# Patient Record
Sex: FEMALE | Race: WHITE | NOT HISPANIC OR LATINO | Employment: OTHER | URBAN - METROPOLITAN AREA
[De-identification: names, ages, dates, MRNs, and addresses within clinical notes are randomized per-mention and may not be internally consistent; named-entity substitution may affect disease eponyms.]

---

## 2017-01-01 ENCOUNTER — GENERIC CONVERSION - ENCOUNTER (OUTPATIENT)
Dept: OTHER | Facility: OTHER | Age: 82
End: 2017-01-01

## 2017-01-07 ENCOUNTER — APPOINTMENT (OUTPATIENT)
Dept: LAB | Facility: CLINIC | Age: 82
End: 2017-01-07
Payer: MEDICARE

## 2017-01-07 ENCOUNTER — TRANSCRIBE ORDERS (OUTPATIENT)
Dept: LAB | Facility: CLINIC | Age: 82
End: 2017-01-07

## 2017-01-07 DIAGNOSIS — R29.6 FALLS FREQUENTLY: Primary | ICD-10-CM

## 2017-01-07 DIAGNOSIS — R29.6 FALLS FREQUENTLY: ICD-10-CM

## 2017-01-07 LAB
CK SERPL-CCNC: 81 U/L (ref 26–192)
TSH SERPL DL<=0.05 MIU/L-ACNC: 3.66 UIU/ML (ref 0.36–3.74)

## 2017-01-07 PROCEDURE — 82550 ASSAY OF CK (CPK): CPT

## 2017-01-07 PROCEDURE — 36415 COLL VENOUS BLD VENIPUNCTURE: CPT

## 2017-01-07 PROCEDURE — 84443 ASSAY THYROID STIM HORMONE: CPT

## 2017-01-17 ENCOUNTER — GENERIC CONVERSION - ENCOUNTER (OUTPATIENT)
Dept: OTHER | Facility: OTHER | Age: 82
End: 2017-01-17

## 2017-01-17 ENCOUNTER — HOSPITAL ENCOUNTER (OUTPATIENT)
Dept: GASTROENTEROLOGY | Facility: HOSPITAL | Age: 82
Discharge: HOME | End: 2017-01-17
Payer: MEDICARE

## 2017-01-17 PROCEDURE — 91110 GI TRC IMG INTRAL ESOPH-ILE: CPT

## 2017-02-25 ENCOUNTER — APPOINTMENT (OUTPATIENT)
Dept: LAB | Facility: CLINIC | Age: 82
End: 2017-02-25
Payer: MEDICARE

## 2017-02-25 ENCOUNTER — TRANSCRIBE ORDERS (OUTPATIENT)
Dept: LAB | Facility: CLINIC | Age: 82
End: 2017-02-25

## 2017-02-25 DIAGNOSIS — Z79.899 ENCOUNTER FOR LONG-TERM (CURRENT) USE OF OTHER MEDICATIONS: ICD-10-CM

## 2017-02-25 DIAGNOSIS — M81.0 OSTEOPOROSIS, UNSPECIFIED: ICD-10-CM

## 2017-02-25 DIAGNOSIS — M81.0 OSTEOPOROSIS, UNSPECIFIED: Primary | ICD-10-CM

## 2017-02-25 LAB
ANION GAP SERPL CALCULATED.3IONS-SCNC: 9 MMOL/L (ref 4–13)
BUN SERPL-MCNC: 31 MG/DL (ref 5–25)
CALCIUM SERPL-MCNC: 9.3 MG/DL (ref 8.3–10.1)
CHLORIDE SERPL-SCNC: 102 MMOL/L (ref 100–108)
CO2 SERPL-SCNC: 28 MMOL/L (ref 21–32)
CREAT SERPL-MCNC: 1.43 MG/DL (ref 0.6–1.3)
GFR SERPL CREATININE-BSD FRML MDRD: 35.1 ML/MIN/1.73SQ M
GLUCOSE SERPL-MCNC: 126 MG/DL (ref 65–140)
POTASSIUM SERPL-SCNC: 4.8 MMOL/L (ref 3.5–5.3)
SODIUM SERPL-SCNC: 139 MMOL/L (ref 136–145)

## 2017-02-25 PROCEDURE — 80048 BASIC METABOLIC PNL TOTAL CA: CPT

## 2017-02-25 PROCEDURE — 36415 COLL VENOUS BLD VENIPUNCTURE: CPT

## 2017-07-07 ENCOUNTER — TRANSCRIBE ORDERS (OUTPATIENT)
Dept: LAB | Facility: CLINIC | Age: 82
End: 2017-07-07

## 2017-07-07 ENCOUNTER — APPOINTMENT (OUTPATIENT)
Dept: LAB | Facility: CLINIC | Age: 82
End: 2017-07-07
Payer: MEDICARE

## 2017-07-07 DIAGNOSIS — E11.42 DIABETIC POLYNEUROPATHY ASSOCIATED WITH TYPE 2 DIABETES MELLITUS (HCC): ICD-10-CM

## 2017-07-07 DIAGNOSIS — M81.0 SENILE OSTEOPOROSIS: ICD-10-CM

## 2017-07-07 DIAGNOSIS — M15.0 PRIMARY GENERALIZED HYPERTROPHIC OSTEOARTHROSIS: ICD-10-CM

## 2017-07-07 DIAGNOSIS — Z79.899 ENCOUNTER FOR LONG-TERM (CURRENT) USE OF OTHER MEDICATIONS: ICD-10-CM

## 2017-07-07 DIAGNOSIS — M81.0 SENILE OSTEOPOROSIS: Primary | ICD-10-CM

## 2017-07-07 DIAGNOSIS — I73.00 RAYNAUD'S DISEASE WITHOUT GANGRENE: ICD-10-CM

## 2017-07-07 LAB
ALBUMIN SERPL BCP-MCNC: 3.2 G/DL (ref 3.5–5)
ALP SERPL-CCNC: 82 U/L (ref 46–116)
ALT SERPL W P-5'-P-CCNC: 21 U/L (ref 12–78)
ANION GAP SERPL CALCULATED.3IONS-SCNC: 9 MMOL/L (ref 4–13)
AST SERPL W P-5'-P-CCNC: 20 U/L (ref 5–45)
BACTERIA UR QL AUTO: ABNORMAL /HPF
BASOPHILS # BLD AUTO: 0.02 THOUSANDS/ΜL (ref 0–0.1)
BASOPHILS NFR BLD AUTO: 0 % (ref 0–1)
BILIRUB SERPL-MCNC: 0.2 MG/DL (ref 0.2–1)
BILIRUB UR QL STRIP: NEGATIVE
BUN SERPL-MCNC: 20 MG/DL (ref 5–25)
CALCIUM SERPL-MCNC: 9.3 MG/DL (ref 8.3–10.1)
CHLORIDE SERPL-SCNC: 104 MMOL/L (ref 100–108)
CLARITY UR: ABNORMAL
CO2 SERPL-SCNC: 29 MMOL/L (ref 21–32)
COLOR UR: YELLOW
CREAT SERPL-MCNC: 1.12 MG/DL (ref 0.6–1.3)
CRP SERPL QL: <3 MG/L
EOSINOPHIL # BLD AUTO: 0.14 THOUSAND/ΜL (ref 0–0.61)
EOSINOPHIL NFR BLD AUTO: 3 % (ref 0–6)
ERYTHROCYTE [DISTWIDTH] IN BLOOD BY AUTOMATED COUNT: 15.9 % (ref 11.6–15.1)
ERYTHROCYTE [SEDIMENTATION RATE] IN BLOOD: 12 MM/HOUR (ref 0–20)
GFR SERPL CREATININE-BSD FRML MDRD: 46.6 ML/MIN/1.73SQ M
GLUCOSE P FAST SERPL-MCNC: 137 MG/DL (ref 65–99)
GLUCOSE UR STRIP-MCNC: NEGATIVE MG/DL
HCT VFR BLD AUTO: 37.9 % (ref 34.8–46.1)
HGB BLD-MCNC: 11.8 G/DL (ref 11.5–15.4)
HGB UR QL STRIP.AUTO: ABNORMAL
KETONES UR STRIP-MCNC: NEGATIVE MG/DL
LEUKOCYTE ESTERASE UR QL STRIP: ABNORMAL
LYMPHOCYTES # BLD AUTO: 1.27 THOUSANDS/ΜL (ref 0.6–4.47)
LYMPHOCYTES NFR BLD AUTO: 23 % (ref 14–44)
MCH RBC QN AUTO: 28 PG (ref 26.8–34.3)
MCHC RBC AUTO-ENTMCNC: 31.1 G/DL (ref 31.4–37.4)
MCV RBC AUTO: 90 FL (ref 82–98)
MONOCYTES # BLD AUTO: 0.65 THOUSAND/ΜL (ref 0.17–1.22)
MONOCYTES NFR BLD AUTO: 12 % (ref 4–12)
NEUTROPHILS # BLD AUTO: 3.5 THOUSANDS/ΜL (ref 1.85–7.62)
NEUTS SEG NFR BLD AUTO: 62 % (ref 43–75)
NITRITE UR QL STRIP: POSITIVE
NON-SQ EPI CELLS URNS QL MICRO: ABNORMAL /HPF
PH UR STRIP.AUTO: 6 [PH] (ref 4.5–8)
PLATELET # BLD AUTO: 217 THOUSANDS/UL (ref 149–390)
PMV BLD AUTO: 11.2 FL (ref 8.9–12.7)
POTASSIUM SERPL-SCNC: 4.4 MMOL/L (ref 3.5–5.3)
PROT SERPL-MCNC: 7 G/DL (ref 6.4–8.2)
PROT UR STRIP-MCNC: NEGATIVE MG/DL
RBC # BLD AUTO: 4.22 MILLION/UL (ref 3.81–5.12)
RBC #/AREA URNS AUTO: ABNORMAL /HPF
SODIUM SERPL-SCNC: 142 MMOL/L (ref 136–145)
SP GR UR STRIP.AUTO: 1.02 (ref 1–1.03)
URATE SERPL-MCNC: 5 MG/DL (ref 2–6.8)
UROBILINOGEN UR QL STRIP.AUTO: 0.2 E.U./DL
WBC # BLD AUTO: 5.58 THOUSAND/UL (ref 4.31–10.16)
WBC #/AREA URNS AUTO: ABNORMAL /HPF

## 2017-07-07 PROCEDURE — 86140 C-REACTIVE PROTEIN: CPT

## 2017-07-07 PROCEDURE — 86038 ANTINUCLEAR ANTIBODIES: CPT

## 2017-07-07 PROCEDURE — 84550 ASSAY OF BLOOD/URIC ACID: CPT

## 2017-07-07 PROCEDURE — 85025 COMPLETE CBC W/AUTO DIFF WBC: CPT

## 2017-07-07 PROCEDURE — 81001 URINALYSIS AUTO W/SCOPE: CPT | Performed by: INTERNAL MEDICINE

## 2017-07-07 PROCEDURE — 85652 RBC SED RATE AUTOMATED: CPT

## 2017-07-07 PROCEDURE — 80053 COMPREHEN METABOLIC PANEL: CPT

## 2017-07-07 PROCEDURE — 36415 COLL VENOUS BLD VENIPUNCTURE: CPT

## 2017-07-10 LAB — RYE IGE QN: NEGATIVE

## 2017-07-20 ENCOUNTER — TRANSCRIBE ORDERS (OUTPATIENT)
Dept: ADMINISTRATIVE | Facility: HOSPITAL | Age: 82
End: 2017-07-20

## 2017-07-20 DIAGNOSIS — N39.0 URINARY TRACT INFECTION, SITE NOT SPECIFIED: Primary | ICD-10-CM

## 2017-07-25 ENCOUNTER — HOSPITAL ENCOUNTER (OUTPATIENT)
Dept: ULTRASOUND IMAGING | Facility: HOSPITAL | Age: 82
Discharge: HOME | End: 2017-07-25
Attending: UROLOGY
Payer: MEDICARE

## 2017-07-25 DIAGNOSIS — N39.0 URINARY TRACT INFECTION, SITE NOT SPECIFIED: ICD-10-CM

## 2017-07-25 PROCEDURE — 76770 US EXAM ABDO BACK WALL COMP: CPT

## 2017-08-30 ENCOUNTER — TRANSCRIBE ORDERS (OUTPATIENT)
Dept: LAB | Facility: CLINIC | Age: 82
End: 2017-08-30

## 2017-08-30 ENCOUNTER — APPOINTMENT (OUTPATIENT)
Dept: LAB | Facility: CLINIC | Age: 82
End: 2017-08-30
Payer: MEDICARE

## 2017-08-30 DIAGNOSIS — Z79.899 ENCOUNTER FOR LONG-TERM (CURRENT) USE OF OTHER MEDICATIONS: ICD-10-CM

## 2017-08-30 DIAGNOSIS — M81.0 SENILE OSTEOPOROSIS: ICD-10-CM

## 2017-08-30 DIAGNOSIS — M81.0 SENILE OSTEOPOROSIS: Primary | ICD-10-CM

## 2017-08-30 LAB
ANION GAP SERPL CALCULATED.3IONS-SCNC: 8 MMOL/L (ref 4–13)
BUN SERPL-MCNC: 23 MG/DL (ref 5–25)
CALCIUM SERPL-MCNC: 9.2 MG/DL (ref 8.3–10.1)
CHLORIDE SERPL-SCNC: 105 MMOL/L (ref 100–108)
CO2 SERPL-SCNC: 27 MMOL/L (ref 21–32)
CREAT SERPL-MCNC: 1.25 MG/DL (ref 0.6–1.3)
GFR SERPL CREATININE-BSD FRML MDRD: 40 ML/MIN/1.73SQ M
GLUCOSE SERPL-MCNC: 122 MG/DL (ref 65–140)
POTASSIUM SERPL-SCNC: 4.6 MMOL/L (ref 3.5–5.3)
SODIUM SERPL-SCNC: 140 MMOL/L (ref 136–145)

## 2017-08-30 PROCEDURE — 80048 BASIC METABOLIC PNL TOTAL CA: CPT

## 2017-08-30 PROCEDURE — 36415 COLL VENOUS BLD VENIPUNCTURE: CPT

## 2017-10-10 ENCOUNTER — APPOINTMENT (OUTPATIENT)
Dept: LAB | Facility: CLINIC | Age: 82
End: 2017-10-10
Payer: MEDICARE

## 2017-10-10 ENCOUNTER — TRANSCRIBE ORDERS (OUTPATIENT)
Dept: LAB | Facility: CLINIC | Age: 82
End: 2017-10-10

## 2017-10-10 DIAGNOSIS — Z79.899 NEED FOR PROPHYLACTIC CHEMOTHERAPY: ICD-10-CM

## 2017-10-10 DIAGNOSIS — M06.09 RHEUMATOID ARTHRITIS OF MULTIPLE SITES WITHOUT RHEUMATOID FACTOR (HCC): ICD-10-CM

## 2017-10-10 DIAGNOSIS — I73.00 SECONDARY RAYNAUD'S PHENOMENON: ICD-10-CM

## 2017-10-10 DIAGNOSIS — E13.42 DIABETIC POLYNEUROPATHY ASSOCIATED WITH OTHER SPECIFIED DIABETES MELLITUS (HCC): ICD-10-CM

## 2017-10-10 DIAGNOSIS — M81.0 SENILE OSTEOPOROSIS: Primary | ICD-10-CM

## 2017-10-10 DIAGNOSIS — E55.9 AVITAMINOSIS D: ICD-10-CM

## 2017-10-10 DIAGNOSIS — M81.0 SENILE OSTEOPOROSIS: ICD-10-CM

## 2017-10-10 LAB
25(OH)D3 SERPL-MCNC: 34.5 NG/ML (ref 30–100)
ALBUMIN SERPL BCP-MCNC: 3.3 G/DL (ref 3.5–5)
ALP SERPL-CCNC: 76 U/L (ref 46–116)
ALT SERPL W P-5'-P-CCNC: 25 U/L (ref 12–78)
ANION GAP SERPL CALCULATED.3IONS-SCNC: 7 MMOL/L (ref 4–13)
AST SERPL W P-5'-P-CCNC: 21 U/L (ref 5–45)
BACTERIA UR QL AUTO: ABNORMAL /HPF
BASOPHILS # BLD AUTO: 0.03 THOUSANDS/ΜL (ref 0–0.1)
BASOPHILS NFR BLD AUTO: 1 % (ref 0–1)
BILIRUB SERPL-MCNC: 0.3 MG/DL (ref 0.2–1)
BILIRUB UR QL STRIP: NEGATIVE
BUN SERPL-MCNC: 18 MG/DL (ref 5–25)
CALCIUM SERPL-MCNC: 9.1 MG/DL (ref 8.3–10.1)
CHLORIDE SERPL-SCNC: 103 MMOL/L (ref 100–108)
CLARITY UR: CLEAR
CO2 SERPL-SCNC: 29 MMOL/L (ref 21–32)
COLOR UR: YELLOW
CREAT SERPL-MCNC: 1.29 MG/DL (ref 0.6–1.3)
EOSINOPHIL # BLD AUTO: 0.13 THOUSAND/ΜL (ref 0–0.61)
EOSINOPHIL NFR BLD AUTO: 2 % (ref 0–6)
ERYTHROCYTE [DISTWIDTH] IN BLOOD BY AUTOMATED COUNT: 17.4 % (ref 11.6–15.1)
ERYTHROCYTE [SEDIMENTATION RATE] IN BLOOD: 10 MM/HOUR (ref 0–20)
GFR SERPL CREATININE-BSD FRML MDRD: 38 ML/MIN/1.73SQ M
GLUCOSE P FAST SERPL-MCNC: 124 MG/DL (ref 65–99)
GLUCOSE UR STRIP-MCNC: NEGATIVE MG/DL
HCT VFR BLD AUTO: 36.8 % (ref 34.8–46.1)
HGB BLD-MCNC: 11.5 G/DL (ref 11.5–15.4)
HGB UR QL STRIP.AUTO: NEGATIVE
KETONES UR STRIP-MCNC: NEGATIVE MG/DL
LEUKOCYTE ESTERASE UR QL STRIP: ABNORMAL
LYMPHOCYTES # BLD AUTO: 1.56 THOUSANDS/ΜL (ref 0.6–4.47)
LYMPHOCYTES NFR BLD AUTO: 26 % (ref 14–44)
MCH RBC QN AUTO: 27.3 PG (ref 26.8–34.3)
MCHC RBC AUTO-ENTMCNC: 31.3 G/DL (ref 31.4–37.4)
MCV RBC AUTO: 87 FL (ref 82–98)
MONOCYTES # BLD AUTO: 0.78 THOUSAND/ΜL (ref 0.17–1.22)
MONOCYTES NFR BLD AUTO: 13 % (ref 4–12)
NEUTROPHILS # BLD AUTO: 3.56 THOUSANDS/ΜL (ref 1.85–7.62)
NEUTS SEG NFR BLD AUTO: 58 % (ref 43–75)
NITRITE UR QL STRIP: NEGATIVE
NON-SQ EPI CELLS URNS QL MICRO: ABNORMAL /HPF
PH UR STRIP.AUTO: 5.5 [PH] (ref 4.5–8)
PLATELET # BLD AUTO: 245 THOUSANDS/UL (ref 149–390)
PMV BLD AUTO: 11.3 FL (ref 8.9–12.7)
POTASSIUM SERPL-SCNC: 4.7 MMOL/L (ref 3.5–5.3)
PROT SERPL-MCNC: 7 G/DL (ref 6.4–8.2)
PROT UR STRIP-MCNC: NEGATIVE MG/DL
RBC # BLD AUTO: 4.22 MILLION/UL (ref 3.81–5.12)
RBC #/AREA URNS AUTO: ABNORMAL /HPF
SODIUM SERPL-SCNC: 139 MMOL/L (ref 136–145)
SP GR UR STRIP.AUTO: <=1.005 (ref 1–1.03)
UROBILINOGEN UR QL STRIP.AUTO: 0.2 E.U./DL
WBC # BLD AUTO: 6.06 THOUSAND/UL (ref 4.31–10.16)
WBC #/AREA URNS AUTO: ABNORMAL /HPF

## 2017-10-10 PROCEDURE — 85025 COMPLETE CBC W/AUTO DIFF WBC: CPT

## 2017-10-10 PROCEDURE — 81001 URINALYSIS AUTO W/SCOPE: CPT | Performed by: INTERNAL MEDICINE

## 2017-10-10 PROCEDURE — 86200 CCP ANTIBODY: CPT

## 2017-10-10 PROCEDURE — 85652 RBC SED RATE AUTOMATED: CPT

## 2017-10-10 PROCEDURE — 86430 RHEUMATOID FACTOR TEST QUAL: CPT

## 2017-10-10 PROCEDURE — 82306 VITAMIN D 25 HYDROXY: CPT

## 2017-10-10 PROCEDURE — 36415 COLL VENOUS BLD VENIPUNCTURE: CPT

## 2017-10-10 PROCEDURE — 80053 COMPREHEN METABOLIC PANEL: CPT

## 2017-10-11 LAB — RHEUMATOID FACT SER QL LA: NEGATIVE

## 2017-10-12 LAB — CCP IGA+IGG SERPL IA-ACNC: 6 UNITS (ref 0–19)

## 2017-11-13 ENCOUNTER — GENERIC CONVERSION - ENCOUNTER (OUTPATIENT)
Dept: OTHER | Facility: OTHER | Age: 82
End: 2017-11-13

## 2017-11-24 ENCOUNTER — ALLSCRIPTS OFFICE VISIT (OUTPATIENT)
Dept: OTHER | Facility: OTHER | Age: 82
End: 2017-11-24

## 2017-11-24 DIAGNOSIS — R35.0 FREQUENCY OF MICTURITION: ICD-10-CM

## 2017-12-19 ENCOUNTER — TRANSCRIBE ORDERS (OUTPATIENT)
Dept: LAB | Facility: CLINIC | Age: 82
End: 2017-12-19

## 2017-12-19 ENCOUNTER — APPOINTMENT (OUTPATIENT)
Dept: LAB | Facility: CLINIC | Age: 82
End: 2017-12-19
Payer: MEDICARE

## 2017-12-19 DIAGNOSIS — M06.09 RHEUMATOID ARTHRITIS OF MULTIPLE SITES WITHOUT RHEUMATOID FACTOR (HCC): Primary | ICD-10-CM

## 2017-12-19 DIAGNOSIS — R35.0 FREQUENCY OF MICTURITION: ICD-10-CM

## 2017-12-19 DIAGNOSIS — Z79.899 ENCOUNTER FOR LONG-TERM (CURRENT) USE OF MEDICATIONS: ICD-10-CM

## 2017-12-19 DIAGNOSIS — M06.09 RHEUMATOID ARTHRITIS OF MULTIPLE SITES WITHOUT RHEUMATOID FACTOR (HCC): ICD-10-CM

## 2017-12-19 LAB
ALBUMIN SERPL BCP-MCNC: 3.2 G/DL (ref 3.5–5)
ALP SERPL-CCNC: 87 U/L (ref 46–116)
ALT SERPL W P-5'-P-CCNC: 25 U/L (ref 12–78)
ANION GAP SERPL CALCULATED.3IONS-SCNC: 8 MMOL/L (ref 4–13)
AST SERPL W P-5'-P-CCNC: 19 U/L (ref 5–45)
BACTERIA UR QL AUTO: ABNORMAL /HPF
BASOPHILS # BLD AUTO: 0.04 THOUSANDS/ΜL (ref 0–0.1)
BASOPHILS NFR BLD AUTO: 1 % (ref 0–1)
BILIRUB SERPL-MCNC: 0.3 MG/DL (ref 0.2–1)
BILIRUB UR QL STRIP: NEGATIVE
BUN SERPL-MCNC: 18 MG/DL (ref 5–25)
CALCIUM SERPL-MCNC: 8.9 MG/DL (ref 8.3–10.1)
CHLORIDE SERPL-SCNC: 104 MMOL/L (ref 100–108)
CLARITY UR: ABNORMAL
CO2 SERPL-SCNC: 27 MMOL/L (ref 21–32)
COLOR UR: YELLOW
CREAT SERPL-MCNC: 1.11 MG/DL (ref 0.6–1.3)
EOSINOPHIL # BLD AUTO: 0.24 THOUSAND/ΜL (ref 0–0.61)
EOSINOPHIL NFR BLD AUTO: 3 % (ref 0–6)
ERYTHROCYTE [DISTWIDTH] IN BLOOD BY AUTOMATED COUNT: 17.1 % (ref 11.6–15.1)
ERYTHROCYTE [SEDIMENTATION RATE] IN BLOOD: 10 MM/HOUR (ref 0–20)
GFR SERPL CREATININE-BSD FRML MDRD: 46 ML/MIN/1.73SQ M
GLUCOSE SERPL-MCNC: 114 MG/DL (ref 65–140)
GLUCOSE UR STRIP-MCNC: NEGATIVE MG/DL
HCT VFR BLD AUTO: 35.2 % (ref 34.8–46.1)
HGB BLD-MCNC: 11.1 G/DL (ref 11.5–15.4)
HGB UR QL STRIP.AUTO: ABNORMAL
KETONES UR STRIP-MCNC: NEGATIVE MG/DL
LEUKOCYTE ESTERASE UR QL STRIP: ABNORMAL
LYMPHOCYTES # BLD AUTO: 2.02 THOUSANDS/ΜL (ref 0.6–4.47)
LYMPHOCYTES NFR BLD AUTO: 27 % (ref 14–44)
MCH RBC QN AUTO: 27.3 PG (ref 26.8–34.3)
MCHC RBC AUTO-ENTMCNC: 31.5 G/DL (ref 31.4–37.4)
MCV RBC AUTO: 87 FL (ref 82–98)
MONOCYTES # BLD AUTO: 0.95 THOUSAND/ΜL (ref 0.17–1.22)
MONOCYTES NFR BLD AUTO: 13 % (ref 4–12)
NEUTROPHILS # BLD AUTO: 4.36 THOUSANDS/ΜL (ref 1.85–7.62)
NEUTS SEG NFR BLD AUTO: 56 % (ref 43–75)
NITRITE UR QL STRIP: NEGATIVE
NON-SQ EPI CELLS URNS QL MICRO: ABNORMAL /HPF
PH UR STRIP.AUTO: 5.5 [PH] (ref 4.5–8)
PLATELET # BLD AUTO: 248 THOUSANDS/UL (ref 149–390)
PMV BLD AUTO: 10.5 FL (ref 8.9–12.7)
POTASSIUM SERPL-SCNC: 4.5 MMOL/L (ref 3.5–5.3)
PROT SERPL-MCNC: 7.2 G/DL (ref 6.4–8.2)
PROT UR STRIP-MCNC: NEGATIVE MG/DL
RBC # BLD AUTO: 4.06 MILLION/UL (ref 3.81–5.12)
RBC #/AREA URNS AUTO: ABNORMAL /HPF
SODIUM SERPL-SCNC: 139 MMOL/L (ref 136–145)
SP GR UR STRIP.AUTO: 1.02 (ref 1–1.03)
UROBILINOGEN UR QL STRIP.AUTO: 0.2 E.U./DL
WBC # BLD AUTO: 7.61 THOUSAND/UL (ref 4.31–10.16)
WBC #/AREA URNS AUTO: ABNORMAL /HPF

## 2017-12-19 PROCEDURE — 80053 COMPREHEN METABOLIC PANEL: CPT

## 2017-12-19 PROCEDURE — 85025 COMPLETE CBC W/AUTO DIFF WBC: CPT

## 2017-12-19 PROCEDURE — 85652 RBC SED RATE AUTOMATED: CPT

## 2017-12-19 PROCEDURE — 81001 URINALYSIS AUTO W/SCOPE: CPT

## 2017-12-19 PROCEDURE — 36415 COLL VENOUS BLD VENIPUNCTURE: CPT

## 2018-01-09 ENCOUNTER — ALLSCRIPTS OFFICE VISIT (OUTPATIENT)
Dept: OTHER | Facility: OTHER | Age: 83
End: 2018-01-09

## 2018-01-10 NOTE — PROGRESS NOTES
Assessment   1  Urine frequency (788 41) (R35 0)   2  Urine incontinence (788 30) (R32)    Plan   Urine incontinence    · Myrbetriq 50 MG Oral Tablet Extended Release 24 Hour; Take 1 tablet daily   Rx By: Faye Vasquez; Dispense: 0 Days ; #:90 Tablet Extended Release 24 Hour; Refill: 3;For: Urine incontinence; JUVENTINO = N; Dispense Sample   · Follow-up visit in 1 month Evaluation and Treatment  Follow-up  Status: Complete  Done:    [id number]   Ordered; For: Urine incontinence; Ordered By: Faye Vasquez Performed:  Due: 27EPX8879; Last Updated By: Cherrie Jeter; 1/9/2018 11:02:27 AM    Discussion/Summary   Discussion Summary:    We discussed her concerns  We discussed possible medical management at this point  The problem is that first-line therapy with anticholinergics will not be tolerated due to her dry mouth problems  He we discussed side effects risks and benefits of anticholinergics  We also discussed side effect profile for Myrbetriq  She would like to try this  I do have samples and will give her samples of Myrbetriq 50 mg to try for 1 month  However she understands there may be difficulty with insurance coverage  We may be able to help Re certify, as she will not tolerate anticholinergics  She will follow up in 1 month to evaluate her progress  Patient's Capacity to Self-Care: Patient is able to Self-Care  Medication SE Review and Pt Understands Tx: Possible side effects of new medications were reviewed with the patient/guardian today  The treatment plan was reviewed with the patient/guardian  The patient/guardian understands and agrees with the treatment plan      Chief Complaint   Chief Complaint Free Text Note Form: Patient presents for urinary frequency and incontinence      History of Present Illness   HPI: Patient has tried behavior modification for the last month   She still complains of significant urinary incontinence especially at night when she wakes to void, she has no control  has a problem with dry mouth and needs to take fluids continually  Review of Systems   Complete-Female Urology:      Constitutional: No fever, no chills, feels well, no tiredness, no recent weight gain or weight loss  Respiratory: No complaints of shortness of breath, no wheezing, no cough, no SOB on exertion, no orthopnea, no PND  Cardiovascular: No complaints of slow heart rate, no fast heart rate, no chest pain, no palpitations, no leg claudication, no lower extremity edema  Gastrointestinal: No complaints of abdominal pain, no constipation, no nausea or vomiting, no diarrhea, no bloody stools  Genitourinary: feelings of urinary urgency,-- Empty sensation,-- incontinence-- (wears 2 pads at night  none during the day)-- and-- stream quality good, but-- no urinary hesitancy-- and-- no hematuria--       The patient presents with complaints of dysuria (one day last week she had pain and burning when she urinated)  The patient presents with complaints of nocturia (1-2 times per night)      Musculoskeletal: No complaints of arthralgias, no myalgias, no joint swelling or stiffness, no limb pain or swelling  Integumentary: No complaints of skin rash or lesions, no itching, no skin wounds, no breast pain or lump  Hematologic/Lymphatic: No complaints of swollen glands, no swollen glands in the neck, does not bleed easily, does not bruise easily  Neurological: No complaints of headache, no confusion, no convulsions, no numbness, no dizziness or fainting, no tingling, no limb weakness, no difficulty walking  Active Problems   1  Anemia (285 9) (D64 9)   2  Anemia due to GI blood loss (280 0) (D50 0)   3  History of bladder infections (V13 02) (Z87 440)   4  History of colon polyps (V12 72) (Z86 010)   5  Urine frequency (788 41) (R35 0)   6  Urine incontinence (788 30) (R32)    Past Medical History   1   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, with long-term current     use of insulin (250 20,V58 67) (E13 01,Z79 4)   2  History of back problems   3  History of hypertension (V12 59) (Z86 79)   4  History of orthopedic surgery (V45 89) (C96 592)    Surgical History   1  History of Back Surgery   2  History of Gastric Surgery   3  History of Hip Replacement   4  History of Knee Surgery    Family History   Mother    1  Family history of diabetes mellitus (V18 0) (Z83 3)  Father    2  Family history of cardiac disorder (V17 49) (Z82 49)   3  Family history of hypertension (V17 49) (Z82 49)    Social History    · Employed   ·    · Never a smoker   · No alcohol use   · Non-smoker (V49 89) (Z78 9)    Current Meds    1  Aspirin TABS; Therapy: (Recorded:24Nov2017) to Recorded   2  Cymbalta 20 MG Oral Capsule Delayed Release Particles; Therapy: (Recorded:24Nov2017) to Recorded   3  Lisinopril 10 MG Oral Tablet; Therapy: (Recorded:24Nov2017) to Recorded   4  Lomotil TABS; Therapy: (Recorded:24Nov2017) to Recorded   5  Lyrica 25 MG Oral Capsule; Therapy: (Recorded:24Nov2017) to Recorded   6  MetFORMIN HCl - 1000 MG Oral Tablet; Therapy: (Recorded:24Nov2017) to Recorded   7  Neurontin 100 MG Oral Capsule; Therapy: (Recorded:24Nov2017) to Recorded   8  PriLOSEC 20 MG CPDR; Therapy: ([phone]) to Recorded   9  TraMADol HCl - 50 MG Oral Tablet; Therapy: (Recorded:24Nov2017) to Recorded   10  Vitamin B-6 TABS; Therapy: (Recorded:24Nov2017) to Recorded   11  Vitamin D3 1000 UNIT Oral Capsule; Therapy: (Recorded:24Nov2017) to Recorded    Allergies   1  Cipro TABS  2  No Known Environmental Allergies   3   No Known Food Allergies    Vitals   Vital Signs    Recorded: 46GPG9226 10:36AM   Heart Rate 80   Systolic 302   Diastolic 70   Height 4 ft 11 in   Weight 144 lb    BMI Calculated 29 08   BSA Calculated 1 6     Physical Exam        Constitutional      General appearance: No acute distress, well appearing and well nourished  Abdomen      Abdomen: Non-tender, no masses  Musculoskeletal      Gait and station: Abnormal  -- Walks with a cane        Signatures    Electronically signed by : DEYVI Montana ; Jan 9 2018 12:28PM EST                       (Author)

## 2018-01-15 NOTE — RESULT NOTES
Message    Colon polyps removed came back as tubular adenomas  Repeat colonoscopy in one year due to the preparation reasons  pt is aware  ph         Verified Results  (1) TISSUE EXAM [id number] 01:18PM Donna Rouse     Test Name Result Flag Reference   LAB AP CASE REPORT (Report)     Surgical Pathology Report             Case: [id number]                   Authorizing Provider: Vane Stewart MD     Collected:      12/23/2016 1318        Ordering Location:   [facility]    Received:      12/23/2016 [address] [place] [address] Endoscopy                               Pathologist:      Jennifer Brower DO                               Specimens:  A) - Stomach, cold biopsy gastric body                                 B) - Polyp, Colorectal, hot snare proximal ascending polyps x 2                    C) - Polyp, Colorectal, hot snare transverse colon polyps x 2   LAB AP FINAL DIAGNOSIS (Report)     A  Stomach, body, biopsy:  - 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   - No H  pylori organisms identified on H&E and special stain   ([place]in-Starry)  B  Colon, proximal ascending, polyps x2, biopsy:  - Tubular adenomas (2)  - Negative for high-grade dysplasia  C  Colon, transverse, polyps x2, biopsy:  - Tubular adenoma involving all submitted fragments  - Negative for high-grade dysplasia  Appropriate positive and negative controls obtained  Interpretation performed at [facility], [place] [address]  Electronically signed by Jennifer Brower DO on 12/28/2016 at 8:41 AM   LAB AP SURGICAL ADDITIONAL INFORMATION (Report)     These tests were developed and their performance characteristics   determined by [facility] or [facility]  They may not be cleared or approved by the [place]  Food and   Drug Administration  The FDA has determined that such clearance or   approval is not necessary   These tests are used for clinical purposes  They should not be regarded as investigational or for research  This   laboratory has been approved by [place], designated as a high-complexity   laboratory and is qualified to perform these tests  LAB AP GROSS DESCRIPTION (Report)     A  The specimen is received in formalin, labeled with the patient's name   and hospital number, and is designated gastric body biopsy  The   specimen consists of 2 tan-pink soft to friable tissue fragments measuring   0 3 cm and 0 5 cm in greatest dimension  Entirely submitted  One cassette  B  The specimen is received in formalin, labeled with the patient's name   and hospital number, and is designated proximal ascending polyps ??2 the   specimen consists of 2 tan-pink polypoid soft tissue fragments measuring   0 5 cm and 0 8 cm in greatest dimension  Entirely submitted  One cassette  C  The specimen is received in formalin, labeled with the patient's name   and hospital number, and is designated transverse colon polyps ??2 the   specimen consists of 4 tan-pink soft tissue fragments measuring 0 2 cm,   0 3 cm, 0 4 cm, and 0 4 cm in greatest dimension  Entirely submitted  One   cassette  Note: The estimated total formalin fixation time based upon information   provided by the submitting clinician and the standard processing schedule   is over 72 hours    [facility]   LAB AP CLINICAL INFORMATION R/o h pylori     R/o h pylori

## 2018-01-16 NOTE — MISCELLANEOUS
Message  GI Reminder Recall [facility]:   Date: 11/13/2017   Dear Mahi Hayward:     Review of our records shows you are due for the following: Colonoscopy  Our records indicate that you are due at this time to have a follow-up examination for a colonoscopy  As you now, these tests are done to prevent colon cancer, a very common disease in the [place] and responsible for the thousands of patient deaths each year  We at [facility] are concerned for your health, and would very much appreciate you getting in touch with us at your earliest convenience, Again, this examination is vital to your proper health maintenance and for the prevention of cancer  Please call the following office to schedule your appointment:   [address], [address], [place], [address] [place] [address]  [phone]  We look forward to hearing from you!      Sincerely,     Dr [facility]            Signatures   Electronically signed by : Fide Trinidad, ; Nov 13 2017  8:51AM EST                       (Author)

## 2018-01-17 NOTE — CONSULTS
Assessment    1  Urine frequency (788 41) (R35 0)   2  Urine incontinence (788 30) (R32)   3  History of bladder infections (V13 02) (Z87 440)    Plan  Urine frequency    · (1) URINALYSIS w URINE C/S REFLEX (will reflex a microscopy if leukocytes, occult  blood, or nitrites are not within normal limits); Status:Active - Retrospective Authorization; Requested Z:[id number];    Perform:[facility]; YJM:[id number]; Last Updated Marylin Jeter; 11/24/2017 12:00:28 PM;Ordered; For:Urine frequency; Ordered By:Thomas Renee;  Urine incontinence    · Follow-up visit in 1 month Evaluation and Treatment  Follow-up  Status: Complete  Done:  [id number]   Ordered; For: Urine incontinence; Ordered By: Cristy Moore Performed:  Due: 91GEF7043; Last Updated By: Kory Griffiths; 11/24/2017 12:01:29 PM    Discussion/Summary  Discussion Summary:   I discussed the situation with the patient and her daughter  It is unclear whether she truly has a urologic condition  We discussed the importance of behavior modification with restricting fluids 2 hours before bedtime and double voiding as well  I would like to check a urine culture to ensure a sterile urine  They will also ensure that she is hydrating adequately  If she is still having complaints subsequently, we discussed adding medication such as Myrbetriq  For now will try to avoid medication  They are satisfied with this plan  Patient's Capacity to Self-Care: Patient is able to Self-Care  Goals and Barriers: The patient has the current Goals: Improve urinary symptoms  The patent has the current Barriers: None  Medication SE Review and Pt Understands Tx: The treatment plan was reviewed with the patient/guardian   The patient/guardian understands and agrees with the treatment plan      Chief Complaint  Chief Complaint Free Text Note Form: Patient presents for urine frequency, incontinence and history of UTI      History of Present Illness  HPI: 80-year-old woman referred for evaluation of lower urinary tract symptoms  She presents today with her daughter accompanying her  I have treated her  Sylvia Huynh in the past   She primarily complains of nocturia and nocturnal enuresis when on her way to the bathroom at night  She does not really have any daytime complaints  She wakes at night 2 to 3 times with this problem  Interestingly though she was on vacation 1 week ago and did not have any nocturnal problems  She typically drinks coffee at night and some water  No other beverages  She saw Romario Lopez a couple of months ago for these symptoms  An ultrasound was obtained and was normal as was her urine sample  [facility] was ordered however was canceled due to a family emergency  She said that she was sent a bill for $50 became very upset and chose not to return there  She gives a history of prior urinary tract infection, although I do not have any urine cultures to confirm this  Review of Systems  Complete-Female Urology:   Constitutional: No fever, no chills, feels well, no tiredness, no recent weight gain or weight loss  Respiratory: No complaints of shortness of breath, no wheezing, no cough, no SOB on exertion, no orthopnea, no PND  Cardiovascular: No complaints of slow heart rate, no fast heart rate, no chest pain, no palpitations, no leg claudication, no lower extremity edema  Gastrointestinal: No complaints of abdominal pain, no constipation, no nausea or vomiting, no diarrhea, no bloody stools  Genitourinary: dysuria, feelings of urinary urgency, Empty sensation and stream quality fair, but as noted in HPI, no urinary hesitancy, no incontinence and no hematuria    The patient presents with complaints of nocturia (Wears 3-4 pads )  Musculoskeletal: No complaints of arthralgias, no myalgias, no joint swelling or stiffness, no limb pain or swelling  Integumentary: No complaints of skin rash or lesions, no itching, no skin wounds, no breast pain or lump  Hematologic/Lymphatic: No complaints of swollen glands, no swollen glands in the neck, does not bleed easily, does not bruise easily  Neurological: No complaints of headache, no confusion, no convulsions, no numbness, no dizziness or fainting, no tingling, no limb weakness, no difficulty walking  ROS Reviewed:   ROS reviewed  Active Problems    1  Anemia (285 9) (D64 9)   2  Anemia due to GI blood loss (280 0) (D50 0)   3  History of bladder infections (V13 02) (Z87 440)   4  History of colon polyps (V12 72) (Z86 010)   5  Urine frequency (788 41) (R35 0)   6  Urine incontinence (788 30) (R32)    Past Medical History  Active Problems And Past Medical History Reviewed: The active problems and past medical history were reviewed and updated today  Surgical History  Surgical History Reviewed: The surgical history was reviewed and updated today  Family History  Family History Reviewed: The family history was reviewed and updated today  Social History  Social History Reviewed: The social history was reviewed and updated today  Current Meds  Medication List Reviewed: The medication list was reviewed and updated today  Vitals  Vital Signs    Recorded: 66YMJ8694 11:25AM   Heart Rate 68   Systolic 346   Diastolic 80   Height 4 ft 11 in   Weight 148 lb 2 oz   BMI Calculated 29 92   BSA Calculated 1 62     Physical Exam    Constitutional   General appearance: No acute distress, well appearing and well nourished  Pulmonary   Respiratory effort: No increased work of breathing or signs of respiratory distress  Cardiovascular   Examination of extremities for edema and/or varicosities: Normal     Abdomen   Abdomen: Non-tender, no masses  Musculoskeletal   Gait and station: Abnormal   Uses a walker  Neurologic   Sensation: No sensory loss  Additional Exam:   no CVA tenderness, no suprapubic tenderness   Affect normal       Future Appointments    Date/Time Provider Specialty Site   01/09/2018 10:30 AM DEYVI Porter   Urology [facility]     Signatures   Electronically signed by : DEYVI Slade ; Nov 24 2017 12:52PM EST                       (Author)

## 2018-01-22 VITALS
SYSTOLIC BLOOD PRESSURE: 126 MMHG | DIASTOLIC BLOOD PRESSURE: 80 MMHG | BODY MASS INDEX: 29.86 KG/M2 | HEART RATE: 68 BPM | HEIGHT: 59 IN | WEIGHT: 148.13 LBS

## 2018-01-23 VITALS
HEIGHT: 59 IN | BODY MASS INDEX: 29.03 KG/M2 | DIASTOLIC BLOOD PRESSURE: 70 MMHG | HEART RATE: 80 BPM | WEIGHT: 144 LBS | SYSTOLIC BLOOD PRESSURE: 120 MMHG

## 2018-01-30 ENCOUNTER — HOSPITAL ENCOUNTER (EMERGENCY)
Facility: HOSPITAL | Age: 83
Discharge: HOME | End: 2018-01-30
Attending: EMERGENCY MEDICINE
Payer: MEDICARE

## 2018-01-30 VITALS
BODY MASS INDEX: 30.3 KG/M2 | WEIGHT: 150 LBS | RESPIRATION RATE: 18 BRPM | SYSTOLIC BLOOD PRESSURE: 129 MMHG | DIASTOLIC BLOOD PRESSURE: 65 MMHG | OXYGEN SATURATION: 96 % | TEMPERATURE: 98.2 F | HEART RATE: 83 BPM

## 2018-01-30 DIAGNOSIS — S81.011A KNEE LACERATION, RIGHT, INITIAL ENCOUNTER: Primary | ICD-10-CM

## 2018-01-30 PROCEDURE — 99283 EMERGENCY DEPT VISIT LOW MDM: CPT

## 2018-01-30 RX ORDER — ACETAMINOPHEN 325 MG/1
975 TABLET ORAL ONCE
Status: COMPLETED | OUTPATIENT
Start: 2018-01-30 | End: 2018-01-30

## 2018-01-30 RX ORDER — LIDOCAINE HYDROCHLORIDE AND EPINEPHRINE 10; 10 MG/ML; UG/ML
1 INJECTION, SOLUTION INFILTRATION; PERINEURAL ONCE
Status: COMPLETED | OUTPATIENT
Start: 2018-01-30 | End: 2018-01-30

## 2018-01-30 RX ADMIN — LIDOCAINE HYDROCHLORIDE,EPINEPHRINE BITARTRATE 1 ML: 10; .01 INJECTION, SOLUTION INFILTRATION; PERINEURAL at 18:44

## 2018-01-30 RX ADMIN — ACETAMINOPHEN 975 MG: 325 TABLET, FILM COATED ORAL at 17:58

## 2018-01-30 NOTE — ED PROVIDER NOTES
History  Chief Complaint   Patient presents with    Knee Injury     patient sustained right knee laceration while walking up the steps  Happened approx 1 hour ago  Bleeding controlled  History provided by:  Patient   used: No     61-year-old female presents with the 3 cm right knee laceration after tripping on her steps this afternoon  She states she fell going up the steps  She was able to get up and walk afterwards  Denies much pain with bending the knee  She did not strike her head  Takes aspirin  Reports some right low back pain but nothing midline  Denies any other injuries  On exam she is awake and alert, oriented  No cervical spine, thoracic or lumbar tenderness  Some mild right-sided muscular tenderness  Able to range the knee without difficulty  3 cm laceration without active bleeding  Plan anesthetization, irrigation and closure with sutures  Prior to Admission Medications   Prescriptions Last Dose Informant Patient Reported? Taking?    DULoxetine (CYMBALTA) 60 mg delayed release capsule  Spouse/Significant Other Yes No   Sig: Take 60 mg by mouth daily   aspirin 81 MG tablet  Spouse/Significant Other Yes No   Sig: Take 81 mg by mouth daily   calcium carbonate-vitamin D (OSCAL-D) 500 mg-200 units per tablet  Spouse/Significant Other Yes No   Sig: Take 1 tablet by mouth daily with breakfast   dicyclomine (BENTYL) 10 mg capsule  Spouse/Significant Other Yes No   Sig: Take 10 mg by mouth daily   diphenoxylate-atropine (LOMOTIL) 2 5-0 025 mg per tablet  Spouse/Significant Other Yes No   Sig: Take 1 tablet by mouth 4 (four) times a day as needed for diarrhea   docusate sodium (COLACE) 100 mg capsule   No No   Sig: Take 1 capsule by mouth 2 (two) times a day for 30 days   ferrous sulfate 325 (65 Fe) mg tablet   No No   Sig: Take 1 tablet by mouth 2 (two) times a day for 30 days   gabapentin (NEURONTIN) 300 mg capsule  Spouse/Significant Other Yes No   Sig: Take 300 mg by mouth 3 (three) times a day   lisinopril (ZESTRIL) 2 5 mg tablet  Spouse/Significant Other Yes No   Sig: Take 2 5 mg by mouth daily   metFORMIN (GLUCOPHAGE) 500 mg tablet  Spouse/Significant Other Yes No   Sig: Take 500 mg by mouth 2 (two) times a day with meals   omeprazole (PriLOSEC) 20 mg delayed release capsule  Spouse/Significant Other Yes No   Sig: Take 20 mg by mouth daily   pregabalin (LYRICA) 50 mg capsule  Spouse/Significant Other Yes No   Sig: Take 50 mg by mouth daily after dinner   pyridoxine (B-6) 100 MG tablet  Spouse/Significant Other Yes No   Sig: Take 100 mg by mouth daily      Facility-Administered Medications: None       Past Medical History:   Diagnosis Date    Arthritis     [facility]     [facility]     Diverticulosis     Hypertension        Past Surgical History:   Procedure Laterality Date    BACK SURGERY      EGD AND COLONOSCOPY N/A 12/23/2016    Procedure: EGD AND COLONOSCOPY;  Surgeon: Denisha Kang MD;  Location: [facility]; Service:     JOINT REPLACEMENT      bilat knees and hips    OTHER SURGICAL HISTORY      pelvis rods    REPLACEMENT TOTAL KNEE BILATERAL         Family History   Problem Relation Age of Onset    Cancer Brother      I have reviewed and agree with the history as documented  Social History   Substance Use Topics    Smoking status: Former Smoker    Smokeless tobacco: Not on file    Alcohol use No        Review of Systems   Constitutional: Negative for activity change and appetite change  Eyes: Negative for photophobia  Respiratory: Negative for chest tightness and shortness of breath  Musculoskeletal: Positive for back pain  Negative for gait problem  Skin: Positive for wound  Neurological: Negative for dizziness, weakness and headaches  All other systems reviewed and are negative        Physical Exam  ED Triage Vitals   Temperature Pulse Respirations Blood Pressure SpO2   01/30/18 1628 01/30/18 1629 01/30/18 1628 01/30/18 1628 01/30/18 1629   98 2 °F (36 8 °C) 83 18 129/65 96 %      Temp Source Heart Rate Source Patient Position - Orthostatic VS BP Location FiO2 (%)   01/30/18 1628 01/30/18 1628 -- -- --   Oral Monitor         Pain Score       01/30/18 1628       No Pain           Orthostatic Vital Signs  Vitals:    01/30/18 1628 01/30/18 1629   BP: 129/65    Pulse:  83       Physical Exam   Constitutional: She is oriented to person, place, and time  She appears well-developed and well-nourished  No distress  HENT:   Head: Normocephalic and atraumatic  Neck: Normal range of motion  No cervical tenderness  Cardiovascular: Normal rate and regular rhythm  Pulmonary/Chest: Effort normal  No respiratory distress  Musculoskeletal: Normal range of motion  She exhibits no deformity  3 cm horizontal laceration on the right knee  No active hemorrhage  Mild tenderness of the right lumbar musculature  No midline tenderness  Neurological: She is alert and oriented to person, place, and time  No sensory deficit  She exhibits normal muscle tone  Coordination normal    Skin: Skin is warm and dry  Psychiatric: She has a normal mood and affect  Her behavior is normal    Nursing note and vitals reviewed  ED Medications  Medications   lidocaine-epinephrine (XYLOCAINE/EPINEPHRINE) 1 %-1:100,000 injection 1 mL (1 mL Infiltration Given 1/30/18 1844)   acetaminophen (TYLENOL) tablet 975 mg (975 mg Oral Given 1/30/18 1758)       Diagnostic Studies  Results Reviewed     None                 No orders to display              Procedures  Lac Repair  Date/Time: 1/30/2018 7:06 PM  Performed by: Tom Lopez  Authorized by: Tom Lopez   Consent: Verbal consent obtained    Consent given by: patient  Patient identity confirmed: verbally with patient  Body area: lower extremity  Location details: right knee  Laceration length: 3 cm    Anesthesia:  Local Anesthetic: lidocaine 1% with epinephrine    Wound Dehiscence:  Superficial Wound Dehiscence: simple closure      Procedure Details:  Skin closure: 4-0 nylon  Technique: running  Dressing: gauze roll  Patient tolerance: Patient tolerated the procedure well with no immediate complications             Phone Contacts  ED Phone Contact    ED Course  ED Course                                MDM  Number of Diagnoses or Management Options  Knee laceration, right, initial encounter:   Diagnosis management comments: 25-year-old female presented with a right knee laceration after tripping up her stairs  Mild right low back pain  Otherwise no injuries  Laceration repaired without complication  Patient ambulatory with cane as usual   Will have her return for surgery removal in 2 weeks or sooner if complications develop  Patient Progress  Patient progress: improved    CritCare Time    Disposition  Final diagnoses:   Knee laceration, right, initial encounter     Time reflects when diagnosis was documented in both MDM as applicable and the Disposition within this note     Time User Action Codes Description Comment    1/30/2018  7:04 PM Myrna Fernandez Add [S81 011A] Knee laceration, right, initial encounter       ED Disposition     ED Disposition Condition Comment    Discharge  [facility] discharge to home/self care  Condition at discharge: Good        Follow-up Information     Follow up With Specialties Details Why Contact Info Additional Information    [facility] Emergency Medicine  For suture removal in 2 weeks [address]  [phone] [facility], [address], [place], [place], [address]        Patient's Medications   Discharge Prescriptions    No medications on file     No discharge procedures on file      ED Provider  Electronically Signed by           Marty Smith MD  01/30/18 8347

## 2018-01-31 NOTE — DISCHARGE INSTRUCTIONS
Laceration   WHAT YOU NEED TO KNOW:   A laceration is an injury to the skin and the soft tissue underneath it  Lacerations happen when you are cut or hit by something  They can happen anywhere on the body  DISCHARGE INSTRUCTIONS:   Return to the emergency department if:   · You have heavy bleeding or bleeding that does not stop after 10 minutes of holding firm, direct pressure over the wound  · Your wound opens up  Contact your healthcare provider if:   · You have a fever or chills  · Your laceration is red, warm, or swollen  · You have red streaks on your skin coming from your wound  · You have white or yellow drainage from the wound that smells bad  · You have pain that gets worse, even after treatment  · You have questions or concerns about your condition or care  Medicines:   · Prescription pain medicine  may be given  Ask how to take this medicine safely  · Antibiotics  help treat or prevent a bacterial infection  · Take your medicine as directed  Contact your healthcare provider if you think your medicine is not helping or if you have side effects  Tell him or her if you are allergic to any medicine  Keep a list of the medicines, vitamins, and herbs you take  Include the amounts, and when and why you take them  Bring the list or the pill bottles to follow-up visits  Carry your medicine list with you in case of an emergency  Care for your wound as directed:   · Do not get your wound wet  until your healthcare provider says it is okay  Do not soak your wound in water  Do not go swimming until your healthcare provider says it is okay  Carefully wash the wound with soap and water  Gently pat the area dry or allow it to air dry  · Change your bandages  when they get wet, dirty, or after washing  Apply new, clean bandages as directed  Do not apply elastic bandages or tape too tight  Do not put powders or lotions over your incision  · Apply antibiotic ointment as directed  Your healthcare provider may give you antibiotic ointment to put over your wound if you have stitches  If you have strips of tape over your incision, let them dry up and fall off on their own  If they do not fall off within 14 days, gently remove them  If you have glue over your wound, do not remove or pick at it  If your glue comes off, do not replace it with glue that you have at home  · Check your wound every day for signs of infection such as swelling, redness, or pus  Self-care:   · Apply ice  on your wound for 15 to 20 minutes every hour or as directed  Use an ice pack, or put crushed ice in a plastic bag  Cover it with a towel  Ice helps prevent tissue damage and decreases swelling and pain  · Use a splint as directed  A splint will decrease movement and stress on your wound  It may help it heal faster  A splint may be used for lacerations over joints or areas of your body that bend  Ask your healthcare provider how to apply and remove a splint  · Decrease scarring of your wound  by applying ointments as directed  Do not apply ointments until your healthcare provider says it is okay  You may need to wait until your wound is healed  Ask which ointment to buy and how often to use it  After your wound is healed, use sunscreen over the area when you are out in the sun  You should do this for at least 6 months to 1 year after your injury  Follow up with your healthcare provider as directed: You may need to follow up in 24 to 48 hours to have your wound checked for infection  You will need to return in 3 to 14 days if you have stitches or staples so they can be removed  Care for your wound as directed to prevent infection and help it heal  Write down your questions so you remember to ask them during your visits  © 2017 [employer] Information is for End User's use only and may not be sold, redistributed or otherwise used for commercial purposes   All illustrations and images included in [facility] are the copyrighted property of [employer]  or [employer]  The above information is an  only  It is not intended as medical advice for individual conditions or treatments  Talk to your doctor, nurse or pharmacist before following any medical regimen to see if it is safe and effective for you

## 2018-02-10 ENCOUNTER — APPOINTMENT (EMERGENCY)
Dept: RADIOLOGY | Facility: HOSPITAL | Age: 83
End: 2018-02-10
Payer: MEDICARE

## 2018-02-10 ENCOUNTER — HOSPITAL ENCOUNTER (EMERGENCY)
Facility: HOSPITAL | Age: 83
Discharge: HOME | End: 2018-02-10
Attending: EMERGENCY MEDICINE
Payer: MEDICARE

## 2018-02-10 VITALS
SYSTOLIC BLOOD PRESSURE: 125 MMHG | HEART RATE: 79 BPM | RESPIRATION RATE: 16 BRPM | OXYGEN SATURATION: 98 % | DIASTOLIC BLOOD PRESSURE: 69 MMHG | TEMPERATURE: 97.8 F

## 2018-02-10 DIAGNOSIS — M25.561 RIGHT KNEE PAIN: ICD-10-CM

## 2018-02-10 DIAGNOSIS — T14.8XXA HEMATOMA: ICD-10-CM

## 2018-02-10 DIAGNOSIS — L02.91 ABSCESS: Primary | ICD-10-CM

## 2018-02-10 DIAGNOSIS — L03.90 CELLULITIS: ICD-10-CM

## 2018-02-10 LAB
ANION GAP SERPL CALCULATED.3IONS-SCNC: 9 MMOL/L (ref 4–13)
APPEARANCE FLD: CLEAR
BASOPHILS # BLD AUTO: 0.02 THOUSANDS/ΜL (ref 0–0.1)
BASOPHILS NFR BLD AUTO: 0 % (ref 0–1)
BUN SERPL-MCNC: 18 MG/DL (ref 5–25)
CALCIUM SERPL-MCNC: 9.5 MG/DL (ref 8.3–10.1)
CHLORIDE SERPL-SCNC: 99 MMOL/L (ref 100–108)
CO2 SERPL-SCNC: 28 MMOL/L (ref 21–32)
COLOR FLD: YELLOW
CREAT SERPL-MCNC: 1.27 MG/DL (ref 0.6–1.3)
CRP SERPL QL: 6.7 MG/L
EOSINOPHIL # BLD AUTO: 0.5 THOUSAND/ΜL (ref 0–0.61)
EOSINOPHIL NFR BLD AUTO: 7 % (ref 0–6)
ERYTHROCYTE [DISTWIDTH] IN BLOOD BY AUTOMATED COUNT: 16.8 % (ref 11.6–15.1)
ERYTHROCYTE [SEDIMENTATION RATE] IN BLOOD: 14 MM/HOUR (ref 0–20)
GFR SERPL CREATININE-BSD FRML MDRD: 39 ML/MIN/1.73SQ M
GLUCOSE SERPL-MCNC: 98 MG/DL (ref 65–140)
HCT VFR BLD AUTO: 32.5 % (ref 34.8–46.1)
HGB BLD-MCNC: 10.3 G/DL (ref 11.5–15.4)
LYMPHOCYTES # BLD AUTO: 1.75 THOUSANDS/ΜL (ref 0.6–4.47)
LYMPHOCYTES # SNV MANUAL: 38 %
LYMPHOCYTES NFR BLD AUTO: 23 % (ref 14–44)
MCH RBC QN AUTO: 26.7 PG (ref 26.8–34.3)
MCHC RBC AUTO-ENTMCNC: 31.7 G/DL (ref 31.4–37.4)
MCV RBC AUTO: 84 FL (ref 82–98)
MONOCYTES # BLD AUTO: 1.03 THOUSAND/ΜL (ref 0.17–1.22)
MONOCYTES NFR BLD AUTO: 13 % (ref 4–12)
MONONUC CELLS NFR SNV MANUAL: 56 %
NEUTROPHILS # BLD AUTO: 4.36 THOUSANDS/ΜL (ref 1.85–7.62)
NEUTROPHILS NFR SNV MANUAL: 3 %
NEUTS SEG NFR BLD AUTO: 57 % (ref 43–75)
PLATELET # BLD AUTO: 198 THOUSANDS/UL (ref 149–390)
PMV BLD AUTO: 10.8 FL (ref 8.9–12.7)
POTASSIUM SERPL-SCNC: 4.1 MMOL/L (ref 3.5–5.3)
RBC # BLD AUTO: 3.86 MILLION/UL (ref 3.81–5.12)
SITE: ABNORMAL
SODIUM SERPL-SCNC: 136 MMOL/L (ref 136–145)
SYNOVIOCYTES NFR SNV: 3 %
TOTAL CELLS COUNTED SPEC: 100
WBC # BLD AUTO: 7.66 THOUSAND/UL (ref 4.31–10.16)
WBC # FLD MANUAL: 1249 /UL (ref 0–200)

## 2018-02-10 PROCEDURE — 89051 BODY FLUID CELL COUNT: CPT | Performed by: ORTHOPAEDIC SURGERY

## 2018-02-10 PROCEDURE — 87070 CULTURE OTHR SPECIMN AEROBIC: CPT | Performed by: ORTHOPAEDIC SURGERY

## 2018-02-10 PROCEDURE — 85652 RBC SED RATE AUTOMATED: CPT | Performed by: EMERGENCY MEDICINE

## 2018-02-10 PROCEDURE — 87205 SMEAR GRAM STAIN: CPT | Performed by: ORTHOPAEDIC SURGERY

## 2018-02-10 PROCEDURE — 86140 C-REACTIVE PROTEIN: CPT | Performed by: EMERGENCY MEDICINE

## 2018-02-10 PROCEDURE — 96361 HYDRATE IV INFUSION ADD-ON: CPT

## 2018-02-10 PROCEDURE — 80048 BASIC METABOLIC PNL TOTAL CA: CPT | Performed by: EMERGENCY MEDICINE

## 2018-02-10 PROCEDURE — 99284 EMERGENCY DEPT VISIT MOD MDM: CPT | Performed by: ORTHOPAEDIC SURGERY

## 2018-02-10 PROCEDURE — 73560 X-RAY EXAM OF KNEE 1 OR 2: CPT

## 2018-02-10 PROCEDURE — 36415 COLL VENOUS BLD VENIPUNCTURE: CPT | Performed by: EMERGENCY MEDICINE

## 2018-02-10 PROCEDURE — 85025 COMPLETE CBC W/AUTO DIFF WBC: CPT | Performed by: EMERGENCY MEDICINE

## 2018-02-10 PROCEDURE — 10140 I&D HMTMA SEROMA/FLUID COLLJ: CPT | Performed by: ORTHOPAEDIC SURGERY

## 2018-02-10 PROCEDURE — 96374 THER/PROPH/DIAG INJ IV PUSH: CPT

## 2018-02-10 PROCEDURE — 99284 EMERGENCY DEPT VISIT MOD MDM: CPT

## 2018-02-10 PROCEDURE — 87040 BLOOD CULTURE FOR BACTERIA: CPT | Performed by: EMERGENCY MEDICINE

## 2018-02-10 RX ORDER — LIDOCAINE HYDROCHLORIDE AND EPINEPHRINE 10; 10 MG/ML; UG/ML
20 INJECTION, SOLUTION INFILTRATION; PERINEURAL ONCE
Status: COMPLETED | OUTPATIENT
Start: 2018-02-10 | End: 2018-02-10

## 2018-02-10 RX ORDER — OXYCODONE HYDROCHLORIDE AND ACETAMINOPHEN 5; 325 MG/1; MG/1
1 TABLET ORAL EVERY 6 HOURS PRN
Qty: 20 TABLET | Refills: 0 | Status: SHIPPED | OUTPATIENT
Start: 2018-02-10 | End: 2018-02-20

## 2018-02-10 RX ORDER — CEPHALEXIN 500 MG/1
500 CAPSULE ORAL EVERY 8 HOURS SCHEDULED
Qty: 30 CAPSULE | Refills: 0 | Status: SHIPPED | OUTPATIENT
Start: 2018-02-10 | End: 2018-02-20

## 2018-02-10 RX ADMIN — HYDROMORPHONE HYDROCHLORIDE 0.5 MG: 1 INJECTION, SOLUTION INTRAMUSCULAR; INTRAVENOUS; SUBCUTANEOUS at 16:06

## 2018-02-10 RX ADMIN — HYDROMORPHONE HYDROCHLORIDE 0.5 MG: 1 INJECTION, SOLUTION INTRAMUSCULAR; INTRAVENOUS; SUBCUTANEOUS at 16:29

## 2018-02-10 RX ADMIN — LIDOCAINE HYDROCHLORIDE,EPINEPHRINE BITARTRATE 20 ML: 10; .01 INJECTION, SOLUTION INFILTRATION; PERINEURAL at 16:06

## 2018-02-10 RX ADMIN — SODIUM CHLORIDE 1000 ML: 0.9 INJECTION, SOLUTION INTRAVENOUS at 16:06

## 2018-02-10 NOTE — ED PROVIDER NOTES
History  Chief Complaint   Patient presents with    Knee Swelling     Pt presents to the ED with right knee pain  Recent lac repair on tuesday d/t fall injury  Pt reports significant swelling and redness over the right knee since last night  History provided by:  Patient  Knee Pain   Location:  Knee  Time since incident:  2 days  Lower extremity injury: injury to the right knee with noted soft tissue swelling, laceration repair back several weeks ago  Knee location:  R knee  Pain details:     Quality:  Aching and burning    Radiates to:  Does not radiate    Severity:  Moderate    Onset quality:  Gradual    Duration:  2 days    Timing:  Constant    Progression:  Worsening  Chronicity:  New  Relieved by:  Nothing  Worsened by:  Extension, flexion, bearing weight and activity  Ineffective treatments:  None tried  Associated symptoms: swelling    Associated symptoms: no fever        Prior to Admission Medications   Prescriptions Last Dose Informant Patient Reported? Taking?    DULoxetine (CYMBALTA) 60 mg delayed release capsule  Spouse/Significant Other Yes No   Sig: Take 60 mg by mouth daily   aspirin 81 MG tablet  Spouse/Significant Other Yes No   Sig: Take 81 mg by mouth daily   calcium carbonate-vitamin D (OSCAL-D) 500 mg-200 units per tablet  Spouse/Significant Other Yes No   Sig: Take 1 tablet by mouth daily with breakfast   dicyclomine (BENTYL) 10 mg capsule  Spouse/Significant Other Yes No   Sig: Take 10 mg by mouth daily   diphenoxylate-atropine (LOMOTIL) 2 5-0 025 mg per tablet  Spouse/Significant Other Yes No   Sig: Take 1 tablet by mouth 4 (four) times a day as needed for diarrhea   docusate sodium (COLACE) 100 mg capsule   No No   Sig: Take 1 capsule by mouth 2 (two) times a day for 30 days   ferrous sulfate 325 (65 Fe) mg tablet   No No   Sig: Take 1 tablet by mouth 2 (two) times a day for 30 days   gabapentin (NEURONTIN) 300 mg capsule  Spouse/Significant Other Yes No   Sig: Take 300 mg by mouth 3 (three) times a day   lisinopril (ZESTRIL) 2 5 mg tablet  Spouse/Significant Other Yes No   Sig: Take 2 5 mg by mouth daily   metFORMIN (GLUCOPHAGE) 500 mg tablet  Spouse/Significant Other Yes No   Sig: Take 500 mg by mouth 2 (two) times a day with meals   omeprazole (PriLOSEC) 20 mg delayed release capsule  Spouse/Significant Other Yes No   Sig: Take 20 mg by mouth daily   pregabalin (LYRICA) 50 mg capsule  Spouse/Significant Other Yes No   Sig: Take 50 mg by mouth daily after dinner   pyridoxine (B-6) 100 MG tablet  Spouse/Significant Other Yes No   Sig: Take 100 mg by mouth daily      Facility-Administered Medications: None       Past Medical History:   Diagnosis Date    Arthritis     [facility]cong[facility] )     Diabetes mellitus ([facility] )     Diverticulosis     Hypertension        Past Surgical History:   Procedure Laterality Date    BACK SURGERY      EGD AND COLONOSCOPY N/A 12/23/2016    Procedure: EGD AND COLONOSCOPY;  Surgeon: Dominick York MD;  [facility]; Service:     JOINT REPLACEMENT      bilat knees and hips    OTHER SURGICAL HISTORY      pelvis rods    REPLACEMENT TOTAL KNEE BILATERAL         Family History   Problem Relation Age of Onset    Cancer Brother      I have reviewed and agree with the history as documented  Social History   Substance Use Topics    Smoking status: Former Smoker    Smokeless tobacco: Not on file    Alcohol use No        Review of Systems   Constitutional: Negative for chills and fever  HENT: Negative for rhinorrhea, sore throat and trouble swallowing  Eyes: Negative for pain  Respiratory: Negative for cough, shortness of breath, wheezing and stridor  Cardiovascular: Negative for chest pain and leg swelling  Gastrointestinal: Negative for abdominal pain, diarrhea and nausea  Endocrine: Negative for polyuria  Genitourinary: Negative for dysuria, flank pain and urgency     Musculoskeletal: Negative for joint swelling, myalgias and neck stiffness  Skin: Negative for rash  Allergic/Immunologic: Negative for immunocompromised state  Neurological: Negative for dizziness, syncope, weakness, numbness and headaches  Psychiatric/Behavioral: Negative for confusion and suicidal ideas  All other systems reviewed and are negative  Physical Exam  ED Triage Vitals [02/10/18 1300]   Temperature Pulse Respirations Blood Pressure SpO2   97 8 °F (36 6 °C) 75 18 121/57 97 %      Temp Source Heart Rate Source Patient Position - Orthostatic VS BP Location FiO2 (%)   Oral Monitor Sitting Right arm --      Pain Score       8           Orthostatic Vital Signs  Vitals:    02/10/18 1300 02/10/18 1501 02/10/18 1700   BP: 121/57 123/70 125/69   Pulse: 75 85 79   Patient Position - Orthostatic VS: Sitting  Sitting       Physical Exam   Constitutional: She is oriented to person, place, and time  She appears well-developed and well-nourished  HENT:   Head: Normocephalic and atraumatic  Eyes: EOM are normal  Pupils are equal, round, and reactive to light  Neck: Normal range of motion  Neck supple  Cardiovascular: Normal rate and regular rhythm  Exam reveals no friction rub  No murmur heard  Pulmonary/Chest: Breath sounds normal  No respiratory distress  She has no wheezes  She has no rales  Abdominal: Soft  Bowel sounds are normal  She exhibits no distension  There is no tenderness  Musculoskeletal: She exhibits tenderness  She exhibits no edema  Right knee: She exhibits decreased range of motion and swelling  Tenderness found  Medial joint line, lateral joint line and patellar tendon tenderness noted  Legs:  Neurological: She is alert and oriented to person, place, and time  Skin: Skin is warm  No rash noted  Psychiatric: She has a normal mood and affect  Nursing note and vitals reviewed        ED Medications  Medications   sodium chloride 0 9 % bolus 1,000 mL (0 mL Intravenous Stopped 2/10/18 5900) HYDROmorphone (DILAUDID) injection 0 5 mg (0 5 mg Intravenous Given 2/10/18 1606)   lidocaine-epinephrine (XYLOCAINE/EPINEPHRINE) 1 %-1:100,000 injection 20 mL (20 mL Infiltration Given 2/10/18 1606)   HYDROmorphone (DILAUDID) injection 0 5 mg (0 5 mg Intravenous Given 2/10/18 1629)       Diagnostic Studies  Results Reviewed     Procedure Component Value Units Date/Time    Synovial Fluid Diff [[id number]] Collected:  02/10/18 1706    Lab Status:  Final result Specimen:  Synovial Fluid from Joint, Right Knee Updated:  02/10/18 2003     Total Counted 100     Neutrophil % Synovial 3 %      Lymph % Synovial 38 %      Mononuclear Cell % Synovial 56 %      Synovial Lining Cell % 3 %     Synovial fluid white cell count w/ diff [[id number]]  (Abnormal) Collected:  02/10/18 1706    Lab Status:  Final result Specimen:  Synovial Fluid from Joint, Right Knee Updated:  02/10/18 1923     Site synovial fluid-right knee     Color, Fluid Yellow     Clarity, Fluid Clear     WBC, Fluid 1,249 (H) /ul     Sedimentation rate, automated [[id number]]  (Normal) Collected:  02/10/18 1602    Lab Status:  Final result Specimen:  Blood from Arm, Right Updated:  02/10/18 1833     Sed Rate 14 mm/hour     Body fluid culture and Gram stain [[id number]] Collected:  02/10/18 1706    Lab Status: In process Specimen:  Synovial Fluid from Joint, Right Knee Updated:  02/10/18 1713    Body fluid culture and Gram stain [[id number]] Collected:  02/10/18 1706    Lab Status: In process Specimen: Body Fluid from Bursa/Synovial Cyst Updated:  02/10/18 1713    Blood culture #1 [[id number]] Collected:  02/10/18 1705    Lab Status:   In process Specimen:  Blood from Arm, Left Updated:  02/10/18 2229    Basic metabolic panel [[id number]]  (Abnormal) Collected:  02/10/18 1602    Lab Status:  Final result Specimen:  Blood from Arm, Right Updated:  02/10/18 1653     Sodium 136 mmol/L      Potassium 4 1 mmol/L      Chloride 99 (L) mmol/L      CO2 28 mmol/L      Anion Gap 9 mmol/L      BUN 18 mg/dL      Creatinine 1 27 mg/dL      Glucose 98 mg/dL      Calcium 9 5 mg/dL      eGFR 39 ml/min/1 73sq m     Narrative:         National Kidney Disease Education Program recommendations are as follows:  GFR calculation is accurate only with a steady state creatinine  Chronic Kidney disease less than 60 ml/min/1 73 sq  meters  Kidney failure less than 15 ml/min/1 73 sq  meters  C-reactive protein [[id number]]  (Abnormal) Collected:  02/10/18 1602    Lab Status:  Final result Specimen:  Blood from Arm, Right Updated:  02/10/18 1653     CRP 6 7 (H) mg/L     CBC and differential [[id number]]  (Abnormal) Collected:  02/10/18 1602    Lab Status:  Final result Specimen:  Blood from Arm, Right Updated:  02/10/18 1616     WBC 7 66 Thousand/uL      RBC 3 86 Million/uL      Hemoglobin 10 3 (L) g/dL      Hematocrit 32 5 (L) %      MCV 84 fL      MCH 26 7 (L) pg      MCHC 31 7 g/dL      RDW 16 8 (H) %      MPV 10 8 fL      Platelets 804 Thousands/uL      Neutrophils Relative 57 %      Lymphocytes Relative 23 %      Monocytes Relative 13 (H) %      Eosinophils Relative 7 (H) %      Basophils Relative 0 %      Neutrophils Absolute 4 36 Thousands/µL      Lymphocytes Absolute 1 75 Thousands/µL      Monocytes Absolute 1 03 Thousand/µL      Eosinophils Absolute 0 50 Thousand/µL      Basophils Absolute 0 02 Thousands/µL     Blood culture #2 [[id number]] Collected:  02/10/18 1602    Lab Status:   In process Specimen:  Blood from Arm, Right Updated:  02/10/18 1613                 XR knee 1 or 2 views right    (Results Pending)              Procedures  Suture Removal  Date/Time: 2/10/2018 4:00 PM  Performed by: Hair Ornelas by: Nena Schulz     Patient location:  ED  Consent:     Consent obtained:  Verbal    Consent given by:  Patient    Risks discussed:  Bleeding    Alternatives discussed:  No treatment  Universal protocol:     Immediately prior to procedure, a time out was called: yes      Patient identity confirmed:  Verbally with patient and arm band  Location:     Laterality:  Right    Location:  Lower extremity    Lower extremity location:  Knee    Knee location:  R knee  Procedure details: Tools used:  Suture removal kit    Wound appearance:  No sign(s) of infection, good wound healing and clean    Sutures removed: running suture   Post-procedure details:     Post-removal:  Antibiotic ointment applied    Patient tolerance of procedure: Tolerated well, no immediate complications             Phone Contacts  ED Phone Contact    ED Course  ED Course                                MDM  Number of Diagnoses or Management Options  Abscess: new and requires workup  Cellulitis: new and requires workup  Hematoma: new and requires workup  Right knee pain: new and requires workup  Diagnosis management comments: 3:27 PM spoke with Dr Stephanie Chi, will come into the ED for eval of the joint, concern for for possible communication with the right knee joint  Fluid filled sac removed noted with bloody discharge  No evidence of pus  Sent off samples       Pt re-examined and evaluated after testing and treatment  Spoke with the patient and feeling improved and sxs have resolved  Will discharge home with close f/u with pcp and instructed to return to the ED if sxs worsen or continue  Pt agrees with the plan for discharge and feels comfortable to go home with proper f/u  Advised to return for worsening or additional problems  Diagnostic tests were reviewed and questions answered  Diagnosis, care plan and treatment options were discussed  The patient understand instructions and will follow up as directed           Amount and/or Complexity of Data Reviewed  Clinical lab tests: ordered and reviewed  Tests in the radiology section of CPT®: ordered and reviewed  Review and summarize past medical records: yes      CritCare Time    Disposition  Final diagnoses:   Abscess   Hematoma   Right knee pain   Cellulitis     Time reflects when diagnosis was documented in both MDM as applicable and the Disposition within this note     Time User Action Codes Description Comment    2/10/2018  3:31 PM Abdelrahman Sen Add [L02 91] Abscess     2/10/2018  4:48 PM Abdelrahman Sen Add Randol Scale  8XXA] Hematoma     2/10/2018  4:48 PM Abdelrahman Sen Add [W38 140] Right knee pain     2/10/2018  4:48 PM Abdelrahman Sen Add [L03 90] Cellulitis       ED Disposition     ED Disposition Condition Comment    Discharge  Tanya Stephen discharge to home/self care      Condition at discharge: Stable        Follow-up Information     Follow up With Specialties Details Why Contact Info    Cathy Trinh, DO Family Medicine Call in 2 days If symptoms worsen [address]   [phone]          Discharge Medication List as of 2/10/2018  4:49 PM      START taking these medications    Details   cephalexin (KEFLEX) 500 mg capsule Take 1 capsule (500 mg total) by mouth every 8 (eight) hours for 10 days, Starting Sat 2/10/2018, Until Tue 2/20/2018, Print      oxyCODONE-acetaminophen (PERCOCET) 5-325 mg per tablet Take 1 tablet by mouth every 6 (six) hours as needed for moderate pain (May use up to two tablets every 6 hours ) for up to 10 days Max Daily Amount: 4 tablets, Starting Sat 2/10/2018, Until Tue 2/20/2018, Print         CONTINUE these medications which have NOT CHANGED    Details   aspirin 81 MG tablet Take 81 mg by mouth daily, Until Discontinued, Historical Med      calcium carbonate-vitamin D (OSCAL-D) 500 mg-200 units per tablet Take 1 tablet by mouth daily with breakfast, Until Discontinued, Historical Med      dicyclomine (BENTYL) 10 mg capsule Take 10 mg by mouth daily, Until Discontinued, Historical Med      diphenoxylate-atropine (LOMOTIL) 2 5-0 025 mg per tablet Take 1 tablet by mouth 4 (four) times a day as needed for diarrhea, Until Discontinued, Historical Med      docusate sodium (COLACE) 100 mg capsule Take 1 capsule by mouth 2 (two) times a day for 30 days, Starting 12/24/2016, Until Mon 1/23/17, Print      DULoxetine (CYMBALTA) 60 mg delayed release capsule Take 60 mg by mouth daily, Until Discontinued, Historical Med      ferrous sulfate 325 (65 Fe) mg tablet Take 1 tablet by mouth 2 (two) times a day for 30 days, Starting 12/24/2016, Until Mon 1/23/17, Print      gabapentin (NEURONTIN) 300 mg capsule Take 300 mg by mouth 3 (three) times a day, Until Discontinued, Historical Med      lisinopril (ZESTRIL) 2 5 mg tablet Take 2 5 mg by mouth daily, Until Discontinued, Historical Med      metFORMIN (GLUCOPHAGE) 500 mg tablet Take 500 mg by mouth 2 (two) times a day with meals, Until Discontinued, Historical Med      omeprazole (PriLOSEC) 20 mg delayed release capsule Take 20 mg by mouth daily, Until Discontinued, Historical Med      pregabalin (LYRICA) 50 mg capsule Take 50 mg by mouth daily after dinner, Until Discontinued, Historical Med      pyridoxine (B-6) 100 MG tablet Take 100 mg by mouth daily, Until Discontinued, Historical Med           No discharge procedures on file      ED Provider  Electronically Signed by           Abigail Sarmiento DO  02/10/18 1242

## 2018-02-10 NOTE — CONSULTS
Consultation  Orthopedic Surgery    Jeremy Jenkins (10 y o  female)   : 1934   MRN: [id number]  Date: 2/10/2018   Encounter: [id number]   Unit/Bed#: ED 23    Consulting Physician:  Jas Villareal MD  Requesting Physician: Jaycee Hernandez DO  Reason for Consult / Principal Problem: right knee swelling    Assessment / Plan  Right prepatellar hemorrhagic bursitis  Recent right knee laceration s/p suturing in the ED on 18  History of right TKA in   Bedside procedures:  · Right knee aspiration:  Betadine prep, 1% lidocaine anesthetic, 8 mL straw synovial fluid obtianed via 18 gauge needle, sent for synovial fluid cultures, gram stain, WBC  · Right prepatellar bursa aspiration:  Betadine prep, 1% lidocaine anesthetic, 12 mL bloody fluid obtained via 18 gauge needle, sent for cultures and gram stain  · Right prepatellar bursa incision & drainage:  Betadine prep, 1% lidocaine anesthetic, 5 mm incision inferomedially with #15 blade, clotted hematoma and bloody fluid expressed, iodoform packing gauze placed into bursa  Sterile gauze bandage and ACE wrap applied  Plan:  · WBAT and ROM as tolerated right knee  · D/C home with Keflex 500 mg PO QID x 7 days  · Remove packing gauze tomorrow  · F/U with [facility] or [address] in 7-10 days, according to patient's preference  History of Present Illness  Estefana Pillar Dorien Felty is a 80 y o  female who presents right knee pain and swelling after falling on her knee on 18  She sustained a laceration over the superomedial aspect of the knee which was sutured in the ED that day  She developed increased knee pain and swelling on 18  Pain is mostly anterior and is worse with knee flexion and weighbearing  Denies fevers, nausea, chest pain, or dyspnea    She has a history of right TKA in  by Dr Anastasiya [facility] at [facility]   The knee has functioned well for her and she denies previous episodes of knee pain since her surgery  Review of Systems  Negative for chest pain and shortness of breath  Review of all other systems is negative    Medical, Surgical, Family, and Social History    Past Medical History:   Diagnosis Date    Arthritis     [facility] )     Diabetes mellitus ([facility] )     Diverticulosis     Hypertension        Past Surgical History:   Procedure Laterality Date    BACK SURGERY      EGD AND COLONOSCOPY N/A 12/23/2016    Procedure: EGD AND COLONOSCOPY;  Surgeon: Naida Mclaughlin MD;  [facility]; Service:     JOINT REPLACEMENT      bilat knees and hips    OTHER SURGICAL HISTORY      pelvis rods    REPLACEMENT TOTAL KNEE BILATERAL         Family History   Problem Relation Age of Onset    Cancer Brother        Social History     Occupational History    Not on file  Social History Main Topics    Smoking status: Former Smoker    Smokeless tobacco: Not on file    Alcohol use No    Drug use: No    Sexual activity: Not on file       Allergies   Allergen Reactions    Ciprofloxacin     Morphine        Current Facility-Administered Medications   Medication Dose Route Frequency    sodium chloride 0 9 % bolus 1,000 mL  1,000 mL Intravenous Once       (Not in a hospital admission)    Vitals  Temp:  [97 8 °F (36 6 °C)] 97 8 °F (36 6 °C)  HR:  [75-85] 85  Resp:  [18] 18  BP: (121-123)/(57-70) 123/70  There is no height or weight on file to calculate BMI  No intake/output data recorded  Physical Exam  General:  Alert & oriented x3, no distress, appears stated age  [de-identified]:  EOMI, eyes clear, hearing intact, mucous membranes moist  Neck:  Supple, non-tender, trachea midline, no lymphadenopathy  Cardiovascular:  Regular rate, no discernable arrhythmia  Pulmonary:  Regular rate, respirations non-labored   Abdominal:  Soft, non-tender    Ortho Exam - Right Lower Extremity  · Neutral knee alignment  · Healed anterior incision    2 cm laceration superomedial to patella that appears clean/dry  · Significant swelling and erythema over anterior knee  · Moderate tenderness and fluctuance over pre-patellar bursa  · No tenderness in the joint lines or popliteal fossa  · Knee ROM 0-70 degrees, limited by pain  · No edema in the leg  · Sensation intact throughout RLE   · +DF/PF ankle and toes  · 2+ DP pulse  Imaging  I have personally reviewed pertinent films in PACS  XR of right knee - TKA components appear well-aligned, no evidence of loosening    Lab Results  (I have personally reviewed pertinent lab results )    Results from last 7 days  Lab Units 02/10/18  1602   WBC Thousand/uL 7 66   HEMOGLOBIN g/dL 10 3*   HEMATOCRIT % 32 5*   PLATELETS Thousands/uL 198   RBC Million/uL 3 86   MCV fL 84   MCH pg 26 7*   MCHC g/dL 31 7   RDW % 16 8*   MPV fL 10 8           Results from last 7 days  Lab Units 02/10/18  1602   SODIUM mmol/L 136   POTASSIUM mmol/L 4 1   CHLORIDE mmol/L 99*   CO2 mmol/L 28   ANION GAP mmol/L 9   BUN mg/dL 18   CREATININE mg/dL 1 27   EGFR ml/min/1 73sq m 39   CALCIUM mg/dL 9 5   GLUCOSE RANDOM mg/dL 98       Results from last 7 days  Lab Units 02/10/18  1602   CRP mg/L 6 7*           EKG, Pathology, and Other Studies  (I have personally reviewed pertinent reports )  n/a    Code Status  Prior    Counseling / Coordination of Care  Total floor / unit time spent today 20 minutes  Greater than 50% of total time was spent with the patient and / or family counseling and / or coordination of care      Zach Mcmillan MD

## 2018-02-10 NOTE — DISCHARGE INSTRUCTIONS
Abscess   WHAT YOU NEED TO KNOW:   A warm compress may help your abscess drain  Your healthcare provider may make a cut in the abscess so it can drain  You may need surgery to remove an abscess that is on your hands or buttocks  DISCHARGE INSTRUCTIONS:   Return to the emergency department if:   · The area around your abscess becomes very painful, warm, or has red streaks  · You have a fever and chills  · Your heart is beating faster than usual      · You feel faint or confused  Contact your healthcare provider if:   · Your abscess gets bigger or does not get better  · Your abscess returns  · You have questions or concerns about your condition or care  Medicines: You may  need any of the following:  · Antibiotics  help treat a bacterial infection  · Acetaminophen  decreases pain and fever  It is available without a doctor's order  Ask how much to take and how often to take it  Follow directions  Acetaminophen can cause liver damage if not taken correctly  · NSAIDs , such as ibuprofen, help decrease swelling, pain, and fever  This medicine is available with or without a doctor's order  NSAIDs can cause stomach bleeding or kidney problems in certain people  If you take blood thinner medicine, always ask your healthcare provider if NSAIDs are safe for you  Always read the medicine label and follow directions  · Take your medicine as directed  Contact your healthcare provider if you think your medicine is not helping or if you have side effects  Tell him or her if you are allergic to any medicine  Keep a list of the medicines, vitamins, and herbs you take  Include the amounts, and when and why you take them  Bring the list or the pill bottles to follow-up visits  Carry your medicine list with you in case of an emergency  Self-care:   · Apply a warm compress to your abscess  This will help it open and drain  Wet a washcloth in warm, but not hot, water  Apply the compress for 10 minutes  Repeat this 4 times each day  Do not  press on an abscess or try to open it with a needle  You may push the bacteria deeper or into your blood  · Do not share your clothes, towels, or sheets with anyone  This can spread the infection to others  · Wash your hands often  This can help prevent the spread of germs  Use soap and water or an alcohol-based hand rub  Care for your wound after it is drained:   · Care for your wound as directed  If your healthcare provider says it is okay, carefully remove the bandage and gauze packing  You may need to soak the gauze to get it out of your wound  Clean your wound and the area around it as directed  Dry the area and put on new, clean bandages  Change your bandages when they get wet or dirty  · Ask your healthcare provider how to change the gauze in your wound  Keep track of how many pieces of gauze are placed inside the wound  Do not put too much packing in the wound  Do not pack the gauze too tightly in your wound  Follow up with your healthcare provider in 1 to 3 days: You may need to have your packing removed or your bandage changed  Write down your questions so you remember to ask them during your visits  © 2017 [employer]  Information is for End User's use only and may not be sold, redistributed or otherwise used for commercial purposes  All illustrations and images included in CareNotes® are the copyrighted property of [employer]  or [employer]  The above information is an  only  It is not intended as medical advice for individual conditions or treatments  Talk to your doctor, nurse or pharmacist before following any medical regimen to see if it is safe and effective for you  Arthralgia   WHAT YOU NEED TO KNOW:   Arthralgia is pain in one or more joints, with no inflammation  It may be short-term and get better within 6 to 8 weeks  Arthralgia can be an early sign of arthritis   Arthralgia may be caused by a medical condition, such as a hormone disorder or a tumor  It may also be caused by an infection or injury  DISCHARGE INSTRUCTIONS:   Medicines: The following medicines may  be ordered for you:  · Acetaminophen  decreases pain  Ask how much to take and how often to take it  Follow directions  Acetaminophen can cause liver damage if not taken correctly  · NSAIDs  decrease pain and prevent swelling  Ask your healthcare provider which medicine is right for you  Ask how much to take and when to take it  Take as directed  NSAIDs can cause stomach bleeding and kidney problems if not taken correctly  · Pain relief cream  decreases pain  Use this cream as directed  · Take your medicine as directed  Contact your healthcare provider if you think your medicine is not helping or if you have side effects  Tell him of her if you are allergic to any medicine  Keep a list of the medicines, vitamins, and herbs you take  Include the amounts, and when and why you take them  Bring the list or the pill bottles to follow-up visits  Carry your medicine list with you in case of an emergency  Follow up with your healthcare provider or specialist as directed:  Write down your questions so you remember to ask them during your visits  Self-care:   · Apply heat  to help decrease pain  Use a heating pad or heat wrap  Apply heat for 20 to 30 minutes every 2 hours for as many days as directed  · Rest  as much as possible  Avoid activities that cause joint pain  · Apply ice  to help decrease swelling and pain  Ice may also help prevent tissue damage  Use an ice pack, or put crushed ice in a plastic bag  Cover it with a towel and place it on your painful joint for 15 to 20 minutes every hour or as directed  · Support  the joint with a brace or elastic wrap as directed  · Elevate  your joint above the level of your heart as often as you can to help decrease swelling and pain   Prop your painful joint on pillows or blankets to keep it elevated comfortably  · Lose weight  if you are overweight  Extra weight can put pressure on your joints and cause more pain  Ask your healthcare provider how much you should weigh  Ask him to help you create a weight loss plan  · Exercise  regularly to help improve joint movement and to decrease pain  Ask about the best exercise plan for you  Low-impact exercises can help take the pressure off your joints  Examples are walking, swimming, and water aerobics  Physical therapy:  A physical therapist teaches you exercises to help improve movement and strength, and to decrease pain  Ask your healthcare provider if physical therapy is right for you  Contact your healthcare provider or specialist if:   · You have a fever  · You continue to have joint pain that cannot be relieved with heat, ice, or medicine  · You have pain and inflammation around your joint  · You have questions or concerns about your condition or care  Return to the emergency department if:   · You have sudden, severe pain when you move your joint  · You have a fever and shaking chills  · You cannot move your joint  · You lose feeling on the side of your body where you have the painful joint  © 2017 [employer]  Information is for End User's use only and may not be sold, redistributed or otherwise used for commercial purposes  All illustrations and images included in CareNotes® are the copyrighted property of [employer]  or [employer]  The above information is an  only  It is not intended as medical advice for individual conditions or treatments  Talk to your doctor, nurse or pharmacist before following any medical regimen to see if it is safe and effective for you

## 2018-02-13 ENCOUNTER — OFFICE VISIT (OUTPATIENT)
Dept: UROLOGY | Facility: AMBULATORY SURGERY CENTER | Age: 83
End: 2018-02-13
Payer: MEDICARE

## 2018-02-13 VITALS
SYSTOLIC BLOOD PRESSURE: 118 MMHG | HEIGHT: 59 IN | BODY MASS INDEX: 28.83 KG/M2 | DIASTOLIC BLOOD PRESSURE: 68 MMHG | HEART RATE: 68 BPM | WEIGHT: 143 LBS

## 2018-02-13 DIAGNOSIS — N32.81 OAB (OVERACTIVE BLADDER): Primary | ICD-10-CM

## 2018-02-13 DIAGNOSIS — N39.44 NOCTURNAL ENURESIS: ICD-10-CM

## 2018-02-13 DIAGNOSIS — R35.0 URINARY FREQUENCY: ICD-10-CM

## 2018-02-13 LAB
BACTERIA SPEC BFLD CULT: NO GROWTH
BACTERIA SPEC BFLD CULT: NO GROWTH
GRAM STN SPEC: NORMAL

## 2018-02-13 PROCEDURE — 99213 OFFICE O/P EST LOW 20 MIN: CPT | Performed by: NURSE PRACTITIONER

## 2018-02-13 RX ORDER — DULOXETIN HYDROCHLORIDE 20 MG/1
CAPSULE, DELAYED RELEASE ORAL
COMMUNITY
End: 2020-02-20 | Stop reason: SDUPTHER

## 2018-02-13 RX ORDER — PREGABALIN 25 MG/1
CAPSULE ORAL
COMMUNITY
End: 2020-02-20 | Stop reason: SDUPTHER

## 2018-02-13 RX ORDER — DIPHENOXYLATE HYDROCHLORIDE AND ATROPINE SULFATE 2.5; .025 MG/1; MG/1
TABLET ORAL
COMMUNITY
End: 2020-02-20 | Stop reason: SDUPTHER

## 2018-02-13 RX ORDER — LISINOPRIL 2.5 MG/1
2.5 TABLET ORAL
COMMUNITY
End: 2022-04-15

## 2018-02-13 RX ORDER — BIOTIN 1 MG
1 TABLET ORAL DAILY
COMMUNITY

## 2018-02-13 RX ORDER — OMEPRAZOLE 20 MG/1
20 CAPSULE, DELAYED RELEASE ORAL DAILY
COMMUNITY

## 2018-02-13 RX ORDER — MULTIVITAMIN WITH IRON
TABLET ORAL
COMMUNITY
End: 2020-02-20 | Stop reason: SDUPTHER

## 2018-02-13 NOTE — PROGRESS NOTES
2018    Tanya Sung Copas  1934  [id number]        Assessment  -OAB  -Urge urinary incontinence  -Nocturnal enuresis    Discussion/Plan  Kasia Dominguez is a 80 y o  female being managed by Dr Cassidy Lobato       -Provided patient with 1 week of Myrbetriq 50mg samples until confirmation of insurance coverage for medication is verified  -Will continue Myrbetriq 50mg  -Follow up in 1 year or sooner PRN  -All questions answered, patients agree with plan     History of Present Illness  80 y o  female with a history of OAB, nocturnal enuresis, and urge urinary incontinence presents today for follow up  Patient seen in office 1 month ago, as urinary symptoms had worsened  Patient unable to take previously prescribed anticholinergics due to inability to tolerate side effects with history of chronic dry mouth  Patient states that since starting myrbetriq, she has had  episodes of nocturnal enuresis and incontinence secondary to OAB  She denies any side effects of medication  Review of Systems  Review of Systems   Constitutional: Negative  HENT: Negative  Respiratory: Negative  Cardiovascular: Negative  Gastrointestinal: Negative  Genitourinary: Positive for frequency and urgency  Negative for decreased urine volume, difficulty urinating, dysuria, flank pain and hematuria  Musculoskeletal: Negative  Skin: Negative  Neurological: Negative  Psychiatric/Behavioral: Negative  Past Medical History  Past Medical History:   Diagnosis Date    Arthritis     Back pain     [facility]     [facility] [address] [facility]     Diabetes mellitus ([facility] )     Diverticulosis     Hypertension        Past Social History  Past Surgical History:   Procedure Laterality Date    BACK SURGERY      EGD AND COLONOSCOPY N/A 2016    Procedure: EGD AND COLONOSCOPY;  Surgeon: Chata Sarah MD;  [facility];   Service:     JOINT REPLACEMENT      bilat knees and hips    OTHER SURGICAL HISTORY      pelvis rods    REPLACEMENT TOTAL KNEE BILATERAL      STOMACH SURGERY         Past Family History  Family History   Problem Relation Age of Onset    Cancer Brother     Diabetes Mother     Hypertension Father     Heart disease Father        Past Social history  Social History     Social History    Marital status: /Civil Union     Spouse name: N/A    Number of children: N/A    Years of education: N/A     Occupational History    Not on file       Social History Main Topics    Smoking status: Former Smoker    Smokeless tobacco: Never Used    Alcohol use No    Drug use: No    Sexual activity: Not on file     Other Topics Concern    Not on file     Social History Narrative    No narrative on file       Current Medications  Current Outpatient Prescriptions   Medication Sig Dispense Refill    aspirin 81 MG tablet Take 81 mg by mouth daily      cephalexin (KEFLEX) 500 mg capsule Take 1 capsule (500 mg total) by mouth every 8 (eight) hours for 10 days 30 capsule 0    Cholecalciferol (VITAMIN D3) 1000 units CAPS Take by mouth      diphenoxylate-atropine (LOMOTIL) 2 5-0 025 mg per tablet Take 1 tablet by mouth 4 (four) times a day as needed for diarrhea      DULoxetine (CYMBALTA) 20 mg capsule Take by mouth      gabapentin (NEURONTIN) 300 mg capsule Take 300 mg by mouth 3 (three) times a day      lisinopril (ZESTRIL) 2 5 mg tablet Take 2 5 mg by mouth daily      metFORMIN (GLUCOPHAGE) 500 mg tablet Take 500 mg by mouth 2 (two) times a day with meals      Mirabegron ER (MYRBETRIQ) 50 MG TB24 Take 1 tablet (50 mg total) by mouth daily for 30 days 30 tablet 11    omeprazole (PriLOSEC) 20 mg delayed release capsule Take 20 mg by mouth daily      pregabalin (LYRICA) 25 mg capsule Take by mouth      pregabalin (LYRICA) 50 mg capsule Take 50 mg by mouth daily after dinner      pyridoxine (B-6) 100 MG tablet Take 100 mg by mouth daily      ASPIRIN 81 PO Take 81 mg by mouth      calcium carbonate-vitamin D (OSCAL-D) 500 mg-200 units per tablet Take 1 tablet by mouth daily with breakfast      dicyclomine (BENTYL) 10 mg capsule Take 10 mg by mouth daily      diphenoxylate-atropine (LOMOTIL) 2 5-0 025 mg per tablet Take by mouth      docusate sodium (COLACE) 100 mg capsule Take 1 capsule by mouth 2 (two) times a day for 30 days 60 capsule 0    DULoxetine (CYMBALTA) 60 mg delayed release capsule Take 60 mg by mouth daily      ferrous sulfate 325 (65 Fe) mg tablet Take 1 tablet by mouth 2 (two) times a day for 30 days 60 tablet 0    lisinopril (ZESTRIL) 2 5 mg tablet Take 2 5 mg by mouth      metFORMIN (GLUCOPHAGE) 1000 MG tablet Take by mouth      omeprazole (PRILOSEC) 20 mg delayed release capsule Take by mouth      oxyCODONE-acetaminophen (PERCOCET) 5-325 mg per tablet Take 1 tablet by mouth every 6 (six) hours as needed for moderate pain (May use up to two tablets every 6 hours ) for up to 10 days Max Daily Amount: 4 tablets 20 tablet 0    pyridoxine (VITAMIN B6) 100 mg tablet Take by mouth       No current facility-administered medications for this visit  Allergies  Allergies   Allergen Reactions    Morphine Other (See Comments)     urinary retention    Ciprofloxacin Rash       Past medical history, social history, family history, medications and allergies were reviewed  Vitals  Vitals:    02/13/18 1332   BP: 118/68   Pulse: 68   Weight: 64 9 kg (143 lb)   Height: 4' 11" (1 499 m)       Physical Exam  Physical Exam   Constitutional: She is oriented to person, place, and time  She appears well-developed and well-nourished  HENT:   Head: Normocephalic  Eyes: Pupils are equal, round, and reactive to light  Neck: Normal range of motion  Cardiovascular: Normal rate  Pulmonary/Chest: Effort normal    Abdominal: Soft  There is no tenderness  There is no CVA tenderness  Musculoskeletal: Normal range of motion     Neurological: She is alert and oriented to person, place, and time  She has normal reflexes  Skin: Skin is warm and dry  Psychiatric: She has a normal mood and affect   Her behavior is normal  Judgment and thought content normal        Results    Lab Results   Component Value Date    GLUCOSE 98 02/10/2018    CALCIUM 9 5 02/10/2018     02/10/2018    K 4 1 02/10/2018    CO2 28 02/10/2018    CL 99 (L) 02/10/2018    BUN 18 02/10/2018    CREATININE 1 27 02/10/2018     Lab Results   Component Value Date    WBC 7 66 02/10/2018    HGB 10 3 (L) 02/10/2018    HCT 32 5 (L) 02/10/2018    MCV 84 02/10/2018     02/10/2018

## 2018-02-13 NOTE — PATIENT INSTRUCTIONS
Overactive Bladder   AMBULATORY CARE:   Overactive bladder  is a sudden urge to urinate that is difficult for you to control  It occurs when the muscles of the bladder contract (tighten) more than normal  This causes a frequent or sudden need to urinate  You usually have to urinate more than 8 times in 24 hours  You may need to get up more than once in the middle of the night to urinate  You may also leak urine before you are able to make it to the bathroom  Contact your healthcare provider for any of the following:   · Pink or bloody urine    · Painful urination    · Continued symptoms even after treatment    · Questions or concerns about your condition or care  Manage your symptoms:   · Limit liquids as directed  Limit liquids to decrease the amount you urinate  Ask how much liquid to drink each day and which liquids are best for you  You may need to avoid drinking liquids several hours before you go to sleep  Your healthcare provider may also recommend that you limit caffeine and alcohol  · Exercise regularly and maintain a healthy weight  Ask your healthcare provider how much you should weigh and about the best exercise plan for you  Extra weight puts pressure on your bladder and may make your symptoms worse  Ask him to help you create a weight loss plan if you are overweight  · Do pelvic muscle exercises often  Your pelvic muscles help you stop urinating  Squeeze these muscles tightly for 5 seconds, then relax for 5 seconds  Gradually work up to squeezing for 10 seconds  Do 3 sets of 15 repetitions a day, or as directed  This will help strengthen your pelvic muscles and improve bladder control  · Train your bladder  Go to the bathroom at set times, such as every 2 hours, even if you do not feel the urge to go  You can also try to hold your urine when you feel the urge to go  For example, hold your urine for 5 minutes when you feel the urge to go   As that becomes easier, hold your urine for 10 minutes  Work up to every 3 or 4 hours to help control your bladder  Treatment for overactive bladder  may be needed if other methods are not working:  · Medicines  may be given to relax your bladder and decrease urination  · Sacral nerve stimulation  sends electrical signals to your sacral nerve through a small device implanted under your skin  Your sacral nerve controls your bladder, sphincter, and pelvic floor muscles  · Surgery  may be done if all other treatments cannot help you control your bladder  Follow up with your healthcare provider as directed:  Write down your questions so you remember to ask them during your visits  © 2017 [facility] [facility] Information is for End User's use only and may not be sold, redistributed or otherwise used for commercial purposes  All illustrations and images included in CareNotes® are the copyrighted property of [employer]  or [employer]  The above information is an  only  It is not intended as medical advice for individual conditions or treatments  Talk to your doctor, nurse or pharmacist before following any medical regimen to see if it is safe and effective for you

## 2018-02-16 LAB
BACTERIA BLD CULT: NORMAL
BACTERIA BLD CULT: NORMAL

## 2018-03-07 NOTE — PROGRESS NOTES
Dear Merlene Dillard,   My name is Concepcion and I am a  Registered Nurse and Care Coordinator for [address]  We recently spoke on the phone regarding your hospital stay and you opted out of continuing to receive follow-up phone calls from me  Because of the reason  that you were in the hospital, Medicare has placed you in a program called [address]  One of the benefits of the program is that you can have a nurse available to answer any questions or concerns you might have  Please feel  free to call me at the number listed below    Sincerely,      [address]  [phone]        Electronically signed by:Veronique Ham RN  Dec 28 2016  2:51PM EST

## 2018-05-19 ENCOUNTER — TRANSCRIBE ORDERS (OUTPATIENT)
Dept: LAB | Facility: CLINIC | Age: 83
End: 2018-05-19

## 2018-05-19 ENCOUNTER — APPOINTMENT (OUTPATIENT)
Dept: LAB | Facility: CLINIC | Age: 83
End: 2018-05-19
Payer: MEDICARE

## 2018-05-19 DIAGNOSIS — E11.42 DIABETIC SENSORIMOTOR NEUROPATHY (HCC): ICD-10-CM

## 2018-05-19 DIAGNOSIS — Z79.899 NEED FOR PROPHYLACTIC CHEMOTHERAPY: ICD-10-CM

## 2018-05-19 DIAGNOSIS — M15.0 PRIMARY GENERALIZED HYPERTROPHIC OSTEOARTHROSIS: ICD-10-CM

## 2018-05-19 DIAGNOSIS — M81.0 SENILE OSTEOPOROSIS: ICD-10-CM

## 2018-05-19 DIAGNOSIS — M06.09 RHEUMATOID ARTHRITIS OF MULTIPLE SITES WITHOUT RHEUMATOID FACTOR (HCC): ICD-10-CM

## 2018-05-19 DIAGNOSIS — M06.09 RHEUMATOID ARTHRITIS OF MULTIPLE SITES WITHOUT RHEUMATOID FACTOR (HCC): Primary | ICD-10-CM

## 2018-05-19 LAB
ALBUMIN SERPL BCP-MCNC: 3.4 G/DL (ref 3.5–5)
ALP SERPL-CCNC: 83 U/L (ref 46–116)
ALT SERPL W P-5'-P-CCNC: 35 U/L (ref 12–78)
ANION GAP SERPL CALCULATED.3IONS-SCNC: 9 MMOL/L (ref 4–13)
AST SERPL W P-5'-P-CCNC: 32 U/L (ref 5–45)
BASOPHILS # BLD AUTO: 0.01 THOUSANDS/ΜL (ref 0–0.1)
BASOPHILS NFR BLD AUTO: 0 % (ref 0–1)
BILIRUB SERPL-MCNC: 0.2 MG/DL (ref 0.2–1)
BUN SERPL-MCNC: 24 MG/DL (ref 5–25)
CALCIUM SERPL-MCNC: 9.1 MG/DL (ref 8.3–10.1)
CHLORIDE SERPL-SCNC: 102 MMOL/L (ref 100–108)
CO2 SERPL-SCNC: 26 MMOL/L (ref 21–32)
CREAT SERPL-MCNC: 1.51 MG/DL (ref 0.6–1.3)
EOSINOPHIL # BLD AUTO: 0.17 THOUSAND/ΜL (ref 0–0.61)
EOSINOPHIL NFR BLD AUTO: 3 % (ref 0–6)
ERYTHROCYTE [DISTWIDTH] IN BLOOD BY AUTOMATED COUNT: 16.1 % (ref 11.6–15.1)
ERYTHROCYTE [SEDIMENTATION RATE] IN BLOOD: 10 MM/HOUR (ref 0–20)
GFR SERPL CREATININE-BSD FRML MDRD: 32 ML/MIN/1.73SQ M
GLUCOSE P FAST SERPL-MCNC: 94 MG/DL (ref 65–99)
HCT VFR BLD AUTO: 32.3 % (ref 34.8–46.1)
HGB BLD-MCNC: 10.2 G/DL (ref 11.5–15.4)
LYMPHOCYTES # BLD AUTO: 1.32 THOUSANDS/ΜL (ref 0.6–4.47)
LYMPHOCYTES NFR BLD AUTO: 23 % (ref 14–44)
MCH RBC QN AUTO: 26.2 PG (ref 26.8–34.3)
MCHC RBC AUTO-ENTMCNC: 31.6 G/DL (ref 31.4–37.4)
MCV RBC AUTO: 83 FL (ref 82–98)
MONOCYTES # BLD AUTO: 0.74 THOUSAND/ΜL (ref 0.17–1.22)
MONOCYTES NFR BLD AUTO: 13 % (ref 4–12)
NEUTROPHILS # BLD AUTO: 3.46 THOUSANDS/ΜL (ref 1.85–7.62)
NEUTS SEG NFR BLD AUTO: 61 % (ref 43–75)
PLATELET # BLD AUTO: 222 THOUSANDS/UL (ref 149–390)
PMV BLD AUTO: 11 FL (ref 8.9–12.7)
POTASSIUM SERPL-SCNC: 4.2 MMOL/L (ref 3.5–5.3)
PROT SERPL-MCNC: 7 G/DL (ref 6.4–8.2)
RBC # BLD AUTO: 3.9 MILLION/UL (ref 3.81–5.12)
SODIUM SERPL-SCNC: 137 MMOL/L (ref 136–145)
WBC # BLD AUTO: 5.7 THOUSAND/UL (ref 4.31–10.16)

## 2018-05-19 PROCEDURE — 85025 COMPLETE CBC W/AUTO DIFF WBC: CPT

## 2018-05-19 PROCEDURE — 85652 RBC SED RATE AUTOMATED: CPT

## 2018-05-19 PROCEDURE — 36415 COLL VENOUS BLD VENIPUNCTURE: CPT

## 2018-05-19 PROCEDURE — 80053 COMPREHEN METABOLIC PANEL: CPT

## 2018-07-18 ENCOUNTER — TRANSCRIBE ORDERS (OUTPATIENT)
Dept: LAB | Facility: CLINIC | Age: 83
End: 2018-07-18

## 2018-07-18 ENCOUNTER — APPOINTMENT (OUTPATIENT)
Dept: LAB | Facility: CLINIC | Age: 83
End: 2018-07-18
Payer: MEDICARE

## 2018-07-18 DIAGNOSIS — R82.71 ASYMPTOMATIC BACTERIURIA IN PREGNANCY, WITH DELIVERY: Primary | ICD-10-CM

## 2018-07-18 DIAGNOSIS — R82.71 ASYMPTOMATIC BACTERIURIA IN PREGNANCY, WITH DELIVERY: ICD-10-CM

## 2018-07-18 LAB
BACTERIA UR QL AUTO: ABNORMAL /HPF
BILIRUB UR QL STRIP: NEGATIVE
CLARITY UR: CLEAR
COLOR UR: YELLOW
GLUCOSE UR STRIP-MCNC: NEGATIVE MG/DL
HGB UR QL STRIP.AUTO: NEGATIVE
KETONES UR STRIP-MCNC: NEGATIVE MG/DL
LEUKOCYTE ESTERASE UR QL STRIP: ABNORMAL
NITRITE UR QL STRIP: NEGATIVE
NON-SQ EPI CELLS URNS QL MICRO: ABNORMAL /HPF
OTHER STN SPEC: ABNORMAL
PH UR STRIP.AUTO: 5 [PH] (ref 4.5–8)
PROT UR STRIP-MCNC: NEGATIVE MG/DL
RBC #/AREA URNS AUTO: ABNORMAL /HPF
SP GR UR STRIP.AUTO: >=1.03 (ref 1–1.03)
UROBILINOGEN UR QL STRIP.AUTO: 0.2 E.U./DL
WBC #/AREA URNS AUTO: ABNORMAL /HPF

## 2018-07-18 PROCEDURE — 81001 URINALYSIS AUTO W/SCOPE: CPT | Performed by: INTERNAL MEDICINE

## 2018-07-18 PROCEDURE — 87086 URINE CULTURE/COLONY COUNT: CPT

## 2018-07-19 LAB — BACTERIA UR CULT: NORMAL

## 2018-09-01 ENCOUNTER — APPOINTMENT (OUTPATIENT)
Dept: LAB | Facility: CLINIC | Age: 83
End: 2018-09-01
Payer: MEDICARE

## 2018-09-01 ENCOUNTER — TRANSCRIBE ORDERS (OUTPATIENT)
Dept: LAB | Facility: CLINIC | Age: 83
End: 2018-09-01

## 2018-09-01 DIAGNOSIS — M06.09 RHEUMATOID ARTHRITIS OF MULTIPLE SITES WITHOUT RHEUMATOID FACTOR (HCC): ICD-10-CM

## 2018-09-01 DIAGNOSIS — Z79.899 NEED FOR PROPHYLACTIC CHEMOTHERAPY: ICD-10-CM

## 2018-09-01 DIAGNOSIS — D64.9 ANEMIA, UNSPECIFIED TYPE: Primary | ICD-10-CM

## 2018-09-01 LAB
ALBUMIN SERPL BCP-MCNC: 3.3 G/DL (ref 3.5–5)
ALP SERPL-CCNC: 78 U/L (ref 46–116)
ALT SERPL W P-5'-P-CCNC: 31 U/L (ref 12–78)
ANION GAP SERPL CALCULATED.3IONS-SCNC: 6 MMOL/L (ref 4–13)
AST SERPL W P-5'-P-CCNC: 28 U/L (ref 5–45)
BACTERIA UR QL AUTO: ABNORMAL /HPF
BASOPHILS # BLD AUTO: 0.03 THOUSANDS/ΜL (ref 0–0.1)
BASOPHILS NFR BLD AUTO: 1 % (ref 0–1)
BILIRUB SERPL-MCNC: 0.3 MG/DL (ref 0.2–1)
BILIRUB UR QL STRIP: NEGATIVE
BUN SERPL-MCNC: 23 MG/DL (ref 5–25)
CALCIUM SERPL-MCNC: 9 MG/DL (ref 8.3–10.1)
CHLORIDE SERPL-SCNC: 105 MMOL/L (ref 100–108)
CLARITY UR: CLEAR
CO2 SERPL-SCNC: 27 MMOL/L (ref 21–32)
COLOR UR: YELLOW
CREAT SERPL-MCNC: 1.4 MG/DL (ref 0.6–1.3)
EOSINOPHIL # BLD AUTO: 0.17 THOUSAND/ΜL (ref 0–0.61)
EOSINOPHIL NFR BLD AUTO: 3 % (ref 0–6)
ERYTHROCYTE [DISTWIDTH] IN BLOOD BY AUTOMATED COUNT: 17.3 % (ref 11.6–15.1)
ERYTHROCYTE [SEDIMENTATION RATE] IN BLOOD: 10 MM/HOUR (ref 0–20)
GFR SERPL CREATININE-BSD FRML MDRD: 35 ML/MIN/1.73SQ M
GLUCOSE P FAST SERPL-MCNC: 97 MG/DL (ref 65–99)
GLUCOSE UR STRIP-MCNC: NEGATIVE MG/DL
HCT VFR BLD AUTO: 31.1 % (ref 34.8–46.1)
HGB BLD-MCNC: 9.6 G/DL (ref 11.5–15.4)
HGB UR QL STRIP.AUTO: ABNORMAL
IMM GRANULOCYTES # BLD AUTO: 0.03 THOUSAND/UL (ref 0–0.2)
IMM GRANULOCYTES NFR BLD AUTO: 1 % (ref 0–2)
KETONES UR STRIP-MCNC: NEGATIVE MG/DL
LEUKOCYTE ESTERASE UR QL STRIP: ABNORMAL
LYMPHOCYTES # BLD AUTO: 1.43 THOUSANDS/ΜL (ref 0.6–4.47)
LYMPHOCYTES NFR BLD AUTO: 22 % (ref 14–44)
MCH RBC QN AUTO: 26.9 PG (ref 26.8–34.3)
MCHC RBC AUTO-ENTMCNC: 30.9 G/DL (ref 31.4–37.4)
MCV RBC AUTO: 87 FL (ref 82–98)
MONOCYTES # BLD AUTO: 0.8 THOUSAND/ΜL (ref 0.17–1.22)
MONOCYTES NFR BLD AUTO: 13 % (ref 4–12)
NEUTROPHILS # BLD AUTO: 3.91 THOUSANDS/ΜL (ref 1.85–7.62)
NEUTS SEG NFR BLD AUTO: 60 % (ref 43–75)
NITRITE UR QL STRIP: POSITIVE
NON-SQ EPI CELLS URNS QL MICRO: ABNORMAL /HPF
NRBC BLD AUTO-RTO: 0 /100 WBCS
PH UR STRIP.AUTO: 5.5 [PH] (ref 4.5–8)
PLATELET # BLD AUTO: 200 THOUSANDS/UL (ref 149–390)
PMV BLD AUTO: 10.9 FL (ref 8.9–12.7)
POTASSIUM SERPL-SCNC: 5.3 MMOL/L (ref 3.5–5.3)
PROT SERPL-MCNC: 6.8 G/DL (ref 6.4–8.2)
PROT UR STRIP-MCNC: ABNORMAL MG/DL
RBC # BLD AUTO: 3.57 MILLION/UL (ref 3.81–5.12)
RBC #/AREA URNS AUTO: ABNORMAL /HPF
SODIUM SERPL-SCNC: 138 MMOL/L (ref 136–145)
SP GR UR STRIP.AUTO: >=1.03 (ref 1–1.03)
UROBILINOGEN UR QL STRIP.AUTO: 0.2 E.U./DL
WBC # BLD AUTO: 6.37 THOUSAND/UL (ref 4.31–10.16)
WBC #/AREA URNS AUTO: ABNORMAL /HPF

## 2018-09-01 PROCEDURE — 80053 COMPREHEN METABOLIC PANEL: CPT

## 2018-09-01 PROCEDURE — 85025 COMPLETE CBC W/AUTO DIFF WBC: CPT

## 2018-09-01 PROCEDURE — 36415 COLL VENOUS BLD VENIPUNCTURE: CPT

## 2018-09-01 PROCEDURE — 81001 URINALYSIS AUTO W/SCOPE: CPT | Performed by: INTERNAL MEDICINE

## 2018-09-01 PROCEDURE — 85652 RBC SED RATE AUTOMATED: CPT

## 2018-09-04 ENCOUNTER — APPOINTMENT (OUTPATIENT)
Dept: LAB | Facility: CLINIC | Age: 83
End: 2018-09-04
Payer: MEDICARE

## 2018-09-04 DIAGNOSIS — D64.9 ANEMIA, UNSPECIFIED TYPE: ICD-10-CM

## 2018-09-04 DIAGNOSIS — M06.09 RHEUMATOID ARTHRITIS OF MULTIPLE SITES WITHOUT RHEUMATOID FACTOR (HCC): ICD-10-CM

## 2018-09-04 DIAGNOSIS — Z79.899 NEED FOR PROPHYLACTIC CHEMOTHERAPY: ICD-10-CM

## 2018-09-04 LAB
FERRITIN SERPL-MCNC: 10 NG/ML (ref 8–388)
IRON SERPL-MCNC: 61 UG/DL (ref 50–170)
TIBC SERPL-MCNC: 351 UG/DL (ref 250–450)

## 2018-09-04 PROCEDURE — 83550 IRON BINDING TEST: CPT

## 2018-09-04 PROCEDURE — 83540 ASSAY OF IRON: CPT

## 2018-09-04 PROCEDURE — 87086 URINE CULTURE/COLONY COUNT: CPT

## 2018-09-04 PROCEDURE — 87186 SC STD MICRODIL/AGAR DIL: CPT

## 2018-09-04 PROCEDURE — 82728 ASSAY OF FERRITIN: CPT

## 2018-09-04 PROCEDURE — 36415 COLL VENOUS BLD VENIPUNCTURE: CPT

## 2018-09-06 LAB
BACTERIA UR CULT: ABNORMAL
BACTERIA UR CULT: ABNORMAL

## 2018-09-10 ENCOUNTER — TELEPHONE (OUTPATIENT)
Dept: UROLOGY | Facility: AMBULATORY SURGERY CENTER | Age: 83
End: 2018-09-10

## 2018-09-10 DIAGNOSIS — N39.0 URINARY TRACT INFECTION WITHOUT HEMATURIA, SITE UNSPECIFIED: Primary | ICD-10-CM

## 2018-09-10 RX ORDER — SULFAMETHOXAZOLE AND TRIMETHOPRIM 800; 160 MG/1; MG/1
1 TABLET ORAL EVERY 12 HOURS SCHEDULED
Qty: 14 TABLET | Refills: 0 | Status: SHIPPED | OUTPATIENT
Start: 2018-09-10 | End: 2018-09-17

## 2018-09-10 NOTE — TELEPHONE ENCOUNTER
Called patient and informed that antibiotic will be sent to pharmacy on file  Patient understands that a week after completing antibiotic she is to have another culture to ensure that infection is cleared  Patient provided verbal understanding and agrees to plan  Future culture and UA orders are placed in patients chart

## 2018-09-10 NOTE — TELEPHONE ENCOUNTER
Patient managed by Dr Adebayo Jimenez and seen at the [place] office  Patient was seen by Dr Pedro Romo last week and was c/o UTI symptoms  Dr Pedro Romo ordered urine culture and is contacting this office to address the abnormal results  Culture results are in Epic  Patient has UTI  Please advise and send to [facility] pool to contact patient at [phone]

## 2018-09-10 NOTE — TELEPHONE ENCOUNTER
Reviewed results of recent urine culture results  Will send prescription for Bactrim to patient's [facility] pharmacy  Patient should have repeat urine testing 1 week after completing course of antibiotics, to ensure infection is cleared  Someone from office will call with results

## 2018-09-11 ENCOUNTER — APPOINTMENT (OUTPATIENT)
Dept: LAB | Facility: CLINIC | Age: 83
End: 2018-09-11
Payer: MEDICARE

## 2018-09-11 DIAGNOSIS — D64.9 ANEMIA, UNSPECIFIED TYPE: ICD-10-CM

## 2018-09-11 DIAGNOSIS — Z79.899 NEED FOR PROPHYLACTIC CHEMOTHERAPY: ICD-10-CM

## 2018-09-11 DIAGNOSIS — M06.09 RHEUMATOID ARTHRITIS OF MULTIPLE SITES WITHOUT RHEUMATOID FACTOR (HCC): ICD-10-CM

## 2018-09-11 LAB
DATE SPECIMEN #1: NORMAL
DATE SPECIMEN #2: NORMAL
DATE SPECIMEN #3: NORMAL
HEMOCCULT SP1 STL QL: NEGATIVE
HEMOCCULT SP2 STL QL: NEGATIVE
HEMOCCULT SP3 STL QL: NEGATIVE

## 2018-09-11 PROCEDURE — 82270 OCCULT BLOOD FECES: CPT

## 2018-11-20 ENCOUNTER — APPOINTMENT (OUTPATIENT)
Dept: LAB | Facility: CLINIC | Age: 83
End: 2018-11-20
Payer: MEDICARE

## 2018-11-20 ENCOUNTER — TRANSCRIBE ORDERS (OUTPATIENT)
Dept: LAB | Facility: CLINIC | Age: 83
End: 2018-11-20

## 2018-11-20 DIAGNOSIS — Z79.899 NEED FOR PROPHYLACTIC CHEMOTHERAPY: ICD-10-CM

## 2018-11-20 DIAGNOSIS — M81.0 SENILE OSTEOPOROSIS: Primary | ICD-10-CM

## 2018-11-20 DIAGNOSIS — M81.0 SENILE OSTEOPOROSIS: ICD-10-CM

## 2018-11-20 LAB
ANION GAP SERPL CALCULATED.3IONS-SCNC: 9 MMOL/L (ref 4–13)
BUN SERPL-MCNC: 16 MG/DL (ref 5–25)
CALCIUM SERPL-MCNC: 9.6 MG/DL (ref 8.3–10.1)
CHLORIDE SERPL-SCNC: 102 MMOL/L (ref 100–108)
CO2 SERPL-SCNC: 28 MMOL/L (ref 21–32)
CREAT SERPL-MCNC: 1.21 MG/DL (ref 0.6–1.3)
GFR SERPL CREATININE-BSD FRML MDRD: 41 ML/MIN/1.73SQ M
GLUCOSE P FAST SERPL-MCNC: 102 MG/DL (ref 65–99)
POTASSIUM SERPL-SCNC: 3.9 MMOL/L (ref 3.5–5.3)
SODIUM SERPL-SCNC: 139 MMOL/L (ref 136–145)

## 2018-11-20 PROCEDURE — 36415 COLL VENOUS BLD VENIPUNCTURE: CPT

## 2018-11-20 PROCEDURE — 80048 BASIC METABOLIC PNL TOTAL CA: CPT

## 2018-12-11 ENCOUNTER — APPOINTMENT (OUTPATIENT)
Dept: LAB | Facility: CLINIC | Age: 83
End: 2018-12-11
Payer: MEDICARE

## 2018-12-11 ENCOUNTER — TRANSCRIBE ORDERS (OUTPATIENT)
Dept: LAB | Facility: CLINIC | Age: 83
End: 2018-12-11

## 2018-12-11 DIAGNOSIS — M81.0 SENILE OSTEOPOROSIS: ICD-10-CM

## 2018-12-11 DIAGNOSIS — M81.0 SENILE OSTEOPOROSIS: Primary | ICD-10-CM

## 2018-12-11 LAB
ANION GAP SERPL CALCULATED.3IONS-SCNC: 9 MMOL/L (ref 4–13)
BUN SERPL-MCNC: 17 MG/DL (ref 5–25)
CALCIUM SERPL-MCNC: 9.3 MG/DL (ref 8.3–10.1)
CHLORIDE SERPL-SCNC: 104 MMOL/L (ref 100–108)
CO2 SERPL-SCNC: 28 MMOL/L (ref 21–32)
CREAT SERPL-MCNC: 1.17 MG/DL (ref 0.6–1.3)
GFR SERPL CREATININE-BSD FRML MDRD: 43 ML/MIN/1.73SQ M
GLUCOSE P FAST SERPL-MCNC: 101 MG/DL (ref 65–99)
POTASSIUM SERPL-SCNC: 4.2 MMOL/L (ref 3.5–5.3)
SODIUM SERPL-SCNC: 141 MMOL/L (ref 136–145)

## 2018-12-11 PROCEDURE — 80048 BASIC METABOLIC PNL TOTAL CA: CPT

## 2018-12-11 PROCEDURE — 36415 COLL VENOUS BLD VENIPUNCTURE: CPT

## 2019-01-07 ENCOUNTER — OFFICE VISIT (OUTPATIENT)
Dept: OBGYN CLINIC | Facility: CLINIC | Age: 84
End: 2019-01-07
Payer: MEDICARE

## 2019-01-07 VITALS — HEIGHT: 59 IN | BODY MASS INDEX: 28.22 KG/M2 | WEIGHT: 140 LBS

## 2019-01-07 DIAGNOSIS — N76.4 LABIAL ABSCESS: Primary | ICD-10-CM

## 2019-01-07 PROCEDURE — 10060 I&D ABSCESS SIMPLE/SINGLE: CPT | Performed by: NURSE PRACTITIONER

## 2019-01-07 PROCEDURE — 99213 OFFICE O/P EST LOW 20 MIN: CPT | Performed by: NURSE PRACTITIONER

## 2019-01-07 RX ORDER — MIRABEGRON 50 MG/1
1 TABLET, FILM COATED, EXTENDED RELEASE ORAL DAILY
Refills: 11 | COMMUNITY
Start: 2018-11-25 | End: 2019-01-22 | Stop reason: SDUPTHER

## 2019-01-07 RX ORDER — MULTIVITAMIN WITH IRON
TABLET ORAL
COMMUNITY
End: 2020-02-20 | Stop reason: SDUPTHER

## 2019-01-07 RX ORDER — BIOTIN 1 MG
TABLET ORAL
COMMUNITY
End: 2020-02-20 | Stop reason: SDUPTHER

## 2019-01-07 RX ORDER — CEPHALEXIN 500 MG/1
500 CAPSULE ORAL EVERY 12 HOURS SCHEDULED
Qty: 14 CAPSULE | Refills: 0 | Status: SHIPPED | OUTPATIENT
Start: 2019-01-07 | End: 2019-01-14

## 2019-01-07 RX ORDER — HYDROXYCHLOROQUINE SULFATE 200 MG/1
200 TABLET, FILM COATED ORAL 2 TIMES DAILY WITH MEALS
COMMUNITY
End: 2021-08-03 | Stop reason: SDUPTHER

## 2019-01-07 RX ORDER — PREGABALIN 75 MG/1
50 CAPSULE ORAL 3 TIMES DAILY
COMMUNITY
End: 2021-08-16

## 2019-01-07 RX ORDER — DIPHENOXYLATE HYDROCHLORIDE AND ATROPINE SULFATE 2.5; .025 MG/1; MG/1
1 TABLET ORAL AS NEEDED
COMMUNITY

## 2019-01-07 RX ORDER — DICYCLOMINE HYDROCHLORIDE 10 MG/1
CAPSULE ORAL
COMMUNITY
End: 2022-04-15

## 2019-01-07 RX ORDER — TRAMADOL HYDROCHLORIDE 50 MG/1
TABLET ORAL
COMMUNITY
End: 2022-04-15

## 2019-01-07 RX ORDER — ALPRAZOLAM 0.25 MG/1
TABLET ORAL
COMMUNITY
End: 2022-04-15

## 2019-01-07 RX ORDER — DULOXETIN HYDROCHLORIDE 60 MG/1
CAPSULE, DELAYED RELEASE ORAL
COMMUNITY
End: 2021-08-30 | Stop reason: SDUPTHER

## 2019-01-07 NOTE — PROGRESS NOTES
Assessment/Plan:      Diagnoses and all orders for this visit:    Labial abscess  -     cephalexin (KEFLEX) 500 mg capsule; Take 1 capsule (500 mg total) by mouth every 12 (twelve) hours for 7 days    Other orders  -     ALPRAZolam (XANAX) 0 25 mg tablet; alprazolam 0 25 mg tablet  -     traMADol (ULTRAM) 50 mg tablet; tramadol 50 mg tablet  -     pyridoxine (VITAMIN B6) 100 mg tablet; Take by oral route  -     Cholecalciferol (VITAMIN D3) 1000 units CAPS; 1000 mg  -     dicyclomine (BENTYL) 10 mg capsule; dicyclomine 10 mg capsule  -     diphenoxylate-atropine (LOMOTIL) 2 5-0 025 mg per tablet; diphenoxylate-atropine 2 5 mg-0 025 mg tablet  -     DULoxetine (CYMBALTA) 60 mg delayed release capsule; duloxetine 60 mg capsule,delayed release  -     metFORMIN (GLUCOPHAGE) 500 mg tablet; metformin 500 mg tablet  -     diclofenac sodium (VOLTAREN) 1 %; Voltaren 1 % topical gel  -     aspirin 81 MG tablet; Aspir-81  -     Diphenoxylate-Atropine (LOMOTIL PO); Lomotil  -     MYRBETRIQ 50 MG TB24; 1 tablet daily  -     pregabalin (LYRICA) 75 mg capsule; Take 50 mg by mouth 3 (three) times a day  -     hydroxychloroquine (PLAQUENIL) 200 mg tablet; Take 200 mg by mouth 2 (two) times a day with meals          Patient informed of exam findings  She tolerated I&D of abscess well  She will start abx treatment for 7 days  Warm compresses to site  Follow-up as needed  Subjective:     Patient ID: Flaco Corona is a 80 y o  female who presents for evaluation of a lump on her labia which she noticed 2 weeks ago  The lump is tender  She states it has not changed in size  She did notice some drainage today  She denies fevers, chills  HPI       Review of Systems    As noted in HPI  Objective:     Physical Exam   Constitutional: She appears well-developed and well-nourished  HENT:   Head: Normocephalic and atraumatic  Neck: Neck supple  Genitourinary:         Neurological: She is alert  Skin: Skin is warm  Incision and drain  Date/Time: 1/7/2019 11:45 AM  Performed by: Alexander Melendrez  Authorized by: Alexander Melendrez     Patient location:  Bedside  Other Assisting Provider: No    Consent:     Consent obtained:  Verbal    Consent given by:  Patient    Risks discussed:  Bleeding and infection    Alternatives discussed:  No treatment  Universal protocol:     Procedure explained and questions answered to patient or proxy's satisfaction: yes      Relevant documents present and verified: yes      Test results available and properly labeled: no      Radiology Images displayed and confirmed  If images not available, report reviewed: no      Patient identity confirmed:  Verbally with patient  Location:     Type:  Abscess    Size:  1 5 cm    Location: left labia majora  Pre-procedure details:     Skin preparation:  Betadine  Anesthesia (see MAR for exact dosages): Anesthesia method:  Local infiltration    Local anesthetic:  Lidocaine 1% w/o epi  Procedure details:     Complexity:  Simple    Needle aspiration: no      Incision types:  Stab incision    Scalpel blade:  10    Approach:  Puncture    Incision depth:  Skin    Techniques: manually expressed  Drainage:  Serosanguinous    Drainage amount:  Scant    Wound treatment:  Wound left open    Packing materials:  None  Post-procedure details:     Patient tolerance of procedure:   Tolerated well, no immediate complications

## 2019-01-10 ENCOUNTER — TELEPHONE (OUTPATIENT)
Dept: OBGYN CLINIC | Facility: CLINIC | Age: 84
End: 2019-01-10

## 2019-01-22 DIAGNOSIS — N32.81 OAB (OVERACTIVE BLADDER): Primary | ICD-10-CM

## 2019-01-22 RX ORDER — MIRABEGRON 50 MG/1
1 TABLET, FILM COATED, EXTENDED RELEASE ORAL DAILY
Qty: 90 TABLET | Refills: 3 | Status: SHIPPED | OUTPATIENT
Start: 2019-01-22 | End: 2019-02-24 | Stop reason: SDUPTHER

## 2019-02-18 NOTE — PROGRESS NOTES
2/19/2019    Tanya Jose Hampden  1934  [id number]        Assessment  -OAB  -Urge urinary incontinence  -Nocturnal enuresis    Discussion/Plan  Suellen Salazar is a 80 y o  female being managed by Dr Harshad Mauricio       -Patient doing well from a urinary standpoint, she is pleased with effects of Myrbetriq  She will continue to take Myrbetriq 50 mg daily  We did discuss follow up in our office as needed, however she wishes to continue yearly evaluation  Follow-up 1 year  Patient was instructed to call with any issues  -All questions answered, patients agree with plan     History of Present Illness  80 y o  female with a history of OAB, urinary urge incontinence, and nocturnal enuresis presents today for follow up  Patient continues to take Myrbetriq 50mg daily  She was unable to tolerate side effects of anticholinergics  Patient states she is pleased with her current urinary pattern  She reports decreased episodes of urinary incontinence  Patient continues to wear 1 sanitary pad daily for protection  No reports of gross hematuria or dysuria  Patient was noted to have positive UTI on 9/4/18, which was treated by her PCP, Dr Kemar Bello  Review of Systems  Review of Systems   Constitutional: Negative  HENT: Negative  Respiratory: Negative  Cardiovascular: Negative  Gastrointestinal: Negative  Genitourinary: Positive for frequency  Negative for decreased urine volume, difficulty urinating, dysuria, flank pain, hematuria and urgency  Musculoskeletal: Negative  Skin: Negative  Neurological: Negative  Psychiatric/Behavioral: Negative          Past Medical History  Past Medical History:   Diagnosis Date    Arthritis     Back pain     [facility]     [facility]     [facility]     Diverticulosis     Hypertension        Past Social History  Past Surgical History:   Procedure Laterality Date    BACK SURGERY      EGD AND COLONOSCOPY N/A 12/23/2016    Procedure: EGD AND COLONOSCOPY;  Surgeon: Bruce Rome MD;  [facility];   Service:     JOINT REPLACEMENT      bilat knees and hips    OTHER SURGICAL HISTORY      pelvis rods    REPLACEMENT TOTAL KNEE BILATERAL      STOMACH SURGERY         Past Family History  Family History   Problem Relation Age of Onset    Cancer Brother     Diabetes Mother     Hypertension Father     Heart disease Father        Past Social history  Social History     Socioeconomic History    Marital status: /Civil Union     Spouse name: Not on file    Number of children: Not on file    Years of education: Not on file    Highest education level: Not on file   Occupational History    Not on file   Social Needs    Financial resource strain: Not on file    Food insecurity:     Worry: Not on file     Inability: Not on file    Transportation needs:     Medical: Not on file     Non-medical: Not on file   Tobacco Use    Smoking status: Former Smoker    Smokeless tobacco: Never Used   Substance and Sexual Activity    Alcohol use: No    Drug use: No    Sexual activity: Yes     Partners: Male     Birth control/protection: None   Lifestyle    Physical activity:     Days per week: Not on file     Minutes per session: Not on file    Stress: Not on file   Relationships    Social connections:     Talks on phone: Not on file     Gets together: Not on file     Attends Baptism service: Not on file     Active member of club or organization: Not on file     Attends meetings of clubs or organizations: Not on file     Relationship status: Not on file    Intimate partner violence:     Fear of current or ex partner: Not on file     Emotionally abused: Not on file     Physically abused: Not on file     Forced sexual activity: Not on file   Other Topics Concern    Not on file   Social History Narrative    Not on file       Current Medications  Current Outpatient Medications   Medication Sig Dispense Refill    ALPRAZolam (XANAX) 0 25 mg tablet alprazolam 0 25 mg tablet      aspirin 81 MG tablet Take 81 mg by mouth daily      aspirin 81 MG tablet Aspir-81      ASPIRIN 81 PO Take 81 mg by mouth      calcium carbonate-vitamin D (OSCAL-D) 500 mg-200 units per tablet Take 1 tablet by mouth daily with breakfast      Cholecalciferol (VITAMIN D3) 1000 units CAPS Take by mouth      Cholecalciferol (VITAMIN D3) 1000 units CAPS 1000 mg      diclofenac sodium (VOLTAREN) 1 % Voltaren 1 % topical gel      dicyclomine (BENTYL) 10 mg capsule Take 10 mg by mouth daily      dicyclomine (BENTYL) 10 mg capsule dicyclomine 10 mg capsule      Diphenoxylate-Atropine (LOMOTIL PO) Lomotil      diphenoxylate-atropine (LOMOTIL) 2 5-0 025 mg per tablet Take 1 tablet by mouth 4 (four) times a day as needed for diarrhea      diphenoxylate-atropine (LOMOTIL) 2 5-0 025 mg per tablet Take by mouth      diphenoxylate-atropine (LOMOTIL) 2 5-0 025 mg per tablet diphenoxylate-atropine 2 5 mg-0 025 mg tablet      docusate sodium (COLACE) 100 mg capsule Take 1 capsule by mouth 2 (two) times a day for 30 days 60 capsule 0    DULoxetine (CYMBALTA) 20 mg capsule Take by mouth      DULoxetine (CYMBALTA) 60 mg delayed release capsule Take 60 mg by mouth daily      DULoxetine (CYMBALTA) 60 mg delayed release capsule duloxetine 60 mg capsule,delayed release      ferrous sulfate 325 (65 Fe) mg tablet Take 1 tablet by mouth 2 (two) times a day for 30 days 60 tablet 0    gabapentin (NEURONTIN) 300 mg capsule Take 300 mg by mouth 3 (three) times a day      hydroxychloroquine (PLAQUENIL) 200 mg tablet Take 200 mg by mouth 2 (two) times a day with meals      lisinopril (ZESTRIL) 2 5 mg tablet Take 2 5 mg by mouth daily      lisinopril (ZESTRIL) 2 5 mg tablet Take 2 5 mg by mouth      metFORMIN (GLUCOPHAGE) 1000 MG tablet Take by mouth      metFORMIN (GLUCOPHAGE) 500 mg tablet Take 500 mg by mouth 2 (two) times a day with meals      metFORMIN (GLUCOPHAGE) 500 mg tablet metformin 500 mg tablet      Mirabegron ER (MYRBETRIQ) 50 MG TB24 Take 1 tablet (50 mg total) by mouth daily for 30 days 30 tablet 11    MYRBETRIQ 50 MG TB24 Take 1 tablet (50 mg total) by mouth daily 90 tablet 3    omeprazole (PriLOSEC) 20 mg delayed release capsule Take 20 mg by mouth daily      omeprazole (PRILOSEC) 20 mg delayed release capsule Take by mouth      pregabalin (LYRICA) 25 mg capsule Take by mouth      pregabalin (LYRICA) 50 mg capsule Take 50 mg by mouth daily after dinner      pregabalin (LYRICA) 75 mg capsule Take 50 mg by mouth 3 (three) times a day      pyridoxine (B-6) 100 MG tablet Take 100 mg by mouth daily      pyridoxine (VITAMIN B6) 100 mg tablet Take by mouth      pyridoxine (VITAMIN B6) 100 mg tablet Take by oral route   traMADol (ULTRAM) 50 mg tablet tramadol 50 mg tablet       No current facility-administered medications for this visit  Allergies  Allergies   Allergen Reactions    Morphine Other (See Comments)     urinary retention    Ciprofloxacin Rash and Nausea Only       Past medical history, social history, family history, medications and allergies were reviewed  Vitals  There were no vitals filed for this visit  Physical Exam  Physical Exam   Constitutional: She is oriented to person, place, and time  She appears well-developed and well-nourished  HENT:   Head: Normocephalic  Eyes: Pupils are equal, round, and reactive to light  Neck: Normal range of motion  Cardiovascular: Normal rate and regular rhythm  Pulmonary/Chest: Effort normal    Abdominal: Soft  Normal appearance  There is no CVA tenderness  Musculoskeletal: Normal range of motion  Neurological: She is alert and oriented to person, place, and time  Skin: Skin is warm and dry  Psychiatric: She has a normal mood and affect   Her behavior is normal  Judgment and thought content normal        Results    I have personally reviewed all pertinent lab results and reviewed with patient  Lab Results   Component Value Date    GLUCOSE 205 (H) 10/09/2015    CALCIUM 9 3 12/11/2018     10/09/2015    K 4 2 12/11/2018    CO2 28 12/11/2018     12/11/2018    BUN 17 12/11/2018    CREATININE 1 17 12/11/2018     Lab Results   Component Value Date    WBC 6 37 09/01/2018    HGB 9 6 (L) 09/01/2018    HCT 31 1 (L) 09/01/2018    MCV 87 09/01/2018     09/01/2018     No results found for this or any previous visit (from the past 1 hour(s))

## 2019-02-19 ENCOUNTER — OFFICE VISIT (OUTPATIENT)
Dept: UROLOGY | Facility: AMBULATORY SURGERY CENTER | Age: 84
End: 2019-02-19
Payer: MEDICARE

## 2019-02-19 VITALS
HEART RATE: 80 BPM | HEIGHT: 60 IN | BODY MASS INDEX: 28.07 KG/M2 | SYSTOLIC BLOOD PRESSURE: 130 MMHG | WEIGHT: 143 LBS | DIASTOLIC BLOOD PRESSURE: 70 MMHG

## 2019-02-19 DIAGNOSIS — N32.81 OAB (OVERACTIVE BLADDER): Primary | ICD-10-CM

## 2019-02-19 PROCEDURE — 99212 OFFICE O/P EST SF 10 MIN: CPT | Performed by: NURSE PRACTITIONER

## 2019-02-24 DIAGNOSIS — N32.81 OAB (OVERACTIVE BLADDER): ICD-10-CM

## 2019-02-25 RX ORDER — MIRABEGRON 50 MG/1
TABLET, FILM COATED, EXTENDED RELEASE ORAL
Qty: 30 TABLET | Refills: 8 | Status: SHIPPED | OUTPATIENT
Start: 2019-02-25 | End: 2020-05-12 | Stop reason: SDUPTHER

## 2019-05-14 ENCOUNTER — APPOINTMENT (OUTPATIENT)
Dept: LAB | Facility: CLINIC | Age: 84
End: 2019-05-14
Payer: MEDICARE

## 2019-05-14 ENCOUNTER — TRANSCRIBE ORDERS (OUTPATIENT)
Dept: LAB | Facility: CLINIC | Age: 84
End: 2019-05-14

## 2019-05-14 DIAGNOSIS — M81.0 SENILE OSTEOPOROSIS: Primary | ICD-10-CM

## 2019-05-14 DIAGNOSIS — Z79.899 ENCOUNTER FOR LONG-TERM (CURRENT) USE OF OTHER MEDICATIONS: ICD-10-CM

## 2019-05-14 DIAGNOSIS — E55.9 AVITAMINOSIS D: ICD-10-CM

## 2019-05-14 DIAGNOSIS — M47.816 LUMBAR SPONDYLOSIS: ICD-10-CM

## 2019-05-14 DIAGNOSIS — D50.9 IRON DEFICIENCY ANEMIA, UNSPECIFIED IRON DEFICIENCY ANEMIA TYPE: ICD-10-CM

## 2019-05-14 DIAGNOSIS — M06.09 RHEUMATOID ARTHRITIS OF MULTIPLE SITES WITHOUT RHEUMATOID FACTOR (HCC): ICD-10-CM

## 2019-05-14 DIAGNOSIS — M15.0 PRIMARY GENERALIZED HYPERTROPHIC OSTEOARTHROSIS: ICD-10-CM

## 2019-05-14 DIAGNOSIS — M81.0 SENILE OSTEOPOROSIS: ICD-10-CM

## 2019-05-14 LAB
25(OH)D3 SERPL-MCNC: 25.3 NG/ML (ref 30–100)
ALBUMIN SERPL BCP-MCNC: 3.2 G/DL (ref 3.5–5)
ALP SERPL-CCNC: 99 U/L (ref 46–116)
ALT SERPL W P-5'-P-CCNC: 32 U/L (ref 12–78)
ANION GAP SERPL CALCULATED.3IONS-SCNC: 9 MMOL/L (ref 4–13)
AST SERPL W P-5'-P-CCNC: 24 U/L (ref 5–45)
BASOPHILS # BLD AUTO: 0.02 THOUSANDS/ΜL (ref 0–0.1)
BASOPHILS NFR BLD AUTO: 0 % (ref 0–1)
BILIRUB SERPL-MCNC: 0.3 MG/DL (ref 0.2–1)
BUN SERPL-MCNC: 27 MG/DL (ref 5–25)
CALCIUM SERPL-MCNC: 9.5 MG/DL (ref 8.3–10.1)
CHLORIDE SERPL-SCNC: 102 MMOL/L (ref 100–108)
CO2 SERPL-SCNC: 29 MMOL/L (ref 21–32)
CREAT SERPL-MCNC: 1.21 MG/DL (ref 0.6–1.3)
EOSINOPHIL # BLD AUTO: 0.12 THOUSAND/ΜL (ref 0–0.61)
EOSINOPHIL NFR BLD AUTO: 3 % (ref 0–6)
ERYTHROCYTE [DISTWIDTH] IN BLOOD BY AUTOMATED COUNT: 19.9 % (ref 11.6–15.1)
ERYTHROCYTE [SEDIMENTATION RATE] IN BLOOD: 20 MM/HOUR (ref 0–20)
FERRITIN SERPL-MCNC: 10 NG/ML (ref 8–388)
GFR SERPL CREATININE-BSD FRML MDRD: 41 ML/MIN/1.73SQ M
GLUCOSE SERPL-MCNC: 142 MG/DL (ref 65–140)
HCT VFR BLD AUTO: 32.9 % (ref 34.8–46.1)
HGB BLD-MCNC: 9.8 G/DL (ref 11.5–15.4)
IMM GRANULOCYTES # BLD AUTO: 0.02 THOUSAND/UL (ref 0–0.2)
IMM GRANULOCYTES NFR BLD AUTO: 0 % (ref 0–2)
LYMPHOCYTES # BLD AUTO: 0.98 THOUSANDS/ΜL (ref 0.6–4.47)
LYMPHOCYTES NFR BLD AUTO: 20 % (ref 14–44)
MCH RBC QN AUTO: 24.1 PG (ref 26.8–34.3)
MCHC RBC AUTO-ENTMCNC: 29.8 G/DL (ref 31.4–37.4)
MCV RBC AUTO: 81 FL (ref 82–98)
MONOCYTES # BLD AUTO: 0.57 THOUSAND/ΜL (ref 0.17–1.22)
MONOCYTES NFR BLD AUTO: 12 % (ref 4–12)
NEUTROPHILS # BLD AUTO: 3.1 THOUSANDS/ΜL (ref 1.85–7.62)
NEUTS SEG NFR BLD AUTO: 65 % (ref 43–75)
NRBC BLD AUTO-RTO: 0 /100 WBCS
PLATELET # BLD AUTO: 213 THOUSANDS/UL (ref 149–390)
PMV BLD AUTO: 10.5 FL (ref 8.9–12.7)
POTASSIUM SERPL-SCNC: 4.2 MMOL/L (ref 3.5–5.3)
PROT SERPL-MCNC: 7.1 G/DL (ref 6.4–8.2)
RBC # BLD AUTO: 4.06 MILLION/UL (ref 3.81–5.12)
SODIUM SERPL-SCNC: 140 MMOL/L (ref 136–145)
WBC # BLD AUTO: 4.81 THOUSAND/UL (ref 4.31–10.16)

## 2019-05-14 PROCEDURE — 85025 COMPLETE CBC W/AUTO DIFF WBC: CPT

## 2019-05-14 PROCEDURE — 80053 COMPREHEN METABOLIC PANEL: CPT

## 2019-05-14 PROCEDURE — 36415 COLL VENOUS BLD VENIPUNCTURE: CPT

## 2019-05-14 PROCEDURE — 85652 RBC SED RATE AUTOMATED: CPT

## 2019-05-14 PROCEDURE — 82728 ASSAY OF FERRITIN: CPT

## 2019-05-14 PROCEDURE — 82306 VITAMIN D 25 HYDROXY: CPT

## 2019-06-20 ENCOUNTER — TRANSCRIBE ORDERS (OUTPATIENT)
Dept: LAB | Facility: CLINIC | Age: 84
End: 2019-06-20

## 2019-06-20 ENCOUNTER — APPOINTMENT (OUTPATIENT)
Dept: LAB | Facility: CLINIC | Age: 84
End: 2019-06-20
Payer: MEDICARE

## 2019-06-20 DIAGNOSIS — M81.0 SENILE OSTEOPOROSIS: Primary | ICD-10-CM

## 2019-06-20 DIAGNOSIS — Z79.899 ENCOUNTER FOR LONG-TERM (CURRENT) USE OF OTHER MEDICATIONS: ICD-10-CM

## 2019-06-20 DIAGNOSIS — M81.0 SENILE OSTEOPOROSIS: ICD-10-CM

## 2019-06-20 LAB
ANION GAP SERPL CALCULATED.3IONS-SCNC: 6 MMOL/L (ref 4–13)
BUN SERPL-MCNC: 22 MG/DL (ref 5–25)
CALCIUM SERPL-MCNC: 9 MG/DL (ref 8.3–10.1)
CHLORIDE SERPL-SCNC: 104 MMOL/L (ref 100–108)
CO2 SERPL-SCNC: 30 MMOL/L (ref 21–32)
CREAT SERPL-MCNC: 1.26 MG/DL (ref 0.6–1.3)
GFR SERPL CREATININE-BSD FRML MDRD: 39 ML/MIN/1.73SQ M
GLUCOSE P FAST SERPL-MCNC: 111 MG/DL (ref 65–99)
POTASSIUM SERPL-SCNC: 4.3 MMOL/L (ref 3.5–5.3)
SODIUM SERPL-SCNC: 140 MMOL/L (ref 136–145)

## 2019-06-20 PROCEDURE — 80048 BASIC METABOLIC PNL TOTAL CA: CPT

## 2019-06-20 PROCEDURE — 36415 COLL VENOUS BLD VENIPUNCTURE: CPT

## 2019-08-01 ENCOUNTER — LAB REQUISITION (OUTPATIENT)
Dept: LAB | Facility: HOSPITAL | Age: 84
End: 2019-08-01
Payer: MEDICARE

## 2019-08-01 DIAGNOSIS — I89.0 LYMPHEDEMA, NOT ELSEWHERE CLASSIFIED: ICD-10-CM

## 2019-08-01 LAB
ANION GAP SERPL CALCULATED.3IONS-SCNC: 5 MMOL/L (ref 4–13)
BUN SERPL-MCNC: 21 MG/DL (ref 5–25)
CALCIUM SERPL-MCNC: 9.2 MG/DL (ref 8.3–10.1)
CHLORIDE SERPL-SCNC: 105 MMOL/L (ref 100–108)
CO2 SERPL-SCNC: 28 MMOL/L (ref 21–32)
CREAT SERPL-MCNC: 1.21 MG/DL (ref 0.6–1.3)
DEPRECATED D DIMER PPP: 2148 NG/ML (FEU)
GFR SERPL CREATININE-BSD FRML MDRD: 41 ML/MIN/1.73SQ M
GLUCOSE SERPL-MCNC: 92 MG/DL (ref 65–140)
NT-PROBNP SERPL-MCNC: 247 PG/ML
POTASSIUM SERPL-SCNC: 4.5 MMOL/L (ref 3.5–5.3)
SODIUM SERPL-SCNC: 138 MMOL/L (ref 136–145)

## 2019-08-01 PROCEDURE — 85379 FIBRIN DEGRADATION QUANT: CPT | Performed by: FAMILY MEDICINE

## 2019-08-01 PROCEDURE — 83880 ASSAY OF NATRIURETIC PEPTIDE: CPT | Performed by: FAMILY MEDICINE

## 2019-08-01 PROCEDURE — 80048 BASIC METABOLIC PNL TOTAL CA: CPT | Performed by: FAMILY MEDICINE

## 2019-08-05 ENCOUNTER — TRANSCRIBE ORDERS (OUTPATIENT)
Dept: ADMINISTRATIVE | Facility: HOSPITAL | Age: 84
End: 2019-08-05

## 2019-08-05 DIAGNOSIS — I89.0 OBLITERATION OF LYMPHATIC VESSEL: Primary | ICD-10-CM

## 2019-08-06 ENCOUNTER — HOSPITAL ENCOUNTER (OUTPATIENT)
Dept: ULTRASOUND IMAGING | Facility: HOSPITAL | Age: 84
Discharge: HOME | End: 2019-08-06
Attending: FAMILY MEDICINE
Payer: MEDICARE

## 2019-08-06 DIAGNOSIS — I89.0 OBLITERATION OF LYMPHATIC VESSEL: ICD-10-CM

## 2019-08-06 PROCEDURE — 93970 EXTREMITY STUDY: CPT

## 2019-08-07 PROCEDURE — 93970 EXTREMITY STUDY: CPT | Performed by: SURGERY

## 2019-11-19 ENCOUNTER — TRANSCRIBE ORDERS (OUTPATIENT)
Dept: LAB | Facility: CLINIC | Age: 84
End: 2019-11-19

## 2019-11-19 ENCOUNTER — HOSPITAL ENCOUNTER (EMERGENCY)
Facility: HOSPITAL | Age: 84
Discharge: HOME | End: 2019-11-19
Attending: EMERGENCY MEDICINE
Payer: MEDICARE

## 2019-11-19 ENCOUNTER — APPOINTMENT (OUTPATIENT)
Dept: LAB | Facility: CLINIC | Age: 84
End: 2019-11-19
Payer: MEDICARE

## 2019-11-19 VITALS
WEIGHT: 149.91 LBS | TEMPERATURE: 98 F | OXYGEN SATURATION: 96 % | SYSTOLIC BLOOD PRESSURE: 132 MMHG | RESPIRATION RATE: 18 BRPM | BODY MASS INDEX: 29.77 KG/M2 | HEART RATE: 79 BPM | DIASTOLIC BLOOD PRESSURE: 87 MMHG

## 2019-11-19 DIAGNOSIS — D64.9 SYMPTOMATIC ANEMIA: Primary | ICD-10-CM

## 2019-11-19 DIAGNOSIS — D50.8 IRON DEFICIENCY ANEMIA SECONDARY TO INADEQUATE DIETARY IRON INTAKE: ICD-10-CM

## 2019-11-19 DIAGNOSIS — D50.9 IRON DEFICIENCY ANEMIA, UNSPECIFIED IRON DEFICIENCY ANEMIA TYPE: ICD-10-CM

## 2019-11-19 DIAGNOSIS — Z79.899 ENCOUNTER FOR LONG-TERM (CURRENT) USE OF OTHER MEDICATIONS: ICD-10-CM

## 2019-11-19 DIAGNOSIS — M06.09 RHEUMATOID ARTHRITIS OF MULTIPLE SITES WITHOUT RHEUMATOID FACTOR (HCC): ICD-10-CM

## 2019-11-19 DIAGNOSIS — E13.42 DIABETIC POLYNEUROPATHY ASSOCIATED WITH OTHER SPECIFIED DIABETES MELLITUS (HCC): ICD-10-CM

## 2019-11-19 DIAGNOSIS — M35.01 KERATOCONJUNCTIVITIS SICCA (HCC): ICD-10-CM

## 2019-11-19 DIAGNOSIS — M81.0 SENILE OSTEOPOROSIS: ICD-10-CM

## 2019-11-19 DIAGNOSIS — M06.09 RHEUMATOID ARTHRITIS OF MULTIPLE SITES WITHOUT RHEUMATOID FACTOR (HCC): Primary | ICD-10-CM

## 2019-11-19 LAB
ABO GROUP BLD: NORMAL
ALBUMIN SERPL BCP-MCNC: 3.1 G/DL (ref 3.5–5)
ALP SERPL-CCNC: 98 U/L (ref 46–116)
ALT SERPL W P-5'-P-CCNC: 24 U/L (ref 12–78)
ANION GAP SERPL CALCULATED.3IONS-SCNC: 9 MMOL/L (ref 4–13)
APTT PPP: 29 SECONDS (ref 23–37)
AST SERPL W P-5'-P-CCNC: 28 U/L (ref 5–45)
BACTERIA UR QL AUTO: ABNORMAL /HPF
BASOPHILS # BLD AUTO: 0.05 THOUSANDS/ΜL (ref 0–0.1)
BASOPHILS NFR BLD AUTO: 1 % (ref 0–1)
BILIRUB SERPL-MCNC: 0.7 MG/DL (ref 0.2–1)
BILIRUB UR QL STRIP: NEGATIVE
BLD GP AB SCN SERPL QL: NEGATIVE
BUN SERPL-MCNC: 19 MG/DL (ref 5–25)
CALCIUM SERPL-MCNC: 8.9 MG/DL (ref 8.3–10.1)
CHLORIDE SERPL-SCNC: 103 MMOL/L (ref 100–108)
CLARITY UR: ABNORMAL
CO2 SERPL-SCNC: 27 MMOL/L (ref 21–32)
COLOR UR: YELLOW
CREAT SERPL-MCNC: 1.08 MG/DL (ref 0.6–1.3)
EOSINOPHIL # BLD AUTO: 0.27 THOUSAND/ΜL (ref 0–0.61)
EOSINOPHIL NFR BLD AUTO: 4 % (ref 0–6)
ERYTHROCYTE [DISTWIDTH] IN BLOOD BY AUTOMATED COUNT: 18.6 % (ref 11.6–15.1)
ERYTHROCYTE [SEDIMENTATION RATE] IN BLOOD: 20 MM/HOUR (ref 0–20)
GFR SERPL CREATININE-BSD FRML MDRD: 47 ML/MIN/1.73SQ M
GLUCOSE P FAST SERPL-MCNC: 118 MG/DL (ref 65–99)
GLUCOSE UR STRIP-MCNC: NEGATIVE MG/DL
HCT VFR BLD AUTO: 22.8 % (ref 34.8–46.1)
HGB BLD-MCNC: 6.7 G/DL (ref 11.5–15.4)
HGB UR QL STRIP.AUTO: NEGATIVE
IMM GRANULOCYTES # BLD AUTO: 0.07 THOUSAND/UL (ref 0–0.2)
IMM GRANULOCYTES NFR BLD AUTO: 1 % (ref 0–2)
INR PPP: 1.07 (ref 0.84–1.19)
IRON SERPL-MCNC: 24 UG/DL (ref 50–170)
KETONES UR STRIP-MCNC: NEGATIVE MG/DL
LEUKOCYTE ESTERASE UR QL STRIP: ABNORMAL
LYMPHOCYTES # BLD AUTO: 1.56 THOUSANDS/ΜL (ref 0.6–4.47)
LYMPHOCYTES NFR BLD AUTO: 21 % (ref 14–44)
MCH RBC QN AUTO: 23.9 PG (ref 26.8–34.3)
MCHC RBC AUTO-ENTMCNC: 29.4 G/DL (ref 31.4–37.4)
MCV RBC AUTO: 81 FL (ref 82–98)
MONOCYTES # BLD AUTO: 1.12 THOUSAND/ΜL (ref 0.17–1.22)
MONOCYTES NFR BLD AUTO: 15 % (ref 4–12)
NEUTROPHILS # BLD AUTO: 4.36 THOUSANDS/ΜL (ref 1.85–7.62)
NEUTS SEG NFR BLD AUTO: 58 % (ref 43–75)
NITRITE UR QL STRIP: NEGATIVE
NON-SQ EPI CELLS URNS QL MICRO: ABNORMAL /HPF
NRBC BLD AUTO-RTO: 0 /100 WBCS
PH UR STRIP.AUTO: 5 [PH]
PLATELET # BLD AUTO: 235 THOUSANDS/UL (ref 149–390)
PMV BLD AUTO: 10.8 FL (ref 8.9–12.7)
POTASSIUM SERPL-SCNC: 4.1 MMOL/L (ref 3.5–5.3)
PROT SERPL-MCNC: 6.6 G/DL (ref 6.4–8.2)
PROT UR STRIP-MCNC: NEGATIVE MG/DL
PROTHROMBIN TIME: 13.3 SECONDS (ref 11.6–14.5)
RBC # BLD AUTO: 2.8 MILLION/UL (ref 3.81–5.12)
RBC #/AREA URNS AUTO: ABNORMAL /HPF
RH BLD: POSITIVE
SODIUM SERPL-SCNC: 139 MMOL/L (ref 136–145)
SP GR UR STRIP.AUTO: >=1.03 (ref 1–1.03)
SPECIMEN EXPIRATION DATE: NORMAL
UROBILINOGEN UR QL STRIP.AUTO: 0.2 E.U./DL
WBC # BLD AUTO: 7.43 THOUSAND/UL (ref 4.31–10.16)
WBC #/AREA URNS AUTO: ABNORMAL /HPF

## 2019-11-19 PROCEDURE — 86901 BLOOD TYPING SEROLOGIC RH(D): CPT | Performed by: EMERGENCY MEDICINE

## 2019-11-19 PROCEDURE — 86900 BLOOD TYPING SEROLOGIC ABO: CPT | Performed by: EMERGENCY MEDICINE

## 2019-11-19 PROCEDURE — 85610 PROTHROMBIN TIME: CPT | Performed by: EMERGENCY MEDICINE

## 2019-11-19 PROCEDURE — 99284 EMERGENCY DEPT VISIT MOD MDM: CPT | Performed by: EMERGENCY MEDICINE

## 2019-11-19 PROCEDURE — 85025 COMPLETE CBC W/AUTO DIFF WBC: CPT

## 2019-11-19 PROCEDURE — 99283 EMERGENCY DEPT VISIT LOW MDM: CPT

## 2019-11-19 PROCEDURE — 36415 COLL VENOUS BLD VENIPUNCTURE: CPT | Performed by: INTERNAL MEDICINE

## 2019-11-19 PROCEDURE — 83540 ASSAY OF IRON: CPT

## 2019-11-19 PROCEDURE — 85730 THROMBOPLASTIN TIME PARTIAL: CPT | Performed by: EMERGENCY MEDICINE

## 2019-11-19 PROCEDURE — 86920 COMPATIBILITY TEST SPIN: CPT

## 2019-11-19 PROCEDURE — 86850 RBC ANTIBODY SCREEN: CPT | Performed by: EMERGENCY MEDICINE

## 2019-11-19 PROCEDURE — 81001 URINALYSIS AUTO W/SCOPE: CPT | Performed by: INTERNAL MEDICINE

## 2019-11-19 PROCEDURE — 36430 TRANSFUSION BLD/BLD COMPNT: CPT

## 2019-11-19 PROCEDURE — P9016 RBC LEUKOCYTES REDUCED: HCPCS

## 2019-11-19 PROCEDURE — 80053 COMPREHEN METABOLIC PANEL: CPT

## 2019-11-19 PROCEDURE — 85652 RBC SED RATE AUTOMATED: CPT | Performed by: INTERNAL MEDICINE

## 2019-11-19 RX ORDER — FERROUS SULFATE 325(65) MG
325 TABLET ORAL DAILY
Qty: 30 TABLET | Refills: 0 | OUTPATIENT
Start: 2019-11-19 | End: 2022-04-15

## 2019-11-19 NOTE — ED PROVIDER NOTES
History  Chief Complaint   Patient presents with    Abnormal Lab     patient reports lethargy and dizziness  had blood work done this am and hemoglobin was 6  7  hx of transfusions in the past     26-year-old female presents to symptomatic anemia  Had outpatient routine blood work this morning which showed a hemoglobin of 6 7  Patient states in retrospect that she has been increasingly tired, exertional dyspnea, and increasing daytime somnolence over the past few months  She denies any bloody or black stools  Denies any significant bleeding  No hematuria no bleeding from the gums no hematemesis no hemoptysis  Patient did have a fall about 2 weeks ago, mechanical, tripped, landed on her buttocks, does have a contusion over bilateral buttocks is, but they are not firm they are not taut, there has been no expanding hematoma, pain was local over the area for a few days but has subsequently completely resolved even of the ecchymosis has not  Patient states she had a blood transfusion multiple years ago because of low blood counts  Patient states that she has been told she has been iron deficient was originally placed on iron pills multiple years ago but was unable to tolerate them due to GI upset so stop taking them  Patient states she eats very little meat and very low proteins in her diet  History provided by:  Patient  Malaise - 7 years or greater   Severity:  Severe  Onset quality:  Gradual  Duration:  8 weeks  Timing:  Constant  Progression:  Worsening  Chronicity:  New  Context comment:  Symptomatic anemia, hemoglobin outpatient 6 7  Relieved by:  Rest  Worsened by:   Activity  Ineffective treatments:  None tried  Associated symptoms: no abdominal pain, no chest pain, no cough, no diarrhea, no dizziness, no dysuria, no fever, no frequency, no headaches, no hematochezia, no nausea, no shortness of breath and no vomiting        Prior to Admission Medications   Prescriptions Last Dose Informant Patient Reported? Taking?    ALPRAZolam (XANAX) 0 25 mg tablet  Spouse/Significant Other Yes No   Sig: alprazolam 0 25 mg tablet   ASPIRIN 81 PO  Spouse/Significant Other Yes No   Sig: Take 81 mg by mouth   Cholecalciferol (VITAMIN D3) 1000 units CAPS  Spouse/Significant Other Yes No   Sig: Take by mouth   Cholecalciferol (VITAMIN D3) 1000 units CAPS  Spouse/Significant Other Yes No   Si mg   DULoxetine (CYMBALTA) 20 mg capsule  Spouse/Significant Other Yes No   Sig: Take by mouth   DULoxetine (CYMBALTA) 60 mg delayed release capsule  Spouse/Significant Other Yes No   Sig: Take 60 mg by mouth daily   DULoxetine (CYMBALTA) 60 mg delayed release capsule  Spouse/Significant Other Yes No   Sig: duloxetine 60 mg capsule,delayed release   Diphenoxylate-Atropine (LOMOTIL PO)  Spouse/Significant Other Yes No   Sig: Lomotil   MYRBETRIQ 50 MG TB24   No No   Sig: TAKE ONE TABLET ONCE DAILY   aspirin 81 MG tablet  Spouse/Significant Other Yes No   Sig: Take 81 mg by mouth daily   aspirin 81 MG tablet  Spouse/Significant Other Yes No   Sig: Aspir-81   calcium carbonate-vitamin D (OSCAL-D) 500 mg-200 units per tablet  Spouse/Significant Other Yes No   Sig: Take 1 tablet by mouth daily with breakfast   diclofenac sodium (VOLTAREN) 1 %  Spouse/Significant Other Yes No   Sig: Voltaren 1 % topical gel   dicyclomine (BENTYL) 10 mg capsule  Spouse/Significant Other Yes No   Sig: Take 10 mg by mouth daily   dicyclomine (BENTYL) 10 mg capsule  Spouse/Significant Other Yes No   Sig: dicyclomine 10 mg capsule   diphenoxylate-atropine (LOMOTIL) 2 5-0 025 mg per tablet  Spouse/Significant Other Yes No   Sig: Take 1 tablet by mouth 4 (four) times a day as needed for diarrhea   diphenoxylate-atropine (LOMOTIL) 2 5-0 025 mg per tablet  Spouse/Significant Other Yes No   Sig: Take by mouth   diphenoxylate-atropine (LOMOTIL) 2 5-0 025 mg per tablet  Spouse/Significant Other Yes No   Sig: diphenoxylate-atropine 2 5 mg-0 025 mg tablet   docusate sodium (COLACE) 100 mg capsule   No No   Sig: Take 1 capsule by mouth 2 (two) times a day for 30 days   ferrous sulfate 325 (65 Fe) mg tablet   No No   Sig: Take 1 tablet by mouth 2 (two) times a day for 30 days   gabapentin (NEURONTIN) 300 mg capsule  Spouse/Significant Other Yes No   Sig: Take 300 mg by mouth 3 (three) times a day   hydroxychloroquine (PLAQUENIL) 200 mg tablet  Spouse/Significant Other Yes No   Sig: Take 200 mg by mouth 2 (two) times a day with meals   lisinopril (ZESTRIL) 2 5 mg tablet  Spouse/Significant Other Yes No   Sig: Take 2 5 mg by mouth daily   lisinopril (ZESTRIL) 2 5 mg tablet  Spouse/Significant Other Yes No   Sig: Take 2 5 mg by mouth   metFORMIN (GLUCOPHAGE) 1000 MG tablet  Spouse/Significant Other Yes No   Sig: Take by mouth   metFORMIN (GLUCOPHAGE) 500 mg tablet  Spouse/Significant Other Yes No   Sig: Take 500 mg by mouth 2 (two) times a day with meals   metFORMIN (GLUCOPHAGE) 500 mg tablet  Spouse/Significant Other Yes No   Sig: metformin 500 mg tablet   omeprazole (PRILOSEC) 20 mg delayed release capsule  Spouse/Significant Other Yes No   Sig: Take by mouth   omeprazole (PriLOSEC) 20 mg delayed release capsule  Spouse/Significant Other Yes No   Sig: Take 20 mg by mouth daily   pregabalin (LYRICA) 25 mg capsule  Spouse/Significant Other Yes No   Sig: Take by mouth   pregabalin (LYRICA) 50 mg capsule  Spouse/Significant Other Yes No   Sig: Take 50 mg by mouth daily after dinner   pregabalin (LYRICA) 75 mg capsule  Spouse/Significant Other Yes No   Sig: Take 50 mg by mouth 3 (three) times a day   pyridoxine (B-6) 100 MG tablet  Spouse/Significant Other Yes No   Sig: Take 100 mg by mouth daily   pyridoxine (VITAMIN B6) 100 mg tablet  Spouse/Significant Other Yes No   Sig: Take by mouth   pyridoxine (VITAMIN B6) 100 mg tablet  Spouse/Significant Other Yes No   Sig: Take by oral route     traMADol (ULTRAM) 50 mg tablet  Spouse/Significant Other Yes No   Sig: tramadol 50 mg tablet Facility-Administered Medications: None       Past Medical History:   Diagnosis Date    Arthritis     Back pain     [facility]     [facility]     [facility] mellitus [facility]     Diverticulosis     Hypertension        Past Surgical History:   Procedure Laterality Date    BACK SURGERY      EGD AND COLONOSCOPY N/A 2016    Procedure: EGD AND COLONOSCOPY;  Surgeon: Tim Espitia MD;  [facility]; Service:     JOINT REPLACEMENT      bilat knees and hips    OTHER SURGICAL HISTORY      pelvis rods    REPLACEMENT TOTAL KNEE BILATERAL      STOMACH SURGERY         Family History   Problem Relation Age of Onset    Cancer Brother     Diabetes Mother     Hypertension Father     Heart disease Father      I have reviewed and agree with the history as documented  Social History     Tobacco Use    Smoking status: Former Smoker     Types: Cigarettes     Last attempt to quit:      Years since quittin 9    Smokeless tobacco: Never Used   Substance Use Topics    Alcohol use: No    Drug use: No        Review of Systems   Constitutional: Negative for activity change, chills, diaphoresis and fever  HENT: Negative for congestion, sinus pressure and sore throat  Eyes: Negative for pain and visual disturbance  Respiratory: Negative for cough, chest tightness, shortness of breath, wheezing and stridor  Cardiovascular: Negative for chest pain and palpitations  Gastrointestinal: Negative for abdominal distention, abdominal pain, anal bleeding, blood in stool, constipation, diarrhea, hematochezia, nausea and vomiting  Genitourinary: Negative for dysuria and frequency  Musculoskeletal: Negative for neck pain and neck stiffness  Skin: Negative for rash  Neurological: Positive for weakness and light-headedness  Negative for dizziness, speech difficulty, numbness and headaches         Physical Exam  Physical Exam   Constitutional: She is oriented to person, place, and time  She appears well-developed  HENT:   Head: Normocephalic and atraumatic  Eyes: Pupils are equal, round, and reactive to light  Neck: Normal range of motion  Neck supple  No tracheal deviation present  Cardiovascular: Normal rate, regular rhythm, normal heart sounds and intact distal pulses  No murmur heard  Pulmonary/Chest: Effort normal and breath sounds normal  No stridor  No respiratory distress  Abdominal: Soft  She exhibits no distension  There is no tenderness  There is no rebound and no guarding  Genitourinary: Rectal exam shows guaiac negative stool (Light brown stool, guaiac negative)  Musculoskeletal: Normal range of motion  Neurological: She is alert and oriented to person, place, and time  Skin: Skin is warm and dry  She is not diaphoretic  No erythema  There is pallor  Large area of eccycmosis over buttock, no large or expanding hematoma   Psychiatric: She has a normal mood and affect  Vitals reviewed        Vital Signs  ED Triage Vitals [11/19/19 1358]   Temperature Pulse Respirations Blood Pressure SpO2   97 5 °F (36 4 °C) 86 18 111/56 97 %      Temp Source Heart Rate Source Patient Position - Orthostatic VS BP Location FiO2 (%)   Oral Monitor Lying Right arm --      Pain Score       --           Vitals:    11/19/19 1358 11/19/19 1400   BP: 111/56 111/56   Pulse: 86 86   Patient Position - Orthostatic VS: Lying          Visual Acuity      ED Medications  Medications - No data to display    Diagnostic Studies  Results Reviewed     Procedure Component Value Units Date/Time    Protime-INR [[id number]]  (Normal) Collected:  11/19/19 1436    Lab Status:  Final result Specimen:  Blood from Arm, Right Updated:  11/19/19 1453     Protime 13 3 seconds      INR 1 07    APTT [[id number]]  (Normal) Collected:  11/19/19 1436    Lab Status:  Final result Specimen:  Blood from Arm, Right Updated:  11/19/19 1453     PTT 29 seconds                  No orders to display Procedures  Procedures       ED Course                               MDM  Number of Diagnoses or Management Options  Iron deficiency anemia secondary to inadequate dietary iron intake: new and requires workup  Symptomatic anemia: new and requires workup  Diagnosis management comments:       Initial ED assessment:  42-year-old female presents increasing weakness, outpatient blood work showing hemoglobin 6 7, pale on examination, guaiac negative    Initial DDx includes but is not limited to:   Iron deficiency anemia, although a single stool guaiac does not fully rule out GI bleed, she does not report any bloody or black stools and she is guaiac negative with light brown stool here  Remainder of blood counts normal so doubt leukemia/myelodysplastic process    Initial ED plan: Will transfuse 1 unit of blood  , patient to be discharged, will follow with allergy, as she is unable to tolerate oral iron, will likely benefit from IV iron treatments  , but will attempt to have her try oral iron again  Final ED summary/disposition:   After evaluation and workup in the emergency department, patient transfuse 1 unit of blood    Discharge on iron will follow up outpatient hematology         Amount and/or Complexity of Data Reviewed  Clinical lab tests: ordered and reviewed  Decide to obtain previous medical records or to obtain history from someone other than the patient: yes  Obtain history from someone other than the patient: yes  Review and summarize past medical records: yes        Disposition  Final diagnoses:   Symptomatic anemia   Iron deficiency anemia secondary to inadequate dietary iron intake     Time reflects when diagnosis was documented in both MDM as applicable and the Disposition within this note     Time User Action Codes Description Comment    11/19/2019  4:44 PM Amena Rachel [D64 9] Symptomatic anemia     11/19/2019  4:44 PM Kendra Tam [D50 8] Iron deficiency anemia secondary to inadequate dietary iron intake       ED Disposition     ED Disposition Condition Date/Time Comment    Discharge Stable Tue Nov 19, 2019  4:44 PM Mike Funk discharge to home/self care  Follow-up Information     Follow up With Specialties Details Why Contact Info Additional Information    [facility] Call in 1 day To arrange for the next available appointment [address] [facility], [address] [place] [address], [id number] [phone]          Patient's Medications   Discharge Prescriptions    FERROUS SULFATE 325 (65 FE) MG TABLET    Take 1 tablet (325 mg total) by mouth daily       Start Date: 11/19/2019End Date: --       Order Dose: 325 mg       Quantity: 30 tablet    Refills: 0     No discharge procedures on file      ED Provider  Electronically Signed by           Bandar Vallejo DO  11/19/19 7874

## 2019-11-20 LAB
ABO GROUP BLD BPU: NORMAL
BPU ID: NORMAL
CROSSMATCH: NORMAL
UNIT DISPENSE STATUS: NORMAL
UNIT PRODUCT CODE: NORMAL
UNIT RH: NORMAL

## 2019-11-22 ENCOUNTER — TELEPHONE (OUTPATIENT)
Dept: HEMATOLOGY ONCOLOGY | Facility: CLINIC | Age: 84
End: 2019-11-22

## 2019-11-22 DIAGNOSIS — N32.81 OAB (OVERACTIVE BLADDER): ICD-10-CM

## 2019-11-22 RX ORDER — MIRABEGRON 50 MG/1
1 TABLET, FILM COATED, EXTENDED RELEASE ORAL DAILY
Qty: 30 TABLET | Refills: 3 | Status: SHIPPED | OUTPATIENT
Start: 2019-11-22 | End: 2020-05-12 | Stop reason: SDUPTHER

## 2019-11-26 ENCOUNTER — TRANSCRIBE ORDERS (OUTPATIENT)
Dept: LAB | Facility: CLINIC | Age: 84
End: 2019-11-26

## 2019-11-26 ENCOUNTER — APPOINTMENT (OUTPATIENT)
Dept: LAB | Facility: CLINIC | Age: 84
End: 2019-11-26
Payer: MEDICARE

## 2019-11-26 DIAGNOSIS — R82.71 ASYMPTOMATIC BACTERIURIA IN PREGNANCY, WITH DELIVERY: ICD-10-CM

## 2019-11-26 DIAGNOSIS — Z79.899 ENCOUNTER FOR LONG-TERM (CURRENT) USE OF OTHER MEDICATIONS: ICD-10-CM

## 2019-11-26 DIAGNOSIS — N18.9 CHRONIC KIDNEY DISEASE, UNSPECIFIED CKD STAGE: Primary | ICD-10-CM

## 2019-11-26 DIAGNOSIS — M81.0 SENILE OSTEOPOROSIS: ICD-10-CM

## 2019-11-26 PROCEDURE — 87086 URINE CULTURE/COLONY COUNT: CPT

## 2019-11-27 LAB — BACTERIA UR CULT: NORMAL

## 2019-12-17 ENCOUNTER — LAB (OUTPATIENT)
Dept: LAB | Facility: CLINIC | Age: 84
End: 2019-12-17
Payer: MEDICARE

## 2019-12-17 DIAGNOSIS — R82.71 ASYMPTOMATIC BACTERIURIA IN PREGNANCY, WITH DELIVERY: ICD-10-CM

## 2019-12-17 DIAGNOSIS — Z79.899 ENCOUNTER FOR LONG-TERM (CURRENT) USE OF OTHER MEDICATIONS: ICD-10-CM

## 2019-12-17 DIAGNOSIS — N18.9 CHRONIC KIDNEY DISEASE, UNSPECIFIED CKD STAGE: ICD-10-CM

## 2019-12-17 DIAGNOSIS — M81.0 SENILE OSTEOPOROSIS: ICD-10-CM

## 2019-12-17 LAB
ANION GAP SERPL CALCULATED.3IONS-SCNC: 7 MMOL/L (ref 4–13)
BUN SERPL-MCNC: 16 MG/DL (ref 5–25)
CALCIUM SERPL-MCNC: 8.6 MG/DL (ref 8.3–10.1)
CHLORIDE SERPL-SCNC: 103 MMOL/L (ref 100–108)
CO2 SERPL-SCNC: 28 MMOL/L (ref 21–32)
CREAT SERPL-MCNC: 1.15 MG/DL (ref 0.6–1.3)
GFR SERPL CREATININE-BSD FRML MDRD: 43 ML/MIN/1.73SQ M
GLUCOSE SERPL-MCNC: 172 MG/DL (ref 65–140)
POTASSIUM SERPL-SCNC: 4.1 MMOL/L (ref 3.5–5.3)
SODIUM SERPL-SCNC: 138 MMOL/L (ref 136–145)

## 2019-12-17 PROCEDURE — 80048 BASIC METABOLIC PNL TOTAL CA: CPT

## 2019-12-17 PROCEDURE — 36415 COLL VENOUS BLD VENIPUNCTURE: CPT

## 2019-12-19 NOTE — PROGRESS NOTES
Oncology Outpatient Consult Note  Anastasiya Edouard 80 y o  female MRN: @ Encounter: [id number]        Date:  12/23/2019      Assessment/ Plan:     1  Recurrent anemia  Hemoglobin 6 7 11/19/2019 with low iron of 24  Ferritin 10 5/2019  She was hospitalized in 2010 and 2016 for same  No bleeding source was identified  Hiatal hernia noted on EGD 2016  Normal WBC and platelets  Chronic mild monocytosis 13-15% since at least 2016  Cr 1 1- 1 4  CMP normal       We discussed IV iron replacement  Due to age and history of CHF  Feraheme requested weekly x 2  She is asked to d/c ASA 81mg which she has been taking every other day  Avoid NSAIDs  At this time, additional GI work-up deferred due to age, prior negative results            HPI:  Anastasiya Edouard is a 80 y o  seen for initial consultation 12/23/2019 accompanied by her daughter, Juan Luis Salinas, regarding recurrent anemia  11/19/2019 hemoglobin 6 7, MCV of 81, white blood cell count 7 43 with 58% neutrophils, 21% lymphocytes, 15% monocytes  Platelet count [id number]  She presented to the ED due to dizziness and lightheadedness  Iron 24  Guaiac negative  She was transfused with 1 U PRBCs and advised to try oral iron again  She reported that she had been having worsening fatigue for many months  She was experiencing dyspnea on exertion  She has a history of iron deficiency and was told to take iron in the past   This was poorly tolerated due to GI upset  5/14/2019 hemoglobin 9 8, MCV of 81, white blood cell count normal with normal differential   Platelets [id number]  Ferritin of 10  September 2018 hemoglobin 9 6  Ferritin of 10  Iron saturation 17%  February 2018 hemoglobin 10 3    December 2017 hemoglobin 11 1 July 2017 hemoglobin 11 8    December 2016 hemoglobin 8 5-9 8  Ferritin of 6  She has a history of GI bleeding around 2010    She also has a PMH of history of congestive heart failure, diabetes mellitus, and hypertension 12/21/2016 she had EGD and colonoscopy during her hospitalization for symptomatic anemia  1cm Hiatal hernia noted  Diverticulosis and colonic polyps noted  Colonoscopy at 1 year's time recommended  Capsule endoscopy 1/17/2017:  No AVMs, ulcers or mass lesions in the small bowel  She denies any melena or hematochezia  She is less SOB after transfusion PRBC  Oral iron poorly tolerated due to nausea  Test Results:      Labs:   Lab Results   Component Value Date    HGB 6 7 (LL) 11/19/2019    HCT 22 8 (L) 11/19/2019    MCV 81 (L) 11/19/2019     11/19/2019    WBC 7 43 11/19/2019    NRBC 0 11/19/2019     Lab Results   Component Value Date     10/09/2015    K 4 1 12/17/2019     12/17/2019    CO2 28 12/17/2019    ANIONGAP 8 10/09/2015    BUN 16 12/17/2019    CREATININE 1 15 12/17/2019    GLUCOSE 205 (H) 10/09/2015    GLUF 118 (H) 11/19/2019    CALCIUM 8 6 12/17/2019    AST 28 11/19/2019    ALT 24 11/19/2019    ALKPHOS 98 11/19/2019    EGFR 43 12/17/2019           Imaging:   No results found  ROS: As mentioned in HPI & Interval History otherwise 14 point ROS negative  Active Problems:   Patient Active Problem List   Diagnosis    Symptomatic anemia    Shortness of breath    Hypomagnesemia    DM2 [facility]diabetes mellitus, type 2) ([facility])    Urinary frequency       Past Medical History:   Past Medical History:   Diagnosis Date    Arthritis     Back pain     CHF (congestive heart failure) ([facility])     Diabetes [facility] [address] [facility]     Diabetes mellitus ([facility] )     Diverticulosis     Hypertension        Surgical History:   Past Surgical History:   Procedure Laterality Date    BACK SURGERY      EGD AND COLONOSCOPY N/A 12/23/2016    Procedure: EGD AND COLONOSCOPY;  Surgeon: Tone Chamorro MD;  [facility]: [facility];   Service:     JOINT REPLACEMENT      bilat knees and hips    OTHER SURGICAL HISTORY      pelvis rods    REPLACEMENT TOTAL KNEE BILATERAL      STOMACH SURGERY         Family History:    Family History   Problem Relation Age of Onset    Cancer Brother     Diabetes Mother     Hypertension Father     Heart disease Father        Cancer-related family history includes Cancer in her brother      Social History:   Social History     Socioeconomic History    Marital status: /Civil Union     Spouse name: Not on file    Number of children: Not on file    Years of education: Not on file    Highest education level: Not on file   Occupational History    Not on file   Social Needs    Financial resource strain: Not on file    Food insecurity:     Worry: Not on file     Inability: Not on file    Transportation needs:     Medical: Not on file     Non-medical: Not on file   Tobacco Use    Smoking status: Former Smoker     Types: Cigarettes     Last attempt to quit: 1975     Years since quittin 9    Smokeless tobacco: Never Used   Substance and Sexual Activity    Alcohol use: No    Drug use: No    Sexual activity: Yes     Partners: Male     Birth control/protection: None   Lifestyle    Physical activity:     Days per week: Not on file     Minutes per session: Not on file    Stress: Not on file   Relationships    Social connections:     Talks on phone: Not on file     Gets together: Not on file     Attends [employer] service: Not on file     Active member of club or organization: Not on file     Attends meetings of clubs or organizations: Not on file     Relationship status: Not on file    Intimate partner violence:     Fear of current or ex partner: Not on file     Emotionally abused: Not on file     Physically abused: Not on file     Forced sexual activity: Not on file   Other Topics Concern    Not on file   Social History Narrative    Not on file       Current Medications:   Current Outpatient Medications   Medication Sig Dispense Refill    ALPRAZolam (XANAX) 0 25 mg tablet alprazolam 0 25 mg tablet      aspirin 81 MG tablet Take 81 mg by mouth daily      aspirin 81 MG tablet Aspir-81      ASPIRIN 81 PO Take 81 mg by mouth      calcium carbonate-vitamin D (OSCAL-D) 500 mg-200 units per tablet Take 1 tablet by mouth daily with breakfast      Cholecalciferol (VITAMIN D3) 1000 units CAPS Take by mouth      Cholecalciferol (VITAMIN D3) 1000 units CAPS 1000 mg      diclofenac sodium (VOLTAREN) 1 % Voltaren 1 % topical gel      dicyclomine (BENTYL) 10 mg capsule Take 10 mg by mouth daily      dicyclomine (BENTYL) 10 mg capsule dicyclomine 10 mg capsule      Diphenoxylate-Atropine (LOMOTIL PO) Lomotil      diphenoxylate-atropine (LOMOTIL) 2 5-0 025 mg per tablet Take 1 tablet by mouth 4 (four) times a day as needed for diarrhea      diphenoxylate-atropine (LOMOTIL) 2 5-0 025 mg per tablet Take by mouth      diphenoxylate-atropine (LOMOTIL) 2 5-0 025 mg per tablet diphenoxylate-atropine 2 5 mg-0 025 mg tablet      docusate sodium (COLACE) 100 mg capsule Take 1 capsule by mouth 2 (two) times a day for 30 days 60 capsule 0    DULoxetine (CYMBALTA) 20 mg capsule Take by mouth      DULoxetine (CYMBALTA) 60 mg delayed release capsule Take 60 mg by mouth daily      DULoxetine (CYMBALTA) 60 mg delayed release capsule duloxetine 60 mg capsule,delayed release      ferrous sulfate 325 (65 Fe) mg tablet Take 1 tablet (325 mg total) by mouth daily 30 tablet 0    gabapentin (NEURONTIN) 300 mg capsule Take 300 mg by mouth 3 (three) times a day      hydroxychloroquine (PLAQUENIL) 200 mg tablet Take 200 mg by mouth 2 (two) times a day with meals      lisinopril (ZESTRIL) 2 5 mg tablet Take 2 5 mg by mouth daily      lisinopril (ZESTRIL) 2 5 mg tablet Take 2 5 mg by mouth      metFORMIN (GLUCOPHAGE) 1000 MG tablet Take by mouth      metFORMIN (GLUCOPHAGE) 500 mg tablet Take 500 mg by mouth 2 (two) times a day with meals      metFORMIN (GLUCOPHAGE) 500 mg tablet metformin 500 mg tablet      MYRBETRIQ 50 MG TB24 TAKE ONE TABLET ONCE DAILY 30 tablet 8    MYRBETRIQ 50 MG TB24 Take 1 tablet (50 mg total) by mouth daily 30 tablet 3    omeprazole (PriLOSEC) 20 mg delayed release capsule Take 20 mg by mouth daily      omeprazole (PRILOSEC) 20 mg delayed release capsule Take by mouth      pregabalin (LYRICA) 25 mg capsule Take by mouth      pregabalin (LYRICA) 50 mg capsule Take 50 mg by mouth daily after dinner      pregabalin (LYRICA) 75 mg capsule Take 50 mg by mouth 3 (three) times a day      pyridoxine (B-6) 100 MG tablet Take 100 mg by mouth daily      pyridoxine (VITAMIN B6) 100 mg tablet Take by mouth      pyridoxine (VITAMIN B6) 100 mg tablet Take by oral route   traMADol (ULTRAM) 50 mg tablet tramadol 50 mg tablet       No current facility-administered medications for this visit  Allergies: Allergies   Allergen Reactions    Morphine Other (See Comments)     urinary retention    Ciprofloxacin Rash and Nausea Only         Physical Exam:    There is no height or weight on file to calculate BSA  Ht Readings from Last 3 Encounters:   02/19/19 4' 11 5" (1 511 m)   01/07/19 4' 11" (1 499 m)   02/13/18 4' 11" (1 499 m)       Wt Readings from Last 3 Encounters:   11/19/19 68 kg (149 lb 14 6 oz)   02/19/19 64 9 kg (143 lb)   01/07/19 63 5 kg (140 lb)        Temp Readings from Last 3 Encounters:   11/19/19 98 °F (36 7 °C) (Oral)   02/10/18 97 8 °F (36 6 °C) (Oral)   01/30/18 98 2 °F (36 8 °C) (Oral)        BP Readings from Last 3 Encounters:   11/19/19 132/87   02/19/19 130/70   02/13/18 118/68         Pulse Readings from Last 3 Encounters:   11/19/19 79   02/19/19 80   02/13/18 68         Physical Exam    Physical Exam   Constitutional: She is oriented to person, place, and time  She appears well-developed and well-nourished  No distress  HENT:   Head: Normocephalic and atraumatic  Eyes: Conjunctivae are normal    Neck: Normal range of motion  Neck supple  No tracheal deviation present  Cardiovascular: Normal rate and regular rhythm   Exam reveals no gallop and no friction rub  No murmur heard  Pulmonary/Chest: Effort normal and breath sounds normal  No respiratory distress  She has no wheezes  She has no rales  She exhibits no tenderness  Abdominal: Soft  She exhibits no distension  There is no tenderness  Musculoskeletal:   Rolling walker   Lymphadenopathy:     She has no cervical adenopathy  Neurological: She is alert and oriented to person, place, and time  Skin: Skin is warm and dry  She is not diaphoretic  No erythema  No pallor  Psychiatric: She has a normal mood and affect  Her behavior is normal  Judgment and thought content normal    Vitals reviewed            Emergency Contacts:    Dinah Bloch, [phone],

## 2019-12-23 ENCOUNTER — CONSULT (OUTPATIENT)
Dept: HEMATOLOGY ONCOLOGY | Facility: CLINIC | Age: 84
End: 2019-12-23
Payer: MEDICARE

## 2019-12-23 VITALS
TEMPERATURE: 96.1 F | HEIGHT: 59 IN | DIASTOLIC BLOOD PRESSURE: 80 MMHG | RESPIRATION RATE: 16 BRPM | WEIGHT: 147 LBS | SYSTOLIC BLOOD PRESSURE: 128 MMHG | BODY MASS INDEX: 29.64 KG/M2

## 2019-12-23 DIAGNOSIS — D64.9 SYMPTOMATIC ANEMIA: Primary | ICD-10-CM

## 2019-12-23 DIAGNOSIS — D50.8 IRON DEFICIENCY ANEMIA SECONDARY TO INADEQUATE DIETARY IRON INTAKE: ICD-10-CM

## 2019-12-23 DIAGNOSIS — D53.9 NUTRITIONAL ANEMIA, UNSPECIFIED: ICD-10-CM

## 2019-12-23 PROCEDURE — 99202 OFFICE O/P NEW SF 15 MIN: CPT | Performed by: PHYSICIAN ASSISTANT

## 2019-12-23 RX ORDER — SODIUM CHLORIDE 9 MG/ML
20 INJECTION, SOLUTION INTRAVENOUS ONCE
Status: CANCELLED | OUTPATIENT
Start: 2020-01-06

## 2020-01-06 ENCOUNTER — HOSPITAL ENCOUNTER (OUTPATIENT)
Dept: INFUSION CENTER | Facility: CLINIC | Age: 85
Discharge: HOME | End: 2020-01-06
Payer: MEDICARE

## 2020-01-06 ENCOUNTER — DOCUMENTATION (OUTPATIENT)
Dept: RADIATION ONCOLOGY | Facility: HOSPITAL | Age: 85
End: 2020-01-06

## 2020-01-06 VITALS
OXYGEN SATURATION: 98 % | DIASTOLIC BLOOD PRESSURE: 60 MMHG | RESPIRATION RATE: 18 BRPM | SYSTOLIC BLOOD PRESSURE: 122 MMHG | TEMPERATURE: 97.9 F | HEART RATE: 85 BPM

## 2020-01-06 DIAGNOSIS — D50.8 IRON DEFICIENCY ANEMIA SECONDARY TO INADEQUATE DIETARY IRON INTAKE: Primary | ICD-10-CM

## 2020-01-06 PROCEDURE — 96365 THER/PROPH/DIAG IV INF INIT: CPT

## 2020-01-06 RX ORDER — SODIUM CHLORIDE 9 MG/ML
20 INJECTION, SOLUTION INTRAVENOUS ONCE
Status: CANCELLED | OUTPATIENT
Start: 2020-01-13

## 2020-01-06 RX ORDER — SODIUM CHLORIDE 9 MG/ML
20 INJECTION, SOLUTION INTRAVENOUS ONCE
Status: COMPLETED | OUTPATIENT
Start: 2020-01-06 | End: 2020-01-06

## 2020-01-06 RX ADMIN — SODIUM CHLORIDE 20 ML/HR: 0.9 INJECTION, SOLUTION INTRAVENOUS at 14:55

## 2020-01-06 RX ADMIN — FERUMOXYTOL 510 MG: 510 INJECTION INTRAVENOUS at 15:20

## 2020-01-06 NOTE — PROGRESS NOTES
Patient to Natacha for Feraheme: Offers no complaints at present time: Lab work ( 11/19/19 ) reviewed: Iron - 24:  No parameters written: Right AC PIV initiated without incident

## 2020-01-06 NOTE — PROGRESS NOTES
Met with the pt who is accompanied by her dtr Vanessa Beard is at [facility] for an iron infusion  Her CC is fatigue and is hopeful that these infusions will help her regain some energy  Vanessa  Resides within a 2 minute drive from the pt's home and is "my rock"  Another dtr works for [employer] as a nurse  A third dtr  in  from complications of MS  Offered ongoing support to the pt and dtr and provided them with my contact info

## 2020-01-13 ENCOUNTER — HOSPITAL ENCOUNTER (OUTPATIENT)
Dept: INFUSION CENTER | Facility: CLINIC | Age: 85
Discharge: HOME | End: 2020-01-13
Payer: MEDICARE

## 2020-01-13 VITALS
HEART RATE: 76 BPM | SYSTOLIC BLOOD PRESSURE: 136 MMHG | OXYGEN SATURATION: 95 % | RESPIRATION RATE: 18 BRPM | TEMPERATURE: 97.1 F | DIASTOLIC BLOOD PRESSURE: 60 MMHG

## 2020-01-13 DIAGNOSIS — D50.8 IRON DEFICIENCY ANEMIA SECONDARY TO INADEQUATE DIETARY IRON INTAKE: Primary | ICD-10-CM

## 2020-01-13 PROCEDURE — 96365 THER/PROPH/DIAG IV INF INIT: CPT

## 2020-01-13 RX ORDER — SODIUM CHLORIDE 9 MG/ML
20 INJECTION, SOLUTION INTRAVENOUS ONCE
Status: CANCELLED | OUTPATIENT
Start: 2020-01-13

## 2020-01-13 RX ORDER — SODIUM CHLORIDE 9 MG/ML
20 INJECTION, SOLUTION INTRAVENOUS ONCE
Status: COMPLETED | OUTPATIENT
Start: 2020-01-13 | End: 2020-01-13

## 2020-01-13 RX ADMIN — FERUMOXYTOL 510 MG: 510 INJECTION INTRAVENOUS at 10:25

## 2020-01-13 RX ADMIN — SODIUM CHLORIDE 20 ML/HR: 0.9 INJECTION, SOLUTION INTRAVENOUS at 10:10

## 2020-01-13 NOTE — PLAN OF CARE
Problem: Potential for Falls  Goal: Patient will remain free of falls  Description  INTERVENTIONS:  - Assess patient frequently for physical needs  -  Identify cognitive and physical deficits and behaviors that affect risk of falls  -  [place] fall precautions as indicated by assessment   - Educate patient/family on patient safety including physical limitations  - Instruct patient to call for assistance with activity based on assessment  - Modify environment to reduce risk of injury  - Consider OT/PT consult to assist with strengthening/mobility  Outcome: Progressing     Problem: Knowledge Deficit  Goal: Patient/family/caregiver demonstrates understanding of disease process, treatment plan, medications, and discharge instructions  Description  Complete learning assessment and assess knowledge base    Interventions:  - Provide teaching at level of understanding  - Provide teaching via preferred learning methods  Outcome: Progressing

## 2020-01-13 NOTE — PROGRESS NOTES
Patient here for faraheme infusion  Patient required multiple RN's for IV  Patient resting with no complaints  Vitals stable  Call bell within reach  Will continue to monitor

## 2020-01-13 NOTE — PROGRESS NOTES
Patient tolerated treatment without complications  Patient declined AVS  Patient aware she does not have another infusion appointment but has an upcoming office visit

## 2020-01-26 ENCOUNTER — TELEPHONE (OUTPATIENT)
Dept: OTHER | Facility: OTHER | Age: 85
End: 2020-01-26

## 2020-01-28 ENCOUNTER — APPOINTMENT (OUTPATIENT)
Dept: LAB | Facility: CLINIC | Age: 85
End: 2020-01-28
Payer: MEDICARE

## 2020-01-28 DIAGNOSIS — D64.9 SYMPTOMATIC ANEMIA: ICD-10-CM

## 2020-01-28 DIAGNOSIS — D53.9 NUTRITIONAL ANEMIA, UNSPECIFIED: ICD-10-CM

## 2020-01-28 DIAGNOSIS — D50.8 IRON DEFICIENCY ANEMIA SECONDARY TO INADEQUATE DIETARY IRON INTAKE: ICD-10-CM

## 2020-01-28 LAB
BASOPHILS # BLD AUTO: 0.04 THOUSANDS/ΜL (ref 0–0.1)
BASOPHILS NFR BLD AUTO: 1 % (ref 0–1)
EOSINOPHIL # BLD AUTO: 0.2 THOUSAND/ΜL (ref 0–0.61)
EOSINOPHIL NFR BLD AUTO: 4 % (ref 0–6)
ERYTHROCYTE [DISTWIDTH] IN BLOOD BY AUTOMATED COUNT: 20.4 % (ref 11.6–15.1)
FERRITIN SERPL-MCNC: 341 NG/ML (ref 8–388)
HCT VFR BLD AUTO: 37.1 % (ref 34.8–46.1)
HGB BLD-MCNC: 11 G/DL (ref 11.5–15.4)
IMM GRANULOCYTES # BLD AUTO: 0.02 THOUSAND/UL (ref 0–0.2)
IMM GRANULOCYTES NFR BLD AUTO: 0 % (ref 0–2)
IRON SATN MFR SERPL: 30 %
IRON SERPL-MCNC: 75 UG/DL (ref 50–170)
LYMPHOCYTES # BLD AUTO: 1.14 THOUSANDS/ΜL (ref 0.6–4.47)
LYMPHOCYTES NFR BLD AUTO: 20 % (ref 14–44)
MCH RBC QN AUTO: 26.1 PG (ref 26.8–34.3)
MCHC RBC AUTO-ENTMCNC: 29.6 G/DL (ref 31.4–37.4)
MCV RBC AUTO: 88 FL (ref 82–98)
MONOCYTES # BLD AUTO: 0.74 THOUSAND/ΜL (ref 0.17–1.22)
MONOCYTES NFR BLD AUTO: 13 % (ref 4–12)
NEUTROPHILS # BLD AUTO: 3.57 THOUSANDS/ΜL (ref 1.85–7.62)
NEUTS SEG NFR BLD AUTO: 62 % (ref 43–75)
NRBC BLD AUTO-RTO: 0 /100 WBCS
PLATELET # BLD AUTO: 189 THOUSANDS/UL (ref 149–390)
PMV BLD AUTO: 10.8 FL (ref 8.9–12.7)
RBC # BLD AUTO: 4.21 MILLION/UL (ref 3.81–5.12)
TIBC SERPL-MCNC: 247 UG/DL (ref 250–450)
VIT B12 SERPL-MCNC: 395 PG/ML (ref 100–900)
WBC # BLD AUTO: 5.71 THOUSAND/UL (ref 4.31–10.16)

## 2020-01-28 PROCEDURE — 82607 VITAMIN B-12: CPT

## 2020-01-28 PROCEDURE — 86255 FLUORESCENT ANTIBODY SCREEN: CPT

## 2020-01-28 PROCEDURE — 83883 ASSAY NEPHELOMETRY NOT SPEC: CPT

## 2020-01-28 PROCEDURE — 82728 ASSAY OF FERRITIN: CPT

## 2020-01-28 PROCEDURE — 85025 COMPLETE CBC W/AUTO DIFF WBC: CPT | Performed by: PHYSICIAN ASSISTANT

## 2020-01-28 PROCEDURE — 82784 ASSAY IGA/IGD/IGG/IGM EACH: CPT

## 2020-01-28 PROCEDURE — 83540 ASSAY OF IRON: CPT

## 2020-01-28 PROCEDURE — 83550 IRON BINDING TEST: CPT

## 2020-01-28 PROCEDURE — 83516 IMMUNOASSAY NONANTIBODY: CPT

## 2020-01-28 PROCEDURE — 84165 PROTEIN E-PHORESIS SERUM: CPT

## 2020-01-28 PROCEDURE — 84165 PROTEIN E-PHORESIS SERUM: CPT | Performed by: PATHOLOGY

## 2020-01-28 PROCEDURE — 36415 COLL VENOUS BLD VENIPUNCTURE: CPT | Performed by: PHYSICIAN ASSISTANT

## 2020-01-29 LAB
ENDOMYSIUM IGA SER QL: NEGATIVE
GLIADIN PEPTIDE IGA SER-ACNC: 30 UNITS (ref 0–19)
GLIADIN PEPTIDE IGG SER-ACNC: 2 UNITS (ref 0–19)
IGA SERPL-MCNC: 526 MG/DL (ref 64–422)
KAPPA LC FREE SER-MCNC: 124.4 MG/L (ref 3.3–19.4)
KAPPA LC FREE/LAMBDA FREE SER: 2.67 {RATIO} (ref 0.26–1.65)
LAMBDA LC FREE SERPL-MCNC: 46.6 MG/L (ref 5.7–26.3)
TTG IGA SER-ACNC: <2 U/ML (ref 0–3)
TTG IGG SER-ACNC: <2 U/ML (ref 0–5)

## 2020-01-29 NOTE — PROGRESS NOTES
Hematology/Oncology Outpatient Follow- up Note  Cristin Moseley 80 y o  female MRN: @ Encounter: [id number]        Date:  2/3/2020      Assessment / Plan:    1  Recurrent anemia  Hemoglobin 6 7 11/19/2019 with low iron of 24  Ferritin 10 5/2019  She was hospitalized in 2010 and 2016 for same  No bleeding source was identified  Hiatal hernia noted on EGD 2016  Normal WBC and platelets  Chronic mild monocytosis 13-15% since at least 2016  Cr 1 1- 1 4  CMP normal        Feraheme 510mg x 2 administered 1/2020  Hemoglobin improved to 11 g/dL  Ferritin 341 1/28/20 compared to 10 5/2019 and 10 9/2018  Iron saturation 30% compared to 17% 9/2018        She is feeling much better  She is asked to have repeat labs prior to 6 month f/u  She has no clinical gluten insensitivity  If she were to experience increased fatigue, she is asked to call our office                HPI:  Cristin Moseley is a 80 y o  seen for initial consultation 12/23/2019 accompanied by her daughter, Bladimir Dominguez, regarding recurrent anemia      11/19/2019 hemoglobin 6 7, MCV of 81, white blood cell count 7 43 with 58% neutrophils, 21% lymphocytes, 15% monocytes  Platelet count [id number]  She presented to the ED due to dizziness and lightheadedness  Iron 24  Guaiac negative  She was transfused with 1 U PRBCs and advised to try oral iron again        She reported that she had been having worsening fatigue for many months  She was experiencing dyspnea on exertion  She has a history of iron deficiency and was told to take iron in the past   This was poorly tolerated due to GI upset      5/14/2019 hemoglobin 9 8, MCV of 81, white blood cell count normal with normal differential   Platelets [id number]  Ferritin of 10      September 2018 hemoglobin 9 6  Ferritin of 10  Iron saturation 17%      February 2018 hemoglobin 10 3     December 2017 hemoglobin 11 1     July 2017 hemoglobin 11 8     December 2016 hemoglobin 8 5-9 8    Ferritin of 6      She has a history of GI bleeding around 2010  She also has a PMH of history of congestive heart failure, diabetes mellitus, and hypertension      12/21/2016 she had EGD and colonoscopy during her hospitalization for symptomatic anemia  1cm Hiatal hernia noted  Diverticulosis and colonic polyps noted  Colonoscopy at 1 year's time recommended        Capsule endoscopy 1/17/2017:  No AVMs, ulcers or mass lesions in the small bowel      She denies any melena or hematochezia  She is less SOB after transfusion PRBC  Oral iron poorly tolerated due to nausea  Interval History:    Due to age and history of CHF, Feraheme chosen over Venofer  She was advised to d/c ASA 81mg and avoid NSAIDs  Additional GI work-up deferred due to age, prior negative results, patient request     Serum kappa on serum lambda free light chains are elevated along with ratio indicative of medical renal disease  No monoclonal band on SPEP  Vitamin B12 normal 395  IgA Elevated to 526 1/28/20   And IGA gliadin weak positive at 30  Feeling much better after Feraheme  Test Results:        Labs:   Lab Results   Component Value Date    HGB 11 0 (L) 01/28/2020    HCT 37 1 01/28/2020    MCV 88 01/28/2020     01/28/2020    WBC 5 71 01/28/2020    NRBC 0 01/28/2020     Lab Results   Component Value Date     10/09/2015    K 4 1 12/17/2019     12/17/2019    CO2 28 12/17/2019    ANIONGAP 8 10/09/2015    BUN 16 12/17/2019    CREATININE 1 15 12/17/2019    GLUCOSE 205 (H) 10/09/2015    GLUF 118 (H) 11/19/2019    CALCIUM 8 6 12/17/2019    AST 28 11/19/2019    ALT 24 11/19/2019    ALKPHOS 98 11/19/2019    EGFR 43 12/17/2019       Imaging: No results found  ROS:  As mentioned in HPI & Interval History otherwise 14 point ROS negative  Allergies:    Allergies   Allergen Reactions    Morphine Other (See Comments)     urinary retention    Ciprofloxacin Rash and Nausea Only     Current Medications: Reviewed  PMH/FH/SH:  Reviewed      Physical Exam:    There is no height or weight on file to calculate BSA  Ht Readings from Last 3 Encounters:   19 4' 11" (1 499 m)   19 4' 11 5" (1 511 m)   19 4' 11" (1 499 m)        Wt Readings from Last 3 Encounters:   19 66 7 kg (147 lb)   19 68 kg (149 lb 14 6 oz)   19 64 9 kg (143 lb)        Temp Readings from Last 3 Encounters:   20 (!) 97 1 °F (36 2 °C) (Temporal)   20 97 9 °F (36 6 °C) (Oral)   19 (!) 96 1 °F (35 6 °C) (Tympanic)        BP Readings from Last 3 Encounters:   20 136/60   20 122/60   19 128/80           Physical Exam   Constitutional: She is oriented to person, place, and time  She appears well-developed and well-nourished  No distress  HENT:   Head: Normocephalic and atraumatic  Eyes: Conjunctivae are normal    Neck: Normal range of motion  Neck supple  No tracheal deviation present  Cardiovascular: Normal rate and regular rhythm  Exam reveals no gallop and no friction rub  No murmur heard  Pulmonary/Chest: Effort normal and breath sounds normal  No respiratory distress  She has no wheezes  She has no rales  She exhibits no tenderness  Abdominal: Soft  She exhibits no distension  There is no tenderness  Musculoskeletal:   Rolling walker   Lymphadenopathy:     She has no cervical adenopathy  Neurological: She is alert and oriented to person, place, and time  Skin: Skin is warm and dry  She is not diaphoretic  No erythema  No pallor  Psychiatric: She has a normal mood and affect  Her behavior is normal  Judgment and thought content normal    Vitals reviewed        ECO      Emergency Contacts:    Danica Green, [phone],

## 2020-01-30 LAB
ALBUMIN SERPL ELPH-MCNC: 3.71 G/DL (ref 3.5–5)
ALBUMIN SERPL ELPH-MCNC: 51.5 % (ref 52–65)
ALPHA1 GLOB SERPL ELPH-MCNC: 0.36 G/DL (ref 0.1–0.4)
ALPHA1 GLOB SERPL ELPH-MCNC: 5 % (ref 2.5–5)
ALPHA2 GLOB SERPL ELPH-MCNC: 0.78 G/DL (ref 0.4–1.2)
ALPHA2 GLOB SERPL ELPH-MCNC: 10.9 % (ref 7–13)
BETA GLOB ABNORMAL SERPL ELPH-MCNC: 0.5 G/DL (ref 0.4–0.8)
BETA1 GLOB SERPL ELPH-MCNC: 7 % (ref 5–13)
BETA2 GLOB SERPL ELPH-MCNC: 8.2 % (ref 2–8)
BETA2+GAMMA GLOB SERPL ELPH-MCNC: 0.59 G/DL (ref 0.2–0.5)
GAMMA GLOB ABNORMAL SERPL ELPH-MCNC: 1.25 G/DL (ref 0.5–1.6)
GAMMA GLOB SERPL ELPH-MCNC: 17.4 % (ref 12–22)
IGG/ALB SER: 1.06 {RATIO} (ref 1.1–1.8)
PROT PATTERN SERPL ELPH-IMP: ABNORMAL
PROT SERPL-MCNC: 7.2 G/DL (ref 6.4–8.2)

## 2020-02-03 ENCOUNTER — OFFICE VISIT (OUTPATIENT)
Dept: HEMATOLOGY ONCOLOGY | Facility: CLINIC | Age: 85
End: 2020-02-03
Payer: MEDICARE

## 2020-02-03 VITALS
DIASTOLIC BLOOD PRESSURE: 70 MMHG | TEMPERATURE: 97.2 F | RESPIRATION RATE: 18 BRPM | SYSTOLIC BLOOD PRESSURE: 138 MMHG | BODY MASS INDEX: 28.22 KG/M2 | HEART RATE: 83 BPM | OXYGEN SATURATION: 98 % | HEIGHT: 59 IN | WEIGHT: 140 LBS

## 2020-02-03 DIAGNOSIS — D50.8 IRON DEFICIENCY ANEMIA SECONDARY TO INADEQUATE DIETARY IRON INTAKE: ICD-10-CM

## 2020-02-03 DIAGNOSIS — D64.9 SYMPTOMATIC ANEMIA: Primary | ICD-10-CM

## 2020-02-03 PROCEDURE — 99214 OFFICE O/P EST MOD 30 MIN: CPT | Performed by: PHYSICIAN ASSISTANT

## 2020-02-03 PROCEDURE — 1124F ACP DISCUSS-NO DSCNMKR DOCD: CPT | Performed by: PHYSICIAN ASSISTANT

## 2020-02-14 ENCOUNTER — OFFICE VISIT (OUTPATIENT)
Dept: URGENT CARE | Age: 85
End: 2020-02-14
Payer: MEDICARE

## 2020-02-14 VITALS
BODY MASS INDEX: 28.22 KG/M2 | WEIGHT: 140 LBS | OXYGEN SATURATION: 97 % | SYSTOLIC BLOOD PRESSURE: 130 MMHG | TEMPERATURE: 98 F | RESPIRATION RATE: 18 BRPM | DIASTOLIC BLOOD PRESSURE: 60 MMHG | HEART RATE: 86 BPM | HEIGHT: 59 IN

## 2020-02-14 DIAGNOSIS — J40 SINOBRONCHITIS: Primary | ICD-10-CM

## 2020-02-14 DIAGNOSIS — J32.9 SINOBRONCHITIS: Primary | ICD-10-CM

## 2020-02-14 LAB — S PYO AG THROAT QL: NEGATIVE

## 2020-02-14 PROCEDURE — G0463 HOSPITAL OUTPT CLINIC VISIT: HCPCS | Performed by: PHYSICIAN ASSISTANT

## 2020-02-14 PROCEDURE — 87880 STREP A ASSAY W/OPTIC: CPT | Performed by: PHYSICIAN ASSISTANT

## 2020-02-14 PROCEDURE — 99213 OFFICE O/P EST LOW 20 MIN: CPT | Performed by: PHYSICIAN ASSISTANT

## 2020-02-14 RX ORDER — AZITHROMYCIN 250 MG/1
TABLET, FILM COATED ORAL
Qty: 6 TABLET | Refills: 0 | Status: SHIPPED | OUTPATIENT
Start: 2020-02-14 | End: 2020-02-18

## 2020-02-14 NOTE — PROGRESS NOTES
[facility]        NAME: Maksim Pace is a 80 y o  female  : 1934    MRN: [id number]  DATE: 2020  TIME: 4:35 PM    Assessment and Plan   Sinobronchitis [J32 9, J40]  1  Sinobronchitis  POCT rapid strepA    azithromycin (ZITHROMAX) 250 mg tablet         Patient Instructions       Follow up with PCP in 3-5 days  Proceed to  ER if symptoms worsen  Chief Complaint     Chief Complaint   Patient presents with    Cold Like Symptoms     Started couple days ago with headache, sore throat and notices spots on throat, cough and chest congestion  Has been taking tylenol and nasal spray  History of Present Illness       Patient for evaluation of persistent cough, chest congestion, head congestion, runny nose  Patient's symptoms started about a week ago  Patient's  was just in the hospital for sepsis from a strep infection  Her  did come home on Friday  Review of Systems   Review of Systems   Constitutional: Negative  HENT: Positive for congestion, rhinorrhea and sore throat  Negative for ear pain, postnasal drip, sinus pressure, sinus pain and voice change  Eyes: Negative  Respiratory: Positive for cough  Negative for shortness of breath and wheezing  Cardiovascular: Negative  Neurological: Positive for headaches  Negative for dizziness and weakness           Current Medications       Current Outpatient Medications:     Cholecalciferol (VITAMIN D3) 1000 units CAPS, Take by mouth, Disp: , Rfl:     Cholecalciferol (VITAMIN D3) 1000 units CAPS, 1000 mg, Disp: , Rfl:     Diphenoxylate-Atropine (LOMOTIL PO), Lomotil, Disp: , Rfl:     diphenoxylate-atropine (LOMOTIL) 2 5-0 025 mg per tablet, Take 1 tablet by mouth 4 (four) times a day as needed for diarrhea, Disp: , Rfl:     diphenoxylate-atropine (LOMOTIL) 2 5-0 025 mg per tablet, Take by mouth, Disp: , Rfl:     diphenoxylate-atropine (LOMOTIL) 2 5-0 025 mg per tablet, diphenoxylate-atropine 2 5 mg-0 025 mg tablet, Disp: , Rfl:     DULoxetine (CYMBALTA) 20 mg capsule, Take by mouth, Disp: , Rfl:     DULoxetine (CYMBALTA) 60 mg delayed release capsule, Take 60 mg by mouth daily, Disp: , Rfl:     DULoxetine (CYMBALTA) 60 mg delayed release capsule, duloxetine 60 mg capsule,delayed release, Disp: , Rfl:     hydroxychloroquine (PLAQUENIL) 200 mg tablet, Take 200 mg by mouth 2 (two) times a day with meals, Disp: , Rfl:     metFORMIN (GLUCOPHAGE) 500 mg tablet, Take 500 mg by mouth 2 (two) times a day with meals, Disp: , Rfl:     metFORMIN (GLUCOPHAGE) 500 mg tablet, metformin 500 mg tablet, Disp: , Rfl:     MYRBETRIQ 50 MG TB24, TAKE ONE TABLET ONCE DAILY, Disp: 30 tablet, Rfl: 8    MYRBETRIQ 50 MG TB24, Take 1 tablet (50 mg total) by mouth daily, Disp: 30 tablet, Rfl: 3    omeprazole (PriLOSEC) 20 mg delayed release capsule, Take 20 mg by mouth daily, Disp: , Rfl:     omeprazole (PRILOSEC) 20 mg delayed release capsule, Take by mouth, Disp: , Rfl:     pregabalin (LYRICA) 50 mg capsule, Take 50 mg by mouth daily after dinner, Disp: , Rfl:     pyridoxine (B-6) 100 MG tablet, Take 100 mg by mouth daily, Disp: , Rfl:     pyridoxine (VITAMIN B6) 100 mg tablet, Take by mouth, Disp: , Rfl:     pyridoxine (VITAMIN B6) 100 mg tablet, Take by oral route , Disp: , Rfl:     ALPRAZolam (XANAX) 0 25 mg tablet, alprazolam 0 25 mg tablet, Disp: , Rfl:     aspirin 81 MG tablet, Take 81 mg by mouth daily, Disp: , Rfl:     aspirin 81 MG tablet, Aspir-81, Disp: , Rfl:     ASPIRIN 81 PO, Take 81 mg by mouth, Disp: , Rfl:     azithromycin (ZITHROMAX) 250 mg tablet, Take 2 tablets day 1 then 1 tab days 2-5, Disp: 6 tablet, Rfl: 0    calcium carbonate-vitamin D (OSCAL-D) 500 mg-200 units per tablet, Take 1 tablet by mouth daily with breakfast, Disp: , Rfl:     diclofenac sodium (VOLTAREN) 1 %, Voltaren 1 % topical gel, Disp: , Rfl:     dicyclomine (BENTYL) 10 mg capsule, Take 10 mg by mouth daily, Disp: , Rfl:     dicyclomine (BENTYL) 10 mg capsule, dicyclomine 10 mg capsule, Disp: , Rfl:     docusate sodium (COLACE) 100 mg capsule, Take 1 capsule by mouth 2 (two) times a day for 30 days (Patient not taking: Reported on 2/14/2020), Disp: 60 capsule, Rfl: 0    ferrous sulfate 325 (65 Fe) mg tablet, Take 1 tablet (325 mg total) by mouth daily (Patient not taking: Reported on 2/14/2020), Disp: 30 tablet, Rfl: 0    gabapentin (NEURONTIN) 300 mg capsule, Take 300 mg by mouth 3 (three) times a day, Disp: , Rfl:     lisinopril (ZESTRIL) 2 5 mg tablet, Take 2 5 mg by mouth daily, Disp: , Rfl:     lisinopril (ZESTRIL) 2 5 mg tablet, Take 2 5 mg by mouth, Disp: , Rfl:     metFORMIN (GLUCOPHAGE) 1000 MG tablet, Take by mouth, Disp: , Rfl:     pregabalin (LYRICA) 25 mg capsule, Take by mouth, Disp: , Rfl:     pregabalin (LYRICA) 75 mg capsule, Take 50 mg by mouth 3 (three) times a day, Disp: , Rfl:     traMADol (ULTRAM) 50 mg tablet, tramadol 50 mg tablet, Disp: , Rfl:     Current Allergies     Allergies as of 02/14/2020 - Reviewed 02/14/2020   Allergen Reaction Noted    Morphine Other (See Comments) 10/18/2001    Ciprofloxacin Rash and Nausea Only 10/18/2001            The following portions of the patient's history were reviewed and updated as appropriate: allergies, current medications, past family history, past medical history, past social history, past surgical history and problem list      Past Medical History:   Diagnosis Date    Anemia     Arthritis     Back pain     [facility]     Diabetes [facility]     Diabetes mellitus [facility]     Diverticulosis     Hypertension        Past Surgical History:   Procedure Laterality Date    BACK SURGERY      EGD AND COLONOSCOPY N/A 12/23/2016    Procedure: EGD AND COLONOSCOPY;  Surgeon: Morro Willis MD;  [facility];   Service:     JOINT REPLACEMENT      bilat knees and hips    OTHER SURGICAL HISTORY      pelvis rods    REPLACEMENT TOTAL KNEE BILATERAL      STOMACH SURGERY         Family History   Problem Relation Age of Onset    Cancer Brother     Diabetes Mother     Hypertension Father     Heart disease Father          Medications have been verified  Objective   /60 (BP Location: Left arm, Patient Position: Sitting)   Pulse 86   Temp 98 °F (36 7 °C) (Temporal)   Resp 18   Ht 4' 11" (1 499 m)   Wt 63 5 kg (140 lb)   LMP  (LMP Unknown)   SpO2 97%   BMI 28 28 kg/m²        Physical Exam     Physical Exam   Constitutional: She is oriented to person, place, and time  She appears well-developed and well-nourished  No distress  HENT:   Head: Normocephalic and atraumatic  Right Ear: External ear normal    Left Ear: External ear normal    Nose: Nose normal    Mouth/Throat: Oropharynx is clear and moist  No oropharyngeal exudate  Patient was complaining about spots on the back were tongue which are just enlarged papillae  Eyes: Pupils are equal, round, and reactive to light  Conjunctivae and EOM are normal  Right eye exhibits no discharge  Left eye exhibits no discharge  Cardiovascular: Normal rate, regular rhythm and normal heart sounds  No murmur heard  Pulmonary/Chest: Effort normal  No stridor  No respiratory distress  She has no wheezes  She has no rales  Intermittent coarse breath sounds bilateral upper airway  Lymphadenopathy:     She has no cervical adenopathy  Neurological: She is alert and oriented to person, place, and time  Skin: Skin is warm and dry  She is not diaphoretic  Psychiatric: She has a normal mood and affect  Her behavior is normal    Nursing note and vitals reviewed

## 2020-02-19 NOTE — PROGRESS NOTES
2/20/2020    [facility] [place] [facility]  1934  [id number]        Assessment  -OAB  -Urge urinary incontinence  -Nocturnal enuresis    Discussion/Plan  Miles Neal is a 80 y o  female being managed by Dr Kacey Padilla       -patient continues to report effectiveness with Myrbetriq, however she has lost coverage with her prescription plan  Provided her with a pamphlet to see if she is eligible for discount plan with Myrbetriq  If medication is not covered, I instructed them to call office to prescribe an alternative  However, we are limited with options due to potential side effects with anticholinergics given her advanced age  She verbalized understanding  They will call with any issues  -All questions answered, patients agree with plan     History of Present Illness  80 y o  female with a history of OAB, urinary urge incontinence, and nocturnal enuresis presents today for follow up  Patient continues to take Myrbetriq 50mg daily  Recent urine culture from 11/26/19 was negative for infection  Unfortunately, patient states she was unable to renew her prescription plan, and Myrbetriq is now cost prohibitive  She states Myrbetriq as effective and has helped to improve her symptoms of urinary urgency, frequency, and urge incontinence  She denies any gross hematuria or dysuria  No overall changes to her health since last office visit  Review of Systems  Review of Systems   Constitutional: Negative  HENT: Negative  Respiratory: Negative  Cardiovascular: Negative  Gastrointestinal: Negative  Genitourinary: Positive for frequency and urgency  Negative for decreased urine volume, difficulty urinating, dysuria, flank pain and hematuria  Musculoskeletal: Negative  Skin: Negative  Neurological: Negative  Psychiatric/Behavioral: Negative          Past Medical History  Past Medical History:   Diagnosis Date    Anemia     Arthritis     Back pain     [facility]     [facility]     Diabetes mellitus [facility]     Diverticulosis     Hypertension        Past Social History  Past Surgical History:   Procedure Laterality Date    BACK SURGERY      EGD AND COLONOSCOPY N/A 2016    Procedure: EGD AND COLONOSCOPY;  Surgeon: Gary Lacy MD;  [facility];   Service:     JOINT REPLACEMENT      bilat knees and hips    OTHER SURGICAL HISTORY      pelvis rods    REPLACEMENT TOTAL KNEE BILATERAL      STOMACH SURGERY         Past Family History  Family History   Problem Relation Age of Onset    Cancer Brother     Diabetes Mother     Hypertension Father     Heart disease Father        Past Social history  Social History     Socioeconomic History    Marital status: /Civil Union     Spouse name: Not on file    Number of children: Not on file    Years of education: Not on file    Highest education level: Not on file   Occupational History    Not on file   Social Needs    Financial resource strain: Not on file    Food insecurity:     Worry: Not on file     Inability: Not on file    Transportation needs:     Medical: Not on file     Non-medical: Not on file   Tobacco Use    Smoking status: Former Smoker     Types: Cigarettes     Last attempt to quit: 1975     Years since quittin 1    Smokeless tobacco: Never Used   Substance and Sexual Activity    Alcohol use: No    Drug use: No    Sexual activity: Yes     Partners: Male     Birth control/protection: None   Lifestyle    Physical activity:     Days per week: Not on file     Minutes per session: Not on file    Stress: Not on file   Relationships    Social connections:     Talks on phone: Not on file     Gets together: Not on file     Attends Baptism service: Not on file     Active member of club or organization: Not on file     Attends meetings of clubs or organizations: Not on file     Relationship status: Not on file    Intimate partner violence:     Fear of current or ex partner: Not on file Emotionally abused: Not on file     Physically abused: Not on file     Forced sexual activity: Not on file   Other Topics Concern    Not on file   Social History Narrative    Not on file       Current Medications  Current Outpatient Medications   Medication Sig Dispense Refill    ALPRAZolam (XANAX) 0 25 mg tablet alprazolam 0 25 mg tablet      aspirin 81 MG tablet Take 81 mg by mouth daily      aspirin 81 MG tablet Aspir-81      ASPIRIN 81 PO Take 81 mg by mouth      calcium carbonate-vitamin D (OSCAL-D) 500 mg-200 units per tablet Take 1 tablet by mouth daily with breakfast      Cholecalciferol (VITAMIN D3) 1000 units CAPS Take by mouth      Cholecalciferol (VITAMIN D3) 1000 units CAPS 1000 mg      diclofenac sodium (VOLTAREN) 1 % Voltaren 1 % topical gel      dicyclomine (BENTYL) 10 mg capsule Take 10 mg by mouth daily      dicyclomine (BENTYL) 10 mg capsule dicyclomine 10 mg capsule      Diphenoxylate-Atropine (LOMOTIL PO) Lomotil      diphenoxylate-atropine (LOMOTIL) 2 5-0 025 mg per tablet Take 1 tablet by mouth 4 (four) times a day as needed for diarrhea      diphenoxylate-atropine (LOMOTIL) 2 5-0 025 mg per tablet Take by mouth      diphenoxylate-atropine (LOMOTIL) 2 5-0 025 mg per tablet diphenoxylate-atropine 2 5 mg-0 025 mg tablet      docusate sodium (COLACE) 100 mg capsule Take 1 capsule by mouth 2 (two) times a day for 30 days (Patient not taking: Reported on 2/14/2020) 60 capsule 0    DULoxetine (CYMBALTA) 20 mg capsule Take by mouth      DULoxetine (CYMBALTA) 60 mg delayed release capsule Take 60 mg by mouth daily      DULoxetine (CYMBALTA) 60 mg delayed release capsule duloxetine 60 mg capsule,delayed release      ferrous sulfate 325 (65 Fe) mg tablet Take 1 tablet (325 mg total) by mouth daily (Patient not taking: Reported on 2/14/2020) 30 tablet 0    gabapentin (NEURONTIN) 300 mg capsule Take 300 mg by mouth 3 (three) times a day      hydroxychloroquine (PLAQUENIL) 200 mg tablet Take 200 mg by mouth 2 (two) times a day with meals      lisinopril (ZESTRIL) 2 5 mg tablet Take 2 5 mg by mouth daily      lisinopril (ZESTRIL) 2 5 mg tablet Take 2 5 mg by mouth      metFORMIN (GLUCOPHAGE) 1000 MG tablet Take by mouth      metFORMIN (GLUCOPHAGE) 500 mg tablet Take 500 mg by mouth 2 (two) times a day with meals      metFORMIN (GLUCOPHAGE) 500 mg tablet metformin 500 mg tablet      MYRBETRIQ 50 MG TB24 TAKE ONE TABLET ONCE DAILY 30 tablet 8    MYRBETRIQ 50 MG TB24 Take 1 tablet (50 mg total) by mouth daily 30 tablet 3    omeprazole (PriLOSEC) 20 mg delayed release capsule Take 20 mg by mouth daily      omeprazole (PRILOSEC) 20 mg delayed release capsule Take by mouth      pregabalin (LYRICA) 25 mg capsule Take by mouth      pregabalin (LYRICA) 50 mg capsule Take 50 mg by mouth daily after dinner      pregabalin (LYRICA) 75 mg capsule Take 50 mg by mouth 3 (three) times a day      pyridoxine (B-6) 100 MG tablet Take 100 mg by mouth daily      pyridoxine (VITAMIN B6) 100 mg tablet Take by mouth      pyridoxine (VITAMIN B6) 100 mg tablet Take by oral route   traMADol (ULTRAM) 50 mg tablet tramadol 50 mg tablet       No current facility-administered medications for this visit  Allergies  Allergies   Allergen Reactions    Morphine Other (See Comments)     urinary retention    Ciprofloxacin Rash and Nausea Only       Past medical history, social history, family history, medications and allergies were reviewed  Vitals  There were no vitals filed for this visit  Physical Exam  Physical Exam   Constitutional: She is oriented to person, place, and time  She appears well-developed and well-nourished  HENT:   Head: Normocephalic  Eyes: Pupils are equal, round, and reactive to light  Neck: Normal range of motion  Cardiovascular: Normal rate and regular rhythm  Pulmonary/Chest: Effort normal    Abdominal: Soft  Normal appearance  There is no CVA tenderness  Musculoskeletal: Normal range of motion  Neurological: She is alert and oriented to person, place, and time  Skin: Skin is warm and dry  Psychiatric: She has a normal mood and affect  Her behavior is normal  Judgment and thought content normal        Results    I have personally reviewed all pertinent lab results and reviewed with patient  Lab Results   Component Value Date    GLUCOSE 205 (H) 10/09/2015    CALCIUM 8 6 12/17/2019     10/09/2015    K 4 1 12/17/2019    CO2 28 12/17/2019     12/17/2019    BUN 16 12/17/2019    CREATININE 1 15 12/17/2019     Lab Results   Component Value Date    WBC 5 71 01/28/2020    HGB 11 0 (L) 01/28/2020    HCT 37 1 01/28/2020    MCV 88 01/28/2020     01/28/2020     No results found for this or any previous visit (from the past 1 hour(s))

## 2020-02-20 ENCOUNTER — OFFICE VISIT (OUTPATIENT)
Dept: UROLOGY | Facility: AMBULATORY SURGERY CENTER | Age: 85
End: 2020-02-20
Payer: MEDICARE

## 2020-02-20 VITALS
WEIGHT: 135.8 LBS | HEIGHT: 59 IN | SYSTOLIC BLOOD PRESSURE: 142 MMHG | BODY MASS INDEX: 27.38 KG/M2 | HEART RATE: 61 BPM | DIASTOLIC BLOOD PRESSURE: 72 MMHG

## 2020-02-20 DIAGNOSIS — R35.0 URINARY FREQUENCY: Primary | ICD-10-CM

## 2020-02-20 DIAGNOSIS — N32.81 OAB (OVERACTIVE BLADDER): ICD-10-CM

## 2020-02-20 PROCEDURE — 99213 OFFICE O/P EST LOW 20 MIN: CPT | Performed by: NURSE PRACTITIONER

## 2020-03-18 ENCOUNTER — TELEPHONE (OUTPATIENT)
Dept: UROLOGY | Facility: MEDICAL CENTER | Age: 85
End: 2020-03-18

## 2020-03-18 NOTE — TELEPHONE ENCOUNTER
Patient  calling to see if we can send script to the [facility] [phone] fax number [phone]     trying to get assist with company since they have no insurance for medication Myrbetriq 50 mg

## 2020-04-15 NOTE — TELEPHONE ENCOUNTER
Patient Assistance Program application needs to be completed  Jonatan can be reached at [phone] should we have any questions

## 2020-05-12 ENCOUNTER — TELEPHONE (OUTPATIENT)
Dept: UROLOGY | Facility: MEDICAL CENTER | Age: 85
End: 2020-05-12

## 2020-05-12 DIAGNOSIS — N32.81 OAB (OVERACTIVE BLADDER): ICD-10-CM

## 2020-05-30 ENCOUNTER — APPOINTMENT (OUTPATIENT)
Dept: LAB | Facility: CLINIC | Age: 85
End: 2020-05-30
Payer: MEDICARE

## 2020-05-30 ENCOUNTER — TRANSCRIBE ORDERS (OUTPATIENT)
Dept: LAB | Facility: CLINIC | Age: 85
End: 2020-05-30

## 2020-05-30 DIAGNOSIS — E13.42 DIABETIC POLYNEUROPATHY ASSOCIATED WITH OTHER SPECIFIED DIABETES MELLITUS (HCC): ICD-10-CM

## 2020-05-30 DIAGNOSIS — N18.9 CHRONIC KIDNEY DISEASE, UNSPECIFIED CKD STAGE: ICD-10-CM

## 2020-05-30 DIAGNOSIS — M06.09 RHEUMATOID ARTHRITIS OF MULTIPLE SITES WITHOUT RHEUMATOID FACTOR (HCC): ICD-10-CM

## 2020-05-30 DIAGNOSIS — M15.0 PRIMARY GENERALIZED HYPERTROPHIC OSTEOARTHROSIS: ICD-10-CM

## 2020-05-30 DIAGNOSIS — M81.0 SENILE OSTEOPOROSIS: ICD-10-CM

## 2020-05-30 DIAGNOSIS — Z79.899 ENCOUNTER FOR LONG-TERM (CURRENT) USE OF OTHER MEDICATIONS: ICD-10-CM

## 2020-05-30 DIAGNOSIS — D50.9 IRON DEFICIENCY ANEMIA, UNSPECIFIED IRON DEFICIENCY ANEMIA TYPE: ICD-10-CM

## 2020-05-30 DIAGNOSIS — E11.42 DIABETIC POLYNEUROPATHY ASSOCIATED WITH TYPE 2 DIABETES MELLITUS (HCC): Primary | ICD-10-CM

## 2020-05-30 DIAGNOSIS — M48.061 SPINAL STENOSIS, LUMBAR REGION, WITHOUT NEUROGENIC CLAUDICATION: ICD-10-CM

## 2020-05-30 LAB
25(OH)D3 SERPL-MCNC: 37.6 NG/ML (ref 30–100)
ALBUMIN SERPL BCP-MCNC: 3.5 G/DL (ref 3.5–5)
ALP SERPL-CCNC: 77 U/L (ref 46–116)
ALT SERPL W P-5'-P-CCNC: 39 U/L (ref 12–78)
ANION GAP SERPL CALCULATED.3IONS-SCNC: 5 MMOL/L (ref 4–13)
AST SERPL W P-5'-P-CCNC: 33 U/L (ref 5–45)
BACTERIA UR QL AUTO: ABNORMAL /HPF
BASOPHILS # BLD AUTO: 0.04 THOUSANDS/ΜL (ref 0–0.1)
BASOPHILS NFR BLD AUTO: 1 % (ref 0–1)
BILIRUB SERPL-MCNC: 0.48 MG/DL (ref 0.2–1)
BILIRUB UR QL STRIP: NEGATIVE
BUN SERPL-MCNC: 26 MG/DL (ref 5–25)
CALCIUM SERPL-MCNC: 9.8 MG/DL (ref 8.3–10.1)
CHLORIDE SERPL-SCNC: 102 MMOL/L (ref 100–108)
CLARITY UR: CLEAR
CO2 SERPL-SCNC: 32 MMOL/L (ref 21–32)
COLOR UR: YELLOW
CREAT SERPL-MCNC: 1.29 MG/DL (ref 0.6–1.3)
EOSINOPHIL # BLD AUTO: 0.19 THOUSAND/ΜL (ref 0–0.61)
EOSINOPHIL NFR BLD AUTO: 3 % (ref 0–6)
ERYTHROCYTE [DISTWIDTH] IN BLOOD BY AUTOMATED COUNT: 14.3 % (ref 11.6–15.1)
ERYTHROCYTE [SEDIMENTATION RATE] IN BLOOD: 6 MM/HOUR (ref 0–20)
GFR SERPL CREATININE-BSD FRML MDRD: 38 ML/MIN/1.73SQ M
GLUCOSE P FAST SERPL-MCNC: 104 MG/DL (ref 65–99)
GLUCOSE UR STRIP-MCNC: NEGATIVE MG/DL
HCT VFR BLD AUTO: 41.6 % (ref 34.8–46.1)
HGB BLD-MCNC: 13.2 G/DL (ref 11.5–15.4)
HGB UR QL STRIP.AUTO: NEGATIVE
IMM GRANULOCYTES # BLD AUTO: 0.02 THOUSAND/UL (ref 0–0.2)
IMM GRANULOCYTES NFR BLD AUTO: 0 % (ref 0–2)
KETONES UR STRIP-MCNC: NEGATIVE MG/DL
LEUKOCYTE ESTERASE UR QL STRIP: ABNORMAL
LYMPHOCYTES # BLD AUTO: 1.37 THOUSANDS/ΜL (ref 0.6–4.47)
LYMPHOCYTES NFR BLD AUTO: 24 % (ref 14–44)
MCH RBC QN AUTO: 30.1 PG (ref 26.8–34.3)
MCHC RBC AUTO-ENTMCNC: 31.7 G/DL (ref 31.4–37.4)
MCV RBC AUTO: 95 FL (ref 82–98)
MONOCYTES # BLD AUTO: 0.7 THOUSAND/ΜL (ref 0.17–1.22)
MONOCYTES NFR BLD AUTO: 12 % (ref 4–12)
NEUTROPHILS # BLD AUTO: 3.46 THOUSANDS/ΜL (ref 1.85–7.62)
NEUTS SEG NFR BLD AUTO: 60 % (ref 43–75)
NITRITE UR QL STRIP: NEGATIVE
NON-SQ EPI CELLS URNS QL MICRO: ABNORMAL /HPF
NRBC BLD AUTO-RTO: 0 /100 WBCS
PH UR STRIP.AUTO: 6 [PH]
PLATELET # BLD AUTO: 173 THOUSANDS/UL (ref 149–390)
PMV BLD AUTO: 11.3 FL (ref 8.9–12.7)
POTASSIUM SERPL-SCNC: 4.4 MMOL/L (ref 3.5–5.3)
PROT SERPL-MCNC: 7.1 G/DL (ref 6.4–8.2)
PROT UR STRIP-MCNC: NEGATIVE MG/DL
RBC # BLD AUTO: 4.39 MILLION/UL (ref 3.81–5.12)
RBC #/AREA URNS AUTO: ABNORMAL /HPF
SODIUM SERPL-SCNC: 139 MMOL/L (ref 136–145)
SP GR UR STRIP.AUTO: 1.01 (ref 1–1.03)
UROBILINOGEN UR QL STRIP.AUTO: 0.2 E.U./DL
WBC # BLD AUTO: 5.78 THOUSAND/UL (ref 4.31–10.16)
WBC #/AREA URNS AUTO: ABNORMAL /HPF

## 2020-05-30 PROCEDURE — 82306 VITAMIN D 25 HYDROXY: CPT

## 2020-05-30 PROCEDURE — 81001 URINALYSIS AUTO W/SCOPE: CPT | Performed by: INTERNAL MEDICINE

## 2020-05-30 PROCEDURE — 36415 COLL VENOUS BLD VENIPUNCTURE: CPT

## 2020-05-30 PROCEDURE — 85652 RBC SED RATE AUTOMATED: CPT

## 2020-05-30 PROCEDURE — 80053 COMPREHEN METABOLIC PANEL: CPT

## 2020-05-30 PROCEDURE — 85025 COMPLETE CBC W/AUTO DIFF WBC: CPT

## 2020-06-23 ENCOUNTER — APPOINTMENT (OUTPATIENT)
Dept: LAB | Facility: CLINIC | Age: 85
End: 2020-06-23
Payer: MEDICARE

## 2020-06-23 DIAGNOSIS — Z79.899 ENCOUNTER FOR LONG-TERM (CURRENT) USE OF OTHER MEDICATIONS: ICD-10-CM

## 2020-06-23 DIAGNOSIS — N18.9 CHRONIC KIDNEY DISEASE, UNSPECIFIED CKD STAGE: ICD-10-CM

## 2020-06-23 DIAGNOSIS — M48.061 SPINAL STENOSIS, LUMBAR REGION, WITHOUT NEUROGENIC CLAUDICATION: ICD-10-CM

## 2020-06-23 DIAGNOSIS — E13.42 DIABETIC POLYNEUROPATHY ASSOCIATED WITH OTHER SPECIFIED DIABETES MELLITUS (HCC): ICD-10-CM

## 2020-06-23 DIAGNOSIS — M81.0 SENILE OSTEOPOROSIS: ICD-10-CM

## 2020-06-23 DIAGNOSIS — D50.9 IRON DEFICIENCY ANEMIA, UNSPECIFIED IRON DEFICIENCY ANEMIA TYPE: ICD-10-CM

## 2020-06-23 DIAGNOSIS — M15.0 PRIMARY GENERALIZED HYPERTROPHIC OSTEOARTHROSIS: ICD-10-CM

## 2020-06-23 DIAGNOSIS — E11.42 DIABETIC POLYNEUROPATHY ASSOCIATED WITH TYPE 2 DIABETES MELLITUS (HCC): ICD-10-CM

## 2020-06-23 DIAGNOSIS — M06.09 RHEUMATOID ARTHRITIS OF MULTIPLE SITES WITHOUT RHEUMATOID FACTOR (HCC): ICD-10-CM

## 2020-06-23 LAB
ANION GAP SERPL CALCULATED.3IONS-SCNC: 7 MMOL/L (ref 4–13)
BUN SERPL-MCNC: 26 MG/DL (ref 5–25)
CALCIUM SERPL-MCNC: 8.7 MG/DL (ref 8.3–10.1)
CHLORIDE SERPL-SCNC: 105 MMOL/L (ref 100–108)
CO2 SERPL-SCNC: 29 MMOL/L (ref 21–32)
CREAT SERPL-MCNC: 1.28 MG/DL (ref 0.6–1.3)
GFR SERPL CREATININE-BSD FRML MDRD: 38 ML/MIN/1.73SQ M
GLUCOSE P FAST SERPL-MCNC: 111 MG/DL (ref 65–99)
POTASSIUM SERPL-SCNC: 4.5 MMOL/L (ref 3.5–5.3)
SODIUM SERPL-SCNC: 141 MMOL/L (ref 136–145)

## 2020-06-23 PROCEDURE — 36415 COLL VENOUS BLD VENIPUNCTURE: CPT

## 2020-06-23 PROCEDURE — 80048 BASIC METABOLIC PNL TOTAL CA: CPT

## 2020-07-21 ENCOUNTER — APPOINTMENT (OUTPATIENT)
Dept: LAB | Facility: CLINIC | Age: 85
End: 2020-07-21
Payer: MEDICARE

## 2020-07-21 ENCOUNTER — TRANSCRIBE ORDERS (OUTPATIENT)
Dept: LAB | Facility: CLINIC | Age: 85
End: 2020-07-21

## 2020-07-21 DIAGNOSIS — D50.8 IRON DEFICIENCY ANEMIA SECONDARY TO INADEQUATE DIETARY IRON INTAKE: ICD-10-CM

## 2020-07-21 DIAGNOSIS — D64.9 SYMPTOMATIC ANEMIA: ICD-10-CM

## 2020-07-21 LAB
ALBUMIN SERPL BCP-MCNC: 3.4 G/DL (ref 3.5–5)
ALP SERPL-CCNC: 79 U/L (ref 46–116)
ALT SERPL W P-5'-P-CCNC: 28 U/L (ref 12–78)
ANION GAP SERPL CALCULATED.3IONS-SCNC: 7 MMOL/L (ref 4–13)
AST SERPL W P-5'-P-CCNC: 24 U/L (ref 5–45)
BASOPHILS # BLD AUTO: 0.03 THOUSANDS/ΜL (ref 0–0.1)
BASOPHILS NFR BLD AUTO: 1 % (ref 0–1)
BILIRUB SERPL-MCNC: 0.5 MG/DL (ref 0.2–1)
BUN SERPL-MCNC: 31 MG/DL (ref 5–25)
CALCIUM SERPL-MCNC: 9.2 MG/DL (ref 8.3–10.1)
CHLORIDE SERPL-SCNC: 102 MMOL/L (ref 100–108)
CO2 SERPL-SCNC: 30 MMOL/L (ref 21–32)
CREAT SERPL-MCNC: 1.3 MG/DL (ref 0.6–1.3)
EOSINOPHIL # BLD AUTO: 0.25 THOUSAND/ΜL (ref 0–0.61)
EOSINOPHIL NFR BLD AUTO: 5 % (ref 0–6)
ERYTHROCYTE [DISTWIDTH] IN BLOOD BY AUTOMATED COUNT: 14.4 % (ref 11.6–15.1)
FERRITIN SERPL-MCNC: 97 NG/ML (ref 8–388)
GFR SERPL CREATININE-BSD FRML MDRD: 37 ML/MIN/1.73SQ M
GLUCOSE P FAST SERPL-MCNC: 114 MG/DL (ref 65–99)
HCT VFR BLD AUTO: 40 % (ref 34.8–46.1)
HGB BLD-MCNC: 12.9 G/DL (ref 11.5–15.4)
IGA SERPL-MCNC: 361 MG/DL (ref 70–400)
IGG SERPL-MCNC: 1010 MG/DL (ref 700–1600)
IGM SERPL-MCNC: 83 MG/DL (ref 40–230)
IMM GRANULOCYTES # BLD AUTO: 0.03 THOUSAND/UL (ref 0–0.2)
IMM GRANULOCYTES NFR BLD AUTO: 1 % (ref 0–2)
IRON SATN MFR SERPL: 36 %
IRON SERPL-MCNC: 93 UG/DL (ref 50–170)
LYMPHOCYTES # BLD AUTO: 1.44 THOUSANDS/ΜL (ref 0.6–4.47)
LYMPHOCYTES NFR BLD AUTO: 26 % (ref 14–44)
MCH RBC QN AUTO: 30.8 PG (ref 26.8–34.3)
MCHC RBC AUTO-ENTMCNC: 32.3 G/DL (ref 31.4–37.4)
MCV RBC AUTO: 96 FL (ref 82–98)
MONOCYTES # BLD AUTO: 0.79 THOUSAND/ΜL (ref 0.17–1.22)
MONOCYTES NFR BLD AUTO: 14 % (ref 4–12)
NEUTROPHILS # BLD AUTO: 3.03 THOUSANDS/ΜL (ref 1.85–7.62)
NEUTS SEG NFR BLD AUTO: 53 % (ref 43–75)
NRBC BLD AUTO-RTO: 0 /100 WBCS
PLATELET # BLD AUTO: 182 THOUSANDS/UL (ref 149–390)
PMV BLD AUTO: 10.5 FL (ref 8.9–12.7)
POTASSIUM SERPL-SCNC: 4.9 MMOL/L (ref 3.5–5.3)
PROT SERPL-MCNC: 7 G/DL (ref 6.4–8.2)
RBC # BLD AUTO: 4.19 MILLION/UL (ref 3.81–5.12)
SODIUM SERPL-SCNC: 139 MMOL/L (ref 136–145)
TIBC SERPL-MCNC: 258 UG/DL (ref 250–450)
WBC # BLD AUTO: 5.57 THOUSAND/UL (ref 4.31–10.16)

## 2020-07-21 PROCEDURE — 82784 ASSAY IGA/IGD/IGG/IGM EACH: CPT

## 2020-07-21 PROCEDURE — 36415 COLL VENOUS BLD VENIPUNCTURE: CPT

## 2020-07-21 PROCEDURE — 83540 ASSAY OF IRON: CPT

## 2020-07-21 PROCEDURE — 82728 ASSAY OF FERRITIN: CPT

## 2020-07-21 PROCEDURE — 85025 COMPLETE CBC W/AUTO DIFF WBC: CPT

## 2020-07-21 PROCEDURE — 80053 COMPREHEN METABOLIC PANEL: CPT

## 2020-07-21 PROCEDURE — 83550 IRON BINDING TEST: CPT

## 2020-08-25 ENCOUNTER — TRANSCRIBE ORDERS (OUTPATIENT)
Dept: LAB | Facility: CLINIC | Age: 85
End: 2020-08-25

## 2020-08-25 ENCOUNTER — APPOINTMENT (OUTPATIENT)
Dept: LAB | Facility: CLINIC | Age: 85
End: 2020-08-25
Payer: MEDICARE

## 2020-08-25 DIAGNOSIS — E11.9 DIABETES MELLITUS WITHOUT COMPLICATION (HCC): Primary | ICD-10-CM

## 2020-08-25 DIAGNOSIS — E11.9 DIABETES MELLITUS WITHOUT COMPLICATION (HCC): ICD-10-CM

## 2020-08-25 LAB
ALBUMIN SERPL BCP-MCNC: 3.4 G/DL (ref 3.5–5)
ALP SERPL-CCNC: 61 U/L (ref 46–116)
ALT SERPL W P-5'-P-CCNC: 25 U/L (ref 12–78)
ANION GAP SERPL CALCULATED.3IONS-SCNC: 7 MMOL/L (ref 4–13)
AST SERPL W P-5'-P-CCNC: 24 U/L (ref 5–45)
BASOPHILS # BLD AUTO: 0.02 THOUSANDS/ΜL (ref 0–0.1)
BASOPHILS NFR BLD AUTO: 0 % (ref 0–1)
BILIRUB SERPL-MCNC: 0.41 MG/DL (ref 0.2–1)
BUN SERPL-MCNC: 26 MG/DL (ref 5–25)
CALCIUM SERPL-MCNC: 8.8 MG/DL (ref 8.3–10.1)
CHLORIDE SERPL-SCNC: 107 MMOL/L (ref 100–108)
CHOLEST SERPL-MCNC: 155 MG/DL (ref 50–200)
CO2 SERPL-SCNC: 29 MMOL/L (ref 21–32)
CREAT SERPL-MCNC: 1.19 MG/DL (ref 0.6–1.3)
EOSINOPHIL # BLD AUTO: 0.31 THOUSAND/ΜL (ref 0–0.61)
EOSINOPHIL NFR BLD AUTO: 6 % (ref 0–6)
ERYTHROCYTE [DISTWIDTH] IN BLOOD BY AUTOMATED COUNT: 13.7 % (ref 11.6–15.1)
EST. AVERAGE GLUCOSE BLD GHB EST-MCNC: 126 MG/DL
GFR SERPL CREATININE-BSD FRML MDRD: 41 ML/MIN/1.73SQ M
GLUCOSE P FAST SERPL-MCNC: 107 MG/DL (ref 65–99)
HBA1C MFR BLD: 6 %
HCT VFR BLD AUTO: 40.5 % (ref 34.8–46.1)
HDLC SERPL-MCNC: 72 MG/DL
HGB BLD-MCNC: 12.8 G/DL (ref 11.5–15.4)
IMM GRANULOCYTES # BLD AUTO: 0.03 THOUSAND/UL (ref 0–0.2)
IMM GRANULOCYTES NFR BLD AUTO: 1 % (ref 0–2)
LDLC SERPL CALC-MCNC: 73 MG/DL (ref 0–100)
LYMPHOCYTES # BLD AUTO: 1.26 THOUSANDS/ΜL (ref 0.6–4.47)
LYMPHOCYTES NFR BLD AUTO: 23 % (ref 14–44)
MCH RBC QN AUTO: 30.3 PG (ref 26.8–34.3)
MCHC RBC AUTO-ENTMCNC: 31.6 G/DL (ref 31.4–37.4)
MCV RBC AUTO: 96 FL (ref 82–98)
MONOCYTES # BLD AUTO: 0.66 THOUSAND/ΜL (ref 0.17–1.22)
MONOCYTES NFR BLD AUTO: 12 % (ref 4–12)
NEUTROPHILS # BLD AUTO: 3.23 THOUSANDS/ΜL (ref 1.85–7.62)
NEUTS SEG NFR BLD AUTO: 58 % (ref 43–75)
NONHDLC SERPL-MCNC: 83 MG/DL
NRBC BLD AUTO-RTO: 0 /100 WBCS
PLATELET # BLD AUTO: 164 THOUSANDS/UL (ref 149–390)
PMV BLD AUTO: 11.5 FL (ref 8.9–12.7)
POTASSIUM SERPL-SCNC: 4.4 MMOL/L (ref 3.5–5.3)
PROT SERPL-MCNC: 6.8 G/DL (ref 6.4–8.2)
RBC # BLD AUTO: 4.22 MILLION/UL (ref 3.81–5.12)
SODIUM SERPL-SCNC: 143 MMOL/L (ref 136–145)
TRIGL SERPL-MCNC: 52 MG/DL
TSH SERPL DL<=0.05 MIU/L-ACNC: 2.24 UIU/ML (ref 0.36–3.74)
WBC # BLD AUTO: 5.51 THOUSAND/UL (ref 4.31–10.16)

## 2020-08-25 PROCEDURE — 80053 COMPREHEN METABOLIC PANEL: CPT

## 2020-08-25 PROCEDURE — 84443 ASSAY THYROID STIM HORMONE: CPT

## 2020-08-25 PROCEDURE — 36415 COLL VENOUS BLD VENIPUNCTURE: CPT

## 2020-08-25 PROCEDURE — 85025 COMPLETE CBC W/AUTO DIFF WBC: CPT

## 2020-08-25 PROCEDURE — 80061 LIPID PANEL: CPT

## 2020-08-25 PROCEDURE — 83036 HEMOGLOBIN GLYCOSYLATED A1C: CPT

## 2020-09-01 ENCOUNTER — OFFICE VISIT (OUTPATIENT)
Dept: HEMATOLOGY ONCOLOGY | Facility: CLINIC | Age: 85
End: 2020-09-01
Payer: MEDICARE

## 2020-09-01 VITALS
BODY MASS INDEX: 28.89 KG/M2 | HEIGHT: 59 IN | OXYGEN SATURATION: 96 % | RESPIRATION RATE: 16 BRPM | SYSTOLIC BLOOD PRESSURE: 118 MMHG | TEMPERATURE: 97.9 F | HEART RATE: 83 BPM | WEIGHT: 143.3 LBS | DIASTOLIC BLOOD PRESSURE: 60 MMHG

## 2020-09-01 DIAGNOSIS — E46 PROTEIN-CALORIE MALNUTRITION, UNSPECIFIED SEVERITY (HCC): ICD-10-CM

## 2020-09-01 DIAGNOSIS — D50.8 IRON DEFICIENCY ANEMIA SECONDARY TO INADEQUATE DIETARY IRON INTAKE: Primary | ICD-10-CM

## 2020-09-01 PROCEDURE — 99213 OFFICE O/P EST LOW 20 MIN: CPT | Performed by: PHYSICIAN ASSISTANT

## 2020-09-01 RX ORDER — SODIUM CHLORIDE 9 MG/ML
20 INJECTION, SOLUTION INTRAVENOUS ONCE
Status: CANCELLED | OUTPATIENT
Start: 2020-09-11

## 2020-09-01 NOTE — PROGRESS NOTES
Hematology/Oncology Outpatient Follow- up Note  Power Signs 80 y o  female MRN: @ Encounter: [id number]        Date:  9/1/2020      Assessment / Plan:    1   Recurrent anemia   Hemoglobin 6 7 11/19/2019 with low iron of 24   Ferritin 10 5/2019   She was hospitalized in 2010 and 2016 for same   No bleeding source was identified  Hiatal hernia noted on EGD 2016  Normal WBC and platelets     Chronic mild monocytosis 13-15% since at least 2016     Cr 1 1- 1 4  CMP normal        Feraheme 510mg x 2 administered 1/2020  Hemoglobin improved to 11 g/dL  Ferritin 341 1/28/20 compared to 10 5/2019 and 10 9/2018  Iron saturation 30% compared to 17% 9/2018        Hemoglobin remains normal   Ferritin 97 7/2020;  Iron saturation 36%  Patient has been more fatigued for the past few weeks  We discussed repeat Feraheme x 1  She is agreeable  If this fails to provide a response, she is asked to f/u with Dr Kecia Dias Call a 80 y  o  seen for initial consultation 12/23/2019 accompanied by her daughter, Vanessa, regarding recurrent anemia      11/19/2019 hemoglobin 6 7, MCV of 81, white blood cell count 7 43 with 58% neutrophils, 21% lymphocytes, 15% monocytes   Platelet count [id number]   She presented to the ED due to dizziness and lightheadedness   Iron 24   Guaiac negative   She was transfused with 1 U PRBCs and advised to try oral iron again        She reported that she had been having worsening fatigue for many months   She was experiencing dyspnea on exertion   She has a history of iron deficiency and was told to take iron in the past   This was poorly tolerated due to GI upset      5/14/2019 hemoglobin 9 8, MCV of 81, white blood cell count normal with normal differential   Platelets [id number]   BGDHIDHO of 10      September 2018 hemoglobin 9 6   Ferritin of 10  Iron saturation 17%      February 2018 hemoglobin 10 3     December 2017 hemoglobin 11 1 July 2017 hemoglobin 11 8     December 2016 hemoglobin 8 5-9 8   Ferritin of 6      She has a history of GI bleeding around 2010   She also has a PMH of history of congestive heart failure, diabetes mellitus, and hypertension      12/21/2016 she had EGD and colonoscopy during her hospitalization for symptomatic anemia   1cm Hiatal hernia noted   Diverticulosis and colonic polyps noted   Colonoscopy at 1 year's time recommended        Capsule endoscopy 1/17/2017:  No AVMs, ulcers or mass lesions in the small bowel      She denies any melena or hematochezia   She is less SOB after transfusion PRBC   Oral iron poorly tolerated due to nausea          Due to age and history of CHF, Feraheme chosen over Venofer  She was advised to d/c ASA 81mg and avoid NSAIDs   Additional GI work-up deferred due to age, prior negative results, patient request      Serum kappa on serum lambda free light chains are elevated along with ratio indicative of medical renal disease  No monoclonal band on SPEP  Vitamin B12 normal 395  IgA Elevated to 526 1/28/20  And IGA gliadin weak positive at 30        Felt much better after Feraheme  Interval History:      Hemoglobin remain normal     7/21/20:  12 9 with MCV 96  CMP stable  Iron saturation remains at 36%  Ferritin decreased from 341 1/28/20 to 97 7/21/20  For the past 2 weeks, has been more fatigued  Denies any infectious symptoms  No lightheadedness or dizziness  No change with sleep patterns  She received a B12 injection per Dr Kurtis Osorio which improved her fatigue for few days      Test Results:        Labs:   Lab Results   Component Value Date    HGB 12 8 08/25/2020    HCT 40 5 08/25/2020    MCV 96 08/25/2020     08/25/2020    WBC 5 51 08/25/2020    NRBC 0 08/25/2020     Lab Results   Component Value Date     10/09/2015    K 4 4 08/25/2020     08/25/2020    CO2 29 08/25/2020    ANIONGAP 8 10/09/2015    BUN 26 (H) 08/25/2020    CREATININE 1 19 08/25/2020    GLUCOSE 205 (H) 10/09/2015    GLUF 107 (H) 08/25/2020    CALCIUM 8 8 08/25/2020    AST 24 08/25/2020    ALT 25 08/25/2020    ALKPHOS 61 08/25/2020    EGFR 41 08/25/2020       Imaging: No results found  ROS:  As mentioned in HPI & Interval History otherwise 14 point ROS negative  Allergies: Allergies   Allergen Reactions    Morphine Other (See Comments)     urinary retention    Ciprofloxacin Rash and Nausea Only     Current Medications: Reviewed  PMH/FH/SH:  Reviewed      Physical Exam:    There is no height or weight on file to calculate BSA  Ht Readings from Last 3 Encounters:   02/20/20 4' 11" (1 499 m)   02/14/20 4' 11" (1 499 m)   02/03/20 4' 11" (1 499 m)        Wt Readings from Last 3 Encounters:   02/20/20 61 6 kg (135 lb 12 8 oz)   02/14/20 63 5 kg (140 lb)   02/03/20 63 5 kg (140 lb)        Temp Readings from Last 3 Encounters:   02/14/20 98 °F (36 7 °C) (Temporal)   02/03/20 (!) 97 2 °F (36 2 °C) (Tympanic)   01/13/20 (!) 97 1 °F (36 2 °C) (Temporal)        BP Readings from Last 3 Encounters:   02/20/20 142/72   02/14/20 130/60   02/03/20 138/70           Physical Exam  Constitutional:       Appearance: She is well-developed  HENT:      Head: Normocephalic and atraumatic  Cardiovascular:      Rate and Rhythm: Normal rate and regular rhythm  Pulmonary:      Effort: Pulmonary effort is normal  No respiratory distress  Breath sounds: Normal breath sounds  Abdominal:      Palpations: Abdomen is soft  Musculoskeletal:      Comments: Arthritic symptoms   Skin:     General: Skin is warm and dry  Neurological:      Mental Status: She is alert     Psychiatric:         Behavior: Behavior normal          Emergency Contacts:    El Morrison, [phone],

## 2020-09-08 ENCOUNTER — TELEPHONE (OUTPATIENT)
Dept: INFUSION CENTER | Facility: CLINIC | Age: 85
End: 2020-09-08

## 2020-09-11 ENCOUNTER — HOSPITAL ENCOUNTER (OUTPATIENT)
Dept: INFUSION CENTER | Facility: CLINIC | Age: 85
Discharge: HOME | End: 2020-09-11
Payer: MEDICARE

## 2020-09-11 VITALS
TEMPERATURE: 97.1 F | SYSTOLIC BLOOD PRESSURE: 141 MMHG | DIASTOLIC BLOOD PRESSURE: 66 MMHG | HEART RATE: 82 BPM | RESPIRATION RATE: 18 BRPM

## 2020-09-11 DIAGNOSIS — D50.8 IRON DEFICIENCY ANEMIA SECONDARY TO INADEQUATE DIETARY IRON INTAKE: Primary | ICD-10-CM

## 2020-09-11 PROCEDURE — 96365 THER/PROPH/DIAG IV INF INIT: CPT

## 2020-09-11 RX ORDER — SODIUM CHLORIDE 9 MG/ML
20 INJECTION, SOLUTION INTRAVENOUS ONCE
Status: CANCELLED | OUTPATIENT
Start: 2020-09-11

## 2020-09-11 RX ORDER — SODIUM CHLORIDE 9 MG/ML
20 INJECTION, SOLUTION INTRAVENOUS ONCE
Status: COMPLETED | OUTPATIENT
Start: 2020-09-11 | End: 2020-09-11

## 2020-09-11 RX ADMIN — SODIUM CHLORIDE 20 ML/HR: 0.9 INJECTION, SOLUTION INTRAVENOUS at 13:00

## 2020-09-11 RX ADMIN — FERUMOXYTOL 510 MG: 510 INJECTION INTRAVENOUS at 13:15

## 2020-09-11 NOTE — PROGRESS NOTES
Pt tolerated treatment well with no complications  Pt aware that she has no future appts   Declined AVS

## 2020-09-11 NOTE — PROGRESS NOTES
Pt presents for iron infusion  IV started with out issue  VS stable  Pt offers no complaints  Call bell with in reach

## 2020-12-01 ENCOUNTER — TRANSCRIBE ORDERS (OUTPATIENT)
Dept: LAB | Facility: CLINIC | Age: 85
End: 2020-12-01

## 2020-12-01 ENCOUNTER — LAB (OUTPATIENT)
Dept: LAB | Facility: CLINIC | Age: 85
End: 2020-12-01
Payer: MEDICARE

## 2020-12-01 DIAGNOSIS — M06.061 SERONEGATIVE RHEUMATOID ARTHRITIS OF RIGHT KNEE (HCC): ICD-10-CM

## 2020-12-01 DIAGNOSIS — N18.9 CHRONIC KIDNEY DISEASE, UNSPECIFIED CKD STAGE: ICD-10-CM

## 2020-12-01 DIAGNOSIS — E11.42 DIABETIC SENSORIMOTOR NEUROPATHY (HCC): ICD-10-CM

## 2020-12-01 DIAGNOSIS — M81.0 SENILE OSTEOPOROSIS: ICD-10-CM

## 2020-12-01 DIAGNOSIS — N18.9 CHRONIC KIDNEY DISEASE, UNSPECIFIED CKD STAGE: Primary | ICD-10-CM

## 2020-12-01 LAB
ALBUMIN SERPL BCP-MCNC: 3.5 G/DL (ref 3.5–5)
ALP SERPL-CCNC: 69 U/L (ref 46–116)
ALT SERPL W P-5'-P-CCNC: 30 U/L (ref 12–78)
ANION GAP SERPL CALCULATED.3IONS-SCNC: 8 MMOL/L (ref 4–13)
AST SERPL W P-5'-P-CCNC: 22 U/L (ref 5–45)
BACTERIA UR QL AUTO: ABNORMAL /HPF
BASOPHILS # BLD AUTO: 0.03 THOUSANDS/ΜL (ref 0–0.1)
BASOPHILS NFR BLD AUTO: 1 % (ref 0–1)
BILIRUB SERPL-MCNC: 0.52 MG/DL (ref 0.2–1)
BILIRUB UR QL STRIP: NEGATIVE
BUN SERPL-MCNC: 25 MG/DL (ref 5–25)
CALCIUM SERPL-MCNC: 9.5 MG/DL (ref 8.3–10.1)
CHLORIDE SERPL-SCNC: 103 MMOL/L (ref 100–108)
CLARITY UR: ABNORMAL
CO2 SERPL-SCNC: 29 MMOL/L (ref 21–32)
COLOR UR: YELLOW
CREAT SERPL-MCNC: 1.23 MG/DL (ref 0.6–1.3)
EOSINOPHIL # BLD AUTO: 0.21 THOUSAND/ΜL (ref 0–0.61)
EOSINOPHIL NFR BLD AUTO: 4 % (ref 0–6)
ERYTHROCYTE [DISTWIDTH] IN BLOOD BY AUTOMATED COUNT: 13.3 % (ref 11.6–15.1)
ERYTHROCYTE [SEDIMENTATION RATE] IN BLOOD: 10 MM/HOUR (ref 0–29)
GFR SERPL CREATININE-BSD FRML MDRD: 40 ML/MIN/1.73SQ M
GLUCOSE P FAST SERPL-MCNC: 118 MG/DL (ref 65–99)
GLUCOSE UR STRIP-MCNC: NEGATIVE MG/DL
HCT VFR BLD AUTO: 42.7 % (ref 34.8–46.1)
HGB BLD-MCNC: 13.6 G/DL (ref 11.5–15.4)
HGB UR QL STRIP.AUTO: NEGATIVE
IMM GRANULOCYTES # BLD AUTO: 0.02 THOUSAND/UL (ref 0–0.2)
IMM GRANULOCYTES NFR BLD AUTO: 0 % (ref 0–2)
KETONES UR STRIP-MCNC: NEGATIVE MG/DL
LEUKOCYTE ESTERASE UR QL STRIP: ABNORMAL
LYMPHOCYTES # BLD AUTO: 1.14 THOUSANDS/ΜL (ref 0.6–4.47)
LYMPHOCYTES NFR BLD AUTO: 19 % (ref 14–44)
MCH RBC QN AUTO: 30.5 PG (ref 26.8–34.3)
MCHC RBC AUTO-ENTMCNC: 31.9 G/DL (ref 31.4–37.4)
MCV RBC AUTO: 96 FL (ref 82–98)
MONOCYTES # BLD AUTO: 0.58 THOUSAND/ΜL (ref 0.17–1.22)
MONOCYTES NFR BLD AUTO: 10 % (ref 4–12)
NEUTROPHILS # BLD AUTO: 4.1 THOUSANDS/ΜL (ref 1.85–7.62)
NEUTS SEG NFR BLD AUTO: 66 % (ref 43–75)
NITRITE UR QL STRIP: NEGATIVE
NON-SQ EPI CELLS URNS QL MICRO: ABNORMAL /HPF
NRBC BLD AUTO-RTO: 0 /100 WBCS
PH UR STRIP.AUTO: 5.5 [PH]
PLATELET # BLD AUTO: 181 THOUSANDS/UL (ref 149–390)
PMV BLD AUTO: 10.9 FL (ref 8.9–12.7)
POTASSIUM SERPL-SCNC: 4.3 MMOL/L (ref 3.5–5.3)
PROT SERPL-MCNC: 7 G/DL (ref 6.4–8.2)
PROT UR STRIP-MCNC: NEGATIVE MG/DL
RBC # BLD AUTO: 4.46 MILLION/UL (ref 3.81–5.12)
RBC #/AREA URNS AUTO: ABNORMAL /HPF
SODIUM SERPL-SCNC: 140 MMOL/L (ref 136–145)
SP GR UR STRIP.AUTO: 1.02 (ref 1–1.03)
UROBILINOGEN UR QL STRIP.AUTO: 0.2 E.U./DL
WBC # BLD AUTO: 6.08 THOUSAND/UL (ref 4.31–10.16)
WBC #/AREA URNS AUTO: ABNORMAL /HPF

## 2020-12-01 PROCEDURE — 81001 URINALYSIS AUTO W/SCOPE: CPT | Performed by: INTERNAL MEDICINE

## 2020-12-01 PROCEDURE — 85652 RBC SED RATE AUTOMATED: CPT

## 2020-12-01 PROCEDURE — 36415 COLL VENOUS BLD VENIPUNCTURE: CPT

## 2020-12-01 PROCEDURE — 80053 COMPREHEN METABOLIC PANEL: CPT

## 2020-12-01 PROCEDURE — 85025 COMPLETE CBC W/AUTO DIFF WBC: CPT

## 2020-12-29 ENCOUNTER — LAB (OUTPATIENT)
Dept: LAB | Facility: CLINIC | Age: 85
End: 2020-12-29
Payer: MEDICARE

## 2020-12-29 DIAGNOSIS — M81.0 SENILE OSTEOPOROSIS: ICD-10-CM

## 2020-12-29 DIAGNOSIS — E11.42 DIABETIC SENSORIMOTOR NEUROPATHY (HCC): ICD-10-CM

## 2020-12-29 DIAGNOSIS — M06.061 SERONEGATIVE RHEUMATOID ARTHRITIS OF RIGHT KNEE (HCC): ICD-10-CM

## 2020-12-29 DIAGNOSIS — N18.9 CHRONIC KIDNEY DISEASE, UNSPECIFIED CKD STAGE: ICD-10-CM

## 2020-12-29 LAB
ANION GAP SERPL CALCULATED.3IONS-SCNC: 5 MMOL/L (ref 4–13)
BUN SERPL-MCNC: 19 MG/DL (ref 5–25)
CALCIUM SERPL-MCNC: 9.6 MG/DL (ref 8.3–10.1)
CHLORIDE SERPL-SCNC: 105 MMOL/L (ref 100–108)
CO2 SERPL-SCNC: 31 MMOL/L (ref 21–32)
CREAT SERPL-MCNC: 1.38 MG/DL (ref 0.6–1.3)
GFR SERPL CREATININE-BSD FRML MDRD: 35 ML/MIN/1.73SQ M
GLUCOSE P FAST SERPL-MCNC: 104 MG/DL (ref 65–99)
POTASSIUM SERPL-SCNC: 4.8 MMOL/L (ref 3.5–5.3)
SODIUM SERPL-SCNC: 141 MMOL/L (ref 136–145)

## 2020-12-29 PROCEDURE — 36415 COLL VENOUS BLD VENIPUNCTURE: CPT

## 2020-12-29 PROCEDURE — 80048 BASIC METABOLIC PNL TOTAL CA: CPT

## 2021-02-18 ENCOUNTER — TELEPHONE (OUTPATIENT)
Dept: UROLOGY | Facility: MEDICAL CENTER | Age: 86
End: 2021-02-18

## 2021-02-18 DIAGNOSIS — N32.81 OAB (OVERACTIVE BLADDER): ICD-10-CM

## 2021-02-18 RX ORDER — MIRABEGRON 50 MG/1
1 TABLET, FILM COATED, EXTENDED RELEASE ORAL DAILY
Qty: 10 TABLET | Refills: 4 | Status: CANCELLED | OUTPATIENT
Start: 2021-02-18

## 2021-02-18 NOTE — TELEPHONE ENCOUNTER
I had no idea what this message was about so I called and spoke directly with the patient  Mrs Sebastian Titus stated that she only has 3-4 tablets remaining of Myrbetriq 50mg  The patient does NOT have an active pharmacy insurance plan  Last year she had us apply to [facility]' Patient Assistance Program so she could get the drug for free  I will need to re-file paperwork for calendary year 2021  Patient is consulting her  whether or not he wants to self-pay for 10 tablets to hold her over until [facility] responds to her re-application

## 2021-02-18 NOTE — TELEPHONE ENCOUNTER
called to advise form was been sent from Rx for patient to get medication  Patient only has 3-4 pills left

## 2021-02-22 NOTE — TELEPHONE ENCOUNTER
Application received and completed  Shakeel Rolon will be able to sign off on it this Thursday 2/25/21  Form will them be faxed to [phone]

## 2021-02-23 ENCOUNTER — LAB (OUTPATIENT)
Dept: LAB | Facility: CLINIC | Age: 86
End: 2021-02-23
Payer: MEDICARE

## 2021-02-23 DIAGNOSIS — E46 PROTEIN-CALORIE MALNUTRITION, UNSPECIFIED SEVERITY (HCC): ICD-10-CM

## 2021-02-23 DIAGNOSIS — D50.8 IRON DEFICIENCY ANEMIA SECONDARY TO INADEQUATE DIETARY IRON INTAKE: ICD-10-CM

## 2021-02-23 LAB
BASOPHILS # BLD AUTO: 0.04 THOUSANDS/ΜL (ref 0–0.1)
BASOPHILS NFR BLD AUTO: 1 % (ref 0–1)
EOSINOPHIL # BLD AUTO: 0.41 THOUSAND/ΜL (ref 0–0.61)
EOSINOPHIL NFR BLD AUTO: 7 % (ref 0–6)
ERYTHROCYTE [DISTWIDTH] IN BLOOD BY AUTOMATED COUNT: 13.5 % (ref 11.6–15.1)
FERRITIN SERPL-MCNC: 143 NG/ML (ref 8–388)
HCT VFR BLD AUTO: 44.6 % (ref 34.8–46.1)
HGB BLD-MCNC: 14.2 G/DL (ref 11.5–15.4)
IMM GRANULOCYTES # BLD AUTO: 0.03 THOUSAND/UL (ref 0–0.2)
IMM GRANULOCYTES NFR BLD AUTO: 1 % (ref 0–2)
IRON SATN MFR SERPL: 32 %
IRON SERPL-MCNC: 89 UG/DL (ref 50–170)
LYMPHOCYTES # BLD AUTO: 1.24 THOUSANDS/ΜL (ref 0.6–4.47)
LYMPHOCYTES NFR BLD AUTO: 20 % (ref 14–44)
MCH RBC QN AUTO: 30.7 PG (ref 26.8–34.3)
MCHC RBC AUTO-ENTMCNC: 31.8 G/DL (ref 31.4–37.4)
MCV RBC AUTO: 96 FL (ref 82–98)
MONOCYTES # BLD AUTO: 0.61 THOUSAND/ΜL (ref 0.17–1.22)
MONOCYTES NFR BLD AUTO: 10 % (ref 4–12)
NEUTROPHILS # BLD AUTO: 4.02 THOUSANDS/ΜL (ref 1.85–7.62)
NEUTS SEG NFR BLD AUTO: 61 % (ref 43–75)
NRBC BLD AUTO-RTO: 0 /100 WBCS
PLATELET # BLD AUTO: 188 THOUSANDS/UL (ref 149–390)
PMV BLD AUTO: 11 FL (ref 8.9–12.7)
RBC # BLD AUTO: 4.63 MILLION/UL (ref 3.81–5.12)
TIBC SERPL-MCNC: 276 UG/DL (ref 250–450)
VIT B12 SERPL-MCNC: 269 PG/ML (ref 100–900)
WBC # BLD AUTO: 6.35 THOUSAND/UL (ref 4.31–10.16)

## 2021-02-23 PROCEDURE — 82728 ASSAY OF FERRITIN: CPT

## 2021-02-23 PROCEDURE — 36415 COLL VENOUS BLD VENIPUNCTURE: CPT

## 2021-02-23 PROCEDURE — 85025 COMPLETE CBC W/AUTO DIFF WBC: CPT

## 2021-02-23 PROCEDURE — 82607 VITAMIN B-12: CPT

## 2021-02-23 PROCEDURE — 83540 ASSAY OF IRON: CPT

## 2021-02-23 PROCEDURE — 83550 IRON BINDING TEST: CPT

## 2021-02-25 ENCOUNTER — OFFICE VISIT (OUTPATIENT)
Dept: UROLOGY | Facility: AMBULATORY SURGERY CENTER | Age: 86
End: 2021-02-25
Payer: MEDICARE

## 2021-02-25 VITALS
BODY MASS INDEX: 28.83 KG/M2 | WEIGHT: 143 LBS | SYSTOLIC BLOOD PRESSURE: 110 MMHG | DIASTOLIC BLOOD PRESSURE: 70 MMHG | HEIGHT: 59 IN | HEART RATE: 80 BPM

## 2021-02-25 DIAGNOSIS — N39.44 NOCTURNAL ENURESIS: ICD-10-CM

## 2021-02-25 DIAGNOSIS — R35.0 URINARY FREQUENCY: ICD-10-CM

## 2021-02-25 DIAGNOSIS — N32.81 OAB (OVERACTIVE BLADDER): Primary | ICD-10-CM

## 2021-02-25 PROCEDURE — 99213 OFFICE O/P EST LOW 20 MIN: CPT | Performed by: NURSE PRACTITIONER

## 2021-02-25 NOTE — PROGRESS NOTES
02/25/21    Sameeryair CANALES Hailee   1934   [id number]     Assessment  1 Overactive bladder  2 Urge incontinence  3 Nocturnal enuresis    Discussion/Plan  1 Overactive bladder   -Discussed urinary symptoms: urinary urgency, frequency, and urge incontinence well controlled by Myrbetriq   -Awaiting refill of Myrbetriq pending application to [address] [place] [address] Patient Assistance Program   2 Urge incontinence   -Encouraged patient to hydrate with water, limit coffee and bladder irritants, and decrease fluid intake prior to bedtime  3 Nocturnal enuresis   -Patient reports decreased incontinence at night  Wears an incontinence brief for protection    -Discussed limiting fluid intake prior to bedtime      Patient continues to report that her symptoms are well controlled with Myrbetriq, however she has lost coverage with her prescription plan  Application received and completed for financial coverage of Myrbetriq which will be signed and faxed today to [address] [place] [address]    If medication is not covered, I instructed them to call office to prescribe an alternative  However, we are limited with options due to potential side effects with anticholinergics given her advanced age  She verbalized understanding  She will call with any issues and otherwise follow up in 1 year  All questions answered, patients agree with the plan  Subjective  HPI   Juan Jose Calhoun is an 80 y o  female managed by Dr Lizbeth Aleman with a history of OAB, urinary urge incontinence, and nocturnal enuresis presents today for follow up  Patient continues to take Myrbetriq 50mg daily  Unfortunately, patient states she was unable to renew her prescription plan, and Myrbetriq is now cost prohibitive  She states Myrbetriq as effective and has helped to improve her symptoms of urinary urgency, frequency, and urge incontinence  She denies any gross hematuria or dysuria  No overall changes to her health since last office visit         Review of Systems - History obtained from the patient  General ROS: negative  Psychological ROS: negative  Respiratory ROS: no cough, shortness of breath, or wheezing  Cardiovascular ROS: no chest pain or dyspnea on exertion  Gastrointestinal ROS: no abdominal pain, change in bowel habits, or black or bloody stools  Genito-Urinary ROS: positive for - urinary frequency/urgency  Musculoskeletal ROS: negative  Neurological ROS: negative       Objective  Physical Exam  Constitutional:       General: She is not in acute distress  Appearance: Normal appearance  She is well-developed  HENT:      Head: Normocephalic and atraumatic  Right Ear: Decreased hearing noted  Left Ear: Decreased hearing noted  Pulmonary:      Effort: Pulmonary effort is normal  No respiratory distress  Musculoskeletal: Normal range of motion  Skin:     General: Skin is warm and dry  Neurological:      General: No focal deficit present  Mental Status: She is alert and oriented to person, place, and time     Psychiatric:         Mood and Affect: Mood normal          Behavior: Behavior normal              [place]

## 2021-02-26 RX ORDER — MIRABEGRON 50 MG/1
1 TABLET, FILM COATED, EXTENDED RELEASE ORAL DAILY
Qty: 7 TABLET | Refills: 0 | Status: SHIPPED | OUTPATIENT
Start: 2021-02-26 | End: 2021-05-25

## 2021-02-26 NOTE — TELEPHONE ENCOUNTER
Received faxed confirmation that Myrbetriq 50mg and [facility] Patient Assistance Program was APPROVED 2/25/21 until 12/31/21  Patient notified of same  Mrs Óscar Quesada stated she only has TWO pills remaining and it could take 5 to 7 days for the Patient Assistance Program dispensing pharmacy to receive her product  Patient is asking for a short supply of drug to her local [facility]  Good pharmacy discount coupon was attached to the prescription  Cost for #7 tablets to [facility] will cost her $[phone] 19  Patient verbalized understanding and wishes to proceed

## 2021-03-01 ENCOUNTER — OFFICE VISIT (OUTPATIENT)
Dept: HEMATOLOGY ONCOLOGY | Facility: CLINIC | Age: 86
End: 2021-03-01
Payer: MEDICARE

## 2021-03-01 VITALS
RESPIRATION RATE: 17 BRPM | DIASTOLIC BLOOD PRESSURE: 52 MMHG | OXYGEN SATURATION: 100 % | BODY MASS INDEX: 30.74 KG/M2 | HEART RATE: 58 BPM | WEIGHT: 152.5 LBS | TEMPERATURE: 98 F | HEIGHT: 59 IN | SYSTOLIC BLOOD PRESSURE: 100 MMHG

## 2021-03-01 DIAGNOSIS — D50.8 IRON DEFICIENCY ANEMIA SECONDARY TO INADEQUATE DIETARY IRON INTAKE: Primary | ICD-10-CM

## 2021-03-01 PROCEDURE — 99213 OFFICE O/P EST LOW 20 MIN: CPT | Performed by: PHYSICIAN ASSISTANT

## 2021-03-01 NOTE — PROGRESS NOTES
Hematology/Oncology Outpatient Follow- up Note  No Devlin 80 y o  female MRN: @ Encounter: [id number]        Date:  3/1/2021      Assessment / Plan:    1   Recurrent anemia   Hemoglobin 6 7 11/19/2019 with low iron of 24   Ferritin 10 5/2019   She was hospitalized in 2010 and 2016 for same   No bleeding source was identified  Hiatal hernia noted on EGD 2016  Normal WBC and platelets     Chronic mild monocytosis 13-15% since at least 2016     Cr 1 1- 1 4  CMP normal        Feraheme 510mg x 2 administered 1/2020   Hemoglobin improved to 11 g/dL   Ferritin 341 1/28/20 compared to 10 5/2019 and 10 9/2018   Iron saturation 30% compared to 17% 9/2018          Ferritin 97 7/2020;  Iron saturation 36%  Feraheme x 1 dose given due to increased fatigue  at this time, iron remains replete  Hemoglobin  remains normal      will recheck CBC and iron panel in approximately 8 months  If she were to experience any increased fatigue or signs or symptoms of anemia, she is advised to call our office                 HPI:  Tanya Stephen is a 80 y  o  seen for initial consultation 12/23/2019 accompanied by her daughter, Vanessa, regarding recurrent anemia      11/19/2019 hemoglobin 6 7, MCV of 81, white blood cell count 7 43 with 58% neutrophils, 21% lymphocytes, 15% monocytes   Platelet count [id number]   She presented to the ED due to dizziness and lightheadedness   Iron 24   Guaiac negative   She was transfused with 1 U PRBCs and advised to try oral iron again        She reported that she had been having worsening fatigue for many months   She was experiencing dyspnea on exertion   She has a history of iron deficiency and was told to take iron in the past   This was poorly tolerated due to GI upset      5/14/2019 hemoglobin 9 8, MCV of 81, white blood cell count normal with normal differential   Platelets [id number]   TYJFEYBF of 10      September 2018 hemoglobin 9 6   Ferritin of 10  Iron saturation 17%      February 2018 hemoglobin 10 3     December 2017 hemoglobin 11 1 July 2017 hemoglobin 11 8 December 2016 hemoglobin 8 5-9 8   Ferritin of 6      She has a history of GI bleeding around 2010   She also has a PMH of history of congestive heart failure, diabetes mellitus, and hypertension      12/21/2016 she had EGD and colonoscopy during her hospitalization for symptomatic anemia   1cm Hiatal hernia noted   Diverticulosis and colonic polyps noted   Colonoscopy at 1 year's time recommended        Capsule endoscopy 1/17/2017:  No AVMs, ulcers or mass lesions in the small bowel      She denies any melena or hematochezia   She is less SOB after transfusion PRBC   Oral iron poorly tolerated due to nausea           Due to age and history of CHF, Feraheme chosen over Venofer   She was advised to d/c ASA 81mg and avoid NSAIDs   Additional GI work-up deferred due to age, prior negative results, patient request      Serum kappa on serum lambda free light chains are elevated along with ratio indicative of medical renal disease   No monoclonal band on SPEP    Vitamin B12 normal 395   IgA Elevated to 526 1/28/20  And IGA gliadin weak positive at 30        Felt much better after [facility]    Interval History:       February 23rd 2021 hemoglobin 14 2, MCV of 96, white blood cell count 6 35, platelets 862,  Iron saturation 32%,  Ferritin 143     Vitamin B12 269      Test Results:        Labs:   Lab Results   Component Value Date    HGB 14 2 02/23/2021    HCT 44 6 02/23/2021    MCV 96 02/23/2021     02/23/2021    WBC 6 35 02/23/2021    NRBC 0 02/23/2021     Lab Results   Component Value Date     10/09/2015    K 4 8 12/29/2020     12/29/2020    CO2 31 12/29/2020    ANIONGAP 8 10/09/2015    BUN 19 12/29/2020    CREATININE 1 38 (H) 12/29/2020    GLUCOSE 205 (H) 10/09/2015    GLUF 104 (H) 12/29/2020    CALCIUM 9 6 12/29/2020    AST 22 12/01/2020    ALT 30 12/01/2020    ALKPHOS 69 12/01/2020    EGFR 35 12/29/2020 Imaging: No results found  ROS:  As mentioned in HPI & Interval History otherwise 14 point ROS negative  Allergies: Allergies   Allergen Reactions    Morphine Other (See Comments)     urinary retention    Ciprofloxacin Rash and Nausea Only     Current Medications: Reviewed  PMH/FH/SH:  Reviewed      Physical Exam:    1 64 meters squared    Ht Readings from Last 3 Encounters:   03/01/21 4' 11" (1 499 m)   02/25/21 4' 11" (1 499 m)   09/01/20 4' 11" (1 499 m)        Wt Readings from Last 3 Encounters:   03/01/21 69 2 kg (152 lb 8 oz)   02/25/21 64 9 kg (143 lb)   09/01/20 65 kg (143 lb 4 8 oz)        Temp Readings from Last 3 Encounters:   03/01/21 98 °F (36 7 °C)   09/11/20 (!) 97 1 °F (36 2 °C) (Temporal)   09/01/20 97 9 °F (36 6 °C) (Tympanic)        BP Readings from Last 3 Encounters:   03/01/21 100/52   02/25/21 110/70   09/11/20 141/66           Physical Exam  Constitutional:       Appearance: She is well-developed  HENT:      Head: Normocephalic and atraumatic  Cardiovascular:      Rate and Rhythm: Normal rate and regular rhythm  Pulmonary:      Effort: Pulmonary effort is normal  No respiratory distress  Breath sounds: Normal breath sounds  Skin:     General: Skin is warm and dry  Neurological:      Mental Status: She is alert     Psychiatric:         Behavior: Behavior normal          Emergency Contacts:    Gayathri Peralta, [phone],

## 2021-03-26 ENCOUNTER — HOSPITAL ENCOUNTER (OUTPATIENT)
Dept: RADIOLOGY | Facility: HOSPITAL | Age: 86
Discharge: HOME | End: 2021-03-26
Attending: FAMILY MEDICINE
Payer: MEDICARE

## 2021-03-26 ENCOUNTER — TRANSCRIBE ORDERS (OUTPATIENT)
Dept: ADMINISTRATIVE | Facility: HOSPITAL | Age: 86
End: 2021-03-26

## 2021-03-26 DIAGNOSIS — M79.672 LEFT FOOT PAIN: ICD-10-CM

## 2021-03-26 DIAGNOSIS — M79.672 LEFT FOOT PAIN: Primary | ICD-10-CM

## 2021-03-26 PROCEDURE — 73610 X-RAY EXAM OF ANKLE: CPT

## 2021-03-26 PROCEDURE — 73630 X-RAY EXAM OF FOOT: CPT

## 2021-05-24 DIAGNOSIS — N32.81 OAB (OVERACTIVE BLADDER): ICD-10-CM

## 2021-05-25 RX ORDER — MIRABEGRON 50 MG/1
TABLET, FILM COATED, EXTENDED RELEASE ORAL
Qty: 90 TABLET | Refills: 2 | Status: SHIPPED | OUTPATIENT
Start: 2021-05-25 | End: 2022-03-14

## 2021-06-08 ENCOUNTER — APPOINTMENT (OUTPATIENT)
Dept: LAB | Facility: CLINIC | Age: 86
End: 2021-06-08
Payer: MEDICARE

## 2021-06-08 ENCOUNTER — TRANSCRIBE ORDERS (OUTPATIENT)
Dept: LAB | Facility: CLINIC | Age: 86
End: 2021-06-08

## 2021-06-08 DIAGNOSIS — M06.09 RHEUMATOID ARTHRITIS OF MULTIPLE SITES WITHOUT RHEUMATOID FACTOR (HCC): ICD-10-CM

## 2021-06-08 DIAGNOSIS — E55.9 AVITAMINOSIS D: ICD-10-CM

## 2021-06-08 DIAGNOSIS — M81.0 SENILE OSTEOPOROSIS: ICD-10-CM

## 2021-06-08 DIAGNOSIS — Z79.899 ENCOUNTER FOR LONG-TERM (CURRENT) USE OF OTHER MEDICATIONS: ICD-10-CM

## 2021-06-08 DIAGNOSIS — M81.0 SENILE OSTEOPOROSIS: Primary | ICD-10-CM

## 2021-06-08 DIAGNOSIS — E13.42 DIABETIC POLYNEUROPATHY ASSOCIATED WITH OTHER SPECIFIED DIABETES MELLITUS (HCC): ICD-10-CM

## 2021-06-08 DIAGNOSIS — M48.061 SPINAL STENOSIS, LUMBAR REGION, WITHOUT NEUROGENIC CLAUDICATION: ICD-10-CM

## 2021-06-08 LAB
25(OH)D3 SERPL-MCNC: 34.6 NG/ML (ref 30–100)
ALBUMIN SERPL BCP-MCNC: 3.6 G/DL (ref 3.5–5)
ALP SERPL-CCNC: 70 U/L (ref 46–116)
ALT SERPL W P-5'-P-CCNC: 25 U/L (ref 12–78)
ANION GAP SERPL CALCULATED.3IONS-SCNC: 7 MMOL/L (ref 4–13)
AST SERPL W P-5'-P-CCNC: 21 U/L (ref 5–45)
BASOPHILS # BLD AUTO: 0.05 THOUSANDS/ΜL (ref 0–0.1)
BASOPHILS NFR BLD AUTO: 1 % (ref 0–1)
BILIRUB SERPL-MCNC: 0.45 MG/DL (ref 0.2–1)
BUN SERPL-MCNC: 24 MG/DL (ref 5–25)
CALCIUM SERPL-MCNC: 9.6 MG/DL (ref 8.3–10.1)
CHLORIDE SERPL-SCNC: 105 MMOL/L (ref 100–108)
CO2 SERPL-SCNC: 30 MMOL/L (ref 21–32)
CREAT SERPL-MCNC: 1.27 MG/DL (ref 0.6–1.3)
EOSINOPHIL # BLD AUTO: 0.29 THOUSAND/ΜL (ref 0–0.61)
EOSINOPHIL NFR BLD AUTO: 5 % (ref 0–6)
ERYTHROCYTE [DISTWIDTH] IN BLOOD BY AUTOMATED COUNT: 13.8 % (ref 11.6–15.1)
ERYTHROCYTE [SEDIMENTATION RATE] IN BLOOD: 7 MM/HOUR (ref 0–29)
GFR SERPL CREATININE-BSD FRML MDRD: 38 ML/MIN/1.73SQ M
GLUCOSE P FAST SERPL-MCNC: 127 MG/DL (ref 65–99)
HCT VFR BLD AUTO: 42.9 % (ref 34.8–46.1)
HGB BLD-MCNC: 13.5 G/DL (ref 11.5–15.4)
IMM GRANULOCYTES # BLD AUTO: 0.02 THOUSAND/UL (ref 0–0.2)
IMM GRANULOCYTES NFR BLD AUTO: 0 % (ref 0–2)
LYMPHOCYTES # BLD AUTO: 1.16 THOUSANDS/ΜL (ref 0.6–4.47)
LYMPHOCYTES NFR BLD AUTO: 21 % (ref 14–44)
MCH RBC QN AUTO: 30 PG (ref 26.8–34.3)
MCHC RBC AUTO-ENTMCNC: 31.5 G/DL (ref 31.4–37.4)
MCV RBC AUTO: 95 FL (ref 82–98)
MONOCYTES # BLD AUTO: 0.63 THOUSAND/ΜL (ref 0.17–1.22)
MONOCYTES NFR BLD AUTO: 11 % (ref 4–12)
NEUTROPHILS # BLD AUTO: 3.38 THOUSANDS/ΜL (ref 1.85–7.62)
NEUTS SEG NFR BLD AUTO: 62 % (ref 43–75)
NRBC BLD AUTO-RTO: 0 /100 WBCS
PLATELET # BLD AUTO: 171 THOUSANDS/UL (ref 149–390)
PMV BLD AUTO: 11.4 FL (ref 8.9–12.7)
POTASSIUM SERPL-SCNC: 4.5 MMOL/L (ref 3.5–5.3)
PROT SERPL-MCNC: 7 G/DL (ref 6.4–8.2)
RBC # BLD AUTO: 4.5 MILLION/UL (ref 3.81–5.12)
SODIUM SERPL-SCNC: 142 MMOL/L (ref 136–145)
WBC # BLD AUTO: 5.53 THOUSAND/UL (ref 4.31–10.16)

## 2021-06-08 PROCEDURE — 85652 RBC SED RATE AUTOMATED: CPT

## 2021-06-08 PROCEDURE — 36415 COLL VENOUS BLD VENIPUNCTURE: CPT

## 2021-06-08 PROCEDURE — 80053 COMPREHEN METABOLIC PANEL: CPT

## 2021-06-08 PROCEDURE — 85025 COMPLETE CBC W/AUTO DIFF WBC: CPT

## 2021-06-08 PROCEDURE — 82306 VITAMIN D 25 HYDROXY: CPT

## 2021-08-10 ENCOUNTER — APPOINTMENT (EMERGENCY)
Dept: CT IMAGING | Facility: HOSPITAL | Age: 86
End: 2021-08-10
Payer: MEDICARE

## 2021-08-10 ENCOUNTER — HOSPITAL ENCOUNTER (EMERGENCY)
Facility: HOSPITAL | Age: 86
Discharge: HOME | End: 2021-08-10
Attending: EMERGENCY MEDICINE | Admitting: EMERGENCY MEDICINE
Payer: MEDICARE

## 2021-08-10 ENCOUNTER — APPOINTMENT (EMERGENCY)
Dept: RADIOLOGY | Facility: HOSPITAL | Age: 86
End: 2021-08-10
Payer: MEDICARE

## 2021-08-10 VITALS
RESPIRATION RATE: 18 BRPM | HEART RATE: 80 BPM | OXYGEN SATURATION: 95 % | SYSTOLIC BLOOD PRESSURE: 134 MMHG | TEMPERATURE: 98.2 F | DIASTOLIC BLOOD PRESSURE: 61 MMHG

## 2021-08-10 DIAGNOSIS — M25.552 LEFT HIP PAIN: ICD-10-CM
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
## 2021-08-10 DIAGNOSIS — M25.512 LEFT SHOULDER PAIN: ICD-10-CM

## 2021-08-10 PROCEDURE — 70450 CT HEAD/BRAIN W/O DYE: CPT

## 2021-08-10 PROCEDURE — 73000 X-RAY EXAM OF COLLAR BONE: CPT

## 2021-08-10 PROCEDURE — 72125 CT NECK SPINE W/O DYE: CPT

## 2021-08-10 PROCEDURE — 73030 X-RAY EXAM OF SHOULDER: CPT

## 2021-08-10 PROCEDURE — 99282 EMERGENCY DEPT VISIT SF MDM: CPT | Performed by: PHYSICIAN ASSISTANT

## 2021-08-10 PROCEDURE — 99284 EMERGENCY DEPT VISIT MOD MDM: CPT

## 2021-08-10 PROCEDURE — 73502 X-RAY EXAM HIP UNI 2-3 VIEWS: CPT

## 2021-08-10 RX ORDER — ACETAMINOPHEN 325 MG/1
650 TABLET ORAL ONCE
Status: COMPLETED | OUTPATIENT
Start: 2021-08-10 | End: 2021-08-10

## 2021-08-10 RX ADMIN — ACETAMINOPHEN 650 MG: 325 TABLET, FILM COATED ORAL at 12:44

## 2021-08-10 NOTE — ED PROVIDER NOTES
History  Chief Complaint   Patient presents with    Fall     pt states she fell yesterday onto her left side, pt does not know how she fell, fall unwitnessed  per family, pt is acting like her normal self  Pt c/o of left shoulder and arm pain  pt does not take blood thinners     The patient is an 63-year-old female with history of hypertension and diabetes who presents to the emergency department for evaluation after a fall  The patient states that this fall occurred yesterday  She was in her basement doing laundry  She states that she tripped over her shoe and fell  She states that she landed on her left side with the majority of the impact being on her left shoulder  She states that she does think that she hit her head, but she does remember the whole incident, and she did not lose consciousness  She is not on any blood thinners  She states that she is having left shoulder pain and left hip pain  She does report she has still been able to ambulate  She has a history of hip replacement on the left side  She states that she took some Tylenol for the pain yesterday, but she did not take anything for the pain today  She states that her shoulder is what is bothering her the most   She denies headache, dizziness, chest pain, shortness of breath, abdominal pain or further injury at this time  History provided by:  Patient   used: No    Fall  Associated symptoms: no abdominal pain, no back pain, no chest pain, no headaches, no nausea, no neck pain and no vomiting        Prior to Admission Medications   Prescriptions Last Dose Informant Patient Reported? Taking?    ALPRAZolam (XANAX) 0 25 mg tablet  Spouse/Significant Other Yes No   Sig: alprazolam 0 25 mg tablet   ASPIRIN 81 PO  Spouse/Significant Other Yes No   Sig: Take 81 mg by mouth   Cholecalciferol (VITAMIN D3) 1000 units CAPS  Spouse/Significant Other Yes No   Sig: Take by mouth   DULoxetine (CYMBALTA) 60 mg delayed release capsule  Spouse/Significant Other Yes No   Sig: duloxetine 60 mg capsule,delayed release   Myrbetriq 50 MG TB24   No No   Sig: Take 1 tablet by mouth every day as directed by physician    calcium carbonate-vitamin D (OSCAL-D) 500 mg-200 units per tablet  Spouse/Significant Other Yes No   Sig: Take 1 tablet by mouth daily with breakfast   diclofenac sodium (VOLTAREN) 1 %  Spouse/Significant Other Yes No   Sig: Voltaren 1 % topical gel   dicyclomine (BENTYL) 10 mg capsule  Spouse/Significant Other Yes No   Sig: Take 10 mg by mouth daily   dicyclomine (BENTYL) 10 mg capsule  Spouse/Significant Other Yes No   Sig: dicyclomine 10 mg capsule   diphenoxylate-atropine (LOMOTIL) 2 5-0 025 mg per tablet  Spouse/Significant Other Yes No   Sig: diphenoxylate-atropine 2 5 mg-0 025 mg tablet   docusate sodium (COLACE) 100 mg capsule   No No   Sig: Take 1 capsule by mouth 2 (two) times a day for 30 days   Patient not taking: Reported on 2/14/2020   ferrous sulfate 325 (65 Fe) mg tablet  Spouse/Significant Other No No   Sig: Take 1 tablet (325 mg total) by mouth daily   Patient not taking: Reported on 2/14/2020   gabapentin (NEURONTIN) 300 mg capsule  Spouse/Significant Other Yes No   Sig: Take 300 mg by mouth 3 (three) times a day   hydroxychloroquine (PLAQUENIL) 200 mg tablet   No No   Sig: Take 1 tablet (200 mg total) by mouth 2 (two) times a day with meals   lisinopril (ZESTRIL) 2 5 mg tablet  Spouse/Significant Other Yes No   Sig: Take 2 5 mg by mouth   metFORMIN (GLUCOPHAGE) 1000 MG tablet  Spouse/Significant Other Yes No   Sig: Take by mouth   omeprazole (PRILOSEC) 20 mg delayed release capsule  Spouse/Significant Other Yes No   Sig: Take by mouth   pregabalin (LYRICA) 50 mg capsule  Spouse/Significant Other Yes No   Sig: Take 50 mg by mouth daily after dinner    pregabalin (LYRICA) 75 mg capsule  Spouse/Significant Other Yes No   Sig: Take 50 mg by mouth 3 (three) times a day   pyridoxine (B-6) 100 MG tablet Spouse/Significant Other Yes No   Sig: Take 100 mg by mouth daily   traMADol (ULTRAM) 50 mg tablet  Spouse/Significant Other Yes No   Sig: tramadol 50 mg tablet      Facility-Administered Medications: None       Past Medical History:   Diagnosis Date    Anemia     Arthritis     Back pain     [facility]     Diabetes ([facility] )     Diabetes mellitus ([facility] )     Diverticulosis     Hypertension        Past Surgical History:   Procedure Laterality Date    BACK SURGERY      EGD AND COLONOSCOPY N/A 2016    Procedure: EGD AND COLONOSCOPY;  Surgeon: Rosita Campos MD;  [facility]; Service:     JOINT REPLACEMENT      bilat knees and hips    OTHER SURGICAL HISTORY      pelvis rods    REPLACEMENT TOTAL KNEE BILATERAL      STOMACH SURGERY         Family History   Problem Relation Age of Onset    Cancer Brother     Diabetes Mother     Hypertension Father     Heart disease Father      I have reviewed and agree with the history as documented  E-Cigarette/Vaping     E-Cigarette/Vaping Substances     Social History     Tobacco Use    Smoking status: Former Smoker     Types: Cigarettes     Quit date:      Years since quittin 6    Smokeless tobacco: Never Used   Substance Use Topics    Alcohol use: No    Drug use: No       Review of Systems   Constitutional: Negative for chills and fever  HENT: Negative for ear pain and sore throat  Eyes: Negative for redness and visual disturbance  Respiratory: Negative for cough and shortness of breath  Cardiovascular: Negative for chest pain  Gastrointestinal: Negative for abdominal pain, diarrhea, nausea and vomiting  Genitourinary: Negative for dysuria and hematuria  Musculoskeletal: Positive for arthralgias  Negative for back pain, neck pain and neck stiffness  Skin: Negative for color change and rash  Neurological: Negative for dizziness, light-headedness and headaches     All other systems reviewed and are negative  Physical Exam  Physical Exam  Vitals and nursing note reviewed  Constitutional:       General: She is not in acute distress  Appearance: Normal appearance  She is well-developed  She is not ill-appearing or toxic-appearing  HENT:      Head: Normocephalic and atraumatic  Mouth/Throat:      Pharynx: Uvula midline  Eyes:      General: Lids are normal       Conjunctiva/sclera: Conjunctivae normal    Cardiovascular:      Rate and Rhythm: Normal rate and regular rhythm  Heart sounds: Normal heart sounds  Pulmonary:      Effort: Pulmonary effort is normal       Breath sounds: Normal breath sounds  Chest:      Chest wall: No tenderness or crepitus  Abdominal:      General: There is no distension  Palpations: Abdomen is soft  Tenderness: There is no abdominal tenderness  Musculoskeletal:      Left shoulder: Tenderness and bony tenderness present  Decreased range of motion  Decreased strength  Normal pulse  Left elbow: Normal       Left wrist: Normal       Left hand: Normal       Cervical back: Normal, normal range of motion and neck supple  Thoracic back: Normal       Lumbar back: Normal       Left hip: Tenderness and bony tenderness present  Normal range of motion  Skin:     General: Skin is warm and dry  Neurological:      Mental Status: She is alert and oriented to person, place, and time           Vital Signs  ED Triage Vitals   Temperature Pulse Respirations Blood Pressure SpO2   08/10/21 1307 08/10/21 1139 08/10/21 1139 08/10/21 1139 08/10/21 1139   98 2 °F (36 8 °C) 83 18 111/56 95 %      Temp Source Heart Rate Source Patient Position - Orthostatic VS BP Location FiO2 (%)   08/10/21 1307 08/10/21 1139 08/10/21 1139 08/10/21 1139 --   Oral Monitor Lying Right arm       Pain Score       08/10/21 1139       9           Vitals:    08/10/21 1139 08/10/21 1415   BP: 111/56 134/61   Pulse: 83 80   Patient Position - Orthostatic VS: Lying Lying         Visual Acuity      ED Medications  Medications   acetaminophen (TYLENOL) tablet 650 mg (650 mg Oral Given 8/10/21 1244)       Diagnostic Studies  Results Reviewed     None                 CT head without contrast   Final Result by Nithya Oconnor MD (08/10 7776)      No acute intracranial abnormality  Workstation performed: [id number]         CT cervical spine without contrast   Final Result by Nithya Oconnor MD (08/10 0516)      No cervical spine fracture or traumatic malalignment  Workstation performed: [id number]         XR shoulder 2+ views LEFT   Final Result by Reynaldo Krishnamurthy MD (08/10 1401)      No acute osseous abnormality  Workstation performed: [id number]         XR clavicle LEFT   Final Result by Reynaldo Krishnamurthy MD (08/10 1403)      No acute osseous abnormality  Workstation performed: [id number]         XR hip/pelv 2-3 vws left   Final Result by Reynaldo Krishnamurthy MD (08/10 1406)      No acute osseous abnormality  Workstation performed: [id number]                    Procedures  Procedures         ED Course                                           MDM  Number of Diagnoses or Management Options  Fall, initial encounter: new and requires workup  Left hip pain: new and requires workup  Left shoulder pain: new and requires workup  Diagnosis management comments: Patient presents for evaluation of left shoulder and left hip pain after falling in her basement yesterday  The patient does admit to hitting her head, but she is not on any blood thinners  The patient is well appearing, and she is in no acute distress  CT of head and, x-ray of left shoulder and clavicle and x-ray of left hip ordered  Tylenol ordered for pain  All imaging reviewed with no acute findings  Results were discussed with the patient  The patient did report some relief of pain with the Tylenol  She continues to complain of significant shoulder discomfort    The decision was ultimately made to place the patient in a sling for comfort  She was advised to not wear it for more than 2 days  I made the patient and her family aware that she might have a soft tissue injury that was not identified on x-ray, and if she is continuing to have that significant pain in the next 2-3 days, she should have further follow-up with her primary care doctor and/or Orthopedics  Additionally advised supportive care such as elevation and icing for 20 minutes on and 20 minutes off  They acknowledged understanding  Advised continuing Tylenol as needed for pain management  Amount and/or Complexity of Data Reviewed  Tests in the radiology section of CPT®: ordered and reviewed  Decide to obtain previous medical records or to obtain history from someone other than the patient: yes  Obtain history from someone other than the patient: yes  Review and summarize past medical records: yes    Risk of Complications, Morbidity, and/or Mortality  Presenting problems: low  Diagnostic procedures: low  Management options: low    Patient Progress  Patient progress: stable      Disposition  Final diagnoses:   Fall, initial encounter   Left shoulder pain   Left hip pain     Time reflects when diagnosis was documented in both MDM as applicable and the Disposition within this note     Time User Action Codes Description Comment    8/10/2021  2:31 PM Deann Marie Add [O93  DIYI] Fall, initial encounter     8/10/2021  2:31 PM Deann Marie Add [L38 [id number]] Left shoulder pain     8/10/2021  2:31 PM Deann Tam [[id number]] Left hip pain       ED Disposition     ED Disposition Condition Date/Time Comment    Discharge Stable Tue Aug 10, 2021  2:31 PM Keyshawn Kapadia discharge to home/self care              Follow-up Information     Follow up With Specialties Details Why Contact Info Additional Information    Dixon Fragoso DO Family Medicine Schedule an appointment as soon as possible for a visit [address] [facility] Schedule an appointment as soon as possible for a visit in 1 week  [address] [facility] [phone] [facility] [address], [address] [place], [place], [address] [facility] Emergency Medicine  If symptoms worsen [address] [facility] [address] [facility], [address], [place], [place], [address]          Discharge Medication List as of 8/10/2021  2:32 PM      CONTINUE these medications which have NOT CHANGED    Details   ALPRAZolam (XANAX) 0 25 mg tablet alprazolam 0 25 mg tablet, Historical Med      ASPIRIN 81 PO Take 81 mg by mouth, Starting Mon 3/19/2012, Historical Med      calcium carbonate-vitamin D (OSCAL-D) 500 mg-200 units per tablet Take 1 tablet by mouth daily with breakfast, Historical Med      Cholecalciferol (VITAMIN D3) 1000 units CAPS Take by mouth, Historical Med      diclofenac sodium (VOLTAREN) 1 % Voltaren 1 % topical gel, Historical Med      !! dicyclomine (BENTYL) 10 mg capsule Take 10 mg by mouth daily, Historical Med      !! dicyclomine (BENTYL) 10 mg capsule dicyclomine 10 mg capsule, Historical Med      diphenoxylate-atropine (LOMOTIL) 2 5-0 025 mg per tablet diphenoxylate-atropine 2 5 mg-0 025 mg tablet, Historical Med      docusate sodium (COLACE) 100 mg capsule Take 1 capsule by mouth 2 (two) times a day for 30 days, Starting 12/24/2016, Until Mon 1/23/17, Print      DULoxetine (CYMBALTA) 60 mg delayed release capsule duloxetine 60 mg capsule,delayed release, Historical Med      ferrous sulfate 325 (65 Fe) mg tablet Take 1 tablet (325 mg total) by mouth daily, Starting Tue 11/19/2019, Print      gabapentin (NEURONTIN) 300 mg capsule Take 300 mg by mouth 3 (three) times a day, Historical Med      hydroxychloroquine (PLAQUENIL) 200 mg tablet Take 1 tablet (200 mg total) by mouth 2 (two) times a day with meals, Starting Tue 8/3/2021, Until Sun 1/30/2022, Normal      lisinopril (ZESTRIL) 2 5 mg tablet Take 2 5 mg by mouth, Historical Med      metFORMIN (GLUCOPHAGE) 1000 MG tablet Take by mouth, Historical Med      Myrbetriq 50 MG TB24 Take 1 tablet by mouth every day as directed by physician , Normal      omeprazole (PRILOSEC) 20 mg delayed release capsule Take by mouth, Historical Med      !! pregabalin (LYRICA) 50 mg capsule Take 50 mg by mouth daily after dinner , Historical Med      !! pregabalin (LYRICA) 75 mg capsule Take 50 mg by mouth 3 (three) times a day, Historical Med      pyridoxine (B-6) 100 MG tablet Take 100 mg by mouth daily, Historical Med      traMADol (ULTRAM) 50 mg tablet tramadol 50 mg tablet, Historical Med       !! - Potential duplicate medications found  Please discuss with provider  No discharge procedures on file      PDMP Review     None          ED Provider  Electronically Signed by           Justyna Lyles PA-C  08/10/21 0915

## 2021-08-21 ENCOUNTER — APPOINTMENT (EMERGENCY)
Dept: CT IMAGING | Facility: HOSPITAL | Age: 86
End: 2021-08-21
Payer: MEDICARE

## 2021-08-21 ENCOUNTER — HOSPITAL ENCOUNTER (EMERGENCY)
Facility: HOSPITAL | Age: 86
Discharge: HOME | End: 2021-08-21
Attending: EMERGENCY MEDICINE
Payer: MEDICARE

## 2021-08-21 ENCOUNTER — APPOINTMENT (EMERGENCY)
Dept: RADIOLOGY | Facility: HOSPITAL | Age: 86
End: 2021-08-21
Payer: MEDICARE

## 2021-08-21 VITALS
HEART RATE: 76 BPM | RESPIRATION RATE: 18 BRPM | TEMPERATURE: 97.8 F | SYSTOLIC BLOOD PRESSURE: 111 MMHG | DIASTOLIC BLOOD PRESSURE: 55 MMHG | OXYGEN SATURATION: 95 %

## 2021-08-21 DIAGNOSIS — S80.01XA CONTUSION OF RIGHT KNEE, INITIAL ENCOUNTER: ICD-10-CM

## 2021-08-21 DIAGNOSIS — S40.012A CONTUSION OF LEFT SHOULDER, INITIAL ENCOUNTER: ICD-10-CM

## 2021-08-21 DIAGNOSIS — S09.90XA CLOSED HEAD INJURY, INITIAL ENCOUNTER: Primary | ICD-10-CM

## 2021-08-21 PROCEDURE — 71045 X-RAY EXAM CHEST 1 VIEW: CPT

## 2021-08-21 PROCEDURE — 99284 EMERGENCY DEPT VISIT MOD MDM: CPT

## 2021-08-21 PROCEDURE — 99284 EMERGENCY DEPT VISIT MOD MDM: CPT | Performed by: EMERGENCY MEDICINE

## 2021-08-21 PROCEDURE — 73560 X-RAY EXAM OF KNEE 1 OR 2: CPT

## 2021-08-21 PROCEDURE — 73060 X-RAY EXAM OF HUMERUS: CPT

## 2021-08-21 PROCEDURE — 70450 CT HEAD/BRAIN W/O DYE: CPT

## 2021-08-21 NOTE — ED PROVIDER NOTES
Emergency Department Trauma Note  Morro Nava 80 y o  female MRN: [id number]  Unit/Bed#: ED 18/ED 18 Encounter: [id number]      Trauma Alert: Trauma Acuity: C  Model of Arrival: Mode of Arrival: Direct from scene via    Trauma Team: Current Providers  Attending Provider: Royce Doll DO  ED Technician: Marcelino Lindo  ED Technician: Daryle Mcgill  Registered Nurse: Blair Posey RN  Consultants: None      History of Present Illness     Chief Complaint:   Chief Complaint   Patient presents with   Dorcus Héctor     HPI:  Morro Nava is a 80 y o  female who presents with mechanical fall  Mechanism:Details of Incident: trip and fall           26-year-old female, mechanical fall, tripped over her shoe, struck head, left shoulder, right knee      History provided by:  Patient and relative  Fall  Mechanism of injury: fall    Injury location:  Head/neck, leg and shoulder/arm  Head/neck injury location:  Head  Shoulder/arm injury location:  L shoulder  Leg injury location:  R knee  Incident location:  Home  Time since incident:  30 minutes  Arrived directly from scene: yes    Fall:     Fall occurred:  Tripped and walking  Suspicion of drug use: no    Tetanus status:  Up to date  Prior to arrival data:     Bystander interventions:  None    Blood loss:  None    Responsiveness at scene:  Alert    Orientation at scene:  Person, place, situation and time    Loss of consciousness: no      Amnesic to event: no      Airway interventions:  None  Current pain details:     Pain quality:  Aching    Pain Severity:  Moderate    Pain timing:  Constant  Associated symptoms: headaches    Associated symptoms: no abdominal pain, no chest pain, no loss of consciousness, no nausea, no neck pain, no seizures and no vomiting    Risk factors: no anticoagulation therapy (But patient does take aspirin)      Review of Systems   Constitutional: Negative for activity change, chills, diaphoresis and fever     HENT: Negative for congestion, sinus pressure and sore throat  Eyes: Negative for pain and visual disturbance  Respiratory: Negative for cough, chest tightness, shortness of breath, wheezing and stridor  Cardiovascular: Negative for chest pain and palpitations  Gastrointestinal: Negative for abdominal distention, abdominal pain, constipation, diarrhea, nausea and vomiting  Genitourinary: Negative for dysuria and frequency  Musculoskeletal: Negative for neck pain and neck stiffness  Skin: Negative for rash  Neurological: Positive for headaches  Negative for dizziness, seizures, loss of consciousness, speech difficulty, light-headedness and numbness  Historical Information     Immunizations:   Immunization History   Administered Date(s) Administered    SARS-CoV-2 / COVID-19 mRNA IM (Moderna) 2021, 2021       Past Medical History:   Diagnosis Date    Anemia     Arthritis     Back pain     [facility]     Diabetes ([facility] )     Diabetes mellitus ([facility] )     Diverticulosis     Hypertension        Family History   Problem Relation Age of Onset    Cancer Brother     Diabetes Mother     Hypertension Father     Heart disease Father      Past Surgical History:   Procedure Laterality Date    BACK SURGERY      EGD AND COLONOSCOPY N/A 2016    Procedure: EGD AND COLONOSCOPY;  Surgeon: Kevin Spangler MD;  [facility];   Service:     JOINT REPLACEMENT      bilat knees and hips    OTHER SURGICAL HISTORY      pelvis rods    REPLACEMENT TOTAL KNEE BILATERAL      STOMACH SURGERY       Social History     Tobacco Use    Smoking status: Former Smoker     Types: Cigarettes     Quit date:      Years since quittin 6    Smokeless tobacco: Never Used   Substance Use Topics    Alcohol use: No    Drug use: No     E-Cigarette/Vaping     E-Cigarette/Vaping Substances       Family History: non-contributory    Meds/Allergies   Prior to Admission Medications   Prescriptions Last Dose Informant Patient Reported? Taking?    ALPRAZolam (XANAX) 0 25 mg tablet  Spouse/Significant Other Yes No   Sig: alprazolam 0 25 mg tablet   ASPIRIN 81 PO  Spouse/Significant Other Yes No   Sig: Take 81 mg by mouth   Cholecalciferol (VITAMIN D3) 1000 units CAPS  Spouse/Significant Other Yes No   Sig: Take by mouth   DULoxetine (CYMBALTA) 60 mg delayed release capsule  Spouse/Significant Other Yes No   Sig: duloxetine 60 mg capsule,delayed release   Myrbetriq 50 MG TB24   No No   Sig: Take 1 tablet by mouth every day as directed by physician    calcium carbonate-vitamin D (OSCAL-D) 500 mg-200 units per tablet  Spouse/Significant Other Yes No   Sig: Take 1 tablet by mouth daily with breakfast   diclofenac sodium (VOLTAREN) 1 %  Spouse/Significant Other Yes No   Sig: Voltaren 1 % topical gel   dicyclomine (BENTYL) 10 mg capsule  Spouse/Significant Other Yes No   Sig: Take 10 mg by mouth daily   dicyclomine (BENTYL) 10 mg capsule  Spouse/Significant Other Yes No   Sig: dicyclomine 10 mg capsule   diphenoxylate-atropine (LOMOTIL) 2 5-0 025 mg per tablet  Spouse/Significant Other Yes No   Sig: diphenoxylate-atropine 2 5 mg-0 025 mg tablet   docusate sodium (COLACE) 100 mg capsule   No No   Sig: Take 1 capsule by mouth 2 (two) times a day for 30 days   Patient not taking: Reported on 2/14/2020   ferrous sulfate 325 (65 Fe) mg tablet  Spouse/Significant Other No No   Sig: Take 1 tablet (325 mg total) by mouth daily   Patient not taking: Reported on 2/14/2020   gabapentin (NEURONTIN) 300 mg capsule  Spouse/Significant Other Yes No   Sig: Take 300 mg by mouth 3 (three) times a day   hydroxychloroquine (PLAQUENIL) 200 mg tablet   No No   Sig: Take 1 tablet (200 mg total) by mouth 2 (two) times a day with meals   lisinopril (ZESTRIL) 2 5 mg tablet  Spouse/Significant Other Yes No   Sig: Take 2 5 mg by mouth   metFORMIN (GLUCOPHAGE) 1000 MG tablet  Spouse/Significant Other Yes No   Sig: Take by mouth   omeprazole (PRILOSEC) 20 mg delayed release capsule  Spouse/Significant Other Yes No   Sig: Take by mouth   pregabalin (LYRICA) 100 mg capsule   No No   Sig: Take 1 capsule (100 mg total) by mouth 2 (two) times a day   pyridoxine (B-6) 100 MG tablet  Spouse/Significant Other Yes No   Sig: Take 100 mg by mouth daily   traMADol (ULTRAM) 50 mg tablet  Spouse/Significant Other Yes No   Sig: tramadol 50 mg tablet      Facility-Administered Medications: None       Allergies   Allergen Reactions    Morphine Other (See Comments)     urinary retention    Ciprofloxacin Rash and Nausea Only       PHYSICAL EXAM    PE limited by:  Nothing    Objective   Vitals:   First set: Temperature: 97 8 °F (36 6 °C) (08/21/21 1350)  Pulse: 91 (08/21/21 1350)  Respirations: 18 (08/21/21 1350)  Blood Pressure: 129/62 (08/21/21 1350)  SpO2: 96 % (08/21/21 1350)    Primary Survey:   (A) Airway:  Intact  (B) Breathing:  Clear symmetric bilateral  (C) Circulation: Pulses:   radial  2/4  (D) Disabliity:  GCS Total:  15  (E) Expose:  Completed    Secondary Survey: (Click on Physical Exam tab above)  Physical Exam  Vitals reviewed  Constitutional:       General: She is not in acute distress  Appearance: She is well-developed  She is not diaphoretic  HENT:      Head: Normocephalic  Right Ear: External ear normal       Left Ear: External ear normal       Nose: Nose normal    Eyes:      General:         Right eye: No discharge  Left eye: No discharge  Pupils: Pupils are equal, round, and reactive to light  Neck:      Trachea: No tracheal deviation  Cardiovascular:      Rate and Rhythm: Normal rate and regular rhythm  Heart sounds: Normal heart sounds  No murmur heard  Pulmonary:      Effort: Pulmonary effort is normal  No respiratory distress  Breath sounds: Normal breath sounds  No stridor  Abdominal:      General: There is no distension  Palpations: Abdomen is soft  Tenderness: There is no abdominal tenderness  There is no guarding or rebound  Musculoskeletal:         General: Normal range of motion  Cervical back: Normal range of motion and neck supple  Comments: Tenderness to palpation over right patella and left shoulder   Skin:     General: Skin is warm and dry  Coloration: Skin is not pale  Findings: No erythema  Comments: Contusion over forehead, large contusion over right knee   Neurological:      General: No focal deficit present  Mental Status: She is alert and oriented to person, place, and time  Cervical spine cleared by clinical criteria? Yes     Invasive Devices     Peripheral Intravenous Line            Peripheral IV 08/21/21 Left Antecubital <1 day                Lab Results:   Results Reviewed     None                 Imaging Studies:   Direct to CT: Yes  XR humerus LEFT   ED Interpretation by Tyra Dyson DO (08/21 5156)   No acute fracture      XR chest 1 view portable   ED Interpretation by Tyra Dyson DO (08/21 5216)   No acute fracture      XR knee 1 or 2 vw right   ED Interpretation by Tyra Dyson DO (08/21 2156)   No acute fracture      CT head without contrast   Final Result by Dee Cervantes MD (08/21 4754)      No acute intracranial abnormality  Stable microangiopathic changes within the brain  Right frontal scalp swelling  No fracture  Workstation performed: [id number]               Procedures  Procedures         ED Course  ED Course as of Aug 21 1502   Sat Aug 21, 2021   1500 Patient feels improved, was able to ambulate around the room without any difficulty    Will discharge              MDM  Number of Diagnoses or Management Options  Closed head injury, initial encounter: new and requires workup  Contusion of left shoulder, initial encounter: new and requires workup  Contusion of right knee, initial encounter: new and requires workup  Diagnosis management comments:       Initial ED assessment:  80-year-old female presents after mechanical fall from standing, struck her head struck her knee struck her shoulder    Initial DDx includes but is not limited to:   Closed head injury versus intracranial hemorrhage, patellar fracture versus simple contusion, shoulder strain versus contusion    Initial ED plan:   X-rays, CT head, trauma level C        Final ED summary/disposition:   After evaluation and workup in the emergency department, able ambulate, no evidence of fractures patient discharged       Amount and/or Complexity of Data Reviewed  Tests in the radiology section of CPT®: ordered and reviewed  Decide to obtain previous medical records or to obtain history from someone other than the patient: yes  Obtain history from someone other than the patient: yes  Review and summarize past medical records: yes  Independent visualization of images, tracings, or specimens: yes            Disposition  [facility] Transfer: No  Final diagnoses:   Closed head injury, initial encounter   Contusion of left shoulder, initial encounter   Contusion of right knee, initial encounter     Time reflects when diagnosis was documented in both MDM as applicable and the Disposition within this note     Time User Action Codes Description Comment    8/21/2021  3:01 PM Romana Sanger Add [S09 90XA] Closed head injury, initial encounter     8/21/2021  3:01 PM Romana Sanger Add [S40 012A] Contusion of left shoulder, initial encounter     8/21/2021  3:01 PM Romana Sanger Add [S80 01XA] Contusion of right knee, initial encounter       ED Disposition     ED Disposition Condition Date/Time Comment    Discharge Stable Sat Aug 21, 2021  3:01 PM Rickie Titus discharge to home/self care              Follow-up Information     Follow up With Specialties Details Why Contact Info Additional [address] Emergency Department Emergency Medicine Go to  If symptoms worsen [address] [facility] [address] [facility] Emergency Department, [address], [place], [place], [address]        Patient's Medications   Discharge Prescriptions    No medications on file     No discharge procedures on file      PDMP Review     None          ED Provider  Electronically Signed by         Jesus Manuel Cardona DO  08/21/21 4358

## 2021-09-19 ENCOUNTER — APPOINTMENT (EMERGENCY)
Dept: CT IMAGING | Facility: HOSPITAL | Age: 86
End: 2021-09-19
Payer: MEDICARE

## 2021-09-19 ENCOUNTER — HOSPITAL ENCOUNTER (EMERGENCY)
Facility: HOSPITAL | Age: 86
Discharge: HOME | End: 2021-09-20
Attending: EMERGENCY MEDICINE
Payer: MEDICARE

## 2021-09-19 DIAGNOSIS — M54.9 BACK PAIN: Primary | ICD-10-CM

## 2021-09-19 LAB
ALBUMIN SERPL BCP-MCNC: 3.6 G/DL (ref 3.5–5)
ALP SERPL-CCNC: 74 U/L (ref 46–116)
ALT SERPL W P-5'-P-CCNC: 30 U/L (ref 12–78)
ANION GAP SERPL CALCULATED.3IONS-SCNC: 5 MMOL/L (ref 4–13)
APTT PPP: 30 SECONDS (ref 23–37)
AST SERPL W P-5'-P-CCNC: 22 U/L (ref 5–45)
BASOPHILS # BLD AUTO: 0.05 THOUSANDS/ΜL (ref 0–0.1)
BASOPHILS NFR BLD AUTO: 1 % (ref 0–1)
BILIRUB SERPL-MCNC: 0.29 MG/DL (ref 0.2–1)
BUN SERPL-MCNC: 23 MG/DL (ref 5–25)
CALCIUM SERPL-MCNC: 9.2 MG/DL (ref 8.3–10.1)
CHLORIDE SERPL-SCNC: 106 MMOL/L (ref 100–108)
CO2 SERPL-SCNC: 28 MMOL/L (ref 21–32)
CREAT SERPL-MCNC: 1.42 MG/DL (ref 0.6–1.3)
EOSINOPHIL # BLD AUTO: 0.23 THOUSAND/ΜL (ref 0–0.61)
EOSINOPHIL NFR BLD AUTO: 4 % (ref 0–6)
ERYTHROCYTE [DISTWIDTH] IN BLOOD BY AUTOMATED COUNT: 14.7 % (ref 11.6–15.1)
GFR SERPL CREATININE-BSD FRML MDRD: 33 ML/MIN/1.73SQ M
GLUCOSE SERPL-MCNC: 119 MG/DL (ref 65–140)
HCT VFR BLD AUTO: 39.4 % (ref 34.8–46.1)
HGB BLD-MCNC: 12.8 G/DL (ref 11.5–15.4)
IMM GRANULOCYTES # BLD AUTO: 0.03 THOUSAND/UL (ref 0–0.2)
IMM GRANULOCYTES NFR BLD AUTO: 1 % (ref 0–2)
INR PPP: 1.05 (ref 0.84–1.19)
LIPASE SERPL-CCNC: 51 U/L (ref 73–393)
LYMPHOCYTES # BLD AUTO: 0.96 THOUSANDS/ΜL (ref 0.6–4.47)
LYMPHOCYTES NFR BLD AUTO: 17 % (ref 14–44)
MCH RBC QN AUTO: 31.1 PG (ref 26.8–34.3)
MCHC RBC AUTO-ENTMCNC: 32.5 G/DL (ref 31.4–37.4)
MCV RBC AUTO: 96 FL (ref 82–98)
MONOCYTES # BLD AUTO: 0.94 THOUSAND/ΜL (ref 0.17–1.22)
MONOCYTES NFR BLD AUTO: 17 % (ref 4–12)
NEUTROPHILS # BLD AUTO: 3.4 THOUSANDS/ΜL (ref 1.85–7.62)
NEUTS SEG NFR BLD AUTO: 60 % (ref 43–75)
NRBC BLD AUTO-RTO: 0 /100 WBCS
PLATELET # BLD AUTO: 160 THOUSANDS/UL (ref 149–390)
PMV BLD AUTO: 10.9 FL (ref 8.9–12.7)
POTASSIUM SERPL-SCNC: 4.3 MMOL/L (ref 3.5–5.3)
PROT SERPL-MCNC: 7.1 G/DL (ref 6.4–8.2)
PROTHROMBIN TIME: 13.7 SECONDS (ref 11.6–14.5)
RBC # BLD AUTO: 4.11 MILLION/UL (ref 3.81–5.12)
SODIUM SERPL-SCNC: 139 MMOL/L (ref 136–145)
TROPONIN I SERPL-MCNC: 0.02 NG/ML
WBC # BLD AUTO: 5.61 THOUSAND/UL (ref 4.31–10.16)

## 2021-09-19 PROCEDURE — 96374 THER/PROPH/DIAG INJ IV PUSH: CPT

## 2021-09-19 PROCEDURE — 84484 ASSAY OF TROPONIN QUANT: CPT | Performed by: EMERGENCY MEDICINE

## 2021-09-19 PROCEDURE — 99284 EMERGENCY DEPT VISIT MOD MDM: CPT | Performed by: EMERGENCY MEDICINE

## 2021-09-19 PROCEDURE — 99284 EMERGENCY DEPT VISIT MOD MDM: CPT

## 2021-09-19 PROCEDURE — 83690 ASSAY OF LIPASE: CPT | Performed by: EMERGENCY MEDICINE

## 2021-09-19 PROCEDURE — 85610 PROTHROMBIN TIME: CPT | Performed by: EMERGENCY MEDICINE

## 2021-09-19 PROCEDURE — 96376 TX/PRO/DX INJ SAME DRUG ADON: CPT

## 2021-09-19 PROCEDURE — 93005 ELECTROCARDIOGRAM TRACING: CPT

## 2021-09-19 PROCEDURE — 85730 THROMBOPLASTIN TIME PARTIAL: CPT | Performed by: EMERGENCY MEDICINE

## 2021-09-19 PROCEDURE — 85025 COMPLETE CBC W/AUTO DIFF WBC: CPT | Performed by: EMERGENCY MEDICINE

## 2021-09-19 PROCEDURE — 74177 CT ABD & PELVIS W/CONTRAST: CPT

## 2021-09-19 PROCEDURE — 81003 URINALYSIS AUTO W/O SCOPE: CPT | Performed by: EMERGENCY MEDICINE

## 2021-09-19 PROCEDURE — 96375 TX/PRO/DX INJ NEW DRUG ADDON: CPT

## 2021-09-19 PROCEDURE — 80053 COMPREHEN METABOLIC PANEL: CPT | Performed by: EMERGENCY MEDICINE

## 2021-09-19 PROCEDURE — 36415 COLL VENOUS BLD VENIPUNCTURE: CPT | Performed by: EMERGENCY MEDICINE

## 2021-09-19 RX ORDER — FENTANYL CITRATE 50 UG/ML
12.5 INJECTION, SOLUTION INTRAMUSCULAR; INTRAVENOUS
Status: COMPLETED | OUTPATIENT
Start: 2021-09-19 | End: 2021-09-19

## 2021-09-19 RX ORDER — ONDANSETRON 2 MG/ML
4 INJECTION INTRAMUSCULAR; INTRAVENOUS ONCE
Status: COMPLETED | OUTPATIENT
Start: 2021-09-19 | End: 2021-09-19

## 2021-09-19 RX ADMIN — FENTANYL CITRATE 12.5 MCG: 50 INJECTION INTRAMUSCULAR; INTRAVENOUS at 22:36

## 2021-09-19 RX ADMIN — FENTANYL CITRATE 12.5 MCG: 50 INJECTION INTRAMUSCULAR; INTRAVENOUS at 23:59

## 2021-09-19 RX ADMIN — DICLOFENAC SODIUM TOPICAL GEL, 1%, 4 G: 10 GEL TOPICAL at 22:23

## 2021-09-19 RX ADMIN — ONDANSETRON 4 MG: 2 INJECTION INTRAMUSCULAR; INTRAVENOUS at 22:23

## 2021-09-20 VITALS
BODY MASS INDEX: 28.83 KG/M2 | WEIGHT: 143 LBS | SYSTOLIC BLOOD PRESSURE: 158 MMHG | OXYGEN SATURATION: 94 % | DIASTOLIC BLOOD PRESSURE: 72 MMHG | TEMPERATURE: 97.9 F | HEIGHT: 59 IN | HEART RATE: 92 BPM | RESPIRATION RATE: 16 BRPM

## 2021-09-20 LAB
BILIRUB UR QL STRIP: NEGATIVE
CLARITY UR: CLEAR
COLOR UR: YELLOW
GLUCOSE SERPL-MCNC: 142 MG/DL (ref 65–140)
GLUCOSE UR STRIP-MCNC: NEGATIVE MG/DL
HGB UR QL STRIP.AUTO: NEGATIVE
KETONES UR STRIP-MCNC: NEGATIVE MG/DL
LEUKOCYTE ESTERASE UR QL STRIP: NEGATIVE
NITRITE UR QL STRIP: NEGATIVE
PH UR STRIP.AUTO: 5.5 [PH]
PROT UR STRIP-MCNC: NEGATIVE MG/DL
SP GR UR STRIP.AUTO: 1.02 (ref 1–1.03)
UROBILINOGEN UR QL STRIP.AUTO: 0.2 E.U./DL

## 2021-09-20 PROCEDURE — 96376 TX/PRO/DX INJ SAME DRUG ADON: CPT

## 2021-09-20 PROCEDURE — 82948 REAGENT STRIP/BLOOD GLUCOSE: CPT

## 2021-09-20 RX ORDER — ACETAMINOPHEN 325 MG/1
650 TABLET ORAL EVERY 6 HOURS PRN
Qty: 30 TABLET | Refills: 0 | Status: SHIPPED | OUTPATIENT
Start: 2021-09-20 | End: 2021-09-25

## 2021-09-20 RX ORDER — FENTANYL CITRATE 50 UG/ML
25 INJECTION, SOLUTION INTRAMUSCULAR; INTRAVENOUS
Status: DISCONTINUED | OUTPATIENT
Start: 2021-09-20 | End: 2021-09-20 | Stop reason: HOSPADM

## 2021-09-20 RX ADMIN — IOHEXOL 80 ML: 350 INJECTION, SOLUTION INTRAVENOUS at 00:10

## 2021-09-20 RX ADMIN — FENTANYL CITRATE 25 MCG: 50 INJECTION INTRAMUSCULAR; INTRAVENOUS at 03:31

## 2021-09-20 NOTE — ED PROVIDER NOTES
History  Chief Complaint   Patient presents with    Back Pain     Began Thursday, now radiating to abdomen and worsening   Abdominal Pain     HPI    Patient is a 80 yof who presents with back pain that started Thursday  Yesterday the pain began to radiate to the front  No f/c/s  No chest pain or sob  No vomiting or diarrhea  No headache  No UTI symptoms  + right sided lumbar spine pain    + right sided lumbar spine tenderness  Pain is worse with moving the lower back  No midline tenderness  She notes this pain started after cleaning her house  MDM posible msk pain but given age and associated abdominal pain will workup for other more concerning intraabdominal/spinal pathology  Prior to Admission Medications   Prescriptions Last Dose Informant Patient Reported? Taking?    ALPRAZolam (XANAX) 0 25 mg tablet  Spouse/Significant Other Yes No   Sig: alprazolam 0 25 mg tablet   ASPIRIN 81 PO  Spouse/Significant Other Yes No   Sig: Take 81 mg by mouth   Cholecalciferol (VITAMIN D3) 1000 units CAPS  Spouse/Significant Other Yes No   Sig: Take by mouth   DULoxetine (CYMBALTA) 60 mg delayed release capsule   No No   Sig: Take 1 capsule (60 mg total) by mouth daily   Myrbetriq 50 MG TB24   No No   Sig: Take 1 tablet by mouth every day as directed by physician    calcium carbonate-vitamin D (OSCAL-D) 500 mg-200 units per tablet  Spouse/Significant Other Yes No   Sig: Take 1 tablet by mouth daily with breakfast   diclofenac sodium (VOLTAREN) 1 %  Spouse/Significant Other Yes No   Sig: Voltaren 1 % topical gel   dicyclomine (BENTYL) 10 mg capsule  Spouse/Significant Other Yes No   Sig: Take 10 mg by mouth daily   dicyclomine (BENTYL) 10 mg capsule  Spouse/Significant Other Yes No   Sig: dicyclomine 10 mg capsule   diphenoxylate-atropine (LOMOTIL) 2 5-0 025 mg per tablet  Spouse/Significant Other Yes No   Sig: diphenoxylate-atropine 2 5 mg-0 025 mg tablet   docusate sodium (COLACE) 100 mg capsule   No No   Sig: Take 1 capsule by mouth 2 (two) times a day for 30 days   Patient not taking: Reported on 2/14/2020   ferrous sulfate 325 (65 Fe) mg tablet  Spouse/Significant Other No No   Sig: Take 1 tablet (325 mg total) by mouth daily   Patient not taking: Reported on 2/14/2020   gabapentin (NEURONTIN) 300 mg capsule  Spouse/Significant Other Yes No   Sig: Take 300 mg by mouth 3 (three) times a day   hydroxychloroquine (PLAQUENIL) 200 mg tablet   No No   Sig: Take 1 tablet (200 mg total) by mouth 2 (two) times a day with meals   lisinopril (ZESTRIL) 2 5 mg tablet  Spouse/Significant Other Yes No   Sig: Take 2 5 mg by mouth   metFORMIN (GLUCOPHAGE) 1000 MG tablet  Spouse/Significant Other Yes No   Sig: Take by mouth   omeprazole (PRILOSEC) 20 mg delayed release capsule  Spouse/Significant Other Yes No   Sig: Take by mouth   pregabalin (LYRICA) 100 mg capsule   No No   Sig: Take 1 capsule (100 mg total) by mouth 2 (two) times a day   pyridoxine (B-6) 100 MG tablet  Spouse/Significant Other Yes No   Sig: Take 100 mg by mouth daily   traMADol (ULTRAM) 50 mg tablet  Spouse/Significant Other Yes No   Sig: tramadol 50 mg tablet      Facility-Administered Medications: None       Past Medical History:   Diagnosis Date    Anemia     Arthritis     Back pain     CH[facility]estive [facility] failure) ([facility]     Diabetes [facility]     Diabetes mellitus ([facility]     Diverticulosis     Hypertension        Past Surgical History:   Procedure Laterality Date    BACK SURGERY      EGD AND COLONOSCOPY N/A 12/23/2016    Procedure: EGD AND COLONOSCOPY;  Surgeon: Alma Malcolm MD;  [facility];   Service:     JOINT REPLACEMENT      bilat knees and hips    OTHER SURGICAL HISTORY      pelvis rods    REPLACEMENT TOTAL KNEE BILATERAL      STOMACH SURGERY         Family History   Problem Relation Age of Onset    Cancer Brother     Diabetes Mother     Hypertension Father     Heart disease Father      I have reviewed and agree with the history as documented  E-Cigarette/Vaping     E-Cigarette/Vaping Substances     Social History     Tobacco Use    Smoking status: Former Smoker     Types: Cigarettes     Quit date:      Years since quittin 7    Smokeless tobacco: Never Used   Substance Use Topics    Alcohol use: No    Drug use: No       Review of Systems   Gastrointestinal: Positive for abdominal pain  Musculoskeletal: Positive for back pain  All other systems reviewed and are negative  Physical Exam  Physical Exam  Vitals and nursing note reviewed  Constitutional:       Appearance: She is well-developed  HENT:      Head: Normocephalic and atraumatic  Right Ear: External ear normal       Left Ear: External ear normal    Eyes:      Conjunctiva/sclera: Conjunctivae normal    Neck:      Vascular: No JVD  Trachea: No tracheal deviation  Cardiovascular:      Rate and Rhythm: Normal rate and regular rhythm  Heart sounds: Normal heart sounds  Pulmonary:      Effort: Pulmonary effort is normal  No respiratory distress  Breath sounds: No wheezing or rales  Abdominal:      Palpations: Abdomen is soft  Tenderness: There is no abdominal tenderness  There is no guarding or rebound  Musculoskeletal:         General: Tenderness present  Cervical back: Normal range of motion and neck supple  Skin:     General: Skin is warm and dry  Findings: No erythema or rash  Neurological:      General: No focal deficit present  Mental Status: She is alert and oriented to person, place, and time  Motor: No weakness  Psychiatric:         Behavior: Behavior normal          Thought Content:  Thought content normal          Vital Signs  ED Triage Vitals   Temperature Pulse Respirations Blood Pressure SpO2   21   97 9 °F (36 6 °C) 93 18 117/57 98 %      Temp Source Heart Rate Source Patient Position - Orthostatic VS BP Location FiO2 (%)   09/19/21 2045 09/19/21 2045 09/19/21 2045 09/19/21 2045 --   Oral Monitor Sitting Right arm       Pain Score       09/19/21 2359       Worst Possible Pain           Vitals:    09/20/21 0230 09/20/21 0300 09/20/21 0330 09/20/21 0400   BP: 164/70 167/74 (!) 180/78 158/72   Pulse: 92 96 90 92   Patient Position - Orthostatic VS: Lying Lying Lying Lying         Visual Acuity      ED Medications  Medications   fentanyl citrate (PF) 100 MCG/2ML 12 5 mcg (12 5 mcg Intravenous Given 9/19/21 2359)   ondansetron (ZOFRAN) injection 4 mg (4 mg Intravenous Given 9/19/21 2223)   Diclofenac Sodium (VOLTAREN) 1 % topical gel 4 g (4 g Topical Given 9/19/21 2223)   iohexol (OMNIPAQUE) 350 MG/ML injection (SINGLE-DOSE) 80 mL (80 mL Intravenous Given 9/20/21 0010)       Diagnostic Studies  Results Reviewed     Procedure Component Value Units Date/Time    Fingerstick Glucose (POCT) [[id number]]  (Abnormal) Collected: 09/20/21 0332    Lab Status: Final result Updated: 09/20/21 0335     POC Glucose 142 mg/dl     UA w Reflex to Microscopic w Reflex to Culture [[id number]] Collected: 09/19/21 2358    Lab Status: Final result Specimen: Urine, Clean Catch Updated: 09/20/21 0017     Color, UA Yellow     Clarity, UA Clear     Specific Gravity, UA 1 025     pH, UA 5 5     Leukocytes, UA Negative     Nitrite, UA Negative     Protein, UA Negative mg/dl      Glucose, UA Negative mg/dl      Ketones, UA Negative mg/dl      Urobilinogen, UA 0 2 E U /dl      Bilirubin, UA Negative     Blood, UA Negative    Troponin I [[id number]]  (Normal) Collected: 09/19/21 2223    Lab Status: Final result Specimen: Blood from Arm, Right Updated: 09/19/21 2322     Troponin I 0 02 ng/mL     Comprehensive metabolic panel [[id number]]  (Abnormal) Collected: 09/19/21 2223    Lab Status: Final result Specimen: Blood from Arm, Right Updated: 09/19/21 2315     Sodium 139 mmol/L      Potassium 4 3 mmol/L      Chloride 106 mmol/L      CO2 28 mmol/L      ANION GAP 5 mmol/L      BUN 23 mg/dL      Creatinine 1 42 mg/dL      Glucose 119 mg/dL      Calcium 9 2 mg/dL      AST 22 U/L      ALT 30 U/L      Alkaline Phosphatase 74 U/L      Total Protein 7 1 g/dL      Albumin 3 6 g/dL      Total Bilirubin 0 29 mg/dL      eGFR 33 ml/min/1 73sq m     Narrative:      National Kidney Disease Foundation guidelines for Chronic Kidney Disease (CKD):     Stage 1 with normal or high GFR (GFR > 90 mL/min/1 73 square meters)    Stage 2 Mild CKD (GFR = 60-89 mL/min/1 73 square meters)    Stage 3A Moderate CKD (GFR = 45-59 mL/min/1 73 square meters)    Stage 3B Moderate CKD (GFR = 30-44 mL/min/1 73 square meters)    Stage 4 Severe CKD (GFR = 15-29 mL/min/1 73 square meters)    Stage 5 End Stage CKD (GFR <15 mL/min/1 73 square meters)  Note: GFR calculation is accurate only with a steady state creatinine    Lipase [[id number]]  (Abnormal) Collected: 09/19/21 2223    Lab Status: Final result Specimen: Blood from Arm, Right Updated: 09/19/21 2315     Lipase 51 u/L     Protime-INR [[id number]]  (Normal) Collected: 09/19/21 2223    Lab Status: Final result Specimen: Blood from Arm, Right Updated: 09/19/21 2305     Protime 13 7 seconds      INR 1 05    APTT [[id number]]  (Normal) Collected: 09/19/21 2223    Lab Status: Final result Specimen: Blood from Arm, Right Updated: 09/19/21 2305     PTT 30 seconds     CBC and differential [[id number]]  (Abnormal) Collected: 09/19/21 2223    Lab Status: Final result Specimen: Blood from Arm, Right Updated: 09/19/21 2252     WBC 5 61 Thousand/uL      RBC 4 11 Million/uL      Hemoglobin 12 8 g/dL      Hematocrit 39 4 %      MCV 96 fL      MCH 31 1 pg      MCHC 32 5 g/dL      RDW 14 7 %      MPV 10 9 fL      Platelets 892 Thousands/uL      nRBC 0 /100 WBCs      Neutrophils Relative 60 %      Immat GRANS % 1 %      Lymphocytes Relative 17 %      Monocytes Relative 17 %      Eosinophils Relative 4 %      Basophils Relative 1 %      Neutrophils Absolute 3 40 Thousands/µL      Immature Grans Absolute 0 03 Thousand/uL      Lymphocytes Absolute 0 96 Thousands/µL      Monocytes Absolute 0 94 Thousand/µL      Eosinophils Absolute 0 23 Thousand/µL      Basophils Absolute 0 05 Thousands/µL                  CT abdomen pelvis with contrast   Final Result by Yue Morrow MD (09/20 3549)      There has been prior cholecystectomy  There is intrahepatic biliary ductal dilatation, most pronounced centrally and involving the left lobe of the liver, similar to March 9, 2008, and likely related to postcholecystectomy change  The common bile duct    is of normal caliber  No bowel obstruction  Atherosclerosis  Other nonemergent findings, as described above  Please see discussion  Workstation performed: [id number]                    Procedures  Procedures         ED Course  ED Course as of Sep 22 0537   Mon Sep 20, 2021   0424 Patient feeling well at time of dc, will treat as paraspinal spasm  MDM    Disposition  Final diagnoses:   Back pain     Time reflects when diagnosis was documented in both MDM as applicable and the Disposition within this note     Time User Action Codes Description Comment    9/20/2021  4:25 AM Dayton VALLE Add [M54 9] Back pain       ED Disposition     ED Disposition Condition Date/Time Comment    Discharge Stable Mon Sep 20, 2021  4:25 AM Jesica Rivera discharge to home/self care              Follow-up Information     Follow up With Specialties Details Why Contact Info    Juan Carlos Ferreira,  Family Medicine In 1 day  [address] [facility]  [address] [id number]  [phone]      [facility]  Call in 3 days  [facility]  [address], [facility], [address] [place] [address]   [phone]          Discharge Medication List as of 9/20/2021  4:25 AM      START taking these medications    Details   acetaminophen (TYLENOL) 325 mg tablet Take 2 tablets (650 mg total) by mouth every 6 (six) hours as needed for mild pain for up to 5 days, Starting Mon 9/20/2021, Until Sat 9/25/2021 at 2359, Print      Diclofenac Sodium (VOLTAREN) 1 % Apply 2 g topically 4 (four) times a day, Starting Mon 9/20/2021, Print         CONTINUE these medications which have NOT CHANGED    Details   ALPRAZolam (XANAX) 0 25 mg tablet alprazolam 0 25 mg tablet, Historical Med      ASPIRIN 81 PO Take 81 mg by mouth, Starting Mon 3/19/2012, Historical Med      calcium carbonate-vitamin D (OSCAL-D) 500 mg-200 units per tablet Take 1 tablet by mouth daily with breakfast, Historical Med      Cholecalciferol (VITAMIN D3) 1000 units CAPS Take by mouth, Historical Med      diclofenac sodium (VOLTAREN) 1 % Voltaren 1 % topical gel, Historical Med      !! dicyclomine (BENTYL) 10 mg capsule Take 10 mg by mouth daily, Historical Med      !! dicyclomine (BENTYL) 10 mg capsule dicyclomine 10 mg capsule, Historical Med      diphenoxylate-atropine (LOMOTIL) 2 5-0 025 mg per tablet diphenoxylate-atropine 2 5 mg-0 025 mg tablet, Historical Med      docusate sodium (COLACE) 100 mg capsule Take 1 capsule by mouth 2 (two) times a day for 30 days, Starting 12/24/2016, Until Mon 1/23/17, Print      DULoxetine (CYMBALTA) 60 mg delayed release capsule Take 1 capsule (60 mg total) by mouth daily, Starting Mon 8/30/2021, Until Sat 2/26/2022, Normal      ferrous sulfate 325 (65 Fe) mg tablet Take 1 tablet (325 mg total) by mouth daily, Starting Tue 11/19/2019, Print      gabapentin (NEURONTIN) 300 mg capsule Take 300 mg by mouth 3 (three) times a day, Historical Med      hydroxychloroquine (PLAQUENIL) 200 mg tablet Take 1 tablet (200 mg total) by mouth 2 (two) times a day with meals, Starting Tue 8/3/2021, Until Sun 1/30/2022, Normal      lisinopril (ZESTRIL) 2 5 mg tablet Take 2 5 mg by mouth, Historical Med      metFORMIN (GLUCOPHAGE) 1000 MG tablet Take by mouth, Historical Med Myrbetriq 50 MG TB24 Take 1 tablet by mouth every day as directed by physician , Normal      omeprazole (PRILOSEC) 20 mg delayed release capsule Take by mouth, Historical Med      pregabalin (LYRICA) 100 mg capsule Take 1 capsule (100 mg total) by mouth 2 (two) times a day, Starting Mon 8/16/2021, Until Sat 2/12/2022, Normal      pyridoxine (B-6) 100 MG tablet Take 100 mg by mouth daily, Historical Med      traMADol (ULTRAM) 50 mg tablet tramadol 50 mg tablet, Historical Med       !! - Potential duplicate medications found  Please discuss with provider  No discharge procedures on file      PDMP Review     None          ED Provider  Electronically Signed by           Lola Guevara MD  09/22/21 9299

## 2021-09-21 LAB
ATRIAL RATE: 82 BPM
P AXIS: 74 DEGREES
PR INTERVAL: 202 MS
QRS AXIS: 111 DEGREES
QRSD INTERVAL: 152 MS
QT INTERVAL: 396 MS
QTC INTERVAL: 462 MS
T WAVE AXIS: -4 DEGREES
VENTRICULAR RATE: 82 BPM

## 2021-09-21 PROCEDURE — 93010 ELECTROCARDIOGRAM REPORT: CPT | Performed by: INTERNAL MEDICINE

## 2021-10-02 ENCOUNTER — HOSPITAL ENCOUNTER (EMERGENCY)
Facility: HOSPITAL | Age: 86
Discharge: HOME | End: 2021-10-02
Attending: EMERGENCY MEDICINE | Admitting: EMERGENCY MEDICINE
Payer: MEDICARE

## 2021-10-02 VITALS
BODY MASS INDEX: 29.43 KG/M2 | HEIGHT: 59 IN | DIASTOLIC BLOOD PRESSURE: 81 MMHG | HEART RATE: 90 BPM | OXYGEN SATURATION: 99 % | WEIGHT: 146 LBS | RESPIRATION RATE: 18 BRPM | SYSTOLIC BLOOD PRESSURE: 164 MMHG

## 2021-10-02 DIAGNOSIS — B02.9 SHINGLES: ICD-10-CM

## 2021-10-02 DIAGNOSIS — R21 RASH: Primary | ICD-10-CM

## 2021-10-02 PROCEDURE — 99284 EMERGENCY DEPT VISIT MOD MDM: CPT | Performed by: EMERGENCY MEDICINE

## 2021-10-02 PROCEDURE — 99282 EMERGENCY DEPT VISIT SF MDM: CPT

## 2021-10-29 ENCOUNTER — TELEPHONE (OUTPATIENT)
Dept: HEMATOLOGY ONCOLOGY | Facility: CLINIC | Age: 86
End: 2021-10-29

## 2021-11-19 ENCOUNTER — HOSPITAL ENCOUNTER (OUTPATIENT)
Dept: RADIOLOGY | Facility: HOSPITAL | Age: 86
Discharge: HOME | End: 2021-11-19
Payer: MEDICARE

## 2021-11-19 ENCOUNTER — APPOINTMENT (OUTPATIENT)
Dept: LAB | Facility: CLINIC | Age: 86
End: 2021-11-19
Payer: MEDICARE

## 2021-11-19 DIAGNOSIS — D50.8 IRON DEFICIENCY ANEMIA SECONDARY TO INADEQUATE DIETARY IRON INTAKE: ICD-10-CM

## 2021-11-19 DIAGNOSIS — S39.92XA TRAUMATIC INJURY OF SACRUM, INITIAL ENCOUNTER: ICD-10-CM

## 2021-11-19 DIAGNOSIS — M06.09 SERONEGATIVE ARTHROPATHY OF MULTIPLE SITES (HCC): ICD-10-CM

## 2021-11-19 PROBLEM — K58.0 IRRITABLE BOWEL SYNDROME WITH DIARRHEA: Status: ACTIVE | Noted: 2021-11-19

## 2021-11-19 PROBLEM — M35.00 SICCA SYNDROME (HCC): Status: ACTIVE | Noted: 2021-11-19

## 2021-11-19 PROBLEM — N18.32 STAGE 3B CHRONIC KIDNEY DISEASE (HCC): Status: ACTIVE | Noted: 2021-11-19

## 2021-11-19 PROBLEM — B02.29 POST ZOSTER NEURALGIA: Status: ACTIVE | Noted: 2021-11-19

## 2021-11-19 PROBLEM — R26.89 ABNORMALITY OF GAIT DUE TO IMPAIRMENT OF BALANCE: Status: ACTIVE | Noted: 2021-11-19

## 2021-11-19 PROBLEM — M15.0 PRIMARY GENERALIZED (OSTEO)ARTHRITIS: Status: ACTIVE | Noted: 2021-11-19

## 2021-11-19 PROBLEM — K31.84 DIABETIC GASTROPARESIS ASSOCIATED WITH TYPE 2 DIABETES MELLITUS (HCC): Status: ACTIVE | Noted: 2021-11-19

## 2021-11-19 PROBLEM — E11.42 DIABETIC POLYNEUROPATHY ASSOCIATED WITH TYPE 2 DIABETES MELLITUS (HCC): Status: ACTIVE | Noted: 2021-11-19

## 2021-11-19 PROBLEM — M75.82 TENDINITIS OF LEFT ROTATOR CUFF: Status: ACTIVE | Noted: 2021-11-19

## 2021-11-19 PROBLEM — M47.816 LUMBAR SPONDYLOSIS: Status: ACTIVE | Noted: 2021-11-19

## 2021-11-19 PROBLEM — M81.0 SENILE OSTEOPOROSIS: Status: ACTIVE | Noted: 2021-11-19

## 2021-11-19 PROBLEM — E11.43 DIABETIC GASTROPARESIS ASSOCIATED WITH TYPE 2 DIABETES MELLITUS (HCC): Status: ACTIVE | Noted: 2021-11-19

## 2021-11-19 LAB
ALBUMIN SERPL BCP-MCNC: 3.5 G/DL (ref 3.5–5)
ALP SERPL-CCNC: 110 U/L (ref 46–116)
ALT SERPL W P-5'-P-CCNC: 92 U/L (ref 12–78)
ANION GAP SERPL CALCULATED.3IONS-SCNC: 10 MMOL/L (ref 4–13)
AST SERPL W P-5'-P-CCNC: 65 U/L (ref 5–45)
BASOPHILS # BLD AUTO: 0.04 THOUSANDS/ΜL (ref 0–0.1)
BASOPHILS NFR BLD AUTO: 1 % (ref 0–1)
BILIRUB SERPL-MCNC: 0.59 MG/DL (ref 0.2–1)
BUN SERPL-MCNC: 28 MG/DL (ref 5–25)
CALCIUM SERPL-MCNC: 9 MG/DL (ref 8.3–10.1)
CHLORIDE SERPL-SCNC: 105 MMOL/L (ref 100–108)
CO2 SERPL-SCNC: 28 MMOL/L (ref 21–32)
CREAT SERPL-MCNC: 1.47 MG/DL (ref 0.6–1.3)
CRP SERPL QL: <3 MG/L
EOSINOPHIL # BLD AUTO: 0 THOUSAND/ΜL (ref 0–0.61)
EOSINOPHIL NFR BLD AUTO: 0 % (ref 0–6)
ERYTHROCYTE [DISTWIDTH] IN BLOOD BY AUTOMATED COUNT: 16.7 % (ref 11.6–15.1)
ERYTHROCYTE [SEDIMENTATION RATE] IN BLOOD: 4 MM/HOUR (ref 0–29)
FERRITIN SERPL-MCNC: 548 NG/ML (ref 8–388)
GFR SERPL CREATININE-BSD FRML MDRD: 32 ML/MIN/1.73SQ M
GLUCOSE P FAST SERPL-MCNC: 90 MG/DL (ref 65–99)
HCT VFR BLD AUTO: 42.4 % (ref 34.8–46.1)
HGB BLD-MCNC: 13.4 G/DL (ref 11.5–15.4)
IMM GRANULOCYTES # BLD AUTO: 0.06 THOUSAND/UL (ref 0–0.2)
IMM GRANULOCYTES NFR BLD AUTO: 1 % (ref 0–2)
IRON SATN MFR SERPL: 83 % (ref 15–50)
IRON SERPL-MCNC: 143 UG/DL (ref 50–170)
LYMPHOCYTES # BLD AUTO: 2.01 THOUSANDS/ΜL (ref 0.6–4.47)
LYMPHOCYTES NFR BLD AUTO: 23 % (ref 14–44)
MCH RBC QN AUTO: 30.5 PG (ref 26.8–34.3)
MCHC RBC AUTO-ENTMCNC: 31.6 G/DL (ref 31.4–37.4)
MCV RBC AUTO: 96 FL (ref 82–98)
MONOCYTES # BLD AUTO: 0.95 THOUSAND/ΜL (ref 0.17–1.22)
MONOCYTES NFR BLD AUTO: 11 % (ref 4–12)
NEUTROPHILS # BLD AUTO: 5.61 THOUSANDS/ΜL (ref 1.85–7.62)
NEUTS SEG NFR BLD AUTO: 64 % (ref 43–75)
NRBC BLD AUTO-RTO: 0 /100 WBCS
PLATELET # BLD AUTO: 178 THOUSANDS/UL (ref 149–390)
PMV BLD AUTO: 12.1 FL (ref 8.9–12.7)
POTASSIUM SERPL-SCNC: 4.3 MMOL/L (ref 3.5–5.3)
PROT SERPL-MCNC: 6.8 G/DL (ref 6.4–8.2)
RBC # BLD AUTO: 4.4 MILLION/UL (ref 3.81–5.12)
SODIUM SERPL-SCNC: 143 MMOL/L (ref 136–145)
TIBC SERPL-MCNC: 173 UG/DL (ref 250–450)
WBC # BLD AUTO: 8.67 THOUSAND/UL (ref 4.31–10.16)

## 2021-11-19 PROCEDURE — 85652 RBC SED RATE AUTOMATED: CPT

## 2021-11-19 PROCEDURE — 83540 ASSAY OF IRON: CPT

## 2021-11-19 PROCEDURE — 80053 COMPREHEN METABOLIC PANEL: CPT

## 2021-11-19 PROCEDURE — 83550 IRON BINDING TEST: CPT

## 2021-11-19 PROCEDURE — 72220 X-RAY EXAM SACRUM TAILBONE: CPT

## 2021-11-19 PROCEDURE — 85025 COMPLETE CBC W/AUTO DIFF WBC: CPT

## 2021-11-19 PROCEDURE — 86140 C-REACTIVE PROTEIN: CPT

## 2021-11-19 PROCEDURE — 72170 X-RAY EXAM OF PELVIS: CPT

## 2021-11-19 PROCEDURE — 82728 ASSAY OF FERRITIN: CPT

## 2021-11-19 PROCEDURE — 36415 COLL VENOUS BLD VENIPUNCTURE: CPT

## 2021-12-21 ENCOUNTER — APPOINTMENT (EMERGENCY)
Dept: CT IMAGING | Facility: HOSPITAL | Age: 86
End: 2021-12-21
Payer: MEDICARE

## 2021-12-21 ENCOUNTER — APPOINTMENT (EMERGENCY)
Dept: RADIOLOGY | Facility: HOSPITAL | Age: 86
End: 2021-12-21
Payer: MEDICARE

## 2021-12-21 ENCOUNTER — HOSPITAL ENCOUNTER (EMERGENCY)
Facility: HOSPITAL | Age: 86
Discharge: HOME | End: 2021-12-21
Attending: EMERGENCY MEDICINE | Admitting: EMERGENCY MEDICINE
Payer: MEDICARE

## 2021-12-21 VITALS
TEMPERATURE: 97.8 F | RESPIRATION RATE: 16 BRPM | SYSTOLIC BLOOD PRESSURE: 127 MMHG | DIASTOLIC BLOOD PRESSURE: 58 MMHG | OXYGEN SATURATION: 98 % | HEART RATE: 98 BPM

## 2021-12-21 DIAGNOSIS — E86.0 DEHYDRATION: ICD-10-CM

## 2021-12-21 DIAGNOSIS — E83.42 HYPOMAGNESEMIA: ICD-10-CM

## 2021-12-21 DIAGNOSIS — R53.83 FATIGUE: Primary | ICD-10-CM

## 2021-12-21 DIAGNOSIS — E87.6 HYPOKALEMIA: ICD-10-CM

## 2021-12-21 LAB
2HR DELTA HS TROPONIN: 1 NG/L
4HR DELTA HS TROPONIN: 2 NG/L
ALBUMIN SERPL BCP-MCNC: 3.3 G/DL (ref 3.5–5)
ALP SERPL-CCNC: 82 U/L (ref 46–116)
ALT SERPL W P-5'-P-CCNC: 44 U/L (ref 12–78)
ANION GAP SERPL CALCULATED.3IONS-SCNC: 13 MMOL/L (ref 4–13)
AST SERPL W P-5'-P-CCNC: 37 U/L (ref 5–45)
ATRIAL RATE: 101 BPM
BACTERIA UR QL AUTO: ABNORMAL /HPF
BASOPHILS # BLD AUTO: 0.04 THOUSANDS/ΜL (ref 0–0.1)
BASOPHILS NFR BLD AUTO: 1 % (ref 0–1)
BILIRUB SERPL-MCNC: 0.6 MG/DL (ref 0.2–1)
BILIRUB UR QL STRIP: ABNORMAL
BUN SERPL-MCNC: 17 MG/DL (ref 5–25)
CALCIUM ALBUM COR SERPL-MCNC: 9.3 MG/DL (ref 8.3–10.1)
CALCIUM SERPL-MCNC: 8.7 MG/DL (ref 8.3–10.1)
CARDIAC TROPONIN I PNL SERPL HS: 15 NG/L
CARDIAC TROPONIN I PNL SERPL HS: 16 NG/L
CARDIAC TROPONIN I PNL SERPL HS: 17 NG/L
CHLORIDE SERPL-SCNC: 100 MMOL/L (ref 100–108)
CLARITY UR: CLEAR
CO2 SERPL-SCNC: 25 MMOL/L (ref 21–32)
COLOR UR: YELLOW
CREAT SERPL-MCNC: 1.27 MG/DL (ref 0.6–1.3)
EOSINOPHIL # BLD AUTO: 0.25 THOUSAND/ΜL (ref 0–0.61)
EOSINOPHIL NFR BLD AUTO: 3 % (ref 0–6)
ERYTHROCYTE [DISTWIDTH] IN BLOOD BY AUTOMATED COUNT: 16.3 % (ref 11.6–15.1)
FLUAV RNA RESP QL NAA+PROBE: NEGATIVE
FLUBV RNA RESP QL NAA+PROBE: NEGATIVE
GFR SERPL CREATININE-BSD FRML MDRD: 38 ML/MIN/1.73SQ M
GLUCOSE SERPL-MCNC: 91 MG/DL (ref 65–140)
GLUCOSE SERPL-MCNC: 95 MG/DL (ref 65–140)
GLUCOSE UR STRIP-MCNC: NEGATIVE MG/DL
HCT VFR BLD AUTO: 38.6 % (ref 34.8–46.1)
HGB BLD-MCNC: 12.8 G/DL (ref 11.5–15.4)
HGB UR QL STRIP.AUTO: NEGATIVE
HYALINE CASTS #/AREA URNS LPF: ABNORMAL /LPF
IMM GRANULOCYTES # BLD AUTO: 0.06 THOUSAND/UL (ref 0–0.2)
IMM GRANULOCYTES NFR BLD AUTO: 1 % (ref 0–2)
INR PPP: 1 (ref 0.84–1.19)
KETONES UR STRIP-MCNC: ABNORMAL MG/DL
LACTATE SERPL-SCNC: 2 MMOL/L (ref 0.5–2)
LEUKOCYTE ESTERASE UR QL STRIP: NEGATIVE
LYMPHOCYTES # BLD AUTO: 1.58 THOUSANDS/ΜL (ref 0.6–4.47)
LYMPHOCYTES NFR BLD AUTO: 21 % (ref 14–44)
MAGNESIUM SERPL-MCNC: 0.8 MG/DL (ref 1.6–2.6)
MCH RBC QN AUTO: 32.1 PG (ref 26.8–34.3)
MCHC RBC AUTO-ENTMCNC: 33.2 G/DL (ref 31.4–37.4)
MCV RBC AUTO: 97 FL (ref 82–98)
MONOCYTES # BLD AUTO: 0.88 THOUSAND/ΜL (ref 0.17–1.22)
MONOCYTES NFR BLD AUTO: 12 % (ref 4–12)
MUCOUS THREADS UR QL AUTO: ABNORMAL
NEUTROPHILS # BLD AUTO: 4.58 THOUSANDS/ΜL (ref 1.85–7.62)
NEUTS SEG NFR BLD AUTO: 62 % (ref 43–75)
NITRITE UR QL STRIP: NEGATIVE
NON-SQ EPI CELLS URNS QL MICRO: ABNORMAL /HPF
NRBC BLD AUTO-RTO: 0 /100 WBCS
OTHER STN SPEC: ABNORMAL
P AXIS: 72 DEGREES
PH UR STRIP.AUTO: 5 [PH] (ref 4.5–8)
PLATELET # BLD AUTO: 201 THOUSANDS/UL (ref 149–390)
PMV BLD AUTO: 11 FL (ref 8.9–12.7)
POTASSIUM SERPL-SCNC: 3.4 MMOL/L (ref 3.5–5.3)
PR INTERVAL: 174 MS
PROT SERPL-MCNC: 6.6 G/DL (ref 6.4–8.2)
PROT UR STRIP-MCNC: ABNORMAL MG/DL
PROTHROMBIN TIME: 13.2 SECONDS (ref 11.6–14.5)
QRS AXIS: 114 DEGREES
QRSD INTERVAL: 140 MS
QT INTERVAL: 418 MS
QTC INTERVAL: 531 MS
RBC # BLD AUTO: 3.99 MILLION/UL (ref 3.81–5.12)
RBC #/AREA URNS AUTO: ABNORMAL /HPF
RSV RNA RESP QL NAA+PROBE: NEGATIVE
SARS-COV-2 RNA RESP QL NAA+PROBE: NEGATIVE
SODIUM SERPL-SCNC: 138 MMOL/L (ref 136–145)
SP GR UR STRIP.AUTO: >=1.03 (ref 1–1.03)
T WAVE AXIS: -28 DEGREES
TSH SERPL DL<=0.05 MIU/L-ACNC: 3.57 UIU/ML (ref 0.36–3.74)
UROBILINOGEN UR QL STRIP.AUTO: 1 E.U./DL
VENTRICULAR RATE: 97 BPM
WBC # BLD AUTO: 7.39 THOUSAND/UL (ref 4.31–10.16)
WBC #/AREA URNS AUTO: ABNORMAL /HPF

## 2021-12-21 PROCEDURE — 74176 CT ABD & PELVIS W/O CONTRAST: CPT

## 2021-12-21 PROCEDURE — 96376 TX/PRO/DX INJ SAME DRUG ADON: CPT

## 2021-12-21 PROCEDURE — 83605 ASSAY OF LACTIC ACID: CPT | Performed by: PHYSICIAN ASSISTANT

## 2021-12-21 PROCEDURE — 0241U HB NFCT DS VIR RESP RNA 4 TRGT: CPT | Performed by: PHYSICIAN ASSISTANT

## 2021-12-21 PROCEDURE — 83735 ASSAY OF MAGNESIUM: CPT | Performed by: PHYSICIAN ASSISTANT

## 2021-12-21 PROCEDURE — 93005 ELECTROCARDIOGRAM TRACING: CPT

## 2021-12-21 PROCEDURE — 82948 REAGENT STRIP/BLOOD GLUCOSE: CPT

## 2021-12-21 PROCEDURE — 99285 EMERGENCY DEPT VISIT HI MDM: CPT

## 2021-12-21 PROCEDURE — 96365 THER/PROPH/DIAG IV INF INIT: CPT

## 2021-12-21 PROCEDURE — 36415 COLL VENOUS BLD VENIPUNCTURE: CPT

## 2021-12-21 PROCEDURE — 87040 BLOOD CULTURE FOR BACTERIA: CPT | Performed by: PHYSICIAN ASSISTANT

## 2021-12-21 PROCEDURE — 85610 PROTHROMBIN TIME: CPT | Performed by: PHYSICIAN ASSISTANT

## 2021-12-21 PROCEDURE — 84484 ASSAY OF TROPONIN QUANT: CPT | Performed by: EMERGENCY MEDICINE

## 2021-12-21 PROCEDURE — 71045 X-RAY EXAM CHEST 1 VIEW: CPT

## 2021-12-21 PROCEDURE — 99285 EMERGENCY DEPT VISIT HI MDM: CPT | Performed by: PHYSICIAN ASSISTANT

## 2021-12-21 PROCEDURE — 81001 URINALYSIS AUTO W/SCOPE: CPT

## 2021-12-21 PROCEDURE — 96361 HYDRATE IV INFUSION ADD-ON: CPT

## 2021-12-21 PROCEDURE — 84443 ASSAY THYROID STIM HORMONE: CPT | Performed by: PHYSICIAN ASSISTANT

## 2021-12-21 PROCEDURE — 85025 COMPLETE CBC W/AUTO DIFF WBC: CPT | Performed by: EMERGENCY MEDICINE

## 2021-12-21 PROCEDURE — 93010 ELECTROCARDIOGRAM REPORT: CPT | Performed by: INTERNAL MEDICINE

## 2021-12-21 PROCEDURE — 80053 COMPREHEN METABOLIC PANEL: CPT | Performed by: EMERGENCY MEDICINE

## 2021-12-21 PROCEDURE — 96366 THER/PROPH/DIAG IV INF ADDON: CPT

## 2021-12-21 PROCEDURE — 96375 TX/PRO/DX INJ NEW DRUG ADDON: CPT

## 2021-12-21 RX ORDER — MAGNESIUM SULFATE HEPTAHYDRATE 40 MG/ML
2 INJECTION, SOLUTION INTRAVENOUS ONCE
Status: COMPLETED | OUTPATIENT
Start: 2021-12-21 | End: 2021-12-21

## 2021-12-21 RX ORDER — FENTANYL CITRATE 50 UG/ML
25 INJECTION, SOLUTION INTRAMUSCULAR; INTRAVENOUS ONCE
Status: COMPLETED | OUTPATIENT
Start: 2021-12-21 | End: 2021-12-21

## 2021-12-21 RX ORDER — POTASSIUM CHLORIDE 20 MEQ/1
40 TABLET, EXTENDED RELEASE ORAL ONCE
Status: COMPLETED | OUTPATIENT
Start: 2021-12-21 | End: 2021-12-21

## 2021-12-21 RX ORDER — FENTANYL CITRATE 50 UG/ML
50 INJECTION, SOLUTION INTRAMUSCULAR; INTRAVENOUS ONCE
Status: COMPLETED | OUTPATIENT
Start: 2021-12-21 | End: 2021-12-21

## 2021-12-21 RX ORDER — ONDANSETRON 2 MG/ML
4 INJECTION INTRAMUSCULAR; INTRAVENOUS ONCE
Status: COMPLETED | OUTPATIENT
Start: 2021-12-21 | End: 2021-12-21

## 2021-12-21 RX ADMIN — SODIUM CHLORIDE 1000 ML: 0.9 INJECTION, SOLUTION INTRAVENOUS at 15:38

## 2021-12-21 RX ADMIN — MAGNESIUM SULFATE HEPTAHYDRATE 2 G: 40 INJECTION, SOLUTION INTRAVENOUS at 18:28

## 2021-12-21 RX ADMIN — FENTANYL CITRATE 50 MCG: 50 INJECTION INTRAMUSCULAR; INTRAVENOUS at 18:33

## 2021-12-21 RX ADMIN — FENTANYL CITRATE 25 MCG: 50 INJECTION INTRAMUSCULAR; INTRAVENOUS at 15:38

## 2021-12-21 RX ADMIN — POTASSIUM CHLORIDE 40 MEQ: 1500 TABLET, EXTENDED RELEASE ORAL at 18:32

## 2021-12-21 RX ADMIN — ONDANSETRON 4 MG: 2 INJECTION INTRAMUSCULAR; INTRAVENOUS at 15:37

## 2021-12-26 LAB
BACTERIA BLD CULT: NORMAL
BACTERIA BLD CULT: NORMAL

## 2022-01-12 ENCOUNTER — OFFICE VISIT (OUTPATIENT)
Dept: OBGYN CLINIC | Facility: CLINIC | Age: 87
End: 2022-01-12
Payer: MEDICARE

## 2022-01-12 VITALS — SYSTOLIC BLOOD PRESSURE: 110 MMHG | WEIGHT: 126 LBS | DIASTOLIC BLOOD PRESSURE: 60 MMHG | BODY MASS INDEX: 25.45 KG/M2

## 2022-01-12 DIAGNOSIS — N90.7 LABIAL CYST: Primary | ICD-10-CM

## 2022-01-12 PROCEDURE — 99213 OFFICE O/P EST LOW 20 MIN: CPT | Performed by: OBSTETRICS & GYNECOLOGY

## 2022-01-12 RX ORDER — ONDANSETRON 4 MG/1
TABLET, ORALLY DISINTEGRATING ORAL
COMMUNITY
Start: 2021-10-06 | End: 2022-06-27

## 2022-01-12 NOTE — PROGRESS NOTES
Assessment/Plan:  Patient's daughter (angie) will call with update in 10 days  If patient is symptomatic/increased size keep appointment for excision and drainage, reviewed all precautions  No problem-specific Assessment & Plan notes found for this encounter  There are no diagnoses linked to this encounter  Subjective:      Patient ID: Speedy Kline is a 80 y o  female  HPI     This is a very pleasant 41-year-old female  ( x3) presents with her daughter (angie) complaining left labial lump over the last 4 weeks  She states that she had a difficult case of shingles from September to December and then subsequently developed labial lesion  She denies any vaginal bleeding or spotting  There has been no vaginal drainage  Patient went through menopause at age 48  She was on hormone replacement therapy for approximately 5 years  Patient has been  for over 72 years and lives independently with her   Patient was not office approximately 3 years ago for similar complaint with incision and drainage which resolved up until recurrence approximately 4 weeks ago  She does follow up with her primary care physician for well-controlled diabetes, hypertension, CKD    The following portions of the patient's history were reviewed and updated as appropriate: allergies, current medications, past family history, past medical history, past social history, past surgical history and problem list     Review of Systems   Constitutional: Negative for fatigue, fever and unexpected weight change  Respiratory: Negative for cough, chest tightness, shortness of breath and wheezing  Cardiovascular: Negative  Negative for chest pain and palpitations  Gastrointestinal: Negative  Negative for abdominal distention, abdominal pain, blood in stool, constipation, diarrhea, nausea and vomiting  Genitourinary: Negative    Negative for difficulty urinating, dyspareunia, dysuria, flank pain, frequency, genital sores, hematuria, pelvic pain, urgency, vaginal bleeding, vaginal discharge and vaginal pain  Skin: Negative for rash  Objective:      /60   Wt 57 2 kg (126 lb)   LMP  (LMP Unknown)   BMI 25 45 kg/m²          Physical Exam  Constitutional:       Appearance: Normal appearance  Pulmonary:      Effort: Pulmonary effort is normal    Abdominal:      General: Bowel sounds are normal  There is no distension  Palpations: Abdomen is soft  Tenderness: There is no abdominal tenderness  There is no guarding or rebound  Genitourinary:     Labia:         Right: No rash, tenderness or lesion  Left: Lesion present  No rash or tenderness  Neurological:      Mental Status: She is alert and oriented to person, place, and time  Psychiatric:         Behavior: Behavior normal        left inferior labia small oval in shape cyst (1 cm) noted, non erythematous, nontender, no drainage, no signs of infection

## 2022-01-24 ENCOUNTER — TELEPHONE (OUTPATIENT)
Dept: UROLOGY | Facility: MEDICAL CENTER | Age: 87
End: 2022-01-24

## 2022-01-24 NOTE — TELEPHONE ENCOUNTER
Patient applying for the [facility] Patient Assistance Program   Paperwork completed, signed by Dr Donta Stevens and faxed to [phone]

## 2022-02-23 ENCOUNTER — APPOINTMENT (OUTPATIENT)
Dept: LAB | Facility: CLINIC | Age: 87
End: 2022-02-23
Payer: MEDICARE

## 2022-02-23 DIAGNOSIS — R94.5 ABNORMAL LIVER FUNCTION: ICD-10-CM

## 2022-02-23 DIAGNOSIS — M06.09 SERONEGATIVE ARTHROPATHY OF MULTIPLE SITES (HCC): ICD-10-CM

## 2022-02-23 DIAGNOSIS — E83.42 HYPOMAGNESEMIA: ICD-10-CM

## 2022-02-23 DIAGNOSIS — N28.9 ABNORMAL KIDNEY FUNCTION: ICD-10-CM

## 2022-02-23 LAB
ALBUMIN SERPL BCP-MCNC: 3.6 G/DL (ref 3.5–5)
ALP SERPL-CCNC: 67 U/L (ref 46–116)
ALT SERPL W P-5'-P-CCNC: 43 U/L (ref 12–78)
ANION GAP SERPL CALCULATED.3IONS-SCNC: 7 MMOL/L (ref 4–13)
AST SERPL W P-5'-P-CCNC: 26 U/L (ref 5–45)
BASOPHILS # BLD AUTO: 0.03 THOUSANDS/ΜL (ref 0–0.1)
BASOPHILS NFR BLD AUTO: 1 % (ref 0–1)
BILIRUB SERPL-MCNC: 0.32 MG/DL (ref 0.2–1)
BUN SERPL-MCNC: 24 MG/DL (ref 5–25)
CALCIUM SERPL-MCNC: 9.6 MG/DL (ref 8.3–10.1)
CHLORIDE SERPL-SCNC: 102 MMOL/L (ref 100–108)
CO2 SERPL-SCNC: 28 MMOL/L (ref 21–32)
CREAT SERPL-MCNC: 1.13 MG/DL (ref 0.6–1.3)
CRP SERPL QL: <3 MG/L
EOSINOPHIL # BLD AUTO: 0.16 THOUSAND/ΜL (ref 0–0.61)
EOSINOPHIL NFR BLD AUTO: 3 % (ref 0–6)
ERYTHROCYTE [DISTWIDTH] IN BLOOD BY AUTOMATED COUNT: 13.2 % (ref 11.6–15.1)
ERYTHROCYTE [SEDIMENTATION RATE] IN BLOOD: 4 MM/HOUR (ref 0–29)
GFR SERPL CREATININE-BSD FRML MDRD: 43 ML/MIN/1.73SQ M
GLUCOSE P FAST SERPL-MCNC: 95 MG/DL (ref 65–99)
HCT VFR BLD AUTO: 38.7 % (ref 34.8–46.1)
HGB BLD-MCNC: 12.2 G/DL (ref 11.5–15.4)
IMM GRANULOCYTES # BLD AUTO: 0.02 THOUSAND/UL (ref 0–0.2)
IMM GRANULOCYTES NFR BLD AUTO: 0 % (ref 0–2)
LYMPHOCYTES # BLD AUTO: 1.27 THOUSANDS/ΜL (ref 0.6–4.47)
LYMPHOCYTES NFR BLD AUTO: 23 % (ref 14–44)
MAGNESIUM SERPL-MCNC: 1.7 MG/DL (ref 1.6–2.6)
MCH RBC QN AUTO: 33.1 PG (ref 26.8–34.3)
MCHC RBC AUTO-ENTMCNC: 31.5 G/DL (ref 31.4–37.4)
MCV RBC AUTO: 105 FL (ref 82–98)
MONOCYTES # BLD AUTO: 0.62 THOUSAND/ΜL (ref 0.17–1.22)
MONOCYTES NFR BLD AUTO: 11 % (ref 4–12)
NEUTROPHILS # BLD AUTO: 3.42 THOUSANDS/ΜL (ref 1.85–7.62)
NEUTS SEG NFR BLD AUTO: 62 % (ref 43–75)
NRBC BLD AUTO-RTO: 0 /100 WBCS
PLATELET # BLD AUTO: 158 THOUSANDS/UL (ref 149–390)
PMV BLD AUTO: 11.5 FL (ref 8.9–12.7)
POTASSIUM SERPL-SCNC: 4.3 MMOL/L (ref 3.5–5.3)
PROT SERPL-MCNC: 6.8 G/DL (ref 6.4–8.2)
RBC # BLD AUTO: 3.69 MILLION/UL (ref 3.81–5.12)
SODIUM SERPL-SCNC: 137 MMOL/L (ref 136–145)
WBC # BLD AUTO: 5.52 THOUSAND/UL (ref 4.31–10.16)

## 2022-02-23 PROCEDURE — 86140 C-REACTIVE PROTEIN: CPT

## 2022-02-23 PROCEDURE — 85652 RBC SED RATE AUTOMATED: CPT

## 2022-02-23 PROCEDURE — 36415 COLL VENOUS BLD VENIPUNCTURE: CPT

## 2022-02-23 PROCEDURE — 83735 ASSAY OF MAGNESIUM: CPT

## 2022-02-23 PROCEDURE — 85025 COMPLETE CBC W/AUTO DIFF WBC: CPT

## 2022-02-23 PROCEDURE — 80053 COMPREHEN METABOLIC PANEL: CPT

## 2022-02-27 ENCOUNTER — APPOINTMENT (OUTPATIENT)
Dept: LAB | Facility: CLINIC | Age: 87
End: 2022-02-27
Payer: MEDICARE

## 2022-02-27 PROCEDURE — 87077 CULTURE AEROBIC IDENTIFY: CPT

## 2022-02-27 PROCEDURE — 87086 URINE CULTURE/COLONY COUNT: CPT

## 2022-02-27 PROCEDURE — 87186 SC STD MICRODIL/AGAR DIL: CPT

## 2022-03-10 DIAGNOSIS — N32.81 OAB (OVERACTIVE BLADDER): ICD-10-CM

## 2022-03-14 RX ORDER — MIRABEGRON 50 MG/1
TABLET, FILM COATED, EXTENDED RELEASE ORAL
Qty: 90 TABLET | Refills: 3 | Status: SHIPPED | OUTPATIENT
Start: 2022-03-14 | End: 2022-03-17 | Stop reason: CLARIF

## 2022-03-14 NOTE — TELEPHONE ENCOUNTER
[facility] was apparently [facility] for Myrbetriq  It was however, out of refills according to her   Refill needed ASAP as patient only has 2 days worth of medication left        Script for the requested medication was queued and forwarded to the Advanced Practitioner covering the [facility] location for approval

## 2022-03-17 RX ORDER — MIRABEGRON 50 MG/1
50 TABLET, FILM COATED, EXTENDED RELEASE ORAL DAILY
Qty: 30 TABLET | Refills: 3 | OUTPATIENT
Start: 2022-03-17 | End: 2022-04-01 | Stop reason: SDUPTHER

## 2022-03-17 NOTE — TELEPHONE ENCOUNTER
Mr Cuate Bgeum returned my call  His annual deductible is $480 so this claim will essentially satisfy the deductible for 2022  Moving forward, a 30 day supply will cost $43       I called [facility] and make ORLANDO Mejia  Ph aware that patient is willing to proceeding with drug purchase and is aware of the $459 78 due at time of service  She will notify the patient when ready for pickup      Corrected script was requeued and forwarded to the Advanced Practitioner covering the [facility] location for approval

## 2022-03-17 NOTE — TELEPHONE ENCOUNTER
Just received notification (3/4/22 @ 8:07pm) from [facility] that additional information is required  They requested the exact same paperwork/script that Dr Akanksha Blas signed-off on back on 1/24/22 - [address] [facility]! So angry for this unnecessary delay to the patient  She only has a few days left of her medication left  I reached out to Mr Tena Beverage  He verified that he did get pharmacy coverage for his wife as of 1/1/22  Policy is [employer] - BIN#:  [id number] - PCN#:  [id number] - GRP#:  [id number] - ID#:  [id number]  Script for Myrbetriq 50mg 1 PO QD #30 with 3 refills was called into [facility]; verbal order given to [facility]  Ph  Claim processed immediately, so no drug prior authorization required  Copayment due by the patient is $459 78 ($440 06 for deductible with a $19 72 copayment)  Essentially she should only owe $19 72 per month moving forward  Patient has Medicare so she does NOT qualify for the [employer] copay assist program     Also, as the patient now has pharmacy benefits, she will no longer qualify for [employer] Patient Assistance Program       All was explained to the patient's ; he veralized understanding  I left my direct dial number for ease in communication  I await a return phone call before taking further action

## 2022-04-01 ENCOUNTER — OFFICE VISIT (OUTPATIENT)
Dept: UROLOGY | Facility: AMBULATORY SURGERY CENTER | Age: 87
End: 2022-04-01
Payer: MEDICARE

## 2022-04-01 VITALS
SYSTOLIC BLOOD PRESSURE: 112 MMHG | OXYGEN SATURATION: 99 % | BODY MASS INDEX: 27.08 KG/M2 | WEIGHT: 129 LBS | HEIGHT: 58 IN | HEART RATE: 89 BPM | DIASTOLIC BLOOD PRESSURE: 86 MMHG

## 2022-04-01 DIAGNOSIS — N32.81 OAB (OVERACTIVE BLADDER): ICD-10-CM

## 2022-04-01 PROCEDURE — 99213 OFFICE O/P EST LOW 20 MIN: CPT | Performed by: NURSE PRACTITIONER

## 2022-04-01 RX ORDER — MIRABEGRON 50 MG/1
50 TABLET, FILM COATED, EXTENDED RELEASE ORAL DAILY
Qty: 30 TABLET | Refills: 11 | Status: SHIPPED | OUTPATIENT
Start: 2022-04-01

## 2022-04-01 NOTE — PROGRESS NOTES
04/01/22    Tanya Stephen   1934   [id number]     Assessment  1 Overactive bladder  2 Urge incontinence  3 Nocturnal enuresis     Discussion/Plan  1 Overactive bladder  2 Urge incontinence  3 Nocturnal enuresis   Continue Myrbetriq   Limit fluid consumption prior to bed  Avoid bladder irritants   Incontinence brief overnight       Patient continues to report that her symptoms are well controlled with Myrbetriq  Return in 1 year or sooner if needed  Subjective  HPI   Don Hutchison is an 80  y  o  female managed by Dr Ramos Cos a history of OAB, urinary urge incontinence, and nocturnal enuresis presents today for follow up  Dean Romp is accompanied by her daughter  Patient continues to take Myrbetriq 50mg daily  She states Myrbetriq is effective and has helped to improve her symptoms of urinary urgency, frequency, and urge incontinence  Dean Romp denies any gross hematuria or dysuria   No overall changes to her health since last office visit  Review of Systems - History obtained from chart review and the patient  General ROS: negative  Psychological ROS: negative  Respiratory ROS: no cough, shortness of breath, or wheezing  Cardiovascular ROS: no chest pain or dyspnea on exertion  Gastrointestinal ROS: no abdominal pain, change in bowel habits, or black or bloody stools  Genito-Urinary ROS: positive for - incontinence and urinary frequency/urgency  Musculoskeletal ROS: negative  Neurological ROS: no TIA or stroke symptoms  Dermatological ROS: negative       Objective   Physical Exam  Vitals and nursing note reviewed  Constitutional:       General: She is not in acute distress  Appearance: Normal appearance  She is not ill-appearing, toxic-appearing or diaphoretic  HENT:      Head: Normocephalic and atraumatic  Pulmonary:      Effort: Pulmonary effort is normal  No respiratory distress  Abdominal:      Tenderness: There is no right CVA tenderness or left CVA tenderness     Musculoskeletal: General: Normal range of motion  Cervical back: Normal range of motion  Skin:     General: Skin is warm and dry  Neurological:      General: No focal deficit present  Mental Status: She is alert and oriented to person, place, and time  Mental status is at baseline  Psychiatric:         Mood and Affect: Mood normal          Behavior: Behavior normal          Thought Content: Thought content normal          Judgment: Judgment normal            JOSE G Oscar     I have spent 15 minutes with Patient and family today in which greater than 50% of this time was spent in counseling/coordination of care regarding Intructions for management

## 2022-04-14 ENCOUNTER — APPOINTMENT (EMERGENCY)
Dept: RADIOLOGY | Facility: HOSPITAL | Age: 87
End: 2022-04-14
Payer: MEDICARE

## 2022-04-14 ENCOUNTER — HOSPITAL ENCOUNTER (OUTPATIENT)
Facility: HOSPITAL | Age: 87
Setting detail: OBSERVATION
Discharge: HOME | End: 2022-04-15
Attending: EMERGENCY MEDICINE | Admitting: INTERNAL MEDICINE
Payer: MEDICARE

## 2022-04-14 DIAGNOSIS — I50.32 CHRONIC DIASTOLIC HEART FAILURE (HCC): ICD-10-CM

## 2022-04-14 DIAGNOSIS — R07.9 CHEST PAIN: Primary | ICD-10-CM

## 2022-04-14 PROBLEM — R79.89 ELEVATED SERUM CREATININE: Status: ACTIVE | Noted: 2022-04-14

## 2022-04-14 PROBLEM — I50.30 DIASTOLIC HEART FAILURE (HCC): Status: ACTIVE | Noted: 2022-04-14

## 2022-04-14 PROBLEM — D72.829 LEUKOCYTOSIS: Status: ACTIVE | Noted: 2022-04-14

## 2022-04-14 PROBLEM — N32.81 OVERACTIVE BLADDER: Status: ACTIVE | Noted: 2022-04-14

## 2022-04-14 LAB
2HR DELTA HS TROPONIN: 0 NG/L
4HR DELTA HS TROPONIN: 1 NG/L
ALBUMIN SERPL BCP-MCNC: 3.4 G/DL (ref 3.5–5)
ALP SERPL-CCNC: 90 U/L (ref 46–116)
ALT SERPL W P-5'-P-CCNC: 51 U/L (ref 12–78)
ANION GAP SERPL CALCULATED.3IONS-SCNC: 10 MMOL/L (ref 4–13)
AST SERPL W P-5'-P-CCNC: 35 U/L (ref 5–45)
ATRIAL RATE: 100 BPM
BASOPHILS # BLD AUTO: 0.04 THOUSANDS/ΜL (ref 0–0.1)
BASOPHILS NFR BLD AUTO: 0 % (ref 0–1)
BILIRUB SERPL-MCNC: 0.37 MG/DL (ref 0.2–1)
BUN SERPL-MCNC: 31 MG/DL (ref 5–25)
CALCIUM ALBUM COR SERPL-MCNC: 9.3 MG/DL (ref 8.3–10.1)
CALCIUM SERPL-MCNC: 8.8 MG/DL (ref 8.3–10.1)
CARDIAC TROPONIN I PNL SERPL HS: 6 NG/L
CARDIAC TROPONIN I PNL SERPL HS: 6 NG/L
CARDIAC TROPONIN I PNL SERPL HS: 7 NG/L
CHLORIDE SERPL-SCNC: 103 MMOL/L (ref 100–108)
CHOLEST SERPL-MCNC: 154 MG/DL
CO2 SERPL-SCNC: 26 MMOL/L (ref 21–32)
CREAT SERPL-MCNC: 1.42 MG/DL (ref 0.6–1.3)
D DIMER PPP FEU-MCNC: 0.87 UG/ML FEU
EOSINOPHIL # BLD AUTO: 0.13 THOUSAND/ΜL (ref 0–0.61)
EOSINOPHIL NFR BLD AUTO: 1 % (ref 0–6)
ERYTHROCYTE [DISTWIDTH] IN BLOOD BY AUTOMATED COUNT: 13.7 % (ref 11.6–15.1)
FLUAV RNA RESP QL NAA+PROBE: NEGATIVE
FLUBV RNA RESP QL NAA+PROBE: NEGATIVE
GFR SERPL CREATININE-BSD FRML MDRD: 33 ML/MIN/1.73SQ M
GLUCOSE SERPL-MCNC: 159 MG/DL (ref 65–140)
GLUCOSE SERPL-MCNC: 176 MG/DL (ref 65–140)
HCT VFR BLD AUTO: 40.1 % (ref 34.8–46.1)
HDLC SERPL-MCNC: 85 MG/DL
HGB BLD-MCNC: 12.8 G/DL (ref 11.5–15.4)
IMM GRANULOCYTES # BLD AUTO: 0.08 THOUSAND/UL (ref 0–0.2)
IMM GRANULOCYTES NFR BLD AUTO: 1 % (ref 0–2)
LDLC SERPL CALC-MCNC: 49 MG/DL (ref 0–100)
LIPASE SERPL-CCNC: 14 U/L (ref 73–393)
LYMPHOCYTES # BLD AUTO: 1.16 THOUSANDS/ΜL (ref 0.6–4.47)
LYMPHOCYTES NFR BLD AUTO: 10 % (ref 14–44)
MAGNESIUM SERPL-MCNC: 1.5 MG/DL (ref 1.6–2.6)
MCH RBC QN AUTO: 32.1 PG (ref 26.8–34.3)
MCHC RBC AUTO-ENTMCNC: 31.9 G/DL (ref 31.4–37.4)
MCV RBC AUTO: 101 FL (ref 82–98)
MONOCYTES # BLD AUTO: 1.29 THOUSAND/ΜL (ref 0.17–1.22)
MONOCYTES NFR BLD AUTO: 12 % (ref 4–12)
NEUTROPHILS # BLD AUTO: 8.44 THOUSANDS/ΜL (ref 1.85–7.62)
NEUTS SEG NFR BLD AUTO: 76 % (ref 43–75)
NRBC BLD AUTO-RTO: 0 /100 WBCS
NT-PROBNP SERPL-MCNC: 601 PG/ML
P AXIS: 31 DEGREES
PLATELET # BLD AUTO: 156 THOUSANDS/UL (ref 149–390)
PMV BLD AUTO: 12.1 FL (ref 8.9–12.7)
POTASSIUM SERPL-SCNC: 4 MMOL/L (ref 3.5–5.3)
PR INTERVAL: 192 MS
PROT SERPL-MCNC: 6.9 G/DL (ref 6.4–8.2)
QRS AXIS: 102 DEGREES
QRSD INTERVAL: 152 MS
QT INTERVAL: 396 MS
QTC INTERVAL: 510 MS
RBC # BLD AUTO: 3.99 MILLION/UL (ref 3.81–5.12)
RSV RNA RESP QL NAA+PROBE: NEGATIVE
SARS-COV-2 RNA RESP QL NAA+PROBE: NEGATIVE
SODIUM SERPL-SCNC: 139 MMOL/L (ref 136–145)
T WAVE AXIS: -16 DEGREES
TRIGL SERPL-MCNC: 98 MG/DL
VENTRICULAR RATE: 100 BPM
WBC # BLD AUTO: 11.14 THOUSAND/UL (ref 4.31–10.16)

## 2022-04-14 PROCEDURE — 80053 COMPREHEN METABOLIC PANEL: CPT | Performed by: EMERGENCY MEDICINE

## 2022-04-14 PROCEDURE — 1124F ACP DISCUSS-NO DSCNMKR DOCD: CPT | Performed by: EMERGENCY MEDICINE

## 2022-04-14 PROCEDURE — 85025 COMPLETE CBC W/AUTO DIFF WBC: CPT | Performed by: EMERGENCY MEDICINE

## 2022-04-14 PROCEDURE — 83036 HEMOGLOBIN GLYCOSYLATED A1C: CPT | Performed by: NURSE PRACTITIONER

## 2022-04-14 PROCEDURE — 0241U HB NFCT DS VIR RESP RNA 4 TRGT: CPT | Performed by: NURSE PRACTITIONER

## 2022-04-14 PROCEDURE — 82948 REAGENT STRIP/BLOOD GLUCOSE: CPT

## 2022-04-14 PROCEDURE — 83880 ASSAY OF NATRIURETIC PEPTIDE: CPT | Performed by: NURSE PRACTITIONER

## 2022-04-14 PROCEDURE — 93005 ELECTROCARDIOGRAM TRACING: CPT

## 2022-04-14 PROCEDURE — 99220 PR INITIAL OBSERVATION CARE/DAY 70 MINUTES: CPT | Performed by: NURSE PRACTITIONER

## 2022-04-14 PROCEDURE — 96374 THER/PROPH/DIAG INJ IV PUSH: CPT

## 2022-04-14 PROCEDURE — 80061 LIPID PANEL: CPT | Performed by: NURSE PRACTITIONER

## 2022-04-14 PROCEDURE — 36415 COLL VENOUS BLD VENIPUNCTURE: CPT | Performed by: EMERGENCY MEDICINE

## 2022-04-14 PROCEDURE — 83690 ASSAY OF LIPASE: CPT | Performed by: NURSE PRACTITIONER

## 2022-04-14 PROCEDURE — 83735 ASSAY OF MAGNESIUM: CPT | Performed by: EMERGENCY MEDICINE

## 2022-04-14 PROCEDURE — 99285 EMERGENCY DEPT VISIT HI MDM: CPT

## 2022-04-14 PROCEDURE — 84484 ASSAY OF TROPONIN QUANT: CPT | Performed by: EMERGENCY MEDICINE

## 2022-04-14 PROCEDURE — 99285 EMERGENCY DEPT VISIT HI MDM: CPT | Performed by: EMERGENCY MEDICINE

## 2022-04-14 PROCEDURE — 93010 ELECTROCARDIOGRAM REPORT: CPT | Performed by: INTERNAL MEDICINE

## 2022-04-14 PROCEDURE — 85379 FIBRIN DEGRADATION QUANT: CPT | Performed by: EMERGENCY MEDICINE

## 2022-04-14 PROCEDURE — 71045 X-RAY EXAM CHEST 1 VIEW: CPT

## 2022-04-14 RX ORDER — MAGNESIUM HYDROXIDE/ALUMINUM HYDROXICE/SIMETHICONE 120; 1200; 1200 MG/30ML; MG/30ML; MG/30ML
30 SUSPENSION ORAL EVERY 6 HOURS PRN
Status: DISCONTINUED | OUTPATIENT
Start: 2022-04-14 | End: 2022-04-15 | Stop reason: HOSPADM

## 2022-04-14 RX ORDER — MELATONIN
1000 DAILY
Status: DISCONTINUED | OUTPATIENT
Start: 2022-04-15 | End: 2022-04-15 | Stop reason: HOSPADM

## 2022-04-14 RX ORDER — FUROSEMIDE 10 MG/ML
20 INJECTION INTRAMUSCULAR; INTRAVENOUS ONCE
Status: COMPLETED | OUTPATIENT
Start: 2022-04-14 | End: 2022-04-14

## 2022-04-14 RX ORDER — FENTANYL CITRATE 50 UG/ML
25 INJECTION, SOLUTION INTRAMUSCULAR; INTRAVENOUS ONCE
Status: COMPLETED | OUTPATIENT
Start: 2022-04-14 | End: 2022-04-14

## 2022-04-14 RX ORDER — LISINOPRIL 5 MG/1
2.5 TABLET ORAL DAILY
Status: DISCONTINUED | OUTPATIENT
Start: 2022-04-15 | End: 2022-04-15 | Stop reason: HOSPADM

## 2022-04-14 RX ORDER — PREGABALIN 50 MG/1
50 CAPSULE ORAL 3 TIMES DAILY
Status: DISCONTINUED | OUTPATIENT
Start: 2022-04-15 | End: 2022-04-15 | Stop reason: HOSPADM

## 2022-04-14 RX ORDER — AMITRIPTYLINE HYDROCHLORIDE 10 MG/1
20 TABLET, FILM COATED ORAL
Status: DISCONTINUED | OUTPATIENT
Start: 2022-04-14 | End: 2022-04-15 | Stop reason: HOSPADM

## 2022-04-14 RX ORDER — ACETAMINOPHEN 325 MG/1
975 TABLET ORAL EVERY 8 HOURS
Status: DISCONTINUED | OUTPATIENT
Start: 2022-04-14 | End: 2022-04-15 | Stop reason: HOSPADM

## 2022-04-14 RX ORDER — DULOXETIN HYDROCHLORIDE 60 MG/1
60 CAPSULE, DELAYED RELEASE ORAL DAILY
Status: DISCONTINUED | OUTPATIENT
Start: 2022-04-15 | End: 2022-04-15 | Stop reason: HOSPADM

## 2022-04-14 RX ORDER — DICYCLOMINE HYDROCHLORIDE 10 MG/1
10 CAPSULE ORAL
Status: DISCONTINUED | OUTPATIENT
Start: 2022-04-14 | End: 2022-04-15 | Stop reason: HOSPADM

## 2022-04-14 RX ORDER — PYRIDOXINE HCL (VITAMIN B6) 50 MG
100 TABLET ORAL DAILY
Status: DISCONTINUED | OUTPATIENT
Start: 2022-04-15 | End: 2022-04-15 | Stop reason: HOSPADM

## 2022-04-14 RX ORDER — ONDANSETRON 2 MG/ML
4 INJECTION INTRAMUSCULAR; INTRAVENOUS EVERY 6 HOURS PRN
Status: DISCONTINUED | OUTPATIENT
Start: 2022-04-14 | End: 2022-04-15 | Stop reason: HOSPADM

## 2022-04-14 RX ORDER — PREGABALIN 75 MG/1
75 CAPSULE ORAL
Status: DISCONTINUED | OUTPATIENT
Start: 2022-04-15 | End: 2022-04-15 | Stop reason: HOSPADM

## 2022-04-14 RX ORDER — OXYBUTYNIN CHLORIDE 5 MG/1
10 TABLET, EXTENDED RELEASE ORAL DAILY
Status: DISCONTINUED | OUTPATIENT
Start: 2022-04-15 | End: 2022-04-15 | Stop reason: HOSPADM

## 2022-04-14 RX ORDER — MAGNESIUM SULFATE 1 G/100ML
1 INJECTION INTRAVENOUS ONCE
Status: COMPLETED | OUTPATIENT
Start: 2022-04-14 | End: 2022-04-14

## 2022-04-14 RX ORDER — ASPIRIN 81 MG/1
324 TABLET, CHEWABLE ORAL ONCE
Status: COMPLETED | OUTPATIENT
Start: 2022-04-14 | End: 2022-04-14

## 2022-04-14 RX ORDER — PANTOPRAZOLE SODIUM 40 MG/1
40 TABLET, DELAYED RELEASE ORAL
Status: DISCONTINUED | OUTPATIENT
Start: 2022-04-15 | End: 2022-04-15 | Stop reason: HOSPADM

## 2022-04-14 RX ORDER — ACETAMINOPHEN 325 MG/1
650 TABLET ORAL EVERY 6 HOURS PRN
Status: DISCONTINUED | OUTPATIENT
Start: 2022-04-14 | End: 2022-04-14

## 2022-04-14 RX ORDER — HYDROMORPHONE HCL/PF 1 MG/ML
0.5 SYRINGE (ML) INJECTION EVERY 6 HOURS PRN
Status: DISCONTINUED | OUTPATIENT
Start: 2022-04-14 | End: 2022-04-15 | Stop reason: HOSPADM

## 2022-04-14 RX ORDER — HEPARIN SODIUM 5000 [USP'U]/ML
5000 INJECTION, SOLUTION INTRAVENOUS; SUBCUTANEOUS EVERY 8 HOURS SCHEDULED
Status: DISCONTINUED | OUTPATIENT
Start: 2022-04-14 | End: 2022-04-15 | Stop reason: HOSPADM

## 2022-04-14 RX ADMIN — FUROSEMIDE 20 MG: 10 INJECTION, SOLUTION INTRAMUSCULAR; INTRAVENOUS at 23:33

## 2022-04-14 RX ADMIN — DICLOFENAC SODIUM 2 G: 10 GEL TOPICAL at 23:33

## 2022-04-14 RX ADMIN — ASPIRIN 81 MG CHEWABLE TABLET 324 MG: 81 TABLET CHEWABLE at 20:24

## 2022-04-14 RX ADMIN — MAGNESIUM SULFATE HEPTAHYDRATE 1 G: 1 INJECTION, SOLUTION INTRAVENOUS at 22:30

## 2022-04-14 RX ADMIN — HEPARIN SODIUM 5000 UNITS: 5000 INJECTION INTRAVENOUS; SUBCUTANEOUS at 23:41

## 2022-04-14 RX ADMIN — INSULIN LISPRO 1 UNITS: 100 INJECTION, SOLUTION INTRAVENOUS; SUBCUTANEOUS at 23:41

## 2022-04-14 RX ADMIN — HYDROMORPHONE HYDROCHLORIDE 0.5 MG: 1 INJECTION, SOLUTION INTRAMUSCULAR; INTRAVENOUS; SUBCUTANEOUS at 21:18

## 2022-04-14 RX ADMIN — FENTANYL CITRATE 25 MCG: 50 INJECTION INTRAMUSCULAR; INTRAVENOUS at 18:41

## 2022-04-14 RX ADMIN — AMITRIPTYLINE HYDROCHLORIDE 20 MG: 10 TABLET, FILM COATED ORAL at 23:36

## 2022-04-14 RX ADMIN — ACETAMINOPHEN 975 MG: 325 TABLET ORAL at 23:33

## 2022-04-15 ENCOUNTER — APPOINTMENT (OUTPATIENT)
Dept: NON INVASIVE DIAGNOSTICS | Facility: HOSPITAL | Age: 87
End: 2022-04-15
Payer: MEDICARE

## 2022-04-15 VITALS
HEART RATE: 84 BPM | BODY MASS INDEX: 26 KG/M2 | DIASTOLIC BLOOD PRESSURE: 79 MMHG | HEIGHT: 59 IN | TEMPERATURE: 98.1 F | WEIGHT: 129 LBS | OXYGEN SATURATION: 97 % | RESPIRATION RATE: 16 BRPM | SYSTOLIC BLOOD PRESSURE: 160 MMHG

## 2022-04-15 LAB
ANION GAP SERPL CALCULATED.3IONS-SCNC: 7 MMOL/L (ref 4–13)
AORTIC ROOT: 2.9 CM
APICAL FOUR CHAMBER EJECTION FRACTION: 47 %
ASCENDING AORTA: 3.5 CM (ref 1.8–2.7)
AV LVOT PEAK GRADIENT: 2 MMHG
AV PEAK GRADIENT: 10 MMHG
AV REGURGITATION PRESSURE HALF TIME: 266 MS
BUN SERPL-MCNC: 30 MG/DL (ref 5–25)
CALCIUM SERPL-MCNC: 8.4 MG/DL (ref 8.3–10.1)
CHLORIDE SERPL-SCNC: 105 MMOL/L (ref 100–108)
CO2 SERPL-SCNC: 29 MMOL/L (ref 21–32)
CREAT SERPL-MCNC: 1.3 MG/DL (ref 0.6–1.3)
DOP CALC LVOT DIAMETER: 2.1 CM
E WAVE DECELERATION TIME: 125 MS
E/A RATIO: 0.67
EST. AVERAGE GLUCOSE BLD GHB EST-MCNC: 123 MG/DL
FRACTIONAL SHORTENING: 29 (ref 28–44)
GFR SERPL CREATININE-BSD FRML MDRD: 36 ML/MIN/1.73SQ M
GLUCOSE P FAST SERPL-MCNC: 110 MG/DL (ref 65–99)
GLUCOSE SERPL-MCNC: 105 MG/DL (ref 65–140)
GLUCOSE SERPL-MCNC: 110 MG/DL (ref 65–140)
GLUCOSE SERPL-MCNC: 99 MG/DL (ref 65–140)
HBA1C MFR BLD: 5.9 %
INTERVENTRICULAR SEPTUM IN DIASTOLE (PARASTERNAL SHORT AXIS VIEW): 1.1 CM
INTERVENTRICULAR SEPTUM: 1.1 CM (ref 0.49–0.91)
LAAS-AP2: 17.4 CM2
LAAS-AP4: 16 CM2
LEFT ATRIUM AREA SYSTOLE SINGLE PLANE A4C: 16.2 CM2
LEFT ATRIUM SIZE: 3.9 CM
LEFT ATRIUM VOLUME INDEX (MOD BIPLANE): 25.5
LEFT INTERNAL DIMENSION IN SYSTOLE: 2.9 CM (ref 2.28–3.45)
LEFT VENTRICULAR INTERNAL DIMENSION IN DIASTOLE: 4.1 CM (ref 3.71–5.52)
LEFT VENTRICULAR POSTERIOR WALL IN END DIASTOLE: 1 CM (ref 0.47–0.9)
LEFT VENTRICULAR STROKE VOLUME: 41 ML
LVSV (TEICH): 41 ML
MAGNESIUM SERPL-MCNC: 1.8 MG/DL (ref 1.6–2.6)
MITRAL REGURGITATION PEAK VELOCITY: 4.66 M/S
MITRAL VALVE MEAN INFLOW VELOCITY: 3.73 M/S
MITRAL VALVE REGURGITANT PEAK GRADIENT: 87 MMHG
MV E'TISSUE VEL-SEP: 7 CM/S
MV EROA: 0.1 CM2
MV PEAK A VEL: 0.96 M/S
MV PEAK E VEL: 64 CM/S
MV REGURGITANT VOLUME: 12
MV STENOSIS PRESSURE HALF TIME: 36 MS
MV VALVE AREA P 1/2 METHOD: 6.1
PISA MRMAX VEL: 0.3 M/S
PISA RADIUS: 0.4 CM
POTASSIUM SERPL-SCNC: 4 MMOL/L (ref 3.5–5.3)
RIGHT ATRIAL 2D VOLUME: 19 ML
RIGHT ATRIUM AREA SYSTOLE A4C: 10.2 CM2
RIGHT VENTRICLE ID DIMENSION: 2.9 CM
SL CV AV DECELERATION TIME RETROGRADE: 916 MS
SL CV AV PEAK GRADIENT RETROGRADE: 55 MMHG
SL CV DOP CALC MV VTI RETROGRADE: 164.6 CM
SL CV LEFT ATRIUM LENGTH A2C: 5.2 CM
SL CV LV EF: 50
SL CV MV MEAN GRADIENT RETROGRADE: 62 MMHG
SL CV PED ECHO LEFT VENTRICLE DIASTOLIC VOLUME (MOD BIPLANE) 2D: 73 ML
SL CV PED ECHO LEFT VENTRICLE SYSTOLIC VOLUME (MOD BIPLANE) 2D: 33 ML
SODIUM SERPL-SCNC: 141 MMOL/L (ref 136–145)
TR MAX PG: 30 MMHG
TR PEAK VELOCITY: 2.7 M/S
TRICUSPID VALVE PEAK REGURGITATION VELOCITY: 2.73 M/S
Z-SCORE OF INTERVENTRICULAR SEPTUM IN END DIASTOLE: 3.73

## 2022-04-15 PROCEDURE — 93306 TTE W/DOPPLER COMPLETE: CPT | Performed by: INTERNAL MEDICINE

## 2022-04-15 PROCEDURE — 82948 REAGENT STRIP/BLOOD GLUCOSE: CPT

## 2022-04-15 PROCEDURE — 80048 BASIC METABOLIC PNL TOTAL CA: CPT | Performed by: NURSE PRACTITIONER

## 2022-04-15 PROCEDURE — 36415 COLL VENOUS BLD VENIPUNCTURE: CPT | Performed by: NURSE PRACTITIONER

## 2022-04-15 PROCEDURE — 99205 OFFICE O/P NEW HI 60 MIN: CPT | Performed by: INTERNAL MEDICINE

## 2022-04-15 PROCEDURE — 93005 ELECTROCARDIOGRAM TRACING: CPT

## 2022-04-15 PROCEDURE — 83735 ASSAY OF MAGNESIUM: CPT | Performed by: NURSE PRACTITIONER

## 2022-04-15 PROCEDURE — 99217 PR OBSERVATION CARE DISCHARGE MANAGEMENT: CPT | Performed by: INTERNAL MEDICINE

## 2022-04-15 PROCEDURE — 93306 TTE W/DOPPLER COMPLETE: CPT

## 2022-04-15 RX ORDER — ASPIRIN 81 MG/1
81 TABLET, CHEWABLE ORAL DAILY
Status: DISCONTINUED | OUTPATIENT
Start: 2022-04-15 | End: 2022-04-15 | Stop reason: HOSPADM

## 2022-04-15 RX ORDER — TORSEMIDE 10 MG/1
10 TABLET ORAL DAILY
Qty: 30 TABLET | Refills: 0 | Status: SHIPPED | OUTPATIENT
Start: 2022-04-15 | End: 2022-06-22 | Stop reason: SDUPTHER

## 2022-04-15 RX ORDER — TORSEMIDE 10 MG/1
10 TABLET ORAL DAILY
Status: DISCONTINUED | OUTPATIENT
Start: 2022-04-15 | End: 2022-04-15 | Stop reason: HOSPADM

## 2022-04-15 RX ORDER — LISINOPRIL 2.5 MG/1
2.5 TABLET ORAL DAILY
Qty: 30 TABLET | Refills: 0 | Status: SHIPPED | OUTPATIENT
Start: 2022-04-16 | End: 2022-06-22 | Stop reason: SDUPTHER

## 2022-04-15 RX ORDER — METOPROLOL SUCCINATE 25 MG/1
12.5 TABLET, EXTENDED RELEASE ORAL DAILY
Status: DISCONTINUED | OUTPATIENT
Start: 2022-04-16 | End: 2022-04-15 | Stop reason: HOSPADM

## 2022-04-15 RX ORDER — ASPIRIN 81 MG/1
81 TABLET, CHEWABLE ORAL DAILY
Qty: 30 TABLET | Refills: 0
Start: 2022-04-15

## 2022-04-15 RX ORDER — METOPROLOL SUCCINATE 25 MG/1
12.5 TABLET, EXTENDED RELEASE ORAL DAILY
Qty: 30 TABLET | Refills: 0 | Status: SHIPPED | OUTPATIENT
Start: 2022-04-16 | End: 2022-06-22 | Stop reason: SDUPTHER

## 2022-04-15 RX ORDER — LISINOPRIL 2.5 MG/1
2.5 TABLET ORAL DAILY
Qty: 30 TABLET | Refills: 0 | Status: SHIPPED | OUTPATIENT
Start: 2022-04-16 | End: 2022-04-15

## 2022-04-15 RX ADMIN — HEPARIN SODIUM 5000 UNITS: 5000 INJECTION INTRAVENOUS; SUBCUTANEOUS at 07:34

## 2022-04-15 RX ADMIN — PANTOPRAZOLE SODIUM 40 MG: 40 TABLET, DELAYED RELEASE ORAL at 05:41

## 2022-04-15 RX ADMIN — DULOXETINE HYDROCHLORIDE 60 MG: 60 CAPSULE, DELAYED RELEASE ORAL at 08:29

## 2022-04-15 RX ADMIN — PYRIDOXINE HCL TAB 50 MG 100 MG: 50 TAB at 08:29

## 2022-04-15 RX ADMIN — MAGNESIUM OXIDE TAB 400 MG (241.3 MG ELEMENTAL MG) 400 MG: 400 (241.3 MG) TAB at 08:29

## 2022-04-15 RX ADMIN — Medication 1000 UNITS: at 08:29

## 2022-04-15 RX ADMIN — PREGABALIN 50 MG: 50 CAPSULE ORAL at 07:33

## 2022-04-15 RX ADMIN — DICLOFENAC SODIUM 2 G: 10 GEL TOPICAL at 12:45

## 2022-04-15 RX ADMIN — DICLOFENAC SODIUM 2 G: 10 GEL TOPICAL at 08:30

## 2022-04-15 RX ADMIN — PREGABALIN 50 MG: 50 CAPSULE ORAL at 12:44

## 2022-04-15 NOTE — DISCHARGE SUMMARY
[facility] 1934, 80 [facility]  [facility] MRN: [id number]  Unit/Bed#: ED 24 Encounter: [id number]  Primary Care Provider: Chelsy Contreras DO   Date and time admitted to hospital: 4/14/2022  5:35 PM    Chronic diastolic heart failure [facility])  Assessment & Plan  Wt Readings from Last 3 Encounters:   04/15/22 58 5 kg (129 lb)   04/01/22 58 5 kg (129 lb)   03/30/22 58 5 kg (129 lb)    Patient with fine crackles in bibasilar lung fields   CXR: mild vasc congestion, One time dose of 20 mg of IV Lasix in ED   Last echocardiogram from December 4047: LV systolic function was normal  Ejection fraction was estimated to be 60%  There were no RWMA  G1DD   Monitor intake and output, daily weights   Low sodium diet and fluid restriction  Stage 3b chronic kidney disease [facility])  Assessment & Plan  Lab Results   Component Value Date    EGFR 36 04/15/2022    EGFR 33 04/14/2022    EGFR 43 02/23/2022    CREATININE 1 30 04/15/2022    CREATININE 1 42 (H) 04/14/2022    CREATININE 1 13 02/23/2022    Creatinine upon admission: 1 42, mildly elevated compared to baseline  1 3 on repeat 4/15  o Baseline: 1 2      Primary generalized (osteo)arthritis  Assessment & Plan  · Continue Lyrica and Cymbalta  Patient and  reports frequent falls at home with most recent one being about 2 weeks ago  · Fall precautions  DM2 (diabetes mellitus, type 2) [facility])  Assessment & Plan  Lab Results   Component Value Date    HGBA1C 5 9 (H) 04/14/2022       Recent Labs     04/14/22  2311 04/15/22  0732 04/15/22  1114   POCGLU 176* 105 99       Blood Sugar Average: Last 72 hrs:  · (P) 126 [id number] A1C  · Home regimen of metformin, will hold while inpatient  · Accu-Cheks and SSI  · Hypoglycemia protocol      * Chest pain  Assessment & Plan   Patient Presentation: Patient presents with complaints of left sided chest pain with radiation to left ear and left arm that began this morning upon walking up  She reports that her pain feels different then her typical arthritic pain  She reports some shortness of breath associated with the episode  She denies palpitations, nausea, vomiting, fever, chills, headache, dizziness, lightheadedness or abdominal pain   Likely etiology: Cardiac arrhythmia r/t to hypomagnesemia versus Angina versus MSK versus mild volume overload r/t CHF   COVID/Influenza/RSV neg   MICH: 2   Initial Troponin: 6  o Repeat 7      Initial EKG: SR with occasional PVCs, RBBB, heart rate 100, repeat same with similar findings in 2021  V3 TWI new, but not consistent  o Monitor on telemetry   Pain Control: Received 324 mg of ASA, one time of Fentanyl and one time of Dilaudid in ED   Continue OTC Tylenol and Voltaren gel   LDL <50   One time dose of 20 mg of IV Lasix  (vascular congestion noted on CXR)   Echo:         Medical Problems             Resolved Problems  Date Reviewed: 4/14/2022    None              Discharging Physician / Practitioner: Finesse Mcknight MD  PCP: Kevyn Conn DO  Admission Date:   Admission Orders (From admission, onward)     Ordered        04/14/22 2021  Place in Observation  Once                      Discharge Date: 04/15/22    Consultations During Hospital Stay:  · Cardiology- who added lopressor and torsemide for chronic systolic heart failure     Procedures Performed:   Echo: Interpretation Summary         Left Ventricle: Left ventricular cavity size is normal  Wall thickness is normal  The left ventricular ejection fraction is 50%  Systolic function is mildly reduced  There is mild global hypokinesis  There is no concentric hypertrophy    Aortic Valve: There is mild to moderate regurgitation  There is no evidence of stenosis    Mitral Valve: There is mild to moderate regurgitation    Tricuspid Valve: There is mild regurgitation    Pericardium: There is a small to moderate left pleural effusion    ·     Significant Findings / Test Results:   · Creat 1 42 on admission, 1 3 on discharge  · Trop 6 -->7    Incidental Findings:   · none    Test Results Pending at Discharge (will require follow up):   · none     Outpatient Tests Requested:  · Stress test    Complications:  none    Reason for Admission: chest pain    Hospital Course:   Matteo Alvarado is a 80 y o  female patient who originally presented to the hospital on 4/14/2022 due to chest pain  She denies ins dep DM, off lisinopril due to normal BP, LDL 50 without fam hx of sudden cardiac death here with chest pain this morning resolved with dilaudid and fentanyl  EKG with RBBB (old), repeated  Trop 6 to 7  Denies recurrent chest pain over night  Please see above list of diagnoses and related plan for additional information  Condition at Discharge: fair    Discharge Day Visit / Exam:   Subjective:  "I dont have any more chest pain, just back pain"  She denies n/v  She denies sob  She has no abd pain    Vitals: Blood Pressure: 128/63 (04/15/22 1100)  Pulse: 86 (04/15/22 1100)  Temperature: 98 1 °F (36 7 °C) (04/15/22 0000)  Temp Source: Oral (04/15/22 0000)  Respirations: 16 (04/15/22 1100)  Height: 4' 11" (149 9 cm) (04/15/22 0739)  Weight - Scale: 58 5 kg (129 lb) (04/15/22 0739)  SpO2: 91 % (04/15/22 1100)  Exam:   Physical Exam  Vitals and nursing note reviewed  Constitutional:       Appearance: Normal appearance  She is not ill-appearing or diaphoretic  HENT:      Head: Normocephalic  Nose: No congestion  Mouth/Throat:      Pharynx: Oropharynx is clear  No oropharyngeal exudate  Eyes:      General: No scleral icterus  Conjunctiva/sclera: Conjunctivae normal    Cardiovascular:      Rate and Rhythm: Normal rate and regular rhythm  Pulmonary:      Effort: Pulmonary effort is normal  No respiratory distress  Breath sounds: No wheezing or rales  Abdominal:      General: Bowel sounds are normal  There is no distension        Palpations: Abdomen is soft  Tenderness: There is no abdominal tenderness  Genitourinary:     Comments: No diaz    Musculoskeletal:      Cervical back: Normal range of motion and neck supple  No rigidity  Right lower leg: No edema  Left lower leg: No edema  Skin:     General: Skin is warm  Coloration: Skin is not jaundiced  Neurological:      Mental Status: She is alert  Psychiatric:         Mood and Affect: Mood normal          Behavior: Behavior normal           Discussion with Family: Updated  ( and granddaughter) at bedside  Discharge instructions/Information to patient and family:   See after visit summary for information provided to patient and family  Provisions for Follow-Up Care:  See after visit summary for information related to follow-up care and any pertinent home health orders  Disposition:   Home       Discharge Statement:  I spent 25 minutes discharging the patient  This time was spent on the day of discharge  I had direct contact with the patient on the day of discharge  Greater than 50% of the total time was spent examining patient, answering all patient questions, arranging and discussing plan of care with patient as well as directly providing post-discharge instructions  Additional time then spent on discharge activities  Discharge Medications:  See after visit summary for reconciled discharge medications provided to patient and/or family

## 2022-04-15 NOTE — ASSESSMENT & PLAN NOTE
· Continue Lyrica and Cymbalta  · Patient and  reports frequent falls at home with most recent one being about 2 weeks ago  · PT/OT evaluations  · Fall precautions

## 2022-04-15 NOTE — ASSESSMENT & PLAN NOTE
Lab Results   Component Value Date    HGBA1C 5 9 (H) 04/14/2022       Recent Labs     04/14/22  2311 04/15/22  0732 04/15/22  1114   POCGLU 176* 105 99       Blood Sugar Average: Last 72 hrs:  · (P) 126 [id number] A1C  · Home regimen of metformin, will hold while inpatient  · Accu-Cheks and SSI  · Hypoglycemia protocol

## 2022-04-15 NOTE — ASSESSMENT & PLAN NOTE
 Patient Presentation: Patient presents with complaints of left sided chest pain with radiation to left ear and left arm that began this morning upon walking up  She reports that her pain feels different then her typical arthritic pain  She reports some shortness of breath associated with the episode  She denies palpitations, nausea, vomiting, fever, chills, headache, dizziness, lightheadedness or abdominal pain   Likely etiology: Cardiac arrhythmia r/t to hypomagnesemia versus Angina versus MSK versus mild volume overload r/t CHF   COVID/Influenza/RSV pending   MICH: 2   Initial Troponin: 6  o Trend x3 or to peak   Initial EKG: SR with occasional PVCs, RBBB, heart rate 100  o Monitor on telemetry   Pain Control: Received 324 mg of ASA, one time of Fentanyl and one time of Dilaudid in ED   Continue OTC Tylenol and Voltaren gel   Check fasting lipid panel and A1c      One time dose of 20 mg of IV Lasix   Obtain ECHO   Admit under Observation Status

## 2022-04-15 NOTE — DISCHARGE INSTRUCTIONS
Chest Pain   WHAT YOU NEED TO KNOW:   Chest pain can be caused by a range of conditions, from not serious to life-threatening  Chest pain can be a symptom of a digestive problem, such as acid reflux or a stomach ulcer  An anxiety attack or a strong emotion, such as anger, can also cause chest pain  Infection, inflammation, or a fracture in the bones or cartilage in your chest can cause pain or discomfort  Sometimes chest pain or pressure is caused by poor blood flow to your heart (angina)  Chest pain may also be caused by life-threatening conditions such as a heart attack or blood clot in your lungs  DISCHARGE INSTRUCTIONS:   Call your local emergency number (911 in the [address] [facility]) or have someone call if:   · You have any of the following signs of a heart attack:      ? Squeezing, pressure, or pain in your chest    ? You may  also have any of the following:     § Discomfort or pain in your back, neck, jaw, stomach, or arm    § Shortness of breath    § Nausea or vomiting    § Lightheadedness or a sudden cold sweat      Return to the emergency department if:   · You have chest discomfort that gets worse, even with medicine  · You cough or vomit blood  · Your bowel movements are black or bloody  · You cannot stop vomiting, or it hurts to swallow  Call your doctor if:   · You have questions or concerns about your condition or care  Medicines:   · Medicines  may be given to treat the cause of your chest pain  Examples include pain medicine, anxiety medicine, or medicines to increase blood flow to your heart  · Do not take certain medicines without asking your healthcare provider first   These include NSAIDs, herbal or vitamin supplements, or hormones (estrogen or progestin)  · Take your medicine as directed  Contact your healthcare provider if you think your medicine is not helping or if you have side effects  Tell him or her if you are allergic to any medicine   Keep a list of the medicines, vitamins, and herbs you take  Include the amounts, and when and why you take them  Bring the list or the pill bottles to follow-up visits  Carry your medicine list with you in case of an emergency  Healthy living tips: The following are general healthy guidelines  If the cause of your chest pain is known, your healthcare provider will give you specific guidelines to follow  · Do not smoke  Nicotine and other chemicals in cigarettes and cigars can cause lung and heart damage  Ask your healthcare provider for information if you currently smoke and need help to quit  E-cigarettes or smokeless tobacco still contain nicotine  Talk to your healthcare provider before you use these products  · Choose a variety of healthy foods as often as possible  Include fresh, frozen, or canned fruits and vegetables  Also include low-fat dairy products, fish, chicken (without skin), and lean meats  Your healthcare provider or a dietitian can help you create meal plans  You may need to avoid certain foods or drinks if your pain is caused by a digestion problem  · Lower your sodium (salt) intake  Limit foods that are high in sodium, such as canned foods, salty snacks, and cold cuts  If you add salt when you cook food, do not add more at the table  Choose low-sodium canned foods as much as possible  · Drink plenty of water every day  Water helps your body to control your temperature and blood pressure  Ask your healthcare provider how much water you should drink every day  · Ask about activity  Your healthcare provider will tell you which activities to limit or avoid  Ask when you can drive, return to work, and have sex  Ask about the best exercise plan for you  · Maintain a healthy weight  Ask your healthcare provider what a healthy weight is for you  Ask him or her to help you create a safe weight loss plan if you are overweight  · Ask about vaccines you may need    Get the influenza (flu) vaccine every year as soon as recommended, usually in September or October  You may also need a pneumococcal vaccine to prevent pneumonia  The vaccine is usually given every 5 years, starting at age 72  Your healthcare provider can tell you if should get other vaccines, and when to get them  Follow up with your healthcare provider within 72 hours, or as directed: You may need to return for more tests to find the cause of your chest pain  You may be referred to a specialist, such as a cardiologist or gastroenterologist  Write down your questions so you remember to ask them during your visits  © Copyright [employer] 2022 Information is for End User's use only and may not be sold, redistributed or otherwise used for commercial purposes  All illustrations and images included in CareNotes® are the copyrighted property of [employer]  or Wilfredo Whyte  The above information is an  only  It is not intended as medical advice for individual conditions or treatments  Talk to your doctor, nurse or pharmacist before following any medical regimen to see if it is safe and effective for you

## 2022-04-15 NOTE — CONSULTS
Consultation - Cardiology Team [address] [facility] 80 y o  female MRN: [id number]  Unit/Bed#: ED 24 Encounter: [id number]    Assessment/Plan     Principal Problem:    Chest pain  Active Problems:    Hypomagnesemia    DM2 (diabetes mellitus, type 2) ([facility], [place] [facility] )    Primary generalized (osteo)arthritis    Stage 3b chronic kidney disease ([facility], [place] [facility] )    Leukocytosis    Chronic diastolic heart failure (HCC)    Overactive bladder      Assessment/Plan    1  Chest pain - left sided with radiation down Lt arm and Lt neck to ear  No reproducibility/not pleuritic  No history of chest pain  Despite prolonged pain, Negative troponin x3  EKG- NSR RBBB (chronic)  CXR- mild pulmonary vascular congestion with small effusions  She has already been ordered an outpatient stress test and follow-up with Dr William Villarreal  Family is satisfied with this plan  Will have her follow through with this  Adding toprol 12 5mg daily, asa 81mg daily      2  CMP- current echo mild LV dysfunction with an EF of 50%  Mild global hypokinesis  ProBNP 601  Denies history of heart failure  She is on no outpatient diuretics  S/P IV lasix 20mg X1 with minimal diuresis  CXR- mild pulmonary vascular congestion with small effusions  No c/o SOB  No hypoxia  Exam- Not edematous  Few crackles bases  +JVD  Will add torsemide 10mg daily  to regimen  Reviewed Na restriction with patient and   Lisinopril has been resumed ( was previously on )    3  Valvular heart disease   Mild-to-moderate AI/mild-to-moderate MR   Outpatient follow up  Optimizing volume    4  CKD III-baseline creatinine 1 2-1 4  Stable  Will need to monitor renal function with adding maintenance diuretic    5  Recurrent anemia  W/u - low ferritin  No bleeding source identified  She has been followed by Hematology  H/H appears stable past 2 years       6  Hypo magnesemia-has been repleted  Will need close monitoring with addition of loop diuretic      History of Present Illness   Physician Requesting Consult: Jill Nina MD  Reason for Consult / Principal Problem:     HPI: Alyssa Hobbs is a 80y o  year old female with CKD III, type 2 diabetes, osteoarthritis with generalized osteoarthritis with lumbar spondylosis/chronic back pain and neurogenic symptoms, anemia  who presents with left-sided chest pain with radiation down the left arm and up the left neck to the ear  Started in the morning and worsened throughout the day  Patient was out to dinner and the discomfort was ongoing and the decision was to seek medical attention  In the ED she was given 25 mcg of fentanyl with no relief  This was followed by 0 5 mg of Dilaudid with relief  Her discomfort has been gone since  She was observed in the emergency overnight  Troponins are negative x3  EKG without ischemic changes  She was being prepared for discharge in her echocardiogram results came back with mild LV dysfunction LVEF 50% with mild global hypokinesis  In comparison to her echocardiogram 2016 by report, this is a mild change  Cardiology was consulted for management  Patient is anxious for discharge  Her  is at the bedside  She does not follow cardiologist   Denies prior cardiac history including heart failure  She is on no outpatient diuretic  Her activity is limited due to chronic pain  She ambulates with an assistive device  Any prolonged activity she has taken by wheelchair  No history of exertional dyspnea or chest pain  No PND orthopnea  No recent edema  HS troponin- 6/6/7  ProBNP 601    TTE 12/2016- LVEF 60%  Grade 1 diastolic dysfunction  Mild concentric LVH  Mild AI  Inpatient consult to Cardiology  Consult performed by: JOSE G Santo  Consult ordered by: Jill Nina MD          Review of Systems   Constitutional: Negative  HENT: Negative  Eyes: Negative  Respiratory: Negative for cough and shortness of breath  Cardiovascular: Positive for chest pain   Negative for palpitations and leg swelling  Gastrointestinal: Negative  Endocrine: Negative  Genitourinary: Negative  Musculoskeletal: Positive for arthralgias, gait problem and neck pain  Skin: Negative  Neurological: Negative for dizziness, syncope and light-headedness  Hematological: Bruises/bleeds easily  Psychiatric/Behavioral: Negative  All other systems reviewed and are negative  Historical Information   Past Medical History:   Diagnosis Date    Anemia     Arthritis     Back pain     CHF (congestive heart failure) (HCC)     Chronic kidney disease     Stage 3b    Diabetes ([place] [address] )     Diabetes mellitus ([place] [address] )     Diabetic gastroparesis associated with type 2 diabetes mellitus ([place] [address] )     Diabetic polyneuropathy ([place] [address] )     Diverticulosis     Herpes zoster     Hypertension     IBS (irritable bowel syndrome)     Neurogenic claudication due to lumbar spinal stenosis     Osteoarthritis     Osteoporosis     Seronegative arthropathy of multiple sites [facility])      Past Surgical History:   Procedure Laterality Date    BACK SURGERY      EGD AND COLONOSCOPY N/A 2016    Procedure: EGD AND COLONOSCOPY;  Surgeon: Blake Anne MD;  [facility];   Service:     JOINT REPLACEMENT      bilat knees and hips    OTHER SURGICAL HISTORY      pelvis rods    REPLACEMENT TOTAL KNEE BILATERAL      STOMACH SURGERY       Social History     Substance and Sexual Activity   Alcohol Use No     Social History     Substance and Sexual Activity   Drug Use No     Social History     Tobacco Use   Smoking Status Former Smoker    Types: Cigarettes    Quit date: 65    Years since quittin 3   Smokeless Tobacco Never Used     Family History:   Family History   Problem Relation Age of Onset    Cancer Brother     Diabetes Mother     Hypertension Father     Heart disease Father        Meds/Allergies   current meds:   Current Facility-Administered Medications   Medication Dose Route Frequency    acetaminophen (TYLENOL) tablet 975 mg  975 mg Oral Q8H    aluminum-magnesium hydroxide-simethicone (MYLANTA) oral suspension 30 mL  30 mL Oral Q6H PRN    amitriptyline (ELAVIL) tablet 20 mg  20 mg Oral HS    cholecalciferol (VITAMIN D3) tablet 1,000 Units  1,000 Units Oral Daily    Diclofenac Sodium (VOLTAREN) 1 % topical gel 2 g  2 g Topical 4x Daily    dicyclomine (BENTYL) capsule 10 mg  10 mg Oral 4x Daily (AC & HS)    DULoxetine (CYMBALTA) delayed release capsule 60 mg  60 mg Oral Daily    heparin (porcine) subcutaneous injection 5,000 Units  5,000 Units Subcutaneous Q8H [facility]    HYDROmorphone (DILAUDID) injection 0 5 mg  0 5 mg Intravenous Q6H PRN    insulin lispro (HumaLOG) 100 units/mL subcutaneous injection 1-5 Units  1-5 Units Subcutaneous TID AC    insulin lispro (HumaLOG) 100 units/mL subcutaneous injection 1-5 Units  1-5 Units Subcutaneous HS    lisinopril (ZESTRIL) tablet 2 5 mg  2 5 mg Oral Daily    magnesium oxide (MAG-OX) tablet 400 mg  400 mg Oral Daily    ondansetron (ZOFRAN) injection 4 mg  4 mg Intravenous Q6H PRN    oxybutynin (DITROPAN-XL) 24 hr tablet 10 mg  10 mg Oral Daily    pantoprazole (PROTONIX) EC tablet 40 mg  40 mg Oral Early Morning    pregabalin (LYRICA) capsule 50 mg  50 mg Oral TID    pregabalin (LYRICA) capsule 75 mg  75 mg Oral HS    pyridoxine (VITAMIN B6) tablet 100 mg  100 mg Oral Daily    and PTA meds:  (Not in a hospital admission)    Allergies   Allergen Reactions    Morphine Other (See Comments)     urinary retention    Ciprofloxacin Rash and Nausea Only       Objective   Vitals: Blood pressure 128/63, pulse 86, temperature 98 1 °F (36 7 °C), temperature source Oral, resp  rate 16, height 4' 11" (1 499 m), weight 58 5 kg (129 lb), SpO2 91 %, not currently breastfeeding    Orthostatic Blood Pressures      Most Recent Value   Blood Pressure 128/63 filed at 04/15/2022 1100   Patient Position - Orthostatic VS Lying filed at 04/15/2022 1100          No intake or output data in the 24 hours ending 04/15/22 1413    Invasive Devices  Report    Peripheral Intravenous Line            Peripheral IV 04/14/22 Left Antecubital <1 day                Physical Exam: /63 (BP Location: Right arm)   Pulse 86   Temp 98 1 °F (36 7 °C) (Oral)   Resp 16   Ht 4' 11" (1 499 m)   Wt 58 5 kg (129 lb)   LMP  (LMP Unknown)   SpO2 91%   BMI 26 05 kg/m²   General Appearance:    Alert, cooperative, no distress, appears stated age   Head:    Normocephalic, no scleral icterus   Eyes:    PERRL   Nose:   Nares normal, septum midline, mucosa normal, no drainage    Throat:   Lips, mucosa, and tongue normal   Neck:   Supple, symmetrical, trachea midline     +JVD   Back:     Symmetric   Lungs:     Few crackles  to auscultation bilaterally, respirations unlabored   Chest Wall:    No tenderness or deformity    Heart:    Regular rate and rhythm, S1 and S2 normal, no murmur, rub   or gallop   Abdomen:     Soft, non-tender   Extremities:   Extremities normal, atraumatic, no cyanosis or edema   Pulses:   2+ and symmetric all extremities   Skin:   Skin color, texture, turgor normal, no rashes or lesions   Neurologic:   Alert and oriented to person place and time   No focal deficits       Lab Results:   Recent Results (from the past 72 hour(s))   ECG 12 lead    Collection Time: 04/14/22  5:43 PM   Result Value Ref Range    Ventricular Rate 100 BPM    Atrial Rate 100 BPM    ME Interval 192 ms    QRSD Interval 152 ms    QT Interval 396 ms    QTC Interval 510 ms    P Axis 31 degrees    QRS Axis 102 degrees    T Wave Axis -16 degrees   CBC and differential    Collection Time: 04/14/22  6:37 PM   Result Value Ref Range    WBC 11 14 (H) 4 31 - 10 16 Thousand/uL    RBC 3 99 3 81 - 5 12 Million/uL    Hemoglobin 12 8 11 5 - 15 4 g/dL    Hematocrit 40 1 34 8 - 46 1 %     (H) 82 - 98 fL    MCH 32 1 26 8 - 34 3 pg    MCHC 31 9 31 4 - 37 4 g/dL    RDW 13 7 11 6 - 15 1 %    MPV 12 1 8 9 - 12 7 fL Platelets 943 251 - 382 Thousands/uL    nRBC 0 /100 WBCs    Neutrophils Relative 76 (H) 43 - 75 %    Immat GRANS % 1 0 - 2 %    Lymphocytes Relative 10 (L) 14 - 44 %    Monocytes Relative 12 4 - 12 %    Eosinophils Relative 1 0 - 6 %    Basophils Relative 0 0 - 1 %    Neutrophils Absolute 8 44 (H) 1 85 - 7 62 Thousands/µL    Immature Grans Absolute 0 08 0 00 - 0 20 Thousand/uL    Lymphocytes Absolute 1 16 0 60 - 4 47 Thousands/µL    Monocytes Absolute 1 29 (H) 0 17 - 1 22 Thousand/µL    Eosinophils Absolute 0 13 0 00 - 0 61 Thousand/µL    Basophils Absolute 0 04 0 00 - 0 10 Thousands/µL   Comprehensive metabolic panel    Collection Time: 04/14/22  6:37 PM   Result Value Ref Range    Sodium 139 136 - 145 mmol/L    Potassium 4 0 3 5 - 5 3 mmol/L    Chloride 103 100 - 108 mmol/L    CO2 26 21 - 32 mmol/L    ANION GAP 10 4 - 13 mmol/L    BUN 31 (H) 5 - 25 mg/dL    Creatinine 1 42 (H) 0 60 - 1 30 mg/dL    Glucose 159 (H) 65 - 140 mg/dL    Calcium 8 8 8 3 - 10 1 mg/dL    Corrected Calcium 9 3 8 3 - 10 1 mg/dL    AST 35 5 - 45 U/L    ALT 51 12 - 78 U/L    Alkaline Phosphatase 90 46 - 116 U/L    Total Protein 6 9 6 4 - 8 2 g/dL    Albumin 3 4 (L) 3 5 - 5 0 g/dL    Total Bilirubin 0 37 0 20 - 1 00 mg/dL    eGFR 33 ml/min/1 73sq m   D-Dimer    Collection Time: 04/14/22  6:37 PM   Result Value Ref Range    D-Dimer, Quant 0 87 (H) <0 50 ug/ml FEU   HS Troponin 0hr (reflex protocol)    Collection Time: 04/14/22  6:37 PM   Result Value Ref Range    hs TnI 0hr 6 "Refer to ACS Flowchart"- see link ng/L   Magnesium    Collection Time: 04/14/22  6:37 PM   Result Value Ref Range    Magnesium 1 5 (L) 1 6 - 2 6 mg/dL   Lipase    Collection Time: 04/14/22  6:37 PM   Result Value Ref Range    Lipase 14 (L) 73 - 393 u/L   NT-BNP PRO    Collection Time: 04/14/22  6:37 PM   Result Value Ref Range    NT-proBNP 601 (H) <450 pg/mL   Hemoglobin A1c w/EAG Estimation (Orders if not completed within the last 90 days)    Collection Time: 04/14/22 6:37 PM   Result Value Ref Range    Hemoglobin A1C 5 9 (H) Normal 3 8-5 6%; PreDiabetic 5 7-6 4%;  Diabetic >=6 5%; Glycemic control for adults with diabetes <7 0% %     mg/dl   Lipid Panel with Direct LDL reflex    Collection Time: 04/14/22  6:37 PM   Result Value Ref Range    Cholesterol 154 See Comment mg/dL    Triglycerides 98 See Comment mg/dL    HDL, Direct 85 >=50 mg/dL    LDL Calculated 49 0 - 100 mg/dL   HS Troponin I 2hr    Collection Time: 04/14/22  8:30 PM   Result Value Ref Range    hs TnI 2hr 6 "Refer to ACS Flowchart"- see link ng/L    Delta 2hr hsTnI 0 <20 ng/L   COVID/FLU/RSV    Collection Time: 04/14/22  8:56 PM    Specimen: Nose; Nares   Result Value Ref Range    SARS-CoV-2 Negative Negative    INFLUENZA A PCR Negative Negative    INFLUENZA B PCR Negative Negative    RSV PCR Negative Negative   HS Troponin I 4hr    Collection Time: 04/14/22 11:08 PM   Result Value Ref Range    hs TnI 4hr 7 "Refer to ACS Flowchart"- see link ng/L    Delta 4hr hsTnI 1 <20 ng/L   Fingerstick Glucose (POCT)    Collection Time: 04/14/22 11:11 PM   Result Value Ref Range    POC Glucose 176 (H) 65 - 140 mg/dl   Basic metabolic panel    Collection Time: 04/15/22  5:40 AM   Result Value Ref Range    Sodium 141 136 - 145 mmol/L    Potassium 4 0 3 5 - 5 3 mmol/L    Chloride 105 100 - 108 mmol/L    CO2 29 21 - 32 mmol/L    ANION GAP 7 4 - 13 mmol/L    BUN 30 (H) 5 - 25 mg/dL    Creatinine 1 30 0 60 - 1 30 mg/dL    Glucose 110 65 - 140 mg/dL    Glucose, Fasting 110 (H) 65 - 99 mg/dL    Calcium 8 4 8 3 - 10 1 mg/dL    eGFR 36 ml/min/1 73sq m   Magnesium    Collection Time: 04/15/22  5:40 AM   Result Value Ref Range    Magnesium 1 8 1 6 - 2 6 mg/dL   Fingerstick Glucose (POCT)    Collection Time: 04/15/22  7:32 AM   Result Value Ref Range    POC Glucose 105 65 - 140 mg/dl   Echo complete w/ contrast if indicated    Collection Time: 04/15/22  8:09 AM   Result Value Ref Range    A4C EF 47 %    LVIDd 4 10 3 71 - 5 52 cm LVIDS 2 90 2 28 - 3 45 cm    IVSd 1 10 cm    LVPWd 1 00 0 47 - 0 90 cm    FS 29 28 - 44    MV E' Tissue Velocity Septal 7 cm/s    LA Volume Index (BP) 25 5     E/A ratio 0 67     E wave deceleration time 125 ms    MV Peak E Homero 64 cm/s    MV Peak A Homero 0 96 m/s    AV LVOT peak gradient 2 mmHg    RVID d 2 9 cm    LA size 3 9 cm    LA length (A2C) 5 20 cm    DAVID A4C 16 2 cm2    RA 2D Volume 19 0 mL    RAA A4C 10 2 cm2    LVOT diameter 2 1 cm    AV peak gradient 10 mmHg    AV peak gradient 55 mmHg    AV Deceleration Time 916 ms    AV regurgitation pressure 1/2 time 266 ms    MV VTI RETROGRADE 164 6 cm    MV stenosis pressure 1/2 time 36 ms    MV valve area p 1/2 method 6 10     MV mean gradient retrograde 62 mmHg    MR PG 87 mmHg    MV regurgitant volume 12 0     MV ERO 0 10 cm2    Radius 0 4 cm    Mr max homero 0 30 m/s    TR Peak Homero 2 7 m/s    Triscuspid Valve Regurgitation Peak Gradient 30 0 mmHg    Ao root 2 90 cm    Asc Ao 3 5 1 80 - 2 70 cm    Mitral regurgitation peak velocity 4 66 m/s    Mitral valve mean inflow velocity 3 73 m/s    Tricuspid valve peak regurgitation velocity 2 73 m/s    Left ventricular stroke volume (2D) 41 00 mL    IVS 1 1 0 49 - 0 91 cm    LEFT VENTRICLE SYSTOLIC VOLUME (MOD BIPLANE) 2D 33 mL    LV DIASTOLIC VOLUME (MOD BIPLANE) 2D 73 mL    Left Atrium Area-systolic Four Chamber 16 cm2    Left Atrium Area-systolic Apical Two Chamber 17 4 cm2    LVSV, 2D 41 mL    LV EF 50     ZIVSD 3 73    Fingerstick Glucose (POCT)    Collection Time: 04/15/22 11:14 AM   Result Value Ref Range    POC Glucose 99 65 - 140 mg/dl     Imaging: I have personally reviewed pertinent reports  EKG: NSR RBBB    Left Ventricle Left ventricular cavity size is normal  Wall thickness is normal  There is no concentric hypertrophy  The left ventricular ejection fraction is 50%  Systolic function is mildly reduced  There is mild global hypokinesis     Right Ventricle Right ventricular cavity size is normal  Systolic function is normal  Wall thickness is normal    Left Atrium The atrium is normal in size  Right Atrium The atrium is normal in size  Aortic Valve The aortic valve is trileaflet  The leaflets are not thickened  The leaflets are mildly calcified  The leaflets exhibit normal mobility  There is mild to moderate regurgitation  There is no evidence of stenosis  Mitral Valve The mitral valve has normal structure and function  There is mild to moderate regurgitation  There is no evidence of stenosis  Tricuspid Valve Tricuspid valve structure is normal  There is mild regurgitation  There is no evidence of stenosis  Pulmonic Valve Pulmonic valve structure is normal  There is no evidence of regurgitation  There is no evidence of stenosis  Ascending Aorta The aortic root is normal in size  IVC/SVC The inferior vena cava is normal in size  Pericardium There is no pericardial effusion  The pericardium is normal in appearance  There is a small to moderate left pleural effusion  Code Status: Level 1 - Full Code  Advance Directive and Living Will:      Power of :    POLST:      Counseling / Coordination of Care  Total floor / unit time spent today 45 minutes  Greater than 50% of total time was spent with the patient and / or family counseling and / or coordination of care

## 2022-04-15 NOTE — ED NOTES
Offered to order lunch, patient declined at this time        Martha Sandoval, BENJAMÍN  04/15/22 8311

## 2022-04-15 NOTE — H&P
[address] 1934, 80 y o  female MRN: [id number]  Unit/Bed#: ED 24 Encounter: [id number]  Primary Care Provider: Shanta Pérez DO   Date and time admitted to hospital: 4/14/2022  5:35 PM    * Chest pain  Assessment & Plan   Patient Presentation: Patient presents with complaints of left sided chest pain with radiation to left ear and left arm that began this morning upon walking up  She reports that her pain feels different then her typical arthritic pain  She reports some shortness of breath associated with the episode  She denies palpitations, nausea, vomiting, fever, chills, headache, dizziness, lightheadedness or abdominal pain   Likely etiology: Cardiac arrhythmia r/t to hypomagnesemia versus Angina versus MSK versus mild volume overload r/t CHF   COVID/Influenza/RSV pending   MICH: 2   Initial Troponin: 6  o Trend x3 or to peak   Initial EKG: SR with occasional PVCs, RBBB, heart rate 100  o Monitor on telemetry   Pain Control: Received 324 mg of ASA, one time of Fentanyl and one time of Dilaudid in ED   Continue OTC Tylenol and Voltaren gel   Check fasting lipid panel and A1c      One time dose of 20 mg of IV Lasix   Obtain ECHO   Admit under Observation Status  Hypomagnesemia  Assessment & Plan  · Magnesium level: 1 5   Replete  Chronic diastolic heart failure (HCC)  Assessment & Plan  Wt Readings from Last 3 Encounters:   04/01/22 58 5 kg (129 lb)   03/30/22 58 5 kg (129 lb)   03/02/22 56 2 kg (123 lb 12 8 oz)    Patient with fine crackles in bibasilar lung fields   CXR: Official read pending   One time dose of 20 mg of IV Lasix in ED   Last echocardiogram from December 2356: LV systolic function was normal  Ejection fraction was estimated to be 60%  There were no RWMA  G1DD   Monitor intake and output, daily weights   Low sodium diet and fluid restriction   Obtain ECHO      Leukocytosis  Assessment & Plan  · Present on admission, WBCs of 11 14  · Suspect reactive  · Trend CBC  · Monitor fever curve  DM2 (diabetes mellitus, type 2) ([facility] [address] )  Assessment & Plan  Lab Results   Component Value Date    HGBA1C 6 0 (H) 08/25/2020       No results for input(s): POCGLU in the last 72 hours  Blood Sugar Average: Last 72 hrs:  · Obtain A1C  · Home regimen of metformin, will hold while inpatient  · Accu-Cheks and SSI  · Hypoglycemia protocol  Stage 3b chronic kidney disease [facility])  Assessment & Plan  Lab Results   Component Value Date    EGFR 33 04/14/2022    EGFR 43 02/23/2022    EGFR 38 12/21/2021    CREATININE 1 42 (H) 04/14/2022    CREATININE 1 13 02/23/2022    CREATININE 1 27 12/21/2021    Creatinine upon admission: 1 42, mildly elevated compared to baseline  o Baseline: 1 2   Monitor BMP  Overactive bladder  Assessment & Plan  · Patient known to [facility] as an outpatient  · Home regimen of Mirabegron which is non-formulary, will replace with Oxybutynin while inpatient  Primary generalized (osteo)arthritis  Assessment & Plan  · Continue Lyrica and Cymbalta  · Patient and  reports frequent falls at home with most recent one being about 2 weeks ago  · PT/OT evaluations  · Fall precautions  VTE Pharmacologic Prophylaxis: VTE Score: 4 Moderate Risk (Score 3-4) - Pharmacological DVT Prophylaxis Ordered: heparin  Code Status: Level 1 - Full Code per patient and   Discussion with family: Updated  ( and son in law) at bedside  Anticipated Length of Stay: Patient will be admitted on an observation basis with an anticipated length of stay of less than 2 midnights secondary to ACS rule out  Total Time for Visit, including Counseling / Coordination of Care: 70 minutes Greater than 50% of this total time spent on direct patient counseling and coordination of care  Chief Complaint: Chest pain    History of Present Illness:  No Devlin is a 80 y o  female with a PMH of CHF, CKD III, DM2, Arthritis, Anemia, IBS, HTN and lumbar spondylosis who presents with chest pain  Patient presents with complaints of left sided chest pain with radiation to left ear and left arm that began this morning upon walking up  She reports that her pain feels different then her typical arthritic pain  She reports some shortness of breath associated with the episode  She denies palpitations, nausea, vomiting, fever, chills, headache, dizziness, lightheadedness or abdominal pain  Patient's denies any outpatient diuretic use  Patient will be admitted for ACS rule out  Review of Systems:  Review of Systems   Constitutional: Negative for chills and fever  Respiratory: Positive for cough and shortness of breath  Negative for chest tightness  Cardiovascular: Positive for chest pain  Negative for palpitations  Gastrointestinal: Negative for abdominal pain, constipation, nausea and vomiting  Genitourinary: Positive for frequency (At baseline)  Musculoskeletal: Positive for arthralgias and myalgias  Neurological: Negative for dizziness and light-headedness  All other systems reviewed and are negative        Past Medical and Surgical History:   Past Medical History:   Diagnosis Date    Anemia     Arthritis     Back pain     CHF (congestive heart failure) (HCC)     Chronic kidney disease     Stage 3b    Diabetes ([facility] [address] )     Diabetes mellitus ([facility] [address] )     Diabetic gastroparesis associated with type 2 diabetes mellitus ([facility] [address] )     Diabetic polyneuropathy ([facility] [address] )     Diverticulosis     Herpes zoster     Hypertension     IBS (irritable bowel syndrome)     Neurogenic claudication due to lumbar spinal stenosis     Osteoarthritis     Osteoporosis     Seronegative arthropathy of multiple sites [facility])        Past Surgical History:   Procedure Laterality Date    BACK SURGERY      EGD AND COLONOSCOPY N/A 12/23/2016    Procedure: EGD AND COLONOSCOPY;  Surgeon: Morro Willis MD;  [facility]; Service:     JOINT REPLACEMENT      bilat knees and hips    OTHER SURGICAL HISTORY      pelvis rods    REPLACEMENT TOTAL KNEE BILATERAL      STOMACH SURGERY         Meds/Allergies:  Prior to Admission medications    Medication Sig Start Date End Date Taking?  Authorizing Provider   ALPRAZolam Brendalyn Savanah) 0 25 mg tablet alprazolam 0 25 mg tablet  Patient not taking: Reported on 4/1/2022    Historical Provider, MD   amitriptyline (ELAVIL) 10 mg tablet Take 2 tablets (20 mg total) by mouth daily at bedtime 11/18/21 5/17/22  Adan Olvera MD   ASPIRIN 81 PO Take 81 mg by mouth    Patient not taking: Reported on 4/1/2022  3/19/12   Historical Provider, MD   calcium carbonate-vitamin D (OSCAL-D) 500 mg-200 units per tablet Take 1 tablet by mouth daily with breakfast    Patient not taking: Reported on 4/1/2022     Historical Provider, MD   Cholecalciferol (VITAMIN D3) 1000 units CAPS Take by mouth    Historical Provider, MD   diclofenac sodium (VOLTAREN) 1 % Voltaren 1 % topical gel  Patient not taking: Reported on 4/1/2022    Historical Provider, MD   Diclofenac Sodium (VOLTAREN) 1 % Apply 2 g topically 4 (four) times a day  Patient not taking: Reported on 4/1/2022 9/20/21   Martín Godoy MD   dicyclomine (BENTYL) 10 mg capsule Take 10 mg by mouth daily    Patient not taking: Reported on 4/1/2022     Historical Provider, MD   dicyclomine (BENTYL) 10 mg capsule dicyclomine 10 mg capsule  Patient not taking: Reported on 4/1/2022    Historical Provider, MD   diphenoxylate-atropine (LOMOTIL) 2 5-0 025 mg per tablet Take 1 tablet by mouth if needed      Historical Provider, MD   docusate sodium (COLACE) 100 mg capsule Take 1 capsule by mouth 2 (two) times a day for 30 days  Patient not taking: Reported on 2/14/2020 12/24/16 1/23/17  Carly Pinzon MD   DULoxetine (CYMBALTA) 60 mg delayed release capsule TAKE ONE CAPSULE BY MOUTH EVERY DAY 3/3/22   Brandon Malone PA-C   ferrous sulfate 325 (65 Fe) mg tablet Take 1 tablet (325 mg total) by mouth daily  Patient not taking: Reported on 4/1/2022 11/19/19   Caden Kapoor DO   gabapentin (NEURONTIN) 300 mg capsule Take 300 mg by mouth 3 (three) times a day    Patient not taking: Reported on 4/1/2022     Historical Provider, MD   hydroxychloroquine (PLAQUENIL) 200 mg tablet Take 1 tablet (200 mg total) by mouth 2 (two) times a day with meals 8/3/21 4/1/22  Elena Hou PA-C   lisinopril (ZESTRIL) 2 5 mg tablet Take 2 5 mg by mouth      Historical Provider, MD   Magnesium 250 MG TABS Take 250 mg by mouth in the morning    Historical Provider, MD   metFORMIN (GLUCOPHAGE) 500 mg tablet TAKE ONE TABLET BY MOUTH TWICE A DAY WITH MORNING AND EVENING MEALS 1/27/22   Historical Provider, MD   Mirabegron ER (Myrbetriq) 50 MG TB24 Take 1 tablet (50 mg total) by mouth daily 4/1/22   JOSE G Oscar   omeprazole (PRILOSEC) 20 mg delayed release capsule Take by mouth      Historical Provider, MD   ondansetron (ZOFRAN-ODT) 4 mg disintegrating tablet TAKE 1 TABLET BY MOUTH EVERY 8 HOURS FOR 2 DAYS AND PLACE ON TOP OF THE TONGUE WHERE IT WILL DISSOLVE, THEN SWALLOW 10/6/21   Historical Provider, MD   pregabalin (LYRICA) 50 mg capsule Take 1 capsule (50 mg total) by mouth 4 (four) times a day  Patient taking differently: Take 50 mg by mouth 3 (three) times a day   2/10/22 5/11/22  Elen Villavicencio MD   pregabalin (LYRICA) 75 mg capsule Take 75 mg by mouth daily at bedtime    Historical Provider, MD   pyridoxine (B-6) 100 MG tablet Take 100 mg by mouth daily    Historical Provider, MD   Sodium Fluoride (PreviDent 5000 Booster Plus) 1 1 % PSTE Apply on teeth every evening as directed 11/18/21   Elen Villavicencio MD   traMADol Ramonia Morning) 50 mg tablet tramadol 50 mg tablet  Patient not taking: Reported on 4/1/2022    Historical Provider, MD     I have reviewed home medications with patient personally  Allergies:    Allergies   Allergen Reactions    Morphine Other (See Comments)     urinary retention    Ciprofloxacin Rash and Nausea Only       Social History:  Marital Status: /Civil Union   Occupation: Unknown  Patient Pre-hospital Living Situation: Home  Patient Pre-hospital Level of Mobility: walks  Patient Pre-hospital Diet Restrictions: Cardiac/Diabetic  Substance Use History:   Social History     Substance and Sexual Activity   Alcohol Use No     Social History     Tobacco Use   Smoking Status Former Smoker    Types: Cigarettes    Quit date: 65    Years since quittin 3   Smokeless Tobacco Never Used     Social History     Substance and Sexual Activity   Drug Use No       Family History:  Family History   Problem Relation Age of Onset    Cancer Brother     Diabetes Mother     Hypertension Father     Heart disease Father        Physical Exam:     Vitals:   Blood Pressure: 126/73 (22)  Pulse: 101 (22)  Temperature: 98 1 °F (36 7 °C) (22)  Temp Source: Oral (22)  Respirations: 16 (22)  SpO2: 97 % (22)    Physical Exam  Vitals and nursing note reviewed  Constitutional:       General: She is not in acute distress  Appearance: She is not ill-appearing or diaphoretic  HENT:      Head: Normocephalic  Mouth/Throat:      Pharynx: Oropharynx is clear  Eyes:      General: No scleral icterus  Conjunctiva/sclera: Conjunctivae normal    Cardiovascular:      Rate and Rhythm: Normal rate and regular rhythm  Pulses: Normal pulses  Heart sounds: Normal heart sounds  No murmur heard  Pulmonary:      Effort: Pulmonary effort is normal       Breath sounds: Rales (fine crackles in bases) present  Abdominal:      General: Bowel sounds are normal  There is no distension  Palpations: Abdomen is soft  Tenderness: There is no abdominal tenderness  Musculoskeletal:         General: Normal range of motion  Cervical back: Normal range of motion     Skin:     General: Skin is warm  Neurological:      Mental Status: She is alert and oriented to person, place, and time  Psychiatric:         Speech: Speech normal           Additional Data:     Lab Results:  Results from last 7 days   Lab Units 04/14/22  1837   WBC Thousand/uL 11 14*   HEMOGLOBIN g/dL 12 8   HEMATOCRIT % 40 1   PLATELETS Thousands/uL 156   NEUTROS PCT % 76*   LYMPHS PCT % 10*   MONOS PCT % 12   EOS PCT % 1     Results from last 7 days   Lab Units 04/14/22  1837   SODIUM mmol/L 139   POTASSIUM mmol/L 4 0   CHLORIDE mmol/L 103   CO2 mmol/L 26   BUN mg/dL 31*   CREATININE mg/dL 1 42*   ANION GAP mmol/L 10   CALCIUM mg/dL 8 8   ALBUMIN g/dL 3 4*   TOTAL BILIRUBIN mg/dL 0 37   ALK PHOS U/L 90   ALT U/L 51   AST U/L 35   GLUCOSE RANDOM mg/dL 159*                       Imaging: Personally reviewed the following imaging: chest xray  XR chest 1 view portable    (Results Pending)       EKG and Other Studies Reviewed on Admission:   · EKG: NSR  , with occasional PVCs, RBBB  ** Please Note: This note has been constructed using a voice recognition system   **

## 2022-04-15 NOTE — ASSESSMENT & PLAN NOTE
Wt Readings from Last 3 Encounters:   04/01/22 58 5 kg (129 lb)   03/30/22 58 5 kg (129 lb)   03/02/22 56 2 kg (123 lb 12 8 oz)    Patient with fine crackles in bibasilar lung fields   CXR: Official read pending   One time dose of 20 mg of IV Lasix in ED   Last echocardiogram from December 1577: LV systolic function was normal  Ejection fraction was estimated to be 60%  There were no RWMA  G1DD   Monitor intake and output, daily weights   Low sodium diet and fluid restriction   Obtain ECHO

## 2022-04-15 NOTE — ASSESSMENT & PLAN NOTE
· Patient known to [facility] as an outpatient  · Home regimen of Mirabegron which is non-formulary, will replace with Oxybutynin while inpatient

## 2022-04-15 NOTE — ASSESSMENT & PLAN NOTE
Lab Results   Component Value Date    HGBA1C 6 0 (H) 08/25/2020       No results for input(s): POCGLU in the last 72 hours  Blood Sugar Average: Last 72 hrs:  · Obtain A1C  · Home regimen of metformin, will hold while inpatient  · Accu-Cheks and SSI  · Hypoglycemia protocol

## 2022-04-15 NOTE — ASSESSMENT & PLAN NOTE
 Patient Presentation: Patient presents with complaints of left sided chest pain with radiation to left ear and left arm that began this morning upon walking up  She reports that her pain feels different then her typical arthritic pain  She reports some shortness of breath associated with the episode  She denies palpitations, nausea, vomiting, fever, chills, headache, dizziness, lightheadedness or abdominal pain   Likely etiology: Cardiac arrhythmia r/t to hypomagnesemia versus Angina versus MSK versus mild volume overload r/t CHF   COVID/Influenza/RSV neg   MICH: 2   Initial Troponin: 6  o Repeat 7      Initial EKG: SR with occasional PVCs, RBBB, heart rate 100, repeat same with similar findings in 2021   o Monitor on telemetry   Pain Control: Received 324 mg of ASA, one time of Fentanyl and one time of Dilaudid in ED   Continue OTC Tylenol and Voltaren gel   LDL <50   One time dose of 20 mg of IV Lasix   (vascular congestion noted on CXR)   Echo:

## 2022-04-15 NOTE — ASSESSMENT & PLAN NOTE
Lab Results   Component Value Date    EGFR 33 04/14/2022    EGFR 43 02/23/2022    EGFR 38 12/21/2021    CREATININE 1 42 (H) 04/14/2022    CREATININE 1 13 02/23/2022    CREATININE 1 27 12/21/2021    Creatinine upon admission: 1 42, mildly elevated compared to baseline  o Baseline: 1 2   Monitor BMP

## 2022-04-15 NOTE — ED PROVIDER NOTES
History  Chief Complaint   Patient presents with    Chest Pain     pt c/o chest pain since this AM  pain on L side of chest that goes into L arm and under breast states it feels like someone is hitting her in the chest        History provided by:  Patient   used: No    Chest Pain  Associated symptoms: no abdominal pain, no cough, no diaphoresis, no dizziness, no fever, no headache, no nausea, no palpitations, no shortness of breath, not vomiting and no weakness      Patient is 27-year-old female presenting to emergency department with left-sided chest pain  Pain radiates to the jaw neck and arm  No shortness of breath  No fevers or chills  No rash  No headache  No syncope  Abdominal pain  No new back pain  Has chronic back pain  History of shingles but no new rashes or vesicles noted  MDM will do cardiac evaluation, pain control, will need admission to the hospital, D-dimer      Prior to Admission Medications   Prescriptions Last Dose Informant Patient Reported? Taking?    ALPRAZolam (XANAX) 0 25 mg tablet Not Taking at Unknown time Self Yes No   Sig: alprazolam 0 25 mg tablet   Patient not taking: Reported on 4/1/2022   ASPIRIN 81 PO Not Taking at Unknown time Self Yes No   Sig: Take 81 mg by mouth     Patient not taking: Reported on 4/1/2022    Cholecalciferol (VITAMIN D3) 1000 units CAPS 4/14/2022 at Unknown time Self Yes Yes   Sig: Take by mouth   DULoxetine (CYMBALTA) 60 mg delayed release capsule 4/14/2022 at Unknown time Self No Yes   Sig: TAKE ONE CAPSULE BY MOUTH EVERY DAY   Diclofenac Sodium (VOLTAREN) 1 % Not Taking at Unknown time Self No No   Sig: Apply 2 g topically 4 (four) times a day   Patient not taking: Reported on 4/1/2022    Magnesium 250 MG TABS 4/13/2022 at Unknown time Self Yes Yes   Sig: Take 250 mg by mouth in the morning   Mirabegron ER (Myrbetriq) 50 MG TB24 4/13/2022 at Unknown time  No Yes   Sig: Take 1 tablet (50 mg total) by mouth daily   Sodium Fluoride (PreviDent 5000 Booster Plus) 1 1 % PSTE 4/14/2022 at Unknown time Self No Yes   Sig: Apply on teeth every evening as directed   amitriptyline (ELAVIL) 10 mg tablet 4/13/2022 at Unknown time Self No Yes   Sig: Take 2 tablets (20 mg total) by mouth daily at bedtime   calcium carbonate-vitamin D (OSCAL-D) 500 mg-200 units per tablet Not Taking at Unknown time Self Yes No   Sig: Take 1 tablet by mouth daily with breakfast     Patient not taking: Reported on 4/1/2022    diclofenac sodium (VOLTAREN) 1 % Not Taking at Unknown time Self Yes No   Sig: Voltaren 1 % topical gel   Patient not taking: Reported on 4/1/2022   dicyclomine (BENTYL) 10 mg capsule Not Taking at Unknown time Self Yes No   Sig: Take 10 mg by mouth daily     Patient not taking: Reported on 4/1/2022    dicyclomine (BENTYL) 10 mg capsule Not Taking at Unknown time Self Yes No   Sig: dicyclomine 10 mg capsule   Patient not taking: Reported on 4/1/2022   diphenoxylate-atropine (LOMOTIL) 2 5-0 025 mg per tablet Past Week at Unknown time Self Yes Yes   Sig: Take 1 tablet by mouth if needed     docusate sodium (COLACE) 100 mg capsule   No No   Sig: Take 1 capsule by mouth 2 (two) times a day for 30 days   Patient not taking: Reported on 2/14/2020   ferrous sulfate 325 (65 Fe) mg tablet Not Taking at Unknown time Self No No   Sig: Take 1 tablet (325 mg total) by mouth daily   Patient not taking: Reported on 4/1/2022    gabapentin (NEURONTIN) 300 mg capsule Not Taking at Unknown time Self Yes No   Sig: Take 300 mg by mouth 3 (three) times a day     Patient not taking: Reported on 4/1/2022    hydroxychloroquine (PLAQUENIL) 200 mg tablet 4/14/2022 at Unknown time Self No Yes   Sig: Take 1 tablet (200 mg total) by mouth 2 (two) times a day with meals   lisinopril (ZESTRIL) 2 5 mg tablet Not Taking at Unknown time Self Yes No   Sig: Take 2 5 mg by mouth     Patient not taking: Reported on 4/14/2022    metFORMIN (GLUCOPHAGE) 500 mg tablet 4/14/2022 at Unknown time Self Yes Yes   Sig: TAKE ONE TABLET BY MOUTH TWICE A DAY WITH MORNING AND EVENING MEALS   omeprazole (PRILOSEC) 20 mg delayed release capsule 4/14/2022 at Unknown time Self Yes Yes   Sig: Take by mouth     ondansetron (ZOFRAN-ODT) 4 mg disintegrating tablet More than a month at Unknown time Self Yes No   Sig: TAKE 1 TABLET BY MOUTH EVERY 8 HOURS FOR 2 DAYS AND PLACE ON TOP OF THE TONGUE WHERE IT WILL DISSOLVE, THEN SWALLOW   pregabalin (LYRICA) 50 mg capsule 4/14/2022 at Unknown time Self No Yes   Sig: Take 1 capsule (50 mg total) by mouth 4 (four) times a day   Patient taking differently: Take 50 mg by mouth 3 (three) times a day     pregabalin (LYRICA) 75 mg capsule 4/13/2022 at Unknown time Self Yes Yes   Sig: Take 75 mg by mouth daily at bedtime   pyridoxine (B-6) 100 MG tablet 4/14/2022 at Unknown time Self Yes Yes   Sig: Take 100 mg by mouth daily   traMADol (ULTRAM) 50 mg tablet Not Taking at Unknown time Self Yes No   Sig: tramadol 50 mg tablet   Patient not taking: Reported on 4/1/2022      Facility-Administered Medications: None       Past Medical History:   Diagnosis Date    Anemia     Arthritis     Back pain     CHF (congestive heart failure) (HCC)     Chronic kidney disease     Stage 3b    Diabetes [facility] [address] [facility]     Diabetes mellitus ([facility] [address] )     Diabetic gastroparesis associated with type 2 diabetes mellitus ([facility] [address] )     Diabetic polyneuropathy (HCC)     Diverticulosis     Herpes zoster     Hypertension     IBS (irritable bowel syndrome)     Neurogenic claudication due to lumbar spinal stenosis     Osteoarthritis     Osteoporosis     Seronegative arthropathy of multiple sites [facility])        Past Surgical History:   Procedure Laterality Date    BACK SURGERY      EGD AND COLONOSCOPY N/A 12/23/2016    Procedure: EGD AND COLONOSCOPY;  Surgeon: Rafat Brito MD;  [facility];   Service:     JOINT REPLACEMENT      bilat knees and hips    OTHER SURGICAL HISTORY      pelvis rods    REPLACEMENT TOTAL KNEE BILATERAL      STOMACH SURGERY         Family History   Problem Relation Age of Onset    Cancer Brother     Diabetes Mother     Hypertension Father     Heart disease Father      I have reviewed and agree with the history as documented  E-Cigarette/Vaping    E-Cigarette Use Never User      E-Cigarette/Vaping Substances    Nicotine No     THC No     CBD No     Flavoring No     Other No      Social History     Tobacco Use    Smoking status: Former Smoker     Types: Cigarettes     Quit date:      Years since quittin 3    Smokeless tobacco: Never Used   Vaping Use    Vaping Use: Never used   Substance Use Topics    Alcohol use: No    Drug use: No       Review of Systems   Constitutional: Negative for chills, diaphoresis and fever  HENT: Negative for congestion and sore throat  Respiratory: Negative for cough, shortness of breath, wheezing and stridor  Cardiovascular: Positive for chest pain  Negative for palpitations and leg swelling  Gastrointestinal: Negative for abdominal pain, blood in stool, diarrhea, nausea and vomiting  Genitourinary: Negative for dysuria, frequency and urgency  Musculoskeletal: Negative for neck pain and neck stiffness  Skin: Negative for pallor and rash  Neurological: Negative for dizziness, syncope, weakness, light-headedness and headaches  All other systems reviewed and are negative  Physical Exam  Physical Exam  Vitals reviewed  Constitutional:       Appearance: She is well-developed  HENT:      Head: Normocephalic and atraumatic  Eyes:      Pupils: Pupils are equal, round, and reactive to light  Cardiovascular:      Rate and Rhythm: Normal rate and regular rhythm  Heart sounds: Normal heart sounds  Pulmonary:      Effort: Pulmonary effort is normal  No respiratory distress  Breath sounds: Normal breath sounds  Abdominal:      General: Bowel sounds are normal       Palpations: Abdomen is soft  Tenderness: There is no abdominal tenderness  Musculoskeletal:         General: Normal range of motion  Cervical back: Neck supple  Right lower leg: No tenderness  No edema  Left lower leg: No tenderness  No edema  Skin:     General: Skin is warm and dry  Capillary Refill: Capillary refill takes less than 2 seconds  Neurological:      General: No focal deficit present  Mental Status: She is alert and oriented to person, place, and time           Vital Signs  ED Triage Vitals   Temperature Pulse Respirations Blood Pressure SpO2   04/14/22 1814 04/14/22 1732 04/14/22 1732 04/14/22 1732 04/14/22 1732   98 1 °F (36 7 °C) 96 18 114/61 99 %      Temp Source Heart Rate Source Patient Position - Orthostatic VS BP Location FiO2 (%)   04/14/22 1814 04/14/22 1753 04/14/22 1814 04/14/22 1814 --   Oral Monitor Lying Right arm       Pain Score       04/14/22 1841       10 - Worst Possible Pain           Vitals:    04/14/22 2030 04/14/22 2232 04/14/22 2300 04/15/22 0000   BP: 159/85 126/73 121/67 120/71   Pulse: 104 101 96 92   Patient Position - Orthostatic VS:  Lying Lying Lying         Visual Acuity      ED Medications  Medications   HYDROmorphone (DILAUDID) injection 0 5 mg (0 5 mg Intravenous Given 4/14/22 2118)   amitriptyline (ELAVIL) tablet 20 mg (20 mg Oral Given 4/14/22 2336)   cholecalciferol (VITAMIN D3) tablet 1,000 Units (has no administration in time range)   dicyclomine (BENTYL) capsule 10 mg (10 mg Oral Not Given 4/14/22 2333)   DULoxetine (CYMBALTA) delayed release capsule 60 mg (has no administration in time range)   lisinopril (ZESTRIL) tablet 2 5 mg (has no administration in time range)   magnesium oxide (MAG-OX) tablet 400 mg (has no administration in time range)   oxybutynin (DITROPAN-XL) 24 hr tablet 10 mg (has no administration in time range)   pantoprazole (PROTONIX) EC tablet 40 mg (has no administration in time range)   pregabalin (LYRICA) capsule 50 mg (has no administration in time range)   pregabalin (LYRICA) capsule 75 mg (75 mg Oral Not Given 4/14/22 2353)   pyridoxine (VITAMIN B6) tablet 100 mg (has no administration in time range)   ondansetron (ZOFRAN) injection 4 mg (has no administration in time range)   aluminum-magnesium hydroxide-simethicone (MYLANTA) oral suspension 30 mL (has no administration in time range)   heparin (porcine) subcutaneous injection 5,000 Units (5,000 Units Subcutaneous Given 4/14/22 2341)   insulin lispro (HumaLOG) 100 units/mL subcutaneous injection 1-5 Units (has no administration in time range)   insulin lispro (HumaLOG) 100 units/mL subcutaneous injection 1-5 Units (1 Units Subcutaneous Given 4/14/22 2341)   Diclofenac Sodium (VOLTAREN) 1 % topical gel 2 g (2 g Topical Given 4/14/22 2333)   acetaminophen (TYLENOL) tablet 975 mg (975 mg Oral Given 4/14/22 2333)   fentanyl citrate (PF) 100 MCG/2ML 25 mcg (25 mcg Intravenous Given 4/14/22 1841)   magnesium sulfate IVPB (premix) SOLN 1 g (0 g Intravenous Stopped 4/14/22 2330)   aspirin chewable tablet 324 mg (324 mg Oral Given 4/14/22 2024)   furosemide (LASIX) injection 20 mg (20 mg Intravenous Given 4/14/22 2333)       Diagnostic Studies  Results Reviewed     Procedure Component Value Units Date/Time    Lipid Panel with Direct LDL reflex [[id number]]  (Normal) Collected: 04/14/22 1837    Lab Status: Final result Specimen: Blood from Arm, Right Updated: 04/14/22 2348     Cholesterol 154 mg/dL      Triglycerides 98 mg/dL      HDL, Direct 85 mg/dL      LDL Calculated 49 mg/dL     HS Troponin I 4hr [[id number]]  (Normal) Collected: 04/14/22 2308    Lab Status: Final result Specimen: Blood from Arm, Left Updated: 04/14/22 2345     hs TnI 4hr 7 ng/L      Delta 4hr hsTnI 1 ng/L     Hemoglobin A1c w/EAG Estimation (Orders if not completed within the last 90 days) [[id number]] Collected: 04/14/22 1837    Lab Status:  In process Specimen: Blood from Arm, Right Updated: 04/14/22 2326    Fingerstick Glucose (POCT) [[id number]]  (Abnormal) Collected: 04/14/22 2311    Lab Status: Final result Updated: 04/14/22 2312     POC Glucose 176 mg/dl     Lipase [[id number]]  (Abnormal) Collected: 04/14/22 1837    Lab Status: Final result Specimen: Blood from Arm, Right Updated: 04/14/22 2236     Lipase 14 u/L     NT-BNP PRO [[id number]]  (Abnormal) Collected: 04/14/22 1837    Lab Status: Final result Specimen: Blood from Arm, Right Updated: 04/14/22 2236     NT-proBNP 601 pg/mL     COVID/FLU/RSV [[id number]]  (Normal) Collected: 04/14/22 2056    Lab Status: Final result Specimen: Nares from Nose Updated: 04/14/22 2155     SARS-CoV-2 Negative     INFLUENZA A PCR Negative     INFLUENZA B PCR Negative     RSV PCR Negative    Narrative:      FOR PEDIATRIC PATIENTS - copy/paste COVID Guidelines URL to browser: [email or web address]    SARS-CoV-2 assay is a Nucleic Acid Amplification assay intended for the  qualitative detection of nucleic acid from SARS-CoV-2 in nasopharyngeal  swabs  Results are for the presumptive identification of SARS-CoV-2 RNA  Positive results are indicative of infection with SARS-CoV-2, the virus  causing COVID-19, but do not rule out bacterial infection or co-infection  with other viruses  Laboratories within the [place] and its  territories are required to report all positive results to the appropriate  public health authorities  Negative results do not preclude SARS-CoV-2  infection and should not be used as the sole basis for treatment or other  patient management decisions  Negative results must be combined with  clinical observations, patient history, and epidemiological information  This test has not been FDA cleared or approved  This test has been authorized by FDA under an Emergency Use Authorization  (EUA)   This test is only authorized for the duration of time the  declaration that circumstances exist justifying the authorization of the  emergency use of an in vitro diagnostic tests for detection of SARS-CoV-2  virus and/or diagnosis of COVID-19 infection under section 564(b)(1) of  the Act, 21 U  S C  040FQO-9(X)(4), unless the authorization is terminated  or revoked sooner  The test has been validated but independent review by FDA  and CLIA is pending  Test performed using [facility] GeneXpert: This RT-PCR assay targets N2,  a region unique to SARS-CoV-2  A conserved region in the E-gene was chosen  for pan-Sarbecovirus detection which includes SARS-CoV-2      HS Troponin I 2hr [[id number]]  (Normal) Collected: 04/14/22 2030    Lab Status: Final result Specimen: Blood from Arm, Left Updated: 04/14/22 2118     hs TnI 2hr 6 ng/L      Delta 2hr hsTnI 0 ng/L     HS Troponin 0hr (reflex protocol) [[id number]]  (Normal) Collected: 04/14/22 1837    Lab Status: Final result Specimen: Blood from Arm, Right Updated: 04/14/22 1922     hs TnI 0hr 6 ng/L     Comprehensive metabolic panel [[id number]]  (Abnormal) Collected: 04/14/22 1837    Lab Status: Final result Specimen: Blood from Arm, Right Updated: 04/14/22 1916     Sodium 139 mmol/L      Potassium 4 0 mmol/L      Chloride 103 mmol/L      CO2 26 mmol/L      ANION GAP 10 mmol/L      BUN 31 mg/dL      Creatinine 1 42 mg/dL      Glucose 159 mg/dL      Calcium 8 8 mg/dL      Corrected Calcium 9 3 mg/dL      AST 35 U/L      ALT 51 U/L      Alkaline Phosphatase 90 U/L      Total Protein 6 9 g/dL      Albumin 3 4 g/dL      Total Bilirubin 0 37 mg/dL      eGFR 33 ml/min/1 73sq m     Narrative:      [place] guidelines for Chronic Kidney Disease (CKD):     Stage 1 with normal or high GFR (GFR > 90 mL/min/1 73 square meters)    Stage 2 Mild CKD (GFR = 60-89 mL/min/1 73 square meters)    Stage 3A Moderate CKD (GFR = 45-59 mL/min/1 73 square meters)    Stage 3B Moderate CKD (GFR = 30-44 mL/min/1 73 square meters)    Stage 4 Severe CKD (GFR = 15-29 mL/min/1 73 square meters)    Stage 5 End Stage CKD (GFR <15 mL/min/1 73 square meters)  Note: GFR calculation is accurate only with a steady state creatinine    Magnesium [[id number]]  (Abnormal) Collected: 04/14/22 1837    Lab Status: Final result Specimen: Blood from Arm, Right Updated: 04/14/22 1916     Magnesium 1 5 mg/dL     D-Dimer [[id number]]  (Abnormal) Collected: 04/14/22 1837    Lab Status: Final result Specimen: Blood from Arm, Right Updated: 04/14/22 1911     D-Dimer, Quant 0 87 ug/ml FEU     Narrative: In the evaluation for possible pulmonary embolism, in the appropriate (Well's Score of 4 or less) patient, the age adjusted d-dimer cutoff for this patient can be calculated as:    Age x 0 01 (in ug/mL) for Age-adjusted D-dimer exclusion threshold for a patient over 50 years      CBC and differential [[id number]]  (Abnormal) Collected: 04/14/22 1837    Lab Status: Final result Specimen: Blood from Arm, Right Updated: 04/14/22 1852     WBC 11 14 Thousand/uL      RBC 3 99 Million/uL      Hemoglobin 12 8 g/dL      Hematocrit 40 1 %       fL      MCH 32 1 pg      MCHC 31 9 g/dL      RDW 13 7 %      MPV 12 1 fL      Platelets 726 Thousands/uL      nRBC 0 /100 WBCs      Neutrophils Relative 76 %      Immat GRANS % 1 %      Lymphocytes Relative 10 %      Monocytes Relative 12 %      Eosinophils Relative 1 %      Basophils Relative 0 %      Neutrophils Absolute 8 44 Thousands/µL      Immature Grans Absolute 0 08 Thousand/uL      Lymphocytes Absolute 1 16 Thousands/µL      Monocytes Absolute 1 29 Thousand/µL      Eosinophils Absolute 0 13 Thousand/µL      Basophils Absolute 0 04 Thousands/µL                  XR chest 1 view portable    (Results Pending)              Procedures  Procedures         ED Course  ED Course as of 04/15/22 0055   Thu Apr 14, 2022 1834 ECG shows rate of 100, sinus, right bundle branch block, this is old, nonspecific ST and T-waves throughout, QTC prolonged, independently interpreted by me   1913 D-Dimer, Quant(!): 0 87  Age adjusted D-dimer normal                               SBIRT 20yo+      Most Recent Value   SBIRT (22 yo +)    In order to provide better care to our patients, we are screening all of our patients for alcohol and drug use  Would it be okay to ask you these screening questions? Yes Filed at: 04/14/2022 2231   Initial Alcohol Screen: US AUDIT-C     1  How often do you have a drink containing alcohol? 0 Filed at: 04/14/2022 2231   2  How many drinks containing alcohol do you have on a typical day you are drinking? 0 Filed at: 04/14/2022 2231   3a  Male UNDER 65: How often do you have five or more drinks on one occasion? 0 Filed at: 04/14/2022 2231   3b  FEMALE Any Age, or MALE 65+: How often do you have 4 or more drinks on one occassion? 0 Filed at: 04/14/2022 2231   Audit-C Score 0 Filed at: 04/14/2022 2231   XIOMY: How many times in the past year have you    Used an illegal drug or used a prescription medication for non-medical reasons?  Never Filed at: 04/14/2022 2231        MICH Risk Score      Most Recent Value   Age >= 72 1 Filed at: 04/14/2022 2030   Known CAD (stenosis >= 50%) 0 Filed at: 04/14/2022 2030   Recent (<=24 hrs) Service Angina 0 Filed at: 04/14/2022 2030   ST Deviation >= 0 5 mm 0 Filed at: 04/14/2022 2030   3+ CAD Risk Factors (FHx, HTN, HLP, DM, Smoker) 1 Filed at: 04/14/2022 2030   Aspirin Use Past 7 Days 0 Filed at: 04/14/2022 2030   Elevated Cardiac Markers 0 Filed at: 04/14/2022 2030   MICH Risk Score (Calculated) 2 Filed at: 04/14/2022 2030                  MDM    Disposition  Final diagnoses:   Chest pain     Time reflects when diagnosis was documented in both MDM as applicable and the Disposition within this note     Time User Action Codes Description Comment    4/14/2022  8:20 PM Cielo Peterson [R07 [id number]] Chest pain       ED Disposition     ED Disposition Condition Date/Time Comment    Admit Stable Thu Apr 14, 2022  8:20 PM Case was discussed with medicine ap and the patient's admission status was agreed to be Admission Status: observation status to the service of Dr Shahida Méndez   Follow-up Information    None         Patient's Medications   Discharge Prescriptions    No medications on file       No discharge procedures on file      PDMP Review     None          ED Provider  Electronically Signed by           Viktoriya Stovall MD  04/15/22 8992

## 2022-04-15 NOTE — DISCHARGE INSTR - AVS FIRST PAGE
Dear Matteo Alvarado,     It was our pleasure to care for you here at [facility], [address]  It is our hope that we were always able to exceed the expected standards for your care during your stay  You were hospitalized due to chest pain  You were cared for on the emergency room under the service of Kylee Russo MD with the [facility] who covers for your primary care physician (PCP), Daksha Louis DO, while you were hospitalized  If you have any questions or concerns related to this hospitalization, you may contact us at [phone]  For follow up as well as medication refills, we recommend that you follow up with your primary care physician  A registered nurse will reach out to you by phone within a few days after your discharge to answer any additional questions that you may have after going home  However, at this time we provide for you here, the most important instructions / recommendations at discharge:     Notable Medication Adjustments -   Lisinopril 2 5 mg was added back to your medication regime  New medications: Metoprolol 12 5mg daily and torsemide 10mg daily  Testing Required after Discharge -   Stress test is ordered for follow up early next week  BMP in one week (blood work)  Dr Gt Meneses will see you in follow up at his [facility] office at  on 4/18  Important follow up information -   Echocardiogram performed here:  Interpretation Summary         Left Ventricle: Left ventricular cavity size is normal  Wall thickness is normal  The left ventricular ejection fraction is 50%  Systolic function is mildly reduced  There is mild global hypokinesis  There is no concentric hypertrophy  Aortic Valve: There is mild to moderate regurgitation  There is no evidence of stenosis  Mitral Valve: There is mild to moderate regurgitation  Tricuspid Valve: There is mild regurgitation      Pericardium: There is a small to moderate left pleural effusion  Please review this entire after visit summary as additional general instructions including medication list, appointments, activity, diet, any pertinent wound care, and other additional recommendations from your care team that may be provided for you        Sincerely,     Torito Gibbs MD

## 2022-04-15 NOTE — ASSESSMENT & PLAN NOTE
Lab Results   Component Value Date    EGFR 36 04/15/2022    EGFR 33 04/14/2022    EGFR 43 02/23/2022    CREATININE 1 30 04/15/2022    CREATININE 1 42 (H) 04/14/2022    CREATININE 1 13 02/23/2022    Creatinine upon admission: 1 42, mildly elevated compared to baseline   1 3 on repeat 4/15  o Baseline: 1 2

## 2022-04-15 NOTE — ASSESSMENT & PLAN NOTE
· Continue Lyrica and Cymbalta  Patient and  reports frequent falls at home with most recent one being about 2 weeks ago  · Fall precautions

## 2022-04-15 NOTE — ASSESSMENT & PLAN NOTE
Wt Readings from Last 3 Encounters:   04/15/22 58 5 kg (129 lb)   04/01/22 58 5 kg (129 lb)   03/30/22 58 5 kg (129 lb)    Patient with fine crackles in bibasilar lung fields   CXR: mild vasc congestion, One time dose of 20 mg of IV Lasix in ED   Last echocardiogram from December 5039: LV systolic function was normal  Ejection fraction was estimated to be 60%  There were no RWMA  G1DD   Monitor intake and output, daily weights   Low sodium diet and fluid restriction

## 2022-04-17 LAB
ATRIAL RATE: 85 BPM
ATRIAL RATE: 98 BPM
ATRIAL RATE: 99 BPM
P AXIS: 26 DEGREES
P AXIS: 53 DEGREES
P AXIS: 79 DEGREES
PR INTERVAL: 182 MS
PR INTERVAL: 182 MS
PR INTERVAL: 196 MS
QRS AXIS: 86 DEGREES
QRS AXIS: 96 DEGREES
QRS AXIS: 97 DEGREES
QRSD INTERVAL: 144 MS
QRSD INTERVAL: 146 MS
QRSD INTERVAL: 150 MS
QT INTERVAL: 392 MS
QT INTERVAL: 398 MS
QT INTERVAL: 416 MS
QTC INTERVAL: 495 MS
QTC INTERVAL: 500 MS
QTC INTERVAL: 510 MS
T WAVE AXIS: -10 DEGREES
T WAVE AXIS: -13 DEGREES
T WAVE AXIS: 0 DEGREES
VENTRICULAR RATE: 85 BPM
VENTRICULAR RATE: 98 BPM
VENTRICULAR RATE: 99 BPM

## 2022-04-17 PROCEDURE — 93010 ELECTROCARDIOGRAM REPORT: CPT | Performed by: INTERNAL MEDICINE

## 2022-04-18 ENCOUNTER — OFFICE VISIT (OUTPATIENT)
Dept: CARDIOLOGY CLINIC | Facility: CLINIC | Age: 87
End: 2022-04-18
Payer: MEDICARE

## 2022-04-18 VITALS
WEIGHT: 129.9 LBS | HEIGHT: 59 IN | BODY MASS INDEX: 26.19 KG/M2 | OXYGEN SATURATION: 96 % | HEART RATE: 86 BPM | DIASTOLIC BLOOD PRESSURE: 52 MMHG | SYSTOLIC BLOOD PRESSURE: 112 MMHG

## 2022-04-18 DIAGNOSIS — K31.84 DIABETIC GASTROPARESIS ASSOCIATED WITH TYPE 2 DIABETES MELLITUS (HCC): ICD-10-CM

## 2022-04-18 DIAGNOSIS — R07.89 OTHER CHEST PAIN: ICD-10-CM

## 2022-04-18 DIAGNOSIS — I50.32 CHRONIC DIASTOLIC HEART FAILURE (HCC): ICD-10-CM

## 2022-04-18 DIAGNOSIS — I74.3 ARTERIAL EMBOLISM AND THROMBOSIS OF LOWER EXTREMITY (HCC): ICD-10-CM

## 2022-04-18 DIAGNOSIS — E11.9 TYPE 2 DIABETES MELLITUS WITHOUT COMPLICATION, WITHOUT LONG-TERM CURRENT USE OF INSULIN (HCC): Primary | ICD-10-CM

## 2022-04-18 DIAGNOSIS — E11.43 DIABETIC GASTROPARESIS ASSOCIATED WITH TYPE 2 DIABETES MELLITUS (HCC): ICD-10-CM

## 2022-04-18 PROCEDURE — 99214 OFFICE O/P EST MOD 30 MIN: CPT | Performed by: INTERNAL MEDICINE

## 2022-04-18 RX ORDER — ATORVASTATIN CALCIUM 10 MG/1
10 TABLET, FILM COATED ORAL DAILY
Qty: 90 TABLET | Refills: 3
Start: 2022-04-18 | End: 2022-04-19 | Stop reason: SDUPTHER

## 2022-04-18 NOTE — PROGRESS NOTES
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[facility])     5  Diabetic gastroparesis associated with type 2 diabetes mellitus ([facility] [address] )         Discussion/Summary:  Today is my 1st visit with the patient  She was in the hospital with atypical chest pain last week and there has been no recurrence  Continue risk factor modification no further testing at this point time  We agreed with a conservative treatment plan will hold off on nuclear stress testing  Blood pressure is well controlled continue 81 mg of aspirin, low-dose of beta-blocker, torsemide, lisinopril  Encourage oral intake  Will follow-up in 3 month    Interval History:  Very pleasant 51-year-old female with a history of diabetes, hypertension presented to the hospital last week with an episode of chest pain  She describes it as a sharp sensation located in the left upper chest and left shoulder  This was worse with some rotation and movement  Denies any exertional or anginal component  There has been no palpitations, lower extremity edema, PND, orthopnea  She had an echocardiogram basic blood work done in the hospital   Overall she has been doing well since then      Medical Problems             Problem List     Symptomatic anemia    Shortness of breath    Hypomagnesemia    DM2 (diabetes mellitus, type 2) ([facility])      Lab Results   Component Value Date    HGBA1C 5 9 (H) 04/14/2022         Urinary frequency    Iron deficiency anemia secondary to inadequate dietary iron intake    Sinobronchitis    Post zoster neuralgia Primary generalized (osteo)arthritis    Seronegative arthropathy of multiple sites [facility])    Senile osteoporosis    Lumbar spondylosis    Diabetic polyneuropathy associated with type 2 diabetes mellitus ([facility] )      Lab Results   Component Value Date    HGBA1C 5 9 (H) 04/14/2022         Diabetic gastroparesis associated with type 2 diabetes mellitus ([facility] )      Lab Results   Component Value Date    HGBA1C 5 9 (H) 04/14/2022         Irritable bowel syndrome with diarrhea    Stage 3b chronic kidney disease ([place] [address] )    Lab Results   Component Value Date    EGFR 36 04/15/2022    EGFR 33 04/14/2022    EGFR 43 02/23/2022    CREATININE 1 30 04/15/2022    CREATININE 1 42 (H) 04/14/2022    CREATININE 1 13 02/23/2022         Traumatic injury of sacrum    Tendinitis of left rotator cuff    Abnormality of gait due to impairment of balance    Sicca syndrome (HCC)    Chest pain    Leukocytosis    Chronic diastolic heart failure (HCC)    Wt Readings from Last 3 Encounters:   04/18/22 58 9 kg (129 lb 14 4 oz)   04/15/22 58 5 kg (129 lb)   04/01/22 58 5 kg (129 lb)                 Overactive bladder    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)              Past Medical History:   Diagnosis Date    Anemia     Arthritis     Back pain     CHF (congestive heart failure) ([facility])     Chronic kidney disease     Stage 3b    Diabetes ([place] [address] )     Diabetes mellitus ([place] [address] )     Diabetic gastroparesis associated with type 2 diabetes mellitus ([place] [address] )     Diabetic polyneuropathy ([place] [address] )     Diverticulosis     Herpes zoster     Hypertension     IBS (irritable bowel syndrome)     Neurogenic claudication due to lumbar spinal stenosis     Osteoarthritis     Osteoporosis     Seronegative arthropathy of multiple sites [facility])      Social History     Socioeconomic History    Marital status: /Civil Union     Spouse name: Not on file    Number of children: Not on file    Years of education: Not on file    Highest education level: Not on file   Occupational History    Not on file   Tobacco Use    Smoking status: Former Smoker     Types: Cigarettes     Quit date:      Years since quittin 3    Smokeless tobacco: Never Used   Vaping Use    Vaping Use: Never used   Substance and Sexual Activity    Alcohol use: No    Drug use: No    Sexual activity: Not Currently     Partners: Male     Birth control/protection: None   Other Topics Concern    Not on file   Social History Narrative    Not on file     Social Determinants of Health     Financial Resource Strain: Not on file   Food Insecurity: Not on file   Transportation Needs: Not on file   Physical Activity: Not on file   Stress: Not on file   Social Connections: Not on file   Intimate Partner Violence: Not on file   Housing Stability: Not on file      Family History   Problem Relation Age of Onset    Cancer Brother     Diabetes Mother     Hypertension Father     Heart disease Father      Past Surgical History:   Procedure Laterality Date    BACK SURGERY      EGD AND COLONOSCOPY N/A 2016    Procedure: EGD AND COLONOSCOPY;  Surgeon: Laura Son MD;  [facility];   Service:     JOINT REPLACEMENT      bilat knees and hips    OTHER SURGICAL HISTORY      pelvis rods    REPLACEMENT TOTAL KNEE BILATERAL      STOMACH SURGERY         Current Outpatient Medications:     amitriptyline (ELAVIL) 10 mg tablet, Take 2 tablets (20 mg total) by mouth daily at bedtime, Disp: 180 tablet, Rfl: 1    Cholecalciferol (VITAMIN D3) 1000 units CAPS, Take by mouth, Disp: , Rfl:     diphenoxylate-atropine (LOMOTIL) 2 5-0 025 mg per tablet, Take 1 tablet by mouth if needed  , Disp: , Rfl:     DULoxetine (CYMBALTA) 60 mg delayed release capsule, TAKE ONE CAPSULE BY MOUTH EVERY DAY, Disp: 30 capsule, Rfl: 5    hydroxychloroquine (PLAQUENIL) 200 mg tablet, Take 1 tablet (200 mg total) by mouth 2 (two) times a day with meals (Patient taking differently: Take 200 mg by mouth daily with breakfast  ), Disp: 180 tablet, Rfl: 1    lisinopril (ZESTRIL) 2 5 mg tablet, Take 1 tablet (2 5 mg total) by mouth daily, Disp: 30 tablet, Rfl: 0    Magnesium 250 MG TABS, Take 250 mg by mouth in the morning, Disp: , Rfl:     metFORMIN (GLUCOPHAGE) 500 mg tablet, TAKE ONE TABLET BY MOUTH TWICE A DAY WITH MORNING AND EVENING MEALS, Disp: , Rfl:     metoprolol succinate (TOPROL-XL) 25 mg 24 hr tablet, Take 0 5 tablets (12 5 mg total) by mouth daily, Disp: 30 tablet, Rfl: 0    Mirabegron ER (Myrbetriq) 50 MG TB24, Take 1 tablet (50 mg total) by mouth daily, Disp: 30 tablet, Rfl: 11    omeprazole (PRILOSEC) 20 mg delayed release capsule, Take 20 mg by mouth daily  , Disp: , Rfl:     pregabalin (LYRICA) 50 mg capsule, Take 1 capsule (50 mg total) by mouth 4 (four) times a day (Patient taking differently: Take 50 mg by mouth 3 (three) times a day  ), Disp: 120 capsule, Rfl: 2    pregabalin (LYRICA) 75 mg capsule, Take 75 mg by mouth daily at bedtime, Disp: , Rfl:     pyridoxine (B-6) 100 MG tablet, Take 100 mg by mouth daily, Disp: , Rfl:     torsemide (DEMADEX) 10 mg tablet, Take 1 tablet (10 mg total) by mouth daily, Disp: 30 tablet, Rfl: 0    aspirin 81 mg chewable tablet, Chew 1 tablet (81 mg total) daily (Patient not taking: Reported on 4/18/2022 ), Disp: 30 tablet, Rfl: 0    atorvastatin (LIPITOR) 10 mg tablet, Take 1 tablet (10 mg total) by mouth daily, Disp: 90 tablet, Rfl: 3    ondansetron (ZOFRAN-ODT) 4 mg disintegrating tablet, TAKE 1 TABLET BY MOUTH EVERY 8 HOURS FOR 2 DAYS AND PLACE ON TOP OF THE TONGUE WHERE IT WILL DISSOLVE, THEN SWALLOW, Disp: , Rfl:     Sodium Fluoride (PreviDent 5000 Booster Plus) 1 1 % PSTE, Apply on teeth every evening as directed, Disp: 100 mL, Rfl: 3  Allergies   Allergen Reactions    Morphine Other (See Comments)     urinary retention    Ciprofloxacin Rash and Nausea Only       Labs:     Chemistry        Component Value Date/Time     10/09/2015 1041    K 4 0 04/15/2022 0540    K 5 1 10/09/2015 1041     04/15/2022 0540     10/09/2015 1041    CO2 29 04/15/2022 0540    CO2 27 9 10/09/2015 1041    BUN 30 (H) 04/15/2022 0540    BUN 43 (H) 10/09/2015 1041    CREATININE 1 30 04/15/2022 0540    CREATININE 1 47 (H) 10/09/2015 1041        Component Value Date/Time    CALCIUM 8 4 04/15/2022 0540    CALCIUM 9 3 10/09/2015 1041    ALKPHOS 90 04/14/2022 1837    AST 35 04/14/2022 1837    ALT 51 04/14/2022 1837            No results found for: CHOL  Lab Results   Component Value Date    HDL 85 04/14/2022    HDL 72 08/25/2020     Lab Results   Component Value Date    LDLCALC 49 04/14/2022    LDLCALC 73 08/25/2020     Lab Results   Component Value Date    TRIG 98 04/14/2022    TRIG 52 08/25/2020     No results found for: CHOLHDL    Imaging: XR chest 1 view portable    Result Date: 4/15/2022  Narrative: CHEST INDICATION:   cp  COMPARISON:  12/21/2021 EXAM PERFORMED/VIEWS:  XR CHEST PORTABLE FINDINGS: Cardiomediastinal silhouette appears unremarkable  New infiltrate and small effusion are present as compared to the study of 12/21/2021  Mild pulmonary vascular congestion is also identified with hypoventilation  No pneumothorax or pleural effusion  Fusion hardware is noted in the thoracolumbar spine  Degenerative changes of the shoulders are noted left greater than right  Impression: New mild pulmonary vascular congestion with left lower lobe atelectasis versus infiltrate and small effusion  The study was marked in EPIC for significant notification  Workstation performed: [id number]     Echo complete w/ contrast if indicated    Result Date: 4/15/2022  Narrative: [facility]  Left Ventricle: Left ventricular cavity size is normal  Wall thickness is normal  The left ventricular ejection fraction is 50%  Systolic function is mildly reduced  There is mild global hypokinesis  There is no concentric hypertrophy    Aortic Valve: There is mild to moderate regurgitation   There is no evidence of stenosis    Mitral Valve: There is mild to moderate regurgitation    Tricuspid Valve: There is mild regurgitation    Pericardium: There is a small to moderate left pleural effusion  ECG:        ROS    Vitals:    04/18/22 1254   BP: 112/52   Pulse: 86   SpO2: 96%     Vitals:    04/18/22 1254   Weight: 58 9 kg (129 lb 14 4 oz)     Height: 4' 11" (149 9 cm)   Body mass index is 26 24 kg/m²  Physical Exam:  Vital signs reviewed  General:  Alert and cooperative, appears stated age, no acute distress  HEENT:  PERRLA, EOMI, no scleral icterus, no conjunctival pallor  Neck:  No lymphadenopathy, no thyromegaly, no carotid bruits, no elevated JVP  Heart:  Regular rate and rhythm, normal S1/S2, no S3/S4, no murmur, rubs or gallops  PMI nondisplaced  Lungs:  Clear to auscultation bilaterally, no wheezes rales or rhonchi  Abdomen:  Soft, non-tender, positive bowel sounds, no rebound or guarding,   no organomegaly   Extremities:  Normal range of motion    No clubbing, cyanosis or edema   Vascular:  2+ pedal pulses  Skin:  No rashes or lesions on exposed skin  Neurologic:  Cranial nerves II-XII grossly intact without focal deficits

## 2022-04-19 DIAGNOSIS — E11.9 TYPE 2 DIABETES MELLITUS WITHOUT COMPLICATION, WITHOUT LONG-TERM CURRENT USE OF INSULIN (HCC): ICD-10-CM

## 2022-04-19 RX ORDER — ATORVASTATIN CALCIUM 10 MG/1
10 TABLET, FILM COATED ORAL DAILY
Qty: 90 TABLET | Refills: 3
Start: 2022-04-19

## 2022-05-04 ENCOUNTER — OFFICE VISIT (OUTPATIENT)
Dept: OBGYN CLINIC | Facility: CLINIC | Age: 87
End: 2022-05-04
Payer: MEDICARE

## 2022-05-04 VITALS
SYSTOLIC BLOOD PRESSURE: 114 MMHG | HEIGHT: 59 IN | DIASTOLIC BLOOD PRESSURE: 68 MMHG | BODY MASS INDEX: 26 KG/M2 | WEIGHT: 129 LBS

## 2022-05-04 DIAGNOSIS — N90.7 LABIAL CYST: Primary | ICD-10-CM

## 2022-05-04 PROCEDURE — 99213 OFFICE O/P EST LOW 20 MIN: CPT | Performed by: OBSTETRICS & GYNECOLOGY

## 2022-06-21 ENCOUNTER — APPOINTMENT (EMERGENCY)
Dept: CT IMAGING | Facility: HOSPITAL | Age: 87
DRG: 682 | End: 2022-06-21
Payer: MEDICARE

## 2022-06-21 ENCOUNTER — HOSPITAL ENCOUNTER (EMERGENCY)
Facility: HOSPITAL | Age: 87
Discharge: HOME | DRG: 682 | End: 2022-06-21
Attending: EMERGENCY MEDICINE | Admitting: EMERGENCY MEDICINE
Payer: MEDICARE

## 2022-06-21 ENCOUNTER — APPOINTMENT (EMERGENCY)
Dept: RADIOLOGY | Facility: HOSPITAL | Age: 87
DRG: 682 | End: 2022-06-21
Payer: MEDICARE

## 2022-06-21 VITALS
HEART RATE: 88 BPM | TEMPERATURE: 98.3 F | RESPIRATION RATE: 20 BRPM | OXYGEN SATURATION: 93 % | DIASTOLIC BLOOD PRESSURE: 69 MMHG | SYSTOLIC BLOOD PRESSURE: 146 MMHG

## 2022-06-21 DIAGNOSIS — R51.9 HEADACHE: ICD-10-CM

## 2022-06-21 DIAGNOSIS — R07.9 CHEST PAIN, UNSPECIFIED TYPE: Primary | ICD-10-CM

## 2022-06-21 LAB
2HR DELTA HS TROPONIN: -1 NG/L
ALBUMIN SERPL BCP-MCNC: 4 G/DL (ref 3.5–5)
ALP SERPL-CCNC: 90 U/L (ref 34–104)
ALT SERPL W P-5'-P-CCNC: 35 U/L (ref 7–52)
ANION GAP SERPL CALCULATED.3IONS-SCNC: 8 MMOL/L (ref 4–13)
AST SERPL W P-5'-P-CCNC: 39 U/L (ref 13–39)
ATRIAL RATE: 92 BPM
BASOPHILS # BLD AUTO: 0.03 THOUSANDS/ΜL (ref 0–0.1)
BASOPHILS NFR BLD AUTO: 0 % (ref 0–1)
BILIRUB SERPL-MCNC: 0.72 MG/DL (ref 0.2–1)
BNP SERPL-MCNC: 189 PG/ML (ref 0–100)
BUN SERPL-MCNC: 22 MG/DL (ref 5–25)
CALCIUM SERPL-MCNC: 9.5 MG/DL (ref 8.4–10.2)
CARDIAC TROPONIN I PNL SERPL HS: 6 NG/L
CARDIAC TROPONIN I PNL SERPL HS: 7 NG/L
CHLORIDE SERPL-SCNC: 104 MMOL/L (ref 96–108)
CO2 SERPL-SCNC: 28 MMOL/L (ref 21–32)
CREAT SERPL-MCNC: 1.17 MG/DL (ref 0.6–1.3)
D DIMER PPP FEU-MCNC: 1.18 UG/ML FEU
EOSINOPHIL # BLD AUTO: 0.26 THOUSAND/ΜL (ref 0–0.61)
EOSINOPHIL NFR BLD AUTO: 2 % (ref 0–6)
ERYTHROCYTE [DISTWIDTH] IN BLOOD BY AUTOMATED COUNT: 15.1 % (ref 11.6–15.1)
GFR SERPL CREATININE-BSD FRML MDRD: 41 ML/MIN/1.73SQ M
GLUCOSE SERPL-MCNC: 121 MG/DL (ref 65–140)
HCT VFR BLD AUTO: 41.7 % (ref 34.8–46.1)
HGB BLD-MCNC: 13.4 G/DL (ref 11.5–15.4)
IMM GRANULOCYTES # BLD AUTO: 0.09 THOUSAND/UL (ref 0–0.2)
IMM GRANULOCYTES NFR BLD AUTO: 1 % (ref 0–2)
LYMPHOCYTES # BLD AUTO: 0.4 THOUSANDS/ΜL (ref 0.6–4.47)
LYMPHOCYTES NFR BLD AUTO: 4 % (ref 14–44)
MCH RBC QN AUTO: 31.5 PG (ref 26.8–34.3)
MCHC RBC AUTO-ENTMCNC: 32.1 G/DL (ref 31.4–37.4)
MCV RBC AUTO: 98 FL (ref 82–98)
MONOCYTES # BLD AUTO: 0.68 THOUSAND/ΜL (ref 0.17–1.22)
MONOCYTES NFR BLD AUTO: 6 % (ref 4–12)
NEUTROPHILS # BLD AUTO: 10.09 THOUSANDS/ΜL (ref 1.85–7.62)
NEUTS SEG NFR BLD AUTO: 87 % (ref 43–75)
NRBC BLD AUTO-RTO: 0 /100 WBCS
P AXIS: 62 DEGREES
PLATELET # BLD AUTO: 141 THOUSANDS/UL (ref 149–390)
PMV BLD AUTO: 11.5 FL (ref 8.9–12.7)
POTASSIUM SERPL-SCNC: 4.2 MMOL/L (ref 3.5–5.3)
PR INTERVAL: 194 MS
PROT SERPL-MCNC: 6.8 G/DL (ref 6.4–8.4)
QRS AXIS: 120 DEGREES
QRSD INTERVAL: 154 MS
QT INTERVAL: 360 MS
QTC INTERVAL: 445 MS
RBC # BLD AUTO: 4.26 MILLION/UL (ref 3.81–5.12)
SODIUM SERPL-SCNC: 140 MMOL/L (ref 135–147)
T WAVE AXIS: -28 DEGREES
VENTRICULAR RATE: 92 BPM
WBC # BLD AUTO: 11.55 THOUSAND/UL (ref 4.31–10.16)

## 2022-06-21 PROCEDURE — 70450 CT HEAD/BRAIN W/O DYE: CPT

## 2022-06-21 PROCEDURE — 71275 CT ANGIOGRAPHY CHEST: CPT

## 2022-06-21 PROCEDURE — 83880 ASSAY OF NATRIURETIC PEPTIDE: CPT | Performed by: PHYSICIAN ASSISTANT

## 2022-06-21 PROCEDURE — G1004 CDSM NDSC: HCPCS

## 2022-06-21 PROCEDURE — 93010 ELECTROCARDIOGRAM REPORT: CPT | Performed by: INTERNAL MEDICINE

## 2022-06-21 PROCEDURE — 36415 COLL VENOUS BLD VENIPUNCTURE: CPT | Performed by: PHYSICIAN ASSISTANT

## 2022-06-21 PROCEDURE — 99285 EMERGENCY DEPT VISIT HI MDM: CPT

## 2022-06-21 PROCEDURE — 84484 ASSAY OF TROPONIN QUANT: CPT | Performed by: PHYSICIAN ASSISTANT

## 2022-06-21 PROCEDURE — 85379 FIBRIN DEGRADATION QUANT: CPT | Performed by: PHYSICIAN ASSISTANT

## 2022-06-21 PROCEDURE — 71045 X-RAY EXAM CHEST 1 VIEW: CPT

## 2022-06-21 PROCEDURE — 85025 COMPLETE CBC W/AUTO DIFF WBC: CPT | Performed by: PHYSICIAN ASSISTANT

## 2022-06-21 PROCEDURE — 80053 COMPREHEN METABOLIC PANEL: CPT | Performed by: PHYSICIAN ASSISTANT

## 2022-06-21 PROCEDURE — 93005 ELECTROCARDIOGRAM TRACING: CPT

## 2022-06-21 PROCEDURE — 99285 EMERGENCY DEPT VISIT HI MDM: CPT | Performed by: PHYSICIAN ASSISTANT

## 2022-06-21 RX ORDER — IODIXANOL 320 MG/ML
70 INJECTION, SOLUTION INTRAVASCULAR
Status: COMPLETED | OUTPATIENT
Start: 2022-06-21 | End: 2022-06-21

## 2022-06-21 RX ORDER — ACETAMINOPHEN 325 MG/1
650 TABLET ORAL ONCE
Status: COMPLETED | OUTPATIENT
Start: 2022-06-21 | End: 2022-06-21

## 2022-06-21 RX ADMIN — IODIXANOL 70 ML: 320 INJECTION, SOLUTION INTRAVASCULAR at 13:34

## 2022-06-21 RX ADMIN — ACETAMINOPHEN 650 MG: 325 TABLET ORAL at 12:11

## 2022-06-21 RX ADMIN — SODIUM CHLORIDE 250 ML: 0.9 INJECTION, SOLUTION INTRAVENOUS at 13:39

## 2022-06-21 NOTE — ED PROVIDER NOTES
History  Chief Complaint   Patient presents with    Chest Pain     Pt c/o chest pain and headache that started this morning  Intermittent chest pain radiates down left arm, but denies numbness and tingling  no cardiac hx     The patient is an 80-year-old female with history of CHF, CKD, diabetes and pulmonary edema who presents to the emergency department for evaluation of chest pain and headache  The patient states she woke up this morning, and she felt okay  She went to the bathroom and then went back to sleep for awhile  She later woke up, and she reports that she had a bad headache  She then noted she developed some chest pressure  She describes it as an elephant sitting on her chest   She states it is currently an 8/10  She states it is mainly in the center of her chest, but it radiates across both sides of her chest   She reports that the headache has since been subsiding  Per family member present, the patient does have frequent falls, although she has not fallen or had any injuries recently  She states that the pain does radiate down her left arm, and she feels like her left arm is not working properly  Per family member, the patient has been experiencing the symptoms in her left arm for a while now, although he states that the pain radiating down her arm is new with her symptoms this morning  The patient takes a nightly low-dose aspirin  They deny fever, chills, cough, shortness of breath, nausea, vomiting, dizziness or lightheadedness  History provided by:  Patient and significant other   used: No    Chest Pain  Associated symptoms: headache    Associated symptoms: no abdominal pain, no back pain, no cough, no dizziness, no fever, no nausea, no shortness of breath and not vomiting        Prior to Admission Medications   Prescriptions Last Dose Informant Patient Reported? Taking?    Cholecalciferol (VITAMIN D3) 1000 units CAPS  Self Yes No   Sig: Take by mouth DULoxetine (CYMBALTA) 60 mg delayed release capsule  Self No No   Sig: TAKE ONE CAPSULE BY MOUTH EVERY DAY   Magnesium 250 MG TABS  Self Yes No   Sig: Take 250 mg by mouth in the morning   Mirabegron ER (Myrbetriq) 50 MG TB24   No No   Sig: Take 1 tablet (50 mg total) by mouth daily   Sodium Fluoride (PreviDent 5000 Booster Plus) 1 1 % PSTE  Self No No   Sig: Apply on teeth every evening as directed   amitriptyline (ELAVIL) 10 mg tablet   No No   Sig: Take 2 tablets (20 mg total) by mouth daily at bedtime   aspirin 81 mg chewable tablet   No No   Sig: Chew 1 tablet (81 mg total) daily   Patient not taking: Reported on 4/18/2022    atorvastatin (LIPITOR) 10 mg tablet   No No   Sig: Take 1 tablet (10 mg total) by mouth daily   diphenoxylate-atropine (LOMOTIL) 2 5-0 025 mg per tablet  Self Yes No   Sig: Take 1 tablet by mouth if needed     hydroxychloroquine (PLAQUENIL) 200 mg tablet   No No   Sig: Take 1 tablet (200 mg total) by mouth daily with breakfast   lisinopril (ZESTRIL) 2 5 mg tablet   No No   Sig: Take 1 tablet (2 5 mg total) by mouth daily   metFORMIN (GLUCOPHAGE) 500 mg tablet  Self Yes No   Sig: TAKE ONE TABLET BY MOUTH TWICE A DAY WITH MORNING AND EVENING MEALS   metoprolol succinate (TOPROL-XL) 25 mg 24 hr tablet   No No   Sig: Take 0 5 tablets (12 5 mg total) by mouth daily   omeprazole (PRILOSEC) 20 mg delayed release capsule  Self Yes No   Sig: Take 20 mg by mouth daily     ondansetron (ZOFRAN-ODT) 4 mg disintegrating tablet  Self Yes No   Sig: TAKE 1 TABLET BY MOUTH EVERY 8 HOURS FOR 2 DAYS AND PLACE ON TOP OF THE TONGUE WHERE IT WILL DISSOLVE, THEN SWALLOW   pregabalin (LYRICA) 50 mg capsule  Self No No   Sig: Take 1 capsule (50 mg total) by mouth 4 (four) times a day   Patient taking differently: Take 50 mg by mouth 3 (three) times a day     pregabalin (LYRICA) 50 mg capsule   No No   Sig: Take 1 cap 4 times daily   pregabalin (LYRICA) 75 mg capsule  Self Yes No   Sig: Take 75 mg by mouth daily at bedtime   pyridoxine (B-6) 100 MG tablet  Self Yes No   Sig: Take 100 mg by mouth daily   torsemide (DEMADEX) 10 mg tablet   No No   Sig: Take 1 tablet (10 mg total) by mouth daily      Facility-Administered Medications: None       Past Medical History:   Diagnosis Date    Anemia     Arthritis     Back pain     [facility] failure[facility])     Chronic kidney disease     Stage 3b    Diabetes ([place] [address] )     Diabetes mellitus ([place] [address] )     Diabetic gastroparesis associated with type 2 diabetes mellitus ([place] [address] )     Diabetic polyneuropathy ([place] [address] )     Diverticulosis     Herpes zoster     Hypertension     IBS (irritable bowel syndrome)     Neurogenic claudication due to lumbar spinal stenosis     Osteoarthritis     Osteoporosis     Pulmonary edema     Seronegative arthropathy of multiple sites [facility])        Past Surgical History:   Procedure Laterality Date    BACK SURGERY      EGD AND COLONOSCOPY N/A 2016    Procedure: EGD AND COLONOSCOPY;  Surgeon: Aury Garcia MD;  [facility]; Service:     JOINT REPLACEMENT      bilat knees and hips    OTHER SURGICAL HISTORY      pelvis rods    REPLACEMENT TOTAL KNEE BILATERAL      STOMACH SURGERY         Family History   Problem Relation Age of Onset    Cancer Brother     Diabetes Mother     Hypertension Father     Heart disease Father      I have reviewed and agree with the history as documented  E-Cigarette/Vaping    E-Cigarette Use Never User      E-Cigarette/Vaping Substances    Nicotine No     THC No     CBD No     Flavoring No     Other No      Social History     Tobacco Use    Smoking status: Former Smoker     Types: Cigarettes     Quit date:      Years since quittin 5    Smokeless tobacco: Never Used   Vaping Use    Vaping Use: Never used   Substance Use Topics    Alcohol use: No    Drug use: No       Review of Systems   Constitutional: Negative for chills and fever     HENT: Negative for ear pain and sore throat  Eyes: Negative for redness and visual disturbance  Respiratory: Negative for cough and shortness of breath  Cardiovascular: Positive for chest pain  Gastrointestinal: Negative for abdominal pain, diarrhea, nausea and vomiting  Genitourinary: Negative for dysuria and hematuria  Musculoskeletal: Positive for myalgias  Negative for back pain, neck pain and neck stiffness  Skin: Negative for color change and rash  Neurological: Positive for headaches  Negative for dizziness and light-headedness  All other systems reviewed and are negative  Physical Exam  Physical Exam  Vitals and nursing note reviewed  Constitutional:       General: She is not in acute distress  Appearance: She is well-developed  She is not ill-appearing or toxic-appearing  HENT:      Head: Normocephalic and atraumatic  Mouth/Throat:      Pharynx: Uvula midline  Eyes:      General: Lids are normal       Conjunctiva/sclera: Conjunctivae normal    Cardiovascular:      Rate and Rhythm: Normal rate and regular rhythm  Pulses: Normal pulses  Heart sounds: Normal heart sounds  Pulmonary:      Effort: Pulmonary effort is normal       Breath sounds: Normal breath sounds  Abdominal:      General: There is no distension  Palpations: Abdomen is soft  Tenderness: There is no abdominal tenderness  Musculoskeletal:      Cervical back: Normal range of motion and neck supple  Right lower leg: No edema  Left lower leg: No edema  Skin:     General: Skin is warm and dry  Neurological:      Mental Status: She is alert and oriented to person, place, and time  Cranial Nerves: Cranial nerves are intact  Sensory: Sensation is intact  Motor: Motor function is intact  No weakness or pronator drift           Vital Signs  ED Triage Vitals   Temperature Pulse Respirations Blood Pressure SpO2   06/21/22 1124 06/21/22 1124 06/21/22 1124 06/21/22 1124 06/21/22 1124   98 3 °F (36 8 °C) 103 20 138/70 94 %      Temp Source Heart Rate Source Patient Position - Orthostatic VS BP Location FiO2 (%)   06/21/22 1124 06/21/22 1124 06/21/22 1124 06/21/22 1124 --   Oral Monitor Sitting Right arm       Pain Score       06/21/22 1211       7           Vitals:    06/21/22 1300 06/21/22 1341 06/21/22 1400 06/21/22 1500   BP: 166/79 151/74 148/75 146/69   Pulse: 96 92 92 88   Patient Position - Orthostatic VS:  Lying           Visual Acuity      ED Medications  Medications   acetaminophen (TYLENOL) tablet 650 mg (650 mg Oral Given 6/21/22 1211)   sodium chloride 0 9 % bolus 250 mL (0 mL Intravenous Stopped 6/21/22 1447)   iodixanol (VISIPAQUE) 320 MG/ML injection 70 mL (70 mL Intravenous Given 6/21/22 1334)       Diagnostic Studies  Results Reviewed     Procedure Component Value Units Date/Time    HS Troponin I 2hr [[id number]]  (Normal) Collected: 06/21/22 1455    Lab Status: Final result Specimen: Blood from Arm, Left Updated: 06/21/22 1548     hs TnI 2hr 6 ng/L      Delta 2hr hsTnI -1 ng/L     B-Type Natriuretic Peptide(BNP) AN, [place], [facility] Only [[id number]]  (Abnormal) Collected: 06/21/22 1211    Lab Status: Final result Specimen: Blood from Arm, Left Updated: 06/21/22 1258      pg/mL     HS Troponin 0hr (reflex protocol) [[id number]]  (Normal) Collected: 06/21/22 1211    Lab Status: Final result Specimen: Blood from Arm, Left Updated: 06/21/22 1257     hs TnI 0hr 7 ng/L     D-Dimer [[id number]]  (Abnormal) Collected: 06/21/22 1211    Lab Status: Final result Specimen: Blood from Arm, Left Updated: 06/21/22 1248     D-Dimer, Quant 1 18 ug/ml FEU     Narrative: In the evaluation for possible pulmonary embolism, in the appropriate (Well's Score of 4 or less) patient, the age adjusted d-dimer cutoff for this patient can be calculated as:    Age x 0 01 (in ug/mL) for Age-adjusted D-dimer exclusion threshold for a patient over 50 years      Comprehensive metabolic panel [[id number]] Collected: 06/21/22 1211    Lab Status: Final result Specimen: Blood from Arm, Left Updated: 06/21/22 1243     Sodium 140 mmol/L      Potassium 4 2 mmol/L      Chloride 104 mmol/L      CO2 28 mmol/L      ANION GAP 8 mmol/L      BUN 22 mg/dL      Creatinine 1 17 mg/dL      Glucose 121 mg/dL      Calcium 9 5 mg/dL      AST 39 U/L      ALT 35 U/L      Alkaline Phosphatase 90 U/L      Total Protein 6 8 g/dL      Albumin 4 0 g/dL      Total Bilirubin 0 72 mg/dL      eGFR 41 ml/min/1 73sq m     Narrative:      National Kidney Disease Foundation guidelines for Chronic Kidney Disease (CKD):     Stage 1 with normal or high GFR (GFR > 90 mL/min/1 73 square meters)    Stage 2 Mild CKD (GFR = 60-89 mL/min/1 73 square meters)    Stage 3A Moderate CKD (GFR = 45-59 mL/min/1 73 square meters)    Stage 3B Moderate CKD (GFR = 30-44 mL/min/1 73 square meters)    Stage 4 Severe CKD (GFR = 15-29 mL/min/1 73 square meters)    Stage 5 End Stage CKD (GFR <15 mL/min/1 73 square meters)  Note: GFR calculation is accurate only with a steady state creatinine    CBC and differential [[id number]]  (Abnormal) Collected: 06/21/22 1211    Lab Status: Final result Specimen: Blood from Arm, Left Updated: 06/21/22 1225     WBC 11 55 Thousand/uL      RBC 4 26 Million/uL      Hemoglobin 13 4 g/dL      Hematocrit 41 7 %      MCV 98 fL      MCH 31 5 pg      MCHC 32 1 g/dL      RDW 15 1 %      MPV 11 5 fL      Platelets 149 Thousands/uL      nRBC 0 /100 WBCs      Neutrophils Relative 87 %      Immat GRANS % 1 %      Lymphocytes Relative 4 %      Monocytes Relative 6 %      Eosinophils Relative 2 %      Basophils Relative 0 %      Neutrophils Absolute 10 09 Thousands/µL      Immature Grans Absolute 0 09 Thousand/uL      Lymphocytes Absolute 0 40 Thousands/µL      Monocytes Absolute 0 68 Thousand/µL      Eosinophils Absolute 0 26 Thousand/µL      Basophils Absolute 0 03 Thousands/µL                  CTA ED chest PE study   Final Result by Miguelito Guerrero MD (06/21 1345)      No definite acute pulmonary emboli but interpretation compromised by respiratory motion and emboli can be obscured  Mild interstitial edema with small effusions  Workstation performed: [id number]         XR chest 1 view portable   Final Result by Ezequiel Williamson MD (06/21 1245)      Low lung volumes with vascular crowding, question mild superimposed interstitial edema  Workstation performed: [id number]         CT head without contrast   Final Result by Dorien Felty, MD (06/21 1228)      No acute intracranial hemorrhage or mass effect  Mild atrophy and chronic microvascular ischemic changes  Persistent opacified right mastoid air cells may indicate right-sided mastoiditis  Workstation performed: [id number]                    Procedures  ECG 12 Lead Documentation Only    Date/Time: 6/21/2022 11:53 AM  Performed by: Fred Santo PA-C  Authorized by: Latisha Berumen PA-C     Comments:      Normal sinus rhythm at 97  Right axis deviation  Right bundle-branch block  No significant change noted from prior done in April of 2022  ECG 12 Lead Documentation Only    Date/Time: 6/21/2022 1:40 PM  Performed by: Fred Santo PA-C  Authorized by: Latisha Berumen PA-C     Comments:      Normal sinus rhythm at 92  Right bundle-branch block  No acute ST-T changes  No significant change noted from EKG done earlier today  ED Course  ED Course as of 06/21/22 1938 Tue Jun 21, 2022   1201 No STEMI on EKG  1254 D-Dimer, Quant(!): 1 18   1302 On re-evaluation, the patient reports that her pain is subsiding  She is resting comfortably, and vital signs remain stable  Will order PE study  1335 Patient reports significant improvement of chest pressure at this time  She is resting comfortably  Pending PE study and 2 hour troponin  1416 Discussed CTA results with the patient and her family member    I advised that it was a limited study due to motion artifact  Patient continues to report resolution of chest pain at this time  1551 hs TnI 2hr: 6   1553 Delta 2hr hsTnI: -1   1553 Discussed repeat troponin results with patient  She would like to be discharged home at this time  She follows with Cardiology, Chela Harris  I encouraged close follow-up  Additionally, I will place an ambulatory referral    1554 Patient continues to remain chest pain-free  Headache has also resolved  HEART Risk Score    Flowsheet Row Most Recent Value   Heart Score Risk Calculator    History 1 Filed at: 06/21/2022 1550   ECG 1 Filed at: 06/21/2022 1550   Age 2 Filed at: 06/21/2022 1550   Risk Factors 2 Filed at: 06/21/2022 1550   Troponin 0 Filed at: 06/21/2022 1550   HEART Score 6 Filed at: 06/21/2022 1550                        SBIRT 20yo+    Flowsheet Row Most Recent Value   SBIRT (25 yo +)    In order to provide better care to our patients, we are screening all of our patients for alcohol and drug use  Would it be okay to ask you these screening questions? Yes Filed at: 06/21/2022 1218   Initial Alcohol Screen: US AUDIT-C     1  How often do you have a drink containing alcohol? 0 Filed at: 06/21/2022 1218   2  How many drinks containing alcohol do you have on a typical day you are drinking? 0 Filed at: 06/21/2022 1218   3a  Male UNDER 65: How often do you have five or more drinks on one occasion? 0 Filed at: 06/21/2022 1218   3b  FEMALE Any Age, or MALE 65+: How often do you have 4 or more drinks on one occassion? 0 Filed at: 06/21/2022 1218   Audit-C Score 0 Filed at: 06/21/2022 1218   XIOMY: How many times in the past year have you    Used an illegal drug or used a prescription medication for non-medical reasons?  Never Filed at: 06/21/2022 1218                    MDM  Number of Diagnoses or Management Options  Chest pain, unspecified type: new and requires workup  Headache: new and requires workup  Diagnosis management comments: Patient presents for evaluation of chest pressure that started this morning  Patient also reports associated headache  Vital signs reviewed and stable at this time  Differential includes but is not limited to pneumonia versus costochondritis versus myocarditis versus pericarditis versus PE versus ACS  Labs, imaging and EKG were ordered and reviewed  Labs are most significant for an elevated D-dimer level  Because of this, PE study was added to the workup for today  Initial troponin came back head 7  EKG does not show any acute ischemic change  Chest x-ray shows some slight interstitial edema but no other acute findings at this time  All results were discussed with the patient  On re-evaluation, the patient reported almost complete resolution of symptoms  She states that the headache has resolved, and she is no longer feeling the chest pressure  I advised that I would still like her to stay for a to our troponin level, and she is agreeable  PE study showed no acute pulmonary embolism, although study was slightly limited due to motion artifact  I did discuss this with the patient and her   Second troponin came back at 6 with a delta change of -1  Patient has a heart score of 6  She continues to remain chest pain-free and headache free since my initial re-evaluation  Vital signs have remained stable  The patient is requesting discharge home  I advised that I will discharge the patient home, however she will require close follow-up with Cardiology  Ambulatory referral to cardiology was placed  Patient was also provided with information for Cardiology  She states that she generally sees Dr Felton Quach  Patient was given strict return to ED precautions if she develops any new or worsening symptoms  I recommended continuing taking her daily low-dose aspirin  Patient is stable for discharge         Amount and/or Complexity of Data Reviewed  Clinical lab tests: ordered and reviewed  Tests in the radiology section of CPT®: ordered and reviewed  Decide to obtain previous medical records or to obtain history from someone other than the patient: yes  Obtain history from someone other than the patient: yes  Review and summarize past medical records: yes    Risk of Complications, Morbidity, and/or Mortality  Presenting problems: low  Diagnostic procedures: low  Management options: low    Patient Progress  Patient progress: improved      Disposition  Final diagnoses:   Headache   Chest pain, unspecified type     Time reflects when diagnosis was documented in both MDM as applicable and the Disposition within this note     Time User Action Codes Description Comment    6/21/2022  3:54 PM Natanael Ricky Add [R07 89] Other chest pain     6/21/2022  3:54 PM Mera Fobryan, [employer] Add [R51 9] Headache     6/21/2022  3:55 PM Natanael Ricky Modify [R51 9] Headache     6/21/2022  3:55 PM Mera Fobryan, [employer] Remove [R07 89] Other chest pain     6/21/2022  3:55 PM Natanael Ricky Add [R07 9] Chest pain, unspecified type     6/21/2022  3:55 PM Natanael Ricky Modify [R51 9] Headache     6/21/2022  3:55 PM Natanael Ricky Modify [R07 9] Chest pain, unspecified type       ED Disposition     ED Disposition   Discharge    Condition   Stable    Date/Time   Tue Jun 21, 2022  3:54 PM    [facility] discharge to home/self care                 Follow-up Information     Follow up With Specialties Details Why Contact Info Additional Andrés Campo [address], DO Cardiology, Multidisciplinary Call in 1 day  [address] [phone]       [address] Emergency Department Emergency Medicine  If symptoms worsen [address] Emergency Department, [address], [place], [place], [address]          Discharge Medication List as of 6/21/2022  3:57 PM      CONTINUE these medications which have NOT CHANGED    Details   amitriptyline (ELAVIL) 10 mg tablet Take 2 tablets (20 mg total) by mouth daily at bedtime, Starting Tue 5/31/2022, Until Sun 11/27/2022, Normal      aspirin 81 mg chewable tablet Chew 1 tablet (81 mg total) daily, Starting Fri 4/15/2022, No Print      atorvastatin (LIPITOR) 10 mg tablet Take 1 tablet (10 mg total) by mouth daily, Starting Tue 4/19/2022, No Print      Cholecalciferol (VITAMIN D3) 1000 units CAPS Take by mouth, Historical Med      diphenoxylate-atropine (LOMOTIL) 2 5-0 025 mg per tablet Take 1 tablet by mouth if needed  , Historical Med      DULoxetine (CYMBALTA) 60 mg delayed release capsule TAKE ONE CAPSULE BY MOUTH EVERY DAY, Normal      hydroxychloroquine (PLAQUENIL) 200 mg tablet Take 1 tablet (200 mg total) by mouth daily with breakfast, Starting Mon 5/23/2022, Until Sat 11/19/2022, Normal      lisinopril (ZESTRIL) 2 5 mg tablet Take 1 tablet (2 5 mg total) by mouth daily, Starting Sat 4/16/2022, Normal      Magnesium 250 MG TABS Take 250 mg by mouth in the morning, Historical Med      metFORMIN (GLUCOPHAGE) 500 mg tablet TAKE ONE TABLET BY MOUTH TWICE A DAY WITH MORNING AND EVENING MEALS, Historical Med      metoprolol succinate (TOPROL-XL) 25 mg 24 hr tablet Take 0 5 tablets (12 5 mg total) by mouth daily, Starting Sat 4/16/2022, Normal      Mirabegron ER (Myrbetriq) 50 MG TB24 Take 1 tablet (50 mg total) by mouth daily, Starting Fri 4/1/2022, Normal      omeprazole (PRILOSEC) 20 mg delayed release capsule Take 20 mg by mouth daily  , Historical Med      ondansetron (ZOFRAN-ODT) 4 mg disintegrating tablet TAKE 1 TABLET BY MOUTH EVERY 8 HOURS FOR 2 DAYS AND PLACE ON TOP OF THE TONGUE WHERE IT WILL DISSOLVE, THEN SWALLOW, Historical Med      !! pregabalin (LYRICA) 50 mg capsule Take 1 cap 4 times daily, Normal      !! pregabalin (LYRICA) 75 mg capsule Take 75 mg by mouth daily at bedtime, Historical Med pyridoxine (B-6) 100 MG tablet Take 100 mg by mouth daily, Historical Med      Sodium Fluoride (PreviDent 5000 Booster Plus) 1 1 % PSTE Apply on teeth every evening as directed, Normal      torsemide (DEMADEX) 10 mg tablet Take 1 tablet (10 mg total) by mouth daily, Starting Fri 4/15/2022, Normal       !! - Potential duplicate medications found  Please discuss with provider                PDMP Review     None          ED Provider  Electronically Signed by           Fred Santo PA-C  06/21/22 3764

## 2022-06-22 ENCOUNTER — TELEPHONE (OUTPATIENT)
Dept: CARDIOLOGY CLINIC | Facility: CLINIC | Age: 87
End: 2022-06-22

## 2022-06-22 NOTE — TELEPHONE ENCOUNTER
Spoke to Dr Yumiko Duque that fine, and pt will follow up with Flaco Grissom at [facility]

## 2022-06-22 NOTE — TELEPHONE ENCOUNTER
Dr Ruel Stokes called is concerned about pt was in ED yesterday, with chest pain, currently isn't taking cardiac  Medication, has no nitro    Dr Chandra Manzano was going to advise to go back to taking Lisinopril 2 5 mg one tablet qd, metoprolol 12 5 mg qd  Also pt does continue to have bilaterally edema, wanted to make sure you were ok with pt doing Torsemide 10 mg M,W & F     Dr Ruel Stokes will sent in scripts, but wanted to make you aware pt is going to be leaving for vacation and wanted her back on medications  Also wanted to make sure you were ok with her sending in [facility] for the chest pains especially being on vacation  Advised Dr Ruel Stokes will sent Dr Ermelinda Strickland message and make aware       Pt has OV 7/7/22 with Caitlin Lazo TT Dr Ermelinda Strickland    Please advise

## 2022-06-24 ENCOUNTER — HOSPITAL ENCOUNTER (INPATIENT)
Facility: HOSPITAL | Age: 87
LOS: 3 days | Discharge: HOME HEALTH | DRG: 682 | End: 2022-06-27
Attending: EMERGENCY MEDICINE | Admitting: INTERNAL MEDICINE
Payer: MEDICARE

## 2022-06-24 ENCOUNTER — APPOINTMENT (EMERGENCY)
Dept: RADIOLOGY | Facility: HOSPITAL | Age: 87
DRG: 682 | End: 2022-06-24
Payer: MEDICARE

## 2022-06-24 ENCOUNTER — APPOINTMENT (EMERGENCY)
Dept: CT IMAGING | Facility: HOSPITAL | Age: 87
DRG: 682 | End: 2022-06-24
Payer: MEDICARE

## 2022-06-24 DIAGNOSIS — Z79.899 POLYPHARMACY: ICD-10-CM

## 2022-06-24 DIAGNOSIS — B02.29 POST ZOSTER NEURALGIA: ICD-10-CM

## 2022-06-24 DIAGNOSIS — L03.119 CELLULITIS OF LOWER EXTREMITY, UNSPECIFIED LATERALITY: ICD-10-CM

## 2022-06-24 DIAGNOSIS — R41.82 ALTERED MENTAL STATUS: Primary | ICD-10-CM

## 2022-06-24 DIAGNOSIS — E11.22 TYPE 2 DIABETES MELLITUS WITH STAGE 3 CHRONIC KIDNEY DISEASE, WITHOUT LONG-TERM CURRENT USE OF INSULIN, UNSPECIFIED WHETHER STAGE 3A OR 3B CKD (HCC): ICD-10-CM

## 2022-06-24 DIAGNOSIS — N18.30 TYPE 2 DIABETES MELLITUS WITH STAGE 3 CHRONIC KIDNEY DISEASE, WITHOUT LONG-TERM CURRENT USE OF INSULIN, UNSPECIFIED WHETHER STAGE 3A OR 3B CKD (HCC): ICD-10-CM

## 2022-06-24 PROBLEM — L03.90 CELLULITIS: Status: ACTIVE | Noted: 2022-06-24

## 2022-06-24 LAB
2HR DELTA HS TROPONIN: -1 NG/L
4HR DELTA HS TROPONIN: -1 NG/L
ALBUMIN SERPL BCP-MCNC: 3.6 G/DL (ref 3.5–5)
ALP SERPL-CCNC: 143 U/L (ref 34–104)
ALT SERPL W P-5'-P-CCNC: 47 U/L (ref 7–52)
ANION GAP SERPL CALCULATED.3IONS-SCNC: 13 MMOL/L (ref 4–13)
APAP SERPL-MCNC: <10 UG/ML (ref 10–20)
APTT PPP: 33 SECONDS (ref 23–37)
AST SERPL W P-5'-P-CCNC: 70 U/L (ref 13–39)
ATRIAL RATE: 95 BPM
ATRIAL RATE: 97 BPM
BASOPHILS # BLD AUTO: 0.02 THOUSANDS/ΜL (ref 0–0.1)
BASOPHILS NFR BLD AUTO: 0 % (ref 0–1)
BILIRUB SERPL-MCNC: 1.27 MG/DL (ref 0.2–1)
BUN SERPL-MCNC: 42 MG/DL (ref 5–25)
CALCIUM SERPL-MCNC: 8.9 MG/DL (ref 8.4–10.2)
CARDIAC TROPONIN I PNL SERPL HS: 15 NG/L
CARDIAC TROPONIN I PNL SERPL HS: 15 NG/L
CARDIAC TROPONIN I PNL SERPL HS: 16 NG/L
CHLORIDE SERPL-SCNC: 101 MMOL/L (ref 96–108)
CO2 SERPL-SCNC: 23 MMOL/L (ref 21–32)
CREAT SERPL-MCNC: 1.79 MG/DL (ref 0.6–1.3)
EOSINOPHIL # BLD AUTO: 0.9 THOUSAND/ΜL (ref 0–0.61)
EOSINOPHIL NFR BLD AUTO: 9 % (ref 0–6)
ERYTHROCYTE [DISTWIDTH] IN BLOOD BY AUTOMATED COUNT: 15 % (ref 11.6–15.1)
ETHANOL SERPL-MCNC: <10 MG/DL
GFR SERPL CREATININE-BSD FRML MDRD: 25 ML/MIN/1.73SQ M
GLUCOSE SERPL-MCNC: 90 MG/DL (ref 65–140)
HCT VFR BLD AUTO: 40.6 % (ref 34.8–46.1)
HGB BLD-MCNC: 13 G/DL (ref 11.5–15.4)
IMM GRANULOCYTES # BLD AUTO: 0.09 THOUSAND/UL (ref 0–0.2)
IMM GRANULOCYTES NFR BLD AUTO: 1 % (ref 0–2)
INR PPP: 1.24 (ref 0.84–1.19)
LACTATE SERPL-SCNC: 1.8 MMOL/L (ref 0.5–2)
LACTATE SERPL-SCNC: 2.2 MMOL/L (ref 0.5–2)
LACTATE SERPL-SCNC: 3.5 MMOL/L (ref 0.5–2)
LYMPHOCYTES # BLD AUTO: 0.21 THOUSANDS/ΜL (ref 0.6–4.47)
LYMPHOCYTES NFR BLD AUTO: 2 % (ref 14–44)
MCH RBC QN AUTO: 30.8 PG (ref 26.8–34.3)
MCHC RBC AUTO-ENTMCNC: 32 G/DL (ref 31.4–37.4)
MCV RBC AUTO: 96 FL (ref 82–98)
MONOCYTES # BLD AUTO: 0.59 THOUSAND/ΜL (ref 0.17–1.22)
MONOCYTES NFR BLD AUTO: 6 % (ref 4–12)
NEUTROPHILS # BLD AUTO: 8.82 THOUSANDS/ΜL (ref 1.85–7.62)
NEUTS SEG NFR BLD AUTO: 82 % (ref 43–75)
NRBC BLD AUTO-RTO: 0 /100 WBCS
P AXIS: 74 DEGREES
P AXIS: 74 DEGREES
PLATELET # BLD AUTO: 146 THOUSANDS/UL (ref 149–390)
PMV BLD AUTO: 11.5 FL (ref 8.9–12.7)
POTASSIUM SERPL-SCNC: 4 MMOL/L (ref 3.5–5.3)
PR INTERVAL: 156 MS
PR INTERVAL: 186 MS
PROCALCITONIN SERPL-MCNC: 1.49 NG/ML
PROT SERPL-MCNC: 6.3 G/DL (ref 6.4–8.4)
PROTHROMBIN TIME: 15.6 SECONDS (ref 11.6–14.5)
QRS AXIS: 103 DEGREES
QRS AXIS: 125 DEGREES
QRSD INTERVAL: 148 MS
QRSD INTERVAL: 154 MS
QT INTERVAL: 398 MS
QT INTERVAL: 436 MS
QTC INTERVAL: 505 MS
QTC INTERVAL: 547 MS
RBC # BLD AUTO: 4.22 MILLION/UL (ref 3.81–5.12)
SALICYLATES SERPL-MCNC: <5 MG/DL (ref 3–20)
SODIUM SERPL-SCNC: 137 MMOL/L (ref 135–147)
T WAVE AXIS: -17 DEGREES
T WAVE AXIS: -20 DEGREES
TSH SERPL DL<=0.05 MIU/L-ACNC: 2.67 UIU/ML (ref 0.45–4.5)
VENTRICULAR RATE: 95 BPM
VENTRICULAR RATE: 97 BPM
WBC # BLD AUTO: 10.63 THOUSAND/UL (ref 4.31–10.16)

## 2022-06-24 PROCEDURE — 36415 COLL VENOUS BLD VENIPUNCTURE: CPT

## 2022-06-24 PROCEDURE — 80143 DRUG ASSAY ACETAMINOPHEN: CPT

## 2022-06-24 PROCEDURE — 85610 PROTHROMBIN TIME: CPT | Performed by: EMERGENCY MEDICINE

## 2022-06-24 PROCEDURE — 99285 EMERGENCY DEPT VISIT HI MDM: CPT

## 2022-06-24 PROCEDURE — 83605 ASSAY OF LACTIC ACID: CPT | Performed by: INTERNAL MEDICINE

## 2022-06-24 PROCEDURE — 93005 ELECTROCARDIOGRAM TRACING: CPT

## 2022-06-24 PROCEDURE — 85730 THROMBOPLASTIN TIME PARTIAL: CPT | Performed by: EMERGENCY MEDICINE

## 2022-06-24 PROCEDURE — 99223 1ST HOSP IP/OBS HIGH 75: CPT | Performed by: INTERNAL MEDICINE

## 2022-06-24 PROCEDURE — 96366 THER/PROPH/DIAG IV INF ADDON: CPT

## 2022-06-24 PROCEDURE — 87040 BLOOD CULTURE FOR BACTERIA: CPT | Performed by: EMERGENCY MEDICINE

## 2022-06-24 PROCEDURE — 93010 ELECTROCARDIOGRAM REPORT: CPT | Performed by: INTERNAL MEDICINE

## 2022-06-24 PROCEDURE — 71046 X-RAY EXAM CHEST 2 VIEWS: CPT

## 2022-06-24 PROCEDURE — 83605 ASSAY OF LACTIC ACID: CPT | Performed by: EMERGENCY MEDICINE

## 2022-06-24 PROCEDURE — 85025 COMPLETE CBC W/AUTO DIFF WBC: CPT | Performed by: EMERGENCY MEDICINE

## 2022-06-24 PROCEDURE — 99285 EMERGENCY DEPT VISIT HI MDM: CPT | Performed by: EMERGENCY MEDICINE

## 2022-06-24 PROCEDURE — 82077 ASSAY SPEC XCP UR&BREATH IA: CPT

## 2022-06-24 PROCEDURE — G1004 CDSM NDSC: HCPCS

## 2022-06-24 PROCEDURE — 96365 THER/PROPH/DIAG IV INF INIT: CPT

## 2022-06-24 PROCEDURE — 80053 COMPREHEN METABOLIC PANEL: CPT | Performed by: EMERGENCY MEDICINE

## 2022-06-24 PROCEDURE — 84145 PROCALCITONIN (PCT): CPT | Performed by: EMERGENCY MEDICINE

## 2022-06-24 PROCEDURE — 84443 ASSAY THYROID STIM HORMONE: CPT

## 2022-06-24 PROCEDURE — 80179 DRUG ASSAY SALICYLATE: CPT

## 2022-06-24 PROCEDURE — 70450 CT HEAD/BRAIN W/O DYE: CPT

## 2022-06-24 PROCEDURE — 84484 ASSAY OF TROPONIN QUANT: CPT

## 2022-06-24 RX ORDER — ASPIRIN 81 MG/1
81 TABLET, CHEWABLE ORAL DAILY
Status: DISCONTINUED | OUTPATIENT
Start: 2022-06-25 | End: 2022-06-27 | Stop reason: HOSPADM

## 2022-06-24 RX ORDER — LISINOPRIL 2.5 MG/1
2.5 TABLET ORAL DAILY
Status: DISCONTINUED | OUTPATIENT
Start: 2022-06-25 | End: 2022-06-25

## 2022-06-24 RX ORDER — ASPIRIN 325 MG
325 TABLET ORAL ONCE
Status: COMPLETED | OUTPATIENT
Start: 2022-06-24 | End: 2022-06-24

## 2022-06-24 RX ORDER — ATORVASTATIN CALCIUM 10 MG/1
10 TABLET, FILM COATED ORAL DAILY
Status: DISCONTINUED | OUTPATIENT
Start: 2022-06-25 | End: 2022-06-27 | Stop reason: HOSPADM

## 2022-06-24 RX ORDER — HYDROXYCHLOROQUINE SULFATE 200 MG/1
200 TABLET, FILM COATED ORAL
Status: DISCONTINUED | OUTPATIENT
Start: 2022-06-25 | End: 2022-06-27 | Stop reason: HOSPADM

## 2022-06-24 RX ORDER — ACETAMINOPHEN 325 MG/1
650 TABLET ORAL EVERY 6 HOURS PRN
Status: DISCONTINUED | OUTPATIENT
Start: 2022-06-24 | End: 2022-06-25

## 2022-06-24 RX ORDER — METOPROLOL SUCCINATE 25 MG/1
12.5 TABLET, EXTENDED RELEASE ORAL DAILY
Status: DISCONTINUED | OUTPATIENT
Start: 2022-06-25 | End: 2022-06-25

## 2022-06-24 RX ORDER — PREGABALIN 50 MG/1
50 CAPSULE ORAL 3 TIMES DAILY
Status: DISCONTINUED | OUTPATIENT
Start: 2022-06-24 | End: 2022-06-27 | Stop reason: HOSPADM

## 2022-06-24 RX ORDER — AMITRIPTYLINE HYDROCHLORIDE 10 MG/1
20 TABLET, FILM COATED ORAL
Status: DISCONTINUED | OUTPATIENT
Start: 2022-06-24 | End: 2022-06-25

## 2022-06-24 RX ORDER — OXYBUTYNIN CHLORIDE 5 MG/1
10 TABLET, EXTENDED RELEASE ORAL DAILY
Refills: 11 | Status: DISCONTINUED | OUTPATIENT
Start: 2022-06-25 | End: 2022-06-27 | Stop reason: HOSPADM

## 2022-06-24 RX ORDER — INSULIN LISPRO 100 [IU]/ML
1-5 INJECTION, SOLUTION INTRAVENOUS; SUBCUTANEOUS
Status: DISCONTINUED | OUTPATIENT
Start: 2022-06-25 | End: 2022-06-25

## 2022-06-24 RX ORDER — PANTOPRAZOLE SODIUM 40 MG/1
40 TABLET, DELAYED RELEASE ORAL
Status: DISCONTINUED | OUTPATIENT
Start: 2022-06-25 | End: 2022-06-27 | Stop reason: HOSPADM

## 2022-06-24 RX ORDER — HEPARIN SODIUM 5000 [USP'U]/ML
5000 INJECTION, SOLUTION INTRAVENOUS; SUBCUTANEOUS EVERY 8 HOURS SCHEDULED
Status: DISCONTINUED | OUTPATIENT
Start: 2022-06-24 | End: 2022-06-27 | Stop reason: HOSPADM

## 2022-06-24 RX ORDER — CEFAZOLIN SODIUM 2 G/50ML
2000 SOLUTION INTRAVENOUS EVERY 8 HOURS
Status: DISCONTINUED | OUTPATIENT
Start: 2022-06-24 | End: 2022-06-27 | Stop reason: HOSPADM

## 2022-06-24 RX ADMIN — HEPARIN SODIUM 5000 UNITS: 5000 INJECTION INTRAVENOUS; SUBCUTANEOUS at 22:44

## 2022-06-24 RX ADMIN — ASPIRIN 325 MG ORAL TABLET 325 MG: 325 PILL ORAL at 18:37

## 2022-06-24 RX ADMIN — CEFAZOLIN SODIUM 2000 MG: 2 SOLUTION INTRAVENOUS at 21:58

## 2022-06-24 RX ADMIN — SODIUM CHLORIDE, SODIUM LACTATE, POTASSIUM CHLORIDE, AND CALCIUM CHLORIDE 1000 ML: .6; .31; .03; .02 INJECTION, SOLUTION INTRAVENOUS at 16:40

## 2022-06-24 RX ADMIN — PREGABALIN 50 MG: 50 CAPSULE ORAL at 21:58

## 2022-06-24 RX ADMIN — AMITRIPTYLINE HYDROCHLORIDE 20 MG: 10 TABLET, FILM COATED ORAL at 21:58

## 2022-06-24 NOTE — ASSESSMENT & PLAN NOTE
Lab Results   Component Value Date    EGFR 25 06/24/2022    EGFR 41 06/21/2022    EGFR 36 04/15/2022    CREATININE 1 79 (H) 06/24/2022    CREATININE 1 17 06/21/2022    CREATININE 1 30 04/15/2022     - Monitor daily BMP

## 2022-06-24 NOTE — ASSESSMENT & PLAN NOTE
Lab Results   Component Value Date    HGBA1C 5 9 (H) 04/14/2022     - Hold metformin  - SSI  - Monitor blood glucose

## 2022-06-24 NOTE — ASSESSMENT & PLAN NOTE
Wt Readings from Last 3 Encounters:   05/04/22 58 5 kg (129 lb)   04/18/22 58 9 kg (129 lb 14 4 oz)   04/15/22 58 5 kg (129 lb)       - Continue home medications 21-Jul-2017 15:10 No

## 2022-06-24 NOTE — ED PROVIDER NOTES
History  Chief Complaint   Patient presents with    Weakness - Generalized     Per family: pt has been more forgetful, recent falls, weakness over the last several days  Eating appropriately at home   reports she slipped off the edge of the bed this AM and fell onto the floor  +ASA  Unknown HS but pt denies HS/LOC   Multiple Falls     Patient is a 9year-old female presenting for evaluation of altered mental status  Patient was well last night  Patient lives at home with her  in separate bedroom  This morning  went and and found patient had fallen and was sitting on the ground next to her bed  When he went to ask what happened he found patient was unable to give a coherent response and seemed confused  He is unsure of patient care head  Patient was then brought to emergency department for evaluation  Denies fever, chills, chest pain, back pain, abdominal pain  Patient has minimal difficulty straining to gather senses and coherent answers  Prior to Admission Medications   Prescriptions Last Dose Informant Patient Reported? Taking?    Cholecalciferol (VITAMIN D3) 1000 units CAPS  Self Yes No   Sig: Take by mouth   DULoxetine (CYMBALTA) 60 mg delayed release capsule 6/24/2022 at Unknown time Self No Yes   Sig: TAKE ONE CAPSULE BY MOUTH EVERY DAY   Magnesium 250 MG TABS 6/23/2022 at Unknown time Self Yes Yes   Sig: Take 250 mg by mouth in the morning   Mirabegron ER (Myrbetriq) 50 MG TB24 6/23/2022 at Unknown time  No Yes   Sig: Take 1 tablet (50 mg total) by mouth daily   Sodium Fluoride (PreviDent 5000 Booster Plus) 1 1 % PSTE 6/23/2022 at Unknown time Self No Yes   Sig: Apply on teeth every evening as directed   amitriptyline (ELAVIL) 10 mg tablet 6/24/2022 at Unknown time  No Yes   Sig: Take 2 tablets (20 mg total) by mouth daily at bedtime   aspirin 81 mg chewable tablet 6/24/2022 at Unknown time  No Yes   Sig: Chew 1 tablet (81 mg total) daily   atorvastatin (LIPITOR) 10 mg tablet 6/24/2022 at Unknown time  No Yes   Sig: Take 1 tablet (10 mg total) by mouth daily   diphenoxylate-atropine (LOMOTIL) 2 5-0 025 mg per tablet Past Month at Unknown time Self Yes Yes   Sig: Take 1 tablet by mouth if needed     hydroxychloroquine (PLAQUENIL) 200 mg tablet 6/24/2022 at Unknown time  No Yes   Sig: Take 1 tablet (200 mg total) by mouth daily with breakfast   lisinopril (ZESTRIL) 2 5 mg tablet 6/23/2022 at Unknown time  No Yes   Sig: Take 1 tablet (2 5 mg total) by mouth daily   metFORMIN (GLUCOPHAGE) 500 mg tablet 6/23/2022 at Unknown time Self Yes Yes   Sig: TAKE ONE TABLET BY MOUTH TWICE A DAY WITH MORNING AND EVENING MEALS   metoprolol succinate (TOPROL-XL) 25 mg 24 hr tablet 6/23/2022 at Unknown time  No Yes   Sig: Take 0 5 tablets (12 5 mg total) by mouth daily   nitroglycerin (NITROSTAT) 0 4 mg SL tablet Not Taking at Unknown time  No No   Sig: Place 1 tablet (0 4 mg total) under the tongue every 5 (five) minutes as needed for chest pain for up to 5 days   Patient not taking: Reported on 6/24/2022   omeprazole (PriLOSEC) 20 mg delayed release capsule 6/23/2022 at Unknown time Self Yes Yes   Sig: Take 20 mg by mouth daily     ondansetron (ZOFRAN-ODT) 4 mg disintegrating tablet Not Taking at Unknown time Self Yes No   Sig: TAKE 1 TABLET BY MOUTH EVERY 8 HOURS FOR 2 DAYS AND PLACE ON TOP OF THE TONGUE WHERE IT WILL DISSOLVE, THEN SWALLOW   Patient not taking: Reported on 6/24/2022   pregabalin (LYRICA) 50 mg capsule  Self No No   Sig: Take 1 capsule (50 mg total) by mouth 4 (four) times a day   Patient taking differently: Take 50 mg by mouth 3 (three) times a day     pregabalin (LYRICA) 50 mg capsule 6/23/2022 at Unknown time  No Yes   Sig: Take 1 cap 4 times daily   pregabalin (LYRICA) 75 mg capsule 6/23/2022 at Unknown time Self Yes Yes   Sig: Take 75 mg by mouth daily at bedtime   pyridoxine (B-6) 100 MG tablet  Self Yes No   Sig: Take 100 mg by mouth daily   torsemide (DEMADEX) 10 mg tablet 2022 at Unknown time  No Yes   Sig: Take 1 tablet every Mon, Wed, and Fri      Facility-Administered Medications: None       Past Medical History:   Diagnosis Date    Anemia     Arthritis     Back pain     [facility] failure[facility])     Chronic kidney disease     Stage 3b    Diabetes ([facility] )     Diabetes mellitus ([facility] )     Diabetic gastroparesis associated with type 2 diabetes mellitus ([facility] )     Diabetic polyneuropathy ([facility] )     Diverticulosis     Herpes zoster     Hypertension     IBS (irritable bowel syndrome)     Neurogenic claudication due to lumbar spinal stenosis     Osteoarthritis     Osteoporosis     Pulmonary edema     Seronegative arthropathy of multiple sites [facility])        Past Surgical History:   Procedure Laterality Date    BACK SURGERY      EGD AND COLONOSCOPY N/A 2016    Procedure: EGD AND COLONOSCOPY;  Surgeon: Anamaria Abbott MD;  [facility]; Service:     JOINT REPLACEMENT      bilat knees and hips    OTHER SURGICAL HISTORY      pelvis rods    REPLACEMENT TOTAL KNEE BILATERAL      STOMACH SURGERY         Family History   Problem Relation Age of Onset    Cancer Brother     Diabetes Mother     Hypertension Father     Heart disease Father      I have reviewed and agree with the history as documented  E-Cigarette/Vaping    E-Cigarette Use Never User      E-Cigarette/Vaping Substances    Nicotine No     THC No     CBD No     Flavoring No     Other No      Social History     Tobacco Use    Smoking status: Former Smoker     Types: Cigarettes     Quit date:      Years since quittin 5    Smokeless tobacco: Never Used   Vaping Use    Vaping Use: Never used   Substance Use Topics    Alcohol use: No    Drug use: No        Review of Systems   Constitutional: Negative  HENT: Negative  Eyes: Negative  Respiratory: Negative  Cardiovascular: Negative  Gastrointestinal: Negative  Endocrine: Negative  Genitourinary: Negative  Musculoskeletal: Negative  Skin: Negative  Allergic/Immunologic: Negative  Neurological: Negative  Hematological: Negative  Psychiatric/Behavioral: Negative  All other systems reviewed and are negative  Physical Exam  ED Triage Vitals   Temperature Pulse Respirations Blood Pressure SpO2   06/24/22 1515 06/24/22 1503 06/24/22 1503 06/24/22 1503 06/24/22 1503   98 4 °F (36 9 °C) 98 16 94/53 (!) 84 %      Temp Source Heart Rate Source Patient Position - Orthostatic VS BP Location FiO2 (%)   06/24/22 1503 06/24/22 1503 06/24/22 1503 06/24/22 1503 --   Oral Monitor Sitting Right arm       Pain Score       06/24/22 1515       No Pain             Orthostatic Vital Signs  Vitals:    06/24/22 1730 06/24/22 1800 06/24/22 1900 06/24/22 2352   BP: 96/52 101/52 103/55 (!) 85/42   Pulse: 88 88 86 76   Patient Position - Orthostatic VS: Lying Lying Lying        Physical Exam  Vitals and nursing note reviewed  Constitutional:       General: She is not in acute distress  Appearance: Normal appearance  She is not ill-appearing, toxic-appearing or diaphoretic  HENT:      Head: Normocephalic and atraumatic  Eyes:      General: No scleral icterus  Right eye: No discharge  Left eye: No discharge  Extraocular Movements: Extraocular movements intact  Conjunctiva/sclera: Conjunctivae normal       Pupils: Pupils are equal, round, and reactive to light  Cardiovascular:      Rate and Rhythm: Normal rate  Pulses: Normal pulses  Heart sounds: Normal heart sounds  No murmur heard  No friction rub  No gallop  Pulmonary:      Effort: Pulmonary effort is normal  No respiratory distress  Breath sounds: Normal breath sounds  No stridor  No wheezing, rhonchi or rales  Abdominal:      General: Abdomen is flat  Bowel sounds are normal  There is no distension  Palpations: Abdomen is soft  Tenderness: There is no abdominal tenderness  There is no guarding or rebound  Musculoskeletal:         General: No swelling  Normal range of motion  Cervical back: Normal range of motion  No rigidity  Right lower leg: No edema  Left lower leg: No edema  Skin:     General: Skin is warm and dry  Capillary Refill: Capillary refill takes less than 2 seconds  Coloration: Skin is not jaundiced  Findings: No bruising or lesion  Neurological:      General: No focal deficit present  Mental Status: She is alert and oriented to person, place, and time  Comments: Word-finding difficulty   Psychiatric:         Mood and Affect: Mood normal          Behavior: Behavior normal          Thought Content:  Thought content normal          Judgment: Judgment normal          ED Medications  Medications   amitriptyline (ELAVIL) tablet 20 mg (20 mg Oral Given 6/24/22 2158)   aspirin chewable tablet 81 mg (has no administration in time range)   atorvastatin (LIPITOR) tablet 10 mg (has no administration in time range)   hydroxychloroquine (PLAQUENIL) tablet 200 mg (has no administration in time range)   lisinopril (ZESTRIL) tablet 2 5 mg (has no administration in time range)   metoprolol succinate (TOPROL-XL) 24 hr tablet 12 5 mg (has no administration in time range)   oxybutynin (DITROPAN-XL) 24 hr tablet 10 mg (has no administration in time range)   pantoprazole (PROTONIX) EC tablet 40 mg (has no administration in time range)   pregabalin (LYRICA) capsule 50 mg (50 mg Oral Given 6/24/22 2158)   acetaminophen (TYLENOL) tablet 650 mg (has no administration in time range)   heparin (porcine) subcutaneous injection 5,000 Units (5,000 Units Subcutaneous Given 6/24/22 2244)   ceFAZolin (ANCEF) IVPB (premix in dextrose) 2,000 mg 50 mL (2,000 mg Intravenous New Bag 6/24/22 2158)   insulin lispro (HumaLOG) 100 units/mL subcutaneous injection 1-5 Units (has no administration in time range)   lactated ringers bolus 1,000 mL (0 mL Intravenous Stopped 6/24/22 1915)   aspirin tablet 325 mg (325 mg Oral Given 6/24/22 1837)       Diagnostic Studies  Results Reviewed     Procedure Component Value Units Date/Time    Blood culture #1 [[id number]] Collected: 06/24/22 1526    Lab Status: Preliminary result Specimen: Blood from Arm, Right Updated: 06/24/22 2204     Blood Culture Received in Microbiology Lab  Culture in Progress  Blood culture #2 [[id number]] Collected: 06/24/22 1526    Lab Status: Preliminary result Specimen: Blood from Arm, Left Updated: 06/24/22 2204     Blood Culture Received in Microbiology Lab  Culture in Progress  HS Troponin I 4hr [[id number]]  (Normal) Collected: 06/24/22 1938    Lab Status: Final result Specimen: Blood from Arm, Right Updated: 06/24/22 2018     hs TnI 4hr 15 ng/L      Delta 4hr hsTnI -1 ng/L     Lactic acid, plasma [[id number]]  (Normal) Collected: 06/24/22 1938    Lab Status: Final result Specimen: Blood from Arm, Right Updated: 06/24/22 2010     LACTIC ACID 1 8 mmol/L     Narrative:      Result may be elevated if tourniquet was used during collection  Lactic acid 2 Hours [[id number]]  (Abnormal) Collected: 06/24/22 1803    Lab Status: Final result Specimen: Blood from Arm, Right Updated: 06/24/22 1854     LACTIC ACID 2 2 mmol/L     Narrative:      Result may be elevated if tourniquet was used during collection  HS Troponin I 2hr [[id number]]  (Normal) Collected: 06/24/22 1803    Lab Status: Final result Specimen: Blood from Arm, Right Updated: 06/24/22 1841     hs TnI 2hr 15 ng/L      Delta 2hr hsTnI -1 ng/L     TSH [[id number]]  (Normal) Collected: 06/24/22 1526    Lab Status: Final result Specimen: Blood from Arm, Left Updated: 06/24/22 1831     TSH 3RD GENERATON 2 667 uIU/mL     Narrative:      Patients undergoing fluorescein dye angiography may retain small amounts of fluorescein in the body for 48-72 hours post procedure  Samples containing fluorescein can produce falsely depressed TSH values   If the patient had this procedure,a specimen should be resubmitted post fluorescein clearance  Procalcitonin [[id number]]  (Abnormal) Collected: 06/24/22 1526    Lab Status: Final result Specimen: Blood from Arm, Left Updated: 06/24/22 1826     Procalcitonin 1 49 ng/ml     HS Troponin 0hr (reflex protocol) [[id number]]  (Normal) Collected: 06/24/22 1557    Lab Status: Final result Specimen: Blood from Arm, Right Updated: 06/24/22 1648     hs TnI 0hr 16 ng/L     Salicylate level [[id number]]  (Normal) Collected: 06/24/22 1557    Lab Status: Final result Specimen: Blood from Arm, Right Updated: 24/80/71 2538     Salicylate Lvl <5 mg/dL     Acetaminophen level-If concentration is detectable, please discuss with medical  on call  [[id number]]  (Abnormal) Collected: 06/24/22 1557    Lab Status: Final result Specimen: Blood from Arm, Right Updated: 06/24/22 1639     Acetaminophen Level <10 ug/mL     Ethanol [[id number]]  (Normal) Collected: 06/24/22 1557    Lab Status: Final result Specimen: Blood from Arm, Right Updated: 06/24/22 1639     Ethanol Lvl <10 mg/dL     Lactic acid [[id number]]  (Abnormal) Collected: 06/24/22 1526    Lab Status: Final result Specimen: Blood from Arm, Left Updated: 06/24/22 1629     LACTIC ACID 3 5 mmol/L     Narrative:      Result may be elevated if tourniquet was used during collection      Comprehensive metabolic panel [[id number]]  (Abnormal) Collected: 06/24/22 1526    Lab Status: Final result Specimen: Blood from Arm, Left Updated: 06/24/22 1606     Sodium 137 mmol/L      Potassium 4 0 mmol/L      Chloride 101 mmol/L      CO2 23 mmol/L      ANION GAP 13 mmol/L      BUN 42 mg/dL      Creatinine 1 79 mg/dL      Glucose 90 mg/dL      Calcium 8 9 mg/dL      AST 70 U/L      ALT 47 U/L      Alkaline Phosphatase 143 U/L      Total Protein 6 3 g/dL      Albumin 3 6 g/dL      Total Bilirubin 1 27 mg/dL      eGFR 25 ml/min/1 73sq m     Narrative:      [place] guidelines for Chronic Kidney Disease (CKD):     Stage 1 with normal or high GFR (GFR > 90 mL/min/1 73 square meters)    Stage 2 Mild CKD (GFR = 60-89 mL/min/1 73 square meters)    Stage 3A Moderate CKD (GFR = 45-59 mL/min/1 73 square meters)    Stage 3B Moderate CKD (GFR = 30-44 mL/min/1 73 square meters)    Stage 4 Severe CKD (GFR = 15-29 mL/min/1 73 square meters)    Stage 5 End Stage CKD (GFR <15 mL/min/1 73 square meters)  Note: GFR calculation is accurate only with a steady state creatinine    APTT [[id number]]  (Normal) Collected: 06/24/22 1526    Lab Status: Final result Specimen: Blood from Arm, Left Updated: 06/24/22 1602     PTT 33 seconds     Protime-INR [[id number]]  (Abnormal) Collected: 06/24/22 1526    Lab Status: Final result Specimen: Blood from Arm, Left Updated: 06/24/22 1602     Protime 15 6 seconds      INR 1 24    UA w Reflex to Microscopic w Reflex to Culture [[id number]]     Lab Status: No result Specimen: Urine     CBC and differential [[id number]]  (Abnormal) Collected: 06/24/22 1526    Lab Status: Final result Specimen: Blood from Arm, Left Updated: 06/24/22 1539     WBC 10 63 Thousand/uL      RBC 4 22 Million/uL      Hemoglobin 13 0 g/dL      Hematocrit 40 6 %      MCV 96 fL      MCH 30 8 pg      MCHC 32 0 g/dL      RDW 15 0 %      MPV 11 5 fL      Platelets 592 Thousands/uL      nRBC 0 /100 WBCs      Neutrophils Relative 82 %      Immat GRANS % 1 %      Lymphocytes Relative 2 %      Monocytes Relative 6 %      Eosinophils Relative 9 %      Basophils Relative 0 %      Neutrophils Absolute 8 82 Thousands/µL      Immature Grans Absolute 0 09 Thousand/uL      Lymphocytes Absolute 0 21 Thousands/µL      Monocytes Absolute 0 59 Thousand/µL      Eosinophils Absolute 0 90 Thousand/µL      Basophils Absolute 0 02 Thousands/µL                  CT head without contrast   Final Result by Eris Sánchez MD (06/24 1657)      No acute intracranial abnormality                    Workstation performed: [id number] XR chest 2 views    (Results Pending)         Procedures  ECG 12 Lead Documentation Only    Date/Time: 6/25/2022 12:02 AM  Performed by: Carlos Whitley DO  Authorized by: Carlos Whitley DO     Indications / Diagnosis:  AMS  ECG reviewed by me, the ED Provider: yes    Patient location:  ED  Previous ECG:     Previous ECG:  Compared to current    Similarity:  No change  Interpretation:     Interpretation: normal    Rate:     ECG rate:  95    ECG rate assessment: normal    Rhythm:     Rhythm: sinus rhythm    Ectopy:     Ectopy: none    QRS:     QRS axis:  Normal  Conduction:     Conduction: abnormal      Abnormal conduction: complete RBBB    ST segments:     ST segments:  Normal  T waves:     T waves: normal            ED Course                  Stroke Assessment     Row Name 06/24/22 1619             NIH Stroke Scale    Interval Baseline      Level of Consciousness (1a ) 0      LOC Questions (1b ) 0      LOC Commands (1c ) 0      Best Gaze (2 ) 0      Visual (3 ) 0      Facial Palsy (4 ) 0      Motor Arm, Left (5a ) 0      Motor Arm, Right (5b ) 0      Motor Leg, Left (6a ) 0      Motor Leg, Right (6b ) 0      Limb Ataxia (7 ) 0      Sensory (8 ) 0      Best Language (9 ) 1      Dysarthria (10 ) 0      Extinction and Inattention (11 ) (Formerly Neglect) 0      Total 1              Flowsheet Row Most Recent Value   TPA Decision Options    TPA Decision Patient not a TPA candidate  Patient is not a candidate options Unclear time of onset outside appropriate time window  SBIRT 20yo+    Flowsheet Row Most Recent Value   SBIRT (23 yo +)    In order to provide better care to our patients, we are screening all of our patients for alcohol and drug use  Would it be okay to ask you these screening questions? Yes Filed at: 06/24/2022 1524   Initial Alcohol Screen: US AUDIT-C     1  How often do you have a drink containing alcohol? 0 Filed at: 06/24/2022 1524   2   How many drinks containing alcohol do you have on a typical day you are drinking? 0 Filed at: 06/24/2022 1524   3b  FEMALE Any Age, or MALE 65+: How often do you have 4 or more drinks on one occassion? 0 Filed at: 06/24/2022 1524   Audit-C Score 0 Filed at: 06/24/2022 1524   XIOMY: How many times in the past year have you    Used an illegal drug or used a prescription medication for non-medical reasons? Never Filed at: 06/24/2022 1524                [facility]  Number of Diagnoses or Management Options  Altered mental status: new and requires workup  Diagnosis management comments: -patient presenting for evaluation of altered mental status  -who exam findings as noted above  -laboratory evaluation suggestive of worsening renal function, elevated lactate  -CT head within normal limits  -will admit to hospital for further altered mental status workup  Amount and/or Complexity of Data Reviewed  Clinical lab tests: ordered and reviewed  Tests in the radiology section of CPT®: ordered and reviewed  Tests in the medicine section of CPT®: ordered and reviewed  Decide to obtain previous medical records or to obtain history from someone other than the patient: yes  Obtain history from someone other than the patient: yes  Review and summarize past medical records: yes  Discuss the patient with other providers: yes  Independent visualization of images, tracings, or specimens: yes        Disposition  Final diagnoses: Altered mental status     Time reflects when diagnosis was documented in both MDM as applicable and the Disposition within this note     Time User Action Codes Description Comment    6/24/2022  6:53 PM Tyler Hsu Add [R48 [id number]] Altered mental status       ED Disposition     ED Disposition   Admit    Condition   Stable    Date/Time   Fri Jun 24, 2022  6:53 PM    Comment   Case was discussed with alberto and the patient's admission status was agreed to be Admission Status: inpatient status to the service of Dr Eufemia Woods   Follow-up Information    None         Current Discharge Medication List      CONTINUE these medications which have NOT CHANGED    Details   amitriptyline (ELAVIL) 10 mg tablet Take 2 tablets (20 mg total) by mouth daily at bedtime  Qty: 180 tablet, Refills: 1    Associated Diagnoses: Post zoster neuralgia      aspirin 81 mg chewable tablet Chew 1 tablet (81 mg total) daily  Qty: 30 tablet, Refills: 0    Associated Diagnoses: Chest pain      atorvastatin (LIPITOR) 10 mg tablet Take 1 tablet (10 mg total) by mouth daily  Qty: 90 tablet, Refills: 3    Associated Diagnoses: Type 2 diabetes mellitus without complication, without long-term current use of insulin ([facility])      diphenoxylate-atropine (LOMOTIL) 2 5-0 025 mg per tablet Take 1 tablet by mouth if needed        DULoxetine (CYMBALTA) 60 mg delayed release capsule TAKE ONE CAPSULE BY MOUTH EVERY DAY  Qty: 30 capsule, Refills: 5    Associated Diagnoses: Primary generalized (osteo)arthritis      hydroxychloroquine (PLAQUENIL) 200 mg tablet Take 1 tablet (200 mg total) by mouth daily with breakfast  Qty: 90 tablet, Refills: 1    Associated Diagnoses: 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 (HCC)      lisinopril (ZESTRIL) 2 5 mg tablet Take 1 tablet (2 5 mg total) by mouth daily  Qty: 30 tablet, Refills: 0    Associated Diagnoses: Chest pain; Chronic diastolic heart failure (HCC)      Magnesium 250 MG TABS Take 250 mg by mouth in the morning      metFORMIN (GLUCOPHAGE) 500 mg tablet TAKE ONE TABLET BY MOUTH TWICE A DAY WITH MORNING AND EVENING MEALS      metoprolol succinate (TOPROL-XL) 25 mg 24 hr tablet Take 0 5 tablets (12 5 mg total) by mouth daily  Qty: 30 tablet, Refills: 0    Associated Diagnoses: Chest pain      Mirabegron ER (Myrbetriq) 50 MG TB24 Take 1 tablet (50 mg total) by mouth daily  Qty: 30 tablet, Refills: 11    Comments: Script called into [employer]; verbal order given to ORLANDO Pichardo Ph  (3/17/22)  Associated Diagnoses: OAB (overactive bladder)      omeprazole (PriLOSEC) 20 mg delayed release capsule Take 20 mg by mouth daily        ! ! pregabalin (LYRICA) 50 mg capsule Take 1 cap 4 times daily  Qty: 120 capsule, Refills: 2    Associated Diagnoses: Spinal stenosis of lumbar region with neurogenic claudication      ! ! pregabalin (LYRICA) 75 mg capsule Take 75 mg by mouth daily at bedtime      Sodium Fluoride (PreviDent 5000 Booster Plus) 1 1 % PSTE Apply on teeth every evening as directed  Qty: 100 mL, Refills: 3    Associated Diagnoses: Post zoster neuralgia      torsemide (DEMADEX) 10 mg tablet Take 1 tablet every Mon, Wed, and Fri  Qty: 30 tablet, Refills: 0    Associated Diagnoses: Chest pain      Cholecalciferol (VITAMIN D3) 1000 units CAPS Take by mouth      nitroglycerin (NITROSTAT) 0 4 mg SL tablet Place 1 tablet (0 4 mg total) under the tongue every 5 (five) minutes as needed for chest pain for up to 5 days  Qty: 5 tablet, Refills: 0    Associated Diagnoses: Chest pain      ondansetron (ZOFRAN-ODT) 4 mg disintegrating tablet TAKE 1 TABLET BY MOUTH EVERY 8 HOURS FOR 2 DAYS AND PLACE ON TOP OF THE TONGUE WHERE IT WILL DISSOLVE, THEN SWALLOW      pyridoxine (B-6) 100 MG tablet Take 100 mg by mouth daily       ! ! - Potential duplicate medications found  Please discuss with provider  No discharge procedures on file  PDMP Review     None           ED Provider  Attending physically available and evaluated Jerel Nina  I managed the patient along with the ED Attending      Electronically Signed by         Millie Lopez DO  06/25/22 0006

## 2022-06-24 NOTE — H&P
[address] 1934, 80 y o  female MRN: [id number]  Unit/Bed#: S -01 Encounter: [id number]  Primary Care Provider: Lashon Clancy DO   Date and time admitted to hospital: 6/24/2022  3:06 PM    * Altered mental status  Assessment & Plan  Patient presents with incoherent speech reported by her  who found her on the floor at the bedside after a falling while trying to get onto her bed  Etiology: AMS likely secondary to acute infectious process cellulitis found on her lower extremities  Will start IV antibiotics and monitor symptoms  Plan  - See plan below     Cellulitis  Assessment & Plan  Patient has bilateral erythema and warmth on her leg    Plan  - Start IV Cefazolin  - Monitor WBC and fever curve     Chronic diastolic heart failure (HCC)  Assessment & Plan  Wt Readings from Last 3 Encounters:   05/04/22 58 5 kg (129 lb)   04/18/22 58 9 kg (129 lb 14 4 oz)   04/15/22 58 5 kg (129 lb)       - Continue home medications       Stage 3b chronic kidney disease [facility])  Assessment & Plan  Lab Results   Component Value Date    EGFR 25 06/24/2022    EGFR 41 06/21/2022    EGFR 36 04/15/2022    CREATININE 1 79 (H) 06/24/2022    CREATININE 1 17 06/21/2022    CREATININE 1 30 04/15/2022     - Monitor daily BMP    DM2 (diabetes mellitus, type 2) ([facility] [address] )  Assessment & Plan    Lab Results   Component Value Date    HGBA1C 5 9 (H) 04/14/2022     - Hold metformin  - SSI  - Monitor blood glucose     VTE Pharmacologic Prophylaxis: VTE Score: 6 High Risk (Score >/= 5) - Pharmacological DVT Prophylaxis Ordered: heparin  Sequential Compression Devices Ordered  Code Status: Level 1 - Full Code   Discussion with family: Updated  (, son and daughter) at bedside  Anticipated Length of Stay: Patient will be admitted on an inpatient basis with an anticipated length of stay of greater than 2 midnights secondary to altered mental status      Chief Complaint: "speaking nonsense"     History of Present Illness:  Edmundo Lala is a 80 y o  female with a PMH of DM2, PUO9H, Diastolic CHF who presents with altered mental status  Her   reports her llast normal was at some point last night prior to bed  In the morning around 6AM he found her on the floor  The patient states she went to the bathroom and then fell as she tried to get back on to her bed  The patient's  said he tried speaking with her and she was not making any sense  During the interview, the family said the patient appears to be close to her baseline but not quite  The patient feels she is at her baseline  She is oriented to person, place but not time  In the ED she received 1L bolus of LR and  mg  Her CTH was negative and CXR demonstrated increase interstitial markings bilaterally  Review of Systems:  Review of Systems   Constitutional: Negative for chills and fever  HENT: Negative for ear pain and sore throat  Eyes: Negative for pain and visual disturbance  Respiratory: Negative for cough and shortness of breath  Cardiovascular: Negative for chest pain and palpitations  Gastrointestinal: Negative for abdominal pain and vomiting  Genitourinary: Negative for dysuria and hematuria  Musculoskeletal: Negative for arthralgias and back pain  Skin: Negative for color change and rash  Neurological: Positive for speech difficulty and weakness (biltateral legs)  Negative for seizures, syncope, facial asymmetry and numbness  Psychiatric/Behavioral: Positive for confusion  All other systems reviewed and are negative        Past Medical and Surgical History:   Past Medical History:   Diagnosis Date    Anemia     Arthritis     Back pain     CHF (congestive heart failure) (HCC)     Chronic kidney disease     Stage 3b    Diabetes ([facility] )     Diabetes mellitus ([facility] )     Diabetic gastroparesis associated with type 2 diabetes mellitus ([facility] )     Diabetic polyneuropathy ([facility] [address] )     Diverticulosis     Herpes zoster     Hypertension     IBS (irritable bowel syndrome)     Neurogenic claudication due to lumbar spinal stenosis     Osteoarthritis     Osteoporosis     Pulmonary edema     Seronegative arthropathy of multiple sites [facility])        Past Surgical History:   Procedure Laterality Date    BACK SURGERY      EGD AND COLONOSCOPY N/A 12/23/2016    Procedure: EGD AND COLONOSCOPY;  Surgeon: Caroline Bhakta MD;  [facility]; Service:     JOINT REPLACEMENT      bilat knees and hips    OTHER SURGICAL HISTORY      pelvis rods    REPLACEMENT TOTAL KNEE BILATERAL      STOMACH SURGERY         Meds/Allergies:  Prior to Admission medications    Medication Sig Start Date End Date Taking?  Authorizing Provider   amitriptyline (ELAVIL) 10 mg tablet Take 2 tablets (20 mg total) by mouth daily at bedtime 5/31/22 11/27/22  Beatris Rothman MD   aspirin 81 mg chewable tablet Chew 1 tablet (81 mg total) daily  Patient not taking: Reported on 4/18/2022  4/15/22   Saranya Santos MD   atorvastatin (LIPITOR) 10 mg tablet Take 1 tablet (10 mg total) by mouth daily 4/19/22   Maeve Gramajo DO   Cholecalciferol (VITAMIN D3) 1000 units CAPS Take by mouth    Historical Provider, MD   diphenoxylate-atropine (LOMOTIL) 2 5-0 025 mg per tablet Take 1 tablet by mouth if needed      Historical Provider, MD   DULoxetine (CYMBALTA) 60 mg delayed release capsule TAKE ONE CAPSULE BY MOUTH EVERY DAY 3/3/22   Rocio Coto PA-C   hydroxychloroquine (PLAQUENIL) 200 mg tablet Take 1 tablet (200 mg total) by mouth daily with breakfast 5/23/22 11/19/22  Rocio Coto PA-C   lisinopril (ZESTRIL) 2 5 mg tablet Take 1 tablet (2 5 mg total) by mouth daily 6/22/22   Beatris Rothman MD   Magnesium 250 MG TABS Take 250 mg by mouth in the morning    Historical Provider, MD   metFORMIN (GLUCOPHAGE) 500 mg tablet TAKE ONE TABLET BY MOUTH TWICE A DAY WITH MORNING AND EVENING MEALS 1/27/22 Historical Provider, MD   metoprolol succinate (TOPROL-XL) 25 mg 24 hr tablet Take 0 5 tablets (12 5 mg total) by mouth daily 6/22/22   Jeffry Plummer MD   Mirabegron ER (Myrbetriq) 50 MG TB24 Take 1 tablet (50 mg total) by mouth daily 4/1/22   JOSE G Islas   nitroglycerin (NITROSTAT) 0 4 mg SL tablet Place 1 tablet (0 4 mg total) under the tongue every 5 (five) minutes as needed for chest pain for up to 5 days 6/22/22 6/27/22  Jeffry Plummer MD   omeprazole (PRILOSEC) 20 mg delayed release capsule Take 20 mg by mouth daily      Historical Provider, MD   ondansetron (ZOFRAN-ODT) 4 mg disintegrating tablet TAKE 1 TABLET BY MOUTH EVERY 8 HOURS FOR 2 DAYS AND PLACE ON TOP OF THE TONGUE WHERE IT WILL DISSOLVE, THEN SWALLOW 10/6/21   Historical Provider, MD   pregabalin (LYRICA) 50 mg capsule Take 1 capsule (50 mg total) by mouth 4 (four) times a day  Patient taking differently: Take 50 mg by mouth 3 (three) times a day   2/10/22 5/11/22  Jeffry Plummer MD   pregabalin (LYRICA) 50 mg capsule Take 1 cap 4 times daily 5/13/22   Jeffry Plummer MD   pregabalin (LYRICA) 75 mg capsule Take 75 mg by mouth daily at bedtime    Historical Provider, MD   pyridoxine (B-6) 100 MG tablet Take 100 mg by mouth daily    Historical Provider, MD   Sodium Fluoride (PreviDent 5000 Booster Plus) 1 1 % PSTE Apply on teeth every evening as directed 11/18/21   Jeffry Plummer MD   torsemide [facility]) 10 mg tablet Take 1 tablet every Mon, Wed, and Fri 6/22/22   Jeffry Plummer MD     I have reviewed home medications with patient personally  Allergies:    Allergies   Allergen Reactions    Morphine Other (See Comments)     urinary retention    Ciprofloxacin Rash and Nausea Only       Social History:  Marital Status: /Civil Union   Occupation: Retired  Patient Pre-hospital Living Situation: Home  Patient Pre-hospital Level of Mobility: unable to be assessed at time of evaluation  Patient Pre-hospital Diet Restrictions: Diabetic   Substance Use History:   Social History     Substance and Sexual Activity   Alcohol Use No     Social History     Tobacco Use   Smoking Status Former Smoker    Types: Cigarettes    Quit date: 65    Years since quittin 5   Smokeless Tobacco Never Used     Social History     Substance and Sexual Activity   Drug Use No       Family History:  Family History   Problem Relation Age of Onset    Cancer Brother     Diabetes Mother     Hypertension Father     Heart disease Father        Physical Exam:     Vitals:   Blood Pressure: 103/55 (220)  Pulse: 86 (22)  Temperature: 98 3 °F (36 8 °C) (22)  Temp Source: Oral (22)  Respirations: 18 (22)  Height: 4' 11" (149 9 cm) (22)  Weight - Scale: 58 5 kg (128 lb 15 5 oz) (22)  SpO2: 97 % (22)    Physical Exam  Vitals and nursing note reviewed  Constitutional:       General: She is not in acute distress  Appearance: She is well-developed  She is ill-appearing (chronically)  HENT:      Head: Normocephalic and atraumatic  Eyes:      Conjunctiva/sclera: Conjunctivae normal    Cardiovascular:      Rate and Rhythm: Normal rate and regular rhythm  Heart sounds: No murmur heard  Pulmonary:      Effort: Pulmonary effort is normal  No respiratory distress  Breath sounds: Normal breath sounds  Abdominal:      General: There is no distension  Palpations: Abdomen is soft  Tenderness: There is no abdominal tenderness  There is no guarding  Musculoskeletal:      Cervical back: Neck supple  Skin:     General: Skin is warm and dry  Findings: Erythema (bilaterally legs  Warm) and lesion (bilateral lower extremities) present  Neurological:      Mental Status: She is alert  She is disoriented            Additional Data:     Lab Results:  Results from last 7 days   Lab Units 22  1526   WBC Thousand/uL 10 63*   HEMOGLOBIN g/dL 13 0   HEMATOCRIT % 40 6   PLATELETS Thousands/uL 146*   NEUTROS PCT % 82*   LYMPHS PCT % 2*   MONOS PCT % 6   EOS PCT % 9*     Results from last 7 days   Lab Units 06/24/22  1526   SODIUM mmol/L 137   POTASSIUM mmol/L 4 0   CHLORIDE mmol/L 101   CO2 mmol/L 23   BUN mg/dL 42*   CREATININE mg/dL 1 79*   ANION GAP mmol/L 13   CALCIUM mg/dL 8 9   ALBUMIN g/dL 3 6   TOTAL BILIRUBIN mg/dL 1 27*   ALK PHOS U/L 143*   ALT U/L 47   AST U/L 70*   GLUCOSE RANDOM mg/dL 90     Results from last 7 days   Lab Units 06/24/22  1526   INR  1 24*             Results from last 7 days   Lab Units 06/24/22  1938 06/24/22  1803 06/24/22  1526   LACTIC ACID mmol/L 1 8 2 2* 3 5*   PROCALCITONIN ng/ml  --   --  1 49*       Imaging: Reviewed radiology reports from this admission including: chest xray and CT head  CT head without contrast   Final Result by Cj Dobson MD (06/24 1657)      No acute intracranial abnormality  Workstation performed: [id number]         XR chest 2 views    (Results Pending)       EKG and Other Studies Reviewed on Admission:   · EKG: NSR  HR 80     ** Please Note: This note has been constructed using a voice recognition system   **

## 2022-06-24 NOTE — ASSESSMENT & PLAN NOTE
Patient presents with incoherent speech reported by her  who found her on the floor at the bedside after a falling while trying to get onto her bed  Etiology: AMS likely secondary to acute infectious process cellulitis found on her lower extremities  Will start IV antibiotics and monitor symptoms      Plan  - See plan below

## 2022-06-24 NOTE — ED ATTENDING ATTESTATION
6/24/2022  IKerry MD, saw and evaluated the patient  I have discussed the patient with the resident/non-physician practitioner and agree with the resident's/non-physician practitioner's findings, Plan of Care, and MDM as documented in the resident's/non-physician practitioner's note, except where noted  All available labs and Radiology studies were reviewed  I was present for key portions of any procedure(s) performed by the resident/non-physician practitioner and I was immediately available to provide assistance  At this point I agree with the current assessment done in the Emergency Department  I have conducted an independent evaluation of this patient a history and physical is as follows:    66-year-old female brought for evaluation of mental status change noticed this morning   reports saw her last normal at some point last night prior to bed  States around 6:00 a m  He found her on the floor, having slid out of bed onto her buttocks and noted that she was speaking abnormally, not making sense  Patient is not able to tell me exactly what happened  She also complains of some left shoulder pain which is chronic  Family states that her symptoms have been improving throughout the day  She is now making more sense  To me she has somewhat garbled speech but family states that this is typical for her when she does not have her teeth in and when her mouth is dry  She is oriented to person and place but is a little bit off on time  Movement of the left shoulder is limited secondary to pain  Otherwise equal strength, sensation  Normal coordination   reports that she was able to walk with her walker normally to get to the hospital   She has some word mix-ups and some incoherent sentences  She has difficulty naming objects in the room including clock, ring  Does not seem to have any difficulty understanding speech    She is getting treated for a urinary infection with Macrobid  Differential diagnosis includes CVA, encephalopathy secondary to infection, metabolic process, injury  Plan CT head, EKG, labs, UA, re-evaluate  Will plan admission for further workup and management  ED Course  ED Course as of 06/24/22 1828 Fri Jun 24, 2022   1618 BUN(!): 42   1618 Creatinine(!): 1 79   1649 LACTIC ACID(!!): 3 5   1826 Patient doing well  No acute findings on CT head  Will give aspirin, admit for further workup  Lab work consistent with dehydration/volume depletion  Will continue IV fluids           Critical Care Time  Procedures

## 2022-06-25 LAB
ANION GAP SERPL CALCULATED.3IONS-SCNC: 9 MMOL/L (ref 4–13)
BACTERIA UR QL AUTO: NORMAL /HPF
BILIRUB UR QL STRIP: NEGATIVE
BUN SERPL-MCNC: 41 MG/DL (ref 5–25)
CALCIUM SERPL-MCNC: 8.1 MG/DL (ref 8.4–10.2)
CHLORIDE SERPL-SCNC: 102 MMOL/L (ref 96–108)
CLARITY UR: CLEAR
CO2 SERPL-SCNC: 26 MMOL/L (ref 21–32)
COLOR UR: YELLOW
CREAT SERPL-MCNC: 1.68 MG/DL (ref 0.6–1.3)
ERYTHROCYTE [DISTWIDTH] IN BLOOD BY AUTOMATED COUNT: 15.2 % (ref 11.6–15.1)
GFR SERPL CREATININE-BSD FRML MDRD: 27 ML/MIN/1.73SQ M
GLUCOSE SERPL-MCNC: 56 MG/DL (ref 65–140)
GLUCOSE SERPL-MCNC: 60 MG/DL (ref 65–140)
GLUCOSE SERPL-MCNC: 61 MG/DL (ref 65–140)
GLUCOSE SERPL-MCNC: 65 MG/DL (ref 65–140)
GLUCOSE SERPL-MCNC: 72 MG/DL (ref 65–140)
GLUCOSE SERPL-MCNC: 85 MG/DL (ref 65–140)
GLUCOSE SERPL-MCNC: 95 MG/DL (ref 65–140)
GLUCOSE UR STRIP-MCNC: NEGATIVE MG/DL
HCT VFR BLD AUTO: 35.6 % (ref 34.8–46.1)
HGB BLD-MCNC: 11.4 G/DL (ref 11.5–15.4)
HGB UR QL STRIP.AUTO: NEGATIVE
KETONES UR STRIP-MCNC: NEGATIVE MG/DL
LEUKOCYTE ESTERASE UR QL STRIP: ABNORMAL
MCH RBC QN AUTO: 30.9 PG (ref 26.8–34.3)
MCHC RBC AUTO-ENTMCNC: 32 G/DL (ref 31.4–37.4)
MCV RBC AUTO: 97 FL (ref 82–98)
NITRITE UR QL STRIP: NEGATIVE
NON-SQ EPI CELLS URNS QL MICRO: NORMAL /HPF
PH UR STRIP.AUTO: 5 [PH]
PLATELET # BLD AUTO: 125 THOUSANDS/UL (ref 149–390)
PLATELET # BLD AUTO: 127 THOUSANDS/UL (ref 149–390)
PMV BLD AUTO: 10.9 FL (ref 8.9–12.7)
PMV BLD AUTO: 11.6 FL (ref 8.9–12.7)
POTASSIUM SERPL-SCNC: 3.6 MMOL/L (ref 3.5–5.3)
PROT UR STRIP-MCNC: NEGATIVE MG/DL
RBC # BLD AUTO: 3.69 MILLION/UL (ref 3.81–5.12)
RBC #/AREA URNS AUTO: NORMAL /HPF
SODIUM SERPL-SCNC: 137 MMOL/L (ref 135–147)
SP GR UR STRIP.AUTO: 1.01 (ref 1–1.03)
UROBILINOGEN UR QL STRIP.AUTO: 0.2 E.U./DL
WBC # BLD AUTO: 7.27 THOUSAND/UL (ref 4.31–10.16)
WBC #/AREA URNS AUTO: NORMAL /HPF

## 2022-06-25 PROCEDURE — 81001 URINALYSIS AUTO W/SCOPE: CPT

## 2022-06-25 PROCEDURE — 99232 SBSQ HOSP IP/OBS MODERATE 35: CPT | Performed by: INTERNAL MEDICINE

## 2022-06-25 PROCEDURE — 82948 REAGENT STRIP/BLOOD GLUCOSE: CPT

## 2022-06-25 PROCEDURE — 85027 COMPLETE CBC AUTOMATED: CPT | Performed by: INTERNAL MEDICINE

## 2022-06-25 PROCEDURE — 80048 BASIC METABOLIC PNL TOTAL CA: CPT | Performed by: INTERNAL MEDICINE

## 2022-06-25 PROCEDURE — 85049 AUTOMATED PLATELET COUNT: CPT

## 2022-06-25 RX ORDER — DULOXETIN HYDROCHLORIDE 60 MG/1
60 CAPSULE, DELAYED RELEASE ORAL DAILY
Status: DISCONTINUED | OUTPATIENT
Start: 2022-06-25 | End: 2022-06-27 | Stop reason: HOSPADM

## 2022-06-25 RX ORDER — ACETAMINOPHEN 325 MG/1
650 TABLET ORAL EVERY 6 HOURS PRN
Status: DISCONTINUED | OUTPATIENT
Start: 2022-06-25 | End: 2022-06-27 | Stop reason: HOSPADM

## 2022-06-25 RX ORDER — INSULIN LISPRO 100 [IU]/ML
1-5 INJECTION, SOLUTION INTRAVENOUS; SUBCUTANEOUS
Status: DISCONTINUED | OUTPATIENT
Start: 2022-06-25 | End: 2022-06-27 | Stop reason: HOSPADM

## 2022-06-25 RX ADMIN — CEFAZOLIN SODIUM 2000 MG: 2 SOLUTION INTRAVENOUS at 12:39

## 2022-06-25 RX ADMIN — PREGABALIN 50 MG: 50 CAPSULE ORAL at 08:18

## 2022-06-25 RX ADMIN — METOPROLOL SUCCINATE 12.5 MG: 25 TABLET, EXTENDED RELEASE ORAL at 08:18

## 2022-06-25 RX ADMIN — OXYBUTYNIN 10 MG: 5 TABLET, FILM COATED, EXTENDED RELEASE ORAL at 08:18

## 2022-06-25 RX ADMIN — CEFAZOLIN SODIUM 2000 MG: 2 SOLUTION INTRAVENOUS at 05:17

## 2022-06-25 RX ADMIN — SODIUM CHLORIDE, SODIUM LACTATE, POTASSIUM CHLORIDE, AND CALCIUM CHLORIDE 500 ML: .6; .31; .03; .02 INJECTION, SOLUTION INTRAVENOUS at 00:38

## 2022-06-25 RX ADMIN — PREGABALIN 50 MG: 50 CAPSULE ORAL at 22:10

## 2022-06-25 RX ADMIN — HYDROXYCHLOROQUINE SULFATE 200 MG: 200 TABLET, FILM COATED ORAL at 08:20

## 2022-06-25 RX ADMIN — DICLOFENAC SODIUM TOPICAL GEL, 1%, 2 G: 10 GEL TOPICAL at 14:27

## 2022-06-25 RX ADMIN — ATORVASTATIN CALCIUM 10 MG: 10 TABLET, FILM COATED ORAL at 11:43

## 2022-06-25 RX ADMIN — ASPIRIN 81 MG CHEWABLE TABLET 81 MG: 81 TABLET CHEWABLE at 08:18

## 2022-06-25 RX ADMIN — CEFAZOLIN SODIUM 2000 MG: 2 SOLUTION INTRAVENOUS at 22:10

## 2022-06-25 RX ADMIN — PANTOPRAZOLE SODIUM 40 MG: 40 TABLET, DELAYED RELEASE ORAL at 05:18

## 2022-06-25 RX ADMIN — DULOXETINE HYDROCHLORIDE 60 MG: 60 CAPSULE, DELAYED RELEASE ORAL at 12:40

## 2022-06-25 RX ADMIN — HEPARIN SODIUM 5000 UNITS: 5000 INJECTION INTRAVENOUS; SUBCUTANEOUS at 14:25

## 2022-06-25 RX ADMIN — ACETAMINOPHEN 650 MG: 325 TABLET, FILM COATED ORAL at 15:28

## 2022-06-25 RX ADMIN — HEPARIN SODIUM 5000 UNITS: 5000 INJECTION INTRAVENOUS; SUBCUTANEOUS at 05:18

## 2022-06-25 RX ADMIN — HEPARIN SODIUM 5000 UNITS: 5000 INJECTION INTRAVENOUS; SUBCUTANEOUS at 22:10

## 2022-06-25 RX ADMIN — PREGABALIN 50 MG: 50 CAPSULE ORAL at 15:28

## 2022-06-25 RX ADMIN — DICLOFENAC SODIUM TOPICAL GEL, 1%, 2 G: 10 GEL TOPICAL at 22:11

## 2022-06-25 RX ADMIN — DICLOFENAC SODIUM TOPICAL GEL, 1%, 2 G: 10 GEL TOPICAL at 18:04

## 2022-06-25 NOTE — PROGRESS NOTES
[facility]  Progress Note - Nina Tamayo 1934, 80 y o  female MRN: [id number]  Unit/Bed#: S -01 Encounter: [id number]  Primary Care Provider: Ginny Bertrand DO   Date and time admitted to hospital: 6/24/2022  3:06 PM    * Altered mental status  Assessment & Plan  Patient presents with incoherent speech reported by her  who found her on the floor at the bedside after a falling while trying to get onto her bed  Etiology: AMS likely secondary to acute infectious process cellulitis found on her lower extremities  Could also be secondary to polypharmacy (patient recently started on amitriptyline)  Will start IV antibiotics and monitor symptoms  Plan  - See plan below   - Follow up blood culture  - Discontinued amitriptyline and continued Cymbalta 60 mg daily    Cellulitis  Assessment & Plan  Patient has bilateral erythema and warmth on her leg    Plan  - Continue IV Cefazolin  - Monitor WBC and fever curve     Chronic diastolic heart failure (HCC)  Assessment & Plan  Wt Readings from Last 3 Encounters:   06/24/22 58 5 kg (128 lb 15 5 oz)   05/04/22 58 5 kg (129 lb)   04/18/22 58 9 kg (129 lb 14 4 oz)       - Continue home medications       Stage 3b chronic kidney disease [facility])  Assessment & Plan  Lab Results   Component Value Date    EGFR 25 06/24/2022    EGFR 41 06/21/2022    EGFR 36 04/15/2022    CREATININE 1 79 (H) 06/24/2022    CREATININE 1 17 06/21/2022    CREATININE 1 30 04/15/2022     - Monitor daily BMP    DM2 (diabetes mellitus, type 2) ([facility] [address] )  Assessment & Plan    Lab Results   Component Value Date    HGBA1C 5 9 (H) 04/14/2022     - Hold metformin  - SSI  - Placed on cardiac diet, was getting hypoglycemic and glucose appears to be well controlled with recent A1c at 5 9  - Monitor blood glucose         VTE Pharmacologic Prophylaxis: VTE Score: 6 High Risk (Score >/= 5) - Pharmacological DVT Prophylaxis Ordered: heparin   Sequential Compression Devices Ordered  Patient Centered Rounds: I performed bedside rounds with nursing staff today  Discussions with Specialists or Other Care Team Provider: Internal medicine     Education and Discussions with Family / Patient: Updated  (friend) at bedside  Current Length of Stay: 1 day(s)  Current Patient Status: Inpatient   Discharge Plan: Anticipate discharge in 48-72 hrs to home  Code Status: Level 1 - Full Code    Subjective:   No acute events overnight  Patient was reported to try and get out of bed  She has no complaints this morning  Objective:     Vitals:   Temp (24hrs), Av 4 °F (36 3 °C), Min:96 4 °F (35 8 °C), Max:98 4 °F (36 9 °C)    Temp:  [96 4 °F (35 8 °C)-98 4 °F (36 9 °C)] 96 4 °F (35 8 °C)  HR:  [76-98] 86  Resp:  [16-18] 16  BP: ()/(42-58) 110/58  SpO2:  [84 %-99 %] 98 %  Body mass index is 26 05 kg/m²  Input and Output Summary (last 24 hours): Intake/Output Summary (Last 24 hours) at 2022 1407  Last data filed at 2022 0900  Gross per 24 hour   Intake 1400 ml   Output 550 ml   Net 850 ml       Physical Exam:   Physical Exam  Vitals and nursing note reviewed  Constitutional:       General: She is not in acute distress  Appearance: She is well-developed  She is not ill-appearing  HENT:      Head: Normocephalic and atraumatic  Mouth/Throat:      Mouth: Mucous membranes are dry  Eyes:      Conjunctiva/sclera: Conjunctivae normal    Cardiovascular:      Rate and Rhythm: Normal rate and regular rhythm  Heart sounds: No murmur heard  Pulmonary:      Effort: Pulmonary effort is normal  No respiratory distress  Breath sounds: Normal breath sounds  Abdominal:      General: There is no distension  Palpations: Abdomen is soft  Tenderness: There is no abdominal tenderness  There is no guarding  Musculoskeletal:      Cervical back: Neck supple  Right lower leg: No edema  Left lower leg: No edema     Skin:     General: Skin is warm and dry  Findings: Erythema (warm in lower extremities) present  Neurological:      Mental Status: She is alert  Additional Data:     Labs:  Results from last 7 days   Lab Units 06/25/22  0353 06/24/22  1526   WBC Thousand/uL 7 27 10 63*   HEMOGLOBIN g/dL 11 4* 13 0   HEMATOCRIT % 35 6 40 6   PLATELETS Thousands/uL 127* 146*   NEUTROS PCT %  --  82*   LYMPHS PCT %  --  2*   MONOS PCT %  --  6   EOS PCT %  --  9*     Results from last 7 days   Lab Units 06/24/22  1526   SODIUM mmol/L 137   POTASSIUM mmol/L 4 0   CHLORIDE mmol/L 101   CO2 mmol/L 23   BUN mg/dL 42*   CREATININE mg/dL 1 79*   ANION GAP mmol/L 13   CALCIUM mg/dL 8 9   ALBUMIN g/dL 3 6   TOTAL BILIRUBIN mg/dL 1 27*   ALK PHOS U/L 143*   ALT U/L 47   AST U/L 70*   GLUCOSE RANDOM mg/dL 90     Results from last 7 days   Lab Units 06/24/22  1526   INR  1 24*     Results from last 7 days   Lab Units 06/25/22  1116 06/25/22  1028 06/25/22  0844 06/25/22  0816   POC GLUCOSE mg/dl 72 61* 60* 56*         Results from last 7 days   Lab Units 06/24/22  1938 06/24/22  1803 06/24/22  1526   LACTIC ACID mmol/L 1 8 2 2* 3 5*   PROCALCITONIN ng/ml  --   --  1 49*       Lines/Drains:  Invasive Devices  Report    Peripheral Intravenous Line  Duration           Peripheral IV 06/25/22 Left Forearm <1 day                      Imaging: No pertinent imaging reviewed  Recent Cultures (last 7 days):   Results from last 7 days   Lab Units 06/24/22  1526   BLOOD CULTURE  Received in Microbiology Lab  Culture in Progress  Received in Microbiology Lab  Culture in Progress         Last 24 Hours Medication List:   Current Facility-Administered Medications   Medication Dose Route Frequency Provider Last Rate    acetaminophen  650 mg Oral Q6H PRN Aretha Mcbride MD      aspirin  81 mg Oral Daily Levy Velasquez MD      atorvastatin  10 mg Oral Daily Levy Velasquez MD      cefazolin  2,000 mg Intravenous Q8H Levy Velasquez MD 2,000 mg (06/25/22 1239)   Serenity Diclofenac Sodium  2 g Topical 4x Daily Idris Alcaraz MD      DULoxetine  60 mg Oral Daily Idris Alcaraz MD      heparin (porcine)  5,000 Units Subcutaneous [facility] Kristina Fitzgerald MD      hydroxychloroquine  200 mg Oral Daily With Breakfast Kristina Fitzgerald MD      insulin lispro  1-5 Units Subcutaneous TID Yemi Jacobo MD      oxybutynin  10 mg Oral Daily Kristina Fitzgerlad MD      pantoprazole  40 mg Oral Early Morning Kristina Fitzgerald MD      pregabalin  50 mg Oral TID Kristina Fitzgerald MD          Today, Patient Was Seen By: Kristina Fitzgerald MD    **Please Note: This note may have been constructed using a voice recognition system  **

## 2022-06-25 NOTE — ASSESSMENT & PLAN NOTE
Lab Results   Component Value Date    HGBA1C 5 9 (H) 04/14/2022     - Hold metformin  - SSI  - Placed on cardiac diet, was getting hypoglycemic and glucose appears to be well controlled with recent A1c at 5 9  - Monitor blood glucose

## 2022-06-25 NOTE — ASSESSMENT & PLAN NOTE
Wt Readings from Last 3 Encounters:   06/24/22 58 5 kg (128 lb 15 5 oz)   05/04/22 58 5 kg (129 lb)   04/18/22 58 9 kg (129 lb 14 4 oz)       - Continue home medications

## 2022-06-25 NOTE — ASSESSMENT & PLAN NOTE
Patient presents with incoherent speech reported by her  who found her on the floor at the bedside after a falling while trying to get onto her bed  Etiology: AMS likely secondary to acute infectious process cellulitis found on her lower extremities  Could also be secondary to polypharmacy (patient recently started on amitriptyline)  Will start IV antibiotics and monitor symptoms      Plan  - See plan below   - Follow up blood culture  - Discontinued amitriptyline and continued Cymbalta 60 mg daily

## 2022-06-25 NOTE — ASSESSMENT & PLAN NOTE
Patient has bilateral erythema and warmth on her leg    Plan  - Continue IV Cefazolin  - Monitor WBC and fever curve

## 2022-06-26 LAB
ANION GAP SERPL CALCULATED.3IONS-SCNC: 7 MMOL/L (ref 4–13)
BUN SERPL-MCNC: 34 MG/DL (ref 5–25)
CALCIUM SERPL-MCNC: 8 MG/DL (ref 8.4–10.2)
CHLORIDE SERPL-SCNC: 104 MMOL/L (ref 96–108)
CO2 SERPL-SCNC: 28 MMOL/L (ref 21–32)
CREAT SERPL-MCNC: 1.44 MG/DL (ref 0.6–1.3)
ERYTHROCYTE [DISTWIDTH] IN BLOOD BY AUTOMATED COUNT: 15.2 % (ref 11.6–15.1)
GFR SERPL CREATININE-BSD FRML MDRD: 32 ML/MIN/1.73SQ M
GLUCOSE SERPL-MCNC: 115 MG/DL (ref 65–140)
GLUCOSE SERPL-MCNC: 121 MG/DL (ref 65–140)
GLUCOSE SERPL-MCNC: 80 MG/DL (ref 65–140)
GLUCOSE SERPL-MCNC: 97 MG/DL (ref 65–140)
HCT VFR BLD AUTO: 35.5 % (ref 34.8–46.1)
HGB BLD-MCNC: 11.3 G/DL (ref 11.5–15.4)
MCH RBC QN AUTO: 31.1 PG (ref 26.8–34.3)
MCHC RBC AUTO-ENTMCNC: 31.8 G/DL (ref 31.4–37.4)
MCV RBC AUTO: 98 FL (ref 82–98)
PLATELET # BLD AUTO: 118 THOUSANDS/UL (ref 149–390)
PMV BLD AUTO: 11.6 FL (ref 8.9–12.7)
POTASSIUM SERPL-SCNC: 3.5 MMOL/L (ref 3.5–5.3)
PROCALCITONIN SERPL-MCNC: 0.83 NG/ML
RBC # BLD AUTO: 3.63 MILLION/UL (ref 3.81–5.12)
SODIUM SERPL-SCNC: 139 MMOL/L (ref 135–147)
WBC # BLD AUTO: 5.96 THOUSAND/UL (ref 4.31–10.16)

## 2022-06-26 PROCEDURE — 80048 BASIC METABOLIC PNL TOTAL CA: CPT | Performed by: INTERNAL MEDICINE

## 2022-06-26 PROCEDURE — 82948 REAGENT STRIP/BLOOD GLUCOSE: CPT

## 2022-06-26 PROCEDURE — 99232 SBSQ HOSP IP/OBS MODERATE 35: CPT | Performed by: INTERNAL MEDICINE

## 2022-06-26 PROCEDURE — 84145 PROCALCITONIN (PCT): CPT | Performed by: INTERNAL MEDICINE

## 2022-06-26 PROCEDURE — 97163 PT EVAL HIGH COMPLEX 45 MIN: CPT

## 2022-06-26 PROCEDURE — 85027 COMPLETE CBC AUTOMATED: CPT

## 2022-06-26 RX ADMIN — OXYBUTYNIN 10 MG: 5 TABLET, FILM COATED, EXTENDED RELEASE ORAL at 08:57

## 2022-06-26 RX ADMIN — HYDROXYCHLOROQUINE SULFATE 200 MG: 200 TABLET, FILM COATED ORAL at 08:58

## 2022-06-26 RX ADMIN — PREGABALIN 50 MG: 50 CAPSULE ORAL at 15:54

## 2022-06-26 RX ADMIN — CEFAZOLIN SODIUM 2000 MG: 2 SOLUTION INTRAVENOUS at 21:22

## 2022-06-26 RX ADMIN — ASPIRIN 81 MG CHEWABLE TABLET 81 MG: 81 TABLET CHEWABLE at 08:56

## 2022-06-26 RX ADMIN — PREGABALIN 50 MG: 50 CAPSULE ORAL at 08:57

## 2022-06-26 RX ADMIN — DICLOFENAC SODIUM TOPICAL GEL, 1%, 2 G: 10 GEL TOPICAL at 13:51

## 2022-06-26 RX ADMIN — CEFAZOLIN SODIUM 2000 MG: 2 SOLUTION INTRAVENOUS at 13:51

## 2022-06-26 RX ADMIN — HEPARIN SODIUM 5000 UNITS: 5000 INJECTION INTRAVENOUS; SUBCUTANEOUS at 13:51

## 2022-06-26 RX ADMIN — HEPARIN SODIUM 5000 UNITS: 5000 INJECTION INTRAVENOUS; SUBCUTANEOUS at 05:41

## 2022-06-26 RX ADMIN — DICLOFENAC SODIUM TOPICAL GEL, 1%, 2 G: 10 GEL TOPICAL at 17:03

## 2022-06-26 RX ADMIN — DULOXETINE HYDROCHLORIDE 60 MG: 60 CAPSULE, DELAYED RELEASE ORAL at 08:57

## 2022-06-26 RX ADMIN — DICLOFENAC SODIUM TOPICAL GEL, 1%, 2 G: 10 GEL TOPICAL at 08:57

## 2022-06-26 RX ADMIN — PANTOPRAZOLE SODIUM 40 MG: 40 TABLET, DELAYED RELEASE ORAL at 05:41

## 2022-06-26 RX ADMIN — ATORVASTATIN CALCIUM 10 MG: 10 TABLET, FILM COATED ORAL at 08:57

## 2022-06-26 RX ADMIN — HEPARIN SODIUM 5000 UNITS: 5000 INJECTION INTRAVENOUS; SUBCUTANEOUS at 21:20

## 2022-06-26 RX ADMIN — CEFAZOLIN SODIUM 2000 MG: 2 SOLUTION INTRAVENOUS at 05:41

## 2022-06-26 RX ADMIN — PREGABALIN 50 MG: 50 CAPSULE ORAL at 21:21

## 2022-06-26 RX ADMIN — DICLOFENAC SODIUM TOPICAL GEL, 1%, 2 G: 10 GEL TOPICAL at 21:22

## 2022-06-26 NOTE — PROGRESS NOTES
[facility] Progress Note  Patient: Corazon Corona 80 y o  female   MRN: [id number]  PCP: Calvin Meneses DO  Unit/Bed#: S -01 Encounter: [id number]  Date Of Visit: 06/26/22    Assessment:    Principal Problem:    Altered mental status  Active Problems:    DM2 (diabetes mellitus, type 2) ([facility] )    Stage 3b chronic kidney disease ([place] [address] )    Chronic diastolic heart failure (HCC)    Cellulitis      Plan:    · Bilateral Lower Extremity Cellulitis -   · Antibiotic - Cefazolin  · Cultures -   · Follow up final results of blood cultures  So far, negative  · Other evaluations -   · WBC has been normal    · Procalcitonin has decreased from 1 49 down to 0 83  Trend intermittently  · Serial leg exams  · Monitor temperatures  · Acute Kidney Injury superimposed on CKD III -   · Present on admission  · Baseline creatinine is 1 1 - 1 4    · Etiology possibly pre-renal vs  Some component of ATN in setting of infection  IVF - Initially given IV fluids but held on 6/25/2022 to avoid overload  Urinalysis negative  Monitor I/O  BMP in am   Avoid Hypotension -   BP management - Holding antihypertensives as blood pressures were initially low on day 1 of hospitalization  Monitor blood pressures  Avoid Nephrotoxins and Adjust renally Excreted Medications - Decreased Lyrica  Holding off on diuretic (torsemide)  Nephrology Consultation - No   · Altered Mental Status -   · Due to dehydration / hypotension / VICENTE  Also polypharmacy which is worsened by VICENTE  Finally infection could be a cause / factor as well  · Normal tests include sodium level, calcium level, CT head, and WBC count  Glucose levels were low at times early in the admission  TSH, medical alcohol level, ASA level, and Tylenol level all normal   · Medication adjustments -  Held amitriptyline  Eliminated the 75 mg Lyrica dose at bedtime but maintained the 50 mg t i d  Dosing  · Consult Geriatrics team for evaluation    · Treat infection (see above)  · Treat acute kidney injury (see above)  · Serial mental status exams  · Ambulatory Dysfunction -   · Physical therapy recommending post acute rehab services verses home health PT dependent on level of assistance available at home  · Additional PT assessments and will be discussing with case management and family  · Diabetes Mellitus Type 2 -   · Insulin sliding scale  · Monitor blood sugars  · Chronic Diastolic Heart Failure -   · Diuretic - Torsemide on hold due to VICENTE  · Beta Blocker - none currently due to BP intolerance  · ACEI / ARB - None due to CKD and BP intolerance  · Monitor I/O and daily weights  · Serial lung exams, JVD monitoring, and leg exams  · Chronic Back Pain -   · Continue lyrica  · On PRN Tylenol  · Continue Voltaren  VTE Pharmacologic Prophylaxis:   VTE Score: 6 High Risk (Score >/= 5) - Pharmacological DVT Prophylaxis Ordered: Heparin  Sequential Compression Devices Ordered  Mechanical VTE Prophylaxis in Place: Yes    Patient Centered Rounds: I have performed bedside rounds with nursing staff today  Discussions with Specialists or Other Care Team Provider: None today  Education and Discussions with Family / Patient: Updated family at bedside who is also a trauma physician assistant, Whit Umanzor  Time Spent for Care: 30 minutes  More than 50% of total time spent on counseling and coordination of care as described above  Current Length of Stay: 2 day(s)  Current Patient Status: Inpatient     Certification Statement: The patient will continue to require additional inpatient hospital stay due to evaluation and treatment of various conditions above  Discharge Plan / Estimated Discharge Date: Anticipate discharge in 48 hrs to discharge location to be determined pending rehab evaluations  Code Status: Level 1 - Full Code      Subjective: The patient offers no acute complaints currently  She has had no fevers or chills    She denies any leg pain  The erythema seems to be improving  The patient is eating and drinking okay  No nausea  No shortness of breath  No dizziness or lightheadedness  Patient drinking well  Good urination  Objective:     Vitals:   Temp (24hrs), Av 9 °F (36 6 °C), Min:97 8 °F (36 6 °C), Max:98 °F (36 7 °C)    Temp:  [97 8 °F (36 6 °C)-98 °F (36 7 °C)] 98 °F (36 7 °C)  HR:  [81-86] 86  Resp:  [20] 20  BP: ()/(53-64) 117/64  SpO2:  [88 %-93 %] 93 %  Body mass index is 26 05 kg/m²  Input and Output Summary (last 24 hours): Intake/Output Summary (Last 24 hours) at 2022 0843  Last data filed at 2022 0403  Gross per 24 hour   Intake 400 ml   Output 450 ml   Net -50 ml       Physical Exam:   Physical Exam  Vitals reviewed  Constitutional:       Appearance: She is not diaphoretic  HENT:      Nose: Nose normal       Mouth/Throat:      Mouth: Mucous membranes are moist       Pharynx: Oropharynx is clear  Eyes:      Pupils: Pupils are equal, round, and reactive to light  Neck:      Vascular: No carotid bruit  Comments: No JVD  Cardiovascular:      Rate and Rhythm: Normal rate and regular rhythm  Heart sounds: No murmur heard  Comments: S3 gallop slightly better  Pulmonary:      Effort: Pulmonary effort is normal       Breath sounds: Rales (Bilaterally in the lower lung fields ) present  No wheezing or rhonchi  Comments: Noted to be about 92-94% on room air  Abdominal:      General: Bowel sounds are normal  There is no distension  Palpations: Abdomen is soft  Tenderness: There is no abdominal tenderness  Musculoskeletal:         General: Swelling (  Mild in the bilateral lower extremities and associated with some erythema which is improving  The warmth is also improving ) present  No tenderness  Cervical back: Neck supple  No tenderness  Skin:     General: Skin is warm and dry  Neurological:      General: No focal deficit present        Mental Status: She is alert  Cranial Nerves: No cranial nerve deficit  Sensory: No sensory deficit  Motor: No weakness  Additional Data:     Labs:  Results from last 7 days   Lab Units 06/26/22  0453 06/25/22  0353 06/24/22  1526   WBC Thousand/uL 5 96   < > 10 63*   HEMOGLOBIN g/dL 11 3*   < > 13 0   HEMATOCRIT % 35 5   < > 40 6   PLATELETS Thousands/uL 118*   < > 146*   NEUTROS PCT %  --   --  82*   LYMPHS PCT %  --   --  2*   MONOS PCT %  --   --  6   EOS PCT %  --   --  9*    < > = values in this interval not displayed  Results from last 7 days   Lab Units 06/26/22  0453 06/25/22  1444 06/24/22  1526   SODIUM mmol/L 139   < > 137   POTASSIUM mmol/L 3 5   < > 4 0   CHLORIDE mmol/L 104   < > 101   CO2 mmol/L 28   < > 23   BUN mg/dL 34*   < > 42*   CREATININE mg/dL 1 44*   < > 1 79*   ANION GAP mmol/L 7   < > 13   CALCIUM mg/dL 8 0*   < > 8 9   ALBUMIN g/dL  --   --  3 6   TOTAL BILIRUBIN mg/dL  --   --  1 27*   ALK PHOS U/L  --   --  143*   ALT U/L  --   --  47   AST U/L  --   --  70*   GLUCOSE RANDOM mg/dL 80   < > 90    < > = values in this interval not displayed  Results from last 7 days   Lab Units 06/24/22  1526   INR  1 24*     Results from last 7 days   Lab Units 06/26/22  0727 06/25/22  2344 06/25/22  1516 06/25/22  1116 06/25/22  1028 06/25/22  0844 06/25/22  0816   POC GLUCOSE mg/dl 97 95 65 72 61* 60* 56*         Results from last 7 days   Lab Units 06/26/22  0453 06/24/22  1938 06/24/22  1803 06/24/22  1526   LACTIC ACID mmol/L  --  1 8 2 2* 3 5*   PROCALCITONIN ng/ml 0 83*  --   --  1 49*       Imaging: No pertinent imaging reviewed  Recent Cultures (last 7 days):     Results from last 7 days   Lab Units 06/24/22  1526   BLOOD CULTURE  No Growth at 24 hrs  No Growth at 24 hrs         Lines/Drains:  Invasive Devices  Report    Peripheral Intravenous Line  Duration           Peripheral IV 06/25/22 Left Forearm 1 day                Telemetry:        Last 24 Hours Medication List:   Current Facility-Administered Medications   Medication Dose Route Frequency Provider Last Rate    acetaminophen  650 mg Oral Q6H PRN Pancho Marquez MD      aspirin  81 mg Oral Daily Richelle Jin MD      atorvastatin  10 mg Oral Daily Richelle Jin MD      cefazolin  2,000 mg Intravenous Adebayo Claire MD 2,000 mg (06/26/22 0586)    Diclofenac Sodium  2 g Topical 4x Daily Pancho Marquez MD      DULoxetine  60 mg Oral Daily Pancho Marquez MD      heparin (porcine)  5,000 Units Subcutaneous [facility] Richelle Jin MD      hydroxychloroquine  200 mg Oral Daily With Breakfast Richelle Jin MD      insulin lispro  1-5 Units Subcutaneous TID Shade Webber MD      oxybutynin  10 mg Oral Daily Richelle Jin MD      pantoprazole  40 mg Oral Early Morning Richelle Jin MD      pregabalin  50 mg Oral TID Richelle Jin MD          Today, Patient Was Seen By: Tana Ramey DO    ** Please Note: This note has been constructed using a voice recognition system   **

## 2022-06-26 NOTE — PHYSICAL THERAPY NOTE
PHYSICAL THERAPY EVALUATION NOTE          Patient Name: Edith Savage Date: 2022      AGE:   80 y o  Mrn:   [id number]  ADMIT DX:  Altered mental status [R41 82]  Weakness [R53 1]    Past Medical History:   Diagnosis Date    Anemia     Arthritis     Back pain     [facility]estive [facility]     Chronic kidney disease     Stage 3b    Diabetes ([facility] )     Diabetes mellitus ([facility] )     Diabetic gastroparesis associated with type 2 diabetes mellitus ([facility])     Diabetic polyneuropathy ([facility])     Diverticulosis     Herpes zoster     Hypertension     IBS (irritable bowel syndrome)     Neurogenic claudication due to lumbar spinal stenosis     Osteoarthritis     Osteoporosis     Pulmonary edema     Seronegative arthropathy of multiple sites ([facility] )      Length Of Stay: 2  PHYSICAL THERAPY EVALUATION :   Patient's identity confirmed via 2 patient identifiers (full name and ) at start of session       22 0942   PT Last Visit   PT Visit Date 22   Note Type   Note type Evaluation   Pain Assessment   Pain Assessment Tool FLACC   Pain Location/Orientation Orientation: Right;Orientation: Mid;Location: Back   Pain Radiating Towards R flank   Pain Onset/Description Onset: Ongoing;Frequency: Intermittent; Descriptor: Aching   Effect of Pain on Daily Activities limits pt's ease of mobility, limited tolerance to bathing (showering) PTA   Patient's Stated Pain Goal No pain   Hospital Pain Intervention(s) Repositioned; Ambulation/increased activity   Pain Rating: FLACC (Rest) - Face 0   Pain Rating: FLACC (Rest) - Legs 0   Pain Rating: FLACC (Rest) - Activity 0   Pain Rating: FLACC (Rest) - Cry 1   Pain Rating: FLACC (Rest) - Consolability 1   Score: FLACC (Rest) 2   Pain Rating: FLACC (Activity) - Face 0   Pain Rating: FLACC (Activity) - Legs 0   Pain Rating: FLACC (Activity) - Activity 0   Pain Rating: FLACC (Activity) - Cry 1   Pain Rating: FLACC (Activity) - Consolability 1   Score: FLACC (Activity) 2   Restrictions/Precautions   Weight Bearing Precautions Per Order No   Braces or Orthoses   (none per pt)   Other Precautions Fall Risk;Pain;Hard of hearing   Home Living   Type of [address] Two level;Performs ADLs on one level; Able to live on main level with bedroom/bathroom  (2 [address], 13 steps down to basement w/ bilateral railing)   Bathroom Equipment Grab bars in shower;Grab bars around toilet   [address] Walker;Cane;Wheelchair-manual  (rollator walker, RW, SPC)   Additional Comments Pt lives in a two level house (1st floor set-up available) w/    Prior Function   Level of [place] Needs assistance with IADLs  (ambulates independently w/ RW, requires assistance w/ bathing and dressing)   Lives With Spouse   Receives Help From Family  (family currently unavailable to assist pt)   ADL Assistance Needs assistance   IADLs Needs assistance   Falls in the last 6 months (S)  >10  (pt reports 10, at least 3 w/in past week)   Comments PTA, pt ambulated independently w/ RW (admits to abandoning AD and furniture walking), required assistance w/ bathing/dressing, and IADLs, family provides transportation, h/o HHPT   General   Family/Caregiver Present Yes   Cognition   Arousal/Participation Cooperative   Orientation Level Oriented to person;Oriented to place;Oriented to time;Disoriented to situation  (oriented to month/year)   Memory Decreased recall of recent events;Decreased recall of precautions   Following Commands Follows one step commands with increased time or repetition   Comments Pt ID via name and  on wristband; pt agreeable to PT eval  Pt pleasant throughout, appears to have decreased safety awareness   Strength RLE   RLE Overall Strength 3+/5  (grossly assessed w/ functional mobility)   Strength LLE   LLE Overall Strength 3+/5  (grossly assessed w/ functional mobility)   Bed Mobility   Supine to Sit Unable to assess   Sit to Supine Unable to assess   Additional Comments Pt OOB in recliner chair upon arrival and returned to chair at end of session   Transfers   Sit to Stand 4  Minimal assistance   Additional items Assist x 1; Armrests; Increased time required;Verbal cues   Stand to Sit 4  Minimal assistance   Additional items Assist x 1; Armrests; Increased time required;Verbal cues  (VC for RW management, hand placement, alignment of self and AD prior to descent)   Ambulation/Elevation   Gait pattern Improper Weight shift;Decreased foot clearance; Foward flexed; Short stride; Excessively slow;Decreased heel strike;Decreased toe off   Gait Assistance 4  Minimal assist   Additional items Assist x 1;Verbal cues  (fluctuating w/ CGA)   Assistive Device Rolling walker   Distance 35'   Stair Management Assistance Not tested   Ambulation/Elevation Additional Comments VC for RW management, chair approach   Balance   Static Sitting Fair +   Dynamic Sitting Fair   Static Standing Fair -  (w/ RW)   Dynamic Standing Poor +   Ambulatory Poor +  (w/ BUE support on RW)   Activity Tolerance   Activity Tolerance Patient tolerated treatment well   Nurse Made Aware RN Kelley   Assessment   Prognosis Good   Problem List Decreased strength; Impaired balance;Decreased mobility; Decreased safety awareness;Pain;Decreased skin integrity   Assessment Tanya Gallegos is a 80 y o  Female who [facility] on 6/24/2022 from home due to recent falls and generalized weaknedd and diagnosis of AMD  Orders for PT eval and treat received  Comorbidities affecting pt at time of evaluation include: anemia, arthritis, CHF, DM, osteoporosis, HTN, CKD  Personal factors affecting DC include: ambulating w/ assistive device, stairs to enter home, inability to navigate community distances, inability to navigate level surfaces w/o external assistance, positive fall history, inability to perform IADLs and inability to perform ADLs   At baseline, pt mobilizes independently w/ and w/o AD, and reports 10 fall(s) in the previous 6 months  Upon evaluation, pt presents w/ the following deficits: weakness, impaired balance, pain limiting functional mobility and gait deviations  Pt currently requires, min Ax1 for transfers and min Ax1 w/ RW for gait  Pt's clinical presentation is unstable/unpredictable due to need for assist w/ all phases of mobility when usually mobilizing independently, pain impacting overall mobility status, need for input for mobility technique/safety, ongoing medical monitoring/management, and recent history of falls  Pt is at an increased risk of falls due to impaired balance, decreased LE strength, decreased safety awareness  From a PT/mobility standpoint, given the above findings, DC recommendation is for Post-acute inpatient rehabilitation vs HHPT pending progress w/ functional mobility and level of assistance available upon DC  During this admission, pt would benefit from continued skilled inpatient PT in the acute care setting in order to address the above deficits to maximize function and mobility before DC from acute care  Barriers to Discharge Decreased caregiver support   Goals   Patient Goals to be able to walk without a walker   STG Expiration Date 07/06/22   Short Term Goal #1 Pt will: perform bed mobility w/ mod I to decrease pt's burden of care; perform transfers w/ mod I to increase pt's OOB mobility; ambulate at least 350' w/ LRAD and supervision to increase pt's ambulatory endurance; negotiate 2 steps w/ UE support and supervision to facilitate pt returning to home living environment; increase all balance ratings by at least 1 grade to decrease pt's risk of falls   PT Treatment Day 0   Plan   Treatment/Interventions Functional transfer training;LE strengthening/ROM; Elevations; Therapeutic exercise; Endurance training;Patient/family training;Equipment eval/education; Bed mobility;Gait training   PT Frequency 4-5x/wk Recommendation   PT Discharge Recommendation Post acute rehabilitation services  (vs HHPT pending progress and level of assistance available upon DC)   Equipment Recommended [facility] Recommended Wheeled walker   Change/add to [employer]? Yes, Change Size   Walker Size Arturo (Ht <5'1")   AM-PAC Basic Mobility Inpatient   Turning in Bed Without Bedrails 2   Lying on Back to Sitting on Edge of Flat Bed 2   Moving Bed to Chair 3   Standing Up From Chair 3   Walk in Room 3   Climb 3-5 Stairs 2   Basic Mobility Inpatient Raw Score 15   Basic Mobility Standardized Score 36 97   Highest Level Of Mobility   -HLM Goal 4: Move to chair/commode   -HLM Achieved 7: Walk 25 feet or more   End of Consult   Patient Position at End of Consult Bedside chair; All needs within reach  (pt's  remaining in room, RN notified of no chair alarm)       The patient's AM-PAC Basic Mobility Inpatient Short Form Raw Score is 15  A Raw score of less than or equal to 16 suggests the patient may benefit from discharge to post-acute rehabilitation services  Please also refer to the recommendation of the Physical Therapist for safe discharge planning      Pt would benefit from skilled inpatient PT during this admission in order to facilitate progress towards goals to maximize functional independence    DC rec: post acute rehab  (Vs HHPT pending progress w/ functional mobility and level of assistance available upon DC)      Annette Merlin, PT, DPT  06/26/22

## 2022-06-26 NOTE — PLAN OF CARE
Problem: PHYSICAL THERAPY ADULT  Goal: Performs mobility at highest level of function for planned discharge setting  See evaluation for individualized goals  Description: Treatment/Interventions: Functional transfer training, LE strengthening/ROM, Elevations, Therapeutic exercise, Endurance training, Patient/family training, Equipment eval/education, Bed mobility, Gait training  Equipment Recommended: Kenroy Titus       See flowsheet documentation for full assessment, interventions and recommendations  6/26/2022 1049 by Clayton Devi PT  Note: Prognosis: Good  Problem List: Decreased strength, Impaired balance, Decreased mobility, Decreased safety awareness, Pain, Decreased skin integrity  Assessment: Kayla Aleman is a 80 y o  Female who presents [facility] on 6/24/2022 from home due to recent falls and generalized weaknedd and diagnosis of AMD  Orders for PT eval and treat received  Comorbidities affecting pt at time of evaluation include: anemia, arthritis, CHF, DM, osteoporosis, HTN, CKD  Personal factors affecting DC include: ambulating w/ assistive device, stairs to enter home, inability to navigate community distances, inability to navigate level surfaces w/o external assistance, positive fall history, inability to perform IADLs and inability to perform ADLs  At baseline, pt mobilizes independently w/ and w/o AD, and reports 10 fall(s) in the previous 6 months  Upon evaluation, pt presents w/ the following deficits: weakness, impaired balance, pain limiting functional mobility and gait deviations  Pt currently requires, min Ax1 for transfers and min Ax1 w/ RW for gait  Pt's clinical presentation is unstable/unpredictable due to need for assist w/ all phases of mobility when usually mobilizing independently, pain impacting overall mobility status, need for input for mobility technique/safety, ongoing medical monitoring/management, and recent history of falls   Pt is at an increased risk of falls due to impaired balance, decreased LE strength, decreased safety awareness  From a PT/mobility standpoint, given the above findings, DC recommendation is for Post-acute inpatient rehabilitation vs HHPT pending progress w/ functional mobility and level of assistance available upon DC  During this admission, pt would benefit from continued skilled inpatient PT in the acute care setting in order to address the above deficits to maximize function and mobility before DC from acute care  Barriers to Discharge: Decreased caregiver support        PT Discharge Recommendation: Post acute rehabilitation services (vs HHPT pending progress and level of assistance available upon DC)          See flowsheet documentation for full assessment

## 2022-06-27 ENCOUNTER — HOME HEALTH ADMISSION (OUTPATIENT)
Dept: HOME HEALTH SERVICES | Facility: HOME HEALTHCARE | Age: 87
End: 2022-06-27
Payer: MEDICARE

## 2022-06-27 VITALS
SYSTOLIC BLOOD PRESSURE: 134 MMHG | DIASTOLIC BLOOD PRESSURE: 77 MMHG | TEMPERATURE: 98 F | OXYGEN SATURATION: 94 % | RESPIRATION RATE: 18 BRPM | WEIGHT: 128.97 LBS | HEART RATE: 94 BPM | BODY MASS INDEX: 26 KG/M2 | HEIGHT: 59 IN

## 2022-06-27 PROBLEM — I50.32 CHRONIC DIASTOLIC HEART FAILURE (HCC): Chronic | Status: ACTIVE | Noted: 2022-04-14

## 2022-06-27 PROBLEM — G92.8 TOXIC METABOLIC ENCEPHALOPATHY: Status: ACTIVE | Noted: 2022-06-24

## 2022-06-27 PROBLEM — N18.32 STAGE 3B CHRONIC KIDNEY DISEASE (HCC): Chronic | Status: ACTIVE | Noted: 2021-11-19

## 2022-06-27 LAB
ANION GAP SERPL CALCULATED.3IONS-SCNC: 6 MMOL/L (ref 4–13)
BASOPHILS # BLD MANUAL: 0 THOUSAND/UL (ref 0–0.1)
BASOPHILS NFR MAR MANUAL: 0 % (ref 0–1)
BUN SERPL-MCNC: 26 MG/DL (ref 5–25)
CALCIUM SERPL-MCNC: 8.1 MG/DL (ref 8.4–10.2)
CHLORIDE SERPL-SCNC: 104 MMOL/L (ref 96–108)
CO2 SERPL-SCNC: 30 MMOL/L (ref 21–32)
CREAT SERPL-MCNC: 1.19 MG/DL (ref 0.6–1.3)
EOSINOPHIL # BLD MANUAL: 0.81 THOUSAND/UL (ref 0–0.4)
EOSINOPHIL NFR BLD MANUAL: 13 % (ref 0–6)
ERYTHROCYTE [DISTWIDTH] IN BLOOD BY AUTOMATED COUNT: 15.2 % (ref 11.6–15.1)
GFR SERPL CREATININE-BSD FRML MDRD: 41 ML/MIN/1.73SQ M
GLUCOSE SERPL-MCNC: 119 MG/DL (ref 65–140)
GLUCOSE SERPL-MCNC: 156 MG/DL (ref 65–140)
GLUCOSE SERPL-MCNC: 95 MG/DL (ref 65–140)
HCT VFR BLD AUTO: 34.4 % (ref 34.8–46.1)
HGB BLD-MCNC: 11 G/DL (ref 11.5–15.4)
LG PLATELETS BLD QL SMEAR: PRESENT
LYMPHOCYTES # BLD AUTO: 1 THOUSAND/UL (ref 0.6–4.47)
LYMPHOCYTES # BLD AUTO: 16 % (ref 14–44)
MCH RBC QN AUTO: 30.3 PG (ref 26.8–34.3)
MCHC RBC AUTO-ENTMCNC: 32 G/DL (ref 31.4–37.4)
MCV RBC AUTO: 95 FL (ref 82–98)
METAMYELOCYTES NFR BLD MANUAL: 1 % (ref 0–1)
MONOCYTES # BLD AUTO: 0.44 THOUSAND/UL (ref 0–1.22)
MONOCYTES NFR BLD: 7 % (ref 4–12)
NEUTROPHILS # BLD MANUAL: 3.74 THOUSAND/UL (ref 1.85–7.62)
NEUTS BAND NFR BLD MANUAL: 1 % (ref 0–8)
NEUTS SEG NFR BLD AUTO: 59 % (ref 43–75)
PLATELET # BLD AUTO: 135 THOUSANDS/UL (ref 149–390)
PLATELET BLD QL SMEAR: ABNORMAL
PMV BLD AUTO: 11 FL (ref 8.9–12.7)
POTASSIUM SERPL-SCNC: 3.5 MMOL/L (ref 3.5–5.3)
RBC # BLD AUTO: 3.63 MILLION/UL (ref 3.81–5.12)
RBC MORPH BLD: NORMAL
SODIUM SERPL-SCNC: 140 MMOL/L (ref 135–147)
VARIANT LYMPHS # BLD AUTO: 3 %
WBC # BLD AUTO: 6.23 THOUSAND/UL (ref 4.31–10.16)

## 2022-06-27 PROCEDURE — 99222 1ST HOSP IP/OBS MODERATE 55: CPT | Performed by: INTERNAL MEDICINE

## 2022-06-27 PROCEDURE — 97167 OT EVAL HIGH COMPLEX 60 MIN: CPT

## 2022-06-27 PROCEDURE — 97535 SELF CARE MNGMENT TRAINING: CPT

## 2022-06-27 PROCEDURE — 99239 HOSP IP/OBS DSCHRG MGMT >30: CPT | Performed by: INTERNAL MEDICINE

## 2022-06-27 PROCEDURE — 85007 BL SMEAR W/DIFF WBC COUNT: CPT | Performed by: INTERNAL MEDICINE

## 2022-06-27 PROCEDURE — 80048 BASIC METABOLIC PNL TOTAL CA: CPT | Performed by: INTERNAL MEDICINE

## 2022-06-27 PROCEDURE — 85027 COMPLETE CBC AUTOMATED: CPT | Performed by: INTERNAL MEDICINE

## 2022-06-27 PROCEDURE — 82948 REAGENT STRIP/BLOOD GLUCOSE: CPT

## 2022-06-27 RX ORDER — AMITRIPTYLINE HYDROCHLORIDE 10 MG/1
20 TABLET, FILM COATED ORAL
Qty: 28 TABLET | Refills: 0 | Status: SHIPPED | OUTPATIENT
Start: 2022-06-27 | End: 2022-07-05

## 2022-06-27 RX ORDER — CEPHALEXIN 500 MG/1
500 CAPSULE ORAL EVERY 8 HOURS SCHEDULED
Qty: 15 CAPSULE | Refills: 0 | Status: SHIPPED | OUTPATIENT
Start: 2022-06-27 | End: 2022-07-02

## 2022-06-27 RX ADMIN — HYDROXYCHLOROQUINE SULFATE 200 MG: 200 TABLET, FILM COATED ORAL at 09:48

## 2022-06-27 RX ADMIN — CEFAZOLIN SODIUM 2000 MG: 2 SOLUTION INTRAVENOUS at 06:31

## 2022-06-27 RX ADMIN — PANTOPRAZOLE SODIUM 40 MG: 40 TABLET, DELAYED RELEASE ORAL at 06:31

## 2022-06-27 RX ADMIN — HEPARIN SODIUM 5000 UNITS: 5000 INJECTION INTRAVENOUS; SUBCUTANEOUS at 06:31

## 2022-06-27 RX ADMIN — DULOXETINE HYDROCHLORIDE 60 MG: 60 CAPSULE, DELAYED RELEASE ORAL at 09:47

## 2022-06-27 RX ADMIN — HEPARIN SODIUM 5000 UNITS: 5000 INJECTION INTRAVENOUS; SUBCUTANEOUS at 15:30

## 2022-06-27 RX ADMIN — DICLOFENAC SODIUM TOPICAL GEL, 1%, 2 G: 10 GEL TOPICAL at 12:14

## 2022-06-27 RX ADMIN — ATORVASTATIN CALCIUM 10 MG: 10 TABLET, FILM COATED ORAL at 09:48

## 2022-06-27 RX ADMIN — ASPIRIN 81 MG CHEWABLE TABLET 81 MG: 81 TABLET CHEWABLE at 09:47

## 2022-06-27 RX ADMIN — PREGABALIN 50 MG: 50 CAPSULE ORAL at 09:48

## 2022-06-27 RX ADMIN — OXYBUTYNIN 10 MG: 5 TABLET, FILM COATED, EXTENDED RELEASE ORAL at 09:48

## 2022-06-27 RX ADMIN — DICLOFENAC SODIUM TOPICAL GEL, 1%, 2 G: 10 GEL TOPICAL at 09:49

## 2022-06-27 RX ADMIN — CEFAZOLIN SODIUM 2000 MG: 2 SOLUTION INTRAVENOUS at 15:26

## 2022-06-27 NOTE — ASSESSMENT & PLAN NOTE
Patient has bilateral erythema and warmth on her leg  Upon physical exam today, left lower extremity is cooler to touch  Patient's lower extremity is not as erythematous  Received treatment with IV cefazolin for 2 days  Has been afebrile and exhibited stable vitals  Discharge patient on 5 days of Keflex

## 2022-06-27 NOTE — CONSULTS
Consultation - Geriatric Medicine   Nela Damon 80 y o  female MRN: [id number]  Unit/Bed#: S -73 Encounter: [id number]        Inpatient consult to Gerontology  Consult performed by: Keturah Strickland MD  Consult ordered by: Brooke Helm DO            Assessment/Plan   1 -metabolic encephalopathy -resolved patient appears to be alert and oriented today suspect the element of dehydration as the causative agent of her decline  Patient's  noticed that the sooner she began to get fluids she improved mentally  2  -cellulitis  -patient with bilateral erythema unsure as to whether we were dealing with venous stasis dermatitis or cellulitis patient was given IV cefazolin empirically  3 -history of chronic diastolic heart failure -patient appears to be euvolemic at present no evidence of peripheral edema no rales on pulmonary exam     4 -VICENTE on chronic renal disease -suspect dehydration was the causative agent  According to the  she was no longer taking the diuretic prior to her admission  Patient's oral intake of fluids has been poor  Patient's GFR prior to discharge was 41 with a creatinine 1 19 which is a marked improvement  5  -diabetes mellitus type 2 -metformin was held on admission because of the patient's low GFR last hemoglobin A1c back in April was 5 9  Would recommend holding the metformin altogether rechecking he had a hemoglobin A1c in 4-6 weeks  If medication needs to be renewed would consider Januvia 25 mg as an alternate choice over metformin  Did speak with her family physician Dr Yesica Johnston  6 -polypharmacy  -medications were reviewed patient will decrease her Elavil to 10 mg x 2 weeks then stop completely  Patient will also discontinue metformin to have a hemoglobin A1c check in 6 weeks, patient will discontinue her lisinopril to and have mg orally daily, patient will discontinue her Demadex    Patient is going to follow with her family physician in 7 days he will monitor her weights and monitor her for peripheral edema  He will reassess her hemoglobin A1c and monitor her blood work routinely  7 -history of seronegative arthropathy of multiple sites  -patient currently on Plaquenil 200 mg orally daily she does follow with Ophthalmology on a regular basis  She also had history of senile osteoporosis and had been on Prolia  8 -lumbar spondylosis  -follows with Rheumatology for this particular problem    9 -abnormal gait  -patient to use her walker at all times  10 -hearing loss    11 -cellulitis  -patient was given course of antibiotic treatment resolution of her erythema was noted  Recommendations    1 -decrease Elavil to 10 mg at nighttime then discontinue after 2 weeks    2 -discontinue Demadex monitor weights daily should she developed peripheral edema or shortness of breath to call PCP    3 -decrease Lyrica to just 75 mg at bedtime    4  -discontinue metformin last hemoglobin A1c was 5 9    5  -discontinue lisinopril    6 -did speak with patient's PCP and attending physician and recommended the above changes  History of Present Illness   Physician Requesting Consult: Edwin Borrero DO  Reason for Consult / Principal Problem:  Metabolic encephalopathy  Hx and PE limited by:  No limitations  Additional history obtained from:   was able to elaborate an excellent history      HPI: Angeles Marie is a 80y o  year old female who presents with a history of a mechanical fall she has had about 6 falls in the last few months  The patient was found on the floor around 6:00 a m  In the morning by her  she appeared to be confused and lethargic and had poor oral intake reason for which the patient was brought into the ER for further evaluation  She denied any history of fever or chills or urinary symptoms  Patient has had no diarrhea or abdominal discomfort no cough or cold   states she does cough when she eats    Patient's past medical history is relevant for a history of diastolic heart failure, overactive bladder, ambulatory dysfunction, seronegative arthritis, chronic kidney disease stage IIIB, diabetes mellitus type 2, peripheral neuropathy, polypharmacy  Patient follows on a regular basis with her family physician Dr Bandar Hou [phone]   states that the patient has poor ambulation and needs to use her walker at all times  Patient has had a decline in her cognition and has problems with recent memory  CMP on initial admission revealed a deteriorated renal function with a GFR of 25 and a creatinine 1 79 baseline GFR is run in the low 40s or high 30s  Baseline creatinine is between 1 13 and 1 27  Patient also noted to have evidence of mild leukocytosis [place] [address] there was no evidence of anemia  Lactic acid levels procalcitonin levels TSH coma panel, troponin levels were all normal   She did have an elevation of lactic acid at 2:00 a m  To 3 5 once the patient was given fluids, her numbers improved markedly as did the patient's mentation  CT of head revealed no acute intracranial hemorrhage or mass effect  There was evidence of mild atrophy and chronic microvascular ischemic changes  There was persistent opacified right mastoid air cells indicative of right-sided mastoiditis  Chest x-ray revealed low lung volumes questioned mild superimposed interstitial edema  CT of the chest revealed no definite acute pulmonary emboli there was mild interstitial edema with small effusions  Chest x-ray on the 24th revealed mild CHF however her lung examination on physical examination revealed clear breath sounds bilaterally  Review of Systems   Constitutional: Negative  HENT: Positive for hearing loss  Eyes:        Patient wears glasses   Respiratory: Negative  Cardiovascular: Negative  Gastrointestinal: Negative      Endocrine:        Patient with history of diabetes mellitus type 2   Genitourinary: Negative  Musculoskeletal: Positive for arthralgias and gait problem  Hematological: Negative  Psychiatric/Behavioral: Positive for confusion and decreased concentration  Memory/Cognitive screening:  Patient with short term memory issues  Mobility:  Mobility has been poor she needs assistive devices to be able to ambulate she has a 4 wheeled walker and now 2 front wheeled walker  Falls:  Patient has had 6 falls in the last 6 months  Assistive Devices:  4 wheeled walker and front wheeled front walker  Fraility:  No frailty  Nutrition/weight loss/grocery shopping/meal preparation:  Appetite is good when foot is provided  Vision impairment:  Patient has glasses  Hearing impairment:  Patient does have hearing impairment uses hearing aids  Incontinence:  No history of urinary incontinence  Delirium:  Patient presented with metabolic encephalopathy  Polypharmacy:  No current facility-administered medications on file prior to encounter       Current Outpatient Medications on File Prior to Encounter   Medication Sig Dispense Refill    amitriptyline (ELAVIL) 10 mg tablet Take 2 tablets (20 mg total) by mouth daily at bedtime 180 tablet 1    aspirin 81 mg chewable tablet Chew 1 tablet (81 mg total) daily 30 tablet 0    atorvastatin (LIPITOR) 10 mg tablet Take 1 tablet (10 mg total) by mouth daily 90 tablet 3    diphenoxylate-atropine (LOMOTIL) 2 5-0 025 mg per tablet Take 1 tablet by mouth if needed        DULoxetine (CYMBALTA) 60 mg delayed release capsule TAKE ONE CAPSULE BY MOUTH EVERY DAY 30 capsule 5    hydroxychloroquine (PLAQUENIL) 200 mg tablet Take 1 tablet (200 mg total) by mouth daily with breakfast 90 tablet 1    lisinopril (ZESTRIL) 2 5 mg tablet Take 1 tablet (2 5 mg total) by mouth daily 30 tablet 0    Magnesium 250 MG TABS Take 250 mg by mouth in the morning      metFORMIN (GLUCOPHAGE) 500 mg tablet TAKE ONE TABLET BY MOUTH TWICE A DAY WITH MORNING AND EVENING MEALS      metoprolol succinate (TOPROL-XL) 25 mg 24 hr tablet Take 0 5 tablets (12 5 mg total) by mouth daily 30 tablet 0    Mirabegron ER (Myrbetriq) 50 MG TB24 Take 1 tablet (50 mg total) by mouth daily 30 tablet 11    omeprazole (PriLOSEC) 20 mg delayed release capsule Take 20 mg by mouth daily        pregabalin (LYRICA) 50 mg capsule Take 1 cap 4 times daily 120 capsule 2    pregabalin (LYRICA) 75 mg capsule Take 75 mg by mouth daily at bedtime      Sodium Fluoride (PreviDent 5000 Booster Plus) 1 1 % PSTE Apply on teeth every evening as directed 100 mL 3    torsemide (DEMADEX) 10 mg tablet Take 1 tablet every Mon, Wed, and Fri 30 tablet 0    Cholecalciferol (VITAMIN D3) 1000 units CAPS Take by mouth      nitroglycerin (NITROSTAT) 0 4 mg SL tablet Place 1 tablet (0 4 mg total) under the tongue every 5 (five) minutes as needed for chest pain for up to 5 days (Patient not taking: Reported on 6/24/2022) 5 tablet 0    ondansetron (ZOFRAN-ODT) 4 mg disintegrating tablet TAKE 1 TABLET BY MOUTH EVERY 8 HOURS FOR 2 DAYS AND PLACE ON TOP OF THE TONGUE WHERE IT WILL DISSOLVE, THEN SWALLOW (Patient not taking: Reported on 6/24/2022)      pregabalin (LYRICA) 50 mg capsule Take 1 capsule (50 mg total) by mouth 4 (four) times a day (Patient taking differently: Take 50 mg by mouth 3 (three) times a day  ) 120 capsule 2    pyridoxine (B-6) 100 MG tablet Take 100 mg by mouth daily       Patients primary residence:  Patient lives in her own home Lives with:    iADL's:  Patient's  does the independent activities of daily living he pays the bills and monitor the patient closely during the course of the day  He drives her to her appointments    ADL's:  Patient enjoys her basic activities of daily living    Historical Information   Past medical history:   Past Medical History:   Diagnosis Date    Anemia     Arthritis     Back pain     CH[facility]estive [facility] failure) [facility] [address] [facility]     Chronic kidney disease     Stage 3b    Diabetes ([facility] )     Diabetes mellitus ([facility] )     Diabetic gastroparesis associated with type 2 diabetes mellitus ([facility] )     Diabetic polyneuropathy ([facility] )     Diverticulosis     Herpes zoster     Hypertension     IBS (irritable bowel syndrome)     Neurogenic claudication due to lumbar spinal stenosis     Osteoarthritis     Osteoporosis     Pulmonary edema     Seronegative arthropathy of multiple sites [facility])      Past surgical history:   Past Surgical History:   Procedure Laterality Date    BACK SURGERY      EGD AND COLONOSCOPY N/A 2016    Procedure: EGD AND COLONOSCOPY;  Surgeon: Nilton Nunez MD;  [facility];   Service:     JOINT REPLACEMENT      bilat knees and hips    OTHER SURGICAL HISTORY      pelvis rods    REPLACEMENT TOTAL KNEE BILATERAL      STOMACH SURGERY       Social history:  Social History     Socioeconomic History    Marital status: /Civil Union     Spouse name: Not on file    Number of children: Not on file    Years of education: Not on file    Highest education level: Not on file   Occupational History    Not on file   Tobacco Use    Smoking status: Former Smoker     Types: Cigarettes     Quit date:      Years since quittin 5    Smokeless tobacco: Never Used   Vaping Use    Vaping Use: Never used   Substance and Sexual Activity    Alcohol use: No    Drug use: No    Sexual activity: Not Currently     Partners: Male     Birth control/protection: None   Other Topics Concern    Not on file   Social History Narrative    Not on file     Social Determinants of Health     Financial Resource Strain: Not on file   Food Insecurity: Not on file   Transportation Needs: Not on file   Physical Activity: Not on file   Stress: Not on file   Social Connections: Not on file   Intimate Partner Violence: Not on file   Housing Stability: Not on file     Family history:   Family History   Problem Relation Age of Onset    Cancer Brother     Diabetes Mother    Hazel Kebede Hypertension Father     Heart disease Father        Meds/Allergies   All current active meds have been reviewed  Allergies   Allergen Reactions    Morphine Other (See Comments)     urinary retention    Ciprofloxacin Rash and Nausea Only       Objective   Vitals:    06/27/22 0721   BP: 134/77   Pulse: 94   Resp: 18   Temp: 98 °F (36 7 °C)   SpO2: 94%       Intake/Output Summary (Last 24 hours) at 6/27/2022 1131  Last data filed at 6/27/2022 0900  Gross per 24 hour   Intake 240 ml   Output 500 ml   Net -260 ml     Invasive Devices  Report    Peripheral Intravenous Line  Duration           Peripheral IV 06/25/22 Left Forearm 2 days                Physical Exam  Constitutional:       Appearance: Normal appearance  HENT:      Head: Normocephalic and atraumatic  Right Ear: Ear canal normal       Left Ear: Ear canal and external ear normal       Nose: Nose normal       Mouth/Throat:      Mouth: Mucous membranes are moist       Pharynx: Oropharynx is clear  Eyes:      Conjunctiva/sclera: Conjunctivae normal    Cardiovascular:      Rate and Rhythm: Normal rate and regular rhythm  Pulses: Normal pulses  Heart sounds: Normal heart sounds  Pulmonary:      Effort: Pulmonary effort is normal       Breath sounds: Normal breath sounds  Abdominal:      General: Bowel sounds are normal       Palpations: Abdomen is soft  Musculoskeletal:         General: Normal range of motion  Cervical back: Neck supple  Skin:     General: Skin is warm  Neurological:      General: No focal deficit present  Psychiatric:         Mood and Affect: Mood normal          Behavior: Behavior normal          Lab Results:   I have personally reviewed pertinent lab and imaging results  VTE Prophylaxis:  Heparin 5000 International Units every 8 hours      Code Status: Level 1 - Full Code  Advance Directive and Living Will:      Power of :    POLST:      Family and Social Support:  Patient has excellent support from her  and rest of family  Discharge planning discussed with[de-identified] Patient  Freedom of Choice: Yes  IMM Given (Date):: 6/27/2022  IMM Given to[de-identified] Patient

## 2022-06-27 NOTE — PLAN OF CARE
Problem: OCCUPATIONAL THERAPY ADULT  Goal: Performs self-care activities at highest level of function for planned discharge setting  See evaluation for individualized goals  Description:   Outcome: Progressing  Note: Limitation: Decreased ADL status, Decreased UE ROM, Decreased UE strength, Decreased Safe judgement during ADL, Decreased cognition, Decreased endurance, Decreased self-care trans, Decreased high-level ADLs  Prognosis: Good  Assessment: Pt is a 80 y o  female seen for OT evaluation at [address] [place] [address], admitted 6/24/2022 w/ Altered mental status  OT completed extensive review of pt's medical and social history  Comorbidities affecting pt's functional performance at time of assessment include: DM type 2, CKD, CHF, AMS, cellulitis, anemia, shortness of breath, OA, osteoporosis, lumbar spondylosis, polyneuropathy, balance impairment  Personal factors affecting pt at time of IE include:steps to enter environment, difficulty performing ADLS, difficulty performing IADLS , limited insight into deficits, decreased initiation and engagement  and health management   Prior to admission, pt was living in [place], [place] with spouse and was independent with ADL/IADL  Upon evaluation, pt presents to OT below baseline due to the following performance deficits: weakness, decreased strength, decreased balance, decreased tolerance, impaired memory, impaired problem solving and decreased safety awareness  Pt to benefit from continued skilled OT tx while in the hospital to address deficits as defined above and maximize level of functional independence w ADL's and functional mobility  Occupational Performance areas to address include: grooming, bathing/shower, toilet hygiene, dressing, functional mobility and functional transfers, bed mobility  The patient's raw score on the AM-PAC Daily Activity inpatient short form is 18, standardized score is 38 66, less than 39 4   Patients at this level are likely to benefit from DC to post-acute rehabilitation services  Based on findings, pt is of high complexity, due to medical comorbidities  At this time, OT recommendations at time of discharge are short term rehab       OT Discharge Recommendation: Post acute rehabilitation services

## 2022-06-27 NOTE — DISCHARGE INSTR - AVS FIRST PAGE
Dear Reza Garcia,     It was our pleasure to care for you here at [facility]  It is our hope that we were always able to exceed the expected standards for your care during your stay  You were hospitalized due to cellulitis  You were cared for on the 4th floor by Cheryl Bess DO under the service of Will Ruff DO with the [facility] who covers for your primary care physician (PCP), Mary Marx DO, while you were hospitalized  If you have any questions or concerns related to this hospitalization, you may contact us at [phone]  For follow up as well as any medication refills, we recommend that you follow up with your primary care physician  A registered nurse will reach out to you by phone within a few days after your discharge to answer any additional questions that you may have after going home  However, at this time we provide for you here, the most important instructions / recommendations at discharge:     Notable Medication Adjustments -   Please START taking 500mg Keflex every 8 hours for the next 5 days  You can start taking it today  Please continue taking lyrica 75mg daily at bedtime  Please continue taking amitriptyline 10mg at night for 2 weeks only  Please resume your 10mg torsemide medication STARTING Wednesday (June 28)  Continue to take it Wednesday, Friday, Monday  Please continue taking Demadex and follow-up with your PCP upon discharge  Please STOP taking metformin, and lisinopril  Testing Required after Discharge -   BMP in 1 week  Please discuss results with your PCP  CBC and Differential in 1 week  Please discuss results with your PCP  HgA1c in 4-5 weeks  Your PCP will discuss results with you  Important follow up information -   Please follow-up with your Primary Care Physician (PCP) in 1 week    Other Instructions -   If you experience fevers, severe pain, or worsening of your symptoms - please return to the ER  Please review this entire after visit summary as additional general instructions including medication list, appointments, activity, diet, any pertinent wound care, and other additional recommendations from your care team that may be provided for you        Sincerely,     Bj Curtis, DO

## 2022-06-27 NOTE — ASSESSMENT & PLAN NOTE
Lab Results   Component Value Date    HGBA1C 5 9 (H) 04/14/2022     Upon discharge and in discussion with geriatrics, will discontinue metformin and get repeat hemoglobin A1c in 4-6 weeks

## 2022-06-27 NOTE — ASSESSMENT & PLAN NOTE
UA: Incoherent speech reported by her  who found her on the floor at the bedside after a falling while trying to get onto her bed  Etiology: AMS likely 2/2 acute infectious process cellulitis found on her lower extremities  May also be secondary to polypharmacy and also a component of dehydration  In the setting of cellulitis, patient did receive IV antibiotics and will be discharged with p o  Antibiotics  Geriatrics consulted, adjusted medication regimen  Discussed with patient's PCP in instructed to follow up with PCP in 1 week  · Discontinued lisinopril, metformin  Repeat hemoglobin A1c in 4-5 weeks  · Will continue with Elavil 10 mg at night for 2 weeks  Patient to stop after 2 weeks  Will work on sleep hygiene per PCP  · Will continue with Demadex due to patient's CHF, will be closely followed by PCP  · Decrease Lyrica to 75 mg at bedtime  Given the patient's resolution of mentation, will likely continue to improve as cellulitis improves and medication regimen is adjusted

## 2022-06-27 NOTE — OCCUPATIONAL THERAPY NOTE
Occupational Therapy Evaluation & Treatment Note      Speedy Kline    6/27/2022    Principal Problem:    Altered mental status  Active Problems:    DM2 (diabetes mellitus, type 2) ([facility])    Stage 3b chronic kidney disease ([place] [address] )    Chronic diastolic heart failure ([place] [address] )    Cellulitis      Past Medical History:   Diagnosis Date    Anemia     Arthritis     Back pain     CHF (congestive heart failure) ([facility])     Chronic kidney disease     Stage 3b    Diabetes ([place] [address] )     Diabetes mellitus ([place] [address] )     Diabetic gastroparesis associated with type 2 diabetes mellitus ([place] [address] )     Diabetic polyneuropathy ([place] [address] )     Diverticulosis     Herpes zoster     Hypertension     IBS (irritable bowel syndrome)     Neurogenic claudication due to lumbar spinal stenosis     Osteoarthritis     Osteoporosis     Pulmonary edema     Seronegative arthropathy of multiple sites [facility])        Past Surgical History:   Procedure Laterality Date    BACK SURGERY      EGD AND COLONOSCOPY N/A 12/23/2016    Procedure: EGD AND COLONOSCOPY;  Surgeon: Naida Mclaughlin MD;  Location: [facility]; Service:     JOINT REPLACEMENT      bilat knees and hips    OTHER SURGICAL HISTORY      pelvis rods    REPLACEMENT TOTAL KNEE BILATERAL      STOMACH SURGERY          06/27/22 1130   OT Last Visit   OT Visit Date 06/27/22   Note Type   Note type Evaluation  (& Treatment)   Restrictions/Precautions   Weight Bearing Precautions Per Order No   Other Precautions Fall Risk;Pain;Hard of hearing   Pain Assessment   Pain Assessment Tool 0-10   Pain Score No Pain   Effect of Pain on Daily Activities chronic back pain, but currently comfortable   Home Living   Type of [address] [facility] One level  (2STE   Pt does occasionally go down to basement )   Bathroom Shower/Tub Walk-in shower   Bathroom Toilet Raised   Bathroom Equipment Shower chair;Grab bars in shower   Home Equipment Walker;Cane  (rollator, RW, standard walker, wheelchair)   Additional Comments Typically uses rollator  W/C for long distances  Uses shopping cart when at grocery store  Prior Function   Level of [place] Independent with ADLs and functional mobility   Lives With Spouse   ADL Assistance Independent   IADLs Independent   Falls in the last 6 months 5 to 10  (2 in the last two weeks, typically related to loss of balance backwards)   Comments Does not drive  Pt does have periods of time she is alone  Pt hard of hearing, therefore  assisted with some of PLOF  Psychosocial   Psychosocial (WDL) WDL   Length of Time/Family Visitation   (spouse present)   ADL   Eating Assistance 7  Independent   Grooming Assistance 5  Supervision/Setup   UB Bathing Assistance 5  Supervision/Setup   LB Bathing Assistance 4  Minimal Assistance   UB Dressing Assistance 5  Supervision/Setup   LB [place]  4  Minimal Assistance   Bed Mobility   Sit to Supine 4  Minimal assistance   Additional items LE management   Additional Comments OOB in recliner at start of session  Asking to get back to bed after session  Transfers   Sit to Stand 4  Minimal assistance   Additional items Assist x 1   Stand to Sit 4  Minimal assistance   Additional items Assist x 1   Stand pivot 4  Minimal assistance   Additional items Assist x 1   Toilet transfer 4  Minimal assistance   Additional items Assist x 1   Functional Mobility   Functional Mobility 4  Minimal assistance   Additional Comments x1  To/from bathroom  Additional items Rolling walker   Activity Tolerance   Activity Tolerance Patient tolerated treatment well   Nurse Made Aware RN Prudence Canes Assessment   RUE Assessment WFL   LUE Assessment   LUE Assessment WFL   Hand Function   Gross Motor Coordination Functional   Fine Motor Coordination Functional   Cognition   Overall Cognitive Status WFL   Arousal/Participation Alert; Cooperative   Attention Within functional limits   Orientation Level Oriented X4   Memory Decreased recall of recent events;Decreased recall of precautions   Following Commands Follows one step commands without difficulty   Comments [place]   Assessment   Limitation Decreased ADL status; Decreased UE ROM; Decreased UE strength;Decreased Safe judgement during ADL;Decreased cognition;Decreased endurance;Decreased self-care trans;Decreased high-level ADLs   Prognosis Good   Assessment Pt is a 80 y o  female seen for OT evaluation at [address] [place] [address], admitted 6/24/2022 w/ Altered mental status  OT completed extensive review of pt's medical and social history  Comorbidities affecting pt's functional performance at time of assessment include: DM type 2, CKD, CHF, AMS, cellulitis, anemia, shortness of breath, OA, osteoporosis, lumbar spondylosis, polyneuropathy, balance impairment  Personal factors affecting pt at time of IE include:steps to enter environment, difficulty performing ADLS, difficulty performing IADLS , limited insight into deficits, decreased initiation and engagement  and health management   Prior to admission, pt was living in [place], [place] with spouse and was independent with ADL/IADL  Upon evaluation, pt presents to OT below baseline due to the following performance deficits: weakness, decreased strength, decreased balance, decreased tolerance, impaired memory, impaired problem solving and decreased safety awareness  Pt to benefit from continued skilled OT tx while in the hospital to address deficits as defined above and maximize level of functional independence w ADL's and functional mobility  Occupational Performance areas to address include: grooming, bathing/shower, toilet hygiene, dressing, functional mobility and functional transfers, bed mobility  The patient's raw score on the AM-PAC Daily Activity inpatient short form is 18, standardized score is 38 66, less than 39 4  Patients at this level are likely to benefit from DC to post-acute rehabilitation services   Based on findings, pt is of high complexity, due to medical comorbidities  At this time, OT recommendations at time of discharge are short term rehab  Plan   Treatment Interventions ADL retraining;Functional transfer training;UE strengthening/ROM; Endurance training;Cognitive reorientation;Patient/family training;Equipment evaluation/education; Compensatory technique education; Energy conservation;Continued evaluation   Goal Expiration Date 07/07/22   OT Frequency 3-5x/wk   Additional Treatment Session   Treatment Assessment Patient participated in Skilled OT session this date with interventions consisting of ADL re training with the use of correct body mechnaics, safety awareness and fall prevention techniques and  functional transfers, bed mobility*   Patient agreeable to OT treatment session, upon arrival patient was found seated OOB to Recliner  Treatment session as follows: Pt required Min A and RW to ambulate into bathroom  Min A for toilet transfer with use of grab bar  Min A to change underwear  Stood at sink with supervision to wash hands  Min A for leg mgmt to lie down in bed  Patient continues to be functioning below baseline level, occupational performance remains limited secondary to factors listed above and increased risk for falls and injury  The patient's raw score on the AM-PAC Daily Activity inpatient short form is 18, standardized score is 38 66, less than 39 4  Patients at this level are likely to benefit from DC to post-acute rehabilitation services  From OT standpoint, recommendation at time of d/c would be Short Term Rehab  Patient to benefit from continued Occupational Therapy treatment while in the hospital to address deficits as defined above and maximize level of functional independence with ADLs and functional mobility    Pt left with call bell in reach, tray table in reach, needs met, bed alarm activated, spouse present     Recommendation   OT Discharge Recommendation Post acute rehabilitation services   [facility] Daily Activity Inpatient   Lower Body Dressing 2   Bathing 2   Toileting 3   Upper Body Dressing 3   Grooming 4   Eating 4   Daily Activity Raw Score 18   Daily Activity Standardized Score (Calc for Raw Score >=11) 38 66   AM-PAC Applied Cognition Inpatient   Following a Speech/Presentation 3   Understanding Ordinary Conversation 4   Taking Medications 3   Remembering Where Things Are Placed or Put Away 3   Remembering List of 4-5 Errands 3   Taking Care of Complicated Tasks 3   Applied Cognition Raw Score 19   Applied Cognition Standardized Score 39 77     Pt will achieve the following goals within 10 days  *Pt will complete grooming with independence  *Pt will complete UB bathing and dressing with independence  *Pt will complete LB bathing and dressing with independence   *Pt will complete toileting (hygiene and clothing management) with independence    *Pt will complete bed mobility with independence, with bed flat and no side rail to prep for purposeful tasks    *Pt will perform functional transfers with modified independence in order to complete ADL routine  *Pt will increase standing tolerance to 5+ minutes in order to complete ADL activities  *Pt will complete item retrieval and light home management with modified independence while demonstrating good safety  *Pt will demonstrate increased activity tolerance in order to complete ADL routine  *Pt will participate in cognitive assessment to determine level of safety for returning home    *Pt will participate in UE therapeutic exercise in order to maximize strength for ADL transfers  *Pt will sit on EOB for 10+ minutes for increased safety with seated activity tolerance during ADL tasks  *Pt will identify 3-5 fall risks to ensure safety upon discharge      [employer], MS, OTR/L

## 2022-06-27 NOTE — DISCHARGE SUMMARY
[facility]  Discharge- [facility] 1934, 80 y o  female MRN: [id number]  Unit/Bed#: S -01 Encounter: [id number]  Primary Care Provider: Margareth Hull DO   Date and time admitted to hospital: 6/24/2022  3:06 PM    * Toxic metabolic encephalopathy  Assessment & Plan  UA: Incoherent speech reported by her  who found her on the floor at the bedside after a falling while trying to get onto her bed  Etiology: AMS likely 2/2 acute infectious process cellulitis found on her lower extremities  May also be secondary to polypharmacy and also a component of dehydration  In the setting of cellulitis, patient did receive IV antibiotics and will be discharged with p o  Antibiotics  Geriatrics consulted, adjusted medication regimen  Discussed with patient's PCP in instructed to follow up with PCP in 1 week  · Discontinued lisinopril, metformin  Repeat hemoglobin A1c in 4-5 weeks  · Will continue with Elavil 10 mg at night for 2 weeks  Patient to stop after 2 weeks  Will work on sleep hygiene per PCP  · Will continue with Demadex due to patient's CHF, will be closely followed by PCP  · Decrease Lyrica to 75 mg at bedtime  Given the patient's resolution of mentation, will likely continue to improve as cellulitis improves and medication regimen is adjusted  Cellulitis  Assessment & Plan  Patient has bilateral erythema and warmth on her leg  Upon physical exam today, left lower extremity is cooler to touch  Patient's lower extremity is not as erythematous  Received treatment with IV cefazolin for 2 days  Has been afebrile and exhibited stable vitals  Discharge patient on 5 days of Keflex  Chronic diastolic heart failure (HCC)  Assessment & Plan  Wt Readings from Last 3 Encounters:   06/24/22 58 5 kg (128 lb 15 5 oz)   05/04/22 58 5 kg (129 lb)   04/18/22 58 9 kg (129 lb 14 4 oz)     Will discharge patient on home regimen of Demadex due to her CHF    Instructed to follow up with PCP closely  Stage 3b chronic kidney disease [facility])  Assessment & Plan  Lab Results   Component Value Date    EGFR 41 06/27/2022    EGFR 32 06/26/2022    EGFR 27 06/25/2022    CREATININE 1 19 06/27/2022    CREATININE 1 44 (H) 06/26/2022    CREATININE 1 68 (H) 06/25/2022     Due to patient's history of CKD, will discontinue metformin upon discharge  DM2 (diabetes mellitus, type 2) ([facility] )  Assessment & Plan    Lab Results   Component Value Date    HGBA1C 5 9 (H) 04/14/2022     Upon discharge and in discussion with geriatrics, will discontinue metformin and get repeat hemoglobin A1c in 4-6 weeks  Medical Problems             Resolved Problems  Date Reviewed: 6/27/2022   None               Discharging Resident: Gregory Beckham DO  Discharging Attending: Ronnie Thompson MD  PCP: Zev Wood DO  Admission Date:   Admission Orders (From admission, onward)     Ordered        06/24/22 1853  Inpatient Admission  Once                      Discharge Date: 06/27/22    Consultations During Hospital Stay:  · Gerontology    Procedures Performed:   · None    Significant Findings / Test Results:   · None    Incidental Findings:   · None     Test Results Pending at Discharge (will require follow up): · None     Outpatient Tests Requested:  · Repeat hemoglobin A1c in 4-5 weeks    Complications:  None    Reason for Admission:  Altered mental status    Hospital Course:   Dagoberto Gómez is a 80 y o  female patient who originally presented to the hospital on 6/24/2022 due to altered mental status per her  who reported that the patient had been found on the floor and was confused and lethargic  She was brought to the ED for further evaluation where was discovered that she had poor oral intake as well  In ED, patient was found to have VICENTE despite her history of CKD  Imaging was negative for any subacute CVA or chest infiltrates    She did have hypotension which was likely due to her medication regimen consisting of multiple antihypertensive medication in the setting of poor oral intake prior to admission  She was also found to be hypoxic on room air within elevated lactic acid  However, her oxygen saturation was stable and her lactic acid quickly normalized with IV fluids  Upon physical exam, it was noted that both of her lower extremities had an erythematous rash with concern for cellulitis  For this reason, she received treatment with IV antibiotics and was transitioned to p o  Keflex and instructed to take it for 5 days - thus completing a full 7 day course of antibiotics  Lastly, there was concern for polypharmacy due to her medication regimen being recently adjusted to include amitriptyline  For this reason, geriatrics was consulted and made adjustments to her medication regimen and discussion with her PCP  Due to her history of CKD her metformin and lisinopril will be discontinued upon discharge  She will be discharged on Demadex out of concern for CHF and she will also continue with Lyrica at that time  She was instructed to follow up closely with her PCP  Pt was informed of repeat BMP (to monitor Cr and electrolyte abnormalities) and CBC with Differential (to monitor eosinophilia - since it was slightly elevated) in 1 week  Both patient and  are in agreement with discharge plans  Patient will be discharged back to home today  Please see above list of diagnoses and related plan for additional information  Condition at Discharge: stable    Discharge Day Visit / Exam:   Subjective:  Patient was laying in bed upon entering the room  She was hard of hearing, but was able to tell me that she feels good  She denies any abdominal pain, nor any shortness of breath, nor any chest pain  She does tell me that she is experiencing lateral chest pain that radiates to her back, but this is something that she has been dealing with for years      Vitals: Blood Pressure: 134/77 (06/27/22 8523)  Pulse: 94 (06/27/22 0721)  Temperature: 98 °F (36 7 °C) (06/27/22 0721)  Temp Source: Axillary (06/25/22 0818)  Respirations: 18 (06/27/22 0721)  Height: 4' 11" (149 9 cm) (06/24/22 1900)  Weight - Scale: 58 5 kg (128 lb 15 5 oz) (06/24/22 1900)  SpO2: 94 % (06/27/22 0721)  Exam:   Physical Exam  Vitals reviewed  Constitutional:       General: She is not in acute distress  Appearance: She is not ill-appearing or diaphoretic  HENT:      Nose: No congestion or rhinorrhea  Eyes:      General: No scleral icterus  Conjunctiva/sclera: Conjunctivae normal    Cardiovascular:      Rate and Rhythm: Normal rate and regular rhythm  Pulses: Normal pulses  Heart sounds: Normal heart sounds  No murmur heard  No friction rub  Pulmonary:      Effort: Pulmonary effort is normal  No respiratory distress  Breath sounds: Rales (Left side) present  Chest:      Chest wall: No tenderness  Abdominal:      General: Bowel sounds are normal  There is no distension  Palpations: Abdomen is soft  Tenderness: There is no abdominal tenderness  There is no guarding or rebound  Musculoskeletal:         General: No tenderness  Right lower leg: No edema  Left lower leg: No edema  Skin:     General: Skin is warm  Findings: Erythema (Bilateral lower extremities with marking) present  Neurological:      Mental Status: She is alert  Psychiatric:         Mood and Affect: Mood normal          Behavior: Behavior normal           Discussion with Family: Updated  () at bedside  Discharge instructions/Information to patient and family:   See after visit summary for information provided to patient and family  Provisions for Follow-Up Care:  See after visit summary for information related to follow-up care and any pertinent home health orders         Disposition:   Home    Planned Readmission: None    Discharge Medications:  See after visit summary for reconciled discharge medications provided to patient and/or family        **Please Note: This note may have been constructed using a voice recognition system**

## 2022-06-27 NOTE — CASE MANAGEMENT
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[facility] ) 2022    Chest pain 2022    Leukocytosis 2022    Chronic diastolic heart failure ([facility] ) 2022    Overactive bladder 2022    Post zoster neuralgia 2021    Primary generalized (osteo)arthritis 2021    Seronegative arthropathy of multiple sites ([address] ) 2021    Senile osteoporosis 2021    Lumbar spondylosis 2021    Diabetic polyneuropathy associated with type 2 diabetes mellitus ([facility] ) 2021    Diabetic gastroparesis associated with type 2 diabetes mellitus ([facility] ) 2021    Irritable bowel syndrome with diarrhea 2021    Stage 3b chronic kidney disease ([facility] ) 2021    Traumatic injury of sacrum 2021    Tendinitis of left rotator cuff 2021    Abnormality of gait due to impairment of balance 2021    Sicca syndrome ([facility] ) 2021    Sinobronchitis 2020    Iron deficiency anemia secondary to inadequate dietary iron intake 2019    Urinary frequency 2018    Hypomagnesemia 2016    DM2 (diabetes mellitus, type 2) ([facility] ) 2016    Symptomatic anemia 2016    Shortness of breath 2016      LOS (days): 3  Geometric Mean LOS (GMLOS) (days):   Days to GMLOS:     OBJECTIVE:  Risk of Unplanned Readmission Score: 23 38         Current admission status: Inpatient   Preferred Pharmacy:   [id number] [address] [address]  Phone: [phone] Fax: [phone]  [address] [place] [address]  Phone: [phone] Fax: [phone]    Primary Care Provider: Nancy Albarran DO    Primary Insurance: MEDICARE  Secondary Insurance: [facility]    DISCHARGE DETAILS:    Discharge planning discussed with[de-identified] Patient  Freedom of Choice: Yes  Comments - Freedom of Choice: Pt currently refusing SNF with the preference of [facility] services for home PT   Pt denied having a preference of a [facility] agency, refused a list, and gave permission for a blanket referral  CM discussed freedom of choice and made the referral   CM contacted family/caregiver?: Yes  Were Treatment Team discharge recommendations reviewed with patient/caregiver?: Yes  Did patient/caregiver verbalize understanding of patient care needs?: Yes  Were patient/caregiver advised of the risks associated with not following Treatment Team discharge recommendations?: Yes         Requested [address]         Is the patient interested in [facility] at discharge?: Yes  [address] requested[de-identified] Physical [address] Name[de-identified] Other  [address] [place] [address] Provider[de-identified] PCP  Home Health Services Needed[de-identified] Strengthening/Theraputic Exercises to Improve Function, Gait/ADL Training  Homebound Criteria Met[de-identified] Requires the Assistance of Another Person for Safe Ambulation or to Leave the Home, Uses an Assist Device (i e  cane, walker, etc)  Supporting Clincal Findings[de-identified] Limited Endurance, Fatigues [place] in [employer]    DME Referral Provided  Referral made for DME?: No    Other Referral/Resources/Interventions Provided:  Interventions: HHC  Referral Comments: [facility] referral made         Treatment Team Recommendation: Home with [address] [employer]  Discharge Destination Plan[de-identified] Home with [place] at Discharge : Family          IMM Given (Date):: 06/27/22  IMM Given to[de-identified] Patient

## 2022-06-27 NOTE — ASSESSMENT & PLAN NOTE
Wt Readings from Last 3 Encounters:   06/24/22 58 5 kg (128 lb 15 5 oz)   05/04/22 58 5 kg (129 lb)   04/18/22 58 9 kg (129 lb 14 4 oz)     Will discharge patient on home regimen of Demadex due to her CHF  Instructed to follow up with PCP closely

## 2022-06-27 NOTE — ASSESSMENT & PLAN NOTE
Lab Results   Component Value Date    EGFR 41 06/27/2022    EGFR 32 06/26/2022    EGFR 27 06/25/2022    CREATININE 1 19 06/27/2022    CREATININE 1 44 (H) 06/26/2022    CREATININE 1 68 (H) 06/25/2022     Due to patient's history of CKD, will discontinue metformin upon discharge

## 2022-06-28 ENCOUNTER — HOME CARE VISIT (OUTPATIENT)
Dept: HOME HEALTH SERVICES | Facility: HOME HEALTHCARE | Age: 87
End: 2022-06-28
Payer: MEDICARE

## 2022-06-28 PROCEDURE — 400013 VN SOC

## 2022-06-28 PROCEDURE — 10330081 VN NO-PAY CLAIM PROCEDURE

## 2022-06-28 PROCEDURE — G0151 HHCP-SERV OF PT,EA 15 MIN: HCPCS

## 2022-06-29 ENCOUNTER — HOME CARE VISIT (OUTPATIENT)
Dept: HOME HEALTH SERVICES | Facility: HOME HEALTHCARE | Age: 87
End: 2022-06-29
Payer: MEDICARE

## 2022-06-29 VITALS — SYSTOLIC BLOOD PRESSURE: 106 MMHG | HEART RATE: 79 BPM | OXYGEN SATURATION: 97 % | DIASTOLIC BLOOD PRESSURE: 60 MMHG

## 2022-06-29 LAB
BACTERIA BLD CULT: NORMAL
BACTERIA BLD CULT: NORMAL

## 2022-06-30 ENCOUNTER — HOME CARE VISIT (OUTPATIENT)
Dept: HOME HEALTH SERVICES | Facility: HOME HEALTHCARE | Age: 87
End: 2022-06-30
Payer: MEDICARE

## 2022-06-30 VITALS — DIASTOLIC BLOOD PRESSURE: 82 MMHG | OXYGEN SATURATION: 95 % | HEART RATE: 82 BPM | SYSTOLIC BLOOD PRESSURE: 120 MMHG

## 2022-06-30 PROCEDURE — G0151 HHCP-SERV OF PT,EA 15 MIN: HCPCS

## 2022-07-01 ENCOUNTER — HOME CARE VISIT (OUTPATIENT)
Dept: HOME HEALTH SERVICES | Facility: HOME HEALTHCARE | Age: 87
End: 2022-07-01
Payer: MEDICARE

## 2022-07-01 NOTE — CASE COMMUNICATION
Spoke with patients  for med review  Reviewed medications as to name dose side effects and frequency indications   Medical provider notified of drug interactions both major and moderate and also notified that pts  reports she is only taking 1 tab of the elavil at bedtime   denied any other current med issues or concerns at this time

## 2022-07-01 NOTE — CASE COMMUNICATION
patients  is reporting the patient is only taking 1 tab of the elavil at bedtime but our system has it listed as two  Can you please update the med list with the correct tablet number   Thankyou      upon drug review the following potential  drug interactions were noted    Major   between aspirin and ntg   moderate  between metoprolol succinate and duloxetine and myrbetriq      [employer]

## 2022-07-04 PROBLEM — I10 PRIMARY HYPERTENSION: Status: ACTIVE | Noted: 2022-07-04

## 2022-07-04 PROBLEM — E78.5 DYSLIPIDEMIA: Status: ACTIVE | Noted: 2022-07-04

## 2022-07-04 NOTE — PROGRESS NOTES
Cardiology  Hospital Follow Up   Office Visit Note  Howie Gamboa   80 y o    female   MRN: [id number]  [address]  [address]  [place] [address] [facility] [address] [phone]    PCP: Jim Nielsen DO  Cardiologist: Dr Kishore Paz              Summary of recommendations  Heart healthy diet  Educational information provided  Continue metoprolol  Torsemide 10 mg daily p r n  only for wt gain 2-3 lb/1 day, 5 lb/ 5 d  Increase fluids to 60 oz, minimum a day  She needs to increase protein I recommended Boost/ Ensure, etc  Follow up will be scheduled with Dr Kishore Paz  6 weeks      Assessment/plan  Chronic heart failure preserved ejection fraction  Wt Readings from Last 3 Encounters:   07/07/22 56 9 kg (125 lb 6 oz)   06/24/22 58 5 kg (128 lb 15 5 oz)   05/04/22 58 5 kg (129 lb)       Wt Readings from Last 3 Encounters:   07/07/22 56 9 kg (125 lb 6 oz)   06/24/22 58 5 kg (128 lb 15 5 oz)   05/04/22 58 5 kg (129 lb)     --beta-blocker:   Toprol 12 5 mg daily  --Diuretic:   torsemide 10 mg MWF since DC 6/27/22  This was held 7/6/22 due to hypotension  Will make diuretic p r n  Only  --ACE/ARB/ARNI:    --MRA:   --SLGT2I  --2 g sodium diet, 1800 cc fluid restriction  Daily weights, call weight gain 2-3 lb in 1 day or 5 lb in 5 days  Coronary artery calcifications, mild noted on CTA 6/21/22  -EKG today sinus rhythm 81 beats per minute with frequent PVCs and a pattern of bigeminy  RBBB  LAHB, bifascicular block  -on aspirin, statin, beta-blocker  Hypertension, essential   BP 88/44 on metoprolol succinate 12 5 mg daily   Blood pressure by myself, automated equipment  She is asymptomatic  Loop diuretic was held starting yesterday  Hyperlipidemia, on atorvastatin 10 mg daily  4/14/22: LDL 49  Type 2 diabetes mellitus with gastroparesis  Hemoglobin A1c 5 9  CKD 3 b, baseline creatinine 1 1 - 1 4    last creatinine 1 19, June 27th  Cardiac testing   TTE 4/15/222  EF 50%  Mild global hypokinesis  No LVH  Mild-to-moderate AI  Mild-to-moderate MR  Mild TR  Small to moderate left pleural effusion                  REINALDO Doan is an 81 yo female with diabetes mellitus, essential hypertension, mixed dyslipidemia  Adm 4/14-4/15/22  CC:  Chest pain, atypical  Dx:  Acute heart failure with preserved ejection fraction     Chest x-ray: Mild vascular congestion with left lower lobe atelectasis versus effusion  EKG: NSR  NSSTTW changes possible inferior ischemia, right bundle branch block  Troponins - normal  Given low-dose IV diuretics  Discharge diuretic torsemide 10 mg daily  Discharge weight : 129 lb  discharge creatinine 1 30  An outpatient Nuclear stress test was ordered    She established cardiology care with Dr Doe Peck following an admission 4/18/22 for atypical, sharp chest pain, worse with rotation and movement  She has had no recurrence  A conservative approach was recommended  He decided to hold off on stress testing  Her blood pressure was well controlled, she was maintained on aspirin and initiated on statin  Follow-up was recommended in 3 months     ER Adm 6/21  Chief complaint:  Chest pain, radiating down her left arm  Headache  CTH: No acute  EKG normal sinus rhythm   Chest x-ray superimposed edema, interstitial edema  Creatinine 1 1  ProBNP 189   Troponin is negative  Elevated D-dimer  CTA: No PE noted within the limits of the test, limited due to motion artifact  Mild coronary artery calcification noted  Given 250 mL of IV fluid  Headache resolved, chest pain resolved  Discharged on aspirin, statin, beta-blocker, ACE-I, torsemide 10 mg daily  Advised follow-up with Cardiology    6/22/22 phone call from PCP    Patient taking torsemide 10 mg 3 times a week    Adm 6/24-6/27/22  CC: mental status change-Incoherent speech reported by her  who found her on the floor at the bedside after a falling while trying to get onto her bed  Found to have VICENTE/creatinine 1 68  Lower extremity cellulitis  Dehydration  CTH : No acute  EKG: Normal sinus rhythm right bundle branch block nonspecific ST T-wave changes, consider inferior ischemia 95 bpm  She did have hypotension, meds adjusted  BC neg after 5 d  Diuretic held  Treated with antibiotics  Followed by geriatrics  Due to concern for polypharmacy, metformin and lisinopril discontinued  Amitriptyline dose adjusted  Dx TME  PT OT recommended rehab; discharged home with [facility]  Discharge diuretic: torsemide 10 mg Monday/ Wednesday/ Friday , metoprolol succinate 12 5 mg daily  Discharge creatinine 1 19  Discharge weight 128 lb    7/5/22  [facility] note:  Low BP 92/58, 80/50  Patient is asymptomatic  Directed by Cardiology to hold torsemide     7/7/22  Hospital follow-up  She is accompanied by her , family  ROS:  Weak, fatigued  Low back pain, chronic  Ambulatory dysfunction, in a wheelchair  Today, she denied chest pain  Her  reports she has not been complaining of chest pain at home  Her p o  Intake is down  She drinks very little fluid,/food  She does eat pretty well for dinner her  reports  Clinically she appears dehydrated  I recommend torsemide only p r n  [place] She likely will not be needing that  Encouraged 60 oz fluid a day including boost/Ensure for protein  Her EKG showed sinus rhythm with PVCs in a ventricular bigeminy, heart rate 81  I would recommend continuing metoprolol succinate 12 5 mg daily  Focus today-- increase her fluid and protein  If she is not eating, or drinking and her clinical status deteriorates, she may need further evaluation in the ED for failure to thrive        I have spent 40 minutes with Patient and family today in which greater than 50% of this time was spent in counseling/coordination of care regarding Patient and family education, Importance of tx compliance, Risk factor reductions and Impressions    Assessment  Diagnoses and all orders for this visit:    Hospital discharge follow-up    Chronic diastolic heart failure (HCC)  -     POCT ECG    Primary hypertension  -     POCT ECG    Dyslipidemia    Diabetic gastroparesis associated with type 2 diabetes mellitus ([place] [address] )    Stage 3b chronic kidney disease ([place] [address] )    Chest pain, unspecified type  -     Ambulatory Referral to Cardiology    Chest pain  -     torsemide (DEMADEX) 10 mg tablet; Take 1 tablet PRN for wt gain 3 lb/ 3 days or 5 lb/ 5 aria    Other orders  -     Discontinue: nitrofurantoin (MACROBID) 100 mg capsule;  (Patient not taking: Reported on 7/7/2022)  -     Discontinue: ampicillin (PRINCIPEN) 500 mg capsule; ampicillin 500 mg capsule   TAKE 1 CAPSULE BY MOUTH 4 TIMES A DAY FOR 7 DAYS (Patient not taking: Reported on 7/7/2022)          Past Medical History:   Diagnosis Date    Anemia     Arthritis     Back pain     [facility]est[facility] failure) [facility])     Chronic kidney disease     Stage 3b    Diabetes ([place] [address] )     Diabetes mellitus ([place] [address] )     Diabetic gastroparesis associated with type 2 diabetes mellitus ([place] [address] )     Diabetic polyneuropathy ([place] [address] )     Diverticulosis     Herpes zoster     Hypertension     IBS (irritable bowel syndrome)     Neurogenic claudication due to lumbar spinal stenosis     Osteoarthritis     Osteoporosis     Pulmonary edema     Seronegative arthropathy of multiple sites ([place] [address] )        Review of Systems   Constitutional: Positive for malaise/fatigue  Negative for chills  Cardiovascular: Negative for chest pain, claudication, cyanosis, dyspnea on exertion, irregular heartbeat, leg swelling, near-syncope, orthopnea, palpitations, paroxysmal nocturnal dyspnea and syncope  Respiratory: Negative for cough and shortness of breath  Musculoskeletal: Positive for back pain  Gastrointestinal: Negative for heartburn and nausea  Neurological: Negative for dizziness, focal weakness, headaches, light-headedness and weakness     All other systems reviewed and are negative  Allergies   Allergen Reactions    Morphine Other (See Comments)     urinary retention    Ciprofloxacin Rash and Nausea Only           Current Outpatient Medications:     amitriptyline (ELAVIL) 10 mg tablet, Take 1 tablet (10 mg total) by mouth daily at bedtime for 14 days, Disp: 14 tablet, Rfl: 0    aspirin 81 mg chewable tablet, Chew 1 tablet (81 mg total) daily, Disp: 30 tablet, Rfl: 0    atorvastatin (LIPITOR) 10 mg tablet, Take 1 tablet (10 mg total) by mouth daily, Disp: 90 tablet, Rfl: 3    Cholecalciferol (VITAMIN D3) 1000 units CAPS, Take 1 capsule by mouth daily , Disp: , Rfl:     diphenoxylate-atropine (LOMOTIL) 2 5-0 025 mg per tablet, Take 1 tablet by mouth if needed  , Disp: , Rfl:     DULoxetine (CYMBALTA) 60 mg delayed release capsule, TAKE ONE CAPSULE BY MOUTH EVERY DAY, Disp: 30 capsule, Rfl: 5    hydroxychloroquine (PLAQUENIL) 200 mg tablet, Take 1 tablet (200 mg total) by mouth daily with breakfast, Disp: 90 tablet, Rfl: 1    Magnesium 250 MG TABS, Take 250 mg by mouth in the morning, Disp: , Rfl:     metoprolol succinate (TOPROL-XL) 25 mg 24 hr tablet, Take 0 5 tablets (12 5 mg total) by mouth daily, Disp: 30 tablet, Rfl: 0    Mirabegron ER (Myrbetriq) 50 MG TB24, Take 1 tablet (50 mg total) by mouth daily, Disp: 30 tablet, Rfl: 11    pregabalin (LYRICA) 75 mg capsule, Take 75 mg by mouth daily at bedtime, Disp: , Rfl:     pyridoxine (B-6) 100 MG tablet, Take 100 mg by mouth daily, Disp: , Rfl:     Sodium Fluoride (PreviDent 5000 Booster Plus) 1 1 % PSTE, Apply on teeth every evening as directed, Disp: 100 mL, Rfl: 3    torsemide (DEMADEX) 10 mg tablet, Take 1 tablet PRN for wt gain 3 lb/ 3 days or 5 lb/ 5 aria, Disp: 30 tablet, Rfl: 0    nitroglycerin (NITROSTAT) 0 4 mg SL tablet, Place 1 tablet (0 4 mg total) under the tongue every 5 (five) minutes as needed for chest pain for up to 5 days (Patient not taking: Reported on 6/24/2022), Disp: 5 tablet, Rfl: 0   omeprazole (PriLOSEC) 20 mg delayed release capsule, Take 20 mg by mouth daily  , Disp: , Rfl:         Social History     Socioeconomic History    Marital status: /Civil Union     Spouse name: Not on file    Number of children: Not on file    Years of education: Not on file    Highest education level: Not on file   Occupational History    Not on file   Tobacco Use    Smoking status: Former Smoker     Types: Cigarettes     Quit date:      Years since quittin 5    Smokeless tobacco: Never Used   Vaping Use    Vaping Use: Never used   Substance and Sexual Activity    Alcohol use: No    Drug use: No    Sexual activity: Not Currently     Partners: Male     Birth control/protection: None   Other Topics Concern    Not on file   Social History Narrative    Not on file     Social Determinants of Health     Financial Resource Strain: Not on file   Food Insecurity: Not on file   Transportation Needs: Not on file   Physical Activity: Not on file   Stress: Not on file   Social Connections: Not on file   Intimate Partner Violence: Not on file   Housing Stability: Not on file       Family History   Problem Relation Age of Onset    Cancer Brother     Diabetes Mother     Hypertension Father     Heart disease Father        Physical Exam  Vitals and nursing note reviewed  Constitutional:       General: She is not in acute distress  Appearance: She is not diaphoretic  HENT:      Head: Normocephalic and atraumatic  Eyes:      Conjunctiva/sclera: Conjunctivae normal    Cardiovascular:      Rate and Rhythm: Normal rate and regular rhythm  Pulses: Intact distal pulses  Heart sounds: Normal heart sounds  Pulmonary:      Effort: Pulmonary effort is normal       Breath sounds: Normal breath sounds  Abdominal:      General: Bowel sounds are normal       Palpations: Abdomen is soft  Musculoskeletal:         General: Normal range of motion        Cervical back: Normal range of motion and neck supple  Skin:     General: Skin is warm and dry  Neurological:      Mental Status: She is alert and oriented to person, place, and time  Vitals: Blood pressure (!) 88/42, pulse 81, resp  rate 18, height 4' 11" (1 499 m), weight 56 9 kg (125 lb 6 oz), SpO2 99 %, not currently breastfeeding  Wt Readings from Last 3 Encounters:   22 56 9 kg (125 lb 6 oz)   22 58 5 kg (128 lb 15 5 oz)   22 58 5 kg (129 lb)         Labs & Results:  Lab Results   Component Value Date    WBC 6 23 2022    HGB 11 0 (L) 2022    HCT 34 4 (L) 2022    MCV 95 2022     (L) 2022     BNP   Date Value Ref Range Status   2022 189 (H) 0 - 100 pg/mL Final     No components found for: CHEM  Total CK   Date Value Ref Range Status   2017 81 26 - 192 U/L Final     Troponin I   Date Value Ref Range Status   2021 0 02 <=0 04 ng/mL Final     Comment:     [employer] Chemistry analyzer 99% cutoff is > 0 04 ng/mL in network labs     o cTnI 99% cutoff is useful only when applied to patients in the clinical setting of myocardial ischemia   o cTnI 99% cutoff should be interpreted in the context of clinical history, ECG findings and possibly cardiac imaging to establish correct diagnosis  o cTnI 99% cutoff may be suggestive but clearly not indicative of a coronary event without the clinical setting of myocardial ischemia         Results for orders placed during the hospital encounter of 16    Echo complete with contrast if indicated    Narrative  [facility] 16, [address] [facility]  [phone]    Transthoracic Echocardiogram  2D, M-mode, Doppler, and Color Doppler    Study date:  23-Dec-2016    Patient: Lindsay Arboleda  MR number: [id number]  Account number: [de-identified]  : 1934  Age: 80 years  Gender: Female  Status: Inpatient  Location: Bedside  Height: 59 in  Weight: 155 8 lb  BP: 131/ 65 mmHg    Indications: Shortness of breath  Diagnoses: R06 02 - Shortness of breath    Sonographer:  STORM Kline  Primary Physician:  Agnieszka Lopez DO  Referring Physician:  Layla Lynn MD MPH  Group:  [id number]  Interpreting Physician:  Mariela Johnson MD    SUMMARY    LEFT VENTRICLE:  Systolic function was normal  Ejection fraction was estimated to be 60 %  There were no regional wall motion abnormalities  There was mild concentric hypertrophy  Doppler parameters were consistent with abnormal left ventricular relaxation  (grade 1 diastolic dysfunction)  AORTIC VALVE:  There was mild regurgitation  HISTORY: PRIOR HISTORY: hypertension, diabetes, CHF    PROCEDURE: The procedure was performed at the bedside  This was a routine  study  The transthoracic approach was used  The study included complete 2D  imaging, M-mode, complete spectral Doppler, and color Doppler  Image quality  was adequate  LEFT VENTRICLE: Size was normal  Systolic function was normal  Ejection  fraction was estimated to be 60 %  There were no regional wall motion  abnormalities  Wall thickness was mildly increased  There was mild concentric  hypertrophy  DOPPLER: There was an increased relative contribution of atrial  contraction to ventricular filling  Doppler parameters were consistent with  abnormal left ventricular relaxation (grade 1 diastolic dysfunction)  RIGHT VENTRICLE: The size was normal  Systolic function was normal  Wall  thickness was normal     LEFT ATRIUM: Size was normal     RIGHT ATRIUM: Size was normal     MITRAL VALVE: Valve structure was normal  There was normal leaflet separation  DOPPLER: The transmitral velocity was within the normal range  There was no  evidence for stenosis  There was trace regurgitation  AORTIC VALVE: The valve was trileaflet  Leaflets exhibited normal thickness and  normal cuspal separation  DOPPLER: Transaortic velocity was within the normal  range   There was no evidence for stenosis  There was mild regurgitation  TRICUSPID VALVE: The valve structure was normal  There was normal leaflet  separation  DOPPLER: The transtricuspid velocity was within the normal range  There was no evidence for stenosis  There was trace regurgitation  Pulmonary  artery systolic pressure was within the normal range  Estimated peak PA  pressure was 18 mmHg  PULMONIC VALVE: Leaflets exhibited normal thickness, no calcification, and  normal cuspal separation  DOPPLER: The transpulmonic velocity was within the  normal range  There was trace regurgitation  PERICARDIUM: There was no pericardial effusion  The pericardium was normal in  appearance  AORTA: The root exhibited normal size  SYSTEMIC VEINS: IVC: The inferior vena cava was normal in size  Respirophasic  changes were normal     SYSTEM MEASUREMENT TABLES    2D  %FS: 27 29 %  AV Diam: 2 58 cm  EDV(Teich): 57 32 ml  EF(Cube): 61 56 %  EF(Teich): 53 96 %  ESV(Cube): 19 13 ml  ESV(Teich): 26 39 ml  IVSd: 1 31 cm  LA Area: 12 85 cm2  LA Diam: 3 81 cm  LVEDV MOD A4C: 54 12 ml  LVEF MOD A4C: 40 47 %  LVESV MOD A4C: 32 22 ml  LVIDd: 3 68 cm  LVIDs: 2 67 cm  LVLd A4C: 7 13 cm  LVLs A4C: 6 02 cm  LVPWd: 1 36 cm  RA Area: 9 74 cm2  RV Diam: 2 74 cm  SI(Cube): 18 46 ml/m2  SI(Teich): 18 63 ml/m2  SV MOD A4C: 21 9 ml  SV(Cube): 30 64 ml  SV(Teich): 30 93 ml    CW  AR Dec Cascade: 2 39 m/s2  AR Dec Time: 1677 2 ms  AR PHT: 486 39 ms  AR Vmax: 4 m/s  AR maxP 14 mmHg  TR MaxP 41 mmHg  TR Vmax: 2 03 m/s    MM  TAPSE: 1 64 cm    PW  E': 0 09 m/s    [employer] Accredited Echocardiography Laboratory    Prepared and electronically signed by    Palomo Pérez MD  Signed 23-Dec-2016 11:56:38    No results found for this or any previous visit  This note was completed in part utilizing [employer] direct voice recognition software     Grammatical errors, random word insertion, spelling mistakes, and incomplete sentences may be an occasional consequence of the system secondary to software limitations, ambient noise and hardware issues  At the time of dictation, efforts were made to edit, clarify and /or correct errors  Please read the chart carefully and recognize, using context, where substitutions have occurred    If you have any questions or concerns about the context, text or information contained within the body of this dictation, please contact myself, the provider, for further clarification

## 2022-07-05 ENCOUNTER — HOME CARE VISIT (OUTPATIENT)
Dept: HOME HEALTH SERVICES | Facility: HOME HEALTHCARE | Age: 87
End: 2022-07-05
Payer: MEDICARE

## 2022-07-05 ENCOUNTER — TELEPHONE (OUTPATIENT)
Dept: CARDIOLOGY CLINIC | Facility: CLINIC | Age: 87
End: 2022-07-05

## 2022-07-05 ENCOUNTER — HOME CARE VISIT (OUTPATIENT)
Dept: HOME HEALTH SERVICES | Facility: HOME HEALTHCARE | Age: 87
End: 2022-07-05

## 2022-07-05 VITALS — DIASTOLIC BLOOD PRESSURE: 50 MMHG | OXYGEN SATURATION: 98 % | HEART RATE: 75 BPM | SYSTOLIC BLOOD PRESSURE: 92 MMHG

## 2022-07-05 DIAGNOSIS — B02.29 POST ZOSTER NEURALGIA: ICD-10-CM

## 2022-07-05 PROCEDURE — 10330064 BANDAGE, CNFRM 4"X4.1YDS N/S LF (12RL/BG

## 2022-07-05 PROCEDURE — 10330064 SPONGE, GAUZE 12PLY STR 4"X4" (1/PK 50/B

## 2022-07-05 PROCEDURE — G0151 HHCP-SERV OF PT,EA 15 MIN: HCPCS

## 2022-07-05 RX ORDER — AMITRIPTYLINE HYDROCHLORIDE 10 MG/1
10 TABLET, FILM COATED ORAL
Qty: 14 TABLET | Refills: 0
Start: 2022-06-27 | End: 2022-08-15

## 2022-07-05 NOTE — PROGRESS NOTES
Contacted by [facility] that pt is taking Elavil 10mg daily instead of the initially prescribed 20mg daily at bedtime   Therefore, adjusted medication list

## 2022-07-06 NOTE — CASE COMMUNICATION
called cardiac office at 1304 to report patient with low BP at rest 92 over 58   after exercises in supine and walking BP taken again and BP lower 80 over 50  a third BP check BP was still the same  pt is asymptomatic for dizziness or lightheadedness though does have fatigue with complaints of no energy   spoke with spouse and patient and directed them to go to ER if patient does have symptoms  patient is seeing CRNP of cardiologist keagan smith on Thursday am

## 2022-07-07 ENCOUNTER — HOME CARE VISIT (OUTPATIENT)
Dept: HOME HEALTH SERVICES | Facility: HOME HEALTHCARE | Age: 87
End: 2022-07-07
Payer: MEDICARE

## 2022-07-07 ENCOUNTER — OFFICE VISIT (OUTPATIENT)
Dept: CARDIOLOGY CLINIC | Facility: CLINIC | Age: 87
End: 2022-07-07
Payer: MEDICARE

## 2022-07-07 VITALS
BODY MASS INDEX: 25.28 KG/M2 | HEART RATE: 81 BPM | DIASTOLIC BLOOD PRESSURE: 42 MMHG | WEIGHT: 125.38 LBS | HEIGHT: 59 IN | OXYGEN SATURATION: 99 % | SYSTOLIC BLOOD PRESSURE: 88 MMHG | RESPIRATION RATE: 18 BRPM

## 2022-07-07 DIAGNOSIS — E78.5 DYSLIPIDEMIA: ICD-10-CM

## 2022-07-07 DIAGNOSIS — R07.9 CHEST PAIN, UNSPECIFIED TYPE: ICD-10-CM

## 2022-07-07 DIAGNOSIS — Z09 HOSPITAL DISCHARGE FOLLOW-UP: Primary | ICD-10-CM

## 2022-07-07 DIAGNOSIS — I50.32 CHRONIC DIASTOLIC HEART FAILURE (HCC): Chronic | ICD-10-CM

## 2022-07-07 DIAGNOSIS — N18.32 STAGE 3B CHRONIC KIDNEY DISEASE (HCC): Chronic | ICD-10-CM

## 2022-07-07 DIAGNOSIS — R07.9 CHEST PAIN: ICD-10-CM

## 2022-07-07 DIAGNOSIS — K31.84 DIABETIC GASTROPARESIS ASSOCIATED WITH TYPE 2 DIABETES MELLITUS (HCC): ICD-10-CM

## 2022-07-07 DIAGNOSIS — I10 PRIMARY HYPERTENSION: ICD-10-CM

## 2022-07-07 DIAGNOSIS — E11.43 DIABETIC GASTROPARESIS ASSOCIATED WITH TYPE 2 DIABETES MELLITUS (HCC): ICD-10-CM

## 2022-07-07 PROCEDURE — 93000 ELECTROCARDIOGRAM COMPLETE: CPT | Performed by: NURSE PRACTITIONER

## 2022-07-07 PROCEDURE — 99215 OFFICE O/P EST HI 40 MIN: CPT | Performed by: NURSE PRACTITIONER

## 2022-07-07 RX ORDER — NITROFURANTOIN 25; 75 MG/1; MG/1
CAPSULE ORAL
COMMUNITY
Start: 2022-06-20 | End: 2022-07-07 | Stop reason: ALTCHOICE

## 2022-07-07 RX ORDER — AMPICILLIN 500 MG/1
CAPSULE ORAL
COMMUNITY
End: 2022-07-07 | Stop reason: ALTCHOICE

## 2022-07-07 RX ORDER — TORSEMIDE 10 MG/1
TABLET ORAL
Qty: 30 TABLET | Refills: 0
Start: 2022-07-07

## 2022-07-07 NOTE — PATIENT INSTRUCTIONS

## 2022-07-07 NOTE — LETTER
July 7, 2022     Mary Daviskacie, [address] [facility] [address]    Patient: Reza Garcia   YOB: 1934   Date of Visit: 7/7/2022       Dear Dr Vicky Hull: Thank you for referring Aster Beyer to me for evaluation  Below are my notes for this consultation  If you have questions, please do not hesitate to call me  I look forward to following your patient along with you  Sincerely,        JOSE G Orellana        CC: DO Karlee Cisneros, [address]  7/7/2022  8:32 AM  Sign when [facility] Visit  Cardiology  Hospital Follow Up   Office Visit Note  Reza Garcia   80 y o    female   MRN: [id number]  [address]  [place] [id number] [phone]    PCP: Mary Marx DO  Cardiologist: Dr Laura Garcia              Summary of recommendations  Heart healthy diet  Educational information provided  Continue metoprolol  Torsemide 10 mg daily p r n  only for wt gain 2-3 lb/1 day, 5 lb/ 5 d  Increase fluids to 60 oz, minimum a day  She needs to increase protein I recommended Boost/ Ensure, etc  Follow up will be scheduled with Dr Laura Garcia  6 weeks      Assessment/plan  Chronic heart failure preserved ejection fraction  Wt Readings from Last 3 Encounters:   07/07/22 56 9 kg (125 lb 6 oz)   06/24/22 58 5 kg (128 lb 15 5 oz)   05/04/22 58 5 kg (129 lb)       Wt Readings from Last 3 Encounters:   07/07/22 56 9 kg (125 lb 6 oz)   06/24/22 58 5 kg (128 lb 15 5 oz)   05/04/22 58 5 kg (129 lb)     --beta-blocker:   Toprol 12 5 mg daily  --Diuretic:   torsemide 10 mg MWF since DC 6/27/22  This was held 7/6/22 due to hypotension  Will make diuretic p r n  Only  --ACE/ARB/ARNI:    --MRA:   --SLGT2I  --2 g sodium diet, 1800 cc fluid restriction   Daily weights, call weight gain 2-3 lb in 1 day or 5 lb in 5 days  Coronary artery calcifications, mild noted on CTA 6/21/22  -EKG today sinus rhythm 81 beats per minute with frequent PVCs and a pattern of bigeminy  RBBB  LAHB, bifascicular block  -on aspirin, statin, beta-blocker  Hypertension, essential   BP 88/44 on metoprolol succinate 12 5 mg daily   Blood pressure by myself, automated equipment  She is asymptomatic  Loop diuretic was held starting yesterday  Hyperlipidemia, on atorvastatin 10 mg daily  4/14/22: LDL 49  Type 2 diabetes mellitus with gastroparesis  Hemoglobin A1c 5 9  CKD 3 b, baseline creatinine 1 1 - 1 4    last creatinine 1 19, June 27th  Cardiac testing   TTE 4/15/222  EF 50%  Mild global hypokinesis  No LVH  Mild-to-moderate AI  Mild-to-moderate MR  Mild TR  Small to moderate left pleural effusion                  REINALDO Landry is an 81 yo female with diabetes mellitus, essential hypertension, mixed dyslipidemia  Adm 4/14-4/15/22  CC:  Chest pain, atypical  Dx:  Acute heart failure with preserved ejection fraction     Chest x-ray: Mild vascular congestion with left lower lobe atelectasis versus effusion  EKG: NSR  NSSTTW changes possible inferior ischemia, right bundle branch block  Troponins - normal  Given low-dose IV diuretics  Discharge diuretic torsemide 10 mg daily  Discharge weight : 129 lb  discharge creatinine 1 30  An outpatient Nuclear stress test was ordered    She established cardiology care with Dr Jeremy Bruce following an admission 4/18/22 for atypical, sharp chest pain, worse with rotation and movement  She has had no recurrence  A conservative approach was recommended  He decided to hold off on stress testing  Her blood pressure was well controlled, she was maintained on aspirin and initiated on statin  Follow-up was recommended in 3 months     ER Adm 6/21  Chief complaint:  Chest pain, radiating down her left arm    Headache  CTH: No acute  EKG normal sinus rhythm   Chest x-ray superimposed edema, interstitial edema  Creatinine 1 1  ProBNP 189   Troponin is negative  Elevated D-dimer  CTA: No PE noted within the limits of the test, limited due to motion artifact  Mild coronary artery calcification noted  Given 250 mL of IV fluid  Headache resolved, chest pain resolved  Discharged on aspirin, statin, beta-blocker, ACE-I, torsemide 10 mg daily  Advised follow-up with Cardiology    6/22/22 phone call from PCP  Patient taking torsemide 10 mg 3 times a week    Adm 6/24-6/27/22  CC: mental status change-Incoherent speech reported by her  who found her on the floor at the bedside after a falling while trying to get onto her bed  Found to have VICENTE/creatinine 1 68  Lower extremity cellulitis  Dehydration  CTH : No acute  EKG: Normal sinus rhythm right bundle branch block nonspecific ST T-wave changes, consider inferior ischemia 95 bpm  She did have hypotension, meds adjusted  BC neg after 5 d  Diuretic held  Treated with antibiotics  Followed by geriatrics  Due to concern for polypharmacy, metformin and lisinopril discontinued  Amitriptyline dose adjusted  Dx TME  PT OT recommended rehab; discharged home with [facility]  Discharge diuretic: torsemide 10 mg Monday/ Wednesday/ Friday , metoprolol succinate 12 5 mg daily  Discharge creatinine 1 19  Discharge weight 128 lb    7/5/22  [facility] note:  Low BP 92/58, 80/50  Patient is asymptomatic  Directed by Cardiology to hold torsemide     7/7/22  Hospital follow-up  She is accompanied by her , family  ROS:  Weak, fatigued  Low back pain, chronic  Ambulatory dysfunction, in a wheelchair  Today, she denied chest pain  Her  reports she has not been complaining of chest pain at home  Her p o  Intake is down  She drinks very little fluid,/food  She does eat pretty well for dinner her  reports  Clinically she appears dehydrated  I recommend torsemide only p r n  Parth Shah She likely will not be needing that    Encouraged 60 oz fluid a day including boost/Ensure for protein  Her EKG showed sinus rhythm with PVCs in a ventricular bigeminy, heart rate 81  I would recommend continuing metoprolol succinate 12 5 mg daily  Focus today-- increase her fluid and protein  If she is not eating, or drinking and her clinical status deteriorates, she may need further evaluation in the ED for failure to thrive        I have spent 40 minutes with Patient and family today in which greater than 50% of this time was spent in counseling/coordination of care regarding Patient and family education, Importance of tx compliance, Risk factor reductions and Impressions  Assessment  Diagnoses and all orders for this visit:    Hospital discharge follow-up    Chronic diastolic heart failure ([place] [address] )  -     POCT ECG    Primary hypertension  -     POCT ECG    Dyslipidemia    Diabetic gastroparesis associated with type 2 diabetes mellitus ([place] [address] )    Stage 3b chronic kidney disease ([place] [address] )    Chest pain, unspecified type  -     Ambulatory Referral to Cardiology    Chest pain  -     torsemide (DEMADEX) 10 mg tablet;  Take 1 tablet PRN for wt gain 3 lb/ 3 days or 5 lb/ 5 aria    Other orders  -     Discontinue: nitrofurantoin (MACROBID) 100 mg capsule;  (Patient not taking: Reported on 7/7/2022)  -     Discontinue: ampicillin (PRINCIPEN) 500 mg capsule; ampicillin 500 mg capsule   TAKE 1 CAPSULE BY MOUTH 4 TIMES A DAY FOR 7 DAYS (Patient not taking: Reported on 7/7/2022)          Past Medical History:   Diagnosis Date    Anemia     Arthritis     Back pain     CHF (congestive heart failure) (HCC)     Chronic kidney disease     Stage 3b    Diabetes ([place] [address] )     Diabetes mellitus ([place] [address] )     Diabetic gastroparesis associated with type 2 diabetes mellitus ([place] [address] )     Diabetic polyneuropathy ([place] [address] )     Diverticulosis     Herpes zoster     Hypertension     IBS (irritable bowel syndrome)     Neurogenic claudication due to lumbar spinal stenosis     Osteoarthritis     Osteoporosis     Pulmonary edema     Seronegative arthropathy of multiple sites [facility])        Review of Systems   Constitutional: Positive for malaise/fatigue  Negative for chills  Cardiovascular: Negative for chest pain, claudication, cyanosis, dyspnea on exertion, irregular heartbeat, leg swelling, near-syncope, orthopnea, palpitations, paroxysmal nocturnal dyspnea and syncope  Respiratory: Negative for cough and shortness of breath  Musculoskeletal: Positive for back pain  Gastrointestinal: Negative for heartburn and nausea  Neurological: Negative for dizziness, focal weakness, headaches, light-headedness and weakness  All other systems reviewed and are negative  Allergies   Allergen Reactions    Morphine Other (See Comments)     urinary retention    Ciprofloxacin Rash and Nausea Only           Current Outpatient Medications:     amitriptyline (ELAVIL) 10 mg tablet, Take 1 tablet (10 mg total) by mouth daily at bedtime for 14 days, Disp: 14 tablet, Rfl: 0    aspirin 81 mg chewable tablet, Chew 1 tablet (81 mg total) daily, Disp: 30 tablet, Rfl: 0    atorvastatin (LIPITOR) 10 mg tablet, Take 1 tablet (10 mg total) by mouth daily, Disp: 90 tablet, Rfl: 3    Cholecalciferol (VITAMIN D3) 1000 units CAPS, Take 1 capsule by mouth daily , Disp: , Rfl:     diphenoxylate-atropine (LOMOTIL) 2 5-0 025 mg per tablet, Take 1 tablet by mouth if needed  , Disp: , Rfl:     DULoxetine (CYMBALTA) 60 mg delayed release capsule, TAKE ONE CAPSULE BY MOUTH EVERY DAY, Disp: 30 capsule, Rfl: 5    hydroxychloroquine (PLAQUENIL) 200 mg tablet, Take 1 tablet (200 mg total) by mouth daily with breakfast, Disp: 90 tablet, Rfl: 1    Magnesium 250 MG TABS, Take 250 mg by mouth in the morning, Disp: , Rfl:     metoprolol succinate (TOPROL-XL) 25 mg 24 hr tablet, Take 0 5 tablets (12 5 mg total) by mouth daily, Disp: 30 tablet, Rfl: 0    Mirabegron ER (Myrbetriq) 50 MG TB24, Take 1 tablet (50 mg total) by mouth daily, Disp: 30 tablet, Rfl: 11    pregabalin (LYRICA) 75 mg capsule, Take 75 mg by mouth daily at bedtime, Disp: , Rfl:    pyridoxine (B-6) 100 MG tablet, Take 100 mg by mouth daily, Disp: , Rfl:     Sodium Fluoride (PreviDent 5000 Booster Plus) 1 1 % PSTE, Apply on teeth every evening as directed, Disp: 100 mL, Rfl: 3    torsemide (DEMADEX) 10 mg tablet, Take 1 tablet PRN for wt gain 3 lb/ 3 days or 5 lb/ 5 aria, Disp: 30 tablet, Rfl: 0    nitroglycerin (NITROSTAT) 0 4 mg SL tablet, Place 1 tablet (0 4 mg total) under the tongue every 5 (five) minutes as needed for chest pain for up to 5 days (Patient not taking: Reported on 2022), Disp: 5 tablet, Rfl: 0    omeprazole (PriLOSEC) 20 mg delayed release capsule, Take 20 mg by mouth daily  , Disp: , Rfl:         Social History     Socioeconomic History    Marital status: /Civil Union     Spouse name: Not on file    Number of children: Not on file    Years of education: Not on file    Highest education level: Not on file   Occupational History    Not on file   Tobacco Use    Smoking status: Former Smoker     Types: Cigarettes     Quit date:      Years since quittin 5    Smokeless tobacco: Never Used   Vaping Use    Vaping Use: Never used   Substance and Sexual Activity    Alcohol use: No    Drug use: No    Sexual activity: Not Currently     Partners: Male     Birth control/protection: None   Other Topics Concern    Not on file   Social History Narrative    Not on file     Social Determinants of Health     Financial Resource Strain: Not on file   Food Insecurity: Not on file   Transportation Needs: Not on file   Physical Activity: Not on file   Stress: Not on file   Social Connections: Not on file   Intimate Partner Violence: Not on file   Housing Stability: Not on file       Family History   Problem Relation Age of Onset    Cancer Brother     Diabetes Mother     Hypertension Father     Heart disease Father        Physical Exam  Vitals and nursing note reviewed  Constitutional:       General: She is not in acute distress       Appearance: She is not diaphoretic  HENT:      Head: Normocephalic and atraumatic  Eyes:      Conjunctiva/sclera: Conjunctivae normal    Cardiovascular:      Rate and Rhythm: Normal rate and regular rhythm  Pulses: Intact distal pulses  Heart sounds: Normal heart sounds  Pulmonary:      Effort: Pulmonary effort is normal       Breath sounds: Normal breath sounds  Abdominal:      General: Bowel sounds are normal       Palpations: Abdomen is soft  Musculoskeletal:         General: Normal range of motion  Cervical back: Normal range of motion and neck supple  Skin:     General: Skin is warm and dry  Neurological:      Mental Status: She is alert and oriented to person, place, and time  Vitals: Blood pressure (!) 88/42, pulse 81, resp  rate 18, height 4' 11" (1 499 m), weight 56 9 kg (125 lb 6 oz), SpO2 99 %, not currently breastfeeding  Wt Readings from Last 3 Encounters:   07/07/22 56 9 kg (125 lb 6 oz)   06/24/22 58 5 kg (128 lb 15 5 oz)   05/04/22 58 5 kg (129 lb)         Labs & Results:  Lab Results   Component Value Date    WBC 6 23 06/27/2022    HGB 11 0 (L) 06/27/2022    HCT 34 4 (L) 06/27/2022    MCV 95 06/27/2022     (L) 06/27/2022     BNP   Date Value Ref Range Status   06/21/2022 189 (H) 0 - 100 pg/mL Final     No components found for: CHEM  Total CK   Date Value Ref Range Status   01/07/2017 81 26 - 192 U/L Final     Troponin I   Date Value Ref Range Status   09/19/2021 0 02 <=0 04 ng/mL Final     Comment:     [employer] Chemistry analyzer 99% cutoff is > 0 04 ng/mL in network labs     o cTnI 99% cutoff is useful only when applied to patients in the clinical setting of myocardial ischemia   o cTnI 99% cutoff should be interpreted in the context of clinical history, ECG findings and possibly cardiac imaging to establish correct diagnosis  o cTnI 99% cutoff may be suggestive but clearly not indicative of a coronary event without the clinical setting of myocardial ischemia  Results for orders placed during the hospital encounter of 16    Echo complete with contrast if indicated    Narrative  [place] [address] [facility]  [phone]    Transthoracic Echocardiogram  2D, M-mode, Doppler, and Color Doppler    Study date:  23-Dec-2016    Patient: Jean Dunbar  MR number: [id number]  Account number: [de-identified]  : 1934  Age: 80 years  Gender: Female  Status: Inpatient  Location: Bedside  Height: 59 in  Weight: 155 8 lb  BP: 131/ 65 mmHg    Indications: Shortness of breath  Diagnoses: R06 02 - Shortness of breath    Sonographer:  STORM Love  Primary Physician:  Maura Galvan DO  Referring Physician:  Ariel Avalos MD MPH  Group:  [facility]  Interpreting Physician:  Rocio Louis MD    SUMMARY    LEFT VENTRICLE:  Systolic function was normal  Ejection fraction was estimated to be 60 %  There were no regional wall motion abnormalities  There was mild concentric hypertrophy  Doppler parameters were consistent with abnormal left ventricular relaxation  (grade 1 diastolic dysfunction)  AORTIC VALVE:  There was mild regurgitation  HISTORY: PRIOR HISTORY: hypertension, diabetes, CHF    PROCEDURE: The procedure was performed at the bedside  This was a routine  study  The transthoracic approach was used  The study included complete 2D  imaging, M-mode, complete spectral Doppler, and color Doppler  Image quality  was adequate  LEFT VENTRICLE: Size was normal  Systolic function was normal  Ejection  fraction was estimated to be 60 %  There were no regional wall motion  abnormalities  Wall thickness was mildly increased  There was mild concentric  hypertrophy  DOPPLER: There was an increased relative contribution of atrial  contraction to ventricular filling  Doppler parameters were consistent with  abnormal left ventricular relaxation (grade 1 diastolic dysfunction)      RIGHT VENTRICLE: The size was normal  Systolic function was normal  Wall  thickness was normal     LEFT ATRIUM: Size was normal     RIGHT ATRIUM: Size was normal     MITRAL VALVE: Valve structure was normal  There was normal leaflet separation  DOPPLER: The transmitral velocity was within the normal range  There was no  evidence for stenosis  There was trace regurgitation  AORTIC VALVE: The valve was trileaflet  Leaflets exhibited normal thickness and  normal cuspal separation  DOPPLER: Transaortic velocity was within the normal  range  There was no evidence for stenosis  There was mild regurgitation  TRICUSPID VALVE: The valve structure was normal  There was normal leaflet  separation  DOPPLER: The transtricuspid velocity was within the normal range  There was no evidence for stenosis  There was trace regurgitation  Pulmonary  artery systolic pressure was within the normal range  Estimated peak PA  pressure was 18 mmHg  PULMONIC VALVE: Leaflets exhibited normal thickness, no calcification, and  normal cuspal separation  DOPPLER: The transpulmonic velocity was within the  normal range  There was trace regurgitation  PERICARDIUM: There was no pericardial effusion  The pericardium was normal in  appearance  AORTA: The root exhibited normal size  SYSTEMIC VEINS: IVC: The inferior vena cava was normal in size   Respirophasic  changes were normal     SYSTEM MEASUREMENT TABLES    2D  %FS: 27 29 %  AV Diam: 2 58 cm  EDV(Teich): 57 32 ml  EF(Cube): 61 56 %  EF(Teich): 53 96 %  ESV(Cube): 19 13 ml  ESV(Teich): 26 39 ml  IVSd: 1 31 cm  LA Area: 12 85 cm2  LA Diam: 3 81 cm  LVEDV MOD A4C: 54 12 ml  LVEF MOD A4C: 40 47 %  LVESV MOD A4C: 32 22 ml  LVIDd: 3 68 cm  LVIDs: 2 67 cm  LVLd A4C: 7 13 cm  LVLs A4C: 6 02 cm  LVPWd: 1 36 cm  RA Area: 9 74 cm2  RV Diam: 2 74 cm  SI(Cube): 18 46 ml/m2  SI(Teich): 18 63 ml/m2  SV MOD A4C: 21 9 ml  SV(Cube): 30 64 ml  SV(Teich): 30 93 ml    CW  AR Dec [place]: 2 39 m/s2  AR Dec Time: 1677 2 ms  AR PHT: 486 39 ms  AR Vmax: 4 m/s  AR maxP 14 mmHg  TR MaxP 41 mmHg  TR Vmax: 2 03 m/s    MM  TAPSE: 1 64 cm    PW  E': 0 09 m/s    [employer] Accredited Echocardiography Laboratory    Prepared and electronically signed by    Gaby Fink MD  Signed 23-Dec-2016 11:56:38    No results found for this or any previous visit  This note was completed in part utilizing m-[employer] fluency direct voice recognition software  Grammatical errors, random word insertion, spelling mistakes, and incomplete sentences may be an occasional consequence of the system secondary to software limitations, ambient noise and hardware issues  At the time of dictation, efforts were made to edit, clarify and /or correct errors  Please read the chart carefully and recognize, using context, where substitutions have occurred    If you have any questions or concerns about the context, text or information contained within the body of this dictation, please contact myself, the provider, for further clarification

## 2022-07-12 ENCOUNTER — HOME CARE VISIT (OUTPATIENT)
Dept: HOME HEALTH SERVICES | Facility: HOME HEALTHCARE | Age: 87
End: 2022-07-12
Payer: MEDICARE

## 2022-07-12 VITALS — HEART RATE: 66 BPM | OXYGEN SATURATION: 98 % | DIASTOLIC BLOOD PRESSURE: 50 MMHG | SYSTOLIC BLOOD PRESSURE: 88 MMHG

## 2022-07-12 PROCEDURE — G0180 MD CERTIFICATION HHA PATIENT: HCPCS | Performed by: INTERNAL MEDICINE

## 2022-07-12 PROCEDURE — G0151 HHCP-SERV OF PT,EA 15 MIN: HCPCS

## 2022-07-12 NOTE — CASE COMMUNICATION
patient seen by physical therapist today  when arrived spouse and patient both reported she just fell on floor as she was using vaccuum  in living room  hit head on sofa and hit her left side spouse got her off floor    patient fell Sunday morning as well  patient refuses to go to hospital to get checked out   vitals at rest 90 over 50 HR 66   after taking walk throughout home 66 ft  84 over 40  rested and taken again 88 over 50     patient only had a cup of coffee today   has water though unknown how much patient is drinking  patient states she is trying to eat or drink

## 2022-07-13 ENCOUNTER — TELEPHONE (OUTPATIENT)
Dept: CARDIOLOGY CLINIC | Facility: CLINIC | Age: 87
End: 2022-07-13

## 2022-07-13 NOTE — TELEPHONE ENCOUNTER
Called, spoke to Bruna Rinne  Message relayed as given   Pt's  verbalized understanding and will attempt to convince pt to have ER eval  Encouraged spouse to utilize 911 if pt has difficulty moving etc

## 2022-07-13 NOTE — TELEPHONE ENCOUNTER
Called - LM on mahsa Ontiveros's VM to call office back re: message and recommendations  Will attempt home ## next

## 2022-07-13 NOTE — TELEPHONE ENCOUNTER
JOSE G [place], [place]  Please call the pt tomorrow, and speak with family  If she is not eating or drinking, she will continue to deteriorate, fall and likely injury herself   I recommend ED eval

## 2022-07-14 ENCOUNTER — HOME CARE VISIT (OUTPATIENT)
Dept: HOME HEALTH SERVICES | Facility: HOME HEALTHCARE | Age: 87
End: 2022-07-14
Payer: MEDICARE

## 2022-07-18 PROBLEM — M48.062 SPINAL STENOSIS OF LUMBAR REGION WITH NEUROGENIC CLAUDICATION: Status: ACTIVE | Noted: 2022-07-18

## 2022-07-18 PROBLEM — G89.4 CHRONIC PAIN SYNDROME: Status: ACTIVE | Noted: 2022-07-18

## 2022-07-19 ENCOUNTER — HOME CARE VISIT (OUTPATIENT)
Dept: HOME HEALTH SERVICES | Facility: HOME HEALTHCARE | Age: 87
End: 2022-07-19
Payer: MEDICARE

## 2022-07-19 PROCEDURE — G0151 HHCP-SERV OF PT,EA 15 MIN: HCPCS

## 2022-07-20 VITALS — OXYGEN SATURATION: 96 % | HEART RATE: 78 BPM | DIASTOLIC BLOOD PRESSURE: 50 MMHG | SYSTOLIC BLOOD PRESSURE: 100 MMHG

## 2022-08-03 ENCOUNTER — APPOINTMENT (OUTPATIENT)
Dept: LAB | Facility: CLINIC | Age: 87
End: 2022-08-03
Payer: MEDICARE

## 2022-08-03 DIAGNOSIS — Z79.899 ENCOUNTER FOR LONG-TERM (CURRENT) USE OF OTHER MEDICATIONS: ICD-10-CM

## 2022-08-03 DIAGNOSIS — M06.09 SERONEGATIVE ARTHROPATHY OF MULTIPLE SITES (HCC): ICD-10-CM

## 2022-08-03 LAB
ALBUMIN SERPL BCP-MCNC: 3.5 G/DL (ref 3.5–5)
ALP SERPL-CCNC: 89 U/L (ref 34–104)
ALT SERPL W P-5'-P-CCNC: 26 U/L (ref 7–52)
ANION GAP SERPL CALCULATED.3IONS-SCNC: 5 MMOL/L (ref 4–13)
AST SERPL W P-5'-P-CCNC: 28 U/L (ref 13–39)
BASOPHILS # BLD AUTO: 0.04 THOUSANDS/ΜL (ref 0–0.1)
BASOPHILS NFR BLD AUTO: 1 % (ref 0–1)
BILIRUB SERPL-MCNC: 0.43 MG/DL (ref 0.2–1)
BUN SERPL-MCNC: 29 MG/DL (ref 5–25)
CA-I BLD-SCNC: 1.21 MMOL/L (ref 1.12–1.32)
CALCIUM SERPL-MCNC: 9.4 MG/DL (ref 8.4–10.2)
CHLORIDE SERPL-SCNC: 106 MMOL/L (ref 96–108)
CO2 SERPL-SCNC: 29 MMOL/L (ref 21–32)
CREAT SERPL-MCNC: 1.28 MG/DL (ref 0.6–1.3)
CRP SERPL QL: <1 MG/L
EOSINOPHIL # BLD AUTO: 0.34 THOUSAND/ΜL (ref 0–0.61)
EOSINOPHIL NFR BLD AUTO: 5 % (ref 0–6)
ERYTHROCYTE [DISTWIDTH] IN BLOOD BY AUTOMATED COUNT: 15.6 % (ref 11.6–15.1)
ERYTHROCYTE [SEDIMENTATION RATE] IN BLOOD: 14 MM/HOUR (ref 0–29)
GFR SERPL CREATININE-BSD FRML MDRD: 37 ML/MIN/1.73SQ M
GLUCOSE SERPL-MCNC: 130 MG/DL (ref 65–140)
HCT VFR BLD AUTO: 37.6 % (ref 34.8–46.1)
HGB BLD-MCNC: 11.7 G/DL (ref 11.5–15.4)
IMM GRANULOCYTES # BLD AUTO: 0.03 THOUSAND/UL (ref 0–0.2)
IMM GRANULOCYTES NFR BLD AUTO: 1 % (ref 0–2)
LYMPHOCYTES # BLD AUTO: 1.27 THOUSANDS/ΜL (ref 0.6–4.47)
LYMPHOCYTES NFR BLD AUTO: 20 % (ref 14–44)
MCH RBC QN AUTO: 30.8 PG (ref 26.8–34.3)
MCHC RBC AUTO-ENTMCNC: 31.1 G/DL (ref 31.4–37.4)
MCV RBC AUTO: 99 FL (ref 82–98)
MONOCYTES # BLD AUTO: 0.84 THOUSAND/ΜL (ref 0.17–1.22)
MONOCYTES NFR BLD AUTO: 13 % (ref 4–12)
NEUTROPHILS # BLD AUTO: 3.94 THOUSANDS/ΜL (ref 1.85–7.62)
NEUTS SEG NFR BLD AUTO: 60 % (ref 43–75)
NRBC BLD AUTO-RTO: 0 /100 WBCS
PLATELET # BLD AUTO: 159 THOUSANDS/UL (ref 149–390)
PMV BLD AUTO: 10.6 FL (ref 8.9–12.7)
POTASSIUM SERPL-SCNC: 4.9 MMOL/L (ref 3.5–5.3)
PROT SERPL-MCNC: 6.4 G/DL (ref 6.4–8.4)
RBC # BLD AUTO: 3.8 MILLION/UL (ref 3.81–5.12)
SODIUM SERPL-SCNC: 140 MMOL/L (ref 135–147)
WBC # BLD AUTO: 6.46 THOUSAND/UL (ref 4.31–10.16)

## 2022-08-03 PROCEDURE — 85652 RBC SED RATE AUTOMATED: CPT

## 2022-08-03 PROCEDURE — 80053 COMPREHEN METABOLIC PANEL: CPT

## 2022-08-03 PROCEDURE — 82330 ASSAY OF CALCIUM: CPT

## 2022-08-03 PROCEDURE — 36415 COLL VENOUS BLD VENIPUNCTURE: CPT

## 2022-08-03 PROCEDURE — 85025 COMPLETE CBC W/AUTO DIFF WBC: CPT

## 2022-08-03 PROCEDURE — 86140 C-REACTIVE PROTEIN: CPT

## 2022-08-04 ENCOUNTER — APPOINTMENT (OUTPATIENT)
Dept: LAB | Facility: CLINIC | Age: 87
End: 2022-08-04
Payer: MEDICARE

## 2022-08-11 NOTE — PROGRESS NOTES
Cardiology   Follow Up   Office Visit Note  Jeremy Jenkins   80 y o    female   MRN: [id number]  [address]  [address]  [place] [address] [facility] [place] [address] [phone]    PCP: Gibson Abbott DO  Cardiologist: Dr Saeid Devries of recommendations  Continue the current plan  Salt restricted diet  Encouraged hydration, use of protein supplements   I encouraged her to use the walker versus a cane  Torsemide p r n  only  Follow up will be scheduled with Dr Marli Dunaway 3-4 months      Assessment/plan  Chronic heart failure preserved ejection fraction  Wt Readings from Last 3 Encounters:   08/15/22 59 4 kg (131 lb)   07/18/22 56 3 kg (124 lb 3 2 oz)   07/07/22 56 9 kg (125 lb 6 oz)     -beta-blocker:   Toprol 12 5 mg daily  --Diuretic:   torsemide 10 mg MWF since DC 6/27/22  This was held 7/6/22 due to hypotension  7/7/22: Will make diuretic p r n  only-given her reduced p o  intake, and recurrent falls  --ACE/ARB/ARNI:    --MRA:   --SLGT2I  --2 g sodium diet, 1800 cc fluid restriction  Daily weights, call weight gain 2-3 lb in 1 day or 5 lb in 5 days  Coronary artery calcifications, mild noted on CTA 6/21/22  -EKG today sinus rhythm 81 beats per minute with frequent PVCs and a pattern of bigeminy  RBBB  LAHB, bifascicular block  -on aspirin, statin, beta-blocker  Hypertension, essential   /58 on metoprolol succinate 12 5 mg daily   Hyperlipidemia, on atorvastatin 10 mg daily  4/14/22: LDL 49  Type 2 diabetes mellitus with gastroparesis  Hemoglobin A1c 5 9  CKD 3 b, baseline creatinine 1 1 - 1 4    last creatinine 1 19, June 27th  Cardiac testing   TTE 4/15/222  EF 50%  Mild global hypokinesis  No LVH  Mild-to-moderate AI  Mild-to-moderate MR  Mild TR  Small to moderate left pleural effusion                  HPI  Elmer Bradley is an 81 yo female with diabetes mellitus, essential hypertension, mixed dyslipidemia        Adm 4/14-4/15/22  CC: Chest pain, atypical    Chest x-ray: Mild vascular congestion with left lower lobe atelectasis versus effusion  EKG: NSR  NSSTTW changes possible inferior ischemia, right bundle branch block  Troponins - normal  Dx:  Acute heart failure with preserved ejection fraction   Given low-dose IV diuretics  Discharge diuretic torsemide 10 mg daily  Discharge weight : 129 lb  discharge creatinine 1 30  An outpatient Nuclear stress test was ordered    She established cardiology care with Dr Nita Garcia following an admission 4/18/22 for atypical, sharp chest pain, worse with rotation and movement  She has had no recurrence  A conservative approach was recommended  He decided to hold off on stress testing  Her blood pressure was well controlled, she was maintained on aspirin and initiated on statin  Follow-up was recommended in 3 months     ER Adm 6/21  Chief complaint:  Chest pain, radiating down her left arm  Headache  CTH: No acute  EKG normal sinus rhythm   Chest x-ray superimposed edema, interstitial edema  Creatinine 1 1  ProBNP 189   Troponin is negative  Elevated D-dimer  CTA: No PE noted within the limits of the test, limited due to motion artifact  Mild coronary artery calcification noted  Given 250 mL of IV fluid  Headache resolved, chest pain resolved  Discharged on aspirin, statin, beta-blocker, ACE-I, torsemide 10 mg daily  Advised follow-up with Cardiology    6/22/22 phone call from PCP  Patient taking torsemide 10 mg 3 times a week    Adm 6/24-6/27/22  CC: mental status change-Incoherent speech reported by her  who found her on the floor at the bedside after a falling while trying to get onto her bed  Found to have VICENTE/creatinine 1 68  Lower extremity cellulitis  Dehydration  CTH : No acute  EKG: Normal sinus rhythm right bundle branch block nonspecific ST T-wave changes, consider inferior ischemia 95 bpm  She did have hypotension, meds adjusted  BC neg after 5 d  Diuretic held    Treated with antibiotics for LE cellulitis  Followed by geriatrics  Due to concern for polypharmacy, metformin and lisinopril discontinued  Amitriptyline dose adjusted  Dx TME  PT OT recommended rehab; discharged home with VNA  Discharge diuretic: torsemide 10 mg Monday/ Wednesday/ Friday , metoprolol succinate 12 5 mg daily  Discharge creatinine 1 19  Discharge weight 128 lb    7/5/22  VNA note:  Low BP 92/58, 80/50  Patient is asymptomatic  Directed by Cardiology to hold torsemide     7/7/22  Hospital follow-up  She is accompanied by her , family  ROS:  Weak, fatigued  Low back pain, chronic  Ambulatory dysfunction, in a wheelchair  Today, she denied chest pain  Her  reports she has not been complaining of chest pain at home  Her p o  Intake is down  She drinks very little fluid/food  She does eat pretty well for dinner her  reports  Clinically she appears dehydrated  I recommend torsemide only p r n  Randi Marie She likely will not be needing that  Encouraged 60 oz fluid a day including boost/Ensure for protein  Her EKG showed sinus rhythm with PVCs in a ventricular bigeminy, heart rate 81  I would recommend continuing metoprolol succinate 12 5 mg daily  Focus today-- increase her fluid and protein  If she is not eating, or drinking and her clinical status deteriorates, she may need further evaluation in the ED for failure to thrive    7/12/22  Note from PT  Patient fell onto the floor  Hit head-on sulfa and hit her left side  She fell a few days prior  Refused to go to the ED  BP 90/50 heart rate 66   only had a cup of coffee today     has water though unknown how much patient is drinking  patient states she is trying to eat or drink       8/15/22  Close follow up  I am seeing her given scheduling constraints, in lieu of her cardiologist  Since the last visit she has been eating and drinking more  Her blood pressures have been up  Her torsemide is p r n  only    She has not needed to take any   She has had no falls  Her  tells me her rheumatologist recommended she rely on the walker more than the cane  Today, she is with the cane  We had a long discussion about the need for fall prevention  She is going to physical therapy twice a week to enhance mobility  She is also scheduled for a pain injection next month  She has right-sided sciatica  111/62 automated, by me  122/58  By the MA  Her weight is up to 131 lb however I feel this is caloric weight  She is euvolemic on exam      I have spent 25 minutes with Patient and family today in which greater than 50% of this time was spent in counseling/coordination of care regarding Patient and family education, Importance of tx compliance, Risk factor reductions and Impressions  Assessment  Diagnoses and all orders for this visit:    Chronic diastolic heart failure (HCC)    Stage 3b chronic kidney disease ([facility] )    Dyslipidemia          Past Medical History:   Diagnosis Date    Anemia     Arthritis     Back pain     [facility]estive heart failure) ([facility])     Chronic kidney disease     Stage 3b    Diabetes [facility]     Diabetes mellitus ([facility]     Diabetic gastroparesis associated with type 2 diabetes mellitus ([facility] )     Diabetic polyneuropathy ([facility] )     Diverticulosis     Herpes zoster     Hypertension     IBS (irritable bowel syndrome)     Neurogenic claudication due to lumbar spinal stenosis     Osteoarthritis     Osteoporosis     Pulmonary edema     Seronegative arthropathy of multiple sites ([facility] )        Review of Systems   Constitutional: Negative for chills and malaise/fatigue  Cardiovascular: Negative for chest pain, claudication, cyanosis, dyspnea on exertion, irregular heartbeat, leg swelling, near-syncope, orthopnea, palpitations, paroxysmal nocturnal dyspnea and syncope  Respiratory: Negative for cough and shortness of breath  Musculoskeletal: Positive for back pain          Right-sided sciatica   Gastrointestinal: Negative for heartburn and nausea  Neurological: Negative for dizziness, focal weakness, headaches, light-headedness and weakness  All other systems reviewed and are negative  Allergies   Allergen Reactions    Morphine Other (See Comments)     urinary retention    Ciprofloxacin Rash and Nausea Only           Current Outpatient Medications:     amitriptyline (ELAVIL) 10 mg tablet, Take 1 tablet (10 mg total) by mouth daily at bedtime for 14 days, Disp: 14 tablet, Rfl: 0    aspirin 81 mg chewable tablet, Chew 1 tablet (81 mg total) daily, Disp: 30 tablet, Rfl: 0    atorvastatin (LIPITOR) 10 mg tablet, Take 1 tablet (10 mg total) by mouth daily (Patient taking differently: Take 10 mg by mouth every evening), Disp: 90 tablet, Rfl: 3    Cholecalciferol (VITAMIN D3) 1000 units CAPS, Take 1 capsule by mouth daily , Disp: , Rfl:     diphenoxylate-atropine (LOMOTIL) 2 5-0 025 mg per tablet, Take 1 tablet by mouth if needed, Disp: , Rfl:     DULoxetine (CYMBALTA) 60 mg delayed release capsule, TAKE ONE CAPSULE BY MOUTH EVERY DAY (Patient taking differently: Take 60 mg by mouth every morning), Disp: 30 capsule, Rfl: 5    hydroxychloroquine (PLAQUENIL) 200 mg tablet, Take 1 tablet (200 mg total) by mouth daily with breakfast, Disp: 90 tablet, Rfl: 1    Magnesium 250 MG TABS, Take 250 mg by mouth every evening, Disp: , Rfl:     metoprolol succinate (TOPROL-XL) 25 mg 24 hr tablet, Take 0 5 tablets (12 5 mg total) by mouth daily, Disp: 30 tablet, Rfl: 0    Mirabegron ER (Myrbetriq) 50 MG TB24, Take 1 tablet (50 mg total) by mouth daily (Patient taking differently: Take 50 mg by mouth every evening), Disp: 30 tablet, Rfl: 11    omeprazole (PriLOSEC) 20 mg delayed release capsule, Take 20 mg by mouth daily  , Disp: , Rfl:     pregabalin (LYRICA) 75 mg capsule, Take 50 mg by mouth 2 (two) times a day, Disp: , Rfl:     pyridoxine (B-6) 100 MG tablet, Take 100 mg by mouth daily, Disp: , Rfl:     Sodium Fluoride (PreviDent 5000 Booster Plus) 1 1 % PSTE, Apply on teeth every evening as directed, Disp: 100 mL, Rfl: 3    nitroglycerin (NITROSTAT) 0 4 mg SL tablet, Place 1 tablet (0 4 mg total) under the tongue every 5 (five) minutes as needed for chest pain for up to 5 days (Patient not taking: Reported on 2022), Disp: 5 tablet, Rfl: 0    torsemide (DEMADEX) 10 mg tablet, Take 1 tablet PRN for wt gain 3 lb/ 3 days or 5 lb/ 5 aria (Patient not taking: No sig reported), Disp: 30 tablet, Rfl: 0    Current Facility-Administered Medications:     denosumab (PROLIA) subcutaneous injection 60 mg, 60 mg, Subcutaneous, Q6 Months, [employer], PA-C, 60 mg at 22 1831        Social History     Socioeconomic History    Marital status: /Civil Union     Spouse name: Not on file    Number of children: Not on file    Years of education: Not on file    Highest education level: Not on file   Occupational History    Not on file   Tobacco Use    Smoking status: Former Smoker     Types: Cigarettes     Quit date:      Years since quittin 6    Smokeless tobacco: Never Used   Vaping Use    Vaping Use: Never used   Substance and Sexual Activity    Alcohol use: No    Drug use: No    Sexual activity: Not Currently     Partners: Male     Birth control/protection: None   Other Topics Concern    Not on file   Social History Narrative    Not on file     Social Determinants of Health     Financial Resource Strain: Not on file   Food Insecurity: Not on file   Transportation Needs: Not on file   Physical Activity: Not on file   Stress: Not on file   Social Connections: Not on file   Intimate Partner Violence: Not on file   Housing Stability: Not on file       Family History   Problem Relation Age of Onset    Cancer Brother     Diabetes Mother     Hypertension Father     Heart disease Father        Physical Exam  Vitals and nursing note reviewed  Constitutional:       General: She is not in acute distress  Appearance: She is not diaphoretic  HENT:      Head: Normocephalic and atraumatic  Eyes:      Conjunctiva/sclera: Conjunctivae normal    Cardiovascular:      Rate and Rhythm: Normal rate and regular rhythm  Pulses: Intact distal pulses  Heart sounds: Murmur heard  High-pitched blowing holosystolic murmur is present with a grade of 2/6 at the apex  Pulmonary:      Effort: Pulmonary effort is normal       Breath sounds: Normal breath sounds  Abdominal:      General: Bowel sounds are normal       Palpations: Abdomen is soft  Musculoskeletal:         General: Normal range of motion  Cervical back: Normal range of motion and neck supple  Skin:     General: Skin is warm and dry  Neurological:      Mental Status: She is alert and oriented to person, place, and time  Vitals: Blood pressure 122/58, pulse 98, height 4' 11" (1 499 m), weight 59 4 kg (131 lb), SpO2 100 %, not currently breastfeeding  Wt Readings from Last 3 Encounters:   08/15/22 59 4 kg (131 lb)   07/18/22 56 3 kg (124 lb 3 2 oz)   07/07/22 56 9 kg (125 lb 6 oz)         Labs & Results:  Lab Results   Component Value Date    WBC 6 46 08/03/2022    HGB 11 7 08/03/2022    HCT 37 6 08/03/2022    MCV 99 (H) 08/03/2022     08/03/2022     BNP   Date Value Ref Range Status   06/21/2022 189 (H) 0 - 100 pg/mL Final     No components found for: CHEM  Total CK   Date Value Ref Range Status   01/07/2017 81 26 - 192 U/L Final     Troponin I   Date Value Ref Range Status   09/19/2021 0 02 <=0 04 ng/mL Final     Comment:     [employer] Chemistry analyzer 99% cutoff is > 0 04 ng/mL in network labs     o cTnI 99% cutoff is useful only when applied to patients in the clinical setting of myocardial ischemia   o cTnI 99% cutoff should be interpreted in the context of clinical history, ECG findings and possibly cardiac imaging to establish correct diagnosis     o cTnI 99% cutoff may be suggestive but clearly not indicative of a coronary event without the clinical setting of myocardial ischemia  Results for orders placed during the hospital encounter of 16    Echo complete with contrast if indicated    Narrative  [facility], [address] [facility]  [phone]    Transthoracic Echocardiogram  2D, M-mode, Doppler, and Color Doppler    Study date:  23-Dec-2016    Patient: Rjaani Johnson  MR number: [id number]  Account number: [de-identified]  : 1934  Age: 80 years  Gender: Female  Status: Inpatient  Location: Bedside  Height: 59 in  Weight: 155 8 lb  BP: 131/ 65 mmHg    Indications: Shortness of breath  Diagnoses: R06 02 - Shortness of breath    Sonographer:  STORM Nice  Primary Physician:  Varsha Frederick DO  Referring Physician:  Fortino Morejon MD MPH  Group:  [facility]  Interpreting Physician:  Lucetta Bosworth, MD    SUMMARY    LEFT VENTRICLE:  Systolic function was normal  Ejection fraction was estimated to be 60 %  There were no regional wall motion abnormalities  There was mild concentric hypertrophy  Doppler parameters were consistent with abnormal left ventricular relaxation  (grade 1 diastolic dysfunction)  AORTIC VALVE:  There was mild regurgitation  HISTORY: PRIOR HISTORY: hypertension, diabetes, CHF    PROCEDURE: The procedure was performed at the bedside  This was a routine  study  The transthoracic approach was used  The study included complete 2D  imaging, M-mode, complete spectral Doppler, and color Doppler  Image quality  was adequate  LEFT VENTRICLE: Size was normal  Systolic function was normal  Ejection  fraction was estimated to be 60 %  There were no regional wall motion  abnormalities  Wall thickness was mildly increased  There was mild concentric  hypertrophy  DOPPLER: There was an increased relative contribution of atrial  contraction to ventricular filling   Doppler parameters were consistent with  abnormal left ventricular relaxation (grade 1 diastolic dysfunction)  RIGHT VENTRICLE: The size was normal  Systolic function was normal  Wall  thickness was normal     LEFT ATRIUM: Size was normal     RIGHT ATRIUM: Size was normal     MITRAL VALVE: Valve structure was normal  There was normal leaflet separation  DOPPLER: The transmitral velocity was within the normal range  There was no  evidence for stenosis  There was trace regurgitation  AORTIC VALVE: The valve was trileaflet  Leaflets exhibited normal thickness and  normal cuspal separation  DOPPLER: Transaortic velocity was within the normal  range  There was no evidence for stenosis  There was mild regurgitation  TRICUSPID VALVE: The valve structure was normal  There was normal leaflet  separation  DOPPLER: The transtricuspid velocity was within the normal range  There was no evidence for stenosis  There was trace regurgitation  Pulmonary  artery systolic pressure was within the normal range  Estimated peak PA  pressure was 18 mmHg  PULMONIC VALVE: Leaflets exhibited normal thickness, no calcification, and  normal cuspal separation  DOPPLER: The transpulmonic velocity was within the  normal range  There was trace regurgitation  PERICARDIUM: There was no pericardial effusion  The pericardium was normal in  appearance  AORTA: The root exhibited normal size  SYSTEMIC VEINS: IVC: The inferior vena cava was normal in size   Respirophasic  changes were normal     SYSTEM MEASUREMENT TABLES    2D  %FS: 27 29 %  AV Diam: 2 58 cm  EDV(Teich): 57 32 ml  EF(Cube): 61 56 %  EF(Teich): 53 96 %  ESV(Cube): 19 13 ml  ESV(Teich): 26 39 ml  IVSd: 1 31 cm  LA Area: 12 85 cm2  LA Diam: 3 81 cm  LVEDV MOD A4C: 54 12 ml  LVEF MOD A4C: 40 47 %  LVESV MOD A4C: 32 22 ml  LVIDd: 3 68 cm  LVIDs: 2 67 cm  LVLd A4C: 7 13 cm  LVLs A4C: 6 02 cm  LVPWd: 1 36 cm  RA Area: 9 74 cm2  RV Diam: 2 74 cm  SI(Cube): 18 46 ml/m2  SI(Teich): 18 63 ml/m2  SV MOD A4C: 21 9 ml  SV(Cube): 30 64 ml  SV(Teich): 30 93 ml    CW  AR Dec [place]: 2 39 m/s2  AR Dec Time: 1677 2 ms  AR PHT: 486 39 ms  AR Vmax: 4 m/s  AR maxP 14 mmHg  TR MaxP 41 mmHg  TR Vmax: 2 03 m/s    MM  TAPSE: 1 64 cm    PW  E': 0 09 m/s    [employer] Accredited Echocardiography Laboratory    Prepared and electronically signed by    Libby Zelaya MD  Signed 23-Dec-2016 11:56:38    No results found for this or any previous visit  This note was completed in part utilizing [employer] fluency direct voice recognition software  Grammatical errors, random word insertion, spelling mistakes, and incomplete sentences may be an occasional consequence of the system secondary to software limitations, ambient noise and hardware issues  At the time of dictation, efforts were made to edit, clarify and /or correct errors  Please read the chart carefully and recognize, using context, where substitutions have occurred    If you have any questions or concerns about the context, text or information contained within the body of this dictation, please contact myself, the provider, for further clarification

## 2022-08-15 ENCOUNTER — OFFICE VISIT (OUTPATIENT)
Dept: CARDIOLOGY CLINIC | Facility: CLINIC | Age: 87
End: 2022-08-15
Payer: MEDICARE

## 2022-08-15 VITALS
OXYGEN SATURATION: 100 % | HEART RATE: 98 BPM | DIASTOLIC BLOOD PRESSURE: 58 MMHG | SYSTOLIC BLOOD PRESSURE: 122 MMHG | BODY MASS INDEX: 26.41 KG/M2 | WEIGHT: 131 LBS | HEIGHT: 59 IN

## 2022-08-15 DIAGNOSIS — N18.32 STAGE 3B CHRONIC KIDNEY DISEASE (HCC): Chronic | ICD-10-CM

## 2022-08-15 DIAGNOSIS — E78.5 DYSLIPIDEMIA: ICD-10-CM

## 2022-08-15 DIAGNOSIS — I50.32 CHRONIC DIASTOLIC HEART FAILURE (HCC): Primary | Chronic | ICD-10-CM

## 2022-08-15 PROCEDURE — 99214 OFFICE O/P EST MOD 30 MIN: CPT | Performed by: NURSE PRACTITIONER

## 2022-08-15 NOTE — LETTER
August 15, 2022     Rell Plummer, [address] [facility] [address] [place] [address]     Patient: Jayesh Springer   YOB: 1934   Date of Visit: 8/15/2022       Dear Dr Sarah Scott: Thank you for referring Claudetta Maria to me for evaluation  Below are my notes for this consultation  If you have questions, please do not hesitate to call me  I look forward to following your patient along with you  Sincerely,        JOSE G Tierney        CC: DO Yadira Glez Soledad [place]  8/15/2022 12:07 PM  Sign when Signing Visit  Cardiology   Follow Up   Office Visit Note  Jayesh Springer   80 y o    female   MRN: [id number]  [address]  [address] [facility],[address]  [place] [id number] [phone]    PCP: Rell Plummer DO  Cardiologist: Dr Katherine Villarreal of recommendations  Continue the current plan  Salt restricted diet  Encouraged hydration, use of protein supplements   I encouraged her to use the walker versus a cane  Torsemide p r n  only  Follow up will be scheduled with Dr William Deras 3-4 months      Assessment/plan  Chronic heart failure preserved ejection fraction  Wt Readings from Last 3 Encounters:   08/15/22 59 4 kg (131 lb)   07/18/22 56 3 kg (124 lb 3 2 oz)   07/07/22 56 9 kg (125 lb 6 oz)     -beta-blocker:   Toprol 12 5 mg daily  --Diuretic:   torsemide 10 mg MWF since DC 6/27/22  This was held 7/6/22 due to hypotension  7/7/22: Will make diuretic p r n  only-given her reduced p o  intake, and recurrent falls  --ACE/ARB/ARNI:    --MRA:   --SLGT2I  --2 g sodium diet, 1800 cc fluid restriction  Daily weights, call weight gain 2-3 lb in 1 day or 5 lb in 5 days  Coronary artery calcifications, mild noted on CTA 6/21/22  -EKG today sinus rhythm 81 beats per minute with frequent PVCs and a pattern of bigeminy  RBBB    LAHB, bifascicular block  -on aspirin, statin, beta-blocker  Hypertension, essential   /58 on metoprolol succinate 12 5 mg daily   Hyperlipidemia, on atorvastatin 10 mg daily  4/14/22: LDL 49  Type 2 diabetes mellitus with gastroparesis  Hemoglobin A1c 5 9  CKD 3 b, baseline creatinine 1 1 - 1 4    last creatinine 1 19, June 27th  Cardiac testing   TTE 4/15/222  EF 50%  Mild global hypokinesis  No LVH  Mild-to-moderate AI  Mild-to-moderate MR  Mild TR  Small to moderate left pleural effusion                  HPI  Chito Trinidad is an 81 yo female with diabetes mellitus, essential hypertension, mixed dyslipidemia  Adm 4/14-4/15/22  CC:  Chest pain, atypical    Chest x-ray: Mild vascular congestion with left lower lobe atelectasis versus effusion  EKG: NSR  NSSTTW changes possible inferior ischemia, right bundle branch block  Troponins - normal  Dx:  Acute heart failure with preserved ejection fraction   Given low-dose IV diuretics  Discharge diuretic torsemide 10 mg daily  Discharge weight : 129 lb  discharge creatinine 1 30  An outpatient Nuclear stress test was ordered    She established cardiology care with Dr Jay Fry following an admission 4/18/22 for atypical, sharp chest pain, worse with rotation and movement  She has had no recurrence  A conservative approach was recommended  He decided to hold off on stress testing  Her blood pressure was well controlled, she was maintained on aspirin and initiated on statin  Follow-up was recommended in 3 months     ER Adm 6/21  Chief complaint:  Chest pain, radiating down her left arm  Headache  CTH: No acute  EKG normal sinus rhythm   Chest x-ray superimposed edema, interstitial edema  Creatinine 1 1  ProBNP 189   Troponin is negative  Elevated D-dimer  CTA: No PE noted within the limits of the test, limited due to motion artifact    Mild coronary artery calcification noted  Given 250 mL of IV fluid  Headache resolved, chest pain resolved  Discharged on aspirin, statin, beta-blocker, ACE-I, torsemide 10 mg daily  Advised follow-up with Cardiology    6/22/22 phone call from PCP  Patient taking torsemide 10 mg 3 times a week    Adm 6/24-6/27/22  CC: mental status change-Incoherent speech reported by her  who found her on the floor at the bedside after a falling while trying to get onto her bed  Found to have VICENTE/creatinine 1 68  Lower extremity cellulitis  Dehydration  CTH : No acute  EKG: Normal sinus rhythm right bundle branch block nonspecific ST T-wave changes, consider inferior ischemia 95 bpm  She did have hypotension, meds adjusted  BC neg after 5 d  Diuretic held  Treated with antibiotics for LE cellulitis  Followed by geriatrics  Due to concern for polypharmacy, metformin and lisinopril discontinued  Amitriptyline dose adjusted  Dx TME  PT OT recommended rehab; discharged home with VNA  Discharge diuretic: torsemide 10 mg Monday/ Wednesday/ Friday , metoprolol succinate 12 5 mg daily  Discharge creatinine 1 19  Discharge weight 128 lb    7/5/22  [facility] note:  Low BP 92/58, 80/50  Patient is asymptomatic  Directed by Cardiology to hold torsemide     7/7/22  Hospital follow-up  She is accompanied by her , family  ROS:  Weak, fatigued  Low back pain, chronic  Ambulatory dysfunction, in a wheelchair  Today, she denied chest pain  Her  reports she has not been complaining of chest pain at home  Her p o  Intake is down  She drinks very little fluid/food  She does eat pretty well for dinner her  reports  Clinically she appears dehydrated  I recommend torsemide only p r n  Gerhardt Sep She likely will not be needing that    Encouraged 60 oz fluid a day including boost/Ensure for protein  Her EKG showed sinus rhythm with PVCs in a ventricular bigeminy, heart rate 81  I would recommend continuing metoprolol succinate 12 5 mg daily  Focus today-- increase her fluid and protein  If she is not eating, or drinking and her clinical status deteriorates, she may need further evaluation in the ED for failure to thrive    7/12/22  Note from PT  Patient fell onto the floor  Hit head-on sulfa and hit her left side  She fell a few days prior  Refused to go to the ED  BP 90/50 heart rate 66   only had a cup of coffee today     has water though unknown how much patient is drinking  patient states she is trying to eat or drink       8/15/22  Close follow up  I am seeing her given scheduling constraints, in lieu of her cardiologist  Since the last visit she has been eating and drinking more  Her blood pressures have been up  Her torsemide is p r n  only  She has not needed to take any  She has had no falls  Her  tells me her rheumatologist recommended she rely on the walker more than the cane  Today, she is with the cane  We had a long discussion about the need for fall prevention  She is going to physical therapy twice a week to enhance mobility  She is also scheduled for a pain injection next month  She has right-sided sciatica  111/62 automated, by me  122/58  By the MA  Her weight is up to 131 lb however I feel this is caloric weight  She is euvolemic on exam      I have spent 25 minutes with Patient and family today in which greater than 50% of this time was spent in counseling/coordination of care regarding Patient and family education, Importance of tx compliance, Risk factor reductions and Impressions    Assessment  Diagnoses and all orders for this visit:    Chronic diastolic heart failure (HCC)    Stage 3b chronic kidney disease ([facility] )    Dyslipidemia          Past Medical History:   Diagnosis Date    Anemia     Arthritis     Back pain     CHF (congestive heart failure) (HCC)     Chronic kidney disease     Stage 3b    Diabetes ([facility] )     Diabetes mellitus ([facility] )     Diabetic gastroparesis associated with type 2 diabetes mellitus ([facility] )     Diabetic polyneuropathy ([facility] )     Diverticulosis     Herpes zoster     Hypertension     IBS (irritable bowel syndrome)     Neurogenic claudication due to lumbar spinal stenosis     Osteoarthritis     Osteoporosis     Pulmonary edema     Seronegative arthropathy of multiple sites [facility])        Review of Systems   Constitutional: Negative for chills and malaise/fatigue  Cardiovascular: Negative for chest pain, claudication, cyanosis, dyspnea on exertion, irregular heartbeat, leg swelling, near-syncope, orthopnea, palpitations, paroxysmal nocturnal dyspnea and syncope  Respiratory: Negative for cough and shortness of breath  Musculoskeletal: Positive for back pain  Right-sided sciatica   Gastrointestinal: Negative for heartburn and nausea  Neurological: Negative for dizziness, focal weakness, headaches, light-headedness and weakness  All other systems reviewed and are negative  Allergies   Allergen Reactions    Morphine Other (See Comments)     urinary retention    Ciprofloxacin Rash and Nausea Only           Current Outpatient Medications:     amitriptyline (ELAVIL) 10 mg tablet, Take 1 tablet (10 mg total) by mouth daily at bedtime for 14 days, Disp: 14 tablet, Rfl: 0    aspirin 81 mg chewable tablet, Chew 1 tablet (81 mg total) daily, Disp: 30 tablet, Rfl: 0    atorvastatin (LIPITOR) 10 mg tablet, Take 1 tablet (10 mg total) by mouth daily (Patient taking differently: Take 10 mg by mouth every evening), Disp: 90 tablet, Rfl: 3    Cholecalciferol (VITAMIN D3) 1000 units CAPS, Take 1 capsule by mouth daily , Disp: , Rfl:     diphenoxylate-atropine (LOMOTIL) 2 5-0 025 mg per tablet, Take 1 tablet by mouth if needed, Disp: , Rfl:     DULoxetine (CYMBALTA) 60 mg delayed release capsule, TAKE ONE CAPSULE BY MOUTH EVERY DAY (Patient taking differently: Take 60 mg by mouth every morning), Disp: 30 capsule, Rfl: 5    hydroxychloroquine (PLAQUENIL) 200 mg tablet, Take 1 tablet (200 mg total) by mouth daily with breakfast, Disp: 90 tablet, Rfl: 1    Magnesium 250 MG TABS, Take 250 mg by mouth every evening, Disp: , Rfl:     metoprolol succinate (TOPROL-XL) 25 mg 24 hr tablet, Take 0 5 tablets (12 5 mg total) by mouth daily, Disp: 30 tablet, Rfl: 0    Mirabegron ER (Myrbetriq) 50 MG TB24, Take 1 tablet (50 mg total) by mouth daily (Patient taking differently: Take 50 mg by mouth every evening), Disp: 30 tablet, Rfl: 11    omeprazole (PriLOSEC) 20 mg delayed release capsule, Take 20 mg by mouth daily  , Disp: , Rfl:     pregabalin (LYRICA) 75 mg capsule, Take 50 mg by mouth 2 (two) times a day, Disp: , Rfl:     pyridoxine (B-6) 100 MG tablet, Take 100 mg by mouth daily, Disp: , Rfl:     Sodium Fluoride (PreviDent 5000 Booster Plus) 1 1 % PSTE, Apply on teeth every evening as directed, Disp: 100 mL, Rfl: 3    nitroglycerin (NITROSTAT) 0 4 mg SL tablet, Place 1 tablet (0 4 mg total) under the tongue every 5 (five) minutes as needed for chest pain for up to 5 days (Patient not taking: Reported on 2022), Disp: 5 tablet, Rfl: 0    torsemide (DEMADEX) 10 mg tablet, Take 1 tablet PRN for wt gain 3 lb/ 3 days or 5 lb/ 5 aria (Patient not taking: No sig reported), Disp: 30 tablet, Rfl: 0    Current Facility-Administered Medications:     denosumab (PROLIA) subcutaneous injection 60 mg, 60 mg, Subcutaneous, Q6 Months, [employer], PA-C, 60 mg at 22 1831        Social History     Socioeconomic History    Marital status: /Civil Union     Spouse name: Not on file    Number of children: Not on file    Years of education: Not on file    Highest education level: Not on file   Occupational History    Not on file   Tobacco Use    Smoking status: Former Smoker     Types: Cigarettes     Quit date:      Years since quittin 6    Smokeless tobacco: Never Used   Vaping Use    Vaping Use: Never used   Substance and Sexual Activity    Alcohol use: No    Drug use: No    Sexual activity: Not Currently     Partners: Male     Birth control/protection: None   Other Topics Concern    Not on file   Social History Narrative    Not on file     Social Determinants of Health     Financial Resource Strain: Not on file   Food Insecurity: Not on file   Transportation Needs: Not on file   Physical Activity: Not on file   Stress: Not on file   Social Connections: Not on file   Intimate Partner Violence: Not on file   Housing Stability: Not on file       Family History   Problem Relation Age of Onset    Cancer Brother     Diabetes Mother     Hypertension Father     Heart disease Father        Physical Exam  Vitals and nursing note reviewed  Constitutional:       General: She is not in acute distress  Appearance: She is not diaphoretic  HENT:      Head: Normocephalic and atraumatic  Eyes:      Conjunctiva/sclera: Conjunctivae normal    Cardiovascular:      Rate and Rhythm: Normal rate and regular rhythm  Pulses: Intact distal pulses  Heart sounds: Murmur heard  High-pitched blowing holosystolic murmur is present with a grade of 2/6 at the apex  Pulmonary:      Effort: Pulmonary effort is normal       Breath sounds: Normal breath sounds  Abdominal:      General: Bowel sounds are normal       Palpations: Abdomen is soft  Musculoskeletal:         General: Normal range of motion  Cervical back: Normal range of motion and neck supple  Skin:     General: Skin is warm and dry  Neurological:      Mental Status: She is alert and oriented to person, place, and time  Vitals: Blood pressure 122/58, pulse 98, height 4' 11" (1 499 m), weight 59 4 kg (131 lb), SpO2 100 %, not currently breastfeeding     Wt Readings from Last 3 Encounters:   08/15/22 59 4 kg (131 lb)   07/18/22 56 3 kg (124 lb 3 2 oz)   07/07/22 56 9 kg (125 lb 6 oz)         Labs & Results:  Lab Results   Component Value Date    WBC 6 46 08/03/2022    HGB 11 7 08/03/2022    HCT 37 6 08/03/2022    MCV 99 (H) 08/03/2022     08/03/2022     BNP   Date Value Ref Range Status   06/21/2022 189 (H) 0 - 100 pg/mL Final     No components found for: CHEM  Total CK   Date Value Ref Range Status   2017 81 26 - 192 U/L Final     Troponin I   Date Value Ref Range Status   2021 0 02 <=0 04 ng/mL Final     Comment:     [employer] Chemistry analyzer 99% cutoff is > 0 04 ng/mL in network labs     o cTnI 99% cutoff is useful only when applied to patients in the clinical setting of myocardial ischemia   o cTnI 99% cutoff should be interpreted in the context of clinical history, ECG findings and possibly cardiac imaging to establish correct diagnosis  o cTnI 99% cutoff may be suggestive but clearly not indicative of a coronary event without the clinical setting of myocardial ischemia  Results for orders placed during the hospital encounter of 16    Echo complete with contrast if indicated    Narrative  [place] [address], [address] [facility]  [phone]    Transthoracic Echocardiogram  2D, M-mode, Doppler, and Color Doppler    Study date:  23-Dec-2016    Patient: Gisele Painting  MR number: [id number]  Account number: [de-identified]  : 1934  Age: 80 years  Gender: Female  Status: Inpatient  Location: Bedside  Height: 59 in  Weight: 155 8 lb  BP: 131/ 65 mmHg    Indications: Shortness of breath  Diagnoses: R06 02 - Shortness of breath    Sonographer:  STORM Huff  Primary Physician:  Daivd Heimlich, DO  Referring Physician:  Eber Joseph MD MPH  Group:  [facility]  Interpreting Physician:  Lois Gunn MD    SUMMARY    LEFT VENTRICLE:  Systolic function was normal  Ejection fraction was estimated to be 60 %  There were no regional wall motion abnormalities  There was mild concentric hypertrophy  Doppler parameters were consistent with abnormal left ventricular relaxation  (grade 1 diastolic dysfunction)  AORTIC VALVE:  There was mild regurgitation  HISTORY: PRIOR HISTORY: hypertension, diabetes, CHF    PROCEDURE: The procedure was performed at the bedside   This was a routine  study  The transthoracic approach was used  The study included complete 2D  imaging, M-mode, complete spectral Doppler, and color Doppler  Image quality  was adequate  LEFT VENTRICLE: Size was normal  Systolic function was normal  Ejection  fraction was estimated to be 60 %  There were no regional wall motion  abnormalities  Wall thickness was mildly increased  There was mild concentric  hypertrophy  DOPPLER: There was an increased relative contribution of atrial  contraction to ventricular filling  Doppler parameters were consistent with  abnormal left ventricular relaxation (grade 1 diastolic dysfunction)  RIGHT VENTRICLE: The size was normal  Systolic function was normal  Wall  thickness was normal     LEFT ATRIUM: Size was normal     RIGHT ATRIUM: Size was normal     MITRAL VALVE: Valve structure was normal  There was normal leaflet separation  DOPPLER: The transmitral velocity was within the normal range  There was no  evidence for stenosis  There was trace regurgitation  AORTIC VALVE: The valve was trileaflet  Leaflets exhibited normal thickness and  normal cuspal separation  DOPPLER: Transaortic velocity was within the normal  range  There was no evidence for stenosis  There was mild regurgitation  TRICUSPID VALVE: The valve structure was normal  There was normal leaflet  separation  DOPPLER: The transtricuspid velocity was within the normal range  There was no evidence for stenosis  There was trace regurgitation  Pulmonary  artery systolic pressure was within the normal range  Estimated peak PA  pressure was 18 mmHg  PULMONIC VALVE: Leaflets exhibited normal thickness, no calcification, and  normal cuspal separation  DOPPLER: The transpulmonic velocity was within the  normal range  There was trace regurgitation  PERICARDIUM: There was no pericardial effusion  The pericardium was normal in  appearance  AORTA: The root exhibited normal size      SYSTEMIC VEINS: IVC: The inferior vena cava was normal in size  Respirophasic  changes were normal     SYSTEM MEASUREMENT TABLES    2D  %FS: 27 29 %  AV Diam: 2 58 cm  EDV(Teich): 57 32 ml  EF(Cube): 61 56 %  EF(Teich): 53 96 %  ESV(Cube): 19 13 ml  ESV(Teich): 26 39 ml  IVSd: 1 31 cm  LA Area: 12 85 cm2  LA Diam: 3 81 cm  LVEDV MOD A4C: 54 12 ml  LVEF MOD A4C: 40 47 %  LVESV MOD A4C: 32 22 ml  LVIDd: 3 68 cm  LVIDs: 2 67 cm  LVLd A4C: 7 13 cm  LVLs A4C: 6 02 cm  LVPWd: 1 36 cm  RA Area: 9 74 cm2  RV Diam: 2 74 cm  SI(Cube): 18 46 ml/m2  SI(Teich): 18 63 ml/m2  SV MOD A4C: 21 9 ml  SV(Cube): 30 64 ml  SV(Teich): 30 93 ml    CW  AR Dec [place]: 2 39 m/s2  AR Dec Time: 1677 2 ms  AR PHT: 486 39 ms  AR Vmax: 4 m/s  AR maxP 14 mmHg  TR MaxP 41 mmHg  TR Vmax: 2 03 m/s    MM  TAPSE: 1 64 cm    PW  E': 0 09 m/s    [employer] Accredited Echocardiography Laboratory    Prepared and electronically signed by    Mariela Johnson MD  Signed 23-Dec-2016 11:56:38    No results found for this or any previous visit  This note was completed in part utilizing m-Raise direct voice recognition software  Grammatical errors, random word insertion, spelling mistakes, and incomplete sentences may be an occasional consequence of the system secondary to software limitations, ambient noise and hardware issues  At the time of dictation, efforts were made to edit, clarify and /or correct errors  Please read the chart carefully and recognize, using context, where substitutions have occurred    If you have any questions or concerns about the context, text or information contained within the body of this dictation, please contact myself, the provider, for further clarification

## 2022-08-26 DIAGNOSIS — R07.9 CHEST PAIN: ICD-10-CM

## 2022-08-26 RX ORDER — METOPROLOL SUCCINATE 25 MG/1
12.5 TABLET, EXTENDED RELEASE ORAL DAILY
Qty: 45 TABLET | Refills: 3 | Status: SHIPPED | OUTPATIENT
Start: 2022-08-26

## 2022-08-30 ENCOUNTER — TELEPHONE (OUTPATIENT)
Dept: UROLOGY | Facility: MEDICAL CENTER | Age: 87
End: 2022-08-30

## 2022-08-30 NOTE — TELEPHONE ENCOUNTER
Mr  Rona Norwood called me directly stating he got some sort of pharmacy coverage for his wife last year and is was "[employer] "  He chose NOT to renew it and wants to make application for [employer] Patient Assistance Program for her Myrbetriq 50mg for 2022-23  Paperwork was received, completed and signed by Dr Broderick Kaye (as she was the only on-site provider available for approval)  Application was faxed to [phone]  Patient aware of same  A copy of the application and the confirmation were emailed to Mr Rona Norwood for his records

## 2022-08-31 NOTE — TELEPHONE ENCOUNTER
Prior Authorization for Myrbetriq 50mg was APPROVED and valid from 8/30/22 until 12/31/22  Patient's  notified of same

## 2022-10-18 PROBLEM — M53.3 SI (SACROILIAC) JOINT DYSFUNCTION: Status: ACTIVE | Noted: 2022-10-18

## 2022-11-30 ENCOUNTER — HOSPITAL ENCOUNTER (OUTPATIENT)
Dept: RADIOLOGY | Facility: HOSPITAL | Age: 87
Discharge: HOME | End: 2022-11-30

## 2022-11-30 DIAGNOSIS — M25.551 RIGHT HIP PAIN: ICD-10-CM

## 2023-01-23 ENCOUNTER — APPOINTMENT (OUTPATIENT)
Dept: LAB | Facility: CLINIC | Age: 88
End: 2023-01-23

## 2023-01-23 DIAGNOSIS — M06.09 SERONEGATIVE ARTHROPATHY OF MULTIPLE SITES (HCC): ICD-10-CM

## 2023-01-23 DIAGNOSIS — M81.0 SENILE OSTEOPOROSIS: ICD-10-CM

## 2023-01-23 LAB
ALBUMIN SERPL BCP-MCNC: 3.8 G/DL (ref 3.5–5)
ALP SERPL-CCNC: 105 U/L (ref 34–104)
ALT SERPL W P-5'-P-CCNC: 61 U/L (ref 7–52)
ANION GAP SERPL CALCULATED.3IONS-SCNC: 6 MMOL/L (ref 4–13)
AST SERPL W P-5'-P-CCNC: 63 U/L (ref 13–39)
BASOPHILS # BLD AUTO: 0.04 THOUSANDS/ÂΜL (ref 0–0.1)
BASOPHILS NFR BLD AUTO: 1 % (ref 0–1)
BILIRUB SERPL-MCNC: 0.48 MG/DL (ref 0.2–1)
BUN SERPL-MCNC: 26 MG/DL (ref 5–25)
CALCIUM SERPL-MCNC: 9.8 MG/DL (ref 8.4–10.2)
CHLORIDE SERPL-SCNC: 104 MMOL/L (ref 96–108)
CO2 SERPL-SCNC: 29 MMOL/L (ref 21–32)
CREAT SERPL-MCNC: 1.34 MG/DL (ref 0.6–1.3)
EOSINOPHIL # BLD AUTO: 0.31 THOUSAND/ÂΜL (ref 0–0.61)
EOSINOPHIL NFR BLD AUTO: 4 % (ref 0–6)
ERYTHROCYTE [DISTWIDTH] IN BLOOD BY AUTOMATED COUNT: 14.6 % (ref 11.6–15.1)
ERYTHROCYTE [SEDIMENTATION RATE] IN BLOOD: 9 MM/HOUR (ref 0–29)
GFR SERPL CREATININE-BSD FRML MDRD: 35 ML/MIN/1.73SQ M
GLUCOSE SERPL-MCNC: 117 MG/DL (ref 65–140)
HCT VFR BLD AUTO: 38.1 % (ref 34.8–46.1)
HGB BLD-MCNC: 11.7 G/DL (ref 11.5–15.4)
IMM GRANULOCYTES # BLD AUTO: 0.06 THOUSAND/UL (ref 0–0.2)
IMM GRANULOCYTES NFR BLD AUTO: 1 % (ref 0–2)
LYMPHOCYTES # BLD AUTO: 1.26 THOUSANDS/ÂΜL (ref 0.6–4.47)
LYMPHOCYTES NFR BLD AUTO: 17 % (ref 14–44)
MCH RBC QN AUTO: 31.3 PG (ref 26.8–34.3)
MCHC RBC AUTO-ENTMCNC: 30.7 G/DL (ref 31.4–37.4)
MCV RBC AUTO: 102 FL (ref 82–98)
MONOCYTES # BLD AUTO: 0.93 THOUSAND/ÂΜL (ref 0.17–1.22)
MONOCYTES NFR BLD AUTO: 12 % (ref 4–12)
NEUTROPHILS # BLD AUTO: 4.99 THOUSANDS/ÂΜL (ref 1.85–7.62)
NEUTS SEG NFR BLD AUTO: 65 % (ref 43–75)
NRBC BLD AUTO-RTO: 0 /100 WBCS
PLATELET # BLD AUTO: 159 THOUSANDS/UL (ref 149–390)
PMV BLD AUTO: 10.9 FL (ref 8.9–12.7)
POTASSIUM SERPL-SCNC: 4.4 MMOL/L (ref 3.5–5.3)
PROT SERPL-MCNC: 6.5 G/DL (ref 6.4–8.4)
RBC # BLD AUTO: 3.74 MILLION/UL (ref 3.81–5.12)
SODIUM SERPL-SCNC: 139 MMOL/L (ref 135–147)
WBC # BLD AUTO: 7.59 THOUSAND/UL (ref 4.31–10.16)

## 2023-02-15 ENCOUNTER — APPOINTMENT (OUTPATIENT)
Dept: LAB | Facility: CLINIC | Age: 88
End: 2023-02-15

## 2023-02-15 DIAGNOSIS — R94.5 ABNORMAL LIVER FUNCTION: ICD-10-CM

## 2023-02-15 DIAGNOSIS — R82.71 BACTERIURIA, ASYMPTOMATIC: ICD-10-CM

## 2023-02-15 LAB
ALBUMIN SERPL BCP-MCNC: 3.8 G/DL (ref 3.5–5)
ALP SERPL-CCNC: 104 U/L (ref 34–104)
ALT SERPL W P-5'-P-CCNC: 45 U/L (ref 7–52)
AST SERPL W P-5'-P-CCNC: 50 U/L (ref 13–39)
BILIRUB DIRECT SERPL-MCNC: 0.07 MG/DL (ref 0–0.2)
BILIRUB SERPL-MCNC: 0.45 MG/DL (ref 0.2–1)
PROT SERPL-MCNC: 6.8 G/DL (ref 6.4–8.4)

## 2023-02-17 LAB
BACTERIA UR CULT: ABNORMAL
BACTERIA UR CULT: ABNORMAL

## 2023-02-20 ENCOUNTER — APPOINTMENT (EMERGENCY)
Dept: ULTRASOUND IMAGING | Facility: HOSPITAL | Age: 88
End: 2023-02-20

## 2023-02-20 ENCOUNTER — HOSPITAL ENCOUNTER (INPATIENT)
Facility: HOSPITAL | Age: 88
LOS: 4 days | Discharge: HOME | End: 2023-02-24
Attending: EMERGENCY MEDICINE | Admitting: INTERNAL MEDICINE

## 2023-02-20 ENCOUNTER — APPOINTMENT (EMERGENCY)
Dept: CT IMAGING | Facility: HOSPITAL | Age: 88
End: 2023-02-20

## 2023-02-20 ENCOUNTER — APPOINTMENT (EMERGENCY)
Dept: RADIOLOGY | Facility: HOSPITAL | Age: 88
End: 2023-02-20

## 2023-02-20 DIAGNOSIS — R07.9 CHEST PAIN, UNSPECIFIED: Primary | ICD-10-CM
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
## 2023-02-20 DIAGNOSIS — E83.42 HYPOMAGNESEMIA: ICD-10-CM

## 2023-02-20 DIAGNOSIS — E87.1 HYPONATREMIA: ICD-10-CM

## 2023-02-20 DIAGNOSIS — R77.8 ELEVATED TROPONIN: ICD-10-CM

## 2023-02-20 DIAGNOSIS — R74.01 TRANSAMINITIS: ICD-10-CM

## 2023-02-20 DIAGNOSIS — R05.8 DRY COUGH: ICD-10-CM

## 2023-02-20 DIAGNOSIS — R79.89 ELEVATED SERUM CREATININE: ICD-10-CM

## 2023-02-20 DIAGNOSIS — E11.9 TYPE 2 DIABETES MELLITUS WITHOUT COMPLICATION, WITHOUT LONG-TERM CURRENT USE OF INSULIN (HCC): ICD-10-CM

## 2023-02-20 DIAGNOSIS — E11.42 DIABETIC POLYNEUROPATHY ASSOCIATED WITH TYPE 2 DIABETES MELLITUS (HCC): ICD-10-CM

## 2023-02-20 DIAGNOSIS — G89.4 CHRONIC PAIN SYNDROME: ICD-10-CM

## 2023-02-20 DIAGNOSIS — N39.0 UTI (URINARY TRACT INFECTION): ICD-10-CM

## 2023-02-20 DIAGNOSIS — N32.81 OAB (OVERACTIVE BLADDER): ICD-10-CM

## 2023-02-20 LAB
2HR DELTA HS TROPONIN: -10 NG/L
ALBUMIN SERPL BCP-MCNC: 3.5 G/DL (ref 3.5–5)
ALP SERPL-CCNC: 214 U/L (ref 34–104)
ALT SERPL W P-5'-P-CCNC: 80 U/L (ref 7–52)
ANION GAP SERPL CALCULATED.3IONS-SCNC: 14 MMOL/L (ref 4–13)
APAP SERPL-MCNC: <10 UG/ML (ref 10–20)
APTT PPP: 31 SECONDS (ref 23–37)
AST SERPL W P-5'-P-CCNC: 116 U/L (ref 13–39)
BASOPHILS # BLD AUTO: 0.02 THOUSANDS/ÂΜL (ref 0–0.1)
BASOPHILS NFR BLD AUTO: 0 % (ref 0–1)
BILIRUB SERPL-MCNC: 0.95 MG/DL (ref 0.2–1)
BUN SERPL-MCNC: 44 MG/DL (ref 5–25)
CALCIUM SERPL-MCNC: 8.8 MG/DL (ref 8.4–10.2)
CARDIAC TROPONIN I PNL SERPL HS: 72 NG/L
CARDIAC TROPONIN I PNL SERPL HS: 82 NG/L
CHLORIDE SERPL-SCNC: 96 MMOL/L (ref 96–108)
CO2 SERPL-SCNC: 22 MMOL/L (ref 21–32)
CREAT SERPL-MCNC: 1.86 MG/DL (ref 0.6–1.3)
EOSINOPHIL # BLD AUTO: 0.64 THOUSAND/ÂΜL (ref 0–0.61)
EOSINOPHIL NFR BLD AUTO: 7 % (ref 0–6)
ERYTHROCYTE [DISTWIDTH] IN BLOOD BY AUTOMATED COUNT: 13.3 % (ref 11.6–15.1)
GFR SERPL CREATININE-BSD FRML MDRD: 23 ML/MIN/1.73SQ M
GLUCOSE SERPL-MCNC: 121 MG/DL (ref 65–140)
HCT VFR BLD AUTO: 41.8 % (ref 34.8–46.1)
HGB BLD-MCNC: 13.3 G/DL (ref 11.5–15.4)
IMM GRANULOCYTES # BLD AUTO: 0.05 THOUSAND/UL (ref 0–0.2)
IMM GRANULOCYTES NFR BLD AUTO: 1 % (ref 0–2)
INR PPP: 1.2 (ref 0.84–1.19)
LYMPHOCYTES # BLD AUTO: 0.38 THOUSANDS/ÂΜL (ref 0.6–4.47)
LYMPHOCYTES NFR BLD AUTO: 4 % (ref 14–44)
MAGNESIUM SERPL-MCNC: 1.6 MG/DL (ref 1.9–2.7)
MCH RBC QN AUTO: 32.2 PG (ref 26.8–34.3)
MCHC RBC AUTO-ENTMCNC: 31.8 G/DL (ref 31.4–37.4)
MCV RBC AUTO: 101 FL (ref 82–98)
MONOCYTES # BLD AUTO: 0.6 THOUSAND/ÂΜL (ref 0.17–1.22)
MONOCYTES NFR BLD AUTO: 7 % (ref 4–12)
NEUTROPHILS # BLD AUTO: 7.56 THOUSANDS/ÂΜL (ref 1.85–7.62)
NEUTS SEG NFR BLD AUTO: 81 % (ref 43–75)
NRBC BLD AUTO-RTO: 0 /100 WBCS
PLATELET # BLD AUTO: 141 THOUSANDS/UL (ref 149–390)
PMV BLD AUTO: 11.4 FL (ref 8.9–12.7)
POTASSIUM SERPL-SCNC: 4 MMOL/L (ref 3.5–5.3)
PROT SERPL-MCNC: 6.6 G/DL (ref 6.4–8.4)
PROTHROMBIN TIME: 15.4 SECONDS (ref 11.6–14.5)
RBC # BLD AUTO: 4.13 MILLION/UL (ref 3.81–5.12)
SODIUM SERPL-SCNC: 132 MMOL/L (ref 135–147)
WBC # BLD AUTO: 9.25 THOUSAND/UL (ref 4.31–10.16)

## 2023-02-20 RX ORDER — MAGNESIUM SULFATE HEPTAHYDRATE 40 MG/ML
2 INJECTION, SOLUTION INTRAVENOUS ONCE
Status: COMPLETED | OUTPATIENT
Start: 2023-02-20 | End: 2023-02-21

## 2023-02-20 RX ORDER — SODIUM CHLORIDE, SODIUM GLUCONATE, SODIUM ACETATE, POTASSIUM CHLORIDE, MAGNESIUM CHLORIDE, SODIUM PHOSPHATE, DIBASIC, AND POTASSIUM PHOSPHATE .53; .5; .37; .037; .03; .012; .00082 G/100ML; G/100ML; G/100ML; G/100ML; G/100ML; G/100ML; G/100ML
1000 INJECTION, SOLUTION INTRAVENOUS ONCE
Status: COMPLETED | OUTPATIENT
Start: 2023-02-20 | End: 2023-02-20

## 2023-02-20 RX ORDER — ASPIRIN 325 MG
325 TABLET ORAL ONCE
Status: COMPLETED | OUTPATIENT
Start: 2023-02-20 | End: 2023-02-20

## 2023-02-20 RX ADMIN — SODIUM CHLORIDE, SODIUM GLUCONATE, SODIUM ACETATE, POTASSIUM CHLORIDE, MAGNESIUM CHLORIDE, SODIUM PHOSPHATE, DIBASIC, AND POTASSIUM PHOSPHATE 1000 ML: .53; .5; .37; .037; .03; .012; .00082 INJECTION, SOLUTION INTRAVENOUS at 21:29

## 2023-02-20 RX ADMIN — MAGNESIUM SULFATE HEPTAHYDRATE 2 G: 40 INJECTION, SOLUTION INTRAVENOUS at 23:39

## 2023-02-20 RX ADMIN — ASPIRIN 325 MG ORAL TABLET 325 MG: 325 PILL ORAL at 22:42

## 2023-02-21 ENCOUNTER — APPOINTMENT (INPATIENT)
Dept: RADIOLOGY | Facility: HOSPITAL | Age: 88
End: 2023-02-21

## 2023-02-21 ENCOUNTER — APPOINTMENT (INPATIENT)
Dept: NON INVASIVE DIAGNOSTICS | Facility: HOSPITAL | Age: 88
End: 2023-02-21

## 2023-02-21 PROBLEM — R79.89 ELEVATED LFTS: Status: ACTIVE | Noted: 2023-02-21

## 2023-02-21 PROBLEM — N17.9 ACUTE RENAL FAILURE SUPERIMPOSED ON STAGE 3B CHRONIC KIDNEY DISEASE (HCC): Status: ACTIVE | Noted: 2023-02-21

## 2023-02-21 PROBLEM — N18.32 ACUTE RENAL FAILURE SUPERIMPOSED ON STAGE 3B CHRONIC KIDNEY DISEASE (HCC): Status: ACTIVE | Noted: 2023-02-21

## 2023-02-21 PROBLEM — R05.8 DRY COUGH: Status: ACTIVE | Noted: 2023-02-21

## 2023-02-21 PROBLEM — R26.2 AMBULATORY DYSFUNCTION: Status: ACTIVE | Noted: 2023-02-21

## 2023-02-21 LAB
4HR DELTA HS TROPONIN: -10 NG/L
ALBUMIN SERPL BCP-MCNC: 2.8 G/DL (ref 3.5–5)
ALP SERPL-CCNC: 217 U/L (ref 34–104)
ALT SERPL W P-5'-P-CCNC: 77 U/L (ref 7–52)
ANION GAP SERPL CALCULATED.3IONS-SCNC: 9 MMOL/L (ref 4–13)
AORTIC ROOT: 3 CM
AORTIC VALVE MEAN VELOCITY: 16.9 M/S
APICAL FOUR CHAMBER EJECTION FRACTION: 51 %
ASCENDING AORTA: 3.1 CM
AST SERPL W P-5'-P-CCNC: 116 U/L (ref 13–39)
AV AREA BY CONTINUOUS VTI: 1.3 CM2
AV AREA PEAK VELOCITY: 1.2 CM2
AV LVOT MEAN GRADIENT: 1 MMHG
AV LVOT PEAK GRADIENT: 3 MMHG
AV MEAN GRADIENT: 12 MMHG
AV PEAK GRADIENT: 19 MMHG
AV REGURGITATION PRESSURE HALF TIME: 250 MS
AV VALVE AREA: 1.3 CM2
AV VELOCITY RATIO: 0.38
BACTERIA UR QL AUTO: ABNORMAL /HPF
BILIRUB SERPL-MCNC: 0.95 MG/DL (ref 0.2–1)
BILIRUB UR QL STRIP: NEGATIVE
BNP SERPL-MCNC: 153 PG/ML (ref 0–100)
BUN SERPL-MCNC: 42 MG/DL (ref 5–25)
CALCIUM ALBUM COR SERPL-MCNC: 9.1 MG/DL (ref 8.3–10.1)
CALCIUM SERPL-MCNC: 8.1 MG/DL (ref 8.4–10.2)
CARDIAC TROPONIN I PNL SERPL HS: 72 NG/L
CHLORIDE SERPL-SCNC: 99 MMOL/L (ref 96–108)
CLARITY UR: CLEAR
CO2 SERPL-SCNC: 25 MMOL/L (ref 21–32)
COLOR UR: YELLOW
CREAT SERPL-MCNC: 1.51 MG/DL (ref 0.6–1.3)
DOP CALC AO PEAK VEL: 2.16 M/S
DOP CALC AO VTI: 40.38 CM
DOP CALC LVOT AREA: 3.14 CM2
DOP CALC LVOT DIAMETER: 2 CM
DOP CALC LVOT PEAK VEL VTI: 16.74 CM
DOP CALC LVOT PEAK VEL: 0.81 M/S
DOP CALC LVOT STROKE INDEX: 35 ML/M2
DOP CALC LVOT STROKE VOLUME: 52.56
E WAVE DECELERATION TIME: 222 MS
ERYTHROCYTE [DISTWIDTH] IN BLOOD BY AUTOMATED COUNT: 13.2 % (ref 11.6–15.1)
FLUAV RNA RESP QL NAA+PROBE: NEGATIVE
FLUBV RNA RESP QL NAA+PROBE: NEGATIVE
FRACTIONAL SHORTENING: 29 (ref 28–44)
GFR SERPL CREATININE-BSD FRML MDRD: 30 ML/MIN/1.73SQ M
GLUCOSE SERPL-MCNC: 70 MG/DL (ref 65–140)
GLUCOSE SERPL-MCNC: 80 MG/DL (ref 65–140)
GLUCOSE SERPL-MCNC: 82 MG/DL (ref 65–140)
GLUCOSE SERPL-MCNC: 95 MG/DL (ref 65–140)
GLUCOSE SERPL-MCNC: 96 MG/DL (ref 65–140)
GLUCOSE UR STRIP-MCNC: NEGATIVE MG/DL
HCT VFR BLD AUTO: 36.1 % (ref 34.8–46.1)
HGB BLD-MCNC: 11.6 G/DL (ref 11.5–15.4)
HGB UR QL STRIP.AUTO: NEGATIVE
INTERVENTRICULAR SEPTUM IN DIASTOLE (PARASTERNAL SHORT AXIS VIEW): 1.1 CM
INTERVENTRICULAR SEPTUM: 1.1 CM (ref 0.6–1.1)
KETONES UR STRIP-MCNC: NEGATIVE MG/DL
LAAS-AP2: 13.2 CM2
LAAS-AP4: 11.1 CM2
LEFT ATRIUM AREA SYSTOLE SINGLE PLANE A4C: 11.1 CM2
LEFT ATRIUM SIZE: 3.4 CM
LEFT INTERNAL DIMENSION IN SYSTOLE: 2.9 CM (ref 2.1–4)
LEFT VENTRICULAR INTERNAL DIMENSION IN DIASTOLE: 4.1 CM (ref 3.5–6)
LEFT VENTRICULAR POSTERIOR WALL IN END DIASTOLE: 0.7 CM
LEFT VENTRICULAR STROKE VOLUME: 40 ML
LEUKOCYTE ESTERASE UR QL STRIP: ABNORMAL
LVSV (TEICH): 40 ML
MAGNESIUM SERPL-MCNC: 2.2 MG/DL (ref 1.9–2.7)
MCH RBC QN AUTO: 32 PG (ref 26.8–34.3)
MCHC RBC AUTO-ENTMCNC: 32.1 G/DL (ref 31.4–37.4)
MCV RBC AUTO: 99 FL (ref 82–98)
MV PEAK A VEL: 0.78 M/S
MV PEAK E VEL: 59 CM/S
MV STENOSIS PRESSURE HALF TIME: 64 MS
MV VALVE AREA P 1/2 METHOD: 3.44
NITRITE UR QL STRIP: NEGATIVE
NON-SQ EPI CELLS URNS QL MICRO: ABNORMAL /HPF
NT-PROBNP SERPL-MCNC: 6328 PG/ML
OSMOLALITY UR/SERPL-RTO: 304 MMOL/KG (ref 282–298)
OSMOLALITY UR: 357 MMOL/KG
PH UR STRIP.AUTO: 5.5 [PH]
PLATELET # BLD AUTO: 119 THOUSANDS/UL (ref 149–390)
PMV BLD AUTO: 11.7 FL (ref 8.9–12.7)
POTASSIUM SERPL-SCNC: 3.8 MMOL/L (ref 3.5–5.3)
PROCALCITONIN SERPL-MCNC: 1.79 NG/ML
PROT SERPL-MCNC: 5.3 G/DL (ref 6.4–8.4)
PROT UR STRIP-MCNC: ABNORMAL MG/DL
RBC # BLD AUTO: 3.63 MILLION/UL (ref 3.81–5.12)
RBC #/AREA URNS AUTO: ABNORMAL /HPF
RIGHT ATRIAL 2D VOLUME: 19 ML
RIGHT ATRIUM AREA SYSTOLE A4C: 9.4 CM2
RIGHT VENTRICLE ID DIMENSION: 2.9 CM
RSV RNA RESP QL NAA+PROBE: NEGATIVE
SARS-COV-2 RNA RESP QL NAA+PROBE: NEGATIVE
SL CV AV DECELERATION TIME RETROGRADE: 862 MS
SL CV AV PEAK GRADIENT RETROGRADE: 30 MMHG
SL CV LEFT ATRIUM LENGTH A2C: 4.9 CM
SL CV LV EF: 55
SL CV PED ECHO LEFT VENTRICLE DIASTOLIC VOLUME (MOD BIPLANE) 2D: 73 ML
SL CV PED ECHO LEFT VENTRICLE SYSTOLIC VOLUME (MOD BIPLANE) 2D: 33 ML
SODIUM 24H UR-SCNC: 52 MOL/L
SODIUM SERPL-SCNC: 133 MMOL/L (ref 135–147)
SP GR UR STRIP.AUTO: 1.02 (ref 1–1.03)
TR MAX PG: 41 MMHG
TR PEAK VELOCITY: 3.2 M/S
TRICUSPID VALVE PEAK REGURGITATION VELOCITY: 3.21 M/S
UROBILINOGEN UR STRIP-ACNC: <2 MG/DL
WBC # BLD AUTO: 5.9 THOUSAND/UL (ref 4.31–10.16)
WBC #/AREA URNS AUTO: ABNORMAL /HPF

## 2023-02-21 RX ORDER — HYDROXYCHLOROQUINE SULFATE 200 MG/1
200 TABLET, FILM COATED ORAL
Status: DISCONTINUED | OUTPATIENT
Start: 2023-02-22 | End: 2023-02-24 | Stop reason: HOSPADM

## 2023-02-21 RX ORDER — METOPROLOL SUCCINATE 25 MG/1
12.5 TABLET, EXTENDED RELEASE ORAL DAILY
Status: DISCONTINUED | OUTPATIENT
Start: 2023-02-22 | End: 2023-02-24 | Stop reason: HOSPADM

## 2023-02-21 RX ORDER — LANOLIN ALCOHOL/MO/W.PET/CERES
400 CREAM (GRAM) TOPICAL DAILY
Status: DISCONTINUED | OUTPATIENT
Start: 2023-02-21 | End: 2023-02-24 | Stop reason: HOSPADM

## 2023-02-21 RX ORDER — PANTOPRAZOLE SODIUM 40 MG/1
40 TABLET, DELAYED RELEASE ORAL
Status: DISCONTINUED | OUTPATIENT
Start: 2023-02-21 | End: 2023-02-24 | Stop reason: HOSPADM

## 2023-02-21 RX ORDER — ACETAMINOPHEN 325 MG/1
650 TABLET ORAL EVERY 6 HOURS PRN
Status: DISCONTINUED | OUTPATIENT
Start: 2023-02-21 | End: 2023-02-24 | Stop reason: HOSPADM

## 2023-02-21 RX ORDER — PREGABALIN 75 MG/1
75 CAPSULE ORAL
Status: DISCONTINUED | OUTPATIENT
Start: 2023-02-21 | End: 2023-02-21

## 2023-02-21 RX ORDER — SODIUM CHLORIDE 9 MG/ML
50 INJECTION, SOLUTION INTRAVENOUS CONTINUOUS
Status: DISPENSED | OUTPATIENT
Start: 2023-02-21 | End: 2023-02-21

## 2023-02-21 RX ORDER — PREGABALIN 50 MG/1
50 CAPSULE ORAL DAILY
Status: DISCONTINUED | OUTPATIENT
Start: 2023-02-21 | End: 2023-02-24 | Stop reason: HOSPADM

## 2023-02-21 RX ORDER — VANCOMYCIN HYDROCHLORIDE 1 G/200ML
15 INJECTION, SOLUTION INTRAVENOUS ONCE AS NEEDED
Status: DISCONTINUED | OUTPATIENT
Start: 2023-02-22 | End: 2023-02-22 | Stop reason: DRUGHIGH

## 2023-02-21 RX ORDER — HEPARIN SODIUM 5000 [USP'U]/ML
5000 INJECTION, SOLUTION INTRAVENOUS; SUBCUTANEOUS EVERY 8 HOURS SCHEDULED
Status: DISCONTINUED | OUTPATIENT
Start: 2023-02-21 | End: 2023-02-24 | Stop reason: HOSPADM

## 2023-02-21 RX ORDER — ATORVASTATIN CALCIUM 10 MG/1
10 TABLET, FILM COATED ORAL EVERY EVENING
Status: DISCONTINUED | OUTPATIENT
Start: 2023-02-22 | End: 2023-02-24 | Stop reason: HOSPADM

## 2023-02-21 RX ORDER — OXYBUTYNIN CHLORIDE 5 MG/1
10 TABLET, EXTENDED RELEASE ORAL DAILY
Status: DISCONTINUED | OUTPATIENT
Start: 2023-02-21 | End: 2023-02-24 | Stop reason: HOSPADM

## 2023-02-21 RX ORDER — VANCOMYCIN HYDROCHLORIDE 1 G/200ML
15 INJECTION, SOLUTION INTRAVENOUS EVERY 12 HOURS
Status: DISCONTINUED | OUTPATIENT
Start: 2023-02-21 | End: 2023-02-21 | Stop reason: DRUGHIGH

## 2023-02-21 RX ORDER — AMITRIPTYLINE HYDROCHLORIDE 10 MG/1
10 TABLET, FILM COATED ORAL
Status: DISCONTINUED | OUTPATIENT
Start: 2023-02-21 | End: 2023-02-24 | Stop reason: HOSPADM

## 2023-02-21 RX ORDER — INSULIN LISPRO 100 [IU]/ML
1-5 INJECTION, SOLUTION INTRAVENOUS; SUBCUTANEOUS
Status: DISCONTINUED | OUTPATIENT
Start: 2023-02-21 | End: 2023-02-24 | Stop reason: HOSPADM

## 2023-02-21 RX ORDER — NITROFURANTOIN 25; 75 MG/1; MG/1
100 CAPSULE ORAL 2 TIMES DAILY
Status: DISCONTINUED | OUTPATIENT
Start: 2023-02-21 | End: 2023-02-22

## 2023-02-21 RX ORDER — INSULIN LISPRO 100 [IU]/ML
1-5 INJECTION, SOLUTION INTRAVENOUS; SUBCUTANEOUS
Status: DISCONTINUED | OUTPATIENT
Start: 2023-02-21 | End: 2023-02-21

## 2023-02-21 RX ORDER — PREGABALIN 50 MG/1
50 CAPSULE ORAL 3 TIMES DAILY
Status: DISCONTINUED | OUTPATIENT
Start: 2023-02-21 | End: 2023-02-21

## 2023-02-21 RX ORDER — DULOXETIN HYDROCHLORIDE 60 MG/1
60 CAPSULE, DELAYED RELEASE ORAL DAILY
Status: DISCONTINUED | OUTPATIENT
Start: 2023-02-22 | End: 2023-02-24 | Stop reason: HOSPADM

## 2023-02-21 RX ORDER — ASPIRIN 81 MG/1
81 TABLET, CHEWABLE ORAL DAILY
Status: DISCONTINUED | OUTPATIENT
Start: 2023-02-22 | End: 2023-02-24 | Stop reason: HOSPADM

## 2023-02-21 RX ORDER — PREGABALIN 75 MG/1
75 CAPSULE ORAL
Status: DISCONTINUED | OUTPATIENT
Start: 2023-02-21 | End: 2023-02-24 | Stop reason: HOSPADM

## 2023-02-21 RX ADMIN — ACETAMINOPHEN 650 MG: 325 TABLET ORAL at 20:28

## 2023-02-21 RX ADMIN — HEPARIN SODIUM 5000 UNITS: 5000 INJECTION INTRAVENOUS; SUBCUTANEOUS at 05:56

## 2023-02-21 RX ADMIN — PREGABALIN 75 MG: 75 CAPSULE ORAL at 03:45

## 2023-02-21 RX ADMIN — HEPARIN SODIUM 5000 UNITS: 5000 INJECTION INTRAVENOUS; SUBCUTANEOUS at 22:16

## 2023-02-21 RX ADMIN — SODIUM CHLORIDE 75 ML/HR: 0.9 INJECTION, SOLUTION INTRAVENOUS at 09:46

## 2023-02-21 RX ADMIN — PREGABALIN 75 MG: 75 CAPSULE ORAL at 22:29

## 2023-02-21 RX ADMIN — CEFTRIAXONE SODIUM 1000 MG: 10 INJECTION, POWDER, FOR SOLUTION INTRAVENOUS at 10:47

## 2023-02-21 RX ADMIN — AMITRIPTYLINE HYDROCHLORIDE 10 MG: 10 TABLET, FILM COATED ORAL at 03:44

## 2023-02-21 RX ADMIN — VANCOMYCIN HYDROCHLORIDE 1500 MG: 5 INJECTION, POWDER, LYOPHILIZED, FOR SOLUTION INTRAVENOUS at 10:03

## 2023-02-21 RX ADMIN — PANTOPRAZOLE SODIUM 40 MG: 40 TABLET, DELAYED RELEASE ORAL at 05:56

## 2023-02-21 RX ADMIN — AMITRIPTYLINE HYDROCHLORIDE 10 MG: 10 TABLET, FILM COATED ORAL at 22:30

## 2023-02-21 RX ADMIN — HEPARIN SODIUM 5000 UNITS: 5000 INJECTION INTRAVENOUS; SUBCUTANEOUS at 14:34

## 2023-02-21 NOTE — ASSESSMENT & PLAN NOTE
Recent Fall on Friday, complaining of R hip pain  Hx of frequent falls and bilateral hip arthroplasties   Denies dizziness, HA, weakness, numbness   CT Head- Neg  CT C Spine- Neg     Plan-  PT/OT eval  Bilateral Hip Xray- Pending

## 2023-02-21 NOTE — PLAN OF CARE
Problem: MOBILITY - ADULT  Goal: Maintain or return to baseline ADL function  Description: INTERVENTIONS:  -  Assess patient's ability to carry out ADLs; assess patient's baseline for ADL function and identify physical deficits which impact ability to perform ADLs (bathing, care of mouth/teeth, toileting, grooming, dressing, etc )  - Assess/evaluate cause of self-care deficits   - Assess range of motion  - Assess patient's mobility; develop plan if impaired  - Assess patient's need for assistive devices and provide as appropriate  - Encourage maximum independence but intervene and supervise when necessary  - Involve family in performance of ADLs  - Assess for home care needs following discharge   - Consider OT consult to assist with ADL evaluation and planning for discharge  - Provide patient education as appropriate  2/21/2023 0402 by Charles Queen RN  Outcome: Progressing  2/21/2023 0402 by Charles Queen RN  Outcome: Progressing  Goal: Maintains/Returns to pre admission functional level  Description: INTERVENTIONS:  - Perform BMAT or MOVE assessment daily    - Set and communicate daily mobility goal to care team and patient/family/caregiver     - Collaborate with rehabilitation services on mobility goals if consulted  - Perform Range of Motion   - Reposition patient  - Dangle patient   - Stand patient   - Ambulate patient  - Out of bed to chair   - Out of bed for meals   - Out of bed for toileting  - Record patient progress and toleration of activity level   2/21/2023 0402 by Charles Queen RN  Outcome: Progressing  2/21/2023 0402 by Charles Queen RN  Outcome: Progressing     Problem: CARDIOVASCULAR - ADULT  Goal: Maintains optimal cardiac output and hemodynamic stability  Description: INTERVENTIONS:  - Monitor I/O, vital signs and rhythm  - Monitor for S/S and trends of decreased cardiac output  - Administer and titrate ordered vasoactive medications to optimize hemodynamic stability  - Assess quality of pulses, skin color and temperature  - Assess for signs of decreased coronary artery perfusion  - Instruct patient to report change in severity of symptoms  2/21/2023 0402 by Georgina Oneill RN  Outcome: Progressing  2/21/2023 0402 by Georgina Oneill RN  Outcome: Progressing  Goal: Absence of cardiac dysrhythmias or at baseline rhythm  Description: INTERVENTIONS:  - Continuous cardiac monitoring, vital signs, obtain 12 lead EKG if ordered  - Administer antiarrhythmic and heart rate control medications as ordered  - Monitor electrolytes and administer replacement therapy as ordered  2/21/2023 0402 by Georgina Oneill RN  Outcome: Progressing  2/21/2023 0402 by Georgina Oneill RN  Outcome: Progressing     Problem: RESPIRATORY - ADULT  Goal: Achieves optimal ventilation and oxygenation  Description: INTERVENTIONS:  - Assess for changes in respiratory status  - Assess for changes in mentation and behavior  - Position to facilitate oxygenation and minimize respiratory effort  - Oxygen administered by appropriate delivery if ordered  - Initiate smoking cessation education as indicated  - Encourage broncho-pulmonary hygiene including cough, deep breathe, Incentive Spirometry  - Assess the need for suctioning and aspirate as needed  - Assess and instruct to report SOB or any respiratory difficulty  - Respiratory Therapy support as indicated  2/21/2023 0402 by Georgina Oneill RN  Outcome: Progressing  2/21/2023 0402 by Georgina Oneill RN  Outcome: Progressing     Problem: SAFETY ADULT  Goal: Maintain or return to baseline ADL function  Description: INTERVENTIONS:  -  Assess patient's ability to carry out ADLs; assess patient's baseline for ADL function and identify physical deficits which impact ability to perform ADLs (bathing, care of mouth/teeth, toileting, grooming, dressing, etc )  - Assess/evaluate cause of self-care deficits   - Assess range of motion  - Assess patient's mobility; develop plan if impaired  - Assess patient's need for assistive devices and provide as appropriate  - Encourage maximum independence but intervene and supervise when necessary  - Involve family in performance of ADLs  - Assess for home care needs following discharge   - Consider OT consult to assist with ADL evaluation and planning for discharge  - Provide patient education as appropriate  2/21/2023 0402 by Shilpa Holt RN  Outcome: Progressing  2/21/2023 0402 by Shilpa Holt RN  Outcome: Progressing  Goal: Maintains/Returns to pre admission functional level  Description: INTERVENTIONS:  - Perform BMAT or MOVE assessment daily    - Set and communicate daily mobility goal to care team and patient/family/caregiver     - Collaborate with rehabilitation services on mobility goals if consulted  - Out of bed for toileting  - Record patient progress and toleration of activity level   2/21/2023 0402 by Shilpa Holt RN  Outcome: Progressing  2/21/2023 0402 by Shilpa Holt RN  Outcome: Progressing  Goal: Patient will remain free of falls  Description: INTERVENTIONS:  - Educate patient/family on patient safety including physical limitations  - Instruct patient to call for assistance with activity   - Consult OT/PT to assist with strengthening/mobility   - Keep Call bell within reach  - Keep bed low and locked with side rails adjusted as appropriate  - Keep care items and personal belongings within reach  - Initiate and maintain comfort rounds  - Make Fall Risk Sign visible to staff  - Apply yellow socks and bracelet for high fall risk patients  - Consider moving patient to room near nurses station  Outcome: Progressing

## 2023-02-21 NOTE — H&P
[facility]  H&P- Nela Damon 1934, 80 y o  female MRN: [id number]  Unit/Bed#: S -01 Encounter: [id number]  Primary Care Provider: Skylar Claire DO   Date and time admitted to hospital: 2/20/2023  7:53 PM    * Chest pain  Assessment & Plan  Presents with CP across the whole chest onset a few hours prior to arrival   Denies radiation, SOB, palpitations  Troponin 82, 74  EKG- NSR 95, old RBBB  Chest Xray- Pending official read, possible pleural effusion on the R however noted on prior  ED gave     Plan-   Monitor on Telemetry       Chronic diastolic heart failure (HCC)  Assessment & Plan  Wt Readings from Last 3 Encounters:   08/15/22 59 4 kg (131 lb)   07/18/22 56 3 kg (124 lb 3 2 oz)   07/07/22 56 9 kg (125 lb 6 oz)     Denies weight gain, SOB, abdominal distension, lower extremity edema   Per last Cardiology note- Torsemide 10 PRN, however family reported pt does not take as her Bps are too soft and they have been instructed not to take  ECHO 4/22 EF 50%, mild global hypokinesis  Does not appear to be volume overloaded, however was able to appreciate crackles at the bases bilaterally   Home medications include Metoprolol 12 5mg       Plan-   BNP- Pending  ECHO- Pending   Monitor on telemetry    Acute renal failure superimposed on stage 3b chronic kidney disease [facility])  Assessment & Plan  Lab Results   Component Value Date    EGFR 23 02/20/2023    EGFR 35 01/23/2023    EGFR 37 08/03/2022    CREATININE 1 86 (H) 02/20/2023    CREATININE 1 34 (H) 01/23/2023    CREATININE 1 28 08/03/2022   Baseline 1 1-1 4  Poor intake vs possible CHF exacerbation   ED gave 1L LR     Plan-   Fluid restriction 1 5  Avoid nephrotoxins  Avoid hypotension     Dry cough  Assessment & Plan  Complaining of dry cough and sore throat onset last week   Took home Covid test which was negative   Denies fevers, chills, congestion, abdominal pain, nvd    Plan-   Covid/flu/rsv- pending  Chloraseptic [place] PRN     Ambulatory dysfunction  Assessment & Plan  Recent Fall on Friday, complaining of R hip pain  Hx of frequent falls and bilateral hip arthroplasties   Denies dizziness, HA, weakness, numbness   CT Head- Neg  CT C Spine- Neg     Plan-  PT/OT eval  Bilateral Hip Xray- Pending     Elevated LFTs  Assessment & Plan  POA-   ALT 80    Possibly in the setting of hepatic congestion, fatty liver disease   RUQ US- Fatty infiltration of the liver is suspected  In the setting of abdominal pain and/or elevated liver function tests, consider steatohepatitis    Plan-   Monitor CMP     DM (diabetes mellitus), type 2 ([facility] [address] )  Assessment & Plan  Lab Results   Component Value Date    HGBA1C 5 9 (H) 04/14/2022       No results for input(s): POCGLU in the last 72 hours  Blood Sugar Average: Last 72 hrs:  Family reports patient has been taking Metformin 550mg BID   Per chart review pt's metformin was discontinued at discharge in 2022 due to worsening CKD, however it appears this was lost in outpatient follow up     Plan-   Insulin Sliding Scale     VTE Pharmacologic Prophylaxis:   High Risk (Score >/= 5) - Pharmacological DVT Prophylaxis Ordered: heparin  Sequential Compression Devices Ordered  Code Status: Level 1 - Full Code  Discussion with family: Updated  ( and daughter) at bedside  Anticipated Length of Stay: Patient will be admitted on an inpatient basis with an anticipated length of stay of greater than 2 midnights secondary to CP   Chief Complaint: CP     History of Present Illness:  Juanjo Montes is a 80 y o  female with a PMH of diastolic CHF, HTN, HLD, CKD stage 3, and ambulatory dysfunction who presents with chest pain onset a few hours prior to arrival   Patient reports that the chest pain was located across her chest and was constant  Pt noted for the last week she has been experiencing dry cough and sore throat, took a COVID test at home which was negative    Last week patient was also complaining of dysuria and urinary frequency and was prescribed Macrobid, has had some relief but still experiencing dysuria  Pt had a recent fall on Friday with head strike  Pt and family report that her legs are weak and falls frequently  Patient denies radiation of chest pain, shortness of breath, palpitations, HA, dizziness, weakness, numbness, fever, chills, abdominal pain, abdominal bloating, NVD, leg swelling, weight gain, or any other symptoms at this time  ED- vital signs stable 113/56, 89, afebrile, 97% on room air  CBC unremarkable  CMP-hyponatremia 132, BUN/CR 44/1 86 CR baseline 1 1-1 4  Elevated LFTs , ALT 80,   Hypomagnesemia 1 6  Troponin 82, 72  RUQ US-fatty liver  CT head, CT C-spine negative  Chest x-ray pending official read, unchanged from previous   EKG NSR 95, RBBB chronic  ED gave , magnesium, 1 L LR  Admitted for chest pain    Review of Systems:  Review of Systems   Constitutional: Negative for chills and fever  HENT: Positive for sore throat  Negative for congestion and ear pain  Eyes: Negative for pain and visual disturbance  Respiratory: Positive for cough  Negative for shortness of breath  Cardiovascular: Positive for chest pain  Negative for palpitations and leg swelling  Gastrointestinal: Negative for abdominal distention, abdominal pain, diarrhea, nausea and vomiting  Genitourinary: Positive for dysuria  Negative for difficulty urinating, hematuria and urgency  Musculoskeletal: Negative for arthralgias and back pain  R hip pain    Skin: Negative for color change and rash  Neurological: Negative for dizziness, seizures, syncope and weakness  Psychiatric/Behavioral: Negative for confusion  All other systems reviewed and are negative        Past Medical and Surgical History:   Past Medical History:   Diagnosis Date   • Anemia    • Arthritis    • Back pain    • CHF (congestive heart failure) (HCC)    • Chronic kidney disease     Stage 3b   • Diabetes ([facility] [address] )    • Diabetes mellitus ([facility] )    • Diabetic gastroparesis associated with type 2 diabetes mellitus ([facility] )    • Diabetic polyneuropathy ([facility] )    • Diverticulosis    • Herpes zoster    • Hypertension    • IBS (irritable bowel syndrome)    • Neurogenic claudication due to lumbar spinal stenosis    • Osteoarthritis    • Osteoporosis    • Pulmonary edema    • Seronegative arthropathy of multiple sites [facility])        Past Surgical History:   Procedure Laterality Date   • BACK SURGERY     • EGD AND COLONOSCOPY N/A 12/23/2016    Procedure: EGD AND COLONOSCOPY;  Surgeon: Aury Garcia MD;  Location: [facility]; Service:    • JOINT REPLACEMENT      bilat knees and hips   • OTHER SURGICAL HISTORY      pelvis rods   • REPLACEMENT TOTAL KNEE BILATERAL     • STOMACH SURGERY         Meds/Allergies:  Prior to Admission medications    Medication Sig Start Date End Date Taking?  Authorizing Provider   aspirin 81 mg chewable tablet Chew 1 tablet (81 mg total) daily 4/15/22  Yes Jeremy Christianson MD   atorvastatin (LIPITOR) 10 mg tablet Take 1 tablet (10 mg total) by mouth daily  Patient taking differently: Take 10 mg by mouth every evening 4/19/22  Yes Mihir Portillo,    Cholecalciferol (VITAMIN D3) 1000 units CAPS Take 1 capsule by mouth daily    Yes Historical Provider, MD   diphenoxylate-atropine (LOMOTIL) 2 5-0 025 mg per tablet Take 1 tablet by mouth if needed   Yes Historical Provider, MD   DULoxetine (CYMBALTA) 60 mg delayed release capsule TAKE ONE CAPSULE BY MOUTH EVERY DAY 9/19/22  Yes Magaly Covarrubias PA-C   hydroxychloroquine (PLAQUENIL) 200 mg tablet TAKE ONE TABLET BY MOUTH EVERY DAY WITH BREAKFAST 11/17/22 5/15/23 Yes Aditya Armenta MD   Magnesium 250 MG TABS Take 250 mg by mouth every evening   Yes Historical Provider, MD   metoprolol succinate (TOPROL-XL) 25 mg 24 hr tablet Take 0 5 tablets (12 5 mg total) by mouth daily 8/26/22  Yes Clare Jane,  Mirabegron ER (Myrbetriq) 50 MG TB24 Take 1 tablet (50 mg total) by mouth daily  Patient taking differently: Take 50 mg by mouth every evening 4/1/22  Yes JOSE G Muse   nitrofurantoin (MACROBID) 100 mg capsule Take 1 capsule (100 mg total) by mouth 2 (two) times a day for 7 days 2/17/23 2/24/23 Yes Carla Fernandes PA-C   omeprazole (PriLOSEC) 20 mg delayed release capsule Take 20 mg by mouth daily     Yes Historical Provider, MD   pregabalin (LYRICA) 50 mg capsule Take 1 capsule (50 mg total) by mouth 3 (three) times a day 10/21/22  Yes Bill Lomeli MD   pregabalin (LYRICA) 75 mg capsule TAKE ONE CAPSULE BY MOUTH EVERY DAY AT BEDTIME 1/17/23  Yes Bill Lomeli MD   pyridoxine (B-6) 100 MG tablet Take 100 mg by mouth daily   Yes Historical Provider, MD   Sodium Fluoride (PreviDent 5000 Booster Plus) 1 1 % PSTE Apply on teeth every evening as directed 11/18/21  Yes Bill Lomeli MD   amitriptyline (ELAVIL) 10 mg tablet Take 1 tablet (10 mg total) by mouth daily at bedtime for 14 days 6/27/22 8/15/22  Donny Brower DO   nitroglycerin (NITROSTAT) 0 4 mg SL tablet Place 1 tablet (0 4 mg total) under the tongue every 5 (five) minutes as needed for chest pain for up to 5 days  Patient not taking: Reported on 6/24/2022 6/22/22 6/27/22  Bill Lomeli MD   pregabalin (LYRICA) 75 mg capsule Take 50 mg by mouth 2 (two) times a day  Patient not taking: Reported on 2/21/2023    Historical Provider, MD   torsemide (DEMADEX) 10 mg tablet Take 1 tablet PRN for wt gain 3 lb/ 3 days or 5 lb/ 5 aria  Patient not taking: Reported on 7/18/2022 7/7/22   JOSE G Moody   amoxicillin (AMOXIL) 500 mg capsule Take 1 capsule (500 mg total) by mouth every 12 (twelve) hours for 7 days 2/16/23 2/21/23  Carla Fernandes PA-C     I have reviewed home medications with patient personally  Allergies:    Allergies   Allergen Reactions   • Morphine Other (See Comments)     urinary retention   • Ciprofloxacin Rash and Nausea Only Social History:  Marital Status: /Civil Union   Occupation: Retired  Patient Pre-hospital Living Situation: Home  Patient Pre-hospital Level of Mobility: walks with walker  Patient Pre-hospital Diet Restrictions: None  Substance Use History:   Social History     Substance and Sexual Activity   Alcohol Use No     Social History     Tobacco Use   Smoking Status Former   • Types: Cigarettes   • Quit date:    • Years since quittin 1   Smokeless Tobacco Never     Social History     Substance and Sexual Activity   Drug Use No       Family History:  Family History   Problem Relation Age of Onset   • Cancer Brother    • Diabetes Mother    • Hypertension Father    • Heart disease Father        Physical Exam:     Vitals:   Blood Pressure: 109/57 (23)  Pulse: 86 (23)  Temperature: 98 2 °F (36 8 °C) (23)  Temp Source: Oral (23)  Respirations: 17 (23)  SpO2: 92 % (23)    Physical Exam  Vitals and nursing note reviewed  Constitutional:       General: She is not in acute distress  Appearance: Normal appearance  She is well-developed  She is not ill-appearing, toxic-appearing or diaphoretic  HENT:      Head: Normocephalic and atraumatic  Mouth/Throat:      Mouth: Mucous membranes are moist    Eyes:      Extraocular Movements: Extraocular movements intact  Conjunctiva/sclera: Conjunctivae normal       Pupils: Pupils are equal, round, and reactive to light  Cardiovascular:      Rate and Rhythm: Normal rate and regular rhythm  Pulses: Normal pulses  Heart sounds: Normal heart sounds  No murmur heard  No friction rub  No gallop  Pulmonary:      Effort: Pulmonary effort is normal  No respiratory distress  Breath sounds: No wheezing  Comments: Crackles at he bases bilaterally   Abdominal:      General: Bowel sounds are normal  There is no distension  Palpations: Abdomen is soft  Tenderness:  There is no abdominal tenderness  There is no right CVA tenderness or left CVA tenderness  Musculoskeletal:         General: Tenderness present  No swelling or deformity  Cervical back: Neck supple  Right lower leg: No edema  Left lower leg: No edema  Comments: Tenderness to the R hip   Skin:     General: Skin is warm and dry  Capillary Refill: Capillary refill takes less than 2 seconds  Neurological:      General: No focal deficit present  Mental Status: She is alert and oriented to person, place, and time  Cranial Nerves: No cranial nerve deficit  Sensory: No sensory deficit  Motor: Weakness present  Coordination: Coordination normal    Psychiatric:         Mood and Affect: Mood normal          Behavior: Behavior normal          Thought Content: Thought content normal         Additional Data:     Lab Results:  Results from last 7 days   Lab Units 02/20/23 2018   WBC Thousand/uL 9 25   HEMOGLOBIN g/dL 13 3   HEMATOCRIT % 41 8   PLATELETS Thousands/uL 141*   NEUTROS PCT % 81*   LYMPHS PCT % 4*   MONOS PCT % 7   EOS PCT % 7*     Results from last 7 days   Lab Units 02/20/23 2018   SODIUM mmol/L 132*   POTASSIUM mmol/L 4 0   CHLORIDE mmol/L 96   CO2 mmol/L 22   BUN mg/dL 44*   CREATININE mg/dL 1 86*   ANION GAP mmol/L 14*   CALCIUM mg/dL 8 8   ALBUMIN g/dL 3 5   TOTAL BILIRUBIN mg/dL 0 95   ALK PHOS U/L 214*   ALT U/L 80*   AST U/L 116*   GLUCOSE RANDOM mg/dL 121     Results from last 7 days   Lab Units 02/20/23  2018   INR  1 20*                   Lines/Drains:  Invasive Devices     Peripheral Intravenous Line  Duration           Peripheral IV 02/20/23 Right;Ventral (anterior) Antecubital <1 day                    Imaging: Reviewed radiology reports from this admission including: chest xray, abdominal/pelvic CT, CT head, ultrasound(s) and CT C spine  US right upper quadrant   Final Result by Juwan Pelaez DO (02/20 2241)      Status post cholecystectomy  Fatty infiltration of the liver is suspected  In the setting of abdominal pain and/or elevated liver function tests, consider steatohepatitis  No acute process seen otherwise  Other findings as above  Workstation performed: [id number]         CT head without contrast   Final Result by Erik Love MD (02/20 2147)      No acute intracranial abnormality  Workstation performed: [id number]         CT cervical spine without contrast   Final Result by Erik Love MD (02/20 2150)      No cervical spine fracture or traumatic malalignment  Workstation performed: [id number]         XR chest 1 view portable   ED Interpretation by Rufino Suresh MD (02/20 2034)   No acute changes  XR hips bilateral 2 vw w pelvis if performed    (Results Pending)       EKG and Other Studies Reviewed on Admission:   · EKG: NSR  HR 95, old RBBB  ** Please Note: This note has been constructed using a voice recognition system   **

## 2023-02-21 NOTE — ASSESSMENT & PLAN NOTE
Recent Fall on Friday, complaining of R hip pain  Hx of frequent falls and bilateral hip arthroplasties   Trauma workup: negative    Plan-  PT/OT eval

## 2023-02-21 NOTE — ASSESSMENT & PLAN NOTE
Wt Readings from Last 3 Encounters:   08/15/22 59 4 kg (131 lb)   07/18/22 56 3 kg (124 lb 3 2 oz)   07/07/22 56 9 kg (125 lb 6 oz)     Denies weight gain, SOB, abdominal distension, lower extremity edema   Per last Cardiology note- Torsemide 10 PRN, however family reported pt does not take as her Bps are too soft and they have been instructed not to take  ECHO 4/22 EF 50%, mild global hypokinesis  Does not appear to be volume overloaded, however was able to appreciate crackles at the bases bilaterally   Home medications include Metoprolol 12 5mg       Plan-   BNP- Pending  ECHO- Pending   Monitor on telemetry

## 2023-02-21 NOTE — PROGRESS NOTES
Nasreen Gunn is a 80 y o  female who is currently ordered Vancomycin IV with management by the Pharmacy Consult service  Relevant clinical data and objective / subjective history reviewed  Vancomycin Assessment:  Indication and Goal AUC/Trough: Pneumonia (goal -600, trough >10), UTI, -600, trough >10  Clinical Status: new  Micro:     Renal Function:  SCr: 1 51 mg/dL  CrCl: 22 5 mL/min  Renal replacement: not on dialysis  Days of Therapy: 1  Current Dose: 1000 mg as needed for random vancomycin level < 15  Vancomycin Plan:  New Dosin mg as needed for random vancomycin    Next Level: 23 @ 0600  Renal Function Monitoring: Daily BMP and [place] will continue to follow closely for s/sx of nephrotoxicity, infusion reactions and appropriateness of therapy  BMP and CBC will be ordered per protocol  We will continue to follow the patient’s culture results and clinical progress daily      Altagracia Gomez, Pharmacist

## 2023-02-21 NOTE — ASSESSMENT & PLAN NOTE
Lab Results   Component Value Date    HGBA1C 5 9 (H) 04/14/2022       No results for input(s): POCGLU in the last 72 hours      Blood Sugar Average: Last 72 hrs:  Family reports patient has been taking Metformin 550mg BID   Per chart review pt's metformin was discontinued at discharge in 2022 due to worsening CKD, however it appears this was lost in outpatient follow up     Plan-   Insulin Sliding Scale   Monitor blood glucose  Hypoglycemic protocol

## 2023-02-21 NOTE — ASSESSMENT & PLAN NOTE
Complaining of dry cough and sore throat onset last week   Took home Covid test which was negative   Denies fevers, chills, congestion, abdominal pain, nvd    Plan-   Covid/flu/rsv- pending  Chloraseptic Spray PRN

## 2023-02-21 NOTE — ASSESSMENT & PLAN NOTE
Lab Results   Component Value Date    EGFR 23 02/20/2023    EGFR 35 01/23/2023    EGFR 37 08/03/2022    CREATININE 1 86 (H) 02/20/2023    CREATININE 1 34 (H) 01/23/2023    CREATININE 1 28 08/03/2022   Baseline 1 1-1 4  Poor intake vs possible CHF exacerbation   ED gave 1L LR     Plan-   Fluid restriction 1 5  Avoid nephrotoxins  Avoid hypotension

## 2023-02-21 NOTE — ASSESSMENT & PLAN NOTE
Lab Results   Component Value Date    EGFR 23 02/20/2023    EGFR 35 01/23/2023    EGFR 37 08/03/2022    CREATININE 1 86 (H) 02/20/2023    CREATININE 1 34 (H) 01/23/2023    CREATININE 1 28 08/03/2022   Baseline 1 1-1 4  Likely Poor intake  ED gave 1L LR     Plan-   Fluid restriction 1 5  Avoid nephrotoxins  Avoid hypotension

## 2023-02-21 NOTE — ASSESSMENT & PLAN NOTE
Wt Readings from Last 3 Encounters:   08/15/22 59 4 kg (131 lb)   07/18/22 56 3 kg (124 lb 3 2 oz)   07/07/22 56 9 kg (125 lb 6 oz)     Echo:  left ventricular ejection fraction is 55% by visual estimation  Systolic function is normal  Wall motion is normal  Diastolic function is mildly abnormal, consistent with grade I (abnormal) relaxation  Left Atrium: The atrium is mildly dilated  Home regimen: Torsemide 10 mh PRN  Plan  Hold torsemide in the setting of VICENTE    Does not appear to be volume overloaded

## 2023-02-21 NOTE — ASSESSMENT & PLAN NOTE
POA-   ALT 80     RUQ US- Fatty infiltration of the liver is suspected    In the setting of abdominal pain and/or elevated liver function tests, consider steatohepatitis    Plan-   Monitor CMP

## 2023-02-21 NOTE — ASSESSMENT & PLAN NOTE
Complaining of dry cough and sore throat onset last week   Denies fevers, chills, congestion, abdominal pain, nvd  COVID/FLU negative    Plan-   Chloraseptic Spray PRN

## 2023-02-21 NOTE — ASSESSMENT & PLAN NOTE
Presents with CP across the whole chest onset a few hours prior to arrival  Denies radiation, SOB, palpitations  Troponin 82, 74, 74  EKG- NSR 95, old RBBB  Chest Xray- Increasing bibasilar consolidations probably represent atelectasis related to hypoventilation unless there is compelling clinical evidence for pneumonia  Trace bilateral pleural effusions  Patient desaturated to 88% on room air  Improved now  Plan-   Continue ceftriaxone  Monitor white cell count and temperature  Speech and swallowing evaluation

## 2023-02-21 NOTE — ED PROVIDER NOTES
Emergency Department Trauma Note  Flaco Corona 80 y o  female MRN: [id number]  Unit/Bed#: ED-43/ED-43 Encounter: [id number]      Trauma Alert: Trauma Acuity: C  Model of Arrival:   via    Trauma Team: Current Providers  Attending Provider: Marlin Taylor MD  Attending Provider: Jessee Valenzuela MD  Registered Nurse: Kai Reading  Resident: Varsha Daley MD  Consultants:     None      History of Present Illness     Chief Complaint:   Chief Complaint   Patient presents with   • Chest Pain     Reports chest pain started about 1 hour ago  Friday Pt fell in kitchen with head strike  Reports hearing "a band playing " in her head since yesterday  With head pain and "periods of banging in head "  (+) baby aspirin/nausea  Started Macrobid friday for UTI     HPI:  Flaco Corona is a 80 y o  female who presents with chest pain  Mechanism:Details of Incident: pt fell on friday, +headstrike on kitchen floor, +aspirin, +tinnitus  Currently complaining of chest pain/ SOB on exertion Injury Date: 02/17/23          History provided by:  Patient, spouse and relative   used: No    Chest Pain  Associated symptoms: back pain, fatigue and headache    Associated symptoms: no abdominal pain, no dizziness, no nausea, no shortness of breath and not vomiting      80year-old female brought for evaluation after developing chest pain at home while sitting on her couch  Reports that the pain since has resolved  She reports chronic back pain  Family mentioned that 3 days ago she had a fall in the kitchen and struck her head  Takes aspirin  Patient does report having intermittent headache and some abnormal sounds/tinnitus  She denies any headache right now     She states that her chest pain has resolved  Daughter reports that patient's appetite has been poor since she fell  She is also getting treated for UTI and this may be related to this  She was able to eat some lasagna this evening    On exam she has no external signs of head trauma  Minimal lower C-spine tenderness  She has difficulty and pain ranging the left shoulder but this is chronic per family  Minimal epigastric tenderness  Plan EKG, labs, CT head/C-spine, chest x-ray, reevaluate  Review of Systems   Constitutional: Positive for appetite change and fatigue  Negative for activity change  HENT: Positive for tinnitus  Respiratory: Negative for chest tightness and shortness of breath  Cardiovascular: Positive for chest pain  Gastrointestinal: Negative for abdominal pain, nausea and vomiting  Musculoskeletal: Positive for back pain and neck pain  Skin: Negative for color change, rash and wound  Neurological: Positive for headaches  Negative for dizziness  All other systems reviewed and are negative  Historical Information     Immunizations:   Immunization History   Administered Date(s) Administered   • COVID-19 MODERNA VACC 0 5 ML IM 01/20/2021, 02/17/2021, 11/22/2021       Past Medical History:   Diagnosis Date   • Anemia    • Arthritis    • Back pain    • CHF (congestive heart failure) ([facility])    • Chronic kidney disease     Stage 3b   • Diabetes ([facility] [address] )    • Diabetes mellitus [facility] )    • Diabetic gastroparesis associated with type 2 diabetes mellitus ([facility])    • Diabetic polyneuropathy ([facility])    • Diverticulosis    • Herpes zoster    • Hypertension    • IBS (irritable bowel syndrome)    • Neurogenic claudication due to lumbar spinal stenosis    • Osteoarthritis    • Osteoporosis    • Pulmonary edema    • Seronegative arthropathy of multiple sites ([facility] )        Family History   Problem Relation Age of Onset   • Cancer Brother    • Diabetes Mother    • Hypertension Father    • Heart disease Father      Past Surgical History:   Procedure Laterality Date   • BACK SURGERY     • EGD AND COLONOSCOPY N/A 12/23/2016    Procedure: EGD AND COLONOSCOPY;  Surgeon: Sarah Vizcaino MD;  Location: [facility];   Service:    • JOINT REPLACEMENT bilat knees and hips   • OTHER SURGICAL HISTORY      pelvis rods   • REPLACEMENT TOTAL KNEE BILATERAL     • STOMACH SURGERY       Social History     Tobacco Use   • Smoking status: Former     Types: Cigarettes     Quit date:      Years since quittin 1   • Smokeless tobacco: Never   Vaping Use   • Vaping Use: Never used   Substance Use Topics   • Alcohol use: No   • Drug use: No     E-Cigarette/Vaping   • E-Cigarette Use Never User      E-Cigarette/Vaping Substances   • Nicotine No    • THC No    • CBD No    • Flavoring No    • Other No        Family History: non-contributory    Meds/Allergies   Prior to Admission Medications   Prescriptions Last Dose Informant Patient Reported? Taking?    Cholecalciferol (VITAMIN D3) 1000 units CAPS 2023  Yes Yes   Sig: Take 1 capsule by mouth daily    DULoxetine (CYMBALTA) 60 mg delayed release capsule 2023  No Yes   Sig: TAKE ONE CAPSULE BY MOUTH EVERY DAY   Magnesium 250 MG TABS 2023  Yes Yes   Sig: Take 250 mg by mouth every evening   Mirabegron ER (Myrbetriq) 50 MG   No Yes   Sig: Take 1 tablet (50 mg total) by mouth daily   Patient taking differently: Take 50 mg by mouth every evening   Sodium Fluoride (PreviDent 5000 Booster Plus) 1 1 % PSTE 2023  No Yes   Sig: Apply on teeth every evening as directed   amitriptyline (ELAVIL) 10 mg tablet   No No   Sig: Take 1 tablet (10 mg total) by mouth daily at bedtime for 14 days   aspirin 81 mg chewable tablet 2023  No Yes   Sig: Chew 1 tablet (81 mg total) daily   atorvastatin (LIPITOR) 10 mg tablet 2023  No Yes   Sig: Take 1 tablet (10 mg total) by mouth daily   Patient taking differently: Take 10 mg by mouth every evening   diphenoxylate-atropine (LOMOTIL) 2 5-0 025 mg per tablet Past Week  Yes Yes   Sig: Take 1 tablet by mouth if needed   hydroxychloroquine (PLAQUENIL) 200 mg tablet 2023  No Yes   Sig: TAKE ONE TABLET BY MOUTH EVERY DAY WITH BREAKFAST   metoprolol succinate (TOPROL-XL) 25 mg 24 hr tablet 2/21/2023  No Yes   Sig: Take 0 5 tablets (12 5 mg total) by mouth daily   nitrofurantoin (MACROBID) 100 mg capsule 2/21/2023  No Yes   Sig: Take 1 capsule (100 mg total) by mouth 2 (two) times a day for 7 days   nitroglycerin (NITROSTAT) 0 4 mg SL tablet   No No   Sig: Place 1 tablet (0 4 mg total) under the tongue every 5 (five) minutes as needed for chest pain for up to 5 days   Patient not taking: Reported on 6/24/2022   omeprazole (PriLOSEC) 20 mg delayed release capsule 2/21/2023  Yes Yes   Sig: Take 20 mg by mouth daily     pregabalin (LYRICA) 50 mg capsule 2/21/2023  No Yes   Sig: Take 1 capsule (50 mg total) by mouth 3 (three) times a day   pregabalin (LYRICA) 75 mg capsule Unknown  Yes No   Sig: Take 50 mg by mouth 2 (two) times a day   Patient not taking: Reported on 2/21/2023   pregabalin (LYRICA) 75 mg capsule 2/21/2023  No Yes   Sig: TAKE ONE CAPSULE BY MOUTH EVERY DAY AT BEDTIME   pyridoxine (B-6) 100 MG tablet 2/21/2023  Yes Yes   Sig: Take 100 mg by mouth daily   torsemide (DEMADEX) 10 mg tablet Not Taking  No No   Sig: Take 1 tablet PRN for wt gain 3 lb/ 3 days or 5 lb/ 5 aria   Patient not taking: Reported on 7/18/2022      Facility-Administered Medications Last Administration Doses Remaining   denosumab (PROLIA) subcutaneous injection 60 mg 7/18/2022  6:31 PM 1          Allergies   Allergen Reactions   • Morphine Other (See Comments)     urinary retention   • Ciprofloxacin Rash and Nausea Only       PHYSICAL EXAM    PE limited by:    Objective   Vitals:   First set: Temperature: 97 7 °F (36 5 °C) (02/20/23 1947)  Pulse: 89 (02/20/23 1947)  Respirations: 18 (02/20/23 1947)  Blood Pressure: (!) 89/50 (02/20/23 1947)  SpO2: 97 % (02/20/23 1947)    Primary Survey:   (A) Airway: Intact  (B) Breathing: Equal  (C) Circulation: Pulses:   normal  (D) Disabliity:  GCS Total:  15  (E) Expose:  Completed    Secondary Survey: (Click on Physical Exam tab above)  Physical Exam  Vitals and nursing note reviewed  Constitutional:       Appearance: She is well-developed  HENT:      Head: Normocephalic and atraumatic  Eyes:      Pupils: Pupils are equal, round, and reactive to light  Neck:      Comments: Minimal lower c-spine tenderness without step-off  Cardiovascular:      Rate and Rhythm: Normal rate and regular rhythm  Heart sounds: Normal heart sounds  No murmur heard  Pulmonary:      Breath sounds: Normal breath sounds  Abdominal:      Palpations: Abdomen is soft  Comments: Slight epigastric tenderness  No guarding  Musculoskeletal:      Right lower leg: No tenderness  No edema  Left lower leg: No tenderness  No edema  Comments: Limited ROM of left shoulder due to pain  Skin:     General: Skin is warm and dry  Neurological:      General: No focal deficit present  Mental Status: She is alert  Psychiatric:         Mood and Affect: Mood normal          Behavior: Behavior normal          Cervical spine cleared by clinical criteria?  No (imaging required)      Invasive Devices     Peripheral Intravenous Line  Duration           Peripheral IV 02/20/23 Right;Ventral (anterior) Antecubital <1 day                Lab Results:   Results Reviewed     Procedure Component Value Units Date/Time    HS Troponin I 2hr [[id number]]  (Abnormal) Collected: 02/20/23 2244    Lab Status: Final result Specimen: Blood from Line, Venous Updated: 02/20/23 2331     hs TnI 2hr 72 ng/L      Delta 2hr hsTnI -10 ng/L     Acetaminophen level-"If concentration is detectable, please discuss with medical  on call " [[id number]]  (Abnormal) Collected: 02/20/23 2018    Lab Status: Final result Specimen: Blood from Arm, Right Updated: 02/20/23 2235     Acetaminophen Level <10 ug/mL     HS Troponin I 4hr [[id number]]     Lab Status: No result Specimen: Blood     Protime-INR [[id number]]  (Abnormal) Collected: 02/20/23 2018    Lab Status: Final result Specimen: Blood from Arm, Right Updated: 02/20/23 2102     Protime 15 4 seconds      INR 1 20    APTT [[id number]]  (Normal) Collected: 02/20/23 2018    Lab Status: Final result Specimen: Blood from Arm, Right Updated: 02/20/23 2102     PTT 31 seconds     HS Troponin 0hr (reflex protocol) [[id number]]  (Abnormal) Collected: 02/20/23 2018    Lab Status: Final result Specimen: Blood from Arm, Right Updated: 02/20/23 2056     hs TnI 0hr 82 ng/L     Comprehensive metabolic panel [[id number]]  (Abnormal) Collected: 02/20/23 2018    Lab Status: Final result Specimen: Blood from Arm, Right Updated: 02/20/23 2053     Sodium 132 mmol/L      Potassium 4 0 mmol/L      Chloride 96 mmol/L      CO2 22 mmol/L      ANION GAP 14 mmol/L      BUN 44 mg/dL      Creatinine 1 86 mg/dL      Glucose 121 mg/dL      Calcium 8 8 mg/dL       U/L      ALT 80 U/L      Alkaline Phosphatase 214 U/L      Total Protein 6 6 g/dL      Albumin 3 5 g/dL      Total Bilirubin 0 95 mg/dL      eGFR 23 ml/min/1 73sq m     Narrative:      [place] guidelines for Chronic Kidney Disease (CKD):   •  Stage 1 with normal or high GFR (GFR > 90 mL/min/1 73 square meters)  •  Stage 2 Mild CKD (GFR = 60-89 mL/min/1 73 square meters)  •  Stage 3A Moderate CKD (GFR = 45-59 mL/min/1 73 square meters)  •  Stage 3B Moderate CKD (GFR = 30-44 mL/min/1 73 square meters)  •  Stage 4 Severe CKD (GFR = 15-29 mL/min/1 73 square meters)  •  Stage 5 End Stage CKD (GFR <15 mL/min/1 73 square meters)  Note: GFR calculation is accurate only with a steady state creatinine    Magnesium [[id number]]  (Abnormal) Collected: 02/20/23 2018    Lab Status: Final result Specimen: Blood from Arm, Right Updated: 02/20/23 2053     Magnesium 1 6 mg/dL     CBC and differential [[id number]]  (Abnormal) Collected: 02/20/23 2018    Lab Status: Final result Specimen: Blood from Arm, Right Updated: 02/20/23 2029     WBC 9 25 Thousand/uL      RBC 4 13 Million/uL      Hemoglobin 13 3 g/dL Hematocrit 41 8 %       fL      MCH 32 2 pg      MCHC 31 8 g/dL      RDW 13 3 %      MPV 11 4 fL      Platelets 624 Thousands/uL      nRBC 0 /100 WBCs      Neutrophils Relative 81 %      Immat GRANS % 1 %      Lymphocytes Relative 4 %      Monocytes Relative 7 %      Eosinophils Relative 7 %      Basophils Relative 0 %      Neutrophils Absolute 7 56 Thousands/µL      Immature Grans Absolute 0 05 Thousand/uL      Lymphocytes Absolute 0 38 Thousands/µL      Monocytes Absolute 0 60 Thousand/µL      Eosinophils Absolute 0 64 Thousand/µL      Basophils Absolute 0 02 Thousands/µL                  Imaging Studies:   Direct to CT: No  US right upper quadrant   Final Result by Mikhail Garcia DO (02/20 2241)      Status post cholecystectomy  Fatty infiltration of the liver is suspected  In the setting of abdominal pain and/or elevated liver function tests, consider steatohepatitis  No acute process seen otherwise  Other findings as above  Workstation performed: [id number]         CT head without contrast   Final Result by Michele Owen MD (02/20 2147)      No acute intracranial abnormality  Workstation performed: [id number]         CT cervical spine without contrast   Final Result by Michele Owen MD (02/20 2150)      No cervical spine fracture or traumatic malalignment  Workstation performed: [id number]         XR chest 1 view portable   ED Interpretation by Alesha Schulte MD (02/20 2034)   No acute changes              Procedures  ECG 12 Lead Documentation Only    Date/Time: 2/20/2023 8:18 PM  Performed by: Alesha Schulte MD  Authorized by: Alesha Schulte MD     Indications / Diagnosis:  Chest pain  ECG reviewed by me, the ED Provider: yes    Patient location:  ED  Previous ECG:     Previous ECG:  Compared to current    Comparison ECG info:  6/24/22    Similarity:  No change  Rate:     ECG rate:  88  Rhythm:     Rhythm: sinus rhythm Ectopy:     Ectopy: none    QRS:     QRS axis:  Normal  Conduction:     Conduction: abnormal      Abnormal conduction: complete RBBB    ST segments:     ST segments:  Normal  T waves:     T waves: non-specific               ED Course  ED Course as of 02/21/23 0024   Mon Feb 20, 2023 2117 hs TnI 0hr(!): 82   2117 BUN(!): 44   2117 Creatinine(!): 1 86   2117 AST(!): 116   2117 ALT(!): 80   2117 Magnesium(!): 1 6 2220 Discussed lab results with patient and family  They do note that she has been taking Tylenol daily for back pain but no more than 2500 mg a day  We will check Tylenol level given abnormal LFTs  [address](!): <10           Medical Decision Making  80-year-old female brought by EMS for evaluation after developing chest pain at home today  Occurred at rest   Resolved prior to arrival   No associated shortness of breath, diaphoresis  She takes 81 mg aspirin daily  Also mentioned a fall 3 days ago with head strike  Family states she has not quite been the same since but is also getting treated for a UTI  She is less active, eating less  Patient reports headache and some tinnitis  Work-up negative for acute traumatic injuries  CT head and cervical spine unremarkable  Chest x-ray unremarkable  Lab work notable for elevated troponin  EKG nonischemic, right bundle branch block similar to prior  Lab work also notable for mild hyponatremia, mild hypomagnesemia, elevated serum creatinine  Started on IV fluids, magnesium sulfate  Was given 325 mg aspirin, admitted to medical service for continued cardiac monitoring, serial troponins      Chest pain, unspecified: acute illness or injury  Elevated serum creatinine: acute illness or injury  Elevated troponin: acute illness or injury  Fall from standing, initial encounter: acute illness or injury  Hypomagnesemia: self-limited or minor problem  Hyponatremia: acute illness or injury  Transaminitis: acute illness or injury  Amount and/or Complexity of Data Reviewed  Labs: ordered  Decision-making details documented in ED Course  Radiology: ordered and independent interpretation performed  Risk  OTC drugs  Prescription drug management  Decision regarding hospitalization  Disposition  [facility] Transfer: No  Final diagnoses:   Chest pain, unspecified   Elevated troponin   Fall from standing, initial encounter   Hyponatremia   Transaminitis   Hypomagnesemia   Elevated serum creatinine     Time reflects when diagnosis was documented in both MDM as applicable and the Disposition within this note     Time User Action Codes Description Comment    2/20/2023 10:39 PM Linnea Greulich Add [R07 9] Chest pain, unspecified     2/20/2023 10:39 PM Blondie Punt J Add [R77 8] Elevated troponin     2/20/2023 10:40 PM Linnea Greulich Add [V82  XXXA] Fall from standing, initial encounter     2/20/2023 10:40 PM Linnea Greulich Add [E87 1] Hyponatremia     2/20/2023 10:40 PM Blondie Punt J Add [R74 01] Transaminitis     2/20/2023 10:40 PM Linnea Greulich Add [E83 42] Hypomagnesemia     2/20/2023 11:10 PM Linnea Greulich Add [R79 89] Elevated serum creatinine       ED Disposition     ED Disposition   Admit    Condition   Stable    Date/Time   Mon Feb 20, 2023 11:31 PM    Comment   Case was discussed with [facility] resident and the patient's admission status was agreed to be Admission Status: inpatient status to the service of Dr Donna Augustin  Follow-up Information    None       Patient's Medications   Discharge Prescriptions    No medications on file     No discharge procedures on file      PDMP Review     None          ED Provider  Electronically Signed by         Jenna Kelly MD  02/21/23 1602

## 2023-02-21 NOTE — QUICK NOTE
Patient was seen and examined at bedside  Patient is confused, drowsy and lethargic  Also complained of burning pain while urinating  No fever or chills  Saturation drops to upper 80s on room air  Tachycardic    Chest x-ray: Small left sided pleural effusion  No pulmonary vascular congestion  Procalcitonin: 1 79  WBC within normal range  Bilateral crackles on lung examination  Plan  Start ceftriaxone for possible pneumonia  Start vancomycin (recent UTI)  Repeat chest x-ray  Follow-up urine analysis and culture  Start gentle IV hydration  Speech and swallowing evaluation    Talked to  at bedside and updated

## 2023-02-21 NOTE — ASSESSMENT & PLAN NOTE
POA-   ALT 80    Possibly in the setting of hepatic congestion, fatty liver disease   RUQ US- Fatty infiltration of the liver is suspected    In the setting of abdominal pain and/or elevated liver function tests, consider steatohepatitis    Plan-   Monitor CMP

## 2023-02-22 PROBLEM — N39.0 UTI (URINARY TRACT INFECTION): Status: ACTIVE | Noted: 2023-02-22

## 2023-02-22 PROBLEM — R41.0 CONFUSION: Status: ACTIVE | Noted: 2023-02-22

## 2023-02-22 LAB
ANION GAP SERPL CALCULATED.3IONS-SCNC: 5 MMOL/L (ref 4–13)
ATRIAL RATE: 88 BPM
BUN SERPL-MCNC: 32 MG/DL (ref 5–25)
CALCIUM SERPL-MCNC: 8.5 MG/DL (ref 8.4–10.2)
CHLORIDE SERPL-SCNC: 101 MMOL/L (ref 96–108)
CO2 SERPL-SCNC: 30 MMOL/L (ref 21–32)
CREAT SERPL-MCNC: 1.22 MG/DL (ref 0.6–1.3)
ERYTHROCYTE [DISTWIDTH] IN BLOOD BY AUTOMATED COUNT: 13.3 % (ref 11.6–15.1)
GFR SERPL CREATININE-BSD FRML MDRD: 39 ML/MIN/1.73SQ M
GLUCOSE SERPL-MCNC: 143 MG/DL (ref 65–140)
GLUCOSE SERPL-MCNC: 144 MG/DL (ref 65–140)
GLUCOSE SERPL-MCNC: 92 MG/DL (ref 65–140)
GLUCOSE SERPL-MCNC: 92 MG/DL (ref 65–140)
GLUCOSE SERPL-MCNC: 93 MG/DL (ref 65–140)
GLUCOSE SERPL-MCNC: 99 MG/DL (ref 65–140)
HCT VFR BLD AUTO: 37.2 % (ref 34.8–46.1)
HGB BLD-MCNC: 12.3 G/DL (ref 11.5–15.4)
MCH RBC QN AUTO: 32.4 PG (ref 26.8–34.3)
MCHC RBC AUTO-ENTMCNC: 33.1 G/DL (ref 31.4–37.4)
MCV RBC AUTO: 98 FL (ref 82–98)
P AXIS: 31 DEGREES
PLATELET # BLD AUTO: 142 THOUSANDS/UL (ref 149–390)
PMV BLD AUTO: 10.9 FL (ref 8.9–12.7)
POTASSIUM SERPL-SCNC: 4 MMOL/L (ref 3.5–5.3)
PR INTERVAL: 188 MS
QRS AXIS: 107 DEGREES
QRSD INTERVAL: 150 MS
QT INTERVAL: 418 MS
QTC INTERVAL: 505 MS
RBC # BLD AUTO: 3.8 MILLION/UL (ref 3.81–5.12)
SODIUM SERPL-SCNC: 136 MMOL/L (ref 135–147)
T WAVE AXIS: -17 DEGREES
VANCOMYCIN SERPL-MCNC: 11 UG/ML (ref 10–20)
VENTRICULAR RATE: 88 BPM
WBC # BLD AUTO: 5.9 THOUSAND/UL (ref 4.31–10.16)

## 2023-02-22 RX ADMIN — ASPIRIN 81 MG: 81 TABLET, CHEWABLE ORAL at 08:48

## 2023-02-22 RX ADMIN — PREGABALIN 50 MG: 50 CAPSULE ORAL at 08:48

## 2023-02-22 RX ADMIN — OXYBUTYNIN CHLORIDE 10 MG: 5 TABLET, EXTENDED RELEASE ORAL at 08:48

## 2023-02-22 RX ADMIN — ATORVASTATIN CALCIUM 10 MG: 10 TABLET, FILM COATED ORAL at 17:32

## 2023-02-22 RX ADMIN — Medication 1 SPRAY: at 21:13

## 2023-02-22 RX ADMIN — VANCOMYCIN HYDROCHLORIDE 750 MG: 750 INJECTION, SOLUTION INTRAVENOUS at 12:24

## 2023-02-22 RX ADMIN — NITROFURANTOIN (MONOHYDRATE/MACROCRYSTALS) 100 MG: 75; 25 CAPSULE ORAL at 08:48

## 2023-02-22 RX ADMIN — AMITRIPTYLINE HYDROCHLORIDE 10 MG: 10 TABLET, FILM COATED ORAL at 21:13

## 2023-02-22 RX ADMIN — MAGNESIUM OXIDE TAB 400 MG (241.3 MG ELEMENTAL MG) 400 MG: 400 (241.3 MG) TAB at 08:49

## 2023-02-22 RX ADMIN — DULOXETINE HYDROCHLORIDE 60 MG: 60 CAPSULE, DELAYED RELEASE ORAL at 08:48

## 2023-02-22 RX ADMIN — ACETAMINOPHEN 650 MG: 325 TABLET ORAL at 21:13

## 2023-02-22 RX ADMIN — PREGABALIN 75 MG: 75 CAPSULE ORAL at 21:13

## 2023-02-22 RX ADMIN — HEPARIN SODIUM 5000 UNITS: 5000 INJECTION INTRAVENOUS; SUBCUTANEOUS at 21:13

## 2023-02-22 RX ADMIN — METOPROLOL SUCCINATE 12.5 MG: 25 TABLET, EXTENDED RELEASE ORAL at 08:49

## 2023-02-22 RX ADMIN — HEPARIN SODIUM 5000 UNITS: 5000 INJECTION INTRAVENOUS; SUBCUTANEOUS at 13:20

## 2023-02-22 RX ADMIN — CEFTRIAXONE SODIUM 1000 MG: 10 INJECTION, POWDER, FOR SOLUTION INTRAVENOUS at 08:44

## 2023-02-22 NOTE — PLAN OF CARE
Problem: SAFETY ADULT  Goal: Patient will remain free of falls  Description: INTERVENTIONS:  - Educate patient/family on patient safety including physical limitations  - Instruct patient to call for assistance with activity   - Consult OT/PT to assist with strengthening/mobility   - Keep Call bell within reach  - Keep bed low and locked with side rails adjusted as appropriate  - Keep care items and personal belongings within reach  - Initiate and maintain comfort rounds  - Make Fall Risk Sign visible to staff  - Apply yellow socks and bracelet for high fall risk patients  - Consider moving patient to room near nurses station  Outcome: Progressing     Problem: Prexisting or High Potential for Compromised Skin Integrity  Goal: Skin integrity is maintained or improved  Description: INTERVENTIONS:  - Identify patients at risk for skin breakdown  - Assess and monitor skin integrity  - Assess and monitor nutrition and hydration status  - Monitor labs   - Assess for incontinence   - Turn and reposition patient  - Assist with mobility/ambulation  - Relieve pressure over bony prominences  - Avoid friction and shearing  - Provide appropriate hygiene as needed including keeping skin clean and dry  - Evaluate need for skin moisturizer/barrier cream  - Collaborate with interdisciplinary team   - Patient/family teaching  - Consider wound care consult   Outcome: Progressing

## 2023-02-22 NOTE — SPEECH THERAPY NOTE
Speech Language/Pathology  Speech-Language Pathology Bedside Swallow Evaluation        Patient Name: Howie MORRIS Date: 2/22/2023     Problem List  Principal Problem:    Chest pain  Active Problems:    DM (diabetes mellitus), type 2 ([address] )    Chronic diastolic heart failure (HCC)    Acute renal failure superimposed on stage 3b chronic kidney disease (HCC)    Elevated LFTs    Ambulatory dysfunction    Dry cough         Past Medical History  Past Medical History:   Diagnosis Date   • Anemia    • Arthritis    • Back pain    • CHF (congestive heart failure) (HCC)    • Chronic kidney disease     Stage 3b   • Diabetes ([facility] )    • Diabetes mellitus ([facility] [address] )    • Diabetic gastroparesis associated with type 2 diabetes mellitus ([facility] [address] )    • Diabetic polyneuropathy ([facility] [address] )    • Diverticulosis    • Herpes zoster    • Hypertension    • IBS (irritable bowel syndrome)    • Neurogenic claudication due to lumbar spinal stenosis    • Osteoarthritis    • Osteoporosis    • Pulmonary edema    • Seronegative arthropathy of multiple sites [facility])        Past Surgical History  Past Surgical History:   Procedure Laterality Date   • BACK SURGERY     • EGD AND COLONOSCOPY N/A 12/23/2016    Procedure: EGD AND COLONOSCOPY;  Surgeon: Jeremias Vance MD;  Location: [facility]; Service:    • JOINT REPLACEMENT      bilat knees and hips   • OTHER SURGICAL HISTORY      pelvis rods   • REPLACEMENT TOTAL KNEE BILATERAL     • STOMACH SURGERY         Summary   Pt presents with oral and pharyngeal swallow function that appears [facility] for puree, mechanical soft solids, and thin liquids  Pt with missing dentition; dentures at home  Pt requesting to consume mechanical soft solids (chopped/puree) as she states that it is "hard to chew"  Pt is cognizant of foods that she can and cannot chew with/without her dentures  TT ST if family brings in dentures to re-assess for upgraded solids        Recommendations:   Diet: mechanically altered/level 2 diet and thin liquids   Meds: whole with liquid   Frequent Oral care: 4x/day  Aspiration precautions and compensatory swallowing strategies: upright posture  Other Recommendations/ considerations: Dentures not at facility  Pt reports she typically wears them to eat/drink  Current Medical Status  Pt is a 80 y o  female who presented to [address] [place] [address] with chest pain on February 20, 2023  PMH significant for chronic diastolic CHF, CKD stage 3b, type 2 diabetes, who presented to the ED with chest pain  She is on torsemide at home but was noted to be volume depleted at the time of presentation and received 1 L IV fluid in the ED  Disoriented and somnolent  She denied any chest pain or shortness of breath at the time of my evaluation  She continues to report dysuria  Was recently started on Macrobid for UTI  Denies fever, chills, nausea, vomiting or diarrhea  Past medical history:  Please see H&P for details    Special Studies:  02/20/23 CT Head wo Contrast IMPRESSION: No acute intracranial abnormality      Social/Education/Vocational Hx:  Pt lives home with spouse    Swallow Information   Current Risks for Dysphagia & Aspiration: DM, CHF  Current Symptoms/Concerns: MD requesting swallow evaluation  Current Diet: NPO   Baseline Diet: regular diet and thin liquids; pt reports she avoids tough meats such as steak and pork chops  Takes pills: whole with liquids    Baseline Assessment   Behavior/Cognition: alert  Speech/Language Status: able to participate in conversation and able to follow commands  Patient Positioning: upright in bed     Swallow Mechanism Exam   Facial: symmetrical  Labial: WFL  Lingual: WFL  Velum: unable to visualize  Mandible: adequate ROM  Dentition: missing dentition; upper/lower dentures but not at home  Vocal quality:clear/adequate   Volitional Cough: strong/productive   Respiratory: Room Air    Consistencies Assessed and Performance   Consistencies Administered: puree, soft, regular and thin liquids (cookie, mary cracker)    Oral Stage: Pt was able to adequately retrieve bolus from straw and spoon  Pt was able to bite through solid, though reported that it was difficult secondary to lack of dentition  Functional mastication noted and A-P transfer appeared timely  Pharyngeal Stage: Palpation revealed complete laryngeal elevation  Pt consumed small bites of solids and thin liquids without coughing, throat clearing, change in vocal quality, or change in respiratory functioning s/p swallows      Esophageal Concerns: none reported  No c/o globus sensation  Results Reviewed with: patient and RN and MD  Dysphagia Goals:   1  Pt will tolerate mechanical soft solids with thin liquids without overt s/s of aspiration/dysphagia x1-2 meal follow ups    2  ST to reassess for diet upgrade when/if family is able to bring in patient's dentures     Discharge recommendation: no follow up needed for swallowing

## 2023-02-22 NOTE — PLAN OF CARE
Problem: MOBILITY - ADULT  Goal: Maintain or return to baseline ADL function  Description: INTERVENTIONS:  -  Assess patient's ability to carry out ADLs; assess patient's baseline for ADL function and identify physical deficits which impact ability to perform ADLs (bathing, care of mouth/teeth, toileting, grooming, dressing, etc )  - Assess/evaluate cause of self-care deficits   - Assess range of motion  - Assess patient's mobility; develop plan if impaired  - Assess patient's need for assistive devices and provide as appropriate  - Encourage maximum independence but intervene and supervise when necessary  - Involve family in performance of ADLs  - Assess for home care needs following discharge   - Consider OT consult to assist with ADL evaluation and planning for discharge  - Provide patient education as appropriate  Outcome: Progressing  Goal: Maintains/Returns to pre admission functional level  Description: INTERVENTIONS:  - Perform BMAT or MOVE assessment daily    - Set and communicate daily mobility goal to care team and patient/family/caregiver     - Collaborate with rehabilitation services on mobility goals if consulted  - Out of bed for toileting  - Record patient progress and toleration of activity level   Outcome: Progressing     Problem: CARDIOVASCULAR - ADULT  Goal: Maintains optimal cardiac output and hemodynamic stability  Description: INTERVENTIONS:  - Monitor I/O, vital signs and rhythm  - Monitor for S/S and trends of decreased cardiac output  - Administer and titrate ordered vasoactive medications to optimize hemodynamic stability  - Assess quality of pulses, skin color and temperature  - Assess for signs of decreased coronary artery perfusion  - Instruct patient to report change in severity of symptoms  Outcome: Progressing  Goal: Absence of cardiac dysrhythmias or at baseline rhythm  Description: INTERVENTIONS:  - Continuous cardiac monitoring, vital signs, obtain 12 lead EKG if ordered  - Administer antiarrhythmic and heart rate control medications as ordered  - Monitor electrolytes and administer replacement therapy as ordered  Outcome: Progressing     Problem: RESPIRATORY - ADULT  Goal: Achieves optimal ventilation and oxygenation  Description: INTERVENTIONS:  - Assess for changes in respiratory status  - Assess for changes in mentation and behavior  - Position to facilitate oxygenation and minimize respiratory effort  - Oxygen administered by appropriate delivery if ordered  - Initiate smoking cessation education as indicated  - Encourage broncho-pulmonary hygiene including cough, deep breathe, Incentive Spirometry  - Assess the need for suctioning and aspirate as needed  - Assess and instruct to report SOB or any respiratory difficulty  - Respiratory Therapy support as indicated  Outcome: Progressing     Problem: SAFETY ADULT  Goal: Maintain or return to baseline ADL function  Description: INTERVENTIONS:  -  Assess patient's ability to carry out ADLs; assess patient's baseline for ADL function and identify physical deficits which impact ability to perform ADLs (bathing, care of mouth/teeth, toileting, grooming, dressing, etc )  - Assess/evaluate cause of self-care deficits   - Assess range of motion  - Assess patient's mobility; develop plan if impaired  - Assess patient's need for assistive devices and provide as appropriate  - Encourage maximum independence but intervene and supervise when necessary  - Involve family in performance of ADLs  - Assess for home care needs following discharge   - Consider OT consult to assist with ADL evaluation and planning for discharge  - Provide patient education as appropriate  Outcome: Progressing  Goal: Maintains/Returns to pre admission functional level  Description: INTERVENTIONS:  - Perform BMAT or MOVE assessment daily    - Set and communicate daily mobility goal to care team and patient/family/caregiver     - Collaborate with rehabilitation services on mobility goals if consulted  - Out of bed for toileting  - Record patient progress and toleration of activity level   Outcome: Progressing  Goal: Patient will remain free of falls  Description: INTERVENTIONS:  - Educate patient/family on patient safety including physical limitations  - Instruct patient to call for assistance with activity   - Consult OT/PT to assist with strengthening/mobility   - Keep Call bell within reach  - Keep bed low and locked with side rails adjusted as appropriate  - Keep care items and personal belongings within reach  - Initiate and maintain comfort rounds  - Make Fall Risk Sign visible to staff  - Apply yellow socks and bracelet for high fall risk patients  - Consider moving patient to room near nurses station  Outcome: Progressing     Problem: Prexisting or High Potential for Compromised Skin Integrity  Goal: Skin integrity is maintained or improved  Description: INTERVENTIONS:  - Identify patients at risk for skin breakdown  - Assess and monitor skin integrity  - Assess and monitor nutrition and hydration status  - Monitor labs   - Assess for incontinence   - Turn and reposition patient  - Assist with mobility/ambulation  - Relieve pressure over bony prominences  - Avoid friction and shearing  - Provide appropriate hygiene as needed including keeping skin clean and dry  - Evaluate need for skin moisturizer/barrier cream  - Collaborate with interdisciplinary team   - Patient/family teaching  - Consider wound care consult   Outcome: Progressing

## 2023-02-22 NOTE — OCCUPATIONAL THERAPY NOTE
Occupational Therapy Evaluation + Treatment      Chavo Carpio    2/22/2023    Patient Active Problem List   Diagnosis    Symptomatic anemia    Shortness of breath    Hypomagnesemia    DM (diabetes mellitus), type 2 (HCC)    Urinary frequency    Iron deficiency anemia secondary to inadequate dietary iron intake    Sinobronchitis    Post zoster neuralgia    Primary generalized (osteo)arthritis    Seronegative arthropathy of multiple sites ([facility] )    Senile osteoporosis    Lumbar spondylosis    Diabetic polyneuropathy associated with type 2 diabetes mellitus ([facility] )    Diabetic gastroparesis associated with type 2 diabetes mellitus ([facility])    Irritable bowel syndrome with diarrhea    Stage 3b chronic kidney disease ([facility] )    Traumatic injury of sacrum    Tendinitis of left rotator cuff    Abnormality of gait due to impairment of balance    Sicca syndrome ([facility])    Chest pain    Leukocytosis    Chronic diastolic heart failure ([facility])    Overactive bladder    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[facility])    Toxic metabolic encephalopathy    Cellulitis    Dyslipidemia    Primary hypertension    Spinal stenosis of lumbar region with neurogenic claudication    Chronic pain syndrome    SI (sacroiliac) joint dysfunction    Acute renal failure superimposed on stage 3b chronic kidney disease ([facility])    Elevated LFTs    Ambulatory dysfunction    Dry cough    UTI (urinary tract infection)    Confusion       Past Medical History:   Diagnosis Date    Anemia     Arthritis     Back pain     CHF (congestive heart failure) ([facility] )     Chronic kidney disease     Stage 3b    Confusion 2/22/2023    Diabetes ([facility] )     Diabetes mellitus ([facility] )     Diabetic gastroparesis associated with type 2 diabetes mellitus ([facility] )     Diabetic polyneuropathy ([facility])     Diverticulosis     Herpes zoster     Hypertension     IBS (irritable bowel syndrome)     Neurogenic claudication due to lumbar spinal stenosis     Osteoarthritis     Osteoporosis Pulmonary edema     Seronegative arthropathy of multiple sites [facility])        Past Surgical History:   Procedure Laterality Date    BACK SURGERY      EGD AND COLONOSCOPY N/A 12/23/2016    Procedure: EGD AND COLONOSCOPY;  Surgeon: Penelope Sandhu MD;  [facility]: [facility]; Service:     JOINT REPLACEMENT      bilat knees and hips    OTHER SURGICAL HISTORY      pelvis rods    REPLACEMENT TOTAL KNEE BILATERAL      STOMACH SURGERY          02/22/23 1200   OT Last Visit   OT Visit Date 02/22/23   Note Type   Note type Evaluation   Pain Assessment   Pain Assessment Tool FLACC   Pain Location/Orientation Orientation: Right;Orientation: Lower; Location: Back   Pain Onset/Description Onset: Ongoing;Frequency: Constant/Continuous   Effect of Pain on Daily Activities comfort, activity tolerance   Hospital Pain Intervention(s) Repositioned; Ambulation/increased activity; Emotional support   Pain Rating: FLACC (Rest) - Face 0   Pain Rating: FLACC (Rest) - Legs 0   Pain Rating: FLACC (Rest) - Activity 0   Pain Rating: FLACC (Rest) - Cry 1   Pain Rating: FLACC (Rest) - Consolability 0   Score: FLACC (Rest) 1   Pain Rating: FLACC (Activity) - Face 0   Pain Rating: FLACC (Activity) - Legs 0   Pain Rating: FLACC (Activity) - Activity 0   Pain Rating: FLACC (Activity) - Cry 1   Pain Rating: FLACC (Activity) - Consolability 1   Score: FLACC (Activity) 2   Restrictions/Precautions   Weight Bearing Precautions Per Order No   Other Precautions Impulsive;Cognitive; Chair Alarm; Bed Alarm;Pain; Fall Risk   Home Living   Type of [address] [place] [address] One level;Performs ADLs on one level; Able to live on main level with bedroom/bathroom;Stairs to enter with rails  (2 DAVID c R HR, FOS to basement, however reports spouse does not let her go down anymore)   Bathroom Shower/Tub Walk-in shower   Bathroom Toilet Raised   Bathroom Equipment Grab bars in shower  (reports possible shower chair in attic, not used at baseline)   Bathroom Accessibility Accessible Home Equipment Walker;Cane  [facility] , transport chair for community mobility, RW, rollator)   Additional Comments Pt reports using rollator "all the time", however spouse reports frequently abandoning   Prior Function   Level of [place] Needs assistance with IADLS; Independent with functional mobility; Independent with ADLs   Lives With Spouse   Receives Help From Family   IADLs Family/Friend/Other provides transportation; Family/Friend/Other provides meals; Family/Friend/Other provides medication management   Falls in the last 6 months 1 to 4  (3 per , all retropulsive  most recentcx head strike)   Vocational Retired   Comments Pt ambulates household dsiatnces mod (I) c rollator at baseline  Utilizes transport chair for community distances   present throughout the day for assistance PRN  Manages all household tasks  Occasional assistance with LB dressing d/t intermittent backpain, but typically completes (I)   Lifestyle   Autonomy At baseline, pt is (I) with ADLs, mod (I) household distances with rollator  A with all IADLs  Lives c spouse   Reciprocal Relationships supportive spouse at [facility]   Service to Others retired   General   Additional Pertinent History pt admitted d/t CP  multiple recent falls d/t generalized weakness and ambulatory dysfunction  Family/Caregiver Present Yes  (Spouse)   ADL   Eating Assistance 5  Supervision/Setup   Grooming Assistance 5  Supervision/Setup   UB Bathing Assistance 5  Supervision/Setup   LB Bathing Assistance 4  Minimal Assistance   UB Dressing Assistance 5  Supervision/Setup   LB Dressing Assistance 4  Minimal [address]  4  Minimal [address] 4  Minimal Assistance   Additional Comments Pt limited by generalized weakness, decreased balance, decreased safety awareness   Bed Mobility   Supine to Sit 4  Minimal assistance   Additional items Assist x 1;HOB elevated; Bedrails; Increased time required;Verbal cues Additional Comments denied lightheadedness / dizziness c positional changes   Transfers   Sit to Stand 5  Supervision   Additional items Assist x 1;Verbal cues; Impulsive   Stand to Sit 5  Supervision   Additional items Assist x 1; Increased time required;Armrests; Verbal cues  (cues for proper hand placements)   Additional Comments impulsive sit>stand from EOB  VC for safe body mechancis and hand placements   Functional Mobility   Functional Mobility 4  Minimal assistance   Additional Comments Functional mobility household distance within room  grossly unstable d/t weakness in BLE, however no overt LOB  assistance for RW management, increased time required   Additional items Rolling walker   Balance   Static Sitting Fair +   Dynamic Sitting Fair   Static Standing Fair -  (c RW)   Dynamic Standing Poor +  (c RW)   Activity Tolerance   Activity Tolerance Patient limited by fatigue   Medical Staff Made Aware Pt benefited from co-session of skilled OT and PT therapists in order to most appropriately address functional deficits d/t regression from functioning level prior to admission  and decreased activity tolerance  OT/PT objectives were addressed separately; please see PT note for specific goal areas targeted  (CM)   Nurse Made Aware BENJAMÍN Patino pre/post session   RUE Assessment   RUE Assessment WFL  (AROM ~90 degrees, MMT 3+/5 shoulder flexion , 4/5 elbow flexion/extension)   LUE Assessment   LUE Assessment WFL  (AROM ~110 degrees, MMT 4/5 shoulder flexion , 4/5 elbow flexion/extension)   Hand Function   Gross Motor Coordination Functional   Fine Motor Coordination Functional   Sensation   Light Touch No apparent deficits   Vision-Basic Assessment   Current Vision Wears glasses all the time   Cognition   Overall Cognitive Status Impaired  (pt with acute onset of confusion at time of admission, gradually improving  Per RN, improved from day prior  Tx for UTI   Will continue to assess)   Arousal/Participation Alert; Cooperative   Attention Attends with cues to redirect   Orientation Level Oriented X4   Memory Decreased recall of precautions   Following Commands Follows one step commands without difficulty   Comments Pt agreeable to OT session  Increased processing required, partly d/t [place]  Cues for safety awareness   Assessment   Limitation Decreased ADL status; Decreased Safe judgement during ADL;Decreased UE strength;Decreased endurance;Decreased high-level ADLs; Decreased self-care trans   Prognosis Good   Assessment Patient is a 80 y o  female seen for OT evaluation an subsequent treatment session at [facility] following admission on 2/20/2023  s/p Chest pain  Comorbidities and significant PMHx impacting functional performance include: UTI, ambulatory dysfunction, confusion, recent fall, chronic LBP, spinal stenosis of lumbar region, SI joint dysfunction, abnormality of gait d/t impairment of balance  Patient presents with active orders for OT eval and treat and up and OOB as tolerated   [place] At baseline, pt is (I) with ADLs, mod (I) household distances with rollator  A with all IADLs  Lives c spouse Upon initial evaluation, patient requires supervision for UB ADLs, min assistance for LB ADLs, and min assistance for transfers and functional mobility household distance with RW  Based on functional eval, pt presents with intact  attention, impaired  safety awareness, impaired  problem solving skills, and intact   memory  Occupational performance is affected by the following deficits:  decreased muscular strength , decreased standing tolerance for self care tasks , decreased dynamic balance impacting functional reach, decreased trunk control , decreased activity tolerance , impaired safety awareness  and (+) pain  Based on these findings, functional performance deficits, and medical complexity pt has been identified as a high complexity evaluation   Personal factors impacting performance include: decreased (+) Hx of falls , steps to enter home, High fall risk  and decreased recall of precautions   Patient would benefit from OT services within the acute care setting to maximize level of functional independence in the following occupational areas bathing/showering, toileting, dressing , personal hygiene/grooming , bed mobility , functional mobility, transfer to all surfaces and fall prevention   From OT standpoint, recommendation at time of D/C would be post-acute rehabilitation   It patient able to demonstrate safe completion of LB ADLs, self care transfers and functional mobility at supervision level with use of RW, then may be able to progress to D/C home with [address] safely  Goals   Patient Goals for decreased back pain   expressing he would like for her to maintain her independence and " get back to walkig around the block again"   Plan   Treatment Interventions ADL retraining;Functional transfer training; Endurance training;Patient/family training;Equipment evaluation/education; Compensatory technique education   Goal Expiration Date 03/04/23   OT Treatment Day 0   OT Frequency 3-5x/wk   Recommendation   OT Discharge Recommendation Post acute rehabilitation services   Additional Comments  It patient able to demonstrate safe completion of LB ADLs, self care transfers and functional mobility at supervision level with use of RW, then may be able to progress to D/C home with [address] safely  Additional Comments 2 The patient's raw score on the AM-PAC Daily Activity Inpatient Short Form is 18  A raw score of less than 19 suggests the patient may benefit from discharge to post-acute rehabilitation services  Please refer to the recommendation of the Occupational Therapist for safe discharge planning     -PAC Daily Activity Inpatient   Lower Body Dressing 3   Bathing 3   Toileting 3   Upper Body Dressing 3   Grooming 3   Eating 3   Daily Activity Raw Score 18   Daily Activity Standardized Score (Calc for Raw Score >=11) 38 66   AM-PAC Applied Cognition Inpatient   Following a Speech/Presentation 2   Understanding Ordinary Conversation 3   Taking Medications 2   Remembering Where Things Are Placed or Put Away 3   Remembering List of 4-5 Errands 3   Taking Care of Complicated Tasks 2   Applied Cognition Raw Score 15   Applied Cognition Standardized Score 33 54   Additional Treatment Session   Start Time 1050   End Time 1113   Treatment Assessment Pt participated in additional tx session following IE with intervention focus on increasing functional independence and safety during ADL tasks  Pt participated in toileting, grooming, and LB dressing  Min A for functional mobility within bathroom d/t difficulty maneuvering within small space requiring assistance c RW management  Pt completed toilet transfer on/off of BSC over standard toilet, cues for proper hand placements on grab bars and proximity to sitting surface  (I) pericare and management of clothing  Demonstrated standing tolerance of 1 min to stand at sink to complete grooming tasks with fair standing balance  Pt required Min A to complete LB dressing at EOB, thread BLE through underwear, but Min A to pull over hips d/t decreased balance and fatigue  Pt caregiver education for continued mobility to maintain functional status during hospitalization, encouraged continued mobilization to /from bathroom for toileting needs with use of BSC  Additional Treatment Day 1   End of Consult   Education Provided Yes;Family or social support of family present for education by provider  (Spouse)   Patient Position at End of Consult Bed/Chair alarm activated; All needs within reach; Supine  (RN Oma Handler at bedside)   Nurse Communication Nurse aware of consult  (BENJAMÍN Morgan)   Pt will complete UB ADLs Independent  as needed for increased ADL independence within 10 days  Pt will complete LB ADLs Supervision  with use of LHAE as needed for increased ADL independence within 10 days       Pt will complete toileting Supervision  with use of DME for increased ADL independence within 10 days  Pt will demonstrate proper body mechanics to complete self-care transfers and functional mobility with Supervision and use of AD PRN for increased safety and functional independence within 10 days  Pt will demonstrate standing tolerance of 5 min with Supervision and use of AD PRN for increased activity tolerance during ADL/IADL tasks within 10 days  Pt will demonstrate proper body mechanics and fall prevention strategies during 100% of tx sessions for increased safety awareness during ADL/IADLs    Pt will demonstrate OOB sitting tolerance of 2-4 hr/day for increased activity tolerance and engagement in self care tasks within 10 days  Pt will participate in ongoing cognitive assessments to assist with safe D/C planning and supervision/assistance recommendations       [facility]

## 2023-02-22 NOTE — PLAN OF CARE
Problem: PHYSICAL THERAPY ADULT  Goal: Performs mobility at highest level of function for planned discharge setting  See evaluation for individualized goals  Description: Treatment/Interventions: Functional transfer training, LE strengthening/ROM, Therapeutic exercise, Endurance training, Patient/family training, Equipment eval/education, Bed mobility, Gait training, Spoke to nursing, OT, Family  Equipment Recommended:  (WC vs rolling walker not rollator walker)       See flowsheet documentation for full assessment, interventions and recommendations  2/22/2023 1307 by Delano Scott PT  Note: Prognosis: Fair  Problem List: Decreased strength, Decreased endurance, Impaired balance, Decreased mobility, Decreased cognition, Pain (gait deviations)  Assessment: Pt is a 80 y o  female seen for PT evaluation s/p admit to [facility] on 2/20/2023 w/ Chest pain  Order placed for PT  Prior to admission: Pt lived w/ spouse in 1 story home and reportedly has 24/7 care via spouse  Pt has rollator/rolling walker and cane but + inconsistent with use based on reports of falls  Upon evaluation: Pt requires min A for bed mobility and gait w/ rolling walker, and S for transfers  Pt's clinical presentation is currently unstable/unpredictable given the functional mobility deficits above, especially (but not limited to) weakness, gait deviations and decreased functional mobility tolerance, coupled with fall risks including hx of falls and impaired balance, and combined with medical complications of hypotension, abnormal sodium values, fear/retreat and +UTI  During this admission, pt would benefit from continued skilled inpatient PT in the acute care setting in order to address deficits as defined above to maximize function and mobility  From a PT standpoint, recommend continued trials w/ ROLLING walker and not rollator walker to offer more support    Based on patient's [facility] Level of Mobility scores today, patient currently has a goal of -HLM Levels: 4: MOVE TO CHAIR/COMMODE, to be completed with nursing  At this time with nursing, recommend pt use rolling walker and A of 1 for performing tasks of OOB to chair for meals and to commode for toileting  Barriers to Discharge: Decreased caregiver support  Barriers to Discharge Comments: Co-morbidities affecting pt's physical performance at time of assessment include:anemia, CHF, CKD, DM, 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, B TKA, back surgery  Personal factors affecting pt at time of IE include: steps to enter environment, advanced age, past experience, inability to navigate community distances, limited insight into impairments and recent fall(s)  PT Discharge Recommendation: Post acute rehabilitation services    See flowsheet documentation for full assessment

## 2023-02-22 NOTE — ASSESSMENT & PLAN NOTE
Patient recently had a UTI  Took nitrofurantoin for 2 days at home  Patient complains of burning pain while urinating  Urine analysis: Leukocytes trace  Nitrites negative, no bacteria    Plan  Continue ceftriaxone and vancomycin  Urinary retention protocol

## 2023-02-22 NOTE — ASSESSMENT & PLAN NOTE
Patient was alert and orient when she presents to the ED  but found confused yesterday  Likely delirium  Plan  Reorient patient  Delirium precautions  Repeat Pro-Vahid in a m  [place] Simple Urinary retention protocol

## 2023-02-22 NOTE — PHYSICAL THERAPY NOTE
PHYSICAL THERAPY EVALUATION  NAME:  Chavo Carpio  DATE: 02/22/23    AGE:   80 y o  Mrn:   [id number]  Principal problem: Principal Problem:    Chest pain  Active Problems:    DM (diabetes mellitus), type 2 (HCC)    Chronic diastolic heart failure (HCC)    Acute renal failure superimposed on stage 3b chronic kidney disease (HCC)    Elevated LFTs    Ambulatory dysfunction    Dry cough    UTI (urinary tract infection)    Confusion      Vitals:    02/21/23 2235 02/21/23 2250 02/22/23 0600 02/22/23 0724   BP: (!) 96/42 (!) 102/48  118/54   BP Location: Right arm      Pulse:  86  93   Resp:    17   Temp:    (!) 97 4 °F (36 3 °C)   TempSrc:       SpO2:  98%  94%   Weight:   61 2 kg (134 lb 14 7 oz)    Height:           Length Of Stay: 2  Performed at least 2 patient identifiers during session: Name and Birthday  PHYSICAL THERAPY EVALUATION :   *Evaluation performed jointly by Alex Freeman SPT (primary) and Michael Wiseman DPT (secondary)--documented by Michael Wiseman due to EMR issues   02/22/23 1034   PT Last Visit   PT Visit Date 02/22/23   Note Type   Note type Evaluation   Pain Assessment   Pain Assessment Tool FLACC   Pain Score   (Pt stated "I am just having a lot of back pain")   Pain Location/Orientation Orientation: Right;Orientation: Lower; Location: Back   Effect of Pain on Daily Activities limits level of comfort when supine   Patient's Stated Pain Goal No pain   Hospital Pain Intervention(s) Repositioned; Ambulation/increased activity; Emotional support   Pain Rating: FLACC (Rest) - Face 0   Pain Rating: FLACC (Rest) - Legs 0   Pain Rating: FLACC (Rest) - Activity 0   Pain Rating: FLACC (Rest) - Cry 1   Pain Rating: FLACC (Rest) - Consolability 0   Score: FLACC (Rest) 1   Pain Rating: FLACC (Activity) - Face 0   Pain Rating: FLACC (Activity) - Legs 0   Pain Rating: FLACC (Activity) - Activity 0   Pain Rating: FLACC (Activity) - Cry 1   Pain Rating: FLACC (Activity) - Consolability 1   Score: FLACC (Activity) 2 Restrictions/Precautions   Weight Bearing Precautions Per Order No   Other Precautions Impulsive;Cognitive; Chair Alarm; Bed Alarm; Fall Risk;Pain  (Pt presents as a falls risk at this time due to gait deviations )   Home Living   Type of [address] [place] [address] One level;Stairs to enter with rails  (2STE with rail on R; flight of stairs to laundry room downstairs, however family won't let her do stairs alone due to falls)   Bathroom Shower/Tub Walk-in shower   Bathroom Toilet Raised   Bathroom Equipment Grab bars in shower  (has shower chair in attic)   Home Equipment Walker;Cane  ([address], transport chair outside of house, RW)   Prior Function   Level of [place] Needs assistance with functional mobility; Needs assistance with ADLs; Needs assistance with IADLS   Lives With Spouse  ()   Receives Help From Family   IADLs Family/Friend/Other provides transportation; Family/Friend/Other provides medication management   Falls in the last 6 months 1 to 4  (1 fall in kitchen, 1 in hallway;  present at end of session and reports that she abandons walker, falls backwards when reaching)   Comments Pt reports feeling "shaky" with BLE weakness before her prior falls   General   Family/Caregiver Present Yes  ( at end of session)   Cognition   Overall Cognitive Status Impaired   Arousal/Participation Cooperative   Orientation Level Oriented to person;Oriented to place;Oriented to situation   Memory Decreased recall of recent events;Decreased short term memory   Following Commands Follows one step commands with increased time or repetition   Subjective   Subjective Pt reports "I'm still anxious" about falling     RUE Assessment   RUE Assessment WFL  (AROM shoulder flex > 90 deg)   LUE Assessment   LUE Assessment WFL  (AROM shoulder flex > 90 deg)   Strength RLE   R Hip Flexion 3+/5   R Knee Extension 4/5   R Ankle Dorsiflexion 4/5   LLE Assessment   LLE Assessment   (Pt reports LLE "gives out" causing her to fall  Reports "this is my weaker leg"  )   Strength LLE   L Hip Flexion 3+/5   L Knee Extension 4/5   L Ankle Dorsiflexion 4/5   Light Touch   RLE Light Touch Grossly intact   LLE Light Touch Grossly intact   Bed Mobility   Supine to Sit 4  Minimal assistance   Additional items Assist x 1;HOB elevated; Bedrails; Increased time required;Verbal cues  (verbal instruction for sequencing of body mechanics)   Transfers   Sit to Stand 5  Supervision   Additional items Assist x 1; Armrests; Increased time required;Verbal cues  (verbal instruction for hand placement)   Stand to Sit 5  Supervision   Additional items Assist x 1; Armrests; Increased time required;Verbal cues  (verbal instruction for hand placement)   Additional Comments Pt impulsive during STS trials and required verbal instruction for hand placement  Ambulation/Elevation   Gait pattern Decreased L stance;Decreased foot clearance; Excessively slow;Knees flexed;Decreased heel strike; Forward Flexion  (increased B knee flexion during gait including for heel strike)   Gait Assistance 4  Minimal assist   Additional items Assist x 1;Verbal cues  (verbal instruction for walker management)   Assistive Device Rolling walker   Distance 20ft   Stair Management Assistance Not tested   Ambulation/Elevation Additional Comments Pt presented with increased B knee flexion during gait, however R knee flexion > L knee  Balance   Static Sitting Fair -   Static Standing Poor +   Endurance Deficit   Endurance Deficit Yes   Endurance Deficit Description limited ambulation distances, self-reported fatigue   Activity Tolerance   Activity Tolerance Patient limited by fatigue   Medical Staff Made Aware care coordination with Taylor Lynch OT; spoke to [facility] from case mangement re: rehab recommendations   Nurse Made Aware BENJAMÍN Webb pre/post evaluation     Assessment:   Pt is a 80 y o  female seen for PT evaluation s/p admit to [facility] [facility] on 2/20/2023 w/ Chest pain    Order placed for PT  Prior to admission: Pt lived w/ spouse in [address] home and reportedly has 24/7 care via spouse  Pt has rollator/rolling walker and cane but + inconsistent with use based on reports of falls  Upon evaluation: Pt requires min A for bed mobility and gait w/ rolling walker, and S for transfers  Pt's clinical presentation is currently unstable/unpredictable given the functional mobility deficits above, especially (but not limited to) weakness, gait deviations and decreased functional mobility tolerance, coupled with fall risks including hx of falls and impaired balance, and combined with medical complications of hypotension, abnormal sodium values, fear/retreat and +UTI  During this admission, pt would benefit from continued skilled inpatient PT in the acute care setting in order to address deficits as defined above to maximize function and mobility  Recommendations:   •  From a PT standpoint, recommend continued trials w/ ROLLING walker and not rollator walker to offer more support  • Based on patient's [facility] Highest Level of Mobility scores today, patient currently has a goal of -[facility] Levels: 4: MOVE TO CHAIR/COMMODE, to be completed with nursing  • At this time with nursing, recommend pt use rolling walker and A of 1 for performing tasks of OOB to chair for meals and to commode for toileting  Prognosis Fair   Problem List Decreased strength;Decreased endurance; Impaired balance;Decreased mobility; Decreased cognition;Pain  (gait deviations)   Barriers to Discharge Decreased caregiver support   Barriers to Discharge Comments Co-morbidities affecting pt's physical performance at time of assessment include:anemia, CHF, CKD, DM, 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, B TKA, back surgery   Personal factors affecting pt at time of IE include: steps to enter environment, advanced age, past experience, inability to navigate community distances, limited insight into impairments and recent fall(s)  Goals   Patient Goals none stated at this time   STG Expiration Date 03/04/23   Short Term Goal #1 Pt will: Perform bed mobility tasks with consistent S to prepare for transfers and reposition in bed  Perform transfers with consistent S to improve ease of transfers and promote proper hand placement  Perform ambulation with LRAD for up to 48' with S to improve gait quality and promote proper use of assistive device  Perform at least 2 stairs w/ railing and w/no more than min A to return to home with DAVID  PT Treatment Day 1   Plan   Treatment/Interventions Functional transfer training;LE strengthening/ROM; Therapeutic exercise; Endurance training;Patient/family training;Equipment eval/education; Bed mobility;Gait training;Spoke to nursing;OT;Family   PT Frequency 3-5x/wk   Recommendation   PT Discharge Recommendation Post acute rehabilitation services   Equipment Recommended   (WC vs rolling walker not rollator walker)   AM-PAC Basic Mobility Inpatient   Turning in Flat Bed Without Bedrails 2   Lying on Back to Sitting on Edge of Flat Bed Without Bedrails 2   Moving Bed to Chair 2   Standing Up From Chair Using Arms 4   Walk in Room 3   Climb 3-5 Stairs With Railing 1   Basic Mobility Inpatient Raw Score 14   Basic Mobility Standardized Score 35 55   Highest Level Of Mobility   -HLM Goal 4: Move to chair/commode   JH-HLM Achieved 6: Walk 10 steps or more   Additional Treatment Session   Start Time 1050   End Time 1112   Treatment Assessment Pt requires less A for gait training during treatment session , and similar A For transfers  She continued to need verbal instruction for walker management and hand placement  SKilled PT REcommended to progress pt toward treatment goals   Equipment Use rolling walker   Additional Treatment Day 1   Exercises   Neuro re-ed Care coordination w/ OT and pt/family for treatment session due to potential need for A Of 2 and limited activity tolerance    Pt amb walk 15ft to bed w/close S plus verbal instruction for walker management, sit to supine S w/verbal instruction LE management  Pt able to egress towards [facility] w/only LE stabilization using leg press method  End of Consult   Patient Position at End of Consult Supine;Bed/Chair alarm activated; All needs within reach  ([facility] starting US guided IV at end of session )   (Please find full objective findings from PT assessment regarding body systems outlined above)  Pt requires PT /OT co-treat and co-eval due to required skilled interventions of at least 2 clinicians for care delivery, medical complexity, limited activity tolerance, and cognitive-behavioral impairments  PT and OT goals addressed separately  The patient's [facility] Basic Mobility Inpatient Short Form Raw Score is 14  A Raw score of less than or equal to 16 suggests the patient may benefit from discharge to post-acute rehabilitation services, which DOES coincide with CURRENT above PT recommendations  However please refer to therapist recommendation for discharge planning given other factors that may influence destination  Adapted from [employer] [facility] “6-Clicks” Basic Mobility and Daily Activity Scores With Discharge Destination  Physical Therapy, 2021;101:1-9  DOI: [id number]    Portions of the record may have been created with voice recognition software  Occasional wrong word or "sound a like" substitutions may have occurred due to the inherent limitations of voice recognition software    Read the chart carefully and recognize, using context, where substitutions have occurred

## 2023-02-22 NOTE — ASSESSMENT & PLAN NOTE
Wt Readings from Last 3 Encounters:   02/22/23 61 2 kg (134 lb 14 7 oz)   08/15/22 59 4 kg (131 lb)   07/18/22 56 3 kg (124 lb 3 2 oz)

## 2023-02-22 NOTE — PLAN OF CARE
Thin Liquids  Mechanical Soft Solids secondary to dentures not at facility  Medications Whole w/ Liquids

## 2023-02-22 NOTE — QUICK NOTE
Contacted by nursing that pt's  wanted a medical update  Attempted to call patient at provided cell phone ([phone]) but he did not answer  Left a voicemail instead

## 2023-02-22 NOTE — PLAN OF CARE
Problem: OCCUPATIONAL THERAPY ADULT  Goal: Performs self-care activities at highest level of function for planned discharge setting  See evaluation for individualized goals  Description: Treatment Interventions: ADL retraining, Functional transfer training, Endurance training, Patient/family training, Equipment evaluation/education, Compensatory technique education          See flowsheet documentation for full assessment, interventions and recommendations  2/22/2023 1318 by Stuart Kwan OT  Note: Limitation: Decreased ADL status, Decreased Safe judgement during ADL, Decreased UE strength, Decreased endurance, Decreased high-level ADLs, Decreased self-care trans  Prognosis: Good  Assessment: Patient is a 80 y o  female seen for OT evaluation an subsequent treatment session at [facility] following admission on 2/20/2023  s/p Chest pain  Comorbidities and significant PMHx impacting functional performance include: UTI, ambulatory dysfunction, confusion, recent fall, chronic LBP, spinal stenosis of lumbar region, SI joint dysfunction, abnormality of gait d/t impairment of balance  Patient presents with active orders for OT eval and treat and up and OOB as tolerated   Lajean Cassette At baseline, pt is (I) with ADLs, mod (I) household distances with rollator  A with all IADLs  Lives c spouse Upon initial evaluation, patient requires supervision for UB ADLs, min assistance for LB ADLs, and min assistance for transfers and functional mobility household distance with RW  Based on functional eval, pt presents with intact  attention, impaired  safety awareness, impaired  problem solving skills, and intact   memory   Occupational performance is affected by the following deficits:  decreased muscular strength , decreased standing tolerance for self care tasks , decreased dynamic balance impacting functional reach, decreased trunk control , decreased activity tolerance , impaired safety awareness  and (+) pain  Based on these findings, functional performance deficits, and medical complexity pt has been identified as a high complexity evaluation  Personal factors impacting performance include: decreased (+) Hx of falls , steps to enter home, High fall risk  and decreased recall of precautions   Patient would benefit from OT services within the acute care setting to maximize level of functional independence in the following occupational areas bathing/showering, toileting, dressing , personal hygiene/grooming , bed mobility , functional mobility, transfer to all surfaces and fall prevention   From OT standpoint, recommendation at time of D/C would be post-acute rehabilitation   It patient able to demonstrate safe completion of LB ADLs, self care transfers and functional mobility at supervision level with use of RW, then may be able to progress to D/C home with Orlando Calderon safely  OT Discharge Recommendation: Post acute rehabilitation services       2/22/2023 1318 by Penney Frankel, OT  Note: Limitation: Decreased ADL status, Decreased Safe judgement during ADL, Decreased UE strength, Decreased endurance, Decreased high-level ADLs, Decreased self-care trans  Prognosis: Good  Assessment: Patient is a 80 y o  female seen for OT evaluation an subsequent treatment session at [facility] following admission on 2/20/2023  s/p Chest pain  Comorbidities and significant PMHx impacting functional performance include: UTI, ambulatory dysfunction, confusion, recent fall, chronic LBP, spinal stenosis of lumbar region, SI joint dysfunction, abnormality of gait d/t impairment of balance  Patient presents with active orders for OT eval and treat and up and OOB as tolerated   Lyndsay Valles At baseline, pt is (I) with ADLs, mod (I) household distances with rollator  A with all IADLs   Lives c spouse Upon initial evaluation, patient requires supervision for UB ADLs, min assistance for LB ADLs, and min assistance for transfers and functional mobility household distance with RW  Based on functional eval, pt presents with intact  attention, impaired  safety awareness, impaired  problem solving skills, and intact   memory  Occupational performance is affected by the following deficits:  decreased muscular strength , decreased standing tolerance for self care tasks , decreased dynamic balance impacting functional reach, decreased trunk control , decreased activity tolerance , impaired safety awareness  and (+) pain  Based on these findings, functional performance deficits, and medical complexity pt has been identified as a high complexity evaluation  Personal factors impacting performance include: decreased (+) Hx of falls , steps to enter home, High fall risk  and decreased recall of precautions   Patient would benefit from OT services within the acute care setting to maximize level of functional independence in the following occupational areas bathing/showering, toileting, dressing , personal hygiene/grooming , bed mobility , functional mobility, transfer to all surfaces and fall prevention   From OT standpoint, recommendation at time of D/C would be post-acute rehabilitation   It patient able to demonstrate safe completion of LB ADLs, self care transfers and functional mobility at supervision level with use of RW, then may be able to progress to D/C home with [address] [place] [address] safely       OT Discharge Recommendation: Post acute rehabilitation services        [facility]

## 2023-02-22 NOTE — PROGRESS NOTES
[facility]  Progress Note - Hannah Springer 1934, 80 y o  female MRN: [id number]  Unit/Bed#: S -01 Encounter: [id number]  Primary Care Provider: Adry Rider DO   Date and time admitted to hospital: 2/20/2023  7:53 PM    * Chest pain  Assessment & Plan  Presents with CP across the whole chest onset a few hours prior to arrival  Denies radiation, SOB, palpitations  Troponin 82, 74, 74  EKG- NSR 95, old RBBB  Chest Xray- Increasing bibasilar consolidations probably represent atelectasis related to hypoventilation unless there is compelling clinical evidence for pneumonia  Trace bilateral pleural effusions  Patient desaturated to 88% on room air  Improved now  Plan-   Continue ceftriaxone  Monitor white cell count and temperature  Speech and swallowing evaluation  Confusion  Assessment & Plan  Patient was alert and orient when she presents to the ED  but found confused yesterday  Likely delirium  Plan  Reorient patient  Delirium precautions  Repeat Pro-Vahid in a m Dara Soulier Urinary retention protocol  UTI (urinary tract infection)  Assessment & Plan  Patient recently had a UTI  Took nitrofurantoin for 2 days at home  Patient complains of burning pain while urinating  Urine analysis: Leukocytes trace  Nitrites negative, no bacteria    Plan  Continue ceftriaxone and vancomycin  Urinary retention protocol    Acute renal failure superimposed on stage 3b chronic kidney disease [facility])  Assessment & Plan  Lab Results   Component Value Date    EGFR 23 02/20/2023    EGFR 35 01/23/2023    EGFR 37 08/03/2022    CREATININE 1 86 (H) 02/20/2023    CREATININE 1 34 (H) 01/23/2023    CREATININE 1 28 08/03/2022   Baseline 1 1-1 4  Likely Poor intake  ED gave 1L LR     Plan-   Fluid restriction 1 5  Avoid nephrotoxins  Avoid hypotension     Dry cough  Assessment & Plan  Complaining of dry cough and sore throat onset last week   Denies fevers, chills, congestion, abdominal pain, nvd  COVID/FLU negative  Plan-   Chloraseptic [place] PRN     Ambulatory dysfunction  Assessment & Plan  Recent Fall on Friday, complaining of R hip pain  Hx of frequent falls and bilateral hip arthroplasties   Trauma workup: negative    Plan-  PT/OT eval       Elevated LFTs  Assessment & Plan  POA-   ALT 80     RUQ US- Fatty infiltration of the liver is suspected  In the setting of abdominal pain and/or elevated liver function tests, consider steatohepatitis    Plan-   Monitor CMP     Chronic diastolic heart failure [facility])  Assessment & Plan  Wt Readings from Last 3 Encounters:   08/15/22 59 4 kg (131 lb)   07/18/22 56 3 kg (124 lb 3 2 oz)   07/07/22 56 9 kg (125 lb 6 oz)     Echo:  left ventricular ejection fraction is 55% by visual estimation  Systolic function is normal  Wall motion is normal  Diastolic function is mildly abnormal, consistent with grade I (abnormal) relaxation  Left Atrium: The atrium is mildly dilated  Home regimen: Torsemide 10 mh PRN  Plan  Hold torsemide in the setting of VICENTE  Does not appear to be volume overloaded      DM (diabetes mellitus), type 2 ([facility] [address] )  Assessment & Plan  Lab Results   Component Value Date    HGBA1C 5 9 (H) 04/14/2022       No results for input(s): POCGLU in the last 72 hours  Blood Sugar Average: Last 72 hrs:  Family reports patient has been taking Metformin 550mg BID   Per chart review pt's metformin was discontinued at discharge in 2022 due to worsening CKD, however it appears this was lost in outpatient follow up     Plan-   Insulin Sliding Scale   Monitor blood glucose  Hypoglycemic protocol        VTE Pharmacologic Prophylaxis:   High Risk (Score >/= 5) - Pharmacological DVT Prophylaxis Ordered: heparin  Sequential Compression Devices Ordered  Patient Centered Rounds: I performed bedside rounds with nursing staff today    Discussions with Specialists or Other Care Team Provider:     Education and Discussions with Family / Patient: Attempted to update  () via phone  Left voicemail  Current Length of Stay: 2 day(s)  Current Patient Status: Inpatient   Discharge Plan: TBD    Code Status: Level 1 - Full Code    Subjective:   Patient was seen and examined at bedside  Patient is still confused  Denies pain  Not in pain or distress  Did not answer my questions properly  Objective:     Vitals:   Temp (24hrs), Av 8 °F (36 6 °C), Min:97 4 °F (36 3 °C), Max:98 °F (36 7 °C)    Temp:  [97 4 °F (36 3 °C)-98 °F (36 7 °C)] 97 4 °F (36 3 °C)  HR:  [86-98] 93  Resp:  [16-17] 17  BP: ()/(42-59) 118/54  SpO2:  [94 %-98 %] 94 %  Body mass index is 27 25 kg/m²  Input and Output Summary (last 24 hours): Intake/Output Summary (Last 24 hours) at 2023 1226  Last data filed at 2023 0900  Gross per 24 hour   Intake --   Output 800 ml   Net -800 ml       Physical Exam:   Physical Exam  Constitutional:       General: She is not in acute distress  Appearance: Normal appearance  She is not ill-appearing or toxic-appearing  HENT:      Head: Normocephalic  Nose: No congestion  Mouth/Throat:      Mouth: Mucous membranes are moist       Pharynx: Oropharynx is clear  Cardiovascular:      Rate and Rhythm: Normal rate  Pulses: Normal pulses  Heart sounds: Normal heart sounds  Pulmonary:      Effort: Pulmonary effort is normal       Breath sounds: Rales present  Abdominal:      General: Bowel sounds are normal       Palpations: Abdomen is soft  Musculoskeletal:      Right lower leg: No edema  Left lower leg: No edema  Skin:     General: Skin is warm and dry  Capillary Refill: Capillary refill takes less than 2 seconds  Neurological:      Mental Status: She is alert  She is disoriented     Psychiatric:         Mood and Affect: Mood normal          Behavior: Behavior normal           Additional Data:     Labs:  Results from last 7 days   Lab Units 23  1006 23  0559 23 WBC Thousand/uL 5 90   < > 9 25   HEMOGLOBIN g/dL 12 3   < > 13 3   HEMATOCRIT % 37 2   < > 41 8   PLATELETS Thousands/uL 142*   < > 141*   NEUTROS PCT %  --   --  81*   LYMPHS PCT %  --   --  4*   MONOS PCT %  --   --  7   EOS PCT %  --   --  7*    < > = values in this interval not displayed       Results from last 7 days   Lab Units 02/22/23  1006 02/21/23  0559   SODIUM mmol/L 136 133*   POTASSIUM mmol/L 4 0 3 8   CHLORIDE mmol/L 101 99   CO2 mmol/L 30 25   BUN mg/dL 32* 42*   CREATININE mg/dL 1 22 1 51*   ANION GAP mmol/L 5 9   CALCIUM mg/dL 8 5 8 1*   ALBUMIN g/dL  --  2 8*   TOTAL BILIRUBIN mg/dL  --  0 95   ALK PHOS U/L  --  217*   ALT U/L  --  77*   AST U/L  --  116*   GLUCOSE RANDOM mg/dL 93 96     Results from last 7 days   Lab Units 02/20/23  2018   INR  1 20*     Results from last 7 days   Lab Units 02/22/23  1114 02/22/23  0725 02/22/23  0156 02/21/23  2139 02/21/23  2114 02/21/23  1612 02/21/23  1044 02/21/23  0649 02/21/23  0210   POC GLUCOSE mg/dl 99 92 92 82 70 80 95 95 95         Results from last 7 days   Lab Units 02/21/23  0559   PROCALCITONIN ng/ml 1 79*       Lines/Drains:  Invasive Devices     Peripheral Intravenous Line  Duration           Peripheral IV 02/22/23 Left;Ventral (anterior) Forearm <1 day                  Telemetry:  Telemetry Orders (From admission, onward)             48 Hour Telemetry Monitoring  Continuous x 48 hours        References:    Telemetry Guidelines   Question:  Reason for 48 Hour Telemetry  Answer:  Arrhythmias Requiring Medical Therapy (eg  SVT, Vtach/fib, Bradycardia, Uncontrolled A-fib)                 Telemetry Reviewed: Normal Sinus Rhythm  Indication for Continued Telemetry Use: Syncope             Imaging: Reviewed radiology reports from this admission including: chest xray    Recent Cultures (last 7 days):   Results from last 7 days   Lab Units 02/15/23  1543   URINE CULTURE  70,000-79,000 cfu/ml Enterococcus faecalis*  20,000-29,000 cfu/ml       Last 24 Hours Medication List:   Current Facility-Administered Medications   Medication Dose Route Frequency Provider Last Rate   • acetaminophen  650 mg Oral Q6H PRN Tony Lopez MD     • amitriptyline  10 mg Oral HS Tony Lopez MD     • aspirin  81 mg Oral Daily Tony Lopez MD     • atorvastatin  10 mg Oral QPM Tony Lopez MD     • cefTRIAXone  1,000 mg Intravenous Q24H Griselda Fech, DO 1,000 mg (02/22/23 0844)   • DULoxetine  60 mg Oral Daily Tony Lopez MD     • heparin (porcine)  5,000 Units Subcutaneous Q8H [address] Tony Lopez MD     • hydroxychloroquine  200 mg Oral Daily With Breakfast Tony Lopez MD     • insulin lispro  1-5 Units Subcutaneous HS Tony Lopez MD     • insulin lispro  1-5 Units Subcutaneous TID AC Kayla Heart MD     • magnesium Oxide  400 mg Oral Daily Tony Lopez MD     • metoprolol succinate  12 5 mg Oral Daily Tony Lopez MD     • oxybutynin  10 mg Oral Daily Tony Lopez MD     • pantoprazole  40 mg Oral Early Morning Tony Lopez MD     • phenol  1 spray Mouth/Throat Q2H PRN Tony Lopez MD     • pregabalin  50 mg Oral Daily Tony Lopez MD     • pregabalin  75 mg Oral HS Tony Lopez MD     • vancomycin  750 mg Intravenous Q24H Abdoulaye Velez,  mg (02/22/23 1224)        Today, Patient Was Seen By: Maykel Vargas MD    **Please Note: This note may have been constructed using a voice recognition system  **

## 2023-02-22 NOTE — ASSESSMENT & PLAN NOTE
Presents with CP across the whole chest onset a few hours prior to arrival  Denies radiation, SOB, palpitations  Troponin 82, 74, 74  EKG- NSR 95, old RBBB  Chest Xray- Increasing bibasilar consolidations probably represent atelectasis related to hypoventilation unless there is compelling clinical evidence for pneumonia  Trace bilateral pleural effusions  Patient desaturated to 88% on room air  Improved now  Has few crackles on examination  Currently treating for pneumonia  Speech and swallowing eval: regular fluid and thin liquid  Plan-   Continue cefdinir to complete 7 days of antibiotics    Follow-up with PCP

## 2023-02-22 NOTE — ASSESSMENT & PLAN NOTE
Lab Results   Component Value Date    HGBA1C 5 9 (H) 04/14/2022       Recent Labs     02/21/23  2139 02/22/23  0156 02/22/23  0725 02/22/23  1114   POCGLU 82 92 92 99       Blood Sugar Average: Last 72 hrs:  (P) 63 [id number]     Plan  Insulin sliding scale  Monitor blood glucose  Hypoglycemic protocol

## 2023-02-22 NOTE — PROGRESS NOTES
Jeremy Guzman is a 80 y o  female who is currently ordered Vancomycin IV with management by the Pharmacy Consult service  Relevant clinical data and objective / subjective history reviewed  Vancomycin Assessment:  Indication and Goal AUC/Trough: Pneumonia (goal -600, trough >10), UTI, -600, trough >10  Clinical Status: stable  Micro:     Renal Function:  SCr: 1 22 mg/dL  CrCl: 27 8 mL/min  Renal replacement: not on dialysis  Days of Therapy: 2  Current Dose: 1000 mg as needed for random vancomycin level < 15  Vancomycin Plan:  New Dosin mg every 24 hours  Estimated AUC: 474 mcg*hr/mL  Estimated Trough: 15 7 mcg/mL  Next Level: 3/1/23 @ 0600  Renal Function Monitoring: Daily BMP and [place] will continue to follow closely for s/sx of nephrotoxicity, infusion reactions and appropriateness of therapy  BMP and CBC will be ordered per protocol  We will continue to follow the patient’s culture results and clinical progress daily      Dianna Geronimo, Pharmacist

## 2023-02-23 LAB
ANION GAP SERPL CALCULATED.3IONS-SCNC: 5 MMOL/L (ref 4–13)
BASOPHILS # BLD AUTO: 0.03 THOUSANDS/ÂΜL (ref 0–0.1)
BASOPHILS NFR BLD AUTO: 0 % (ref 0–1)
BUN SERPL-MCNC: 28 MG/DL (ref 5–25)
CALCIUM SERPL-MCNC: 8.7 MG/DL (ref 8.4–10.2)
CHLORIDE SERPL-SCNC: 103 MMOL/L (ref 96–108)
CO2 SERPL-SCNC: 29 MMOL/L (ref 21–32)
CREAT SERPL-MCNC: 1.1 MG/DL (ref 0.6–1.3)
EOSINOPHIL # BLD AUTO: 0.97 THOUSAND/ÂΜL (ref 0–0.61)
EOSINOPHIL NFR BLD AUTO: 13 % (ref 0–6)
ERYTHROCYTE [DISTWIDTH] IN BLOOD BY AUTOMATED COUNT: 13.3 % (ref 11.6–15.1)
GFR SERPL CREATININE-BSD FRML MDRD: 44 ML/MIN/1.73SQ M
GLUCOSE SERPL-MCNC: 108 MG/DL (ref 65–140)
GLUCOSE SERPL-MCNC: 124 MG/DL (ref 65–140)
GLUCOSE SERPL-MCNC: 151 MG/DL (ref 65–140)
GLUCOSE SERPL-MCNC: 89 MG/DL (ref 65–140)
GLUCOSE SERPL-MCNC: 98 MG/DL (ref 65–140)
HCT VFR BLD AUTO: 34.9 % (ref 34.8–46.1)
HGB BLD-MCNC: 11.5 G/DL (ref 11.5–15.4)
IMM GRANULOCYTES # BLD AUTO: 0.07 THOUSAND/UL (ref 0–0.2)
IMM GRANULOCYTES NFR BLD AUTO: 1 % (ref 0–2)
LYMPHOCYTES # BLD AUTO: 0.99 THOUSANDS/ÂΜL (ref 0.6–4.47)
LYMPHOCYTES NFR BLD AUTO: 14 % (ref 14–44)
MCH RBC QN AUTO: 32 PG (ref 26.8–34.3)
MCHC RBC AUTO-ENTMCNC: 33 G/DL (ref 31.4–37.4)
MCV RBC AUTO: 97 FL (ref 82–98)
MONOCYTES # BLD AUTO: 0.42 THOUSAND/ÂΜL (ref 0.17–1.22)
MONOCYTES NFR BLD AUTO: 6 % (ref 4–12)
NEUTROPHILS # BLD AUTO: 4.79 THOUSANDS/ÂΜL (ref 1.85–7.62)
NEUTS SEG NFR BLD AUTO: 66 % (ref 43–75)
NRBC BLD AUTO-RTO: 0 /100 WBCS
PLATELET # BLD AUTO: 134 THOUSANDS/UL (ref 149–390)
PMV BLD AUTO: 11 FL (ref 8.9–12.7)
POTASSIUM SERPL-SCNC: 3.8 MMOL/L (ref 3.5–5.3)
PROCALCITONIN SERPL-MCNC: 0.72 NG/ML
RBC # BLD AUTO: 3.59 MILLION/UL (ref 3.81–5.12)
SODIUM SERPL-SCNC: 137 MMOL/L (ref 135–147)
WBC # BLD AUTO: 7.27 THOUSAND/UL (ref 4.31–10.16)

## 2023-02-23 RX ADMIN — MAGNESIUM OXIDE TAB 400 MG (241.3 MG ELEMENTAL MG) 400 MG: 400 (241.3 MG) TAB at 09:29

## 2023-02-23 RX ADMIN — ATORVASTATIN CALCIUM 10 MG: 10 TABLET, FILM COATED ORAL at 17:00

## 2023-02-23 RX ADMIN — PANTOPRAZOLE SODIUM 40 MG: 40 TABLET, DELAYED RELEASE ORAL at 06:44

## 2023-02-23 RX ADMIN — PREGABALIN 50 MG: 50 CAPSULE ORAL at 09:27

## 2023-02-23 RX ADMIN — Medication 1 SPRAY: at 16:04

## 2023-02-23 RX ADMIN — VANCOMYCIN HYDROCHLORIDE 750 MG: 750 INJECTION, SOLUTION INTRAVENOUS at 12:50

## 2023-02-23 RX ADMIN — METOPROLOL SUCCINATE 12.5 MG: 25 TABLET, EXTENDED RELEASE ORAL at 09:29

## 2023-02-23 RX ADMIN — DULOXETINE HYDROCHLORIDE 60 MG: 60 CAPSULE, DELAYED RELEASE ORAL at 09:29

## 2023-02-23 RX ADMIN — PREGABALIN 75 MG: 75 CAPSULE ORAL at 21:42

## 2023-02-23 RX ADMIN — OXYBUTYNIN CHLORIDE 10 MG: 5 TABLET, EXTENDED RELEASE ORAL at 09:28

## 2023-02-23 RX ADMIN — HYDROXYCHLOROQUINE SULFATE 200 MG: 200 TABLET, FILM COATED ORAL at 09:36

## 2023-02-23 RX ADMIN — HEPARIN SODIUM 5000 UNITS: 5000 INJECTION INTRAVENOUS; SUBCUTANEOUS at 06:44

## 2023-02-23 RX ADMIN — HEPARIN SODIUM 5000 UNITS: 5000 INJECTION INTRAVENOUS; SUBCUTANEOUS at 14:12

## 2023-02-23 RX ADMIN — INSULIN LISPRO 1 UNITS: 100 INJECTION, SOLUTION INTRAVENOUS; SUBCUTANEOUS at 22:57

## 2023-02-23 RX ADMIN — AMITRIPTYLINE HYDROCHLORIDE 10 MG: 10 TABLET, FILM COATED ORAL at 21:42

## 2023-02-23 RX ADMIN — ASPIRIN 81 MG: 81 TABLET, CHEWABLE ORAL at 09:33

## 2023-02-23 RX ADMIN — Medication 1 SPRAY: at 21:46

## 2023-02-23 RX ADMIN — ACETAMINOPHEN 650 MG: 325 TABLET ORAL at 21:40

## 2023-02-23 RX ADMIN — CEFTRIAXONE SODIUM 1000 MG: 10 INJECTION, POWDER, FOR SOLUTION INTRAVENOUS at 09:33

## 2023-02-23 NOTE — ASSESSMENT & PLAN NOTE
Recent Fall on Friday, complaining of R hip pain  Hx of frequent falls and bilateral hip arthroplasties   Trauma workup: negative    Plan-  PT/OT eval recommended rehab  But patient's family wanted her to go home  Patient is being discharged home

## 2023-02-23 NOTE — ASSESSMENT & PLAN NOTE
Lab Results   Component Value Date    HGBA1C 5 9 (H) 04/14/2022       Recent Labs     02/22/23  1622 02/22/23  2125 02/23/23  0647 02/23/23  1055   POCGLU 144* 143* 98 108       Blood Sugar Average: Last 72 hrs:  (P) 14 [id number] reports patient has been taking Metformin 550mg BID   Per chart review pt's metformin was discontinued at discharge in 2022 due to worsening CKD, however it appears this was lost in outpatient follow up     Plan-   Continue home medications  Monitor blood glucose at home  Follow-up with PCP

## 2023-02-23 NOTE — ASSESSMENT & PLAN NOTE
Wt Readings from Last 3 Encounters:   02/22/23 61 2 kg (134 lb 14 7 oz)   08/15/22 59 4 kg (131 lb)   07/18/22 56 3 kg (124 lb 3 2 oz)     Echo:  left ventricular ejection fraction is 55% by visual estimation  Systolic function is normal  Wall motion is normal  Diastolic function is mildly abnormal, consistent with grade I (abnormal) relaxation  Left Atrium: The atrium is mildly dilated  Home regimen:  Torsemide 10 mg PRN  Plan  Continue torsemide 10 mg as needed  Low-sodium diet  Follow-up with PCP

## 2023-02-23 NOTE — CASE MANAGEMENT
Case Management Assessment & Discharge Planning Note    Patient name Ava Knowles  Location S /S -73 MRN [id number]  : 1934 Date 2023       Current Admission Date: 2023  Current Admission Diagnosis:Chest pain   Patient Active Problem List    Diagnosis Date Noted   • UTI (urinary tract infection) 2023   • Confusion 2023   • Acute renal failure superimposed on stage 3b chronic kidney disease ([address] ) 2023   • Elevated LFTs 2023   • Ambulatory dysfunction 2023   • Dry cough 2023   • SI (sacroiliac) joint dysfunction 10/18/2022   • Spinal stenosis of lumbar region with neurogenic claudication 2022   • Chronic pain syndrome 2022   • Dyslipidemia 2022   • Primary hypertension 2022   • Toxic metabolic encephalopathy    • Cellulitis 2022   • 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[address] ) 2022   • Chest pain 2022   • Leukocytosis 2022   • Chronic diastolic heart failure ([address] ) 2022   • Overactive bladder 2022   • Post zoster neuralgia 2021   • Primary generalized (osteo)arthritis 2021   • Seronegative arthropathy of multiple sites ([address] ) 2021   • Senile osteoporosis 2021   • Lumbar spondylosis 2021   • Diabetic polyneuropathy associated with type 2 diabetes mellitus ([address] ) 2021   • Diabetic gastroparesis associated with type 2 diabetes mellitus ([address] ) 2021   • Irritable bowel syndrome with diarrhea 2021   • Stage 3b chronic kidney disease ([address] ) 2021   • Traumatic injury of sacrum 2021   • Tendinitis of left rotator cuff 2021   • Abnormality of gait due to impairment of balance 2021   • Sicca syndrome ([address] ) 2021   • Sinobronchitis 2020   • Iron deficiency anemia secondary to inadequate dietary iron intake 2019   • Urinary frequency 2018   • Hypomagnesemia 2016   • DM (diabetes mellitus), type 2 ([facility] [address] ) 12/22/2016   • Symptomatic anemia 12/21/2016   • Shortness of breath 12/21/2016      LOS (days): 3  Geometric Mean LOS (GMLOS) (days): 4 30  Days to GMLOS:1 6     OBJECTIVE:    Risk of Unplanned Readmission Score: 19 99         Current admission status: Inpatient       Preferred Pharmacy:   [facility] [place], [place] - [address] [address] [address] [facility]  Phone: [phone] Fax: [address] [address] [facility] [address], [address]  [place] [address]  Phone: [phone] Fax: [phone]    Primary Care Provider: Roxana Blount DO    Primary Insurance: MEDICARE  Secondary Insurance: [employer]    ASSESSMENT:  [address] Proxies    There are no active Health Care Proxies on file  Readmission Root Cause  30 Day Readmission: No    Patient Information  Admitted from[de-identified] Home  Mental Status: Alert  During Assessment patient was accompanied by: Spouse Manuel Wagner (Spouse))  Assessment information provided by[de-identified] Patient  Primary Caregiver: Self  Support Systems: Spouse/significant other, Daughter, Family members  [place] of Residence: [address] [place] [address] do you live in?: [facility] entry access options   Select all that apply : Stairs  Number of steps to enter home : 2  Do the steps have railings?: Yes  Type of Current Residence: [place]  In the last 12 months, was there a time when you were not able to pay the mortgage or rent on time?: No  In the last 12 months, how many places have you lived?: 1  In the last 12 months, was there a time when you did not have a steady place to sleep or slept in a shelter (including now)?: No  Homeless/housing insecurity resource given?: N/A  Living Arrangements: Lives w/ Spouse/significant other  Is patient a ?: No    Activities of Daily Living Prior to Admission  Functional Status: Independent  Completes ADLs independently?: Yes  Ambulates independently?: Yes  Does patient use assisted devices?: Yes  Assisted Devices (DME) used: Rollator, Wheelchair, Other (Comment), Shower Chair, Straight Lorean Rings (Transport chair)  Does patient currently own DME?: Yes  What DME does the patient currently own?: Straight Cane, Walker, Wheelchair, Rollator, Other (Comment), Shower Chair (Transport chair)  Does patient have a history of Outpatient Therapy (PT/OT)?: Yes  Does the patient have a history of Short-Term Rehab?: No  Does patient have a history of HHC?: Yes  Does patient currently have [facility]?: No         Patient Information Continued  Income Source: SSI/SSD  Does patient have prescription coverage?: Yes (No issues with getting or affording her medications)  Within the past 12 months, you worried that your food would run out before you got the money to buy more : Never true  Within the past 12 months, the food you bought just didn't last and you didn't have money to get more : Never true  Food insecurity resource given?: N/A  Does patient receive dialysis treatments?: No  Does patient have a history of substance abuse?: No  Does patient have a history of Mental Health Diagnosis?: No         Means of Transportation  Means of Transport to Appts[de-identified] Family transport  In the past 12 months, has lack of transportation kept you from medical appointments or from getting medications?: No  In the past 12 months, has lack of transportation kept you from meetings, work, or from getting things needed for daily living?: No  Was application for public transport provided?: N/A        DISCHARGE DETAILS:    Discharge planning discussed with[de-identified] Patient and spouse  Freedom of Choice: Yes  Comments - Freedom of Choice: Reviewed STR recommendation  CM contacted family/caregiver?: Yes  Were Treatment Team discharge recommendations reviewed with patient/caregiver?: Yes  Did patient/caregiver verbalize understanding of patient care needs?: Yes  Were patient/caregiver advised of the risks associated with not following Treatment Team discharge recommendations?: Yes    Contacts  Patient Contacts: Mandy Mejia (Spouse)  Relationship to Patient[de-identified] Family  Contact Method: In Person  Reason/Outcome: Discharge Planning, Emergency Contact, Continuity of [address]         Is the patient interested in [facility] at discharge?: No    DME Referral Provided  Referral made for DME?: No    Other Referral/Resources/Interventions Provided:  Referral Comments: Reviewed recommendations for STR and patient and spouse are not interested  CM offered to set up [facility] or provide orders for outpatient OT/PT if preferred  Spouse reported that she has done this in the past but they did not see any benefit so they are not interested in this at this time  CM reviewed that if they would change their mind, PCP or any following provider could place these orders for her  Treatment Team Recommendation: Short Term Rehab  Discharge Destination Plan[de-identified] Home  Transport at Discharge : Family                             IMM Given (Date):: 02/23/23  IMM Given to[de-identified] Family     Additional Comments: Patient is COVID vaccianted X2 and boosted X3                CM met with patient and spouse at bedside  CM name and role reviewed  CM assessment completed above  CM reviewed recommendation for STR at DC  Patient's spouse declined a need for this  CM offered to set up [facility] or outpatient PT which he also declined reporting she had done this in the past with no improvement  CM reviewed discharge planning process including the following: identifying caregivers at home, preference for d/c planning needs, and discharge planning  CM will continue to follow for care coordination and update assessment as necessary

## 2023-02-23 NOTE — ASSESSMENT & PLAN NOTE
POA-   ALT 80     RUQ US- Fatty infiltration of the liver is suspected    In the setting of abdominal pain and/or elevated liver function tests, consider steatohepatitis    Plan-   Follow-up with PCP

## 2023-02-23 NOTE — ASSESSMENT & PLAN NOTE
Lab Results   Component Value Date    EGFR 44 02/23/2023    EGFR 39 02/22/2023    EGFR 30 02/21/2023    CREATININE 1 10 02/23/2023    CREATININE 1 22 02/22/2023    CREATININE 1 51 (H) 02/21/2023   Baseline 1 1-1 4  Likely Poor intake  ED gave 1L LR     Plan-   Fluid restriction 1 5  Avoid nephrotoxins  Avoid hypotension

## 2023-02-23 NOTE — ASSESSMENT & PLAN NOTE
Complaining of dry cough and sore throat onset last week   Denies fevers, chills, congestion, abdominal pain, nvd  COVID/FLU negative    Plan-   resolved

## 2023-02-23 NOTE — ASSESSMENT & PLAN NOTE
Patient recently had a UTI  Took nitrofurantoin for 2 days at home  Patient complains of burning pain while urinating  Urine analysis: Leukocytes trace  Nitrites negative, no bacteria  Plan  Completed 3 days of vancomycin  Continue ceftriaxone    Urinary retention protocol

## 2023-02-23 NOTE — SPEECH THERAPY NOTE
Speech Language/Pathology    Speech/Language Pathology Progress Note    Patient Name: Nasreen Gunn  YTOBF'G Date: 2/23/2023       Subjective:  Pt seen for dysphagia tx follow up at lunch  Pt recommended for dysphagia 2 diet, but ordered mech soft   at bedside  Does not want to bring in dentures if pt is being discharged soon, stated she typically eats softer foods even w/ her dentures  Objective:  Pt self fed chicken noodle soup and peaches, drank water by straw  Pt w/ adequate oral processing w/ all consistencies  Good oral control and transfer w/ liquids  No overt s/s aspiration noted       Assessment:  Pt tolerating [facility] soft foods w/o dentures, although current diet order is surgical soft  No overt s/s aspiration w/ thin liquids    Plan/Recommendations:  rec change diet to dysphagia 2 w/ thin liquids  meds as tolerated  Will follow up x 1-2 as needed      Jeremie Washington CCC-SLP  Speech Pathologist  Available via  tiger text

## 2023-02-23 NOTE — ASSESSMENT & PLAN NOTE
Patient was alert and orient when she presents to the ED  but found confused following day    Likely delirium  Plan  Resolved

## 2023-02-23 NOTE — PROGRESS NOTES
[facility]  Progress Note - Speedy Kline 1934, 80 y o  female MRN: [id number]  Unit/Bed#: S -01 Encounter: [id number]  Primary Care Provider: Jewell Dewey DO   Date and time admitted to hospital: 2/20/2023  7:53 PM    * Chest pain  Assessment & Plan  Presents with CP across the whole chest onset a few hours prior to arrival  Denies radiation, SOB, palpitations  Troponin 82, 74, 74  EKG- NSR 95, old RBBB  Chest Xray- Increasing bibasilar consolidations probably represent atelectasis related to hypoventilation unless there is compelling clinical evidence for pneumonia  Trace bilateral pleural effusions  Patient desaturated to 88% on room air  Improved now  Has few crackles on examination  Currently treating for pneumonia  Speech and swallowing eval: regular fluid and thin liquid  Plan-   Continue ceftriaxone  Monitor white cell count and temperature  Confusion  Assessment & Plan  Patient was alert and orient when she presents to the ED  but found confused following day  Likely delirium  Plan  Reorient patient  Delirium precautions     Urinary retention protocol  UTI (urinary tract infection)  Assessment & Plan  Patient recently had a UTI  Took nitrofurantoin for 2 days at home  Patient complains of burning pain while urinating  Urine analysis: Leukocytes trace  Nitrites negative, no bacteria  Plan  Completed 3 days of vancomycin  Continue ceftriaxone    Urinary retention protocol    Acute renal failure superimposed on stage 3b chronic kidney disease [facility])  Assessment & Plan  Lab Results   Component Value Date    EGFR 44 02/23/2023    EGFR 39 02/22/2023    EGFR 30 02/21/2023    CREATININE 1 10 02/23/2023    CREATININE 1 22 02/22/2023    CREATININE 1 51 (H) 02/21/2023   Baseline 1 1-1 4  Likely Poor intake  ED gave 1L LR     Plan-   Fluid restriction 1 5  Avoid nephrotoxins  Avoid hypotension     Dry cough  Assessment & Plan  Complaining of dry cough and sore throat onset last week   Denies fevers, chills, congestion, abdominal pain, nvd  COVID/FLU negative  Plan-   Chloraseptic [place] PRN     Ambulatory dysfunction  Assessment & Plan  Recent Fall on Friday, complaining of R hip pain  Hx of frequent falls and bilateral hip arthroplasties   Trauma workup: negative    Plan-  PT/OT eval recommended rehab  Elevated LFTs  Assessment & Plan  POA-   ALT 80     RUQ US- Fatty infiltration of the liver is suspected  In the setting of abdominal pain and/or elevated liver function tests, consider steatohepatitis    Plan-   Monitor CMP     Chronic diastolic heart failure (HCC)  Assessment & Plan  Wt Readings from Last 3 Encounters:   02/22/23 61 2 kg (134 lb 14 7 oz)   08/15/22 59 4 kg (131 lb)   07/18/22 56 3 kg (124 lb 3 2 oz)     Echo:  left ventricular ejection fraction is 55% by visual estimation  Systolic function is normal  Wall motion is normal  Diastolic function is mildly abnormal, consistent with grade I (abnormal) relaxation  Left Atrium: The atrium is mildly dilated  Home regimen: Torsemide 10 mh PRN  Plan  Hold torsemide in the setting of VICENTE  Does not appear to be volume overloaded      DM (diabetes mellitus), type 2 [facility])  Assessment & Plan  Lab Results   Component Value Date    HGBA1C 5 9 (H) 04/14/2022       Recent Labs     02/22/23  1622 02/22/23  2125 02/23/23  0647 02/23/23  1055   POCGLU 144* 143* 98 108       Blood Sugar Average: Last 72 hrs:  (P) 05 [id number] reports patient has been taking Metformin 550mg BID   Per chart review pt's metformin was discontinued at discharge in 2022 due to worsening CKD, however it appears this was lost in outpatient follow up     Plan-   Insulin Sliding Scale   Monitor blood glucose  Hypoglycemic protocol        VTE Pharmacologic Prophylaxis:   High Risk (Score >/= 5) - Pharmacological DVT Prophylaxis Ordered: heparin  Sequential Compression Devices Ordered  Patient Centered Rounds:  I performed bedside rounds with nursing staff today  Discussions with Specialists or Other Care Team Provider:     Education and Discussions with Family / Patient: Talked to  st bedside and updated  Current Length of Stay: 3 day(s)  Current Patient Status: Inpatient   Discharge Plan: tomorrow    Code Status: Level 1 - Full Code    Subjective:   Patient was seen and examined at bedside  Patient is still confused  But better compared to yesterday  Satting well on room air  Denies pain  Tolerating diet well  Planing of sore throat  Objective:     Vitals:   Temp (24hrs), Av 1 °F (36 7 °C), Min:97 7 °F (36 5 °C), Max:98 4 °F (36 9 °C)    Temp:  [97 7 °F (36 5 °C)-98 4 °F (36 9 °C)] 98 4 °F (36 9 °C)  HR:  [84-94] 84  Resp:  [15-18] 18  BP: (120-131)/(58-72) 120/58  SpO2:  [93 %-97 %] 93 %  Body mass index is 27 25 kg/m²  Input and Output Summary (last 24 hours): Intake/Output Summary (Last 24 hours) at 2023 1307  Last data filed at 2023 6418  Gross per 24 hour   Intake --   Output 1000 ml   Net -1000 ml       Physical Exam:   Physical Exam  Constitutional:       General: She is not in acute distress  Appearance: Normal appearance  She is not ill-appearing or toxic-appearing  HENT:      Head: Normocephalic  Nose: No congestion  Mouth/Throat:      Mouth: Mucous membranes are moist       Pharynx: Oropharynx is clear  Cardiovascular:      Rate and Rhythm: Normal rate  Pulses: Normal pulses  Heart sounds: Normal heart sounds  Pulmonary:      Effort: Pulmonary effort is normal       Breath sounds: Rales present  Abdominal:      General: Bowel sounds are normal       Palpations: Abdomen is soft  Musculoskeletal:      Right lower leg: No edema  Left lower leg: No edema  Skin:     General: Skin is warm and dry  Capillary Refill: Capillary refill takes less than 2 seconds  Neurological:      Mental Status: She is alert  She is disoriented  Psychiatric:         Mood and Affect: Mood normal          Behavior: Behavior normal           Additional Data:     Labs:  Results from last 7 days   Lab Units 02/23/23  0654   WBC Thousand/uL 7 27   HEMOGLOBIN g/dL 11 5   HEMATOCRIT % 34 9   PLATELETS Thousands/uL 134*   NEUTROS PCT % 66   LYMPHS PCT % 14   MONOS PCT % 6   EOS PCT % 13*     Results from last 7 days   Lab Units 02/23/23  0654 02/22/23  1006 02/21/23  0559   SODIUM mmol/L 137   < > 133*   POTASSIUM mmol/L 3 8   < > 3 8   CHLORIDE mmol/L 103   < > 99   CO2 mmol/L 29   < > 25   BUN mg/dL 28*   < > 42*   CREATININE mg/dL 1 10   < > 1 51*   ANION GAP mmol/L 5   < > 9   CALCIUM mg/dL 8 7   < > 8 1*   ALBUMIN g/dL  --   --  2 8*   TOTAL BILIRUBIN mg/dL  --   --  0 95   ALK PHOS U/L  --   --  217*   ALT U/L  --   --  77*   AST U/L  --   --  116*   GLUCOSE RANDOM mg/dL 89   < > 96    < > = values in this interval not displayed       Results from last 7 days   Lab Units 02/20/23  2018   INR  1 20*     Results from last 7 days   Lab Units 02/23/23  1055 02/23/23  0647 02/22/23  2125 02/22/23  1622 02/22/23  1114 02/22/23  0725 02/22/23  0156 02/21/23  2139 02/21/23  2114 02/21/23  1612 02/21/23  1044 02/21/23  0649   POC GLUCOSE mg/dl 108 98 143* 144* 99 92 92 82 70 80 95 95         Results from last 7 days   Lab Units 02/23/23  0654 02/21/23  0559   PROCALCITONIN ng/ml 0 72* 1 79*       Lines/Drains:  Invasive Devices     Peripheral Intravenous Line  Duration           Peripheral IV 02/22/23 Left;Ventral (anterior) Forearm 1 day                  Telemetry:  Telemetry Orders (From admission, onward)             48 Hour Telemetry Monitoring  Continuous x 48 hours        References:    Telemetry Guidelines   Question:  Reason for 48 Hour Telemetry  Answer:  Arrhythmias Requiring Medical Therapy (eg  SVT, Vtach/fib, Bradycardia, Uncontrolled A-fib)                 Telemetry Reviewed: Normal Sinus Rhythm  Indication for Continued Telemetry Use: Syncope Imaging: Reviewed radiology reports from this admission including: chest xray    Recent Cultures (last 7 days):         Last 24 Hours Medication List:   Current Facility-Administered Medications   Medication Dose Route Frequency Provider Last Rate   • acetaminophen  650 mg Oral Q6H PRN Cyndi Hebert MD     • amitriptyline  10 mg Oral HS Cyndi Hebert MD     • aspirin  81 mg Oral Daily Cyndi Hebert MD     • atorvastatin  10 mg Oral QPM Cyndi Hebert MD     • cefTRIAXone  1,000 mg Intravenous Q24H Griselda Fech, DO 1,000 mg (02/23/23 0933)   • DULoxetine  60 mg Oral Daily Cyndi Hebert MD     • heparin (porcine)  5,000 Units Subcutaneous Q8H [address] Cyndi Hebert MD     • hydroxychloroquine  200 mg Oral Daily With Breakfast Cyndi Hebert MD     • insulin lispro  1-5 Units Subcutaneous HS Cyndi Hebert MD     • insulin lispro  1-5 Units Subcutaneous TID AC Maria E Somers MD     • magnesium Oxide  400 mg Oral Daily Cyndi Hebert MD     • metoprolol succinate  12 5 mg Oral Daily Cyndi Hebert MD     • oxybutynin  10 mg Oral Daily Cyndi Hebert MD     • pantoprazole  40 mg Oral Early Morning Cyndi Hebert MD     • phenol  1 spray Mouth/Throat Q2H PRN Cyndi Hebert MD     • pregabalin  50 mg Oral Daily Cyndi Hebert MD     • pregabalin  75 mg Oral HS Cyndi Hebert MD     • vancomycin  750 mg Intravenous Q24H Griselda Fech,  mg (02/23/23 1250)        Today, Patient Was Seen By: Nadia Limon MD    **Please Note: This note may have been constructed using a voice recognition system  **

## 2023-02-23 NOTE — PLAN OF CARE
Problem: MOBILITY - ADULT  Goal: Maintain or return to baseline ADL function  Description: INTERVENTIONS:  -  Assess patient's ability to carry out ADLs; assess patient's baseline for ADL function and identify physical deficits which impact ability to perform ADLs (bathing, care of mouth/teeth, toileting, grooming, dressing, etc )  - Assess/evaluate cause of self-care deficits   - Assess range of motion  - Assess patient's mobility; develop plan if impaired  - Assess patient's need for assistive devices and provide as appropriate  - Encourage maximum independence but intervene and supervise when necessary  - Involve family in performance of ADLs  - Assess for home care needs following discharge   - Consider OT consult to assist with ADL evaluation and planning for discharge  - Provide patient education as appropriate  Outcome: Progressing  Goal: Maintains/Returns to pre admission functional level  Description: INTERVENTIONS:  - Perform BMAT or MOVE assessment daily    - Set and communicate daily mobility goal to care team and patient/family/caregiver  - Collaborate with rehabilitation services on mobility goals if consulted  - Perform Range of Motion  times a day  - Reposition patient every  hours    - Dangle patient times a day  - Stand patient  times a day  - Ambulate patient  times a day  - Out of bed to chair times a day   - Out of bed for meals  times a day  - Out of bed for toileting  - Record patient progress and toleration of activity level   Outcome: Progressing

## 2023-02-23 NOTE — PLAN OF CARE
Problem: MOBILITY - ADULT  Goal: Maintain or return to baseline ADL function  Description: INTERVENTIONS:  -  Assess patient's ability to carry out ADLs; assess patient's baseline for ADL function and identify physical deficits which impact ability to perform ADLs (bathing, care of mouth/teeth, toileting, grooming, dressing, etc )  - Assess/evaluate cause of self-care deficits   - Assess range of motion  - Assess patient's mobility; develop plan if impaired  - Assess patient's need for assistive devices and provide as appropriate  - Encourage maximum independence but intervene and supervise when necessary  - Involve family in performance of ADLs  - Assess for home care needs following discharge   - Consider OT consult to assist with ADL evaluation and planning for discharge  - Provide patient education as appropriate  Outcome: Progressing  Goal: Maintains/Returns to pre admission functional level  Description: INTERVENTIONS:  - Perform BMAT or MOVE assessment daily    - Set and communicate daily mobility goal to care team and patient/family/caregiver  - Collaborate with rehabilitation services on mobility goals if consulted  - Perform Range of Motion 3 times a day  - Reposition patient every 2 hours    - Dangle patient 3 times a day  - Stand patient 3 times a day  - Ambulate patient 3 times a day  - Out of bed to chair 3 times a day   - Out of bed for meals 3 times a day  - Out of bed for toileting  - Record patient progress and toleration of activity level   Outcome: Progressing     Problem: CARDIOVASCULAR - ADULT  Goal: Maintains optimal cardiac output and hemodynamic stability  Description: INTERVENTIONS:  - Monitor I/O, vital signs and rhythm  - Monitor for S/S and trends of decreased cardiac output  - Administer and titrate ordered vasoactive medications to optimize hemodynamic stability  - Assess quality of pulses, skin color and temperature  - Assess for signs of decreased coronary artery perfusion  - Instruct patient to report change in severity of symptoms  Outcome: Progressing  Goal: Absence of cardiac dysrhythmias or at baseline rhythm  Description: INTERVENTIONS:  - Continuous cardiac monitoring, vital signs, obtain 12 lead EKG if ordered  - Administer antiarrhythmic and heart rate control medications as ordered  - Monitor electrolytes and administer replacement therapy as ordered  Outcome: Progressing     Problem: RESPIRATORY - ADULT  Goal: Achieves optimal ventilation and oxygenation  Description: INTERVENTIONS:  - Assess for changes in respiratory status  - Assess for changes in mentation and behavior  - Position to facilitate oxygenation and minimize respiratory effort  - Oxygen administered by appropriate delivery if ordered  - Initiate smoking cessation education as indicated  - Encourage broncho-pulmonary hygiene including cough, deep breathe, Incentive Spirometry  - Assess the need for suctioning and aspirate as needed  - Assess and instruct to report SOB or any respiratory difficulty  - Respiratory Therapy support as indicated  Outcome: Progressing     Problem: SAFETY ADULT  Goal: Maintain or return to baseline ADL function  Description: INTERVENTIONS:  -  Assess patient's ability to carry out ADLs; assess patient's baseline for ADL function and identify physical deficits which impact ability to perform ADLs (bathing, care of mouth/teeth, toileting, grooming, dressing, etc )  - Assess/evaluate cause of self-care deficits   - Assess range of motion  - Assess patient's mobility; develop plan if impaired  - Assess patient's need for assistive devices and provide as appropriate  - Encourage maximum independence but intervene and supervise when necessary  - Involve family in performance of ADLs  - Assess for home care needs following discharge   - Consider OT consult to assist with ADL evaluation and planning for discharge  - Provide patient education as appropriate  Outcome: Progressing  Goal: Maintains/Returns to pre admission functional level  Description: INTERVENTIONS:  - Perform BMAT or MOVE assessment daily    - Set and communicate daily mobility goal to care team and patient/family/caregiver  - Collaborate with rehabilitation services on mobility goals if consulted  - Perform Range of Motion 3 times a day  - Reposition patient every 2 hours    - Dangle patient 3 times a day  - Stand patient 3 times a day  - Ambulate patient 3 times a day  - Out of bed to chair 3 times a day   - Out of bed for meals 3 times a day  - Out of bed for toileting  - Record patient progress and toleration of activity level   Outcome: Progressing  Goal: Patient will remain free of falls  Description: INTERVENTIONS:  - Educate patient/family on patient safety including physical limitations  - Instruct patient to call for assistance with activity   - Consult OT/PT to assist with strengthening/mobility   - Keep Call bell within reach  - Keep bed low and locked with side rails adjusted as appropriate  - Keep care items and personal belongings within reach  - Initiate and maintain comfort rounds  - Make Fall Risk Sign visible to staff  - Offer Toileting every 2 Hours, in advance of need  - Initiate/Maintain bed alarm  - Obtain necessary fall risk management equipment  - Apply yellow socks and bracelet for high fall risk patients  - Consider moving patient to room near nurses station  Outcome: Progressing     Problem: Prexisting or High Potential for Compromised Skin Integrity  Goal: Skin integrity is maintained or improved  Description: INTERVENTIONS:  - Identify patients at risk for skin breakdown  - Assess and monitor skin integrity  - Assess and monitor nutrition and hydration status  - Monitor labs   - Assess for incontinence   - Turn and reposition patient  - Assist with mobility/ambulation  - Relieve pressure over bony prominences  - Avoid friction and shearing  - Provide appropriate hygiene as needed including keeping skin clean and dry  - Evaluate need for skin moisturizer/barrier cream  - Collaborate with interdisciplinary team   - Patient/family teaching  - Consider wound care consult   Outcome: Progressing

## 2023-02-23 NOTE — PROGRESS NOTES
Kimberly Robins is a 80 y o  female who is currently ordered Vancomycin IV with management by the Pharmacy Consult service  Relevant clinical data and objective / subjective history reviewed  Vancomycin Assessment:  Indication and Goal AUC/Trough: Pneumonia (goal -600, trough >10), UTI, -600, trough >10  Clinical Status: stable  Micro:   No labs  Renal Function:  SCr: 1 1 mg/dL  CrCl: 30 9 mL/min  Renal replacement: not on dialysis  Days of Therapy: 3  Current Dose: 750 mg every 24 hours  Vancomycin Plan:  New Dosin mg every 24 hours  Estimated AUC: 446 mcg*hr/mL  Estimated Trough: 14 5 mcg/mL  Next Level: 3/1/23 @ 0600  Renal Function Monitoring: Daily BMP and [place] will continue to follow closely for s/sx of nephrotoxicity, infusion reactions and appropriateness of therapy  BMP and CBC will be ordered per protocol  We will continue to follow the patient’s culture results and clinical progress daily      Livan Houston, Pharmacist

## 2023-02-24 VITALS
DIASTOLIC BLOOD PRESSURE: 80 MMHG | TEMPERATURE: 98.1 F | BODY MASS INDEX: 26.31 KG/M2 | HEIGHT: 59 IN | HEART RATE: 103 BPM | OXYGEN SATURATION: 99 % | WEIGHT: 130.51 LBS | SYSTOLIC BLOOD PRESSURE: 131 MMHG | RESPIRATION RATE: 18 BRPM

## 2023-02-24 PROBLEM — R41.0 CONFUSION: Status: RESOLVED | Noted: 2023-02-22 | Resolved: 2023-02-24

## 2023-02-24 PROBLEM — N17.9 ACUTE RENAL FAILURE SUPERIMPOSED ON STAGE 3B CHRONIC KIDNEY DISEASE (HCC): Status: RESOLVED | Noted: 2023-02-21 | Resolved: 2023-02-24

## 2023-02-24 PROBLEM — R05.8 DRY COUGH: Status: RESOLVED | Noted: 2023-02-21 | Resolved: 2023-02-24

## 2023-02-24 PROBLEM — N18.32 ACUTE RENAL FAILURE SUPERIMPOSED ON STAGE 3B CHRONIC KIDNEY DISEASE (HCC): Status: RESOLVED | Noted: 2023-02-21 | Resolved: 2023-02-24

## 2023-02-24 LAB
ANION GAP SERPL CALCULATED.3IONS-SCNC: 8 MMOL/L (ref 4–13)
BUN SERPL-MCNC: 27 MG/DL (ref 5–25)
CALCIUM SERPL-MCNC: 9 MG/DL (ref 8.4–10.2)
CHLORIDE SERPL-SCNC: 103 MMOL/L (ref 96–108)
CO2 SERPL-SCNC: 27 MMOL/L (ref 21–32)
CREAT SERPL-MCNC: 1.07 MG/DL (ref 0.6–1.3)
ERYTHROCYTE [DISTWIDTH] IN BLOOD BY AUTOMATED COUNT: 13.3 % (ref 11.6–15.1)
GFR SERPL CREATININE-BSD FRML MDRD: 46 ML/MIN/1.73SQ M
GLUCOSE SERPL-MCNC: 116 MG/DL (ref 65–140)
GLUCOSE SERPL-MCNC: 90 MG/DL (ref 65–140)
GLUCOSE SERPL-MCNC: 96 MG/DL (ref 65–140)
HCT VFR BLD AUTO: 33.4 % (ref 34.8–46.1)
HGB BLD-MCNC: 10.9 G/DL (ref 11.5–15.4)
MCH RBC QN AUTO: 31.7 PG (ref 26.8–34.3)
MCHC RBC AUTO-ENTMCNC: 32.6 G/DL (ref 31.4–37.4)
MCV RBC AUTO: 97 FL (ref 82–98)
PLATELET # BLD AUTO: 127 THOUSANDS/UL (ref 149–390)
PMV BLD AUTO: 11 FL (ref 8.9–12.7)
POTASSIUM SERPL-SCNC: 3.8 MMOL/L (ref 3.5–5.3)
RBC # BLD AUTO: 3.44 MILLION/UL (ref 3.81–5.12)
SODIUM SERPL-SCNC: 138 MMOL/L (ref 135–147)
WBC # BLD AUTO: 7 THOUSAND/UL (ref 4.31–10.16)

## 2023-02-24 RX ORDER — PREGABALIN 75 MG/1
75 CAPSULE ORAL
Qty: 10 CAPSULE | Refills: 0
Start: 2023-02-24 | End: 2023-02-27

## 2023-02-24 RX ORDER — MIRABEGRON 50 MG/1
50 TABLET, FILM COATED, EXTENDED RELEASE ORAL EVERY EVENING
Qty: 30 TABLET | Refills: 0 | Status: SHIPPED | OUTPATIENT
Start: 2023-02-24 | End: 2023-03-26

## 2023-02-24 RX ORDER — CEFDINIR 300 MG/1
300 CAPSULE ORAL EVERY 12 HOURS SCHEDULED
Qty: 6 CAPSULE | Refills: 0 | Status: SHIPPED | OUTPATIENT
Start: 2023-02-24 | End: 2023-02-27

## 2023-02-24 RX ORDER — PREGABALIN 50 MG/1
50 CAPSULE ORAL DAILY
Qty: 10 CAPSULE | Refills: 0
Start: 2023-02-25 | End: 2023-02-27

## 2023-02-24 RX ORDER — ATORVASTATIN CALCIUM 10 MG/1
10 TABLET, FILM COATED ORAL EVERY EVENING
Qty: 30 TABLET | Refills: 0 | Status: SHIPPED | OUTPATIENT
Start: 2023-02-24 | End: 2023-03-26

## 2023-02-24 RX ADMIN — ASPIRIN 81 MG: 81 TABLET, CHEWABLE ORAL at 08:21

## 2023-02-24 RX ADMIN — CEFTRIAXONE SODIUM 1000 MG: 10 INJECTION, POWDER, FOR SOLUTION INTRAVENOUS at 08:40

## 2023-02-24 RX ADMIN — PANTOPRAZOLE SODIUM 40 MG: 40 TABLET, DELAYED RELEASE ORAL at 06:08

## 2023-02-24 RX ADMIN — METOPROLOL SUCCINATE 12.5 MG: 25 TABLET, EXTENDED RELEASE ORAL at 08:21

## 2023-02-24 RX ADMIN — PREGABALIN 50 MG: 50 CAPSULE ORAL at 08:22

## 2023-02-24 RX ADMIN — HEPARIN SODIUM 5000 UNITS: 5000 INJECTION INTRAVENOUS; SUBCUTANEOUS at 06:08

## 2023-02-24 RX ADMIN — DULOXETINE HYDROCHLORIDE 60 MG: 60 CAPSULE, DELAYED RELEASE ORAL at 08:22

## 2023-02-24 RX ADMIN — HYDROXYCHLOROQUINE SULFATE 200 MG: 200 TABLET, FILM COATED ORAL at 08:24

## 2023-02-24 RX ADMIN — OXYBUTYNIN CHLORIDE 10 MG: 5 TABLET, EXTENDED RELEASE ORAL at 08:21

## 2023-02-24 RX ADMIN — MAGNESIUM OXIDE TAB 400 MG (241.3 MG ELEMENTAL MG) 400 MG: 400 (241.3 MG) TAB at 08:22

## 2023-02-24 NOTE — DISCHARGE INSTR - AVS FIRST PAGE
Dear Leonarda Diaz,     It was our pleasure to care for you here at [facility], [address]  It is our hope that we were always able to exceed the expected standards for your care during your stay  You were hospitalized due to pneumonia and urinary tract infection  You were cared for on the [facility] 4th floor by Radha Oliver MD under the service of Rufino Pino MD with the [facility] who covers for your primary care physician (PCP), Pastora Ramirez DO, while you were hospitalized  If you have any questions or concerns related to this hospitalization, you may contact us at [phone]  For follow up as well as any medication refills, we recommend that you follow up with your primary care physician  A registered nurse will reach out to you by phone within a few days after your discharge to answer any additional questions that you may have after going home  However, at this time we provide for you here, the most important instructions / recommendations at discharge:     Notable Medication Adjustments -   Start taking cefdinir 300 mg every 12 hours for 3 days  No other changes were made to your medications  please take them as ordered  Testing Required after Discharge -   None  Important follow up information -   Follow-up with your PCP within 1 week  Other Instructions -   Call provider for worsening of symptoms  Please review this entire after visit summary as additional general instructions including medication list, appointments, activity, diet, any pertinent wound care, and other additional recommendations from your care team that may be provided for you        Sincerely,     Radha Oliver MD

## 2023-02-24 NOTE — PLAN OF CARE
Problem: MOBILITY - ADULT  Goal: Maintain or return to baseline ADL function  Description: INTERVENTIONS:  -  Assess patient's ability to carry out ADLs; assess patient's baseline for ADL function and identify physical deficits which impact ability to perform ADLs (bathing, care of mouth/teeth, toileting, grooming, dressing, etc )  - Assess/evaluate cause of self-care deficits   - Assess range of motion  - Assess patient's mobility; develop plan if impaired  - Assess patient's need for assistive devices and provide as appropriate  - Encourage maximum independence but intervene and supervise when necessary  - Involve family in performance of ADLs  - Assess for home care needs following discharge   - Consider OT consult to assist with ADL evaluation and planning for discharge  - Provide patient education as appropriate  Outcome: Progressing  Goal: Maintains/Returns to pre admission functional level  Description: INTERVENTIONS:  - Perform BMAT or MOVE assessment daily    - Set and communicate daily mobility goal to care team and patient/family/caregiver  - Collaborate with rehabilitation services on mobility goals if consulted  - Perform Range of Motion  times a day  - Reposition patient every  hours    - Dangle patient times a day  - Stand patient times a day  - Ambulate patient  times a day  - Out of bed to chair times a day   - Out of bed for meals  times a day  - Out of bed for toileting  - Record patient progress and toleration of activity level   Outcome: Progressing     Problem: CARDIOVASCULAR - ADULT  Goal: Maintains optimal cardiac output and hemodynamic stability  Description: INTERVENTIONS:  - Monitor I/O, vital signs and rhythm  - Monitor for S/S and trends of decreased cardiac output  - Administer and titrate ordered vasoactive medications to optimize hemodynamic stability  - Assess quality of pulses, skin color and temperature  - Assess for signs of decreased coronary artery perfusion  - Instruct patient to report change in severity of symptoms  Outcome: Progressing  Goal: Absence of cardiac dysrhythmias or at baseline rhythm  Description: INTERVENTIONS:  - Continuous cardiac monitoring, vital signs, obtain 12 lead EKG if ordered  - Administer antiarrhythmic and heart rate control medications as ordered  - Monitor electrolytes and administer replacement therapy as ordered  Outcome: Progressing     Problem: RESPIRATORY - ADULT  Goal: Achieves optimal ventilation and oxygenation  Description: INTERVENTIONS:  - Assess for changes in respiratory status  - Assess for changes in mentation and behavior  - Position to facilitate oxygenation and minimize respiratory effort  - Oxygen administered by appropriate delivery if ordered  - Initiate smoking cessation education as indicated  - Encourage broncho-pulmonary hygiene including cough, deep breathe, Incentive Spirometry  - Assess the need for suctioning and aspirate as needed  - Assess and instruct to report SOB or any respiratory difficulty  - Respiratory Therapy support as indicated  Outcome: Progressing     Problem: SAFETY ADULT  Goal: Maintain or return to baseline ADL function  Description: INTERVENTIONS:  -  Assess patient's ability to carry out ADLs; assess patient's baseline for ADL function and identify physical deficits which impact ability to perform ADLs (bathing, care of mouth/teeth, toileting, grooming, dressing, etc )  - Assess/evaluate cause of self-care deficits   - Assess range of motion  - Assess patient's mobility; develop plan if impaired  - Assess patient's need for assistive devices and provide as appropriate  - Encourage maximum independence but intervene and supervise when necessary  - Involve family in performance of ADLs  - Assess for home care needs following discharge   - Consider OT consult to assist with ADL evaluation and planning for discharge  - Provide patient education as appropriate  Outcome: Progressing  Goal: Maintains/Returns to pre admission functional level  Description: INTERVENTIONS:  - Perform BMAT or MOVE assessment daily    - Set and communicate daily mobility goal to care team and patient/family/caregiver  - Collaborate with rehabilitation services on mobility goals if consulted  - Perform Range of Motion  times a day  - Reposition patient every  hours    - Dangle patient  times a day  - Stand patient  times a day  - Ambulate patient  times a day  - Out of bed to chair  times a day   - Out of bed for meals  times a day  - Out of bed for toileting  - Record patient progress and toleration of activity level   Outcome: Progressing  Goal: Patient will remain free of falls  Description: INTERVENTIONS:  - Educate patient/family on patient safety including physical limitations  - Instruct patient to call for assistance with activity   - Consult OT/PT to assist with strengthening/mobility   - Keep Call bell within reach  - Keep bed low and locked with side rails adjusted as appropriate  - Keep care items and personal belongings within reach  - Initiate and maintain comfort rounds  - Make Fall Risk Sign visible to staff  - Offer Toileting every Hours, in advance of need  - Initiate/Maintain alarm  - Obtain necessary fall risk management equipment:   - Apply yellow socks and bracelet for high fall risk patients  - Consider moving patient to room near nurses station  Outcome: Progressing     Problem: Prexisting or High Potential for Compromised Skin Integrity  Goal: Skin integrity is maintained or improved  Description: INTERVENTIONS:  - Identify patients at risk for skin breakdown  - Assess and monitor skin integrity  - Assess and monitor nutrition and hydration status  - Monitor labs   - Assess for incontinence   - Turn and reposition patient  - Assist with mobility/ambulation  - Relieve pressure over bony prominences  - Avoid friction and shearing  - Provide appropriate hygiene as needed including keeping skin clean and dry  - Evaluate need for skin moisturizer/barrier cream  - Collaborate with interdisciplinary team   - Patient/family teaching  - Consider wound care consult   Outcome: Progressing     Problem: Nutrition/Hydration-ADULT  Goal: Nutrient/Hydration intake appropriate for improving, restoring or maintaining nutritional needs  Description: Monitor and assess patient's nutrition/hydration status for malnutrition  Collaborate with interdisciplinary team and initiate plan and interventions as ordered  Monitor patient's weight and dietary intake as ordered or per policy  Utilize nutrition screening tool and intervene as necessary  Determine patient's food preferences and provide high-protein, high-caloric foods as appropriate       INTERVENTIONS:  - Monitor oral intake, urinary output, labs, and treatment plans  - Assess nutrition and hydration status and recommend course of action  - Evaluate amount of meals eaten  - Assist patient with eating if necessary   - Allow adequate time for meals  - Recommend/ encourage appropriate diets, oral nutritional supplements, and vitamin/mineral supplements  - Order, calculate, and assess calorie counts as needed  - Recommend, monitor, and adjust tube feedings and TPN/PPN based on assessed needs  - Assess need for intravenous fluids  - Provide specific nutrition/hydration education as appropriate  - Include patient/family/caregiver in decisions related to nutrition  Outcome: Progressing

## 2023-02-24 NOTE — PROGRESS NOTES
Saray Hickey is a 80 y o  female who is currently ordered Vancomycin IV with management by the Pharmacy Consult service  Relevant clinical data and objective / subjective history reviewed  Vancomycin Assessment:  Indication and Goal AUC/Trough: Pneumonia (goal -600, trough >10), UTI, -600, trough >10  Clinical Status: stable  Micro:     Renal Function:  SCr: 1 07 mg/dL  CrCl: 31 7 mL/min  Renal replacement: not on dialysis  Days of Therapy: 4  Current Dose: 750 mg every 24 hours  Vancomycin Plan:  New Dosin mg every 24 hours  Estimated AUC: 451 mcg*hr/mL  Estimated Trough: 14 5 mcg/mL  Next Level: 3/1/23 @ 0600  Renal Function Monitoring: Daily BMP and [place] will continue to follow closely for s/sx of nephrotoxicity, infusion reactions and appropriateness of therapy  BMP and CBC will be ordered per protocol  We will continue to follow the patient’s culture results and clinical progress daily      Ana Rosa Gonzalez, Pharmacist

## 2023-02-24 NOTE — SPEECH THERAPY NOTE
Attempted f/u however patients  and patient report no difficulty with current diet and no desire to modify it   did not bring dentures as she is pending d/c today  Will d/c ST orders at this time  Reconsult if indicated      DEYVI Wadsworth , [id number] [facility]  Speech Language Pathologist   Available via [address] [place] [address] #[id number]  [place] #[id number]

## 2023-02-24 NOTE — DISCHARGE SUMMARY
[facility]  Discharge- Prentis Sole 1934, 80 y o  female MRN: [id number]  Unit/Bed#: S -01 Encounter: [id number]  Primary Care Provider: Jung Quijano DO   Date and time admitted to hospital: 2/20/2023  7:53 PM    * Chest pain  Assessment & Plan  Presents with CP across the whole chest onset a few hours prior to arrival  Denies radiation, SOB, palpitations  Troponin 82, 74, 74  EKG- NSR 95, old RBBB  Chest Xray- Increasing bibasilar consolidations probably represent atelectasis related to hypoventilation unless there is compelling clinical evidence for pneumonia  Trace bilateral pleural effusions  Patient desaturated to 88% on room air  Improved now  Has few crackles on examination  Currently treating for pneumonia  Speech and swallowing eval: regular fluid and thin liquid  Plan-   Continue cefdinir to complete 7 days of antibiotics  Follow-up with PCP      Confusion-resolved as of 2/24/2023  Assessment & Plan  Patient was alert and orient when she presents to the ED  but found confused following day  Likely delirium  Plan  Resolved      UTI (urinary tract infection)  Assessment & Plan  Patient recently had a UTI  Took nitrofurantoin for 2 days at home  Patient complains of burning pain while urinating  Urine analysis: Leukocytes trace  Nitrites negative, no bacteria  Plan  Completed 3 days of vancomycin  Continue ceftriaxone    Urinary retention protocol    Acute renal failure superimposed on stage 3b chronic kidney disease (HCC)-resolved as of 2/24/2023  Assessment & Plan  Lab Results   Component Value Date    EGFR 44 02/23/2023    EGFR 39 02/22/2023    EGFR 30 02/21/2023    CREATININE 1 10 02/23/2023    CREATININE 1 22 02/22/2023    CREATININE 1 51 (H) 02/21/2023   Baseline 1 1-1 4  Likely Poor intake  ED gave 1L LR     Plan-   Fluid restriction 1 5  Avoid nephrotoxins  Avoid hypotension     Ambulatory dysfunction  Assessment & Plan  Recent Fall on Friday, complaining of R hip pain  Hx of frequent falls and bilateral hip arthroplasties   Trauma workup: negative    Plan-  PT/OT ebal recommended rehab  But patient's family wanted her to go home  Patient is being discharged home  Elevated LFTs  Assessment & Plan  POA-   ALT 80     RUQ US- Fatty infiltration of the liver is suspected  In the setting of abdominal pain and/or elevated liver function tests, consider steatohepatitis    Plan-   Follow-up with PCP    Chronic diastolic heart failure (HCC)  Assessment & Plan  Wt Readings from Last 3 Encounters:   02/22/23 61 2 kg (134 lb 14 7 oz)   08/15/22 59 4 kg (131 lb)   07/18/22 56 3 kg (124 lb 3 2 oz)     Echo:  left ventricular ejection fraction is 55% by visual estimation  Systolic function is normal  Wall motion is normal  Diastolic function is mildly abnormal, consistent with grade I (abnormal) relaxation  Left Atrium: The atrium is mildly dilated  Home regimen: Torsemide 10 mg PRN  Plan  Continue torsemide 10 mg as needed  Low-sodium diet  Follow-up with PCP      DM (diabetes mellitus), type 2 [facility])  Assessment & Plan  Lab Results   Component Value Date    HGBA1C 5 9 (H) 04/14/2022       Recent Labs     02/22/23  1622 02/22/23  2125 02/23/23  0647 02/23/23  1055   POCGLU 144* 143* 98 108       Blood Sugar Average: Last 72 hrs:  (P) 24 [id number] reports patient has been taking Metformin 550mg BID   Per chart review pt's metformin was discontinued at discharge in 2022 due to worsening CKD, however it appears this was lost in outpatient follow up     Plan-   Continue home medications  Monitor blood glucose at home  Follow-up with PCP    Dry cough-resolved as of 2/24/2023  Assessment & Plan  Complaining of dry cough and sore throat onset last week   Denies fevers, chills, congestion, abdominal pain, nvd  COVID/FLU negative    Plan-   resolved        Medical Problems     Resolved Problems  Date Reviewed: 2/24/2023          Resolved    Acute renal failure superimposed on stage 3b chronic kidney disease ([facility] [address] ) 2/24/2023     Resolved by  Abel Dunn MD    Dry cough 2/24/2023     Resolved by  Abel Dunn MD    Confusion 2/24/2023     Resolved by  Abel Dunn MD        Discharging Resident: Abel Dunn MD  Discharging Attending: Angie Medrano MD  PCP: Reuben Palacios DO  Admission Date:   Admission Orders (From admission, onward)     Ordered        02/20/23 2332  INPATIENT ADMISSION  Once                      Discharge Date: 02/24/23    Consultations During Hospital Stay:  · none    Procedures Performed:   · none    Significant Findings / Test Results:   CXR: Left lower lobe linear atelectasis versus scar  US RUQ: Fatty infiltration of the liver is suspected    Incidental Findings:   · None    Test Results Pending at Discharge (will require follow up): · None     Outpatient Tests Requested:  · None    Complications:  None    Reason for Admission: Chest pain    Hospital Course:   Jonas Delacruz is a 80 y o  female patient who originally presented to the hospital on 2/20/2023 due to chest pain  On admission patient had modulating the elevated troponins  EKG was normal sinus rhythm  Chest x-ray showed increasing bibasilar consolidations and trace bilateral pleural effusions  Patient desaturated to 88% on room air on admission  The following day patient became confused  Patient also was recently treated for UTI  Patient was on nitrofurantoin for 2 days at home  Patient's urine analysis was positive for leukocytes  she was started on ceftriaxone for suspected pneumonia and started vancomycin for UTI  Patient completed 3 days of vancomycin and 4 days of ceftriaxone while hospitalized  Patient's saturation improved with treatments  Also confusion resolved    Patient also had VICENTE on admission which later improved  Patient is being discharged on cefdinir to complete 7 days of antibiotics  PT OT evaluation recommended postacute rehab  But patient's  wanted to take the patient home  Please see above list of diagnoses and related plan for additional information  Condition at Discharge: good    Discharge Day Visit / Exam:   Subjective: Patient was seen and examined at bedside  Confusion has improved  Denies pain, cough, SOB, fever or chills  Satting well on room air  No complaints  Vitals: Blood Pressure: 116/65 (02/24/23 0726)  Pulse: 77 (02/24/23 0726)  Temperature: 98 1 °F (36 7 °C) (02/24/23 0726)  Temp Source: Oral (02/23/23 2110)  Respirations: 18 (02/24/23 0726)  Height: 4' 11" (149 9 cm) (02/21/23 1340)  Weight - Scale: 59 2 kg (130 lb 8 2 oz) (02/24/23 0537)  SpO2: 96 % (02/24/23 0726)  Exam:   Physical Exam  Constitutional:       General: She is not in acute distress  Appearance: Normal appearance  She is not ill-appearing or toxic-appearing  HENT:      Head: Normocephalic  Nose: No congestion  Mouth/Throat:      Mouth: Mucous membranes are moist       Pharynx: Oropharynx is clear  Cardiovascular:      Rate and Rhythm: Normal rate  Pulses: Normal pulses  Heart sounds: Normal heart sounds  Pulmonary:      Effort: Pulmonary effort is normal       Breath sounds: Normal breath sounds  Abdominal:      General: Bowel sounds are normal       Palpations: Abdomen is soft  Musculoskeletal:      Right lower leg: No edema  Left lower leg: No edema  Skin:     General: Skin is warm and dry  Capillary Refill: Capillary refill takes less than 2 seconds  Neurological:      General: No focal deficit present  Mental Status: She is alert  Psychiatric:         Behavior: Behavior normal           Discussion with Family: Updated  () at bedside      Discharge instructions/Information to patient and family: See after visit summary for information provided to patient and family  Provisions for Follow-Up Care:  See after visit summary for information related to follow-up care and any pertinent home health orders  Disposition:   Home    Planned Readmission: no    Discharge Medications:  See after visit summary for reconciled discharge medications provided to patient and/or family        **Please Note: This note may have been constructed using a voice recognition system**

## 2023-02-28 ENCOUNTER — OFFICE VISIT (OUTPATIENT)
Dept: UROLOGY | Facility: AMBULATORY SURGERY CENTER | Age: 88
End: 2023-02-28

## 2023-02-28 VITALS
WEIGHT: 130 LBS | DIASTOLIC BLOOD PRESSURE: 64 MMHG | SYSTOLIC BLOOD PRESSURE: 108 MMHG | BODY MASS INDEX: 26.21 KG/M2 | RESPIRATION RATE: 14 BRPM | HEIGHT: 59 IN

## 2023-02-28 DIAGNOSIS — N39.0 RECURRENT UTI: Primary | ICD-10-CM

## 2023-02-28 RX ORDER — NITROFURANTOIN MACROCRYSTALS 50 MG/1
50 CAPSULE ORAL
Qty: 90 CAPSULE | Refills: 2 | Status: SHIPPED | OUTPATIENT
Start: 2023-02-28 | End: 2023-05-29

## 2023-02-28 NOTE — PROGRESS NOTES
2/28/2023    [place] Yassine  1934  [id number]        Assessment  Urgency and frequency/overactive bladder with nighttime incontinence history of UTI      Discussion  I would continue with the Myrbetriq 50 mg but switch to daily before bedtime  We discussed double voiding and fluid production prior to bedtime  Recommended trial of Macrodantin 50 mg suppression as she has had 2 urinary tract infections requiring hospitalization within the last 6 months  Cultures have revealed sensitive Enterococcus  History of Present Illness  80 y o  female with a history of overactive bladder previously known to Dr Keisha Velez   She presents today with her daughter  She is 80years of age and is in a wheelchair  She resides with her   Her daughter checks in regularly  She complains of urgency and frequency of urination and often at night when she gets up to urinate she has no control of her bladder  During the daytime she has no incontinence  She does not appear to have stress incontinence  She has been on Myrbetriq 50 mg daily in the morning  She has been hospitalized twice in the last 6 months for a urinary tract infection with sepsis  Cultures have revealed sensitive Enterococcus  She denies any hematuria  AUA Symptom Score      Review of Systems  Review of Systems   Constitutional: Negative  HENT: Negative  Eyes: Negative  Respiratory: Negative  Cardiovascular: Negative  Gastrointestinal: Negative  Endocrine: Negative  Genitourinary:        HPI   Musculoskeletal: Negative  Skin: Negative  Allergic/Immunologic: Negative  Neurological: Negative  Hematological: Negative  Psychiatric/Behavioral: Negative            Past Medical History  Past Medical History:   Diagnosis Date   • Anemia    • Arthritis    • Back pain    • CHF (congestive heart failure) ([facility])    • Chronic kidney disease     Stage 3b   • Confusion 2/22/2023   • Diabetes [facility])    • Diabetes mellitus ([facility] )    • Diabetic gastroparesis associated with type 2 diabetes mellitus ([facility] )    • Diabetic polyneuropathy ([facility] )    • Diverticulosis    • Herpes zoster    • Hypertension    • IBS (irritable bowel syndrome)    • Neurogenic claudication due to lumbar spinal stenosis    • Osteoarthritis    • Osteoporosis    • Pulmonary edema    • Seronegative arthropathy of multiple sites [facility])        Past Social History  Past Surgical History:   Procedure Laterality Date   • BACK SURGERY     • EGD AND COLONOSCOPY N/A 2016    Procedure: EGD AND COLONOSCOPY;  Surgeon: Barby Farr MD;  [facility];   Service:    • JOINT REPLACEMENT      bilat knees and hips   • OTHER SURGICAL HISTORY      pelvis rods   • REPLACEMENT TOTAL KNEE BILATERAL     • STOMACH SURGERY         Past Family History  Family History   Problem Relation Age of Onset   • Cancer Brother    • Diabetes Mother    • Hypertension Father    • Heart disease Father        Past Social history  Social History     Socioeconomic History   • Marital status: /Civil Union     Spouse name: Not on file   • Number of children: Not on file   • Years of education: Not on file   • Highest education level: Not on file   Occupational History   • Not on file   Tobacco Use   • Smoking status: Former     Types: Cigarettes     Quit date:      Years since quittin 1   • Smokeless tobacco: Never   Vaping Use   • Vaping Use: Never used   Substance and Sexual Activity   • Alcohol use: No   • Drug use: No   • Sexual activity: Not Currently     Partners: Male     Birth control/protection: None   Other Topics Concern   • Not on file   Social History Narrative   • Not on file     Social Determinants of Health     Financial Resource Strain: Not on file   Food Insecurity: No Food Insecurity   • Worried About Running Out of Food in the Last Year: Never true   • Ran Out of Food in the Last Year: Never true   Transportation Needs: No Transportation Needs   • Lack of Transportation (Medical): No   • Lack of Transportation (Non-Medical):  No   Physical Activity: Not on file   Stress: Not on file   Social Connections: Not on file   Intimate Partner Violence: Not on file   Housing Stability: Low Risk    • Unable to Pay for Housing in the Last Year: No   • Number of Places Lived in the Last Year: 1   • Unstable Housing in the Last Year: No       Current Medications  Current Outpatient Medications   Medication Sig Dispense Refill   • aspirin 81 mg chewable tablet Chew 1 tablet (81 mg total) daily 30 tablet 0   • atorvastatin (LIPITOR) 10 mg tablet Take 1 tablet (10 mg total) by mouth every evening 30 tablet 0   • Cholecalciferol (VITAMIN D3) 1000 units CAPS Take 1 capsule by mouth daily      • DULoxetine (CYMBALTA) 60 mg delayed release capsule TAKE ONE CAPSULE BY MOUTH EVERY DAY 30 capsule 5   • hydroxychloroquine (PLAQUENIL) 200 mg tablet TAKE ONE TABLET BY MOUTH EVERY DAY WITH BREAKFAST 90 tablet 1   • Magnesium 250 MG TABS Take 250 mg by mouth every evening     • metFORMIN (GLUCOPHAGE) 500 mg tablet Take 500 mg by mouth 2 (two) times a day     • metoprolol succinate (TOPROL-XL) 25 mg 24 hr tablet Take 0 5 tablets (12 5 mg total) by mouth daily 45 tablet 3   • Mirabegron ER (Myrbetriq) 50 MG TB24 Take 1 tablet (50 mg total) by mouth every evening 30 tablet 0   • omeprazole (PriLOSEC) 20 mg delayed release capsule Take 20 mg by mouth daily       • pregabalin (LYRICA) 75 mg capsule Take 1 capsule (75 mg total) by mouth 3 (three) times a day 90 capsule 5   • pyridoxine (B-6) 100 MG tablet Take 100 mg by mouth daily     • Sodium Fluoride (PreviDent 5000 Booster Plus) 1 1 % PSTE Apply on teeth every evening as directed 100 mL 3   • amitriptyline (ELAVIL) 10 mg tablet Take 1 tablet (10 mg total) by mouth daily at bedtime for 14 days 14 tablet 0   • diphenoxylate-atropine (LOMOTIL) 2 5-0 025 mg per tablet Take 1 tablet by mouth if needed (Patient not taking: Reported on 2/28/2023)     • torsemide (DEMADEX) 10 mg tablet Take 1 tablet PRN for wt gain 3 lb/ 3 days or 5 lb/ 5 aria (Patient not taking: Reported on 2/28/2023) 30 tablet 0     Current Facility-Administered Medications   Medication Dose Route Frequency Provider Last Rate Last Admin   • denosumab (PROLIA) subcutaneous injection 60 mg  60 mg Subcutaneous Q6 Months The Meldium, PA-C   60 mg at 07/18/22 1831       Allergies  Allergies   Allergen Reactions   • Morphine Other (See Comments)     urinary retention   • Ciprofloxacin Rash and Nausea Only       Past Medical History, Social History, Family History, medications and allergies were reviewed  Vitals  Vitals:    02/28/23 1459   BP: 108/64   Resp: 14   Weight: 59 kg (130 lb)   Height: 4' 11" (1 499 m)       Physical Exam  Physical Exam  On examination she is in no acute distress  She is in a wheelchair  She is elderly cachectic and frail-appearing  Skin is discolored and hyperpigmented   examination reveals no CVA tenderness  The bladder is nonpalpable  Skin is warm  Extremities without edema    Neurologic is grossly intact and nonfocal     Results  No results found for: PSA  Lab Results   Component Value Date    GLUCOSE 205 (H) 10/09/2015    CALCIUM 9 0 02/24/2023     10/09/2015    K 3 8 02/24/2023    CO2 27 02/24/2023     02/24/2023    BUN 27 (H) 02/24/2023    CREATININE 1 07 02/24/2023     Lab Results   Component Value Date    WBC 7 00 02/24/2023    HGB 10 9 (L) 02/24/2023    HCT 33 4 (L) 02/24/2023    MCV 97 02/24/2023     (L) 02/24/2023         Office Urine Dip  No results found for this or any previous visit (from the past 1 hour(s)) ]

## 2023-03-01 PROBLEM — R13.10 DYSPHAGIA: Status: ACTIVE | Noted: 2023-03-01

## 2023-03-01 PROBLEM — I73.00 RAYNAUD'S PHENOMENON WITHOUT GANGRENE: Status: ACTIVE | Noted: 2023-03-01

## 2023-03-01 PROBLEM — K21.9 GASTROESOPHAGEAL REFLUX DISEASE WITHOUT ESOPHAGITIS: Status: ACTIVE | Noted: 2023-03-01

## 2023-03-02 ENCOUNTER — TELEPHONE (OUTPATIENT)
Dept: UROLOGY | Facility: AMBULATORY SURGERY CENTER | Age: 88
End: 2023-03-02

## 2023-03-02 NOTE — TELEPHONE ENCOUNTER
Patient recently seen in the office on 2/28/23  Macrodantin 50mg daily was prescribed for suppressive therapy

## 2023-03-02 NOTE — TELEPHONE ENCOUNTER
Called Vanessa back about medication it states 4 times a day and its suppose to be once daily    She understands

## 2023-03-02 NOTE — TELEPHONE ENCOUNTER
Pt under care of Dr Madeline Roldan    Pt daughter Michael Thomas called and requesting c/b regarding medication question for nitrofurantoin (MACRODANTIN) 50 mg capsule         Call [phone] Michael Thomas (daughter)  Pt call [phone]

## 2023-03-21 ENCOUNTER — TELEPHONE (OUTPATIENT)
Dept: UROLOGY | Facility: MEDICAL CENTER | Age: 88
End: 2023-03-21

## 2023-03-21 NOTE — TELEPHONE ENCOUNTER
3/20/21 - Patient's  called stating his wife has one weeks worth of medication and he's not able to get drugs through [address] [place] [address]  They stated she needs to make application again for the  [address] [place] [address] Patient Assistance Program for 2023  Apparently the program expires December 31 of each year  Application was received, completed and signed by the provider  Form then faxed to [phone]  (See attached information )  I left a message on the patient's home phone voice mail notifying of same  No further action required

## 2023-03-21 NOTE — TELEPHONE ENCOUNTER
Received notification that patient was APPROVED for the [id number] Patient Assistance Program   Enrollement in the program begins 3/21/23 until 12/31/23   "  Myrbetriq 50mg will be sent to the patient directly within 3-5 business days of this notice "  (See scanned document )    Mr Erma Tristan notified of same  For future PAP applications, we developed a plan for her 2024 request   Patient's  will reach out to me directly at the end of January, 2024

## 2023-03-23 ENCOUNTER — APPOINTMENT (OUTPATIENT)
Dept: LAB | Facility: CLINIC | Age: 88
End: 2023-03-23

## 2023-03-23 DIAGNOSIS — N18.32 STAGE 3B CHRONIC KIDNEY DISEASE (HCC): ICD-10-CM

## 2023-03-23 LAB
ANION GAP SERPL CALCULATED.3IONS-SCNC: 5 MMOL/L (ref 4–13)
BUN SERPL-MCNC: 27 MG/DL (ref 5–25)
CALCIUM SERPL-MCNC: 9 MG/DL (ref 8.4–10.2)
CHLORIDE SERPL-SCNC: 106 MMOL/L (ref 96–108)
CO2 SERPL-SCNC: 29 MMOL/L (ref 21–32)
CREAT SERPL-MCNC: 1.24 MG/DL (ref 0.6–1.3)
GFR SERPL CREATININE-BSD FRML MDRD: 38 ML/MIN/1.73SQ M
GLUCOSE P FAST SERPL-MCNC: 106 MG/DL (ref 65–99)
POTASSIUM SERPL-SCNC: 4.7 MMOL/L (ref 3.5–5.3)
SODIUM SERPL-SCNC: 140 MMOL/L (ref 135–147)

## 2023-04-27 ENCOUNTER — APPOINTMENT (OUTPATIENT)
Dept: LAB | Facility: CLINIC | Age: 88
End: 2023-04-27

## 2023-04-27 DIAGNOSIS — R79.89 ELEVATED SERUM CREATININE: ICD-10-CM

## 2023-04-27 LAB
ANION GAP SERPL CALCULATED.3IONS-SCNC: 4 MMOL/L (ref 4–13)
BUN SERPL-MCNC: 26 MG/DL (ref 5–25)
CALCIUM SERPL-MCNC: 9.5 MG/DL (ref 8.4–10.2)
CHLORIDE SERPL-SCNC: 105 MMOL/L (ref 96–108)
CO2 SERPL-SCNC: 31 MMOL/L (ref 21–32)
CREAT SERPL-MCNC: 1.39 MG/DL (ref 0.6–1.3)
GFR SERPL CREATININE-BSD FRML MDRD: 33 ML/MIN/1.73SQ M
GLUCOSE P FAST SERPL-MCNC: 122 MG/DL (ref 65–99)
POTASSIUM SERPL-SCNC: 4.4 MMOL/L (ref 3.5–5.3)
SODIUM SERPL-SCNC: 140 MMOL/L (ref 135–147)

## 2023-05-18 ENCOUNTER — CONSULT (OUTPATIENT)
Dept: GASTROENTEROLOGY | Facility: AMBULARY SURGERY CENTER | Age: 88
End: 2023-05-18

## 2023-05-18 ENCOUNTER — APPOINTMENT (OUTPATIENT)
Dept: LAB | Facility: AMBULARY SURGERY CENTER | Age: 88
End: 2023-05-18
Attending: INTERNAL MEDICINE

## 2023-05-18 VITALS
WEIGHT: 127.4 LBS | OXYGEN SATURATION: 100 % | HEIGHT: 59 IN | DIASTOLIC BLOOD PRESSURE: 76 MMHG | SYSTOLIC BLOOD PRESSURE: 122 MMHG | HEART RATE: 84 BPM | BODY MASS INDEX: 25.68 KG/M2 | RESPIRATION RATE: 18 BRPM

## 2023-05-18 DIAGNOSIS — K21.9 GASTROESOPHAGEAL REFLUX DISEASE WITHOUT ESOPHAGITIS: ICD-10-CM

## 2023-05-18 DIAGNOSIS — K21.9 GASTROESOPHAGEAL REFLUX DISEASE WITHOUT ESOPHAGITIS: Primary | ICD-10-CM

## 2023-05-18 DIAGNOSIS — R74.8 ELEVATED LIVER ENZYMES: ICD-10-CM

## 2023-05-18 DIAGNOSIS — R13.10 DYSPHAGIA, UNSPECIFIED TYPE: ICD-10-CM

## 2023-05-18 DIAGNOSIS — D64.9 ANEMIA, UNSPECIFIED TYPE: ICD-10-CM

## 2023-05-18 LAB
ERYTHROCYTE [DISTWIDTH] IN BLOOD BY AUTOMATED COUNT: 13.4 % (ref 11.6–15.1)
HCT VFR BLD AUTO: 41.6 % (ref 34.8–46.1)
HGB BLD-MCNC: 12.6 G/DL (ref 11.5–15.4)
MCH RBC QN AUTO: 29.2 PG (ref 26.8–34.3)
MCHC RBC AUTO-ENTMCNC: 30.3 G/DL (ref 31.4–37.4)
MCV RBC AUTO: 96 FL (ref 82–98)
PLATELET # BLD AUTO: 202 THOUSANDS/UL (ref 149–390)
PMV BLD AUTO: 11.3 FL (ref 8.9–12.7)
RBC # BLD AUTO: 4.32 MILLION/UL (ref 3.81–5.12)
WBC # BLD AUTO: 6.44 THOUSAND/UL (ref 4.31–10.16)

## 2023-05-18 NOTE — LETTER
May 18, 2023     Teryl Bowels, [address] [place] [address] Ranjith Harris    Patient: Kadi Dolan   YOB: 1934   Date of Visit: 5/18/2023       Dear Dr Glen Schulz: Thank you for referring Guillermo Villeda to me for evaluation  Below are my notes for this consultation  If you have questions, please do not hesitate to call me  I look forward to following your patient along with you  Sincerely,        Aneesh Lou MD        CC: No Recipients  Aneesh Lou MD  5/18/2023  2:23 PM  Sign when Signing Visit  Consultation - [facility]  Kadi Dolan 80 y o  female MRN: [id number]  Unit/Bed#:  Encounter: [id number]        Consults    ASSESSMENT/PLAN:     1  History of tubular adenomas-patient does not wish to pursue colonoscopy at this time given advanced age and comorbidities  2   Normocytic anemia-no signs of overt bleeding, melena, hematochezia, change in bowel habits  Suspect anemia was likely dilutional as this was during the hospitalization on the last day  -We will obtain repeat CBC at this time  3   Dysphagia-appears to be likely oropharyngeal, has been evaluated by speech during the hospital at which time she was recommended dysphagia 2 diet along with thin liquids  Had a lengthy discussion with patient regarding following this diet   -Would recommend barium swallow as the next step given the risks of anesthesia/procedure associated with EGD  Patient and family prefer this  -If there is any abnormality in the barium swallow, can consider the next steps which may include EGD with possible dilation  Procedure should then be done in the hospital   -Meanwhile, continue to follow diet as recommended by speech   -Patient has been taking omeprazole 20 mg and has not had any symptoms of acid reflux, reports that she has been taking this for the past 2 years, may continue this for now      4 Elevated LFTs-unclear etiology, recommend repeating liver enzymes at this time  Ultrasound in the hospital was notable for hepatic steatosis only  She status post cholecystectomy  ______________________________________________________________________    Reason for Consult / Principal Problem: [unfilled]    HPI: Sharon Loving is a 80y o  year old female with history of diabetes, gastroparesis, neuropathy, IBS, osteoporosis, seronegative arthropathy on Plaquenil, lumbar stenosis, CKD stage IIIb, recurrent UTIs, chronic diastolic heart failure, hypertension, presents for initial evaluation for evaluation of dysphagia  Patient is accompanied by her  and daughter  She tells me that she has had symptoms of dysphagia intermittently over the past several years, worse recently  Reports that symptoms are typically with drier foods, reports that once had pretzel stuck in the esophagus  She reports that symptoms are mostly in the oropharyngeal area  She denies hematemesis, coffee and emesis or melena  She reports that she has been taking omeprazole 20 mg for several years which has been controlling acid reflux symptoms  She denies any change in bowel habits, hematemesis, coffee and emesis or melena  No unintentional weight loss  She was last seen by GI service in 2016 by Dr Ely Sommers at which time she underwent EGD and colonoscopy  EGD was notable for hiatal hernia and bile reflux  Gastric biopsies were negative for H  Pylori  Colonoscopy was notable for several colon polyps and diverticulosis  She was recommended to repeat a colonoscopy 1 year interval due to suboptimal bowel prep  Polyps were tubular adenomas without any high-grade dysplasia  Labs from February notable for anemia with hemoglobin of 10 9, platelets 189  Creatinine of 1 3    Review of Systems: The remainder of the review of systems was negative except for the pertinent positives noted in HPI       Historical Information   Past Medical History:   Diagnosis Date   • Anemia    • Arthritis    • Back pain    • CHF (congestive heart failure) ([facility])    • Chronic kidney disease     Stage 3b   • Confusion 2023   • Diabetes ([place] [address] )    • Diabetes mellitus ([place] [address] )    • Diabetic gastroparesis associated with type 2 diabetes mellitus ([place] [address] )    • Diabetic polyneuropathy ([facility])    • Diverticulosis    • Herpes zoster    • Hypertension    • IBS (irritable bowel syndrome)    • Neurogenic claudication due to lumbar spinal stenosis    • Osteoarthritis    • Osteoporosis    • Pulmonary edema    • Raynaud's phenomenon without gangrene    • Seronegative arthropathy of multiple sites ([place] [address] )    • [place] ([place] [address] )      Past Surgical History:   Procedure Laterality Date   • BACK SURGERY     • EGD AND COLONOSCOPY N/A 2016    Procedure: EGD AND COLONOSCOPY;  Surgeon: Miri Perez MD;  [facility]; Service:    • JOINT REPLACEMENT      bilat knees and hips   • OTHER SURGICAL HISTORY      pelvis rods   • REPLACEMENT TOTAL KNEE BILATERAL     • STOMACH SURGERY       Social History   Social History     Substance and Sexual Activity   Alcohol Use No     Social History     Substance and Sexual Activity   Drug Use No     Social History     Tobacco Use   Smoking Status Former   • Types: Cigarettes   • Quit date:    • Years since quittin 4   Smokeless Tobacco Never     Family History   Problem Relation Age of Onset   • Cancer Brother    • Diabetes Mother    • Hypertension Father    • Heart disease Father        Meds/Allergies      (Not in a hospital admission)    No current facility-administered medications for this visit  Allergies   Allergen Reactions   • Morphine Other (See Comments)     urinary retention   • Ciprofloxacin Rash and Nausea Only       Objective      not currently breastfeeding      [unfilled]    PHYSICAL EXAM     GEN: well nourished, well developed, no acute distress  HEENT: anicteric, MMM, no cervical or supraclavicular lymphadenopathy  CV: RRR, no m/r/g  CHEST: CTA b/l, no WRR  ABD: +BS, soft, NT/ND, no hepatosplenomegaly  EXT: no c/c/e  SKIN: no rashes,  NEURO: aaox3    Lab Results:   No visits with results within 1 Day(s) from this visit     Latest known visit with results is:   Appointment on 04/27/2023   Component Date Value   • Sodium 04/27/2023 140    • Potassium 04/27/2023 4 4    • Chloride 04/27/2023 105    • CO2 04/27/2023 31    • ANION GAP 04/27/2023 4    • BUN 04/27/2023 26 (H)    • Creatinine 04/27/2023 1 39 (H)    • Glucose, Fasting 04/27/2023 122 (H)    • Calcium 04/27/2023 9 5    • eGFR 04/27/2023 33      Imaging Studies: I have personally reviewed pertinent films in PACS

## 2023-05-18 NOTE — PROGRESS NOTES
Consultation - [facility]  Dayana Cervantes 80 y o  female MRN: [id number]  Unit/Bed#:  Encounter: [id number]        Consults    ASSESSMENT/PLAN:     1  History of tubular adenomas-patient does not wish to pursue colonoscopy at this time given advanced age and comorbidities  2   Normocytic anemia-no signs of overt bleeding, melena, hematochezia, change in bowel habits  Suspect anemia was likely dilutional as this was during the hospitalization on the last day  -We will obtain repeat CBC at this time  3   Dysphagia-appears to be likely oropharyngeal, has been evaluated by speech during the hospital at which time she was recommended dysphagia 2 diet along with thin liquids  Had a lengthy discussion with patient regarding following this diet   -Would recommend barium swallow as the next step given the risks of anesthesia/procedure associated with EGD  Patient and family prefer this  -If there is any abnormality in the barium swallow, can consider the next steps which may include EGD with possible dilation  Procedure should then be done in the hospital   -Meanwhile, continue to follow diet as recommended by speech   -Patient has been taking omeprazole 20 mg and has not had any symptoms of acid reflux, reports that she has been taking this for the past 2 years, may continue this for now  4 Elevated LFTs-unclear etiology, recommend repeating liver enzymes at this time  Ultrasound in the hospital was notable for hepatic steatosis only  She status post cholecystectomy    ______________________________________________________________________    Reason for Consult / Principal Problem: [unfilled]    HPI: Dayana Cervantes is a 80y o  year old female with history of diabetes, gastroparesis, neuropathy, IBS, osteoporosis, seronegative arthropathy on Plaquenil, lumbar stenosis, CKD stage IIIb, recurrent UTIs, chronic diastolic heart failure, hypertension, presents for initial evaluation for evaluation of dysphagia  Patient is accompanied by her  and daughter  She tells me that she has had symptoms of dysphagia intermittently over the past several years, worse recently  Reports that symptoms are typically with drier foods, reports that once had pretzel stuck in the esophagus  She reports that symptoms are mostly in the oropharyngeal area  She denies hematemesis, coffee and emesis or melena  She reports that she has been taking omeprazole 20 mg for several years which has been controlling acid reflux symptoms  She denies any change in bowel habits, hematemesis, coffee and emesis or melena  No unintentional weight loss  She was last seen by GI service in 2016 by Dr Flornetin Cortes at which time she underwent EGD and colonoscopy  EGD was notable for hiatal hernia and bile reflux  Gastric biopsies were negative for H  Pylori  Colonoscopy was notable for several colon polyps and diverticulosis  She was recommended to repeat a colonoscopy 1 year interval due to suboptimal bowel prep  Polyps were tubular adenomas without any high-grade dysplasia  Labs from February notable for anemia with hemoglobin of 10 9, platelets 860  Creatinine of 1 3    Review of Systems: The remainder of the review of systems was negative except for the pertinent positives noted in HPI       Historical Information   Past Medical History:   Diagnosis Date   • Anemia    • Arthritis    • Back pain    • CHF (congestive heart failure) ([facility])    • Chronic kidney disease     Stage 3b   • Confusion 02/22/2023   • Diabetes ([facility] )    • Diabetes mellitus ([facility] )    • Diabetic gastroparesis associated with type 2 diabetes mellitus ([facility] )    • Diabetic polyneuropathy ([facility] )    • Diverticulosis    • Herpes zoster    • Hypertension    • IBS (irritable bowel syndrome)    • Neurogenic claudication due to lumbar spinal stenosis    • Osteoarthritis    • Osteoporosis    • Pulmonary edema    • Raynaud's phenomenon without gangrene    • Seronegative arthropathy of multiple sites ([facility] )    • [facility])      Past Surgical History:   Procedure Laterality Date   • BACK SURGERY     • EGD AND COLONOSCOPY N/A 2016    Procedure: EGD AND COLONOSCOPY;  Surgeon: Kevin Chan MD;  [facility]; Service:    • JOINT REPLACEMENT      bilat knees and hips   • OTHER SURGICAL HISTORY      pelvis rods   • REPLACEMENT TOTAL KNEE BILATERAL     • STOMACH SURGERY       Social History   Social History     Substance and Sexual Activity   Alcohol Use No     Social History     Substance and Sexual Activity   Drug Use No     Social History     Tobacco Use   Smoking Status Former   • Types: Cigarettes   • Quit date:    • Years since quittin 4   Smokeless Tobacco Never     Family History   Problem Relation Age of Onset   • Cancer Brother    • Diabetes Mother    • Hypertension Father    • Heart disease Father        Meds/Allergies     (Not in a hospital admission)    No current facility-administered medications for this visit  Allergies   Allergen Reactions   • Morphine Other (See Comments)     urinary retention   • Ciprofloxacin Rash and Nausea Only       Objective     not currently breastfeeding  [unfilled]    PHYSICAL EXAM     GEN: well nourished, well developed, no acute distress  HEENT: anicteric, MMM, no cervical or supraclavicular lymphadenopathy  CV: RRR, no m/r/g  CHEST: CTA b/l, no WRR  ABD: +BS, soft, NT/ND, no hepatosplenomegaly  EXT: no c/c/e  SKIN: no rashes,  NEURO: aaox3    Lab Results:   No visits with results within 1 Day(s) from this visit     Latest known visit with results is:   Appointment on 2023   Component Date Value   • Sodium 2023 140    • Potassium 2023 4 4    • Chloride 2023 105    • CO2 2023 31    • ANION GAP 2023 4    • BUN 2023 26 (H)    • Creatinine 2023 1 39 (H)    • Glucose, Fasting 2023 122 (H)    • Calcium 2023 9 5    • eGFR 2023 33 Imaging Studies: I have personally reviewed pertinent films in PACS

## 2023-05-24 ENCOUNTER — HOSPITAL ENCOUNTER (OUTPATIENT)
Dept: RADIOLOGY | Facility: HOSPITAL | Age: 88
Discharge: HOME | End: 2023-05-24
Attending: INTERNAL MEDICINE

## 2023-05-24 DIAGNOSIS — K21.9 GASTROESOPHAGEAL REFLUX DISEASE WITHOUT ESOPHAGITIS: ICD-10-CM

## 2023-05-26 ENCOUNTER — TELEPHONE (OUTPATIENT)
Dept: GASTROENTEROLOGY | Facility: AMBULARY SURGERY CENTER | Age: 88
End: 2023-05-26

## 2023-05-26 NOTE — TELEPHONE ENCOUNTER
----- Message from Hali Ricardo MD sent at 5/26/2023  7:21 AM EDT -----  Alton Fess-   Can you call the patient and explain to her that she has esophageal dysmotility with some delay in emptying of the esophagus which may be causing some of dysphagia symptoms  I would recommend eating small meals and chewing them properly followed by liquids  I would also recommend increasing omeprazole to 40 mg as she is likely having acid reflux as well  Please find out if they would like to proceed with EGD  Fortunately, there are no lesions or ulcerations noted on barium swallow    Thank you  Dr Johana Hester

## 2023-05-26 NOTE — TELEPHONE ENCOUNTER
Spoke with patient and her , I reviewed results in detail and recommendations  They understood and would like to hold off on EGD at this time  Her  will call back in if they decide to do EGD   No further questions

## 2023-06-01 NOTE — PROGRESS NOTES
6/2/2023    Tanya Quezada Favor  1934  [id number]        Assessment  -Overactive bladder  -Recurrent urinary tract infections  -Urinary stress incontinence    Discussion/Plan  Cristal Carias is a 80 y o  female being managed by Dr Patty Meneses  1  Overactive bladder, urinary stress incontinence- PVR in the office today 0 mL  She will remain on Myrbetriq 50 mg daily  Refill sent to her pharmacy  We reviewed Kegel exercises for management of stress incontinence and discussed referral to pelvic floor physical therapy  She refers at this time  Educational information provided  2  Recurrent urinary tract infections- urine dip appears negative for infection or blood  Patient and daughter wish to remain on Macrodantin 50 mg daily as she has a prior history of urosepsis  Encouraged patient to increase water intake  Refill sent to pharmacy  Follow-up in 6 months for reevaluation with PVR assessment  Patient and daughter were advised to call sooner with any questions or issues     -All questions answered, patient and daughter agree with plan     History of Present Illness  80 y o  female with a history of OAB and recurrent UTI presents today for follow up  Patient was last seen in the office in February 2023  She is accompanied today by her daughter as primary historian  She remains on Myrbetriq 50 mg as well as Macrodantin 50 mg daily for suppression of reoccurring urinary tract infections  Last positive urine culture from 2/15/2023 noted Enterococcus organism  Patient reports symptoms of urinary stress incontinence  She wears sanitary briefs daily for protection  Patient denies any gross hematuria or dysuria  No additional changes to her overall health  Review of Systems  Review of Systems   Constitutional: Negative  HENT: Negative  Respiratory: Negative  Cardiovascular: Negative  Gastrointestinal: Negative  Genitourinary: Positive for frequency   Negative for decreased urine volume, difficulty urinating, dysuria, flank pain, hematuria and urgency  Musculoskeletal: Negative  Skin: Negative  Neurological: Negative  Psychiatric/Behavioral: Negative  Past Medical History  Past Medical History:   Diagnosis Date   • Anemia    • Arthritis    • Back pain    • CHF (congestive heart failure) ([facility])    • Chronic kidney disease     Stage 3b   • Confusion 2023   • Diabetes ([place] [address] )    • Diabetes mellitus ([place] [address] )    • Diabetic gastroparesis associated with type 2 diabetes mellitus ([place] [address] )    • Diabetic polyneuropathy ([place] [address] )    • Diverticulosis    • Herpes zoster    • Hypertension    • IBS (irritable bowel syndrome)    • Neurogenic claudication due to lumbar spinal stenosis    • Osteoarthritis    • Osteoporosis    • Pulmonary edema    • Raynaud's phenomenon without gangrene    • Seronegative arthropathy of multiple sites ([place] [address] )    • Sicca ([place] [address] )        Past Social History  Past Surgical History:   Procedure Laterality Date   • BACK SURGERY     • COLONOSCOPY     • EGD AND COLONOSCOPY N/A 2016    Procedure: EGD AND COLONOSCOPY;  Surgeon: Mustapha Buckner MD;  Location[facility];   Service:    • JOINT REPLACEMENT      bilat knees and hips   • OTHER SURGICAL HISTORY      pelvis rods   • REPLACEMENT TOTAL KNEE BILATERAL     • STOMACH SURGERY     • UPPER GASTROINTESTINAL ENDOSCOPY         Past Family History  Family History   Problem Relation Age of Onset   • Cancer Brother    • Diabetes Mother    • Hypertension Father    • Heart disease Father        Past Social history  Social History     Socioeconomic History   • Marital status: /Civil Union     Spouse name: Not on file   • Number of children: Not on file   • Years of education: Not on file   • Highest education level: Not on file   Occupational History   • Not on file   Tobacco Use   • Smoking status: Former     Types: Cigarettes     Quit date:      Years since quittin 4   • Smokeless tobacco: Never   Vaping Use   • Vaping Use: Never used   Substance and Sexual Activity   • Alcohol use: No   • Drug use: No   • Sexual activity: Not Currently     Partners: Male     Birth control/protection: None   Other Topics Concern   • Not on file   Social History Narrative   • Not on file     Social Determinants of Health     Financial Resource Strain: Not on file   Food Insecurity: No Food Insecurity (2/23/2023)    Hunger Vital Sign    • Worried About Running Out of Food in the Last Year: Never true    • Ran Out of Food in the Last Year: Never true   Transportation Needs: No Transportation Needs (2/23/2023)    PRAPARE - Transportation    • Lack of Transportation (Medical): No    • Lack of Transportation (Non-Medical):  No   Physical Activity: Not on file   Stress: Not on file   Social Connections: Not on file   Intimate Partner Violence: Not on file   Housing Stability: Low Risk  (2/23/2023)    Housing Stability Vital Sign    • Unable to Pay for Housing in the Last Year: No    • Number of Places Lived in the Last Year: 1    • Unstable Housing in the Last Year: No       Current Medications  Current Outpatient Medications   Medication Sig Dispense Refill   • amitriptyline (ELAVIL) 10 mg tablet TAKE TWO TABLETS BY MOUTH AT BEDTIME 180 tablet 1   • aspirin 81 mg chewable tablet Chew 1 tablet (81 mg total) daily 30 tablet 0   • atorvastatin (LIPITOR) 10 mg tablet TAKE ONE TABLET BY MOUTH EVERY DAY 90 tablet 3   • Cholecalciferol (VITAMIN D3) 1000 units CAPS Take 1 capsule by mouth daily      • diphenoxylate-atropine (LOMOTIL) 2 5-0 025 mg per tablet Take 1 tablet by mouth if needed (Patient not taking: Reported on 2/28/2023)     • DULoxetine (CYMBALTA) 60 mg delayed release capsule TAKE ONE CAPSULE BY MOUTH EVERY DAY 30 capsule 5   • hydroxychloroquine (PLAQUENIL) 200 mg tablet TAKE ONE TABLET BY MOUTH EVERY DAY WITH BREAKFAST 90 tablet 1   • Magnesium 250 MG TABS Take 250 mg by mouth every evening     • metFORMIN (GLUCOPHAGE) 500 mg tablet Take 500 mg by mouth 2 (two) times a day     • metoprolol succinate (TOPROL-XL) 25 mg 24 hr tablet Take 0 5 tablets (12 5 mg total) by mouth daily 45 tablet 3   • Mirabegron ER (Myrbetriq) 50 MG TB24 Take 1 tablet (50 mg total) by mouth every evening 30 tablet 0   • omeprazole (PriLOSEC) 20 mg delayed release capsule Take 20 mg by mouth daily       • pregabalin (LYRICA) 75 mg capsule Take 1 capsule (75 mg total) by mouth 3 (three) times a day 90 capsule 5   • pyridoxine (B-6) 100 MG tablet Take 100 mg by mouth daily     • Sodium Fluoride (PreviDent 5000 Booster Plus) 1 1 % PSTE Apply on teeth every evening as directed 100 mL 3   • torsemide (DEMADEX) 10 mg tablet Take 1 tablet PRN for wt gain 3 lb/ 3 days or 5 lb/ 5 aria (Patient not taking: Reported on 2/28/2023) 30 tablet 0     No current facility-administered medications for this visit  Allergies  Allergies   Allergen Reactions   • Morphine Other (See Comments)     urinary retention   • Ciprofloxacin Rash and Nausea Only       Past medical history, social history, family history, medications and allergies were reviewed  Vitals  There were no vitals filed for this visit  Physical Exam  Physical Exam  Constitutional:       Appearance: Normal appearance  She is well-developed  HENT:      Head: Normocephalic  Eyes:      Pupils: Pupils are equal, round, and reactive to light  Pulmonary:      Effort: Pulmonary effort is normal    Abdominal:      Palpations: Abdomen is soft  Musculoskeletal:         General: Normal range of motion  Cervical back: Normal range of motion  Comments: Ambulates with walker   Skin:     General: Skin is warm and dry  Neurological:      General: No focal deficit present  Mental Status: She is alert and oriented to person, place, and time  Comments: Forgetful   Psychiatric:         Mood and Affect: Mood normal          Behavior: Behavior normal          Thought Content:  Thought content normal          Judgment: Judgment normal          Results    I have personally reviewed all pertinent lab results and reviewed with patient  Lab Results   Component Value Date    BUN 26 (H) 04/27/2023    CALCIUM 9 5 04/27/2023     04/27/2023    CO2 31 04/27/2023    CREATININE 1 39 (H) 04/27/2023    GLUCOSE 205 (H) 10/09/2015    K 4 4 04/27/2023     10/09/2015     Lab Results   Component Value Date    HCT 41 6 05/18/2023    HGB 12 6 05/18/2023    MCV 96 05/18/2023     05/18/2023    WBC 6 44 05/18/2023     No results found for this or any previous visit (from the past 1 hour(s))

## 2023-06-02 ENCOUNTER — OFFICE VISIT (OUTPATIENT)
Dept: UROLOGY | Facility: AMBULATORY SURGERY CENTER | Age: 88
End: 2023-06-02

## 2023-06-02 VITALS
SYSTOLIC BLOOD PRESSURE: 110 MMHG | HEART RATE: 91 BPM | BODY MASS INDEX: 26.21 KG/M2 | DIASTOLIC BLOOD PRESSURE: 62 MMHG | OXYGEN SATURATION: 97 % | HEIGHT: 59 IN | WEIGHT: 130 LBS

## 2023-06-02 DIAGNOSIS — N32.81 OAB (OVERACTIVE BLADDER): ICD-10-CM

## 2023-06-02 DIAGNOSIS — N39.0 RECURRENT UTI: Primary | ICD-10-CM

## 2023-06-02 LAB
POST-VOID RESIDUAL VOLUME, ML POC: 0 ML
SL AMB  POCT GLUCOSE, UA: NORMAL
SL AMB LEUKOCYTE ESTERASE,UA: NORMAL
SL AMB POCT BILIRUBIN,UA: NORMAL
SL AMB POCT BLOOD,UA: NORMAL
SL AMB POCT CLARITY,UA: CLEAR
SL AMB POCT COLOR,UA: YELLOW
SL AMB POCT KETONES,UA: NORMAL
SL AMB POCT NITRITE,UA: NORMAL
SL AMB POCT PH,UA: 6
SL AMB POCT SPECIFIC GRAVITY,UA: 1.01
SL AMB POCT URINE PROTEIN: NORMAL
SL AMB POCT UROBILINOGEN: 0.2

## 2023-06-02 RX ORDER — NITROFURANTOIN MACROCRYSTALS 50 MG/1
50 CAPSULE ORAL
COMMUNITY
End: 2023-06-02 | Stop reason: SDUPTHER

## 2023-06-02 RX ORDER — MIRABEGRON 50 MG/1
50 TABLET, FILM COATED, EXTENDED RELEASE ORAL EVERY EVENING
Qty: 30 TABLET | Refills: 11 | Status: SHIPPED | OUTPATIENT
Start: 2023-06-02 | End: 2023-07-02

## 2023-06-02 RX ORDER — NITROFURANTOIN MACROCRYSTALS 50 MG/1
50 CAPSULE ORAL DAILY
Qty: 30 CAPSULE | Refills: 6 | Status: SHIPPED | OUTPATIENT
Start: 2023-06-02

## 2023-06-02 NOTE — PATIENT INSTRUCTIONS
Kegel Exercises for Women   AMBULATORY CARE:   Kegel exercises  help strengthen your pelvic muscles  Pelvic muscles hold your pelvic organs, such as your bladder and uterus, in place  Kegel exercises help prevent or control certain conditions, such as urine incontinence (leakage) or uterine prolapse  Call your doctor or physical therapist if:   You cannot feel your muscles tighten or relax  You continue to leak urine  You have questions or concerns about your condition or care  Use the correct muscles:  Pelvic muscles are the muscles you use to control urine flow  To target these muscles, stop and start the flow of urine several times  This will help you become familiar with how it feels to tighten and relax these muscles  How to do Kegel exercises:   Get into a comfortable position  You may lie down, stand up, or sit down to do these exercises  When you first try to do these exercises, it may be easier if you lie down  Tighten or squeeze your pelvic muscles slowly  It may feel like you are trying to hold back urine or gas  Hold this position for 3 seconds  Relax for 3 seconds  Repeat this cycle 10 times  Do not hold your breath when you do Kegel exercises  Keep your stomach, back, and leg muscles relaxed  Do 10 sets of Kegel exercises, at least 3 times a day  When you know how to do Kegel exercises, use different positions  This will help to strengthen your pelvic muscles as much as possible  You can do these exercises while you lie on the floor, watch TV, or while you stand  Tighten your pelvic muscles before you sneeze, cough, or lift to prevent urine leakage  You may notice improved bladder control within about 6 weeks  Follow up with your doctor or physical therapist as directed:  Write down your questions so you remember to ask them during your visits    © Copyright Aida Contreras 2022 Information is for End User's use only and may not be sold, redistributed or otherwise used for commercial purposes  The above information is an  only  It is not intended as medical advice for individual conditions or treatments  Talk to your doctor, nurse or pharmacist before following any medical regimen to see if it is safe and effective for you

## 2023-06-14 ENCOUNTER — APPOINTMENT (OUTPATIENT)
Dept: LAB | Facility: CLINIC | Age: 88
End: 2023-06-14
Payer: MEDICARE

## 2023-06-14 DIAGNOSIS — I73.00 RAYNAUD'S PHENOMENON WITHOUT GANGRENE: ICD-10-CM

## 2023-06-14 DIAGNOSIS — Z79.899 ENCOUNTER FOR LONG-TERM (CURRENT) USE OF OTHER MEDICATIONS: ICD-10-CM

## 2023-06-14 DIAGNOSIS — M06.09 SERONEGATIVE ARTHROPATHY OF MULTIPLE SITES (HCC): ICD-10-CM

## 2023-06-14 DIAGNOSIS — M35.00 SICCA SYNDROME (HCC): ICD-10-CM

## 2023-06-14 DIAGNOSIS — N18.32 STAGE 3B CHRONIC KIDNEY DISEASE (HCC): ICD-10-CM

## 2023-06-14 LAB
ALBUMIN SERPL BCP-MCNC: 3.6 G/DL (ref 3.5–5)
ALP SERPL-CCNC: 100 U/L (ref 34–104)
ALT SERPL W P-5'-P-CCNC: 27 U/L (ref 7–52)
ANA SER QL IA: POSITIVE
ANION GAP SERPL CALCULATED.3IONS-SCNC: 7 MMOL/L (ref 4–13)
AST SERPL W P-5'-P-CCNC: 33 U/L (ref 13–39)
BASOPHILS # BLD AUTO: 0.03 THOUSANDS/ÂΜL (ref 0–0.1)
BASOPHILS NFR BLD AUTO: 1 % (ref 0–1)
BILIRUB SERPL-MCNC: 0.46 MG/DL (ref 0.2–1)
BUN SERPL-MCNC: 25 MG/DL (ref 5–25)
CALCIUM SERPL-MCNC: 9.3 MG/DL (ref 8.4–10.2)
CHLORIDE SERPL-SCNC: 104 MMOL/L (ref 96–108)
CO2 SERPL-SCNC: 28 MMOL/L (ref 21–32)
CREAT SERPL-MCNC: 1.31 MG/DL (ref 0.6–1.3)
CRP SERPL QL: 1 MG/L
EOSINOPHIL # BLD AUTO: 0.44 THOUSAND/ÂΜL (ref 0–0.61)
EOSINOPHIL NFR BLD AUTO: 8 % (ref 0–6)
ERYTHROCYTE [DISTWIDTH] IN BLOOD BY AUTOMATED COUNT: 14.5 % (ref 11.6–15.1)
ERYTHROCYTE [SEDIMENTATION RATE] IN BLOOD: 26 MM/HOUR (ref 0–29)
GFR SERPL CREATININE-BSD FRML MDRD: 36 ML/MIN/1.73SQ M
GLUCOSE SERPL-MCNC: 114 MG/DL (ref 65–140)
HCT VFR BLD AUTO: 37.4 % (ref 34.8–46.1)
HGB BLD-MCNC: 11.7 G/DL (ref 11.5–15.4)
IMM GRANULOCYTES # BLD AUTO: 0.04 THOUSAND/UL (ref 0–0.2)
IMM GRANULOCYTES NFR BLD AUTO: 1 % (ref 0–2)
LYMPHOCYTES # BLD AUTO: 1.21 THOUSANDS/ÂΜL (ref 0.6–4.47)
LYMPHOCYTES NFR BLD AUTO: 22 % (ref 14–44)
MCH RBC QN AUTO: 29.2 PG (ref 26.8–34.3)
MCHC RBC AUTO-ENTMCNC: 31.3 G/DL (ref 31.4–37.4)
MCV RBC AUTO: 93 FL (ref 82–98)
MONOCYTES # BLD AUTO: 0.77 THOUSAND/ÂΜL (ref 0.17–1.22)
MONOCYTES NFR BLD AUTO: 14 % (ref 4–12)
NEUTROPHILS # BLD AUTO: 3.15 THOUSANDS/ÂΜL (ref 1.85–7.62)
NEUTS SEG NFR BLD AUTO: 54 % (ref 43–75)
NRBC BLD AUTO-RTO: 0 /100 WBCS
PLATELET # BLD AUTO: 152 THOUSANDS/UL (ref 149–390)
PMV BLD AUTO: 11.4 FL (ref 8.9–12.7)
POTASSIUM SERPL-SCNC: 4.3 MMOL/L (ref 3.5–5.3)
PROT SERPL-MCNC: 7.2 G/DL (ref 6.4–8.4)
RBC # BLD AUTO: 4.01 MILLION/UL (ref 3.81–5.12)
SODIUM SERPL-SCNC: 139 MMOL/L (ref 135–147)
WBC # BLD AUTO: 5.64 THOUSAND/UL (ref 4.31–10.16)

## 2023-06-14 PROCEDURE — 85025 COMPLETE CBC W/AUTO DIFF WBC: CPT

## 2023-06-14 PROCEDURE — 86038 ANTINUCLEAR ANTIBODIES: CPT

## 2023-06-14 PROCEDURE — 36415 COLL VENOUS BLD VENIPUNCTURE: CPT

## 2023-06-14 PROCEDURE — 86039 ANTINUCLEAR ANTIBODIES (ANA): CPT

## 2023-06-14 PROCEDURE — 80053 COMPREHEN METABOLIC PANEL: CPT

## 2023-06-14 PROCEDURE — 85652 RBC SED RATE AUTOMATED: CPT

## 2023-06-14 PROCEDURE — 86140 C-REACTIVE PROTEIN: CPT

## 2023-06-15 ENCOUNTER — APPOINTMENT (OUTPATIENT)
Dept: LAB | Facility: CLINIC | Age: 88
End: 2023-06-15
Payer: MEDICARE

## 2023-06-15 LAB
ANA HOMOGEN SER QL IF: NORMAL
ANA HOMOGEN TITR SER: NORMAL {TITER}
BACTERIA UR QL AUTO: ABNORMAL /HPF
BILIRUB UR QL STRIP: NEGATIVE
CLARITY UR: CLEAR
COLOR UR: COLORLESS
GLUCOSE UR STRIP-MCNC: NEGATIVE MG/DL
HGB UR QL STRIP.AUTO: NEGATIVE
KETONES UR STRIP-MCNC: NEGATIVE MG/DL
LEUKOCYTE ESTERASE UR QL STRIP: ABNORMAL
NITRITE UR QL STRIP: NEGATIVE
NON-SQ EPI CELLS URNS QL MICRO: ABNORMAL /HPF
PH UR STRIP.AUTO: 6 [PH]
PROT UR STRIP-MCNC: NEGATIVE MG/DL
RBC #/AREA URNS AUTO: ABNORMAL /HPF
SP GR UR STRIP.AUTO: 1 (ref 1–1.03)
UROBILINOGEN UR STRIP-ACNC: <2 MG/DL
WBC #/AREA URNS AUTO: ABNORMAL /HPF

## 2023-06-15 PROCEDURE — 81001 URINALYSIS AUTO W/SCOPE: CPT

## 2023-06-27 ENCOUNTER — OFFICE VISIT (OUTPATIENT)
Dept: CARDIOLOGY CLINIC | Facility: CLINIC | Age: 88
End: 2023-06-27
Payer: MEDICARE

## 2023-06-27 VITALS
RESPIRATION RATE: 18 BRPM | HEIGHT: 59 IN | BODY MASS INDEX: 26 KG/M2 | DIASTOLIC BLOOD PRESSURE: 62 MMHG | HEART RATE: 69 BPM | SYSTOLIC BLOOD PRESSURE: 120 MMHG | OXYGEN SATURATION: 99 % | WEIGHT: 129 LBS

## 2023-06-27 DIAGNOSIS — I73.00 RAYNAUD'S PHENOMENON WITHOUT GANGRENE: ICD-10-CM

## 2023-06-27 DIAGNOSIS — I10 PRIMARY HYPERTENSION: ICD-10-CM

## 2023-06-27 DIAGNOSIS — I50.32 CHRONIC DIASTOLIC HEART FAILURE (HCC): Chronic | ICD-10-CM

## 2023-06-27 DIAGNOSIS — I74.3 ARTERIAL EMBOLISM AND THROMBOSIS OF LOWER EXTREMITY (HCC): Primary | ICD-10-CM

## 2023-06-27 DIAGNOSIS — E78.5 DYSLIPIDEMIA: ICD-10-CM

## 2023-06-27 PROCEDURE — 99214 OFFICE O/P EST MOD 30 MIN: CPT | Performed by: INTERNAL MEDICINE

## 2023-06-27 NOTE — PROGRESS NOTES
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[facility] )        2  Chronic diastolic heart failure ([facility] )        3  Primary hypertension        4  Dyslipidemia        5  Raynaud's phenomenon without gangrene            Discussion/Summary: Overall she has been doing well since her last appointment  She is mainly limited by some underlying orthopedic issues  Blood pressure is well controlled lipids have been doing well blood work was recently reviewed and is stable  Raynaud's has been well controlled no ulcerations  Functional capacity has been good for 80years old  She is not due for any testing I will see her back in 12 months  Interval History:  Very pleasant 59-year-old female with a history of diabetes, hypertension presented to the hospital last week with an episode of chest pain  She describes it as a sharp sensation located in the left upper chest and left shoulder  This was worse with some rotation and movement  Denies any exertional or anginal component  There has been no palpitations, lower extremity edema, PND, orthopnea  She had an echocardiogram basic blood work done in the hospital   Overall she has been doing well since then  Overall since the last appointment she has been doing well  She continues to remain active doing small jobs around the house  She is limited by lower extremity osteoarthritis and uses a cane  Chest pain is gone away  Denies any shortness of breath  There is been no palpitations, lightheadedness, dizziness, or syncope  She has been taking her medications as prescribed      Medical Problems             Problem List     Symptomatic anemia    Shortness of breath    Hypomagnesemia    DM2 (diabetes mellitus, type 2) ([facility] )      Lab Results   Component Value Date    HGBA1C 5 9 (H) 04/14/2022         Urinary frequency    Iron deficiency anemia secondary to inadequate dietary iron intake    Sinobronchitis    Post zoster neuralgia    Primary generalized (osteo)arthritis    Seronegative arthropathy of multiple sites ([place] [address] )    Senile osteoporosis    Lumbar spondylosis    Diabetic polyneuropathy associated with type 2 diabetes mellitus ([place] [address] )      Lab Results   Component Value Date    HGBA1C 5 9 (H) 04/14/2022         Diabetic gastroparesis associated with type 2 diabetes mellitus ([place] [address] )      Lab Results   Component Value Date    HGBA1C 5 9 (H) 04/14/2022         Irritable bowel syndrome with diarrhea    Stage 3b chronic kidney disease ([place] [address] )    Lab Results   Component Value Date    EGFR 36 06/14/2023    EGFR 33 04/27/2023    EGFR 38 03/23/2023    CREATININE 1 31 (H) 06/14/2023    CREATININE 1 39 (H) 04/27/2023    CREATININE 1 24 03/23/2023         Traumatic injury of sacrum    Tendinitis of left rotator cuff    Abnormality of gait due to impairment of balance    Sicca syndrome (HCC)    Chest pain    Leukocytosis    Chronic diastolic heart failure (HCC)    Wt Readings from Last 3 Encounters:   06/27/23 58 5 kg (129 lb)   06/26/23 58 3 kg (128 lb 9 6 oz)   06/02/23 59 kg (130 lb)                 Overactive bladder    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[facility])              Past Medical History:   Diagnosis Date   • Anemia    • Arthritis    • Back pain    • CHF (congestive heart failure) ([facility])    • Chronic kidney disease     Stage 3b   • Confusion 02/22/2023   • Diabetes ([place] [address] )    • Diabetes mellitus ([place] [address] )    • Diabetic gastroparesis associated with type 2 diabetes mellitus ([place] [address] )    • Diabetic polyneuropathy ([facility])    • Diverticulosis    • Herpes zoster    • Hypertension    • IBS (irritable bowel syndrome)    • Neurogenic claudication due to lumbar spinal stenosis    • Osteoarthritis    • Osteoporosis    • Pulmonary edema    • Raynaud's phenomenon without gangrene    • Seronegative arthropathy of multiple sites ([facility] )    • Sicca ([facility] )      Social History     Socioeconomic History   • Marital status: /Civil Union     Spouse name: Not on file   • Number of children: Not on file   • Years of education: Not on file   • Highest education level: Not on file   Occupational History   • Not on file   Tobacco Use   • Smoking status: Former     Types: Cigarettes     Quit date: 65     Years since quittin 5   • Smokeless tobacco: Never   Vaping Use   • Vaping Use: Never used   Substance and Sexual Activity   • Alcohol use: No   • Drug use: No   • Sexual activity: Not Currently     Partners: Male     Birth control/protection: None   Other Topics Concern   • Not on file   Social History Narrative   • Not on file     Social Determinants of Health     Financial Resource Strain: Not on file   Food Insecurity: No Food Insecurity (2023)    Hunger Vital Sign    • Worried About Running Out of Food in the Last Year: Never true    • Ran Out of Food in the Last Year: Never true   Transportation Needs: No Transportation Needs (2023)    PRAPARE - Transportation    • Lack of Transportation (Medical): No    • Lack of Transportation (Non-Medical): No   Physical Activity: Not on file   Stress: Not on file   Social Connections: Not on file   Intimate Partner Violence: Not on file   Housing Stability: Low Risk  (2023)    Housing Stability Vital Sign    • Unable to Pay for Housing in the Last Year: No    • Number of Places Lived in the Last Year: 1    • Unstable Housing in the Last Year: No      Family History   Problem Relation Age of Onset   • Cancer Brother    • Diabetes Mother    • Hypertension Father    • Heart disease Father      Past Surgical History:   Procedure Laterality Date   • BACK SURGERY     • COLONOSCOPY     • EGD AND COLONOSCOPY N/A 2016    Procedure: EGD AND COLONOSCOPY;  Surgeon: Tomas Shultz MD;  [facility];   Service: • JOINT REPLACEMENT      bilat knees and hips   • OTHER SURGICAL HISTORY      pelvis rods   • REPLACEMENT TOTAL KNEE BILATERAL     • STOMACH SURGERY     • UPPER GASTROINTESTINAL ENDOSCOPY         Current Outpatient Medications:   •  amitriptyline (ELAVIL) 10 mg tablet, TAKE TWO TABLETS BY MOUTH AT BEDTIME, Disp: 180 tablet, Rfl: 1  •  aspirin 81 mg chewable tablet, Chew 1 tablet (81 mg total) daily, Disp: 30 tablet, Rfl: 0  •  atorvastatin (LIPITOR) 10 mg tablet, TAKE ONE TABLET BY MOUTH EVERY DAY, Disp: 90 tablet, Rfl: 3  •  Cholecalciferol (VITAMIN D3) 1000 units CAPS, Take 1 capsule by mouth daily , Disp: , Rfl:   •  diphenoxylate-atropine (LOMOTIL) 2 5-0 025 mg per tablet, Take 1 tablet by mouth if needed, Disp: , Rfl:   •  DULoxetine (CYMBALTA) 60 mg delayed release capsule, TAKE ONE CAPSULE BY MOUTH EVERY DAY, Disp: 30 capsule, Rfl: 5  •  hydroxychloroquine (PLAQUENIL) 200 mg tablet, TAKE ONE TABLET BY MOUTH EVERY DAY WITH BREAKFAST, Disp: 90 tablet, Rfl: 1  •  Magnesium 250 MG TABS, Take 250 mg by mouth every evening, Disp: , Rfl:   •  Mirabegron ER (Myrbetriq) 50 MG TB24, Take 1 tablet (50 mg total) by mouth every evening, Disp: 30 tablet, Rfl: 11  •  nitrofurantoin (MACRODANTIN) 50 mg capsule, Take 1 capsule (50 mg total) by mouth in the morning, Disp: 30 capsule, Rfl: 6  •  omeprazole (PriLOSEC) 20 mg delayed release capsule, Take 20 mg by mouth daily  , Disp: , Rfl:   •  pregabalin (LYRICA) 75 mg capsule, Take 1 capsule (75 mg total) by mouth 3 (three) times a day, Disp: 90 capsule, Rfl: 5  •  pyridoxine (B-6) 100 MG tablet, Take 100 mg by mouth daily, Disp: , Rfl:   •  Sodium Fluoride (PreviDent 5000 Booster Plus) 1 1 % PSTE, Apply on teeth every evening as directed, Disp: 100 mL, Rfl: 3  •  metFORMIN (GLUCOPHAGE) 500 mg tablet, Take 500 mg by mouth 2 (two) times a day (Patient not taking: Reported on 6/27/2023), Disp: , Rfl:   •  metoprolol succinate (TOPROL-XL) 25 mg 24 hr tablet, Take 0 5 tablets "(12 5 mg total) by mouth daily (Patient not taking: Reported on 6/27/2023), Disp: 45 tablet, Rfl: 3  •  torsemide (DEMADEX) 10 mg tablet, Take 1 tablet PRN for wt gain 3 lb/ 3 days or 5 lb/ 5 aria (Patient not taking: Reported on 6/27/2023), Disp: 30 tablet, Rfl: 0  Allergies   Allergen Reactions   • Morphine Other (See Comments)     urinary retention   • Ciprofloxacin Rash and Nausea Only       Labs:     Chemistry        Component Value Date/Time     10/09/2015 1041    K 4 3 06/14/2023 1200    K 5 1 10/09/2015 1041     06/14/2023 1200     10/09/2015 1041    CO2 28 06/14/2023 1200    CO2 27 9 10/09/2015 1041    BUN 25 06/14/2023 1200    BUN 43 (H) 10/09/2015 1041    CREATININE 1 31 (H) 06/14/2023 1200    CREATININE 1 47 (H) 10/09/2015 1041        Component Value Date/Time    CALCIUM 9 3 06/14/2023 1200    CALCIUM 9 3 10/09/2015 1041    ALKPHOS 100 06/14/2023 1200    AST 33 06/14/2023 1200    ALT 27 06/14/2023 1200            No results found for: \"CHOL\"  Lab Results   Component Value Date    HDL 85 04/14/2022    HDL 72 08/25/2020     Lab Results   Component Value Date    LDLCALC 49 04/14/2022    LDLCALC 73 08/25/2020     Lab Results   Component Value Date    TRIG 98 04/14/2022    TRIG 52 08/25/2020     No results found for: \"CHOLHDL\"    Imaging: XR chest 1 view portable    Result Date: 4/15/2022  Narrative: CHEST INDICATION:   cp  COMPARISON:  12/21/2021 EXAM PERFORMED/VIEWS:  XR CHEST PORTABLE FINDINGS: Cardiomediastinal silhouette appears unremarkable  New infiltrate and small effusion are present as compared to the study of 12/21/2021  Mild pulmonary vascular congestion is also identified with hypoventilation  No pneumothorax or pleural effusion  Fusion hardware is noted in the thoracolumbar spine  Degenerative changes of the shoulders are noted left greater than right       Impression: New mild pulmonary vascular congestion with left lower lobe atelectasis versus infiltrate and small " "effusion  The study was marked in EPIC for significant notification  Workstation performed: [id number]     Echo complete w/ contrast if indicated    Result Date: 4/15/2022  Narrative: •  Left Ventricle: Left ventricular cavity size is normal  Wall thickness is normal  The left ventricular ejection fraction is 50%  Systolic function is mildly reduced  There is mild global hypokinesis  There is no concentric hypertrophy  •  Aortic Valve: There is mild to moderate regurgitation  There is no evidence of stenosis  •  Mitral Valve: There is mild to moderate regurgitation  •  Tricuspid Valve: There is mild regurgitation  •  Pericardium: There is a small to moderate left pleural effusion  ECG:        ROS    Vitals:    06/27/23 0946   BP: 120/62   Pulse: 69   Resp: 18   SpO2: 99%     Vitals:    06/27/23 0946   Weight: 58 5 kg (129 lb)     Height: 4' 11\" (149 9 cm)   Body mass index is 26 05 kg/m²  Physical Exam:  Vital signs reviewed  General:  Alert and cooperative, appears stated age, no acute distress  HEENT:  PERRLA, EOMI, no scleral icterus, no conjunctival pallor  Neck:  No lymphadenopathy, no thyromegaly, no carotid bruits, no elevated JVP  Heart:  Regular rate and rhythm, normal S1/S2, no S3/S4, no murmur, rubs or gallops  PMI nondisplaced  Lungs:  Clear to auscultation bilaterally, no wheezes rales or rhonchi  Abdomen:  Soft, non-tender, positive bowel sounds, no rebound or guarding,   no organomegaly   Extremities:  Normal range of motion    No clubbing, cyanosis or edema   Vascular:  2+ pedal pulses  Skin:  No rashes or lesions on exposed skin  Neurologic:  Cranial nerves II-XII grossly intact without focal deficits  Psych:  Normal mood and affect        "

## 2023-07-20 ENCOUNTER — TELEPHONE (OUTPATIENT)
Age: 88
End: 2023-07-20

## 2023-07-24 NOTE — TELEPHONE ENCOUNTER
[facility] ([address] Medicine  Progress Note    Patient Name: Vita Moya  MRN: [id number]  Patient Class: IP- Inpatient   Admission Date: 7/21/2023  Length of Stay: 2 days  Attending Physician: Ana Retana MD  Primary Care Provider: Chavo Mcfarland MD        Subjective:     Principal Problem:Severe sepsis with acute organ dysfunction        HPI:  40 y/o female with PMHx T2DM and uncomplicated asthma. Here with 1-week history of febrile illness, GI symptoms initially (watery diarrhea, nausea and vomiting) and now having non-productive cough and dyspnea. No wheezing. Still having diarrhea since onset, >5 watery bms daily, n/v resolved, decreased appetite/po intake. No recent abx exposure or sick contacts. No exotic travel.   Met criteria for sepsis in the ED (temp 102F, tachycardia) with new hypoxia (89% on RA-> improved with 2LNC), bilateral lung infiltrates on CXR, labs - wbc 18, hyponatremia 129, lactic acid <1, negative viral swab for influenza/covid.   Tx given in the ED: Solumedrol 125 x1, nebs x 3 tx, rocephin + azithromycin, 1L fluid bolus  Sepsis protocol initiated in the ED with admission to .  Bedside evaluation completed in the ED,  at bedside - no active bronchospasms on examination, feels mildly improved.       Overview/Hospital Course:  No notes on file    Interval History: diarrhea resolved, c diff negative, having blood tinged sputum production (minimal), no dyspnea/wheezing, on/off oxygen     Review of Systems   Constitutional:  Negative for chills and fever.   HENT:  Negative for congestion.    Respiratory:  Positive for cough. Negative for chest tightness and shortness of breath.    Cardiovascular:  Negative for chest pain.   Gastrointestinal:  Negative for abdominal pain, blood in stool, nausea and vomiting.   All other systems reviewed and are negative.  Objective:            Physical Exam  Vitals and nursing note reviewed.   Constitutional:   Physical therapy saw pt today  Her BP 92/50  75( rest),  After exercise 88/50  C/o fatigue  Has Appt with Negrito Feuntes on 7/7/22      Taking: Toprol-xl 12 5 mg                Torsemide 10 mg - Mon, Wed, Fri    Please advise      General: She is not in acute distress.     Appearance: She is not toxic-appearing.   HENT:      Head: Normocephalic and atraumatic.   Eyes:      General: No scleral icterus.     Extraocular Movements: Extraocular movements intact.   Cardiovascular:      Rate and Rhythm: Normal rate and regular rhythm.      Pulses: Normal pulses.      Heart sounds: Normal heart sounds.   Pulmonary:      Effort: Pulmonary effort is normal. No respiratory distress.      Breath sounds: increased aeration at the bases, occ rhonchi, no wheezing or rales.   Abdominal:      General: Bowel sounds are normal.      Palpations: Abdomen is soft.      Tenderness: There is no abdominal tenderness. There is no guarding or rebound.   Musculoskeletal:      Right lower leg: No edema.      Left lower leg: No edema.   Lymphadenopathy:      Cervical: No cervical adenopathy.   Skin:     General: Skin is warm.   Neurological:      General: No focal deficit present.      Mental Status: She is alert and oriented to person, place, and time.   Psychiatric:         Behavior: Behavior normal.         Thought Content: Thought content normal.             Significant Labs: All pertinent labs within the past 24 hours have been reviewed.     Significant Imaging: I have reviewed all pertinent imaging results/findings within the past 24 hours.      Assessment/Plan:      * Severe sepsis with acute organ dysfunction  This patient does have evidence of infective focus  My overall impression is sepsis.  Source: Respiratory and Abdominal  Antibiotics given-   Antibiotics (72h ago, onward)      Start     Stop Route Frequency Ordered    07/22/23 1400  cefTRIAXone (ROCEPHIN) 2 g in dextrose 5 % in water (D5W) 5 % 100 mL IVPB (MB+)  ( Community Acquired Pneumonia (CAP) - Low MDR Risk)         07/27 1359 IV Every 24 hours (non-standard times) 07/21/23 1513    07/21/23 1615  azithromycin (ZITHROMAX) 500 mg in dextrose 5 % (D5W) 250 mL IVPB (Vial-Mate)  ( Community Acquired  Pneumonia (CAP) - Low MDR Risk)         07/24 1614 IV Every 24 hours (non-standard times) 07/21/23 1513          Latest lactate reviewed-  Recent Labs   Lab 07/21/23  1751   LACTATE 0.9  0.8     Organ dysfunction indicated by Acute respiratory failure    Fluid challenge- completed in the ED. Normotensive currently. IVF continued for hypovolemic hyponatremia.  Post- resuscitation assessment Yes Perfusion exam was performed within 6 hours of septic shock presentation after bolus shows Adequate tissue perfusion assessed by non-invasive monitoring       Will Not start Pressors- Levophed for MAP of 65  Source control achieved by: empiric abx directed tx    Lactic acid 0.9  Sputum cultures (if able to collect specimen), and stool for c.diff testing- negative  cxr - infiltrates c/w bacterial pneumonia  -ABG obtained on 2LNC - showing normal pH, pco2 32, PO2 98 -> d/w RT and agree with weaning oxygen  -continue prn albuterol inhaler  -ISPT, Mucinex bid, add Breo today  -mobilize    Diarrhea of presumed infectious origin  Reports daily profuse watery diarrhea since onset which was 7-8 days ago.  -c diff testing negative  -stop Miralax        Hyponatremia  Sodium 129, normal renal function.   Likely hypovolemic hyponatremia in setting of acute diarrheal illness, with ongoing symptoms  -received 1L NS bolus in the ED and maintained on NS at 75 cc/h  -sodium level is better today, 133  -completed IVF            Type 2 diabetes mellitus with hyperglycemia, without long-term current use of insulin  Patient's FSGs are uncontrolled due to hyperglycemia on current medication regimen.  Last A1c reviewed-   Lab Results   Component Value Date    HGBA1C 9.6 (H) 07/21/2023     Most recent fingerstick glucose reviewed-   Recent Labs   Lab 07/22/23  0956 07/22/23  1228 07/22/23  1717 07/22/23  1945   POCTGLUCOSE 351* 370* 278* 312*     Current correctional scale  Medium  Maintain anti-hyperglycemic dose as follows-   Antihyperglycemics  (From admission, onward)      Start     Stop Route Frequency Ordered    07/22/23 0955  insulin aspart U-100 pen 5 Units         -- SubQ 3 times daily with meals 07/22/23 0950    07/21/23 2100  insulin detemir U-100 (Levemir) pen 10 Units         -- SubQ Nightly 07/21/23 1513    07/21/23 1611  insulin aspart U-100 pen 1-10 Units         -- SubQ Before meals & nightly PRN 07/21/23 1513          Hold Oral hypoglycemics while patient is in the hospital.    Bacterial pneumonia  Bilateral infiltrates on CXR, associated with GI symptoms and high fever.  -see sepsis section      Anxiety disorder  Continue sertraline.    Asthma, mild intermittent  No active bronchospasms on examination.  Continue prn albuterol for dyspnea/wheezing.  She received a dose of solumedrol 125 + nebs tx in the ED-> no bronchospasms by the time I assessed her. Avoiding systemic CS due to severe steroid induced hyperglycemia. Add maintenance Breo today to prevent further exacerbation and improved oxygenation.   Monitor glucose given background diabetes.      Obesity  Body mass index is 43.3 kg/m². Morbid obesity complicates all aspects of disease management from diagnostic modalities to treatment. Weight loss encouraged and health benefits explained to patient.           VTE Risk Mitigation (From admission, onward)           Ordered     heparin (porcine) injection 5,000 Units  Every 8 hours         07/21/23 1513     IP VTE HIGH RISK PATIENT  Once         07/21/23 1513     Place sequential compression device  Until discontinued         07/21/23 1513                    Discharge Planning   ALISTAIR:      Code Status: Full Code   Is the patient medically ready for discharge?:     Reason for patient still in hospital (select all that apply): Patient trending condition  Discharge Plan A: Home                  Ana Retana MD  Department of Hospital Medicine   [facility] - Intensive Care ([place])

## 2023-09-13 ENCOUNTER — OFFICE VISIT (OUTPATIENT)
Dept: GASTROENTEROLOGY | Facility: CLINIC | Age: 88
End: 2023-09-13
Payer: MEDICARE

## 2023-09-13 VITALS
TEMPERATURE: 98.2 F | SYSTOLIC BLOOD PRESSURE: 130 MMHG | WEIGHT: 131 LBS | BODY MASS INDEX: 26.41 KG/M2 | DIASTOLIC BLOOD PRESSURE: 70 MMHG | HEIGHT: 59 IN

## 2023-09-13 DIAGNOSIS — K58.0 IRRITABLE BOWEL SYNDROME WITH DIARRHEA: Primary | ICD-10-CM

## 2023-09-13 DIAGNOSIS — R15.9 INCONTINENCE OF FECES WITH FECAL URGENCY: ICD-10-CM

## 2023-09-13 DIAGNOSIS — R15.2 INCONTINENCE OF FECES WITH FECAL URGENCY: ICD-10-CM

## 2023-09-13 DIAGNOSIS — R13.13 PHARYNGEAL DYSPHAGIA: ICD-10-CM

## 2023-09-13 PROCEDURE — 99214 OFFICE O/P EST MOD 30 MIN: CPT | Performed by: INTERNAL MEDICINE

## 2023-09-13 RX ORDER — DIPHENOXYLATE HYDROCHLORIDE AND ATROPINE SULFATE 2.5; .025 MG/1; MG/1
1 TABLET ORAL 4 TIMES DAILY PRN
Qty: 30 TABLET | Refills: 0 | Status: SHIPPED | OUTPATIENT
Start: 2023-09-13

## 2023-09-13 RX ORDER — DICYCLOMINE HYDROCHLORIDE 10 MG/1
10 CAPSULE ORAL 3 TIMES DAILY PRN
Qty: 30 CAPSULE | Refills: 2 | Status: SHIPPED | OUTPATIENT
Start: 2023-09-13

## 2023-09-13 RX ORDER — MONTELUKAST SODIUM 4 MG/1
1 TABLET, CHEWABLE ORAL 2 TIMES DAILY
Qty: 60 TABLET | Refills: 2 | Status: SHIPPED | OUTPATIENT
Start: 2023-09-13 | End: 2023-09-13 | Stop reason: CLARIF

## 2023-09-13 NOTE — PROGRESS NOTES
[facility] - Outpatient Follow-up Note  Yair Alexander 80 y.o. female MRN: [id number]  Encounter: [id number]          ASSESSMENT AND PLAN:      78-year-old female here for evaluation of abdominal pain, gas, intermittent fecal incontinence and dysphagia. 1. Abdominal bloating, gas and intermittent fecal incontinence -differential diagnosis broad including irritable bowel syndrome with diarrhea, small intestine bacterial overgrowth or fecal incontinence secondary to weakening of anal sphincter or postcholecystectomy diarrhea  -I recommend increasing fiber intake  -Benefiber supplement  -Breath test to assess for small intestine bacterial overgrowth  -Bentyl as needed for crampy abdominal pain  -Lomotil and Imodium as needed for diarrhea  -If no improvement will consider colestipol    2. Dysphagia -she has chronic dysphagia. Reviewed barium esophagogram which showed esophageal dysmotility. She has dysphagia on initiation of swallow and complete clearance of the esophagus. Her oropharyngeal dysphagia could be secondary to cricopharyngeal bar.  -Cut meat and vegetable into small pieces  -Chew thoroughly before swallowing  -Keep your food moist  -Ensure supplement      Follow-up in 2 months  ______________________________________________________________________    SUBJECTIVE: 78-year-old female with history of diabetic gastroparesis, neuropathy, IBS, chronic dysphagia here for evaluation of abdominal bloating, gas and intermittent fecal incontinence. She reports intermittent abdominal bloating and cramping in lower abdomen. Reports frequent gas and intermittent fecal incontinence    She has no nausea, or vomiting. Reports chronic dysphagia to solid food and at times to liquid as well. Reviewed her barium esophagogram      She had EGD and colonoscopy in 2016 by Dr. Nikki Levi. Colonoscopy was notable for several colon polyps and diverticulosis.   She was recommended to repeat a colonoscopy 1 year interval due to suboptimal bowel prep. Polyps were tubular adenomas without any high-grade dysplasia. REVIEW OF SYSTEMS IS OTHERWISE NEGATIVE. Historical Information   Past Medical History:   Diagnosis Date   • Anemia    • Arthritis    • Back pain    • CHF (congestive heart failure) (HCC)    • Chronic kidney disease     Stage 3b   • Confusion 2023   • Diabetes ([address])    • Diabetes mellitus ([address])    • Diabetic gastroparesis associated with type 2 diabetes mellitus ([address])    • Diabetic polyneuropathy ([address])    • Diverticulosis    • Herpes zoster    • Hypertension    • IBS (irritable bowel syndrome)    • Neurogenic claudication due to lumbar spinal stenosis    • Osteoarthritis    • Osteoporosis    • Pulmonary edema    • Raynaud's phenomenon without gangrene    • Seronegative arthropathy of multiple sites ([address])    • Sicca ([address])      Past Surgical History:   Procedure Laterality Date   • BACK SURGERY     • COLONOSCOPY     • EGD AND COLONOSCOPY N/A 2016    Procedure: EGD AND COLONOSCOPY;  Surgeon: Genetta Klinefelter, MD;  Location: [facility];   Service:    • JOINT REPLACEMENT      bilat knees and hips   • OTHER SURGICAL HISTORY      pelvis rods   • REPLACEMENT TOTAL KNEE BILATERAL     • STOMACH SURGERY     • UPPER GASTROINTESTINAL ENDOSCOPY       Social History   Social History     Substance and Sexual Activity   Alcohol Use No     Social History     Substance and Sexual Activity   Drug Use No     Social History     Tobacco Use   Smoking Status Former   • Types: Cigarettes   • Quit date:    • Years since quittin.7   Smokeless Tobacco Never     Family History   Problem Relation Age of Onset   • Cancer Brother    • Diabetes Mother    • Hypertension Father    • Heart disease Father        Meds/Allergies       Current Outpatient Medications:   •  amitriptyline (ELAVIL) 10 mg tablet  •  aspirin 81 mg chewable tablet  •  atorvastatin (LIPITOR) 10 mg tablet  •  Cholecalciferol (VITAMIN D3) 1000 units CAPS  •  colestipol (COLESTID) 1 g tablet  •  dicyclomine (BENTYL) 10 mg capsule  •  diphenoxylate-atropine (LOMOTIL) 2.5-0.025 mg per tablet  •  DULoxetine (CYMBALTA) 60 mg delayed release capsule  •  hydroxychloroquine (PLAQUENIL) 200 mg tablet  •  Magnesium 250 MG TABS  •  nitrofurantoin (MACRODANTIN) 50 mg capsule  •  omeprazole (PriLOSEC) 20 mg delayed release capsule  •  pregabalin (LYRICA) 75 mg capsule  •  pyridoxine (B-6) 100 MG tablet  •  Sodium Fluoride (PreviDent 5000 Booster Plus) 1.1 % PSTE  •  diphenoxylate-atropine (LOMOTIL) 2.5-0.025 mg per tablet  •  metFORMIN (GLUCOPHAGE) 500 mg tablet  •  metoprolol succinate (TOPROL-XL) 25 mg 24 hr tablet  •  Mirabegron ER (Myrbetriq) 50 MG TB24  •  torsemide (DEMADEX) 10 mg tablet    Allergies   Allergen Reactions   • Morphine Other (See Comments)     urinary retention   • Ciprofloxacin Rash and Nausea Only           Objective     Blood pressure 130/70, temperature 98.2 °F (36.8 °C), temperature source Tympanic, height 4' 11" (1.499 m), weight 59.4 kg (131 lb), not currently breastfeeding. Body mass index is 26.46 kg/m². PHYSICAL EXAM:      General Appearance:   Alert, cooperative, no distress   HEENT:   Normocephalic, atraumatic, anicteric.     Neck:  Supple, symmetrical, trachea midline   Lungs:   Clear to auscultation bilaterally; no rales, rhonchi or wheezing; respirations unlabored    Heart[de-identified]   Regular rate and rhythm; no murmur, rub, or gallop. Abdomen:   Soft, non-tender, non-distended; normal bowel sounds; no masses, no organomegaly    Genitalia:   Deferred    Rectal:   Deferred    Extremities:  No cyanosis, clubbing or edema    Pulses:  2+ and symmetric    Skin:  No jaundice, rashes, or lesions    Lymph nodes:  No palpable cervical lymphadenopathy        Lab Results:   No visits with results within 1 Day(s) from this visit.    Latest known visit with results is:   Appointment on 06/14/2023   Component Date Value   • CRP 06/14/2023 1.0 • Sed Rate 06/14/2023 26    • Sodium 06/14/2023 139    • Potassium 06/14/2023 4.3    • Chloride 06/14/2023 104    • CO2 06/14/2023 28    • ANION GAP 06/14/2023 7    • BUN 06/14/2023 25    • Creatinine 06/14/2023 1.31 (H)    • Glucose 06/14/2023 114    • Calcium 06/14/2023 9.3    • AST 06/14/2023 33    • ALT 06/14/2023 27    • Alkaline Phosphatase 06/14/2023 100    • Total Protein 06/14/2023 7.2    • Albumin 06/14/2023 3.6    • Total Bilirubin 06/14/2023 0.46    • eGFR 06/14/2023 36    • Color, UA 06/15/2023 Colorless    • Clarity, UA 06/15/2023 Clear    • Specific Gravity, UA 06/15/2023 1.005    • pH, UA 06/15/2023 6.0    • Leukocytes, UA 06/15/2023 Small (A)    • Nitrite, UA 06/15/2023 Negative    • Protein, UA 06/15/2023 Negative    • Glucose, UA 06/15/2023 Negative    • Ketones, UA 06/15/2023 Negative    • Urobilinogen, UA 06/15/2023 <2.0    • Bilirubin, UA 06/15/2023 Negative    • Occult Blood, UA 06/15/2023 Negative    • RBC, UA 06/15/2023 1-2    • WBC, UA 06/15/2023 1-2    • Epithelial Cells 06/15/2023 Occasional    • Bacteria, UA 06/15/2023 Occasional    • WBC 06/14/2023 5.64    • RBC 06/14/2023 4.01    • Hemoglobin 06/14/2023 11.7    • Hematocrit 06/14/2023 37.4    • MCV 06/14/2023 93    • MCH 06/14/2023 29.2    • MCHC 06/14/2023 31.3 (L)    • RDW 06/14/2023 14.5    • MPV 06/14/2023 11.4    • Platelets 68/28/0394 152    • nRBC 06/14/2023 0    • Neutrophils Relative 06/14/2023 54    • Immat GRANS % 06/14/2023 1    • Lymphocytes Relative 06/14/2023 22    • Monocytes Relative 06/14/2023 14 (H)    • Eosinophils Relative 06/14/2023 8 (H)    • Basophils Relative 06/14/2023 1    • Neutrophils Absolute 06/14/2023 3.15    • Immature Grans Absolute 06/14/2023 0.04    • Lymphocytes Absolute 06/14/2023 1.21    • Monocytes Absolute 06/14/2023 0.77    • Eosinophils Absolute 06/14/2023 0.44    • Basophils Absolute 06/14/2023 0.03    • CÉSAR 06/14/2023 Positive (A)    • CÉSAR Titer 1 06/14/2023 Titer of 80    • CÉSAR Pattern 1 06/14/2023 Cytoplasmic (non-nuclear)          Radiology Results:   No results found.

## 2023-11-22 ENCOUNTER — OFFICE VISIT (OUTPATIENT)
Dept: WOUND CARE | Facility: HOSPITAL | Age: 88
End: 2023-11-22
Payer: MEDICARE

## 2023-11-22 VITALS
RESPIRATION RATE: 18 BRPM | HEART RATE: 88 BPM | HEIGHT: 59 IN | WEIGHT: 127 LBS | TEMPERATURE: 96.8 F | BODY MASS INDEX: 25.6 KG/M2 | DIASTOLIC BLOOD PRESSURE: 70 MMHG | SYSTOLIC BLOOD PRESSURE: 117 MMHG

## 2023-11-22 DIAGNOSIS — I87.311 CHRONIC VENOUS HYPERTENSION (IDIOPATHIC) WITH ULCER OF RIGHT LOWER EXTREMITY (CODE) (HCC): ICD-10-CM

## 2023-11-22 DIAGNOSIS — N18.30 TYPE 2 DIABETES MELLITUS WITH STAGE 3 CHRONIC KIDNEY DISEASE, WITHOUT LONG-TERM CURRENT USE OF INSULIN, UNSPECIFIED WHETHER STAGE 3A OR 3B CKD (HCC): ICD-10-CM

## 2023-11-22 DIAGNOSIS — S81.801A TRAUMATIC OPEN WOUND OF RIGHT LOWER LEG, INITIAL ENCOUNTER: Primary | ICD-10-CM

## 2023-11-22 DIAGNOSIS — E11.22 TYPE 2 DIABETES MELLITUS WITH STAGE 3 CHRONIC KIDNEY DISEASE, WITHOUT LONG-TERM CURRENT USE OF INSULIN, UNSPECIFIED WHETHER STAGE 3A OR 3B CKD (HCC): ICD-10-CM

## 2023-11-22 PROCEDURE — 99213 OFFICE O/P EST LOW 20 MIN: CPT | Performed by: STUDENT IN AN ORGANIZED HEALTH CARE EDUCATION/TRAINING PROGRAM

## 2023-11-22 PROCEDURE — 97597 DBRDMT OPN WND 1ST 20 CM/<: CPT | Performed by: STUDENT IN AN ORGANIZED HEALTH CARE EDUCATION/TRAINING PROGRAM

## 2023-11-22 PROCEDURE — 99204 OFFICE O/P NEW MOD 45 MIN: CPT | Performed by: STUDENT IN AN ORGANIZED HEALTH CARE EDUCATION/TRAINING PROGRAM

## 2023-11-22 RX ORDER — LIDOCAINE HYDROCHLORIDE 40 MG/ML
5 SOLUTION TOPICAL ONCE
Status: COMPLETED | OUTPATIENT
Start: 2023-11-22 | End: 2023-11-22

## 2023-11-22 RX ADMIN — LIDOCAINE HYDROCHLORIDE 5 ML: 40 SOLUTION TOPICAL at 13:42

## 2023-11-22 NOTE — PATIENT INSTRUCTIONS
Orders Placed This Encounter   Procedures    Wound miscellaneous orders     Protein: Eat protein with each meal to promote healing. Examples of protein are fish, meat, chicken, nuts, peanut butter, eggs, lentils, edamame or a protein shake. Standing Status:   Future     Standing Expiration Date:   11/22/2024    Wound miscellaneous orders     Wound infection:  If you have signs of infection please call the wound center. If the wound center is closed- please go to the Emergency department. Some signs of infection:  fever, chills, increased redness, red streaks, increase in pain, increased drainage. Drainage with an odor, Change in drainage color: white/milky/green/tan/yellow,  an increase in swelling, chest pain and/or shortness of breath. Standing Status:   Future     Standing Expiration Date:   11/22/2024    Wound cleansing and dressings     Wash your hands with soap and water. Remove old dressing, discard into plastic bag and place in trash. Cleanse the wound with soap and water prior to applying a clean dressing. Do not use tissue or cotton balls. Do not scrub the wound. Pat dry using gauze. Shower yes   Apply mild moisturizer to skin surrounding wound  Apply Polymem Max AG to the right leg wound. Cover with ABD  Secure with rolled gauze and tape. Change dressing every other day.      Standing Status:   Future     Standing Expiration Date:   11/22/2024

## 2023-11-22 NOTE — PROGRESS NOTES
Patient ID: Mallory Hanson is a 80 y.o. female Date of Birth 1934     Chief Complaint  Chief Complaint   Patient presents with    New Patient Visit     Right leg wound       Allergies  Morphine and Ciprofloxacin    Assessment:       Diagnoses and all orders for this visit:    Traumatic open wound of right lower leg, initial encounter  -     Wound miscellaneous orders; Future  -     Wound miscellaneous orders; Future  -     Wound cleansing and dressings; Future  -     lidocaine (XYLOCAINE) 4 % topical solution 5 mL  -     Debridement    Chronic venous hypertension (idiopathic) with ulcer of right lower extremity (CODE) (HCC)    Type 2 diabetes mellitus with stage 3 chronic kidney disease, without long-term current use of insulin, unspecified whether stage 3a or 3b CKD ([address])              Debridement   Wound 11/22/23 Traumatic Leg Right; Anterior    Universal Protocol:  Consent: Verbal consent obtained. Risks and benefits: risks, benefits and alternatives were discussed  Consent given by: patient  Time out: Immediately prior to procedure a "time out" was called to verify the correct patient, procedure, equipment, support staff and site/side marked as required. Patient identity confirmed: verbally with patient    Performed by: physician  Debridement type: selective  Pain control: lidocaine 4%  Post-debridement measurements  Length (cm): 1.7  Width (cm): 1.3  Depth (cm): 0.4  Percent debrided: 90%  Surface Area (cm^2): 2.21  Area debrided (cm^2): 1.99  Volume (cm^3): 0.88  Devitalized tissue debrided: biofilm, exudate and fibrin  Instrument(s) utilized: curette  Bleeding: small  Hemostasis obtained with: pressure  Procedural pain (0-10): 1  Post-procedural pain: 0   Response to treatment: procedure was tolerated well        Plan:   It was a pleasure to see Mallory Hanson for wound care consult today  Selective   debridement performed today as above  Start plan of care as noted below with polymem ag, spandigrip   A1C results reviewed with the patient today. No signs or symptoms of infection today. Patient understands that if any signs of infection start (such as increased redness, drainage, pain, fever, chills, diaphoresis), they should call our office or proceed to the ER or Urgent Care. Patient should continue a high protein diet to facilitate wound healing  Patient is advised to not submerge wound or leave wound open to air. Follow up in 1 weeks  Given the multi-factorial nature of wound care, additional time was taken to review patient's treatment plan with other specialties and most recent pertinent lab work and imaging. All plans of care discussed with patient at bedside who verbalized understanding with treatment plan. Wound 11/22/23 Traumatic Leg Right; Anterior (Active)   Wound Image Images linked 11/22/23 1311   Wound Description Pink;Yellow;Slough; Epithelialization;Granulation tissue 11/22/23 1311   Park-wound Assessment Maceration;[place] 11/22/23 1311   Wound Length (cm) 1.7 cm 11/22/23 1311   Wound Width (cm) 1.3 cm 11/22/23 1311   Wound Depth (cm) 0.3 cm 11/22/23 1311   Wound Surface Area (cm^2) 2.21 cm^2 11/22/23 1311   Wound Volume (cm^3) 0.663 cm^3 11/22/23 1311   Calculated Wound Volume (cm^3) 0.66 cm^3 11/22/23 1311   Drainage Amount Moderate 11/22/23 1311   Drainage Description Serosanguineous 11/22/23 1311   Non-staged Wound Description Full thickness 11/22/23 1311   Dressing Status Intact 11/22/23 1311       Wound 11/22/23 Traumatic Leg Right; Anterior (Active)   Date First Assessed/Time First Assessed: 11/22/23 1309   Primary Wound Type: Traumatic  Location: Leg  Wound Location Orientation: Right; Anterior       [REMOVED] Wound 06/28/22 Skin Tear Skin tear Arm Right (Removed)   Resolved Date: 11/22/23  Date First Assessed/Time First Assessed: 06/28/22 1500   Primary Wound Type: Skin Tear  Traumatic Wound Type: Skin tear  Location: Arm  Wound Location Orientation: (c) Right  Wound Outcome: (c) Other (Comment)       [REMOVED] Wound 06/28/22 Skin Tear Skin tear Arm Right (Removed)   Resolved Date: 11/22/23  Date First Assessed/Time First Assessed: 06/28/22 1500   Primary Wound Type: Skin Tear  Traumatic Wound Type: Skin tear  Location: Arm  Wound Location Orientation: Right  Wound Outcome: (c) Other (Comment)       Subjective:      .    11/22/23: Thiago Lynch is a pleasant 41-year-old female with a past medical history of type 2 diabetes mellitus most recent A1c 5.9% 1 year ago, diabetic gastroparesis, polyneuropathy, ambulatory dysfunction, HFpEF G1DD, and Raynaud's disease here today for initial wound care consult. Patient was referred by primary care provider Dr. Nory Carson. Patient sustained the wound traumatically  to the LLE approximately 2 months ago when the fridge door fell against her leg. At the time she was seen her PCP and prescribed as course of azithromycin + Neosporin t[to the wound. Since then wound care has consisted of Neosporin however PCP saw the patient yesterday and advised that she make an appointment to be seen by wound care due to nonhealing status. Patient denies any symptoms of infection today including fever chills diaphoresis. She does regularly wear compression socks. The following portions of the patient's history were reviewed and updated as appropriate: allergies, current medications, past family history, past medical history, past social history, past surgical history, and problem list.    Review of Systems   Constitutional:  Negative for chills, diaphoresis, fatigue and fever. Skin:  Positive for wound. All other systems reviewed and are negative. Objective:       Wound 11/22/23 Traumatic Leg Right; Anterior (Active)   Wound Image Images linked 11/22/23 1311   Wound Description Pink;Yellow;Slough; Epithelialization;Granulation tissue 11/22/23 1311   Park-wound Assessment Maceration;[place] 11/22/23 1311   Wound Length (cm) 1.7 cm 11/22/23 1311   Wound Width (cm) 1.3 cm 11/22/23 1311   Wound Depth (cm) 0.3 cm 11/22/23 1311   Wound Surface Area (cm^2) 2.21 cm^2 11/22/23 1311   Wound Volume (cm^3) 0.663 cm^3 11/22/23 1311   Calculated Wound Volume (cm^3) 0.66 cm^3 11/22/23 1311   Drainage Amount Moderate 11/22/23 1311   Drainage Description Serosanguineous 11/22/23 1311   Non-staged Wound Description Full thickness 11/22/23 1311   Dressing Status Intact 11/22/23 1311       /70   Pulse 88   Temp (!) 96.8 °F (36 °C)   Resp 18   Ht 4' 11" (1.499 m)   Wt 57.6 kg (127 lb)   LMP  (LMP Unknown)   BMI 25.65 kg/m²     Physical Exam  Vitals reviewed. Constitutional:       Appearance: Normal appearance. HENT:      Head: Normocephalic and atraumatic. Mouth/Throat:      Mouth: Mucous membranes are moist.   Eyes:      Extraocular Movements: Extraocular movements intact. Pulmonary:      Effort: Pulmonary effort is normal.   Skin:     Comments: Hyperpigmentation of bilateral lower extremities consistent with chronic venous stasis disease. Right lower extremity wound with fibrin deposition which is debrided today. Wound does have more depth than initially appreciated after debridement of fibrin and devitalized tissue. Otherwise healthy wound bed with no signs of infection. Rolled edges. Neurological:      Mental Status: She is alert. Psychiatric:         Mood and Affect: Mood normal.         Behavior: Behavior normal.           Wound Instructions:  Orders Placed This Encounter   Procedures    Wound miscellaneous orders     Protein: Eat protein with each meal to promote healing. Examples of protein are fish, meat, chicken, nuts, peanut butter, eggs, lentils, edamame or a protein shake. Standing Status:   Future     Standing Expiration Date:   11/22/2024    Wound miscellaneous orders     Wound infection:  If you have signs of infection please call the wound center.   If the wound center is closed- please go to the Emergency department. Some signs of infection:  fever, chills, increased redness, red streaks, increase in pain, increased drainage. Drainage with an odor, Change in drainage color: white/milky/green/tan/yellow,  an increase in swelling, chest pain and/or shortness of breath. Standing Status:   Future     Standing Expiration Date:   11/22/2024    Wound cleansing and dressings     Wash your hands with soap and water. Remove old dressing, discard into plastic bag and place in trash. Cleanse the wound with soap and water prior to applying a clean dressing. Do not use tissue or cotton balls. Do not scrub the wound. Pat dry using gauze. Shower yes   Apply mild moisturizer to skin surrounding wound  Apply Polymem Max AG to the right leg wound. Cover with ABD  Secure with rolled gauze and tape. Change dressing every other day. Standing Status:   Future     Standing Expiration Date:   11/22/2024    Debridement     This order was created via procedure documentation        Diagnosis ICD-10-CM Associated Orders   1. Traumatic open wound of right lower leg, initial encounter  S81.801A Wound miscellaneous orders     Wound miscellaneous orders     Wound cleansing and dressings     lidocaine (XYLOCAINE) 4 % topical solution 5 mL     Debridement      2. Chronic venous hypertension (idiopathic) with ulcer of right lower extremity (CODE) ([facility])  I87.311       3. Type 2 diabetes mellitus with stage 3 chronic kidney disease, without long-term current use of insulin, unspecified whether stage 3a or 3b CKD ([facility])  E11.22     N18.30           --  Moshe Nuñez MD    "This note has been constructed using a voice recognition system. Therefore there may be syntax, spelling, and/or grammatical errors. Occasional wrong word or "sound alike" substitutions may have occurred due to the inherent limitations of voice recognition software. Read the chart carefully and recognize, using context, where substitutions have occurred.  Please call if you have any questions."

## 2023-11-27 ENCOUNTER — TELEPHONE (OUTPATIENT)
Age: 88
End: 2023-11-27

## 2023-11-27 NOTE — TELEPHONE ENCOUNTER
Pt's  has been contacted, we decided on the below appointment:    Date: 11/28/2023     Arrival Time 2:25 PM     Visit Type: FOLLOW UP PG [[id number]]     Provider: Ewelina Krishna PA-C Department: [facility]

## 2023-11-27 NOTE — TELEPHONE ENCOUNTER
Patient's spouse Janel Bruce called today re: patient's upcoming 1/12 appointment in [place]. Pt can't make that appointment due to the distance. Spouse is requesting a new appointment date and time. Attempts were made to reschedule in either [facility] or [facility]. But there was no availability. Please, review. Call back: [phone] or [phone] this number has a voicemail option.

## 2023-11-28 ENCOUNTER — OFFICE VISIT (OUTPATIENT)
Dept: UROLOGY | Facility: AMBULATORY SURGERY CENTER | Age: 88
End: 2023-11-28
Payer: MEDICARE

## 2023-11-28 VITALS
SYSTOLIC BLOOD PRESSURE: 112 MMHG | BODY MASS INDEX: 25.6 KG/M2 | WEIGHT: 127 LBS | HEIGHT: 59 IN | RESPIRATION RATE: 16 BRPM | DIASTOLIC BLOOD PRESSURE: 68 MMHG

## 2023-11-28 DIAGNOSIS — N32.81 OAB (OVERACTIVE BLADDER): ICD-10-CM

## 2023-11-28 DIAGNOSIS — N39.0 RECURRENT UTI: ICD-10-CM

## 2023-11-28 LAB — POST-VOID RESIDUAL VOLUME, ML POC: 0 ML

## 2023-11-28 PROCEDURE — 99213 OFFICE O/P EST LOW 20 MIN: CPT

## 2023-11-28 PROCEDURE — 51798 US URINE CAPACITY MEASURE: CPT

## 2023-11-28 RX ORDER — MIRABEGRON 50 MG/1
50 TABLET, FILM COATED, EXTENDED RELEASE ORAL EVERY EVENING
Qty: 30 TABLET | Refills: 11 | Status: SHIPPED | OUTPATIENT
Start: 2023-11-28 | End: 2024-11-22

## 2023-11-28 RX ORDER — NITROFURANTOIN MACROCRYSTALS 50 MG/1
50 CAPSULE ORAL DAILY
Qty: 30 CAPSULE | Refills: 6 | Status: SHIPPED | OUTPATIENT
Start: 2023-11-28

## 2023-11-28 NOTE — PROGRESS NOTES
Office Visit- Urology  Kris Stanford 1934 MRN: [id number]      Assessment/Discussion/Plan    80 y.o. female managed by     Overactive bladder  -Patient is on Myrbetriq 50 mg daily she feels that this is effective. -PVR is 0 mL  -Follow-up in 1 year    2. Recurrent urinary tract infection  -Patient has not had any urinary tract infections since being seen in June 2023  -Patient is on daily prophylactic antibiosis with 50 mg of Macrodantin. Patient and daughter wish to continue this due to prior history of urosepsis. Reviewed the small risk that with continued use of nitrofurantoin and decreased renal function there can be risk for interstitial lung disease liver dysfunction. Patient had a CMP with normal liver function test in June 2023. She denies any symptoms indicative of interstitial lung disease such as shortness of breath or cough. Patient and daughter opted to continue with use of Macrodantin as patient has been on for some time now. Routine obtainment of CMP per PCP. -Follow-up in a year          Chief Complaint:   Oliverio Mtz is a 80 y.o. female presenting to the office for a follow up visit regarding overactive bladder/recurrent urinary tract infection. Subjective    Patient is an 80-year-old female with a history of overactive bladder and recurrent UTI presents for follow-up. She was last in the office in June 2023. She is accompanied today by her daughter. She has been on Macrobid 50 mg for recurrent UTI prophylaxis and has not had a UTI since being seen in the office in June 2023. She also utilizes Myrbetriq 50 mg for overactive bladder and has been on this for a number of years. She is currently happy with her urinary symptoms. Denies any gross hematuria, dysuria, flank pain. She denies any shortness of breath, chronic cough or difficulty breathing. ROS:   Review of Systems   Constitutional: Negative. Negative for chills, fatigue and fever. HENT: Negative. Respiratory:  Negative for shortness of breath. Cardiovascular:  Negative for chest pain. Gastrointestinal: Negative. Negative for abdominal pain. Endocrine: Negative. Musculoskeletal: Negative. Skin: Negative. Neurological: Negative. Negative for dizziness and light-headedness. Hematological: Negative. Psychiatric/Behavioral: Negative. Past Medical History  Past Medical History:   Diagnosis Date    Anemia     Arthritis     Back pain     CHF (congestive heart failure) ([facility])     Chronic kidney disease     Stage 3b    Confusion 02/22/2023    Diabetes ([facility])     Diabetes mellitus ([facility])     Diabetic gastroparesis associated with type 2 diabetes mellitus      Diabetic polyneuropathy (formerly [facility])     Diverticulosis     Herpes zoster     Hypertension     IBS (irritable bowel syndrome)     Neurogenic claudication due to lumbar spinal stenosis     Osteoarthritis     Osteoporosis     Pulmonary edema     Raynaud's phenomenon without gangrene     Seronegative arthropathy of multiple sites ([facility])     Sicca ([facility])        Past Surgical History  Past Surgical History:   Procedure Laterality Date    BACK SURGERY      COLONOSCOPY      EGD AND COLONOSCOPY N/A 12/23/2016    Procedure: EGD AND COLONOSCOPY;  Surgeon: Praneeth Wilhelm MD;  [facility];   Service:     JOINT REPLACEMENT      bilat knees and hips    OTHER SURGICAL HISTORY      pelvis rods    REPLACEMENT TOTAL KNEE BILATERAL      STOMACH SURGERY      UPPER GASTROINTESTINAL ENDOSCOPY         Past Family History  Family History   Problem Relation Age of Onset    Cancer Brother     Diabetes Mother     Hypertension Father     Heart disease Father        Past Social history  Social History     Socioeconomic History    Marital status: /Civil Union     Spouse name: Solo Chandra    Number of children: 2    Years of education: Not on file    Highest education level: High school graduate   Occupational History    Not on file   Tobacco Use    Smoking status: Former     Types: Cigarettes     Quit date: 1975     Years since quittin.9    Smokeless tobacco: Never   Vaping Use    Vaping Use: Never used   Substance and Sexual Activity    Alcohol use: No    Drug use: No    Sexual activity: Not Currently     Partners: Male     Birth control/protection: None   Other Topics Concern    Not on file   Social History Narrative    Not on file     Social Determinants of Health     Financial Resource Strain: Not on file   Food Insecurity: No Food Insecurity (2023)    Hunger Vital Sign     Worried About Running Out of Food in the Last Year: Never true     Ran Out of Food in the Last Year: Never true   Transportation Needs: No Transportation Needs (2023)    PRAPARE - Transportation     Lack of Transportation (Medical): No     Lack of Transportation (Non-Medical):  No   Physical Activity: Not on file   Stress: Not on file   Social Connections: Not on file   Intimate Partner Violence: Not on file   Housing Stability: Low Risk  (2023)    Housing Stability Vital Sign     Unable to Pay for Housing in the Last Year: No     Number of Places Lived in the Last Year: 1     Unstable Housing in the Last Year: No       Current Medications  Current Outpatient Medications   Medication Sig Dispense Refill    amitriptyline (ELAVIL) 10 mg tablet TAKE TWO TABLETS BY MOUTH AT BEDTIME 180 tablet 1    aspirin 81 mg chewable tablet Chew 1 tablet (81 mg total) daily 30 tablet 0    atorvastatin (LIPITOR) 10 mg tablet TAKE ONE TABLET BY MOUTH EVERY DAY 90 tablet 3    Cholecalciferol (VITAMIN D3) 1000 units CAPS Take 1 capsule by mouth daily       diphenoxylate-atropine (LOMOTIL) 2.5-0.025 mg per tablet Take 1 tablet by mouth if needed      diphenoxylate-atropine (LOMOTIL) 2.5-0.025 mg per tablet Take 1 tablet by mouth 4 (four) times a day as needed for diarrhea 30 tablet 0    DULoxetine (CYMBALTA) 60 mg delayed release capsule TAKE ONE CAPSULE BY MOUTH EVERY DAY 30 capsule 5 hydroxychloroquine (PLAQUENIL) 200 mg tablet Take 1 tablet (200 mg total) by mouth daily with breakfast 90 tablet 1    Magnesium 250 MG TABS Take 250 mg by mouth every evening      Mirabegron ER (Myrbetriq) 50 MG TB24 Take 1 tablet (50 mg total) by mouth every evening 30 tablet 11    nitrofurantoin (MACRODANTIN) 50 mg capsule Take 1 capsule (50 mg total) by mouth in the morning 30 capsule 6    omeprazole (PriLOSEC) 20 mg delayed release capsule Take 20 mg by mouth daily        pregabalin (LYRICA) 75 mg capsule TAKE ONE CAPSULE BY MOUTH THREE TIMES A DAY 90 capsule 5    pyridoxine (B-6) 100 MG tablet Take 100 mg by mouth daily      Sodium Fluoride (PreviDent 5000 Booster Plus) 1.1 % PSTE Apply on teeth every evening as directed 100 mL 3    dicyclomine (BENTYL) 10 mg capsule Take 1 capsule (10 mg total) by mouth 3 (three) times a day as needed (abd pain) (Patient not taking: Reported on 11/22/2023) 30 capsule 2    metFORMIN (GLUCOPHAGE) 500 mg tablet Take 500 mg by mouth 2 (two) times a day (Patient not taking: Reported on 6/27/2023)      metoprolol succinate (TOPROL-XL) 25 mg 24 hr tablet Take 0.5 tablets (12.5 mg total) by mouth daily (Patient not taking: Reported on 6/27/2023) 45 tablet 3    torsemide (DEMADEX) 10 mg tablet Take 1 tablet PRN for wt gain 3 lb/ 3 days or 5 lb/ 5 aria (Patient not taking: Reported on 6/27/2023) 30 tablet 0     No current facility-administered medications for this visit. Allergies  Allergies   Allergen Reactions    Morphine Other (See Comments)     urinary retention    Ciprofloxacin Rash and Nausea Only       OBJECTIVE    Vitals   Vitals:    11/28/23 1439   BP: 112/68   BP Location: Left arm   Patient Position: Sitting   Cuff Size: Adult   Resp: 16   Weight: 57.6 kg (127 lb)   Height: 4' 11" (1.499 m)       PVR:    Physical Exam  Constitutional:       General: She is not in acute distress. Appearance: Normal appearance. She is normal weight.  She is not ill-appearing or toxic-appearing. HENT:      Head: Normocephalic and atraumatic. Eyes:      Conjunctiva/sclera: Conjunctivae normal.   Cardiovascular:      Rate and Rhythm: Normal rate. Pulmonary:      Effort: Pulmonary effort is normal. No respiratory distress. Skin:     General: Skin is warm and dry. Neurological:      General: No focal deficit present. Mental Status: She is alert and oriented to person, place, and time. Cranial Nerves: No cranial nerve deficit. Psychiatric:         Mood and Affect: Mood normal.         Behavior: Behavior normal.         Thought Content: Thought content normal.          Labs:     Lab Results   Component Value Date    CREATININE 1.31 (H) 06/14/2023      Lab Results   Component Value Date    HGBA1C 5.9 (H) 04/14/2022     Lab Results   Component Value Date    GLUCOSE 205 (H) 10/09/2015    CALCIUM 9.3 06/14/2023     10/09/2015    K 4.3 06/14/2023    CO2 28 06/14/2023     06/14/2023    BUN 25 06/14/2023    CREATININE 1.31 (H) 06/14/2023       I have personally reviewed all pertinent lab results and reviewed with patient    Imaging       Kymberly Patel PA-C  Date: 11/28/2023 Time: 4:00 PM  [facility]    This note was written using fluency dictation software. Please excuse any resulting minor grammatical errors.

## 2023-12-01 ENCOUNTER — OFFICE VISIT (OUTPATIENT)
Dept: WOUND CARE | Facility: HOSPITAL | Age: 88
End: 2023-12-01
Payer: MEDICARE

## 2023-12-01 VITALS
RESPIRATION RATE: 18 BRPM | HEART RATE: 102 BPM | SYSTOLIC BLOOD PRESSURE: 135 MMHG | TEMPERATURE: 97.1 F | DIASTOLIC BLOOD PRESSURE: 86 MMHG

## 2023-12-01 DIAGNOSIS — L97.911 NON-PRESSURE CHRONIC ULCER RIGHT LOWER LEG, LIMITED TO BREAKDOWN SKIN (HCC): Primary | ICD-10-CM

## 2023-12-01 DIAGNOSIS — I87.311 CHRONIC VENOUS HYPERTENSION (IDIOPATHIC) WITH ULCER OF RIGHT LOWER EXTREMITY (CODE) (HCC): ICD-10-CM

## 2023-12-01 DIAGNOSIS — N18.30 TYPE 2 DIABETES MELLITUS WITH STAGE 3 CHRONIC KIDNEY DISEASE, WITHOUT LONG-TERM CURRENT USE OF INSULIN, UNSPECIFIED WHETHER STAGE 3A OR 3B CKD (HCC): ICD-10-CM

## 2023-12-01 DIAGNOSIS — E11.22 TYPE 2 DIABETES MELLITUS WITH STAGE 3 CHRONIC KIDNEY DISEASE, WITHOUT LONG-TERM CURRENT USE OF INSULIN, UNSPECIFIED WHETHER STAGE 3A OR 3B CKD (HCC): ICD-10-CM

## 2023-12-01 PROCEDURE — 97597 DBRDMT OPN WND 1ST 20 CM/<: CPT | Performed by: STUDENT IN AN ORGANIZED HEALTH CARE EDUCATION/TRAINING PROGRAM

## 2023-12-01 RX ORDER — LIDOCAINE HYDROCHLORIDE 40 MG/ML
5 SOLUTION TOPICAL ONCE
Status: COMPLETED | OUTPATIENT
Start: 2023-12-01 | End: 2023-12-01

## 2023-12-01 RX ADMIN — LIDOCAINE HYDROCHLORIDE 5 ML: 40 SOLUTION TOPICAL at 09:35

## 2023-12-01 NOTE — PATIENT INSTRUCTIONS
Orders Placed This Encounter   Procedures    Wound cleansing and dressings     Wash your hands with soap and water. Remove old dressing, discard into plastic bag and place in trash. Cleanse the wound with soap and water prior to applying a clean dressing. Do not use tissue or cotton balls. Do not scrub the wound. Pat dry using gauze. Shower yes   Apply mild moisturizer to skin surrounding wound  Apply Medihoney to the right leg wound. Cover with gauze and ABD  Secure with rolled gauze and tape. Change dressing every day. Standing Status:   Future     Standing Expiration Date:   12/1/2024    Wound compression and edema control     Compression Stocking: Spandagrip size E    Remove compression stockings every night HS and re-apply first thing qAM. Follow daily skin care as instructed. Avoid prolonged standing in one place. Elevate leg(s) above the level of the heart when sitting or as much as possible.      Standing Status:   Future     Standing Expiration Date:   12/1/2024

## 2023-12-01 NOTE — PROGRESS NOTES
Patient ID: Richardson Rivera is a 80 y.o. 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 Risks and benefits: risks, benefits and alternatives were discussed  Consent given by: patient  Time out: Immediately prior to procedure a "time out" was called to verify the correct patient, procedure, equipment, support staff and site/side marked as required. Patient identity confirmed: verbally with patient    Performed by: physician  Debridement type: selective  Pain control: lidocaine 4%  Post-debridement measurements  Length (cm): 1.7  Width (cm): 1.2  Depth (cm): 0.3  Percent debrided: 90%  Surface Area (cm^2): 2.04  Area debrided (cm^2): 1.84  Volume (cm^3): 0.61  Devitalized tissue debrided: biofilm, exudate and fibrin  Instrument(s) utilized: curette  Bleeding: small  Hemostasis obtained with: pressure  Procedural pain (0-10): 1  Post-procedural pain: 0   Response to treatment: procedure was tolerated well        Plan: It was a pleasure to see Richardson Rivera for wound care follow up today  Selective debridement performed today as above  Wound similar in size however with healthy wound bed.   Continue plan of care as noted below with change to medihoney  No signs or symptoms of infection today. Patient understands that if any signs of infection start (such as increased redness, drainage, pain, fever, chills, diaphoresis), they should call our office or proceed to the ER or Urgent Care. Patient should continue a high protein diet to facilitate wound healing  Patient is advised to not submerge wound or leave wound open to air. Follow up in 1 weeks  Given the multi-factorial nature of wound care, additional time was taken to review patient's treatment plan with other specialties and most recent pertinent lab work and imaging. All plans of care discussed with patient at bedside who verbalized understanding with treatment plan. Wound 11/22/23 Traumatic Leg Right; Anterior (Active)   Wound Image Images linked 12/01/23 0933   Wound Description Pink;Yellow;Slough; Epithelialization;Granulation tissue 12/01/23 0933   Park-wound Assessment Pink;Dry;Scaly; Swelling 12/01/23 0933   Wound Length (cm) 1.7 cm 12/01/23 0933   Wound Width (cm) 1.2 cm 12/01/23 0933   Wound Depth (cm) 0.3 cm 12/01/23 0933   Wound Surface Area (cm^2) 2.04 cm^2 12/01/23 0933   Wound Volume (cm^3) 0.612 cm^3 12/01/23 0933   Calculated Wound Volume (cm^3) 0.61 cm^3 12/01/23 0933   Change in Wound Size % 7.58 12/01/23 0933   Drainage Amount Moderate 12/01/23 0933   Drainage Description Serosanguineous 12/01/23 0933   Non-staged Wound Description Full thickness 12/01/23 0933   Dressing Status Intact 12/01/23 0933       Wound 11/22/23 Traumatic Leg Right; Anterior (Active)   Date First Assessed/Time First Assessed: 11/22/23 1309   Primary Wound Type: Traumatic  Location: Leg  Wound Location Orientation: Right; Anterior       [REMOVED] Wound 06/28/22 Skin Tear Skin tear Arm Right (Removed)   Resolved Date: 11/22/23  Date First Assessed/Time First Assessed: 06/28/22 1500   Primary Wound Type: Skin Tear  Traumatic Wound Type: Skin tear  Location: Arm  Wound Location Orientation: (c) Right  Wound Outcome: (c) Other (Comment)       [REMOVED] Wound 06/28/22 Skin Tear Skin tear Arm Right (Removed)   Resolved Date: 11/22/23  Date First Assessed/Time First Assessed: 06/28/22 1500   Primary Wound Type: Skin Tear  Traumatic Wound Type: Skin tear  Location: Arm  Wound Location Orientation: Right  Wound Outcome: (c) Other (Comment)       Subjective:      .    12/1/23: Applying PolyMem as directed. Not noticing much drainage from the wound at all. No symptoms of infection. 11/22/23: Edin Acosta is a pleasant 20-year-old female with a past medical history of type 2 diabetes mellitus most recent A1c 5.9% 1 year ago, diabetic gastroparesis, polyneuropathy, ambulatory dysfunction, HFpEF G1DD, and Raynaud's disease here today for initial wound care consult. Patient was referred by primary care provider Dr. Rico Arias. Patient sustained the wound traumatically  to the LLE approximately 2 months ago when the fridge door fell against her leg. At the time she was seen her PCP and prescribed as course of azithromycin + Neosporin t[to the wound. Since then wound care has consisted of Neosporin however PCP saw the patient yesterday and advised that she make an appointment to be seen by wound care due to nonhealing status. Patient denies any symptoms of infection today including fever chills diaphoresis. She does regularly wear compression socks. The following portions of the patient's history were reviewed and updated as appropriate: allergies, current medications, past family history, past medical history, past social history, past surgical history, and problem list.    Review of Systems   Constitutional:  Negative for chills, diaphoresis, fatigue and fever. Skin:  Positive for wound. All other systems reviewed and are negative. Objective:       Wound 11/22/23 Traumatic Leg Right; Anterior (Active)   Wound Image Images linked 12/01/23 8126   Wound Description Pink;Yellow;Slough; Epithelialization;Granulation tissue 12/01/23 0933   Park-wound Assessment Pink;Dry;Scaly; Swelling 12/01/23 0933   Wound Length (cm) 1.7 cm 12/01/23 0933   Wound Width (cm) 1.2 cm 12/01/23 0933   Wound Depth (cm) 0.3 cm 12/01/23 0933   Wound Surface Area (cm^2) 2.04 cm^2 12/01/23 0933   Wound Volume (cm^3) 0.612 cm^3 12/01/23 0933   Calculated Wound Volume (cm^3) 0.61 cm^3 12/01/23 0933   Change in Wound Size % 7.58 12/01/23 0933   Drainage Amount Moderate 12/01/23 0933   Drainage Description Serosanguineous 12/01/23 0933   Non-staged Wound Description Full thickness 12/01/23 0933   Dressing Status Intact 12/01/23 0933       /86   Pulse 102   Temp (!) 97.1 °F (36.2 °C)   Resp 18   LMP  (LMP Unknown)     Physical Exam  Vitals reviewed. Constitutional:       Appearance: Normal appearance. HENT:      Head: Normocephalic and atraumatic. Mouth/Throat:      Mouth: Mucous membranes are moist.   Eyes:      Extraocular Movements: Extraocular movements intact. Pulmonary:      Effort: Pulmonary effort is normal.   Skin:     Comments: . Lower extremity wound similar in size to last exam.  Wounds with fibrin deposition but less than last exam.  Rolled edges. Healthy periwound without maceration. Neurological:      Mental Status: She is alert. Psychiatric:         Mood and Affect: Mood normal.         Behavior: Behavior normal.           Wound Instructions:  Orders Placed This Encounter   Procedures    Wound cleansing and dressings     Wash your hands with soap and water. Remove old dressing, discard into plastic bag and place in trash. Cleanse the wound with soap and water prior to applying a clean dressing. Do not use tissue or cotton balls. Do not scrub the wound. Pat dry using gauze. Shower yes   Apply mild moisturizer to skin surrounding wound  Apply Medihoney to the right leg wound. Cover with gauze and ABD  Secure with rolled gauze and tape.   Change dressing every day. Standing Status:   Future     Standing Expiration Date:   12/1/2024    Wound compression and edema control     Compression Stocking: Spandagrip size E    Remove compression stockings every night HS and re-apply first thing qAM. Follow daily skin care as instructed. Avoid prolonged standing in one place. Elevate leg(s) above the level of the heart when sitting or as much as possible. Standing Status:   Future     Standing Expiration Date:   12/1/2024    Debridement     This order was created via procedure documentation        Diagnosis ICD-10-CM Associated Orders   1. 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 Chronic venous hypertension (idiopathic) with ulcer of right lower extremity (CODE) ([facility])  I87.311       3. Type 2 diabetes mellitus with stage 3 chronic kidney disease, without long-term current use of insulin, unspecified whether stage 3a or 3b CKD ([address])  E11.22     N18.30           --  Kat Cagle MD    "This note has been constructed using a voice recognition system. Therefore there may be syntax, spelling, and/or grammatical errors. Occasional wrong word or "sound alike" substitutions may have occurred due to the inherent limitations of voice recognition software. Read the chart carefully and recognize, using context, where substitutions have occurred.  Please call if you have any questions."

## 2023-12-08 ENCOUNTER — OFFICE VISIT (OUTPATIENT)
Dept: WOUND CARE | Facility: HOSPITAL | Age: 88
End: 2023-12-08
Payer: MEDICARE

## 2023-12-08 VITALS
TEMPERATURE: 97.4 F | DIASTOLIC BLOOD PRESSURE: 63 MMHG | RESPIRATION RATE: 18 BRPM | SYSTOLIC BLOOD PRESSURE: 99 MMHG | HEART RATE: 96 BPM

## 2023-12-08 DIAGNOSIS — L97.911 NON-PRESSURE CHRONIC ULCER RIGHT LOWER LEG, LIMITED TO BREAKDOWN SKIN (HCC): Primary | ICD-10-CM

## 2023-12-08 DIAGNOSIS — E11.22 TYPE 2 DIABETES MELLITUS WITH STAGE 3 CHRONIC KIDNEY DISEASE, WITHOUT LONG-TERM CURRENT USE OF INSULIN, UNSPECIFIED WHETHER STAGE 3A OR 3B CKD (HCC): ICD-10-CM

## 2023-12-08 DIAGNOSIS — I87.311 CHRONIC VENOUS HYPERTENSION (IDIOPATHIC) WITH ULCER OF RIGHT LOWER EXTREMITY (CODE) (HCC): ICD-10-CM

## 2023-12-08 DIAGNOSIS — N18.30 TYPE 2 DIABETES MELLITUS WITH STAGE 3 CHRONIC KIDNEY DISEASE, WITHOUT LONG-TERM CURRENT USE OF INSULIN, UNSPECIFIED WHETHER STAGE 3A OR 3B CKD (HCC): ICD-10-CM

## 2023-12-08 PROCEDURE — 97597 DBRDMT OPN WND 1ST 20 CM/<: CPT | Performed by: STUDENT IN AN ORGANIZED HEALTH CARE EDUCATION/TRAINING PROGRAM

## 2023-12-08 RX ORDER — LIDOCAINE HYDROCHLORIDE 40 MG/ML
5 SOLUTION TOPICAL ONCE
Status: COMPLETED | OUTPATIENT
Start: 2023-12-08 | End: 2023-12-08

## 2023-12-08 RX ADMIN — LIDOCAINE HYDROCHLORIDE 5 ML: 40 SOLUTION TOPICAL at 10:51

## 2023-12-08 NOTE — PATIENT INSTRUCTIONS
Orders Placed This Encounter   Procedures    Wound cleansing and dressings     Wash your hands with soap and water. Remove old dressing, discard into plastic bag and place in trash. Cleanse the wound with soap and water prior to applying a clean dressing. Do not use tissue or cotton balls. Do not scrub the wound. Pat dry using gauze. Shower yes   Apply mild moisturizer to skin surrounding wound  Apply medihoney to the right leg wound. Cover with gauze and ABD  Secure with rolled gauze and tape. Change dressing every day. Today medihoney was used at the wound center. Standing Status:   Future     Standing Expiration Date:   12/8/2024    Wound compression and edema control     Compression Stocking: Spandagrip size E     Remove compression stockings every night HS and re-apply first thing qAM. Follow daily skin care as instructed. Avoid prolonged standing in one place. Elevate leg(s) above the level of the heart when sitting or as much as possible.      Standing Status:   Future     Standing Expiration Date:   12/8/2024

## 2023-12-08 NOTE — PROGRESS NOTES
Patient ID: Kris Stanford is a 80 y.o. 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 Risks and benefits: risks, benefits and alternatives were discussed  Consent given by: patient  Time out: Immediately prior to procedure a "time out" was called to verify the correct patient, procedure, equipment, support staff and site/side marked as required. Patient identity confirmed: verbally with patient    Performed by: physician  Debridement type: selective  Pain control: lidocaine 4%  Post-debridement measurements  Length (cm): 2  Width (cm): 1  Depth (cm): 0.2  Percent debrided: 90%  Surface Area (cm^2): 2  Area debrided (cm^2): 1.8  Volume (cm^3): 0.4  Devitalized tissue debrided: biofilm, exudate and fibrin  Instrument(s) utilized: curette  Bleeding: small  Hemostasis obtained with: pressure  Procedural pain (0-10): 1  Post-procedural pain: 0   Response to treatment: procedure was tolerated well        Plan:   It was a pleasure to see Kris Stanford for wound care follow up today  Selective debridement performed today as above  Wound is improving   Continue plan of care as noted below with medihoney, rolled gauze  No signs or symptoms of infection today. Patient understands that if any signs of infection start (such as increased redness, drainage, pain, fever, chills, diaphoresis), they should call our office or proceed to the ER or Urgent Care. Patient should continue a high protein diet to facilitate wound healing  Patient is advised to not submerge wound or leave wound open to air. Follow up in 2 weeks  Given the multi-factorial nature of wound care, additional time was taken to review patient's treatment plan with other specialties and most recent pertinent lab work and imaging. All plans of care discussed with patient at bedside who verbalized understanding with treatment plan. Wound 11/22/23 Traumatic Leg Right; Anterior (Active)   Wound Image Images linked 12/08/23 1032   Wound Description Pink;Yellow;Slough; Epithelialization;Granulation tissue 12/08/23 1032   Park-wound Assessment Pink;Dry;Swelling 12/08/23 1032   Wound Length (cm) 2 cm 12/08/23 1032   Wound Width (cm) 1 cm 12/08/23 1032   Wound Depth (cm) 0.1 cm 12/08/23 1032   Wound Surface Area (cm^2) 2 cm^2 12/08/23 1032   Wound Volume (cm^3) 0.2 cm^3 12/08/23 1032   Calculated Wound Volume (cm^3) 0.2 cm^3 12/08/23 1032   Change in Wound Size % 69.7 12/08/23 1032   Drainage Amount Moderate 12/08/23 1032   Drainage Description Serosanguineous 12/08/23 1032   Non-staged Wound Description Full thickness 12/08/23 1032   Dressing Status Intact 12/08/23 1032       Wound 11/22/23 Traumatic Leg Right; Anterior (Active)   Date First Assessed/Time First Assessed: 11/22/23 1309   Primary Wound Type: Traumatic  Location: Leg  Wound Location Orientation: Right; Anterior       [REMOVED] Wound 06/28/22 Skin Tear Skin tear Arm Right (Removed)   Resolved Date: 11/22/23  Date First Assessed/Time First Assessed: 06/28/22 1500   Primary Wound Type: Skin Tear  Traumatic Wound Type: Skin tear  Location: Arm  Wound Location Orientation: (c) Right  Wound Outcome: (c) Other (Comment)       [REMOVED] Wound 06/28/22 Skin Tear Skin tear Arm Right (Removed)   Resolved Date: 11/22/23  Date First Assessed/Time First Assessed: 06/28/22 1500   Primary Wound Type: Skin Tear  Traumatic Wound Type: Skin tear  Location: Arm  Wound Location Orientation: Right  Wound Outcome: (c) Other (Comment)       Subjective:      .    12/8/23: Lying Medihoney daily as directed. Happy with wound healing. No symptoms of infection. 12/1/23: Applying PolyMem as directed. Not noticing much drainage from the wound at all. No symptoms of infection. 11/22/23: Jose Eduardo Lebron is a pleasant 72-year-old female with a past medical history of type 2 diabetes mellitus most recent A1c 5.9% 1 year ago, diabetic gastroparesis, polyneuropathy, ambulatory dysfunction, HFpEF G1DD, and Raynaud's disease here today for initial wound care consult. Patient was referred by primary care provider Dr. Prateek Rizo. Patient sustained the wound traumatically  to the LLE approximately 2 months ago when the fridge door fell against her leg. At the time she was seen her PCP and prescribed as course of azithromycin + Neosporin t[to the wound. Since then wound care has consisted of Neosporin however PCP saw the patient yesterday and advised that she make an appointment to be seen by wound care due to nonhealing status. Patient denies any symptoms of infection today including fever chills diaphoresis. She does regularly wear compression socks. The following portions of the patient's history were reviewed and updated as appropriate: allergies, current medications, past family history, past medical history, past social history, past surgical history, and problem list.    Review of Systems   Constitutional:  Negative for chills, diaphoresis, fatigue and fever. Skin:  Positive for wound. All other systems reviewed and are negative. Objective:       Wound 11/22/23 Traumatic Leg Right; Anterior (Active)   Wound Image Images linked 12/08/23 1032   Wound Description Pink;Yellow;Slough; Epithelialization;Granulation tissue 12/08/23 1032   Park-wound Assessment Pink;Dry;Swelling 12/08/23 1032   Wound Length (cm) 2 cm 12/08/23 1032   Wound Width (cm) 1 cm 12/08/23 1032   Wound Depth (cm) 0.1 cm 12/08/23 1032   Wound Surface Area (cm^2) 2 cm^2 12/08/23 1032   Wound Volume (cm^3) 0.2 cm^3 12/08/23 1032   Calculated Wound Volume (cm^3) 0.2 cm^3 12/08/23 1032   Change in Wound Size % 69.7 12/08/23 1032   Drainage Amount Moderate 12/08/23 1032   Drainage Description Serosanguineous 12/08/23 1032   Non-staged Wound Description Full thickness 12/08/23 1032   Dressing Status Intact 12/08/23 1032       BP 99/63   Pulse 96   Temp (!) 97.4 °F (36.3 °C)   Resp 18   LMP  (LMP Unknown)     Physical Exam  Vitals reviewed. Constitutional:       Appearance: Normal appearance. HENT:      Head: Normocephalic and atraumatic. Mouth/Throat:      Mouth: Mucous membranes are moist.   Eyes:      Extraocular Movements: Extraocular movements intact. Pulmonary:      Effort: Pulmonary effort is normal.   Skin:     Comments: Distal right lower extremity wound slightly smaller than last exam.  Healthy wound bed with less fibrin deposition. Rolled edges. No signs of infection. No erythema. Periwound without maceration. Neurological:      Mental Status: She is alert. Psychiatric:         Mood and Affect: Mood normal.         Behavior: Behavior normal.           Wound Instructions:  Orders Placed This Encounter   Procedures    Wound cleansing and dressings     Wash your hands with soap and water. Remove old dressing, discard into plastic bag and place in trash. Cleanse the wound with soap and water prior to applying a clean dressing. Do not use tissue or cotton balls. Do not scrub the wound. Pat dry using gauze. Shower yes   Apply mild moisturizer to skin surrounding wound  Apply medihoney to the right leg wound.   Cover with gauze and ABD  Secure with rolled gauze and tape. Change dressing every day. Today medihoney was used at the wound center. Standing Status:   Future     Standing Expiration Date:   12/8/2024    Wound compression and edema control     Compression Stocking: Spandagrip size E     Remove compression stockings every night HS and re-apply first thing qAM. Follow daily skin care as instructed. Avoid prolonged standing in one place. Elevate leg(s) above the level of the heart when sitting or as much as possible. Standing Status:   Future     Standing Expiration Date:   12/8/2024    Debridement     This order was created via procedure documentation        Diagnosis ICD-10-CM Associated Orders   1. 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 Chronic venous hypertension (idiopathic) with ulcer of right lower extremity (CODE) ([facility])  I87.311       3. Type 2 diabetes mellitus with stage 3 chronic kidney disease, without long-term current use of insulin, unspecified whether stage 3a or 3b CKD ([address])  E11.22     N18.30           --  Aneesh Bowen MD    "This note has been constructed using a voice recognition system. Therefore there may be syntax, spelling, and/or grammatical errors. Occasional wrong word or "sound alike" substitutions may have occurred due to the inherent limitations of voice recognition software. Read the chart carefully and recognize, using context, where substitutions have occurred.  Please call if you have any questions."

## 2023-12-16 ENCOUNTER — APPOINTMENT (OUTPATIENT)
Dept: LAB | Facility: CLINIC | Age: 88
End: 2023-12-16
Payer: MEDICARE

## 2023-12-16 DIAGNOSIS — Z79.60 LONG-TERM USE OF IMMUNOSUPPRESSANT MEDICATION: ICD-10-CM

## 2023-12-16 DIAGNOSIS — N18.32 STAGE 3B CHRONIC KIDNEY DISEASE (HCC): Chronic | ICD-10-CM

## 2023-12-16 DIAGNOSIS — M81.0 SENILE OSTEOPOROSIS: ICD-10-CM

## 2023-12-16 DIAGNOSIS — M06.09 RHEUMATOID ARTHRITIS OF MULTIPLE SITES WITHOUT RHEUMATOID FACTOR (HCC): ICD-10-CM

## 2023-12-16 LAB
ALBUMIN SERPL BCP-MCNC: 3.6 G/DL (ref 3.5–5)
ALP SERPL-CCNC: 83 U/L (ref 34–104)
ALT SERPL W P-5'-P-CCNC: 27 U/L (ref 7–52)
ANION GAP SERPL CALCULATED.3IONS-SCNC: 4 MMOL/L
AST SERPL W P-5'-P-CCNC: 27 U/L (ref 13–39)
BASOPHILS # BLD AUTO: 0.04 THOUSANDS/ÂΜL (ref 0–0.1)
BASOPHILS NFR BLD AUTO: 1 % (ref 0–1)
BILIRUB SERPL-MCNC: 0.61 MG/DL (ref 0.2–1)
BUN SERPL-MCNC: 28 MG/DL (ref 5–25)
CALCIUM SERPL-MCNC: 9.6 MG/DL (ref 8.4–10.2)
CHLORIDE SERPL-SCNC: 106 MMOL/L (ref 96–108)
CO2 SERPL-SCNC: 30 MMOL/L (ref 21–32)
CREAT SERPL-MCNC: 1.43 MG/DL (ref 0.6–1.3)
CRP SERPL QL: <1 MG/L
EOSINOPHIL # BLD AUTO: 0.21 THOUSAND/ÂΜL (ref 0–0.61)
EOSINOPHIL NFR BLD AUTO: 4 % (ref 0–6)
ERYTHROCYTE [DISTWIDTH] IN BLOOD BY AUTOMATED COUNT: 14.1 % (ref 11.6–15.1)
ERYTHROCYTE [SEDIMENTATION RATE] IN BLOOD: 19 MM/HOUR (ref 0–29)
GFR SERPL CREATININE-BSD FRML MDRD: 32 ML/MIN/1.73SQ M
GLUCOSE SERPL-MCNC: 95 MG/DL (ref 65–140)
HCT VFR BLD AUTO: 38 % (ref 34.8–46.1)
HGB BLD-MCNC: 11.7 G/DL (ref 11.5–15.4)
IMM GRANULOCYTES # BLD AUTO: 0.04 THOUSAND/UL (ref 0–0.2)
IMM GRANULOCYTES NFR BLD AUTO: 1 % (ref 0–2)
LYMPHOCYTES # BLD AUTO: 1.23 THOUSANDS/ÂΜL (ref 0.6–4.47)
LYMPHOCYTES NFR BLD AUTO: 21 % (ref 14–44)
MCH RBC QN AUTO: 29.7 PG (ref 26.8–34.3)
MCHC RBC AUTO-ENTMCNC: 30.8 G/DL (ref 31.4–37.4)
MCV RBC AUTO: 96 FL (ref 82–98)
MONOCYTES # BLD AUTO: 0.7 THOUSAND/ÂΜL (ref 0.17–1.22)
MONOCYTES NFR BLD AUTO: 12 % (ref 4–12)
NEUTROPHILS # BLD AUTO: 3.75 THOUSANDS/ÂΜL (ref 1.85–7.62)
NEUTS SEG NFR BLD AUTO: 61 % (ref 43–75)
NRBC BLD AUTO-RTO: 0 /100 WBCS
PLATELET # BLD AUTO: 162 THOUSANDS/UL (ref 149–390)
PMV BLD AUTO: 11 FL (ref 8.9–12.7)
POTASSIUM SERPL-SCNC: 4.3 MMOL/L (ref 3.5–5.3)
PROT SERPL-MCNC: 6.9 G/DL (ref 6.4–8.4)
RBC # BLD AUTO: 3.94 MILLION/UL (ref 3.81–5.12)
SODIUM SERPL-SCNC: 140 MMOL/L (ref 135–147)
WBC # BLD AUTO: 5.97 THOUSAND/UL (ref 4.31–10.16)

## 2023-12-16 PROCEDURE — 36415 COLL VENOUS BLD VENIPUNCTURE: CPT

## 2023-12-16 PROCEDURE — 85025 COMPLETE CBC W/AUTO DIFF WBC: CPT

## 2023-12-16 PROCEDURE — 80053 COMPREHEN METABOLIC PANEL: CPT

## 2023-12-16 PROCEDURE — 86140 C-REACTIVE PROTEIN: CPT

## 2023-12-16 PROCEDURE — 85652 RBC SED RATE AUTOMATED: CPT

## 2023-12-22 ENCOUNTER — OFFICE VISIT (OUTPATIENT)
Dept: WOUND CARE | Facility: HOSPITAL | Age: 88
End: 2023-12-22
Payer: MEDICARE

## 2023-12-22 VITALS
SYSTOLIC BLOOD PRESSURE: 151 MMHG | HEART RATE: 91 BPM | RESPIRATION RATE: 15 BRPM | DIASTOLIC BLOOD PRESSURE: 82 MMHG | TEMPERATURE: 97.3 F

## 2023-12-22 DIAGNOSIS — I87.311 CHRONIC VENOUS HYPERTENSION (IDIOPATHIC) WITH ULCER OF RIGHT LOWER EXTREMITY (CODE) (HCC): ICD-10-CM

## 2023-12-22 DIAGNOSIS — L97.911 NON-PRESSURE CHRONIC ULCER RIGHT LOWER LEG, LIMITED TO BREAKDOWN SKIN (HCC): Primary | ICD-10-CM

## 2023-12-22 PROCEDURE — 97597 DBRDMT OPN WND 1ST 20 CM/<: CPT | Performed by: STUDENT IN AN ORGANIZED HEALTH CARE EDUCATION/TRAINING PROGRAM

## 2023-12-22 RX ORDER — LIDOCAINE HYDROCHLORIDE 40 MG/ML
5 SOLUTION TOPICAL ONCE
Status: COMPLETED | OUTPATIENT
Start: 2023-12-22 | End: 2023-12-22

## 2023-12-22 RX ADMIN — LIDOCAINE HYDROCHLORIDE 5 ML: 40 SOLUTION TOPICAL at 10:15

## 2023-12-22 NOTE — PROGRESS NOTES
"Patient ID: Tanya Stephen is a 89 y.o. 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)  -     lidocaine (XYLOCAINE) 4 % topical solution 5 mL  -     Cancel: Wound cleansing and dressings Traumatic Right;Anterior Leg; Future  -     Wound compression and edema control Traumatic Right;Anterior Leg; Future  -     Wound cleansing and dressings Traumatic Right;Anterior Leg; Future  -     Debridement    Chronic venous hypertension (idiopathic) with ulcer of right lower extremity (CODE) (HCC)  -     lidocaine (XYLOCAINE) 4 % topical solution 5 mL  -     Cancel: Wound cleansing and dressings Traumatic Right;Anterior Leg; Future  -     Wound compression and edema control Traumatic Right;Anterior Leg; Future  -     Wound cleansing and dressings Traumatic Right;Anterior Leg; Future            Debridement   Wound 11/22/23 Traumatic Leg Right;Anterior    Universal Protocol:  Consent: Verbal consent obtained.  Risks and benefits: risks, benefits and alternatives were discussed  Consent given by: patient  Time out: Immediately prior to procedure a \"time out\" was called to verify the correct patient, procedure, equipment, support staff and site/side marked as required.  Patient identity confirmed: verbally with patient    Debridement Details  Performed by: physician  Debridement type: selective  Pain control: lidocaine 4%      Post-debridement measurements  Length (cm): 2  Width (cm): 1.1  Depth (cm): 0.2  Percent debrided: 90%  Surface Area (cm^2): 2.2  Area Debrided (cm^2): 1.98  Volume (cm^3): 0.44    Devitalized tissue debrided: biofilm and exudate  Instrument(s) utilized: curette  Bleeding: small  Hemostasis obtained with: pressure  Procedural pain (0-10): " 1  Post-procedural pain: 0   Response to treatment: procedure was tolerated well        Plan:   It was a pleasure to see Tanya Stephen for wound care follow up today  Selective debridement performed today as above  Wound is improving   Continue plan of care as noted below with medihoney  No signs or symptoms of infection today. Patient understands that if any signs of infection start (such as increased redness, drainage, pain, fever, chills, diaphoresis), they should call our office or proceed to the ER or Urgent Care.  Patient should continue a high protein diet to facilitate wound healing  Patient is advised to not submerge wound or leave wound open to air.  Follow up in 1 week with Dr Blandon.  Patient's  family is concerned that Tanya may have a hammertoe deformity that may be contributing to opening of a new wound on the third digit of the left foot..  On my exam it does appear that some of these deformities may be due to arthritic changes not necessarily tendon issues. I advised them that she can  come to the office Friday afternoon for wound follow up to meet with podiatry who may be able to offer another opinion.  Given the multi-factorial nature of wound care, additional time was taken to review patient's treatment plan with other specialties and most recent pertinent lab work and imaging.   All plans of care discussed with patient at bedside who verbalized understanding with treatment plan.    Wound 11/22/23 Traumatic Leg Right;Anterior (Active)   Wound Image Images linked 12/22/23 1011   Wound Description Pink;Yellow;Slough;Epithelialization;Granulation tissue 12/22/23 1010   Park-wound Assessment Pink;Dry;Swelling 12/22/23 1010   Wound Length (cm) 2 cm 12/22/23 1010   Wound Width (cm) 1.1 cm 12/22/23 1010   Wound Depth (cm) 0.2 cm 12/22/23 1010   Wound Surface Area (cm^2) 2.2 cm^2 12/22/23 1010   Wound Volume (cm^3) 0.44 cm^3 12/22/23 1010   Calculated Wound Volume (cm^3) 0.44 cm^3 12/22/23 1010    Change in Wound Size % 33.33 12/22/23 1010   Drainage Amount Small 12/22/23 1010   Drainage Description Yellow 12/22/23 1010   Non-staged Wound Description Full thickness 12/22/23 1010   Dressing Status Intact (upon arrival) 12/22/23 1010       Wound 11/22/23 Traumatic Leg Right;Anterior (Active)   Date First Assessed/Time First Assessed: 11/22/23 1309   Primary Wound Type: Traumatic  Location: Leg  Wound Location Orientation: Right;Anterior       [REMOVED] Wound 06/28/22 Skin Tear Skin tear Arm Right (Removed)   Resolved Date: 11/22/23  Date First Assessed/Time First Assessed: 06/28/22 1500   Primary Wound Type: Skin Tear  Traumatic Wound Type: Skin tear  Location: Arm  Wound Location Orientation: (c) Right  Wound Outcome: (c) Other (Comment)       [REMOVED] Wound 06/28/22 Skin Tear Skin tear Arm Right (Removed)   Resolved Date: 11/22/23  Date First Assessed/Time First Assessed: 06/28/22 1500   Primary Wound Type: Skin Tear  Traumatic Wound Type: Skin tear  Location: Arm  Wound Location Orientation: Right  Wound Outcome: (c) Other (Comment)       [REMOVED] Wound 12/22/23 Toe (Comment  which one) Left;Posterior (Removed)   Resolved Date/Resolved Time: 12/22/23 1021  Date First Assessed/Time First Assessed: 12/22/23 1009   Location: (c) Toe (Comment  which one)  Wound Location Orientation: Left;Posterior  Wound Description (Comments): LEFT THIRD TOE  Wound Outcome: (c) O...       Subjective:      .    12/22/23, 12/8/23: Applying  Medihoney daily as directed.  Happy with wound healing.  No symptoms of infection.     12/1/23: Applying PolyMem as directed.  Not noticing much drainage from the wound at all.  No symptoms of infection.     11/22/23: Cynthia Martínez is a pleasant 89-year-old female with a past medical history of type 2 diabetes mellitus most recent A1c 5.9% 1 year ago, diabetic gastroparesis, polyneuropathy, ambulatory dysfunction, HFpEF G1DD, and Raynaud's disease here today for initial wound care  consult.  Patient was referred by primary care provider Dr. Gregory Santoro.  Patient sustained the wound traumatically  to the LLE approximately 2 months ago when the fridge door fell against her leg.  At the time she was seen her PCP and prescribed as course of azithromycin + Neosporin t[to the wound.  Since then wound care has consisted of Neosporin however PCP saw the patient yesterday and advised that she make an appointment to be seen by wound care due to nonhealing status.  Patient denies any symptoms of infection today including fever chills diaphoresis. She does regularly wear compression socks.             The following portions of the patient's history were reviewed and updated as appropriate: allergies, current medications, past family history, past medical history, past social history, past surgical history, and problem list.    Review of Systems   Constitutional:  Negative for chills, diaphoresis, fatigue and fever.   Skin:  Positive for wound.   All other systems reviewed and are negative.        Objective:       Wound 11/22/23 Traumatic Leg Right;Anterior (Active)   Wound Image Images linked 12/22/23 1011   Wound Description Pink;Yellow;Slough;Epithelialization;Granulation tissue 12/22/23 1010   Park-wound Assessment Pink;Dry;Swelling 12/22/23 1010   Wound Length (cm) 2 cm 12/22/23 1010   Wound Width (cm) 1.1 cm 12/22/23 1010   Wound Depth (cm) 0.2 cm 12/22/23 1010   Wound Surface Area (cm^2) 2.2 cm^2 12/22/23 1010   Wound Volume (cm^3) 0.44 cm^3 12/22/23 1010   Calculated Wound Volume (cm^3) 0.44 cm^3 12/22/23 1010   Change in Wound Size % 33.33 12/22/23 1010   Drainage Amount Small 12/22/23 1010   Drainage Description Yellow 12/22/23 1010   Non-staged Wound Description Full thickness 12/22/23 1010   Dressing Status Intact (upon arrival) 12/22/23 1010       /82   Pulse 91   Temp (!) 97.3 °F (36.3 °C)   Resp 15   LMP  (LMP Unknown)     Physical Exam  Vitals reviewed.   Constitutional:        Appearance: Normal appearance.   HENT:      Head: Normocephalic and atraumatic.      Mouth/Throat:      Mouth: Mucous membranes are moist.   Eyes:      Extraocular Movements: Extraocular movements intact.   Pulmonary:      Effort: Pulmonary effort is normal.   Skin:     Comments: Distal anterior right lower extremity wound slightly smaller than last exam.  Healthy wound bed.  Rolled edges.  No signs of infection.   Neurological:      Mental Status: She is alert.   Psychiatric:         Mood and Affect: Mood normal.         Behavior: Behavior normal.           Wound Instructions:  Orders Placed This Encounter   Procedures    Wound compression and edema control Traumatic Right;Anterior Leg     Compression Stocking: Spandagrip size E     Remove compression stockings every night at bedtime and re-apply first thing morning. Follow daily skin care as instructed.     Avoid prolonged standing in one place.     Elevate leg(s) above the level of the heart when sitting or as much as possible.     Standing Status:   Future     Standing Expiration Date:   12/22/2024    Wound cleansing and dressings Traumatic Right;Anterior Leg     Wash your hands with soap and water.  Remove old dressing, discard into plastic bag and place in trash.  Cleanse the wound with soap and water prior to applying a clean dressing. Do not use tissue or cotton balls. Do not scrub the wound. Pat dry using gauze.  Shower yes   Apply mild moisturizer to skin surrounding wound  Apply medihoney to the right leg wound.  Cover with gauze  Secure with rolled gauze and tape.  Change dressing every day.  Today medihoney was used at the wound center.     Standing Status:   Future     Standing Expiration Date:   12/22/2024    Debridement     This order was created via procedure documentation        Diagnosis ICD-10-CM Associated Orders   1. 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 Chronic venous hypertension (idiopathic) with ulcer of right lower extremity (CODE) ([facility])  I87.311 lidocaine (XYLOCAINE) 4 % topical solution 5 mL     Wound compression and edema control Traumatic Right;Anterior Leg     Wound cleansing and dressings Traumatic Right;Anterior Leg          --  Saida Bermudez MD    \"This note has been constructed using a voice recognition system. Therefore there may be syntax, spelling, and/or grammatical errors. Occasional wrong word or \"sound alike\" substitutions may have occurred due to the inherent limitations of voice recognition software. Read the chart carefully and recognize, using context, where substitutions have occurred. Please call if you have any questions.\"         "

## 2023-12-22 NOTE — PATIENT INSTRUCTIONS
Orders Placed This Encounter   Procedures    Wound compression and edema control Traumatic Right;Anterior Leg     Compression Stocking: Spandagrip size E     Remove compression stockings every night at bedtime and re-apply first thing morning. Follow daily skin care as instructed.     Avoid prolonged standing in one place.     Elevate leg(s) above the level of the heart when sitting or as much as possible.     Standing Status:   Future     Standing Expiration Date:   12/22/2024    Wound cleansing and dressings Traumatic Right;Anterior Leg     Wash your hands with soap and water.  Remove old dressing, discard into plastic bag and place in trash.  Cleanse the wound with soap and water prior to applying a clean dressing. Do not use tissue or cotton balls. Do not scrub the wound. Pat dry using gauze.  Shower yes   Apply mild moisturizer to skin surrounding wound  Apply medihoney to the right leg wound.  Cover with gauze  Secure with rolled gauze and tape.  Change dressing every day.  Today medihoney was used at the wound center.     Standing Status:   Future     Standing Expiration Date:   12/22/2024

## 2023-12-29 ENCOUNTER — OFFICE VISIT (OUTPATIENT)
Dept: WOUND CARE | Facility: HOSPITAL | Age: 88
End: 2023-12-29
Payer: MEDICARE

## 2023-12-29 VITALS
DIASTOLIC BLOOD PRESSURE: 73 MMHG | SYSTOLIC BLOOD PRESSURE: 118 MMHG | HEART RATE: 96 BPM | RESPIRATION RATE: 16 BRPM | TEMPERATURE: 97.8 F

## 2023-12-29 DIAGNOSIS — R09.89 WEAK ARTERIAL PULSE: ICD-10-CM

## 2023-12-29 DIAGNOSIS — I87.311 CHRONIC VENOUS HYPERTENSION (IDIOPATHIC) WITH ULCER OF RIGHT LOWER EXTREMITY (CODE) (HCC): ICD-10-CM

## 2023-12-29 DIAGNOSIS — E11.22 TYPE 2 DIABETES MELLITUS WITH STAGE 3 CHRONIC KIDNEY DISEASE, WITHOUT LONG-TERM CURRENT USE OF INSULIN, UNSPECIFIED WHETHER STAGE 3A OR 3B CKD (HCC): ICD-10-CM

## 2023-12-29 DIAGNOSIS — L97.521 DIABETIC ULCER OF TOE OF LEFT FOOT ASSOCIATED WITH TYPE 2 DIABETES MELLITUS, LIMITED TO BREAKDOWN OF SKIN (HCC): ICD-10-CM

## 2023-12-29 DIAGNOSIS — E11.621 DIABETIC ULCER OF TOE OF LEFT FOOT ASSOCIATED WITH TYPE 2 DIABETES MELLITUS, LIMITED TO BREAKDOWN OF SKIN (HCC): ICD-10-CM

## 2023-12-29 DIAGNOSIS — N18.30 TYPE 2 DIABETES MELLITUS WITH STAGE 3 CHRONIC KIDNEY DISEASE, WITHOUT LONG-TERM CURRENT USE OF INSULIN, UNSPECIFIED WHETHER STAGE 3A OR 3B CKD (HCC): ICD-10-CM

## 2023-12-29 DIAGNOSIS — L97.911 NON-PRESSURE CHRONIC ULCER RIGHT LOWER LEG, LIMITED TO BREAKDOWN SKIN (HCC): Primary | ICD-10-CM

## 2023-12-29 PROCEDURE — 97597 DBRDMT OPN WND 1ST 20 CM/<: CPT | Performed by: STUDENT IN AN ORGANIZED HEALTH CARE EDUCATION/TRAINING PROGRAM

## 2023-12-29 PROCEDURE — 99213 OFFICE O/P EST LOW 20 MIN: CPT | Performed by: STUDENT IN AN ORGANIZED HEALTH CARE EDUCATION/TRAINING PROGRAM

## 2023-12-29 RX ORDER — LIDOCAINE HYDROCHLORIDE 40 MG/ML
5 SOLUTION TOPICAL ONCE
Status: COMPLETED | OUTPATIENT
Start: 2023-12-29 | End: 2023-12-29

## 2023-12-29 RX ADMIN — LIDOCAINE HYDROCHLORIDE 5 ML: 40 SOLUTION TOPICAL at 14:04

## 2023-12-29 NOTE — PATIENT INSTRUCTIONS
Orders Placed This Encounter   Procedures    Wound cleansing and dressings Traumatic Right;Anterior Leg     Wound cleansing and dressings Traumatic Right;Anterior Leg        Wash your hands with soap and water.  Remove old dressing, discard into plastic bag and place in trash.  Cleanse the wound with Prophase prior to applying a clean dressing. Do not use tissue or cotton balls. Do not scrub the wound. Pat dry using gauze.  Shower yes   Apply mild moisturizer to skin surrounding wound  Apply medihoney to the right leg wound.  Cover with gauze  Secure with rolled gauze and tape.  Change dressing every day.  Today medihoney was used at the wound center.     Standing Status:   Future     Standing Expiration Date:   12/29/2024    Wound compression and edema control Traumatic Right;Anterior Leg     Compression Stocking: Spandagrip size E     Remove compression stockings every night at bedtime and re-apply first thing morning. Follow daily skin care as instructed.     Avoid prolonged standing in one place.     Elevate leg(s) above the level of the heart when sitting or as much as possible.     Standing Status:   Future     Standing Expiration Date:   12/29/2024    Wound cleansing and dressings Diabetic Ulcer Left Toe (Comment  which one) (LEFT THIRD TOE)     Left Third Toe Wound:    Wash your hands with soap and water.  Remove old dressing, discard into plastic bag and place in trash.  Cleanse the wound with soap and water prior to applying a clean dressing. Do not use tissue or cotton balls. Do not scrub the wound. Pat dry using gauze.  Shower yes   Apply moisturizer to skin surrounding wound  Apply Betadine to the toe wound daily.  Leave open to air.    Dr. Blandon applied a toe sling to be worn during the day.  May be removed at bedtime and for shower.     Standing Status:   Future     Standing Expiration Date:   12/29/2024    Wound miscellaneous orders Diabetic Ulcer Left Toe (Comment  which one) (LEFT THIRD TOE)      Toe Sling can be ordered on [employer].     Standing Status:   Future     Standing Expiration Date:   12/29/2024

## 2023-12-30 NOTE — PROGRESS NOTES
Patient ID: Tanya Stephen is a 89 y.o. female Date of Birth 7/21/1934     My role is Foot, Ankle and Wound Specialist    PLAN:    -Educated patient on their condition.   -The patient's wound is not currently infected. We discussed the importance of recognizing systemic and local signs of infection and going directly to the emergency department should any of these occur  -Debridement as below:   -Discussed proper care of dressings, patient is not to get them wet at all. If the dressings do get wet, they must be removed and redressed.  -return to wound care in    -Continue Medihoney, DSD, Spandage  to right lower extremity.  Perform Betadine, open to air to left foot with use of toe sling.  Patient was instructed to purchase toe slings online for left foot in order to offload distal digit.  -Patient and daughter verbalize understanding of plan    Patient optimization:  -Vascular:   Arterial - F/u LEADs scheduled for 1/31/24   Venous -chronic venous hypertension noted, will hold off on significant compression pending arterial studies   Lymphatic -no evidence of lymphatic disease    -MSK:   Pressure reduction -continue wearing wide toebox supportive sneakers.  Use of toe sling to left foot second through fourth digits.  Patient was instructed to purchase this online in order to offload distal digits.   Deformity and possible correction -left third digit hammertoe is partially reducible and could be appropriate for percutaneous flexor tenotomy pending arterial studies.     -Neuro:   Neuropathy - We discussed checking feet daily for additional cuts, bruises, or wounds. We also discussed refraining from walking barefoot and wearing white socks in order to notice drainage    -Nutritional:   Dietary supplementation - Recommend high protein diet with Toni supplementation until wound is fully healed   Smoking cessation - N/A   Glycemic control -previous hemoglobin A1c 5.9% from 4/19/2022.        Debridement   Wound  "11/22/23 Traumatic Leg Right;Anterior    Universal Protocol:  Consent: Verbal consent obtained.  Risks and benefits: risks, benefits and alternatives were discussed  Consent given by: patient  Time out: Immediately prior to procedure a \"time out\" was called to verify the correct patient, procedure, equipment, support staff and site/side marked as required.  Patient understanding: patient states understanding of the procedure being performed  Patient identity confirmed: verbally with patient    Debridement Details  Performed by: physician  Debridement type: selective  Pain control: lidocaine 4%      Post-debridement measurements  Length (cm): 1.8  Width (cm): 1.1  Depth (cm): 0.2  Percent debrided: 100%  Surface Area (cm^2): 1.98  Area Debrided (cm^2): 1.98  Volume (cm^3): 0.4    Devitalized tissue debrided: biofilm, exudate and slough  Instrument(s) utilized: blade  Bleeding: medium  Hemostasis obtained with: pressure  Procedural pain (0-10): 2  Post-procedural pain: 2   Response to treatment: procedure was tolerated well    Debridement   Wound 12/29/23 Diabetic Ulcer Toe (Comment  which one) Left    Universal Protocol:  Consent: Verbal consent obtained.  Risks and benefits: risks, benefits and alternatives were discussed  Consent given by: patient  Time out: Immediately prior to procedure a \"time out\" was called to verify the correct patient, procedure, equipment, support staff and site/side marked as required.  Patient understanding: patient states understanding of the procedure being performed  Patient identity confirmed: verbally with patient    Debridement Details  Performed by: physician  Debridement type: selective  Pain control: lidocaine 4%      Post-debridement measurements  Length (cm): 0.1  Width (cm): 0.1  Depth (cm): 0.1  Percent debrided: 100%  Surface Area (cm^2): 0.01  Area Debrided (cm^2): 0.01  Volume (cm^3): 0    Devitalized tissue debrided: callus  Instrument(s) utilized: blade  Bleeding: " none  Hemostasis obtained with: not applicable  Procedural pain (0-10): insensate  Post-procedural pain: insensate   Response to treatment: procedure was tolerated well         Wound Instructions    Orders Placed This Encounter   Procedures    Wound cleansing and dressings Traumatic Right;Anterior Leg     Wound cleansing and dressings Traumatic Right;Anterior Leg        Wash your hands with soap and water.  Remove old dressing, discard into plastic bag and place in trash.  Cleanse the wound with Prophase prior to applying a clean dressing. Do not use tissue or cotton balls. Do not scrub the wound. Pat dry using gauze.  Shower yes   Apply mild moisturizer to skin surrounding wound  Apply medihoney to the right leg wound.  Cover with gauze  Secure with rolled gauze and tape.  Change dressing every day.  Today medihoney was used at the wound center.     Standing Status:   Future     Standing Expiration Date:   12/29/2024    Wound compression and edema control Traumatic Right;Anterior Leg     Compression Stocking: Spandagrip size E     Remove compression stockings every night at bedtime and re-apply first thing morning. Follow daily skin care as instructed.     Avoid prolonged standing in one place.     Elevate leg(s) above the level of the heart when sitting or as much as possible.     Standing Status:   Future     Standing Expiration Date:   12/29/2024    Wound cleansing and dressings Diabetic Ulcer Left Toe (Comment  which one) (LEFT THIRD TOE)     Left Third Toe Wound:    Wash your hands with soap and water.  Remove old dressing, discard into plastic bag and place in trash.  Cleanse the wound with soap and water prior to applying a clean dressing. Do not use tissue or cotton balls. Do not scrub the wound. Pat dry using gauze.  Shower yes   Apply moisturizer to skin surrounding wound  Apply Betadine to the toe wound daily.  Leave open to air.    Dr. Blandon applied a toe sling to be worn during the day.  May be  removed at bedtime and for shower.     Standing Status:   Future     Standing Expiration Date:   12/29/2024    Wound miscellaneous orders Diabetic Ulcer Left Toe (Comment  which one) (LEFT THIRD TOE)     Toe Sling can be ordered on [employer].     Standing Status:   Future     Standing Expiration Date:   12/29/2024    Debridement Traumatic Right;Anterior Leg     This order was created via procedure documentation    Debridement     This order was created via procedure documentation     SUBJECTIVE:    Chief complaint  Left toe ulceration, right leg ulceration    11/22/23: Cynthia - Tanya is a pleasant 89-year-old female with a past medical history of type 2 diabetes mellitus most recent A1c 5.9% 1 year ago, diabetic gastroparesis, polyneuropathy, ambulatory dysfunction, HFpEF G1DD, and Raynaud's disease here today for initial wound care consult.  Patient was referred by primary care provider Dr. Gregory Santoro.  Patient sustained the wound traumatically  to the LLE approximately 2 months ago when the fridge door fell against her leg.  At the time she was seen her PCP and prescribed as course of azithromycin + Neosporin t[to the wound.  Since then wound care has consisted of Neosporin however PCP saw the patient yesterday and advised that she make an appointment to be seen by wound care due to nonhealing status.  Patient denies any symptoms of infection today including fever chills diaphoresis. She does regularly wear compression socks.     12/29/23: Tea states that she is doing well since her last visit.  Her daughter is present with her today who is concerned about left third digit deformity and possible wound formation to the area.  She denies any systemic signs of infection since her last wound care visit.          The following portions of the patient's history were reviewed and updated as appropriate:   Patient Active Problem List   Diagnosis    Anemia    Shortness of breath    Hypomagnesemia    DM (diabetes  mellitus), type 2 (HCC)    Urinary frequency    Iron deficiency anemia secondary to inadequate dietary iron intake    Sinobronchitis    Post zoster neuralgia    Primary generalized (osteo)arthritis    Seronegative arthropathy of multiple sites (HCC)    Senile osteoporosis    Lumbar spondylosis    Diabetic polyneuropathy associated with type 2 diabetes mellitus (HCC)    Diabetic gastroparesis associated with type 2 diabetes mellitus     Irritable bowel syndrome with diarrhea    Stage 3b chronic kidney disease (HCC)    Traumatic injury of sacrum    Tendinitis of left rotator cuff    Abnormality of gait due to impairment of balance    Sicca syndrome (HCC)    Chest pain    Leukocytosis    Chronic diastolic heart failure (HCC)    OAB (overactive bladder)    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)    Toxic metabolic encephalopathy    Cellulitis    Dyslipidemia    Primary hypertension    Spinal stenosis of lumbar region with neurogenic claudication    Chronic pain syndrome    SI (sacroiliac) joint dysfunction    Elevated LFTs    Ambulatory dysfunction    Recurrent UTI    Gastroesophageal reflux disease without esophagitis    Dysphagia    Raynaud's phenomenon without gangrene    Elevated liver enzymes    Incontinence of feces with fecal urgency     Past Medical History:   Diagnosis Date    Anemia     Arthritis     Back pain     CHF (congestive heart failure) (HCC)     Chronic kidney disease     Stage 3b    Confusion 02/22/2023    Diabetes (HCC)     Diabetes mellitus (HCC)     Diabetic gastroparesis associated with type 2 diabetes mellitus      Diabetic polyneuropathy (HCC)     Diverticulosis     Herpes zoster     Hypertension     IBS (irritable bowel syndrome)     Neurogenic claudication due to lumbar spinal stenosis     Osteoarthritis     Osteoporosis     Pulmonary edema     Raynaud's phenomenon without gangrene     Seronegative arthropathy of multiple sites (HCC)     Sicca (HCC)      Past Surgical  History:   Procedure Laterality Date    BACK SURGERY      COLONOSCOPY      EGD AND COLONOSCOPY N/A 2016    Procedure: EGD AND COLONOSCOPY;  Surgeon: Elina Anderson MD;  Location: [facility];  Service:     JOINT REPLACEMENT      bilat knees and hips    OTHER SURGICAL HISTORY      pelvis rods    REPLACEMENT TOTAL KNEE BILATERAL      STOMACH SURGERY      UPPER GASTROINTESTINAL ENDOSCOPY       Social History     Socioeconomic History    Marital status: /Civil Union     Spouse name: Weston    Number of children: 2    Years of education: None    Highest education level: High school graduate   Occupational History    None   Tobacco Use    Smoking status: Former     Current packs/day: 0.00     Types: Cigarettes     Quit date:      Years since quittin.0    Smokeless tobacco: Never   Vaping Use    Vaping status: Never Used   Substance and Sexual Activity    Alcohol use: No    Drug use: No    Sexual activity: Not Currently     Partners: Male     Birth control/protection: None   Other Topics Concern    None   Social History Narrative    None     Social Determinants of Health     Financial Resource Strain: Not on file   Food Insecurity: No Food Insecurity (2023)    Hunger Vital Sign     Worried About Running Out of Food in the Last Year: Never true     Ran Out of Food in the Last Year: Never true   Transportation Needs: No Transportation Needs (2023)    PRAPARE - Transportation     Lack of Transportation (Medical): No     Lack of Transportation (Non-Medical): No   Physical Activity: Not on file   Stress: Not on file   Social Connections: Not on file   Intimate Partner Violence: Not on file   Housing Stability: Low Risk  (2023)    Housing Stability Vital Sign     Unable to Pay for Housing in the Last Year: No     Number of Places Lived in the Last Year: 1     Unstable Housing in the Last Year: No        Current Outpatient Medications:     amitriptyline (ELAVIL) 10 mg tablet, TAKE TWO TABLETS BY  MOUTH AT BEDTIME, Disp: 180 tablet, Rfl: 1    aspirin 81 mg chewable tablet, Chew 1 tablet (81 mg total) daily, Disp: 30 tablet, Rfl: 0    atorvastatin (LIPITOR) 10 mg tablet, TAKE ONE TABLET BY MOUTH EVERY DAY, Disp: 90 tablet, Rfl: 3    Cholecalciferol (VITAMIN D3) 1000 units CAPS, Take 1 capsule by mouth daily , Disp: , Rfl:     dicyclomine (BENTYL) 10 mg capsule, Take 1 capsule (10 mg total) by mouth 3 (three) times a day as needed (abd pain) (Patient not taking: Reported on 11/22/2023), Disp: 30 capsule, Rfl: 2    diphenoxylate-atropine (LOMOTIL) 2.5-0.025 mg per tablet, Take 1 tablet by mouth if needed (Patient not taking: Reported on 12/18/2023), Disp: , Rfl:     diphenoxylate-atropine (LOMOTIL) 2.5-0.025 mg per tablet, Take 1 tablet by mouth 4 (four) times a day as needed for diarrhea, Disp: 30 tablet, Rfl: 0    DULoxetine (CYMBALTA) 60 mg delayed release capsule, TAKE ONE CAPSULE BY MOUTH EVERY DAY, Disp: 30 capsule, Rfl: 5    hydroxychloroquine (PLAQUENIL) 200 mg tablet, Take 1 tablet (200 mg total) by mouth daily with breakfast, Disp: 90 tablet, Rfl: 1    Magnesium 250 MG TABS, Take 250 mg by mouth every evening, Disp: , Rfl:     metFORMIN (GLUCOPHAGE) 500 mg tablet, Take 500 mg by mouth 2 (two) times a day (Patient not taking: Reported on 6/27/2023), Disp: , Rfl:     metoprolol succinate (TOPROL-XL) 25 mg 24 hr tablet, Take 0.5 tablets (12.5 mg total) by mouth daily (Patient not taking: Reported on 6/27/2023), Disp: 45 tablet, Rfl: 3    Mirabegron ER (Myrbetriq) 50 MG TB24, Take 1 tablet (50 mg total) by mouth every evening, Disp: 30 tablet, Rfl: 11    nitrofurantoin (MACRODANTIN) 50 mg capsule, Take 1 capsule (50 mg total) by mouth in the morning, Disp: 30 capsule, Rfl: 6    omeprazole (PriLOSEC) 20 mg delayed release capsule, Take 20 mg by mouth daily  , Disp: , Rfl:     pregabalin (LYRICA) 75 mg capsule, TAKE ONE CAPSULE BY MOUTH THREE TIMES A DAY, Disp: 90 capsule, Rfl: 5    pyridoxine (B-6) 100 MG  tablet, Take 100 mg by mouth daily, Disp: , Rfl:     Sodium Fluoride (PreviDent 5000 Booster Plus) 1.1 % PSTE, Apply on teeth every evening as directed, Disp: 100 mL, Rfl: 3    torsemide (DEMADEX) 10 mg tablet, Take 1 tablet PRN for wt gain 3 lb/ 3 days or 5 lb/ 5 aria (Patient not taking: Reported on 6/27/2023), Disp: 30 tablet, Rfl: 0  No current facility-administered medications for this visit.  Family History   Problem Relation Age of Onset    Cancer Brother     Diabetes Mother     Hypertension Father     Heart disease Father       Review of Systems   Constitutional:  Negative for chills, diaphoresis, fatigue and fever.   Skin:  Positive for wound.   All other systems reviewed and are negative.      Allergies  Morphine and Ciprofloxacin    OBJECTIVE:  /73   Pulse 96   Temp 97.8 °F (36.6 °C)   Resp 16   LMP  (LMP Unknown)     Physical Exam  Vitals reviewed.   Constitutional:       Appearance: Normal appearance.   HENT:      Head: Normocephalic and atraumatic.      Mouth/Throat:      Mouth: Mucous membranes are moist.   Eyes:      Extraocular Movements: Extraocular movements intact.   Pulmonary:      Effort: Pulmonary effort is normal.   Skin:     Comments: Distal anterior right lower extremity wound slightly smaller than last exam.  Healthy wound bed.  Rolled edges.  No signs of infection.    Left third digit partially reducible hammertoe deformity with frontal plane rotation/adductovarus position noted.  There is a small ulceration at the distal lateral tip of the left third digit with a healthy wound bed and no signs of infection.   Neurological:      Mental Status: She is alert.   Psychiatric:         Mood and Affect: Mood normal.         Behavior: Behavior normal.           Wound 11/22/23 Traumatic Leg Right;Anterior (Active)   Wound Image   12/29/23 1402   Wound Description Pink;Yellow;Slough;Epithelialization;Granulation tissue 12/29/23 1400   Park-wound Assessment Pink;Dry;Swelling 12/29/23 1400    Wound Length (cm) 1.8 cm 12/29/23 1400   Wound Width (cm) 1.1 cm 12/29/23 1400   Wound Depth (cm) 0.2 cm 12/29/23 1400   Wound Surface Area (cm^2) 1.98 cm^2 12/29/23 1400   Wound Volume (cm^3) 0.396 cm^3 12/29/23 1400   Calculated Wound Volume (cm^3) 0.4 cm^3 12/29/23 1400   Change in Wound Size % 39.39 12/29/23 1400   Drainage Amount Small 12/29/23 1400   Drainage Description Yellow 12/29/23 1400   Non-staged Wound Description Full thickness 12/29/23 1400   Dressing Status Intact 12/29/23 1400       Wound 12/29/23 Diabetic Ulcer Toe (Comment  which one) Left (Active)   Wound Image   12/29/23 1423   Wound Description Pink;Epithelialization 12/29/23 1423   Park-wound Assessment Dry 12/29/23 1423   Wound Length (cm) 0.1 cm 12/29/23 1423   Wound Width (cm) 0.1 cm 12/29/23 1423   Wound Depth (cm) 0.1 cm 12/29/23 1423   Wound Surface Area (cm^2) 0.01 cm^2 12/29/23 1423   Wound Volume (cm^3) 0.001 cm^3 12/29/23 1423   Calculated Wound Volume (cm^3) 0 cm^3 12/29/23 1423   Drainage Amount Scant 12/29/23 1423   Drainage Description Serosanguineous 12/29/23 1423   Non-staged Wound Description Full thickness 12/29/23 1423   Dressing Status Intact 12/29/23 1423           Wound 11/22/23 Traumatic Leg Right;Anterior (Active)   Wound Image   12/29/23 1402   Wound Description Pink;Yellow;Slough;Epithelialization;Granulation tissue 12/29/23 1400   Park-wound Assessment Pink;Dry;Swelling 12/29/23 1400   Wound Length (cm) 1.8 cm 12/29/23 1400   Wound Width (cm) 1.1 cm 12/29/23 1400   Wound Depth (cm) 0.2 cm 12/29/23 1400   Wound Surface Area (cm^2) 1.98 cm^2 12/29/23 1400   Wound Volume (cm^3) 0.396 cm^3 12/29/23 1400   Calculated Wound Volume (cm^3) 0.4 cm^3 12/29/23 1400   Change in Wound Size % 39.39 12/29/23 1400   Drainage Amount Small 12/29/23 1400   Drainage Description Yellow 12/29/23 1400   Non-staged Wound Description Full thickness 12/29/23 1400   Dressing Status Intact 12/29/23 1400       Wound 12/29/23 Diabetic Ulcer  Toe (Comment  which one) Left (Active)   Wound Image   12/29/23 1423   Wound Description Pink;Epithelialization 12/29/23 1423   Park-wound Assessment Dry 12/29/23 1423   Wound Length (cm) 0.1 cm 12/29/23 1423   Wound Width (cm) 0.1 cm 12/29/23 1423   Wound Depth (cm) 0.1 cm 12/29/23 1423   Wound Surface Area (cm^2) 0.01 cm^2 12/29/23 1423   Wound Volume (cm^3) 0.001 cm^3 12/29/23 1423   Calculated Wound Volume (cm^3) 0 cm^3 12/29/23 1423   Drainage Amount Scant 12/29/23 1423   Drainage Description Serosanguineous 12/29/23 1423   Non-staged Wound Description Full thickness 12/29/23 1423   Dressing Status Intact 12/29/23 1423     Diagnosis:  1. Non-pressure chronic ulcer right lower leg, limited to breakdown skin ([facility])  -     lidocaine (XYLOCAINE) 4 % topical solution 5 mL  -     Wound cleansing and dressings Traumatic Right;Anterior Leg; Future  -     Wound compression and edema control Traumatic Right;Anterior Leg; Future  -     Wound cleansing and dressings Diabetic Ulcer Left Toe (Comment  which one) (LEFT THIRD TOE); Future  -     Wound miscellaneous orders Diabetic Ulcer Left Toe (Comment  which one) (LEFT THIRD TOE); Future  -     VAS ARTERIAL DUPLEX- LOWER LIMB BILATERAL; Future; Expected date: 12/29/2023    2. Chronic venous hypertension (idiopathic) with ulcer of right lower extremity (CODE) ([facility])  -     lidocaine (XYLOCAINE) 4 % topical solution 5 mL  -     Wound cleansing and dressings Traumatic Right;Anterior Leg; Future  -     Wound compression and edema control Traumatic Right;Anterior Leg; Future  -     Wound cleansing and dressings Diabetic Ulcer Left Toe (Comment  which one) (LEFT THIRD TOE); Future  -     Wound miscellaneous orders Diabetic Ulcer Left Toe (Comment  which one) (LEFT THIRD TOE); Future    3. Type 2 diabetes mellitus with stage 3 chronic kidney disease, without long-term current use of insulin, unspecified whether stage 3a or 3b CKD (HCC)  -     lidocaine (XYLOCAINE) 4 % topical  solution 5 mL  -     Wound cleansing and dressings Traumatic Right;Anterior Leg; Future  -     Wound compression and edema control Traumatic Right;Anterior Leg; Future  -     Wound cleansing and dressings Diabetic Ulcer Left Toe (Comment  which one) (LEFT THIRD TOE); Future  -     Wound miscellaneous orders Diabetic Ulcer Left Toe (Comment  which one) (LEFT THIRD TOE); Future    4. Weak arterial pulse  -     VAS ARTERIAL DUPLEX- LOWER LIMB BILATERAL; Future; Expected date: 12/29/2023    5. Diabetic ulcer of toe of left foot associated with type 2 diabetes mellitus, limited to breakdown of skin ([facility])        Diagnosis ICD-10-CM Associated Orders   1. Non-pressure chronic ulcer right lower leg, limited to breakdown skin ([facility])  L97.911 lidocaine (XYLOCAINE) 4 % topical solution 5 mL     Wound cleansing and dressings Traumatic Right;Anterior Leg     Wound compression and edema control Traumatic Right;Anterior Leg     Wound cleansing and dressings Diabetic Ulcer Left Toe (Comment  which one) (LEFT THIRD TOE)     Wound miscellaneous orders Diabetic Ulcer Left Toe (Comment  which one) (LEFT THIRD TOE)     VAS ARTERIAL DUPLEX- LOWER LIMB BILATERAL      2. Chronic venous hypertension (idiopathic) with ulcer of right lower extremity (CODE) ([facility])  I87.311 lidocaine (XYLOCAINE) 4 % topical solution 5 mL     Wound cleansing and dressings Traumatic Right;Anterior Leg     Wound compression and edema control Traumatic Right;Anterior Leg     Wound cleansing and dressings Diabetic Ulcer Left Toe (Comment  which one) (LEFT THIRD TOE)     Wound miscellaneous orders Diabetic Ulcer Left Toe (Comment  which one) (LEFT THIRD TOE)      3. Type 2 diabetes mellitus with stage 3 chronic kidney disease, without long-term current use of insulin, unspecified whether stage 3a or 3b CKD ([facility])  E11.22 lidocaine (XYLOCAINE) 4 % topical solution 5 mL    N18.30 Wound cleansing and dressings Traumatic Right;Anterior Leg     Wound compression and edema  control Traumatic Right;Anterior Leg     Wound cleansing and dressings Diabetic Ulcer Left Toe (Comment  which one) (LEFT THIRD TOE)     Wound miscellaneous orders Diabetic Ulcer Left Toe (Comment  which one) (LEFT THIRD TOE)      4. Weak arterial pulse  R09.89 VAS ARTERIAL DUPLEX- LOWER LIMB BILATERAL      5. Diabetic ulcer of toe of left foot associated with type 2 diabetes mellitus, limited to breakdown of skin ([facility])  E11.621     L97.521            ASSESSMENT:    1) right Distal anterior leg ulceration with about 3% healing over 30-days  2) left distal third digit ulceration, [place] 1  3) type 2 diabetes with CKD 3

## 2024-01-05 ENCOUNTER — OFFICE VISIT (OUTPATIENT)
Dept: WOUND CARE | Facility: HOSPITAL | Age: 89
End: 2024-01-05
Payer: MEDICARE

## 2024-01-05 VITALS
TEMPERATURE: 97.2 F | RESPIRATION RATE: 18 BRPM | SYSTOLIC BLOOD PRESSURE: 148 MMHG | HEART RATE: 84 BPM | DIASTOLIC BLOOD PRESSURE: 78 MMHG

## 2024-01-05 DIAGNOSIS — R09.89 WEAK ARTERIAL PULSE: ICD-10-CM

## 2024-01-05 DIAGNOSIS — N18.30 TYPE 2 DIABETES MELLITUS WITH STAGE 3 CHRONIC KIDNEY DISEASE, WITHOUT LONG-TERM CURRENT USE OF INSULIN, UNSPECIFIED WHETHER STAGE 3A OR 3B CKD (HCC): ICD-10-CM

## 2024-01-05 DIAGNOSIS — L97.911 NON-PRESSURE CHRONIC ULCER RIGHT LOWER LEG, LIMITED TO BREAKDOWN SKIN (HCC): Primary | ICD-10-CM

## 2024-01-05 DIAGNOSIS — M20.42 HAMMERTOES OF BOTH FEET: ICD-10-CM

## 2024-01-05 DIAGNOSIS — M20.41 HAMMERTOES OF BOTH FEET: ICD-10-CM

## 2024-01-05 DIAGNOSIS — E11.22 TYPE 2 DIABETES MELLITUS WITH STAGE 3 CHRONIC KIDNEY DISEASE, WITHOUT LONG-TERM CURRENT USE OF INSULIN, UNSPECIFIED WHETHER STAGE 3A OR 3B CKD (HCC): ICD-10-CM

## 2024-01-05 DIAGNOSIS — I87.311 CHRONIC VENOUS HYPERTENSION (IDIOPATHIC) WITH ULCER OF RIGHT LOWER EXTREMITY (CODE) (HCC): ICD-10-CM

## 2024-01-05 PROCEDURE — 97597 DBRDMT OPN WND 1ST 20 CM/<: CPT | Performed by: STUDENT IN AN ORGANIZED HEALTH CARE EDUCATION/TRAINING PROGRAM

## 2024-01-05 RX ORDER — LIDOCAINE HYDROCHLORIDE 40 MG/ML
5 SOLUTION TOPICAL ONCE
Status: COMPLETED | OUTPATIENT
Start: 2024-01-05 | End: 2024-01-05

## 2024-01-05 RX ADMIN — LIDOCAINE HYDROCHLORIDE 5 ML: 40 SOLUTION TOPICAL at 14:43

## 2024-01-05 NOTE — PATIENT INSTRUCTIONS
Orders Placed This Encounter   Procedures    Wound cleansing and dressings     Wound cleansing and dressings Traumatic Right;Anterior Leg        Wash your hands with soap and water.  Remove old dressing, discard into plastic bag and place in trash.  Cleanse the wound with Prophase prior to applying a clean dressing. Do not use tissue or cotton balls. Do not scrub the wound. Pat dry using gauze.  Shower yes   Apply mild moisturizer to skin surrounding wound  Apply medihoney to the right leg wound.  Cover with gauze  Secure with rolled gauze and tape.  Change dressing every day.  Today medihoney was used at the wound center.     Standing Status:   Future     Standing Expiration Date:   1/5/2025    Wound compression and edema control     Compression Stocking: Spandagrip size E     Remove compression stockings every night at bedtime and re-apply first thing morning. Follow daily skin care as instructed.     Avoid prolonged standing in one place.     Elevate leg(s) above the level of the heart when sitting or as much as possible.     Standing Status:   Future     Standing Expiration Date:   1/5/2025

## 2024-01-07 NOTE — PROGRESS NOTES
Patient ID: Tanya Stephen is a 89 y.o. female Date of Birth 7/21/1934     My role is Foot, Ankle and Wound Specialist    PLAN:  -Educated patient on their condition.   -The patient's wound is not currently infected. We discussed the importance of recognizing systemic and local signs of infection and going directly to the emergency department should any of these occur  -Debridement as below:   -Discussed proper care of dressings, patient is not to get them wet at all. If the dressings do get wet, they must be removed and redressed.  -return to wound care in    -Continue Medihoney, DSD, Spandage  to right lower extremity.   -Patient was instructed to purchase toe slings online for left foot in order to offload distal digit.  -Patient, , and daughter verbalize understanding of plan     Patient optimization:  -Vascular:              Arterial - F/u LEADs scheduled for 1/23/24              Venous -chronic venous hypertension noted, will hold off on significant compression pending arterial studies              Lymphatic -no evidence of lymphatic disease     -MSK:              Pressure reduction -continue wearing wide toebox supportive sneakers.  Use of toe sling to left foot second through fourth digits.  Patient was instructed to purchase this online in order to offload distal digits.              Deformity and possible correction -left third digit hammertoe is partially reducible and could be appropriate for percutaneous flexor tenotomy pending arterial studies.      -Neuro:              Neuropathy - We discussed checking feet daily for additional cuts, bruises, or wounds. We also discussed refraining from walking barefoot and wearing white socks in order to notice drainage     -Nutritional:              Dietary supplementation - Recommend high protein diet with Toni supplementation until wound is fully healed              Smoking cessation - N/A              Glycemic control -previous hemoglobin A1c  "5.9% from 4/19/2022.    Debridement   Wound 11/22/23 Traumatic Leg Right;Anterior    Universal Protocol:  Consent: Verbal consent obtained.  Risks and benefits: risks, benefits and alternatives were discussed  Consent given by: patient  Time out: Immediately prior to procedure a \"time out\" was called to verify the correct patient, procedure, equipment, support staff and site/side marked as required.  Patient understanding: patient states understanding of the procedure being performed  Patient identity confirmed: verbally with patient    Debridement Details  Performed by: physician  Debridement type: selective  Pain control: lidocaine 4%      Post-debridement measurements  Length (cm): 1.8  Width (cm): 1  Depth (cm): 0.1  Percent debrided: 100%  Surface Area (cm^2): 1.8  Area Debrided (cm^2): 1.8  Volume (cm^3): 0.18    Devitalized tissue debrided: exudate, necrotic debris and slough  Instrument(s) utilized: blade  Bleeding: small  Hemostasis obtained with: not applicable  Procedural pain (0-10): 2  Post-procedural pain: 2   Response to treatment: procedure was tolerated well         Wound Instructions    Orders Placed This Encounter   Procedures    Wound cleansing and dressings     Wound cleansing and dressings Traumatic Right;Anterior Leg        Wash your hands with soap and water.  Remove old dressing, discard into plastic bag and place in trash.  Cleanse the wound with Prophase prior to applying a clean dressing. Do not use tissue or cotton balls. Do not scrub the wound. Pat dry using gauze.  Shower yes   Apply mild moisturizer to skin surrounding wound  Apply medihoney to the right leg wound.  Cover with gauze  Secure with rolled gauze and tape.  Change dressing every day.  Today medihoney was used at the wound center.     Standing Status:   Future     Standing Expiration Date:   1/5/2025    Wound compression and edema control     Compression Stocking: Spandagrip size E     Remove compression stockings every " night at bedtime and re-apply first thing morning. Follow daily skin care as instructed.     Avoid prolonged standing in one place.     Elevate leg(s) above the level of the heart when sitting or as much as possible.     Standing Status:   Future     Standing Expiration Date:   1/5/2025     SUBJECTIVE:    Chief complaint  Right leg ulceration, hammertoe deformity    11/22/23: Consult - Tanya is a pleasant 89-year-old female with a past medical history of type 2 diabetes mellitus most recent A1c 5.9% 1 year ago, diabetic gastroparesis, polyneuropathy, ambulatory dysfunction, HFpEF G1DD, and Raynaud's disease here today for initial wound care consult.  Patient was referred by primary care provider Dr. Gregory Santoro.  Patient sustained the wound traumatically  to the LLE approximately 2 months ago when the fridge door fell against her leg.  At the time she was seen her PCP and prescribed as course of azithromycin + Neosporin t[to the wound.  Since then wound care has consisted of Neosporin however PCP saw the patient yesterday and advised that she make an appointment to be seen by wound care due to nonhealing status.  Patient denies any symptoms of infection today including fever chills diaphoresis. She does regularly wear compression socks.     1/5/24: Tanya states that she is doing well since her last visit.  Her daughter and  are present with her today.  She denies any systemic signs of infection since her last wound care visit.  She states she has been using her toe sling since her last visit and believes her left third digit wound may be healed.  She states that she has not ordered any toe slings online yet but plans to do so prior to her next visit.          The following portions of the patient's history were reviewed and updated as appropriate:   Patient Active Problem List   Diagnosis    Anemia    Shortness of breath    Hypomagnesemia    DM (diabetes mellitus), type 2 (HCC)    Urinary frequency     Iron deficiency anemia secondary to inadequate dietary iron intake    Sinobronchitis    Post zoster neuralgia    Primary generalized (osteo)arthritis    Seronegative arthropathy of multiple sites (HCC)    Senile osteoporosis    Lumbar spondylosis    Diabetic polyneuropathy associated with type 2 diabetes mellitus (HCC)    Diabetic gastroparesis associated with type 2 diabetes mellitus     Irritable bowel syndrome with diarrhea    Stage 3b chronic kidney disease (HCC)    Traumatic injury of sacrum    Tendinitis of left rotator cuff    Abnormality of gait due to impairment of balance    Sicca syndrome (HCC)    Chest pain    Leukocytosis    Chronic diastolic heart failure (HCC)    OAB (overactive bladder)    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)    Toxic metabolic encephalopathy    Cellulitis    Dyslipidemia    Primary hypertension    Spinal stenosis of lumbar region with neurogenic claudication    Chronic pain syndrome    SI (sacroiliac) joint dysfunction    Elevated LFTs    Ambulatory dysfunction    Recurrent UTI    Gastroesophageal reflux disease without esophagitis    Dysphagia    Raynaud's phenomenon without gangrene    Elevated liver enzymes    Incontinence of feces with fecal urgency     Past Medical History:   Diagnosis Date    Anemia     Arthritis     Back pain     CHF (congestive heart failure) (HCC)     Chronic kidney disease     Stage 3b    Confusion 02/22/2023    Diabetes (HCC)     Diabetes mellitus (HCC)     Diabetic gastroparesis associated with type 2 diabetes mellitus      Diabetic polyneuropathy (HCC)     Diverticulosis     Herpes zoster     Hypertension     IBS (irritable bowel syndrome)     Neurogenic claudication due to lumbar spinal stenosis     Osteoarthritis     Osteoporosis     Pulmonary edema     Raynaud's phenomenon without gangrene     Seronegative arthropathy of multiple sites (HCC)     Sicca (HCC)      Past Surgical History:   Procedure Laterality Date    BACK  SURGERY      COLONOSCOPY      EGD AND COLONOSCOPY N/A 2016    Procedure: EGD AND COLONOSCOPY;  Surgeon: Elina Anderson MD;  Location: [facility];  Service:     JOINT REPLACEMENT      bilat knees and hips    OTHER SURGICAL HISTORY      pelvis rods    REPLACEMENT TOTAL KNEE BILATERAL      STOMACH SURGERY      UPPER GASTROINTESTINAL ENDOSCOPY       Social History     Socioeconomic History    Marital status: /Civil Union     Spouse name: Weston    Number of children: 2    Years of education: Not on file    Highest education level: High school graduate   Occupational History    Not on file   Tobacco Use    Smoking status: Former     Current packs/day: 0.00     Types: Cigarettes     Quit date:      Years since quittin.0    Smokeless tobacco: Never   Vaping Use    Vaping status: Never Used   Substance and Sexual Activity    Alcohol use: No    Drug use: No    Sexual activity: Not Currently     Partners: Male     Birth control/protection: None   Other Topics Concern    Not on file   Social History Narrative    Not on file     Social Determinants of Health     Financial Resource Strain: Not on file   Food Insecurity: No Food Insecurity (2023)    Hunger Vital Sign     Worried About Running Out of Food in the Last Year: Never true     Ran Out of Food in the Last Year: Never true   Transportation Needs: No Transportation Needs (2023)    PRAPARE - Transportation     Lack of Transportation (Medical): No     Lack of Transportation (Non-Medical): No   Physical Activity: Not on file   Stress: Not on file   Social Connections: Not on file   Intimate Partner Violence: Not on file   Housing Stability: Low Risk  (2023)    Housing Stability Vital Sign     Unable to Pay for Housing in the Last Year: No     Number of Places Lived in the Last Year: 1     Unstable Housing in the Last Year: No        Current Outpatient Medications:     amitriptyline (ELAVIL) 10 mg tablet, TAKE TWO TABLETS BY MOUTH AT BEDTIME,  Disp: 180 tablet, Rfl: 1    aspirin 81 mg chewable tablet, Chew 1 tablet (81 mg total) daily, Disp: 30 tablet, Rfl: 0    atorvastatin (LIPITOR) 10 mg tablet, TAKE ONE TABLET BY MOUTH EVERY DAY, Disp: 90 tablet, Rfl: 3    Cholecalciferol (VITAMIN D3) 1000 units CAPS, Take 1 capsule by mouth daily , Disp: , Rfl:     dicyclomine (BENTYL) 10 mg capsule, Take 1 capsule (10 mg total) by mouth 3 (three) times a day as needed (abd pain) (Patient not taking: Reported on 11/22/2023), Disp: 30 capsule, Rfl: 2    diphenoxylate-atropine (LOMOTIL) 2.5-0.025 mg per tablet, Take 1 tablet by mouth if needed (Patient not taking: Reported on 12/18/2023), Disp: , Rfl:     diphenoxylate-atropine (LOMOTIL) 2.5-0.025 mg per tablet, Take 1 tablet by mouth 4 (four) times a day as needed for diarrhea, Disp: 30 tablet, Rfl: 0    DULoxetine (CYMBALTA) 60 mg delayed release capsule, TAKE ONE CAPSULE BY MOUTH EVERY DAY, Disp: 30 capsule, Rfl: 5    hydroxychloroquine (PLAQUENIL) 200 mg tablet, Take 1 tablet (200 mg total) by mouth daily with breakfast, Disp: 90 tablet, Rfl: 1    Magnesium 250 MG TABS, Take 250 mg by mouth every evening, Disp: , Rfl:     metFORMIN (GLUCOPHAGE) 500 mg tablet, Take 500 mg by mouth 2 (two) times a day (Patient not taking: Reported on 6/27/2023), Disp: , Rfl:     metoprolol succinate (TOPROL-XL) 25 mg 24 hr tablet, Take 0.5 tablets (12.5 mg total) by mouth daily (Patient not taking: Reported on 6/27/2023), Disp: 45 tablet, Rfl: 3    Mirabegron ER (Myrbetriq) 50 MG TB24, Take 1 tablet (50 mg total) by mouth every evening, Disp: 30 tablet, Rfl: 11    nitrofurantoin (MACRODANTIN) 50 mg capsule, Take 1 capsule (50 mg total) by mouth in the morning, Disp: 30 capsule, Rfl: 6    omeprazole (PriLOSEC) 20 mg delayed release capsule, Take 20 mg by mouth daily  , Disp: , Rfl:     pregabalin (LYRICA) 75 mg capsule, TAKE ONE CAPSULE BY MOUTH THREE TIMES A DAY, Disp: 90 capsule, Rfl: 5    pyridoxine (B-6) 100 MG tablet, Take 100 mg  by mouth daily, Disp: , Rfl:     Sodium Fluoride (PreviDent 5000 Booster Plus) 1.1 % PSTE, Apply on teeth every evening as directed, Disp: 100 mL, Rfl: 3    torsemide (DEMADEX) 10 mg tablet, Take 1 tablet PRN for wt gain 3 lb/ 3 days or 5 lb/ 5 aria (Patient not taking: Reported on 6/27/2023), Disp: 30 tablet, Rfl: 0  Family History   Problem Relation Age of Onset    Cancer Brother     Diabetes Mother     Hypertension Father     Heart disease Father       Review of Systems   Constitutional:  Negative for chills, diaphoresis, fatigue and fever.   Skin:  Positive for wound.   All other systems reviewed and are negative.      Allergies  Morphine and Ciprofloxacin    OBJECTIVE:  /78   Pulse 84   Temp (!) 97.2 °F (36.2 °C)   Resp 18   LMP  (LMP Unknown)     Physical Exam  Vitals reviewed.   Constitutional:       Appearance: Normal appearance.   HENT:      Head: Normocephalic and atraumatic.      Mouth/Throat:      Mouth: Mucous membranes are moist.   Eyes:      Extraocular Movements: Extraocular movements intact.   Pulmonary:      Effort: Pulmonary effort is normal.   Skin:     Comments: Distal anterior right lower extremity wound slightly smaller than last exam.  Healthy wound bed.  Rolled edges.  No signs of infection.    Left third digit partially reducible hammertoe deformity with frontal plane rotation/adductovarus position noted.  Ulceration to the tip of the digit is now fully resolved.   Neurological:      Mental Status: She is alert.   Psychiatric:         Mood and Affect: Mood normal.         Behavior: Behavior normal.           Wound 11/22/23 Traumatic Leg Right;Anterior (Active)   Wound Image   01/05/24 1453   Wound Description Pink;Yellow;Slough;Epithelialization;Granulation tissue 01/05/24 1435   Park-wound Assessment Pink;Dry;Swelling 01/05/24 1435   Wound Length (cm) 1.8 cm 01/05/24 1435   Wound Width (cm) 1 cm 01/05/24 1435   Wound Depth (cm) 0.1 cm 01/05/24 1435   Wound Surface Area (cm^2)  1.8 cm^2 01/05/24 1435   Wound Volume (cm^3) 0.18 cm^3 01/05/24 1435   Calculated Wound Volume (cm^3) 0.18 cm^3 01/05/24 1435   Change in Wound Size % 72.73 01/05/24 1435   Drainage Amount Small 01/05/24 1435   Drainage Description Yellow 01/05/24 1435   Non-staged Wound Description Full thickness 01/05/24 1435   Treatments Irrigation with NSS 01/05/24 1435   Dressing Status Intact 12/29/23 1400       Wound 12/29/23 Diabetic Ulcer Toe (Comment  which one) Left (Active)   Wound Image   01/05/24 1436   Wound Description Epithelialization 01/05/24 1436   Park-wound Assessment Dry 01/05/24 1436   Wound Length (cm) 0 cm 01/05/24 1436   Wound Width (cm) 0 cm 01/05/24 1436   Wound Depth (cm) 0 cm 01/05/24 1436   Wound Surface Area (cm^2) 0 cm^2 01/05/24 1436   Wound Volume (cm^3) 0 cm^3 01/05/24 1436   Calculated Wound Volume (cm^3) 0 cm^3 01/05/24 1436   Drainage Amount None 01/05/24 1436   Drainage Description Serosanguineous 12/29/23 1423   Non-staged Wound Description Full thickness 12/29/23 1423   Dressing Status Intact 12/29/23 1423           Wound 11/22/23 Traumatic Leg Right;Anterior (Active)   Wound Image   01/05/24 1453   Wound Description Pink;Yellow;Slough;Epithelialization;Granulation tissue 01/05/24 1435   Park-wound Assessment Pink;Dry;Swelling 01/05/24 1435   Wound Length (cm) 1.8 cm 01/05/24 1435   Wound Width (cm) 1 cm 01/05/24 1435   Wound Depth (cm) 0.1 cm 01/05/24 1435   Wound Surface Area (cm^2) 1.8 cm^2 01/05/24 1435   Wound Volume (cm^3) 0.18 cm^3 01/05/24 1435   Calculated Wound Volume (cm^3) 0.18 cm^3 01/05/24 1435   Change in Wound Size % 72.73 01/05/24 1435   Drainage Amount Small 01/05/24 1435   Drainage Description Yellow 01/05/24 1435   Non-staged Wound Description Full thickness 01/05/24 1435   Treatments Irrigation with NSS 01/05/24 1435   Dressing Status Intact 12/29/23 1400       Wound 12/29/23 Diabetic Ulcer Toe (Comment  which one) Left (Active)   Wound Image   01/05/24 1436   Wound  Description Epithelialization 01/05/24 1436   Park-wound Assessment Dry 01/05/24 1436   Wound Length (cm) 0 cm 01/05/24 1436   Wound Width (cm) 0 cm 01/05/24 1436   Wound Depth (cm) 0 cm 01/05/24 1436   Wound Surface Area (cm^2) 0 cm^2 01/05/24 1436   Wound Volume (cm^3) 0 cm^3 01/05/24 1436   Calculated Wound Volume (cm^3) 0 cm^3 01/05/24 1436   Drainage Amount None 01/05/24 1436   Drainage Description Serosanguineous 12/29/23 1423   Non-staged Wound Description Full thickness 12/29/23 1423   Dressing Status Intact 12/29/23 1423                         Diagnosis:  1. 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 Chronic venous hypertension (idiopathic) with ulcer of right lower extremity (CODE) ([facility])  -     Wound cleansing and dressings; Future  -     Wound compression and edema control; Future  -     lidocaine (XYLOCAINE) 4 % topical solution 5 mL    3. Type 2 diabetes mellitus with stage 3 chronic kidney disease, without long-term current use of insulin, unspecified whether stage 3a or 3b CKD ([facility])  -     Wound cleansing and dressings; Future  -     Wound compression and edema control; Future  -     lidocaine (XYLOCAINE) 4 % topical solution 5 mL    4. Weak arterial pulse  -     Wound cleansing and dressings; Future  -     Wound compression and edema control; Future  -     lidocaine (XYLOCAINE) 4 % topical solution 5 mL    5. Hammertoes of both feet        Diagnosis ICD-10-CM Associated Orders   1. 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 Chronic venous hypertension (idiopathic) with ulcer of right lower extremity (CODE) ([facility])  I87.311 Wound cleansing and  dressings     Wound compression and edema control     lidocaine (XYLOCAINE) 4 % topical solution 5 mL      3. Type 2 diabetes mellitus with stage 3 chronic kidney disease, without long-term current use of insulin, unspecified whether stage 3a or 3b CKD (HCC)  E11.22 Wound cleansing and dressings    N18.30 Wound compression and edema control     lidocaine (XYLOCAINE) 4 % topical solution 5 mL      4. Weak arterial pulse  R09.89 Wound cleansing and dressings     Wound compression and edema control     lidocaine (XYLOCAINE) 4 % topical solution 5 mL      5. Hammertoes of both feet  M20.41     M20.42            ASSESSMENT:    1) right Distal anterior leg ulceration with about 10% healing over 30-days  2) bilateral hammertoe deformities  3) type 2 diabetes with CKD 3

## 2024-01-15 DIAGNOSIS — N39.0 RECURRENT UTI: ICD-10-CM

## 2024-01-15 RX ORDER — NITROFURANTOIN MACROCRYSTALS 50 MG/1
50 CAPSULE ORAL EVERY MORNING
Qty: 30 CAPSULE | Refills: 6 | Status: SHIPPED | OUTPATIENT
Start: 2024-01-15

## 2024-01-26 ENCOUNTER — OFFICE VISIT (OUTPATIENT)
Dept: WOUND CARE | Facility: HOSPITAL | Age: 89
End: 2024-01-26
Payer: MEDICARE

## 2024-01-26 VITALS
DIASTOLIC BLOOD PRESSURE: 69 MMHG | HEART RATE: 94 BPM | SYSTOLIC BLOOD PRESSURE: 114 MMHG | TEMPERATURE: 97.2 F | RESPIRATION RATE: 16 BRPM

## 2024-01-26 DIAGNOSIS — N18.30 TYPE 2 DIABETES MELLITUS WITH STAGE 3 CHRONIC KIDNEY DISEASE, WITHOUT LONG-TERM CURRENT USE OF INSULIN, UNSPECIFIED WHETHER STAGE 3A OR 3B CKD (HCC): ICD-10-CM

## 2024-01-26 DIAGNOSIS — M20.42 HAMMERTOES OF BOTH FEET: ICD-10-CM

## 2024-01-26 DIAGNOSIS — M20.41 HAMMERTOES OF BOTH FEET: ICD-10-CM

## 2024-01-26 DIAGNOSIS — L97.911 NON-PRESSURE CHRONIC ULCER RIGHT LOWER LEG, LIMITED TO BREAKDOWN SKIN (HCC): Primary | ICD-10-CM

## 2024-01-26 DIAGNOSIS — I87.311 CHRONIC VENOUS HYPERTENSION (IDIOPATHIC) WITH ULCER OF RIGHT LOWER EXTREMITY (CODE) (HCC): ICD-10-CM

## 2024-01-26 DIAGNOSIS — R09.89 WEAK ARTERIAL PULSE: ICD-10-CM

## 2024-01-26 DIAGNOSIS — E11.22 TYPE 2 DIABETES MELLITUS WITH STAGE 3 CHRONIC KIDNEY DISEASE, WITHOUT LONG-TERM CURRENT USE OF INSULIN, UNSPECIFIED WHETHER STAGE 3A OR 3B CKD (HCC): ICD-10-CM

## 2024-01-26 PROCEDURE — 11042 DBRDMT SUBQ TIS 1ST 20SQCM/<: CPT | Performed by: STUDENT IN AN ORGANIZED HEALTH CARE EDUCATION/TRAINING PROGRAM

## 2024-01-26 RX ORDER — LIDOCAINE HYDROCHLORIDE 40 MG/ML
5 SOLUTION TOPICAL ONCE
Status: COMPLETED | OUTPATIENT
Start: 2024-01-26 | End: 2024-01-26

## 2024-01-26 RX ADMIN — LIDOCAINE HYDROCHLORIDE 5 ML: 40 SOLUTION TOPICAL at 15:35

## 2024-01-26 NOTE — PATIENT INSTRUCTIONS
Orders Placed This Encounter   Procedures    Wound cleansing and dressings Traumatic Right;Anterior Leg     Wound cleansing and dressings Traumatic Right;Anterior Leg        Wash your hands with soap and water.  Remove old dressing, discard into plastic bag and place in trash.  Cleanse the wound with Prophase prior to applying a clean dressing. Do not use tissue or cotton balls. Do not scrub the wound. Pat dry using gauze.  Shower yes   Apply mild moisturizer to skin surrounding wound  Apply medihoney to the right leg wound.  Cover with gauze  Secure with rolled gauze and tape.  Change dressing every day.  Today medihoney was used at the wound center.     Standing Status:   Future     Standing Expiration Date:   1/26/2025    Wound compression and edema control Traumatic Right;Anterior Leg     Compression Stocking: Spandagrip size E     Remove compression stockings every night at bedtime and re-apply first thing morning. Follow daily skin care as instructed.     Avoid prolonged standing in one place.     Elevate leg(s) above the level of the heart when sitting or as much as possible.     Standing Status:   Future     Standing Expiration Date:   1/26/2025

## 2024-01-27 ENCOUNTER — HOSPITAL ENCOUNTER (OUTPATIENT)
Dept: VASCULAR ULTRASOUND | Facility: HOSPITAL | Age: 89
Discharge: HOME | End: 2024-01-27
Attending: STUDENT IN AN ORGANIZED HEALTH CARE EDUCATION/TRAINING PROGRAM
Payer: MEDICARE

## 2024-01-27 DIAGNOSIS — R09.89 WEAK ARTERIAL PULSE: ICD-10-CM

## 2024-01-27 DIAGNOSIS — L97.911 NON-PRESSURE CHRONIC ULCER RIGHT LOWER LEG, LIMITED TO BREAKDOWN SKIN (HCC): ICD-10-CM

## 2024-01-27 PROCEDURE — 93923 UPR/LXTR ART STDY 3+ LVLS: CPT

## 2024-01-27 PROCEDURE — 93925 LOWER EXTREMITY STUDY: CPT

## 2024-01-27 NOTE — PROGRESS NOTES
Patient ID: Tanya Stephen is a 89 y.o. female Date of Birth 7/21/1934     My role is Foot, Ankle and Wound Specialist    PLAN:  -Educated patient on their condition.   -The patient's wound is not currently infected. We discussed the importance of recognizing systemic and local signs of infection and going directly to the emergency department should any of these occur  -Debridement as below:   -Discussed proper care of dressings, patient is not to get them wet at all. If the dressings do get wet, they must be removed and redressed.  -return to wound care in    -Continue Medihoney, DSD, Spandage  to right lower extremity.   -Patient was instructed to purchase toe slings online for left foot in order to offload distal digit.  -Patient, , and daughter verbalize understanding of plan     Patient optimization:  -Vascular:              Arterial - F/u LEADs scheduled for 1/27/24              Venous -chronic venous hypertension noted, will hold off on significant compression pending arterial studies              Lymphatic -no evidence of lymphatic disease     -MSK:              Pressure reduction -continue wearing wide toebox supportive sneakers.  Use of toe sling to left foot second through fourth digits.  Patient was instructed to purchase this online in order to offload distal digits.              Deformity and possible correction -left third digit hammertoe is partially reducible and could be appropriate for percutaneous flexor tenotomy pending arterial studies.      -Neuro:              Neuropathy - We discussed checking feet daily for additional cuts, bruises, or wounds. We also discussed refraining from walking barefoot and wearing white socks in order to notice drainage     -Nutritional:              Dietary supplementation - Recommend high protein diet with Toni supplementation until wound is fully healed              Smoking cessation - N/A              Glycemic control -previous hemoglobin A1c  "5.9% from 4/19/2022. Will consider ordering new HgbA1c at patient's next visit.          Debridement   Wound 11/22/23 Venous Ulcer Leg Right;Anterior    Universal Protocol:  Consent: Verbal consent obtained.  Risks and benefits: risks, benefits and alternatives were discussed  Consent given by: patient  Time out: Immediately prior to procedure a \"time out\" was called to verify the correct patient, procedure, equipment, support staff and site/side marked as required.  Patient understanding: patient states understanding of the procedure being performed  Patient identity confirmed: verbally with patient    Debridement Details  Performed by: physician  Debridement type: surgical  Level of debridement: subcutaneous tissue  Pain control: lidocaine 4%      Post-debridement measurements  Length (cm): 1.8  Width (cm): 1  Depth (cm): 0.1  Percent debrided: 100%  Surface Area (cm^2): 1.8  Area Debrided (cm^2): 1.8  Volume (cm^3): 0.18    Tissue and other material debrided: dermis, epidermis and subcutaneous tissue  Devitalized tissue debrided: biofilm, exudate and slough  Instrument(s) utilized: curette  Bleeding: medium  Hemostasis obtained with: pressure  Procedural pain (0-10): 1  Post-procedural pain: 1   Response to treatment: procedure was tolerated well         Wound Instructions    Orders Placed This Encounter   Procedures    Wound cleansing and dressings Traumatic Right;Anterior Leg     Wound cleansing and dressings Traumatic Right;Anterior Leg        Wash your hands with soap and water.  Remove old dressing, discard into plastic bag and place in trash.  Cleanse the wound with Prophase prior to applying a clean dressing. Do not use tissue or cotton balls. Do not scrub the wound. Pat dry using gauze.  Shower yes   Apply mild moisturizer to skin surrounding wound  Apply medihoney to the right leg wound.  Cover with gauze  Secure with rolled gauze and tape.  Change dressing every day.  Today medihoney was used at the " wound center.     Standing Status:   Future     Standing Expiration Date:   1/26/2025    Wound compression and edema control Traumatic Right;Anterior Leg     Compression Stocking: Spandagrip size E     Remove compression stockings every night at bedtime and re-apply first thing morning. Follow daily skin care as instructed.     Avoid prolonged standing in one place.     Elevate leg(s) above the level of the heart when sitting or as much as possible.     Standing Status:   Future     Standing Expiration Date:   1/26/2025         SUBJECTIVE:    Chief complaint  Right leg ulceration      11/22/23: Consult - Tanya is a pleasant 89-year-old female with a past medical history of type 2 diabetes mellitus most recent A1c 5.9% 1 year ago, diabetic gastroparesis, polyneuropathy, ambulatory dysfunction, HFpEF G1DD, and Raynaud's disease here today for initial wound care consult.  Patient was referred by primary care provider Dr. Gregory Santoro.  Patient sustained the wound traumatically  to the LLE approximately 2 months ago when the fridge door fell against her leg.  At the time she was seen her PCP and prescribed as course of azithromycin + Neosporin t[to the wound.  Since then wound care has consisted of Neosporin however PCP saw the patient yesterday and advised that she make an appointment to be seen by wound care due to nonhealing status.  Patient denies any symptoms of infection today including fever chills diaphoresis. She does regularly wear compression socks.     1/26/24: Tanya states that she is doing well since her last visit.  Her daughter is present with her today.  She denies any systemic signs of infection since her last wound care visit.  She states she has been using her toe sling since her last visit.  She states that she has not ordered any toe slings online yet but plans to do so prior to her next visit.          The following portions of the patient's history were reviewed and updated as  appropriate:   Patient Active Problem List   Diagnosis    Anemia    Shortness of breath    Hypomagnesemia    DM (diabetes mellitus), type 2 (HCC)    Urinary frequency    Iron deficiency anemia secondary to inadequate dietary iron intake    Sinobronchitis    Post zoster neuralgia    Primary generalized (osteo)arthritis    Seronegative arthropathy of multiple sites ([facility])    Senile osteoporosis    Lumbar spondylosis    Diabetic polyneuropathy associated with type 2 diabetes mellitus (HCC)    Diabetic gastroparesis associated with type 2 diabetes mellitus     Irritable bowel syndrome with diarrhea    Stage 3b chronic kidney disease (HCC)    Traumatic injury of sacrum    Tendinitis of left rotator cuff    Abnormality of gait due to impairment of balance    Sicca syndrome ([facility])    Chest pain    Leukocytosis    Chronic diastolic heart failure ([facility])    OAB (overactive bladder)    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[facility])    Toxic metabolic encephalopathy    Cellulitis    Dyslipidemia    Primary hypertension    Spinal stenosis of lumbar region with neurogenic claudication    Chronic pain syndrome    SI (sacroiliac) joint dysfunction    Elevated LFTs    Ambulatory dysfunction    Recurrent UTI    Gastroesophageal reflux disease without esophagitis    Dysphagia    Raynaud's phenomenon without gangrene    Elevated liver enzymes    Incontinence of feces with fecal urgency     Past Medical History:   Diagnosis Date    Anemia     Arthritis     Back pain     CHF (congestive heart failure) ([facility])     Chronic kidney disease     Stage 3b    Confusion 02/22/2023    Diabetes (HCC)     Diabetes mellitus (HCC)     Diabetic gastroparesis associated with type 2 diabetes mellitus      Diabetic polyneuropathy (HCC)     Diverticulosis     Herpes zoster     Hypertension     IBS (irritable bowel syndrome)     Neurogenic claudication due to lumbar spinal stenosis     Osteoarthritis     Osteoporosis     Pulmonary edema     Raynaud's  phenomenon without gangrene     Seronegative arthropathy of multiple sites (HCC)     Sicca (HCC)      Past Surgical History:   Procedure Laterality Date    BACK SURGERY      COLONOSCOPY      EGD AND COLONOSCOPY N/A 2016    Procedure: EGD AND COLONOSCOPY;  Surgeon: Elina Anderson MD;  Location[facility];  Service:     JOINT REPLACEMENT      bilat knees and hips    OTHER SURGICAL HISTORY      pelvis rods    REPLACEMENT TOTAL KNEE BILATERAL      STOMACH SURGERY      UPPER GASTROINTESTINAL ENDOSCOPY       Social History     Socioeconomic History    Marital status: /Civil Union     Spouse name: Weston    Number of children: 2    Years of education: Not on file    Highest education level: High school graduate   Occupational History    Not on file   Tobacco Use    Smoking status: Former     Current packs/day: 0.00     Types: Cigarettes     Quit date:      Years since quittin.1    Smokeless tobacco: Never   Vaping Use    Vaping status: Never Used   Substance and Sexual Activity    Alcohol use: No    Drug use: No    Sexual activity: Not Currently     Partners: Male     Birth control/protection: None   Other Topics Concern    Not on file   Social History Narrative    Not on file     Social Determinants of Health     Financial Resource Strain: Not on file   Food Insecurity: No Food Insecurity (2023)    Hunger Vital Sign     Worried About Running Out of Food in the Last Year: Never true     Ran Out of Food in the Last Year: Never true   Transportation Needs: No Transportation Needs (2023)    PRAPARE - Transportation     Lack of Transportation (Medical): No     Lack of Transportation (Non-Medical): No   Physical Activity: Not on file   Stress: Not on file   Social Connections: Not on file   Intimate Partner Violence: Not on file   Housing Stability: Low Risk  (2023)    Housing Stability Vital Sign     Unable to Pay for Housing in the Last Year: No     Number of Places Lived in the Last Year: 1      Unstable Housing in the Last Year: No        Current Outpatient Medications:     amitriptyline (ELAVIL) 10 mg tablet, TAKE TWO TABLETS BY MOUTH AT BEDTIME, Disp: 180 tablet, Rfl: 1    aspirin 81 mg chewable tablet, Chew 1 tablet (81 mg total) daily, Disp: 30 tablet, Rfl: 0    atorvastatin (LIPITOR) 10 mg tablet, TAKE ONE TABLET BY MOUTH EVERY DAY, Disp: 90 tablet, Rfl: 3    Cholecalciferol (VITAMIN D3) 1000 units CAPS, Take 1 capsule by mouth daily , Disp: , Rfl:     dicyclomine (BENTYL) 10 mg capsule, Take 1 capsule (10 mg total) by mouth 3 (three) times a day as needed (abd pain) (Patient not taking: Reported on 11/22/2023), Disp: 30 capsule, Rfl: 2    diphenoxylate-atropine (LOMOTIL) 2.5-0.025 mg per tablet, Take 1 tablet by mouth if needed (Patient not taking: Reported on 12/18/2023), Disp: , Rfl:     diphenoxylate-atropine (LOMOTIL) 2.5-0.025 mg per tablet, Take 1 tablet by mouth 4 (four) times a day as needed for diarrhea, Disp: 30 tablet, Rfl: 0    DULoxetine (CYMBALTA) 60 mg delayed release capsule, TAKE ONE CAPSULE BY MOUTH EVERY DAY, Disp: 30 capsule, Rfl: 5    hydroxychloroquine (PLAQUENIL) 200 mg tablet, Take 1 tablet (200 mg total) by mouth daily with breakfast, Disp: 90 tablet, Rfl: 1    Magnesium 250 MG TABS, Take 250 mg by mouth every evening, Disp: , Rfl:     metFORMIN (GLUCOPHAGE) 500 mg tablet, Take 500 mg by mouth 2 (two) times a day (Patient not taking: Reported on 6/27/2023), Disp: , Rfl:     metoprolol succinate (TOPROL-XL) 25 mg 24 hr tablet, Take 0.5 tablets (12.5 mg total) by mouth daily (Patient not taking: Reported on 6/27/2023), Disp: 45 tablet, Rfl: 3    Mirabegron ER (Myrbetriq) 50 MG TB24, Take 1 tablet (50 mg total) by mouth every evening, Disp: 30 tablet, Rfl: 11    nitrofurantoin (MACRODANTIN) 50 mg capsule, TAKE ONE CAPSULE BY MOUTH EVERY DAY IN THE MORNING, Disp: 30 capsule, Rfl: 6    omeprazole (PriLOSEC) 20 mg delayed release capsule, Take 20 mg by mouth daily  , Disp: ,  Rfl:     pregabalin (LYRICA) 75 mg capsule, TAKE ONE CAPSULE BY MOUTH THREE TIMES A DAY, Disp: 90 capsule, Rfl: 5    pyridoxine (B-6) 100 MG tablet, Take 100 mg by mouth daily, Disp: , Rfl:     Sodium Fluoride (PreviDent 5000 Booster Plus) 1.1 % PSTE, Apply on teeth every evening as directed, Disp: 100 mL, Rfl: 3    torsemide (DEMADEX) 10 mg tablet, Take 1 tablet PRN for wt gain 3 lb/ 3 days or 5 lb/ 5 aria (Patient not taking: Reported on 6/27/2023), Disp: 30 tablet, Rfl: 0  No current facility-administered medications for this visit.  Family History   Problem Relation Age of Onset    Cancer Brother     Diabetes Mother     Hypertension Father     Heart disease Father       Review of Systems   Constitutional:  Negative for chills, diaphoresis, fatigue and fever.   Skin:  Positive for wound.   All other systems reviewed and are negative.      Allergies  Morphine and Ciprofloxacin    OBJECTIVE:  /69   Pulse 94   Temp (!) 97.2 °F (36.2 °C)   Resp 16   LMP  (LMP Unknown)     Physical Exam  Vitals reviewed.   Constitutional:       Appearance: Normal appearance.   HENT:      Head: Normocephalic and atraumatic.      Mouth/Throat:      Mouth: Mucous membranes are moist.   Eyes:      Extraocular Movements: Extraocular movements intact.   Pulmonary:      Effort: Pulmonary effort is normal.   Skin:     Comments: Distal anterior right lower extremity wound slightly smaller than last exam.  Healthy wound bed.  Rolled edges.  No signs of infection.    Left third digit partially reducible hammertoe deformity with frontal plane rotation/adductovarus position noted.  Ulceration to the tip of the digit is now fully resolved.   Neurological:      Mental Status: She is alert.   Psychiatric:         Mood and Affect: Mood normal.         Behavior: Behavior normal.           Wound 11/22/23 Venous Ulcer Leg Right;Anterior (Active)   Wound Image   01/26/24 0095   Wound Description Pink;Yellow;Slough;Epithelialization;Granulation  tissue;White 01/26/24 1529   Park-wound Assessment Pink;Dry;Swelling 01/26/24 1529   Wound Length (cm) 1.8 cm 01/26/24 1529   Wound Width (cm) 1 cm 01/26/24 1529   Wound Depth (cm) 0.1 cm 01/26/24 1529   Wound Surface Area (cm^2) 1.8 cm^2 01/26/24 1529   Wound Volume (cm^3) 0.18 cm^3 01/26/24 1529   Calculated Wound Volume (cm^3) 0.18 cm^3 01/26/24 1529   Change in Wound Size % 72.73 01/26/24 1529   Drainage Amount Scant 01/26/24 1529   Drainage Description Serous 01/26/24 1529   Non-staged Wound Description Full thickness 01/26/24 1529   Treatments Irrigation with NSS 01/05/24 1435   Dressing Status Intact 01/26/24 1529       Wound 12/29/23 Diabetic Ulcer Toe (Comment  which one) Left (Active)   Wound Image   01/26/24 1533   Wound Description Epithelialization;Eschar 01/26/24 1532   Park-wound Assessment Dry 01/26/24 1532   Wound Length (cm) 0 cm 01/26/24 1532   Wound Width (cm) 0 cm 01/26/24 1532   Wound Depth (cm) 0 cm 01/26/24 1532   Wound Surface Area (cm^2) 0 cm^2 01/26/24 1532   Wound Volume (cm^3) 0 cm^3 01/26/24 1532   Calculated Wound Volume (cm^3) 0 cm^3 01/26/24 1532   Drainage Amount None 01/26/24 1532   Drainage Description Serosanguineous 12/29/23 1423   Non-staged Wound Description Not applicable 01/26/24 1532   Dressing Status Other (Comment) 01/26/24 1532           Wound 11/22/23 Venous Ulcer Leg Right;Anterior (Active)   Wound Image   01/26/24 1531   Wound Description Pink;Yellow;Slough;Epithelialization;Granulation tissue;White 01/26/24 1529   Park-wound Assessment Pink;Dry;Swelling 01/26/24 1529   Wound Length (cm) 1.8 cm 01/26/24 1529   Wound Width (cm) 1 cm 01/26/24 1529   Wound Depth (cm) 0.1 cm 01/26/24 1529   Wound Surface Area (cm^2) 1.8 cm^2 01/26/24 1529   Wound Volume (cm^3) 0.18 cm^3 01/26/24 1529   Calculated Wound Volume (cm^3) 0.18 cm^3 01/26/24 1529   Change in Wound Size % 72.73 01/26/24 1529   Drainage Amount Scant 01/26/24 1529   Drainage Description Serous 01/26/24 1529    Non-staged Wound Description Full thickness 01/26/24 1529   Treatments Irrigation with NSS 01/05/24 1435   Dressing Status Intact 01/26/24 1529       Wound 12/29/23 Diabetic Ulcer Toe (Comment  which one) Left (Active)   Wound Image   01/26/24 1533   Wound Description Epithelialization;Eschar 01/26/24 1532   Park-wound Assessment Dry 01/26/24 1532   Wound Length (cm) 0 cm 01/26/24 1532   Wound Width (cm) 0 cm 01/26/24 1532   Wound Depth (cm) 0 cm 01/26/24 1532   Wound Surface Area (cm^2) 0 cm^2 01/26/24 1532   Wound Volume (cm^3) 0 cm^3 01/26/24 1532   Calculated Wound Volume (cm^3) 0 cm^3 01/26/24 1532   Drainage Amount None 01/26/24 1532   Drainage Description Serosanguineous 12/29/23 1423   Non-staged Wound Description Not applicable 01/26/24 1532   Dressing Status Other (Comment) 01/26/24 1532                         Diagnosis:  1. Non-pressure chronic ulcer right lower leg, limited to breakdown skin (formerly [facility])  -     lidocaine (XYLOCAINE) 4 % topical solution 5 mL  -     Wound cleansing and dressings Traumatic Right;Anterior Leg; Future  -     Wound compression and edema control Traumatic Right;Anterior Leg; Future    2. Type 2 diabetes mellitus with stage 3 chronic kidney disease, without long-term current use of insulin, unspecified whether stage 3a or 3b CKD (formerly [facility])    3. Weak arterial pulse    4. Hammertoes of both feet    5. Chronic venous hypertension (idiopathic) with ulcer of right lower extremity (CODE) (formerly [facility])        Diagnosis ICD-10-CM Associated Orders   1. Non-pressure chronic ulcer right lower leg, limited to breakdown skin (formerly [facility])  L97.911 lidocaine (XYLOCAINE) 4 % topical solution 5 mL     Wound cleansing and dressings Traumatic Right;Anterior Leg     Wound compression and edema control Traumatic Right;Anterior Leg      2. Type 2 diabetes mellitus with stage 3 chronic kidney disease, without long-term current use of insulin, unspecified whether stage 3a or 3b CKD (formerly [facility])  E11.22     N18.30       3. Weak  arterial pulse  R09.89       4. Hammertoes of both feet  M20.41     M20.42       5. Chronic venous hypertension (idiopathic) with ulcer of right lower extremity (CODE) ([facility])  I87.311            ASSESSMENT:  1) right Distal anterior leg ulceration with about 10% healing over 30-days  2) bilateral hammertoe deformities  3) type 2 diabetes with CKD 3

## 2024-01-28 PROCEDURE — 93925 LOWER EXTREMITY STUDY: CPT | Performed by: SURGERY

## 2024-01-28 PROCEDURE — 93922 UPR/L XTREMITY ART 2 LEVELS: CPT | Performed by: SURGERY

## 2024-02-02 ENCOUNTER — OFFICE VISIT (OUTPATIENT)
Dept: WOUND CARE | Facility: HOSPITAL | Age: 89
End: 2024-02-02
Payer: MEDICARE

## 2024-02-02 VITALS — TEMPERATURE: 97.2 F | RESPIRATION RATE: 18 BRPM

## 2024-02-02 DIAGNOSIS — M20.41 HAMMERTOES OF BOTH FEET: ICD-10-CM

## 2024-02-02 DIAGNOSIS — E11.22 TYPE 2 DIABETES MELLITUS WITH STAGE 3 CHRONIC KIDNEY DISEASE, WITHOUT LONG-TERM CURRENT USE OF INSULIN, UNSPECIFIED WHETHER STAGE 3A OR 3B CKD (HCC): ICD-10-CM

## 2024-02-02 DIAGNOSIS — N18.30 TYPE 2 DIABETES MELLITUS WITH STAGE 3 CHRONIC KIDNEY DISEASE, WITHOUT LONG-TERM CURRENT USE OF INSULIN, UNSPECIFIED WHETHER STAGE 3A OR 3B CKD (HCC): ICD-10-CM

## 2024-02-02 DIAGNOSIS — L97.911 NON-PRESSURE CHRONIC ULCER RIGHT LOWER LEG, LIMITED TO BREAKDOWN SKIN (HCC): Primary | ICD-10-CM

## 2024-02-02 DIAGNOSIS — M20.42 HAMMERTOES OF BOTH FEET: ICD-10-CM

## 2024-02-02 PROCEDURE — 28010 INCISION OF TOE TENDON: CPT | Performed by: STUDENT IN AN ORGANIZED HEALTH CARE EDUCATION/TRAINING PROGRAM

## 2024-02-02 PROCEDURE — 11042 DBRDMT SUBQ TIS 1ST 20SQCM/<: CPT | Performed by: STUDENT IN AN ORGANIZED HEALTH CARE EDUCATION/TRAINING PROGRAM

## 2024-02-02 PROCEDURE — NC001 PR NO CHARGE: Performed by: STUDENT IN AN ORGANIZED HEALTH CARE EDUCATION/TRAINING PROGRAM

## 2024-02-02 RX ORDER — LIDOCAINE HYDROCHLORIDE 40 MG/ML
5 SOLUTION TOPICAL ONCE
Status: COMPLETED | OUTPATIENT
Start: 2024-02-02 | End: 2024-02-02

## 2024-02-02 RX ADMIN — LIDOCAINE HYDROCHLORIDE 5 ML: 40 SOLUTION TOPICAL at 14:56

## 2024-02-02 NOTE — PATIENT INSTRUCTIONS
Orders Placed This Encounter   Procedures    Wound compression and edema control Venous Ulcer Right;Anterior Leg     Compression Stocking: Spandagrip size E     Remove compression stockings every night at bedtime and re-apply first thing morning. Follow daily skin care as instructed.     Avoid prolonged standing in one place.     Elevate leg(s) above the level of the heart when sitting or as much as possible.     Standing Status:   Future     Standing Expiration Date:   2/2/2025    Wound cleansing and dressings Venous Ulcer Right;Anterior Leg     Keep dressing on left foot (toe) until you see Dr. Blandon on Tuesday 2/6/24     Standing Status:   Future     Standing Expiration Date:   2/2/2025

## 2024-02-05 NOTE — PROGRESS NOTES
Patient ID: Tanya Stephen is a 89 y.o. female Date of Birth 7/21/1934     My role is Foot, Ankle and Wound Specialist    PLAN:    -Educated patient on their condition.   -The patient's wound is not currently infected. We discussed the importance of recognizing systemic and local signs of infection and going directly to the emergency department should any of these occur  -Debridement as below:   -Discussed proper care of dressings, patient is not to get them wet at all. If the dressings do get wet, they must be removed and redressed.  -return to wound care in  4-days for post-procedure visit  -Continue Medihoney, DSD, Spandage  to right lower extremity.   -Patient was instructed to purchase toe slings online for left foot in order to offload distal digit.  -Percutaneous flexor tenotomy of left third digit performed as described below  -Patient, , and daughter verbalize understanding of plan     Patient optimization:  -Vascular:              Arterial -lower extremity arterial duplex study from 1/27/2024 reviewed: No evidence of lower extremity arterial occlusive disease bilaterally with bilateral great toe pressures above the healing range.              Venous - chronic venous hypertension noted, given results of arterial studies, will consider addition of compression to right lower extremity at patient's next visit.              Lymphatic -no evidence of lymphatic disease     -MSK:              Pressure reduction -continue wearing wide toebox supportive sneakers.  Use of toe sling to left foot second through fourth digits.  Patient was instructed to purchase this online in order to offload distal digits.              Deformity and possible correction -left third digit percutaneous flexor tenotomy performed 2/2/2024     -Neuro:              Neuropathy - We discussed checking feet daily for additional cuts, bruises, or wounds. We also discussed refraining from walking barefoot and wearing white socks in  "order to notice drainage     -Nutritional:              Dietary supplementation - Recommend high protein diet with Toni supplementation until wound is fully healed              Smoking cessation - N/A              Glycemic control -previous hemoglobin A1c 5.9% from 4/19/2022. Will consider ordering new HgbA1c at patient's next visit.     Debridement   Wound 11/22/23 Venous Ulcer Leg Right;Anterior    Universal Protocol:  Consent: Verbal consent obtained.  Risks and benefits: risks, benefits and alternatives were discussed  Consent given by: patient  Time out: Immediately prior to procedure a \"time out\" was called to verify the correct patient, procedure, equipment, support staff and site/side marked as required.  Patient understanding: patient states understanding of the procedure being performed  Patient identity confirmed: verbally with patient    Debridement Details  Performed by: physician  Debridement type: surgical  Level of debridement: subcutaneous tissue  Pain control: lidocaine 4%      Post-debridement measurements  Length (cm): 1.6  Width (cm): 0.9  Depth (cm): 0.1  Percent debrided: 100%  Surface Area (cm^2): 1.44  Area Debrided (cm^2): 1.44  Volume (cm^3): 0.14    Tissue and other material debrided: dermis, epidermis and subcutaneous tissue  Devitalized tissue debrided: biofilm, fibrin and slough  Instrument(s) utilized: curette  Bleeding: small  Hemostasis obtained with: pressure  Procedural pain (0-10): 2  Post-procedural pain: 2   Response to treatment: procedure was tolerated well       Percutaneous Tenotomy of left 3rd toe; single tendon. (CPT 14068)    I obtained informed verbal consent prior to the procedure. I recommended we proceed with a tenotomy to help reduce the pressures on the tip of the deformed toe. Patient is agreeable to that. No guarantees were given of successful correction of the toe deformity nor the toe callus. I explained complications such as over or under correction, " bleeding, infection, pain.     I prepped the left 3rd toe on the left foot. A 5cc injection of 1% lidocaine plain was given in digital block fashion.  After adequate anesthesia, an 18 gauge needle was introduced plantarly at the skin crease of the IPJ and was utilized to transect the flexor tendon. Immediate release of the tendon contracture was noted. A small DSD was applied followed by a series of gauze and tape strips to hold the toe in the corrected position. She was instructed to keep this dressing clean, dry and intact until his next visit.      Wound Instructions    Orders Placed This Encounter   Procedures    Wound compression and edema control Venous Ulcer Right;Anterior Leg     Compression Stocking: Spandagrip size E     Remove compression stockings every night at bedtime and re-apply first thing morning. Follow daily skin care as instructed.     Avoid prolonged standing in one place.     Elevate leg(s) above the level of the heart when sitting or as much as possible.     Standing Status:   Future     Standing Expiration Date:   2/2/2025    Wound cleansing and dressings Venous Ulcer Right;Anterior Leg     Keep dressing on left foot (toe) until you see Dr. Blandon on Tuesday 2/6/24     Standing Status:   Future     Standing Expiration Date:   2/2/2025    Debridement     This order was created via procedure documentation     SUBJECTIVE:    Chief complaint  Right leg ulceration, left third digit partially reducible hammertoe deformity    11/22/23: Consult - Tanya is a pleasant 89-year-old female with a past medical history of type 2 diabetes mellitus most recent A1c 5.9% 1 year ago, diabetic gastroparesis, polyneuropathy, ambulatory dysfunction, HFpEF G1DD, and Raynaud's disease here today for initial wound care consult.  Patient was referred by primary care provider Dr. Gregory Santoro.  Patient sustained the wound traumatically  to the LLE approximately 2 months ago when the fridge door fell against her  leg.  At the time she was seen her PCP and prescribed as course of azithromycin + Neosporin t[to the wound.  Since then wound care has consisted of Neosporin however PCP saw the patient yesterday and advised that she make an appointment to be seen by wound care due to nonhealing status.  Patient denies any symptoms of infection today including fever chills diaphoresis. She does regularly wear compression socks.     2/2/24: Tanya states that she is doing well since her last visit.  Her daughter and  are present with her today.  She denies any systemic signs of infection since her last wound care visit.  She states she has been using her toe sling since her last visit.  She states that she has not ordered any toe slings online yet but plans to do so prior to her next visit.  We did discuss this in greater detail today and that showed [facility]  which toe slings to order.          The following portions of the patient's history were reviewed and updated as appropriate:   Patient Active Problem List   Diagnosis    Anemia    Shortness of breath    Hypomagnesemia    DM (diabetes mellitus), type 2 (HCC)    Urinary frequency    Iron deficiency anemia secondary to inadequate dietary iron intake    Sinobronchitis    Post zoster neuralgia    Primary generalized (osteo)arthritis    Seronegative arthropathy of multiple sites (HCC)    Senile osteoporosis    Lumbar spondylosis    Diabetic polyneuropathy associated with type 2 diabetes mellitus (HCC)    Diabetic gastroparesis associated with type 2 diabetes mellitus     Irritable bowel syndrome with diarrhea    Stage 3b chronic kidney disease (HCC)    Traumatic injury of sacrum    Tendinitis of left rotator cuff    Abnormality of gait due to impairment of balance    Sicca syndrome (HCC)    Chest pain    Leukocytosis    Chronic diastolic heart failure (HCC)    OAB (overactive bladder)    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)    Toxic metabolic  encephalopathy    Cellulitis    Dyslipidemia    Primary hypertension    Spinal stenosis of lumbar region with neurogenic claudication    Chronic pain syndrome    SI (sacroiliac) joint dysfunction    Elevated LFTs    Ambulatory dysfunction    Recurrent UTI    Gastroesophageal reflux disease without esophagitis    Dysphagia    Raynaud's phenomenon without gangrene    Elevated liver enzymes    Incontinence of feces with fecal urgency     Past Medical History:   Diagnosis Date    Anemia     Arthritis     Back pain     CHF (congestive heart failure) (HCC)     Chronic kidney disease     Stage 3b    Confusion 2023    Diabetes (HCC)     Diabetes mellitus (HCC)     Diabetic gastroparesis associated with type 2 diabetes mellitus      Diabetic polyneuropathy (HCC)     Diverticulosis     Herpes zoster     Hypertension     IBS (irritable bowel syndrome)     Neurogenic claudication due to lumbar spinal stenosis     Osteoarthritis     Osteoporosis     Pulmonary edema     Raynaud's phenomenon without gangrene     Seronegative arthropathy of multiple sites (HCC)     Sicca (HCC)      Past Surgical History:   Procedure Laterality Date    BACK SURGERY      COLONOSCOPY      EGD AND COLONOSCOPY N/A 2016    Procedure: EGD AND COLONOSCOPY;  Surgeon: Elina Anderson MD;  Location: [facility];  Service:     JOINT REPLACEMENT      bilat knees and hips    OTHER SURGICAL HISTORY      pelvis rods    REPLACEMENT TOTAL KNEE BILATERAL      STOMACH SURGERY      UPPER GASTROINTESTINAL ENDOSCOPY       Social History     Socioeconomic History    Marital status: /Civil Union     Spouse name: Weston    Number of children: 2    Years of education: Not on file    Highest education level: High school graduate   Occupational History    Not on file   Tobacco Use    Smoking status: Former     Current packs/day: 0.00     Types: Cigarettes     Quit date:      Years since quittin.1    Smokeless tobacco: Never   Vaping Use    Vaping status:  Never Used   Substance and Sexual Activity    Alcohol use: No    Drug use: No    Sexual activity: Not Currently     Partners: Male     Birth control/protection: None   Other Topics Concern    Not on file   Social History Narrative    Not on file     Social Determinants of Health     Financial Resource Strain: Not on file   Food Insecurity: No Food Insecurity (2/23/2023)    Hunger Vital Sign     Worried About Running Out of Food in the Last Year: Never true     Ran Out of Food in the Last Year: Never true   Transportation Needs: No Transportation Needs (2/23/2023)    PRAPARE - Transportation     Lack of Transportation (Medical): No     Lack of Transportation (Non-Medical): No   Physical Activity: Not on file   Stress: Not on file   Social Connections: Not on file   Intimate Partner Violence: Not on file   Housing Stability: Low Risk  (2/23/2023)    Housing Stability Vital Sign     Unable to Pay for Housing in the Last Year: No     Number of Places Lived in the Last Year: 1     Unstable Housing in the Last Year: No        Current Outpatient Medications:     amitriptyline (ELAVIL) 10 mg tablet, TAKE TWO TABLETS BY MOUTH AT BEDTIME, Disp: 180 tablet, Rfl: 1    aspirin 81 mg chewable tablet, Chew 1 tablet (81 mg total) daily, Disp: 30 tablet, Rfl: 0    atorvastatin (LIPITOR) 10 mg tablet, TAKE ONE TABLET BY MOUTH EVERY DAY, Disp: 90 tablet, Rfl: 3    Cholecalciferol (VITAMIN D3) 1000 units CAPS, Take 1 capsule by mouth daily , Disp: , Rfl:     dicyclomine (BENTYL) 10 mg capsule, Take 1 capsule (10 mg total) by mouth 3 (three) times a day as needed (abd pain) (Patient not taking: Reported on 11/22/2023), Disp: 30 capsule, Rfl: 2    diphenoxylate-atropine (LOMOTIL) 2.5-0.025 mg per tablet, Take 1 tablet by mouth if needed (Patient not taking: Reported on 12/18/2023), Disp: , Rfl:     diphenoxylate-atropine (LOMOTIL) 2.5-0.025 mg per tablet, Take 1 tablet by mouth 4 (four) times a day as needed for diarrhea, Disp: 30  tablet, Rfl: 0    DULoxetine (CYMBALTA) 60 mg delayed release capsule, TAKE ONE CAPSULE BY MOUTH EVERY DAY, Disp: 30 capsule, Rfl: 5    hydroxychloroquine (PLAQUENIL) 200 mg tablet, Take 1 tablet (200 mg total) by mouth daily with breakfast, Disp: 90 tablet, Rfl: 1    Magnesium 250 MG TABS, Take 250 mg by mouth every evening, Disp: , Rfl:     metFORMIN (GLUCOPHAGE) 500 mg tablet, Take 500 mg by mouth 2 (two) times a day (Patient not taking: Reported on 6/27/2023), Disp: , Rfl:     metoprolol succinate (TOPROL-XL) 25 mg 24 hr tablet, Take 0.5 tablets (12.5 mg total) by mouth daily (Patient not taking: Reported on 6/27/2023), Disp: 45 tablet, Rfl: 3    Mirabegron ER (Myrbetriq) 50 MG TB24, Take 1 tablet (50 mg total) by mouth every evening, Disp: 30 tablet, Rfl: 11    nitrofurantoin (MACRODANTIN) 50 mg capsule, TAKE ONE CAPSULE BY MOUTH EVERY DAY IN THE MORNING, Disp: 30 capsule, Rfl: 6    omeprazole (PriLOSEC) 20 mg delayed release capsule, Take 20 mg by mouth daily  , Disp: , Rfl:     pregabalin (LYRICA) 75 mg capsule, TAKE ONE CAPSULE BY MOUTH THREE TIMES A DAY, Disp: 90 capsule, Rfl: 5    pyridoxine (B-6) 100 MG tablet, Take 100 mg by mouth daily, Disp: , Rfl:     Sodium Fluoride (PreviDent 5000 Booster Plus) 1.1 % PSTE, Apply on teeth every evening as directed, Disp: 100 mL, Rfl: 3    torsemide (DEMADEX) 10 mg tablet, Take 1 tablet PRN for wt gain 3 lb/ 3 days or 5 lb/ 5 aria (Patient not taking: Reported on 6/27/2023), Disp: 30 tablet, Rfl: 0  Family History   Problem Relation Age of Onset    Cancer Brother     Diabetes Mother     Hypertension Father     Heart disease Father       Review of Systems   Constitutional:  Negative for chills, diaphoresis, fatigue and fever.   Skin:  Positive for wound.   All other systems reviewed and are negative.      Allergies  Morphine and Ciprofloxacin    OBJECTIVE:  Temp (!) 97.2 °F (36.2 °C)   Resp 18   LMP  (LMP Unknown)     Physical Exam  Vitals reviewed.    Constitutional:       Appearance: Normal appearance.   HENT:      Head: Normocephalic and atraumatic.      Mouth/Throat:      Mouth: Mucous membranes are moist.   Eyes:      Extraocular Movements: Extraocular movements intact.   Pulmonary:      Effort: Pulmonary effort is normal.   Skin:     Comments: Distal anterior right lower extremity wound slightly smaller than last exam.  Healthy wound bed.  Rolled edges.  No signs of infection.    Left third digit partially reducible hammertoe deformity with frontal plane rotation/adductovarus position noted.  Ulceration to the tip of the digit remains fully resolved.   Neurological:      Mental Status: She is alert.   Psychiatric:         Mood and Affect: Mood normal.         Behavior: Behavior normal.           Wound 11/22/23 Venous Ulcer Leg Right;Anterior (Active)   Wound Image   02/02/24 1453   Wound Description Pink;Slough;Epithelialization;Granulation tissue;White;Yellow 02/02/24 1453   Park-wound Assessment Pink;Dry;Maceration 02/02/24 1453   Wound Length (cm) 1.6 cm 02/02/24 1453   Wound Width (cm) 0.9 cm 02/02/24 1453   Wound Depth (cm) 0.1 cm 02/02/24 1453   Wound Surface Area (cm^2) 1.44 cm^2 02/02/24 1453   Wound Volume (cm^3) 0.144 cm^3 02/02/24 1453   Calculated Wound Volume (cm^3) 0.14 cm^3 02/02/24 1453   Change in Wound Size % 78.79 02/02/24 1453   Drainage Amount Scant 02/02/24 1453   Drainage Description Serous 02/02/24 1453   Non-staged Wound Description Full thickness 02/02/24 1453   Treatments Irrigation with NSS 01/05/24 1435   Dressing Status Intact 02/02/24 1453           Wound 11/22/23 Venous Ulcer Leg Right;Anterior (Active)   Wound Image   02/02/24 1453   Wound Description Pink;Slough;Epithelialization;Granulation tissue;White;Yellow 02/02/24 1453   Park-wound Assessment Pink;Dry;Maceration 02/02/24 1453   Wound Length (cm) 1.6 cm 02/02/24 1453   Wound Width (cm) 0.9 cm 02/02/24 1453   Wound Depth (cm) 0.1 cm 02/02/24 1453   Wound Surface Area  (cm^2) 1.44 cm^2 02/02/24 1453   Wound Volume (cm^3) 0.144 cm^3 02/02/24 1453   Calculated Wound Volume (cm^3) 0.14 cm^3 02/02/24 1453   Change in Wound Size % 78.79 02/02/24 1453   Drainage Amount Scant 02/02/24 1453   Drainage Description Serous 02/02/24 1453   Non-staged Wound Description Full thickness 02/02/24 1453   Treatments Irrigation with NSS 01/05/24 1435   Dressing Status Intact 02/02/24 1453                         Diagnosis:  1. 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 Hammertoes of both feet    3. Type 2 diabetes mellitus with stage 3 chronic kidney disease, without long-term current use of insulin, unspecified whether stage 3a or 3b CKD ([facility])        Diagnosis ICD-10-CM Associated Orders   1. 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 Hammertoes of both feet  M20.41     M20.42       3. Type 2 diabetes mellitus with stage 3 chronic kidney disease, without long-term current use of insulin, unspecified whether stage 3a or 3b CKD ([facility])  E11.22     N18.30            ASSESSMENT:    1) right Distal anterior leg ulceration with about 20% healing over 30-days  2) bilateral hammertoe deformities  3) type 2 diabetes with CKD 3

## 2024-02-06 ENCOUNTER — OFFICE VISIT (OUTPATIENT)
Dept: WOUND CARE | Facility: HOSPITAL | Age: 89
End: 2024-02-06
Payer: MEDICARE

## 2024-02-06 ENCOUNTER — TELEPHONE (OUTPATIENT)
Age: 89
End: 2024-02-06

## 2024-02-06 VITALS
DIASTOLIC BLOOD PRESSURE: 62 MMHG | SYSTOLIC BLOOD PRESSURE: 109 MMHG | TEMPERATURE: 97.7 F | HEART RATE: 93 BPM | RESPIRATION RATE: 15 BRPM

## 2024-02-06 DIAGNOSIS — L97.911 NON-PRESSURE CHRONIC ULCER RIGHT LOWER LEG, LIMITED TO BREAKDOWN SKIN (HCC): Primary | ICD-10-CM

## 2024-02-06 DIAGNOSIS — M20.41 HAMMERTOES OF BOTH FEET: ICD-10-CM

## 2024-02-06 DIAGNOSIS — N18.30 TYPE 2 DIABETES MELLITUS WITH STAGE 3 CHRONIC KIDNEY DISEASE, WITHOUT LONG-TERM CURRENT USE OF INSULIN, UNSPECIFIED WHETHER STAGE 3A OR 3B CKD (HCC): ICD-10-CM

## 2024-02-06 DIAGNOSIS — M20.42 HAMMERTOES OF BOTH FEET: ICD-10-CM

## 2024-02-06 DIAGNOSIS — E11.22 TYPE 2 DIABETES MELLITUS WITH STAGE 3 CHRONIC KIDNEY DISEASE, WITHOUT LONG-TERM CURRENT USE OF INSULIN, UNSPECIFIED WHETHER STAGE 3A OR 3B CKD (HCC): ICD-10-CM

## 2024-02-06 DIAGNOSIS — R73.01 ELEVATED FASTING GLUCOSE: ICD-10-CM

## 2024-02-06 PROCEDURE — 11042 DBRDMT SUBQ TIS 1ST 20SQCM/<: CPT | Performed by: STUDENT IN AN ORGANIZED HEALTH CARE EDUCATION/TRAINING PROGRAM

## 2024-02-06 RX ORDER — LIDOCAINE HYDROCHLORIDE 40 MG/ML
5 SOLUTION TOPICAL ONCE
Status: COMPLETED | OUTPATIENT
Start: 2024-02-06 | End: 2024-02-06

## 2024-02-06 RX ADMIN — LIDOCAINE HYDROCHLORIDE 5 ML: 40 SOLUTION TOPICAL at 14:14

## 2024-02-06 NOTE — TELEPHONE ENCOUNTER
Patients GI provider:  Dr. Zaragoza  -  Number to return call: [phone]  -  Reason for call: Pt  called to reschedule her appt because they were going to do the breath test this weekend. I added to the next available of 5/8/24 and put on the waitlist. If pt needs to be sooner please reach out to her    Scheduled procedure/appointment date if applicable: Apt/5/8/24

## 2024-02-06 NOTE — PATIENT INSTRUCTIONS
Orders Placed This Encounter   Procedures    Wound compression and edema control Venous Ulcer Right;Anterior Leg     Compression Stocking: Spandagrip size E     Remove compression stockings every night at bedtime and re-apply first thing morning. Follow daily skin care as instructed.     Avoid prolonged standing in one place.     Elevate leg(s) above the level of the heart when sitting or as much as possible.     Standing Status:   Future     Standing Expiration Date:   2/6/2025    Wound cleansing and dressings Venous Ulcer Right;Anterior Leg     Right Leg Wound:    Wash your hands with soap and water.  Remove old dressing, discard into plastic bag and place in trash.  Cleanse the wound with soap and water prior to applying a clean dressing. Do not use tissue or cotton balls. Do not scrub the wound. Pat dry using gauze.  Shower no   Apply moisturizer to skin surrounding wound  Apply Puracol AG to the leg wound.  Cover with gauze.  Secure with rolled gauze and tape.  Change dressing three times per week.    This was done today.     Standing Status:   Future     Standing Expiration Date:   2/6/2025    Wound miscellaneous orders     Have labwork done as ordered (Hemoglobin A1C)    Protein: Eat protein with each meal to promote healing.  Examples of protein are fish, meat, chicken, nuts, peanut butter, eggs, lentils, edamame or a protein shake.    Wound infection:  If you have signs of infection please call the wound center.  If the wound center is closed- please go to the Emergency department.  Some signs of infection:  fever, chills, increased redness, red streaks, increase in pain, increased drainage.  Drainage with an odor, Change in drainage color: white/milky/green/tan/yellow,  an increase in swelling, chest pain and/or shortness of breath.     Standing Status:   Future     Standing Expiration Date:   2/6/2025    Wound cleansing and dressings Surgical Left Toe D3, third     Wash your hands with soap and water.   Remove old dressing, discard into plastic bag and place in trash.  Cleanse the wound with soap and water prior to applying a clean dressing. Do not use tissue or cotton balls. Do not scrub the wound. Pat dry using gauze.  Shower no     Apply betadine to the toe wound.  Cover with bandaid.    Change dressing at next Wound Center visit.  IF the bandaid gets wet, remove the bandaid, dry wound thoroughly; apply betadine and let it dry then apply new bandaid.    This was done today.     Standing Status:   Future     Standing Expiration Date:   2/6/2025

## 2024-02-07 NOTE — PROGRESS NOTES
Patient ID: Tanya Stephen is a 89 y.o. female Date of Birth 7/21/1934     My role is Foot, Ankle and Wound Specialist    PLAN:    -Educated patient on their condition.   -The patient's wound is not currently infected. We discussed the importance of recognizing systemic and local signs of infection and going directly to the emergency department should any of these occur  -Debridement as below:   -Discussed proper care of dressings, patient is not to get them wet at all. If the dressings do get wet, they must be removed and redressed.  -return to wound care in 1-week  -Follow-up hemoglobin A1c  -Continue Puracol, DSD, Spandage  to right lower extremity.   -Patient was instructed to refrain from using her toe slings until her next visit.  -Patient, , and daughter verbalize understanding of plan     Patient optimization:  -Vascular:              Arterial -lower extremity arterial duplex study from 1/27/2024 reviewed: No evidence of lower extremity arterial occlusive disease bilaterally with bilateral great toe pressures above the healing range.              Venous - chronic venous hypertension noted              Lymphatic -no evidence of lymphatic disease     -MSK:              Pressure reduction -continue wearing wide toebox supportive sneakers.  Use of toe sling to left foot second through fourth digits.  Patient was instructed to purchase this online in order to offload distal digits.              Deformity and possible correction -left third digit percutaneous flexor tenotomy performed 2/2/2024     -Neuro:              Neuropathy - We discussed checking feet daily for additional cuts, bruises, or wounds. We also discussed refraining from walking barefoot and wearing white socks in order to notice drainage     -Nutritional:              Dietary supplementation - Recommend high protein diet with Toni supplementation until wound is fully healed              Smoking cessation - N/A              Glycemic  "control -previous hemoglobin A1c 5.9% from 4/19/2022.  Follow-up repeat hemoglobin A1c    Debridement   Wound 11/22/23 Venous Ulcer Leg Right;Anterior    Universal Protocol:  Consent: Verbal consent obtained.  Risks and benefits: risks, benefits and alternatives were discussed  Consent given by: patient  Time out: Immediately prior to procedure a \"time out\" was called to verify the correct patient, procedure, equipment, support staff and site/side marked as required.  Patient understanding: patient states understanding of the procedure being performed  Patient identity confirmed: verbally with patient    Debridement Details  Performed by: physician  Debridement type: surgical  Level of debridement: subcutaneous tissue      Post-debridement measurements  Length (cm): 1.1  Width (cm): 1.3  Depth (cm): 0.1  Percent debrided: 100%  Surface Area (cm^2): 1.43  Area Debrided (cm^2): 1.43  Volume (cm^3): 0.14    Tissue and other material debrided: dermis, epidermis and subcutaneous tissue  Devitalized tissue debrided: biofilm, fibrin and slough  Instrument(s) utilized: curette  Bleeding: small  Hemostasis obtained with: pressure  Procedural pain (0-10): insensate  Post-procedural pain: insensate   Response to treatment: procedure was tolerated well         Wound Instructions    Orders Placed This Encounter   Procedures    Wound compression and edema control Venous Ulcer Right;Anterior Leg     Compression Stocking: Spandagrip size E     Remove compression stockings every night at bedtime and re-apply first thing morning. Follow daily skin care as instructed.     Avoid prolonged standing in one place.     Elevate leg(s) above the level of the heart when sitting or as much as possible.     Standing Status:   Future     Standing Expiration Date:   2/6/2025    Wound cleansing and dressings Venous Ulcer Right;Anterior Leg     Right Leg Wound:    Wash your hands with soap and water.  Remove old dressing, discard into plastic bag " and place in trash.  Cleanse the wound with soap and water prior to applying a clean dressing. Do not use tissue or cotton balls. Do not scrub the wound. Pat dry using gauze.  Shower no   Apply moisturizer to skin surrounding wound  Apply Puracol AG to the leg wound.  Cover with gauze.  Secure with rolled gauze and tape.  Change dressing three times per week.    This was done today.     Standing Status:   Future     Standing Expiration Date:   2/6/2025    Wound miscellaneous orders     Have labwork done as ordered (Hemoglobin A1C)    Protein: Eat protein with each meal to promote healing.  Examples of protein are fish, meat, chicken, nuts, peanut butter, eggs, lentils, edamame or a protein shake.    Wound infection:  If you have signs of infection please call the wound center.  If the wound center is closed- please go to the Emergency department.  Some signs of infection:  fever, chills, increased redness, red streaks, increase in pain, increased drainage.  Drainage with an odor, Change in drainage color: white/milky/green/tan/yellow,  an increase in swelling, chest pain and/or shortness of breath.     Standing Status:   Future     Standing Expiration Date:   2/6/2025    Wound cleansing and dressings Surgical Left Toe D3, third     Wash your hands with soap and water.  Remove old dressing, discard into plastic bag and place in trash.  Cleanse the wound with soap and water prior to applying a clean dressing. Do not use tissue or cotton balls. Do not scrub the wound. Pat dry using gauze.  Shower no     Apply betadine to the toe wound.  Cover with bandaid.    Change dressing at next Wound Center visit.  IF the bandaid gets wet, remove the bandaid, dry wound thoroughly; apply betadine and let it dry then apply new bandaid.    This was done today.     Standing Status:   Future     Standing Expiration Date:   2/6/2025     SUBJECTIVE:    Chief complaint  Right leg ulcer    11/22/23: Cynthia Martínez is a pleasant  89-year-old female with a past medical history of type 2 diabetes mellitus most recent A1c 5.9% 1 year ago, diabetic gastroparesis, polyneuropathy, ambulatory dysfunction, HFpEF G1DD, and Raynaud's disease here today for initial wound care consult.  Patient was referred by primary care provider Dr. Gregory Santoro.  Patient sustained the wound traumatically  to the LLE approximately 2 months ago when the fridge door fell against her leg.  At the time she was seen her PCP and prescribed as course of azithromycin + Neosporin t[to the wound.  Since then wound care has consisted of Neosporin however PCP saw the patient yesterday and advised that she make an appointment to be seen by wound care due to nonhealing status.  Patient denies any symptoms of infection today including fever chills diaphoresis. She does regularly wear compression socks.     2/6/24: Tanya states that she is doing well since her last visit.  Her daughter and  are present with her today.  She denies any systemic signs of infection since her last wound care visit.  She states that she did finally get her toe slings however has not used them yet secondary to her procedure at her last visit.  She states that her bandages to the left foot did get wet states that she will do better job of keeping them dry until her next visit.          The following portions of the patient's history were reviewed and updated as appropriate:   Patient Active Problem List   Diagnosis    Anemia    Shortness of breath    Hypomagnesemia    DM (diabetes mellitus), type 2 (HCC)    Urinary frequency    Iron deficiency anemia secondary to inadequate dietary iron intake    Sinobronchitis    Post zoster neuralgia    Primary generalized (osteo)arthritis    Seronegative arthropathy of multiple sites (HCC)    Senile osteoporosis    Lumbar spondylosis    Diabetic polyneuropathy associated with type 2 diabetes mellitus (HCC)    Diabetic gastroparesis associated with type 2  diabetes mellitus     Irritable bowel syndrome with diarrhea    Stage 3b chronic kidney disease (HCC)    Traumatic injury of sacrum    Tendinitis of left rotator cuff    Abnormality of gait due to impairment of balance    Sicca syndrome (HCC)    Chest pain    Leukocytosis    Chronic diastolic heart failure (HCC)    OAB (overactive bladder)    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)    Toxic metabolic encephalopathy    Cellulitis    Dyslipidemia    Primary hypertension    Spinal stenosis of lumbar region with neurogenic claudication    Chronic pain syndrome    SI (sacroiliac) joint dysfunction    Elevated LFTs    Ambulatory dysfunction    Recurrent UTI    Gastroesophageal reflux disease without esophagitis    Dysphagia    Raynaud's phenomenon without gangrene    Elevated liver enzymes    Incontinence of feces with fecal urgency     Past Medical History:   Diagnosis Date    Anemia     Arthritis     Back pain     CHF (congestive heart failure) (HCC)     Chronic kidney disease     Stage 3b    Confusion 02/22/2023    Diabetes (HCC)     Diabetes mellitus (HCC)     Diabetic gastroparesis associated with type 2 diabetes mellitus      Diabetic polyneuropathy (HCC)     Diverticulosis     Herpes zoster     Hypertension     IBS (irritable bowel syndrome)     Neurogenic claudication due to lumbar spinal stenosis     Osteoarthritis     Osteoporosis     Pulmonary edema     Raynaud's phenomenon without gangrene     Seronegative arthropathy of multiple sites (HCC)     Sicca (HCC)      Past Surgical History:   Procedure Laterality Date    BACK SURGERY      COLONOSCOPY      EGD AND COLONOSCOPY N/A 12/23/2016    Procedure: EGD AND COLONOSCOPY;  Surgeon: Elina Anderson MD;  Location: [facility];  Service:     JOINT REPLACEMENT      bilat knees and hips    OTHER SURGICAL HISTORY      pelvis rods    REPLACEMENT TOTAL KNEE BILATERAL      STOMACH SURGERY      UPPER GASTROINTESTINAL ENDOSCOPY       Social History      Socioeconomic History    Marital status: /Civil Union     Spouse name: Weston    Number of children: 2    Years of education: Not on file    Highest education level: High school graduate   Occupational History    Not on file   Tobacco Use    Smoking status: Former     Current packs/day: 0.00     Types: Cigarettes     Quit date:      Years since quittin.1    Smokeless tobacco: Never   Vaping Use    Vaping status: Never Used   Substance and Sexual Activity    Alcohol use: No    Drug use: No    Sexual activity: Not Currently     Partners: Male     Birth control/protection: None   Other Topics Concern    Not on file   Social History Narrative    Not on file     Social Determinants of Health     Financial Resource Strain: Not on file   Food Insecurity: No Food Insecurity (2023)    Hunger Vital Sign     Worried About Running Out of Food in the Last Year: Never true     Ran Out of Food in the Last Year: Never true   Transportation Needs: No Transportation Needs (2023)    PRAPARE - Transportation     Lack of Transportation (Medical): No     Lack of Transportation (Non-Medical): No   Physical Activity: Not on file   Stress: Not on file   Social Connections: Not on file   Intimate Partner Violence: Not on file   Housing Stability: Low Risk  (2023)    Housing Stability Vital Sign     Unable to Pay for Housing in the Last Year: No     Number of Places Lived in the Last Year: 1     Unstable Housing in the Last Year: No        Current Outpatient Medications:     amitriptyline (ELAVIL) 10 mg tablet, TAKE TWO TABLETS BY MOUTH AT BEDTIME, Disp: 180 tablet, Rfl: 1    aspirin 81 mg chewable tablet, Chew 1 tablet (81 mg total) daily, Disp: 30 tablet, Rfl: 0    atorvastatin (LIPITOR) 10 mg tablet, TAKE ONE TABLET BY MOUTH EVERY DAY, Disp: 90 tablet, Rfl: 3    Cholecalciferol (VITAMIN D3) 1000 units CAPS, Take 1 capsule by mouth daily , Disp: , Rfl:     dicyclomine (BENTYL) 10 mg capsule, Take 1 capsule  (10 mg total) by mouth 3 (three) times a day as needed (abd pain) (Patient not taking: Reported on 11/22/2023), Disp: 30 capsule, Rfl: 2    diphenoxylate-atropine (LOMOTIL) 2.5-0.025 mg per tablet, Take 1 tablet by mouth if needed (Patient not taking: Reported on 12/18/2023), Disp: , Rfl:     diphenoxylate-atropine (LOMOTIL) 2.5-0.025 mg per tablet, Take 1 tablet by mouth 4 (four) times a day as needed for diarrhea, Disp: 30 tablet, Rfl: 0    DULoxetine (CYMBALTA) 60 mg delayed release capsule, TAKE ONE CAPSULE BY MOUTH EVERY DAY, Disp: 30 capsule, Rfl: 5    hydroxychloroquine (PLAQUENIL) 200 mg tablet, Take 1 tablet (200 mg total) by mouth daily with breakfast, Disp: 90 tablet, Rfl: 1    Magnesium 250 MG TABS, Take 250 mg by mouth every evening, Disp: , Rfl:     metFORMIN (GLUCOPHAGE) 500 mg tablet, Take 500 mg by mouth 2 (two) times a day (Patient not taking: Reported on 6/27/2023), Disp: , Rfl:     metoprolol succinate (TOPROL-XL) 25 mg 24 hr tablet, Take 0.5 tablets (12.5 mg total) by mouth daily (Patient not taking: Reported on 6/27/2023), Disp: 45 tablet, Rfl: 3    Mirabegron ER (Myrbetriq) 50 MG TB24, Take 1 tablet (50 mg total) by mouth every evening, Disp: 30 tablet, Rfl: 11    nitrofurantoin (MACRODANTIN) 50 mg capsule, TAKE ONE CAPSULE BY MOUTH EVERY DAY IN THE MORNING, Disp: 30 capsule, Rfl: 6    omeprazole (PriLOSEC) 20 mg delayed release capsule, Take 20 mg by mouth daily  , Disp: , Rfl:     pregabalin (LYRICA) 75 mg capsule, TAKE ONE CAPSULE BY MOUTH THREE TIMES A DAY, Disp: 90 capsule, Rfl: 5    pyridoxine (B-6) 100 MG tablet, Take 100 mg by mouth daily, Disp: , Rfl:     Sodium Fluoride (PreviDent 5000 Booster Plus) 1.1 % PSTE, Apply on teeth every evening as directed, Disp: 100 mL, Rfl: 3    torsemide (DEMADEX) 10 mg tablet, Take 1 tablet PRN for wt gain 3 lb/ 3 days or 5 lb/ 5 aria (Patient not taking: Reported on 6/27/2023), Disp: 30 tablet, Rfl: 0  No current facility-administered medications  for this visit.  Family History   Problem Relation Age of Onset    Cancer Brother     Diabetes Mother     Hypertension Father     Heart disease Father       Review of Systems   Constitutional:  Negative for chills, diaphoresis, fatigue and fever.   Skin:  Positive for wound.   All other systems reviewed and are negative.      Allergies  Morphine and Ciprofloxacin    OBJECTIVE:  /62   Pulse 93   Temp 97.7 °F (36.5 °C)   Resp 15   LMP  (LMP Unknown)     Physical Exam  Vitals reviewed.   Constitutional:       Appearance: Normal appearance.   HENT:      Head: Normocephalic and atraumatic.      Mouth/Throat:      Mouth: Mucous membranes are moist.   Eyes:      Extraocular Movements: Extraocular movements intact.   Pulmonary:      Effort: Pulmonary effort is normal.   Skin:     Comments: Distal anterior right lower extremity wound slightly smaller than last exam.  Healthy wound bed.  Rolled edges.  No signs of infection.    Left third digit is much more straight today with still very minor plantarflexion at the level of the PIPJ.  Overall digital deformity has corrected nicely status post percutaneous flexor tenotomy   Neurological:      Mental Status: She is alert.   Psychiatric:         Mood and Affect: Mood normal.         Behavior: Behavior normal.           Wound 11/22/23 Venous Ulcer Leg Right;Anterior (Active)   Wound Image   02/06/24 1412   Wound Description Yellow;Slough;White;Pink 02/06/24 1412   Park-wound Assessment Pink;Maceration;Edema 02/06/24 1412   Wound Length (cm) 1.1 cm 02/06/24 1412   Wound Width (cm) 1.3 cm 02/06/24 1412   Wound Depth (cm) 0.1 cm 02/06/24 1412   Wound Surface Area (cm^2) 1.43 cm^2 02/06/24 1412   Wound Volume (cm^3) 0.143 cm^3 02/06/24 1412   Calculated Wound Volume (cm^3) 0.14 cm^3 02/06/24 1412   Change in Wound Size % 78.79 02/06/24 1412   Drainage Amount Small 02/06/24 1412   Drainage Description Serous 02/06/24 1412   Non-staged Wound Description Full thickness  02/06/24 1412   Treatments Irrigation with NSS 01/05/24 1435   Dressing Status Intact 02/06/24 1412       Wound 02/06/24 Surgical Toe D3, third Left (Active)   Wound Image   02/06/24 1411   Wound Description Pink 02/06/24 1411   Park-wound Assessment Maceration 02/06/24 1411   Wound Length (cm) 0.1 cm 02/06/24 1411   Wound Width (cm) 0.2 cm 02/06/24 1411   Wound Depth (cm) 0.1 cm 02/06/24 1411   Wound Surface Area (cm^2) 0.02 cm^2 02/06/24 1411   Wound Volume (cm^3) 0.002 cm^3 02/06/24 1411   Calculated Wound Volume (cm^3) 0 cm^3 02/06/24 1411   Drainage Amount Scant 02/06/24 1411   Drainage Description Clear 02/06/24 1411   Non-staged Wound Description Full thickness 02/06/24 1411   Dressing Status Intact 02/06/24 1411           Wound 11/22/23 Venous Ulcer Leg Right;Anterior (Active)   Wound Image   02/06/24 1412   Wound Description Yellow;Slough;White;Pink 02/06/24 1412   Park-wound Assessment Pink;Maceration;Edema 02/06/24 1412   Wound Length (cm) 1.1 cm 02/06/24 1412   Wound Width (cm) 1.3 cm 02/06/24 1412   Wound Depth (cm) 0.1 cm 02/06/24 1412   Wound Surface Area (cm^2) 1.43 cm^2 02/06/24 1412   Wound Volume (cm^3) 0.143 cm^3 02/06/24 1412   Calculated Wound Volume (cm^3) 0.14 cm^3 02/06/24 1412   Change in Wound Size % 78.79 02/06/24 1412   Drainage Amount Small 02/06/24 1412   Drainage Description Serous 02/06/24 1412   Non-staged Wound Description Full thickness 02/06/24 1412   Treatments Irrigation with NSS 01/05/24 1435   Dressing Status Intact 02/06/24 1412       Wound 02/06/24 Surgical Toe D3, third Left (Active)   Wound Image   02/06/24 1411   Wound Description Pink 02/06/24 1411   Park-wound Assessment Maceration 02/06/24 1411   Wound Length (cm) 0.1 cm 02/06/24 1411   Wound Width (cm) 0.2 cm 02/06/24 1411   Wound Depth (cm) 0.1 cm 02/06/24 1411   Wound Surface Area (cm^2) 0.02 cm^2 02/06/24 1411   Wound Volume (cm^3) 0.002 cm^3 02/06/24 1411   Calculated Wound Volume (cm^3) 0 cm^3 02/06/24 1411    Drainage Amount Scant 02/06/24 1411   Drainage Description Clear 02/06/24 1411   Non-staged Wound Description Full thickness 02/06/24 1411   Dressing Status Intact 02/06/24 1411                         Diagnosis:  1. 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 Hammertoes of both feet  -     lidocaine (XYLOCAINE) 4 % topical solution 5 mL  -     Wound compression and edema control Venous Ulcer Right;Anterior Leg; Future  -     Wound cleansing and dressings Venous Ulcer Right;Anterior Leg; Future  -     Wound miscellaneous orders; Future  -     Wound cleansing and dressings Surgical Left Toe D3, third; Future    3. Type 2 diabetes mellitus with stage 3 chronic kidney disease, without long-term current use of insulin, unspecified whether stage 3a or 3b CKD (HCC)  -     lidocaine (XYLOCAINE) 4 % topical solution 5 mL  -     Wound compression and edema control Venous Ulcer Right;Anterior Leg; Future  -     Wound cleansing and dressings Venous Ulcer Right;Anterior Leg; Future  -     Wound miscellaneous orders; Future  -     Wound cleansing and dressings Surgical Left Toe D3, third; Future        Diagnosis ICD-10-CM Associated Orders   1. 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 Hammertoes of both feet  M20.41 lidocaine (XYLOCAINE) 4 %  topical solution 5 mL    M20.42 Wound compression and edema control Venous Ulcer Right;Anterior Leg     Wound cleansing and dressings Venous Ulcer Right;Anterior Leg     Wound miscellaneous orders     Wound cleansing and dressings Surgical Left Toe D3, third      3. Type 2 diabetes mellitus with stage 3 chronic kidney disease, without long-term current use of insulin, unspecified whether stage 3a or 3b CKD (HCC)  E11.22 lidocaine (XYLOCAINE) 4 % topical solution 5 mL    N18.30 Wound compression and edema control Venous Ulcer Right;Anterior Leg     Wound cleansing and dressings Venous Ulcer Right;Anterior Leg     Wound miscellaneous orders     Wound cleansing and dressings Surgical Left Toe D3, third           ASSESSMENT:    1) right Distal anterior leg ulceration with about 20% healing over 30-days  2) bilateral hammertoe deformities  3) type 2 diabetes with CKD 3

## 2024-02-14 ENCOUNTER — HOSPITAL ENCOUNTER (OUTPATIENT)
Dept: RADIOLOGY | Facility: HOSPITAL | Age: 89
Discharge: HOME | End: 2024-02-14
Payer: MEDICARE

## 2024-02-14 ENCOUNTER — OFFICE VISIT (OUTPATIENT)
Dept: WOUND CARE | Facility: HOSPITAL | Age: 89
End: 2024-02-14
Payer: MEDICARE

## 2024-02-14 VITALS
DIASTOLIC BLOOD PRESSURE: 72 MMHG | TEMPERATURE: 97.6 F | HEART RATE: 94 BPM | RESPIRATION RATE: 18 BRPM | SYSTOLIC BLOOD PRESSURE: 141 MMHG

## 2024-02-14 DIAGNOSIS — M20.42 HAMMERTOES OF BOTH FEET: ICD-10-CM

## 2024-02-14 DIAGNOSIS — S89.92XA LEFT KNEE INJURY, INITIAL ENCOUNTER: ICD-10-CM

## 2024-02-14 DIAGNOSIS — M20.41 HAMMERTOES OF BOTH FEET: ICD-10-CM

## 2024-02-14 DIAGNOSIS — S81.802A TRAUMATIC OPEN WOUND OF LEFT LOWER LEG, INITIAL ENCOUNTER: ICD-10-CM

## 2024-02-14 DIAGNOSIS — L97.911 NON-PRESSURE CHRONIC ULCER RIGHT LOWER LEG, LIMITED TO BREAKDOWN SKIN (HCC): Primary | ICD-10-CM

## 2024-02-14 PROCEDURE — 97597 DBRDMT OPN WND 1ST 20 CM/<: CPT | Performed by: STUDENT IN AN ORGANIZED HEALTH CARE EDUCATION/TRAINING PROGRAM

## 2024-02-14 PROCEDURE — 11042 DBRDMT SUBQ TIS 1ST 20SQCM/<: CPT | Performed by: STUDENT IN AN ORGANIZED HEALTH CARE EDUCATION/TRAINING PROGRAM

## 2024-02-14 PROCEDURE — 99213 OFFICE O/P EST LOW 20 MIN: CPT | Performed by: STUDENT IN AN ORGANIZED HEALTH CARE EDUCATION/TRAINING PROGRAM

## 2024-02-14 PROCEDURE — 73564 X-RAY EXAM KNEE 4 OR MORE: CPT

## 2024-02-14 RX ORDER — LIDOCAINE HYDROCHLORIDE 40 MG/ML
5 SOLUTION TOPICAL ONCE
Status: COMPLETED | OUTPATIENT
Start: 2024-02-14 | End: 2024-02-14

## 2024-02-14 RX ADMIN — LIDOCAINE HYDROCHLORIDE 5 ML: 40 SOLUTION TOPICAL at 15:27

## 2024-02-14 NOTE — PATIENT INSTRUCTIONS
Orders Placed This Encounter   Procedures    Wound cleansing and dressings     Right Leg Wound:     Wash your hands with soap and water.  Remove old dressing, discard into plastic bag and place in trash.  Cleanse the wound with soap and water prior to applying a clean dressing. Do not use tissue or cotton balls. Do not scrub the wound. Pat dry using gauze.  Shower no   Apply moisturizer to skin surrounding wound  Apply Puracol AG to the leg wound.  Cover with gauze.  Secure with rolled gauze and tape.  Change dressing three times per week.     This was done today.     Standing Status:   Future     Standing Expiration Date:   2/14/2025    Wound compression and edema control     Compression Stocking: Spandagrip size E     Remove compression stockings every night at bedtime and re-apply first thing morning. Follow daily skin care as instructed.     Avoid prolonged standing in one place.     Elevate leg(s) above the level of the heart when sitting or as much as possible.     Standing Status:   Future     Standing Expiration Date:   2/14/2025    Wound miscellaneous orders     x-ray to left leg due to swelling and bruising post fall     Standing Status:   Future     Standing Expiration Date:   2/14/2025    Wound cleansing and dressings     Left Leg wound  Cleanse with prophase, pat dry  Apply mepilex up  Cover with ABD  Secure with marge and tape  Change dressing three times a week     Standing Status:   Future     Standing Expiration Date:   2/14/2025

## 2024-02-15 NOTE — PROGRESS NOTES
Patient ID: Tanya Stephen is a 89 y.o. female Date of Birth 7/21/1934     My role is Foot, Ankle and Wound Specialist    PLAN:  -Educated patient on their condition.   -The patient's wound is not currently infected. We discussed the importance of recognizing systemic and local signs of infection and going directly to the emergency department should any of these occur  -Debridement as below:   -Discussed proper care of dressings, patient is not to get them wet at all. If the dressings do get wet, they must be removed and redressed.  -return to wound care in 1-week  -Follow-up hemoglobin A1c  -Continue Puracol, DSD, Spandage  to right lower extremity.  Mepilex up, DSD to left leg.  -X-ray of the left knee from 2/14/2024 reviewed: Small joint effusion noted, besides this satisfactory alignment of total knee arthroplasty.  Will consider consult to orthopedic surgery pending clinical course.  -Patient may begin using her toe slings for the left foot as needed  -Patient, , and daughter verbalize understanding of plan     Patient optimization:  -Vascular:              Arterial -lower extremity arterial duplex study from 1/27/2024 reviewed: No evidence of lower extremity arterial occlusive disease bilaterally with bilateral great toe pressures above the healing range.              Venous - chronic venous hypertension noted              Lymphatic -no evidence of lymphatic disease     -MSK:              Pressure reduction -continue wearing wide toebox supportive sneakers.  Continue using toe slings for the left foot              Deformity and possible correction -left third digit percutaneous flexor tenotomy performed 2/2/2024     -Neuro:              Neuropathy - We discussed checking feet daily for additional cuts, bruises, or wounds. We also discussed refraining from walking barefoot and wearing white socks in order to notice drainage     -Nutritional:              Dietary supplementation - Recommend high  "protein diet with Toni supplementation until wound is fully healed              Smoking cessation - N/A              Glycemic control -previous hemoglobin A1c 5.9% from 4/19/2022.  Follow-up repeat hemoglobin A1c      Debridement   Wound 02/14/24 Traumatic Pretibial Left;Proximal    Universal Protocol:  Consent: Verbal consent obtained.  Risks and benefits: risks, benefits and alternatives were discussed  Consent given by: patient  Time out: Immediately prior to procedure a \"time out\" was called to verify the correct patient, procedure, equipment, support staff and site/side marked as required.  Patient understanding: patient states understanding of the procedure being performed  Patient identity confirmed: verbally with patient    Debridement Details  Performed by: physician  Debridement type: selective  Pain control: lidocaine 4%      Post-debridement measurements  Length (cm): 3.5  Width (cm): 2.8  Depth (cm): 0.1  Percent debrided: 100%  Surface Area (cm^2): 9.8  Area Debrided (cm^2): 9.8  Volume (cm^3): 0.98    Devitalized tissue debrided: biofilm, fibrin and slough  Instrument(s) utilized: curette  Bleeding: small  Hemostasis obtained with: pressure  Procedural pain (0-10): 2  Post-procedural pain: 2   Response to treatment: procedure was tolerated well    Debridement   Wound 11/22/23 Venous Ulcer Leg Right;Anterior    Universal Protocol:  Consent: Verbal consent obtained.  Risks and benefits: risks, benefits and alternatives were discussed  Consent given by: patient  Time out: Immediately prior to procedure a \"time out\" was called to verify the correct patient, procedure, equipment, support staff and site/side marked as required.  Patient understanding: patient states understanding of the procedure being performed  Patient identity confirmed: verbally with patient    Debridement Details  Performed by: physician  Debridement type: surgical  Level of debridement: subcutaneous tissue  Pain control: lidocaine " 4%      Post-debridement measurements  Length (cm): 1.3  Width (cm): 0.7  Depth (cm): 0.1  Percent debrided: 100%  Surface Area (cm^2): 0.91  Area Debrided (cm^2): 0.91  Volume (cm^3): 0.09    Tissue and other material debrided: dermis, epidermis and subcutaneous tissue  Devitalized tissue debrided: biofilm, fibrin and slough  Instrument(s) utilized: curette  Bleeding: small  Hemostasis obtained with: pressure  Procedural pain (0-10): 1  Post-procedural pain: 1   Response to treatment: procedure was tolerated well         Wound Instructions    Orders Placed This Encounter   Procedures    Wound cleansing and dressings     Right Leg Wound:     Wash your hands with soap and water.  Remove old dressing, discard into plastic bag and place in trash.  Cleanse the wound with soap and water prior to applying a clean dressing. Do not use tissue or cotton balls. Do not scrub the wound. Pat dry using gauze.  Shower no   Apply moisturizer to skin surrounding wound  Apply Puracol AG to the leg wound.  Cover with gauze.  Secure with rolled gauze and tape.  Change dressing three times per week.     This was done today.     Standing Status:   Future     Standing Expiration Date:   2/14/2025    Wound compression and edema control     Compression Stocking: Spandagrip size E     Remove compression stockings every night at bedtime and re-apply first thing morning. Follow daily skin care as instructed.     Avoid prolonged standing in one place.     Elevate leg(s) above the level of the heart when sitting or as much as possible.     Standing Status:   Future     Standing Expiration Date:   2/14/2025    Wound miscellaneous orders     x-ray to left leg due to swelling and bruising post fall     Standing Status:   Future     Standing Expiration Date:   2/14/2025    Wound cleansing and dressings     Left Leg wound  Cleanse with prophase, pat dry  Apply mepilex up  Cover with ABD  Secure with marge and tape  Change dressing three times a week      Standing Status:   Future     Standing Expiration Date:   2/14/2025    Debridement Traumatic Left;Proximal Pretibial     This order was created via procedure documentation    Debridement Venous Ulcer Right;Anterior Leg     This order was created via procedure documentation    XR knee 4+ vw left injury     Standing Status:   Future     Number of Occurrences:   1     Standing Expiration Date:   2/14/2028     Scheduling Instructions:      Bring along any outside films relating to this procedure.               SUBJECTIVE:    Chief complaint  Right leg ulceration, left leg ulceration    11/22/23: Consult - Tanya is a pleasant 89-year-old female with a past medical history of type 2 diabetes mellitus most recent A1c 5.9% 1 year ago, diabetic gastroparesis, polyneuropathy, ambulatory dysfunction, HFpEF G1DD, and Raynaud's disease here today for initial wound care consult.  Patient was referred by primary care provider Dr. Gregory Santoro.  Patient sustained the wound traumatically  to the LLE approximately 2 months ago when the fridge door fell against her leg.  At the time she was seen her PCP and prescribed as course of azithromycin + Neosporin t[to the wound.  Since then wound care has consisted of Neosporin however PCP saw the patient yesterday and advised that she make an appointment to be seen by wound care due to nonhealing status.  Patient denies any symptoms of infection today including fever chills diaphoresis. She does regularly wear compression socks.     2/14/24: Tanya states that she is doing well since her last visit.  Her daughter and  are present with her today.  She denies any systemic signs of infection since her last wound care visit.  She states that since her last visit she fell, injuring her left knee.  She states that this left a wound to the area which she believes was secondary to rubbing up against the carpet.  She also states that her knee is bruised and swollen.  She states  that she has not sought treatment for this thus far.          The following portions of the patient's history were reviewed and updated as appropriate:   Patient Active Problem List   Diagnosis    Anemia    Shortness of breath    Hypomagnesemia    DM (diabetes mellitus), type 2 (HCC)    Urinary frequency    Iron deficiency anemia secondary to inadequate dietary iron intake    Sinobronchitis    Post zoster neuralgia    Primary generalized (osteo)arthritis    Seronegative arthropathy of multiple sites (HCC)    Senile osteoporosis    Lumbar spondylosis    Diabetic polyneuropathy associated with type 2 diabetes mellitus (HCC)    Diabetic gastroparesis associated with type 2 diabetes mellitus     Irritable bowel syndrome with diarrhea    Stage 3b chronic kidney disease (HCC)    Traumatic injury of sacrum    Tendinitis of left rotator cuff    Abnormality of gait due to impairment of balance    Sicca syndrome (HCC)    Chest pain    Leukocytosis    Chronic diastolic heart failure (HCC)    OAB (overactive bladder)    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)    Toxic metabolic encephalopathy    Cellulitis    Dyslipidemia    Primary hypertension    Spinal stenosis of lumbar region with neurogenic claudication    Chronic pain syndrome    SI (sacroiliac) joint dysfunction    Elevated LFTs    Ambulatory dysfunction    Recurrent UTI    Gastroesophageal reflux disease without esophagitis    Dysphagia    Raynaud's phenomenon without gangrene    Elevated liver enzymes    Incontinence of feces with fecal urgency     Past Medical History:   Diagnosis Date    Anemia     Arthritis     Back pain     CHF (congestive heart failure) (HCC)     Chronic kidney disease     Stage 3b    Confusion 02/22/2023    Diabetes (HCC)     Diabetes mellitus (HCC)     Diabetic gastroparesis associated with type 2 diabetes mellitus      Diabetic polyneuropathy (HCC)     Diverticulosis     Herpes zoster     Hypertension     IBS (irritable bowel  syndrome)     Neurogenic claudication due to lumbar spinal stenosis     Osteoarthritis     Osteoporosis     Pulmonary edema     Raynaud's phenomenon without gangrene     Seronegative arthropathy of multiple sites (HCC)     Sicca (HCC)      Past Surgical History:   Procedure Laterality Date    BACK SURGERY      COLONOSCOPY      EGD AND COLONOSCOPY N/A 2016    Procedure: EGD AND COLONOSCOPY;  Surgeon: Elina Anderson MD;  Location: [facility];  Service:     JOINT REPLACEMENT      bilat knees and hips    OTHER SURGICAL HISTORY      pelvis rods    REPLACEMENT TOTAL KNEE BILATERAL      STOMACH SURGERY      UPPER GASTROINTESTINAL ENDOSCOPY       Social History     Socioeconomic History    Marital status: /Civil Union     Spouse name: Weston    Number of children: 2    Years of education: Not on file    Highest education level: High school graduate   Occupational History    Not on file   Tobacco Use    Smoking status: Former     Current packs/day: 0.00     Types: Cigarettes     Quit date:      Years since quittin.1    Smokeless tobacco: Never   Vaping Use    Vaping status: Never Used   Substance and Sexual Activity    Alcohol use: No    Drug use: No    Sexual activity: Not Currently     Partners: Male     Birth control/protection: None   Other Topics Concern    Not on file   Social History Narrative    Not on file     Social Determinants of Health     Financial Resource Strain: Not on file   Food Insecurity: No Food Insecurity (2023)    Hunger Vital Sign     Worried About Running Out of Food in the Last Year: Never true     Ran Out of Food in the Last Year: Never true   Transportation Needs: No Transportation Needs (2023)    PRAPARE - Transportation     Lack of Transportation (Medical): No     Lack of Transportation (Non-Medical): No   Physical Activity: Not on file   Stress: Not on file   Social Connections: Not on file   Intimate Partner Violence: Not on file   Housing Stability: Low Risk   (2/23/2023)    Housing Stability Vital Sign     Unable to Pay for Housing in the Last Year: No     Number of Places Lived in the Last Year: 1     Unstable Housing in the Last Year: No        Current Outpatient Medications:     amitriptyline (ELAVIL) 10 mg tablet, TAKE TWO TABLETS BY MOUTH AT BEDTIME, Disp: 180 tablet, Rfl: 1    aspirin 81 mg chewable tablet, Chew 1 tablet (81 mg total) daily, Disp: 30 tablet, Rfl: 0    atorvastatin (LIPITOR) 10 mg tablet, TAKE ONE TABLET BY MOUTH EVERY DAY, Disp: 90 tablet, Rfl: 3    Cholecalciferol (VITAMIN D3) 1000 units CAPS, Take 1 capsule by mouth daily , Disp: , Rfl:     dicyclomine (BENTYL) 10 mg capsule, Take 1 capsule (10 mg total) by mouth 3 (three) times a day as needed (abd pain) (Patient not taking: Reported on 11/22/2023), Disp: 30 capsule, Rfl: 2    diphenoxylate-atropine (LOMOTIL) 2.5-0.025 mg per tablet, Take 1 tablet by mouth if needed (Patient not taking: Reported on 12/18/2023), Disp: , Rfl:     diphenoxylate-atropine (LOMOTIL) 2.5-0.025 mg per tablet, Take 1 tablet by mouth 4 (four) times a day as needed for diarrhea, Disp: 30 tablet, Rfl: 0    DULoxetine (CYMBALTA) 60 mg delayed release capsule, TAKE ONE CAPSULE BY MOUTH EVERY DAY, Disp: 30 capsule, Rfl: 5    hydroxychloroquine (PLAQUENIL) 200 mg tablet, Take 1 tablet (200 mg total) by mouth daily with breakfast, Disp: 90 tablet, Rfl: 1    Magnesium 250 MG TABS, Take 250 mg by mouth every evening, Disp: , Rfl:     metFORMIN (GLUCOPHAGE) 500 mg tablet, Take 500 mg by mouth 2 (two) times a day (Patient not taking: Reported on 6/27/2023), Disp: , Rfl:     metoprolol succinate (TOPROL-XL) 25 mg 24 hr tablet, Take 0.5 tablets (12.5 mg total) by mouth daily (Patient not taking: Reported on 6/27/2023), Disp: 45 tablet, Rfl: 3    Mirabegron ER (Myrbetriq) 50 MG TB24, Take 1 tablet (50 mg total) by mouth every evening, Disp: 30 tablet, Rfl: 11    nitrofurantoin (MACRODANTIN) 50 mg capsule, TAKE ONE CAPSULE BY MOUTH  EVERY DAY IN THE MORNING, Disp: 30 capsule, Rfl: 6    omeprazole (PriLOSEC) 20 mg delayed release capsule, Take 20 mg by mouth daily  , Disp: , Rfl:     pregabalin (LYRICA) 75 mg capsule, TAKE ONE CAPSULE BY MOUTH THREE TIMES A DAY, Disp: 90 capsule, Rfl: 5    pyridoxine (B-6) 100 MG tablet, Take 100 mg by mouth daily, Disp: , Rfl:     Sodium Fluoride (PreviDent 5000 Booster Plus) 1.1 % PSTE, Apply on teeth every evening as directed, Disp: 100 mL, Rfl: 3    torsemide (DEMADEX) 10 mg tablet, Take 1 tablet PRN for wt gain 3 lb/ 3 days or 5 lb/ 5 aria (Patient not taking: Reported on 6/27/2023), Disp: 30 tablet, Rfl: 0  Family History   Problem Relation Age of Onset    Cancer Brother     Diabetes Mother     Hypertension Father     Heart disease Father       Review of Systems   Constitutional:  Negative for chills, diaphoresis, fatigue and fever.   Skin:  Positive for wound.   All other systems reviewed and are negative.      Allergies  Morphine and Ciprofloxacin    OBJECTIVE:  /72   Pulse 94   Temp 97.6 °F (36.4 °C)   Resp 18   LMP  (LMP Unknown)     Physical Exam  Vitals reviewed.   Constitutional:       Appearance: Normal appearance.   HENT:      Head: Normocephalic and atraumatic.      Mouth/Throat:      Mouth: Mucous membranes are moist.   Eyes:      Extraocular Movements: Extraocular movements intact.   Pulmonary:      Effort: Pulmonary effort is normal.   Musculoskeletal:         General: Swelling and signs of injury present.      Left lower leg: Edema present.   Skin:     Comments: Distal anterior right lower extremity wound slightly smaller than last exam.  Healthy wound bed.  Rolled edges.  No signs of infection.    Left leg ulceration around the level of the tibial tuberosity with mixed fibrous/granular wound bed and biofilm formation.  No local signs of infection.   Neurological:      Mental Status: She is alert.   Psychiatric:         Mood and Affect: Mood normal.         Behavior: Behavior  normal.           Wound 11/22/23 Venous Ulcer Leg Right;Anterior (Active)   Wound Image   02/14/24 1542   Wound Description Yellow;Slough;White;Pink;Epithelialization 02/14/24 1522   Park-wound Assessment Edema;Dry;Scar Tissue 02/14/24 1522   Wound Length (cm) 1.3 cm 02/14/24 1522   Wound Width (cm) 0.7 cm 02/14/24 1522   Wound Depth (cm) 0.1 cm 02/14/24 1522   Wound Surface Area (cm^2) 0.91 cm^2 02/14/24 1522   Wound Volume (cm^3) 0.091 cm^3 02/14/24 1522   Calculated Wound Volume (cm^3) 0.09 cm^3 02/14/24 1522   Change in Wound Size % 86.36 02/14/24 1522   Drainage Amount Small 02/06/24 1412   Drainage Description Serous 02/06/24 1412   Non-staged Wound Description Full thickness 02/06/24 1412   Treatments Irrigation with NSS 01/05/24 1435   Dressing Status Intact 02/06/24 1412       Wound 02/06/24 Surgical Toe D3, third Left (Active)   Wound Image   02/14/24 1521   Wound Description Pink;Epithelialization 02/14/24 1521   Park-wound Assessment Dry 02/14/24 1521   Wound Length (cm) 0 cm 02/14/24 1521   Wound Width (cm) 0 cm 02/14/24 1521   Wound Depth (cm) 0 cm 02/14/24 1521   Wound Surface Area (cm^2) 0 cm^2 02/14/24 1521   Wound Volume (cm^3) 0 cm^3 02/14/24 1521   Calculated Wound Volume (cm^3) 0 cm^3 02/14/24 1521   Drainage Amount None 02/14/24 1521   Drainage Description Clear 02/06/24 1411   Non-staged Wound Description Full thickness 02/06/24 1411   Dressing Status Intact 02/06/24 1411       Wound 02/14/24 Traumatic Pretibial Left;Proximal (Active)   Wound Image   02/14/24 1523   Wound Description Beefy red;Granulation tissue;Slough;Yellow;Brown 02/14/24 1523   Park-wound Assessment Pink;Pale;Purple;Edema 02/14/24 1523   Wound Length (cm) 3.5 cm 02/14/24 1523   Wound Width (cm) 2.8 cm 02/14/24 1523   Wound Depth (cm) 0.1 cm 02/14/24 1523   Wound Surface Area (cm^2) 9.8 cm^2 02/14/24 1523   Wound Volume (cm^3) 0.98 cm^3 02/14/24 1523   Calculated Wound Volume (cm^3) 0.98 cm^3 02/14/24 1523   Drainage Amount  Small 02/14/24 1523   Drainage Description Serosanguineous 02/14/24 1523   Non-staged Wound Description Full thickness 02/14/24 1523           Wound 11/22/23 Venous Ulcer Leg Right;Anterior (Active)   Wound Image   02/14/24 1542   Wound Description Yellow;Slough;White;Pink;Epithelialization 02/14/24 1522   Park-wound Assessment Edema;Dry;Scar Tissue 02/14/24 1522   Wound Length (cm) 1.3 cm 02/14/24 1522   Wound Width (cm) 0.7 cm 02/14/24 1522   Wound Depth (cm) 0.1 cm 02/14/24 1522   Wound Surface Area (cm^2) 0.91 cm^2 02/14/24 1522   Wound Volume (cm^3) 0.091 cm^3 02/14/24 1522   Calculated Wound Volume (cm^3) 0.09 cm^3 02/14/24 1522   Change in Wound Size % 86.36 02/14/24 1522   Drainage Amount Small 02/06/24 1412   Drainage Description Serous 02/06/24 1412   Non-staged Wound Description Full thickness 02/06/24 1412   Treatments Irrigation with NSS 01/05/24 1435   Dressing Status Intact 02/06/24 1412       Wound 02/06/24 Surgical Toe D3, third Left (Active)   Wound Image   02/14/24 1521   Wound Description Pink;Epithelialization 02/14/24 1521   Park-wound Assessment Dry 02/14/24 1521   Wound Length (cm) 0 cm 02/14/24 1521   Wound Width (cm) 0 cm 02/14/24 1521   Wound Depth (cm) 0 cm 02/14/24 1521   Wound Surface Area (cm^2) 0 cm^2 02/14/24 1521   Wound Volume (cm^3) 0 cm^3 02/14/24 1521   Calculated Wound Volume (cm^3) 0 cm^3 02/14/24 1521   Drainage Amount None 02/14/24 1521   Drainage Description Clear 02/06/24 1411   Non-staged Wound Description Full thickness 02/06/24 1411   Dressing Status Intact 02/06/24 1411       Wound 02/14/24 Traumatic Pretibial Left;Proximal (Active)   Wound Image   02/14/24 1523   Wound Description Beefy red;Granulation tissue;Slough;Yellow;Brown 02/14/24 1523   Park-wound Assessment Pink;Pale;Purple;Edema 02/14/24 1523   Wound Length (cm) 3.5 cm 02/14/24 1523   Wound Width (cm) 2.8 cm 02/14/24 1523   Wound Depth (cm) 0.1 cm 02/14/24 1523   Wound Surface Area (cm^2) 9.8 cm^2 02/14/24  1523   Wound Volume (cm^3) 0.98 cm^3 02/14/24 1523   Calculated Wound Volume (cm^3) 0.98 cm^3 02/14/24 1523   Drainage Amount Small 02/14/24 1523   Drainage Description Serosanguineous 02/14/24 1523   Non-staged Wound Description Full thickness 02/14/24 1523     Diagnosis:  1. 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 Hammertoes of both feet  -     Wound cleansing and dressings; Future  -     Wound compression and edema control; Future  -     lidocaine (XYLOCAINE) 4 % topical solution 5 mL  -     Wound miscellaneous orders; Future  -     Wound cleansing and dressings; Future    3. Traumatic open wound of left lower leg, initial encounter  -     Wound cleansing and dressings; Future  -     Wound compression and edema control; Future  -     lidocaine (XYLOCAINE) 4 % topical solution 5 mL  -     Wound miscellaneous orders; Future  -     Wound cleansing and dressings; Future  -     XR knee 4+ vw left injury; Future; Expected date: 02/14/2024    4. Left knee injury, initial encounter  -     XR knee 4+ vw left injury; Future; Expected date: 02/14/2024        Diagnosis ICD-10-CM Associated Orders   1. 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 Hammertoes of both feet  M20.41 Wound cleansing and dressings    M20.42 Wound compression and edema control     lidocaine (XYLOCAINE) 4 % topical solution 5 mL     Wound miscellaneous orders     Wound cleansing and dressings      3. Traumatic open wound of left lower leg,  initial encounter  S81.802A Wound cleansing and dressings     Wound compression and edema control     lidocaine (XYLOCAINE) 4 % topical solution 5 mL     Wound miscellaneous orders     Wound cleansing and dressings     XR knee 4+ vw left injury      4. Left knee injury, initial encounter  S89.92XA XR knee 4+ vw left injury           ASSESSMENT:    1) right Distal anterior leg ulceration with about 49% healing over 30-days  2) left leg ulceration, full-thickness to level of mixed dermis/subcutaneous tissue  3) bilateral hammertoe deformities  4) type 2 diabetes with CKD 3

## 2024-02-21 PROBLEM — N39.0 RECURRENT UTI: Status: RESOLVED | Noted: 2023-02-22 | Resolved: 2024-02-21

## 2024-02-23 ENCOUNTER — OFFICE VISIT (OUTPATIENT)
Dept: WOUND CARE | Facility: HOSPITAL | Age: 89
End: 2024-02-23
Payer: MEDICARE

## 2024-02-23 VITALS
SYSTOLIC BLOOD PRESSURE: 146 MMHG | DIASTOLIC BLOOD PRESSURE: 85 MMHG | TEMPERATURE: 97.4 F | RESPIRATION RATE: 18 BRPM | HEART RATE: 95 BPM

## 2024-02-23 DIAGNOSIS — L97.911 NON-PRESSURE CHRONIC ULCER RIGHT LOWER LEG, LIMITED TO BREAKDOWN SKIN (HCC): Primary | ICD-10-CM

## 2024-02-23 DIAGNOSIS — M20.41 HAMMERTOES OF BOTH FEET: ICD-10-CM

## 2024-02-23 DIAGNOSIS — S81.802A TRAUMATIC OPEN WOUND OF LEFT LOWER LEG, INITIAL ENCOUNTER: ICD-10-CM

## 2024-02-23 DIAGNOSIS — M20.42 HAMMERTOES OF BOTH FEET: ICD-10-CM

## 2024-02-23 PROCEDURE — 11042 DBRDMT SUBQ TIS 1ST 20SQCM/<: CPT | Performed by: STUDENT IN AN ORGANIZED HEALTH CARE EDUCATION/TRAINING PROGRAM

## 2024-02-23 PROCEDURE — 97597 DBRDMT OPN WND 1ST 20 CM/<: CPT | Performed by: STUDENT IN AN ORGANIZED HEALTH CARE EDUCATION/TRAINING PROGRAM

## 2024-02-23 RX ORDER — LIDOCAINE HYDROCHLORIDE 40 MG/ML
5 SOLUTION TOPICAL ONCE
Status: COMPLETED | OUTPATIENT
Start: 2024-02-23 | End: 2024-02-23

## 2024-02-23 RX ADMIN — LIDOCAINE HYDROCHLORIDE 5 ML: 40 SOLUTION TOPICAL at 15:19

## 2024-02-23 NOTE — PATIENT INSTRUCTIONS
Orders Placed This Encounter   Procedures    Wound cleansing and dressings Venous Ulcer Right;Anterior Leg     Right Leg Wound:     Wash your hands with soap and water. Remove old dressing, discard into plastic bag and place in trash. Cleansed the wound with soap and water prior to applying a clean dressing. Do not use tissue or cotton balls. Do not scrub the wound. Pat dry using gauze.   Shower no   Applied moisturizer to skin surrounding wound   Apply polymem ag cut to the leg wound.   Covered with abd  Secured with coflex lite  Change weekly at the wound center.     This was done today.     Standing Status:   Future     Standing Expiration Date:   2/23/2025    Wound cleansing and dressings Traumatic Left;Proximal Pretibial     Left Leg wound   Wash your hands with soap and water.  Remove old dressing, discard into plastic bag and place in trash. Cleanse wound with prophase. Do not use tissue or cotton balls. Do not scrub the wound. Pat dry using gauze.  Shower no     Apply mepilex up to cover wound bed  Cover with abd  Secure with rolled gauze and tape  Change dressing 3 times weekly    The above was completed today at the wound center.     Standing Status:   Future     Standing Expiration Date:   2/23/2025    Wound compression and edema control     Right Leg Wound  Coflex lite:     Keep compression wrap/wraps clean and dry. If wraps are too tight and you experience numbness/tingling, call the wound center. If after hours, remove wraps or proceed to nearest E.R. and call wound center in AM.    Wrap will be changed 1 x weekly in wound center    Avoid prolonged standing in one place.    Elevate leg(s) above the level of the heart when sitting or as much as possible.       Left leg wound:     Elastic Tubular Stocking    Tubular elastic bandage: Apply from base of toes to behind the knee. Apply in AM, may remove for sleep.    Avoid prolonged standing in one place.    Elevate leg(s) above the level of the heart when  sitting or as much as possible.     Standing Status:   Future     Standing Expiration Date:   2/23/2025

## 2024-02-26 NOTE — PROGRESS NOTES
Patient ID: Tanya Stephen is a 89 y.o. female Date of Birth 7/21/1934     My role is Foot, Ankle and Wound Specialist    PLAN:    -Educated patient on their condition.   -The patient's wound is not currently infected. We discussed the importance of recognizing systemic and local signs of infection and going directly to the emergency department should any of these occur  -Debridement as below:   -Discussed proper care of dressings, patient is not to get them wet at all. If the dressings do get wet, they must be removed and redressed.  -return to wound care in 1-week  -Follow-up hemoglobin A1c  -PolyMem Ag, Coflex lite to right lower extremity wound given increase in edema and serous drainage  -Mepilex up, ABD to left leg wound  -Patient may begin using her toe slings for the left foot as needed  -Patient, , and daughter verbalize understanding of plan     Patient optimization:  -Vascular:              Arterial -lower extremity arterial duplex study from 1/27/2024 reviewed: No evidence of lower extremity arterial occlusive disease bilaterally with bilateral great toe pressures above the healing range.              Venous - chronic venous hypertension noted              Lymphatic -no evidence of lymphatic disease     -MSK:              Pressure reduction -continue wearing wide toebox supportive sneakers.  Continue using toe slings for the left foot              Deformity and possible correction - left third digit percutaneous flexor tenotomy performed 2/2/2024     -Neuro:              Neuropathy - We discussed checking feet daily for additional cuts, bruises, or wounds. We also discussed refraining from walking barefoot and wearing white socks in order to notice drainage     -Nutritional:              Dietary supplementation - Recommend high protein diet with Toni supplementation until wound is fully healed              Smoking cessation - N/A              Glycemic control -previous hemoglobin A1c 5.9%  "from 4/19/2022.  Follow-up repeat hemoglobin A1c    Debridement   Wound 11/22/23 Venous Ulcer Leg Right;Anterior    Universal Protocol:  Consent: Verbal consent obtained.  Risks and benefits: risks, benefits and alternatives were discussed  Consent given by: patient  Time out: Immediately prior to procedure a \"time out\" was called to verify the correct patient, procedure, equipment, support staff and site/side marked as required.  Patient understanding: patient states understanding of the procedure being performed  Patient identity confirmed: verbally with patient    Debridement Details  Performed by: physician  Debridement type: surgical  Level of debridement: subcutaneous tissue  Pain control: lidocaine 4%      Post-debridement measurements  Length (cm): 0.9  Width (cm): 0.5  Depth (cm): 0.1  Percent debrided: 100%  Surface Area (cm^2): 0.45  Area Debrided (cm^2): 0.45  Volume (cm^3): 0.05    Tissue and other material debrided: dermis, epidermis and subcutaneous tissue  Devitalized tissue debrided: biofilm, exudate and slough  Instrument(s) utilized: curette  Bleeding: small  Hemostasis obtained with: pressure  Procedural pain (0-10): insensate  Post-procedural pain: insensate   Response to treatment: procedure was tolerated well    Debridement   Wound 02/14/24 Traumatic Pretibial Left;Proximal    Universal Protocol:  Consent: Verbal consent obtained.  Risks and benefits: risks, benefits and alternatives were discussed  Consent given by: patient  Time out: Immediately prior to procedure a \"time out\" was called to verify the correct patient, procedure, equipment, support staff and site/side marked as required.  Patient understanding: patient states understanding of the procedure being performed  Patient identity confirmed: verbally with patient    Debridement Details  Performed by: physician  Debridement type: selective  Pain control: lidocaine 4%      Post-debridement measurements  Length (cm): 3.2  Width (cm): " 2.3  Depth (cm): 0.1  Percent debrided: 100%  Surface Area (cm^2): 7.36  Area Debrided (cm^2): 7.36  Volume (cm^3): 0.74    Devitalized tissue debrided: biofilm and slough  Instrument(s) utilized: blade  Bleeding: small  Hemostasis obtained with: pressure  Procedural pain (0-10): 2  Post-procedural pain: 2   Response to treatment: procedure was tolerated well         Wound Instructions    Orders Placed This Encounter   Procedures    Wound cleansing and dressings Venous Ulcer Right;Anterior Leg     Right Leg Wound:     Wash your hands with soap and water. Remove old dressing, discard into plastic bag and place in trash. Cleansed the wound with soap and water prior to applying a clean dressing. Do not use tissue or cotton balls. Do not scrub the wound. Pat dry using gauze.   Shower no   Applied moisturizer to skin surrounding wound   Apply polymem ag cut to the leg wound.   Covered with abd  Secured with coflex lite  Change weekly at the wound center.     This was done today.     Standing Status:   Future     Standing Expiration Date:   2/23/2025    Wound cleansing and dressings Traumatic Left;Proximal Pretibial     Left Leg wound   Wash your hands with soap and water.  Remove old dressing, discard into plastic bag and place in trash. Cleanse wound with prophase. Do not use tissue or cotton balls. Do not scrub the wound. Pat dry using gauze.  Shower no     Apply mepilex up to cover wound bed  Cover with abd  Secure with rolled gauze and tape  Change dressing 3 times weekly    The above was completed today at the wound center.     Standing Status:   Future     Standing Expiration Date:   2/23/2025    Wound compression and edema control     Right Leg Wound  Coflex lite:     Keep compression wrap/wraps clean and dry. If wraps are too tight and you experience numbness/tingling, call the wound center. If after hours, remove wraps or proceed to nearest E.R. and call wound center in AM.    Wrap will be changed 1 x weekly in  wound center    Avoid prolonged standing in one place.    Elevate leg(s) above the level of the heart when sitting or as much as possible.       Left leg wound:     Elastic Tubular Stocking    Tubular elastic bandage: Apply from base of toes to behind the knee. Apply in AM, may remove for sleep.    Avoid prolonged standing in one place.    Elevate leg(s) above the level of the heart when sitting or as much as possible.     Standing Status:   Future     Standing Expiration Date:   2/23/2025    Debridement     This order was created via procedure documentation    Debridement     This order was created via procedure documentation     SUBJECTIVE:    Chief complaint  Bilateral leg ulcerations    11/22/23: Consult - Tanya is a pleasant 89-year-old female with a past medical history of type 2 diabetes mellitus most recent A1c 5.9% 1 year ago, diabetic gastroparesis, polyneuropathy, ambulatory dysfunction, HFpEF G1DD, and Raynaud's disease here today for initial wound care consult.  Patient was referred by primary care provider Dr. Gregory Santoro.  Patient sustained the wound traumatically  to the LLE approximately 2 months ago when the fridge door fell against her leg.  At the time she was seen her PCP and prescribed as course of azithromycin + Neosporin t[to the wound.  Since then wound care has consisted of Neosporin however PCP saw the patient yesterday and advised that she make an appointment to be seen by wound care due to nonhealing status.  Patient denies any symptoms of infection today including fever chills diaphoresis. She does regularly wear compression socks.     2/23/24: Tanya states that she is doing well since her last visit.  Her daughter and  are present with her today.  She denies any systemic signs of infection since her last wound care visit.  Her  states that he has noticed an increase in swelling and drainage since last visit.          The following portions of the patient's  history were reviewed and updated as appropriate:   Patient Active Problem List   Diagnosis    Anemia    Shortness of breath    Hypomagnesemia    DM (diabetes mellitus), type 2 (HCC)    Urinary frequency    Iron deficiency anemia secondary to inadequate dietary iron intake    Sinobronchitis    Post zoster neuralgia    Primary generalized (osteo)arthritis    Seronegative arthropathy of multiple sites (HCC)    Senile osteoporosis    Lumbar spondylosis    Diabetic polyneuropathy associated with type 2 diabetes mellitus (HCC)    Diabetic gastroparesis associated with type 2 diabetes mellitus     Irritable bowel syndrome with diarrhea    Stage 3b chronic kidney disease (HCC)    Traumatic injury of sacrum    Tendinitis of left rotator cuff    Abnormality of gait due to impairment of balance    Sicca syndrome (HCC)    Chest pain    Leukocytosis    Chronic diastolic heart failure (HCC)    OAB (overactive bladder)    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)    Toxic metabolic encephalopathy    Cellulitis    Dyslipidemia    Primary hypertension    Spinal stenosis of lumbar region with neurogenic claudication    Chronic pain syndrome    SI (sacroiliac) joint dysfunction    Elevated LFTs    Ambulatory dysfunction    Gastroesophageal reflux disease without esophagitis    Dysphagia    Raynaud's phenomenon without gangrene    Elevated liver enzymes    Incontinence of feces with fecal urgency     Past Medical History:   Diagnosis Date    Anemia     Arthritis     Back pain     CHF (congestive heart failure) (HCC)     Chronic kidney disease     Stage 3b    Confusion 02/22/2023    Diabetes (HCC)     Diabetes mellitus (HCC)     Diabetic gastroparesis associated with type 2 diabetes mellitus      Diabetic polyneuropathy (HCC)     Diverticulosis     Herpes zoster     Hypertension     IBS (irritable bowel syndrome)     Neurogenic claudication due to lumbar spinal stenosis     Osteoarthritis     Osteoporosis     Pulmonary  edema     Raynaud's phenomenon without gangrene     Seronegative arthropathy of multiple sites (HCC)     Sicca (HCC)      Past Surgical History:   Procedure Laterality Date    BACK SURGERY      COLONOSCOPY      EGD AND COLONOSCOPY N/A 2016    Procedure: EGD AND COLONOSCOPY;  Surgeon: Elina Anderson MD;  Location: [facility];  Service:     JOINT REPLACEMENT      bilat knees and hips    OTHER SURGICAL HISTORY      pelvis rods    REPLACEMENT TOTAL KNEE BILATERAL      STOMACH SURGERY      UPPER GASTROINTESTINAL ENDOSCOPY       Social History     Socioeconomic History    Marital status: /Civil Union     Spouse name: Weston    Number of children: 2    Years of education: None    Highest education level: High school graduate   Occupational History    None   Tobacco Use    Smoking status: Former     Current packs/day: 0.00     Types: Cigarettes     Quit date:      Years since quittin.1    Smokeless tobacco: Never   Vaping Use    Vaping status: Never Used   Substance and Sexual Activity    Alcohol use: No    Drug use: No    Sexual activity: Not Currently     Partners: Male     Birth control/protection: None   Other Topics Concern    None   Social History Narrative    None     Social Determinants of Health     Financial Resource Strain: Not on file   Food Insecurity: No Food Insecurity (2023)    Hunger Vital Sign     Worried About Running Out of Food in the Last Year: Never true     Ran Out of Food in the Last Year: Never true   Transportation Needs: No Transportation Needs (2023)    PRAPARE - Transportation     Lack of Transportation (Medical): No     Lack of Transportation (Non-Medical): No   Physical Activity: Not on file   Stress: Not on file   Social Connections: Not on file   Intimate Partner Violence: Not on file   Housing Stability: Low Risk  (2023)    Housing Stability Vital Sign     Unable to Pay for Housing in the Last Year: No     Number of Places Lived in the Last Year: 1      Unstable Housing in the Last Year: No        Current Outpatient Medications:     amitriptyline (ELAVIL) 10 mg tablet, TAKE TWO TABLETS BY MOUTH AT BEDTIME, Disp: 180 tablet, Rfl: 1    aspirin 81 mg chewable tablet, Chew 1 tablet (81 mg total) daily, Disp: 30 tablet, Rfl: 0    atorvastatin (LIPITOR) 10 mg tablet, TAKE ONE TABLET BY MOUTH EVERY DAY, Disp: 90 tablet, Rfl: 3    Cholecalciferol (VITAMIN D3) 1000 units CAPS, Take 1 capsule by mouth daily , Disp: , Rfl:     dicyclomine (BENTYL) 10 mg capsule, Take 1 capsule (10 mg total) by mouth 3 (three) times a day as needed (abd pain) (Patient not taking: Reported on 11/22/2023), Disp: 30 capsule, Rfl: 2    diphenoxylate-atropine (LOMOTIL) 2.5-0.025 mg per tablet, Take 1 tablet by mouth if needed (Patient not taking: Reported on 12/18/2023), Disp: , Rfl:     diphenoxylate-atropine (LOMOTIL) 2.5-0.025 mg per tablet, Take 1 tablet by mouth 4 (four) times a day as needed for diarrhea, Disp: 30 tablet, Rfl: 0    DULoxetine (CYMBALTA) 60 mg delayed release capsule, TAKE ONE CAPSULE BY MOUTH EVERY DAY, Disp: 30 capsule, Rfl: 5    hydroxychloroquine (PLAQUENIL) 200 mg tablet, Take 1 tablet (200 mg total) by mouth daily with breakfast, Disp: 90 tablet, Rfl: 1    Magnesium 250 MG TABS, Take 250 mg by mouth every evening, Disp: , Rfl:     metFORMIN (GLUCOPHAGE) 500 mg tablet, Take 500 mg by mouth 2 (two) times a day (Patient not taking: Reported on 6/27/2023), Disp: , Rfl:     metoprolol succinate (TOPROL-XL) 25 mg 24 hr tablet, Take 0.5 tablets (12.5 mg total) by mouth daily (Patient not taking: Reported on 6/27/2023), Disp: 45 tablet, Rfl: 3    Mirabegron ER (Myrbetriq) 50 MG TB24, Take 1 tablet (50 mg total) by mouth every evening, Disp: 30 tablet, Rfl: 11    nitrofurantoin (MACRODANTIN) 50 mg capsule, TAKE ONE CAPSULE BY MOUTH EVERY DAY IN THE MORNING, Disp: 30 capsule, Rfl: 6    omeprazole (PriLOSEC) 20 mg delayed release capsule, Take 20 mg by mouth daily  , Disp: , Rfl:      pregabalin (LYRICA) 75 mg capsule, TAKE ONE CAPSULE BY MOUTH THREE TIMES A DAY, Disp: 90 capsule, Rfl: 5    pyridoxine (B-6) 100 MG tablet, Take 100 mg by mouth daily, Disp: , Rfl:     Sodium Fluoride (PreviDent 5000 Booster Plus) 1.1 % PSTE, Apply on teeth every evening as directed, Disp: 100 mL, Rfl: 3    torsemide (DEMADEX) 10 mg tablet, Take 1 tablet PRN for wt gain 3 lb/ 3 days or 5 lb/ 5 aria (Patient not taking: Reported on 6/27/2023), Disp: 30 tablet, Rfl: 0  Family History   Problem Relation Age of Onset    Cancer Brother     Diabetes Mother     Hypertension Father     Heart disease Father       Review of Systems   Constitutional:  Negative for chills, diaphoresis, fatigue and fever.   Skin:  Positive for wound.   All other systems reviewed and are negative.      Allergies  Morphine and Ciprofloxacin    OBJECTIVE:  /85   Pulse 95   Temp (!) 97.4 °F (36.3 °C)   Resp 18   LMP  (LMP Unknown)     Physical Exam  Vitals reviewed.   Constitutional:       Appearance: Normal appearance.   HENT:      Head: Normocephalic and atraumatic.      Mouth/Throat:      Mouth: Mucous membranes are moist.   Eyes:      Extraocular Movements: Extraocular movements intact.   Pulmonary:      Effort: Pulmonary effort is normal.   Musculoskeletal:         General: Swelling and signs of injury present.      Left lower leg: Edema present.   Skin:     Comments: Distal anterior right lower extremity wound slightly smaller than last exam.  Healthy wound bed.  Rolled edges.  No signs of infection.    Left leg ulceration around the level of the tibial tuberosity with mixed fibrous/granular wound bed and biofilm formation.  No local signs of infection.  Overall superficial with attached edges   Neurological:      Mental Status: She is alert.   Psychiatric:         Mood and Affect: Mood normal.         Behavior: Behavior normal.           Wound 11/22/23 Venous Ulcer Leg Right;Anterior (Active)   Wound Image   02/23/24 2172    Wound Description Yellow;Slough;White;Pink 02/23/24 1516   Park-wound Assessment Edema;Scar Tissue;Maceration 02/23/24 1516   Wound Length (cm) 0.9 cm 02/23/24 1516   Wound Width (cm) 0.5 cm 02/23/24 1516   Wound Depth (cm) 0.1 cm 02/23/24 1516   Wound Surface Area (cm^2) 0.45 cm^2 02/23/24 1516   Wound Volume (cm^3) 0.045 cm^3 02/23/24 1516   Calculated Wound Volume (cm^3) 0.05 cm^3 02/23/24 1516   Change in Wound Size % 92.42 02/23/24 1516   Drainage Amount Moderate 02/23/24 1516   Drainage Description Serous 02/23/24 1516   Non-staged Wound Description Full thickness 02/23/24 1516   Treatments Irrigation with NSS 01/05/24 1435   Dressing Status Intact 02/23/24 1516       Wound 02/14/24 Traumatic Pretibial Left;Proximal (Active)   Wound Image   02/23/24 1516   Wound Description Hypergranulation;Pink;Yellow;White 02/23/24 1516   Park-wound Assessment Pink;Fragile 02/23/24 1516   Wound Length (cm) 3.2 cm 02/23/24 1516   Wound Width (cm) 2.3 cm 02/23/24 1516   Wound Depth (cm) 0.1 cm 02/23/24 1516   Wound Surface Area (cm^2) 7.36 cm^2 02/23/24 1516   Wound Volume (cm^3) 0.736 cm^3 02/23/24 1516   Calculated Wound Volume (cm^3) 0.74 cm^3 02/23/24 1516   Change in Wound Size % 24.49 02/23/24 1516   Drainage Amount Small 02/23/24 1516   Drainage Description Serosanguineous 02/23/24 1516   Non-staged Wound Description Full thickness 02/23/24 1516           Wound 11/22/23 Venous Ulcer Leg Right;Anterior (Active)   Wound Image   02/23/24 1551   Wound Description Yellow;Slough;White;Pink 02/23/24 1516   Park-wound Assessment Edema;Scar Tissue;Maceration 02/23/24 1516   Wound Length (cm) 0.9 cm 02/23/24 1516   Wound Width (cm) 0.5 cm 02/23/24 1516   Wound Depth (cm) 0.1 cm 02/23/24 1516   Wound Surface Area (cm^2) 0.45 cm^2 02/23/24 1516   Wound Volume (cm^3) 0.045 cm^3 02/23/24 1516   Calculated Wound Volume (cm^3) 0.05 cm^3 02/23/24 1516   Change in Wound Size % 92.42 02/23/24 1516   Drainage Amount Moderate 02/23/24  1516   Drainage Description Serous 02/23/24 1516   Non-staged Wound Description Full thickness 02/23/24 1516   Treatments Irrigation with NSS 01/05/24 1435   Dressing Status Intact 02/23/24 1516       Wound 02/14/24 Traumatic Pretibial Left;Proximal (Active)   Wound Image   02/23/24 1516   Wound Description Hypergranulation;Pink;Yellow;White 02/23/24 1516   Park-wound Assessment Pink;Fragile 02/23/24 1516   Wound Length (cm) 3.2 cm 02/23/24 1516   Wound Width (cm) 2.3 cm 02/23/24 1516   Wound Depth (cm) 0.1 cm 02/23/24 1516   Wound Surface Area (cm^2) 7.36 cm^2 02/23/24 1516   Wound Volume (cm^3) 0.736 cm^3 02/23/24 1516   Calculated Wound Volume (cm^3) 0.74 cm^3 02/23/24 1516   Change in Wound Size % 24.49 02/23/24 1516   Drainage Amount Small 02/23/24 1516   Drainage Description Serosanguineous 02/23/24 1516   Non-staged Wound Description Full thickness 02/23/24 1516     Diagnosis:  1. 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 Traumatic open wound of left lower leg, initial encounter  -     lidocaine (XYLOCAINE) 4 % topical solution 5 mL  -     Wound cleansing and dressings Venous Ulcer Right;Anterior Leg; Future  -     Wound cleansing and dressings Traumatic Left;Proximal Pretibial; Future  -     Wound compression and edema control; Future    3. Hammertoes of both feet        Diagnosis ICD-10-CM Associated Orders   1. 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 Traumatic open wound of left lower leg, initial encounter  S81.802A lidocaine (XYLOCAINE) 4 % topical solution 5 mL     Wound cleansing and dressings Venous Ulcer Right;Anterior Leg     Wound cleansing and dressings Traumatic Left;Proximal Pretibial     Wound compression and edema control      3. Hammertoes of both feet  M20.41     M20.42            ASSESSMENT:    1) right Distal anterior leg ulceration with about 75% healing over 30-days  2) left leg ulceration, full-thickness to level of mixed dermis/subcutaneous tissue with 25% healing since the last visit  3) bilateral hammertoe deformities  4) type 2 diabetes with CKD 3

## 2024-03-01 ENCOUNTER — OFFICE VISIT (OUTPATIENT)
Dept: WOUND CARE | Facility: HOSPITAL | Age: 89
End: 2024-03-01
Payer: MEDICARE

## 2024-03-01 VITALS
SYSTOLIC BLOOD PRESSURE: 129 MMHG | DIASTOLIC BLOOD PRESSURE: 76 MMHG | RESPIRATION RATE: 15 BRPM | TEMPERATURE: 97.4 F | HEART RATE: 93 BPM

## 2024-03-01 DIAGNOSIS — M20.42 HAMMERTOES OF BOTH FEET: ICD-10-CM

## 2024-03-01 DIAGNOSIS — E11.22 TYPE 2 DIABETES MELLITUS WITH STAGE 3 CHRONIC KIDNEY DISEASE, WITHOUT LONG-TERM CURRENT USE OF INSULIN, UNSPECIFIED WHETHER STAGE 3A OR 3B CKD (HCC): ICD-10-CM

## 2024-03-01 DIAGNOSIS — E11.621 DIABETIC ULCER OF LEFT GREAT TOE (HCC): ICD-10-CM

## 2024-03-01 DIAGNOSIS — L97.911 NON-PRESSURE CHRONIC ULCER RIGHT LOWER LEG, LIMITED TO BREAKDOWN SKIN (HCC): Primary | ICD-10-CM

## 2024-03-01 DIAGNOSIS — M20.41 HAMMERTOES OF BOTH FEET: ICD-10-CM

## 2024-03-01 DIAGNOSIS — N18.30 TYPE 2 DIABETES MELLITUS WITH STAGE 3 CHRONIC KIDNEY DISEASE, WITHOUT LONG-TERM CURRENT USE OF INSULIN, UNSPECIFIED WHETHER STAGE 3A OR 3B CKD (HCC): ICD-10-CM

## 2024-03-01 DIAGNOSIS — L97.529 DIABETIC ULCER OF LEFT GREAT TOE (HCC): ICD-10-CM

## 2024-03-01 DIAGNOSIS — S81.802A TRAUMATIC OPEN WOUND OF LEFT LOWER LEG, INITIAL ENCOUNTER: ICD-10-CM

## 2024-03-01 PROCEDURE — 97597 DBRDMT OPN WND 1ST 20 CM/<: CPT | Performed by: STUDENT IN AN ORGANIZED HEALTH CARE EDUCATION/TRAINING PROGRAM

## 2024-03-01 PROCEDURE — 99213 OFFICE O/P EST LOW 20 MIN: CPT | Performed by: STUDENT IN AN ORGANIZED HEALTH CARE EDUCATION/TRAINING PROGRAM

## 2024-03-01 RX ORDER — LIDOCAINE HYDROCHLORIDE 40 MG/ML
5 SOLUTION TOPICAL ONCE
Status: COMPLETED | OUTPATIENT
Start: 2024-03-01 | End: 2024-03-01

## 2024-03-01 RX ADMIN — LIDOCAINE HYDROCHLORIDE 5 ML: 40 SOLUTION TOPICAL at 14:40

## 2024-03-01 NOTE — PATIENT INSTRUCTIONS
Orders Placed This Encounter   Procedures    Wound cleansing and dressings Venous Ulcer Right;Anterior Leg     Right Leg Wound:      Wash your hands with soap and water. Remove old dressing, discard into plastic bag and place in trash. Cleansed the wound with soap and water prior to applying a clean dressing. Do not use tissue or cotton balls. Do not scrub the wound. Pat dry using gauze.   Shower no   Applied moisturizer to skin surrounding wound   Apply polymem ag cut to the leg wound.   Covered with abd  Secured with coflex lite  Change weekly at the wound center.      This was done today.     Standing Status:   Future     Standing Expiration Date:   3/1/2025    Wound cleansing and dressings Traumatic Left;Proximal Pretibial     Left Leg wound   Wash your hands with soap and water.  Remove old dressing, discard into plastic bag and place in trash. Cleanse wound with prophase. Do not use tissue or cotton balls. Do not scrub the wound. Pat dry using gauze.  Shower no      Apply mepilex up to cover wound bed  Cover with abd  Secure with rolled gauze and tape  Change dressing 3 times weekly     The above was completed today at the wound center.     Standing Status:   Future     Standing Expiration Date:   3/1/2025    Wound compression and edema control Venous Ulcer Right;Anterior Leg     Right Leg Wound  Coflex lite:      Keep compression wrap/wraps clean and dry. If wraps are too tight and you experience numbness/tingling, call the wound center. If after hours, remove wraps or proceed to nearest E.R. and call wound center in AM.     Wrap will be changed 1 x weekly in wound center     Avoid prolonged standing in one place.     Elevate leg(s) above the level of the heart when sitting or as much as possible.     Standing Status:   Future     Standing Expiration Date:   3/1/2025    Wound compression and edema control Traumatic Left;Proximal Pretibial     Left leg wound:      Elastic Tubular Stocking     Tubular elastic  bandage: Apply from base of toes to behind the knee. Apply in AM, may remove for sleep.     Avoid prolonged standing in one place.     Elevate leg(s) above the level of the heart when sitting or as much as possible.     Standing Status:   Future     Standing Expiration Date:   3/1/2025    Wound cleansing and dressings Diabetic Ulcer Left Toe D1, great     Left Great Toe Wound:    Wash your hands with soap and water.  Remove old dressing, discard into plastic bag and place in trash.  Cleanse the wound with soap and water prior to applying a clean dressing. Do not use tissue or cotton balls. Do not scrub the wound. Pat dry using gauze.  Shower no   Apply moisturizer to skin surrounding wound  Wear toe separator when it arrives.  Apply Betadine to the toe wound. Then apply Xeroform. Cover with Bandaid.    Change dressing every other day.     Standing Status:   Future     Standing Expiration Date:   3/1/2025

## 2024-03-02 ENCOUNTER — APPOINTMENT (OUTPATIENT)
Dept: LAB | Facility: CLINIC | Age: 89
End: 2024-03-02
Payer: MEDICARE

## 2024-03-02 DIAGNOSIS — Z79.899 ENCOUNTER FOR LONG-TERM (CURRENT) USE OF OTHER MEDICATIONS: ICD-10-CM

## 2024-03-02 DIAGNOSIS — N18.32 STAGE 3B CHRONIC KIDNEY DISEASE (HCC): ICD-10-CM

## 2024-03-02 DIAGNOSIS — R74.8 ELEVATED LIVER ENZYMES: ICD-10-CM

## 2024-03-02 DIAGNOSIS — R73.01 ELEVATED FASTING GLUCOSE: ICD-10-CM

## 2024-03-02 DIAGNOSIS — M81.0 SENILE OSTEOPOROSIS: ICD-10-CM

## 2024-03-02 LAB
ANION GAP SERPL CALCULATED.3IONS-SCNC: 7 MMOL/L
BUN SERPL-MCNC: 38 MG/DL (ref 5–25)
CALCIUM SERPL-MCNC: 9.5 MG/DL (ref 8.4–10.2)
CHLORIDE SERPL-SCNC: 102 MMOL/L (ref 96–108)
CO2 SERPL-SCNC: 30 MMOL/L (ref 21–32)
CREAT SERPL-MCNC: 1.45 MG/DL (ref 0.6–1.3)
GFR SERPL CREATININE-BSD FRML MDRD: 31 ML/MIN/1.73SQ M
GLUCOSE P FAST SERPL-MCNC: 135 MG/DL (ref 65–99)
POTASSIUM SERPL-SCNC: 4.3 MMOL/L (ref 3.5–5.3)
SODIUM SERPL-SCNC: 139 MMOL/L (ref 135–147)

## 2024-03-02 PROCEDURE — 83036 HEMOGLOBIN GLYCOSYLATED A1C: CPT

## 2024-03-02 PROCEDURE — 36415 COLL VENOUS BLD VENIPUNCTURE: CPT

## 2024-03-02 PROCEDURE — 80048 BASIC METABOLIC PNL TOTAL CA: CPT

## 2024-03-03 LAB
EST. AVERAGE GLUCOSE BLD GHB EST-MCNC: 143 MG/DL
HBA1C MFR BLD: 6.6 %

## 2024-03-04 NOTE — PROGRESS NOTES
Patient ID: Tanya Stephen is a 89 y.o. female Date of Birth 7/21/1934     My role is Foot, Ankle and Wound Specialist    PLAN:  -Educated patient on their condition.   -The patient's wounds are not currently infected. We discussed the importance of recognizing systemic and local signs of infection and going directly to the emergency department should any of these occur  -Debridement as below:   -Discussed proper care of dressings, patient is not to get them wet at all. If the dressings do get wet, they must be removed and redressed.  -return to wound care in 1-week  -PolyMem Ag, Coflex lite to right lower extremity wound  -Mepilex up, ABD to left leg wound  -Betadine, Xeroform, Band-Aid to left hallux ulceration with use of toe spacer to prevent pressure to the area.  -Patient, , and daughter verbalize understanding of plan     Patient optimization:  -Vascular:              Arterial -lower extremity arterial duplex study from 1/27/2024 reviewed: No evidence of lower extremity arterial occlusive disease bilaterally with bilateral great toe pressures above the healing range.              Venous - chronic venous hypertension noted              Lymphatic -no evidence of lymphatic disease     -MSK:              Pressure reduction -continue wearing wide toebox supportive sneakers.  Continue using toe slings for the left foot              Deformity and possible correction - left third digit percutaneous flexor tenotomy performed 2/2/2024     -Neuro:              Neuropathy - We discussed checking feet daily for additional cuts, bruises, or wounds. We also discussed refraining from walking barefoot and wearing white socks in order to notice drainage     -Nutritional:              Dietary supplementation - Recommend high protein diet with Toni supplementation until wound is fully healed              Smoking cessation - N/A              Glycemic control -new hemoglobin A1c from 3/2/2024 reviewed: 6.6%.  This is  "increased from the patient's previous hemoglobin A1c of 5.9%.      Debridement   Wound 02/14/24 Traumatic Pretibial Left;Proximal    Universal Protocol:  Consent: Verbal consent obtained.  Risks and benefits: risks, benefits and alternatives were discussed  Consent given by: patient  Time out: Immediately prior to procedure a \"time out\" was called to verify the correct patient, procedure, equipment, support staff and site/side marked as required.  Patient understanding: patient states understanding of the procedure being performed  Patient identity confirmed: verbally with patient    Debridement Details  Performed by: physician  Debridement type: selective  Pain control: lidocaine 4%      Post-debridement measurements  Length (cm): 3  Width (cm): 2.1  Depth (cm): 0.1  Percent debrided: 100%  Surface Area (cm^2): 6.3  Area Debrided (cm^2): 6.3  Volume (cm^3): 0.63    Devitalized tissue debrided: biofilm, exudate, fibrin and slough  Instrument(s) utilized: blade  Bleeding: small  Hemostasis obtained with: pressure  Procedural pain (0-10): 3  Post-procedural pain: 3   Response to treatment: procedure was tolerated well    Debridement   Wound 03/01/24 Diabetic Ulcer Toe D1, great Left    Universal Protocol:  Consent: Verbal consent obtained.  Risks and benefits: risks, benefits and alternatives were discussed  Consent given by: patient  Time out: Immediately prior to procedure a \"time out\" was called to verify the correct patient, procedure, equipment, support staff and site/side marked as required.  Patient understanding: patient states understanding of the procedure being performed  Patient identity confirmed: verbally with patient    Debridement Details  Performed by: physician  Debridement type: selective      Post-debridement measurements  Length (cm): 0.2  Width (cm): 0.4  Depth (cm): 0.3  Percent debrided: 100%  Surface Area (cm^2): 0.08  Area Debrided (cm^2): 0.08  Volume (cm^3): 0.02    Devitalized tissue " debrided: necrotic debris  Instrument(s) utilized: blade and forceps  Bleeding: none  Hemostasis obtained with: not applicable  Procedural pain (0-10): insensate  Post-procedural pain: insensate   Response to treatment: procedure was tolerated well         Wound Instructions    Orders Placed This Encounter   Procedures    Wound cleansing and dressings Venous Ulcer Right;Anterior Leg     Right Leg Wound:      Wash your hands with soap and water. Remove old dressing, discard into plastic bag and place in trash. Cleansed the wound with soap and water prior to applying a clean dressing. Do not use tissue or cotton balls. Do not scrub the wound. Pat dry using gauze.   Shower no   Applied moisturizer to skin surrounding wound   Apply polymem ag cut to the leg wound.   Covered with abd  Secured with coflex lite  Change weekly at the wound center.      This was done today.     Standing Status:   Future     Standing Expiration Date:   3/1/2025    Wound cleansing and dressings Traumatic Left;Proximal Pretibial     Left Leg wound   Wash your hands with soap and water.  Remove old dressing, discard into plastic bag and place in trash. Cleanse wound with prophase. Do not use tissue or cotton balls. Do not scrub the wound. Pat dry using gauze.  Shower no      Apply mepilex up to cover wound bed  Cover with abd  Secure with rolled gauze and tape  Change dressing 3 times weekly     The above was completed today at the wound center.     Standing Status:   Future     Standing Expiration Date:   3/1/2025    Wound compression and edema control Venous Ulcer Right;Anterior Leg     Right Leg Wound  Coflex lite:      Keep compression wrap/wraps clean and dry. If wraps are too tight and you experience numbness/tingling, call the wound center. If after hours, remove wraps or proceed to nearest E.R. and call wound center in AM.     Wrap will be changed 1 x weekly in wound center     Avoid prolonged standing in one place.     Elevate leg(s)  above the level of the heart when sitting or as much as possible.     Standing Status:   Future     Standing Expiration Date:   3/1/2025    Wound compression and edema control Traumatic Left;Proximal Pretibial     Left leg wound:      Elastic Tubular Stocking     Tubular elastic bandage: Apply from base of toes to behind the knee. Apply in AM, may remove for sleep.     Avoid prolonged standing in one place.     Elevate leg(s) above the level of the heart when sitting or as much as possible.     Standing Status:   Future     Standing Expiration Date:   3/1/2025    Wound cleansing and dressings Diabetic Ulcer Left Toe D1, great     Left Great Toe Wound:    Wash your hands with soap and water.  Remove old dressing, discard into plastic bag and place in trash.  Cleanse the wound with soap and water prior to applying a clean dressing. Do not use tissue or cotton balls. Do not scrub the wound. Pat dry using gauze.  Shower no   Apply moisturizer to skin surrounding wound  Wear toe separator when it arrives.  Apply Betadine to the toe wound. Then apply Xeroform. Cover with Bandaid.    Change dressing every other day.     Standing Status:   Future     Standing Expiration Date:   3/1/2025    Debridement Traumatic Left;Proximal Pretibial     This order was created via procedure documentation    Debridement Diabetic Ulcer Left Toe D1, great     This order was created via procedure documentation     SUBJECTIVE:    Chief complaint  Bilateral lower extremity wounds    11/22/23: Consult - Tanya is a pleasant 89-year-old female with a past medical history of type 2 diabetes mellitus most recent A1c 5.9% 1 year ago, diabetic gastroparesis, polyneuropathy, ambulatory dysfunction, HFpEF G1DD, and Raynaud's disease here today for initial wound care consult.  Patient was referred by primary care provider Dr. Gregory Santoro.  Patient sustained the wound traumatically  to the LLE approximately 2 months ago when the fridge door fell  against her leg.  At the time she was seen her PCP and prescribed as course of azithromycin + Neosporin t[to the wound.  Since then wound care has consisted of Neosporin however PCP saw the patient yesterday and advised that she make an appointment to be seen by wound care due to nonhealing status.  Patient denies any symptoms of infection today including fever chills diaphoresis. She does regularly wear compression socks.     3/1/24: Tanya states that she is doing well since her last visit.  Her daughter and  are present with her today.  She denies any systemic signs of infection since her last wound care visit.  She states that she did tolerate the Coflex lite well without complication.  She states that she has had left great toe pain and would like to have this examined.          The following portions of the patient's history were reviewed and updated as appropriate:   Patient Active Problem List   Diagnosis    Anemia    Shortness of breath    Hypomagnesemia    DM (diabetes mellitus), type 2 (HCC)    Urinary frequency    Iron deficiency anemia secondary to inadequate dietary iron intake    Sinobronchitis    Post zoster neuralgia    Primary generalized (osteo)arthritis    Seronegative arthropathy of multiple sites (HCC)    Senile osteoporosis    Lumbar spondylosis    Diabetic polyneuropathy associated with type 2 diabetes mellitus (HCC)    Diabetic gastroparesis associated with type 2 diabetes mellitus     Irritable bowel syndrome with diarrhea    Stage 3b chronic kidney disease (HCC)    Traumatic injury of sacrum    Tendinitis of left rotator cuff    Abnormality of gait due to impairment of balance    Sicca syndrome (HCC)    Chest pain    Leukocytosis    Chronic diastolic heart failure (HCC)    OAB (overactive bladder)    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)    Toxic metabolic encephalopathy    Cellulitis    Dyslipidemia    Primary hypertension    Spinal stenosis of lumbar region  with neurogenic claudication    Chronic pain syndrome    SI (sacroiliac) joint dysfunction    Elevated LFTs    Ambulatory dysfunction    Gastroesophageal reflux disease without esophagitis    Dysphagia    Raynaud's phenomenon without gangrene    Elevated liver enzymes    Incontinence of feces with fecal urgency     Past Medical History:   Diagnosis Date    Anemia     Arthritis     Back pain     CHF (congestive heart failure) (HCC)     Chronic kidney disease     Stage 3b    Confusion 2023    Diabetes (HCC)     Diabetes mellitus (HCC)     Diabetic gastroparesis associated with type 2 diabetes mellitus      Diabetic polyneuropathy (HCC)     Diverticulosis     Herpes zoster     Hypertension     IBS (irritable bowel syndrome)     Neurogenic claudication due to lumbar spinal stenosis     Osteoarthritis     Osteoporosis     Pulmonary edema     Raynaud's phenomenon without gangrene     Seronegative arthropathy of multiple sites (HCC)     Sicca (HCC)      Past Surgical History:   Procedure Laterality Date    BACK SURGERY      COLONOSCOPY      EGD AND COLONOSCOPY N/A 2016    Procedure: EGD AND COLONOSCOPY;  Surgeon: Elina Anderson MD;  Location: [facility];  Service:     JOINT REPLACEMENT      bilat knees and hips    OTHER SURGICAL HISTORY      pelvis rods    REPLACEMENT TOTAL KNEE BILATERAL      STOMACH SURGERY      UPPER GASTROINTESTINAL ENDOSCOPY       Social History     Socioeconomic History    Marital status: /Civil Union     Spouse name: Weston    Number of children: 2    Years of education: None    Highest education level: High school graduate   Occupational History    None   Tobacco Use    Smoking status: Former     Current packs/day: 0.00     Types: Cigarettes     Quit date:      Years since quittin.2    Smokeless tobacco: Never   Vaping Use    Vaping status: Never Used   Substance and Sexual Activity    Alcohol use: No    Drug use: No    Sexual activity: Not Currently     Partners: Male      Birth control/protection: None   Other Topics Concern    None   Social History Narrative    None     Social Determinants of Health     Financial Resource Strain: Not on file   Food Insecurity: No Food Insecurity (2/23/2023)    Hunger Vital Sign     Worried About Running Out of Food in the Last Year: Never true     Ran Out of Food in the Last Year: Never true   Transportation Needs: No Transportation Needs (2/23/2023)    PRAPARE - Transportation     Lack of Transportation (Medical): No     Lack of Transportation (Non-Medical): No   Physical Activity: Not on file   Stress: Not on file   Social Connections: Not on file   Intimate Partner Violence: Not on file   Housing Stability: Low Risk  (2/23/2023)    Housing Stability Vital Sign     Unable to Pay for Housing in the Last Year: No     Number of Places Lived in the Last Year: 1     Unstable Housing in the Last Year: No        Current Outpatient Medications:     amitriptyline (ELAVIL) 10 mg tablet, TAKE TWO TABLETS BY MOUTH AT BEDTIME, Disp: 180 tablet, Rfl: 1    aspirin 81 mg chewable tablet, Chew 1 tablet (81 mg total) daily, Disp: 30 tablet, Rfl: 0    atorvastatin (LIPITOR) 10 mg tablet, TAKE ONE TABLET BY MOUTH EVERY DAY, Disp: 90 tablet, Rfl: 3    Cholecalciferol (VITAMIN D3) 1000 units CAPS, Take 1 capsule by mouth daily , Disp: , Rfl:     dicyclomine (BENTYL) 10 mg capsule, Take 1 capsule (10 mg total) by mouth 3 (three) times a day as needed (abd pain) (Patient not taking: Reported on 11/22/2023), Disp: 30 capsule, Rfl: 2    diphenoxylate-atropine (LOMOTIL) 2.5-0.025 mg per tablet, Take 1 tablet by mouth if needed (Patient not taking: Reported on 12/18/2023), Disp: , Rfl:     diphenoxylate-atropine (LOMOTIL) 2.5-0.025 mg per tablet, Take 1 tablet by mouth 4 (four) times a day as needed for diarrhea, Disp: 30 tablet, Rfl: 0    DULoxetine (CYMBALTA) 60 mg delayed release capsule, TAKE ONE CAPSULE BY MOUTH EVERY DAY, Disp: 30 capsule, Rfl: 5    hydroxychloroquine  (PLAQUENIL) 200 mg tablet, Take 1 tablet (200 mg total) by mouth daily with breakfast, Disp: 90 tablet, Rfl: 1    Magnesium 250 MG TABS, Take 250 mg by mouth every evening, Disp: , Rfl:     metFORMIN (GLUCOPHAGE) 500 mg tablet, Take 500 mg by mouth 2 (two) times a day (Patient not taking: Reported on 6/27/2023), Disp: , Rfl:     metoprolol succinate (TOPROL-XL) 25 mg 24 hr tablet, Take 0.5 tablets (12.5 mg total) by mouth daily (Patient not taking: Reported on 6/27/2023), Disp: 45 tablet, Rfl: 3    Mirabegron ER (Myrbetriq) 50 MG TB24, Take 1 tablet (50 mg total) by mouth every evening, Disp: 30 tablet, Rfl: 11    nitrofurantoin (MACRODANTIN) 50 mg capsule, TAKE ONE CAPSULE BY MOUTH EVERY DAY IN THE MORNING, Disp: 30 capsule, Rfl: 6    omeprazole (PriLOSEC) 20 mg delayed release capsule, Take 20 mg by mouth daily  , Disp: , Rfl:     pregabalin (LYRICA) 75 mg capsule, TAKE ONE CAPSULE BY MOUTH THREE TIMES A DAY, Disp: 90 capsule, Rfl: 5    pyridoxine (B-6) 100 MG tablet, Take 100 mg by mouth daily, Disp: , Rfl:     Sodium Fluoride (PreviDent 5000 Booster Plus) 1.1 % PSTE, Apply on teeth every evening as directed, Disp: 100 mL, Rfl: 3    torsemide (DEMADEX) 10 mg tablet, Take 1 tablet PRN for wt gain 3 lb/ 3 days or 5 lb/ 5 aria (Patient not taking: Reported on 6/27/2023), Disp: 30 tablet, Rfl: 0  Family History   Problem Relation Age of Onset    Cancer Brother     Diabetes Mother     Hypertension Father     Heart disease Father       Review of Systems   Constitutional:  Negative for chills, diaphoresis, fatigue and fever.   Skin:  Positive for wound.   All other systems reviewed and are negative.      Allergies  Morphine and Ciprofloxacin    OBJECTIVE:  /76   Pulse 93   Temp (!) 97.4 °F (36.3 °C)   Resp 15   LMP  (LMP Unknown)     Physical Exam  Vitals reviewed.   Constitutional:       Appearance: Normal appearance.   HENT:      Head: Normocephalic and atraumatic.      Mouth/Throat:      Mouth: Mucous  membranes are moist.   Eyes:      Extraocular Movements: Extraocular movements intact.   Pulmonary:      Effort: Pulmonary effort is normal.   Musculoskeletal:         General: Swelling and signs of injury present.      Left lower leg: Edema present.   Skin:     Comments: Distal anterior right lower extremity wound much improved since last visit.  100% granular wound base    Left leg ulceration around the level of the tibial tuberosity with mixed fibrous/granular wound bed and biofilm formation.  No local signs of infection.  Overall superficial with attached edges.  Smaller compared to last visit.    New ulceration to the lateral aspect of the left hallux to the level of subcutaneous tissue.  Fibrous wound base with mild serosanguineous drainage and rolled edges.   Neurological:      Mental Status: She is alert.   Psychiatric:         Mood and Affect: Mood normal.         Behavior: Behavior normal.           Wound 11/22/23 Venous Ulcer Leg Right;Anterior (Active)   Wound Image   03/01/24 1439   Wound Description Pink;Epithelialization 03/01/24 1438   Park-wound Assessment Dry 03/01/24 1438   Wound Length (cm) 0.2 cm 03/01/24 1438   Wound Width (cm) 0.2 cm 03/01/24 1438   Wound Depth (cm) 0.1 cm 03/01/24 1438   Wound Surface Area (cm^2) 0.04 cm^2 03/01/24 1438   Wound Volume (cm^3) 0.004 cm^3 03/01/24 1438   Calculated Wound Volume (cm^3) 0 cm^3 03/01/24 1438   Change in Wound Size % 100 03/01/24 1438   Drainage Amount Scant 03/01/24 1438   Drainage Description Serous 03/01/24 1438   Non-staged Wound Description Full thickness 03/01/24 1438   Treatments Irrigation with NSS 01/05/24 1435   Dressing Status Intact 03/01/24 1438       Wound 02/14/24 Traumatic Pretibial Left;Proximal (Active)   Wound Image   03/01/24 1437   Wound Description Pink;Yellow;Black;Brown;White 03/01/24 1436   Park-wound Assessment Pink;Fragile 03/01/24 1436   Wound Length (cm) 3 cm 03/01/24 1436   Wound Width (cm) 2.1 cm 03/01/24 1436   Wound  Depth (cm) 0.1 cm 03/01/24 1436   Wound Surface Area (cm^2) 6.3 cm^2 03/01/24 1436   Wound Volume (cm^3) 0.63 cm^3 03/01/24 1436   Calculated Wound Volume (cm^3) 0.63 cm^3 03/01/24 1436   Change in Wound Size % 35.71 03/01/24 1436   Drainage Amount Moderate 03/01/24 1436   Drainage Description Serosanguineous 03/01/24 1436   Non-staged Wound Description Full thickness 03/01/24 1436   Dressing Status Intact 03/01/24 1436       Wound 03/01/24 Diabetic Ulcer Toe D1, great Left (Active)   Enter Del Angel score: Del Angel Grade 2: Deep ulcer extended to ligament, tendon, joint capsule, bone, or deep fascia without abscess or osteomyelitis (OM) 03/01/24 1448   Wound Image   03/01/24 1449   Wound Description Pink 03/01/24 1448   Park-wound Assessment Dry;Callus 03/01/24 1448   Wound Length (cm) 0.2 cm 03/01/24 1448   Wound Width (cm) 0.4 cm 03/01/24 1448   Wound Depth (cm) 0.3 cm 03/01/24 1448   Wound Surface Area (cm^2) 0.08 cm^2 03/01/24 1448   Wound Volume (cm^3) 0.024 cm^3 03/01/24 1448   Calculated Wound Volume (cm^3) 0.02 cm^3 03/01/24 1448   Drainage Amount Scant 03/01/24 1448   Drainage Description Serosanguineous 03/01/24 1448   Non-staged Wound Description Full thickness 03/01/24 1448   Dressing Status Other (Comment) 03/01/24 1448           Wound 11/22/23 Venous Ulcer Leg Right;Anterior (Active)   Wound Image   03/01/24 1439   Wound Description Pink;Epithelialization 03/01/24 1438   Park-wound Assessment Dry 03/01/24 1438   Wound Length (cm) 0.2 cm 03/01/24 1438   Wound Width (cm) 0.2 cm 03/01/24 1438   Wound Depth (cm) 0.1 cm 03/01/24 1438   Wound Surface Area (cm^2) 0.04 cm^2 03/01/24 1438   Wound Volume (cm^3) 0.004 cm^3 03/01/24 1438   Calculated Wound Volume (cm^3) 0 cm^3 03/01/24 1438   Change in Wound Size % 100 03/01/24 1438   Drainage Amount Scant 03/01/24 1438   Drainage Description Serous 03/01/24 1438   Non-staged Wound Description Full thickness 03/01/24 1438   Treatments Irrigation with NSS 01/05/24 1435    Dressing Status Intact 03/01/24 1438       Wound 02/14/24 Traumatic Pretibial Left;Proximal (Active)   Wound Image   03/01/24 1437   Wound Description Pink;Yellow;Black;Brown;White 03/01/24 1436   Park-wound Assessment Pink;Fragile 03/01/24 1436   Wound Length (cm) 3 cm 03/01/24 1436   Wound Width (cm) 2.1 cm 03/01/24 1436   Wound Depth (cm) 0.1 cm 03/01/24 1436   Wound Surface Area (cm^2) 6.3 cm^2 03/01/24 1436   Wound Volume (cm^3) 0.63 cm^3 03/01/24 1436   Calculated Wound Volume (cm^3) 0.63 cm^3 03/01/24 1436   Change in Wound Size % 35.71 03/01/24 1436   Drainage Amount Moderate 03/01/24 1436   Drainage Description Serosanguineous 03/01/24 1436   Non-staged Wound Description Full thickness 03/01/24 1436   Dressing Status Intact 03/01/24 1436       Wound 03/01/24 Diabetic Ulcer Toe D1, great Left (Active)   Enter Del Angel score: Del Angel Grade 2: Deep ulcer extended to ligament, tendon, joint capsule, bone, or deep fascia without abscess or osteomyelitis (OM) 03/01/24 1448   Wound Image   03/01/24 1449   Wound Description Pink 03/01/24 1448   Park-wound Assessment Dry;Callus 03/01/24 1448   Wound Length (cm) 0.2 cm 03/01/24 1448   Wound Width (cm) 0.4 cm 03/01/24 1448   Wound Depth (cm) 0.3 cm 03/01/24 1448   Wound Surface Area (cm^2) 0.08 cm^2 03/01/24 1448   Wound Volume (cm^3) 0.024 cm^3 03/01/24 1448   Calculated Wound Volume (cm^3) 0.02 cm^3 03/01/24 1448   Drainage Amount Scant 03/01/24 1448   Drainage Description Serosanguineous 03/01/24 1448   Non-staged Wound Description Full thickness 03/01/24 1448   Dressing Status Other (Comment) 03/01/24 1448     Diagnosis:  1. 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 Traumatic open wound of left lower leg, initial encounter  -     lidocaine (XYLOCAINE) 4 % topical solution 5 mL  -     Wound cleansing and dressings Venous Ulcer Right;Anterior Leg; Future  -     Wound cleansing and dressings Traumatic Left;Proximal Pretibial; Future  -     Wound compression and edema control Venous Ulcer Right;Anterior Leg; Future  -     Wound compression and edema control Traumatic Left;Proximal Pretibial; Future  -     Wound cleansing and dressings Diabetic Ulcer Left Toe D1, great; Future    3. Type 2 diabetes mellitus with stage 3 chronic kidney disease, without long-term current use of insulin, unspecified whether stage 3a or 3b CKD ([facility])  -     lidocaine (XYLOCAINE) 4 % topical solution 5 mL  -     Wound cleansing and dressings Venous Ulcer Right;Anterior Leg; Future  -     Wound cleansing and dressings Traumatic Left;Proximal Pretibial; Future  -     Wound compression and edema control Venous Ulcer Right;Anterior Leg; Future  -     Wound compression and edema control Traumatic Left;Proximal Pretibial; Future  -     Wound cleansing and dressings Diabetic Ulcer Left Toe D1, great; Future    4. Hammertoes of both feet    5. Diabetic ulcer of left great toe ([facility])        Diagnosis ICD-10-CM Associated Orders   1. 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 Traumatic  open wound of left lower leg, initial encounter  S81.802A lidocaine (XYLOCAINE) 4 % topical solution 5 mL     Wound cleansing and dressings Venous Ulcer Right;Anterior Leg     Wound cleansing and dressings Traumatic Left;Proximal Pretibial     Wound compression and edema control Venous Ulcer Right;Anterior Leg     Wound compression and edema control Traumatic Left;Proximal Pretibial     Wound cleansing and dressings Diabetic Ulcer Left Toe D1, great      3. Type 2 diabetes mellitus with stage 3 chronic kidney disease, without long-term current use of insulin, unspecified whether stage 3a or 3b CKD ([facility])  E11.22 lidocaine (XYLOCAINE) 4 % topical solution 5 mL    N18.30 Wound cleansing and dressings Venous Ulcer Right;Anterior Leg     Wound cleansing and dressings Traumatic Left;Proximal Pretibial     Wound compression and edema control Venous Ulcer Right;Anterior Leg     Wound compression and edema control Traumatic Left;Proximal Pretibial     Wound cleansing and dressings Diabetic Ulcer Left Toe D1, great      4. Hammertoes of both feet  M20.41     M20.42       5. Diabetic ulcer of left great toe ([facility])  E11.621     L97.529            ASSESSMENT:    1) right Distal anterior leg ulceration with about 97% healing over 30-days  2) left leg ulceration, full-thickness to level of mixed dermis/subcutaneous tissue with 36% healing since the last visit  3) new diabetic ulceration to the left hallux lateral aspect level of subcutaneous tissue, [place] Angel 2  4) bilateral hammertoe deformities  5) type 2 diabetes with CKD 3

## 2024-03-05 DIAGNOSIS — R94.4 DECREASED GFR: Primary | ICD-10-CM

## 2024-03-08 ENCOUNTER — OFFICE VISIT (OUTPATIENT)
Dept: WOUND CARE | Facility: HOSPITAL | Age: 89
End: 2024-03-08
Payer: MEDICARE

## 2024-03-08 VITALS
RESPIRATION RATE: 18 BRPM | TEMPERATURE: 97.5 F | DIASTOLIC BLOOD PRESSURE: 80 MMHG | SYSTOLIC BLOOD PRESSURE: 148 MMHG | HEART RATE: 102 BPM

## 2024-03-08 DIAGNOSIS — M20.41 HAMMERTOES OF BOTH FEET: ICD-10-CM

## 2024-03-08 DIAGNOSIS — S81.811A SKIN TEAR OF RIGHT LOWER LEG WITHOUT COMPLICATION, INITIAL ENCOUNTER: ICD-10-CM

## 2024-03-08 DIAGNOSIS — E11.621 DIABETIC ULCER OF LEFT GREAT TOE (HCC): ICD-10-CM

## 2024-03-08 DIAGNOSIS — L97.911 NON-PRESSURE CHRONIC ULCER RIGHT LOWER LEG, LIMITED TO BREAKDOWN SKIN (HCC): Primary | ICD-10-CM

## 2024-03-08 DIAGNOSIS — S81.802A TRAUMATIC OPEN WOUND OF LEFT LOWER LEG, INITIAL ENCOUNTER: ICD-10-CM

## 2024-03-08 DIAGNOSIS — M20.42 HAMMERTOES OF BOTH FEET: ICD-10-CM

## 2024-03-08 DIAGNOSIS — E11.22 TYPE 2 DIABETES MELLITUS WITH STAGE 3 CHRONIC KIDNEY DISEASE, WITHOUT LONG-TERM CURRENT USE OF INSULIN, UNSPECIFIED WHETHER STAGE 3A OR 3B CKD (HCC): ICD-10-CM

## 2024-03-08 DIAGNOSIS — L97.529 DIABETIC ULCER OF LEFT GREAT TOE (HCC): ICD-10-CM

## 2024-03-08 DIAGNOSIS — N18.30 TYPE 2 DIABETES MELLITUS WITH STAGE 3 CHRONIC KIDNEY DISEASE, WITHOUT LONG-TERM CURRENT USE OF INSULIN, UNSPECIFIED WHETHER STAGE 3A OR 3B CKD (HCC): ICD-10-CM

## 2024-03-08 PROCEDURE — 97597 DBRDMT OPN WND 1ST 20 CM/<: CPT | Performed by: STUDENT IN AN ORGANIZED HEALTH CARE EDUCATION/TRAINING PROGRAM

## 2024-03-08 RX ORDER — LIDOCAINE HYDROCHLORIDE 40 MG/ML
5 SOLUTION TOPICAL ONCE
Status: COMPLETED | OUTPATIENT
Start: 2024-03-08 | End: 2024-03-08

## 2024-03-08 RX ADMIN — LIDOCAINE HYDROCHLORIDE 5 ML: 40 SOLUTION TOPICAL at 15:23

## 2024-03-08 NOTE — PATIENT INSTRUCTIONS
Orders Placed This Encounter   Procedures    Wound cleansing and dressings     Left Leg wound   Wash your hands with soap and water.  Remove old dressing, discard into plastic bag and place in trash. Cleanse wound with prophase. Do not use tissue or cotton balls. Do not scrub the wound. Pat dry using gauze.  Shower no      Apply mepilex up to cover wound bed  Cover with abd  Secure with rolled gauze and tape  Change dressing 3 times weekly     The above was completed today at the wound center.     Standing Status:   Future     Standing Expiration Date:   3/8/2025    Wound compression and edema control     Left leg wound:      Elastic Tubular Stocking     Tubular elastic bandage: Apply from base of toes to behind the knee. Apply in AM, may remove for sleep.     Avoid prolonged standing in one place.     Elevate leg(s) above the level of the heart when sitting or as much as possible.     Standing Status:   Future     Standing Expiration Date:   3/8/2025

## 2024-03-09 NOTE — PROGRESS NOTES
Patient ID: Tanya Stephen is a 89 y.o. female Date of Birth 7/21/1934     My role is Foot, Ankle and Wound Specialist    PLAN:    -Educated patient on their condition.   -The patient's wounds are not currently infected. We discussed the importance of recognizing systemic and local signs of infection and going directly to the emergency department should any of these occur  -Debridement as below:   -Discussed proper care of dressings, patient is not to get them wet at all. If the dressings do get wet, they must be removed and redressed.  -return to wound care with Dr. Bermudez given more proximal wounds. All of the patient's wounds below the knee have fully healed.   -Continue Spandage  to right lower extremity for mild compression  -Mepilex up, ABD to left leg wound  -toe spacer to prevent pressure to the area of the left lateral hallux  -Patient and daughter verbalize understanding of plan     Patient optimization:  -Vascular:              Arterial -lower extremity arterial duplex study from 1/27/2024 reviewed: No evidence of lower extremity arterial occlusive disease bilaterally with bilateral great toe pressures above the healing range.              Venous - chronic venous hypertension noted              Lymphatic -no evidence of lymphatic disease     -MSK:              Pressure reduction -continue wearing wide toebox supportive sneakers.  Continue using toe slings for the left foot              Deformity and possible correction - left third digit percutaneous flexor tenotomy performed 2/2/2024     -Neuro:              Neuropathy - We discussed checking feet daily for additional cuts, bruises, or wounds. We also discussed refraining from walking barefoot and wearing white socks in order to notice drainage     -Nutritional:              Dietary supplementation - Recommend high protein diet with Toni supplementation until wound is fully healed              Smoking cessation - N/A              Glycemic  "control -new hemoglobin A1c from 3/2/2024 reviewed: 6.6%.  This is increased from the patient's previous hemoglobin A1c of 5.9%.    Debridement   Wound 02/14/24 Traumatic Pretibial Left;Proximal    Universal Protocol:  Consent: Verbal consent obtained.  Risks and benefits: risks, benefits and alternatives were discussed  Consent given by: patient  Time out: Immediately prior to procedure a \"time out\" was called to verify the correct patient, procedure, equipment, support staff and site/side marked as required.  Patient understanding: patient states understanding of the procedure being performed  Patient identity confirmed: verbally with patient    Debridement Details  Performed by: physician  Debridement type: selective  Pain control: lidocaine 4%      Post-debridement measurements  Length (cm): 2  Width (cm): 2  Depth (cm): 0.1  Percent debrided: 100%  Surface Area (cm^2): 4  Area Debrided (cm^2): 4  Volume (cm^3): 0.4    Devitalized tissue debrided: biofilm, fibrin and slough  Instrument(s) utilized: blade  Bleeding: small  Hemostasis obtained with: pressure  Procedural pain (0-10): 4  Post-procedural pain: 4   Response to treatment: procedure was tolerated well         Wound Instructions    Orders Placed This Encounter   Procedures    Wound cleansing and dressings     Left Leg wound   Wash your hands with soap and water.  Remove old dressing, discard into plastic bag and place in trash. Cleanse wound with prophase. Do not use tissue or cotton balls. Do not scrub the wound. Pat dry using gauze.  Shower no      Apply mepilex up to cover wound bed  Cover with abd  Secure with rolled gauze and tape  Change dressing 3 times weekly     The above was completed today at the wound center.     Standing Status:   Future     Standing Expiration Date:   3/8/2025    Wound compression and edema control     Left leg wound:      Elastic Tubular Stocking     Tubular elastic bandage: Apply from base of toes to behind the knee. " "Apply in AM, may remove for sleep.     Avoid prolonged standing in one place.     Elevate leg(s) above the level of the heart when sitting or as much as possible.     Standing Status:   Future     Standing Expiration Date:   3/8/2025    Debridement     This order was created via procedure documentation     SUBJECTIVE:    Chief complaint  \" I fell 4 times last week\"    11/22/23: Consult - Tanya is a pleasant 89-year-old female with a past medical history of type 2 diabetes mellitus most recent A1c 5.9% 1 year ago, diabetic gastroparesis, polyneuropathy, ambulatory dysfunction, HFpEF G1DD, and Raynaud's disease here today for initial wound care consult.  Patient was referred by primary care provider Dr. Gregory Santoro.  Patient sustained the wound traumatically  to the LLE approximately 2 months ago when the fridge door fell against her leg.  At the time she was seen her PCP and prescribed as course of azithromycin + Neosporin t[to the wound.  Since then wound care has consisted of Neosporin however PCP saw the patient yesterday and advised that she make an appointment to be seen by wound care due to nonhealing status.  Patient denies any symptoms of infection today including fever chills diaphoresis. She does regularly wear compression socks.     3/8/24: Tanya states that she fell approximately 4 times last week.  She states that she injured both arms and has a new skin tear to her right knee.  She also states that she hit her head on her covered during 1 of these falls.  She states that she did not get it looked at but has felt fine since the injury which was about 5 days ago.  I did tell her that I think it is best if she goes and gets this looked at.  She denies any systemic signs of infection since her last visit.            The following portions of the patient's history were reviewed and updated as appropriate:   Patient Active Problem List   Diagnosis    Anemia    Shortness of breath    Hypomagnesemia "    DM (diabetes mellitus), type 2 (HCC)    Urinary frequency    Iron deficiency anemia secondary to inadequate dietary iron intake    Sinobronchitis    Post zoster neuralgia    Primary generalized (osteo)arthritis    Seronegative arthropathy of multiple sites (HCC)    Senile osteoporosis    Lumbar spondylosis    Diabetic polyneuropathy associated with type 2 diabetes mellitus (HCC)    Diabetic gastroparesis associated with type 2 diabetes mellitus     Irritable bowel syndrome with diarrhea    Stage 3b chronic kidney disease (HCC)    Traumatic injury of sacrum    Tendinitis of left rotator cuff    Abnormality of gait due to impairment of balance    Sicca syndrome (HCC)    Chest pain    Leukocytosis    Chronic diastolic heart failure (HCC)    OAB (overactive bladder)    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)    Toxic metabolic encephalopathy    Cellulitis    Dyslipidemia    Primary hypertension    Spinal stenosis of lumbar region with neurogenic claudication    Chronic pain syndrome    SI (sacroiliac) joint dysfunction    Elevated LFTs    Ambulatory dysfunction    Gastroesophageal reflux disease without esophagitis    Dysphagia    Raynaud's phenomenon without gangrene    Elevated liver enzymes    Incontinence of feces with fecal urgency     Past Medical History:   Diagnosis Date    Anemia     Arthritis     Back pain     CHF (congestive heart failure) (HCC)     Chronic kidney disease     Stage 3b    Confusion 02/22/2023    Diabetes (HCC)     Diabetes mellitus (HCC)     Diabetic gastroparesis associated with type 2 diabetes mellitus      Diabetic polyneuropathy (HCC)     Diverticulosis     Herpes zoster     Hypertension     IBS (irritable bowel syndrome)     Neurogenic claudication due to lumbar spinal stenosis     Osteoarthritis     Osteoporosis     Pulmonary edema     Raynaud's phenomenon without gangrene     Seronegative arthropathy of multiple sites (HCC)     Sicca (HCC)      Past Surgical  History:   Procedure Laterality Date    BACK SURGERY      COLONOSCOPY      EGD AND COLONOSCOPY N/A 2016    Procedure: EGD AND COLONOSCOPY;  Surgeon: Elina Anderson MD;  Location: [facility];  Service:     JOINT REPLACEMENT      bilat knees and hips    OTHER SURGICAL HISTORY      pelvis rods    REPLACEMENT TOTAL KNEE BILATERAL      STOMACH SURGERY      UPPER GASTROINTESTINAL ENDOSCOPY       Social History     Socioeconomic History    Marital status: /Civil Union     Spouse name: Weston    Number of children: 2    Years of education: Not on file    Highest education level: High school graduate   Occupational History    Not on file   Tobacco Use    Smoking status: Former     Current packs/day: 0.00     Types: Cigarettes     Quit date:      Years since quittin.2    Smokeless tobacco: Never   Vaping Use    Vaping status: Never Used   Substance and Sexual Activity    Alcohol use: No    Drug use: No    Sexual activity: Not Currently     Partners: Male     Birth control/protection: None   Other Topics Concern    Not on file   Social History Narrative    Not on file     Social Determinants of Health     Financial Resource Strain: Not on file   Food Insecurity: No Food Insecurity (2023)    Hunger Vital Sign     Worried About Running Out of Food in the Last Year: Never true     Ran Out of Food in the Last Year: Never true   Transportation Needs: No Transportation Needs (2023)    PRAPARE - Transportation     Lack of Transportation (Medical): No     Lack of Transportation (Non-Medical): No   Physical Activity: Not on file   Stress: Not on file   Social Connections: Not on file   Intimate Partner Violence: Not on file   Housing Stability: Low Risk  (2023)    Housing Stability Vital Sign     Unable to Pay for Housing in the Last Year: No     Number of Places Lived in the Last Year: 1     Unstable Housing in the Last Year: No        Current Outpatient Medications:     amitriptyline (ELAVIL) 10 mg  tablet, TAKE TWO TABLETS BY MOUTH AT BEDTIME, Disp: 180 tablet, Rfl: 1    aspirin 81 mg chewable tablet, Chew 1 tablet (81 mg total) daily, Disp: 30 tablet, Rfl: 0    atorvastatin (LIPITOR) 10 mg tablet, TAKE ONE TABLET BY MOUTH EVERY DAY, Disp: 90 tablet, Rfl: 3    Cholecalciferol (VITAMIN D3) 1000 units CAPS, Take 1 capsule by mouth daily , Disp: , Rfl:     dicyclomine (BENTYL) 10 mg capsule, Take 1 capsule (10 mg total) by mouth 3 (three) times a day as needed (abd pain) (Patient not taking: Reported on 11/22/2023), Disp: 30 capsule, Rfl: 2    diphenoxylate-atropine (LOMOTIL) 2.5-0.025 mg per tablet, Take 1 tablet by mouth if needed (Patient not taking: Reported on 12/18/2023), Disp: , Rfl:     diphenoxylate-atropine (LOMOTIL) 2.5-0.025 mg per tablet, Take 1 tablet by mouth 4 (four) times a day as needed for diarrhea, Disp: 30 tablet, Rfl: 0    DULoxetine (CYMBALTA) 60 mg delayed release capsule, TAKE ONE CAPSULE BY MOUTH EVERY DAY, Disp: 30 capsule, Rfl: 5    hydroxychloroquine (PLAQUENIL) 200 mg tablet, Take 1 tablet (200 mg total) by mouth daily with breakfast, Disp: 90 tablet, Rfl: 1    Magnesium 250 MG TABS, Take 250 mg by mouth every evening, Disp: , Rfl:     metFORMIN (GLUCOPHAGE) 500 mg tablet, Take 500 mg by mouth 2 (two) times a day (Patient not taking: Reported on 6/27/2023), Disp: , Rfl:     metoprolol succinate (TOPROL-XL) 25 mg 24 hr tablet, Take 0.5 tablets (12.5 mg total) by mouth daily (Patient not taking: Reported on 6/27/2023), Disp: 45 tablet, Rfl: 3    Mirabegron ER (Myrbetriq) 50 MG TB24, Take 1 tablet (50 mg total) by mouth every evening, Disp: 30 tablet, Rfl: 11    nitrofurantoin (MACRODANTIN) 50 mg capsule, TAKE ONE CAPSULE BY MOUTH EVERY DAY IN THE MORNING, Disp: 30 capsule, Rfl: 6    omeprazole (PriLOSEC) 20 mg delayed release capsule, Take 20 mg by mouth daily  , Disp: , Rfl:     pregabalin (LYRICA) 75 mg capsule, TAKE ONE CAPSULE BY MOUTH THREE TIMES A DAY, Disp: 90 capsule, Rfl: 5     pyridoxine (B-6) 100 MG tablet, Take 100 mg by mouth daily, Disp: , Rfl:     Sodium Fluoride (PreviDent 5000 Booster Plus) 1.1 % PSTE, Apply on teeth every evening as directed, Disp: 100 mL, Rfl: 3    torsemide (DEMADEX) 10 mg tablet, Take 1 tablet PRN for wt gain 3 lb/ 3 days or 5 lb/ 5 aria (Patient not taking: Reported on 6/27/2023), Disp: 30 tablet, Rfl: 0  No current facility-administered medications for this visit.  Family History   Problem Relation Age of Onset    Cancer Brother     Diabetes Mother     Hypertension Father     Heart disease Father       Review of Systems   Constitutional:  Negative for chills, diaphoresis, fatigue and fever.   Skin:  Positive for wound.   All other systems reviewed and are negative.      Allergies  Morphine and Ciprofloxacin    OBJECTIVE:  /80   Pulse 102   Temp 97.5 °F (36.4 °C)   Resp 18   LMP  (LMP Unknown)     Physical Exam  Vitals reviewed.   Constitutional:       Appearance: Normal appearance.   HENT:      Head: Normocephalic and atraumatic.      Mouth/Throat:      Mouth: Mucous membranes are moist.   Eyes:      Extraocular Movements: Extraocular movements intact.   Pulmonary:      Effort: Pulmonary effort is normal.   Musculoskeletal:         General: Swelling and signs of injury present.      Left lower leg: Edema present.   Skin:     Comments: 1) right pretibial ulceration 100% healed today    2) Left leg ulceration around the level of the tibial tuberosity with mixed fibrous/granular wound bed and biofilm formation.  No local signs of infection.  Overall superficial with attached edges.  Smaller compared to last visit.    3) left hallux ulceration to the lateral distal aspect of the digit is 100% healed today.    4) multiple new skin tears/wounds noted   Neurological:      Mental Status: She is alert.   Psychiatric:         Mood and Affect: Mood normal.         Behavior: Behavior normal.           Wound 02/14/24 Traumatic Pretibial Left;Proximal (Active)    Wound Image   03/08/24 1520   Wound Description Pink;Yellow;Brown;White 03/08/24 1520   Park-wound Assessment Pink;Fragile 03/08/24 1520   Wound Length (cm) 2 cm 03/08/24 1520   Wound Width (cm) 2 cm 03/08/24 1520   Wound Depth (cm) 0.1 cm 03/08/24 1520   Wound Surface Area (cm^2) 4 cm^2 03/08/24 1520   Wound Volume (cm^3) 0.4 cm^3 03/08/24 1520   Calculated Wound Volume (cm^3) 0.4 cm^3 03/08/24 1520   Change in Wound Size % 59.18 03/08/24 1520   Drainage Amount Small 03/08/24 1520   Drainage Description Serosanguineous 03/08/24 1520   Non-staged Wound Description Full thickness 03/08/24 1520   Dressing Status Intact 03/01/24 1436           Wound 02/14/24 Traumatic Pretibial Left;Proximal (Active)   Wound Image   03/08/24 1520   Wound Description Pink;Yellow;Brown;White 03/08/24 1520   Park-wound Assessment Pink;Fragile 03/08/24 1520   Wound Length (cm) 2 cm 03/08/24 1520   Wound Width (cm) 2 cm 03/08/24 1520   Wound Depth (cm) 0.1 cm 03/08/24 1520   Wound Surface Area (cm^2) 4 cm^2 03/08/24 1520   Wound Volume (cm^3) 0.4 cm^3 03/08/24 1520   Calculated Wound Volume (cm^3) 0.4 cm^3 03/08/24 1520   Change in Wound Size % 59.18 03/08/24 1520   Drainage Amount Small 03/08/24 1520   Drainage Description Serosanguineous 03/08/24 1520   Non-staged Wound Description Full thickness 03/08/24 1520   Dressing Status Intact 03/01/24 1436     Diagnosis:  1. Non-pressure chronic ulcer right lower leg, 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)  -     Wound cleansing and dressings; Future  -     lidocaine (XYLOCAINE) 4 % topical solution 5 mL  -     Wound compression and edema control; Future    2. Traumatic open wound of left lower leg, initial encounter  -     Wound cleansing and dressings; Future  -     lidocaine (XYLOCAINE) 4 % topical solution 5 mL  -     Wound compression and edema control; Future    3. Type 2 diabetes mellitus with stage 3 chronic kidney disease, without long-term current use of insulin, unspecified whether stage 3a  or 3b CKD ([facility])  -     Wound cleansing and dressings; Future  -     lidocaine (XYLOCAINE) 4 % topical solution 5 mL  -     Wound compression and edema control; Future    4. Hammertoes of both feet  -     Wound cleansing and dressings; Future  -     lidocaine (XYLOCAINE) 4 % topical solution 5 mL  -     Wound compression and edema control; Future    5. Diabetic ulcer of left great toe ([facility])  -     Wound cleansing and dressings; Future  -     lidocaine (XYLOCAINE) 4 % topical solution 5 mL  -     Wound compression and edema control; Future        Diagnosis ICD-10-CM Associated Orders   1. Non-pressure chronic ulcer right lower leg, limited to breakdown skin ([facility])  L97.911 Wound cleansing and dressings     lidocaine (XYLOCAINE) 4 % topical solution 5 mL     Wound compression and edema control      2. Traumatic open wound of left lower leg, initial encounter  S81.802A Wound cleansing and dressings     lidocaine (XYLOCAINE) 4 % topical solution 5 mL     Wound compression and edema control      3. Type 2 diabetes mellitus with stage 3 chronic kidney disease, without long-term current use of insulin, unspecified whether stage 3a or 3b CKD ([facility])  E11.22 Wound cleansing and dressings    N18.30 lidocaine (XYLOCAINE) 4 % topical solution 5 mL     Wound compression and edema control      4. Hammertoes of both feet  M20.41 Wound cleansing and dressings    M20.42 lidocaine (XYLOCAINE) 4 % topical solution 5 mL     Wound compression and edema control      5. Diabetic ulcer of left great toe ([facility])  E11.621 Wound cleansing and dressings    L97.529 lidocaine (XYLOCAINE) 4 % topical solution 5 mL     Wound compression and edema control           ASSESSMENT:    1) right Distal anterior leg ulceration 100% healed  2) left leg ulceration, full-thickness to level of mixed dermis/subcutaneous tissue with 60% healing over the past 3-weeks  3) Left hallux diabetic ulceration 100% healed  4) bilateral hammertoe deformities  5) type 2 diabetes  with CKD 3

## 2024-03-18 ENCOUNTER — OFFICE VISIT (OUTPATIENT)
Dept: WOUND CARE | Facility: HOSPITAL | Age: 89
End: 2024-03-18
Payer: MEDICARE

## 2024-03-18 VITALS
SYSTOLIC BLOOD PRESSURE: 130 MMHG | HEART RATE: 98 BPM | DIASTOLIC BLOOD PRESSURE: 79 MMHG | RESPIRATION RATE: 15 BRPM | TEMPERATURE: 97.6 F

## 2024-03-18 DIAGNOSIS — N18.30 TYPE 2 DIABETES MELLITUS WITH STAGE 3 CHRONIC KIDNEY DISEASE, WITHOUT LONG-TERM CURRENT USE OF INSULIN, UNSPECIFIED WHETHER STAGE 3A OR 3B CKD (HCC): ICD-10-CM

## 2024-03-18 DIAGNOSIS — E11.22 TYPE 2 DIABETES MELLITUS WITH STAGE 3 CHRONIC KIDNEY DISEASE, WITHOUT LONG-TERM CURRENT USE OF INSULIN, UNSPECIFIED WHETHER STAGE 3A OR 3B CKD (HCC): ICD-10-CM

## 2024-03-18 DIAGNOSIS — T14.8XXA MULTIPLE SKIN TEARS: Primary | ICD-10-CM

## 2024-03-18 PROCEDURE — 97597 DBRDMT OPN WND 1ST 20 CM/<: CPT | Performed by: STUDENT IN AN ORGANIZED HEALTH CARE EDUCATION/TRAINING PROGRAM

## 2024-03-18 PROCEDURE — 99214 OFFICE O/P EST MOD 30 MIN: CPT | Performed by: STUDENT IN AN ORGANIZED HEALTH CARE EDUCATION/TRAINING PROGRAM

## 2024-03-18 RX ORDER — LIDOCAINE HYDROCHLORIDE 40 MG/ML
5 SOLUTION TOPICAL ONCE
Status: COMPLETED | OUTPATIENT
Start: 2024-03-18 | End: 2024-03-18

## 2024-03-18 RX ADMIN — LIDOCAINE HYDROCHLORIDE 5 ML: 40 SOLUTION TOPICAL at 14:20

## 2024-03-18 NOTE — PROGRESS NOTES
"Patient ID: Tanya Stehpen is a 89 y.o. female Date of Birth 7/21/1934     Chief Complaint  Chief Complaint   Patient presents with    Follow Up Wound Care Visit     rle       Allergies  Morphine and Ciprofloxacin    Assessment:     Diagnoses and all orders for this visit:    Multiple skin tears  -     lidocaine (XYLOCAINE) 4 % topical solution 5 mL  -     Wound cleansing and dressings; Future  -     Wound compression and edema control Traumatic Left;Lower Leg; Future  -     Wound miscellaneous orders; Future  -     Debridement  -     Debridement    Type 2 diabetes mellitus with stage 3 chronic kidney disease, without long-term current use of insulin, unspecified whether stage 3a or 3b CKD ([facility])  -     lidocaine (XYLOCAINE) 4 % topical solution 5 mL  -     Wound cleansing and dressings; Future  -     Wound compression and edema control Traumatic Left;Lower Leg; Future  -     Wound miscellaneous orders; Future              Debridement   Wound 03/18/24 Knee Anterior;Right    Universal Protocol:  Consent: Verbal consent obtained.  Risks and benefits: risks, benefits and alternatives were discussed  Consent given by: patient  Time out: Immediately prior to procedure a \"time out\" was called to verify the correct patient, procedure, equipment, support staff and site/side marked as required.  Patient identity confirmed: verbally with patient    Debridement Details  Performed by: physician  Debridement type: selective  Pain control: lidocaine 4%      Post-debridement measurements  Length (cm): 1.7  Width (cm): 3.1  Depth (cm): 0.1  Percent debrided: 90%  Surface Area (cm^2): 5.27  Area Debrided (cm^2): 4.74  Volume (cm^3): 0.53    Devitalized tissue debrided: biofilm, exudate and fibrin  Instrument(s) utilized: curette  Bleeding: small  Hemostasis obtained with: pressure  Procedural pain (0-10): 1  Post-procedural pain: 0   Response to treatment: procedure was tolerated well    Debridement   Wound 03/18/24 Traumatic Leg " "Left;Lower    Universal Protocol:  Consent: Verbal consent obtained.  Risks and benefits: risks, benefits and alternatives were discussed  Consent given by: patient  Time out: Immediately prior to procedure a \"time out\" was called to verify the correct patient, procedure, equipment, support staff and site/side marked as required.  Patient identity confirmed: verbally with patient    Debridement Details  Performed by: physician  Debridement type: selective  Pain control: lidocaine 4%      Post-debridement measurements  Length (cm): 2.5  Width (cm): 2  Depth (cm): 0.1  Percent debrided: 90%  Surface Area (cm^2): 5  Area Debrided (cm^2): 4.5  Volume (cm^3): 0.5    Devitalized tissue debrided: biofilm and exudate  Instrument(s) utilized: curette  Bleeding: small  Hemostasis obtained with: pressure  Procedural pain (0-10): 1  Post-procedural pain: 0   Response to treatment: procedure was tolerated well        Plan:   It was a pleasure to see Tanya Stephen for wound care follow up today  Selective debridement performed today as above.  Remainder wounds debrided with saline and gauze.  Start plan of care as noted below with hydrocolloid.  Is appropriate given the current nature of the wounds however more importantly patient did not her  will have more ease of use with hydrocolloid.   A1C results reviewed with the patient today.  No signs or symptoms of infection today. Patient understands that if any signs of infection start (such as increased redness, drainage, pain, fever, chills, diaphoresis), they should call our office or proceed to the ER or Urgent Care.  Patient should continue a high protein diet to facilitate wound healing  Patient is advised to not submerge wound or leave wound open to air.  Follow up in 1 week at [facility] wound management  Given the multi-factorial nature of wound care, additional time was taken to review patient's treatment plan with other specialties and most recent pertinent lab " work and imaging.   All plans of care discussed with patient at bedside who verbalized understanding with treatment plan.    Wound 02/14/24 Traumatic Pretibial Left;Proximal (Active)   Wound Image Images linked 03/18/24 1417   Wound Description Beefy red;Yellow;Pink;Epithelialization 03/18/24 1417   Wound Length (cm) 2 cm 03/18/24 1417   Wound Width (cm) 1.6 cm 03/18/24 1417   Wound Depth (cm) 0.1 cm 03/18/24 1417   Wound Surface Area (cm^2) 3.2 cm^2 03/18/24 1417   Wound Volume (cm^3) 0.32 cm^3 03/18/24 1417   Calculated Wound Volume (cm^3) 0.32 cm^3 03/18/24 1417   Change in Wound Size % 67.35 03/18/24 1417   Drainage Amount Small 03/18/24 1417   Drainage Description Serosanguineous 03/18/24 1417   Non-staged Wound Description Full thickness 03/18/24 1417   Dressing Status Intact (upon arrival) 03/18/24 1417       Wound 03/18/24 Traumatic Leg Left;Lower (Active)   Wound Image Images linked 03/18/24 1418   Wound Description Pink;Beefy red;Other (Comment) (purple flap of skin) 03/18/24 1418   Park-wound Assessment Dry 03/18/24 1418   Wound Length (cm) 2.5 cm 03/18/24 1418   Wound Width (cm) 2 cm 03/18/24 1418   Wound Depth (cm) 0.1 cm 03/18/24 1418   Wound Surface Area (cm^2) 5 cm^2 03/18/24 1418   Wound Volume (cm^3) 0.5 cm^3 03/18/24 1418   Calculated Wound Volume (cm^3) 0.5 cm^3 03/18/24 1418   Drainage Amount Small 03/18/24 1418   Dressing Status Intact (upon arrival) 03/18/24 1418       Wound 03/18/24 Traumatic Leg Anterior;Left;Upper (Active)   Wound Image Images linked 03/18/24 1416   Wound Description Pink;Yellow 03/18/24 1416   Park-wound Assessment Dry 03/18/24 1416   Wound Length (cm) 0.6 cm 03/18/24 1416   Wound Width (cm) 0.3 cm 03/18/24 1416   Wound Depth (cm) 0.1 cm 03/18/24 1416   Wound Surface Area (cm^2) 0.18 cm^2 03/18/24 1416   Wound Volume (cm^3) 0.018 cm^3 03/18/24 1416   Calculated Wound Volume (cm^3) 0.02 cm^3 03/18/24 1416   Drainage Amount Scant 03/18/24 1416   Drainage Description  Serosanguineous 03/18/24 1416   Non-staged Wound Description Full thickness 03/18/24 1416   Dressing Status Intact;Clean (upon arrival) 03/18/24 1416       Wound 03/18/24 Knee Anterior;Right (Active)   Wound Image Images linked 03/18/24 1415   Wound Description Pink;Beefy red;Yellow;Granulation tissue 03/18/24 1415   Park-wound Assessment Dry;Hyperpigmented 03/18/24 1415   Wound Length (cm) 1.7 cm 03/18/24 1415   Wound Width (cm) 3.1 cm 03/18/24 1415   Wound Depth (cm) 0.1 cm 03/18/24 1415   Wound Surface Area (cm^2) 5.27 cm^2 03/18/24 1415   Wound Volume (cm^3) 0.527 cm^3 03/18/24 1415   Calculated Wound Volume (cm^3) 0.53 cm^3 03/18/24 1415   Drainage Amount Small 03/18/24 1415   Drainage Description Serosanguineous 03/18/24 1415   Non-staged Wound Description Full thickness 03/18/24 1415   Dressing Status Intact (UPON ARRIVAL) 03/18/24 1415       Wound 02/14/24 Traumatic Pretibial Left;Proximal (Active)   Date First Assessed: 02/14/24   Primary Wound Type: Traumatic  Location: Pretibial  Wound Location Orientation: Left;Proximal       Wound 03/18/24 Traumatic Leg Left;Lower (Active)   Date First Assessed/Time First Assessed: 03/18/24 1413   Primary Wound Type: Traumatic  Location: Leg  Wound Location Orientation: Left;Lower       Wound 03/18/24 Traumatic Leg Anterior;Left;Upper (Active)   Date First Assessed/Time First Assessed: 03/18/24 1413   Primary Wound Type: Traumatic  Location: Leg  Wound Location Orientation: Anterior;Left;Upper       Wound 03/18/24 Knee Anterior;Right (Active)   Date First Assessed/Time First Assessed: 03/18/24 1413   Location: Knee  Wound Location Orientation: Anterior;Right       [REMOVED] Wound 06/28/22 Skin Tear Skin tear Arm Right (Removed)   Resolved Date: 11/22/23  Date First Assessed/Time First Assessed: 06/28/22 1500   Primary Wound Type: Skin Tear  Traumatic Wound Type: Skin tear  Location: Arm  Wound Location Orientation: (c) Right  Wound Outcome: (c) Other (Comment)        [REMOVED] Wound 06/28/22 Skin Tear Skin tear Arm Right (Removed)   Resolved Date: 11/22/23  Date First Assessed/Time First Assessed: 06/28/22 1500   Primary Wound Type: Skin Tear  Traumatic Wound Type: Skin tear  Location: Arm  Wound Location Orientation: Right  Wound Outcome: (c) Other (Comment)       [REMOVED] Wound 11/22/23 Venous Ulcer Leg Right;Anterior (Removed)   Resolved Date: 03/08/24  Date First Assessed/Time First Assessed: 11/22/23 1309   Primary Wound Type: Venous Ulcer  Location: Leg  Wound Location Orientation: Right;Anterior  Wound Outcome: Healed       [REMOVED] Wound 12/22/23 Toe (Comment  which one) Left;Posterior (Removed)   Resolved Date/Resolved Time: 12/22/23 1021  Date First Assessed/Time First Assessed: 12/22/23 1009   Location: (c) Toe (Comment  which one)  Wound Location Orientation: Left;Posterior  Wound Description (Comments): LEFT THIRD TOE  Wound Outcome: (c) O...       [REMOVED] Wound 12/29/23 Diabetic Ulcer Toe (Comment  which one) Left (Removed)   Resolved Date: 01/26/24  Date First Assessed/Time First Assessed: 12/29/23 1421   Primary Wound Type: Diabetic Ulcer  Location: (c) Toe (Comment  which one)  Wound Location Orientation: Left  Wound Description (Comments): LEFT THIRD TOE; HINOJOSA GRADE ...       [REMOVED] Wound 02/06/24 Surgical Toe D3, third Left (Removed)   Resolved Date: 02/14/24  Date First Assessed/Time First Assessed: 02/06/24 1410   Primary Wound Type: Surgical  Location: Toe D3, third  Wound Location Orientation: Left  Wound Outcome: Healed       [REMOVED] Wound 03/01/24 Diabetic Ulcer Toe D1, great Left (Removed)   Resolved Date: 03/08/24  Date First Assessed/Time First Assessed: 03/01/24 1448   Primary Wound Type: Diabetic Ulcer  Location: Toe D1, great  Wound Location Orientation: Left  Wound Outcome: Healed       Subjective:      Velasquez Martínez is a pleasant 89-year-old female here today for follow-up wound management visit.  Notably I first saw patient for  consult in November 2023.  Since then she has been following with podiatry here at wound management due to need for correction of hammertoe deformity wound healing and then continued to follow for lower extremity venous wounds.  Unfortunately patient continues to have skin tears due to frequent falls and most recent wounds were not able to be treated by podiatry due to location of the wounds above the knees.  Patient is seeing me for the first time since November 2023.  Three new wounds today that was sustained this past week.  She presents with her daughter who does not live with her but lives close by and takes her to appointments.  Patient resides with her  who helps with wound care.  One of the new wound is from tape that was applied to wounds and most dressings were either askew or inappropriately placed.  No symptoms of infection today including fever chills diaphoresis.  Her daughter notes that she would like to change to [facility] as she is coming from [place] and [facility] is too far for her to come on a continuous basis.        The following portions of the patient's history were reviewed and updated as appropriate: allergies, current medications, past family history, past medical history, past social history, past surgical history, and problem list.    Review of Systems   Constitutional:  Negative for chills, diaphoresis and fever.   Skin:  Positive for wound.   All other systems reviewed and are negative.        Objective:       Wound 02/14/24 Traumatic Pretibial Left;Proximal (Active)   Wound Image Images linked 03/18/24 1417   Wound Description Beefy red;Yellow;Pink;Epithelialization 03/18/24 1417   Wound Length (cm) 2 cm 03/18/24 1417   Wound Width (cm) 1.6 cm 03/18/24 1417   Wound Depth (cm) 0.1 cm 03/18/24 1417   Wound Surface Area (cm^2) 3.2 cm^2 03/18/24 1417   Wound Volume (cm^3) 0.32 cm^3 03/18/24 1417   Calculated Wound Volume (cm^3) 0.32 cm^3 03/18/24 1417   Change  in Wound Size % 67.35 03/18/24 1417   Drainage Amount Small 03/18/24 1417   Drainage Description Serosanguineous 03/18/24 1417   Non-staged Wound Description Full thickness 03/18/24 1417   Dressing Status Intact (upon arrival) 03/18/24 1417       Wound 03/18/24 Traumatic Leg Left;Lower (Active)   Wound Image Images linked 03/18/24 1418   Wound Description Pink;Beefy red;Other (Comment) (purple flap of skin) 03/18/24 1418   Park-wound Assessment Dry 03/18/24 1418   Wound Length (cm) 2.5 cm 03/18/24 1418   Wound Width (cm) 2 cm 03/18/24 1418   Wound Depth (cm) 0.1 cm 03/18/24 1418   Wound Surface Area (cm^2) 5 cm^2 03/18/24 1418   Wound Volume (cm^3) 0.5 cm^3 03/18/24 1418   Calculated Wound Volume (cm^3) 0.5 cm^3 03/18/24 1418   Drainage Amount Small 03/18/24 1418   Dressing Status Intact (upon arrival) 03/18/24 1418       Wound 03/18/24 Traumatic Leg Anterior;Left;Upper (Active)   Wound Image Images linked 03/18/24 1416   Wound Description Pink;Yellow 03/18/24 1416   Park-wound Assessment Dry 03/18/24 1416   Wound Length (cm) 0.6 cm 03/18/24 1416   Wound Width (cm) 0.3 cm 03/18/24 1416   Wound Depth (cm) 0.1 cm 03/18/24 1416   Wound Surface Area (cm^2) 0.18 cm^2 03/18/24 1416   Wound Volume (cm^3) 0.018 cm^3 03/18/24 1416   Calculated Wound Volume (cm^3) 0.02 cm^3 03/18/24 1416   Drainage Amount Scant 03/18/24 1416   Drainage Description Serosanguineous 03/18/24 1416   Non-staged Wound Description Full thickness 03/18/24 1416   Dressing Status Intact;Clean (upon arrival) 03/18/24 1416       Wound 03/18/24 Knee Anterior;Right (Active)   Wound Image Images linked 03/18/24 1415   Wound Description Pink;Beefy red;Yellow;Granulation tissue 03/18/24 1415   Park-wound Assessment Dry;Hyperpigmented 03/18/24 1415   Wound Length (cm) 1.7 cm 03/18/24 1415   Wound Width (cm) 3.1 cm 03/18/24 1415   Wound Depth (cm) 0.1 cm 03/18/24 1415   Wound Surface Area (cm^2) 5.27 cm^2 03/18/24 1415   Wound Volume (cm^3) 0.527 cm^3 03/18/24  1415   Calculated Wound Volume (cm^3) 0.53 cm^3 03/18/24 1415   Drainage Amount Small 03/18/24 1415   Drainage Description Serosanguineous 03/18/24 1415   Non-staged Wound Description Full thickness 03/18/24 1415   Dressing Status Intact (UPON ARRIVAL) 03/18/24 1415       /79   Pulse 98   Temp 97.6 °F (36.4 °C)   Resp 15   LMP  (LMP Unknown)     Physical Exam  Vitals reviewed.   Constitutional:       Appearance: Normal appearance.   HENT:      Head: Normocephalic and atraumatic.      Comments: Mild bilateral temporal wasting.     Mouth/Throat:      Mouth: Mucous membranes are moist.   Eyes:      Extraocular Movements: Extraocular movements intact.   Pulmonary:      Effort: Pulmonary effort is normal.   Skin:     Comments: EDL right knee wound and 3 wounds surrounding the left knee.  Debridement as above.  No overt signs of infection.  Healthy wound bed with some devitalized tissue and fibrin deposition.  Distal wound of left lower extremity with skin flap still in place that is not yet fully necrosis.  Still adhering strongly to wound bed despite ecchymotic appearance.   Neurological:      Mental Status: She is alert.   Psychiatric:         Mood and Affect: Mood normal.         Behavior: Behavior normal.                         Wound Instructions:  Orders Placed This Encounter   Procedures    Wound cleansing and dressings     Left Leg Wounds and Right Leg Wound:  Wash your hands with soap and water.  Remove old dressing, discard into plastic bag and place in trash. Cleanse wound with prophase. Do not use tissue or cotton balls. Do not scrub the wound. Pat dry using gauze.  Shower: Yes     Apply Hydrocolloid to each wound.    Change dressing 3 times weekly     The above was completed today at the wound center.     Standing Status:   Future     Standing Expiration Date:   3/18/2025    Wound compression and edema control Traumatic Left;Lower Leg     Elastic Tubular Stocking: Spandagrip to right and left lower  "leg.    Tubular elastic bandage: Apply from base of toes to behind the knee. Apply in AM, may remove for sleep.    Avoid prolonged standing in one place.    Elevate leg(s) above the level of the heart when sitting or as much as possible.     Standing Status:   Future     Standing Expiration Date:   3/18/2025    Wound miscellaneous orders     Protein: Eat protein with each meal to promote healing.  Examples of protein are fish, meat, chicken, nuts, peanut butter, eggs, lentils, edamame or a protein shake.    Wound infection:  If you have signs of infection please call the wound center.  If the wound center is closed- please go to the Emergency department.  Some signs of infection:  fever, chills, increased redness, red streaks, increase in pain, increased drainage.  Drainage with an odor, Change in drainage color: white/milky/green/tan/yellow,  an increase in swelling, chest pain and/or shortness of breath.     Standing Status:   Future     Standing Expiration Date:   3/18/2025    Debridement     This order was created via procedure documentation    Debridement     This order was created via procedure documentation        Diagnosis ICD-10-CM Associated Orders   1. Multiple skin tears  T14.8XXA lidocaine (XYLOCAINE) 4 % topical solution 5 mL     Wound cleansing and dressings     Wound compression and edema control Traumatic Left;Lower Leg     Wound miscellaneous orders     Debridement     Debridement      2. Type 2 diabetes mellitus with stage 3 chronic kidney disease, without long-term current use of insulin, unspecified whether stage 3a or 3b CKD ([facility])  E11.22 lidocaine (XYLOCAINE) 4 % topical solution 5 mL    N18.30 Wound cleansing and dressings     Wound compression and edema control Traumatic Left;Lower Leg     Wound miscellaneous orders          --  Saida Bermudez MD    \"This note has been constructed using a voice recognition system. Therefore there may be syntax, spelling, and/or grammatical errors. Occasional " "wrong word or \"sound alike\" substitutions may have occurred due to the inherent limitations of voice recognition software. Read the chart carefully and recognize, using context, where substitutions have occurred. Please call if you have any questions.\"     "

## 2024-03-18 NOTE — PATIENT INSTRUCTIONS
Orders Placed This Encounter   Procedures    Wound cleansing and dressings     Left Leg Wounds and Right Leg Wound:  Wash your hands with soap and water.  Remove old dressing, discard into plastic bag and place in trash. Cleanse wound with prophase. Do not use tissue or cotton balls. Do not scrub the wound. Pat dry using gauze.  Shower: Yes     Apply Hydrocolloid to each wound.    Change dressing 3 times weekly     The above was completed today at the wound center.     Standing Status:   Future     Standing Expiration Date:   3/18/2025    Wound compression and edema control Traumatic Left;Lower Leg     Elastic Tubular Stocking: Spandagrip to right and left lower leg.    Tubular elastic bandage: Apply from base of toes to behind the knee. Apply in AM, may remove for sleep.    Avoid prolonged standing in one place.    Elevate leg(s) above the level of the heart when sitting or as much as possible.     Standing Status:   Future     Standing Expiration Date:   3/18/2025    Wound miscellaneous orders     Protein: Eat protein with each meal to promote healing.  Examples of protein are fish, meat, chicken, nuts, peanut butter, eggs, lentils, edamame or a protein shake.    Wound infection:  If you have signs of infection please call the wound center.  If the wound center is closed- please go to the Emergency department.  Some signs of infection:  fever, chills, increased redness, red streaks, increase in pain, increased drainage.  Drainage with an odor, Change in drainage color: white/milky/green/tan/yellow,  an increase in swelling, chest pain and/or shortness of breath.     Standing Status:   Future     Standing Expiration Date:   3/18/2025

## 2024-03-27 ENCOUNTER — OFFICE VISIT (OUTPATIENT)
Dept: OBGYN CLINIC | Facility: CLINIC | Age: 89
End: 2024-03-27
Payer: MEDICARE

## 2024-03-27 VITALS
WEIGHT: 128.4 LBS | DIASTOLIC BLOOD PRESSURE: 68 MMHG | BODY MASS INDEX: 25.88 KG/M2 | HEIGHT: 59 IN | SYSTOLIC BLOOD PRESSURE: 120 MMHG

## 2024-03-27 DIAGNOSIS — L72.3 SEBACEOUS CYST: Primary | ICD-10-CM

## 2024-03-27 DIAGNOSIS — N32.81 OAB (OVERACTIVE BLADDER): ICD-10-CM

## 2024-03-27 PROCEDURE — 99213 OFFICE O/P EST LOW 20 MIN: CPT | Performed by: OBSTETRICS & GYNECOLOGY

## 2024-03-27 NOTE — PROGRESS NOTES
Assessment/Plan:  Patient has been reassured.  Reviewed all precautions.  All questions answered.  Return to office as needed.  No problem-specific Assessment & Plan notes found for this encounter.       Diagnoses and all orders for this visit:    Sebaceous cyst          Subjective:      Patient ID: Tanya Stephen is a 89 y.o. female.    HPI    This is a pleasant 89-year-old female P3 ( x 3) accompanied with her  today presents complaining of vaginal right lump over the last several weeks.  She denies any bleeding or spotting.  She does have episodes of incontinence and has been on Myrbetriq with urology.    She does have chronic back pain has been unstable with multiple falls..  She also follows up in wound clinic. history of bilateral hip replacements, bilateral knee replacements, complete spine fusion.  She also had shingles in     Shingles     Back fusion       She went through menopause at age 50. She was on hormones for approximately 5 years. Patient has been  for over 66 years and lives independently with her .     The following portions of the patient's history were reviewed and updated as appropriate: allergies, current medications, past family history, past medical history, past social history, past surgical history, and problem list.    Review of Systems   Constitutional:  Negative for fatigue, fever and unexpected weight change.   Respiratory:  Negative for cough, chest tightness, shortness of breath and wheezing.    Cardiovascular: Negative.  Negative for chest pain and palpitations.   Gastrointestinal: Negative.  Negative for abdominal distention, abdominal pain, blood in stool, constipation, diarrhea, nausea and vomiting.   Genitourinary: Negative.  Negative for difficulty urinating, dyspareunia, dysuria, flank pain, frequency, genital sores, hematuria, pelvic pain, urgency, vaginal bleeding, vaginal discharge and vaginal pain.   Skin:  Negative for rash.      "    Objective:      /68   Ht 4' 11\" (1.499 m)   Wt 58.2 kg (128 lb 6.4 oz)   LMP  (LMP Unknown)   BMI 25.93 kg/m²          Physical Exam  Abdominal:      General: Bowel sounds are normal. There is no distension.      Palpations: Abdomen is soft.      Tenderness: There is no abdominal tenderness. There is no guarding.   Genitourinary:     Labia:         Right: Lesion present. No rash or tenderness.         Left: No rash or tenderness.       Vagina: No signs of injury. No vaginal discharge or tenderness.      Cervix: No cervical motion tenderness, discharge, friability, lesion, erythema or cervical bleeding.           External genitalia is essentially normal.  There is a very small calcified sebaceous cyst right side, 7:00 hymenal ring.    "

## 2024-03-28 ENCOUNTER — HOME HEALTH ADMISSION (OUTPATIENT)
Dept: HOME HEALTH SERVICES | Facility: HOME HEALTHCARE | Age: 89
End: 2024-03-28
Payer: MEDICARE

## 2024-03-28 ENCOUNTER — OFFICE VISIT (OUTPATIENT)
Dept: WOUND CARE | Facility: HOSPITAL | Age: 89
End: 2024-03-28
Payer: MEDICARE

## 2024-03-28 VITALS
SYSTOLIC BLOOD PRESSURE: 144 MMHG | TEMPERATURE: 96.9 F | RESPIRATION RATE: 18 BRPM | DIASTOLIC BLOOD PRESSURE: 97 MMHG | HEART RATE: 84 BPM

## 2024-03-28 DIAGNOSIS — L97.911 NON-PRESSURE CHRONIC ULCER RIGHT LOWER LEG, LIMITED TO BREAKDOWN SKIN (HCC): ICD-10-CM

## 2024-03-28 DIAGNOSIS — S81.811A SKIN TEAR OF RIGHT LOWER LEG WITHOUT COMPLICATION, INITIAL ENCOUNTER: ICD-10-CM

## 2024-03-28 DIAGNOSIS — S81.802A TRAUMATIC OPEN WOUND OF LEFT LOWER LEG, INITIAL ENCOUNTER: ICD-10-CM

## 2024-03-28 DIAGNOSIS — E11.22 TYPE 2 DIABETES MELLITUS WITH STAGE 3 CHRONIC KIDNEY DISEASE, WITHOUT LONG-TERM CURRENT USE OF INSULIN, UNSPECIFIED WHETHER STAGE 3A OR 3B CKD (HCC): ICD-10-CM

## 2024-03-28 DIAGNOSIS — T14.8XXA MULTIPLE SKIN TEARS: Primary | ICD-10-CM

## 2024-03-28 DIAGNOSIS — N18.30 TYPE 2 DIABETES MELLITUS WITH STAGE 3 CHRONIC KIDNEY DISEASE, WITHOUT LONG-TERM CURRENT USE OF INSULIN, UNSPECIFIED WHETHER STAGE 3A OR 3B CKD (HCC): ICD-10-CM

## 2024-03-28 PROCEDURE — 11042 DBRDMT SUBQ TIS 1ST 20SQCM/<: CPT | Performed by: FAMILY MEDICINE

## 2024-03-28 PROCEDURE — 97597 DBRDMT OPN WND 1ST 20 CM/<: CPT | Performed by: FAMILY MEDICINE

## 2024-03-28 RX ORDER — MIRABEGRON 50 MG/1
TABLET, FILM COATED, EXTENDED RELEASE ORAL
Qty: 90 TABLET | Refills: 1 | Status: SHIPPED | OUTPATIENT
Start: 2024-03-28

## 2024-03-28 RX ORDER — LIDOCAINE 40 MG/G
CREAM TOPICAL ONCE
Status: COMPLETED | OUTPATIENT
Start: 2024-03-28 | End: 2024-03-28

## 2024-03-28 RX ADMIN — LIDOCAINE 1 APPLICATION: 40 CREAM TOPICAL at 14:15

## 2024-03-28 NOTE — PROGRESS NOTES
"Patient ID: Tanya Stephen is a 89 y.o. female Date of Birth 7/21/1934     Chief Complaint  Chief Complaint   Patient presents with    Follow Up Wound Care Visit     Bilateral leg wounds       Allergies  Morphine and Ciprofloxacin    Assessment:    DM (diabetes mellitus), type 2 (HCC)    Lab Results   Component Value Date    HGBA1C 6.6 (H) 03/02/2024        Diagnoses and all orders for this visit:    Multiple skin tears  -     Wound cleansing and dressings; Future  -     Wound compression and edema control; Future  -     Wound miscellaneous orders; Future  -     lidocaine (LMX) 4 % cream  -     Referral to [facility]; Future    Non-pressure chronic ulcer right lower leg, 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)  -     Wound cleansing and dressings; Future  -     Wound compression and edema control; Future  -     Wound miscellaneous orders; Future  -     lidocaine (LMX) 4 % cream  -     Referral to [facility]; Future    Traumatic open wound of left lower leg, initial encounter  -     Wound cleansing and dressings; Future  -     Wound compression and edema control; Future  -     Wound miscellaneous orders; Future  -     lidocaine (LMX) 4 % cream  -     Referral to [facility]; Future  -     Debridement Traumatic Left;Lower Leg  -     Debridement Traumatic Left;Proximal Pretibial  -     Debridement Traumatic Anterior;Left;Upper Leg    Type 2 diabetes mellitus with stage 3 chronic kidney disease, without long-term current use of insulin, unspecified whether stage 3a or 3b CKD (HCC)    Skin tear of right lower leg without complication, initial encounter  -     Debridement Anterior;Right Knee              Debridement   Wound 03/18/24 Knee Anterior;Right    Universal Protocol:  Consent: Verbal consent obtained.  Consent given by: patient  Time out: Immediately prior to procedure a \"time out\" was called to verify the correct patient, procedure, equipment, support staff and " "site/side marked as required.  Timeout called at: 3/28/2024 2:20 PM.  Patient understanding: patient states understanding of the procedure being performed  Patient identity confirmed: verbally with patient    Debridement Details  Performed by: physician  Debridement type: selective  Pain control: lidocaine 4%      Post-debridement measurements  Length (cm): 4.8  Width (cm): 3  Depth (cm): 0.1  Percent debrided: 50%  Surface Area (cm^2): 14.4  Area Debrided (cm^2): 7.2  Volume (cm^3): 1.44    Devitalized tissue debrided: biofilm and eschar  Instrument(s) utilized: curette  Bleeding: small  Hemostasis obtained with: pressure  Response to treatment: procedure was tolerated well    Debridement   Wound 02/14/24 Traumatic Pretibial Left;Proximal    Universal Protocol:  Consent: Verbal consent obtained.  Consent given by: patient  Time out: Immediately prior to procedure a \"time out\" was called to verify the correct patient, procedure, equipment, support staff and site/side marked as required.  Timeout called at: 3/28/2024 2:20 PM.  Patient understanding: patient states understanding of the procedure being performed  Patient identity confirmed: verbally with patient    Debridement Details  Performed by: physician  Debridement type: selective  Pain control: lidocaine 4%      Post-debridement measurements  Length (cm): 0.6  Width (cm): 0.5  Depth (cm): 0.1  Percent debrided: 100%  Surface Area (cm^2): 0.3  Area Debrided (cm^2): 0.3  Volume (cm^3): 0.03    Devitalized tissue debrided: biofilm  Instrument(s) utilized: curette  Bleeding: small  Hemostasis obtained with: pressure  Response to treatment: procedure was tolerated well    Debridement   Wound 03/18/24 Traumatic Leg Anterior;Left;Upper    Universal Protocol:  Consent: Verbal consent obtained.  Consent given by: patient  Time out: Immediately prior to procedure a \"time out\" was called to verify the correct patient, procedure, equipment, support staff and site/side " marked as required.  Timeout called at: 3/28/2024 2:20 PM.  Patient understanding: patient states understanding of the procedure being performed  Patient identity confirmed: verbally with patient    Debridement Details  Performed by: physician  Debridement type: selective  Pain control: lidocaine 4%      Post-debridement measurements  Length (cm): 0.6  Width (cm): 2.1  Depth (cm): 0.1  Percent debrided: 100%  Surface Area (cm^2): 1.26  Area Debrided (cm^2): 1.26  Volume (cm^3): 0.13    Devitalized tissue debrided: biofilm  Instrument(s) utilized: curette  Bleeding: small  Hemostasis obtained with: pressure  Response to treatment: procedure was tolerated well        Plan:  Wounds debrided as above  Wound management with Dermagran, see wound orders below  Compression with Tubigrip's  Keep legs elevated whenever seated and avoid prolonged standing  Adequate protein intake  Discussed the importance of tight diabetic control,  A1C results reviewed with the patient today.  Follow-up in 2 weeks or call sooner with questions or concerns    Wound 02/14/24 Traumatic Pretibial Left;Proximal (Active)   Wound Image Images linked 03/28/24 1405   Wound Description Pink;White;Pale 03/28/24 1405   Park-wound Assessment Scar Tissue 03/28/24 1405   Wound Length (cm) 0.6 cm 03/28/24 1405   Wound Width (cm) 0.5 cm 03/28/24 1405   Wound Depth (cm) 0.1 cm 03/28/24 1405   Wound Surface Area (cm^2) 0.3 cm^2 03/28/24 1405   Wound Volume (cm^3) 0.03 cm^3 03/28/24 1405   Calculated Wound Volume (cm^3) 0.03 cm^3 03/28/24 1405   Change in Wound Size % 96.94 03/28/24 1405   Drainage Amount Small 03/28/24 1405   Drainage Description Serosanguineous 03/28/24 1405   Non-staged Wound Description Full thickness 03/28/24 1405   Dressing Status Intact 03/28/24 1405       Wound 03/18/24 Traumatic Leg Left;Lower (Active)   Wound Image Images linked 03/28/24 1440   Wound Description Black;Eschar;Beefy red 03/28/24 1405   Park-wound Assessment Edema  03/28/24 1405   Wound Length (cm) 1.3 cm 03/28/24 1405   Wound Width (cm) 1.2 cm 03/28/24 1405   Wound Depth (cm) 0.1 cm 03/28/24 1405   Wound Surface Area (cm^2) 1.56 cm^2 03/28/24 1405   Wound Volume (cm^3) 0.156 cm^3 03/28/24 1405   Calculated Wound Volume (cm^3) 0.16 cm^3 03/28/24 1405   Change in Wound Size % 68 03/28/24 1405   Drainage Amount Small 03/28/24 1405   Drainage Description Bloody 03/28/24 1405   Non-staged Wound Description Full thickness 03/28/24 1405   Dressing Status Intact 03/28/24 1405       Wound 03/18/24 Traumatic Leg Anterior;Left;Upper (Active)   Wound Image Images linked 03/28/24 1406   Wound Description Pink;Yellow;Brown 03/28/24 1406   Park-wound Assessment Dry;Scar Tissue 03/28/24 1406   Wound Length (cm) 0.6 cm 03/28/24 1406   Wound Width (cm) 2.1 cm 03/28/24 1406   Wound Depth (cm) 0.1 cm 03/28/24 1406   Wound Surface Area (cm^2) 1.26 cm^2 03/28/24 1406   Wound Volume (cm^3) 0.126 cm^3 03/28/24 1406   Calculated Wound Volume (cm^3) 0.13 cm^3 03/28/24 1406   Change in Wound Size % -550 03/28/24 1406   Drainage Amount Scant 03/28/24 1406   Drainage Description Serosanguineous 03/28/24 1406   Non-staged Wound Description Full thickness 03/28/24 1406   Dressing Status Intact 03/28/24 1406       Wound 03/18/24 Knee Anterior;Right (Active)   Wound Image Images linked 03/28/24 1405   Wound Description Yellow;Pink;Pale;Epithelialization;Black 03/28/24 1405   Park-wound Assessment Dry;Hyperpigmented;Edema 03/28/24 1405   Wound Length (cm) 4.8 cm 03/28/24 1405   Wound Width (cm) 3 cm 03/28/24 1405   Wound Depth (cm) 0.1 cm 03/28/24 1405   Wound Surface Area (cm^2) 14.4 cm^2 03/28/24 1405   Wound Volume (cm^3) 1.44 cm^3 03/28/24 1405   Calculated Wound Volume (cm^3) 1.44 cm^3 03/28/24 1405   Change in Wound Size % -171.7 03/28/24 1405   Drainage Amount Small 03/28/24 1405   Drainage Description Serosanguineous 03/28/24 1405   Non-staged Wound Description Full thickness 03/28/24 1405       Wound  02/14/24 Traumatic Pretibial Left;Proximal (Active)   Date First Assessed: 02/14/24   Primary Wound Type: Traumatic  Location: Pretibial  Wound Location Orientation: Left;Proximal       Wound 03/18/24 Traumatic Leg Left;Lower (Active)   Date First Assessed/Time First Assessed: 03/18/24 1413   Primary Wound Type: Traumatic  Location: Leg  Wound Location Orientation: Left;Lower       Wound 03/18/24 Traumatic Leg Anterior;Left;Upper (Active)   Date First Assessed/Time First Assessed: 03/18/24 1413   Primary Wound Type: Traumatic  Location: Leg  Wound Location Orientation: Anterior;Left;Upper       Wound 03/18/24 Knee Anterior;Right (Active)   Date First Assessed/Time First Assessed: 03/18/24 1413   Location: Knee  Wound Location Orientation: Anterior;Right       [REMOVED] Wound 06/28/22 Skin Tear Skin tear Arm Right (Removed)   Resolved Date: 11/22/23  Date First Assessed/Time First Assessed: 06/28/22 1500   Primary Wound Type: Skin Tear  Traumatic Wound Type: Skin tear  Location: Arm  Wound Location Orientation: (c) Right  Wound Outcome: (c) Other (Comment)       [REMOVED] Wound 06/28/22 Skin Tear Skin tear Arm Right (Removed)   Resolved Date: 11/22/23  Date First Assessed/Time First Assessed: 06/28/22 1500   Primary Wound Type: Skin Tear  Traumatic Wound Type: Skin tear  Location: Arm  Wound Location Orientation: Right  Wound Outcome: (c) Other (Comment)       [REMOVED] Wound 11/22/23 Venous Ulcer Leg Right;Anterior (Removed)   Resolved Date: 03/08/24  Date First Assessed/Time First Assessed: 11/22/23 1309   Primary Wound Type: Venous Ulcer  Location: Leg  Wound Location Orientation: Right;Anterior  Wound Outcome: Healed       [REMOVED] Wound 12/22/23 Toe (Comment  which one) Left;Posterior (Removed)   Resolved Date/Resolved Time: 12/22/23 1021  Date First Assessed/Time First Assessed: 12/22/23 1009   Location: (c) Toe (Comment  which one)  Wound Location Orientation: Left;Posterior  Wound Description (Comments):  LEFT THIRD TOE  Wound Outcome: (c) O...       [REMOVED] Wound 12/29/23 Diabetic Ulcer Toe (Comment  which one) Left (Removed)   Resolved Date: 01/26/24  Date First Assessed/Time First Assessed: 12/29/23 1421   Primary Wound Type: Diabetic Ulcer  Location: (c) Toe (Comment  which one)  Wound Location Orientation: Left  Wound Description (Comments): LEFT THIRD TOE; HINOJOSA GRADE ...       [REMOVED] Wound 02/06/24 Surgical Toe D3, third Left (Removed)   Resolved Date: 02/14/24  Date First Assessed/Time First Assessed: 02/06/24 1410   Primary Wound Type: Surgical  Location: Toe D3, third  Wound Location Orientation: Left  Wound Outcome: Healed       [REMOVED] Wound 03/01/24 Diabetic Ulcer Toe D1, great Left (Removed)   Resolved Date: 03/08/24  Date First Assessed/Time First Assessed: 03/01/24 1448   Primary Wound Type: Diabetic Ulcer  Location: Toe D1, great  Wound Location Orientation: Left  Wound Outcome: Healed       Subjective:      .    Patient is a transfer from [facility] who presents for follow-up of multiple bilateral lower extremity traumatic wounds status post falls.  No increased pain or drainage.  Hydrocolloid was the last ordered dressing but it appears that patient and her  have been struggling with dressing changes and there was simply dry gauze placed on the wounds today.  Tubigrip's were also ordered but are not being used        The following portions of the patient's history were reviewed and updated as appropriate: She  has a past medical history of Anemia, Arthritis, Back pain, CHF (congestive heart failure) ([facility]), Chronic kidney disease, Confusion (02/22/2023), Diabetes ([facility]), Diabetes mellitus ([facility]), Diabetic gastroparesis associated with type 2 diabetes mellitus, Diabetic polyneuropathy ([facility]), Diverticulosis, Herpes zoster, Hypertension, IBS (irritable bowel syndrome), Neurogenic claudication due to lumbar spinal stenosis, Osteoarthritis, Osteoporosis, Pulmonary edema,  Raynaud's phenomenon without gangrene, Seronegative arthropathy of multiple sites (HCC), and Sicca (HCC).  She   Patient Active Problem List    Diagnosis Date Noted    Incontinence of feces with fecal urgency 09/13/2023    Elevated liver enzymes 05/18/2023    Gastroesophageal reflux disease without esophagitis 03/01/2023    Dysphagia 03/01/2023    Raynaud's phenomenon without gangrene 03/01/2023    Elevated LFTs 02/21/2023    Ambulatory dysfunction 02/21/2023    SI (sacroiliac) joint dysfunction 10/18/2022    Spinal stenosis of lumbar region with neurogenic claudication 07/18/2022    Chronic pain syndrome 07/18/2022    Dyslipidemia 07/04/2022    Primary hypertension 07/04/2022    Toxic metabolic encephalopathy 06/24/2022    Cellulitis 06/24/2022    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) 04/18/2022    Chest pain 04/14/2022    Leukocytosis 04/14/2022    Chronic diastolic heart failure (HCC) 04/14/2022    OAB (overactive bladder) 04/14/2022    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    Primary generalized (osteo)arthritis 11/19/2021    Seronegative arthropathy of multiple sites (HCC) 11/19/2021    Senile osteoporosis 11/19/2021    Lumbar spondylosis 11/19/2021    Diabetic polyneuropathy associated with type 2 diabetes mellitus (HCC) 11/19/2021    Diabetic gastroparesis associated with type 2 diabetes mellitus  11/19/2021    Irritable bowel syndrome with diarrhea 11/19/2021    Stage 3b chronic kidney disease (HCC) 11/19/2021    Traumatic injury of sacrum 11/19/2021    Tendinitis of left rotator cuff 11/19/2021    Abnormality of gait due to impairment of balance 11/19/2021    Sicca syndrome (HCC) 11/19/2021    Sinobronchitis 02/14/2020    Iron deficiency anemia secondary to inadequate dietary iron intake 12/23/2019    Urinary frequency 02/13/2018    Hypomagnesemia 12/22/2016    DM (diabetes mellitus), type 2 (HCC) 12/22/2016    Anemia 12/21/2016    Shortness of breath 12/21/2016     She  reports that she quit  smoking about 49 years ago. Her smoking use included cigarettes. She has never used smokeless tobacco. She reports that she does not drink alcohol and does not use drugs.  Current Outpatient Medications   Medication Sig Dispense Refill    amitriptyline (ELAVIL) 10 mg tablet TAKE TWO TABLETS BY MOUTH AT BEDTIME 180 tablet 1    aspirin 81 mg chewable tablet Chew 1 tablet (81 mg total) daily 30 tablet 0    atorvastatin (LIPITOR) 10 mg tablet TAKE ONE TABLET BY MOUTH EVERY DAY 90 tablet 3    Cholecalciferol (VITAMIN D3) 1000 units CAPS Take 1 capsule by mouth daily       dicyclomine (BENTYL) 10 mg capsule Take 1 capsule (10 mg total) by mouth 3 (three) times a day as needed (abd pain) (Patient not taking: Reported on 11/22/2023) 30 capsule 2    diphenoxylate-atropine (LOMOTIL) 2.5-0.025 mg per tablet Take 1 tablet by mouth if needed (Patient not taking: Reported on 3/27/2024)      diphenoxylate-atropine (LOMOTIL) 2.5-0.025 mg per tablet Take 1 tablet by mouth 4 (four) times a day as needed for diarrhea 30 tablet 0    DULoxetine (CYMBALTA) 60 mg delayed release capsule TAKE ONE CAPSULE BY MOUTH EVERY DAY 30 capsule 5    hydroxychloroquine (PLAQUENIL) 200 mg tablet Take 1 tablet (200 mg total) by mouth daily with breakfast 90 tablet 1    Magnesium 250 MG TABS Take 250 mg by mouth every evening      metFORMIN (GLUCOPHAGE) 500 mg tablet Take 500 mg by mouth 2 (two) times a day (Patient not taking: Reported on 6/27/2023)      metoprolol succinate (TOPROL-XL) 25 mg 24 hr tablet Take 0.5 tablets (12.5 mg total) by mouth daily (Patient not taking: Reported on 6/27/2023) 45 tablet 3    Mirabegron ER (Myrbetriq) 50 MG TB24 Take 1 tablet by mouth daily as directed by physician. 90 tablet 1    nitrofurantoin (MACRODANTIN) 50 mg capsule TAKE ONE CAPSULE BY MOUTH EVERY DAY IN THE MORNING 30 capsule 6    omeprazole (PriLOSEC) 20 mg delayed release capsule Take 20 mg by mouth daily        pregabalin (LYRICA) 75 mg capsule TAKE ONE  CAPSULE BY MOUTH THREE TIMES A DAY 90 capsule 5    pyridoxine (B-6) 100 MG tablet Take 100 mg by mouth daily      Sodium Fluoride (PreviDent 5000 Booster Plus) 1.1 % PSTE Apply on teeth every evening as directed 100 mL 3    torsemide (DEMADEX) 10 mg tablet Take 1 tablet PRN for wt gain 3 lb/ 3 days or 5 lb/ 5 aria (Patient not taking: Reported on 6/27/2023) 30 tablet 0     No current facility-administered medications for this visit.     She is allergic to morphine and ciprofloxacin..    Review of Systems   Constitutional:  Negative for chills and fever.   HENT:  Negative for congestion and sneezing.    Respiratory:  Negative for cough.    Cardiovascular:  Positive for leg swelling.   Musculoskeletal:  Positive for gait problem.   Skin:  Positive for wound.   Psychiatric/Behavioral:  Negative for agitation.          Objective:       Wound 02/14/24 Traumatic Pretibial Left;Proximal (Active)   Wound Image Images linked 03/28/24 1405   Wound Description Pink;White;Pale 03/28/24 1405   Park-wound Assessment Scar Tissue 03/28/24 1405   Wound Length (cm) 0.6 cm 03/28/24 1405   Wound Width (cm) 0.5 cm 03/28/24 1405   Wound Depth (cm) 0.1 cm 03/28/24 1405   Wound Surface Area (cm^2) 0.3 cm^2 03/28/24 1405   Wound Volume (cm^3) 0.03 cm^3 03/28/24 1405   Calculated Wound Volume (cm^3) 0.03 cm^3 03/28/24 1405   Change in Wound Size % 96.94 03/28/24 1405   Drainage Amount Small 03/28/24 1405   Drainage Description Serosanguineous 03/28/24 1405   Non-staged Wound Description Full thickness 03/28/24 1405   Dressing Status Intact 03/28/24 1405       Wound 03/18/24 Traumatic Leg Left;Lower (Active)   Wound Image Images linked 03/28/24 1440   Wound Description Black;Eschar;Beefy red 03/28/24 1405   Park-wound Assessment Edema 03/28/24 1405   Wound Length (cm) 1.3 cm 03/28/24 1405   Wound Width (cm) 1.2 cm 03/28/24 1405   Wound Depth (cm) 0.1 cm 03/28/24 1405   Wound Surface Area (cm^2) 1.56 cm^2 03/28/24 1405   Wound Volume (cm^3)  0.156 cm^3 03/28/24 1405   Calculated Wound Volume (cm^3) 0.16 cm^3 03/28/24 1405   Change in Wound Size % 68 03/28/24 1405   Drainage Amount Small 03/28/24 1405   Drainage Description Bloody 03/28/24 1405   Non-staged Wound Description Full thickness 03/28/24 1405   Dressing Status Intact 03/28/24 1405       Wound 03/18/24 Traumatic Leg Anterior;Left;Upper (Active)   Wound Image Images linked 03/28/24 1406   Wound Description Pink;Yellow;Brown 03/28/24 1406   Park-wound Assessment Dry;Scar Tissue 03/28/24 1406   Wound Length (cm) 0.6 cm 03/28/24 1406   Wound Width (cm) 2.1 cm 03/28/24 1406   Wound Depth (cm) 0.1 cm 03/28/24 1406   Wound Surface Area (cm^2) 1.26 cm^2 03/28/24 1406   Wound Volume (cm^3) 0.126 cm^3 03/28/24 1406   Calculated Wound Volume (cm^3) 0.13 cm^3 03/28/24 1406   Change in Wound Size % -550 03/28/24 1406   Drainage Amount Scant 03/28/24 1406   Drainage Description Serosanguineous 03/28/24 1406   Non-staged Wound Description Full thickness 03/28/24 1406   Dressing Status Intact 03/28/24 1406       Wound 03/18/24 Knee Anterior;Right (Active)   Wound Image Images linked 03/28/24 1405   Wound Description Yellow;Pink;Pale;Epithelialization;Black 03/28/24 1405   Park-wound Assessment Dry;Hyperpigmented;Edema 03/28/24 1405   Wound Length (cm) 4.8 cm 03/28/24 1405   Wound Width (cm) 3 cm 03/28/24 1405   Wound Depth (cm) 0.1 cm 03/28/24 1405   Wound Surface Area (cm^2) 14.4 cm^2 03/28/24 1405   Wound Volume (cm^3) 1.44 cm^3 03/28/24 1405   Calculated Wound Volume (cm^3) 1.44 cm^3 03/28/24 1405   Change in Wound Size % -171.7 03/28/24 1405   Drainage Amount Small 03/28/24 1405   Drainage Description Serosanguineous 03/28/24 1405   Non-staged Wound Description Full thickness 03/28/24 1405       /97   Pulse 84   Temp (!) 96.9 °F (36.1 °C)   Resp 18   LMP  (LMP Unknown)     Physical Exam        Wound 02/14/24 Traumatic Pretibial Left;Proximal (Active)   Wound Image   03/28/24 1405   Wound  Description Pink;White;Pale 03/28/24 1405   Park-wound Assessment Scar Tissue 03/28/24 1405   Wound Length (cm) 0.6 cm 03/28/24 1405   Wound Width (cm) 0.5 cm 03/28/24 1405   Wound Depth (cm) 0.1 cm 03/28/24 1405   Wound Surface Area (cm^2) 0.3 cm^2 03/28/24 1405   Wound Volume (cm^3) 0.03 cm^3 03/28/24 1405   Calculated Wound Volume (cm^3) 0.03 cm^3 03/28/24 1405   Change in Wound Size % 96.94 03/28/24 1405   Drainage Amount Small 03/28/24 1405   Drainage Description Serosanguineous 03/28/24 1405   Non-staged Wound Description Full thickness 03/28/24 1405   Dressing Status Intact 03/28/24 1405       Wound 03/18/24 Traumatic Leg Left;Lower (Active)   Wound Image   03/28/24 1440   Wound Description Black;Eschar;Beefy red 03/28/24 1405   Park-wound Assessment Edema 03/28/24 1405   Wound Length (cm) 1.3 cm 03/28/24 1405   Wound Width (cm) 1.2 cm 03/28/24 1405   Wound Depth (cm) 0.1 cm 03/28/24 1405   Wound Surface Area (cm^2) 1.56 cm^2 03/28/24 1405   Wound Volume (cm^3) 0.156 cm^3 03/28/24 1405   Calculated Wound Volume (cm^3) 0.16 cm^3 03/28/24 1405   Change in Wound Size % 68 03/28/24 1405   Drainage Amount Small 03/28/24 1405   Drainage Description Bloody 03/28/24 1405   Non-staged Wound Description Full thickness 03/28/24 1405   Dressing Status Intact 03/28/24 1405       Wound 03/18/24 Traumatic Leg Anterior;Left;Upper (Active)   Wound Image   03/28/24 1406   Wound Description Pink;Yellow;Brown 03/28/24 1406   Park-wound Assessment Dry;Scar Tissue 03/28/24 1406   Wound Length (cm) 0.6 cm 03/28/24 1406   Wound Width (cm) 2.1 cm 03/28/24 1406   Wound Depth (cm) 0.1 cm 03/28/24 1406   Wound Surface Area (cm^2) 1.26 cm^2 03/28/24 1406   Wound Volume (cm^3) 0.126 cm^3 03/28/24 1406   Calculated Wound Volume (cm^3) 0.13 cm^3 03/28/24 1406   Change in Wound Size % -550 03/28/24 1406   Drainage Amount Scant 03/28/24 1406   Drainage Description Serosanguineous 03/28/24 1406   Non-staged Wound Description Full thickness  03/28/24 1406   Dressing Status Intact 03/28/24 1406       Wound 03/18/24 Knee Anterior;Right (Active)   Wound Image   03/28/24 1405   Wound Description Yellow;Pink;Pale;Epithelialization;Black 03/28/24 1405   Park-wound Assessment Dry;Hyperpigmented;Edema 03/28/24 1405   Wound Length (cm) 4.8 cm 03/28/24 1405   Wound Width (cm) 3 cm 03/28/24 1405   Wound Depth (cm) 0.1 cm 03/28/24 1405   Wound Surface Area (cm^2) 14.4 cm^2 03/28/24 1405   Wound Volume (cm^3) 1.44 cm^3 03/28/24 1405   Calculated Wound Volume (cm^3) 1.44 cm^3 03/28/24 1405   Change in Wound Size % -171.7 03/28/24 1405   Drainage Amount Small 03/28/24 1405   Drainage Description Serosanguineous 03/28/24 1405   Non-staged Wound Description Full thickness 03/28/24 1405   Dressing Status Intact 03/18/24 1415                       Wound Instructions:  Orders Placed This Encounter   Procedures    Wound cleansing and dressings     Left Leg Wounds and Right Leg Wound:  Wash your hands with soap and water.  Remove old dressing, discard into plastic bag and place in trash. Cleanse wound with mild soap and water Do not use tissue or cotton balls. Do not scrub the wound. Pat dry using gauze.  Shower: Yes remove dressing to shower, redress immediately after shower. Do not leave open to air.      Apply Dermagran cut to fit over wound bed  Cover with Gauze  Secure with rolled gauze and tape     Change dressing 3 times weekly     The above was completed today at the wound center.     Standing Status:   Future     Standing Expiration Date:   3/28/2025    Wound compression and edema control     Elastic Tubular Stocking: Spandagrip to right and left lower leg.     Tubular elastic bandage: Apply from base of toes to behind the knee. Apply in AM, may remove for sleep.     Avoid prolonged standing in one place.     Elevate leg(s) above the level of the heart when sitting or as much as possible.     Standing Status:   Future     Standing Expiration Date:   3/28/2025     Wound miscellaneous orders     Protein: Eat protein with each meal to promote healing.  Examples of protein are fish, meat, chicken, nuts, peanut butter, eggs, lentils, edamame or a protein shake.     Wound infection:  If you have signs of infection please call the wound center.  If the wound center is closed- please go to the Emergency department.  Some signs of infection:  fever, chills, increased redness, red streaks, increase in pain, increased drainage.  Drainage with an odor, Change in drainage color: white/milky/green/tan/yellow,  an increase in swelling, chest pain and/or shortness of breath.     Standing Status:   Future     Standing Expiration Date:   3/28/2025    Debridement Anterior;Right Knee     This order was created via procedure documentation    Debridement Traumatic Left;Lower Leg     This order was created via procedure documentation    Debridement Traumatic Left;Proximal Pretibial     This order was created via procedure documentation    Debridement Traumatic Anterior;Left;Upper Leg     This order was created via procedure documentation    Referral to [facility]     Standing Status:   Future     Standing Expiration Date:   3/28/2025     Referral Priority:   Routine     Referral Type:   Home Health     Referral Reason:   Specialty Services Required     Requested Specialty:   Home Health Services     Number of Visits Requested:   1     Expiration Date:   3/28/2025        Diagnosis ICD-10-CM Associated Orders   1. Multiple skin tears  T14.8XXA Wound cleansing and dressings     Wound compression and edema control     Wound miscellaneous orders     lidocaine (LMX) 4 % cream     Referral to [facility]      2. 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 Traumatic open wound of left lower leg, initial encounter  S81.803N Wound cleansing and dressings     Wound compression and edema control     Wound miscellaneous orders     lidocaine (LMX) 4 % cream     Referral to [facility]     Debridement Traumatic Left;Lower Leg     Debridement Traumatic Left;Proximal Pretibial     Debridement Traumatic Anterior;Left;Upper Leg      4. Type 2 diabetes mellitus with stage 3 chronic kidney disease, without long-term current use of insulin, unspecified whether stage 3a or 3b CKD ([facility])  E11.22     N18.30       5. Skin tear of right lower leg without complication, initial encounter  S81.625A Debridement Anterior;Right Knee

## 2024-03-28 NOTE — PATIENT INSTRUCTIONS
Orders Placed This Encounter   Procedures    Wound cleansing and dressings     Left Leg Wounds and Right Leg Wound:  Wash your hands with soap and water.  Remove old dressing, discard into plastic bag and place in trash. Cleanse wound with mild soap and water Do not use tissue or cotton balls. Do not scrub the wound. Pat dry using gauze.  Shower: Yes remove dressing to shower, redress immediately after shower. Do not leave open to air.      Apply Dermagran cut to fit over wound bed  Cover with Gauze  Secure with rolled gauze and tape     Change dressing 3 times weekly     The above was completed today at the wound center.     Standing Status:   Future     Standing Expiration Date:   3/28/2025    Wound compression and edema control     Elastic Tubular Stocking: Spandagrip to right and left lower leg.     Tubular elastic bandage: Apply from base of toes to behind the knee. Apply in AM, may remove for sleep.     Avoid prolonged standing in one place.     Elevate leg(s) above the level of the heart when sitting or as much as possible.     Standing Status:   Future     Standing Expiration Date:   3/28/2025    Wound miscellaneous orders     Protein: Eat protein with each meal to promote healing.  Examples of protein are fish, meat, chicken, nuts, peanut butter, eggs, lentils, edamame or a protein shake.     Wound infection:  If you have signs of infection please call the wound center.  If the wound center is closed- please go to the Emergency department.  Some signs of infection:  fever, chills, increased redness, red streaks, increase in pain, increased drainage.  Drainage with an odor, Change in drainage color: white/milky/green/tan/yellow,  an increase in swelling, chest pain and/or shortness of breath.     Standing Status:   Future     Standing Expiration Date:   3/28/2025    Referral to [facility]     Standing Status:   Future     Standing Expiration Date:   3/28/2025     Referral Priority:   Routine      Referral Type:   Home Health     Referral Reason:   Specialty Services Required     Requested Specialty:   Home Health Services     Number of Visits Requested:   1     Expiration Date:   3/28/2025

## 2024-03-28 NOTE — PROGRESS NOTES
"Debridement   Wound 03/18/24 Traumatic Leg Left;Lower    Universal Protocol:  Consent: Verbal consent obtained.  Consent given by: patient  Time out: Immediately prior to procedure a \"time out\" was called to verify the correct patient, procedure, equipment, support staff and site/side marked as required.  Timeout called at: 3/28/2024 2:20 PM.  Patient understanding: patient states understanding of the procedure being performed  Patient identity confirmed: verbally with patient    Debridement Details  Performed by: physician  Debridement type: surgical  Level of debridement: subcutaneous tissue  Pain control: lidocaine 4%      Post-debridement measurements  Length (cm): 1.3  Width (cm): 1.2  Depth (cm): 0.2  Percent debrided: 100%  Surface Area (cm^2): 1.56  Area Debrided (cm^2): 1.56  Volume (cm^3): 0.31    Tissue and other material debrided: subcutaneous tissue  Devitalized tissue debrided: clots, exudate and slough  Instrument(s) utilized: curette  Bleeding: small  Hemostasis obtained with: pressure  Response to treatment: procedure was tolerated well        "

## 2024-03-29 ENCOUNTER — HOME CARE VISIT (OUTPATIENT)
Dept: HOME HEALTH SERVICES | Facility: HOME HEALTHCARE | Age: 89
End: 2024-03-29

## 2024-03-30 ENCOUNTER — HOME CARE VISIT (OUTPATIENT)
Dept: HOME HEALTH SERVICES | Facility: HOME HEALTHCARE | Age: 89
End: 2024-03-30
Payer: MEDICARE

## 2024-03-30 VITALS
HEART RATE: 72 BPM | SYSTOLIC BLOOD PRESSURE: 120 MMHG | RESPIRATION RATE: 16 BRPM | TEMPERATURE: 97.7 F | DIASTOLIC BLOOD PRESSURE: 70 MMHG | OXYGEN SATURATION: 97 %

## 2024-03-30 PROCEDURE — G0299 HHS/HOSPICE OF RN EA 15 MIN: HCPCS

## 2024-03-30 PROCEDURE — 10330081 VN NO-PAY CLAIM PROCEDURE

## 2024-04-01 ENCOUNTER — HOME CARE VISIT (OUTPATIENT)
Dept: HOME HEALTH SERVICES | Facility: HOME HEALTHCARE | Age: 89
End: 2024-04-01
Payer: MEDICARE

## 2024-04-01 VITALS
HEART RATE: 80 BPM | TEMPERATURE: 97.6 F | DIASTOLIC BLOOD PRESSURE: 80 MMHG | OXYGEN SATURATION: 98 % | RESPIRATION RATE: 18 BRPM | SYSTOLIC BLOOD PRESSURE: 126 MMHG

## 2024-04-01 PROCEDURE — 10330064 DRESSING, HYDROGEL 4X4 (15/BX 4BX/CS)

## 2024-04-01 PROCEDURE — 10330064 SPONGE, GAUZE 8PLY N/S 4"X4" (200/PK 20P

## 2024-04-01 PROCEDURE — G0299 HHS/HOSPICE OF RN EA 15 MIN: HCPCS

## 2024-04-01 PROCEDURE — 10330064 BANDAGE, CNFRM 4"X4.1YDS N/S LF (12RL/BG

## 2024-04-01 PROCEDURE — 10330064 PAD, ABD 5X9" STR LF (1/PK 20PK/BX) MGM1

## 2024-04-01 PROCEDURE — 10330064 CLEANSER, WND SEA-CLEANS 6OZ  COLPLT

## 2024-04-02 PROCEDURE — 10330064 SPONGE, GAUZE 8PLY N/S 4"X4" (200/PK 20P

## 2024-04-02 PROCEDURE — 10330064 BANDAGE, CNFRM 4"X4.1YDS N/S LF (12RL/BG

## 2024-04-02 PROCEDURE — 10330064 DRESSING, HYDROGEL 4X4 (15/BX 4BX/CS)

## 2024-04-03 ENCOUNTER — HOME CARE VISIT (OUTPATIENT)
Dept: HOME HEALTH SERVICES | Facility: HOME HEALTHCARE | Age: 89
End: 2024-04-03
Payer: MEDICARE

## 2024-04-03 VITALS
RESPIRATION RATE: 18 BRPM | SYSTOLIC BLOOD PRESSURE: 108 MMHG | DIASTOLIC BLOOD PRESSURE: 62 MMHG | OXYGEN SATURATION: 100 % | TEMPERATURE: 98.2 F | HEART RATE: 82 BPM

## 2024-04-03 PROCEDURE — G0299 HHS/HOSPICE OF RN EA 15 MIN: HCPCS

## 2024-04-04 ENCOUNTER — HOME CARE VISIT (OUTPATIENT)
Dept: HOME HEALTH SERVICES | Facility: HOME HEALTHCARE | Age: 89
End: 2024-04-04
Payer: MEDICARE

## 2024-04-05 ENCOUNTER — HOME CARE VISIT (OUTPATIENT)
Dept: HOME HEALTH SERVICES | Facility: HOME HEALTHCARE | Age: 89
End: 2024-04-05
Payer: MEDICARE

## 2024-04-05 PROCEDURE — G0299 HHS/HOSPICE OF RN EA 15 MIN: HCPCS

## 2024-04-07 VITALS
DIASTOLIC BLOOD PRESSURE: 70 MMHG | SYSTOLIC BLOOD PRESSURE: 108 MMHG | RESPIRATION RATE: 18 BRPM | HEART RATE: 72 BPM | OXYGEN SATURATION: 98 % | TEMPERATURE: 96.7 F

## 2024-04-08 ENCOUNTER — HOME CARE VISIT (OUTPATIENT)
Dept: HOME HEALTH SERVICES | Facility: HOME HEALTHCARE | Age: 89
End: 2024-04-08
Payer: MEDICARE

## 2024-04-08 DIAGNOSIS — E11.9 TYPE 2 DIABETES MELLITUS WITHOUT COMPLICATION, WITHOUT LONG-TERM CURRENT USE OF INSULIN (HCC): ICD-10-CM

## 2024-04-08 PROCEDURE — G0299 HHS/HOSPICE OF RN EA 15 MIN: HCPCS

## 2024-04-08 RX ORDER — ATORVASTATIN CALCIUM 10 MG/1
TABLET, FILM COATED ORAL
Qty: 90 TABLET | Refills: 3 | Status: SHIPPED | OUTPATIENT
Start: 2024-04-08

## 2024-04-09 VITALS
SYSTOLIC BLOOD PRESSURE: 108 MMHG | TEMPERATURE: 97.7 F | OXYGEN SATURATION: 98 % | RESPIRATION RATE: 18 BRPM | DIASTOLIC BLOOD PRESSURE: 60 MMHG | HEART RATE: 84 BPM

## 2024-04-10 ENCOUNTER — TELEPHONE (OUTPATIENT)
Dept: UROLOGY | Facility: AMBULATORY SURGERY CENTER | Age: 89
End: 2024-04-10

## 2024-04-10 ENCOUNTER — TELEPHONE (OUTPATIENT)
Dept: OTHER | Facility: HOSPITAL | Age: 89
End: 2024-04-10

## 2024-04-10 ENCOUNTER — HOME CARE VISIT (OUTPATIENT)
Dept: HOME HEALTH SERVICES | Facility: HOME HEALTHCARE | Age: 89
End: 2024-04-10
Payer: MEDICARE

## 2024-04-10 PROCEDURE — G0299 HHS/HOSPICE OF RN EA 15 MIN: HCPCS

## 2024-04-10 NOTE — TELEPHONE ENCOUNTER
Pt's  dropped off a form for Patient Assistance Program.    Once Completed he is requesting it to be faxed to [phone]

## 2024-04-10 NOTE — TELEPHONE ENCOUNTER
Patients  called refill line, he states that he received a form to get mybetriq covered and he will drop it off at the office. Please advise.

## 2024-04-11 ENCOUNTER — OFFICE VISIT (OUTPATIENT)
Dept: WOUND CARE | Facility: HOSPITAL | Age: 89
End: 2024-04-11
Payer: MEDICARE

## 2024-04-11 VITALS
DIASTOLIC BLOOD PRESSURE: 60 MMHG | SYSTOLIC BLOOD PRESSURE: 130 MMHG | TEMPERATURE: 97.7 F | RESPIRATION RATE: 18 BRPM | HEART RATE: 89 BPM

## 2024-04-11 DIAGNOSIS — S81.802A TRAUMATIC OPEN WOUND OF LEFT LOWER LEG, INITIAL ENCOUNTER: Primary | ICD-10-CM

## 2024-04-11 DIAGNOSIS — N18.30 TYPE 2 DIABETES MELLITUS WITH STAGE 3 CHRONIC KIDNEY DISEASE, WITHOUT LONG-TERM CURRENT USE OF INSULIN, UNSPECIFIED WHETHER STAGE 3A OR 3B CKD (HCC): ICD-10-CM

## 2024-04-11 DIAGNOSIS — E11.22 TYPE 2 DIABETES MELLITUS WITH STAGE 3 CHRONIC KIDNEY DISEASE, WITHOUT LONG-TERM CURRENT USE OF INSULIN, UNSPECIFIED WHETHER STAGE 3A OR 3B CKD (HCC): ICD-10-CM

## 2024-04-11 DIAGNOSIS — T14.8XXA MULTIPLE SKIN TEARS: ICD-10-CM

## 2024-04-11 PROCEDURE — 11042 DBRDMT SUBQ TIS 1ST 20SQCM/<: CPT | Performed by: FAMILY MEDICINE

## 2024-04-11 RX ORDER — LIDOCAINE 40 MG/G
CREAM TOPICAL ONCE
Status: COMPLETED | OUTPATIENT
Start: 2024-04-11 | End: 2024-04-11

## 2024-04-11 RX ADMIN — LIDOCAINE 1 APPLICATION: 40 CREAM TOPICAL at 14:49

## 2024-04-11 NOTE — PROGRESS NOTES
"Patient ID: Tanya Stephen is a 89 y.o. female Date of Birth 7/21/1934     Chief Complaint  Chief Complaint   Patient presents with    Follow Up Wound Care Visit     Left leg wounds       Allergies  Morphine and Ciprofloxacin    Assessment:    No problem-specific Assessment & Plan notes found for this encounter.       Diagnoses and all orders for this visit:    Traumatic open wound of left lower leg, initial encounter  -     lidocaine (LMX) 4 % cream  -     Wound cleansing and dressings; Future  -     Wound Procedure Treatment  -     Debridement Traumatic Left;Proximal;Lower Leg  -     Debridement Left Knee    Type 2 diabetes mellitus with stage 3 chronic kidney disease, without long-term current use of insulin, unspecified whether stage 3a or 3b CKD (HCC)  -     lidocaine (LMX) 4 % cream  -     Wound cleansing and dressings; Future  -     Wound Procedure Treatment    Multiple skin tears  -     lidocaine (LMX) 4 % cream  -     Wound cleansing and dressings; Future  -     Wound Procedure Treatment              Debridement   Wound 02/14/24 Traumatic Leg Left;Proximal;Lower    Universal Protocol:  Consent: Verbal consent obtained.  Consent given by: patient  Time out: Immediately prior to procedure a \"time out\" was called to verify the correct patient, procedure, equipment, support staff and site/side marked as required.  Timeout called at: 4/11/2024 3:00 PM.  Patient understanding: patient states understanding of the procedure being performed  Patient identity confirmed: verbally with patient    Debridement Details  Performed by: physician  Debridement type: surgical  Level of debridement: subcutaneous tissue  Pain control: lidocaine 4%      Post-debridement measurements  Length (cm): 1  Width (cm): 0.5  Depth (cm): 0.2  Percent debrided: 100%  Surface Area (cm^2): 0.5  Area Debrided (cm^2): 0.5  Volume (cm^3): 0.1    Tissue and other material debrided: subcutaneous tissue  Devitalized tissue debrided: exudate and " "slough  Instrument(s) utilized: curette  Bleeding: small  Hemostasis obtained with: pressure  Response to treatment: procedure was tolerated well    Debridement   Wound 03/29/24 Skin tear Knee Left    Universal Protocol:  Consent: Verbal consent obtained.  Consent given by: patient  Time out: Immediately prior to procedure a \"time out\" was called to verify the correct patient, procedure, equipment, support staff and site/side marked as required.  Timeout called at: 4/11/2024 3:00 PM.  Patient understanding: patient states understanding of the procedure being performed  Patient identity confirmed: verbally with patient    Debridement Details  Performed by: physician  Debridement type: surgical  Level of debridement: subcutaneous tissue  Pain control: lidocaine 4%      Post-debridement measurements  Length (cm): 3  Width (cm): 3  Depth (cm): 0.2  Percent debrided: 100%  Surface Area (cm^2): 9  Area Debrided (cm^2): 9  Volume (cm^3): 1.8    Tissue and other material debrided: subcutaneous tissue  Devitalized tissue debrided: exudate and slough  Instrument(s) utilized: curette  Bleeding: small  Hemostasis obtained with: pressure  Response to treatment: procedure was tolerated well        Plan:  A few wounds have closed since last visit  New wound on the left knee  Wounds debrided as above  Continue wound management Dermagran, see wound orders below  Discussed the importance of compression bilaterally  Compression with Tubigrip's  Keep legs elevated whenever seated and avoid prolonged standing  Follow-up in 2 weeks or call sooner with questions or concerns    Wound 02/14/24 Traumatic Leg Left;Proximal;Lower (Active)   Wound Image Images linked 04/11/24 1444   Wound Description Pink;Pale;Slough;Yellow;Epithelialization 04/11/24 1446   Park-wound Assessment Scar Tissue;Dry 04/11/24 1446   Wound Length (cm) 1 cm 04/11/24 1446   Wound Width (cm) 0.5 cm 04/11/24 1446   Wound Depth (cm) 0.1 cm 04/11/24 1446   Wound Surface " Area (cm^2) 0.5 cm^2 04/11/24 1446   Wound Volume (cm^3) 0.05 cm^3 04/11/24 1446   Calculated Wound Volume (cm^3) 0.05 cm^3 04/11/24 1446   Change in Wound Size % 94.9 04/11/24 1446   Drainage Amount Small 04/11/24 1446   Drainage Description Serosanguineous 04/11/24 1446   Non-staged Wound Description Full thickness 04/11/24 1446       Wound 03/29/24 Skin tear Knee Left (Active)   Wound Image Images linked 04/11/24 1445   Wound Description Yellow;Slough;Granulation tissue;Pink 04/11/24 1446   Park-wound Assessment Pink;Dry;Scar Tissue 04/11/24 1446   Wound Length (cm) 3 cm 04/11/24 1446   Wound Width (cm) 3 cm 04/11/24 1446   Wound Depth (cm) 0.1 cm 04/11/24 1446   Wound Surface Area (cm^2) 9 cm^2 04/11/24 1446   Wound Volume (cm^3) 0.9 cm^3 04/11/24 1446   Calculated Wound Volume (cm^3) 0.9 cm^3 04/11/24 1446   Change in Wound Size % 55 04/11/24 1446   Drainage Amount Moderate 04/11/24 1446   Drainage Description Serosanguineous 04/11/24 1446   Non-staged Wound Description Full thickness 04/11/24 1446       Wound 02/14/24 Traumatic Leg Left;Proximal;Lower (Active)   Date First Assessed: 02/14/24   Primary Wound Type: Traumatic  Location: Leg  Wound Location Orientation: Left;Proximal;Lower       Wound 03/29/24 Skin tear Knee Left (Active)   Date First Assessed: 03/29/24   Present on Original Admission: Yes  Traumatic Wound Type: Skin tear  Location: Knee  Wound Location Orientation: Left  Dressing Status: Clean;Dry;Intact       [REMOVED] Wound 06/28/22 Skin Tear Skin tear Arm Right (Removed)   Resolved Date: 11/22/23  Date First Assessed/Time First Assessed: 06/28/22 1500   Primary Wound Type: Skin Tear  Traumatic Wound Type: Skin tear  Location: Arm  Wound Location Orientation: (c) Right  Wound Outcome: (c) Other (Comment)       [REMOVED] Wound 06/28/22 Skin Tear Skin tear Arm Right (Removed)   Resolved Date: 11/22/23  Date First Assessed/Time First Assessed: 06/28/22 1500   Primary Wound Type: Skin Tear   Traumatic Wound Type: Skin tear  Location: Arm  Wound Location Orientation: Right  Wound Outcome: (c) Other (Comment)       [REMOVED] Wound 11/22/23 Venous Ulcer Leg Right;Anterior (Removed)   Resolved Date: 03/08/24  Date First Assessed/Time First Assessed: 11/22/23 1309   Primary Wound Type: Venous Ulcer  Location: Leg  Wound Location Orientation: Right;Anterior  Wound Outcome: Healed       [REMOVED] Wound 12/22/23 Toe (Comment  which one) Left;Posterior (Removed)   Resolved Date/Resolved Time: 12/22/23 1021  Date First Assessed/Time First Assessed: 12/22/23 1009   Location: (c) Toe (Comment  which one)  Wound Location Orientation: Left;Posterior  Wound Description (Comments): LEFT THIRD TOE  Wound Outcome: (c) O...       [REMOVED] Wound 12/29/23 Diabetic Ulcer Toe (Comment  which one) Left (Removed)   Resolved Date: 01/26/24  Date First Assessed/Time First Assessed: 12/29/23 1421   Primary Wound Type: Diabetic Ulcer  Location: (c) Toe (Comment  which one)  Wound Location Orientation: Left  Wound Description (Comments): LEFT THIRD TOE; HINOJOSA GRADE ...       [REMOVED] Wound 02/06/24 Surgical Toe D3, third Left (Removed)   Resolved Date: 02/14/24  Date First Assessed/Time First Assessed: 02/06/24 1410   Primary Wound Type: Surgical  Location: Toe D3, third  Wound Location Orientation: Left  Wound Outcome: Healed       [REMOVED] Wound 03/01/24 Diabetic Ulcer Toe D1, great Left (Removed)   Resolved Date: 03/08/24  Date First Assessed/Time First Assessed: 03/01/24 1448   Primary Wound Type: Diabetic Ulcer  Location: Toe D1, great  Wound Location Orientation: Left  Wound Outcome: Healed       [REMOVED] Wound 03/18/24 Traumatic Leg Left;Lower (Removed)   Resolved Date: 04/08/24  Date First Assessed/Time First Assessed: 03/18/24 1413   Primary Wound Type: Traumatic  Location: Leg  Wound Location Orientation: Left;Lower       [REMOVED] Wound 03/18/24 Traumatic Leg Anterior;Left;Upper (Removed)   Resolved Date: 04/08/24   Date First Assessed/Time First Assessed: 03/18/24 1413   Primary Wound Type: Traumatic  Location: Leg  Wound Location Orientation: Anterior;Left;Upper       [REMOVED] Wound 03/18/24 Knee Anterior;Right (Removed)   Resolved Date: 04/08/24  Date First Assessed/Time First Assessed: 03/18/24 1413   Location: Knee  Wound Location Orientation: Anterior;Right       Subjective:      .    Patient presents for follow-up of bilateral lower extremity traumatic wounds after falls.  Patient did fall and other times since last visit and has a new wound on her left knee.  No increased pain or drainage.  Has been using Dermagran on the wounds.        The following portions of the patient's history were reviewed and updated as appropriate: She  has a past medical history of Anemia, Arthritis, Back pain, CHF (congestive heart failure) ([facility]), Chronic kidney disease, Confusion (02/22/2023), Diabetes ([facility]), Diabetes mellitus ([facility]), Diabetic gastroparesis associated with type 2 diabetes mellitus  ([facility]), Diabetic polyneuropathy ([facility]), Diverticulosis, Herpes zoster, Hypertension, IBS (irritable bowel syndrome), Neurogenic claudication due to lumbar spinal stenosis, Osteoarthritis, Osteoporosis, Pulmonary edema, Raynaud's phenomenon without gangrene, Seronegative arthropathy of multiple sites ([facility]), and Sicca ([facility]).  She   Patient Active Problem List    Diagnosis Date Noted    Incontinence of feces with fecal urgency 09/13/2023    Elevated liver enzymes 05/18/2023    Gastroesophageal reflux disease without esophagitis 03/01/2023    Dysphagia 03/01/2023    Raynaud's phenomenon without gangrene 03/01/2023    Elevated LFTs 02/21/2023    Ambulatory dysfunction 02/21/2023    SI (sacroiliac) joint dysfunction 10/18/2022    Spinal stenosis of lumbar region with neurogenic claudication 07/18/2022    Chronic pain syndrome 07/18/2022    Dyslipidemia 07/04/2022    Primary hypertension 07/04/2022    Toxic metabolic encephalopathy 06/24/2022     Cellulitis 06/24/2022    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) 04/18/2022    Chest pain 04/14/2022    Leukocytosis 04/14/2022    Chronic diastolic heart failure (HCC) 04/14/2022    OAB (overactive bladder) 04/14/2022    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    Primary generalized (osteo)arthritis 11/19/2021    Seronegative arthropathy of multiple sites ([facility]) 11/19/2021    Senile osteoporosis 11/19/2021    Lumbar spondylosis 11/19/2021    Diabetic polyneuropathy associated with type 2 diabetes mellitus ([facility]) 11/19/2021    Diabetic gastroparesis associated with type 2 diabetes mellitus  ([facility]) 11/19/2021    Irritable bowel syndrome with diarrhea 11/19/2021    Stage 3b chronic kidney disease ([facility]) 11/19/2021    Traumatic injury of sacrum 11/19/2021    Tendinitis of left rotator cuff 11/19/2021    Abnormality of gait due to impairment of balance 11/19/2021    Sicca syndrome ([facility]) 11/19/2021    Sinobronchitis 02/14/2020    Iron deficiency anemia secondary to inadequate dietary iron intake 12/23/2019    Urinary frequency 02/13/2018    Hypomagnesemia 12/22/2016    DM (diabetes mellitus), type 2 ([facility]) 12/22/2016    Anemia 12/21/2016    Shortness of breath 12/21/2016     She  reports that she quit smoking about 49 years ago. Her smoking use included cigarettes. She has never used smokeless tobacco. She reports that she does not drink alcohol and does not use drugs.  Current Outpatient Medications   Medication Sig Dispense Refill    amitriptyline (ELAVIL) 10 mg tablet TAKE TWO TABLETS BY MOUTH AT BEDTIME 180 tablet 1    aspirin 81 mg chewable tablet Chew 1 tablet (81 mg total) daily 30 tablet 0    atorvastatin (LIPITOR) 10 mg tablet TAKE ONE TABLET BY MOUTH EVERY DAY 90 tablet 3    Cholecalciferol (VITAMIN D3) 1000 units CAPS Take 1 capsule by mouth daily       dicyclomine (BENTYL) 10 mg capsule Take 1 capsule (10 mg total) by mouth 3 (three) times a day as needed (abd pain) (Patient not taking: Reported on  11/22/2023) 30 capsule 2    diphenoxylate-atropine (LOMOTIL) 2.5-0.025 mg per tablet Take 1 tablet by mouth if needed (Patient not taking: Reported on 3/27/2024)      diphenoxylate-atropine (LOMOTIL) 2.5-0.025 mg per tablet Take 1 tablet by mouth 4 (four) times a day as needed for diarrhea 30 tablet 0    DULoxetine (CYMBALTA) 60 mg delayed release capsule TAKE ONE CAPSULE BY MOUTH EVERY DAY 30 capsule 5    hydroxychloroquine (PLAQUENIL) 200 mg tablet Take 1 tablet (200 mg total) by mouth daily with breakfast 90 tablet 1    Magnesium 250 MG TABS Take 250 mg by mouth every evening      metFORMIN (GLUCOPHAGE) 500 mg tablet Take 500 mg by mouth 2 (two) times a day      metoprolol succinate (TOPROL-XL) 25 mg 24 hr tablet Take 0.5 tablets (12.5 mg total) by mouth daily (Patient not taking: Reported on 6/27/2023) 45 tablet 3    Mirabegron ER (Myrbetriq) 50 MG TB24 Take 1 tablet by mouth daily as directed by physician. 90 tablet 1    nitrofurantoin (MACRODANTIN) 50 mg capsule TAKE ONE CAPSULE BY MOUTH EVERY DAY IN THE MORNING 30 capsule 6    omeprazole (PriLOSEC) 20 mg delayed release capsule Take 20 mg by mouth daily      pregabalin (LYRICA) 75 mg capsule TAKE ONE CAPSULE BY MOUTH THREE TIMES A DAY 90 capsule 5    pyridoxine (B-6) 100 MG tablet Take 100 mg by mouth daily      Sodium Fluoride (PreviDent 5000 Booster Plus) 1.1 % PSTE Apply on teeth every evening as directed 100 mL 3    torsemide (DEMADEX) 10 mg tablet Take 1 tablet PRN for wt gain 3 lb/ 3 days or 5 lb/ 5 aria (Patient not taking: Reported on 6/27/2023) 30 tablet 0     No current facility-administered medications for this visit.     She is allergic to morphine and ciprofloxacin..    Review of Systems   Constitutional:  Negative for chills and fever.   HENT:  Negative for congestion and sneezing.    Respiratory:  Negative for cough.    Cardiovascular:  Positive for leg swelling.   Musculoskeletal:  Positive for gait problem.   Skin:  Positive for wound.    Psychiatric/Behavioral:  Negative for agitation.          Objective:       Wound 02/14/24 Traumatic Leg Left;Proximal;Lower (Active)   Wound Image Images linked 04/11/24 1444   Wound Description Pink;Pale;Slough;Yellow;Epithelialization 04/11/24 1446   Park-wound Assessment Scar Tissue;Dry 04/11/24 1446   Wound Length (cm) 1 cm 04/11/24 1446   Wound Width (cm) 0.5 cm 04/11/24 1446   Wound Depth (cm) 0.1 cm 04/11/24 1446   Wound Surface Area (cm^2) 0.5 cm^2 04/11/24 1446   Wound Volume (cm^3) 0.05 cm^3 04/11/24 1446   Calculated Wound Volume (cm^3) 0.05 cm^3 04/11/24 1446   Change in Wound Size % 94.9 04/11/24 1446   Drainage Amount Small 04/11/24 1446   Drainage Description Serosanguineous 04/11/24 1446   Non-staged Wound Description Full thickness 04/11/24 1446       Wound 03/29/24 Skin tear Knee Left (Active)   Wound Image Images linked 04/11/24 1445   Wound Description Yellow;Slough;Granulation tissue;Pink 04/11/24 1446   Park-wound Assessment Pink;Dry;Scar Tissue 04/11/24 1446   Wound Length (cm) 3 cm 04/11/24 1446   Wound Width (cm) 3 cm 04/11/24 1446   Wound Depth (cm) 0.1 cm 04/11/24 1446   Wound Surface Area (cm^2) 9 cm^2 04/11/24 1446   Wound Volume (cm^3) 0.9 cm^3 04/11/24 1446   Calculated Wound Volume (cm^3) 0.9 cm^3 04/11/24 1446   Change in Wound Size % 55 04/11/24 1446   Drainage Amount Moderate 04/11/24 1446   Drainage Description Serosanguineous 04/11/24 1446   Non-staged Wound Description Full thickness 04/11/24 1446       /60   Pulse 89   Temp 97.7 °F (36.5 °C) (Temporal)   Resp 18   LMP  (LMP Unknown)     Physical Exam  Vitals reviewed.   Constitutional:       General: She is not in acute distress.     Appearance: Normal appearance. She is not ill-appearing, toxic-appearing or diaphoretic.   HENT:      Head: Normocephalic and atraumatic.      Right Ear: External ear normal.      Left Ear: External ear normal.   Eyes:      Conjunctiva/sclera: Conjunctivae normal.   Pulmonary:       Effort: Pulmonary effort is normal. No respiratory distress.   Musculoskeletal:      Cervical back: Neck supple.      Right lower leg: Edema present.      Left lower leg: Edema present.   Skin:     Comments: See wound assessment   Neurological:      Mental Status: She is alert.      Gait: Gait abnormal.   Psychiatric:         Mood and Affect: Mood normal.         Behavior: Behavior normal.           Wound 02/14/24 Traumatic Leg Left;Proximal;Lower (Active)   Wound Image   04/11/24 1444   Wound Description Pink;Pale;Slough;Yellow;Epithelialization 04/11/24 1446   Park-wound Assessment Scar Tissue;Dry 04/11/24 1446   Wound Length (cm) 1 cm 04/11/24 1446   Wound Width (cm) 0.5 cm 04/11/24 1446   Wound Depth (cm) 0.1 cm 04/11/24 1446   Wound Surface Area (cm^2) 0.5 cm^2 04/11/24 1446   Wound Volume (cm^3) 0.05 cm^3 04/11/24 1446   Calculated Wound Volume (cm^3) 0.05 cm^3 04/11/24 1446   Change in Wound Size % 94.9 04/11/24 1446   Drainage Amount Small 04/11/24 1446   Drainage Description Serosanguineous 04/11/24 1446   Non-staged Wound Description Full thickness 04/11/24 1446   Dressing Status Intact 03/28/24 1405       Wound 03/29/24 Skin tear Knee Left (Active)   Wound Image   04/11/24 1445   Wound Description Yellow;Slough;Granulation tissue;Pink 04/11/24 1446   Park-wound Assessment Pink;Dry;Scar Tissue 04/11/24 1446   Wound Length (cm) 3 cm 04/11/24 1446   Wound Width (cm) 3 cm 04/11/24 1446   Wound Depth (cm) 0.1 cm 04/11/24 1446   Wound Surface Area (cm^2) 9 cm^2 04/11/24 1446   Wound Volume (cm^3) 0.9 cm^3 04/11/24 1446   Calculated Wound Volume (cm^3) 0.9 cm^3 04/11/24 1446   Change in Wound Size % 55 04/11/24 1446   Drainage Amount Moderate 04/11/24 1446   Drainage Description Serosanguineous 04/11/24 1446   Non-staged Wound Description Full thickness 04/11/24 1446                         Wound Instructions:  Orders Placed This Encounter   Procedures    Wound cleansing and dressings     Wound cleansing and  dressings       Left Leg Wounds and Right Leg Wound:  Wash your hands with soap and water.  Remove old dressing, discard into plastic bag and place in trash. Cleanse wound with mild soap and water Do not use tissue or cotton balls. Do not scrub the wound. Pat dry using gauze.  Shower: Yes remove dressing to shower, redress immediately after shower. Do not leave open to air.      Apply Dermagran cut to fit over wound bed  Cover with Gauze  Secure with rolled gauze and tape     Wound compression and edema control      Elastic Tubular Stocking: Spandagrip size E to right and left lower legs.     Tubular elastic bandage: Apply from base of toes to behind the knee. Apply in AM, may remove for sleep.   Avoid prolonged standing in one place.   Elevate leg(s) above the level of the heart when sitting or as much as possible.    Wound miscellaneous orders      Protein: Eat protein with each meal to promote healing.  Examples of protein are fish, meat, chicken, nuts, peanut butter, eggs, lentils, edamame or a protein shake.     Wound infection:  If you have signs of infection please call the wound center.  If the wound center is closed- please go to the Emergency department.  Some signs of infection:  fever, chills, increased redness, red streaks, increase in pain, increased drainage.  Drainage with an odor, Change in drainage color: white/milky/green/tan/yellow,  an increase in swelling, chest pain and/or shortness of breath.    Continue [facility] 3x week for wound assessment and care.     Standing Status:   Future     Standing Expiration Date:   4/25/2024    Wound Procedure Treatment     This order was created via procedure documentation    Debridement Traumatic Left;Proximal;Lower Leg     This order was created via procedure documentation    Debridement Left Knee     This order was created via procedure documentation        Diagnosis ICD-10-CM Associated Orders   1. Traumatic open wound of left lower leg, initial  encounter  S81.802A lidocaine (LMX) 4 % cream     Wound cleansing and dressings     Wound Procedure Treatment     Debridement Traumatic Left;Proximal;Lower Leg     Debridement Left Knee      2. Type 2 diabetes mellitus with stage 3 chronic kidney disease, without long-term current use of insulin, unspecified whether stage 3a or 3b CKD (HCC)  E11.22 lidocaine (LMX) 4 % cream    N18.30 Wound cleansing and dressings     Wound Procedure Treatment      3. Multiple skin tears  T14.8XXA lidocaine (LMX) 4 % cream     Wound cleansing and dressings     Wound Procedure Treatment

## 2024-04-11 NOTE — PROGRESS NOTES
Wound Procedure Treatment    Performed by: Val Tijerina RN  Authorized by: Brittanie Grewal DO  Associated wounds:   Wound 02/14/24 Traumatic Leg Left;Proximal;Lower  Wound 03/29/24 Skin tear Knee Left  Wound cleansed with:  NSS  Applied to periwound:  Moisture lotion  Applied primary dressing:  Dermagran  Applied secondary dressing:  ABD  Wound secured with:  Akbar, Tape, Elastic tubular stocking and Size E

## 2024-04-11 NOTE — PATIENT INSTRUCTIONS
Orders Placed This Encounter   Procedures    Wound cleansing and dressings     Wound cleansing and dressings       Left Leg Wounds and Right Leg Wound:  Wash your hands with soap and water.  Remove old dressing, discard into plastic bag and place in trash. Cleanse wound with mild soap and water Do not use tissue or cotton balls. Do not scrub the wound. Pat dry using gauze.  Shower: Yes remove dressing to shower, redress immediately after shower. Do not leave open to air.      Apply Dermagran cut to fit over wound bed  Cover with Gauze  Secure with rolled gauze and tape     Wound compression and edema control      Elastic Tubular Stocking: Spandagrip size E to right and left lower legs.     Tubular elastic bandage: Apply from base of toes to behind the knee. Apply in AM, may remove for sleep.   Avoid prolonged standing in one place.   Elevate leg(s) above the level of the heart when sitting or as much as possible.    Wound miscellaneous orders      Protein: Eat protein with each meal to promote healing.  Examples of protein are fish, meat, chicken, nuts, peanut butter, eggs, lentils, edamame or a protein shake.     Wound infection:  If you have signs of infection please call the wound center.  If the wound center is closed- please go to the Emergency department.  Some signs of infection:  fever, chills, increased redness, red streaks, increase in pain, increased drainage.  Drainage with an odor, Change in drainage color: white/milky/green/tan/yellow,  an increase in swelling, chest pain and/or shortness of breath.    Continue [facility] 3x week for wound assessment and care.     Standing Status:   Future     Standing Expiration Date:   4/25/2024    Wound Procedure Treatment     This order was created via procedure documentation    Debridement     This order was created via procedure documentation

## 2024-04-12 ENCOUNTER — HOME CARE VISIT (OUTPATIENT)
Dept: HOME HEALTH SERVICES | Facility: HOME HEALTHCARE | Age: 89
End: 2024-04-12
Payer: MEDICARE

## 2024-04-14 VITALS
OXYGEN SATURATION: 97 % | DIASTOLIC BLOOD PRESSURE: 60 MMHG | TEMPERATURE: 96.7 F | HEART RATE: 80 BPM | SYSTOLIC BLOOD PRESSURE: 110 MMHG | RESPIRATION RATE: 18 BRPM

## 2024-04-15 ENCOUNTER — HOME CARE VISIT (OUTPATIENT)
Dept: HOME HEALTH SERVICES | Facility: HOME HEALTHCARE | Age: 89
End: 2024-04-15
Payer: MEDICARE

## 2024-04-15 VITALS
RESPIRATION RATE: 18 BRPM | SYSTOLIC BLOOD PRESSURE: 128 MMHG | TEMPERATURE: 97.9 F | DIASTOLIC BLOOD PRESSURE: 80 MMHG | HEART RATE: 88 BPM | OXYGEN SATURATION: 92 %

## 2024-04-15 PROCEDURE — G0299 HHS/HOSPICE OF RN EA 15 MIN: HCPCS

## 2024-04-17 ENCOUNTER — HOME CARE VISIT (OUTPATIENT)
Dept: HOME HEALTH SERVICES | Facility: HOME HEALTHCARE | Age: 89
End: 2024-04-17
Payer: MEDICARE

## 2024-04-17 PROCEDURE — G0299 HHS/HOSPICE OF RN EA 15 MIN: HCPCS

## 2024-04-18 VITALS
SYSTOLIC BLOOD PRESSURE: 108 MMHG | TEMPERATURE: 96.7 F | DIASTOLIC BLOOD PRESSURE: 72 MMHG | OXYGEN SATURATION: 98 % | RESPIRATION RATE: 18 BRPM | HEART RATE: 72 BPM

## 2024-04-19 ENCOUNTER — HOME CARE VISIT (OUTPATIENT)
Dept: HOME HEALTH SERVICES | Facility: HOME HEALTHCARE | Age: 89
End: 2024-04-19
Payer: MEDICARE

## 2024-04-19 VITALS
DIASTOLIC BLOOD PRESSURE: 60 MMHG | HEART RATE: 88 BPM | TEMPERATURE: 97.7 F | SYSTOLIC BLOOD PRESSURE: 118 MMHG | RESPIRATION RATE: 18 BRPM | OXYGEN SATURATION: 95 %

## 2024-04-19 PROCEDURE — G0299 HHS/HOSPICE OF RN EA 15 MIN: HCPCS

## 2024-04-20 ENCOUNTER — APPOINTMENT (OUTPATIENT)
Dept: LAB | Facility: CLINIC | Age: 89
End: 2024-04-20
Payer: MEDICARE

## 2024-04-20 DIAGNOSIS — R94.4 DECREASED GFR: ICD-10-CM

## 2024-04-20 LAB
ANION GAP SERPL CALCULATED.3IONS-SCNC: 7 MMOL/L (ref 4–13)
BUN SERPL-MCNC: 34 MG/DL (ref 5–25)
CALCIUM SERPL-MCNC: 9.7 MG/DL (ref 8.4–10.2)
CHLORIDE SERPL-SCNC: 102 MMOL/L (ref 96–108)
CO2 SERPL-SCNC: 29 MMOL/L (ref 21–32)
CREAT SERPL-MCNC: 1.59 MG/DL (ref 0.6–1.3)
GFR SERPL CREATININE-BSD FRML MDRD: 28 ML/MIN/1.73SQ M
GLUCOSE P FAST SERPL-MCNC: 151 MG/DL (ref 65–99)
POTASSIUM SERPL-SCNC: 4.6 MMOL/L (ref 3.5–5.3)
SODIUM SERPL-SCNC: 138 MMOL/L (ref 135–147)

## 2024-04-20 PROCEDURE — 80048 BASIC METABOLIC PNL TOTAL CA: CPT

## 2024-04-20 PROCEDURE — 36415 COLL VENOUS BLD VENIPUNCTURE: CPT

## 2024-04-22 ENCOUNTER — HOME CARE VISIT (OUTPATIENT)
Dept: HOME HEALTH SERVICES | Facility: HOME HEALTHCARE | Age: 89
End: 2024-04-22
Payer: MEDICARE

## 2024-04-22 VITALS
RESPIRATION RATE: 18 BRPM | DIASTOLIC BLOOD PRESSURE: 60 MMHG | SYSTOLIC BLOOD PRESSURE: 126 MMHG | OXYGEN SATURATION: 96 % | TEMPERATURE: 97.6 F | HEART RATE: 60 BPM

## 2024-04-22 PROCEDURE — G0299 HHS/HOSPICE OF RN EA 15 MIN: HCPCS

## 2024-04-23 ENCOUNTER — HOME CARE VISIT (OUTPATIENT)
Dept: HOME HEALTH SERVICES | Facility: HOME HEALTHCARE | Age: 89
End: 2024-04-23
Payer: MEDICARE

## 2024-04-23 VITALS — HEART RATE: 84 BPM | DIASTOLIC BLOOD PRESSURE: 58 MMHG | RESPIRATION RATE: 20 BRPM | SYSTOLIC BLOOD PRESSURE: 108 MMHG

## 2024-04-23 PROCEDURE — G0151 HHCP-SERV OF PT,EA 15 MIN: HCPCS

## 2024-04-24 DIAGNOSIS — K31.84 DIABETIC GASTROPARESIS ASSOCIATED WITH TYPE 2 DIABETES MELLITUS  (HCC): ICD-10-CM

## 2024-04-24 DIAGNOSIS — N18.4 STAGE 4 CHRONIC KIDNEY DISEASE (HCC): Primary | ICD-10-CM

## 2024-04-24 DIAGNOSIS — E11.42 DIABETIC POLYNEUROPATHY ASSOCIATED WITH TYPE 2 DIABETES MELLITUS (HCC): ICD-10-CM

## 2024-04-24 DIAGNOSIS — E11.43 DIABETIC GASTROPARESIS ASSOCIATED WITH TYPE 2 DIABETES MELLITUS  (HCC): ICD-10-CM

## 2024-04-24 DIAGNOSIS — M06.09 SERONEGATIVE ARTHROPATHY OF MULTIPLE SITES (HCC): ICD-10-CM

## 2024-04-25 ENCOUNTER — OFFICE VISIT (OUTPATIENT)
Dept: WOUND CARE | Facility: HOSPITAL | Age: 89
End: 2024-04-25
Payer: MEDICARE

## 2024-04-25 ENCOUNTER — HOME CARE VISIT (OUTPATIENT)
Dept: HOME HEALTH SERVICES | Facility: HOME HEALTHCARE | Age: 89
End: 2024-04-25
Payer: MEDICARE

## 2024-04-25 VITALS
RESPIRATION RATE: 16 BRPM | DIASTOLIC BLOOD PRESSURE: 60 MMHG | TEMPERATURE: 97.8 F | SYSTOLIC BLOOD PRESSURE: 119 MMHG | HEART RATE: 94 BPM

## 2024-04-25 VITALS
SYSTOLIC BLOOD PRESSURE: 122 MMHG | OXYGEN SATURATION: 97 % | RESPIRATION RATE: 18 BRPM | DIASTOLIC BLOOD PRESSURE: 60 MMHG | HEART RATE: 88 BPM

## 2024-04-25 DIAGNOSIS — S81.802A TRAUMATIC OPEN WOUND OF LEFT LOWER LEG, INITIAL ENCOUNTER: Primary | ICD-10-CM

## 2024-04-25 DIAGNOSIS — S81.801A OPEN WOUND OF RIGHT LOWER LEG, INITIAL ENCOUNTER: ICD-10-CM

## 2024-04-25 PROCEDURE — 97597 DBRDMT OPN WND 1ST 20 CM/<: CPT | Performed by: FAMILY MEDICINE

## 2024-04-25 PROCEDURE — G0151 HHCP-SERV OF PT,EA 15 MIN: HCPCS

## 2024-04-25 PROCEDURE — 11042 DBRDMT SUBQ TIS 1ST 20SQCM/<: CPT | Performed by: FAMILY MEDICINE

## 2024-04-25 PROCEDURE — 99213 OFFICE O/P EST LOW 20 MIN: CPT | Performed by: FAMILY MEDICINE

## 2024-04-25 RX ORDER — LIDOCAINE 40 MG/G
CREAM TOPICAL ONCE
Status: COMPLETED | OUTPATIENT
Start: 2024-04-25 | End: 2024-04-25

## 2024-04-25 RX ADMIN — LIDOCAINE: 40 CREAM TOPICAL at 15:33

## 2024-04-25 NOTE — PATIENT INSTRUCTIONS
Orders Placed This Encounter   Procedures    Wound cleansing and dressings Left Knee     Wound location right and left knee wounds.   Change dressing 3x per week.    You may remove the dressing and shower. Do not leave wound open to air, apply new dressing immediately.  Cleanse the wound with mild soap and water or normal saline, pat dry.   Apply Dermagran cut to fit the wound.  Cover with gauze.   Secure with roll gauze and tape.        Elastic Tubular Stocking: Spandagrip size E to right and left lower legs.     Tubular elastic bandage: Apply from base of toes to behind the knee. Apply in AM, may remove for sleep.  Avoid prolonged standing in one place.  Elevate leg(s) above the level of the heart when sitting or as much as possible.        Continue [facility] 3x week for wound assessment and care.     Standing Status:   Future     Standing Expiration Date:   5/9/2024

## 2024-04-25 NOTE — PROGRESS NOTES
Wound Procedure Treatment    Performed by: Dede Graves RN  Authorized by: Brittanie Grewal DO  Associated wounds:   Wound 03/29/24 Skin tear Knee Left  Wound 04/15/24 Leg Right  Wound cleansed with:  NSS  Applied primary dressing:  Dermagran  Applied secondary dressing:  Gauze  Wound secured with:  Akbar, Tape, Size E and Elastic tubular stocking

## 2024-04-25 NOTE — PROGRESS NOTES
Patient ID: Tanya Stephen is a 89 y.o. female Date of Birth 7/21/1934     Chief Complaint  Chief Complaint   Patient presents with   • Follow Up Wound Care Visit     Left knee wound       Allergies  Morphine and Ciprofloxacin    Assessment:    No problem-specific Assessment & Plan notes found for this encounter.       {Assess/PlanSmartLinks:16510}          Procedures    Plan:   New wound on RLE  LLE wound is improved  Wounds debrided as above    Wound 03/29/24 Skin tear Knee Left (Active)   Wound Image Images linked 04/25/24 1551       Wound 04/15/24 Leg Right (Active)   Wound Image Images linked 04/25/24 1543   Wound Description Black;Brown;Eschar;Granulation tissue 04/25/24 1542   Park-wound Assessment Dry;Intact 04/25/24 1542   Wound Length (cm) 0.3 cm 04/25/24 1542   Wound Width (cm) 0.5 cm 04/25/24 1542   Wound Depth (cm) 0.1 cm 04/25/24 1542   Wound Surface Area (cm^2) 0.15 cm^2 04/25/24 1542   Wound Volume (cm^3) 0.015 cm^3 04/25/24 1542   Calculated Wound Volume (cm^3) 0.02 cm^3 04/25/24 1542   Change in Wound Size % 95.56 04/25/24 1542   Drainage Amount Small 04/25/24 1542   Drainage Description Bloody 04/25/24 1542   Non-staged Wound Description Full thickness 04/25/24 1542   Patient Tolerance Tolerated well 04/25/24 1542   Dressing Status Other (Comment) (open to air.) 04/25/24 1542       Wound 03/29/24 Skin tear Knee Left (Active)   Date First Assessed: 03/29/24   Present on Original Admission: Yes  Traumatic Wound Type: Skin tear  Location: Knee  Wound Location Orientation: Left  Dressing Status: Clean;Dry;Intact       Wound 04/15/24 Traumatic Elbow Right (Active)   Date First Assessed: 04/15/24   Primary Wound Type: Traumatic  Location: Elbow  Wound Location Orientation: Right  Wound Outcome: Healed       Wound 04/15/24 Leg Right (Active)   Date First Assessed: 04/15/24   Location: Leg  Wound Location Orientation: Right       Wound 04/21/24 Traumatic Skin tear Hand Left (Active)   Date First  Assessed: 04/21/24   Primary Wound Type: Traumatic  Traumatic Wound Type: Skin tear  Location: Hand  Wound Location Orientation: Left       [REMOVED] Wound 06/28/22 Skin Tear Skin tear Arm Right (Removed)   Resolved Date: 11/22/23  Date First Assessed/Time First Assessed: 06/28/22 1500   Primary Wound Type: Skin Tear  Traumatic Wound Type: Skin tear  Location: Arm  Wound Location Orientation: (c) Right  Wound Outcome: (c) Other (Comment)       [REMOVED] Wound 06/28/22 Skin Tear Skin tear Arm Right (Removed)   Resolved Date: 11/22/23  Date First Assessed/Time First Assessed: 06/28/22 1500   Primary Wound Type: Skin Tear  Traumatic Wound Type: Skin tear  Location: Arm  Wound Location Orientation: Right  Wound Outcome: (c) Other (Comment)       [REMOVED] Wound 11/22/23 Venous Ulcer Leg Right;Anterior (Removed)   Resolved Date: 03/08/24  Date First Assessed/Time First Assessed: 11/22/23 1309   Primary Wound Type: Venous Ulcer  Location: Leg  Wound Location Orientation: Right;Anterior  Wound Outcome: Healed       [REMOVED] Wound 12/22/23 Toe (Comment  which one) Left;Posterior (Removed)   Resolved Date/Resolved Time: 12/22/23 1021  Date First Assessed/Time First Assessed: 12/22/23 1009   Location: (c) Toe (Comment  which one)  Wound Location Orientation: Left;Posterior  Wound Description (Comments): LEFT THIRD TOE  Wound Outcome: (c) O...       [REMOVED] Wound 12/29/23 Diabetic Ulcer Toe (Comment  which one) Left (Removed)   Resolved Date: 01/26/24  Date First Assessed/Time First Assessed: 12/29/23 1421   Primary Wound Type: Diabetic Ulcer  Location: (c) Toe (Comment  which one)  Wound Location Orientation: Left  Wound Description (Comments): LEFT THIRD TOE; HINOJOSA GRADE ...       [REMOVED] Wound 02/06/24 Surgical Toe D3, third Left (Removed)   Resolved Date: 02/14/24  Date First Assessed/Time First Assessed: 02/06/24 1410   Primary Wound Type: Surgical  Location: Toe D3, third  Wound Location Orientation: Left  Wound  Outcome: Healed       [REMOVED] Wound 02/14/24 Traumatic Leg Left;Proximal;Lower (Removed)   Resolved Date: 04/15/24  Date First Assessed: 02/14/24   Primary Wound Type: Traumatic  Location: Leg  Wound Location Orientation: Left;Proximal;Lower       [REMOVED] Wound 03/01/24 Diabetic Ulcer Toe D1, great Left (Removed)   Resolved Date: 03/08/24  Date First Assessed/Time First Assessed: 03/01/24 1448   Primary Wound Type: Diabetic Ulcer  Location: Toe D1, great  Wound Location Orientation: Left  Wound Outcome: Healed       [REMOVED] Wound 03/18/24 Traumatic Leg Left;Lower (Removed)   Resolved Date: 04/08/24  Date First Assessed/Time First Assessed: 03/18/24 1413   Primary Wound Type: Traumatic  Location: Leg  Wound Location Orientation: Left;Lower       [REMOVED] Wound 03/18/24 Traumatic Leg Anterior;Left;Upper (Removed)   Resolved Date: 04/08/24  Date First Assessed/Time First Assessed: 03/18/24 1413   Primary Wound Type: Traumatic  Location: Leg  Wound Location Orientation: Anterior;Left;Upper       [REMOVED] Wound 03/18/24 Knee Anterior;Right (Removed)   Resolved Date: 04/08/24  Date First Assessed/Time First Assessed: 03/18/24 1413   Location: Knee  Wound Location Orientation: Anterior;Right       Subjective:      .    HPI    {Common ambulatory SmartLinks:74423}    Review of Systems   Constitutional:  Negative for chills and fever.   HENT:  Negative for congestion and sneezing.    Respiratory:  Negative for cough.    Cardiovascular:  Positive for leg swelling.   Musculoskeletal:  Positive for gait problem.   Skin:  Positive for wound.   Psychiatric/Behavioral:  Negative for agitation.          Objective:       Wound 03/29/24 Skin tear Knee Left (Active)   Wound Image Images linked 04/25/24 1551       Wound 04/15/24 Leg Right (Active)   Wound Image Images linked 04/25/24 1543   Wound Description Black;Brown;Eschar;Granulation tissue 04/25/24 1542   Park-wound Assessment Dry;Intact 04/25/24 1542   Wound Length (cm)  0.3 cm 04/25/24 1542   Wound Width (cm) 0.5 cm 04/25/24 1542   Wound Depth (cm) 0.1 cm 04/25/24 1542   Wound Surface Area (cm^2) 0.15 cm^2 04/25/24 1542   Wound Volume (cm^3) 0.015 cm^3 04/25/24 1542   Calculated Wound Volume (cm^3) 0.02 cm^3 04/25/24 1542   Change in Wound Size % 95.56 04/25/24 1542   Drainage Amount Small 04/25/24 1542   Drainage Description Bloody 04/25/24 1542   Non-staged Wound Description Full thickness 04/25/24 1542   Patient Tolerance Tolerated well 04/25/24 1542   Dressing Status Other (Comment) (open to air.) 04/25/24 1542       /60   Pulse 94   Temp 97.8 °F (36.6 °C)   Resp 16   LMP  (LMP Unknown)     Physical Exam        Wound 03/29/24 Skin tear Knee Left (Active)   Wound Image   04/25/24 1520   Wound Description Yellow;Slough;Granulation tissue;Pink 04/11/24 1446   Park-wound Assessment Pink;Dry;Scar Tissue 04/11/24 1446   Wound Length (cm) 3 cm 04/11/24 1446   Wound Width (cm) 3 cm 04/11/24 1446   Wound Depth (cm) 0.1 cm 04/11/24 1446   Wound Surface Area (cm^2) 9 cm^2 04/11/24 1446   Wound Volume (cm^3) 0.9 cm^3 04/11/24 1446   Calculated Wound Volume (cm^3) 0.9 cm^3 04/11/24 1446   Change in Wound Size % 55 04/11/24 1446   Drainage Amount Moderate 04/11/24 1446   Drainage Description Serosanguineous 04/11/24 1446   Non-staged Wound Description Full thickness 04/11/24 1446       Wound 04/15/24 Traumatic Elbow Right (Active)       Wound 04/15/24 Leg Right (Active)       Wound 04/21/24 Traumatic Skin tear Hand Left (Active)                       Wound Instructions:  Orders Placed This Encounter   Procedures   • Wound cleansing and dressings Left Knee     Wound location right and left knee wounds.   Change dressing 3x per week.    You may remove the dressing and shower. Do not leave wound open to air, apply new dressing immediately.  Cleanse the wound with mild soap and water or normal saline, pat dry.   Apply Dermagran cut to fit the wound.  Cover with gauze.   Secure with  roll gauze and tape.        Elastic Tubular Stocking: Spandagrip size E to right and left lower legs.     Tubular elastic bandage: Apply from base of toes to behind the knee. Apply in AM, may remove for sleep.  Avoid prolonged standing in one place.  Elevate leg(s) above the level of the heart when sitting or as much as possible.        Continue [facility] 3x week for wound assessment and care.     Standing Status:   Future     Standing Expiration Date:   5/9/2024   • Wound Procedure Treatment     This order was created via procedure documentation        Diagnosis ICD-10-CM Associated Orders   1. Traumatic open wound of left lower leg, initial encounter  S81.802A lidocaine (LMX) 4 % cream     Wound cleansing and dressings Left Knee     Wound Procedure Treatment             chills and fever.   HENT:  Negative for congestion and sneezing.    Respiratory:  Negative for cough.    Cardiovascular:  Positive for leg swelling.   Musculoskeletal:  Positive for gait problem.   Skin:  Positive for wound.   Psychiatric/Behavioral:  Negative for agitation.          Objective:       Wound 03/29/24 Skin tear Knee Left (Active)   Wound Image Images linked 04/25/24 1551       Wound 04/15/24 Leg Right (Active)   Wound Image Images linked 04/25/24 1543   Wound Description Black;Brown;Eschar;Granulation tissue 04/25/24 1542   Park-wound Assessment Dry;Intact 04/25/24 1542   Wound Length (cm) 0.3 cm 04/25/24 1542   Wound Width (cm) 0.5 cm 04/25/24 1542   Wound Depth (cm) 0.1 cm 04/25/24 1542   Wound Surface Area (cm^2) 0.15 cm^2 04/25/24 1542   Wound Volume (cm^3) 0.015 cm^3 04/25/24 1542   Calculated Wound Volume (cm^3) 0.02 cm^3 04/25/24 1542   Change in Wound Size % 95.56 04/25/24 1542   Drainage Amount Small 04/25/24 1542   Drainage Description Bloody 04/25/24 1542   Non-staged Wound Description Full thickness 04/25/24 1542   Patient Tolerance Tolerated well 04/25/24 1542   Dressing Status Other (Comment) (open to air.) 04/25/24 1542       /60   Pulse 94   Temp 97.8 °F (36.6 °C)   Resp 16   LMP  (LMP Unknown)     Physical Exam  Vitals reviewed.   Constitutional:       General: She is not in acute distress.     Appearance: Normal appearance. She is not ill-appearing, toxic-appearing or diaphoretic.   HENT:      Head: Normocephalic and atraumatic.      Right Ear: External ear normal.      Left Ear: External ear normal.   Eyes:      Conjunctiva/sclera: Conjunctivae normal.   Pulmonary:      Effort: Pulmonary effort is normal. No respiratory distress.   Musculoskeletal:      Cervical back: Neck supple.      Right lower leg: Edema present.      Left lower leg: Edema present.   Skin:     Comments: See wound assessment   Neurological:      Mental Status: She is alert.   Psychiatric:         Mood  and Affect: Mood normal.         Behavior: Behavior normal.             Wound 03/29/24 Skin tear Knee Left (Active)   Wound Image   04/25/24 1520   Wound Description Yellow;Slough;Granulation tissue;Pink 04/11/24 1446   Park-wound Assessment Pink;Dry;Scar Tissue 04/11/24 1446   Wound Length (cm) 3 cm 04/11/24 1446   Wound Width (cm) 3 cm 04/11/24 1446   Wound Depth (cm) 0.1 cm 04/11/24 1446   Wound Surface Area (cm^2) 9 cm^2 04/11/24 1446   Wound Volume (cm^3) 0.9 cm^3 04/11/24 1446   Calculated Wound Volume (cm^3) 0.9 cm^3 04/11/24 1446   Change in Wound Size % 55 04/11/24 1446   Drainage Amount Moderate 04/11/24 1446   Drainage Description Serosanguineous 04/11/24 1446   Non-staged Wound Description Full thickness 04/11/24 1446       Wound 04/15/24 Traumatic Elbow Right (Active)       Wound 04/15/24 Leg Right (Active)       Wound 04/21/24 Traumatic Skin tear Hand Left (Active)                       Wound Instructions:  Orders Placed This Encounter   Procedures    Wound cleansing and dressings Left Knee     Wound location right and left knee wounds.   Change dressing 3x per week.    You may remove the dressing and shower. Do not leave wound open to air, apply new dressing immediately.  Cleanse the wound with mild soap and water or normal saline, pat dry.   Apply Dermagran cut to fit the wound.  Cover with gauze.   Secure with roll gauze and tape.        Elastic Tubular Stocking: Spandagrip size E to right and left lower legs.     Tubular elastic bandage: Apply from base of toes to behind the knee. Apply in AM, may remove for sleep.  Avoid prolonged standing in one place.  Elevate leg(s) above the level of the heart when sitting or as much as possible.        Continue [facility] 3x week for wound assessment and care.     Standing Status:   Future     Standing Expiration Date:   5/9/2024    Wound Procedure Treatment     This order was created via procedure documentation    Debridement Left Knee     This order was  created via procedure documentation    Debridement Right Leg     This order was created via procedure documentation        Diagnosis ICD-10-CM Associated Orders   1. Traumatic open wound of left lower leg, initial encounter  S81.802A lidocaine (LMX) 4 % cream     Wound cleansing and dressings Left Knee     Wound Procedure Treatment     Debridement Left Knee      2. Open wound of right lower leg, initial encounter  S81.801A Debridement Right Leg

## 2024-04-27 ENCOUNTER — HOME CARE VISIT (OUTPATIENT)
Dept: HOME HEALTH SERVICES | Facility: HOME HEALTHCARE | Age: 89
End: 2024-04-27
Payer: MEDICARE

## 2024-04-29 ENCOUNTER — TELEPHONE (OUTPATIENT)
Dept: RHEUMATOLOGY | Facility: CLINIC | Age: 89
End: 2024-04-29

## 2024-04-29 ENCOUNTER — HOME CARE VISIT (OUTPATIENT)
Dept: HOME HEALTH SERVICES | Facility: HOME HEALTHCARE | Age: 89
End: 2024-04-29
Payer: MEDICARE

## 2024-04-29 NOTE — PROGRESS NOTES
"Debridement   Wound 04/15/24 Leg Right    Universal Protocol:  Consent: Verbal consent obtained.  Consent given by: patient  Time out: Immediately prior to procedure a \"time out\" was called to verify the correct patient, procedure, equipment, support staff and site/side marked as required.  Timeout called at: 4/25/2024 3:05 PM.  Patient understanding: patient states understanding of the procedure being performed  Patient identity confirmed: verbally with patient    Debridement Details  Performed by: physician  Debridement type: selective  Pain control: lidocaine 4%      Post-debridement measurements  Length (cm): 0.3  Width (cm): 0.5  Depth (cm): 0.1  Percent debrided: 100%  Surface Area (cm^2): 0.15  Area Debrided (cm^2): 0.15  Volume (cm^3): 0.02    Devitalized tissue debrided: biofilm and eschar  Instrument(s) utilized: curette  Bleeding: small  Hemostasis obtained with: pressure  Response to treatment: procedure was tolerated well        "

## 2024-05-01 ENCOUNTER — HOME CARE VISIT (OUTPATIENT)
Dept: HOME HEALTH SERVICES | Facility: HOME HEALTHCARE | Age: 89
End: 2024-05-01
Payer: MEDICARE

## 2024-05-01 VITALS
OXYGEN SATURATION: 96 % | DIASTOLIC BLOOD PRESSURE: 58 MMHG | SYSTOLIC BLOOD PRESSURE: 130 MMHG | HEART RATE: 102 BPM | RESPIRATION RATE: 26 BRPM

## 2024-05-01 PROBLEM — E11.21 DIABETIC NEPHROPATHY ASSOCIATED WITH TYPE 2 DIABETES MELLITUS (HCC): Status: ACTIVE | Noted: 2024-05-01

## 2024-05-01 PROBLEM — I12.9 PARENCHYMAL RENAL HYPERTENSION: Status: ACTIVE | Noted: 2024-05-01

## 2024-05-01 PROBLEM — N18.4 CHRONIC KIDNEY DISEASE, STAGE IV (SEVERE) (HCC): Status: ACTIVE | Noted: 2024-05-01

## 2024-05-01 PROBLEM — N18.32 STAGE 3B CHRONIC KIDNEY DISEASE (HCC): Chronic | Status: RESOLVED | Noted: 2021-11-19 | Resolved: 2024-05-01

## 2024-05-01 PROCEDURE — G0299 HHS/HOSPICE OF RN EA 15 MIN: HCPCS

## 2024-05-01 PROCEDURE — G0151 HHCP-SERV OF PT,EA 15 MIN: HCPCS

## 2024-05-01 NOTE — PROGRESS NOTES
Consultation - Nephrology 5/10/2024        History of Present Illness   Reason for Consult / Principal Problem: CKD    HPI: Tanya Stephen is a 89 y.o. year old female with a history of DM2/hypertension/chronic diastolic CHF/PAD/IBS/anemia/dyslipidemia/Raynaud's-seronegative arthropathy who we are asked to see for CKD      Patient denies any significant kidney disease in the past except for frequent urinary tract infections for which she was put on Macrodantin on a daily dose has not had a urinary tract infection for couple years  No kidney stones  No overt other bladder problems  Currently no dysuria or hematuria minimal foamy urine at most but frequency which is chronic she is currently taking Myrbetriq  Chronic arthritic symptoms no significant myalgias, no unusual skin rash, some mild numbness of her fingertips  No significant NSAID use  She does have some mild edema at most but she does wear support hose for wound care purposes    Diabetes mellitus for about 10 years diet controlled now had been on metformin: No overt neuropathy or retinopathy    Hypertension about 10 years well-controlled  -No blood pressure medications!    Of note: Patient on Prilosec however may not require it      Data:  Creatinine has ranged from 1.1-1.8 over the last few years  Current creatinine as of 4/20/2024: 1.59  Electrolytes normal including potassium 4.6  Calcium 9.7    Hemoglobin 11.7 from 12/16/2023  UA from 6/15/2023: Negative hematuria and negative proteinuria        Review of systems:    General: No fevers chills or recent illnesses, good appetite weight is stable and reasonably good energy  Cardiovascular: No chest pain or shortness of breath  Respiratory: No coughing or wheezing  Gastrointestinal: No nausea vomiting or diarrhea currently, or bright red blood per rectum.  GI health maintenance: Had a colonoscopy does not require another 1 based on age  Genitourinary: See HPI  Neurology: No headaches, no dizziness or  lightheadedness upon standing  Chronic back pain and joint pains  All other systems were reviewed and are negative    Historical Information   Past Medical History:   Diagnosis Date    Anemia     Arthritis     Back pain     CHF (congestive heart failure) (HCC)     Chronic kidney disease     Stage 3b    Confusion 2023    Diabetes (HCC)     Diabetes mellitus (HCC)     Diabetic gastroparesis associated with type 2 diabetes mellitus  (HCC)     Diabetic polyneuropathy (HCC)     Diverticulosis     Herpes zoster     Hypertension     IBS (irritable bowel syndrome)     Neurogenic claudication due to lumbar spinal stenosis     Osteoarthritis     Osteoporosis     Pulmonary edema     Raynaud's phenomenon without gangrene     Seronegative arthropathy of multiple sites (HCC)     Sicca (HCC)    Small intestinal bacterial overgrowth/IBS with diarrhea/dysphagia  No history of CAD/CVA/seizures/thyroid disease/lung disease/cancer/liver disease    Past Surgical History:   Procedure Laterality Date    BACK SURGERY      COLONOSCOPY      EGD AND COLONOSCOPY N/A 2016    Procedure: EGD AND COLONOSCOPY;  Surgeon: Elina Anderson MD;  Location: [facility];  Service:     JOINT REPLACEMENT      bilat knees and hips    OTHER SURGICAL HISTORY      pelvis rods    REPLACEMENT TOTAL KNEE BILATERAL      STOMACH SURGERY      UPPER GASTROINTESTINAL ENDOSCOPY       Social History   Social History     Substance and Sexual Activity   Alcohol Use No     Social History     Substance and Sexual Activity   Drug Use No     Social History     Tobacco Use   Smoking Status Former    Current packs/day: 0.00    Types: Cigarettes    Quit date:     Years since quittin.3   Smokeless Tobacco Never   Does not add salt to food tries to avoid it  Limited activity    Family History   Problem Relation Age of Onset    Cancer Brother     Diabetes Mother     Hypertension Father     Heart disease Father    No history of kidney disease  Father with  hypertension    Meds/Allergies   all current active meds have been reviewed, current meds:   Current Outpatient Medications:     amitriptyline (ELAVIL) 10 mg tablet, TAKE TWO TABLETS BY MOUTH AT BEDTIME, Disp: 180 tablet, Rfl: 1    aspirin 81 mg chewable tablet, Chew 1 tablet (81 mg total) daily, Disp: 30 tablet, Rfl: 0    atorvastatin (LIPITOR) 10 mg tablet, TAKE ONE TABLET BY MOUTH EVERY DAY, Disp: 90 tablet, Rfl: 3    Cholecalciferol (VITAMIN D3) 1000 units CAPS, Take 1 capsule by mouth daily , Disp: , Rfl:     diphenoxylate-atropine (LOMOTIL) 2.5-0.025 mg per tablet, Take 1 tablet by mouth 4 (four) times a day as needed for diarrhea, Disp: 30 tablet, Rfl: 0    DULoxetine (CYMBALTA) 60 mg delayed release capsule, TAKE ONE CAPSULE BY MOUTH ONCE DAILY, Disp: 30 capsule, Rfl: 5    hydroxychloroquine (PLAQUENIL) 200 mg tablet, Take 1 tablet (200 mg total) by mouth daily with breakfast, Disp: 90 tablet, Rfl: 1    Magnesium 250 MG TABS, Take 250 mg by mouth every evening, Disp: , Rfl:     Mirabegron ER (Myrbetriq) 50 MG TB24, Take 1 tablet by mouth daily as directed by physician., Disp: 90 tablet, Rfl: 1    neomycin (MYCIFRADIN) 500 mg tablet, Take 1 tablet (500 mg total) by mouth 2 (two) times a day for 14 days, Disp: 28 tablet, Rfl: 0    nitrofurantoin (MACRODANTIN) 50 mg capsule, TAKE ONE CAPSULE BY MOUTH EVERY DAY IN THE MORNING, Disp: 30 capsule, Rfl: 6    omeprazole (PriLOSEC) 20 mg delayed release capsule, Take 20 mg by mouth daily, Disp: , Rfl:     pregabalin (LYRICA) 75 mg capsule, TAKE ONE CAPSULE BY MOUTH THREE TIMES A DAY, Disp: 90 capsule, Rfl: 5    pyridoxine (B-6) 100 MG tablet, Take 100 mg by mouth daily, Disp: , Rfl:     rifaximin (XIFAXAN) 550 mg tablet, Take 1 tablet (550 mg total) by mouth every 8 (eight) hours for 14 days, Disp: 42 tablet, Rfl: 0    Sodium Fluoride (PreviDent 5000 Booster Plus) 1.1 % PSTE, Apply on teeth every evening as directed, Disp: 100 mL, Rfl: 3    dicyclomine (BENTYL) 10 mg  capsule, Take 1 capsule (10 mg total) by mouth 3 (three) times a day as needed (abd pain) (Patient not taking: Reported on 11/22/2023), Disp: 30 capsule, Rfl: 2    diphenoxylate-atropine (LOMOTIL) 2.5-0.025 mg per tablet, Take 1 tablet by mouth if needed (Patient not taking: Reported on 5/10/2024), Disp: , Rfl:     metFORMIN (GLUCOPHAGE) 500 mg tablet, Take 500 mg by mouth 2 (two) times a day (Patient not taking: Reported on 5/10/2024), Disp: , Rfl:     metoprolol succinate (TOPROL-XL) 25 mg 24 hr tablet, Take 0.5 tablets (12.5 mg total) by mouth daily (Patient not taking: Reported on 6/27/2023), Disp: 45 tablet, Rfl: 3    torsemide (DEMADEX) 10 mg tablet, Take 1 tablet PRN for wt gain 3 lb/ 3 days or 5 lb/ 5 aria (Patient not taking: Reported on 6/27/2023), Disp: 30 tablet, Rfl: 0    Allergies   Allergen Reactions    Morphine Other (See Comments)     urinary retention    Ciprofloxacin Rash and Nausea Only       Objective   BP sitting on right: 126/58 with a heart rate of 80 and regular  BP sitting on left: 126/60 with a heart rate of 80 and regular  BP standing on left: 120/58 with a heart rate of 84 and regular  Body mass index is 26.86 kg/m².    General:  Well-developed well-nourished no acute distress  Skin:  No acute rash  Eyes:  No scleral icterus, noninjected, conjunctiva appear normal, no discharge from the eyes  ENT:  Normocephalic/atraumatic, mucous membranes moist, tongue appears normal size  Neck:  Supple, no jugular venous distention, 1+ carotid upstroke, no carotid bruits; trachea is midline, no thyromegaly; no lymphadenopathy  Back:  No CVA tenderness, spine appears midline without any overt abnormalities  Chest: Very slight crackles at the right base otherwise clear to auscultation, but do no dullness to percussion, good respiratory effort, no use of accessory respiratory muscles  CVS:  Regular rate and rhythm without a murmur rub or gallops appreciable  Abdomen/gastrointestinal:  Normal bowel  sounds, nontender and nondistended without hepatosplenomegaly or bruits; no masses appreciable  Extremities:  No clubbing, no cyanosis,1+ nonpitting edema of the lower extremities 2 through 2 up the pretibial region bilaterally, poor distal pulses, no femoral bruits, severe  arthritic changes and full range of motion  Neuro:  No gross focality  Psych:  Alert, oriented and appropriate    Current Weight:   Weight (last 2 days)       Date/Time Weight    05/10/24 1315 60.3 (133)              Lab Results:  I have personally reviewed pertinent labs.  Results for orders placed or performed in visit on 04/20/24   Basic metabolic panel   Result Value Ref Range    Sodium 138 135 - 147 mmol/L    Potassium 4.6 3.5 - 5.3 mmol/L    Chloride 102 96 - 108 mmol/L    CO2 29 21 - 32 mmol/L    ANION GAP 7 4 - 13 mmol/L    BUN 34 (H) 5 - 25 mg/dL    Creatinine 1.59 (H) 0.60 - 1.30 mg/dL    Glucose, Fasting 151 (H) 65 - 99 mg/dL    Calcium 9.7 8.4 - 10.2 mg/dL    eGFR 28 ml/min/1.73sq m               ASSESSMENT AND PLAN:  89 y.o. year old female with a history of DM2/hypertension/chronic diastolic CHF/PAD/IBS/anemia/dyslipidemia/Raynaud's-seronegative arthropathy who we are asked to see for CKD    1. CKD stage 4  Etiology: Diabetic kidney disease/hypertensive nephrosclerosis/arteriolar nephrosclerosis/cardiorenal syndrome/nephron loss from aging.  Rule out obstructive uropathy.  Less likely primary glomerular process with bland UA in the past  Baseline creatinine: 1.4-1.6 recently, most recently 1.59 from 4/20/2024  Recommend:  Workup:  Follow-up chemistry  UA with microscopic  Urine protein creatinine ratio  Urine eosinophils on PPI  Mineral bone disorder evaluation from CKD including magnesium/phosphorus/PTH intact level/vitamin-D level  CBC  Lipid profile  Renal ultrasound with PVR  Renal artery duplex to rule out renal artery stenosis only if blood pressure becomes a problem    Treatment:  Treat hypertension, please see below for  recommendations  Treat dyslipidemia  Avoid nephrotoxic agents such as NSAIDs, and proton pump inhibitors as able; patient counseled as such  Good overall health recommendations including weight loss as appropriate, isotonic exercise as able, and avoidance of salt; patient counseled as such  Kidney smart referral    Further workup and treatment recommendations will be forthcoming depending upon the above results and course    2. Hypertension:  Goal:  Less than 130/80 given CKD  Push nonmedical regimen as outlined above  Medication changes today:  No changes patient is doing well off medications    3. Other problems:  Seronegative arthritis/Raynaud's/sicca syndrome  Diastolic CHF   Diabetes mellitus type 2 with diabetic gastroparesis.  Is contraindicated with frequent urinary tract infections  PAD  IBS  Iron deficiency anemia    Patient Instructions   Visit summary:  - Overall kidney function has been relatively stable as of recently.  Most likely it is related to diabetes and high blood pressure and hardening of the arteries and aging process which is normal.  We are going to make sure there is nothing significant contributing for example obstruction of the urinary passage although unlikely.    Ways to keep you and your kidneys healthy:  - Good blood pressure control which you have achieved  - Good cholesterol control which we will make sure  - Also keep well-hydrated at all times!  - Avoiding nonsteroidal anti-inflammatory drugs which you are  - Trying to come off omeprazole/Prilosec but I want to get approval from Dr. Zaragoza first so continue for now  - Healthy habits including avoiding salt as you are doing and remaining as active as possible.    We will also arrange kidney smart program to give you helpful hence to keep your kidneys in good shape!      1. Medication changes today:  No medication changes today.    2.  General instructions:  The above for general instructions    3.  Please go for  fasting  lab work  at this time this will also include a couple of urine specimens       Also please go for an ultrasound of your kidneys at this time we will help to facilitate    4.  Please take 1 week a blood pressure readings in 3 months    AS FOLLOWS  MORNING AND EVENING, SITTING AND STANDING as follows:  TAKE THE MORNING READINGS BEFORE ANY MEDICATIONS AND WHEN YOU ARE RELAXED FOR SEVERAL MINUTES  TAKE THE EVENING READINGS:  BETWEEN 7-10 P.M.; PRIOR TO ANY MEDICATIONS; AT LEAST IN OUR  FROM DINNER; AND CERTAINLY AFTER RELAXING FOR A FEW MINUTES  PLEASE INCLUDE HEART RATE WITH YOUR BLOOD PRESSURE READINGS  When taking standing readings, keep your arm supported at heart level and not dangling  Make sure you are sitting with your back supported and feet on the ground and do not cross your legs or feet  Make sure you have not taken any coffee or caffeine products or exercised or smoke cigarettes at least 30 minutes before taking your blood pressure  Then please mail these readings into the office      5.  In 3 months:  Please go for nonfasting lab work but in the morning  Please take 1 week a blood pressure readings as outlined above and mail those into the office      6.  Follow-up in 6 months  Please bring in 1 week a blood pressure readings morning evening, sitting and standing is outlined above  PLEASE BRING AN YOUR BLOOD PRESSURE MACHINE TO CORRELATE WITH THE OFFICE MACHINE AT THIS NEXT SCHEDULED VISIT  Please go for fasting lab work 1-2 weeks prior to your appointment      7.  General non medical recommendations:  AVOID SALT BUT NOT ADDING AN READING LABELS TO MAKE SURE THERE IS LOW-SALT IN THE FOOD THAT YOU ARE EATING  Goal is less than 2 g of sodium intake or less than 5 g of sodium chloride intake per day    Avoid nonsteroidal anti-inflammatory drugs such as Naprosyn, ibuprofen, Aleve, Advil, Celebrex, Meloxicam (Mobic) etc.  You can use Tylenol as needed if you do not have any liver condition to be concerned  "about    Avoid medications such as Sudafed or decongestants and antihistamines that contained the D component which is the decongestant.  You can take antihistamines without the decongestant or D component.    Try to avoid medications such as pantoprazole or  Protonix/Nexium or Esomeprazole)/Prilosec or omeprazole/Prevacid or lansoprazole/AcipHex or Rabeprazole.  If you are able to, use Pepcid as this is safer for your kidneys.    Try to exercise at least 30 minutes 3 days a week to begin with with an ultimate goal of 5 days a week for at least 30 minutes    Please do not drink more than 2 glasses of alcohol/wine on a daily basis as this may contribute to your high blood pressure.          Portions of the record may have been created with voice recognition software.  Occasional wrong word or \"sound a like\" substitutions may have occurred due to the inherent limitations of voice recognition software.  Read the chart carefully and recognize, using context, where substitutions have occurred.    AYLEEN VELASQUEZ MD  "

## 2024-05-02 ENCOUNTER — HOME CARE VISIT (OUTPATIENT)
Dept: HOME HEALTH SERVICES | Facility: HOME HEALTHCARE | Age: 89
End: 2024-05-02
Payer: MEDICARE

## 2024-05-02 ENCOUNTER — OFFICE VISIT (OUTPATIENT)
Dept: GASTROENTEROLOGY | Facility: CLINIC | Age: 89
End: 2024-05-02
Payer: MEDICARE

## 2024-05-02 DIAGNOSIS — R14.0 BLOATING: ICD-10-CM

## 2024-05-02 DIAGNOSIS — R14.0 GASSINESS: ICD-10-CM

## 2024-05-02 DIAGNOSIS — K58.0 IRRITABLE BOWEL SYNDROME WITH DIARRHEA: Primary | ICD-10-CM

## 2024-05-02 PROCEDURE — 91065 BREATH HYDROGEN/METHANE TEST: CPT | Performed by: INTERNAL MEDICINE

## 2024-05-02 NOTE — PROGRESS NOTES
[facility]       Bacterial Overgrowth Analytical Record    Tanya Stephen 89 y.o. female MRN: [id number]      Date of Test: 4/28/2024    Substrate Given: Lactulose    Ordering Provider:     Medical Assistant: Sophie SWEENEY    Symptoms: Bloating, IBS-D, Gas    The patient presents for bacterial overgrowth testing.    Patient fasted overnight. Baseline readings obtained.   Breath test performed every 20 min for a total of 3 hr    Sample Clock Time ppmH2 ppmCH4 Co2% Alli   Baseline 10:30   4 9 1.3 Too high   #1  20 minutes 11:00 9 13 4.1 1.34   #2  40 minutes 11:22 15 11 4.4 1.25   #3  60 minutes 11:45 45 16 3.9 1.41   #4  80 minutes 12:06 131 20 3.8 1.44   #5  100 minutes 12:26 167 21 3.4 1.61   #6  120 minutes 12:47 166 20 3.6 1.52   #7  140 minutes 1:07 172 18 3.9 1.41   #8  160 minutes 1:30 177 19 3.9 1.44   #9  180 minutes 1:51 228 21 3.1 1.77       Physician interpretation: Test is positive for SIBO and intestinal Methanogen overgrowth.  Defer treatment to questions primary GI provider Dr. Zaragoza.

## 2024-05-03 VITALS
TEMPERATURE: 97.6 F | DIASTOLIC BLOOD PRESSURE: 70 MMHG | RESPIRATION RATE: 18 BRPM | SYSTOLIC BLOOD PRESSURE: 122 MMHG | OXYGEN SATURATION: 98 % | HEART RATE: 72 BPM

## 2024-05-07 ENCOUNTER — HOME CARE VISIT (OUTPATIENT)
Dept: HOME HEALTH SERVICES | Facility: HOME HEALTHCARE | Age: 89
End: 2024-05-07
Payer: MEDICARE

## 2024-05-07 VITALS
RESPIRATION RATE: 18 BRPM | OXYGEN SATURATION: 95 % | SYSTOLIC BLOOD PRESSURE: 114 MMHG | HEART RATE: 76 BPM | DIASTOLIC BLOOD PRESSURE: 62 MMHG

## 2024-05-07 PROCEDURE — G0151 HHCP-SERV OF PT,EA 15 MIN: HCPCS

## 2024-05-08 ENCOUNTER — OFFICE VISIT (OUTPATIENT)
Dept: GASTROENTEROLOGY | Facility: CLINIC | Age: 89
End: 2024-05-08
Payer: MEDICARE

## 2024-05-08 VITALS
SYSTOLIC BLOOD PRESSURE: 110 MMHG | WEIGHT: 129 LBS | HEIGHT: 59 IN | BODY MASS INDEX: 26 KG/M2 | DIASTOLIC BLOOD PRESSURE: 70 MMHG | TEMPERATURE: 97.2 F

## 2024-05-08 DIAGNOSIS — K58.0 IRRITABLE BOWEL SYNDROME WITH DIARRHEA: ICD-10-CM

## 2024-05-08 DIAGNOSIS — K63.8219 SMALL INTESTINAL BACTERIAL OVERGROWTH (SIBO): Primary | ICD-10-CM

## 2024-05-08 PROCEDURE — 99214 OFFICE O/P EST MOD 30 MIN: CPT | Performed by: INTERNAL MEDICINE

## 2024-05-08 RX ORDER — NEOMYCIN SULFATE 500 MG/1
500 TABLET ORAL 2 TIMES DAILY
Qty: 28 TABLET | Refills: 0 | Status: SHIPPED | OUTPATIENT
Start: 2024-05-08 | End: 2024-05-22

## 2024-05-08 NOTE — PROGRESS NOTES
[facility] - Outpatient Follow-up Note  Tanya Stephen 89 y.o. female MRN: [id number]  Encounter: [id number]          ASSESSMENT AND PLAN:      1. Small intestinal bacterial overgrowth (SIBO)  -Breath test positive SIBO and IMO  -I recommend treatment with Xifaxan and neomycin for total of 14 days.  Prescription sent to her pharmacy.  - neomycin (MYCIFRADIN) 500 mg tablet; Take 1 tablet (500 mg total) by mouth 2 (two) times a day for 14 days  Dispense: 28 tablet; Refill: 0  - rifaximin (XIFAXAN) 550 mg tablet; Take 1 tablet (550 mg total) by mouth every 8 (eight) hours for 14 days  Dispense: 42 tablet; Refill: 0    2. Irritable bowel syndrome with diarrhea  -Recommend fiber supplement-Benefiber 1 scoop daily  -High-fiber diet  - neomycin (MYCIFRADIN) 500 mg tablet; Take 1 tablet (500 mg total) by mouth 2 (two) times a day for 14 days  Dispense: 28 tablet; Refill: 0  - rifaximin (XIFAXAN) 550 mg tablet; Take 1 tablet (550 mg total) by mouth every 8 (eight) hours for 14 days  Dispense: 42 tablet; Refill: 0    3. Dysphagia -chronic dysphagia.  Reviewed barium esophagogram which showed esophageal dysmotility.  She has dysphagia on initiation of swallow and complete clearance of the esophagus.  Her oropharyngeal dysphagia could be secondary to cricopharyngeal bar.  -Cut meat and vegetable into small pieces  -Chew thoroughly before swallowing  -Keep your food moist  -Ensure supplement  ______________________________________________________________________    SUBJECTIVE: 89-year-old female with irritable bowel syndrome with diarrhea and dysphagia here to discuss her recent breath test.  I reviewed her Breath test which is positive for SIBO and IMO.   She reports abdominal bloating and excessive gas  She also reports intermittent passage of stool spontaneously  Her dysphagia symptoms are stable -mostly experience dysphagia when eating rice and noodles    She is here with her  and  daughter      REVIEW OF SYSTEMS IS OTHERWISE NEGATIVE.      Historical Information     Past Surgical History:   Procedure Laterality Date    BACK SURGERY      COLONOSCOPY      EGD AND COLONOSCOPY N/A 2016    Procedure: EGD AND COLONOSCOPY;  Surgeon: Elina Anderson MD;  Location: [facility];  Service:     JOINT REPLACEMENT      bilat knees and hips    OTHER SURGICAL HISTORY      pelvis rods    REPLACEMENT TOTAL KNEE BILATERAL      STOMACH SURGERY      UPPER GASTROINTESTINAL ENDOSCOPY       Social History   Social History     Substance and Sexual Activity   Alcohol Use No     Social History     Substance and Sexual Activity   Drug Use No     Social History     Tobacco Use   Smoking Status Former    Current packs/day: 0.00    Types: Cigarettes    Quit date:     Years since quittin.3   Smokeless Tobacco Never     Family History   Problem Relation Age of Onset    Cancer Brother     Diabetes Mother     Hypertension Father     Heart disease Father        Meds/Allergies       Current Outpatient Medications:     amitriptyline (ELAVIL) 10 mg tablet    aspirin 81 mg chewable tablet    atorvastatin (LIPITOR) 10 mg tablet    Cholecalciferol (VITAMIN D3) 1000 units CAPS    diphenoxylate-atropine (LOMOTIL) 2.5-0.025 mg per tablet    diphenoxylate-atropine (LOMOTIL) 2.5-0.025 mg per tablet    DULoxetine (CYMBALTA) 60 mg delayed release capsule    hydroxychloroquine (PLAQUENIL) 200 mg tablet    Magnesium 250 MG TABS    metFORMIN (GLUCOPHAGE) 500 mg tablet    Mirabegron ER (Myrbetriq) 50 MG TB24    neomycin (MYCIFRADIN) 500 mg tablet    nitrofurantoin (MACRODANTIN) 50 mg capsule    omeprazole (PriLOSEC) 20 mg delayed release capsule    pregabalin (LYRICA) 75 mg capsule    pyridoxine (B-6) 100 MG tablet    rifaximin (XIFAXAN) 550 mg tablet    dicyclomine (BENTYL) 10 mg capsule    metoprolol succinate (TOPROL-XL) 25 mg 24 hr tablet    Sodium Fluoride (PreviDent 5000 Booster Plus) 1.1 % PSTE    torsemide (DEMADEX) 10 mg  "tablet    Allergies   Allergen Reactions    Morphine Other (See Comments)     urinary retention    Ciprofloxacin Rash and Nausea Only           Objective     Blood pressure 110/70, temperature (!) 97.2 °F (36.2 °C), temperature source Tympanic, height 4' 11\" (1.499 m), weight 58.5 kg (129 lb), not currently breastfeeding. Body mass index is 26.05 kg/m².      PHYSICAL EXAM:      General Appearance:   Alert, cooperative, no distress   HEENT:   Normocephalic, atraumatic, anicteric.     Neck:  Supple, symmetrical, trachea midline   Lungs:   Clear to auscultation bilaterally; no rales, rhonchi or wheezing; respirations unlabored    Heart::   Regular rate and rhythm; no murmur, rub, or gallop.   Abdomen:   Soft, non-tender, non-distended; normal bowel sounds; no masses, no organomegaly    Genitalia:   Deferred    Rectal:   Deferred    Extremities:  No cyanosis, clubbing or edema    Pulses:  2+ and symmetric    Skin:  No jaundice, rashes, or lesions    Lymph nodes:  No palpable cervical lymphadenopathy        Lab Results:   No visits with results within 1 Day(s) from this visit.   Latest known visit with results is:   Appointment on 04/20/2024   Component Date Value    Sodium 04/20/2024 138     Potassium 04/20/2024 4.6     Chloride 04/20/2024 102     CO2 04/20/2024 29     ANION GAP 04/20/2024 7     BUN 04/20/2024 34 (H)     Creatinine 04/20/2024 1.59 (H)     Glucose, Fasting 04/20/2024 151 (H)     Calcium 04/20/2024 9.7     eGFR 04/20/2024 28          Radiology Results:   No results found.    "

## 2024-05-09 ENCOUNTER — OFFICE VISIT (OUTPATIENT)
Dept: WOUND CARE | Facility: HOSPITAL | Age: 89
End: 2024-05-09
Payer: MEDICARE

## 2024-05-09 VITALS
RESPIRATION RATE: 20 BRPM | TEMPERATURE: 97.4 F | DIASTOLIC BLOOD PRESSURE: 63 MMHG | SYSTOLIC BLOOD PRESSURE: 137 MMHG | HEART RATE: 89 BPM

## 2024-05-09 DIAGNOSIS — S81.812A LACERATION OF SKIN OF LEFT LOWER LEG, INITIAL ENCOUNTER: ICD-10-CM

## 2024-05-09 DIAGNOSIS — S81.802A TRAUMATIC OPEN WOUND OF LEFT LOWER LEG, INITIAL ENCOUNTER: Primary | ICD-10-CM

## 2024-05-09 PROCEDURE — 99213 OFFICE O/P EST LOW 20 MIN: CPT | Performed by: ORTHOPAEDIC SURGERY

## 2024-05-09 NOTE — PROGRESS NOTES
Wound Procedure Treatment Left Leg    Performed by: Dede Graves RN  Authorized by: Nevaeh Suggs PA-C  Associated wounds:   Wound 05/09/24 Skin tear Leg Left  Wound cleansed with:  NSS  Applied primary dressing:  Dermagran  Applied secondary dressing:  Other    [employer]

## 2024-05-09 NOTE — PATIENT INSTRUCTIONS
Orders Placed This Encounter   Procedures    Wound cleansing and dressings     Left knee wound is now healed!.  No further dressing is needed.  Please continue to cleanse area daily with mild soap and water.  Dry well.  Apply a nonscented moisturizer to both legs to prevent dryness and cracking.      Wound location left anterior leg.    Change dressing every other day.    You may remove the dressing and shower. Do not leave wound open to air, apply new dressing immediately.  Cleanse the wound with mild soap and water or normal saline, pat dry.   Apply Dermagran to the wound.  Cover with gauze and tape or a bandaid.        Elastic Tubular Stocking:  Spandagrip size F to left leg.      Tubular elastic bandage: Apply from base of toes to behind the knee. Apply in AM, may remove for sleep.    Avoid prolonged standing in one place.    Elevate leg(s) above the level of the heart when sitting or as much as possible.     Standing Status:   Future     Standing Expiration Date:   5/16/2024

## 2024-05-09 NOTE — PROGRESS NOTES
Patient ID: Tanya Stephen is a 89 y.o. female Date of Birth 7/21/1934       Chief Complaint   Patient presents with    Follow Up Wound Care Visit     Left knee is healed.,     New wound on left anterior leg.        Allergies:  Morphine and Ciprofloxacin    Diagnosis:   Diagnosis ICD-10-CM Associated Orders   1. Traumatic open wound of left lower leg, initial encounter  S81.802A Wound cleansing and dressings     Wound Procedure Treatment Left Leg      2. Laceration of skin of left lower leg, initial encounter  S81.812A            Assessment and Plan :  Initial Evaluation of open traumatic wound on Left knee is healed and new skin tear on distal LLE with no signs of infection today.  Debrided as below  Continue wound management with Dermagran, see wound orders below   Continue compression with Tubigrip  No harsh cleansers such as alcohol, peroxide, or antibacterial soap, do not submerge in water such as bathtub, hot tub, swimming pool, ocean, etc.   Can cleanse with mild soap and water at dressing changes.   Follow-up in 1 week(s) or call sooner with questions or concerns or any signs of infection such as redness, swelling, increased/purulent drainage, fever, chills, increased severe pain.       Subjective:   Patient presents for follow-up evaluation of open traumatic wound on the left lower extremity. Pt states she fell and has a new skin tear on distal LLE. Has been using Dermagran on the LLE wound and Tubigrip for compression. Denies fevers or chills.      The following portions of the patient's history were reviewed and updated as appropriate:   Patient Active Problem List   Diagnosis    Anemia    Shortness of breath    Hypomagnesemia    DM (diabetes mellitus), type 2 (HCC)    Urinary frequency    Iron deficiency anemia secondary to inadequate dietary iron intake    Sinobronchitis    Post zoster neuralgia    Primary generalized (osteo)arthritis    Seronegative arthropathy of multiple sites (HCC)    Senile  osteoporosis    Lumbar spondylosis    Diabetic polyneuropathy associated with type 2 diabetes mellitus (HCC)    Diabetic gastroparesis associated with type 2 diabetes mellitus  (HCC)    Irritable bowel syndrome with diarrhea    Traumatic injury of sacrum    Tendinitis of left rotator cuff    Abnormality of gait due to impairment of balance    Sicca syndrome (HCC)    Chest pain    Leukocytosis    Chronic diastolic heart failure (HCC)    OAB (overactive bladder)    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)    Toxic metabolic encephalopathy    Cellulitis    Dyslipidemia    Primary hypertension    Spinal stenosis of lumbar region with neurogenic claudication    Chronic pain syndrome    SI (sacroiliac) joint dysfunction    Elevated LFTs    Ambulatory dysfunction    Gastroesophageal reflux disease without esophagitis    Dysphagia    Raynaud's phenomenon without gangrene    Elevated liver enzymes    Incontinence of feces with fecal urgency    Parenchymal renal hypertension    Chronic kidney disease, stage IV (severe) (HCC)    Diabetic nephropathy associated with type 2 diabetes mellitus (HCC)     Past Medical History:   Diagnosis Date    Anemia     Arthritis     Back pain     CHF (congestive heart failure) (HCC)     Chronic kidney disease     Stage 3b    Confusion 02/22/2023    Diabetes (HCC)     Diabetes mellitus (HCC)     Diabetic gastroparesis associated with type 2 diabetes mellitus  (HCC)     Diabetic polyneuropathy (HCC)     Diverticulosis     Herpes zoster     Hypertension     IBS (irritable bowel syndrome)     Neurogenic claudication due to lumbar spinal stenosis     Osteoarthritis     Osteoporosis     Pulmonary edema     Raynaud's phenomenon without gangrene     Seronegative arthropathy of multiple sites (HCC)     Sicca (HCC)      Past Surgical History:   Procedure Laterality Date    BACK SURGERY      COLONOSCOPY      EGD AND COLONOSCOPY N/A 12/23/2016    Procedure: EGD AND COLONOSCOPY;  Surgeon:  Elina Anderson MD;  Location: [facility];  Service:     JOINT REPLACEMENT      bilat knees and hips    OTHER SURGICAL HISTORY      pelvis rods    REPLACEMENT TOTAL KNEE BILATERAL      STOMACH SURGERY      UPPER GASTROINTESTINAL ENDOSCOPY       Family History   Problem Relation Age of Onset    Cancer Brother     Diabetes Mother     Hypertension Father     Heart disease Father      Social History     Socioeconomic History    Marital status: /Civil Union     Spouse name: Weston    Number of children: 2    Years of education: None    Highest education level: High school graduate   Occupational History    None   Tobacco Use    Smoking status: Former     Current packs/day: 0.00     Types: Cigarettes     Quit date:      Years since quittin.3    Smokeless tobacco: Never   Vaping Use    Vaping status: Never Used   Substance and Sexual Activity    Alcohol use: No    Drug use: No    Sexual activity: Not Currently     Partners: Male     Birth control/protection: None   Other Topics Concern    None   Social History Narrative    None     Social Determinants of Health     Financial Resource Strain: Not on file   Food Insecurity: No Food Insecurity (2023)    Hunger Vital Sign     Worried About Running Out of Food in the Last Year: Never true     Ran Out of Food in the Last Year: Never true   Transportation Needs: No Transportation Needs (2023)    PRAPARE - Transportation     Lack of Transportation (Medical): No     Lack of Transportation (Non-Medical): No   Physical Activity: Not on file   Stress: Not on file   Social Connections: Not on file   Intimate Partner Violence: Not on file   Housing Stability: Low Risk  (2023)    Housing Stability Vital Sign     Unable to Pay for Housing in the Last Year: No     Number of Places Lived in the Last Year: 1     Unstable Housing in the Last Year: No       Current Outpatient Medications:     amitriptyline (ELAVIL) 10 mg tablet, TAKE TWO TABLETS BY MOUTH AT BEDTIME,  Disp: 180 tablet, Rfl: 1    aspirin 81 mg chewable tablet, Chew 1 tablet (81 mg total) daily, Disp: 30 tablet, Rfl: 0    atorvastatin (LIPITOR) 10 mg tablet, TAKE ONE TABLET BY MOUTH EVERY DAY, Disp: 90 tablet, Rfl: 3    Cholecalciferol (VITAMIN D3) 1000 units CAPS, Take 1 capsule by mouth daily , Disp: , Rfl:     dicyclomine (BENTYL) 10 mg capsule, Take 1 capsule (10 mg total) by mouth 3 (three) times a day as needed (abd pain) (Patient not taking: Reported on 11/22/2023), Disp: 30 capsule, Rfl: 2    diphenoxylate-atropine (LOMOTIL) 2.5-0.025 mg per tablet, Take 1 tablet by mouth if needed, Disp: , Rfl:     diphenoxylate-atropine (LOMOTIL) 2.5-0.025 mg per tablet, Take 1 tablet by mouth 4 (four) times a day as needed for diarrhea, Disp: 30 tablet, Rfl: 0    DULoxetine (CYMBALTA) 60 mg delayed release capsule, TAKE ONE CAPSULE BY MOUTH ONCE DAILY, Disp: 30 capsule, Rfl: 5    hydroxychloroquine (PLAQUENIL) 200 mg tablet, Take 1 tablet (200 mg total) by mouth daily with breakfast, Disp: 90 tablet, Rfl: 1    Magnesium 250 MG TABS, Take 250 mg by mouth every evening, Disp: , Rfl:     metFORMIN (GLUCOPHAGE) 500 mg tablet, Take 500 mg by mouth 2 (two) times a day, Disp: , Rfl:     metoprolol succinate (TOPROL-XL) 25 mg 24 hr tablet, Take 0.5 tablets (12.5 mg total) by mouth daily (Patient not taking: Reported on 6/27/2023), Disp: 45 tablet, Rfl: 3    Mirabegron ER (Myrbetriq) 50 MG TB24, Take 1 tablet by mouth daily as directed by physician., Disp: 90 tablet, Rfl: 1    neomycin (MYCIFRADIN) 500 mg tablet, Take 1 tablet (500 mg total) by mouth 2 (two) times a day for 14 days, Disp: 28 tablet, Rfl: 0    nitrofurantoin (MACRODANTIN) 50 mg capsule, TAKE ONE CAPSULE BY MOUTH EVERY DAY IN THE MORNING, Disp: 30 capsule, Rfl: 6    omeprazole (PriLOSEC) 20 mg delayed release capsule, Take 20 mg by mouth daily, Disp: , Rfl:     pregabalin (LYRICA) 75 mg capsule, TAKE ONE CAPSULE BY MOUTH THREE TIMES A DAY, Disp: 90 capsule, Rfl:  5    pyridoxine (B-6) 100 MG tablet, Take 100 mg by mouth daily, Disp: , Rfl:     rifaximin (XIFAXAN) 550 mg tablet, Take 1 tablet (550 mg total) by mouth every 8 (eight) hours for 14 days, Disp: 42 tablet, Rfl: 0    Sodium Fluoride (PreviDent 5000 Booster Plus) 1.1 % PSTE, Apply on teeth every evening as directed, Disp: 100 mL, Rfl: 3    torsemide (DEMADEX) 10 mg tablet, Take 1 tablet PRN for wt gain 3 lb/ 3 days or 5 lb/ 5 aria (Patient not taking: Reported on 6/27/2023), Disp: 30 tablet, Rfl: 0    Review of Systems   Constitutional:  Negative for chills and fever.   HENT:  Negative for congestion and sneezing.    Respiratory:  Negative for cough.    Cardiovascular:  Positive for leg swelling.   Musculoskeletal:  Positive for gait problem.   Skin:  Positive for wound (LLE).   Psychiatric/Behavioral:  Negative for agitation.          Objective:  /63   Pulse 89   Temp (!) 97.4 °F (36.3 °C)   Resp 20   LMP  (LMP Unknown)         Physical Exam  Vitals reviewed.   Constitutional:       General: She is not in acute distress.     Appearance: Normal appearance. She is not ill-appearing, toxic-appearing or diaphoretic.   HENT:      Head: Normocephalic and atraumatic.      Right Ear: External ear normal.      Left Ear: External ear normal.   Eyes:      Conjunctiva/sclera: Conjunctivae normal.   Pulmonary:      Effort: Pulmonary effort is normal. No respiratory distress.   Musculoskeletal:      Cervical back: Neck supple.      Right lower leg: Edema present.      Left lower leg: Edema present.   Skin:     Findings: Wound (LLE) present.             Comments: 1. Healed.  2. Beefy red fragile wound bed. See wound assessment   Neurological:      Mental Status: She is alert.   Psychiatric:         Mood and Affect: Mood normal.         Behavior: Behavior normal.               Wound 05/09/24 Skin tear Leg Left (Active)   Wound Description Pink 05/09/24 1521   Park-wound Assessment Intact 05/09/24 1521   Wound Length (cm)  0.6 cm 05/09/24 1521   Wound Width (cm) 0.6 cm 05/09/24 1521   Wound Depth (cm) 0.1 cm 05/09/24 1521   Wound Surface Area (cm^2) 0.36 cm^2 05/09/24 1521   Wound Volume (cm^3) 0.036 cm^3 05/09/24 1521   Calculated Wound Volume (cm^3) 0.04 cm^3 05/09/24 1521   Drainage Amount Small 05/09/24 1521   Drainage Description Serosanguineous 05/09/24 1521   Non-staged Wound Description Full thickness 05/09/24 1521   Dressing Status Intact 05/09/24 1521     Wound 03/29/24 Skin tear Knee Left (Active)   Wound Description Yellow;Slough;Granulation tissue;Pink 04/11/24 1446   Park-wound Assessment Pink;Dry 04/11/24 1446   Wound Length (cm) 0 cm 04/11/24 1446   Wound Width (cm) 0 cm 04/11/24 1446   Wound Depth (cm) 0 cm 04/11/24 1446   Wound Surface Area (cm^2) 0 cm^2 04/11/24 1446   Wound Volume (cm^3) 0 cm^3 04/11/24 1446   Calculated Wound Volume (cm^3) 0 cm^3 04/11/24 1446   Drainage Amount none 04/11/24 1446   Drainage Description none 04/11/24 1446             Wound Instructions:  Orders Placed This Encounter   Procedures    Wound cleansing and dressings     Left knee wound is now healed!.  No further dressing is needed.  Please continue to cleanse area daily with mild soap and water.  Dry well.  Apply a nonscented moisturizer to both legs to prevent dryness and cracking.      Wound location left anterior leg.    Change dressing every other day.    You may remove the dressing and shower. Do not leave wound open to air, apply new dressing immediately.  Cleanse the wound with mild soap and water or normal saline, pat dry.   Apply Dermagran to the wound.  Cover with gauze and tape or a bandaid.        Elastic Tubular Stocking:  Spandagrip size F to left leg.      Tubular elastic bandage: Apply from base of toes to behind the knee. Apply in AM, may remove for sleep.    Avoid prolonged standing in one place.    Elevate leg(s) above the level of the heart when sitting or as much as possible.     Standing Status:   Future      "Standing Expiration Date:   5/16/2024    Wound Procedure Treatment Left Leg     This order was created via procedure documentation       Nevaeh Suggs PA-C, [facility]      Portions of the record may have been created with voice recognition software. Occasional wrong word or \"sound alike\" substitutions may have occurred due to the inherent limitations of voice recognition software. Read the chart carefully and recognize, using context, where substitutions have occurred.    "

## 2024-05-10 ENCOUNTER — CONSULT (OUTPATIENT)
Dept: NEPHROLOGY | Facility: CLINIC | Age: 89
End: 2024-05-10
Payer: MEDICARE

## 2024-05-10 ENCOUNTER — TELEPHONE (OUTPATIENT)
Age: 89
End: 2024-05-10

## 2024-05-10 VITALS — WEIGHT: 133 LBS | HEIGHT: 59 IN | BODY MASS INDEX: 26.81 KG/M2

## 2024-05-10 DIAGNOSIS — E11.21 DIABETIC NEPHROPATHY ASSOCIATED WITH TYPE 2 DIABETES MELLITUS (HCC): ICD-10-CM

## 2024-05-10 DIAGNOSIS — N18.4 STAGE 4 CHRONIC KIDNEY DISEASE (HCC): ICD-10-CM

## 2024-05-10 DIAGNOSIS — M06.09 SERONEGATIVE ARTHROPATHY OF MULTIPLE SITES (HCC): ICD-10-CM

## 2024-05-10 DIAGNOSIS — E11.43 DIABETIC GASTROPARESIS ASSOCIATED WITH TYPE 2 DIABETES MELLITUS  (HCC): ICD-10-CM

## 2024-05-10 DIAGNOSIS — I12.9 PARENCHYMAL RENAL HYPERTENSION, STAGE 1 THROUGH STAGE 4 OR UNSPECIFIED CHRONIC KIDNEY DISEASE: Primary | ICD-10-CM

## 2024-05-10 DIAGNOSIS — N18.4 CHRONIC KIDNEY DISEASE, STAGE IV (SEVERE) (HCC): ICD-10-CM

## 2024-05-10 DIAGNOSIS — K31.84 DIABETIC GASTROPARESIS ASSOCIATED WITH TYPE 2 DIABETES MELLITUS  (HCC): ICD-10-CM

## 2024-05-10 DIAGNOSIS — E83.42 HYPOMAGNESEMIA: ICD-10-CM

## 2024-05-10 DIAGNOSIS — I50.32 CHRONIC DIASTOLIC HEART FAILURE (HCC): Chronic | ICD-10-CM

## 2024-05-10 DIAGNOSIS — E11.42 DIABETIC POLYNEUROPATHY ASSOCIATED WITH TYPE 2 DIABETES MELLITUS (HCC): ICD-10-CM

## 2024-05-10 DIAGNOSIS — E78.5 DYSLIPIDEMIA: ICD-10-CM

## 2024-05-10 PROCEDURE — 99204 OFFICE O/P NEW MOD 45 MIN: CPT | Performed by: INTERNAL MEDICINE

## 2024-05-10 NOTE — TELEPHONE ENCOUNTER
Patients GI provider:  Dr. Zaragoza    Number to return call: [phone]    Reason for call: Pt returning a call from [facility]. I did not see a note and called the office but she had left for the day. Please call the pt back on Monday    Scheduled procedure/appointment date if applicable: Apt/9/4/24

## 2024-05-10 NOTE — PATIENT INSTRUCTIONS
Visit summary:  - Overall kidney function has been relatively stable as of recently.  Most likely it is related to diabetes and high blood pressure and hardening of the arteries and aging process which is normal.  We are going to make sure there is nothing significant contributing for example obstruction of the urinary passage although unlikely.    Ways to keep you and your kidneys healthy:  - Good blood pressure control which you have achieved  - Good cholesterol control which we will make sure  - Also keep well-hydrated at all times!  - Avoiding nonsteroidal anti-inflammatory drugs which you are  - Trying to come off omeprazole/Prilosec but I want to get approval from Dr. Zaragoza first so continue for now  - Healthy habits including avoiding salt as you are doing and remaining as active as possible.    We will also arrange kidney smart program to give you helpful hence to keep your kidneys in good shape!      1. Medication changes today:  No medication changes today.    2.  General instructions:  The above for general instructions    3.  Please go for  fasting  lab work at this time this will also include a couple of urine specimens       Also please go for an ultrasound of your kidneys at this time we will help to facilitate    4.  Please take 1 week a blood pressure readings in 3 months    AS FOLLOWS  MORNING AND EVENING, SITTING AND STANDING as follows:  TAKE THE MORNING READINGS BEFORE ANY MEDICATIONS AND WHEN YOU ARE RELAXED FOR SEVERAL MINUTES  TAKE THE EVENING READINGS:  BETWEEN 7-10 P.M.; PRIOR TO ANY MEDICATIONS; AT LEAST IN OUR  FROM DINNER; AND CERTAINLY AFTER RELAXING FOR A FEW MINUTES  PLEASE INCLUDE HEART RATE WITH YOUR BLOOD PRESSURE READINGS  When taking standing readings, keep your arm supported at heart level and not dangling  Make sure you are sitting with your back supported and feet on the ground and do not cross your legs or feet  Make sure you have not taken any coffee or caffeine  products or exercised or smoke cigarettes at least 30 minutes before taking your blood pressure  Then please mail these readings into the office      5.  In 3 months:  Please go for nonfasting lab work but in the morning  Please take 1 week a blood pressure readings as outlined above and mail those into the office      6.  Follow-up in 6 months  Please bring in 1 week a blood pressure readings morning evening, sitting and standing is outlined above  PLEASE BRING AN YOUR BLOOD PRESSURE MACHINE TO CORRELATE WITH THE OFFICE MACHINE AT THIS NEXT SCHEDULED VISIT  Please go for fasting lab work 1-2 weeks prior to your appointment      7.  General non medical recommendations:  AVOID SALT BUT NOT ADDING AN READING LABELS TO MAKE SURE THERE IS LOW-SALT IN THE FOOD THAT YOU ARE EATING  Goal is less than 2 g of sodium intake or less than 5 g of sodium chloride intake per day    Avoid nonsteroidal anti-inflammatory drugs such as Naprosyn, ibuprofen, Aleve, Advil, Celebrex, Meloxicam (Mobic) etc.  You can use Tylenol as needed if you do not have any liver condition to be concerned about    Avoid medications such as Sudafed or decongestants and antihistamines that contained the D component which is the decongestant.  You can take antihistamines without the decongestant or D component.    Try to avoid medications such as pantoprazole or  Protonix/Nexium or Esomeprazole)/Prilosec or omeprazole/Prevacid or lansoprazole/AcipHex or Rabeprazole.  If you are able to, use Pepcid as this is safer for your kidneys.    Try to exercise at least 30 minutes 3 days a week to begin with with an ultimate goal of 5 days a week for at least 30 minutes    Please do not drink more than 2 glasses of alcohol/wine on a daily basis as this may contribute to your high blood pressure.

## 2024-05-13 ENCOUNTER — TELEPHONE (OUTPATIENT)
Age: 89
End: 2024-05-13

## 2024-05-13 NOTE — TELEPHONE ENCOUNTER
Would recommend waiting until she gets the xifaxan to take both. Please provide samples if any offices have. Thanks!

## 2024-05-13 NOTE — TELEPHONE ENCOUNTER
Spoke with [facility]  and states they will  xiafaxan samples later today and I advised they need to be started together with neomycin.

## 2024-05-13 NOTE — TELEPHONE ENCOUNTER
Pt. Spouse calling stating pt. Can't afford the oop cost of $2k for Xifaxan, pt. Does not have prescription coverage , can pt. Get samples from the office, pt. Spouse is also asking if she needs to take the neomycin with the Xifaxan or if she can start the neomycin now since she has medication, please advise

## 2024-05-14 ENCOUNTER — HOSPITAL ENCOUNTER (OUTPATIENT)
Dept: ULTRASOUND IMAGING | Facility: HOSPITAL | Age: 89
Discharge: HOME | End: 2024-05-14
Attending: INTERNAL MEDICINE
Payer: MEDICARE

## 2024-05-14 DIAGNOSIS — K31.84 DIABETIC GASTROPARESIS ASSOCIATED WITH TYPE 2 DIABETES MELLITUS  (HCC): ICD-10-CM

## 2024-05-14 DIAGNOSIS — N18.4 CHRONIC KIDNEY DISEASE, STAGE IV (SEVERE) (HCC): ICD-10-CM

## 2024-05-14 DIAGNOSIS — I50.32 CHRONIC DIASTOLIC HEART FAILURE (HCC): Chronic | ICD-10-CM

## 2024-05-14 DIAGNOSIS — M06.09 SERONEGATIVE ARTHROPATHY OF MULTIPLE SITES (HCC): ICD-10-CM

## 2024-05-14 DIAGNOSIS — N18.4 STAGE 4 CHRONIC KIDNEY DISEASE (HCC): ICD-10-CM

## 2024-05-14 DIAGNOSIS — E78.5 DYSLIPIDEMIA: ICD-10-CM

## 2024-05-14 DIAGNOSIS — E83.42 HYPOMAGNESEMIA: ICD-10-CM

## 2024-05-14 DIAGNOSIS — I12.9 PARENCHYMAL RENAL HYPERTENSION, STAGE 1 THROUGH STAGE 4 OR UNSPECIFIED CHRONIC KIDNEY DISEASE: ICD-10-CM

## 2024-05-14 DIAGNOSIS — E11.42 DIABETIC POLYNEUROPATHY ASSOCIATED WITH TYPE 2 DIABETES MELLITUS (HCC): ICD-10-CM

## 2024-05-14 DIAGNOSIS — E11.43 DIABETIC GASTROPARESIS ASSOCIATED WITH TYPE 2 DIABETES MELLITUS  (HCC): ICD-10-CM

## 2024-05-14 DIAGNOSIS — E11.21 DIABETIC NEPHROPATHY ASSOCIATED WITH TYPE 2 DIABETES MELLITUS (HCC): ICD-10-CM

## 2024-05-14 PROCEDURE — 76770 US EXAM ABDO BACK WALL COMP: CPT

## 2024-05-16 ENCOUNTER — APPOINTMENT (OUTPATIENT)
Dept: LAB | Facility: CLINIC | Age: 89
End: 2024-05-16
Payer: MEDICARE

## 2024-05-16 DIAGNOSIS — N18.4 STAGE 4 CHRONIC KIDNEY DISEASE (HCC): ICD-10-CM

## 2024-05-16 DIAGNOSIS — K31.84 DIABETIC GASTROPARESIS ASSOCIATED WITH TYPE 2 DIABETES MELLITUS  (HCC): ICD-10-CM

## 2024-05-16 DIAGNOSIS — N18.4 CHRONIC KIDNEY DISEASE, STAGE IV (SEVERE) (HCC): ICD-10-CM

## 2024-05-16 DIAGNOSIS — E11.21 DIABETIC NEPHROPATHY ASSOCIATED WITH TYPE 2 DIABETES MELLITUS (HCC): ICD-10-CM

## 2024-05-16 DIAGNOSIS — E11.42 DIABETIC POLYNEUROPATHY ASSOCIATED WITH TYPE 2 DIABETES MELLITUS (HCC): ICD-10-CM

## 2024-05-16 DIAGNOSIS — I12.9 PARENCHYMAL RENAL HYPERTENSION, STAGE 1 THROUGH STAGE 4 OR UNSPECIFIED CHRONIC KIDNEY DISEASE: ICD-10-CM

## 2024-05-16 DIAGNOSIS — I50.32 CHRONIC DIASTOLIC HEART FAILURE (HCC): Chronic | ICD-10-CM

## 2024-05-16 DIAGNOSIS — E83.42 HYPOMAGNESEMIA: ICD-10-CM

## 2024-05-16 DIAGNOSIS — E78.5 DYSLIPIDEMIA: ICD-10-CM

## 2024-05-16 DIAGNOSIS — M06.09 SERONEGATIVE ARTHROPATHY OF MULTIPLE SITES (HCC): ICD-10-CM

## 2024-05-16 DIAGNOSIS — R74.8 ELEVATED LIVER ENZYMES: ICD-10-CM

## 2024-05-16 DIAGNOSIS — E11.43 DIABETIC GASTROPARESIS ASSOCIATED WITH TYPE 2 DIABETES MELLITUS  (HCC): ICD-10-CM

## 2024-05-16 LAB
25(OH)D3 SERPL-MCNC: 41.7 NG/ML (ref 30–100)
ALBUMIN SERPL BCP-MCNC: 3.7 G/DL (ref 3.5–5)
ALP SERPL-CCNC: 105 U/L (ref 34–104)
ALT SERPL W P-5'-P-CCNC: 40 U/L (ref 7–52)
ANION GAP SERPL CALCULATED.3IONS-SCNC: 6 MMOL/L (ref 4–13)
AST SERPL W P-5'-P-CCNC: 43 U/L (ref 13–39)
BASOPHILS # BLD AUTO: 0.03 THOUSANDS/ÂΜL (ref 0–0.1)
BASOPHILS NFR BLD AUTO: 0 % (ref 0–1)
BILIRUB DIRECT SERPL-MCNC: 0.13 MG/DL (ref 0–0.2)
BILIRUB SERPL-MCNC: 0.59 MG/DL (ref 0.2–1)
BUN SERPL-MCNC: 21 MG/DL (ref 5–25)
CALCIUM SERPL-MCNC: 9.3 MG/DL (ref 8.4–10.2)
CHLORIDE SERPL-SCNC: 104 MMOL/L (ref 96–108)
CHOLEST SERPL-MCNC: 118 MG/DL
CO2 SERPL-SCNC: 30 MMOL/L (ref 21–32)
CREAT SERPL-MCNC: 1.3 MG/DL (ref 0.6–1.3)
CREAT UR-MCNC: 23.2 MG/DL
CRP SERPL QL: <1 MG/L
EOSINOPHIL # BLD AUTO: 0.28 THOUSAND/ÂΜL (ref 0–0.61)
EOSINOPHIL NFR BLD AUTO: 4 % (ref 0–6)
ERYTHROCYTE [DISTWIDTH] IN BLOOD BY AUTOMATED COUNT: 14.2 % (ref 11.6–15.1)
ERYTHROCYTE [SEDIMENTATION RATE] IN BLOOD: 14 MM/HOUR (ref 0–29)
FERRITIN SERPL-MCNC: 80 NG/ML (ref 11–307)
GFR SERPL CREATININE-BSD FRML MDRD: 36 ML/MIN/1.73SQ M
GLUCOSE P FAST SERPL-MCNC: 128 MG/DL (ref 65–99)
HCT VFR BLD AUTO: 40.8 % (ref 34.8–46.1)
HDLC SERPL-MCNC: 73 MG/DL
HGB BLD-MCNC: 12.8 G/DL (ref 11.5–15.4)
IMM GRANULOCYTES # BLD AUTO: 0.07 THOUSAND/UL (ref 0–0.2)
IMM GRANULOCYTES NFR BLD AUTO: 1 % (ref 0–2)
IRON SATN MFR SERPL: 24 % (ref 15–50)
IRON SERPL-MCNC: 65 UG/DL (ref 50–212)
LDLC SERPL CALC-MCNC: 35 MG/DL (ref 0–100)
LYMPHOCYTES # BLD AUTO: 1.32 THOUSANDS/ÂΜL (ref 0.6–4.47)
LYMPHOCYTES NFR BLD AUTO: 19 % (ref 14–44)
MAGNESIUM SERPL-MCNC: 2 MG/DL (ref 1.9–2.7)
MCH RBC QN AUTO: 29.3 PG (ref 26.8–34.3)
MCHC RBC AUTO-ENTMCNC: 31.4 G/DL (ref 31.4–37.4)
MCV RBC AUTO: 93 FL (ref 82–98)
MONOCYTES # BLD AUTO: 0.8 THOUSAND/ÂΜL (ref 0.17–1.22)
MONOCYTES NFR BLD AUTO: 11 % (ref 4–12)
NEUTROPHILS # BLD AUTO: 4.53 THOUSANDS/ÂΜL (ref 1.85–7.62)
NEUTS SEG NFR BLD AUTO: 65 % (ref 43–75)
NRBC BLD AUTO-RTO: 0 /100 WBCS
PHOSPHATE SERPL-MCNC: 3.4 MG/DL (ref 2.3–4.1)
PLATELET # BLD AUTO: 155 THOUSANDS/UL (ref 149–390)
PMV BLD AUTO: 11.2 FL (ref 8.9–12.7)
POTASSIUM SERPL-SCNC: 4.2 MMOL/L (ref 3.5–5.3)
PROT SERPL-MCNC: 7 G/DL (ref 6.4–8.4)
PROT UR-MCNC: 13 MG/DL
PROT/CREAT UR: 0.56 MG/G{CREAT} (ref 0–0.1)
PTH-INTACT SERPL-MCNC: 50.9 PG/ML (ref 12–88)
RBC # BLD AUTO: 4.37 MILLION/UL (ref 3.81–5.12)
SODIUM SERPL-SCNC: 140 MMOL/L (ref 135–147)
TIBC SERPL-MCNC: 276 UG/DL (ref 250–450)
TRIGL SERPL-MCNC: 51 MG/DL
UIBC SERPL-MCNC: 211 UG/DL (ref 155–355)
WBC # BLD AUTO: 7.03 THOUSAND/UL (ref 4.31–10.16)

## 2024-05-16 PROCEDURE — 83735 ASSAY OF MAGNESIUM: CPT

## 2024-05-16 PROCEDURE — 84100 ASSAY OF PHOSPHORUS: CPT

## 2024-05-16 PROCEDURE — 83550 IRON BINDING TEST: CPT

## 2024-05-16 PROCEDURE — 36415 COLL VENOUS BLD VENIPUNCTURE: CPT

## 2024-05-16 PROCEDURE — 84156 ASSAY OF PROTEIN URINE: CPT | Performed by: INTERNAL MEDICINE

## 2024-05-16 PROCEDURE — 82728 ASSAY OF FERRITIN: CPT

## 2024-05-16 PROCEDURE — 82248 BILIRUBIN DIRECT: CPT

## 2024-05-16 PROCEDURE — 82570 ASSAY OF URINE CREATININE: CPT | Performed by: INTERNAL MEDICINE

## 2024-05-16 PROCEDURE — 80061 LIPID PANEL: CPT

## 2024-05-16 PROCEDURE — 83540 ASSAY OF IRON: CPT

## 2024-05-16 PROCEDURE — 82306 VITAMIN D 25 HYDROXY: CPT

## 2024-05-16 PROCEDURE — 87205 SMEAR GRAM STAIN: CPT

## 2024-05-16 PROCEDURE — 83970 ASSAY OF PARATHORMONE: CPT

## 2024-05-17 ENCOUNTER — NURSE TRIAGE (OUTPATIENT)
Age: 89
End: 2024-05-17

## 2024-05-17 LAB — EOSINOPHIL NFR URNS MANUAL: 2 %

## 2024-05-17 NOTE — TELEPHONE ENCOUNTER
"Patient started SIBO treatment, She is having urgency and accidents.  Is there is anything she can take?       Reason for Disposition   MILD diarrhea and taking antibiotics    Answer Assessment - Initial Assessment Questions  1. DIARRHEA SEVERITY: \"How bad is the diarrhea?\" \"How many extra stools have you had in the past 24 hours than normal?\"    2 episodes of diarrhea in the last 24 hours.  Significant urgently   2. ONSET: \"When did the diarrhea begin?\"     Started SIBO treatment on 5/14/24    Protocols used: Diarrhea-ADULT-[place]    "

## 2024-05-21 ENCOUNTER — OFFICE VISIT (OUTPATIENT)
Dept: WOUND CARE | Facility: HOSPITAL | Age: 89
End: 2024-05-21
Payer: MEDICARE

## 2024-05-21 ENCOUNTER — TELEPHONE (OUTPATIENT)
Dept: NEPHROLOGY | Facility: CLINIC | Age: 89
End: 2024-05-21

## 2024-05-21 VITALS
RESPIRATION RATE: 18 BRPM | SYSTOLIC BLOOD PRESSURE: 127 MMHG | TEMPERATURE: 97.1 F | DIASTOLIC BLOOD PRESSURE: 62 MMHG | HEART RATE: 91 BPM

## 2024-05-21 DIAGNOSIS — S81.802A TRAUMATIC OPEN WOUND OF LEFT LOWER LEG, INITIAL ENCOUNTER: Primary | ICD-10-CM

## 2024-05-21 DIAGNOSIS — R74.8 ELEVATED ALKALINE PHOSPHATASE LEVEL: ICD-10-CM

## 2024-05-21 DIAGNOSIS — N18.4 CHRONIC KIDNEY DISEASE, STAGE IV (SEVERE) (HCC): Primary | ICD-10-CM

## 2024-05-21 PROCEDURE — 11042 DBRDMT SUBQ TIS 1ST 20SQCM/<: CPT | Performed by: FAMILY MEDICINE

## 2024-05-21 RX ORDER — LIDOCAINE 40 MG/G
CREAM TOPICAL ONCE
Status: COMPLETED | OUTPATIENT
Start: 2024-05-21 | End: 2024-05-21

## 2024-05-21 RX ADMIN — LIDOCAINE: 40 CREAM TOPICAL at 13:34

## 2024-05-21 NOTE — TELEPHONE ENCOUNTER
----- Message from Fabián Godoy MD sent at 5/16/2024  3:20 PM EDT -----  In general labs look good including kidney labs however couple of the minor liver tests are just slightly elevated    Recommend  1.  Repeat a CMP in the next few weeks at your convenience nonfasting along with a GGT.  Try to minimize alcohol

## 2024-05-21 NOTE — PATIENT INSTRUCTIONS
Orders Placed This Encounter   Procedures    Wound cleansing and dressings Left Leg     Wound location left anterior leg.    Change dressing every other day.    You may remove the dressing and shower. Do not leave wound open to air, apply new dressing immediately.  Cleanse the wound with mild soap and water or normal saline, pat dry.   Apply Dermagran to the wound.  Cover with gauze and tape or a bandaid.          Elastic Tubular Stocking:  Spandagrip size F to left leg.       Tubular elastic bandage: Apply from base of toes to behind the knee. Apply in AM, may remove for sleep.     Avoid prolonged standing in one place.     Elevate leg(s) above the level of the heart when sitting or as much as possible.     Standing Status:   Future     Standing Expiration Date:   6/4/2024

## 2024-05-21 NOTE — PROGRESS NOTES
"Patient ID: Tanya Stephen is a 89 y.o. female Date of Birth 7/21/1934     Chief Complaint  Chief Complaint   Patient presents with    Follow Up Wound Care Visit     Left leg wound        Allergies  Morphine and Ciprofloxacin    Assessment:    No problem-specific Assessment & Plan notes found for this encounter.       Diagnoses and all orders for this visit:    Traumatic open wound of left lower leg, initial encounter  -     lidocaine (LMX) 4 % cream  -     Wound cleansing and dressings Left Leg; Future  -     Wound Procedure Treatment Left Leg  -     Debridement Left Leg              Debridement   Wound 05/09/24 Skin tear Leg Left    Universal Protocol:  Consent: Verbal consent obtained.  Consent given by: patient  Time out: Immediately prior to procedure a \"time out\" was called to verify the correct patient, procedure, equipment, support staff and site/side marked as required.  Timeout called at: 5/21/2024 1:20 PM.  Patient understanding: patient states understanding of the procedure being performed  Patient identity confirmed: verbally with patient    Debridement Details  Performed by: physician  Debridement type: surgical  Level of debridement: subcutaneous tissue  Pain control: lidocaine 4%      Post-debridement measurements  Length (cm): 0.4  Width (cm): 0.2  Depth (cm): 0.2  Percent debrided: 100%  Surface Area (cm^2): 0.08  Area Debrided (cm^2): 0.08  Volume (cm^3): 0.02    Tissue and other material debrided: subcutaneous tissue  Devitalized tissue debrided: exudate and slough  Instrument(s) utilized: curette  Bleeding: small  Hemostasis obtained with: pressure  Response to treatment: procedure was tolerated well        Plan:  Wound is improved  Debrided as above  Continue wound management with Dermagran, see wound orders below  Continue compression with Tubigrip  Keep legs elevated whenever seated and avoid prolonged standing  Follow-up in 1 week or call sooner with questions or concerns    Wound " 05/09/24 Skin tear Leg Left (Active)   Wound Image Images linked 05/21/24 1352   Wound Description Pink;Granulation tissue 05/21/24 1332   Park-wound Assessment Intact;Scar Tissue 05/21/24 1332   Wound Length (cm) 0.4 cm 05/21/24 1332   Wound Width (cm) 0.2 cm 05/21/24 1332   Wound Depth (cm) 0.1 cm 05/21/24 1332   Wound Surface Area (cm^2) 0.08 cm^2 05/21/24 1332   Wound Volume (cm^3) 0.008 cm^3 05/21/24 1332   Calculated Wound Volume (cm^3) 0.01 cm^3 05/21/24 1332   Change in Wound Size % 75 05/21/24 1332   Drainage Amount Scant 05/21/24 1332   Drainage Description Serous;Yellow 05/21/24 1332   Non-staged Wound Description Full thickness 05/21/24 1332   Dressing Status Intact 05/21/24 1332       Wound 05/09/24 Skin tear Leg Left (Active)   Date First Assessed/Time First Assessed: 05/09/24 1510   Present on Original Admission: No  Traumatic Wound Type: Skin tear  Location: Leg  Wound Location Orientation: Left       [REMOVED] Wound 06/28/22 Skin Tear Skin tear Arm Right (Removed)   Resolved Date: 11/22/23  Date First Assessed/Time First Assessed: 06/28/22 1500   Primary Wound Type: Skin Tear  Traumatic Wound Type: Skin tear  Location: Arm  Wound Location Orientation: (c) Right  Wound Outcome: (c) Other (Comment)       [REMOVED] Wound 06/28/22 Skin Tear Skin tear Arm Right (Removed)   Resolved Date: 11/22/23  Date First Assessed/Time First Assessed: 06/28/22 1500   Primary Wound Type: Skin Tear  Traumatic Wound Type: Skin tear  Location: Arm  Wound Location Orientation: Right  Wound Outcome: (c) Other (Comment)       [REMOVED] Wound 11/22/23 Venous Ulcer Leg Right;Anterior (Removed)   Resolved Date: 03/08/24  Date First Assessed/Time First Assessed: 11/22/23 1309   Primary Wound Type: Venous Ulcer  Location: Leg  Wound Location Orientation: Right;Anterior  Wound Outcome: Healed       [REMOVED] Wound 12/22/23 Toe (Comment  which one) Left;Posterior (Removed)   Resolved Date/Resolved Time: 12/22/23 1021  Date First  Assessed/Time First Assessed: 12/22/23 1009   Location: (c) Toe (Comment  which one)  Wound Location Orientation: Left;Posterior  Wound Description (Comments): LEFT THIRD TOE  Wound Outcome: (c) O...       [REMOVED] Wound 12/29/23 Diabetic Ulcer Toe (Comment  which one) Left (Removed)   Resolved Date: 01/26/24  Date First Assessed/Time First Assessed: 12/29/23 1421   Primary Wound Type: Diabetic Ulcer  Location: (c) Toe (Comment  which one)  Wound Location Orientation: Left  Wound Description (Comments): LEFT THIRD TOE; HINOJOSA GRADE ...       [REMOVED] Wound 02/06/24 Surgical Toe D3, third Left (Removed)   Resolved Date: 02/14/24  Date First Assessed/Time First Assessed: 02/06/24 1410   Primary Wound Type: Surgical  Location: Toe D3, third  Wound Location Orientation: Left  Wound Outcome: Healed       [REMOVED] Wound 02/14/24 Traumatic Leg Left;Proximal;Lower (Removed)   Resolved Date: 04/15/24  Date First Assessed: 02/14/24   Primary Wound Type: Traumatic  Location: Leg  Wound Location Orientation: Left;Proximal;Lower       [REMOVED] Wound 03/01/24 Diabetic Ulcer Toe D1, great Left (Removed)   Resolved Date: 03/08/24  Date First Assessed/Time First Assessed: 03/01/24 1448   Primary Wound Type: Diabetic Ulcer  Location: Toe D1, great  Wound Location Orientation: Left  Wound Outcome: Healed       [REMOVED] Wound 03/18/24 Traumatic Leg Left;Lower (Removed)   Resolved Date: 04/08/24  Date First Assessed/Time First Assessed: 03/18/24 1413   Primary Wound Type: Traumatic  Location: Leg  Wound Location Orientation: Left;Lower       [REMOVED] Wound 03/18/24 Traumatic Leg Anterior;Left;Upper (Removed)   Resolved Date: 04/08/24  Date First Assessed/Time First Assessed: 03/18/24 1413   Primary Wound Type: Traumatic  Location: Leg  Wound Location Orientation: Anterior;Left;Upper       [REMOVED] Wound 03/18/24 Knee Anterior;Right (Removed)   Resolved Date: 04/08/24  Date First Assessed/Time First Assessed: 03/18/24 1413    Location: Knee  Wound Location Orientation: Anterior;Right       [REMOVED] Wound 03/29/24 Skin tear Knee Left (Removed)   Resolved Date: 05/09/24  Date First Assessed: 03/29/24   Present on Original Admission: Yes  Traumatic Wound Type: Skin tear  Location: Knee  Wound Location Orientation: Left  Dressing Status: Clean;Dry;Intact  Wound Outcome: Healed       [REMOVED] Wound 04/15/24 Traumatic Elbow Right (Removed)   Resolved Date: 05/01/24  Date First Assessed: 04/15/24   Primary Wound Type: Traumatic  Location: Elbow  Wound Location Orientation: Right  Wound Outcome: Healed       [REMOVED] Wound 04/15/24 Leg Right (Removed)   Resolved Date: 05/09/24  Date First Assessed: 04/15/24   Location: Leg  Wound Location Orientation: Right  Wound Outcome: Healed       [REMOVED] Wound 04/21/24 Traumatic Skin tear Hand Left (Removed)   Resolved Date: 05/01/24  Date First Assessed: 04/21/24   Primary Wound Type: Traumatic  Traumatic Wound Type: Skin tear  Location: Hand  Wound Location Orientation: Left       Subjective:      .    Patient presents for follow-up of left lower extremity wound.  Has been using Dermagran on the wound and a Tubigrip for light compression.  No increased pain or drainage.  Patient's daughter reports that she is unsure if her dad has been applying the Dermagran the entire time because she does not know if he ran out.  Patient arrived today without a dressing in place as she states that she and her  thought that the wound was closed.        The following portions of the patient's history were reviewed and updated as appropriate: She  has a past medical history of Anemia, Arthritis, Back pain, CHF (congestive heart failure) ([facility]), Chronic kidney disease, Confusion (02/22/2023), Diabetes ([facility]), Diabetes mellitus ([facility]), Diabetic gastroparesis associated with type 2 diabetes mellitus  ([facility]), Diabetic polyneuropathy ([facility]), Diverticulosis, Herpes zoster, Hypertension, IBS (irritable bowel syndrome),  Neurogenic claudication due to lumbar spinal stenosis, Osteoarthritis, Osteoporosis, Pulmonary edema, Raynaud's phenomenon without gangrene, Seronegative arthropathy of multiple sites (HCC), and Sicca (HCC).  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    Chronic kidney disease, stage IV (severe) ([facility]) 05/01/2024    Diabetic nephropathy associated with type 2 diabetes mellitus ([facility]) 05/01/2024    Incontinence of feces with fecal urgency 09/13/2023    Elevated liver enzymes 05/18/2023    Gastroesophageal reflux disease without esophagitis 03/01/2023    Dysphagia 03/01/2023    Raynaud's phenomenon without gangrene 03/01/2023    Elevated LFTs 02/21/2023    Ambulatory dysfunction 02/21/2023    SI (sacroiliac) joint dysfunction 10/18/2022    Spinal stenosis of lumbar region with neurogenic claudication 07/18/2022    Chronic pain syndrome 07/18/2022    Dyslipidemia 07/04/2022    Primary hypertension 07/04/2022    Toxic metabolic encephalopathy 06/24/2022    Cellulitis 06/24/2022    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) 04/18/2022    Chest pain 04/14/2022    Leukocytosis 04/14/2022    Chronic diastolic heart failure (HCC) 04/14/2022    OAB (overactive bladder) 04/14/2022    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    Primary generalized (osteo)arthritis 11/19/2021    Seronegative arthropathy of multiple sites (HCC) 11/19/2021    Senile osteoporosis 11/19/2021    Lumbar spondylosis 11/19/2021    Diabetic polyneuropathy associated with type 2 diabetes mellitus (HCC) 11/19/2021    Diabetic gastroparesis associated with type 2 diabetes mellitus  (HCC) 11/19/2021    Irritable bowel syndrome with diarrhea 11/19/2021    Traumatic injury of sacrum 11/19/2021    Tendinitis of left rotator cuff 11/19/2021    Abnormality of gait due to impairment of balance 11/19/2021    Sicca syndrome (HCC) 11/19/2021    Sinobronchitis 02/14/2020    Iron deficiency anemia  secondary to inadequate dietary iron intake 12/23/2019    Urinary frequency 02/13/2018    Hypomagnesemia 12/22/2016    DM (diabetes mellitus), type 2 (HCC) 12/22/2016    Anemia 12/21/2016    Shortness of breath 12/21/2016     She  reports that she quit smoking about 49 years ago. Her smoking use included cigarettes. She has never used smokeless tobacco. She reports that she does not drink alcohol and does not use drugs.  Current Outpatient Medications   Medication Sig Dispense Refill    amitriptyline (ELAVIL) 10 mg tablet TAKE TWO TABLETS BY MOUTH AT BEDTIME 180 tablet 1    aspirin 81 mg chewable tablet Chew 1 tablet (81 mg total) daily 30 tablet 0    atorvastatin (LIPITOR) 10 mg tablet TAKE ONE TABLET BY MOUTH EVERY DAY 90 tablet 3    Cholecalciferol (VITAMIN D3) 1000 units CAPS Take 1 capsule by mouth daily       dicyclomine (BENTYL) 10 mg capsule Take 1 capsule (10 mg total) by mouth 3 (three) times a day as needed (abd pain) (Patient not taking: Reported on 11/22/2023) 30 capsule 2    diphenoxylate-atropine (LOMOTIL) 2.5-0.025 mg per tablet Take 1 tablet by mouth if needed (Patient not taking: Reported on 5/10/2024)      diphenoxylate-atropine (LOMOTIL) 2.5-0.025 mg per tablet Take 1 tablet by mouth 4 (four) times a day as needed for diarrhea 30 tablet 0    DULoxetine (CYMBALTA) 60 mg delayed release capsule TAKE ONE CAPSULE BY MOUTH ONCE DAILY 30 capsule 5    hydroxychloroquine (PLAQUENIL) 200 mg tablet Take 1 tablet (200 mg total) by mouth daily with breakfast 90 tablet 1    Magnesium 250 MG TABS Take 250 mg by mouth every evening      metFORMIN (GLUCOPHAGE) 500 mg tablet Take 500 mg by mouth 2 (two) times a day (Patient not taking: Reported on 5/10/2024)      metoprolol succinate (TOPROL-XL) 25 mg 24 hr tablet Take 0.5 tablets (12.5 mg total) by mouth daily (Patient not taking: Reported on 6/27/2023) 45 tablet 3    Mirabegron ER (Myrbetriq) 50 MG TB24 Take 1 tablet by mouth daily as directed by physician. 90  tablet 1    neomycin (MYCIFRADIN) 500 mg tablet Take 1 tablet (500 mg total) by mouth 2 (two) times a day for 14 days 28 tablet 0    nitrofurantoin (MACRODANTIN) 50 mg capsule TAKE ONE CAPSULE BY MOUTH EVERY DAY IN THE MORNING 30 capsule 6    omeprazole (PriLOSEC) 20 mg delayed release capsule Take 20 mg by mouth daily      pregabalin (LYRICA) 75 mg capsule TAKE ONE CAPSULE BY MOUTH THREE TIMES A DAY 90 capsule 5    pyridoxine (B-6) 100 MG tablet Take 100 mg by mouth daily      rifaximin (XIFAXAN) 550 mg tablet Take 1 tablet (550 mg total) by mouth every 8 (eight) hours for 14 days 42 tablet 0    Sodium Fluoride (PreviDent 5000 Booster Plus) 1.1 % PSTE Apply on teeth every evening as directed 100 mL 3    torsemide (DEMADEX) 10 mg tablet Take 1 tablet PRN for wt gain 3 lb/ 3 days or 5 lb/ 5 aria (Patient not taking: Reported on 6/27/2023) 30 tablet 0     No current facility-administered medications for this visit.     She is allergic to morphine and ciprofloxacin..    Review of Systems   Constitutional:  Negative for chills and fever.   HENT:  Negative for congestion and sneezing.    Respiratory:  Negative for cough.    Cardiovascular:  Positive for leg swelling.   Musculoskeletal:  Positive for gait problem.   Skin:  Positive for wound.   Psychiatric/Behavioral:  Negative for agitation.          Objective:       Wound 05/09/24 Skin tear Leg Left (Active)   Wound Image Images linked 05/21/24 1352   Wound Description Pink;Granulation tissue 05/21/24 1332   Park-wound Assessment Intact;Scar Tissue 05/21/24 1332   Wound Length (cm) 0.4 cm 05/21/24 1332   Wound Width (cm) 0.2 cm 05/21/24 1332   Wound Depth (cm) 0.1 cm 05/21/24 1332   Wound Surface Area (cm^2) 0.08 cm^2 05/21/24 1332   Wound Volume (cm^3) 0.008 cm^3 05/21/24 1332   Calculated Wound Volume (cm^3) 0.01 cm^3 05/21/24 1332   Change in Wound Size % 75 05/21/24 1332   Drainage Amount Scant 05/21/24 1332   Drainage Description Serous;Yellow 05/21/24 1332    Non-staged Wound Description Full thickness 05/21/24 1332   Dressing Status Intact 05/21/24 1332       /62   Pulse 91   Temp (!) 97.1 °F (36.2 °C)   Resp 18   LMP  (LMP Unknown)     Physical Exam        Wound 05/09/24 Skin tear Leg Left (Active)   Wound Image   05/21/24 1352   Wound Description Pink;Granulation tissue 05/21/24 1332   Park-wound Assessment Intact;Scar Tissue 05/21/24 1332   Wound Length (cm) 0.4 cm 05/21/24 1332   Wound Width (cm) 0.2 cm 05/21/24 1332   Wound Depth (cm) 0.1 cm 05/21/24 1332   Wound Surface Area (cm^2) 0.08 cm^2 05/21/24 1332   Wound Volume (cm^3) 0.008 cm^3 05/21/24 1332   Calculated Wound Volume (cm^3) 0.01 cm^3 05/21/24 1332   Change in Wound Size % 75 05/21/24 1332   Drainage Amount Scant 05/21/24 1332   Drainage Description Serous;Yellow 05/21/24 1332   Non-staged Wound Description Full thickness 05/21/24 1332   Dressing Status Intact 05/21/24 1332                       Wound Instructions:  Orders Placed This Encounter   Procedures    Wound cleansing and dressings Left Leg     Wound location left anterior leg.    Change dressing every other day.    You may remove the dressing and shower. Do not leave wound open to air, apply new dressing immediately.  Cleanse the wound with mild soap and water or normal saline, pat dry.   Apply Dermagran to the wound.  Cover with gauze and tape or a bandaid.          Elastic Tubular Stocking:  Spandagrip size F to left leg.       Tubular elastic bandage: Apply from base of toes to behind the knee. Apply in AM, may remove for sleep.     Avoid prolonged standing in one place.     Elevate leg(s) above the level of the heart when sitting or as much as possible.     Standing Status:   Future     Standing Expiration Date:   6/4/2024    Wound Procedure Treatment Left Leg     This order was created via procedure documentation    Debridement Left Leg     This order was created via procedure documentation        Diagnosis ICD-10-CM Associated  Orders   1. Traumatic open wound of left lower leg, initial encounter  S81.896X lidocaine (LMX) 4 % cream     Wound cleansing and dressings Left Leg     Wound Procedure Treatment Left Leg     Debridement Left Leg

## 2024-05-21 NOTE — PROGRESS NOTES
Wound Procedure Treatment Left Leg    Performed by: Alba Barroso RN  Authorized by: Brittanie Grewal DO    Associated wounds:   Wound 05/09/24 Skin tear Leg Left  Wound cleansed with:  NSS  Applied primary dressing:  Dermagran  Applied secondary dressing:  Other    Covered dermagran with cosmopor

## 2024-05-21 NOTE — TELEPHONE ENCOUNTER
Message left for pt with results and recommendations, lab orders in the system and mailed to pt as well.

## 2024-05-29 ENCOUNTER — CLINICAL SUPPORT (OUTPATIENT)
Age: 89
End: 2024-05-29
Payer: MEDICARE

## 2024-05-29 VITALS
SYSTOLIC BLOOD PRESSURE: 120 MMHG | DIASTOLIC BLOOD PRESSURE: 62 MMHG | WEIGHT: 130.8 LBS | OXYGEN SATURATION: 96 % | BODY MASS INDEX: 26.42 KG/M2 | HEART RATE: 87 BPM | TEMPERATURE: 97.2 F

## 2024-05-29 DIAGNOSIS — M81.0 SENILE OSTEOPOROSIS: Primary | ICD-10-CM

## 2024-05-29 DIAGNOSIS — Z79.60 LONG-TERM USE OF IMMUNOSUPPRESSANT MEDICATION: ICD-10-CM

## 2024-05-29 PROCEDURE — 96372 THER/PROPH/DIAG INJ SC/IM: CPT

## 2024-05-29 NOTE — PROGRESS NOTES
Assessment/Plan:    Tanya Stephen came into the [facility] Office today 05/29/24 to receive Prolia injection.      Verbal consent obtained.  Consent given by: patient    patient states patient has been medically healthy with no underlining concerns/complications.      Tanya Stephen presents with no symptoms today.       All insturctions were reviewed with the patient.    If the patient should have any questions/concerns, advised patient to contacted [facility].       Subjective:     History provided by: patient    Patient ID: Tanya Stephen is a 89 y.o. female      Objective:    Vitals:    05/29/24 1242   BP: 120/62   BP Location: Right arm   Patient Position: Sitting   Cuff Size: Adult   Pulse: 87   Temp: (!) 97.2 °F (36.2 °C)   TempSrc: Temporal   SpO2: 96%   Weight: 59.3 kg (130 lb 12.8 oz)       Patient tolerated the injection well without any complications.  Injection site/s upper right arm.  Medication was provided by office.    Patient signed consent form yes   Patient signed ABN form yes (If no patient is not a medicare patient).   Patient waited 15 minutes after injection no (This only applies to patient's receiving first time injection).       Last Visit: 4/24/2024  Next visit:6/24/2024

## 2024-06-04 ENCOUNTER — OFFICE VISIT (OUTPATIENT)
Dept: WOUND CARE | Facility: HOSPITAL | Age: 89
End: 2024-06-04
Payer: MEDICARE

## 2024-06-04 VITALS
SYSTOLIC BLOOD PRESSURE: 140 MMHG | HEART RATE: 87 BPM | RESPIRATION RATE: 18 BRPM | DIASTOLIC BLOOD PRESSURE: 65 MMHG | TEMPERATURE: 97.3 F

## 2024-06-04 DIAGNOSIS — S81.802A TRAUMATIC OPEN WOUND OF LEFT LOWER LEG, INITIAL ENCOUNTER: Primary | ICD-10-CM

## 2024-06-04 PROCEDURE — 99212 OFFICE O/P EST SF 10 MIN: CPT

## 2024-06-04 NOTE — PATIENT INSTRUCTIONS
Orders Placed This Encounter   Procedures    Wound cleansing and dressings     Continue good skin care!  Thank you for coming to our center.     Standing Status:   Future     Standing Expiration Date:   6/4/2024

## 2024-06-20 ENCOUNTER — APPOINTMENT (OUTPATIENT)
Dept: LAB | Facility: CLINIC | Age: 89
End: 2024-06-20
Payer: MEDICARE

## 2024-06-20 DIAGNOSIS — Z79.60 LONG-TERM USE OF IMMUNOSUPPRESSANT MEDICATION: ICD-10-CM

## 2024-06-20 DIAGNOSIS — M81.0 SENILE OSTEOPOROSIS: ICD-10-CM

## 2024-06-20 LAB
ANION GAP SERPL CALCULATED.3IONS-SCNC: 4 MMOL/L (ref 4–13)
BUN SERPL-MCNC: 28 MG/DL (ref 5–25)
CALCIUM SERPL-MCNC: 9.7 MG/DL (ref 8.4–10.2)
CHLORIDE SERPL-SCNC: 102 MMOL/L (ref 96–108)
CO2 SERPL-SCNC: 34 MMOL/L (ref 21–32)
CREAT SERPL-MCNC: 1.5 MG/DL (ref 0.6–1.3)
GFR SERPL CREATININE-BSD FRML MDRD: 30 ML/MIN/1.73SQ M
GLUCOSE SERPL-MCNC: 178 MG/DL (ref 65–140)
POTASSIUM SERPL-SCNC: 4.6 MMOL/L (ref 3.5–5.3)
SODIUM SERPL-SCNC: 140 MMOL/L (ref 135–147)

## 2024-06-20 PROCEDURE — 36415 COLL VENOUS BLD VENIPUNCTURE: CPT

## 2024-06-20 PROCEDURE — 80048 BASIC METABOLIC PNL TOTAL CA: CPT

## 2024-06-24 ENCOUNTER — OFFICE VISIT (OUTPATIENT)
Age: 89
End: 2024-06-24
Payer: MEDICARE

## 2024-06-24 VITALS
TEMPERATURE: 97.3 F | HEIGHT: 59 IN | HEART RATE: 92 BPM | OXYGEN SATURATION: 98 % | WEIGHT: 124.6 LBS | BODY MASS INDEX: 25.12 KG/M2

## 2024-06-24 DIAGNOSIS — G89.4 CHRONIC PAIN SYNDROME: ICD-10-CM

## 2024-06-24 DIAGNOSIS — M15.0 PRIMARY GENERALIZED (OSTEO)ARTHRITIS: ICD-10-CM

## 2024-06-24 DIAGNOSIS — M81.0 SENILE OSTEOPOROSIS: Primary | ICD-10-CM

## 2024-06-24 DIAGNOSIS — K31.84 DIABETIC GASTROPARESIS ASSOCIATED WITH TYPE 2 DIABETES MELLITUS  (HCC): ICD-10-CM

## 2024-06-24 DIAGNOSIS — N32.81 OAB (OVERACTIVE BLADDER): ICD-10-CM

## 2024-06-24 DIAGNOSIS — M48.062 SPINAL STENOSIS OF LUMBAR REGION WITH NEUROGENIC CLAUDICATION: ICD-10-CM

## 2024-06-24 DIAGNOSIS — N18.32 STAGE 3B CHRONIC KIDNEY DISEASE (HCC): ICD-10-CM

## 2024-06-24 DIAGNOSIS — M06.09 SERONEGATIVE ARTHROPATHY OF MULTIPLE SITES (HCC): ICD-10-CM

## 2024-06-24 DIAGNOSIS — E11.43 DIABETIC GASTROPARESIS ASSOCIATED WITH TYPE 2 DIABETES MELLITUS  (HCC): ICD-10-CM

## 2024-06-24 DIAGNOSIS — M35.00 SICCA SYNDROME (HCC): ICD-10-CM

## 2024-06-24 DIAGNOSIS — Z79.899 ENCOUNTER FOR LONG-TERM (CURRENT) USE OF OTHER MEDICATIONS: ICD-10-CM

## 2024-06-24 DIAGNOSIS — K21.9 GASTROESOPHAGEAL REFLUX DISEASE WITHOUT ESOPHAGITIS: ICD-10-CM

## 2024-06-24 PROCEDURE — 99214 OFFICE O/P EST MOD 30 MIN: CPT | Performed by: INTERNAL MEDICINE

## 2024-06-24 NOTE — PATIENT INSTRUCTIONS
It is extremely important that you drink a significant amount of water every day so that you do not cause worsening of your chronic kidney disease.  Get the lab work for your next visit and Prolia around November 25, 2024 and we will bring you in that following week for your Prolia and your office visit.  Follow-up with all of your physicians as scheduled.  I would suggest you try to use a paraffin wax bath for your hands which she would get on [employer].  You can use that as many times daily as you would like.  That will help your hand pain.

## 2024-06-24 NOTE — PROGRESS NOTES
Assessment/Plan:    Seronegative arthropathy of multiple sites (HCC)  Seronegative arthropathy stable on low-dose Plaquenil 200 mg daily.  No sign of active inflammation or synovitis on exam at this visit.  Continue follow-up with eye doctor every 6 months to monitor for Plaquenil toxicity.  Monitor disease activity and medication toxicity with lab work as ordered.  Check Plaquenil Avise to monitor for therapeutic Plaquenil level and make adjustments in dosing if needed.  Follow-up 6 months or sooner if needed.    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  Suspect her diabetic neuropathy contributes as well.  Encouraged home exercise program with strengthening exercises as instructed by the physical therapist to continue to her home in the past.  Encourage ongoing use of her walker for ambulation assistance and fall prevention.  Patient does have generalized osteoarthritis multiple joints as well as cervical spondylosis with no current radicular symptoms at this time.  Monitor labs for medication toxicity.  Follow-up 6 months or sooner if needed.    Senile osteoporosis  Patient with osteoporosis who continues on Prolia every 6 months.  Her last Prolia injection was 5/29/2024.  She will be due for her next Prolia after 11/30/2024.  Check serum calcium and kidney function 2 weeks prior and 2 weeks after each Prolia injection in view of her low GFR.  Most recent DEXA was March 2018 which included only her forearm in view of extensive back surgery and bilateral total hip arthroplasties.  I do not believe that additional DEXA studies at this time would add benefit in view of this.  She will continue with calcium and vitamin D supplement.  Practice fall  prevention particularly with using her walker for ambulation assistance.  Encourage increasing her hydration in view of chronic kidney disease and fluctuating GFR's based on her water intake.  Follow-up 6 months or sooner if needed.    OAB (overactive bladder)  Continue Myrbetriq for overactive bladder.  This has been of benefit for her.  Follow-up urology.    Spinal stenosis of lumbar region with neurogenic claudication  Severe spinal stenosis with history multilevel extensive laminectomies with fusion, with chronic severe right-sided low back pain.  She did have follow-up with pain management and is status post epidural 5/22/2024 with reasonable success.  Have encouraged her to discuss follow-up epidural with pain management as she did have benefit.  Encourage home TENS unit for symptomatic relief.  She can continue with gentle chiropractic manipulation as tolerated.  Encouraged her to use walker for ambulation assistance to prevent falls in view of her increased risk for fracture.  She did have benefit with home PT for balance and gait disturbance in the recent past.  Could consider additional therapy if symptoms warrant.  Continue to monitor.    Diabetic gastroparesis associated with type 2 diabetes mellitus (HCC)    Lab Results   Component Value Date    HGBA1C 6.6 (H) 03/02/2024   Patient does have history of esophageal dysmotility on prior barium swallow study.  She continues with omeprazole.  Follow-up GI.  Continue to monitor.    Gastroesophageal reflux disease without esophagitis  Documented esophageal dysmotility on barium swallow.  She is cautious with chewing so she does not have symptoms of dysphagia.  Encourage small more frequent meals.  Continue omeprazole.  Follow-up GI.  Continue to monitor.    Sicca syndrome (HCC)  Chronic sicca symptoms treated with PreviDent toothpaste and XyliMelts for xerostomia and eyedrops for dry eyes.  She has been stable with this program.  Encourage vigilant dental  hygiene and dental follow-ups every 4 to 6 months as directed by the dentist.  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  No evidence clinically for lupus.  Continue to monitor serologies if indicated.    Stage 3b chronic kidney disease (HCC)  Lab Results   Component Value Date    EGFR 30 06/20/2024    EGFR 36 05/16/2024    EGFR 28 04/20/2024    CREATININE 1.50 (H) 06/20/2024    CREATININE 1.30 05/16/2024    CREATININE 1.59 (H) 04/20/2024   Kidney disease stage IIIb.  Encourage increase hydration in view of decreased GFR with decrease in hydration.  Avoid nephrotoxic agents like nonsteroidals.  Follow-up with nephrology as scheduled.  Continue to monitor.    Chronic pain syndrome  Continue low-dose Lyrica 75 mg 3 times daily with low-dose Elavil 10 mg nightly.  Encourage daily use of her walker for fall prevention.  Follow-up with pain management for consideration of additional lumbar injections as she had good response with recent injection dated 5/22/2024.  Monitor labs for medication toxicity.  Avoid nonsteroidals in view of her chronic kidney disease.         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  Suspect her diabetic neuropathy contributes as well.  Encouraged home exercise program with strengthening exercises as instructed by the physical therapist to continue to her home in the past.  Encourage ongoing use of her walker for ambulation assistance and fall prevention.  Patient does have generalized osteoarthritis multiple joints as well as cervical spondylosis with no current radicular symptoms at this time.  Monitor labs for  medication toxicity.  Follow-up 6 months or sooner if needed.         Seronegative arthropathy of multiple sites (HCC)     Seronegative arthropathy stable on low-dose Plaquenil 200 mg daily.  No sign of active inflammation or synovitis on exam at this visit.  Continue follow-up with eye doctor every 6 months to monitor for Plaquenil toxicity.  Monitor disease activity and medication toxicity with lab work as ordered.  Check Plaquenil Avise to monitor for therapeutic Plaquenil level and make adjustments in dosing if needed.  Follow-up 6 months or sooner if needed.         Relevant Orders    C-reactive protein    Sedimentation rate, automated    CBC and differential    Comprehensive metabolic panel    Urinalysis with microscopic    MISCELLANEOUS LAB TEST    Senile osteoporosis - Primary     Patient with osteoporosis who continues on Prolia every 6 months.  Her last Prolia injection was 5/29/2024.  She will be due for her next Prolia after 11/30/2024.  Check serum calcium and kidney function 2 weeks prior and 2 weeks after each Prolia injection in view of her low GFR.  Most recent DEXA was March 2018 which included only her forearm in view of extensive back surgery and bilateral total hip arthroplasties.  I do not believe that additional DEXA studies at this time would add benefit in view of this.  She will continue with calcium and vitamin D supplement.  Practice fall prevention particularly with using her walker for ambulation assistance.  Encourage increasing her hydration in view of chronic kidney disease and fluctuating GFR's based on her water intake.  Follow-up 6 months or sooner if needed.         Diabetic gastroparesis associated with type 2 diabetes mellitus  (HCC)       Lab Results   Component Value Date    HGBA1C 6.6 (H) 03/02/2024   Patient does have history of esophageal dysmotility on prior barium swallow study.  She continues with omeprazole.  Follow-up GI.  Continue to monitor.         Stage 3b chronic  kidney disease (HCC) (Chronic)     Lab Results   Component Value Date    EGFR 30 06/20/2024    EGFR 36 05/16/2024    EGFR 28 04/20/2024    CREATININE 1.50 (H) 06/20/2024    CREATININE 1.30 05/16/2024    CREATININE 1.59 (H) 04/20/2024   Kidney disease stage IIIb.  Encourage increase hydration in view of decreased GFR with decrease in hydration.  Avoid nephrotoxic agents like nonsteroidals.  Follow-up with nephrology as scheduled.  Continue to monitor.         Sicca syndrome (HCC)     Chronic sicca symptoms treated with PreviDent toothpaste and XyliMelts for xerostomia and eyedrops for dry eyes.  She has been stable with this program.  Encourage vigilant dental hygiene and dental follow-ups every 4 to 6 months as directed by the dentist.  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  No evidence clinically for lupus.  Continue to monitor serologies if indicated.         OAB (overactive bladder)     Continue Myrbetriq for overactive bladder.  This has been of benefit for her.  Follow-up urology.         Spinal stenosis of lumbar region with neurogenic claudication     Severe spinal stenosis with history multilevel extensive laminectomies with fusion, with chronic severe right-sided low back pain.  She did have follow-up with pain management and is status post epidural 5/22/2024 with reasonable success.  Have encouraged her to discuss follow-up epidural with pain management as she did have benefit.  Encourage home TENS unit for symptomatic relief.  She can continue with gentle chiropractic manipulation as tolerated.  Encouraged her to use walker for ambulation assistance to prevent falls in view of her increased risk for fracture.  She did have benefit with home PT for balance and gait disturbance in the recent past.  Could consider additional therapy if symptoms warrant.  Continue to monitor.         Chronic pain syndrome     Continue low-dose  Lyrica 75 mg 3 times daily with low-dose Elavil 10 mg nightly.  Encourage daily use of her walker for fall prevention.  Follow-up with pain management for consideration of additional lumbar injections as she had good response with recent injection dated 5/22/2024.  Monitor labs for medication toxicity.  Avoid nonsteroidals in view of her chronic kidney disease.         Gastroesophageal reflux disease without esophagitis     Documented esophageal dysmotility on barium swallow.  She is cautious with chewing so she does not have symptoms of dysphagia.  Encourage small more frequent meals.  Continue omeprazole.  Follow-up GI.  Continue to monitor.          Other Visit Diagnoses       Encounter for long-term (current) use of other medications        Relevant Orders    C-reactive protein    Sedimentation rate, automated    CBC and differential    Comprehensive metabolic panel    Urinalysis with microscopic    MISCELLANEOUS LAB TEST                Reviewed records, labs, and imaging with the patient in detail.  Counseled patient.  Discussion regarding my findings and recommendations.  Office visit with documentation 35 min.    Subjective:      Patient ID: Tanya Stephen is a 89 y.o. female.    Patient with primary generalized osteoarthritis with probable seronegative inflammatory component, who has been on Plaquenil since February of 2018, with lumbar spondylosis/stenosis status post revision decompression multiple levels, with chronic neurogenic symptoms.  Type 2 diabetes with neuropathy feet with carpal tunnel syndrome with thenar atrophy, with history improved post herpetic neuralgia pain following H zoster end September 2021.  Chronic pain has been generally stable on Lyrica in divided doses 4 times daily, with chronic and intermittent back pain which can be severe at times, limiting her activities.  She has history of balance difficulties and recurrent falls and has made gains in strength with home PT.  She does  have a walker for daily use, but occasionally uses her cane when out. History of gastroparesis likely related to type 2 diabetes, with irritable bowel with history urgency and diarrhea after eating, but she has improved with the addition of a Probiotic which has helped her irritable bowel symptoms.  Barium swallow significant for esophageal dysmotility.  She continues on increased omeprazole 20 mg twice daily.  She has minimal morning stiffness with chronic pain in particular in her thumbs, and feet.  She has no further heel pain.  She has chronic and persistent nonradicular low back pain with prominent neurogenic symptoms with some referred pain into her right buttock and hip area.  Patient had lumbar epidural 5/22/2024 with some improvement in her back symptoms.  Unfortunately she had a recent fall 2 weeks ago with subsequent large ecchymosis and right-sided back pain.  She was treated by her primary care physician with a Medrol Dosepak with some benefit.  She has chronic dry eyes and dry mouth, hearing loss with tinnitus and dizziness, sleep disturbance with chronic fatigue, anxiety depression, abnormal gait with incoordination, and OAB with urinary frequency, nocturia, urgency, incontinence which have improved with Myrbetriq.  She has urinary incontinence most prominent when she stands up from a sitting position. She has chronic ankle edema.  She has Raynaud's phenomenon with no digital ulcers.  She has complained of sore throat and difficulty swallowing. She has known diastolic dysfunction and follows with Cardiology.  Past medical history otherwise significant for osteoporosis on Prolia, primary Raynaud's, chronic kidney disease, history overactive bladder, hypertension, history of plantar fasciitis secondary to mechanical foot deformities, and prominent 1st CMC joint osteoarthritis bilaterally.  She is on chronic suppression with low-dose Macrodantin 50 mg daily for history of recurrent UTI.  She follows with  urology.  Patient was recently treated with a 14-day course of Xifaxan as well as neomycin for presumed SIBO per GI.  She noted some benefit with the antibiotic therapy.  She has been using CBD cream with benefit.    Medical history significant for hospitalization for urinary tract infection and pneumonia.  She has had recurrent UTIs twice required hospitalization.  UTIs have been quiescent since she has been placed on chronic suppression with low-dose Macrodantin.     Lab work dated 6/20/2024 significant for creatinine 1.50 with estimated GFR 30 slightly lower than prior study.  Glucose 178.  Calcium 9.7.  Review of lab work dated 5/16/2024 significant for CRP less than 1.0.  Alk phos 105.  AST mildly elevated at 43.  ALT 40.  Total bilirubin 0.59.  Glucose 128.  Creatinine 1.30 with estimated GFR 36.  Magnesium 2.0.  Phosphorus 3.4.  Review of lab work dated 12/16/2023 significant for BUN/creatinine 28/1.43 with estimated GFR 32.  Calcium 9.6.  CBC remarkable for MCHC 30.8 with platelet count 162.  Sed rate 19.  CRP less than 1.0.  6/15/2023 significant for urinalysis positive for dipstick leukocytes with no symptoms.  Lab work dated 6/14/2023 significant for CRP less than 1.0.  Sed rate 26.  Creatinine 1.31 with estimated GFR 36.  Calcium 9.3.  Glucose 114.  CBC with elevated eosinophils of 8%.  CÉSAR positive in a dilution of 80 in a cytoplasmic pattern.  Review of lab work dated 2/23/2023 significant for hemoglobin/hematocrit 11.5/34.9.  Platelet count 134 stable.  BUN 28.  Creatinine 1.10.  Glucose 89.  Estimated GFR 44 stable.  Calcium 8.7.      Most recent DEXA dated 03/27/2018 of the forearm with a T-score of-2.7 with an increase of 2.5% as compared to the prior study.    Review of CT scan of the lumbar spine dated 12/21/2021 significant for decompression of the central canal secondary to prior laminectomies infusion.  X-ray pelvis dated 11/19/2021 significant for bilateral total hip  replacement.        Allergies  Allergies   Allergen Reactions    Morphine Other (See Comments)     urinary retention    Ciprofloxacin Rash and Nausea Only       Home Medications    Current Outpatient Medications:     amitriptyline (ELAVIL) 10 mg tablet, TAKE TWO TABLETS BY MOUTH AT BEDTIME, Disp: 180 tablet, Rfl: 1    aspirin 81 mg chewable tablet, Chew 1 tablet (81 mg total) daily, Disp: 30 tablet, Rfl: 0    atorvastatin (LIPITOR) 10 mg tablet, TAKE ONE TABLET BY MOUTH EVERY DAY, Disp: 90 tablet, Rfl: 3    Cholecalciferol (VITAMIN D3) 1000 units CAPS, Take 1 capsule by mouth daily , Disp: , Rfl:     diphenoxylate-atropine (LOMOTIL) 2.5-0.025 mg per tablet, Take 1 tablet by mouth 4 (four) times a day as needed for diarrhea, Disp: 30 tablet, Rfl: 0    DULoxetine (CYMBALTA) 60 mg delayed release capsule, TAKE ONE CAPSULE BY MOUTH ONCE DAILY, Disp: 30 capsule, Rfl: 5    hydroxychloroquine (PLAQUENIL) 200 mg tablet, Take 1 tablet (200 mg total) by mouth daily with breakfast, Disp: 90 tablet, Rfl: 1    Magnesium 250 MG TABS, Take 250 mg by mouth every evening, Disp: , Rfl:     Mirabegron ER (Myrbetriq) 50 MG TB24, Take 1 tablet by mouth daily as directed by physician., Disp: 90 tablet, Rfl: 1    nitrofurantoin (MACRODANTIN) 50 mg capsule, TAKE ONE CAPSULE BY MOUTH EVERY DAY IN THE MORNING, Disp: 30 capsule, Rfl: 6    omeprazole (PriLOSEC) 20 mg delayed release capsule, Take 20 mg by mouth daily, Disp: , Rfl:     pregabalin (LYRICA) 75 mg capsule, TAKE ONE CAPSULE BY MOUTH THREE TIMES A DAY, Disp: 90 capsule, Rfl: 5    pyridoxine (B-6) 100 MG tablet, Take 100 mg by mouth daily, Disp: , Rfl:     Sodium Fluoride (PreviDent 5000 Booster Plus) 1.1 % PSTE, Apply on teeth every evening as directed, Disp: 100 mL, Rfl: 3    dicyclomine (BENTYL) 10 mg capsule, Take 1 capsule (10 mg total) by mouth 3 (three) times a day as needed (abd pain) (Patient not taking: Reported on 11/22/2023), Disp: 30 capsule, Rfl: 2     diphenoxylate-atropine (LOMOTIL) 2.5-0.025 mg per tablet, Take 1 tablet by mouth if needed (Patient not taking: Reported on 5/10/2024), Disp: , Rfl:     metFORMIN (GLUCOPHAGE) 500 mg tablet, Take 500 mg by mouth 2 (two) times a day (Patient not taking: Reported on 5/10/2024), Disp: , Rfl:     metoprolol succinate (TOPROL-XL) 25 mg 24 hr tablet, Take 0.5 tablets (12.5 mg total) by mouth daily (Patient not taking: Reported on 6/27/2023), Disp: 45 tablet, Rfl: 3    torsemide (DEMADEX) 10 mg tablet, Take 1 tablet PRN for wt gain 3 lb/ 3 days or 5 lb/ 5 aria (Patient not taking: Reported on 6/27/2023), Disp: 30 tablet, Rfl: 0    Current Facility-Administered Medications:     denosumab (PROLIA) subcutaneous injection 60 mg, 60 mg, Subcutaneous, Q6 Months, , 60 mg at 05/29/24 1401    Past Medical History  Past Medical History:   Diagnosis Date    Anemia     Arthritis     Back pain     CHF (congestive heart failure) (HCC)     Chronic kidney disease     Stage 3b    Confusion 02/22/2023    Diabetes (HCC)     Diabetes mellitus (HCC)     Diabetic gastroparesis associated with type 2 diabetes mellitus  (HCC)     Diabetic polyneuropathy (HCC)     Diverticulosis     Herpes zoster     Hypertension     IBS (irritable bowel syndrome)     Neurogenic claudication due to lumbar spinal stenosis     Osteoarthritis     Osteoporosis     Pulmonary edema     Raynaud's phenomenon without gangrene     Seronegative arthropathy of multiple sites (HCC)     Sicca (HCC)        Past Surgical History   Past Surgical History:   Procedure Laterality Date    BACK SURGERY      COLONOSCOPY      EGD AND COLONOSCOPY N/A 12/23/2016    Procedure: EGD AND COLONOSCOPY;  Surgeon: Elina Anderson MD;  Location: [facility];  Service:     JOINT REPLACEMENT      bilat knees and hips    OTHER SURGICAL HISTORY      pelvis rods    REPLACEMENT TOTAL KNEE BILATERAL      STOMACH SURGERY      UPPER GASTROINTESTINAL ENDOSCOPY         Family History   Family History   Problem  Relation Age of Onset    Cancer Brother     Diabetes Mother     Hypertension Father     Heart disease Father        The following portions of the patient's history were reviewed and updated as appropriate: allergies, current medications, past family history, past medical history, past social history, past surgical history, and problem list.    Review of Systems   Constitutional:  Negative for chills and fever.   HENT:  Positive for hearing loss and tinnitus. Negative for ear pain and sore throat.    Eyes:  Negative for pain and visual disturbance.   Respiratory:  Negative for cough and shortness of breath.    Cardiovascular:  Negative for chest pain and palpitations.   Gastrointestinal:  Negative for abdominal pain and vomiting.        Chronic gastroparesis.  IBS symptoms improved with leaky gut revive for IBS.  Difficulty swallowing with history of barium swallow significant for esophageal dysmotility .  She continues on omeprazole.   Genitourinary:  Positive for frequency and urgency. Negative for dysuria and hematuria.        Nocturia, frequency, urgency, and urinary stress incontinence  have improved with Myrbetriq for overactive bladder.  Recurrent UTIs on bladder suppression with low-dose Macrodantin.   Musculoskeletal:  Positive for arthralgias, back pain, gait problem, joint swelling, myalgias and neck stiffness.        Raynaud's without digital ulcerations.  Chronic dry eyes and dry mouth.  Ankle edema secondary to venous insufficiency.  Chronic low back pain with neurogenic symptoms  on Lyrica and Elavil.  Chronic thumb arthralgias.  Chronic foot arthralgias.  Neuropathic symptoms stocking distribution.   Skin:  Negative for color change and rash.        History senile purpura extremities.   Neurological:  Positive for dizziness and numbness. Negative for seizures and syncope.        Neuropathic symptoms feet.  Carpal tunnel symptoms hands.  Dizziness intermittent.  It may be exacerbated by her medicines.   "Neuropathic symptoms feet.  Hx zoster neuralgia low back radiating into abdomen severe essentially quiescent.  Abnormal gait with incoordination likely secondary to neuropathy and spinal stenosis. Recurrent falls.   Psychiatric/Behavioral:  Positive for sleep disturbance.         Anxiety. Sleep disturbance with chronic fatigue exacerbated by pain.   All other systems reviewed and are negative.        Objective:      Pulse 92   Temp (!) 97.3 °F (36.3 °C)   Ht 4' 11\" (1.499 m)   Wt 56.5 kg (124 lb 9.6 oz)   LMP  (LMP Unknown)   SpO2 98%   BMI 25.17 kg/m²          Physical Exam  Vitals reviewed.   Constitutional:       Appearance: Normal appearance.   HENT:      Head: Normocephalic.      Nose:      Comments: Nose and throat unremarkable.  Eyes:      Extraocular Movements: Extraocular movements intact.   Neck:      Comments: Without masses, thyromegaly, lymphadenopathy  Cardiovascular:      Rate and Rhythm: Regular rhythm.      Comments: Varicosities lower extremities with trace edema ankles and feet.  Pulmonary:      Breath sounds: Normal breath sounds.   Abdominal:      Palpations: Abdomen is soft.   Musculoskeletal:      Cervical back: Neck supple.      Comments: Neck decreased lateral flexion.  Shoulders left decreased abduction and external rotation with decreased internal rotation with marked tenderness supraspinatus tendon insertion.  Bilateral shoulder crepitus.  Elbows full range of motion.  Wrists decreased range of motion right greater than left with positive Tinel's and chronic thickening bilateral wrists.  Positive Tinel's bilateral elbows right greater than left.  Hands squaring 1st carpometacarpal joints bilaterally, with Heberden's nodes, interossei wasting, thenar atrophy right greater than left, Dupuytren's right greater than left, thickening flexor tendon sheaths right ring finger greater than right middle finger, and deformity wrists.  No synovitis appreciated.  Back dorsal kyphosis with " scoliosis.  Marked spasm noted dorsal and lumbosacral paraspinals.  Straight leg raising negative bilaterally.  Hips full range of motion with patellofemoral crepitus.  Ankles full range of motion.  Valgus deformity bilateral ankles.  Feet tight heel cords.  No synovitis appreciated.   Skin:     General: Skin is warm.      Comments: Senile purpura extremities. Dry skin. Varicosities lower extremities. Raynaud's. No digital ulcerations appreciated.   Neurological:      Comments: Decreased sensation stocking distribution.               This note was written in part using the assistance of the [id number] microphone system. Those portions using this system have been dictated and not read.

## 2024-06-25 NOTE — ASSESSMENT & PLAN NOTE
Severe spinal stenosis with history multilevel extensive laminectomies with fusion, with chronic severe right-sided low back pain.  She did have follow-up with pain management and is status post epidural 5/22/2024 with reasonable success.  Have encouraged her to discuss follow-up epidural with pain management as she did have benefit.  Encourage home TENS unit for symptomatic relief.  She can continue with gentle chiropractic manipulation as tolerated.  Encouraged her to use walker for ambulation assistance to prevent falls in view of her increased risk for fracture.  She did have benefit with home PT for balance and gait disturbance in the recent past.  Could consider additional therapy if symptoms warrant.  Continue to monitor.

## 2024-06-25 NOTE — ASSESSMENT & PLAN NOTE
Documented esophageal dysmotility on barium swallow.  She is cautious with chewing so she does not have symptoms of dysphagia.  Encourage small more frequent meals.  Continue omeprazole.  Follow-up GI.  Continue to monitor.

## 2024-06-25 NOTE — ASSESSMENT & PLAN NOTE
Seronegative arthropathy stable on low-dose Plaquenil 200 mg daily.  No sign of active inflammation or synovitis on exam at this visit.  Continue follow-up with eye doctor every 6 months to monitor for Plaquenil toxicity.  Monitor disease activity and medication toxicity with lab work as ordered.  Check Plaquenil Avise to monitor for therapeutic Plaquenil level and make adjustments in dosing if needed.  Follow-up 6 months or sooner if needed.

## 2024-06-25 NOTE — ASSESSMENT & PLAN NOTE
Lab Results   Component Value Date    EGFR 30 06/20/2024    EGFR 36 05/16/2024    EGFR 28 04/20/2024    CREATININE 1.50 (H) 06/20/2024    CREATININE 1.30 05/16/2024    CREATININE 1.59 (H) 04/20/2024   Kidney disease stage IIIb.  Encourage increase hydration in view of decreased GFR with decrease in hydration.  Avoid nephrotoxic agents like nonsteroidals.  Follow-up with nephrology as scheduled.  Continue to monitor.

## 2024-06-25 NOTE — ASSESSMENT & PLAN NOTE
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  Suspect her diabetic neuropathy contributes as well.  Encouraged home exercise program with strengthening exercises as instructed by the physical therapist to continue to her home in the past.  Encourage ongoing use of her walker for ambulation assistance and fall prevention.  Patient does have generalized osteoarthritis multiple joints as well as cervical spondylosis with no current radicular symptoms at this time.  Monitor labs for medication toxicity.  Follow-up 6 months or sooner if needed.

## 2024-06-25 NOTE — ASSESSMENT & PLAN NOTE
Lab Results   Component Value Date    HGBA1C 6.6 (H) 03/02/2024   Patient does have history of esophageal dysmotility on prior barium swallow study.  She continues with omeprazole.  Follow-up GI.  Continue to monitor.

## 2024-06-25 NOTE — ASSESSMENT & PLAN NOTE
Patient with osteoporosis who continues on Prolia every 6 months.  Her last Prolia injection was 5/29/2024.  She will be due for her next Prolia after 11/30/2024.  Check serum calcium and kidney function 2 weeks prior and 2 weeks after each Prolia injection in view of her low GFR.  Most recent DEXA was March 2018 which included only her forearm in view of extensive back surgery and bilateral total hip arthroplasties.  I do not believe that additional DEXA studies at this time would add benefit in view of this.  She will continue with calcium and vitamin D supplement.  Practice fall prevention particularly with using her walker for ambulation assistance.  Encourage increasing her hydration in view of chronic kidney disease and fluctuating GFR's based on her water intake.  Follow-up 6 months or sooner if needed.

## 2024-06-25 NOTE — ASSESSMENT & PLAN NOTE
Continue low-dose Lyrica 75 mg 3 times daily with low-dose Elavil 10 mg nightly.  Encourage daily use of her walker for fall prevention.  Follow-up with pain management for consideration of additional lumbar injections as she had good response with recent injection dated 5/22/2024.  Monitor labs for medication toxicity.  Avoid nonsteroidals in view of her chronic kidney disease.

## 2024-06-25 NOTE — ASSESSMENT & PLAN NOTE
Chronic sicca symptoms treated with PreviDent toothpaste and XyliMelts for xerostomia and eyedrops for dry eyes.  She has been stable with this program.  Encourage vigilant dental hygiene and dental follow-ups every 4 to 6 months as directed by the dentist.  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  No evidence clinically for lupus.  Continue to monitor serologies if indicated.

## 2024-07-08 ENCOUNTER — APPOINTMENT (EMERGENCY)
Dept: RADIOLOGY | Facility: HOSPITAL | Age: 89
End: 2024-07-08
Payer: MEDICARE

## 2024-07-08 ENCOUNTER — APPOINTMENT (EMERGENCY)
Dept: CT IMAGING | Facility: HOSPITAL | Age: 89
End: 2024-07-08
Payer: MEDICARE

## 2024-07-08 ENCOUNTER — HOSPITAL ENCOUNTER (EMERGENCY)
Facility: HOSPITAL | Age: 89
Discharge: HOME | End: 2024-07-08
Attending: EMERGENCY MEDICINE
Payer: MEDICARE

## 2024-07-08 VITALS
SYSTOLIC BLOOD PRESSURE: 137 MMHG | TEMPERATURE: 98 F | BODY MASS INDEX: 27.02 KG/M2 | DIASTOLIC BLOOD PRESSURE: 67 MMHG | WEIGHT: 134.04 LBS | HEART RATE: 87 BPM | RESPIRATION RATE: 20 BRPM | OXYGEN SATURATION: 94 % | HEIGHT: 59 IN

## 2024-07-08 DIAGNOSIS — M54.9 ACUTE BACK PAIN: Primary | ICD-10-CM
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
## 2024-07-08 DIAGNOSIS — J90 PLEURAL EFFUSION: ICD-10-CM

## 2024-07-08 DIAGNOSIS — R91.1 PULMONARY NODULE: ICD-10-CM

## 2024-07-08 DIAGNOSIS — R03.0 ELEVATED BLOOD PRESSURE READING: ICD-10-CM

## 2024-07-08 DIAGNOSIS — R10.9 ABDOMINAL PAIN: ICD-10-CM

## 2024-07-08 PROCEDURE — 99285 EMERGENCY DEPT VISIT HI MDM: CPT

## 2024-07-08 PROCEDURE — 74176 CT ABD & PELVIS W/O CONTRAST: CPT

## 2024-07-08 PROCEDURE — 70450 CT HEAD/BRAIN W/O DYE: CPT

## 2024-07-08 PROCEDURE — 99285 EMERGENCY DEPT VISIT HI MDM: CPT | Performed by: EMERGENCY MEDICINE

## 2024-07-08 PROCEDURE — 73552 X-RAY EXAM OF FEMUR 2/>: CPT

## 2024-07-08 RX ORDER — ACETAMINOPHEN 325 MG/1
650 TABLET ORAL ONCE
Status: COMPLETED | OUTPATIENT
Start: 2024-07-08 | End: 2024-07-08

## 2024-07-08 RX ORDER — LIDOCAINE 50 MG/G
1 PATCH TOPICAL ONCE
Status: DISCONTINUED | OUTPATIENT
Start: 2024-07-08 | End: 2024-07-08 | Stop reason: HOSPADM

## 2024-07-08 RX ADMIN — LIDOCAINE 5% 1 PATCH: 700 PATCH TOPICAL at 13:57

## 2024-07-08 RX ADMIN — ACETAMINOPHEN 650 MG: 325 TABLET, FILM COATED ORAL at 13:57

## 2024-07-08 NOTE — ED PROVIDER NOTES
"History  Chief Complaint   Patient presents with    Fall     Per  \"she fall in bedroom last week and bruised her right hip and c/o back pain\"  PT \"has bilateral knee and hip replacements, and she is fused from neck to pelvis, we just want to be sure that the hardware isn't disturbed\" PT denies head strike, \"she was on edge of bed and her surekha slipped and she went straight down in her right side\"     Patient is an 89-year-old female, with a history significant for CHF and lumbar fusion per my review of the medical record, who presents to the ED today for evaluation of back pain that began after fall.  Patient states that, 6 days ago, she was on the edge of her bed attempting to get up when she slid off the side of the bed landing on her right hip resulting in sudden onset pain has been constant and worsening since its onset.  There is associated bruising over the right hip.  Patient states she was in her usual state of health before she slipped off the bed.  She denies head strike and patient's , present in room and providing collateral history, confirms that patient did not strike her head.  He also states that she is not confused.  Patient denies any prodromal symptoms prior to the fall.  Movement exacerbates her discomfort but she states she has been able to continue to ambulate.  She attempted applying ice as well as Tylenol, up to 1 g daily, to remit her symptoms.  Patient is without other concerns at this time.        Prior to Admission Medications   Prescriptions Last Dose Informant Patient Reported? Taking?   Cholecalciferol (VITAMIN D3) 1000 units CAPS  Child Yes No   Sig: Take 1 capsule by mouth daily    DULoxetine (CYMBALTA) 60 mg delayed release capsule  Child No No   Sig: TAKE ONE CAPSULE BY MOUTH ONCE DAILY   Magnesium 250 MG TABS  Child Yes No   Sig: Take 250 mg by mouth every evening   Mirabegron ER (Myrbetriq) 50 MG TB24  Child No No   Sig: Take 1 tablet by mouth daily as directed by " physician.   Sodium Fluoride (PreviDent 5000 Booster Plus) 1.1 % PSTE  Child No No   Sig: Apply on teeth every evening as directed   amitriptyline (ELAVIL) 10 mg tablet  Child No No   Sig: TAKE TWO TABLETS BY MOUTH AT BEDTIME   aspirin 81 mg chewable tablet  Child No No   Sig: Chew 1 tablet (81 mg total) daily   atorvastatin (LIPITOR) 10 mg tablet  Child No No   Sig: TAKE ONE TABLET BY MOUTH EVERY DAY   dicyclomine (BENTYL) 10 mg capsule  Child No No   Sig: Take 1 capsule (10 mg total) by mouth 3 (three) times a day as needed (abd pain)   Patient not taking: Reported on 11/22/2023   diphenoxylate-atropine (LOMOTIL) 2.5-0.025 mg per tablet  Child Yes No   Sig: Take 1 tablet by mouth if needed   Patient not taking: Reported on 5/10/2024   diphenoxylate-atropine (LOMOTIL) 2.5-0.025 mg per tablet  Child No No   Sig: Take 1 tablet by mouth 4 (four) times a day as needed for diarrhea   hydroxychloroquine (PLAQUENIL) 200 mg tablet  Child No No   Sig: Take 1 tablet (200 mg total) by mouth daily with breakfast   metFORMIN (GLUCOPHAGE) 500 mg tablet  Child Yes No   Sig: Take 500 mg by mouth 2 (two) times a day   Patient not taking: Reported on 5/10/2024   metoprolol succinate (TOPROL-XL) 25 mg 24 hr tablet  Child No No   Sig: Take 0.5 tablets (12.5 mg total) by mouth daily   Patient not taking: Reported on 6/27/2023   nitrofurantoin (MACRODANTIN) 50 mg capsule  Child No No   Sig: TAKE ONE CAPSULE BY MOUTH EVERY DAY IN THE MORNING   omeprazole (PriLOSEC) 20 mg delayed release capsule  Child Yes No   Sig: Take 20 mg by mouth daily   pregabalin (LYRICA) 75 mg capsule  Child No No   Sig: TAKE ONE CAPSULE BY MOUTH THREE TIMES A DAY   pyridoxine (B-6) 100 MG tablet  Child Yes No   Sig: Take 100 mg by mouth daily   torsemide (DEMADEX) 10 mg tablet  Child No No   Sig: Take 1 tablet PRN for wt gain 3 lb/ 3 days or 5 lb/ 5 aria   Patient not taking: Reported on 6/27/2023      Facility-Administered Medications Last Administration Doses  Remaining   denosumab (PROLIA) subcutaneous injection 60 mg 2024  2:01 PM 1          Past Medical History:   Diagnosis Date    Anemia     Arthritis     Back pain     CHF (congestive heart failure) (HCC)     Chronic kidney disease     Stage 3b    Confusion 2023    Diabetes (HCC)     Diabetes mellitus (HCC)     Diabetic gastroparesis associated with type 2 diabetes mellitus  (HCC)     Diabetic polyneuropathy (HCC)     Diverticulosis     Herpes zoster     Hypertension     IBS (irritable bowel syndrome)     Neurogenic claudication due to lumbar spinal stenosis     Osteoarthritis     Osteoporosis     Pulmonary edema     Raynaud's phenomenon without gangrene     Seronegative arthropathy of multiple sites (HCC)     Sicca (HCC)        Past Surgical History:   Procedure Laterality Date    BACK SURGERY      COLONOSCOPY      EGD AND COLONOSCOPY N/A 2016    Procedure: EGD AND COLONOSCOPY;  Surgeon: Elina Anderson MD;  Location: [facility];  Service:     JOINT REPLACEMENT      bilat knees and hips    OTHER SURGICAL HISTORY      pelvis rods    REPLACEMENT TOTAL KNEE BILATERAL      STOMACH SURGERY      UPPER GASTROINTESTINAL ENDOSCOPY         Family History   Problem Relation Age of Onset    Cancer Brother     Diabetes Mother     Hypertension Father     Heart disease Father      I have reviewed and agree with the history as documented.    E-Cigarette/Vaping    E-Cigarette Use Never User      E-Cigarette/Vaping Substances    Nicotine No     THC No     CBD No     Flavoring No     Other No      Social History     Tobacco Use    Smoking status: Former     Current packs/day: 0.00     Types: Cigarettes     Quit date:      Years since quittin.5    Smokeless tobacco: Never   Vaping Use    Vaping status: Never Used   Substance Use Topics    Alcohol use: No    Drug use: No       Review of Systems   Constitutional:  Negative for fever.   HENT:  Negative for trouble swallowing.    Eyes:  Negative for visual  disturbance.   Respiratory:  Negative for shortness of breath.    Cardiovascular:  Negative for chest pain.   Gastrointestinal:  Negative for abdominal pain.   Endocrine: Negative for polyuria.   Genitourinary:  Negative for dysuria.   Musculoskeletal:  Positive for back pain. Negative for gait problem.   Skin:  Positive for color change.   Allergic/Immunologic: Positive for environmental allergies.   Neurological:  Negative for weakness and numbness.   Hematological:  Negative for adenopathy.   Psychiatric/Behavioral:  Negative for confusion.    All other systems reviewed and are negative.      Physical Exam  Physical Exam  Vitals and nursing note reviewed.   Constitutional:       General: She is not in acute distress.     Appearance: She is not toxic-appearing or diaphoretic.   HENT:      Head: Normocephalic and atraumatic.      Right Ear: External ear normal.      Left Ear: External ear normal.      Nose: Nose normal. No rhinorrhea.      Mouth/Throat:      Mouth: Mucous membranes are moist.      Pharynx: Oropharynx is clear. No oropharyngeal exudate or posterior oropharyngeal erythema.      Comments: Uvula midline. No oropharyngeal or submandibular mass/swelling  Eyes:      General: No scleral icterus.        Right eye: No discharge.         Left eye: No discharge.      Conjunctiva/sclera: Conjunctivae normal.      Pupils: Pupils are equal, round, and reactive to light.   Neck:      Comments: Patient is spontaneously rotating their neck to the left and right during the history and physical exam interaction without difficulty or apparent discomfort    Cardiovascular:      Rate and Rhythm: Normal rate and regular rhythm.      Pulses: Normal pulses.      Heart sounds: Normal heart sounds. No murmur heard.     No friction rub. No gallop.      Comments: 2+ Radial  Pulmonary:      Effort: Pulmonary effort is normal. No respiratory distress.      Breath sounds: Normal breath sounds. No stridor. No wheezing, rhonchi or  rales.   Abdominal:      General: Abdomen is flat. There is no distension.      Palpations: Abdomen is soft.      Tenderness: There is abdominal tenderness (Right lower quadrant abdomen around the area of tenderness over the right hip). There is no right CVA tenderness, left CVA tenderness, guarding or rebound.   Musculoskeletal:         General: Tenderness (Right hip, midline L-spine) present. No deformity.      Cervical back: Neck supple. No tenderness. No muscular tenderness.      Right lower leg: No edema.      Left lower leg: No edema.      Comments: Tenderness to palpation over the lateral right hip as well as the midline lumbar spine    No tenderness to palpation of the bilateral shoulders, elbows, arms, thighs, knees, legs. No chest wall tenderness to palpation     No midline C, T spine tenderness to palpation     Lymphadenopathy:      Cervical: No cervical adenopathy.   Skin:     General: Skin is warm and dry.      Capillary Refill: Capillary refill takes less than 2 seconds.      Findings: Bruising (Right hip) present.      Comments: Bruising over right hip, soft compartments, no pain or proportion to exam, no skin defect   Neurological:      Mental Status: She is alert.      Comments: Patient is speaking clearly in complete sentences.  Patient is answering appropriately and able follow commands.  Patient is moving all four extremities spontaneously.  No facial droop.  Tongue midline.     Intact L5 and S1 motor and sensory function bilaterally.  No saddle anesthesia.  1/4 patellar reflexes bilaterally.   Psychiatric:         Mood and Affect: Mood normal.         Behavior: Behavior normal.         Vital Signs  ED Triage Vitals   Temperature Pulse Respirations Blood Pressure SpO2   07/08/24 1214 07/08/24 1214 07/08/24 1214 07/08/24 1214 07/08/24 1214   98 °F (36.7 °C) 90 16 110/56 96 %      Temp Source Heart Rate Source Patient Position - Orthostatic VS BP Location FiO2 (%)   07/08/24 1214 07/08/24 1214  07/08/24 1241 07/08/24 1214 --   Oral Monitor Lying Right arm       Pain Score       07/08/24 1214       10 - Worst Possible Pain           Vitals:    07/08/24 1300 07/08/24 1330 07/08/24 1400 07/08/24 1430   BP: 132/60 147/63 144/65 137/67   Pulse: 89 90 91 87   Patient Position - Orthostatic VS:             Visual Acuity      ED Medications  Medications   lidocaine (LIDODERM) 5 % patch 1 patch (1 patch Topical Medication Applied 7/8/24 1357)   acetaminophen (TYLENOL) tablet 650 mg (650 mg Oral Given 7/8/24 1357)       Diagnostic Studies  Results Reviewed       None                   CT abdomen pelvis wo contrast   Final Result by Jhoana Irvin MD (07/08 1447)   Small bilateral effusion with mild interlobular septal thickening suggest mild congestion      New nodular opacity left lingular region measuring about 2.3 cm, suggest evaluation with PET scan for further evaluation and characterization      No free fluid in the abdomen to suggest any significant visceral injury   Posterior lumbar fusion with transpedicular screws extending from T10-S1      An immediate finding notification has been created            Workstation performed: [id number]         CT head without contrast   Final Result by Jhoana Irvin MD (07/08 1448)      No acute intracranial hemorrhage seen   No mass effect or midline shift seen                  Workstation performed: [id number]         CT recon only lumbar spine (No Charge)   Final Result by Lester Mejia MD (07/08 1458)      Extensive postoperative and spondylotic changes without acute fracture.            Workstation performed: [id number]         XR femur 2 views RIGHT   ED Interpretation by Mihir Sesay MD (07/08 1531)   Per my independent interpretation: No acute osseous abnormality                 Procedures  Procedures         ED Course  ED Course as of 07/08/24 1702   Mon Jul 08, 2024   1541 On reevaluation, patient reports significant improvement in pain and  feels comfortable to go home/wants to go home at this time.  No tenderness to palpation of the abdomen.  Patient able to ambulate using a walker.  Results reviewed with patient and her  and questions answered to their satisfaction.  They both understand need for close follow-up of effusions and pulmonary nodule with further imaging.  Patient continues to deny dyspnea at this time. Patient has neither questions nor concerns after receiving discharge instructions and return precautions.  Patient advised to call her PCP tomorrow regarding the pulmonary nodule and recommendation for PET scan.  Patient and  expressed verbal understanding of this                               SBIRT 20yo+      Flowsheet Row Most Recent Value   Initial Alcohol Screen: US AUDIT-C     1. How often do you have a drink containing alcohol? 0 Filed at: 07/08/2024 1250   2. How many drinks containing alcohol do you have on a typical day you are drinking?  0 Filed at: 07/08/2024 1250   3a. Male UNDER 65: How often do you have five or more drinks on one occasion? 0 Filed at: 07/08/2024 1250   3b. FEMALE Any Age, or MALE 65+: How often do you have 4 or more drinks on one occassion? 0 Filed at: 07/08/2024 1250   Audit-C Score 0 Filed at: 07/08/2024 1250   XIOMY: How many times in the past year have you...    Used an illegal drug or used a prescription medication for non-medical reasons? Never Filed at: 07/08/2024 1250                      Medical Decision Making  Patient is an 89-year-old female, with a history significant for CHF and lumbar fusion per my review of the medical record, who presents to the ED today for evaluation of back pain that began after fall.  Patient states that, 6 days ago, she was on the edge of her bed attempting to get up when she slid off the side of the bed landing on her right hip resulting in sudden onset pain has been constant and worsening since its onset.  There is associated bruising over the right hip.   Patient states she was in her usual state of health before she slipped off the bed.  She denies head strike and patient's , present in room and providing collateral history, confirms that patient did not strike her head.  He also states that she is not confused.  Patient denies any prodromal symptoms prior to the fall.  Movement exacerbates her discomfort but she states she has been able to continue to ambulate.  She attempted applying ice as well as Tylenol, up to 1 g daily, to remit her symptoms.  Patient is currently afebrile and hemodynamically stable.  Her physical exam is notable for no distal lower extremity focal neurodeficit, clear heart lungs, soft abdomen tenderness to palpation in the lateral right lower quadrant near the hip with overlying bruising and tenderness to palpation.  Her exam is also notable for midline spinous process tenderness to palpation.  This presentation is concerning for: Fracture, sprain, strain, contusion, intra-abdominal injury, mechanical fall.  ICH also considered.  No reason to suspect cauda equina, conus medullaris, arrhythmia, syncope based upon history physical exam.  Will investigate with CT head, abdomen pelvis with lumbar recon, x-ray femur.  Will manage with multimodal analgesia and further based on workup.    Amount and/or Complexity of Data Reviewed  Radiology: ordered and independent interpretation performed.    Risk  OTC drugs.  Prescription drug management.             Disposition  Final diagnoses:   Pulmonary nodule   Pleural effusion   Acute back pain   Fall, initial encounter   Abdominal pain   Elevated blood pressure reading     Time reflects when diagnosis was documented in both MDM as applicable and the Disposition within this note       Time User Action Codes Description Comment    7/8/2024  3:32 PM Mihir Sesay [R91.1] Pulmonary nodule     7/8/2024  3:32 PM Mihir Sesay Add [J90] Pleural effusion     7/8/2024  3:32 PM Shama,  Mihir CANALES Add [M54.9] Acute back pain     7/8/2024  3:32 PM Mihir Sesay Add [W19.XXXA] Fall, initial encounter     7/8/2024  3:32 PM Mihir Sesay Add [R10.9] Abdominal pain     7/8/2024  3:32 PM Mihir Sesay Add [R03.0] Elevated blood pressure reading     7/8/2024  3:43 PM Mihir Sesay Modify [R91.1] Pulmonary nodule     7/8/2024  3:43 PM Mihir Sesay Modify [M54.9] Acute back pain           ED Disposition       ED Disposition   Discharge    Condition   Stable    Date/Time   Mon Jul 8, 2024 9824    Comment   Tanya Stephen discharge to home/self care.                   Follow-up Information       Follow up With Specialties Details Why Contact Info    Gregory Santoro DO Family Medicine Schedule an appointment as soon as possible for a visit   [address] [place] [address]  [phone]              Discharge Medication List as of 7/8/2024  3:44 PM        CONTINUE these medications which have NOT CHANGED    Details   amitriptyline (ELAVIL) 10 mg tablet TAKE TWO TABLETS BY MOUTH AT BEDTIME, Normal      aspirin 81 mg chewable tablet Chew 1 tablet (81 mg total) daily, Starting Fri 4/15/2022, No Print      atorvastatin (LIPITOR) 10 mg tablet TAKE ONE TABLET BY MOUTH EVERY DAY, Normal      Cholecalciferol (VITAMIN D3) 1000 units CAPS Take 1 capsule by mouth daily , Historical Med      dicyclomine (BENTYL) 10 mg capsule Take 1 capsule (10 mg total) by mouth 3 (three) times a day as needed (abd pain), Starting Wed 9/13/2023, Normal      !! diphenoxylate-atropine (LOMOTIL) 2.5-0.025 mg per tablet Take 1 tablet by mouth if needed, Historical Med      !! diphenoxylate-atropine (LOMOTIL) 2.5-0.025 mg per tablet Take 1 tablet by mouth 4 (four) times a day as needed for diarrhea, Starting Wed 9/13/2023, Normal      DULoxetine (CYMBALTA) 60 mg delayed release capsule TAKE ONE CAPSULE BY MOUTH ONCE DAILY, Starting Mon 4/22/2024, Normal      hydroxychloroquine (PLAQUENIL)  200 mg tablet Take 1 tablet (200 mg total) by mouth daily with breakfast, Starting Tue 1/30/2024, Until Sun 7/28/2024, Normal      Magnesium 250 MG TABS Take 250 mg by mouth every evening, Historical Med      metFORMIN (GLUCOPHAGE) 500 mg tablet Take 500 mg by mouth 2 (two) times a day, Starting Mon 1/23/2023, Historical Med      metoprolol succinate (TOPROL-XL) 25 mg 24 hr tablet Take 0.5 tablets (12.5 mg total) by mouth daily, Starting Fri 8/26/2022, Normal      Mirabegron ER (Myrbetriq) 50 MG TB24 Take 1 tablet by mouth daily as directed by physician., Normal      nitrofurantoin (MACRODANTIN) 50 mg capsule TAKE ONE CAPSULE BY MOUTH EVERY DAY IN THE MORNING, Starting Mon 1/15/2024, Normal      omeprazole (PriLOSEC) 20 mg delayed release capsule Take 20 mg by mouth daily, Historical Med      pregabalin (LYRICA) 75 mg capsule TAKE ONE CAPSULE BY MOUTH THREE TIMES A DAY, Starting Thu 4/4/2024, Normal      pyridoxine (B-6) 100 MG tablet Take 100 mg by mouth daily, Historical Med      Sodium Fluoride (PreviDent 5000 Booster Plus) 1.1 % PSTE Apply on teeth every evening as directed, Normal      torsemide (DEMADEX) 10 mg tablet Take 1 tablet PRN for wt gain 3 lb/ 3 days or 5 lb/ 5 aria, No Print       !! - Potential duplicate medications found. Please discuss with provider.              PDMP Review       None            ED Provider  Electronically Signed by             Mihir Sesay MD  07/08/24 8517

## 2024-07-08 NOTE — DISCHARGE INSTRUCTIONS
You were evaluated in the emergency department for: back pain. You can access your current and pending results through [facility]. A radiologist will take a second look at your X-Rays, if you had any, and you will be contacted with any new findings.     You should follow-up with your primary care provider, as soon as possible, for re-evaluation.  If you do not have a primary care provider, I have referred you to [facility]. You will be contacted about scheduling an appointment. Their phone number is also included on this paperwork.     Your workup revealed no emergent features at this time; however, many disease processes are dynamic:    Please, return to the emergency department if you experience new or worsening symptoms, fever, chest pain, shortness of breath, difficulty breathing, dizziness, abdominal pain, persistent nausea/vomiting, syncope or passing out, blood in your urine or stool, coughing up blood, leg swelling/pain, urinary retention, bowel or bladder incontinence, numbness between your legs.    Additionally, your blood pressure was measured to be high. This is something that you should discuss with your primary care provider and have re-checked within one week.      You may take 650mg of tylenol every four to six hours, not exceeding 3,000mg daily, for the management of your discomfort.     CT recon only lumbar spine (No Charge)    Result Date: 7/8/2024  Impression: Extensive postoperative and spondylotic changes without acute fracture. Workstation performed: [id number]     CT head without contrast    Result Date: 7/8/2024  Impression: No acute intracranial hemorrhage seen No mass effect or midline shift seen Workstation performed: [id number]     CT abdomen pelvis wo contrast    Result Date: 7/8/2024  Impression: Small bilateral effusion with mild interlobular septal thickening suggest mild congestion New nodular opacity left lingular region measuring about 2.3 cm, suggest  evaluation with PET scan for further evaluation and characterization No free fluid in the abdomen to suggest any significant visceral injury Posterior lumbar fusion with transpedicular screws extending from T10-S1 An immediate finding notification has been created Workstation performed: [id number]

## 2024-07-09 ENCOUNTER — TELEPHONE (OUTPATIENT)
Dept: PHYSICAL THERAPY | Facility: OTHER | Age: 89
End: 2024-07-09

## 2024-07-09 NOTE — TELEPHONE ENCOUNTER
Call placed to the patient per [facility] referral.    Spoke with the patient who states she took her medication and is feeling much better. Pt was instructed to call [facility] back if her pain doesn't fully go away or if it returns    [facility] closed

## 2024-07-30 ENCOUNTER — HOSPITAL ENCOUNTER (INPATIENT)
Facility: HOSPITAL | Age: 89
LOS: 5 days | Discharge: HOME | DRG: 291 | End: 2024-08-04
Attending: EMERGENCY MEDICINE | Admitting: HOSPITALIST
Payer: MEDICARE

## 2024-07-30 ENCOUNTER — APPOINTMENT (EMERGENCY)
Dept: CT IMAGING | Facility: HOSPITAL | Age: 89
DRG: 291 | End: 2024-07-30
Payer: MEDICARE

## 2024-07-30 ENCOUNTER — APPOINTMENT (EMERGENCY)
Dept: RADIOLOGY | Facility: HOSPITAL | Age: 89
DRG: 291 | End: 2024-07-30
Payer: MEDICARE

## 2024-07-30 ENCOUNTER — TELEPHONE (OUTPATIENT)
Age: 89
End: 2024-07-30

## 2024-07-30 DIAGNOSIS — R26.2 AMBULATORY DYSFUNCTION: ICD-10-CM

## 2024-07-30 DIAGNOSIS — M48.062 SPINAL STENOSIS OF LUMBAR REGION WITH NEUROGENIC CLAUDICATION: ICD-10-CM

## 2024-07-30 DIAGNOSIS — M81.0 SENILE OSTEOPOROSIS: ICD-10-CM

## 2024-07-30 DIAGNOSIS — I50.33 ACUTE ON CHRONIC DIASTOLIC CHF (CONGESTIVE HEART FAILURE) (HCC): ICD-10-CM

## 2024-07-30 DIAGNOSIS — J90 PLEURAL EFFUSION: ICD-10-CM

## 2024-07-30 DIAGNOSIS — R13.10 DYSPHAGIA, UNSPECIFIED TYPE: ICD-10-CM

## 2024-07-30 DIAGNOSIS — J91.8 PLEURAL EFFUSION ASSOCIATED WITH PULMONARY INFECTION: ICD-10-CM

## 2024-07-30 DIAGNOSIS — M15.0 PRIMARY GENERALIZED (OSTEO)ARTHRITIS: Primary | ICD-10-CM

## 2024-07-30 DIAGNOSIS — J18.9 PLEURAL EFFUSION ASSOCIATED WITH PULMONARY INFECTION: ICD-10-CM

## 2024-07-30 DIAGNOSIS — J81.1 PULMONARY EDEMA: Primary | ICD-10-CM

## 2024-07-30 DIAGNOSIS — R29.898 MUSCULAR DECONDITIONING: ICD-10-CM

## 2024-07-30 DIAGNOSIS — R26.89 BALANCE PROBLEM: ICD-10-CM

## 2024-07-30 LAB
2HR DELTA HS TROPONIN: -2 NG/L
ALBUMIN SERPL BCG-MCNC: 3.4 G/DL (ref 3.5–5)
ALP SERPL-CCNC: 100 U/L (ref 34–104)
ALT SERPL W P-5'-P-CCNC: 27 U/L (ref 7–52)
ANION GAP SERPL CALCULATED.3IONS-SCNC: 8 MMOL/L (ref 4–13)
ANISOCYTOSIS BLD QL SMEAR: PRESENT
AST SERPL W P-5'-P-CCNC: 36 U/L (ref 13–39)
BASOPHILS # BLD AUTO: 0.03 THOUSANDS/ÂΜL (ref 0–0.1)
BASOPHILS NFR BLD AUTO: 1 % (ref 0–1)
BILIRUB SERPL-MCNC: 0.79 MG/DL (ref 0.2–1)
BNP SERPL-MCNC: 213 PG/ML (ref 0–100)
BUN SERPL-MCNC: 21 MG/DL (ref 5–25)
CALCIUM ALBUM COR SERPL-MCNC: 9.3 MG/DL (ref 8.3–10.1)
CALCIUM SERPL-MCNC: 8.8 MG/DL (ref 8.4–10.2)
CARDIAC TROPONIN I PNL SERPL HS: 15 NG/L
CARDIAC TROPONIN I PNL SERPL HS: 17 NG/L
CHLORIDE SERPL-SCNC: 106 MMOL/L (ref 96–108)
CO2 SERPL-SCNC: 26 MMOL/L (ref 21–32)
CREAT SERPL-MCNC: 1.26 MG/DL (ref 0.6–1.3)
EOSINOPHIL # BLD AUTO: 0.19 THOUSAND/ÂΜL (ref 0–0.61)
EOSINOPHIL NFR BLD AUTO: 3 % (ref 0–6)
ERYTHROCYTE [DISTWIDTH] IN BLOOD BY AUTOMATED COUNT: 15.8 % (ref 11.6–15.1)
EST. AVERAGE GLUCOSE BLD GHB EST-MCNC: 171 MG/DL
GFR SERPL CREATININE-BSD FRML MDRD: 37 ML/MIN/1.73SQ M
GLUCOSE SERPL-MCNC: 136 MG/DL (ref 65–140)
HBA1C MFR BLD: 7.6 %
HCT VFR BLD AUTO: 39.4 % (ref 34.8–46.1)
HGB BLD-MCNC: 12.5 G/DL (ref 11.5–15.4)
IMM GRANULOCYTES # BLD AUTO: 0.04 THOUSAND/UL (ref 0–0.2)
IMM GRANULOCYTES NFR BLD AUTO: 1 % (ref 0–2)
LYMPHOCYTES # BLD AUTO: 1.02 THOUSANDS/ÂΜL (ref 0.6–4.47)
LYMPHOCYTES NFR BLD AUTO: 17 % (ref 14–44)
MAGNESIUM SERPL-MCNC: 1.7 MG/DL (ref 1.9–2.7)
MCH RBC QN AUTO: 29.8 PG (ref 26.8–34.3)
MCHC RBC AUTO-ENTMCNC: 31.7 G/DL (ref 31.4–37.4)
MCV RBC AUTO: 94 FL (ref 82–98)
MONOCYTES # BLD AUTO: 0.73 THOUSAND/ÂΜL (ref 0.17–1.22)
MONOCYTES NFR BLD AUTO: 12 % (ref 4–12)
NEUTROPHILS # BLD AUTO: 4.07 THOUSANDS/ÂΜL (ref 1.85–7.62)
NEUTS SEG NFR BLD AUTO: 66 % (ref 43–75)
NRBC BLD AUTO-RTO: 0 /100 WBCS
PLATELET # BLD AUTO: 136 THOUSANDS/UL (ref 149–390)
PLATELET BLD QL SMEAR: ADEQUATE
PMV BLD AUTO: 11.1 FL (ref 8.9–12.7)
POIKILOCYTOSIS BLD QL SMEAR: PRESENT
POLYCHROMASIA BLD QL SMEAR: PRESENT
POTASSIUM SERPL-SCNC: 3.7 MMOL/L (ref 3.5–5.3)
PROT SERPL-MCNC: 6.4 G/DL (ref 6.4–8.4)
RBC # BLD AUTO: 4.19 MILLION/UL (ref 3.81–5.12)
RBC MORPH BLD: PRESENT
SODIUM SERPL-SCNC: 140 MMOL/L (ref 135–147)
TSH SERPL DL<=0.05 MIU/L-ACNC: 5.14 UIU/ML (ref 0.45–4.5)
WBC # BLD AUTO: 6.08 THOUSAND/UL (ref 4.31–10.16)

## 2024-07-30 PROCEDURE — 36415 COLL VENOUS BLD VENIPUNCTURE: CPT | Performed by: EMERGENCY MEDICINE

## 2024-07-30 PROCEDURE — 84439 ASSAY OF FREE THYROXINE: CPT

## 2024-07-30 PROCEDURE — 71275 CT ANGIOGRAPHY CHEST: CPT

## 2024-07-30 PROCEDURE — 80053 COMPREHEN METABOLIC PANEL: CPT | Performed by: EMERGENCY MEDICINE

## 2024-07-30 PROCEDURE — 84484 ASSAY OF TROPONIN QUANT: CPT | Performed by: EMERGENCY MEDICINE

## 2024-07-30 PROCEDURE — 71045 X-RAY EXAM CHEST 1 VIEW: CPT

## 2024-07-30 PROCEDURE — 85025 COMPLETE CBC W/AUTO DIFF WBC: CPT | Performed by: EMERGENCY MEDICINE

## 2024-07-30 PROCEDURE — 99285 EMERGENCY DEPT VISIT HI MDM: CPT | Performed by: EMERGENCY MEDICINE

## 2024-07-30 PROCEDURE — 99285 EMERGENCY DEPT VISIT HI MDM: CPT

## 2024-07-30 PROCEDURE — 83880 ASSAY OF NATRIURETIC PEPTIDE: CPT | Performed by: EMERGENCY MEDICINE

## 2024-07-30 PROCEDURE — 83735 ASSAY OF MAGNESIUM: CPT

## 2024-07-30 PROCEDURE — 99223 1ST HOSP IP/OBS HIGH 75: CPT | Performed by: INTERNAL MEDICINE

## 2024-07-30 PROCEDURE — 83036 HEMOGLOBIN GLYCOSYLATED A1C: CPT

## 2024-07-30 PROCEDURE — 93005 ELECTROCARDIOGRAM TRACING: CPT

## 2024-07-30 PROCEDURE — 84443 ASSAY THYROID STIM HORMONE: CPT

## 2024-07-30 RX ORDER — OXYBUTYNIN CHLORIDE 5 MG/1
10 TABLET, EXTENDED RELEASE ORAL DAILY
Status: DISCONTINUED | OUTPATIENT
Start: 2024-07-31 | End: 2024-08-04 | Stop reason: HOSPADM

## 2024-07-30 RX ORDER — AMITRIPTYLINE HYDROCHLORIDE 10 MG/1
20 TABLET, FILM COATED ORAL
Status: DISCONTINUED | OUTPATIENT
Start: 2024-07-30 | End: 2024-08-04 | Stop reason: HOSPADM

## 2024-07-30 RX ORDER — HYDRALAZINE HYDROCHLORIDE 20 MG/ML
5 INJECTION INTRAMUSCULAR; INTRAVENOUS EVERY 6 HOURS PRN
Status: DISCONTINUED | OUTPATIENT
Start: 2024-07-30 | End: 2024-08-04 | Stop reason: HOSPADM

## 2024-07-30 RX ORDER — DULOXETIN HYDROCHLORIDE 60 MG/1
60 CAPSULE, DELAYED RELEASE ORAL DAILY
Status: DISCONTINUED | OUTPATIENT
Start: 2024-07-31 | End: 2024-08-04 | Stop reason: HOSPADM

## 2024-07-30 RX ORDER — PYRIDOXINE HCL (VITAMIN B6) 50 MG
50 TABLET ORAL DAILY
Status: DISCONTINUED | OUTPATIENT
Start: 2024-07-31 | End: 2024-08-04 | Stop reason: HOSPADM

## 2024-07-30 RX ORDER — FUROSEMIDE 10 MG/ML
40 INJECTION INTRAMUSCULAR; INTRAVENOUS
Status: DISCONTINUED | OUTPATIENT
Start: 2024-07-31 | End: 2024-08-01

## 2024-07-30 RX ORDER — ENOXAPARIN SODIUM 100 MG/ML
30 INJECTION SUBCUTANEOUS DAILY
Status: DISCONTINUED | OUTPATIENT
Start: 2024-07-31 | End: 2024-08-04 | Stop reason: HOSPADM

## 2024-07-30 RX ORDER — LANOLIN ALCOHOL/MO/W.PET/CERES
400 CREAM (GRAM) TOPICAL DAILY
Status: DISCONTINUED | OUTPATIENT
Start: 2024-07-31 | End: 2024-08-04 | Stop reason: HOSPADM

## 2024-07-30 RX ORDER — PREGABALIN 100 MG/1
100 CAPSULE ORAL 3 TIMES DAILY
Status: DISCONTINUED | OUTPATIENT
Start: 2024-07-30 | End: 2024-08-04 | Stop reason: HOSPADM

## 2024-07-30 RX ORDER — PANTOPRAZOLE SODIUM 40 MG/1
40 TABLET, DELAYED RELEASE ORAL
Status: DISCONTINUED | OUTPATIENT
Start: 2024-07-31 | End: 2024-08-04 | Stop reason: HOSPADM

## 2024-07-30 RX ORDER — DIPHENOXYLATE HYDROCHLORIDE AND ATROPINE SULFATE 2.5; .025 MG/1; MG/1
1 TABLET ORAL 4 TIMES DAILY PRN
Status: DISCONTINUED | OUTPATIENT
Start: 2024-07-30 | End: 2024-08-04 | Stop reason: HOSPADM

## 2024-07-30 RX ORDER — ATORVASTATIN CALCIUM 10 MG/1
10 TABLET, FILM COATED ORAL DAILY
Status: DISCONTINUED | OUTPATIENT
Start: 2024-07-31 | End: 2024-08-04 | Stop reason: HOSPADM

## 2024-07-30 RX ORDER — ASPIRIN 81 MG/1
81 TABLET, CHEWABLE ORAL DAILY
Status: DISCONTINUED | OUTPATIENT
Start: 2024-07-31 | End: 2024-08-04 | Stop reason: HOSPADM

## 2024-07-30 RX ADMIN — IOHEXOL 85 ML: 350 INJECTION, SOLUTION INTRAVENOUS at 14:14

## 2024-07-30 RX ADMIN — PREGABALIN 100 MG: 100 CAPSULE ORAL at 22:31

## 2024-07-30 RX ADMIN — AMITRIPTYLINE HYDROCHLORIDE 20 MG: 10 TABLET, FILM COATED ORAL at 22:32

## 2024-07-30 NOTE — ASSESSMENT & PLAN NOTE
Patient's  endorses a fall cough patient about 2 weeks ago.  According to him she also has been having trouble walking with her walker.  Patient had some improvement with rehab in the past  PT, OT consulted for rehab recommendations.

## 2024-07-30 NOTE — ASSESSMENT & PLAN NOTE
Patient was on torsemide and metoprolol at home in the past, however she has not been taking these medications since more than a year now.  Does not check blood pressure at home.  BP readings have been elevated during the hospital stay so for.  Patient already started on furosemide.  Will consider adding additional agent in case blood pressures are still elevated with furosemide.  Hydralazine as needed 5 mg for SBP more than 160.

## 2024-07-30 NOTE — ED PROVIDER NOTES
History  Chief Complaint   Patient presents with    Shortness of Breath     Pt reports SOB since yesterday worse when tries to go to bed. Pt also has complained of left CP since being on vacation in [place]     89-year-old female with past medical history of diabetes, anemia, CKD, and CHF presents for 1 day of shortness of breath.  Patient reports that she laid down for bed last night is beginning to have mild difficulty breathing.  Persisted overnight causing her to seek treatment today.  Daughter in room to aid in history taking.  Reports that patient was told she had CHF in the past but has not received any treatment for it.  Does not follow with cardiology.  Patient currently asymptomatic in room.  Denies headache, vision change, chest pain, abdominal pain, nausea, vomiting, diarrhea.  Patient reports recently returning from vacation to [place].          Prior to Admission Medications   Prescriptions Last Dose Informant Patient Reported? Taking?   Cholecalciferol (VITAMIN D3) 1000 units CAPS 7/29/2024 Child Yes Yes   Sig: Take 1 capsule by mouth daily    DULoxetine (CYMBALTA) 60 mg delayed release capsule 7/29/2024 Child No Yes   Sig: TAKE ONE CAPSULE BY MOUTH ONCE DAILY   Magnesium 250 MG TABS 7/29/2024 Child Yes Yes   Sig: Take 250 mg by mouth every evening   Mirabegron ER (Myrbetriq) 50 MG TB24 7/29/2024 Child No Yes   Sig: Take 1 tablet by mouth daily as directed by physician.   Sodium Fluoride (PreviDent 5000 Booster Plus) 1.1 % PSTE Not Taking Child No No   Sig: Apply on teeth every evening as directed   Patient not taking: Reported on 7/30/2024   amitriptyline (ELAVIL) 10 mg tablet 7/29/2024 Child No Yes   Sig: TAKE TWO TABLETS BY MOUTH AT BEDTIME   aspirin 81 mg chewable tablet 7/29/2024 Child No Yes   Sig: Chew 1 tablet (81 mg total) daily   atorvastatin (LIPITOR) 10 mg tablet 7/29/2024 Child No Yes   Sig: TAKE ONE TABLET BY MOUTH EVERY DAY   dicyclomine (BENTYL) 10 mg capsule Not Taking Child No No    Sig: Take 1 capsule (10 mg total) by mouth 3 (three) times a day as needed (abd pain)   Patient not taking: Reported on 7/30/2024   diphenoxylate-atropine (LOMOTIL) 2.5-0.025 mg per tablet Not Taking Child Yes No   Sig: Take 1 tablet by mouth if needed   Patient not taking: Reported on 5/10/2024   diphenoxylate-atropine (LOMOTIL) 2.5-0.025 mg per tablet Past Month Child No Yes   Sig: Take 1 tablet by mouth 4 (four) times a day as needed for diarrhea   hydroxychloroquine (PLAQUENIL) 200 mg tablet  Child No No   Sig: Take 1 tablet (200 mg total) by mouth daily with breakfast   metFORMIN (GLUCOPHAGE) 500 mg tablet Not Taking Child Yes No   Sig: Take 500 mg by mouth 2 (two) times a day   Patient not taking: Reported on 5/10/2024   metoprolol succinate (TOPROL-XL) 25 mg 24 hr tablet Not Taking Child No No   Sig: Take 0.5 tablets (12.5 mg total) by mouth daily   Patient not taking: Reported on 6/27/2023   nitrofurantoin (MACRODANTIN) 50 mg capsule 7/29/2024 Child No Yes   Sig: TAKE ONE CAPSULE BY MOUTH EVERY DAY IN THE MORNING   omeprazole (PriLOSEC) 20 mg delayed release capsule 7/29/2024 Child Yes Yes   Sig: Take 20 mg by mouth daily   pregabalin (LYRICA) 75 mg capsule 7/29/2024 Child No Yes   Sig: TAKE ONE CAPSULE BY MOUTH THREE TIMES A DAY   Patient taking differently: Take 100 mg by mouth 3 (three) times a day   pyridoxine (B-6) 100 MG tablet 7/29/2024 Child Yes Yes   Sig: Take 100 mg by mouth daily   torsemide (DEMADEX) 10 mg tablet Not Taking Child No No   Sig: Take 1 tablet PRN for wt gain 3 lb/ 3 days or 5 lb/ 5 aria   Patient not taking: Reported on 6/27/2023      Facility-Administered Medications Last Administration Doses Remaining   denosumab (PROLIA) subcutaneous injection 60 mg 5/29/2024  2:01 PM 1          Past Medical History:   Diagnosis Date    Anemia     Arthritis     Back pain     CHF (congestive heart failure) ([facility])     Chronic kidney disease     Stage 3b    Confusion 02/22/2023    Diabetes (HCC)      Diabetes mellitus (HCC)     Diabetic gastroparesis associated with type 2 diabetes mellitus  (HCC)     Diabetic polyneuropathy (HCC)     Diverticulosis     Herpes zoster     Hypertension     IBS (irritable bowel syndrome)     Neurogenic claudication due to lumbar spinal stenosis     Osteoarthritis     Osteoporosis     Pulmonary edema     Raynaud's phenomenon without gangrene     Seronegative arthropathy of multiple sites (HCC)     Sicca (HCC)        Past Surgical History:   Procedure Laterality Date    BACK SURGERY      COLONOSCOPY      EGD AND COLONOSCOPY N/A 2016    Procedure: EGD AND COLONOSCOPY;  Surgeon: Elina Anderson MD;  [facility];  Service:     JOINT REPLACEMENT      bilat knees and hips    OTHER SURGICAL HISTORY      pelvis rods    REPLACEMENT TOTAL KNEE BILATERAL      STOMACH SURGERY      UPPER GASTROINTESTINAL ENDOSCOPY         Family History   Problem Relation Age of Onset    Cancer Brother     Diabetes Mother     Hypertension Father     Heart disease Father      I have reviewed and agree with the history as documented.    E-Cigarette/Vaping    E-Cigarette Use Never User      E-Cigarette/Vaping Substances    Nicotine No     THC No     CBD No     Flavoring No     Other No      Social History     Tobacco Use    Smoking status: Former     Current packs/day: 0.00     Types: Cigarettes     Quit date:      Years since quittin.6    Smokeless tobacco: Never   Vaping Use    Vaping status: Never Used   Substance Use Topics    Alcohol use: No    Drug use: No        Review of Systems   Constitutional:  Negative for chills and fever.   HENT:  Negative for ear pain and sore throat.    Eyes:  Negative for pain and visual disturbance.   Respiratory:  Positive for shortness of breath. Negative for cough, wheezing and stridor.    Cardiovascular:  Positive for leg swelling. Negative for chest pain and palpitations.   Gastrointestinal:  Negative for abdominal distention, abdominal pain,  constipation, diarrhea, nausea and vomiting.   Genitourinary:  Negative for dysuria and hematuria.   Musculoskeletal:  Negative for arthralgias and back pain.   Skin:  Negative for color change and rash.   Neurological:  Negative for seizures and syncope.   All other systems reviewed and are negative.      Physical Exam  ED Triage Vitals   Temperature Pulse Respirations Blood Pressure SpO2   07/30/24 1140 07/30/24 1140 07/30/24 1140 07/30/24 1140 07/30/24 1140   97.7 °F (36.5 °C) 95 20 158/69 97 %      Temp Source Heart Rate Source Patient Position - Orthostatic VS BP Location FiO2 (%)   07/30/24 1140 07/30/24 1140 07/30/24 2233 07/30/24 1140 --   Oral Monitor Lying Right arm       Pain Score       07/30/24 1140       10 - Worst Possible Pain             Orthostatic Vital Signs  Vitals:    07/30/24 1430 07/30/24 1630 07/30/24 2020 07/30/24 2233   BP: 152/72 166/70 142/64 140/63   Pulse: 89 90 90 93   Patient Position - Orthostatic VS:    Lying       Physical Exam  Constitutional:       Appearance: Normal appearance.   Eyes:      Extraocular Movements: Extraocular movements intact.      Conjunctiva/sclera: Conjunctivae normal.      Pupils: Pupils are equal, round, and reactive to light.   Cardiovascular:      Rate and Rhythm: Normal rate and regular rhythm.      Pulses: Normal pulses.      Heart sounds: Normal heart sounds. No murmur heard.     No friction rub. No gallop.   Pulmonary:      Effort: Pulmonary effort is normal. No respiratory distress.      Breath sounds: No stridor. Rhonchi present. No wheezing or rales.   Chest:      Chest wall: No deformity or tenderness.   Abdominal:      General: Abdomen is flat. Bowel sounds are normal. There is no distension.      Palpations: Abdomen is soft.      Tenderness: There is no abdominal tenderness. There is no right CVA tenderness, left CVA tenderness, guarding or rebound.   Musculoskeletal:      Right lower leg: No tenderness. Edema present.   Skin:     General: Skin  is warm and dry.      Capillary Refill: Capillary refill takes less than 2 seconds.   Neurological:      General: No focal deficit present.      Mental Status: She is alert and oriented to person, place, and time. Mental status is at baseline.      Cranial Nerves: No cranial nerve deficit.      Sensory: No sensory deficit.      Motor: No weakness.      Coordination: Coordination normal.         ED Medications  Medications   pyridoxine (VITAMIN B6) tablet 50 mg (has no administration in time range)   aspirin chewable tablet 81 mg (has no administration in time range)   diphenoxylate-atropine (LOMOTIL) 2.5-0.025 mg per tablet 1 tablet (has no administration in time range)   amitriptyline (ELAVIL) tablet 20 mg (20 mg Oral Given 7/30/24 2232)   pregabalin (LYRICA) capsule 100 mg (100 mg Oral Given 7/30/24 2231)   atorvastatin (LIPITOR) tablet 10 mg (has no administration in time range)   DULoxetine (CYMBALTA) delayed release capsule 60 mg (has no administration in time range)   magnesium Oxide (MAG-OX) tablet 400 mg (has no administration in time range)   oxybutynin (DITROPAN-XL) 24 hr tablet 10 mg (has no administration in time range)   pantoprazole (PROTONIX) EC tablet 40 mg (has no administration in time range)   furosemide (LASIX) injection 40 mg (has no administration in time range)   enoxaparin (LOVENOX) subcutaneous injection 30 mg (has no administration in time range)   hydrALAZINE (APRESOLINE) injection 5 mg (has no administration in time range)   iohexol (OMNIPAQUE) 350 MG/ML injection (MULTI-DOSE) 85 mL (85 mL Intravenous Given 7/30/24 1414)       Diagnostic Studies  Results Reviewed       Procedure Component Value Units Date/Time    Hemoglobin A1C [[id number]]  (Abnormal) Collected: 07/30/24 1918    Lab Status: Final result Specimen: Blood from Arm, Right Updated: 07/30/24 2324     Hemoglobin A1C 7.6 %       mg/dl     TSH, 3rd generation with Free T4 reflex [[id number]]  (Abnormal) Collected: 07/30/24  1155    Lab Status: Final result Specimen: Blood from Arm, Right Updated: 07/30/24 1844     TSH 3RD GENERATON 5.144 uIU/mL     T4, free [[id number]] Collected: 07/30/24 1155    Lab Status: In process Specimen: Blood from Arm, Right Updated: 07/30/24 1844    Magnesium [[id number]]  (Abnormal) Collected: 07/30/24 1155    Lab Status: Final result Specimen: Blood from Arm, Right Updated: 07/30/24 1838     Magnesium 1.7 mg/dL     HS Troponin I 2hr [[id number]]  (Normal) Collected: 07/30/24 1429    Lab Status: Final result Specimen: Blood from Arm, Right Updated: 07/30/24 1501     hs TnI 2hr 15 ng/L      Delta 2hr hsTnI -2 ng/L     B-Type Natriuretic Peptide(BNP) [[id number]]  (Abnormal) Collected: 07/30/24 1155    Lab Status: Final result Specimen: Blood from Arm, Right Updated: 07/30/24 1429      pg/mL     CBC and differential [[id number]]  (Abnormal) Collected: 07/30/24 1155    Lab Status: Final result Specimen: Blood from Arm, Right Updated: 07/30/24 1241     WBC 6.08 Thousand/uL      RBC 4.19 Million/uL      Hemoglobin 12.5 g/dL      Hematocrit 39.4 %      MCV 94 fL      MCH 29.8 pg      MCHC 31.7 g/dL      RDW 15.8 %      MPV 11.1 fL      Platelets 136 Thousands/uL      nRBC 0 /100 WBCs      Segmented % 66 %      Immature Grans % 1 %      Lymphocytes % 17 %      Monocytes % 12 %      Eosinophils Relative 3 %      Basophils Relative 1 %      Absolute Neutrophils 4.07 Thousands/µL      Absolute Immature Grans 0.04 Thousand/uL      Absolute Lymphocytes 1.02 Thousands/µL      Absolute Monocytes 0.73 Thousand/µL      Eosinophils Absolute 0.19 Thousand/µL      Basophils Absolute 0.03 Thousands/µL     Narrative:      This is an appended report.  These results have been appended to a previously verified report.    Smear Review(Phlebs Do Not Order) [[id number]] Collected: 07/30/24 1155    Lab Status: Final result Specimen: Blood from Arm, Right Updated: 07/30/24 1241     RBC Morphology Present     Platelet Estimate Adequate      Anisocytosis Present     Poikilocytes Present     Polychromasia Present    HS Troponin 0hr (reflex protocol) [[id number]]  (Normal) Collected: 07/30/24 1155    Lab Status: Final result Specimen: Blood from Arm, Right Updated: 07/30/24 1222     hs TnI 0hr 17 ng/L     Comprehensive metabolic panel [[id number]]  (Abnormal) Collected: 07/30/24 1155    Lab Status: Final result Specimen: Blood from Arm, Right Updated: 07/30/24 1215     Sodium 140 mmol/L      Potassium 3.7 mmol/L      Chloride 106 mmol/L      CO2 26 mmol/L      ANION GAP 8 mmol/L      BUN 21 mg/dL      Creatinine 1.26 mg/dL      Glucose 136 mg/dL      Calcium 8.8 mg/dL      Corrected Calcium 9.3 mg/dL      AST 36 U/L      ALT 27 U/L      Alkaline Phosphatase 100 U/L      Total Protein 6.4 g/dL      Albumin 3.4 g/dL      Total Bilirubin 0.79 mg/dL      eGFR 37 ml/min/1.73sq m     Narrative:      National Kid[employer] guidelines for Chronic Kidney Disease (CKD):     Stage 1 with normal or high GFR (GFR > 90 mL/min/1.73 square meters)    Stage 2 Mild CKD (GFR = 60-89 mL/min/1.73 square meters)    Stage 3A Moderate CKD (GFR = 45-59 mL/min/1.73 square meters)    Stage 3B Moderate CKD (GFR = 30-44 mL/min/1.73 square meters)    Stage 4 Severe CKD (GFR = 15-29 mL/min/1.73 square meters)    Stage 5 End Stage CKD (GFR <15 mL/min/1.73 square meters)  Note: GFR calculation is accurate only with a steady state creatinine                   CTA ED chest PE Study   Final Result by Bradley Plata MD (07/30 6297)      1.  No pulmonary embolism.   2.  Moderate to large right and moderate left pleural effusions with moderate pulmonary edema.                  Workstation performed: [id number]         XR chest 1 view portable   ED Interpretation by Johnathan Mercedes DO (07/30 6725)   Bibasilar consolidations seen on previous CXR. Worsening diffuse, hazy opacities in B/L lungs      Final Result by James Anthony MD (07/30 4784)      Bilateral pleural effusions with  possible mild interstitial edema.            Resident: Dylan Jaquez I, the attending radiologist, have reviewed the images and agree with the final report above.      Workstation performed: [id number]               Procedures  Procedures      ED Course  ED Course as of 07/30/24 2348 Tue Jul 30, 2024   1256 89-year-old female with past medical history of diabetes, anemia, CKD, and CHF presents for 1 day of shortness of breath.  Patient reports that she laid down for bed last night is beginning to have mild difficulty breathing.  Persisted overnight causing her to seek treatment today.  Daughter in room to aid in history taking.  Reports that patient was told she had CHF in the past but has not received any treatment for it.  Does not follow with cardiology.  Patient currently asymptomatic in room.  Denies headache, vision change, chest pain, abdominal pain, nausea, vomiting, diarrhea.  Patient reports recently returning from vacation to [place].   1259 DDX including but not limited to: pneumonia, pleural effusion, CHF, PE, PTX, ACS, MI, anemia, metabolic abnormality   1300 Comprehensive metabolic panel(!)  Unremarkable   1300 CBC and differential(!)  No concerning values   1305 BNP(!): 213  Non-concerning for acute on chronic CHF   1308 Physical exam findings of right lower extremity swelling with shortness of breath concerning for PE.  Ordered CT PE study to rule out pulmonary embolism   1310 Initial workup to include CBC, CMP, troponins, BNP, CXR, CT PE study   1520 CTA ED chest PE Study  IMPRESSION:     1.  No pulmonary embolism.  2.  Moderate to large right and moderate left pleural effusions with moderate pulmonary edema.   1530 Discussed CT PE study findings with patient and daughter.  Reassured that scan was negative for pulmonary embolism at this time.  Discussed incidental finding of bilateral pleural effusions noted on chest x-ray and CT scan.  Patient notified of need for admission for  treatment of and further evaluation of pleural effusions.  Patient and daughter understand and agree.             HEART Risk Score      Flowsheet Row Most Recent Value   Heart Score Risk Calculator    History 0 Filed at: 07/30/2024 1314   ECG 0 Filed at: 07/30/2024 1314   Age 2 Filed at: 07/30/2024 1314   Risk Factors 1 Filed at: 07/30/2024 1314   Troponin 1 Filed at: 07/30/2024 1314   HEART Score 4 Filed at: 07/30/2024 1314                            Wells' Criteria for PE      Flowsheet Row Most Recent Value   Wells' Criteria for PE    Clinical signs and symptoms of DVT 3 Filed at: 07/30/2024 1338   PE is primary diagnosis or equally likely 3 Filed at: 07/30/2024 1338   HR >100 0 Filed at: 07/30/2024 1338   Immobilization at least 3 days or Surgery in the previous 4 weeks 0 Filed at: 07/30/2024 1338   Previous, objectively diagnosed PE or DVT 0 Filed at: 07/30/2024 1338   Hemoptysis 0 Filed at: 07/30/2024 1338   Malignancy with treatment within 6 months or palliative 0 Filed at: 07/30/2024 1338   Wells' Criteria Total 6 Filed at: 07/30/2024 1338              Medical Decision Making  Amount and/or Complexity of Data Reviewed  Labs: ordered. Decision-making details documented in ED Course.  Radiology: ordered and independent interpretation performed. Decision-making details documented in ED Course.    Risk  Prescription drug management.  Decision regarding hospitalization.          Disposition  Final diagnoses:   Pulmonary edema   Pleural effusion associated with pulmonary infection     Time reflects when diagnosis was documented in both MDM as applicable and the Disposition within this note       Time User Action Codes Description Comment    7/30/2024  3:52 PM Daren, Johnathan Add [J81.1] Pulmonary edema     7/30/2024  3:52 PM Daren, Johnathan Add [J18.9,  J91.8] Pleural effusion associated with pulmonary infection     7/30/2024  3:52 PM Daren, Johnathan Remove [J18.9,  J91.8] Pleural effusion associated with pulmonary infection      7/30/2024  3:52 PM Daren, Johnathan Add [J18.9,  J91.8] Pleural effusion associated with pulmonary infection     7/30/2024  3:52 PM Daren, Johnathan Remove [J18.9,  J91.8] Pleural effusion associated with pulmonary infection     7/30/2024  3:53 PM Daren, Johnathan Add [J18.9,  J91.8] Pleural effusion associated with pulmonary infection     7/30/2024  3:53 PM Daren, Johnathan Modify [J18.9,  J91.8] Pleural effusion      7/30/2024  3:53 PM Daren, Johnathan Modify [J18.9,  J91.8] Pleural effusion associated with pulmonary infection     7/30/2024  5:36 PM Serg Irvin Add [M81.0] Senile osteoporosis     7/30/2024  6:35 PM Viridiana Irvinab Add [J90] Pleural effusion           ED Disposition       ED Disposition   Admit    Condition   Stable    Date/Time   Tue Jul 30, 2024 8301    Comment   Case was discussed with Dr. Garrison and the patient's admission status was agreed to be Admission Status: inpatient status to the service of Dr. Garrison.               Follow-up Information    None         Current Discharge Medication List        CONTINUE these medications which have NOT CHANGED    Details   amitriptyline (ELAVIL) 10 mg tablet TAKE TWO TABLETS BY MOUTH AT BEDTIME  Qty: 180 tablet, Refills: 1    Associated Diagnoses: Post zoster neuralgia      aspirin 81 mg chewable tablet Chew 1 tablet (81 mg total) daily  Qty: 30 tablet, Refills: 0    Associated Diagnoses: Chest pain      atorvastatin (LIPITOR) 10 mg tablet TAKE ONE TABLET BY MOUTH EVERY DAY  Qty: 90 tablet, Refills: 3    Associated Diagnoses: Type 2 diabetes mellitus without complication, without long-term current use of insulin (HCC)      Cholecalciferol (VITAMIN D3) 1000 units CAPS Take 1 capsule by mouth daily       !! diphenoxylate-atropine (LOMOTIL) 2.5-0.025 mg per tablet Take 1 tablet by mouth 4 (four) times a day as needed for diarrhea  Qty: 30 tablet, Refills: 0    Associated Diagnoses: Irritable bowel syndrome with diarrhea      DULoxetine (CYMBALTA) 60 mg delayed release capsule TAKE  ONE CAPSULE BY MOUTH ONCE DAILY  Qty: 30 capsule, Refills: 5    Associated Diagnoses: Primary generalized (osteo)arthritis      Magnesium 250 MG TABS Take 250 mg by mouth every evening      Mirabegron ER (Myrbetriq) 50 MG TB24 Take 1 tablet by mouth daily as directed by physician.  Qty: 90 tablet, Refills: 1    Associated Diagnoses: OAB (overactive bladder)      nitrofurantoin (MACRODANTIN) 50 mg capsule TAKE ONE CAPSULE BY MOUTH EVERY DAY IN THE MORNING  Qty: 30 capsule, Refills: 6    Associated Diagnoses: Recurrent UTI      omeprazole (PriLOSEC) 20 mg delayed release capsule Take 20 mg by mouth daily      pregabalin (LYRICA) 75 mg capsule TAKE ONE CAPSULE BY MOUTH THREE TIMES A DAY  Qty: 90 capsule, Refills: 5    Associated Diagnoses: Chronic pain syndrome      pyridoxine (B-6) 100 MG tablet Take 100 mg by mouth daily      dicyclomine (BENTYL) 10 mg capsule Take 1 capsule (10 mg total) by mouth 3 (three) times a day as needed (abd pain)  Qty: 30 capsule, Refills: 2    Associated Diagnoses: Irritable bowel syndrome with diarrhea      !! diphenoxylate-atropine (LOMOTIL) 2.5-0.025 mg per tablet Take 1 tablet by mouth if needed      hydroxychloroquine (PLAQUENIL) 200 mg tablet Take 1 tablet (200 mg total) by mouth daily with breakfast  Qty: 90 tablet, Refills: 1    Associated Diagnoses: 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 (HCC)      metFORMIN (GLUCOPHAGE) 500 mg tablet Take 500 mg by mouth 2 (two) times a day      metoprolol succinate (TOPROL-XL) 25 mg 24 hr tablet Take 0.5 tablets (12.5 mg total) by mouth daily  Qty: 45 tablet, Refills: 3    Associated Diagnoses: Chest pain      Sodium Fluoride (PreviDent 5000 Booster Plus) 1.1 % PSTE Apply on teeth every evening as directed  Qty: 100 mL, Refills: 3    Associated Diagnoses: Post zoster neuralgia      torsemide (DEMADEX) 10 mg tablet Take 1 tablet PRN for wt gain 3 lb/ 3 days or 5 lb/ 5 aria  Qty: 30 tablet, Refills: 0    Associated  Diagnoses: Chest pain       !! - Potential duplicate medications found. Please discuss with provider.        No discharge procedures on file.    PDMP Review       None             ED Provider  Attending physically available and evaluated Tanya Stephen. I managed the patient along with the ED Attending.    Electronically Signed by           Johnathan Mercedes DO  07/30/24 2989

## 2024-07-30 NOTE — H&P
[facility]  H&P  Name: Tanya Stephen 89 y.o. female I MRN: [id number]  Unit/Bed#: ED-41 I Date of Admission: 7/30/2024   Date of Service: 7/30/2024 I Hospital Day: 0      Assessment & Plan   * Acute on chronic diastolic CHF (congestive heart failure) (HCC)  Assessment & Plan  Wt Readings from Last 3 Encounters:   07/08/24 60.8 kg (134 lb 0.6 oz)   06/24/24 56.5 kg (124 lb 9.6 oz)   05/29/24 59.3 kg (130 lb 12.8 oz)     Lab Results   Component Value Date     (H) 07/30/2024     (H) 02/21/2023    LVEF 55 02/21/2023      Evaluation pertinent for increasing dyspnea.  Currently NYHA class III.  Orthopnea noted by the patient since last 24 hours.  Not on any GDMT at home.    Plan:  Start diuresis with Lasix 40mg IV BID  Monitor I/O, standing scale weight  Monitor hemodynamics.  Pulmonology consulted for possibility of therapeutic thoracentesis  Echo for EF evaluation.    Pleural effusion  Assessment & Plan  Bilateral pleural effusion as evidenced on CT chest.  Most likely cardiogenic in origin given her past medical history of diastolic heart failure.  Not currently on any GDMT at home.  IV Lasix as mentioned in heart failure.  Pulmonology consulted for possibility of therapeutic thoracentesis.    Ambulatory dysfunction  Assessment & Plan  Patient's  endorses a fall cough patient about 2 weeks ago.  According to him she also has been having trouble walking with her walker.  Patient had some improvement with rehab in the past  PT, OT consulted for rehab recommendations.    Hypertension  Assessment & Plan  Patient was on torsemide and metoprolol at home in the past, however she has not been taking these medications since more than a year now.  Does not check blood pressure at home.  BP readings have been elevated during the hospital stay so for.  Patient already started on furosemide.  Will consider adding additional agent in case blood pressures are still elevated with  Medication already refilled   furosemide.  Hydralazine as needed 5 mg for SBP more than 160.           VTE Pharmacologic Prophylaxis: VTE Score: 5 High Risk (Score >/= 5) - Pharmacological DVT Prophylaxis Ordered: enoxaparin (Lovenox). Sequential Compression Devices Ordered.  Code Status: Level 3 - DNAR and DNI discussed with the patient and her   Discussion with family: Updated  () at bedside.    Anticipated Length of Stay: Patient will be admitted on an inpatient basis with an anticipated length of stay of greater than 2 midnights secondary to CHF.    Chief Complaint: Shortness of breath    History of Present Illness:  Tanya Stephen is a 89 y.o. female with a PMH of diastolic CHF, Raynaud's, hypertension, diabetes, DJD who presents with shortness of breath.  Patient was on a vacation with her  in [place] when she started to have difficulty breathing a week ago.  Last night she had sudden exacerbation of her difficulty breathing.  She endorses orthopnea and has been using 4 pillows at night.  Her difficulty breathing increases on exertion, relieved on rest and also has to breathe through her mouth instead through her nose.  She also endorses back pain that has been ongoing for some time now its most likely because of her degenerative joint disease.  She also endorses some degree of edema in bilateral lower limbs which increased in the same time period of 1 week.  She has an overactive bladder for which she takes mirabegron and nitrofurantoin at home according to her , her metoprolol, torsemide, metformin were discontinued about a year ago  Does not endorse any cough, sputum production, chest pain, dizziness, any other subjective symptoms apart from as mentioned above.    Review of Systems:  Review of Systems   Constitutional:  Positive for activity change. Negative for chills and fever.   HENT:  Negative for ear pain and sore throat.    Eyes:  Negative for pain and visual disturbance.    Respiratory:  Positive for shortness of breath. Negative for cough and chest tightness.    Cardiovascular:  Positive for leg swelling. Negative for chest pain and palpitations.   Gastrointestinal:  Negative for abdominal pain and vomiting.   Genitourinary:  Negative for dysuria and hematuria.   Musculoskeletal:  Negative for arthralgias and back pain.   Skin:  Negative for color change and rash.   Neurological:  Negative for dizziness, seizures and syncope.   All other systems reviewed and are negative.      Past Medical and Surgical History:   Past Medical History:   Diagnosis Date    Anemia     Arthritis     Back pain     CHF (congestive heart failure) (HCC)     Chronic kidney disease     Stage 3b    Confusion 02/22/2023    Diabetes (HCC)     Diabetes mellitus (HCC)     Diabetic gastroparesis associated with type 2 diabetes mellitus  (HCC)     Diabetic polyneuropathy (HCC)     Diverticulosis     Herpes zoster     Hypertension     IBS (irritable bowel syndrome)     Neurogenic claudication due to lumbar spinal stenosis     Osteoarthritis     Osteoporosis     Pulmonary edema     Raynaud's phenomenon without gangrene     Seronegative arthropathy of multiple sites (HCC)     Sicca (HCC)        Past Surgical History:   Procedure Laterality Date    BACK SURGERY      COLONOSCOPY      EGD AND COLONOSCOPY N/A 12/23/2016    Procedure: EGD AND COLONOSCOPY;  Surgeon: Elina Anderson MD;  Location: [facility];  Service:     JOINT REPLACEMENT      bilat knees and hips    OTHER SURGICAL HISTORY      pelvis rods    REPLACEMENT TOTAL KNEE BILATERAL      STOMACH SURGERY      UPPER GASTROINTESTINAL ENDOSCOPY         Meds/Allergies:  Prior to Admission medications    Medication Sig Start Date End Date Taking? Authorizing Provider   amitriptyline (ELAVIL) 10 mg tablet TAKE TWO TABLETS BY MOUTH AT BEDTIME 2/2/24  Yes Danyell Dutta MD   aspirin 81 mg chewable tablet Chew 1 tablet (81 mg total) daily 4/15/22  Yes Cjn T  MD Marina   atorvastatin (LIPITOR) 10 mg tablet TAKE ONE TABLET BY MOUTH EVERY DAY 4/8/24  Yes Mihir Portillo DO   Cholecalciferol (VITAMIN D3) 1000 units CAPS Take 1 capsule by mouth daily    Yes Historical Provider, MD   diphenoxylate-atropine (LOMOTIL) 2.5-0.025 mg per tablet Take 1 tablet by mouth 4 (four) times a day as needed for diarrhea 9/13/23  Yes Cleveland Zaragoza MD   DULoxetine (CYMBALTA) 60 mg delayed release capsule TAKE ONE CAPSULE BY MOUTH ONCE DAILY 4/22/24  Yes Danyell Dutta MD   Magnesium 250 MG TABS Take 250 mg by mouth every evening   Yes Historical Provider, MD   Mirabegron ER (Myrbetriq) 50 MG TB24 Take 1 tablet by mouth daily as directed by physician. 3/28/24  Yes Kendall Moore PA-C   nitrofurantoin (MACRODANTIN) 50 mg capsule TAKE ONE CAPSULE BY MOUTH EVERY DAY IN THE MORNING 1/15/24  Yes Shellie Robbins PA-C   omeprazole (PriLOSEC) 20 mg delayed release capsule Take 20 mg by mouth daily   Yes Historical Provider, MD   pregabalin (LYRICA) 75 mg capsule TAKE ONE CAPSULE BY MOUTH THREE TIMES A DAY  Patient taking differently: Take 100 mg by mouth 3 (three) times a day 4/4/24  Yes Danyell Dutta MD   pyridoxine (B-6) 100 MG tablet Take 100 mg by mouth daily   Yes Historical Provider, MD   dicyclomine (BENTYL) 10 mg capsule Take 1 capsule (10 mg total) by mouth 3 (three) times a day as needed (abd pain)  Patient not taking: Reported on 7/30/2024 9/13/23   Cleveland Zaragoza MD   diphenoxylate-atropine (LOMOTIL) 2.5-0.025 mg per tablet Take 1 tablet by mouth if needed  Patient not taking: Reported on 5/10/2024    Historical Provider, MD   hydroxychloroquine (PLAQUENIL) 200 mg tablet Take 1 tablet (200 mg total) by mouth daily with breakfast 1/30/24 7/28/24  Yolanda Spence PA-C   metFORMIN (GLUCOPHAGE) 500 mg tablet Take 500 mg by mouth 2 (two) times a day  Patient not taking: Reported on 5/10/2024 1/23/23   Historical Provider, MD   metoprolol succinate (TOPROL-XL) 25 mg 24  hr tablet Take 0.5 tablets (12.5 mg total) by mouth daily  Patient not taking: Reported on 2023   Mihir Portillo DO   Sodium Fluoride (PreviDent 5000 Booster Plus) 1.1 % PSTE Apply on teeth every evening as directed  Patient not taking: Reported on 21   Danyell Dutta MD   torsemide (DEMADEX) 10 mg tablet Take 1 tablet PRN for wt gain 3 lb/ 3 days or 5 lb/ 5 aria  Patient not taking: Reported on 2023   JOSE G Valladares     I have reviewed home medications with patient personally.    Allergies:   Allergies   Allergen Reactions    Morphine Other (See Comments)     urinary retention    Ciprofloxacin Rash and Nausea Only       Social History:  Marital Status: /Civil Union   Occupation: Homemaker  Patient Pre-hospital Living Situation: Home  Patient Pre-hospital Level of Mobility: walks with walker  Patient Pre-hospital Diet Restrictions: None  Substance Use History:   Social History     Substance and Sexual Activity   Alcohol Use No     Social History     Tobacco Use   Smoking Status Former    Current packs/day: 0.00    Types: Cigarettes    Quit date:     Years since quittin.6   Smokeless Tobacco Never     Social History     Substance and Sexual Activity   Drug Use No       Family History:  Family History   Problem Relation Age of Onset    Cancer Brother     Diabetes Mother     Hypertension Father     Heart disease Father        Physical Exam:     Vitals:   Blood Pressure: 166/70 (24 1630)  Pulse: 90 (24 1630)  Temperature: 97.7 °F (36.5 °C) (24 1140)  Temp Source: Oral (24 1140)  Respirations: 20 (24 1630)  SpO2: 95 % (24 1630)    Physical Exam  Vitals and nursing note reviewed.   Constitutional:       General: She is not in acute distress.     Appearance: She is well-developed. She is ill-appearing.   HENT:      Head: Normocephalic and atraumatic.   Eyes:      Conjunctiva/sclera: Conjunctivae normal.    Cardiovascular:      Rate and Rhythm: Regular rhythm. Tachycardia present.      Heart sounds: No murmur heard.  Pulmonary:      Effort: Pulmonary effort is normal. No respiratory distress.      Breath sounds: Rales present.      Comments: No breath sounds bilateral lower zones.  Decreased breath sounds left more than right and middle zones.  Abdominal:      Palpations: Abdomen is soft.      Tenderness: There is no abdominal tenderness.   Musculoskeletal:         General: No swelling.      Cervical back: Neck supple.      Right lower leg: Edema present.      Left lower leg: Edema present.      Comments: Erythema bilateral lower limbs.   Skin:     General: Skin is warm and dry.      Capillary Refill: Capillary refill takes less than 2 seconds.   Neurological:      Mental Status: She is alert.   Psychiatric:         Mood and Affect: Mood normal.          Additional Data:     Lab Results:  Results from last 7 days   Lab Units 07/30/24  1155   WBC Thousand/uL 6.08   HEMOGLOBIN g/dL 12.5   HEMATOCRIT % 39.4   PLATELETS Thousands/uL 136*   SEGS PCT % 66   LYMPHO PCT % 17   MONO PCT % 12   EOS PCT % 3     Results from last 7 days   Lab Units 07/30/24  1155   SODIUM mmol/L 140   POTASSIUM mmol/L 3.7   CHLORIDE mmol/L 106   CO2 mmol/L 26   BUN mg/dL 21   CREATININE mg/dL 1.26   ANION GAP mmol/L 8   CALCIUM mg/dL 8.8   ALBUMIN g/dL 3.4*   TOTAL BILIRUBIN mg/dL 0.79   ALK PHOS U/L 100   ALT U/L 27   AST U/L 36   GLUCOSE RANDOM mg/dL 136             Lab Results   Component Value Date    HGBA1C 6.6 (H) 03/02/2024    HGBA1C 5.9 (H) 04/14/2022    HGBA1C 6.0 (H) 08/25/2020           Lines/Drains:  Invasive Devices       Peripheral Intravenous Line  Duration             Peripheral IV 07/30/24 Right Antecubital <1 day                        Imaging: Reviewed radiology reports from this admission including: chest CT scan  CTA ED chest PE Study   Final Result by Bradley Plata MD (07/30 9253)      1.  No pulmonary embolism.   2.   Moderate to large right and moderate left pleural effusions with moderate pulmonary edema.                  Workstation performed: [id number]         XR chest 1 view portable   ED Interpretation by Johnathan Mercedes DO (07/30 1345)   Bibasilar consolidations seen on previous CXR. Worsening diffuse, hazy opacities in B/L lungs      Final Result by James Anthony MD (07/30 1358)      Bilateral pleural effusions with possible mild interstitial edema.            Resident: Dylan Jaquez I, the attending radiologist, have reviewed the images and agree with the final report above.      Workstation performed: [id number]               ** Please Note: This note has been constructed using a voice recognition system. **

## 2024-07-30 NOTE — TELEPHONE ENCOUNTER
Patient's  called.  The patient would like to restart home physical therapy.  Can you please place an order to [facility].  Thank you.

## 2024-07-30 NOTE — ASSESSMENT & PLAN NOTE
Wt Readings from Last 3 Encounters:   07/08/24 60.8 kg (134 lb 0.6 oz)   06/24/24 56.5 kg (124 lb 9.6 oz)   05/29/24 59.3 kg (130 lb 12.8 oz)     Lab Results   Component Value Date     (H) 07/30/2024     (H) 02/21/2023    LVEF 55 02/21/2023      Evaluation pertinent for increasing dyspnea.  Currently NYHA class III.  Orthopnea noted by the patient since last 24 hours.  Not on any GDMT at home.    Plan:  Start diuresis with Lasix 40mg IV BID  Monitor I/O, standing scale weight  Monitor hemodynamics.  Pulmonology consulted for possibility of therapeutic thoracentesis  Echo for EF evaluation.

## 2024-07-30 NOTE — ED PROVIDER NOTES
History  Chief Complaint   Patient presents with    Shortness of Breath     Pt reports SOB since yesterday worse when tries to go to bed. Pt also has complained of left CP since being on vacation in [place]     HPI    Prior to Admission Medications   Prescriptions Last Dose Informant Patient Reported? Taking?   Cholecalciferol (VITAMIN D3) 1000 units CAPS  Child Yes No   Sig: Take 1 capsule by mouth daily    DULoxetine (CYMBALTA) 60 mg delayed release capsule  Child No No   Sig: TAKE ONE CAPSULE BY MOUTH ONCE DAILY   Magnesium 250 MG TABS  Child Yes No   Sig: Take 250 mg by mouth every evening   Mirabegron ER (Myrbetriq) 50 MG TB24  Child No No   Sig: Take 1 tablet by mouth daily as directed by physician.   Sodium Fluoride (PreviDent 5000 Booster Plus) 1.1 % PSTE  Child No No   Sig: Apply on teeth every evening as directed   amitriptyline (ELAVIL) 10 mg tablet  Child No No   Sig: TAKE TWO TABLETS BY MOUTH AT BEDTIME   aspirin 81 mg chewable tablet  Child No No   Sig: Chew 1 tablet (81 mg total) daily   atorvastatin (LIPITOR) 10 mg tablet  Child No No   Sig: TAKE ONE TABLET BY MOUTH EVERY DAY   dicyclomine (BENTYL) 10 mg capsule  Child No No   Sig: Take 1 capsule (10 mg total) by mouth 3 (three) times a day as needed (abd pain)   Patient not taking: Reported on 11/22/2023   diphenoxylate-atropine (LOMOTIL) 2.5-0.025 mg per tablet  Child Yes No   Sig: Take 1 tablet by mouth if needed   Patient not taking: Reported on 5/10/2024   diphenoxylate-atropine (LOMOTIL) 2.5-0.025 mg per tablet  Child No No   Sig: Take 1 tablet by mouth 4 (four) times a day as needed for diarrhea   hydroxychloroquine (PLAQUENIL) 200 mg tablet  Child No No   Sig: Take 1 tablet (200 mg total) by mouth daily with breakfast   metFORMIN (GLUCOPHAGE) 500 mg tablet  Child Yes No   Sig: Take 500 mg by mouth 2 (two) times a day   Patient not taking: Reported on 5/10/2024   metoprolol succinate (TOPROL-XL) 25 mg 24 hr tablet  Child No No   Sig: Take 0.5  tablets (12.5 mg total) by mouth daily   Patient not taking: Reported on 6/27/2023   nitrofurantoin (MACRODANTIN) 50 mg capsule  Child No No   Sig: TAKE ONE CAPSULE BY MOUTH EVERY DAY IN THE MORNING   omeprazole (PriLOSEC) 20 mg delayed release capsule  Child Yes No   Sig: Take 20 mg by mouth daily   pregabalin (LYRICA) 75 mg capsule  Child No No   Sig: TAKE ONE CAPSULE BY MOUTH THREE TIMES A DAY   pyridoxine (B-6) 100 MG tablet  Child Yes No   Sig: Take 100 mg by mouth daily   torsemide (DEMADEX) 10 mg tablet  Child No No   Sig: Take 1 tablet PRN for wt gain 3 lb/ 3 days or 5 lb/ 5 aria   Patient not taking: Reported on 6/27/2023      Facility-Administered Medications Last Administration Doses Remaining   denosumab (PROLIA) subcutaneous injection 60 mg 5/29/2024  2:01 PM 1          Past Medical History:   Diagnosis Date    Anemia     Arthritis     Back pain     CHF (congestive heart failure) (HCC)     Chronic kidney disease     Stage 3b    Confusion 02/22/2023    Diabetes (HCC)     Diabetes mellitus (HCC)     Diabetic gastroparesis associated with type 2 diabetes mellitus  (HCC)     Diabetic polyneuropathy (HCC)     Diverticulosis     Herpes zoster     Hypertension     IBS (irritable bowel syndrome)     Neurogenic claudication due to lumbar spinal stenosis     Osteoarthritis     Osteoporosis     Pulmonary edema     Raynaud's phenomenon without gangrene     Seronegative arthropathy of multiple sites (HCC)     Sicca (HCC)        Past Surgical History:   Procedure Laterality Date    BACK SURGERY      COLONOSCOPY      EGD AND COLONOSCOPY N/A 12/23/2016    Procedure: EGD AND COLONOSCOPY;  Surgeon: Elina Anderson MD;  Location[facility];  Service:     JOINT REPLACEMENT      bilat knees and hips    OTHER SURGICAL HISTORY      pelvis rods    REPLACEMENT TOTAL KNEE BILATERAL      STOMACH SURGERY      UPPER GASTROINTESTINAL ENDOSCOPY         Family History   Problem Relation Age of Onset    Cancer Brother     Diabetes  Mother     Hypertension Father     Heart disease Father      I have reviewed and agree with the history as documented.    E-Cigarette/Vaping    E-Cigarette Use Never User      E-Cigarette/Vaping Substances    Nicotine No     THC No     CBD No     Flavoring No     Other No      Social History     Tobacco Use    Smoking status: Former     Current packs/day: 0.00     Types: Cigarettes     Quit date:      Years since quittin.6    Smokeless tobacco: Never   Vaping Use    Vaping status: Never Used   Substance Use Topics    Alcohol use: No    Drug use: No       Review of Systems    Physical Exam  Physical Exam    Vital Signs  ED Triage Vitals [24 1140]   Temperature Pulse Respirations Blood Pressure SpO2   97.7 °F (36.5 °C) 95 20 158/69 97 %      Temp Source Heart Rate Source Patient Position - Orthostatic VS BP Location FiO2 (%)   Oral Monitor -- Right arm --      Pain Score       10 - Worst Possible Pain           Vitals:    24 1140   BP: 158/69   Pulse: 95         Visual Acuity      ED Medications  Medications - No data to display    Diagnostic Studies  Results Reviewed       Procedure Component Value Units Date/Time    HS Troponin 0hr (reflex protocol) [[id number]]  (Normal) Collected: 24 1155    Lab Status: Final result Specimen: Blood from Arm, Right Updated: 24 1222     hs TnI 0hr 17 ng/L     HS Troponin I 2hr [[id number]]     Lab Status: No result Specimen: Blood     HS Troponin I 4hr [[id number]]     Lab Status: No result Specimen: Blood     Comprehensive metabolic panel [[id number]]  (Abnormal) Collected: 24 1155    Lab Status: Final result Specimen: Blood from Arm, Right Updated: 24 1215     Sodium 140 mmol/L      Potassium 3.7 mmol/L      Chloride 106 mmol/L      CO2 26 mmol/L      ANION GAP 8 mmol/L      BUN 21 mg/dL      Creatinine 1.26 mg/dL      Glucose 136 mg/dL      Calcium 8.8 mg/dL      Corrected Calcium 9.3 mg/dL      AST 36 U/L      ALT 27 U/L      Alkaline  Phosphatase 100 U/L      Total Protein 6.4 g/dL      Albumin 3.4 g/dL      Total Bilirubin 0.79 mg/dL      eGFR 37 ml/min/1.73sq m     Narrative:      National Kidney Disease Foundation guidelines for Chronic Kidney Disease (CKD):     Stage 1 with normal or high GFR (GFR > 90 mL/min/1.73 square meters)    Stage 2 Mild CKD (GFR = 60-89 mL/min/1.73 square meters)    Stage 3A Moderate CKD (GFR = 45-59 mL/min/1.73 square meters)    Stage 3B Moderate CKD (GFR = 30-44 mL/min/1.73 square meters)    Stage 4 Severe CKD (GFR = 15-29 mL/min/1.73 square meters)    Stage 5 End Stage CKD (GFR <15 mL/min/1.73 square meters)  Note: GFR calculation is accurate only with a steady state creatinine    CBC and differential [[id number]] Collected: 07/30/24 1155    Lab Status: In process Specimen: Blood from Arm, Right Updated: 07/30/24 1158                   XR chest 1 view portable    (Results Pending)              Procedures  Procedures         ED Course                                               MDM             Disposition  Final diagnoses:   None     ED Disposition       None          Follow-up Information    None         Patient's Medications   Discharge Prescriptions    No medications on file       No discharge procedures on file.    PDMP Review       None            ED Provider  Electronically Signed by

## 2024-07-31 ENCOUNTER — APPOINTMENT (INPATIENT)
Dept: NON INVASIVE DIAGNOSTICS | Facility: HOSPITAL | Age: 89
DRG: 291 | End: 2024-07-31
Payer: MEDICARE

## 2024-07-31 LAB
ALBUMIN SERPL BCG-MCNC: 3.1 G/DL (ref 3.5–5)
ALP SERPL-CCNC: 85 U/L (ref 34–104)
ALT SERPL W P-5'-P-CCNC: 23 U/L (ref 7–52)
ANION GAP SERPL CALCULATED.3IONS-SCNC: 7 MMOL/L (ref 4–13)
AORTIC ROOT: 2.7 CM
AORTIC VALVE MEAN VELOCITY: 13.5 M/S
APICAL FOUR CHAMBER EJECTION FRACTION: 58 %
ASCENDING AORTA: 3.2 CM
AST SERPL W P-5'-P-CCNC: 33 U/L (ref 13–39)
ATRIAL RATE: 95 BPM
AV AREA BY CONTINUOUS VTI: 1.6 CM2
AV AREA PEAK VELOCITY: 1.4 CM2
AV LVOT MEAN GRADIENT: 2 MMHG
AV LVOT PEAK GRADIENT: 3 MMHG
AV MEAN GRADIENT: 8 MMHG
AV PEAK GRADIENT: 16 MMHG
AV REGURGITATION PRESSURE HALF TIME: 300 MS
AV VALVE AREA: 1.63 CM2
AV VELOCITY RATIO: 0.45
BASOPHILS # BLD AUTO: 0.04 THOUSANDS/ÂΜL (ref 0–0.1)
BASOPHILS NFR BLD AUTO: 1 % (ref 0–1)
BILIRUB SERPL-MCNC: 0.76 MG/DL (ref 0.2–1)
BSA FOR ECHO PROCEDURE: 1.57 M2
BUN SERPL-MCNC: 18 MG/DL (ref 5–25)
CALCIUM ALBUM COR SERPL-MCNC: 9.1 MG/DL (ref 8.3–10.1)
CALCIUM SERPL-MCNC: 8.4 MG/DL (ref 8.4–10.2)
CHLORIDE SERPL-SCNC: 106 MMOL/L (ref 96–108)
CO2 SERPL-SCNC: 28 MMOL/L (ref 21–32)
CREAT SERPL-MCNC: 1.19 MG/DL (ref 0.6–1.3)
DOP CALC AO PEAK VEL: 2 M/S
DOP CALC AO VTI: 35.98 CM
DOP CALC LVOT AREA: 3.14 CM2
DOP CALC LVOT CARDIAC INDEX: 4.28 L/MIN/M2
DOP CALC LVOT CARDIAC OUTPUT: 6.73 L/MIN
DOP CALC LVOT DIAMETER: 2 CM
DOP CALC LVOT PEAK VEL VTI: 18.71 CM
DOP CALC LVOT PEAK VEL: 0.89 M/S
DOP CALC LVOT STROKE INDEX: 38.2 ML/M2
DOP CALC LVOT STROKE VOLUME: 58.75
E WAVE DECELERATION TIME: 100 MS
E/A RATIO: 0.86
EOSINOPHIL # BLD AUTO: 0.25 THOUSAND/ÂΜL (ref 0–0.61)
EOSINOPHIL NFR BLD AUTO: 4 % (ref 0–6)
ERYTHROCYTE [DISTWIDTH] IN BLOOD BY AUTOMATED COUNT: 15.9 % (ref 11.6–15.1)
FRACTIONAL SHORTENING: 35 (ref 28–44)
GFR SERPL CREATININE-BSD FRML MDRD: 40 ML/MIN/1.73SQ M
GLUCOSE SERPL-MCNC: 60 MG/DL (ref 65–140)
HCT VFR BLD AUTO: 38.2 % (ref 34.8–46.1)
HGB BLD-MCNC: 11.8 G/DL (ref 11.5–15.4)
IMM GRANULOCYTES # BLD AUTO: 0.04 THOUSAND/UL (ref 0–0.2)
IMM GRANULOCYTES NFR BLD AUTO: 1 % (ref 0–2)
INTERVENTRICULAR SEPTUM IN DIASTOLE (PARASTERNAL SHORT AXIS VIEW): 0.8 CM
INTERVENTRICULAR SEPTUM: 0.8 CM (ref 0.6–1.1)
LAAS-AP2: 19.6 CM2
LAAS-AP4: 13.3 CM2
LEFT ATRIUM AREA SYSTOLE SINGLE PLANE A4C: 14.8 CM2
LEFT ATRIUM SIZE: 3.3 CM
LEFT ATRIUM VOLUME (MOD BIPLANE): 42 ML
LEFT ATRIUM VOLUME INDEX (MOD BIPLANE): 26.8 ML/M2
LEFT INTERNAL DIMENSION IN SYSTOLE: 2.8 CM (ref 2.1–4)
LEFT VENTRICULAR INTERNAL DIMENSION IN DIASTOLE: 4.3 CM (ref 3.5–6)
LEFT VENTRICULAR POSTERIOR WALL IN END DIASTOLE: 0.8 CM
LEFT VENTRICULAR STROKE VOLUME: 57 ML
LVSV (TEICH): 57 ML
LYMPHOCYTES # BLD AUTO: 1.13 THOUSANDS/ÂΜL (ref 0.6–4.47)
LYMPHOCYTES NFR BLD AUTO: 19 % (ref 14–44)
MAGNESIUM SERPL-MCNC: 1.8 MG/DL (ref 1.9–2.7)
MCH RBC QN AUTO: 29.3 PG (ref 26.8–34.3)
MCHC RBC AUTO-ENTMCNC: 30.9 G/DL (ref 31.4–37.4)
MCV RBC AUTO: 95 FL (ref 82–98)
MONOCYTES # BLD AUTO: 0.89 THOUSAND/ÂΜL (ref 0.17–1.22)
MONOCYTES NFR BLD AUTO: 15 % (ref 4–12)
MV E'TISSUE VEL-SEP: 10 CM/S
MV PEAK A VEL: 0.94 M/S
MV PEAK E VEL: 81 CM/S
MV STENOSIS PRESSURE HALF TIME: 29 MS
MV VALVE AREA P 1/2 METHOD: 7.59
NEUTROPHILS # BLD AUTO: 3.56 THOUSANDS/ÂΜL (ref 1.85–7.62)
NEUTS SEG NFR BLD AUTO: 60 % (ref 43–75)
NRBC BLD AUTO-RTO: 0 /100 WBCS
P AXIS: 99 DEGREES
PLATELET # BLD AUTO: 124 THOUSANDS/UL (ref 149–390)
PMV BLD AUTO: 11.5 FL (ref 8.9–12.7)
POTASSIUM SERPL-SCNC: 3.7 MMOL/L (ref 3.5–5.3)
PR INTERVAL: 188 MS
PROT SERPL-MCNC: 5.8 G/DL (ref 6.4–8.4)
QRS AXIS: 140 DEGREES
QRSD INTERVAL: 144 MS
QT INTERVAL: 434 MS
QTC INTERVAL: 545 MS
RBC # BLD AUTO: 4.03 MILLION/UL (ref 3.81–5.12)
RIGHT ATRIUM AREA SYSTOLE A4C: 11.3 CM2
RIGHT VENTRICLE ID DIMENSION: 2.7 CM
SL CV AV DECELERATION TIME RETROGRADE: 1034 MS
SL CV AV PEAK GRADIENT RETROGRADE: 41 MMHG
SL CV LEFT ATRIUM LENGTH A2C: 5.8 CM
SL CV LV EF: 50
SL CV PED ECHO LEFT VENTRICLE DIASTOLIC VOLUME (MOD BIPLANE) 2D: 85 ML
SL CV PED ECHO LEFT VENTRICLE SYSTOLIC VOLUME (MOD BIPLANE) 2D: 29 ML
SODIUM SERPL-SCNC: 141 MMOL/L (ref 135–147)
T WAVE AXIS: -24 DEGREES
T4 FREE SERPL-MCNC: 0.96 NG/DL (ref 0.61–1.12)
TR MAX PG: 18 MMHG
TR PEAK VELOCITY: 2.1 M/S
TRICUSPID ANNULAR PLANE SYSTOLIC EXCURSION: 1.8 CM
TRICUSPID VALVE PEAK REGURGITATION VELOCITY: 2.14 M/S
VENTRICULAR RATE: 95 BPM
WBC # BLD AUTO: 5.91 THOUSAND/UL (ref 4.31–10.16)

## 2024-07-31 PROCEDURE — 83735 ASSAY OF MAGNESIUM: CPT

## 2024-07-31 PROCEDURE — 99232 SBSQ HOSP IP/OBS MODERATE 35: CPT | Performed by: HOSPITALIST

## 2024-07-31 PROCEDURE — 99223 1ST HOSP IP/OBS HIGH 75: CPT | Performed by: STUDENT IN AN ORGANIZED HEALTH CARE EDUCATION/TRAINING PROGRAM

## 2024-07-31 PROCEDURE — 80053 COMPREHEN METABOLIC PANEL: CPT

## 2024-07-31 PROCEDURE — 85025 COMPLETE CBC W/AUTO DIFF WBC: CPT

## 2024-07-31 PROCEDURE — 93010 ELECTROCARDIOGRAM REPORT: CPT | Performed by: INTERNAL MEDICINE

## 2024-07-31 PROCEDURE — 93306 TTE W/DOPPLER COMPLETE: CPT

## 2024-07-31 PROCEDURE — 93306 TTE W/DOPPLER COMPLETE: CPT | Performed by: INTERNAL MEDICINE

## 2024-07-31 RX ORDER — MAGNESIUM SULFATE HEPTAHYDRATE 40 MG/ML
2 INJECTION, SOLUTION INTRAVENOUS ONCE
Status: COMPLETED | OUTPATIENT
Start: 2024-07-31 | End: 2024-07-31

## 2024-07-31 RX ORDER — LISINOPRIL 2.5 MG/1
2.5 TABLET ORAL DAILY
Status: DISCONTINUED | OUTPATIENT
Start: 2024-08-01 | End: 2024-08-01

## 2024-07-31 RX ADMIN — DULOXETINE HYDROCHLORIDE 60 MG: 60 CAPSULE, DELAYED RELEASE ORAL at 08:07

## 2024-07-31 RX ADMIN — OXYBUTYNIN CHLORIDE 10 MG: 5 TABLET, EXTENDED RELEASE ORAL at 08:07

## 2024-07-31 RX ADMIN — PREGABALIN 100 MG: 100 CAPSULE ORAL at 21:07

## 2024-07-31 RX ADMIN — MAGNESIUM SULFATE HEPTAHYDRATE 2 G: 40 INJECTION, SOLUTION INTRAVENOUS at 07:58

## 2024-07-31 RX ADMIN — FUROSEMIDE 40 MG: 10 INJECTION, SOLUTION INTRAMUSCULAR; INTRAVENOUS at 15:21

## 2024-07-31 RX ADMIN — Medication 400 MG: at 08:07

## 2024-07-31 RX ADMIN — ENOXAPARIN SODIUM 30 MG: 30 INJECTION SUBCUTANEOUS at 08:01

## 2024-07-31 RX ADMIN — PYRIDOXINE HCL TAB 50 MG 50 MG: 50 TAB at 08:12

## 2024-07-31 RX ADMIN — PREGABALIN 100 MG: 100 CAPSULE ORAL at 08:07

## 2024-07-31 RX ADMIN — ASPIRIN 81 MG 81 MG: 81 TABLET ORAL at 08:07

## 2024-07-31 RX ADMIN — AMITRIPTYLINE HYDROCHLORIDE 20 MG: 10 TABLET, FILM COATED ORAL at 21:07

## 2024-07-31 RX ADMIN — PANTOPRAZOLE SODIUM 40 MG: 20 TABLET, DELAYED RELEASE ORAL at 05:13

## 2024-07-31 RX ADMIN — FUROSEMIDE 40 MG: 10 INJECTION, SOLUTION INTRAMUSCULAR; INTRAVENOUS at 08:06

## 2024-07-31 RX ADMIN — ATORVASTATIN CALCIUM 10 MG: 10 TABLET, FILM COATED ORAL at 08:07

## 2024-07-31 RX ADMIN — PREGABALIN 100 MG: 100 CAPSULE ORAL at 15:21

## 2024-07-31 NOTE — ASSESSMENT & PLAN NOTE
According to , she has some difficulty with ambulatory function.  She uses a walker at home.  Recommend PT/OT evaluation

## 2024-07-31 NOTE — ASSESSMENT & PLAN NOTE
Bilateral pleural effusions seen on CT most likely secondary to acute on chronic heart failure.  She is significantly improved breathing since starting IV Lasix 40 mg twice daily.  Will continue to diurese at this point.

## 2024-07-31 NOTE — PLAN OF CARE
Problem: SAFETY ADULT  Goal: Patient will remain free of falls  Description: INTERVENTIONS:  - Educate patient/family on patient safety including physical limitations  - Instruct patient to call for assistance with activity   - Consult OT/PT to assist with strengthening/mobility   - Keep Call bell within reach  - Keep bed low and locked with side rails adjusted as appropriate  - Keep care items and personal belongings within reach  - Initiate and maintain comfort rounds  - Make Fall Risk Sign visible to staff  - Offer Toileting every 2 Hours, in advance of need  - Obtain necessary fall risk management equipment: call bell in reach  - Apply yellow socks and bracelet for high fall risk patients  - Consider moving patient to room near nurses station  Outcome: Progressing  Goal: Maintain or return to baseline ADL function  Description: INTERVENTIONS:  -  Assess patient's ability to carry out ADLs; assess patient's baseline for ADL function and identify physical deficits which impact ability to perform ADLs (bathing, care of mouth/teeth, toileting, grooming, dressing, etc.)  - Assess/evaluate cause of self-care deficits   - Assess range of motion  - Assess patient's mobility; develop plan if impaired  - Assess patient's need for assistive devices and provide as appropriate  - Encourage maximum independence but intervene and supervise when necessary  - Involve family in performance of ADLs  - Assess for home care needs following discharge   - Consider OT consult to assist with ADL evaluation and planning for discharge  - Provide patient education as appropriate  Outcome: Progressing  Goal: Maintains/Returns to pre admission functional level  Description: INTERVENTIONS:  - Perform AM-PAC 6 Click Basic Mobility/ Daily Activity assessment daily.  - Set and communicate daily mobility goal to care team and patient/family/caregiver.   - Collaborate with rehabilitation services on mobility goals if consulted  - Perform Range  of Motion 3 times a day.  - Reposition patient every 2 hours.  - Dangle patient 3 times a day  - Stand patient 3 times a day  - Ambulate patient 3 times a day  - Out of bed to chair 3 times a day   - Out of bed for meals 3 times a day  - Out of bed for toileting  - Record patient progress and toleration of activity level   Outcome: Progressing     Problem: DISCHARGE PLANNING  Goal: Discharge to home or other facility with appropriate resources  Description: INTERVENTIONS:  - Identify barriers to discharge w/patient and caregiver  - Arrange for needed discharge resources and transportation as appropriate  - Identify discharge learning needs (meds, wound care, etc.)  - Arrange for interpretive services to assist at discharge as needed  - Refer to Case Management Department for coordinating discharge planning if the patient needs post-hospital services based on physician/advanced practitioner order or complex needs related to functional status, cognitive ability, or social support system  Outcome: Progressing     Problem: Knowledge Deficit  Goal: Patient/family/caregiver demonstrates understanding of disease process, treatment plan, medications, and discharge instructions  Description: Complete learning assessment and assess knowledge base.  Interventions:  - Provide teaching at level of understanding  - Provide teaching via preferred learning methods  Outcome: Progressing

## 2024-07-31 NOTE — ASSESSMENT & PLAN NOTE
Blood Pressure: 144/68  Patient already started on furosemide.    Will consider adding additional agent in case blood pressures are still elevated with furosemide.  Hydralazine as needed 5 mg for SBP more than 160.

## 2024-07-31 NOTE — PROGRESS NOTES
[facility]  Progress Note  Name: Tanya Stephen I  MRN: [id number]  Unit/Bed#: W -01 I Date of Admission: 7/30/2024   Date of Service: 7/31/2024 I Hospital Day: 1    Assessment & Plan   Pleural effusion  Assessment & Plan  Bilateral pleural effusions seen on CT most likely secondary to acute on chronic heart failure.  She is significantly improved breathing since starting IV Lasix 40 mg twice daily.  Will continue to diurese at this point.    Ambulatory dysfunction  Assessment & Plan  According to , she has some difficulty with ambulatory function.  She uses a walker at home.  Recommend PT/OT evaluation    Hypertension  Assessment & Plan  Blood Pressure: 144/68  Patient already started on furosemide.    Will consider adding additional agent in case blood pressures are still elevated with furosemide.  Hydralazine as needed 5 mg for SBP more than 160.    * Acute on chronic diastolic CHF (congestive heart failure) (HCC)  Assessment & Plan  Wt Readings from Last 3 Encounters:   07/31/24 61.7 kg (136 lb 0.4 oz)   07/08/24 60.8 kg (134 lb 0.6 oz)   06/24/24 56.5 kg (124 lb 9.6 oz)     Lab Results   Component Value Date     (H) 07/30/2024     (H) 02/21/2023    LVEF 50 07/31/2024      Evaluation pertinent for increasing dyspnea.    Currently NYHA class III.  Orthopnea noted by the patient since last 24 hours.  Not on any GDMT at home.  Was previously on torsemide and metoprolol at home.  These medications were discontinued due to soft BPs.    Repeat echo today showed an EF of 50%.  Systolic function is normal.  Diastolic function is mildly abnormal, consistent with grade I (abnormal) relaxation.  The following segments are hypokinetic: basal inferolateral, mid inferolateral and apical lateral.    Evaluated by pulmonology, they do not recommend thoracentesis at this time.    Plan:  Diuresis with Lasix 40mg IV BID  Monitor I/O, standing scale weight  Monitor  hemodynamics.           VTE Pharmacologic Prophylaxis: VTE Score: 5 High Risk (Score >/= 5) - Pharmacological DVT Prophylaxis Ordered: enoxaparin (Lovenox). Sequential Compression Devices Ordered.    Mobility:   Basic Mobility Inpatient Raw Score: 21  JH-HLM Goal: 6: Walk 10 steps or more  JH-HLM Achieved: 6: Walk 10 steps or more  JH-HLM Goal achieved. Continue to encourage appropriate mobility.    Patient Centered Rounds: I performed bedside rounds with nursing staff today.  Discussions with Specialists or Other Care Team Provider: Pulmonology    Education and Discussions with Family / Patient: Updated  () at bedside.    Current Length of Stay: 1 day(s)  Current Patient Status: Inpatient   Discharge Plan: Anticipate discharge in 24-48 hrs to to be determined after evaluation by PT/OT    Code Status: Level 3 - DNAR and DNI    Subjective:   Patient seen and examined at bedside today.  Reports feeling better today than yesterday.  She was able to sleep without difficulty or orthopnea.  No new symptoms.  No overnight events.    Objective:     Vitals:   Temp (24hrs), Av °F (36.7 °C), Min:97.4 °F (36.3 °C), Max:98.4 °F (36.9 °C)    Temp:  [97.4 °F (36.3 °C)-98.4 °F (36.9 °C)] 97.4 °F (36.3 °C)  HR:  [90-97] 92  Resp:  [16-20] 18  BP: (140-166)/(63-70) 144/68  SpO2:  [85 %-96 %] 94 %  Body mass index is 27.47 kg/m².     Input and Output Summary (last 24 hours):     Intake/Output Summary (Last 24 hours) at 2024 1446  Last data filed at 2024 1342  Gross per 24 hour   Intake 410 ml   Output 2050 ml   Net -1640 ml     Physical Exam  Vitals and nursing note reviewed.   Constitutional:       General: She is not in acute distress.     Appearance: She is well-developed.   HENT:      Head: Normocephalic and atraumatic.   Eyes:      Conjunctiva/sclera: Conjunctivae normal.   Cardiovascular:      Rate and Rhythm: Regular rhythm. Tachycardia present.      Heart sounds: No murmur heard.  Pulmonary:       Effort: Pulmonary effort is normal. No respiratory distress.      Breath sounds: Rales present.      Comments: Decreased breath sounds at the bases bilaterally  Abdominal:      Palpations: Abdomen is soft.      Tenderness: There is no abdominal tenderness.   Musculoskeletal:         General: No swelling.      Cervical back: Neck supple.      Right lower leg: Edema (1+) present.      Left lower leg: Edema (1+) present.      Comments: Erythema bilateral lower limbs.   Skin:     General: Skin is warm and dry.      Capillary Refill: Capillary refill takes less than 2 seconds.   Neurological:      Mental Status: She is alert.   Psychiatric:         Mood and Affect: Mood normal.          Additional Data:     Labs:  Results from last 7 days   Lab Units 07/31/24  0440   WBC Thousand/uL 5.91   HEMOGLOBIN g/dL 11.8   HEMATOCRIT % 38.2   PLATELETS Thousands/uL 124*   SEGS PCT % 60   LYMPHO PCT % 19   MONO PCT % 15*   EOS PCT % 4     Results from last 7 days   Lab Units 07/31/24  0440   SODIUM mmol/L 141   POTASSIUM mmol/L 3.7   CHLORIDE mmol/L 106   CO2 mmol/L 28   BUN mg/dL 18   CREATININE mg/dL 1.19   ANION GAP mmol/L 7   CALCIUM mg/dL 8.4   ALBUMIN g/dL 3.1*   TOTAL BILIRUBIN mg/dL 0.76   ALK PHOS U/L 85   ALT U/L 23   AST U/L 33   GLUCOSE RANDOM mg/dL 60*             Results from last 7 days   Lab Units 07/30/24  1918   HEMOGLOBIN A1C % 7.6*           Lines/Drains:  Invasive Devices       Peripheral Intravenous Line  Duration             Peripheral IV 07/30/24 Right Antecubital 1 day                  Imaging: Reviewed radiology reports from this admission including: chest xray and ECHO    Recent Cultures (last 7 days):         Last 24 Hours Medication List:   Current Facility-Administered Medications   Medication Dose Route Frequency Provider Last Rate    amitriptyline  20 mg Oral HS Serg Irvin MD      aspirin  81 mg Oral Daily Serg Irvin MD      atorvastatin  10 mg Oral Daily Serg Irvin MD       diphenoxylate-atropine  1 tablet Oral 4x Daily PRN Serg Irvin MD      DULoxetine  60 mg Oral Daily Serg Irvin MD      enoxaparin  30 mg Subcutaneous Daily Serg Irvin MD      furosemide  40 mg Intravenous BID (diuretic) Serg Irvin MD      hydrALAZINE  5 mg Intravenous Q6H PRN Serg Irvin MD      magnesium Oxide  400 mg Oral Daily Serg Irvin MD      oxybutynin  10 mg Oral Daily Serg Irvin MD      pantoprazole  40 mg Oral Early Morning Serg Irvin MD      pregabalin  100 mg Oral TID Serg Irvin MD      pyridoxine  50 mg Oral Daily Serg Irvin MD          Today, Patient Was Seen By: Perez Vides MD    **Please Note: This note may have been constructed using a voice recognition system.**

## 2024-07-31 NOTE — HOSPITAL COURSE
89 y.o. female with a PMH of diastolic CHF, Raynaud's, hypertension, diabetes, DJD who presents with shortness of breath worsening over the past week.  She also endorses orthopnea with 4 pillow use at night.  She was admitted for acute heart failure exacerbation.    She was treated with diuretic with good response.  She did have some creatinine elevation which required us to hold diuresis for 1 day.  She also required more oxygen with exertion upon doing ambulatory pulse oximetry testing.  She was evaluated by respiratory therapy who indicated that home oxygen is required.    On ***she was stable for discharge.

## 2024-07-31 NOTE — ASSESSMENT & PLAN NOTE
Wt Readings from Last 3 Encounters:   07/31/24 61.7 kg (136 lb 0.4 oz)   07/08/24 60.8 kg (134 lb 0.6 oz)   06/24/24 56.5 kg (124 lb 9.6 oz)     Lab Results   Component Value Date     (H) 07/30/2024     (H) 02/21/2023    LVEF 50 07/31/2024      Evaluation pertinent for increasing dyspnea.    Currently NYHA class III.  Orthopnea noted by the patient since last 24 hours.  Not on any GDMT at home.  Was previously on torsemide and metoprolol at home.  These medications were discontinued due to soft BPs.    Repeat echo today showed an EF of 50%.  Systolic function is normal.  Diastolic function is mildly abnormal, consistent with grade I (abnormal) relaxation.  The following segments are hypokinetic: basal inferolateral, mid inferolateral and apical lateral.    Evaluated by pulmonology, they do not recommend thoracentesis at this time.    Plan:  Diuresis with Lasix 40mg IV BID  Monitor I/O, standing scale weight  Monitor hemodynamics.

## 2024-07-31 NOTE — CONSULTS
Consult Note - Pulmonary   Tanya Stephen 89 y.o. female MRN: [id number]  Unit/Bed#: -01 Encounter: [id number]      Reason for consultation: Bilateral Pleural Effusion    Requesting provider: Dr. Serg Irvin MD    Assessment & Recommendations:   89-year-old female with history of hypertension, diabetes diastolic heart failure, DJD presenting with shortness of breath found to have bilateral pleural effusions likely in setting of acute decompensated heart failure.     Acute on chronic decompensated diastolic heart failure  Patient with 2-day history of shortness of breath at rest, ? Orthopnea, bilateral lower extremity edema with B/L pleural effusions, elevated BNP @ 213.  XR chest, CT significant for increased interstitial markings suggestive of edema.    2D Echo (2023): EF 55%, mod TR.    Plan  Agree with diuresis Lasix 40 IV twice daily  Strict I's/O monitoring. Discussed with nurse re pure-wick placement, versus diaper with daily weights for I/O monitoring.   Goal for net negative balance  Monitor electrolytes  Fluid restriction less than 1.5 L daily  Low-salt diet  For rpt ECHO    Bilateral Pleural Effusions  Given bilaterality of effusions and symptomatology, pleural effusion likely in setting of acute decompensated decompensated heart failure.  CT Chest: No PE, Moderate to large right and moderate left pleural effusions with associated atelectasis. No pneumothorax. Moderate pulmonary edema.     Plan   Management as above   Will reassess with repeat imaging in 2 days   Monitor O2 and supplement with goal sat above 90%   Will hold off on thoracocentesis at this time.      3. History of Hypertension  4. History of Diabetes  5. History of DJD    History of Present Illness   HPI:  Tanya Stephen is a 89 y.o. female with history of hypertension, diabetes, diastolic heart failure EF 55% on echo of 2023 degenerative joint disease, osteoarthritis who presents with 2-day history of shortness of breath.   Patient reports being in her usual state of health up until 2 days prior with noted sudden onset of shortness of breath.  Shortness of breath was noticed initially on exertion and later at rest.  This was accompanied by inability to lie flat unable to quantify pillow orthopnea as she normally sleeps on 2-4 pillows for comfort, worsening leg swelling, however denied palpitations, presyncope, lightheadedness or dizziness. Denies prior occurrence of symptoms     Of note patient recently returned from vacation in [place] where she was on a 10-hour drive from [place] with stops along the way. Denied any noticeable calf swelling, warmth or tenderness. She reports compliance with all her medications, is up-to-date on vaccines and denies recent ill contact exposure. Given worsening symptoms presented to the ED for further evaluation and management.     Of note patient is ambulatory at baseline with rolling walker and is able to complete ADLs with minimal assistance. Reports frequent falls, most recently 2 weeks prior with no head injury for which she presented to the ED where CT chest imaging at that time revealed small bilateral pleural effusions.     In ED, patient was vitally stable saturating average 90s on room air afebrile hypertensive w/ labs significant for hypomagnesemia, elevated BNP of 213 negative tropes no leukocytosis appreciated.  X-ray was significant for bilateral cephalization w/ hazy opacities, kerley B lines, obscuring left and right costophrenic and cardiophrenic angles with questionable retrocardiac opacity.  CT was negative for pulmonary embolism but was significant for bilateral interlobular septal thickening and questionable opacification in the lingula.  Patient was assessed as having manic acute exacerbation of CHF started on diuretics and pulmonary was consulted given bilateral pleural effusions.     Upon my evaluation at bedside patient was sleeping soundly no respiratory painful  distress.  Patient was able to provide her own history and denied any active chest pain, shortness of breath, palpitations.  Her examination was significant for decreased breath sounds bilaterally bibasilarly w/ no wheeze, rhonchi, crackles appreciated, L/E + edema, erythematous with chronic wounds no calf swelling or tenderness.     Review of systems:  12 point review of systems was completed and was otherwise negative except as listed in HPI.      Historical Information   Past Medical History:   Diagnosis Date    Anemia     Arthritis     Back pain     CHF (congestive heart failure) (HCC)     Chronic kidney disease     Stage 3b    Confusion 02/22/2023    Diabetes (HCC)     Diabetes mellitus (HCC)     Diabetic gastroparesis associated with type 2 diabetes mellitus  (HCC)     Diabetic polyneuropathy (HCC)     Diverticulosis     Herpes zoster     Hypertension     IBS (irritable bowel syndrome)     Neurogenic claudication due to lumbar spinal stenosis     Osteoarthritis     Osteoporosis     Pulmonary edema     Raynaud's phenomenon without gangrene     Seronegative arthropathy of multiple sites (HCC)     Sicca (HCC)      Past Surgical History:   Procedure Laterality Date    BACK SURGERY      COLONOSCOPY      EGD AND COLONOSCOPY N/A 12/23/2016    Procedure: EGD AND COLONOSCOPY;  Surgeon: Elina Anderson MD;  Location: [facility];  Service:     JOINT REPLACEMENT      bilat knees and hips    OTHER SURGICAL HISTORY      pelvis rods    REPLACEMENT TOTAL KNEE BILATERAL      STOMACH SURGERY      UPPER GASTROINTESTINAL ENDOSCOPY       Family History   Problem Relation Age of Onset    Cancer Brother     Diabetes Mother     Hypertension Father     Heart disease Father        Occupational History: Patient previously worked as a caretaker.  Denies chemical/dust or smoke exposure during that time.     Social History: Patient reports being a former smoker for approximately 30 years?  Half pack to 1 pack/day.  Denies current smoking/  alcohol consumption or other drug use.     Meds/Allergies   Current Facility-Administered Medications   Medication Dose Route Frequency    amitriptyline (ELAVIL) tablet 20 mg  20 mg Oral HS    aspirin chewable tablet 81 mg  81 mg Oral Daily    atorvastatin (LIPITOR) tablet 10 mg  10 mg Oral Daily    diphenoxylate-atropine (LOMOTIL) 2.5-0.025 mg per tablet 1 tablet  1 tablet Oral 4x Daily PRN    DULoxetine (CYMBALTA) delayed release capsule 60 mg  60 mg Oral Daily    enoxaparin (LOVENOX) subcutaneous injection 30 mg  30 mg Subcutaneous Daily    furosemide (LASIX) injection 40 mg  40 mg Intravenous BID (diuretic)    hydrALAZINE (APRESOLINE) injection 5 mg  5 mg Intravenous Q6H PRN    magnesium Oxide (MAG-OX) tablet 400 mg  400 mg Oral Daily    oxybutynin (DITROPAN-XL) 24 hr tablet 10 mg  10 mg Oral Daily    pantoprazole (PROTONIX) EC tablet 40 mg  40 mg Oral Early Morning    pregabalin (LYRICA) capsule 100 mg  100 mg Oral TID    pyridoxine (VITAMIN B6) tablet 50 mg  50 mg Oral Daily       Facility-Administered Medications Prior to Admission:     denosumab (PROLIA) subcutaneous injection 60 mg    Medications Prior to Admission:     amitriptyline (ELAVIL) 10 mg tablet    aspirin 81 mg chewable tablet    atorvastatin (LIPITOR) 10 mg tablet    Cholecalciferol (VITAMIN D3) 1000 units CAPS    diphenoxylate-atropine (LOMOTIL) 2.5-0.025 mg per tablet    DULoxetine (CYMBALTA) 60 mg delayed release capsule    Magnesium 250 MG TABS    Mirabegron ER (Myrbetriq) 50 MG TB24    nitrofurantoin (MACRODANTIN) 50 mg capsule    omeprazole (PriLOSEC) 20 mg delayed release capsule    pregabalin (LYRICA) 75 mg capsule    pyridoxine (B-6) 100 MG tablet    dicyclomine (BENTYL) 10 mg capsule    diphenoxylate-atropine (LOMOTIL) 2.5-0.025 mg per tablet    hydroxychloroquine (PLAQUENIL) 200 mg tablet    metFORMIN (GLUCOPHAGE) 500 mg tablet    metoprolol succinate (TOPROL-XL) 25 mg 24 hr tablet    Sodium Fluoride (PreviDent 5000 Booster Plus) 1.1  "% PSTE    torsemide (DEMADEX) 10 mg tablet  Allergies   Allergen Reactions    Morphine Other (See Comments)     urinary retention    Ciprofloxacin Rash and Nausea Only       Vitals: Blood pressure 140/63, pulse 93, temperature 98.4 °F (36.9 °C), temperature source Axillary, resp. rate 18, height 4' 11\" (1.499 m), weight 61.7 kg (136 lb 0.4 oz), SpO2 91%, not currently breastfeeding., Body mass index is 27.47 kg/m².      Intake/Output Summary (Last 24 hours) at 7/31/2024 0711  Last data filed at 7/31/2024 0511  Gross per 24 hour   Intake --   Output 200 ml   Net -200 ml       Physical Exam  General: Awake alert and oriented x 3, conversant without conversational dyspnea, NAD, normal affect  HEENT:  PERRL, Sclera noninjected, nonicteric OU, Nares patent, no nasal flaring, no nasal drainage, Mucous membranes, moist, no oral lesions, normal dentition  NECK: Trachea midline, no accessory muscle use, no stridor, no cervical or supraclavicular adenopathy, JVP not elevated  CARDIAC: Reg, single s1/S2, no m/r/g  PULM: BS - VS, decreased bibasally, no wheeze/rhonchi appreciated.   CHEST: No gross deformities, equal chest expansion on inspiration bilaterally  ABD: Normoactive bowel sounds, soft nontender, nondistended, no rebound, no rigidity, no guarding  : not examined  MSK: joint deformities to hands, ecchymosis to skin overlying hips B/L.   EXT: No cyanosis, no clubbing, no edema, normal capillary refill  SKIN:  No rashes, no lesions  NEURO: no focal neurologic deficits, AAOx3, moving all extremities appropriately    Labs: I have personally reviewed pertinent lab results.  Laboratory and Diagnostics  Results from last 7 days   Lab Units 07/31/24  0440 07/30/24  1155   WBC Thousand/uL 5.91 6.08   HEMOGLOBIN g/dL 11.8 12.5   HEMATOCRIT % 38.2 39.4   PLATELETS Thousands/uL 124* 136*   SEGS PCT % 60 66   MONO PCT % 15* 12   EOS PCT % 4 3     Results from last 7 days   Lab Units 07/31/24  0440 07/30/24  1155   SODIUM mmol/L " 141 140   POTASSIUM mmol/L 3.7 3.7   CHLORIDE mmol/L 106 106   CO2 mmol/L 28 26   ANION GAP mmol/L 7 8   BUN mg/dL 18 21   CREATININE mg/dL 1.19 1.26   CALCIUM mg/dL 8.4 8.8   GLUCOSE RANDOM mg/dL 60* 136   ALT U/L 23 27   AST U/L 33 36   ALK PHOS U/L 85 100   ALBUMIN g/dL 3.1* 3.4*   TOTAL BILIRUBIN mg/dL 0.76 0.79     Results from last 7 days   Lab Units 07/31/24  0440 07/30/24  1155   MAGNESIUM mg/dL 1.8* 1.7*                                       MICRO      Imaging and other studies: I have personally reviewed pertinent reports.    CT Chest: IMPRESSION:     1.  No pulmonary embolism.  2.  Moderate to large right and moderate left pleural effusions with moderate pulmonary edema.    Pulmonary function testing: none available.     EKG, Pathology, and Other Studies:     Code Status: Level 3 - DNAR and DNI      Yu Bethea MD  Pulmonary & Critical Care Medicine, PGY IV  [facility]

## 2024-07-31 NOTE — PLAN OF CARE
Problem: CARDIOVASCULAR - ADULT  Goal: Maintains optimal cardiac output and hemodynamic stability  Description: INTERVENTIONS:  - Monitor I/O, vital signs and rhythm  - Monitor for S/S and trends of decreased cardiac output  - Administer and titrate ordered vasoactive medications to optimize hemodynamic stability  - Assess quality of pulses, skin color and temperature  - Assess for signs of decreased coronary artery perfusion  - Instruct patient to report change in severity of symptoms  Outcome: Progressing     Problem: Potential for Falls  Goal: Patient will remain free of falls  Description: INTERVENTIONS:  - Educate patient/family on patient safety including physical limitations  - Instruct patient to call for assistance with activity   - Consult OT/PT to assist with strengthening/mobility   - Keep Call bell within reach  - Keep bed low and locked with side rails adjusted as appropriate  - Keep care items and personal belongings within reach  - Initiate and maintain comfort rounds  - Make Fall Risk Sign visible to staff  - Offer Toileting every 2 Hours, in advance of need  - Initiate/Maintain bed alarm  - Obtain necessary fall risk management equipment: wlaker  - Apply yellow socks and bracelet for high fall risk patients  - Consider moving patient to room near nurses station  Outcome: Progressing

## 2024-08-01 LAB
ANION GAP SERPL CALCULATED.3IONS-SCNC: 10 MMOL/L (ref 4–13)
BASOPHILS # BLD AUTO: 0.04 THOUSANDS/ÂΜL (ref 0–0.1)
BASOPHILS NFR BLD AUTO: 1 % (ref 0–1)
BUN SERPL-MCNC: 21 MG/DL (ref 5–25)
CALCIUM SERPL-MCNC: 8.5 MG/DL (ref 8.4–10.2)
CHLORIDE SERPL-SCNC: 99 MMOL/L (ref 96–108)
CO2 SERPL-SCNC: 32 MMOL/L (ref 21–32)
CREAT SERPL-MCNC: 1.49 MG/DL (ref 0.6–1.3)
EOSINOPHIL # BLD AUTO: 0.23 THOUSAND/ÂΜL (ref 0–0.61)
EOSINOPHIL NFR BLD AUTO: 4 % (ref 0–6)
ERYTHROCYTE [DISTWIDTH] IN BLOOD BY AUTOMATED COUNT: 15.7 % (ref 11.6–15.1)
GFR SERPL CREATININE-BSD FRML MDRD: 30 ML/MIN/1.73SQ M
GLUCOSE SERPL-MCNC: 115 MG/DL (ref 65–140)
HCT VFR BLD AUTO: 38.3 % (ref 34.8–46.1)
HGB BLD-MCNC: 12 G/DL (ref 11.5–15.4)
IMM GRANULOCYTES # BLD AUTO: 0.05 THOUSAND/UL (ref 0–0.2)
IMM GRANULOCYTES NFR BLD AUTO: 1 % (ref 0–2)
LYMPHOCYTES # BLD AUTO: 1.14 THOUSANDS/ÂΜL (ref 0.6–4.47)
LYMPHOCYTES NFR BLD AUTO: 18 % (ref 14–44)
MAGNESIUM SERPL-MCNC: 2 MG/DL (ref 1.9–2.7)
MCH RBC QN AUTO: 29.1 PG (ref 26.8–34.3)
MCHC RBC AUTO-ENTMCNC: 31.3 G/DL (ref 31.4–37.4)
MCV RBC AUTO: 93 FL (ref 82–98)
MONOCYTES # BLD AUTO: 1.01 THOUSAND/ÂΜL (ref 0.17–1.22)
MONOCYTES NFR BLD AUTO: 16 % (ref 4–12)
NEUTROPHILS # BLD AUTO: 3.78 THOUSANDS/ÂΜL (ref 1.85–7.62)
NEUTS SEG NFR BLD AUTO: 60 % (ref 43–75)
NRBC BLD AUTO-RTO: 0 /100 WBCS
PLATELET # BLD AUTO: 130 THOUSANDS/UL (ref 149–390)
PMV BLD AUTO: 11.8 FL (ref 8.9–12.7)
POTASSIUM SERPL-SCNC: 3.1 MMOL/L (ref 3.5–5.3)
RBC # BLD AUTO: 4.13 MILLION/UL (ref 3.81–5.12)
SODIUM SERPL-SCNC: 141 MMOL/L (ref 135–147)
WBC # BLD AUTO: 6.25 THOUSAND/UL (ref 4.31–10.16)

## 2024-08-01 PROCEDURE — 99232 SBSQ HOSP IP/OBS MODERATE 35: CPT | Performed by: HOSPITALIST

## 2024-08-01 PROCEDURE — 97167 OT EVAL HIGH COMPLEX 60 MIN: CPT

## 2024-08-01 PROCEDURE — 97163 PT EVAL HIGH COMPLEX 45 MIN: CPT

## 2024-08-01 PROCEDURE — 99232 SBSQ HOSP IP/OBS MODERATE 35: CPT | Performed by: STUDENT IN AN ORGANIZED HEALTH CARE EDUCATION/TRAINING PROGRAM

## 2024-08-01 PROCEDURE — 83735 ASSAY OF MAGNESIUM: CPT

## 2024-08-01 PROCEDURE — 80048 BASIC METABOLIC PNL TOTAL CA: CPT

## 2024-08-01 PROCEDURE — 85025 COMPLETE CBC W/AUTO DIFF WBC: CPT

## 2024-08-01 RX ORDER — POTASSIUM CHLORIDE 20 MEQ/1
40 TABLET, EXTENDED RELEASE ORAL ONCE
Status: COMPLETED | OUTPATIENT
Start: 2024-08-01 | End: 2024-08-01

## 2024-08-01 RX ORDER — POTASSIUM CHLORIDE 14.9 MG/ML
20 INJECTION INTRAVENOUS
Status: DISCONTINUED | OUTPATIENT
Start: 2024-08-01 | End: 2024-08-01 | Stop reason: SINTOL

## 2024-08-01 RX ORDER — POTASSIUM CHLORIDE 29.8 MG/ML
40 INJECTION INTRAVENOUS ONCE
Status: DISCONTINUED | OUTPATIENT
Start: 2024-08-01 | End: 2024-08-01

## 2024-08-01 RX ADMIN — ATORVASTATIN CALCIUM 10 MG: 10 TABLET, FILM COATED ORAL at 08:36

## 2024-08-01 RX ADMIN — FUROSEMIDE 40 MG: 10 INJECTION, SOLUTION INTRAMUSCULAR; INTRAVENOUS at 08:35

## 2024-08-01 RX ADMIN — Medication 400 MG: at 08:36

## 2024-08-01 RX ADMIN — AMITRIPTYLINE HYDROCHLORIDE 20 MG: 10 TABLET, FILM COATED ORAL at 21:14

## 2024-08-01 RX ADMIN — OXYBUTYNIN CHLORIDE 10 MG: 5 TABLET, EXTENDED RELEASE ORAL at 08:36

## 2024-08-01 RX ADMIN — PANTOPRAZOLE SODIUM 40 MG: 20 TABLET, DELAYED RELEASE ORAL at 05:31

## 2024-08-01 RX ADMIN — PREGABALIN 100 MG: 100 CAPSULE ORAL at 08:37

## 2024-08-01 RX ADMIN — ASPIRIN 81 MG 81 MG: 81 TABLET ORAL at 08:36

## 2024-08-01 RX ADMIN — PREGABALIN 100 MG: 100 CAPSULE ORAL at 15:41

## 2024-08-01 RX ADMIN — DULOXETINE HYDROCHLORIDE 60 MG: 60 CAPSULE, DELAYED RELEASE ORAL at 08:36

## 2024-08-01 RX ADMIN — ENOXAPARIN SODIUM 30 MG: 30 INJECTION SUBCUTANEOUS at 08:35

## 2024-08-01 RX ADMIN — PREGABALIN 100 MG: 100 CAPSULE ORAL at 21:14

## 2024-08-01 RX ADMIN — PYRIDOXINE HCL TAB 50 MG 50 MG: 50 TAB at 08:36

## 2024-08-01 RX ADMIN — LISINOPRIL 2.5 MG: 2.5 TABLET ORAL at 08:36

## 2024-08-01 RX ADMIN — POTASSIUM CHLORIDE 40 MEQ: 1500 TABLET, EXTENDED RELEASE ORAL at 12:41

## 2024-08-01 NOTE — PROGRESS NOTES
Progress Note - Pulmonary   Tanya Stephen 89 y.o. female MRN: [id number]  Unit/Bed#: W -01 Encounter: [id number]      Assessment:  89-year-old female with history of hypertension, diabetes diastolic heart failure, DJD presenting with shortness of breath admitted for acute on chronic decompensated CHF.     Acute on chronic decompensated diastolic heart failure  Patient with 2-day history of shortness of breath at rest, ? Orthopnea, bilateral lower extremity edema with B/L pleural effusions, elevated BNP @ 213.  XR chest, CT significant for increased interstitial markings suggestive of edema.               2D Echo (2023): EF 55%, mod TR.   Rpt 2D Echo EF 50%  grade 1 diastolic dysfunction    Patient overall net balance - 3075 since admission  Plan  Pt diuresing well with net balance - 3075  C/W Diuresis Lasix 40 IV twice daily, recommend continued diuresis with goal net negative balance  Strict I's/O monitoring  Monitor electrolytes with goal K more than 4 mag more than 2  Fluid restriction less than 1.5 L daily  Low-salt diet  Will rpt XR in AM  For BP control. Started on ACE by primary team. Monitor renal function     Bilateral Pleural Effusions  Given bilaterality of effusions and symptomatology, pleural effusion likely in setting of acute decompensated decompensated heart failure.  CT Chest: No PE, Moderate to large right and moderate left pleural effusions with associated atelectasis. No pneumothorax. Moderate pulmonary edema.     Plan              Management as above              XR in AM              Monitor O2 and supplement with goal sat above 90%              Will hold off on thoracocentesis at this time.                 3. History of Hypertension - uncontrolled. Started on lisinopril 2.5mg Daily by primary team  4. History of Diabetes- low BGL reading yesterday. To monitor   5. History of DJD - on amitriptyline and pregabalin    Subjective:   Patient reports feeling overall well.  States that  "shortness of breath has improved since admission.  Has been tolerating diet, and has been voiding via Main catheter.     Objective:   Patient seen and examined at bedside.  In no respiratory painful distress sitting upright in bed.   Patient diuresing well via Main, urine clear yellow, overall net -3000.     Vitals: Blood pressure 119/60, pulse 92, temperature 97.9 °F (36.6 °C), temperature source Oral, resp. rate 16, height 4' 11\" (1.499 m), weight 55.9 kg (123 lb 3.2 oz), SpO2 91%, not currently breastfeeding.,  Body mass index is 24.88 kg/m².      Intake/Output Summary (Last 24 hours) at 8/1/2024 0756  Last data filed at 8/1/2024 0101  Gross per 24 hour   Intake 810 ml   Output 3935 ml   Net -3125 ml     Physical Exam  Gen: Awake, alert, oriented x 3, no acute distress  HEENT: Mucous membranes moist, no oral lesions, no thrush  NECK: No accessory muscle use, JVP not elevated  Cardiac: Regular, single S1, single S2, no murmurs, no rubs, no gallops  Lungs: Breath sounds vesicular decreased bibasilarly with faint crackles noted at bases  Abdomen: normoactive bowel sounds, soft nontender, nondistended, no rebound or rigidity, no guarding  Extremities: no cyanosis, no clubbing, no edema    Labs: I have personally reviewed pertinent lab results.  Laboratory and Diagnostics  Results from last 7 days   Lab Units 08/01/24 0454 07/31/24 0440 07/30/24  1155   WBC Thousand/uL 6.25 5.91 6.08   HEMOGLOBIN g/dL 12.0 11.8 12.5   HEMATOCRIT % 38.3 38.2 39.4   PLATELETS Thousands/uL 130* 124* 136*   SEGS PCT % 60 60 66   MONO PCT % 16* 15* 12   EOS PCT % 4 4 3     Results from last 7 days   Lab Units 08/01/24 0454 07/31/24 0440 07/30/24  1155   SODIUM mmol/L 141 141 140   POTASSIUM mmol/L 3.1* 3.7 3.7   CHLORIDE mmol/L 99 106 106   CO2 mmol/L 32 28 26   ANION GAP mmol/L 10 7 8   BUN mg/dL 21 18 21   CREATININE mg/dL 1.49* 1.19 1.26   CALCIUM mg/dL 8.5 8.4 8.8   GLUCOSE RANDOM mg/dL 115 60* 136   ALT U/L  --  23 27   AST U/L  " --  33 36   ALK PHOS U/L  --  85 100   ALBUMIN g/dL  --  3.1* 3.4*   TOTAL BILIRUBIN mg/dL  --  0.76 0.79     Results from last 7 days   Lab Units 08/01/24  0454 07/31/24  0440 07/30/24  1155   MAGNESIUM mg/dL 2.0 1.8* 1.7*                                         Imaging and other studies: I have personally reviewed pertinent reports.      Yu Bethea MD  Pulmonary & Critical Fellow, PGY IV  [facility]

## 2024-08-01 NOTE — PLAN OF CARE
Problem: OCCUPATIONAL THERAPY ADULT  Goal: Performs self-care activities at highest level of function for planned discharge setting.  See evaluation for individualized goals.  Description: Treatment Interventions: ADL retraining, Functional transfer training, UE strengthening/ROM, Endurance training, Cognitive reorientation, Patient/family training, Equipment evaluation/education, Compensatory technique education, Energy conservation, Activityengagement          See flowsheet documentation for full assessment, interventions and recommendations.   Note: Limitation: Decreased ADL status, Decreased UE strength, Decreased Safe judgement during ADL, Decreased cognition, Decreased endurance, Decreased self-care trans, Decreased high-level ADLs (impaired balance, fxnl mobility, act bryanna, fxnl reach, standing bryanna, strength, attention to task, direction following, safety awareness, insight, pacing, response time)  Prognosis: Good  Assessment: Pt is a 89 y.o. female seen for OT evaluation s/p admission to [facility] on 7/30/2024 due to SOB. Diagnosed with Acute on chronic diastolic CHF (congestive heart failure) (HCC). Personal and env factors supporting pt at time of IE include (I) PLOF, supportive  + children, accessible home environment, and FFSU. Personal and env factors inhibiting engagement in occupations include advanced age and difficulty completing IADLs. Performance deficits that affect the pt’s occupational performance can be seen above. Due to pt's current functional limitations and medical complications pt is functioning below baseline. Pt would benefit from continued skilled OT treatment in order to maximize safety, independence and overall performance with ADLs, functional mobility, functional transfers, and cognition in order to achieve highest level of function.     Rehab Resource Intensity Level, OT: I (Maximum Resource Intensity)

## 2024-08-01 NOTE — PROGRESS NOTES
[facility]  Progress Note  Name: Tanya Stephen I  MRN: [id number]  Unit/Bed#: W -01 I Date of Admission: 7/30/2024   Date of Service: 8/1/2024 I Hospital Day: 2    Assessment & Plan   * Acute on chronic diastolic CHF (congestive heart failure) (HCC)  Assessment & Plan  Wt Readings from Last 3 Encounters:   08/01/24 55.9 kg (123 lb 3.2 oz)   07/08/24 60.8 kg (134 lb 0.6 oz)   06/24/24 56.5 kg (124 lb 9.6 oz)     Lab Results   Component Value Date     (H) 07/30/2024     (H) 02/21/2023    LVEF 50 07/31/2024      Evaluation pertinent for increasing dyspnea.    Currently NYHA class III.  Orthopnea noted by the patient since last 24 hours.  Not on any GDMT at home.  Was previously on torsemide and metoprolol at home.  These medications were discontinued due to soft BPs.    Repeat echo today showed an EF of 50%.  Systolic function is normal.  Diastolic function is mildly abnormal, consistent with grade I (abnormal) relaxation.  The following segments are hypokinetic: basal inferolateral, mid inferolateral and apical lateral.  Evaluated by pulmonology, they do not recommend thoracentesis at this time.    Plan:  Diuresis was held today due to elevation of creatinine.  If her creatinine downtrends tomorrow, we will give further diuresis if needed.  Repeat x-ray in a.m.  Monitor I/O, standing scale weight  Monitor hemodynamics.    Pleural effusion  Assessment & Plan  Bilateral pleural effusions seen on CT most likely secondary to acute on chronic heart failure.  She is significantly improved breathing since starting IV Lasix 40 mg twice daily.  On physical exam, her crackles seem to be improved however still remains with diminished breath sounds at the bases.  Will continue to diurese at this point.    Ambulatory dysfunction  Assessment & Plan  According to , she has some difficulty with ambulatory function.  She uses a walker at home.  PT/OT recommend level 2 intensity  rehab upon discharge.    Hypertension  Assessment & Plan  Blood Pressure: 125/63  Patient already started on furosemide.    Was started on low-dose lisinopril yesterday.  Her creatinine seems to be baseline 1-1.5.  Creatinine was 1.19 and increased to 1.49 today.  We have discontinued lisinopril as we believe this was the cause.  Hydralazine as needed 5 mg for SBP more than 160.         VTE Pharmacologic Prophylaxis: VTE Score: 5 High Risk (Score >/= 5) - Pharmacological DVT Prophylaxis Ordered: enoxaparin (Lovenox). Sequential Compression Devices Ordered.    Mobility:   Basic Mobility Inpatient Raw Score: 15  JH-HLM Goal: 4: Move to chair/commode  JH-HLM Achieved: 6: Walk 10 steps or more  JH-HLM Goal achieved. Continue to encourage appropriate mobility.    Patient Centered Rounds: I performed bedside rounds with nursing staff today.  Discussions with Specialists or Other Care Team Provider: Pulmonology    Education and Discussions with Family / Patient: Updated  () at bedside.    Current Length of Stay: 2 day(s)  Current Patient Status: Inpatient   Discharge Plan: Anticipate discharge in 24-48 hrs to to be determined after evaluation by PT/OT    Code Status: Level 3 - DNAR and DNI    Subjective:   Patient seen and examined at bedside.  She reports feeling well today with no new symptoms.  No overnight events.  She was able to sleep through the night however she does still sleep at an incline.  No reports of shortness of breath.    Objective:     Vitals:   Temp (24hrs), Av.5 °F (36.4 °C), Min:97.1 °F (36.2 °C), Max:97.9 °F (36.6 °C)    Temp:  [97.1 °F (36.2 °C)-97.9 °F (36.6 °C)] 97.1 °F (36.2 °C)  HR:  [97] 97  Resp:  [16-20] 20  BP: ()/(60-63) 96/60  SpO2:  [94 %] 94 %  Body mass index is 24.88 kg/m².     Input and Output Summary (last 24 hours):     Intake/Output Summary (Last 24 hours) at 2024 1707  Last data filed at 2024 1545  Gross per 24 hour   Intake 450 ml   Output  2985 ml   Net -2535 ml     Physical Exam  Vitals and nursing note reviewed.   Constitutional:       General: She is not in acute distress.     Appearance: She is well-developed.   HENT:      Head: Normocephalic and atraumatic.   Eyes:      Conjunctiva/sclera: Conjunctivae normal.   Cardiovascular:      Rate and Rhythm: Regular rhythm. Tachycardia present.      Heart sounds: No murmur heard.  Pulmonary:      Effort: Pulmonary effort is normal. No respiratory distress.      Comments: Decreased breath sounds at the bases bilaterally  Abdominal:      Palpations: Abdomen is soft.      Tenderness: There is no abdominal tenderness.   Musculoskeletal:         General: No swelling.      Cervical back: Neck supple.      Right lower leg: Edema (1+) present.      Left lower leg: Edema (1+) present.      Comments: Erythema bilateral lower limbs.   Skin:     General: Skin is warm and dry.      Capillary Refill: Capillary refill takes less than 2 seconds.   Neurological:      Mental Status: She is alert.   Psychiatric:         Mood and Affect: Mood normal.          Additional Data:     Labs:  Results from last 7 days   Lab Units 08/01/24  0454   WBC Thousand/uL 6.25   HEMOGLOBIN g/dL 12.0   HEMATOCRIT % 38.3   PLATELETS Thousands/uL 130*   SEGS PCT % 60   LYMPHO PCT % 18   MONO PCT % 16*   EOS PCT % 4     Results from last 7 days   Lab Units 08/01/24  0454 07/31/24  0440   SODIUM mmol/L 141 141   POTASSIUM mmol/L 3.1* 3.7   CHLORIDE mmol/L 99 106   CO2 mmol/L 32 28   BUN mg/dL 21 18   CREATININE mg/dL 1.49* 1.19   ANION GAP mmol/L 10 7   CALCIUM mg/dL 8.5 8.4   ALBUMIN g/dL  --  3.1*   TOTAL BILIRUBIN mg/dL  --  0.76   ALK PHOS U/L  --  85   ALT U/L  --  23   AST U/L  --  33   GLUCOSE RANDOM mg/dL 115 60*             Results from last 7 days   Lab Units 07/30/24  1918   HEMOGLOBIN A1C % 7.6*           Lines/Drains:  Invasive Devices       Peripheral Intravenous Line  Duration             Peripheral IV 07/30/24 Right Antecubital 2  days              Drain  Duration             Urethral Catheter 16 Fr. 1 day                  Imaging: Reviewed radiology reports from this admission including: chest xray and ECHO    Recent Cultures (last 7 days):         Last 24 Hours Medication List:   Current Facility-Administered Medications   Medication Dose Route Frequency Provider Last Rate    amitriptyline  20 mg Oral HS Serg Irvin MD      aspirin  81 mg Oral Daily Serg Irvin MD      atorvastatin  10 mg Oral Daily Serg Irvin MD      diphenoxylate-atropine  1 tablet Oral 4x Daily PRN Serg Irvin MD      DULoxetine  60 mg Oral Daily Serg Irvin MD      enoxaparin  30 mg Subcutaneous Daily Serg Irvin MD      hydrALAZINE  5 mg Intravenous Q6H PRN Serg Irvin MD      magnesium Oxide  400 mg Oral Daily Serg Irvin MD      oxybutynin  10 mg Oral Daily Serg Irvin MD      pantoprazole  40 mg Oral Early Morning Serg Irvin MD      pregabalin  100 mg Oral TID Serg Irvin MD      pyridoxine  50 mg Oral Daily Serg Irvin MD          Today, Patient Was Seen By: Perez Vides MD    **Please Note: This note may have been constructed using a voice recognition system.**

## 2024-08-01 NOTE — ASSESSMENT & PLAN NOTE
According to , she has some difficulty with ambulatory function.  She uses a walker at home.  PT/OT recommend level 2 intensity rehab upon discharge.  Patient and  would like to be dcd with  home with home health

## 2024-08-01 NOTE — ASSESSMENT & PLAN NOTE
Perfusion significantly improved since her admission.  Appreciate pulm input.  Will continue lasix 20mg daily upon discharge.

## 2024-08-01 NOTE — PLAN OF CARE
Problem: SAFETY ADULT  Goal: Patient will remain free of falls  Description: INTERVENTIONS:  - Educate patient/family on patient safety including physical limitations  - Instruct patient to call for assistance with activity   - Consult OT/PT to assist with strengthening/mobility   - Keep Call bell within reach  - Keep bed low and locked with side rails adjusted as appropriate  - Keep care items and personal belongings within reach  - Initiate and maintain comfort rounds  - Make Fall Risk Sign visible to staff  - Obtain necessary fall risk management equipment  - Apply yellow socks and bracelet for high fall risk patients  - Consider moving patient to room near nurses station  Outcome: Progressing  Goal: Maintain or return to baseline ADL function  Description: INTERVENTIONS:  -  Assess patient's ability to carry out ADLs; assess patient's baseline for ADL function and identify physical deficits which impact ability to perform ADLs (bathing, care of mouth/teeth, toileting, grooming, dressing, etc.)  - Assess/evaluate cause of self-care deficits   - Assess range of motion  - Assess patient's mobility; develop plan if impaired  - Assess patient's need for assistive devices and provide as appropriate  - Encourage maximum independence but intervene and supervise when necessary  - Involve family in performance of ADLs  - Assess for home care needs following discharge   - Consider OT consult to assist with ADL evaluation and planning for discharge  - Provide patient education as appropriate  Outcome: Progressing  Goal: Maintains/Returns to pre admission functional level  Description: INTERVENTIONS:  - Perform AM-PAC 6 Click Basic Mobility/ Daily Activity assessment daily.  - Set and communicate daily mobility goal to care team and patient/family/caregiver.   - Collaborate with rehabilitation services on mobility goals if consulted  - Out of bed for toileting  - Record patient progress and toleration of activity level    Outcome: Progressing     Problem: DISCHARGE PLANNING  Goal: Discharge to home or other facility with appropriate resources  Description: INTERVENTIONS:  - Identify barriers to discharge w/patient and caregiver  - Arrange for needed discharge resources and transportation as appropriate  - Identify discharge learning needs (meds, wound care, etc.)  - Arrange for interpretive services to assist at discharge as needed  - Refer to Case Management Department for coordinating discharge planning if the patient needs post-hospital services based on physician/advanced practitioner order or complex needs related to functional status, cognitive ability, or social support system  Outcome: Progressing     Problem: Knowledge Deficit  Goal: Patient/family/caregiver demonstrates understanding of disease process, treatment plan, medications, and discharge instructions  Description: Complete learning assessment and assess knowledge base.  Interventions:  - Provide teaching at level of understanding  - Provide teaching via preferred learning methods  Outcome: Progressing     Problem: Potential for Falls  Goal: Patient will remain free of falls  Description: INTERVENTIONS:  - Educate patient/family on patient safety including physical limitations  - Instruct patient to call for assistance with activity   - Consult OT/PT to assist with strengthening/mobility   - Keep Call bell within reach  - Keep bed low and locked with side rails adjusted as appropriate  - Keep care items and personal belongings within reach  - Initiate and maintain comfort rounds  - Make Fall Risk Sign visible to staff  - Obtain necessary fall risk management equipment  - Apply yellow socks and bracelet for high fall risk patients  - Consider moving patient to room near nurses station  Outcome: Progressing     Problem: CARDIOVASCULAR - ADULT  Goal: Maintains optimal cardiac output and hemodynamic stability  Description: INTERVENTIONS:  - Monitor I/O, vital signs and  rhythm  - Monitor for S/S and trends of decreased cardiac output  - Administer and titrate ordered vasoactive medications to optimize hemodynamic stability  - Assess quality of pulses, skin color and temperature  - Assess for signs of decreased coronary artery perfusion  - Instruct patient to report change in severity of symptoms  Outcome: Progressing

## 2024-08-01 NOTE — PLAN OF CARE
Problem: SAFETY ADULT  Goal: Patient will remain free of falls  Description: INTERVENTIONS:  - Educate patient/family on patient safety including physical limitations  - Instruct patient to call for assistance with activity   - Consult OT/PT to assist with strengthening/mobility   - Keep Call bell within reach  - Keep bed low and locked with side rails adjusted as appropriate  - Keep care items and personal belongings within reach  - Initiate and maintain comfort rounds  - Make Fall Risk Sign visible to staff  - Offer Toileting every  Hours, in advance of need  - Initiate/Maintain alarm  - Obtain necessary fall risk management equipment:   - Apply yellow socks and bracelet for high fall risk patients  - Consider moving patient to room near nurses station  Outcome: Progressing  Goal: Maintain or return to baseline ADL function  Description: INTERVENTIONS:  -  Assess patient's ability to carry out ADLs; assess patient's baseline for ADL function and identify physical deficits which impact ability to perform ADLs (bathing, care of mouth/teeth, toileting, grooming, dressing, etc.)  - Assess/evaluate cause of self-care deficits   - Assess range of motion  - Assess patient's mobility; develop plan if impaired  - Assess patient's need for assistive devices and provide as appropriate  - Encourage maximum independence but intervene and supervise when necessary  - Involve family in performance of ADLs  - Assess for home care needs following discharge   - Consider OT consult to assist with ADL evaluation and planning for discharge  - Provide patient education as appropriate  Outcome: Progressing  Goal: Maintains/Returns to pre admission functional level  Description: INTERVENTIONS:  - Perform AM-PAC 6 Click Basic Mobility/ Daily Activity assessment daily.  - Set and communicate daily mobility goal to care team and patient/family/caregiver.   - Collaborate with rehabilitation services on mobility goals if consulted  - Perform  Range of Motion  times a day.  - Reposition patient every  hours.  - Dangle patient  times a day  - Stand patient  times a day  - Ambulate patient  times a day  - Out of bed to chair  times a day   - Out of bed for meals  times a day  - Out of bed for toileting  - Record patient progress and toleration of activity level   Outcome: Progressing     Problem: DISCHARGE PLANNING  Goal: Discharge to home or other facility with appropriate resources  Description: INTERVENTIONS:  - Identify barriers to discharge w/patient and caregiver  - Arrange for needed discharge resources and transportation as appropriate  - Identify discharge learning needs (meds, wound care, etc.)  - Arrange for interpretive services to assist at discharge as needed  - Refer to Case Management Department for coordinating discharge planning if the patient needs post-hospital services based on physician/advanced practitioner order or complex needs related to functional status, cognitive ability, or social support system  Outcome: Progressing     Problem: Knowledge Deficit  Goal: Patient/family/caregiver demonstrates understanding of disease process, treatment plan, medications, and discharge instructions  Description: Complete learning assessment and assess knowledge base.  Interventions:  - Provide teaching at level of understanding  - Provide teaching via preferred learning methods  Outcome: Progressing     Problem: Potential for Falls  Goal: Patient will remain free of falls  Description: INTERVENTIONS:  - Educate patient/family on patient safety including physical limitations  - Instruct patient to call for assistance with activity   - Consult OT/PT to assist with strengthening/mobility   - Keep Call bell within reach  - Keep bed low and locked with side rails adjusted as appropriate  - Keep care items and personal belongings within reach  - Initiate and maintain comfort rounds  - Make Fall Risk Sign visible to staff  - Offer Toileting every   Hours, in advance of need  - Initiate/Maintain alarm  - Obtain necessary fall risk management equipment:  - Apply yellow socks and bracelet for high fall risk patients  - Consider moving patient to room near nurses station  Outcome: Progressing     Problem: CARDIOVASCULAR - ADULT  Goal: Maintains optimal cardiac output and hemodynamic stability  Description: INTERVENTIONS:  - Monitor I/O, vital signs and rhythm  - Monitor for S/S and trends of decreased cardiac output  - Administer and titrate ordered vasoactive medications to optimize hemodynamic stability  - Assess quality of pulses, skin color and temperature  - Assess for signs of decreased coronary artery perfusion  - Instruct patient to report change in severity of symptoms  Outcome: Progressing

## 2024-08-01 NOTE — ASSESSMENT & PLAN NOTE
Evaluation pertinent for increasing dyspnea.    Currently NYHA class III.  Orthopnea noted by the patient since last 24 hours.  Not on any GDMT at home.  Was previously on torsemide and metoprolol at home.  These medications were discontinued due to soft BPs.  Repeat echo showed an EF of 50%. G1DD. The following segments are hypokinetic: basal inferolateral, mid inferolateral and apical lateral.      Plan:  Approved for home O2 supplement- which has delivered.  Continue furosemide 20 mg daily  Repeat BMP within 3 days and 1 week of discharge.  CHF education on daily weight, LFR and daily Na 2g.  Follow up with cardiology as outpatient.

## 2024-08-01 NOTE — CASE MANAGEMENT
Case Management Assessment & Discharge Planning Note    Patient name Tanya Stephen  Location W /W -01 MRN [id number]  : 1934 Date 2024       Current Admission Date: 2024  Current Admission Diagnosis:Acute on chronic diastolic CHF (congestive heart failure) ([facility])   Patient Active Problem List    Diagnosis Date Noted Date Diagnosed    Pleural effusion 2024     Parenchymal renal hypertension 2024     Chronic kidney disease, stage IV (severe) ([facility]) 2024     Diabetic nephropathy associated with type 2 diabetes mellitus ([facility]) 2024     Incontinence of feces with fecal urgency 2023     Elevated liver enzymes 2023     Gastroesophageal reflux disease without esophagitis 2023     Dysphagia 2023     Raynaud's phenomenon without gangrene 2023     Elevated LFTs 2023     Ambulatory dysfunction 2023     SI (sacroiliac) joint dysfunction 10/18/2022     Spinal stenosis of lumbar region with neurogenic claudication 2022     Chronic pain syndrome 2022     Dyslipidemia 2022     Hypertension 2022     Toxic metabolic encephalopathy 2022     Cellulitis 2022     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[facility]) 2022     Chest pain 2022     Leukocytosis 2022     Acute on chronic diastolic CHF (congestive heart failure) ([facility]) 2022     OAB (overactive bladder) 2022     Post zoster neuralgia 2021     Primary generalized (osteo)arthritis 2021     Seronegative arthropathy of multiple sites (HCC) 2021     Senile osteoporosis 2021     Lumbar spondylosis 2021     Diabetic polyneuropathy associated with type 2 diabetes mellitus ([facility]) 2021     Diabetic gastroparesis associated with type 2 diabetes mellitus  ([facility]) 2021     Irritable bowel syndrome with diarrhea 2021     Stage 3b chronic kidney disease ([facility]) 2021      Traumatic injury of sacrum 11/19/2021     Tendinitis of left rotator cuff 11/19/2021     Abnormality of gait due to impairment of balance 11/19/2021     Sicca syndrome (HCC) 11/19/2021     Sinobronchitis 02/14/2020     Iron deficiency anemia secondary to inadequate dietary iron intake 12/23/2019     Urinary frequency 02/13/2018     Hypomagnesemia 12/22/2016     DM (diabetes mellitus), type 2 (HCC) 12/22/2016     Anemia 12/21/2016     Shortness of breath 12/21/2016       LOS (days): 2  Geometric Mean LOS (GMLOS) (days): 3.9  Days to GMLOS:2.2     OBJECTIVE:    Risk of Unplanned Readmission Score: 15.26         Current admission status: Inpatient       Preferred Pharmacy:   [facility] [address] [place] [address], [place] - [address] [place], [place] [id number]  Phone: [phone] Fax: [phone]    [facility] [address] [place] [address]  Phone: [phone] Fax: [phone]    [facility] [address]  [address]  [place] [address]  Phone: [phone] Fax: [phone]    Primary Care Provider: Gregory Santoro DO    Primary Insurance: MEDICARE  Secondary Insurance: [employer]    ASSESSMENT:  Active Health Care Proxies    There are no active Health Care Proxies on file.                 Readmission Root Cause  30 Day Readmission: No    Patient Information  Admitted from:: Home  Mental Status: Alert  During Assessment patient was accompanied by: Not accompanied during assessment  Assessment information provided by:: Patient  Primary Caregiver: Self  Support Systems: Self, Spouse/significant other, Daughter  Home entry access options. Select all that apply.: Stairs  Number of steps to enter home.: 3  Do the steps have railings?: Yes  Type of Current Residence: [facility]  Living Arrangements: Lives w/ Spouse/significant other  Is patient a ?:  No    Activities of Daily Living Prior to Admission  Functional Status: Independent  Completes ADLs independently?: Yes  Ambulates independently?: Yes  Does patient use assisted devices?: Yes  Assisted Devices (DME) used: Straight Cane, Walker  Does patient currently own DME?: Yes  What DME does the patient currently own?: Straight Cane, Walker  Does patient have a history of Outpatient Therapy (PT/OT)?: Yes  Does the patient have a history of Short-Term Rehab?: No  Does patient have a history of HHC?: Yes ([facility])  Does patient currently have HHC?: No         Patient Information Continued  Income Source: Pension/halfway  Does patient have prescription coverage?: Yes  Does patient receive dialysis treatments?: No  Does patient have a history of substance abuse?: No         Means of Transportation  Means of Transport to \[facility] [place] [facility]\"":: Family transport      Social Determinants of Health (SDOH)      Flowsheet Row Most Recent Value   Housing Stability    In the last 12 months, was there a time when you were not able to pay the mortgage or rent on time? N   In the past 12 months, how many times have you moved where you were living? 0   At any time in the past 12 months, were you homeless or living in a shelter (including now)? N   Transportation Needs    In the past 12 months, has lack of transportation kept you from medical appointments or from getting medications? no   In the past 12 months, has lack of transportation kept you from meetings, work, or from getting things needed for daily living? No   Food Insecurity    Within the past 12 months, you worried that your food would run out before you got the money to buy more. Never true   Within the past 12 months, the food you bought just didn't last and you didn't have money to get more. Never true   Utilities    In the past 12 months has the electric, gas, oil, or water company threatened to shut off services in your home? No            DISCHARGE DETAILS:    Discharge  planning discussed with:: Patient  Freedom of Choice: Yes  Comments - Freedom of Choice: CM met wiht patient at bedside. CM introduced self and CM role. CM verified PCP and pharmacy. Patient lives with spouse, is independent, uses a walker and a cane, no O2, no HHC services at this time. Therapy evals pending, CM will follow  CM contacted family/caregiver?: Yes  Were Treatment Team discharge recommendations reviewed with patient/caregiver?: Yes  Did patient/caregiver verbalize understanding of patient care needs?: Yes  Were patient/caregiver advised of the risks associated with not following Treatment Team discharge recommendations?: Yes    Contacts  Patient Contacts: Weston Stephen (Spouse)  Relationship to Patient:: Family  Contact Method: Phone  Phone Number: [phone]  Reason/Outcome: Emergency Contact, Discharge Planning              Other Referral/Resources/Interventions Provided:  Referral Comments: Therapy evaluations pending

## 2024-08-01 NOTE — PLAN OF CARE
Problem: PHYSICAL THERAPY ADULT  Goal: Performs mobility at highest level of function for planned discharge setting.  See evaluation for individualized goals.  Description: Treatment/Interventions: Functional transfer training, LE strengthening/ROM, Elevations, Therapeutic exercise, Endurance training, Cognitive reorientation, Equipment eval/education, Patient/family training, Bed mobility, Gait training, Compensatory technique education  Equipment Recommended: Walker       See flowsheet documentation for full assessment, interventions and recommendations.  8/1/2024 1305 by Jerri Martinez PT  Note: Prognosis: Good  Problem List: Decreased strength, Decreased endurance, Impaired balance, Decreased mobility, Decreased cognition, Decreased safety awareness  Assessment: Tanya Stephen is a 89 y.o. Female who presents [facility] on [facility] due to SOB and diagnosis of acute on chronic diastolic CHF. Orders for PT eval and treat received, w/ activity orders of up and OOB as tolerated. Comorbidities affecting pt's functional mobility at time of evaluation include: HTN, anemia, DM, osteoporosis, CKD, SI joint dysfunction, lumbar spondylosis. Personal factors affecting DC include: ambulating w/ assistive device, stairs to enter home, inability to ambulate household distances, inability to navigate level surfaces w/o external assistance, decreased cognition, positive fall history, limited insight into impairments, inability to perform IADLs, and inability to perform ADLs. At baseline, pt mobilizes mod I w/ RW, and w/ >10 fall(s) in the previous 6 months. Upon evaluation, pt presents w/ the following deficits: impaired strength, impaired balance, impaired cognition, decreased safety awareness, decreased endurance/activity tolerance, and gait deviations. Pt currently requires  supervision for bed mobility, min Ax1 for transfers, min Ax1 w/ RW for ambulation. Pt's clinical presentation is unstable/unpredictable due to abnormal  lab values, need for increased assistance w/ functional mobility compared to baseline, requiring supplemental O2 in order to maintain O2 saturation, need for input for mobility technique, recent h/o falls, ongoing medical management. From a PT/mobility standpoint given the above findings, DC recommendation is level: II (Moderate Rehab Resource Intensity). During current admission, pt will benefit from continued skilled inpatient PT in the acute care setting in order to address the above deficits and to maximize function and mobility prior to DC from acute care.        Rehab Resource Intensity Level, PT: II (Moderate Resource Intensity)    See flowsheet documentation for full assessment.

## 2024-08-01 NOTE — PHYSICAL THERAPY NOTE
PHYSICAL THERAPY EVALUATION NOTE          Patient Name: Tanya Stephen  Today's Date: 2024        AGE:   89 y.o.  Mrn:   [id number]  ADMIT DX:  Senile osteoporosis [M81.0]  Pleural effusion associated with pulmonary infection [J18.9, J91.8]  Pulmonary edema [J81.1]  Pleural effusion [J90]  Trouble breathing [R06.89]    Past Medical History:  Past Medical History:   Diagnosis Date    Anemia     Arthritis     Back pain     CHF (congestive heart failure) (HCC)     Chronic kidney disease     Stage 3b    Confusion 2023    Diabetes (HCC)     Diabetes mellitus (HCC)     Diabetic gastroparesis associated with type 2 diabetes mellitus  (HCC)     Diabetic polyneuropathy (HCC)     Diverticulosis     Herpes zoster     Hypertension     IBS (irritable bowel syndrome)     Neurogenic claudication due to lumbar spinal stenosis     Osteoarthritis     Osteoporosis     Pulmonary edema     Raynaud's phenomenon without gangrene     Seronegative arthropathy of multiple sites (HCC)     Sicca (HCC)        Past Surgical History:  Past Surgical History:   Procedure Laterality Date    BACK SURGERY      COLONOSCOPY      EGD AND COLONOSCOPY N/A 2016    Procedure: EGD AND COLONOSCOPY;  Surgeon: Elina Anderson MD;  [facility]: [facility];  Service:     JOINT REPLACEMENT      bilat knees and hips    OTHER SURGICAL HISTORY      pelvis rods    REPLACEMENT TOTAL KNEE BILATERAL      STOMACH SURGERY      UPPER GASTROINTESTINAL ENDOSCOPY       Length Of Stay: 2        PHYSICAL THERAPY EVALUATION:    Patient's identity confirmed via 2 patient identifiers (full name and ) at start of session       24 1028   PT Last Visit   PT Visit Date 24   Note Type   Note type Evaluation   Pain Assessment   Pain Assessment Tool 0-10   Pain Score No Pain   Restrictions/Precautions   Weight Bearing Precautions Per Order No   Other Precautions Cognitive;Chair Alarm;Bed Alarm;O2;Fall  "Risk  (1.5L O2, catheter)   Home Living   Type of Home House   Home Layout One level;Performs ADLs on one level;Able to live on main level with bedroom/bathroom;Stairs to enter with rails  (3 DAVID)   Bathroom Shower/Tub Walk-in shower   Bathroom Toilet Raised   Bathroom Equipment Grab bars in shower   Bathroom Accessibility Not accessible  (not accessible via RW)   Home Equipment Walker;Cane;Wheelchair-manual  (RW, SPC)   Prior Function   Level of [place] Independent with functional mobility;Needs assistance with ADLs;Needs assistance with IADLS   Lives With Spouse   Receives Help From Family  (Local supportive daughter and niece visit often. Was active w/ HHPT up until ~1 month ago, had to stop due to back pain)   IADLs Family/Friend/Other provides transportation;Family/Friend/Other provides meals;Family/Friend/Other provides medication management   Falls in the last 6 months (S)  >10  (\"about 3 or 4 a month\")   Comments PTA pt was amb mod I w/ RW w/in house (but walker does not fit into bathroom), uses manual WC vs SPC in community   General   Additional Pertinent History SpO2 dropped to low 70s while ambulating on 1.5L O2, increased to 3L w/ pt recovering to >92%, O2 decreased to 1.5L at end of session w/ SpO2 >90%. RN updated   Family/Caregiver Present Yes  (pt's  arrived during session)   Cognition   Overall Cognitive Status Impaired   Arousal/Participation Cooperative   Orientation Level Oriented to person;Oriented to time  (generally oriented to month/year, aware in hospital)   Memory Decreased recall of recent events;Decreased recall of precautions   Following Commands Follows one step commands with increased time or repetition   Comments Pt ID via name and ; pt agreeable to PT eval and mobility. Pt pleasant throughout   RLE Assessment   RLE Assessment X   Strength RLE   R Hip Flexion 3+/5   R Hip ABduction 4/5   R Hip ADduction 4/5   R Knee Flexion 4/5   R Knee Extension 4/5   R Ankle " Dorsiflexion 4/5   R Ankle Plantar Flexion 4/5   LLE Assessment   LLE Assessment X   Strength LLE   L Hip Flexion 3+/5   L Hip ABduction 3+/5   L Hip ADduction 4/5   L Knee Flexion 4/5   L Knee Extension 4/5   L Ankle Dorsiflexion 4/5   L Ankle Plantar Flexion 4/5   Vision-Basic Assessment   Current Vision Wears glasses all the time   Light Touch   RLE Light Touch Grossly intact   LLE Light Touch Grossly intact   Bed Mobility   Supine to Sit 5  Supervision   Additional items Assist x 1;HOB elevated;Bedrails;Increased time required;Verbal cues   Additional Comments supervision to maintain sitting balance at EOB   Transfers   Sit to Stand 4  Minimal assistance   Additional items Assist x 1;Increased time required;Verbal cues   Stand to Sit 4  Minimal assistance   Additional items Assist x 1;Armrests;Increased time required;Verbal cues   Ambulation/Elevation   Gait pattern Improper Weight shift;Decreased foot clearance;Short stride;Excessively slow;Decreased hip extension;Decreased heel strike;Decreased toe off  (L trendelenburg gait)   Gait Assistance 4  Minimal assist   Additional items Assist x 1;Verbal cues   Assistive Device Rolling walker   Distance 20'   Balance   Static Sitting Fair +   Dynamic Sitting Fair   Static Standing Fair -  (w/ RW)   Dynamic Standing Poor +   Ambulatory Poor +  (w/ RW)   Endurance Deficit   Endurance Deficit Yes   Endurance Deficit Description decreased overall activity tolerance, endurance   Activity Tolerance   Activity Tolerance Patient limited by fatigue;Other (Comment)  (desaturation in SpO2 during ambulation)   Medical Staff Made Aware Pt benefited from PT/OT care coordination w/ OT Krysten due to to allow for challenge of pt's activity tolerance, PT and OT goals were addressed individually during session   Nurse Made Aware BENJAMÍN Jones   Assessment   Prognosis Good   Problem List Decreased strength;Decreased endurance;Impaired balance;Decreased mobility;Decreased cognition;Decreased  safety awareness   Assessment Tanya Stephen is a 89 y.o. Female who presents to [facility] on 7/30/24 due to SOB and diagnosis of acute on chronic diastolic CHF. Orders for PT eval and treat received, w/ activity orders of up and OOB as tolerated. Comorbidities affecting pt's functional mobility at time of evaluation include: HTN, anemia, DM, osteoporosis, CKD, SI joint dysfunction, lumbar spondylosis. Personal factors affecting DC include: ambulating w/ assistive device, stairs to enter home, inability to ambulate household distances, inability to navigate level surfaces w/o external assistance, decreased cognition, positive fall history, limited insight into impairments, inability to perform IADLs, and inability to perform ADLs. At baseline, pt mobilizes mod I w/ RW, and w/ >10 fall(s) in the previous 6 months. Upon evaluation, pt presents w/ the following deficits: impaired strength, impaired balance, impaired cognition, decreased safety awareness, decreased endurance/activity tolerance, and gait deviations. Pt currently requires  supervision for bed mobility, min Ax1 for transfers, min Ax1 w/ RW for ambulation. Pt's clinical presentation is unstable/unpredictable due to abnormal lab values, need for increased assistance w/ functional mobility compared to baseline, requiring supplemental O2 in order to maintain O2 saturation, need for input for mobility technique, recent h/o falls, ongoing medical management. From a PT/mobility standpoint given the above findings, DC recommendation is level: II (Moderate Rehab Resource Intensity). During current admission, pt will benefit from continued skilled inpatient PT in the acute care setting in order to address the above deficits and to maximize function and mobility prior to DC from acute care.   Goals   Patient Goals be able to cook Sunday dinner   STG Expiration Date 08/11/24   Short Term Goal #1 Pt will: perform bed mobility w/ mod I to decrease pt's burden of care and  "increase pt's independence w/ repositioning in bed; perform transfers w/ mod I to promote OOB mobility; ambulate at least 100' w/ LRAD and mod I to increase pt's ambulatory endurance/tolerance; negotiate 3 stair(s) w/ UE and supervision to facilitate pt returning to previous living environment; increase all balance ratings by at least 1 grade to decrease pt's risk of falls   PT Treatment Day 0   Plan   Treatment/Interventions Functional transfer training;LE strengthening/ROM;Elevations;Therapeutic exercise;Endurance training;Cognitive reorientation;Equipment eval/education;Patient/family training;Bed mobility;Gait training;Compensatory technique education   PT Frequency 3-5x/wk   Discharge Recommendation   Rehab Resource Intensity Level, PT II (Moderate Resource Intensity)   Equipment Recommended Walker   Walker Package Recommended Wheeled walker   Change/add to Walker Package? Yes, Change Size   Walker Size Arturo (Ht <5'1\")   AM-PAC Basic Mobility Inpatient   Turning in Flat Bed Without Bedrails 3   Lying on Back to Sitting on Edge of Flat Bed Without Bedrails 2   Moving Bed to Chair 3   Standing Up From Chair Using Arms 3   Walk in Room 3   Climb 3-5 Stairs With Railing 1   Basic Mobility Inpatient Raw Score 15   Basic Mobility Standardized Score 36.97   [facility] Highest Level Of Mobility   -[facility] Goal 4: Move to chair/commode   -[facility] Achieved 6: Walk 10 steps or more   End of Consult   Patient Position at End of Consult Bedside chair;Bed/Chair alarm activated;All needs within reach       The patient's AM-PAC Basic Mobility Inpatient Short Form Raw Score is 15. A Raw score of less than or equal to 16 suggests the patient may benefit from discharge to post-acute rehabilitation services. Please also refer to the recommendation of the Physical Therapist for safe discharge planning.    Pt will benefit from skilled inpatient PT during this admission in order to facilitate progress towards goals and to maximize " functional independence prior to DC      DC rec: level II (Moderate Rehab Resource Intensity)        Jerri Martinez PT, DPT  08/01/24

## 2024-08-01 NOTE — CASE MANAGEMENT
Case Management Discharge Planning Note    Patient name Tanya Stephen  Location W /W -01 MRN [id number]  : 1934 Date 2024       Current Admission Date: 2024  Current Admission Diagnosis:Acute on chronic diastolic CHF (congestive heart failure) ([facility])   Patient Active Problem List    Diagnosis Date Noted Date Diagnosed    Pleural effusion 2024     Parenchymal renal hypertension 2024     Chronic kidney disease, stage IV (severe) ([facility]) 2024     Diabetic nephropathy associated with type 2 diabetes mellitus ([facility]) 2024     Incontinence of feces with fecal urgency 2023     Elevated liver enzymes 2023     Gastroesophageal reflux disease without esophagitis 2023     Dysphagia 2023     Raynaud's phenomenon without gangrene 2023     Elevated LFTs 2023     Ambulatory dysfunction 2023     SI (sacroiliac) joint dysfunction 10/18/2022     Spinal stenosis of lumbar region with neurogenic claudication 2022     Chronic pain syndrome 2022     Dyslipidemia 2022     Hypertension 2022     Toxic metabolic encephalopathy 2022     Cellulitis 2022     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[facility]) 2022     Chest pain 2022     Leukocytosis 2022     Acute on chronic diastolic CHF (congestive heart failure) ([facility]) 2022     OAB (overactive bladder) 2022     Post zoster neuralgia 2021     Primary generalized (osteo)arthritis 2021     Seronegative arthropathy of multiple sites (HCC) 2021     Senile osteoporosis 2021     Lumbar spondylosis 2021     Diabetic polyneuropathy associated with type 2 diabetes mellitus ([facility]) 2021     Diabetic gastroparesis associated with type 2 diabetes mellitus  ([facility]) 2021     Irritable bowel syndrome with diarrhea 2021     Stage 3b chronic kidney disease ([facility]) 2021     Traumatic injury of  sacrum 11/19/2021     Tendinitis of left rotator cuff 11/19/2021     Abnormality of gait due to impairment of balance 11/19/2021     Sicca syndrome (HCC) 11/19/2021     Sinobronchitis 02/14/2020     Iron deficiency anemia secondary to inadequate dietary iron intake 12/23/2019     Urinary frequency 02/13/2018     Hypomagnesemia 12/22/2016     DM (diabetes mellitus), type 2 (HCC) 12/22/2016     Anemia 12/21/2016     Shortness of breath 12/21/2016       LOS (days): 2  Geometric Mean LOS (GMLOS) (days): 3.9  Days to GMLOS:2     OBJECTIVE:  Risk of Unplanned Readmission Score: 14.98         Current admission status: Inpatient   Preferred Pharmacy:   [facility] [address] [place] [address], [place] [id number]  Phone: [phone] Fax: [phone]    [facility] [place] [address]  [place] [id number]  Phone: [phone] Fax: [phone]    [facility] [address]  [address]  [place] [id number]  Phone: [phone] Fax: [phone]    Primary Care Provider: Gregory Santoro DO    Primary Insurance: MEDICARE  Secondary Insurance: [employer]    DISCHARGE DETAILS:    Discharge planning discussed with:: Patient and      Comments - Freedom of Choice: CM met with patient and  at bedside to discuss therapy recommendations for STR. They are not interested.  would like [facility]. [facility] referral sent  CM contacted family/caregiver?: Yes  Were Treatment Team discharge recommendations reviewed with patient/caregiver?: Yes  Did patient/caregiver verbalize understanding of patient care needs?: Yes  Were patient/caregiver advised of the risks associated with not following Treatment Team discharge recommendations?: Yes    Contacts  Patient Contacts: Weston Stephen (Spouse)  Relationship to Patient:: Family  Contact Method: In  Person  Reason/Outcome: Discharge Planning    Requested Home Health Care         Is the patient interested in HHC at discharge?: Yes  Home Health Discipline requested:: Occupational Therapy, Physical Therapy  Home Health Follow-Up Provider:: PCP  Home Health Services Needed:: Evaluate Functional Status and Safety, Gait/ADL Training, Strengthening/Theraputic Exercises to Improve Function  Homebound Criteria Met:: Requires the Assistance of Another Person for Safe Ambulation or to Leave the Home, Uses an Assist Device (i.e. cane, walker, etc)  Supporting Clincal Findings:: Dyspnea with Exertion, Limited Endurance, Fatigues Easliy in Short Distances         Other Referral/Resources/Interventions Provided:  Interventions: HHC  Referral Comments: Referrals sent for [facility]

## 2024-08-01 NOTE — OCCUPATIONAL THERAPY NOTE
Occupational Therapy Evaluation     Patient Name: Tanya Stephen  Today's Date: 8/1/2024  Problem List  Principal Problem:    Acute on chronic diastolic CHF (congestive heart failure) ([facility])  Active Problems:    Hypertension    Ambulatory dysfunction    Pleural effusion    Past Medical History  Past Medical History:   Diagnosis Date    Anemia     Arthritis     Back pain     CHF (congestive heart failure) (HCC)     Chronic kidney disease     Stage 3b    Confusion 02/22/2023    Diabetes (HCC)     Diabetes mellitus (HCC)     Diabetic gastroparesis associated with type 2 diabetes mellitus  (HCC)     Diabetic polyneuropathy (HCC)     Diverticulosis     Herpes zoster     Hypertension     IBS (irritable bowel syndrome)     Neurogenic claudication due to lumbar spinal stenosis     Osteoarthritis     Osteoporosis     Pulmonary edema     Raynaud's phenomenon without gangrene     Seronegative arthropathy of multiple sites (HCC)     Sicca (HCC)      Past Surgical History  Past Surgical History:   Procedure Laterality Date    BACK SURGERY      COLONOSCOPY      EGD AND COLONOSCOPY N/A 12/23/2016    Procedure: EGD AND COLONOSCOPY;  Surgeon: Elina Anderson MD;  Location: [facility];  Service:     JOINT REPLACEMENT      bilat knees and hips    OTHER SURGICAL HISTORY      pelvis rods    REPLACEMENT TOTAL KNEE BILATERAL      STOMACH SURGERY      UPPER GASTROINTESTINAL ENDOSCOPY          08/01/24 1052   OT Last Visit   OT Visit Date 08/01/24   Note Type   Note type Evaluation   Pain Assessment   Pain Assessment Tool 0-10   Pain Score No Pain   Restrictions/Precautions   Weight Bearing Precautions Per Order No   Other Precautions Cognitive;Chair Alarm;Bed Alarm;Multiple lines;O2;Fall Risk;Pain  (1.5L O2, catheter)   Home Living   Type of Home House   Home Layout One level;Stairs to enter with rails  (3 DAVID)   Bathroom Shower/Tub Walk-in shower   Bathroom Toilet Raised   Bathroom Equipment Grab bars in shower;Grab bars around  "toilet  (grab bars along walls from doorway to toilet)   Bathroom Accessibility Not accessible  (RW does NOT fit into bathroom)   Home Equipment Walker;Cane;Wheelchair-manual   Additional Comments use of rollator at baseline, - use of w/c in community   Prior Function   Level of [place]   (initially reporting (I) with ADLs - then later stating that  assists)   Lives With Spouse   Receives Help From Family  (2 daughters are local and provide support daily/every other day. Was active with HHPT up until 1 month ago)   IADLs Family/Friend/Other provides transportation;Family/Friend/Other provides meals;Family/Friend/Other provides medication management  (able to cook some small meals,  organizes medications)   Falls in the last 6 months (S)  >10  (\"Oh not too many only about 3 or 4 a month\")   Vocational Retired   Comments Pt reports that when her  leaves for some errands he tells her to sit in the chair and not get up without him there   Lifestyle   Autonomy PTA pt living with  in 1SH, pt (I) with ADLs and shares IADLs, (+)falls, (-)drives, use of rollator at baseline   Reciprocal Relationships supportive  + local daughters   Service to Others retired   Intrinsic Gratification enjoys Sunday family dinners with her kids + grandkids + great grandkids   General   Additional Pertinent History Admit due to SOB. PMH: CHF, Raynauds, HTN, DM, DJD   Family/Caregiver Present Yes  ( Favian present at end of session)   Subjective   Subjective \"Oh you can call me Tanisha\"   ADL   Eating Assistance 5  Supervision/Setup   Grooming Assistance 5  Supervision/Setup   UB Bathing Assistance 4  Minimal Assistance   LB Bathing Assistance 2  Maximal Assistance   UB Dressing Assistance 4  Minimal Assistance   LB Dressing Assistance 2  Maximal Assistance   LB Dressing Deficit Increased time to complete;Supervision/safety;Verbal cueing;Thread RLE into underwear;Thread LLE into underwear;Pull up over " hips  ((A) for threading over catheter)   Toileting Assistance  4  Minimal Assistance   Bed Mobility   Supine to Sit 5  Supervision   Additional items Increased time required;Verbal cues   Transfers   Sit to Stand 4  Minimal assistance   Additional items Assist x 1;Increased time required;Verbal cues   Stand to Sit 4  Minimal assistance   Additional items Assist x 1;Increased time required;Verbal cues   Additional Comments use of RW   Functional Mobility   Functional Mobility 4  Minimal assistance   Additional Comments Ax1, functional distance around bed to recliner chair   Additional items Rolling walker   Balance   Static Sitting Fair +   Dynamic Sitting Fair   Static Standing Fair -   Dynamic Standing Poor +   Ambulatory Poor +   Activity Tolerance   Activity Tolerance Patient limited by fatigue;Other (Comment)  (limited by O2: desatting to low 70s during functional mobility - RN aware O2 increased to 3L for recovery)   Medical Staff Made Aware PT BENJAMÍN Guthrie Assessment   RUE Assessment WFL   LUE Assessment   LUE Assessment WFL   Hand Function   Gross Motor Coordination Functional   Fine Motor Coordination Functional   Vision-Basic Assessment   Current Vision Wears glasses all the time   Cognition   Overall Cognitive Status Impaired   Arousal/Participation Alert;Cooperative   Attention Attends with cues to redirect   Orientation Level Oriented to person;Oriented to time;Disoriented to situation  (generally aware of month/year and aware of hospital)   Memory Decreased short term memory;Decreased recall of recent events;Decreased recall of precautions   Following Commands Follows one step commands with increased time or repetition   Comments pleasant and cooperative, [place], requiring increased processing time, questionable recall at times   Assessment   Limitation Decreased ADL status;Decreased UE strength;Decreased Safe judgement during ADL;Decreased cognition;Decreased endurance;Decreased self-care  trans;Decreased high-level ADLs  (impaired balance, fxnl mobility, act bryanna, fxnl reach, standing bryanna, strength, attention to task, direction following, safety awareness, insight, pacing, response time)   Prognosis Good   Assessment Pt is a 89 y.o. female seen for OT evaluation s/p admission to [facility] on 7/30/2024 due to SOB. Diagnosed with Acute on chronic diastolic CHF (congestive heart failure) (HCC). Personal and env factors supporting pt at time of IE include (I) PLOF, supportive  + children, accessible home environment, and FFSU. Personal and env factors inhibiting engagement in occupations include advanced age and difficulty completing IADLs. Performance deficits that affect the pt’s occupational performance can be seen above. Due to pt's current functional limitations and medical complications pt is functioning below baseline. Pt would benefit from continued skilled OT treatment in order to maximize safety, independence and overall performance with ADLs, functional mobility, functional transfers, and cognition in order to achieve highest level of function.   Goals   Patient Goals to be able to cook sunday dinners for her family   LTG Time Frame 10-14   Long Term Goal see goals listed below   Plan   Treatment Interventions ADL retraining;Functional transfer training;UE strengthening/ROM;Endurance training;Cognitive reorientation;Patient/family training;Equipment evaluation/education;Compensatory technique education;Energy conservation;Activityengagement   Goal Expiration Date 08/11/24   OT Treatment Day 0   OT Frequency 3-5x/wk   Discharge Recommendation   Rehab Resource Intensity Level, OT I (Maximum Resource Intensity)   AM-PAC Daily Activity Inpatient   Lower Body Dressing 3   Bathing 2   Toileting 3   Upper Body Dressing 3   Grooming 3   Eating 3   Daily Activity Raw Score 17   Daily Activity Standardized Score (Calc for Raw Score >=11) 37.26   AM-PAC Applied Cognition Inpatient   Following a  Speech/Presentation 3   Understanding Ordinary Conversation 3   Taking Medications 3   Remembering Where Things Are Placed or Put Away 2   Remembering List of 4-5 Errands 1   Taking Care of Complicated Tasks 1   Applied Cognition Raw Score 13   Applied Cognition Standardized Score 30.46   End of Consult   Patient Position at End of Consult Bedside chair;All needs within reach;Bed/Chair alarm activated        GOALS:   Goals established in order to promote pt's established goal of being able to cook family dinner on Sundays     -Patient will perform grooming tasks standing at sink with overall Mod I in order to increase overall independence    -Patient will be Mod I with UB dressing using AE and AD as needed in order to increase (I) with ADLs    -Patient will be Mod I with UB bathing using AE and AD as needed in order to increase (I) with ADLs    -Patient will be Mod I with LB dressing with use of AE and AD as needed in order to increase (I) with ADLs    -Patient will be Mod I with LB bathing with use of AE and AD as needed in order to increase (I) with ADLs    -Patient will complete toileting w/ Mod I w/ G hygiene/thoroughness in order to reduce caregiver burden    -Patient will demonstrate Mod I with bed mobility for ability to manage own comfort and initiate OOB tasks.     -Patient will perform functional transfers with Mod I to/from all surfaces using DME as needed in order to increase (I) with functional tasks    -Patient will be Mod I with functional mobility to/from bathroom for increased independence with toileting tasks    -Patient will tolerate therapeutic activities for greater than 30 min, in order to increase tolerance for functional activities.     -Patient will engage in ongoing cognitive assessment in order to assist with safe discharge planning/recommendations.      The patient's raw score on the -PAC Daily Activity Inpatient Short Form is 17. A raw score of less than 19 suggests the patient may  benefit from discharge to post-acute rehabilitation services. HOWEVER please refer to the recommendation of the Occupational Therapist for safe discharge planning.    This session, pt required and most appropriately benefited from skilled OT/PT co-eval due to decreased activity tolerance and unpredictable medical and/or functional status. OT and PT goals were addressed separately as seen in documentation.     Krysten Valles MS, OTR/L

## 2024-08-02 ENCOUNTER — APPOINTMENT (INPATIENT)
Dept: RADIOLOGY | Facility: HOSPITAL | Age: 89
DRG: 291 | End: 2024-08-02
Payer: MEDICARE

## 2024-08-02 LAB
ANION GAP SERPL CALCULATED.3IONS-SCNC: 7 MMOL/L (ref 4–13)
BASOPHILS # BLD AUTO: 0.03 THOUSANDS/ÂΜL (ref 0–0.1)
BASOPHILS NFR BLD AUTO: 0 % (ref 0–1)
BUN SERPL-MCNC: 20 MG/DL (ref 5–25)
CALCIUM SERPL-MCNC: 8.3 MG/DL (ref 8.4–10.2)
CHLORIDE SERPL-SCNC: 100 MMOL/L (ref 96–108)
CO2 SERPL-SCNC: 32 MMOL/L (ref 21–32)
CREAT SERPL-MCNC: 1.5 MG/DL (ref 0.6–1.3)
EOSINOPHIL # BLD AUTO: 0.27 THOUSAND/ÂΜL (ref 0–0.61)
EOSINOPHIL NFR BLD AUTO: 4 % (ref 0–6)
ERYTHROCYTE [DISTWIDTH] IN BLOOD BY AUTOMATED COUNT: 15.7 % (ref 11.6–15.1)
GFR SERPL CREATININE-BSD FRML MDRD: 30 ML/MIN/1.73SQ M
GLUCOSE SERPL-MCNC: 111 MG/DL (ref 65–140)
GLUCOSE SERPL-MCNC: 126 MG/DL (ref 65–140)
GLUCOSE SERPL-MCNC: 135 MG/DL (ref 65–140)
GLUCOSE SERPL-MCNC: 138 MG/DL (ref 65–140)
HCT VFR BLD AUTO: 38.2 % (ref 34.8–46.1)
HGB BLD-MCNC: 12.3 G/DL (ref 11.5–15.4)
IMM GRANULOCYTES # BLD AUTO: 0.05 THOUSAND/UL (ref 0–0.2)
IMM GRANULOCYTES NFR BLD AUTO: 1 % (ref 0–2)
LYMPHOCYTES # BLD AUTO: 1.58 THOUSANDS/ÂΜL (ref 0.6–4.47)
LYMPHOCYTES NFR BLD AUTO: 24 % (ref 14–44)
MCH RBC QN AUTO: 29.6 PG (ref 26.8–34.3)
MCHC RBC AUTO-ENTMCNC: 32.2 G/DL (ref 31.4–37.4)
MCV RBC AUTO: 92 FL (ref 82–98)
MONOCYTES # BLD AUTO: 1.13 THOUSAND/ÂΜL (ref 0.17–1.22)
MONOCYTES NFR BLD AUTO: 17 % (ref 4–12)
NEUTROPHILS # BLD AUTO: 3.61 THOUSANDS/ÂΜL (ref 1.85–7.62)
NEUTS SEG NFR BLD AUTO: 54 % (ref 43–75)
NRBC BLD AUTO-RTO: 0 /100 WBCS
PLATELET # BLD AUTO: 135 THOUSANDS/UL (ref 149–390)
PMV BLD AUTO: 11.5 FL (ref 8.9–12.7)
POTASSIUM SERPL-SCNC: 3.6 MMOL/L (ref 3.5–5.3)
RBC # BLD AUTO: 4.15 MILLION/UL (ref 3.81–5.12)
SODIUM SERPL-SCNC: 139 MMOL/L (ref 135–147)
WBC # BLD AUTO: 6.67 THOUSAND/UL (ref 4.31–10.16)

## 2024-08-02 PROCEDURE — 97116 GAIT TRAINING THERAPY: CPT

## 2024-08-02 PROCEDURE — 82948 REAGENT STRIP/BLOOD GLUCOSE: CPT

## 2024-08-02 PROCEDURE — 80048 BASIC METABOLIC PNL TOTAL CA: CPT

## 2024-08-02 PROCEDURE — 97530 THERAPEUTIC ACTIVITIES: CPT

## 2024-08-02 PROCEDURE — 85025 COMPLETE CBC W/AUTO DIFF WBC: CPT

## 2024-08-02 PROCEDURE — 99232 SBSQ HOSP IP/OBS MODERATE 35: CPT | Performed by: HOSPITALIST

## 2024-08-02 PROCEDURE — 99232 SBSQ HOSP IP/OBS MODERATE 35: CPT | Performed by: STUDENT IN AN ORGANIZED HEALTH CARE EDUCATION/TRAINING PROGRAM

## 2024-08-02 PROCEDURE — 71045 X-RAY EXAM CHEST 1 VIEW: CPT

## 2024-08-02 PROCEDURE — 97110 THERAPEUTIC EXERCISES: CPT

## 2024-08-02 RX ORDER — FUROSEMIDE 40 MG/1
40 TABLET ORAL ONCE
Status: COMPLETED | OUTPATIENT
Start: 2024-08-02 | End: 2024-08-02

## 2024-08-02 RX ORDER — ACETAMINOPHEN 325 MG/1
650 TABLET ORAL EVERY 6 HOURS PRN
Status: DISCONTINUED | OUTPATIENT
Start: 2024-08-02 | End: 2024-08-04 | Stop reason: HOSPADM

## 2024-08-02 RX ADMIN — OXYBUTYNIN CHLORIDE 10 MG: 5 TABLET, EXTENDED RELEASE ORAL at 08:38

## 2024-08-02 RX ADMIN — PREGABALIN 100 MG: 100 CAPSULE ORAL at 21:33

## 2024-08-02 RX ADMIN — Medication 400 MG: at 08:39

## 2024-08-02 RX ADMIN — AMITRIPTYLINE HYDROCHLORIDE 20 MG: 10 TABLET, FILM COATED ORAL at 21:33

## 2024-08-02 RX ADMIN — PREGABALIN 100 MG: 100 CAPSULE ORAL at 16:22

## 2024-08-02 RX ADMIN — PYRIDOXINE HCL TAB 50 MG 50 MG: 50 TAB at 08:38

## 2024-08-02 RX ADMIN — FUROSEMIDE 40 MG: 40 TABLET ORAL at 13:06

## 2024-08-02 RX ADMIN — ACETAMINOPHEN 650 MG: 325 TABLET, FILM COATED ORAL at 16:22

## 2024-08-02 RX ADMIN — ATORVASTATIN CALCIUM 10 MG: 10 TABLET, FILM COATED ORAL at 08:40

## 2024-08-02 RX ADMIN — PANTOPRAZOLE SODIUM 40 MG: 20 TABLET, DELAYED RELEASE ORAL at 05:28

## 2024-08-02 RX ADMIN — DULOXETINE HYDROCHLORIDE 60 MG: 60 CAPSULE, DELAYED RELEASE ORAL at 08:40

## 2024-08-02 RX ADMIN — ASPIRIN 81 MG 81 MG: 81 TABLET ORAL at 08:39

## 2024-08-02 RX ADMIN — ENOXAPARIN SODIUM 30 MG: 30 INJECTION SUBCUTANEOUS at 08:37

## 2024-08-02 RX ADMIN — PREGABALIN 100 MG: 100 CAPSULE ORAL at 08:39

## 2024-08-02 NOTE — PLAN OF CARE
Problem: PHYSICAL THERAPY ADULT  Goal: Performs mobility at highest level of function for planned discharge setting.  See evaluation for individualized goals.  Description: Treatment/Interventions: Functional transfer training, LE strengthening/ROM, Elevations, Therapeutic exercise, Endurance training, Cognitive reorientation, Equipment eval/education, Patient/family training, Bed mobility, Gait training, Compensatory technique education  Equipment Recommended: Walker       See flowsheet documentation for full assessment, interventions and recommendations.  Outcome: Progressing  Note: Prognosis: Good  Problem List: Decreased strength, Decreased range of motion, Decreased endurance, Impaired balance, Decreased mobility, Decreased safety awareness, Decreased cognition  Assessment: pt began tx session lying supine in [facility] bed as pt was agreeable to participate in PT intervention. In comparison to previous tx [facility] pt continues to complete all bed mobility with /s and + time to complete transfer to seated EOB. pt was agreeable to sit EOB 5 minutes w/o LOB while being educated on functional transfers to and from RW. pt required min ax1 for all funcitonal transfers to and from RW and ambulation with RW. pt did require Vc's for hand placement while performing transfers and VC's for RW proximity as pt would advance RW to far causing pt to demonstrate flexed posture w/ increased risk for falls and injuries. pt remains limited to less then house hold distances 20'x1 RW , 15'x1 RW as pt had R knee buckling several times w/ ambulation. pt did participate in TE activities while seated in recliner with AROM, no increases in pain in order to strengthen LE's pt required max ax1 to complete 1 step in todays tx [facility] as pt required bilateral hand rails for safety and balance. Post tx pt cotninues to be functioning below her base line level and would benefit from cotninued skilled PT intervention in order to address deficits  listed above. Continue to recomemdn Dc w/ level 2 moderate rehab resource intensity when medically cleared        Rehab Resource Intensity Level, PT: II (Moderate Resource Intensity)    See flowsheet documentation for full assessment.

## 2024-08-02 NOTE — PHYSICAL THERAPY NOTE
PHYSICAL THERAPY NOTE          Patient Name: Tanya Stephen  Today's Date: 8/2/2024 08/02/24 1045   PT Last Visit   PT Visit Date 08/02/24   Note Type   Note Type Treatment   Pain Assessment   Pain Assessment Tool 0-10   Pain Score No Pain   Pain Onset/Description Onset: Ongoing   Patient's Stated Pain Goal No pain   Hospital Pain Intervention(s) Repositioned;Ambulation/increased activity;Rest   Multiple Pain Sites No   Restrictions/Precautions   Weight Bearing Precautions Per Order No   Other Precautions Cognitive;Chair Alarm;Bed Alarm;O2;Fall Risk  (catheter, 1.5 L NC 02)   General   Chart Reviewed Yes   Response to Previous Treatment Patient with no complaints from previous session.   Family/Caregiver Present Yes  (spouse)   Cognition   Overall Cognitive Status Impaired   Arousal/Participation Alert;Responsive;Cooperative   Attention Attends with cues to redirect   Memory Decreased short term memory;Decreased recall of recent events;Decreased recall of precautions   Following Commands Follows one step commands with increased time or repetition   Comments pt was pleasantly confused throughout PT intervention   Subjective   Subjective pt was agreeable to participate in PT intervention and stated 0/10 pain   Bed Mobility   Supine to Sit 5  Supervision   Additional items Assist x 1;Increased time required;HOB elevated;Bedrails;Verbal cues   Sit to Supine Unable to assess   Additional Comments pt was able to sit EOB 5 minutes while being educated on functional transfers to and from RW w/o LOB   Transfers   Sit to Stand 4  Minimal assistance   Additional items Assist x 1;Increased time required;Verbal cues   Stand to Sit 4  Minimal assistance   Additional items Assist x 1;Armrests;Increased time required;Verbal cues   Stand pivot Unable to assess   Toilet transfer 3  Moderate assistance   Additional items Assist x 1;Increased  time required;Verbal cues;Standard toilet  (pt required mod ax1 for sit<>stand from standard toilet)   Additional Comments pt continues to require min ax1 and RW for all functional transfers   Ambulation/Elevation   Gait pattern Improper Weight shift;R Knee Xin;Narrow LES;Decreased foot clearance;Short stride;Excessively slow;Decreased hip extension;Decreased heel strike;Decreased toe off   Gait Assistance 4  Minimal assist   Additional items Assist x 1;Verbal cues   Assistive Device Rolling walker   Distance 20'x1 RW 15'x1 RW   Stair Management Assistance 2  Maximal assist   Additional items Assist x 1;Verbal cues;Tactile cues;Increased time required   Stair Management Technique Two rails;Step to pattern;Foreward;Backward   Number of Stairs 1   Balance   Static Sitting Fair +   Dynamic Sitting Fair   Static Standing Fair -   Dynamic Standing Poor +   Ambulatory Poor +   Endurance Deficit   Endurance Deficit Yes   Endurance Deficit Description limited activity tolerance, ambulation distanec and steps completed in todays tx session   Activity Tolerance   Activity Tolerance Patient limited by fatigue   Nurse Made Aware Spoke to RN   Exercises   Quad Sets Sitting;10 reps;AROM;Bilateral  (long sit position)   Hip Abduction Sitting;10 reps;AROM;Bilateral   Hip Adduction Sitting;10 reps;AROM;Bilateral  (pillow squeezes)   Knee AROM Long Arc Quad Sitting;10 reps;AROM;Bilateral   Ankle Pumps Sitting;15 reps;AROM;Bilateral   Marching Sitting;10 reps;AROM;Bilateral   Balance training  pt also completed 10 hip abd/add w/ AROM in long sit position   Assessment   Prognosis Good   Problem List Decreased strength;Decreased range of motion;Decreased endurance;Impaired balance;Decreased mobility;Decreased safety awareness;Decreased cognition   Assessment pt began tx session lying supine in [facility] bed as pt was agreeable to participate in PT intervention. In comparison to previous tx [facility] pt continues to complete all bed mobility  "with /s and + time to complete transfer to seated EOB. pt was agreeable to sit EOB 5 minutes w/o LOB while being educated on functional transfers to and from RW. pt required min ax1 for all funcitonal transfers to and from RW and ambulation with RW. pt did require Vc's for hand placement while performing transfers and VC's for RW proximity as pt would advance RW to far causing pt to demonstrate flexed posture w/ increased risk for falls and injuries. pt remains limited to less then house hold distances 20'x1 RW , 15'x1 RW as pt had R knee buckling several times w/ ambulation. pt did participate in TE activities while seated in recliner with AROM, no increases in pain in order to strengthen LE's pt required max ax1 to complete 1 step in todays tx sesison as pt required bilateral hand rails for safety and balance. Post tx pt cotninues to be functioning below her base line level and would benefit from cotninued skilled PT intervention in order to address deficits listed above. Continue to recomemdn Dc w/ level 2 moderate rehab resource intensity when medically cleared   Goals   Patient Goals to get some rest   STG Expiration Date 08/11/24   PT Treatment Day 1   Plan   Treatment/Interventions Functional transfer training;LE strengthening/ROM;Elevations;Therapeutic exercise;Cognitive reorientation;Patient/family training;Equipment eval/education;Bed mobility;Gait training;Spoke to nursing   Progress Slow progress, decreased activity tolerance   PT Frequency 3-5x/wk   Discharge Recommendation   Rehab Resource Intensity Level, PT II (Moderate Resource Intensity)   Equipment Recommended Walker   Walker Package Recommended Wheeled walker   Change/add to Walker Package? Yes, Change Size   Walker Size Arturo (Ht <5'1\")   AM-PAC Basic Mobility Inpatient   Turning in Flat Bed Without Bedrails 3   Lying on Back to Sitting on Edge of Flat Bed Without Bedrails 3   Moving Bed to Chair 3   Standing Up From Chair Using Arms 3   Walk " in Room 3   Climb 3-5 Stairs With Railing 2   Basic Mobility Inpatient Raw Score 17   Basic Mobility Standardized Score 39.67   [facility] Level Of Mobility   [facility] Goal 5: Stand one or more mins   Education   Education Provided Mobility training;Assistive device   Patient Reinforcement needed   End of Consult   Patient Position at End of Consult Bedside chair;Bed/Chair alarm activated;All needs within reach   The patient's AM-PAC Basic Mobility Inpatient Short Form Raw Score is 17. A Raw score of greater than 16 suggests the patient may benefit from discharge to home. Please also refer to the recommendation of the Physical Therapist for safe discharge planning.    Pt continues to be functioning below her baseline level and would benefit from continued skilled PT intervention in order to address pt deficits listed above   Arie Arita

## 2024-08-02 NOTE — DISCHARGE INSTR - AVS FIRST PAGE
Dear Tanya Stephen,     It was our pleasure to care for you here at [facility].  It is our hope that we were always able to exceed the expected standards for your care during your stay.  You were hospitalized due to heart failure exacerbation.  You were cared for on the fourth floor by Darvin Montero MD under the service of Alejandro Xie MD with the [facility] who covers for your primary care physician (PCP), Gregory Santoro DO, while you were hospitalized.  If you have any questions or concerns related to this hospitalization, you may contact us at .  For follow up as well as any medication refills, we recommend that you follow up with your primary care physician.  A registered nurse will reach out to you by phone within a few days after your discharge to answer any additional questions that you may have after going home.  However, at this time we provide for you here, the most important instructions / recommendations at discharge:       Notable Medication Adjustments -   Please start to take Lasix 20 mg once daily.  Please continue to take all of your other medications.  Testing Required after Discharge -   Please repeat BMP within 3 days and within 1 week of discharge.  ** Please contact your PCP to request testing orders for any of the testing recommended here **  Important follow up information -   Please follow-up with your PCP within a week of discharge.  Please discuss your amitriptyline and duloxetine with your PCP.  Please call and follow-up with your cardiologist Dr. Portillo within 3 days of discharge.  Other Instructions -   Please maintain a 2000 milligram daily sodium diet and 60 Oz daily fluid restriction.  Please avoid canned food, smoked meat, processed meals.  Please check daily weights. If you gain 3 pounds in one day, 5 pounds in one week, or experience worsening shortness of breath or increasing lower leg swelling please  call your cardiologist's office directly.  Please review this entire after visit summary as additional general instructions including medication list, appointments, activity, diet, any pertinent wound care, and other additional recommendations from your care team that may be provided for you.      Sincerely,     Darvin Montero MD

## 2024-08-02 NOTE — CASE MANAGEMENT
Case Management Progress Note    Patient name Tanya Stephen  Location W /W -01 MRN [id number]  : 1934 Date 2024       LOS (days): 3  Geometric Mean LOS (GMLOS) (days): 3.9  Days to GMLOS:1        OBJECTIVE:        Current admission status: Inpatient  [facility]   [facility] [address] [place] [id number]  Phone: [phone] Fax: [phone]    [facility] [place] [address] [address]  Phone: [phone] Fax: [phone]    [facility] [address]  [address]  [facility] [address]  Phone: [phone] Fax: [phone]    Primary Care Provider: Gregory Santoro DO    Primary Insurance: MEDICARE  Secondary Insurance: [employer]    PROGRESS NOTE: Patient choice list for HHC shared with patient and . They would like [facility] therapy. HHC therapy reserved with [facility]

## 2024-08-02 NOTE — DISCHARGE SUMMARY
On license of [facility]  Discharge- Tanya Stephen 7/21/1934, 89 y.o. female MRN: [id number]  Unit/Bed#: [facility] 416-01 Encounter: [id number]  Primary Care Provider: Gregory Santoro DO   Date and time admitted to hospital: 7/30/2024 11:43 AM    * Acute on chronic diastolic CHF (congestive heart failure) (HCC)  Assessment & Plan  Evaluation pertinent for increasing dyspnea.    Currently NYHA class III.  Orthopnea noted by the patient since last 24 hours.  Not on any GDMT at home.  Was previously on torsemide and metoprolol at home.  These medications were discontinued due to soft BPs.  Repeat echo showed an EF of 50%. G1DD. The following segments are hypokinetic: basal inferolateral, mid inferolateral and apical lateral.      Plan:  Approved for home O2 supplement- which has delivered.  Continue furosemide 20 mg daily  Repeat BMP within 3 days and 1 week of discharge.  CHF education on daily weight, LFR and daily Na 2g.  Follow up with cardiology as outpatient.    Pleural effusion  Assessment & Plan  Perfusion significantly improved since her admission.  Appreciate pulm input.  Will continue lasix 20mg daily upon discharge.    Ambulatory dysfunction  Assessment & Plan  According to , she has some difficulty with ambulatory function.  She uses a walker at home.  PT/OT recommend level 2 intensity rehab upon discharge.  Patient and  would like to be dcd with  home with home health    Hypertension  Assessment & Plan  Blood Pressure: 121/60  Continue home meds.      Medical Problems       Resolved Problems  Date Reviewed: 8/4/2024   None       Discharging Resident: Darvin Montero MD  Discharging Attending: Alejandro Xie MD  PCP: Gregory Santoro DO  Admission Date:   Admission Orders (From admission, onward)       Ordered        07/30/24 1553  INPATIENT ADMISSION  Once                          Discharge Date: 08/04/24    Consultations During Hospital Stay:  Pulmonology    Procedures Performed:    None    Significant Findings / Test Results:   Moderate to large right and moderate left pleural effusions with moderate pulmonary edema.   Echo:     Left Ventricle: Left ventricular cavity size is normal. Wall thickness is normal. The left ventricular ejection fraction is 50%. Systolic function is normal. Diastolic function is mildly abnormal, consistent with grade I (abnormal) relaxation.    The following segments are hypokinetic: basal inferolateral, mid inferolateral and apical lateral.    All other segments are normal.    Aortic Valve: The aortic valve is trileaflet. The leaflets are mildly thickened. The leaflets are moderately calcified. There is mild to moderate regurgitation. There is mild to moderate stenosis. The aortic valve peak velocity is 2 m/s. The aortic valve mean gradient is 8 mmHg. The dimensionless velocity index is 0.45. The aortic valve area is 1.63 cm2.    Mitral Valve: There is mild to moderate regurgitation.    Tricuspid Valve: There is mild regurgitation.    Incidental Findings:   Low lung volumes with vascular crowding.   Mild pulmonary venous congestion.    I reviewed the above mentioned incidental findings with the patient and/or family and they expressed understanding.    Test Results Pending at Discharge (will require follow up):  None     Outpatient Tests Requested:  Repeat BMP within 3 days and 1 week of discharge.    Complications: None    Reason for Admission: Shortness of breath    Hospital Course:   89 y.o. female with a PMH of diastolic CHF, Raynaud's, hypertension, diabetes, DJD who presents with shortness of breath worsening over the past week.  She also endorses orthopnea with 4 pillow use at night.  She was admitted for acute heart failure exacerbation. She was treated with IV diuretic with good response as well as improved b/l pleural effusion. She also required supplemental oxygen with exertion and was approved for home O2 tank which has been delivered today. She will  "be discharged on Lasix 20mg daily and follow up with cardiology/ PCP as outpatient. She is medically stable to be discharged today.      Condition at Discharge: stable    Discharge Day Visit / Exam:   Subjective:    Patient seen and examined at bedside today. No acute events overnight. Endorsed feeling better compared to yesterday. Denied any acute complaints i.e.any fevers, chills, headaches, chest pain, SOB, abdominal pain, nausea, vomiting, constipation, diarrhea.      Vitals: Blood Pressure: 120/67 (08/04/24 0703)  Pulse: 87 (08/04/24 0703)  Temperature: (!) 97.3 °F (36.3 °C) (08/04/24 0703)  Temp Source: Oral (08/03/24 1500)  Respirations: 18 (08/04/24 0703)  Height: 4' 11\" (149.9 cm) (07/31/24 0802)  Weight - Scale: 57.3 kg (126 lb 6.4 oz) (08/04/24 0600)  SpO2: 100 % (08/04/24 0703)    Physical Exam  Vitals and nursing note reviewed.   Constitutional:       General: She is not in acute distress.     Appearance: She is well-developed.   HENT:      Head: Normocephalic and atraumatic.      Mouth/Throat:      Mouth: Mucous membranes are dry.   Eyes:      General:         Right eye: No discharge.         Left eye: No discharge.      Extraocular Movements: Extraocular movements intact.      Conjunctiva/sclera: Conjunctivae normal.   Cardiovascular:      Rate and Rhythm: Normal rate and regular rhythm.      Pulses: Normal pulses.   Pulmonary:      Effort: Pulmonary effort is normal. No respiratory distress.      Breath sounds: No wheezing, rhonchi or rales.      Comments: Diminished BS bibasilar.  Chest:      Chest wall: No tenderness.   Abdominal:      General: Bowel sounds are normal. There is no distension.      Palpations: Abdomen is soft.      Tenderness: There is no abdominal tenderness. There is no guarding or rebound.   Musculoskeletal:         General: No swelling.      Cervical back: Normal range of motion and neck supple.      Right lower leg: No edema.      Left lower leg: No edema.   Skin:     General: " Skin is warm and dry.   Neurological:      Mental Status: She is alert and oriented to person, place, and time.   Psychiatric:         Mood and Affect: Mood normal.         Behavior: Behavior normal.         Thought Content: Thought content normal.         Judgment: Judgment normal.        Discussion with Family: Attempted to update  () via phone. Left voicemail.     Discharge instructions/Information to patient and family:   See after visit summary for information provided to patient and family.      Provisions for Follow-Up Care:  See after visit summary for information related to follow-up care and any pertinent home health orders.      Mobility at time of Discharge:   Basic Mobility Inpatient Raw Score: 17  JH-HLM Goal: 5: Stand one or more mins  JH-HLM Achieved: 6: Walk 10 steps or more  HLM Goal achieved. Continue to encourage appropriate mobility.     Disposition:   Home with VNA Services (Reminder: Complete face to face encounter)    Planned Readmission: No    Discharge Medications:  See after visit summary for reconciled discharge medications provided to patient and/or family.      **Please Note: This note may have been constructed using a voice recognition system**

## 2024-08-02 NOTE — PROGRESS NOTES
Progress Note - Pulmonary   Tanya Stephen 89 y.o. female MRN: [id number]  Unit/Bed#: W -01 Encounter: [id number]      Assessment:  89-year-old female with history of hypertension, diabetes diastolic heart failure, DJD presenting with shortness of breath admitted for acute on chronic decompensated CHF.     Acute on chronic decompensated diastolic heart failure  Patient with 2-day history of shortness of breath at rest, ? Orthopnea, bilateral lower extremity edema with B/L pleural effusions, elevated BNP @ 213.  XR chest, CT significant for increased interstitial markings suggestive of edema.               2D Echo (2023): EF 55%, mod TR.   Rpt 2D Echo EF 50%  grade 1 diastolic dysfunction    Patient overall net balance - ve since admission  Plan  Pt diuresing well with overall net -ve balance.   Lasix held given new VICENTE. Pt also hypotensive overnight map 69-72, now normotensive  w/ MAP 81.   Agree with holding diuresis, encourage oral hydration  Strict I's/O monitoring  Monitor electrolytes with goal K more than 4 mag more than 2  Fluid restriction less than 1.5 L daily  Low-salt diet  Rpt XR showing improved aeration with decreased interstitial markings B/L.  Monitor BP. Pt started on Lisinopril 2.5 mg yesterday, now discontinued.    Bilateral Pleural Effusions   Given bilaterality of effusions and symptomatology, pleural effusion likely in setting of acute decompensated decompensated heart failure.  CT Chest: No PE, Moderate to large right and moderate left pleural effusions with associated atelectasis. No pneumothorax. Moderate pulmonary edema.   Effusions overall improving  Plan              Management as above   Rpt XR showing improved aeration with decreased interstitial markings B/L.              Monitor O2 and supplement with goal sat above 90%              Will hold off on thoracocentesis at this time.                 3. History of Hypertension - low trending overnight. ACE discontinued. Diuresis on  "hold.  4. History of Diabetes- To monitor   5. History of DJD - on amitriptyline and pregabalin    Subjective:   Patient reports feeling overall well. States that shortness of breath has improved since admission.  Has been tolerating diet, and has been voiding via Diaz catheter.     Objective:   Patient seen and examined at bedside.  Overnight, low trending BP. Lasix D/C yesterday. ACE held. Pt voiding via diaz with -ve 2500 balance in last 24 hrs and overall net - 5775 since admission.     Vitals: Blood pressure 132/55, pulse 101, temperature 97.8 °F (36.6 °C), resp. rate 18, height 4' 11\" (1.499 m), weight 55.8 kg (123 lb), SpO2 (!) 89%, not currently breastfeeding.,  Body mass index is 24.84 kg/m².      Intake/Output Summary (Last 24 hours) at 8/2/2024 0931  Last data filed at 8/2/2024 0550  Gross per 24 hour   Intake 250 ml   Output 2750 ml   Net -2500 ml     Physical Exam  Gen: Awake, alert, oriented x 3, no acute distress  HEENT: Mucous membranes moist, no oral lesions, no thrush  NECK: No accessory muscle use, JVP not elevated  Cardiac: Regular, single S1, single S2, no murmurs, no rubs, no gallops  Lungs: Breath sounds vesicular decreased bibasilarly with faint crackles noted  to left LZ. No wheeze/rhonchi  Abdomen: normoactive bowel sounds, soft nontender, nondistended, no rebound or rigidity, no guarding  Extremities: no cyanosis, no clubbing, no edema    Labs: I have personally reviewed pertinent lab results.  Laboratory and Diagnostics  Results from last 7 days   Lab Units 08/02/24 0442 08/01/24  0454 07/31/24  0440 07/30/24  1155   WBC Thousand/uL 6.67 6.25 5.91 6.08   HEMOGLOBIN g/dL 12.3 12.0 11.8 12.5   HEMATOCRIT % 38.2 38.3 38.2 39.4   PLATELETS Thousands/uL 135* 130* 124* 136*   SEGS PCT % 54 60 60 66   MONO PCT % 17* 16* 15* 12   EOS PCT % 4 4 4 3     Results from last 7 days   Lab Units 08/02/24 0442 08/01/24  0454 07/31/24  0440 07/30/24  1155   SODIUM mmol/L 139 141 141 140   POTASSIUM " mmol/L 3.6 3.1* 3.7 3.7   CHLORIDE mmol/L 100 99 106 106   CO2 mmol/L 32 32 28 26   ANION GAP mmol/L 7 10 7 8   BUN mg/dL 20 21 18 21   CREATININE mg/dL 1.50* 1.49* 1.19 1.26   CALCIUM mg/dL 8.3* 8.5 8.4 8.8   GLUCOSE RANDOM mg/dL 111 115 60* 136   ALT U/L  --   --  23 27   AST U/L  --   --  33 36   ALK PHOS U/L  --   --  85 100   ALBUMIN g/dL  --   --  3.1* 3.4*   TOTAL BILIRUBIN mg/dL  --   --  0.76 0.79     Results from last 7 days   Lab Units 08/01/24  0454 07/31/24  0440 07/30/24  1155   MAGNESIUM mg/dL 2.0 1.8* 1.7*                                         Imaging and other studies: I have personally reviewed pertinent reports.      Yu Bethea MD  Pulmonary & Critical Fellow, PGY IV  [facility]

## 2024-08-02 NOTE — PROGRESS NOTES
[facility]  Progress Note  Name: Tanya Stephen I  MRN: [id number]  Unit/Bed#: W -01 I Date of Admission: 7/30/2024   Date of Service: 8/2/2024 I Hospital Day: 3    Assessment & Plan   * Acute on chronic diastolic CHF (congestive heart failure) (HCC)  Assessment & Plan  Wt Readings from Last 3 Encounters:   08/02/24 55.8 kg (123 lb)   07/08/24 60.8 kg (134 lb 0.6 oz)   06/24/24 56.5 kg (124 lb 9.6 oz)     Lab Results   Component Value Date     (H) 07/30/2024     (H) 02/21/2023    LVEF 50 07/31/2024      Evaluation pertinent for increasing dyspnea.    Currently NYHA class III.  Orthopnea noted by the patient since last 24 hours.  Not on any GDMT at home.  Was previously on torsemide and metoprolol at home.  These medications were discontinued due to soft BPs.    Repeat echo today showed an EF of 50%.  Systolic function is normal.  Diastolic function is mildly abnormal, consistent with grade I (abnormal) relaxation.  The following segments are hypokinetic: basal inferolateral, mid inferolateral and apical lateral.  Evaluated by pulmonology, they do not recommend thoracentesis at this time.    X-ray this morning revealed improved pleural effusions, now small left-sided pleural effusion remains.    Plan:  Creatinine remained stable, 1 further dose of p.o. Lasix was given today.  Will reassess BMP tomorrow to monitor creatinine.    Pleural effusion  Assessment & Plan  Jacob x-ray this a.m. revealed improvement in bilateral effusions, now small effusion on the left.  On physical exam, her crackles seem to be improved however still remains with diminished breath sounds at the bases.  She received 1 further dose of p.o. Lasix today.    Ambulatory dysfunction  Assessment & Plan  According to , she has some difficulty with ambulatory function.  She uses a walker at home.  PT/OT recommend level 2 intensity rehab upon discharge.    Hypertension  Assessment & Plan  Blood  Pressure: 132/55  Patient already started on furosemide.    Was started on low-dose lisinopril yesterday.  Her creatinine seems to be baseline 1-1.5.  Creatinine was 1.19 and increased to 1.49 today.  We have discontinued lisinopril as we believe this was the cause.  Hydralazine as needed 5 mg for SBP more than 160.         VTE Pharmacologic Prophylaxis: VTE Score: 5 High Risk (Score >/= 5) - Pharmacological DVT Prophylaxis Ordered: enoxaparin (Lovenox). Sequential Compression Devices Ordered.    Mobility:   Basic Mobility Inpatient Raw Score: 17  JH-HLM Goal: 5: Stand one or more mins  JH-HLM Achieved: 2: Bed activities/Dependent transfer  JH-HLM Goal achieved. Continue to encourage appropriate mobility.    Patient Centered Rounds: I performed bedside rounds with nursing staff today.  Discussions with Specialists or Other Care Team Provider: Pulmonology    Education and Discussions with Family / Patient: Updated  () at bedside.    Current Length of Stay: 3 day(s)  Current Patient Status: Inpatient   Discharge Plan: Anticipate discharge tomorrow to home.    Code Status: Level 3 - DNAR and DNI    Subjective:   Patient reported feeling well today.  She denies any shortness of breath, chest pain, or palpitations.  She continues to sleep at somewhat of an incline.  No overnight events.  No new symptoms.    Objective:     Vitals:   Temp (24hrs), Av.6 °F (36.4 °C), Min:97.1 °F (36.2 °C), Max:97.9 °F (36.6 °C)    Temp:  [97.1 °F (36.2 °C)-97.9 °F (36.6 °C)] 97.8 °F (36.6 °C)  HR:  [] 101  Resp:  [15-20] 18  BP: ()/(55-60) 132/55  SpO2:  [89 %-94 %] 89 %  Body mass index is 24.84 kg/m².     Input and Output Summary (last 24 hours):     Intake/Output Summary (Last 24 hours) at 2024 1443  Last data filed at 2024 1251  Gross per 24 hour   Intake 490 ml   Output 1900 ml   Net -1410 ml     Physical Exam  Vitals and nursing note reviewed.   Constitutional:       General: She is not in  acute distress.     Appearance: She is well-developed.   HENT:      Head: Normocephalic and atraumatic.   Eyes:      Conjunctiva/sclera: Conjunctivae normal.   Cardiovascular:      Rate and Rhythm: Regular rhythm. Tachycardia present.      Heart sounds: No murmur heard.  Pulmonary:      Effort: Pulmonary effort is normal. No respiratory distress.      Comments: Decreased breath sounds at the bases bilaterally  Abdominal:      Palpations: Abdomen is soft.      Tenderness: There is no abdominal tenderness.   Musculoskeletal:         General: No swelling.      Cervical back: Neck supple.      Right lower leg: Edema (1+) present.      Left lower leg: Edema (1+) present.      Comments: Erythema bilateral lower limbs.   Skin:     General: Skin is warm and dry.      Capillary Refill: Capillary refill takes less than 2 seconds.   Neurological:      Mental Status: She is alert.   Psychiatric:         Mood and Affect: Mood normal.          Additional Data:     Labs:  Results from last 7 days   Lab Units 08/02/24  0442   WBC Thousand/uL 6.67   HEMOGLOBIN g/dL 12.3   HEMATOCRIT % 38.2   PLATELETS Thousands/uL 135*   SEGS PCT % 54   LYMPHO PCT % 24   MONO PCT % 17*   EOS PCT % 4     Results from last 7 days   Lab Units 08/02/24  0442 08/01/24  0454 07/31/24  0440   SODIUM mmol/L 139   < > 141   POTASSIUM mmol/L 3.6   < > 3.7   CHLORIDE mmol/L 100   < > 106   CO2 mmol/L 32   < > 28   BUN mg/dL 20   < > 18   CREATININE mg/dL 1.50*   < > 1.19   ANION GAP mmol/L 7   < > 7   CALCIUM mg/dL 8.3*   < > 8.4   ALBUMIN g/dL  --   --  3.1*   TOTAL BILIRUBIN mg/dL  --   --  0.76   ALK PHOS U/L  --   --  85   ALT U/L  --   --  23   AST U/L  --   --  33   GLUCOSE RANDOM mg/dL 111   < > 60*    < > = values in this interval not displayed.         Results from last 7 days   Lab Units 08/02/24  1114   POC GLUCOSE mg/dl 135     Results from last 7 days   Lab Units 07/30/24  1918   HEMOGLOBIN A1C % 7.6*           Lines/Drains:  Invasive Devices        Peripheral Intravenous Line  Duration             Peripheral IV 07/30/24 Right Antecubital 3 days              Drain  Duration             Urethral Catheter 16 Fr. 1 day                  Imaging: Reviewed radiology reports from this admission including: chest xray and ECHO    Recent Cultures (last 7 days):         Last 24 Hours Medication List:   Current Facility-Administered Medications   Medication Dose Route Frequency Provider Last Rate    acetaminophen  650 mg Oral Q6H PRN Perez Vides MD      amitriptyline  20 mg Oral HS Serg Irvin MD      aspirin  81 mg Oral Daily Serg Irvin MD      atorvastatin  10 mg Oral Daily Serg Irvin MD      diphenoxylate-atropine  1 tablet Oral 4x Daily PRN Serg Irvin MD      DULoxetine  60 mg Oral Daily Serg Irvin MD      enoxaparin  30 mg Subcutaneous Daily Serg Irvin MD      hydrALAZINE  5 mg Intravenous Q6H PRN Serg Irvin MD      magnesium Oxide  400 mg Oral Daily Serg Irvin MD      oxybutynin  10 mg Oral Daily Serg Irvin MD      pantoprazole  40 mg Oral Early Morning Serg Irvin MD      pregabalin  100 mg Oral TID Serg Irvin MD      pyridoxine  50 mg Oral Daily Serg Irvin MD          Today, Patient Was Seen By: Perez Vides MD    **Please Note: This note may have been constructed using a voice recognition system.**

## 2024-08-03 LAB
ANION GAP SERPL CALCULATED.3IONS-SCNC: 6 MMOL/L (ref 4–13)
BASOPHILS # BLD AUTO: 0.04 THOUSANDS/ÂΜL (ref 0–0.1)
BASOPHILS NFR BLD AUTO: 1 % (ref 0–1)
BUN SERPL-MCNC: 23 MG/DL (ref 5–25)
CALCIUM SERPL-MCNC: 8.3 MG/DL (ref 8.4–10.2)
CHLORIDE SERPL-SCNC: 100 MMOL/L (ref 96–108)
CO2 SERPL-SCNC: 32 MMOL/L (ref 21–32)
CREAT SERPL-MCNC: 1.56 MG/DL (ref 0.6–1.3)
EOSINOPHIL # BLD AUTO: 0.3 THOUSAND/ÂΜL (ref 0–0.61)
EOSINOPHIL NFR BLD AUTO: 5 % (ref 0–6)
ERYTHROCYTE [DISTWIDTH] IN BLOOD BY AUTOMATED COUNT: 15.6 % (ref 11.6–15.1)
GFR SERPL CREATININE-BSD FRML MDRD: 29 ML/MIN/1.73SQ M
GLUCOSE SERPL-MCNC: 102 MG/DL (ref 65–140)
GLUCOSE SERPL-MCNC: 110 MG/DL (ref 65–140)
GLUCOSE SERPL-MCNC: 118 MG/DL (ref 65–140)
GLUCOSE SERPL-MCNC: 118 MG/DL (ref 65–140)
GLUCOSE SERPL-MCNC: 169 MG/DL (ref 65–140)
HCT VFR BLD AUTO: 36.5 % (ref 34.8–46.1)
HGB BLD-MCNC: 11.6 G/DL (ref 11.5–15.4)
IMM GRANULOCYTES # BLD AUTO: 0.05 THOUSAND/UL (ref 0–0.2)
IMM GRANULOCYTES NFR BLD AUTO: 1 % (ref 0–2)
LYMPHOCYTES # BLD AUTO: 1.44 THOUSANDS/ÂΜL (ref 0.6–4.47)
LYMPHOCYTES NFR BLD AUTO: 25 % (ref 14–44)
MCH RBC QN AUTO: 29.7 PG (ref 26.8–34.3)
MCHC RBC AUTO-ENTMCNC: 31.8 G/DL (ref 31.4–37.4)
MCV RBC AUTO: 94 FL (ref 82–98)
MONOCYTES # BLD AUTO: 0.96 THOUSAND/ÂΜL (ref 0.17–1.22)
MONOCYTES NFR BLD AUTO: 17 % (ref 4–12)
NEUTROPHILS # BLD AUTO: 2.9 THOUSANDS/ÂΜL (ref 1.85–7.62)
NEUTS SEG NFR BLD AUTO: 51 % (ref 43–75)
NRBC BLD AUTO-RTO: 0 /100 WBCS
PLATELET # BLD AUTO: 133 THOUSANDS/UL (ref 149–390)
PMV BLD AUTO: 11.5 FL (ref 8.9–12.7)
POTASSIUM SERPL-SCNC: 3.9 MMOL/L (ref 3.5–5.3)
RBC # BLD AUTO: 3.9 MILLION/UL (ref 3.81–5.12)
SODIUM SERPL-SCNC: 138 MMOL/L (ref 135–147)
WBC # BLD AUTO: 5.69 THOUSAND/UL (ref 4.31–10.16)

## 2024-08-03 PROCEDURE — 99232 SBSQ HOSP IP/OBS MODERATE 35: CPT | Performed by: HOSPITALIST

## 2024-08-03 PROCEDURE — 99232 SBSQ HOSP IP/OBS MODERATE 35: CPT | Performed by: NURSE PRACTITIONER

## 2024-08-03 PROCEDURE — 82948 REAGENT STRIP/BLOOD GLUCOSE: CPT

## 2024-08-03 PROCEDURE — 85025 COMPLETE CBC W/AUTO DIFF WBC: CPT

## 2024-08-03 PROCEDURE — 94761 N-INVAS EAR/PLS OXIMETRY MLT: CPT

## 2024-08-03 PROCEDURE — 80048 BASIC METABOLIC PNL TOTAL CA: CPT

## 2024-08-03 RX ORDER — SODIUM CHLORIDE, SODIUM GLUCONATE, SODIUM ACETATE, POTASSIUM CHLORIDE, MAGNESIUM CHLORIDE, SODIUM PHOSPHATE, DIBASIC, AND POTASSIUM PHOSPHATE .53; .5; .37; .037; .03; .012; .00082 G/100ML; G/100ML; G/100ML; G/100ML; G/100ML; G/100ML; G/100ML
50 INJECTION, SOLUTION INTRAVENOUS CONTINUOUS
Status: DISCONTINUED | OUTPATIENT
Start: 2024-08-03 | End: 2024-08-03

## 2024-08-03 RX ORDER — FUROSEMIDE 20 MG/1
20 TABLET ORAL DAILY
Qty: 30 TABLET | Refills: 0 | Status: SHIPPED | OUTPATIENT
Start: 2024-08-03 | End: 2024-08-07 | Stop reason: SDUPTHER

## 2024-08-03 RX ORDER — FUROSEMIDE 40 MG/1
20 TABLET ORAL DAILY
Status: DISCONTINUED | OUTPATIENT
Start: 2024-08-03 | End: 2024-08-04 | Stop reason: HOSPADM

## 2024-08-03 RX ADMIN — ATORVASTATIN CALCIUM 10 MG: 10 TABLET, FILM COATED ORAL at 08:45

## 2024-08-03 RX ADMIN — AMITRIPTYLINE HYDROCHLORIDE 20 MG: 10 TABLET, FILM COATED ORAL at 21:06

## 2024-08-03 RX ADMIN — PYRIDOXINE HCL TAB 50 MG 50 MG: 50 TAB at 08:46

## 2024-08-03 RX ADMIN — Medication 400 MG: at 08:46

## 2024-08-03 RX ADMIN — ACETAMINOPHEN 650 MG: 325 TABLET, FILM COATED ORAL at 14:52

## 2024-08-03 RX ADMIN — PANTOPRAZOLE SODIUM 40 MG: 20 TABLET, DELAYED RELEASE ORAL at 05:22

## 2024-08-03 RX ADMIN — PREGABALIN 100 MG: 100 CAPSULE ORAL at 21:06

## 2024-08-03 RX ADMIN — OXYBUTYNIN CHLORIDE 10 MG: 5 TABLET, EXTENDED RELEASE ORAL at 08:46

## 2024-08-03 RX ADMIN — PREGABALIN 100 MG: 100 CAPSULE ORAL at 16:30

## 2024-08-03 RX ADMIN — ENOXAPARIN SODIUM 30 MG: 30 INJECTION SUBCUTANEOUS at 08:46

## 2024-08-03 RX ADMIN — DULOXETINE HYDROCHLORIDE 60 MG: 60 CAPSULE, DELAYED RELEASE ORAL at 08:46

## 2024-08-03 RX ADMIN — ASPIRIN 81 MG 81 MG: 81 TABLET ORAL at 08:45

## 2024-08-03 RX ADMIN — PREGABALIN 100 MG: 100 CAPSULE ORAL at 08:45

## 2024-08-03 RX ADMIN — FUROSEMIDE 20 MG: 40 TABLET ORAL at 12:26

## 2024-08-03 RX ADMIN — SODIUM CHLORIDE, SODIUM GLUCONATE, SODIUM ACETATE, POTASSIUM CHLORIDE, MAGNESIUM CHLORIDE, SODIUM PHOSPHATE, DIBASIC, AND POTASSIUM PHOSPHATE 50 ML/HR: .53; .5; .37; .037; .03; .012; .00082 INJECTION, SOLUTION INTRAVENOUS at 11:25

## 2024-08-03 NOTE — PROGRESS NOTES
"Progress Note - Pulmonary   Tanya Stephen 89 y.o. female MRN: [id number]  Unit/Bed#: W -01 Encounter: [id number]    Assessment/Plan:    1.  Acute hypoxic respiratory failure likely multifaceted as listed below        -Currently on 1 L 93%, does not wear home O2        -Maintain saturations greater than 88%        -Pulmonary toileting: Deep breathing cough out of bed as tolerated incentive spirometry        -Will need home O2 evaluation prior to discharge    2.  Acute on chronic grade 1 diastolic CHF        -7/31/2024-echo EF 50%  BNP- 213        -Recommend cardiology consult        -Recommend significant diuresing        -Recommend midodrine or albumin-creatinine actually improved with diuresing on the 30th and 31st    3. B/ L pleural effusions secondary to CHF       -R > L effusions       -Would recommend diuresing first       -Will hold off on thoracentesis at this time      -Pulmonary will sign off at this time  -If hypoxia worsens or becomes increasingly short of breath please feel free to reconsult      Chief Complaint:    \"I feel good\"    Subjective:    Tanya was comfortably sitting.  No significant overnight events reported.  Currently denying any fevers, chills, night sweats, pleuritic chest pain, or palpitations.    Objective:    Vitals: Blood pressure 121/60, pulse 87, temperature (!) 97.3 °F (36.3 °C), resp. rate 19, height 4' 11\" (1.499 m), weight 56.2 kg (124 lb), SpO2 93%, not currently breastfeeding.,Body mass index is 25.04 kg/m².      Intake/Output Summary (Last 24 hours) at 8/3/2024 1007  Last data filed at 8/3/2024 0955  Gross per 24 hour   Intake 600 ml   Output 1500 ml   Net -900 ml       Invasive Devices       Peripheral Intravenous Line  Duration             Peripheral IV 07/30/24 Right Antecubital 3 days              Drain  Duration             Urethral Catheter 16 Fr. 2 days                    Physical Exam:     Physical Exam  Constitutional:       General: She is not in acute " "distress.     Appearance: Normal appearance. She is normal weight. She is not ill-appearing.   HENT:      Head: Normocephalic and atraumatic.      Nose: Nose normal. No congestion or rhinorrhea.      Mouth/Throat:      Mouth: Mucous membranes are dry.      Pharynx: Oropharynx is clear. No oropharyngeal exudate or posterior oropharyngeal erythema.   Cardiovascular:      Rate and Rhythm: Normal rate and regular rhythm.      Pulses: Normal pulses.      Heart sounds: Normal heart sounds. No murmur heard.     No friction rub. No gallop.   Pulmonary:      Effort: Pulmonary effort is normal. No respiratory distress.      Breath sounds: Normal breath sounds. No stridor. No wheezing, rhonchi or rales.      Comments: Manage aeration at bases  Chest:      Chest wall: No tenderness.   Abdominal:      General: Abdomen is flat. Bowel sounds are normal. There is no distension.      Palpations: Abdomen is soft. There is no mass.   Musculoskeletal:         General: No swelling or tenderness. Normal range of motion.      Cervical back: Normal range of motion. No rigidity or tenderness.   Skin:     General: Skin is warm and dry.      Coloration: Skin is not jaundiced or pale.   Neurological:      General: No focal deficit present.      Mental Status: She is alert and oriented to person, place, and time. Mental status is at baseline.      Cranial Nerves: No cranial nerve deficit.      Sensory: No sensory deficit.   Psychiatric:         Mood and Affect: Mood normal.         Behavior: Behavior normal.         Labs: I have personally reviewed pertinent lab results., ABG: No results found for: \"PHART\", \"EMW8LCF\", \"PO2ART\", \"NPK9PZH\", \"O6MDTGQS\", \"BEART\", \"SOURCE\", BNP: No results found for: \"BNP\", CBC:   Lab Results   Component Value Date    WBC 5.69 08/03/2024    HGB 11.6 08/03/2024    HCT 36.5 08/03/2024    MCV 94 08/03/2024     (L) 08/03/2024    RBC 3.90 08/03/2024    MCH 29.7 08/03/2024    MCHC 31.8 08/03/2024    RDW 15.6 (H) " "08/03/2024    MPV 11.5 08/03/2024    NRBC 0 08/03/2024   , CMP:   Lab Results   Component Value Date    SODIUM 138 08/03/2024    K 3.9 08/03/2024     08/03/2024    CO2 32 08/03/2024    BUN 23 08/03/2024    CREATININE 1.56 (H) 08/03/2024    CALCIUM 8.3 (L) 08/03/2024    EGFR 29 08/03/2024   , PT/INR: No results found for: \"PT\", \"INR\"        Imaging and other studies: I have personally reviewed pertinent films in PACS      Bilateral effusions  "

## 2024-08-03 NOTE — PLAN OF CARE
Problem: SAFETY ADULT  Goal: Patient will remain free of falls  Description: INTERVENTIONS:  - Educate patient/family on patient safety including physical limitations  - Instruct patient to call for assistance with activity   - Consult OT/PT to assist with strengthening/mobility   - Keep Call bell within reach  - Keep bed low and locked with side rails adjusted as appropriate  - Keep care items and personal belongings within reach  - Initiate and maintain comfort rounds  - Make Fall Risk Sign visible to staff  - Offer Toileting every  Hours, in advance of need  - Initiate/Maintain alarm  - Obtain necessary fall risk management equipment:   - Apply yellow socks and bracelet for high fall risk patients  - Consider moving patient to room near nurses station  Outcome: Progressing  Goal: Maintain or return to baseline ADL function  Description: INTERVENTIONS:  -  Assess patient's ability to carry out ADLs; assess patient's baseline for ADL function and identify physical deficits which impact ability to perform ADLs (bathing, care of mouth/teeth, toileting, grooming, dressing, etc.)  - Assess/evaluate cause of self-care deficits   - Assess range of motion  - Assess patient's mobility; develop plan if impaired  - Assess patient's need for assistive devices and provide as appropriate  - Encourage maximum independence but intervene and supervise when necessary  - Involve family in performance of ADLs  - Assess for home care needs following discharge   - Consider OT consult to assist with ADL evaluation and planning for discharge  - Provide patient education as appropriate  Outcome: Progressing  Goal: Maintains/Returns to pre admission functional level  Description: INTERVENTIONS:  - Perform AM-PAC 6 Click Basic Mobility/ Daily Activity assessment daily.  - Set and communicate daily mobility goal to care team and patient/family/caregiver.   - Collaborate with rehabilitation services on mobility goals if consulted  - Perform  Range of Motion times a day.  - Reposition patient every  hours.  - Dangle patient  times a day  - Stand patient  times a day  - Ambulate patient  times a day  - Out of bed to chair  times a day   - Out of bed for meals  times a day  - Out of bed for toileting  - Record patient progress and toleration of activity level   Outcome: Progressing     Problem: DISCHARGE PLANNING  Goal: Discharge to home or other facility with appropriate resources  Description: INTERVENTIONS:  - Identify barriers to discharge w/patient and caregiver  - Arrange for needed discharge resources and transportation as appropriate  - Identify discharge learning needs (meds, wound care, etc.)  - Arrange for interpretive services to assist at discharge as needed  - Refer to Case Management Department for coordinating discharge planning if the patient needs post-hospital services based on physician/advanced practitioner order or complex needs related to functional status, cognitive ability, or social support system  Outcome: Progressing     Problem: Knowledge Deficit  Goal: Patient/family/caregiver demonstrates understanding of disease process, treatment plan, medications, and discharge instructions  Description: Complete learning assessment and assess knowledge base.  Interventions:  - Provide teaching at level of understanding  - Provide teaching via preferred learning methods  Outcome: Progressing     Problem: Potential for Falls  Goal: Patient will remain free of falls  Description: INTERVENTIONS:  - Educate patient/family on patient safety including physical limitations  - Instruct patient to call for assistance with activity   - Consult OT/PT to assist with strengthening/mobility   - Keep Call bell within reach  - Keep bed low and locked with side rails adjusted as appropriate  - Keep care items and personal belongings within reach  - Initiate and maintain comfort rounds  - Make Fall Risk Sign visible to staff  - Offer Toileting every  Hours,  in advance of need  - Initiate/Maintain alarm  - Obtain necessary fall risk management equipment:   - Apply yellow socks and bracelet for high fall risk patients  - Consider moving patient to room near nurses station  Outcome: Progressing     Problem: CARDIOVASCULAR - ADULT  Goal: Maintains optimal cardiac output and hemodynamic stability  Description: INTERVENTIONS:  - Monitor I/O, vital signs and rhythm  - Monitor for S/S and trends of decreased cardiac output  - Administer and titrate ordered vasoactive medications to optimize hemodynamic stability  - Assess quality of pulses, skin color and temperature  - Assess for signs of decreased coronary artery perfusion  - Instruct patient to report change in severity of symptoms  Outcome: Progressing     Problem: Prexisting or High Potential for Compromised Skin Integrity  Goal: Skin integrity is maintained or improved  Description: INTERVENTIONS:  - Identify patients at risk for skin breakdown  - Assess and monitor skin integrity  - Assess and monitor nutrition and hydration status  - Monitor labs   - Assess for incontinence   - Turn and reposition patient  - Assist with mobility/ambulation  - Relieve pressure over bony prominences  - Avoid friction and shearing  - Provide appropriate hygiene as needed including keeping skin clean and dry  - Evaluate need for skin moisturizer/barrier cream  - Collaborate with interdisciplinary team   - Patient/family teaching  - Consider wound care consult   Outcome: Progressing

## 2024-08-03 NOTE — CASE MANAGEMENT
Case Management Progress Note    Patient name Tanya Stephen  Location W /W -01 MRN [id number]  : 1934 Date 8/3/2024       LOS (days): 4  Geometric Mean LOS (GMLOS) (days): 3.9  Days to GMLOS:-0.1        OBJECTIVE:        Current admission status: Inpatient  Preferred Pharmacy:   [facility] [place] [id number]  [place] [address] [address]  Phone: [phone] Fax: [phone]    [facility] [place] [address] [address]  Phone: [phone] Fax: [phone]    [facility] [address] [facility] [address]  [address] [facility] [address]  [facility] [address]  Phone: [phone] Fax: [phone]    Primary Care Provider: Gregory Santoro DO    Primary Insurance: MEDICARE  Secondary Insurance: [employer]    PROGRESS NOTE:      Cm advised patient would need O2 on dc. Cm submitted order into paracte and awaiting approval.

## 2024-08-03 NOTE — PROGRESS NOTES
[facility]  Progress Note  Name: Tanya Stephen I  MRN: [id number]  Unit/Bed#: W -01 I Date of Admission: 7/30/2024   Date of Service: 8/3/2024 I Hospital Day: 4    Assessment & Plan   * Acute on chronic diastolic CHF (congestive heart failure) (HCC)  Assessment & Plan  Wt Readings from Last 3 Encounters:   08/03/24 56.2 kg (124 lb)   07/08/24 60.8 kg (134 lb 0.6 oz)   06/24/24 56.5 kg (124 lb 9.6 oz)     Lab Results   Component Value Date     (H) 07/30/2024     (H) 02/21/2023    LVEF 50 07/31/2024      Evaluation pertinent for increasing dyspnea.    Currently NYHA class III.  Orthopnea noted by the patient since last 24 hours.  Not on any GDMT at home.  Was previously on torsemide and metoprolol at home.  These medications were discontinued due to soft BPs.    Repeat echo showed an EF of 50%.  Systolic function is normal.  Diastolic function is mildly abnormal, consistent with grade I (abnormal) relaxation.  The following segments are hypokinetic: basal inferolateral, mid inferolateral and apical lateral.  Evaluated by pulmonology, they do not recommend thoracentesis at this time.    X-ray this morning revealed improved pleural effusions, now small left-sided pleural effusion remains.  During ambulatory pulse oximetry, oxygen saturation decreased to 82.  Daughter and  were at bedside today, they will discuss rehab instead of home health.    Plan:  Respiratory therapy evaluation to determine need for home oxygen.  Creatinine remained stable  Started furosemide 20 mg daily  Close follow up with cardiolgy    Pleural effusion  Assessment & Plan  Perfusion significantly improved since her admission.  Given her fluctuating creatinine we were back-and-forth with her diuresis.  However we have decided that to prevent further heart failure exacerbations she should go home on Lasix 20 mg daily.    Ambulatory dysfunction  Assessment & Plan  According to , she has  some difficulty with ambulatory function.  She uses a walker at home.  PT/OT recommend level 2 intensity rehab upon discharge.  She has elected to return home with home health.    Hypertension  Assessment & Plan  Blood Pressure: 121/60  Well-controlled on current medical regimen.         VTE Pharmacologic Prophylaxis: VTE Score: 5 High Risk (Score >/= 5) - Pharmacological DVT Prophylaxis Ordered: enoxaparin (Lovenox). Sequential Compression Devices Ordered.    Mobility:   Basic Mobility Inpatient Raw Score: 17  JH-HLM Goal: 5: Stand one or more mins  JH-HLM Achieved: 2: Bed activities/Dependent transfer  JH-HLM Goal achieved. Continue to encourage appropriate mobility.    Patient Centered Rounds: I performed bedside rounds with nursing staff today.  Discussions with Specialists or Other Care Team Provider: Pulmonology    Education and Discussions with Family / Patient: Updated  () at bedside.    Current Length of Stay: 4 day(s)  Current Patient Status: Inpatient   Discharge Plan: Anticipate discharge tomorrow to home.    Code Status: Level 3 - DNAR and DNI    Subjective:   Patient seen and examined at bedside today.  She also had her  and daughter in attendance.  During our morning rounds she reported feeling well without any complaints or shortness of breath.  However during her ambulatory pulse oximetry test we found her oxygen saturation to be 82%.  She did report shortness of breath with walking.  Upon resting she feels better.  No other symptoms.  No overnight events.    Objective:     Vitals:   Temp (24hrs), Av.2 °F (36.2 °C), Min:97.1 °F (36.2 °C), Max:97.3 °F (36.3 °C)    Temp:  [97.1 °F (36.2 °C)-97.3 °F (36.3 °C)] 97.3 °F (36.3 °C)  HR:  [87-98] 87  Resp:  [16-19] 19  BP: (101-121)/(48-60) 121/60  SpO2:  [86 %-93 %] 93 %  Body mass index is 25.04 kg/m².     Input and Output Summary (last 24 hours):     Intake/Output Summary (Last 24 hours) at 8/3/2024 1078  Last data filed  at 8/3/2024 1401  Gross per 24 hour   Intake 900 ml   Output 1350 ml   Net -450 ml     Physical Exam  Vitals and nursing note reviewed.   Constitutional:       General: She is not in acute distress.     Appearance: She is well-developed.   HENT:      Head: Normocephalic and atraumatic.   Eyes:      Conjunctiva/sclera: Conjunctivae normal.   Cardiovascular:      Rate and Rhythm: Regular rhythm.      Heart sounds: No murmur heard.  Pulmonary:      Effort: Pulmonary effort is normal. No respiratory distress.      Comments: Decreased breath sounds at the bases bilaterally  Abdominal:      Palpations: Abdomen is soft.      Tenderness: There is no abdominal tenderness.   Musculoskeletal:         General: No swelling.      Cervical back: Neck supple.      Right lower leg: Right lower leg edema: 1+.      Left lower leg: Left lower leg edema: 1+.      Comments: Erythema bilateral lower limbs.   Skin:     General: Skin is warm and dry.      Capillary Refill: Capillary refill takes less than 2 seconds.   Neurological:      Mental Status: She is alert.   Psychiatric:         Mood and Affect: Mood normal.          Additional Data:     Labs:  Results from last 7 days   Lab Units 08/03/24  0516   WBC Thousand/uL 5.69   HEMOGLOBIN g/dL 11.6   HEMATOCRIT % 36.5   PLATELETS Thousands/uL 133*   SEGS PCT % 51   LYMPHO PCT % 25   MONO PCT % 17*   EOS PCT % 5     Results from last 7 days   Lab Units 08/03/24  0516 08/01/24  0454 07/31/24  0440   SODIUM mmol/L 138   < > 141   POTASSIUM mmol/L 3.9   < > 3.7   CHLORIDE mmol/L 100   < > 106   CO2 mmol/L 32   < > 28   BUN mg/dL 23   < > 18   CREATININE mg/dL 1.56*   < > 1.19   ANION GAP mmol/L 6   < > 7   CALCIUM mg/dL 8.3*   < > 8.4   ALBUMIN g/dL  --   --  3.1*   TOTAL BILIRUBIN mg/dL  --   --  0.76   ALK PHOS U/L  --   --  85   ALT U/L  --   --  23   AST U/L  --   --  33   GLUCOSE RANDOM mg/dL 118   < > 60*    < > = values in this interval not displayed.         Results from last 7 days    Lab Units 08/03/24  1101 08/03/24  0701 08/02/24  2127 08/02/24  1606 08/02/24  1114   POC GLUCOSE mg/dl 118 110 138 126 135     Results from last 7 days   Lab Units 07/30/24  1918   HEMOGLOBIN A1C % 7.6*           Lines/Drains:  Invasive Devices       Peripheral Intravenous Line  Duration             Peripheral IV 07/30/24 Right Antecubital 4 days              Drain  Duration             Urethral Catheter 16 Fr. 2 days                  Imaging: Reviewed radiology reports from this admission including: chest xray and ECHO    Recent Cultures (last 7 days):         Last 24 Hours Medication List:   Current Facility-Administered Medications   Medication Dose Route Frequency Provider Last Rate    acetaminophen  650 mg Oral Q6H PRN Perez Vides MD      amitriptyline  20 mg Oral HS Serg Irvin MD      aspirin  81 mg Oral Daily Serg Irvin MD      atorvastatin  10 mg Oral Daily Serg Irvin MD      diphenoxylate-atropine  1 tablet Oral 4x Daily PRN Serg Irvin MD      DULoxetine  60 mg Oral Daily Serg Irvin MD      enoxaparin  30 mg Subcutaneous Daily Serg Irvin MD      furosemide  20 mg Oral Daily Perez Vides MD      hydrALAZINE  5 mg Intravenous Q6H PRN Serg Irvin MD      magnesium Oxide  400 mg Oral Daily Serg Irvin MD      oxybutynin  10 mg Oral Daily Serg Irvin MD      pantoprazole  40 mg Oral Early Morning Serg Irvin MD      pregabalin  100 mg Oral TID Serg Irvin MD      pyridoxine  50 mg Oral Daily Serg Irvin MD          Today, Patient Was Seen By: Perez Vides MD    **Please Note: This note may have been constructed using a voice recognition system.**

## 2024-08-03 NOTE — CASE MANAGEMENT
Case Management Progress Note    Patient name Tanya Stephen  Location W /W -01 MRN [id number]  : 1934 Date 8/3/2024       LOS (days): 4  Geometric Mean LOS (GMLOS) (days): 3.9  Days to GMLOS:0        OBJECTIVE:        Current admission status: Inpatient  Preferred Pharmacy:   [facility] [address] [place] [address]  Phone: [phone] Fax: [phone]    [facility] [address] [address]  Phone: [phone] Fax: [phone]    [facility] [address]  [address]  [facility] [address]  Phone: [phone] Fax: [phone]    Primary Care Provider: Gregory Santoro DO    Primary Insurance: MEDICARE  Secondary Insurance: [employer]    PROGRESS NOTE:    Cm advised patient medically stable for discharge. Patient advised [facility] in [place] of anticipated discharge. Cm met with patient and family at bedside to advise of [facility] reaching out Monday to start services. They have no other questions or needs at this time.

## 2024-08-03 NOTE — CASE MANAGEMENT
Case Management Discharge Planning Note    Patient name Tanya Stephen  Location W /W -01 MRN [id number]  : 1934 Date 8/3/2024       Current Admission Date: 2024  Current Admission Diagnosis:Acute on chronic diastolic CHF (congestive heart failure) ([facility])   Patient Active Problem List    Diagnosis Date Noted Date Diagnosed    Pleural effusion 2024     Parenchymal renal hypertension 2024     Chronic kidney disease, stage IV (severe) ([facility]) 2024     Diabetic nephropathy associated with type 2 diabetes mellitus (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) 2022     Chest pain 2022     Leukocytosis 2022     Acute on chronic diastolic CHF (congestive heart failure) ([facility]) 2022     OAB (overactive bladder) 2022     Post zoster neuralgia 2021     Primary generalized (osteo)arthritis 2021     Seronegative arthropathy of multiple sites (HCC) 2021     Senile osteoporosis 2021     Lumbar spondylosis 2021     Diabetic polyneuropathy associated with type 2 diabetes mellitus ([facility]) 2021     Diabetic gastroparesis associated with type 2 diabetes mellitus  ([facility]) 2021     Irritable bowel syndrome with diarrhea 2021     Stage 3b chronic kidney disease ([facility]) 2021     Traumatic injury of  sacrum 11/19/2021     Tendinitis of left rotator cuff 11/19/2021     Abnormality of gait due to impairment of balance 11/19/2021     Sicca syndrome (HCC) 11/19/2021     Sinobronchitis 02/14/2020     Iron deficiency anemia secondary to inadequate dietary iron intake 12/23/2019     Urinary frequency 02/13/2018     Hypomagnesemia 12/22/2016     DM (diabetes mellitus), type 2 (HCC) 12/22/2016     Anemia 12/21/2016     Shortness of breath 12/21/2016       LOS (days): 4  Geometric Mean LOS (GMLOS) (days): 3.9  Days to GMLOS:0     OBJECTIVE:  Risk of Unplanned Readmission Score: 18.1         Current admission status: Inpatient   Preferred Pharmacy:   [facility] [address] [place] [address], [place] [id number]  Phone: [phone] Fax: [phone]    [facility] [place] [address]  [place] [id number]  Phone: [phone] Fax: [phone]    [facility] [address]  [address] [facility] [address]  [place] [id number]  Phone: [phone] Fax: [phone]    Primary Care Provider: Gregory Santoro DO    Primary Insurance: MEDICARE  Secondary Insurance: [employer]    DISCHARGE DETAILS:    Discharge planning discussed with:: Patient and family at bedside  Freedom of Choice: Yes  Comments - Freedom of Choice: patient and family at bedside in agreement [facility] discharge plan home with ROD MURPHY contacted family/caregiver?: Yes  Were Treatment Team discharge recommendations reviewed with patient/caregiver?: Yes  Did patient/caregiver verbalize understanding of patient care needs?: Yes  Were patient/caregiver advised of the risks associated with not following Treatment Team discharge recommendations?: Yes    Contacts  Patient Contacts: Weston Stephen (Spouse)  Relationship to Patient:: Family  Contact Method: In Person  Reason/Outcome: Discharge Planning    Requested Home  Health Care         Is the patient interested in HHC at discharge?: Yes  Home Health Discipline requested:: Physical Therapy, Occupational Therapy  Home Health Agency Name:: [facility]  HHA External Referral Reason (only applicable if external HHA name selected): Patient has established relationship with provider  Home Health Follow-Up Provider:: PCP    DME Referral Provided  Referral made for DME?: No    Other Referral/Resources/Interventions Provided:  Referral Comments: [facility]    Would you like to participate in our [facility] service program?  : No - Declined    Treatment Team Recommendation: Home with Home Health Care  Discharge Destination Plan:: Home with Home Health Care  Transport at Discharge : Family           ETA of Transport (Date): 08/03/24        Transfer Mode: Wheelchair        IMM Given (Date):: 08/03/24  IMM Given to:: Patient  Family notified:: Patient and family at bedside in agreement with discharge plan. Patient asked daughter to sign IMM form on her behalf.

## 2024-08-03 NOTE — RESPIRATORY THERAPY NOTE
Home Oxygen Qualifying Test     Patient name: Tanya Stephen        : 1934   Date of Test:  August 3, 2024  Diagnosis:    Home Oxygen Test:    **Medicare Guidelines require item(s) 1-5 on all ambulatory patients or 1 and 2 on non-ambulatory patients.    1. Baseline SPO2 on Room Air at rest 97 %   If <= 88% on Room Air add O2 via NC to obtain SpO2 >=88%. If LPM needed, document LPM  needed to reach =>88%    SPO2 during exertion on Room Air 84  %  During exertion monitor SPO2. If SPO2 increases >=89%, do not add supplemental oxygen    SPO2 on Oxygen at Rest  % at  LPM    SPO2 during exertion on Oxygen 94 % at 2 LPM    Test performed during exertion activity.      [x]  Supplemental Home Oxygen is indicated.    []  Client does not qualify for home oxygen.    Respiratory Additional Notes- Patient does qualify for home O2 on exertion only at this time.     Joan Rodriguez, RT

## 2024-08-03 NOTE — PLAN OF CARE
Problem: SAFETY ADULT  Goal: Patient will remain free of falls  Description: INTERVENTIONS:  - Educate patient/family on patient safety including physical limitations  - Instruct patient to call for assistance with activity   - Consult OT/PT to assist with strengthening/mobility   - Keep Call bell within reach  - Keep bed low and locked with side rails adjusted as appropriate  - Keep care items and personal belongings within reach  - Initiate and maintain comfort rounds  - Make Fall Risk Sign visible to staff  - Offer Toileting every  Hours, in advance of need  - Initiate/Maintain alarm  - Obtain necessary fall risk management equipment:   - Apply yellow socks and bracelet for high fall risk patients  - Consider moving patient to room near nurses station  Outcome: Progressing  Goal: Maintain or return to baseline ADL function  Description: INTERVENTIONS:  -  Assess patient's ability to carry out ADLs; assess patient's baseline for ADL function and identify physical deficits which impact ability to perform ADLs (bathing, care of mouth/teeth, toileting, grooming, dressing, etc.)  - Assess/evaluate cause of self-care deficits   - Assess range of motion  - Assess patient's mobility; develop plan if impaired  - Assess patient's need for assistive devices and provide as appropriate  - Encourage maximum independence but intervene and supervise when necessary  - Involve family in performance of ADLs  - Assess for home care needs following discharge   - Consider OT consult to assist with ADL evaluation and planning for discharge  - Provide patient education as appropriate  Outcome: Progressing  Goal: Maintains/Returns to pre admission functional level  Description: INTERVENTIONS:  - Perform AM-PAC 6 Click Basic Mobility/ Daily Activity assessment daily.  - Set and communicate daily mobility goal to care team and patient/family/caregiver.   - Collaborate with rehabilitation services on mobility goals if consulted  - Perform  Range of Motion  times a day.  - Reposition patient every  hours.  - Dangle patient  times a day  - Stand patient  times a day  - Ambulate patient  times a day  - Out of bed to chair  times a day   - Out of bed for meals  times a day  - Out of bed for toileting  - Record patient progress and toleration of activity level   Outcome: Progressing     Problem: DISCHARGE PLANNING  Goal: Discharge to home or other facility with appropriate resources  Description: INTERVENTIONS:  - Identify barriers to discharge w/patient and caregiver  - Arrange for needed discharge resources and transportation as appropriate  - Identify discharge learning needs (meds, wound care, etc.)  - Arrange for interpretive services to assist at discharge as needed  - Refer to Case Management Department for coordinating discharge planning if the patient needs post-hospital services based on physician/advanced practitioner order or complex needs related to functional status, cognitive ability, or social support system  Outcome: Progressing     Problem: Knowledge Deficit  Goal: Patient/family/caregiver demonstrates understanding of disease process, treatment plan, medications, and discharge instructions  Description: Complete learning assessment and assess knowledge base.  Interventions:  - Provide teaching at level of understanding  - Provide teaching via preferred learning methods  Outcome: Progressing     Problem: Potential for Falls  Goal: Patient will remain free of falls  Description: INTERVENTIONS:  - Educate patient/family on patient safety including physical limitations  - Instruct patient to call for assistance with activity   - Consult OT/PT to assist with strengthening/mobility   - Keep Call bell within reach  - Keep bed low and locked with side rails adjusted as appropriate  - Keep care items and personal belongings within reach  - Initiate and maintain comfort rounds  - Make Fall Risk Sign visible to staff  - Offer Toileting every   Hours, in advance of need  - Initiate/Maintain alarm  - Obtain necessary fall risk management equipment:   - Apply yellow socks and bracelet for high fall risk patients  - Consider moving patient to room near nurses station  Outcome: Progressing

## 2024-08-04 VITALS
SYSTOLIC BLOOD PRESSURE: 120 MMHG | RESPIRATION RATE: 18 BRPM | BODY MASS INDEX: 25.48 KG/M2 | TEMPERATURE: 97.3 F | HEIGHT: 59 IN | WEIGHT: 126.4 LBS | DIASTOLIC BLOOD PRESSURE: 67 MMHG | OXYGEN SATURATION: 100 % | HEART RATE: 87 BPM

## 2024-08-04 LAB
ANION GAP SERPL CALCULATED.3IONS-SCNC: 6 MMOL/L (ref 4–13)
BUN SERPL-MCNC: 23 MG/DL (ref 5–25)
CALCIUM SERPL-MCNC: 9.1 MG/DL (ref 8.4–10.2)
CHLORIDE SERPL-SCNC: 97 MMOL/L (ref 96–108)
CO2 SERPL-SCNC: 34 MMOL/L (ref 21–32)
CREAT SERPL-MCNC: 1.46 MG/DL (ref 0.6–1.3)
DME PARACHUTE DELIVERY DATE ACTUAL: NORMAL
DME PARACHUTE DELIVERY DATE REQUESTED: NORMAL
DME PARACHUTE ITEM DESCRIPTION: NORMAL
DME PARACHUTE ORDER STATUS: NORMAL
DME PARACHUTE SUPPLIER NAME: NORMAL
DME PARACHUTE SUPPLIER PHONE: NORMAL
GFR SERPL CREATININE-BSD FRML MDRD: 31 ML/MIN/1.73SQ M
GLUCOSE SERPL-MCNC: 123 MG/DL (ref 65–140)
GLUCOSE SERPL-MCNC: 126 MG/DL (ref 65–140)
GLUCOSE SERPL-MCNC: 148 MG/DL (ref 65–140)
MAGNESIUM SERPL-MCNC: 1.9 MG/DL (ref 1.9–2.7)
POTASSIUM SERPL-SCNC: 4.1 MMOL/L (ref 3.5–5.3)
SODIUM SERPL-SCNC: 137 MMOL/L (ref 135–147)

## 2024-08-04 PROCEDURE — 80048 BASIC METABOLIC PNL TOTAL CA: CPT

## 2024-08-04 PROCEDURE — 99239 HOSP IP/OBS DSCHRG MGMT >30: CPT | Performed by: HOSPITALIST

## 2024-08-04 PROCEDURE — 82948 REAGENT STRIP/BLOOD GLUCOSE: CPT

## 2024-08-04 PROCEDURE — 83735 ASSAY OF MAGNESIUM: CPT

## 2024-08-04 RX ADMIN — FUROSEMIDE 20 MG: 40 TABLET ORAL at 08:16

## 2024-08-04 RX ADMIN — Medication 400 MG: at 08:17

## 2024-08-04 RX ADMIN — ATORVASTATIN CALCIUM 10 MG: 10 TABLET, FILM COATED ORAL at 08:17

## 2024-08-04 RX ADMIN — DULOXETINE HYDROCHLORIDE 60 MG: 60 CAPSULE, DELAYED RELEASE ORAL at 08:17

## 2024-08-04 RX ADMIN — ASPIRIN 81 MG 81 MG: 81 TABLET ORAL at 08:17

## 2024-08-04 RX ADMIN — ENOXAPARIN SODIUM 30 MG: 30 INJECTION SUBCUTANEOUS at 08:17

## 2024-08-04 RX ADMIN — PREGABALIN 100 MG: 100 CAPSULE ORAL at 08:16

## 2024-08-04 RX ADMIN — OXYBUTYNIN CHLORIDE 10 MG: 5 TABLET, EXTENDED RELEASE ORAL at 08:16

## 2024-08-04 RX ADMIN — PYRIDOXINE HCL TAB 50 MG 50 MG: 50 TAB at 08:17

## 2024-08-04 RX ADMIN — PANTOPRAZOLE SODIUM 40 MG: 20 TABLET, DELAYED RELEASE ORAL at 06:00

## 2024-08-04 NOTE — PLAN OF CARE
Problem: SAFETY ADULT  Goal: Patient will remain free of falls  Description: INTERVENTIONS:  - Educate patient/family on patient safety including physical limitations  - Instruct patient to call for assistance with activity   - Consult OT/PT to assist with strengthening/mobility   - Keep Call bell within reach  - Keep bed low and locked with side rails adjusted as appropriate  - Keep care items and personal belongings within reach  - Initiate and maintain comfort rounds  - Make Fall Risk Sign visible to staff  - Offer Toileting every  Hours, in advance of need  - Initiate/Maintain alarm  - Obtain necessary fall risk management equipment:   - Apply yellow socks and bracelet for high fall risk patients  - Consider moving patient to room near nurses station  Outcome: Progressing  Goal: Maintain or return to baseline ADL function  Description: INTERVENTIONS:  -  Assess patient's ability to carry out ADLs; assess patient's baseline for ADL function and identify physical deficits which impact ability to perform ADLs (bathing, care of mouth/teeth, toileting, grooming, dressing, etc.)  - Assess/evaluate cause of self-care deficits   - Assess range of motion  - Assess patient's mobility; develop plan if impaired  - Assess patient's need for assistive devices and provide as appropriate  - Encourage maximum independence but intervene and supervise when necessary  - Involve family in performance of ADLs  - Assess for home care needs following discharge   - Consider OT consult to assist with ADL evaluation and planning for discharge  - Provide patient education as appropriate  Outcome: Progressing  Goal: Maintains/Returns to pre admission functional level  Description: INTERVENTIONS:  - Perform AM-PAC 6 Click Basic Mobility/ Daily Activity assessment daily.  - Set and communicate daily mobility goal to care team and patient/family/caregiver.   - Collaborate with rehabilitation services on mobility goals if consulted  - Perform  Range of Motion  times a day.  - Reposition patient every  hours.  - Dangle patient  times a day  - Stand patient  times a day  - Ambulate patient  times a day  - Out of bed to chair  times a day   - Out of bed for meals  times a day  - Out of bed for toileting  - Record patient progress and toleration of activity level   Outcome: Progressing     Problem: DISCHARGE PLANNING  Goal: Discharge to home or other facility with appropriate resources  Description: INTERVENTIONS:  - Identify barriers to discharge w/patient and caregiver  - Arrange for needed discharge resources and transportation as appropriate  - Identify discharge learning needs (meds, wound care, etc.)  - Arrange for interpretive services to assist at discharge as needed  - Refer to Case Management Department for coordinating discharge planning if the patient needs post-hospital services based on physician/advanced practitioner order or complex needs related to functional status, cognitive ability, or social support system  Outcome: Progressing     Problem: Knowledge Deficit  Goal: Patient/family/caregiver demonstrates understanding of disease process, treatment plan, medications, and discharge instructions  Description: Complete learning assessment and assess knowledge base.  Interventions:  - Provide teaching at level of understanding  - Provide teaching via preferred learning methods  Outcome: Progressing     Problem: Potential for Falls  Goal: Patient will remain free of falls  Description: INTERVENTIONS:  - Educate patient/family on patient safety including physical limitations  - Instruct patient to call for assistance with activity   - Consult OT/PT to assist with strengthening/mobility   - Keep Call bell within reach  - Keep bed low and locked with side rails adjusted as appropriate  - Keep care items and personal belongings within reach  - Initiate and maintain comfort rounds  - Make Fall Risk Sign visible to staff  - Offer Toileting every Hours,  in advance of need  - Initiate/Maintain alarm  - Obtain necessary fall risk management equipment  - Apply yellow socks and bracelet for high fall risk patients  - Consider moving patient to room near nurses station  Outcome: Progressing     Problem: CARDIOVASCULAR - ADULT  Goal: Maintains optimal cardiac output and hemodynamic stability  Description: INTERVENTIONS:  - Monitor I/O, vital signs and rhythm  - Monitor for S/S and trends of decreased cardiac output  - Administer and titrate ordered vasoactive medications to optimize hemodynamic stability  - Assess quality of pulses, skin color and temperature  - Assess for signs of decreased coronary artery perfusion  - Instruct patient to report change in severity of symptoms  Outcome: Progressing     Problem: Prexisting or High Potential for Compromised Skin Integrity  Goal: Skin integrity is maintained or improved  Description: INTERVENTIONS:  - Identify patients at risk for skin breakdown  - Assess and monitor skin integrity  - Assess and monitor nutrition and hydration status  - Monitor labs   - Assess for incontinence   - Turn and reposition patient  - Assist with mobility/ambulation  - Relieve pressure over bony prominences  - Avoid friction and shearing  - Provide appropriate hygiene as needed including keeping skin clean and dry  - Evaluate need for skin moisturizer/barrier cream  - Collaborate with interdisciplinary team   - Patient/family teaching  - Consider wound care consult   Outcome: Progressing

## 2024-08-04 NOTE — CASE MANAGEMENT
Case Management Progress Note    Patient name Tanya Stephen  Location W /W -01 MRN [id number]  : 1934 Date 2024       LOS (days): 5  Geometric Mean LOS (GMLOS) (days): 3.9  Days to GMLOS:-1        OBJECTIVE:        Current admission status: Inpatient  Preferred Pharmacy:   [facility] [id number] [facility] [place] - [address] [place] [id number]  Phone: [phone] Fax: [phone]    [facility] [place] [place]  [address] [address]  Phone: [phone] Fax: [phone]    [facility] [place] [place] [facility] [address] [facility] [address]  [address] [address]  [facility] [address]  Phone: [phone] Fax: [phone]    Primary Care Provider: Gregory Santoro DO    Primary Insurance: MEDICARE  Secondary Insurance: [employer]    PROGRESS NOTE:    Cm met with patient and spouse at bedside to deliver Richard. Per spouse, [employer] delivered tank to room this morning for dc and is to call back when they leave for home set up delivery. CM contacted [employer] Liaison who confirmed tank was delivered at bedside and Richard would no longer need to be delivered. [facility]. CM advised provider that patient has no other needs at this time.

## 2024-08-06 ENCOUNTER — TELEPHONE (OUTPATIENT)
Dept: CARDIOLOGY CLINIC | Facility: CLINIC | Age: 89
End: 2024-08-06

## 2024-08-06 NOTE — PROGRESS NOTES
Cardiology   Follow Up   Office Visit Note  Tanya Stephen   89 y.o.   female   MRN: [id number]  [facility] [place]  [address]  [address]  [place] [address] [phone]    PCP: Gregory Santoro DO  Cardiologist: Dr. Portillo              Summary of recommendations  a salt restricted diet was reinforced  Continue Lasix 20 mg daily, ASA 81 mg/d; Daily weights.  They should assess her regularly for symptoms of hypovolemia also hypervolemia  agree with home PT, as she is very frail  BMP, BNP in 5 days  Follow-up with me in 2 weeks  Follow up will be scheduled with Dr Portillo         Assessment/plan  Chronic HFpEF.  Recent admission for decompensation 7/30 - 8/4/2025, likely secondary to sodium dietary indiscretion while she was on vacation, in the setting of valvular heart disease and new wall motion abnormalities  Echo 7/31/2024: EF 50% with WMA: Hypokinetic: basal inferolateral, mid inferolateral and apical lateral.  These  WMA do not appear to have been present 2/21/2023.  Will treat medically.  Wt Readings from Last 3 Encounters:   08/07/24 55.8 kg (123 lb)   08/04/24 57.3 kg (126 lb 6.4 oz)   07/08/24 60.8 kg (134 lb 0.6 oz)     -beta-blocker: Now off given BP.  Previously she was on 12.5 mg daily but that was over a year ago  --Diuretic:  now back on Lasix 20 mg/d;     PTA:   torsemide 10 mg MWF-This was held 7/6/22 due to hypotension. 7/7/22:  Will make diuretic p.r.n. only-given her reduced p.o. intake, and recurrent falls  --ACE/ARB/ARNI:    --MRA:   --SLGT2I  --2 g sodium diet, 1800 cc fluid restriction. Daily weights,  Coronary artery calcifications, mild noted on CTA 6/21/22  on aspirin, statin  An echo July 2024 shows an EF of 50% with hypokinetic inferolateral and apical lateral segments.  These appear to be new compared to last year  Abnormal EKG: RBBB.  LAHB, bifascicular block  Valvular heart disease  Mild to moderate aortic regurgitation  Mild to moderate  I called patient and told him that at this point Dr. Muniz wants his off of his pain medications.  Patient states he only takes 1 tramadol before PT (which is almost complete) and most nights before bed because he has pain at night and has a hard time getting comfortable.  He also takes Tylenol PRN for pain.  We discussed OTC NSAIDS and he states he did have a kidney elevation years ago when he was taking Naproxen, but he was on it chronically for back pain.  His kidney function has been normal since.  I recommend trying a short course of Aleve 2 tabs QAM on PT days and 2 tabs QHS.  He may also take Tylenol but advised to wait 3 hours after taking Aleve.  He will continue to ice PRN for pain and swelling as well.  Patient verbalized understanding.    Patient also asks if it is normal after surgery to experience urinary frequency.  He reports getting up 4-5 times a night.  He has normal flow and does feel like he is emptying his bladder.  He denies pain or burning.  He is not drinking excess fluids throughout the day.  I advised him that it can be common after surgery with being this far out I would recommend contacting his PCP.  Patient verbalized understanding.   aortic stenosis  Hypertension, essential.  /68 on a low-dose loop diuretic  Hyperlipidemia, on atorvastatin 10 mg daily.  Not addressed today   Latest Reference Range & Units 04/14/22 18:37 05/16/24 12:22   Cholesterol See Comment mg/dL 154 118   Triglycerides See Comment mg/dL 98 51   HDL >=50 mg/dL 85 73   LDL Calculated 0 - 100 mg/dL 49 35   Type 2 diabetes mellitus with gastroparesis.  Hemoglobin A1c 7.6  CKD 3 b, baseline creatinine now around 1.4 with Lasix 20 mg daily  Hx of frequent falls/DJD / bilateral hip arthroplasties   Cardiac testing  TTE 4/15/222. EF 50%.  Mild global hypokinesis.  No LVH.  Mild-to-moderate AI.  Mild-to-moderate MR.  Mild TR.  Small to moderate left pleural effusion  SPECT 4/15/2022: Ordered, not completed.  TTE 2/21/2023.  EF 55%.  Wall motion normal.  Grade 1 DD.  Mild LAE.  Mild AI.  Mild aortic stenosis.  Mild MR.  Moderate TR.  Mild pulmonary regurgitation.  TTE 7/31/24. EF 50%.  Grade 1 DD. The following segments are hypokinetic: basal inferolateral, mid inferolateral and apical lateral.  Mild to moderate AI.  Mild to moderate aortic stenosis.  SHEEBA 1.63 cm².  Mean gradient 8. mild to moderate MR.  Mild TR.                    REINALDO Stephen is an 86 yo female with diabetes mellitus, essential hypertension, mixed dyslipidemia.      Adm 4/14-4/15/22  CC:  Chest pain, atypical  .  Chest x-ray: Mild vascular congestion with left lower lobe atelectasis versus effusion  EKG: NSR. NSSTTW changes possible inferior ischemia, right bundle branch block  Troponins - normal  Dx:  Acute heart failure with preserved ejection fraction   Given low-dose IV diuretics  Discharge diuretic torsemide 10 mg daily  Discharge weight : 129 lb  discharge creatinine 1.30  An outpatient Nuclear stress test was ordered    She established cardiology care with Dr. Portillo following an admission 4/18/22 for atypical, sharp chest pain, worse with rotation and movement.  She has had no  recurrence.  A conservative approach was recommended.  He decided to hold off on stress testing.  Her blood pressure was well controlled, she was maintained on aspirin and initiated on statin.  Follow-up was recommended in 3 months     ER Adm 6/21  Chief complaint:  Chest pain, radiating down her left arm.  Headache  CTH: No acute  EKG normal sinus rhythm   Chest x-ray superimposed edema, interstitial edema  Creatinine 1.1  ProBNP 189   Troponin is negative  Elevated D-dimer  CTA: No PE noted within the limits of the test, limited due to motion artifact.  Mild coronary artery calcification noted  Given 250 mL of IV fluid  Headache resolved, chest pain resolved  Discharged on aspirin, statin, beta-blocker, ACE-I, torsemide 10 mg daily  Advised follow-up with Cardiology    6/22/22 phone call from PCP.  Patient taking torsemide 10 mg 3 times a week    Adm 6/24-6/27/22  CC: mental status change-Incoherent speech reported by her  who found her on the floor at the bedside after a falling while trying to get onto her bed  Found to have VICENTE/creatinine 1.68.  Lower extremity cellulitis.  Dehydration  CTH : No acute.  EKG: Normal sinus rhythm right bundle branch block nonspecific ST T-wave changes, consider inferior ischemia 95 bpm  She did have hypotension, meds adjusted  BC neg after 5 d  Diuretic held.  Treated with antibiotics for LE cellulitis  Followed by geriatrics  Due to concern for polypharmacy, metformin and lisinopril discontinued.  Amitriptyline dose adjusted  Dx TME  PT OT recommended rehab; discharged home with VNA  Discharge diuretic: torsemide 10 mg Monday/ Wednesday/ Friday , metoprolol succinate 12.5 mg daily  Discharge creatinine 1.19  Discharge weight 128 lb    7/5/22.  [facility] note:  Low BP 92/58, 80/50.  Patient is asymptomatic  Directed by Cardiology to hold torsemide     7/7/22  Hospital follow-up.  She is accompanied by her , family  ROS:  Weak, fatigued.  Low back pain, chronic.   Ambulatory dysfunction, in a wheelchair.  Today, she denied chest pain.  Her  reports she has not been complaining of chest pain at home.  Her p.o. Intake is down.  She drinks very little fluid/food.  She does eat pretty well for dinner her  reports.  Clinically she appears dehydrated  I recommend torsemide only p.r.n..  She likely will not be needing that.  Encouraged 60 oz fluid a day including boost/Ensure for protein  Her EKG showed sinus rhythm with PVCs in a ventricular bigeminy, heart rate 81  I would recommend continuing metoprolol succinate 12.5 mg daily  Focus today-- increase her fluid and protein  If she is not eating, or drinking and her clinical status deteriorates, she may need further evaluation in the ED for failure to thrive    7/12/22. Note from PT  Patient fell onto the floor.  Hit head-on sulfa and hit her left side.    She fell a few days prior  Refused to go to the ED  BP 90/50 heart rate 66   only had a cup of coffee today     has water though unknown how much patient is drinking  patient states she is trying to eat or drink       8/15/22  Close follow up.  I am seeing her given scheduling constraints, in lieu of her cardiologist  Since the last visit she has been eating and drinking more.  Her blood pressures have been up.  Her torsemide is p.r.n. only.  She has not needed to take any.  She has had no falls.  Her  tells me her rheumatologist recommended she rely on the walker more than the cane.  Today, she is with the cane.  We had a long discussion about the need for fall prevention.  She is going to physical therapy twice a week to enhance mobility.  She is also scheduled for a pain injection next month.  She has right-sided sciatica  111/62 automated, by me  122/58  By the MA  Her weight is up to 131 lb however I feel this is caloric weight.  She is euvolemic on exam    Interval history  2/24/2023 ADM   chief complaint: Chest pain across the whole chest onset a few  hours prior to arrival. Denies radiation, SOB, palpitations  Troponin 82, 74, 74  EKG- NSR 95, old RBBB  Patient desaturated to 88% on room air on admission.   Chest Xray- Increasing bibasilar consolidations probably represent atelectasis related to hypoventilation unless there is compelling clinical evidence for pneumonia. Trace bilateral pleural effusions  Treated for pneumonia as well as a UTI  Recommended rehab, patient was taken home, per her       6/27/23 OV Dr Portillo  Per his note:   Overall since the last appointment she has been doing well.  She continues to remain active doing small jobs around the house.  She is limited by lower extremity osteoarthritis and uses a cane.  Chest pain is gone away.  Denies any shortness of breath.  There is been no palpitations, lightheadedness, dizziness, or syncope.  She has been taking her medications as prescribed.       Overall she has been doing well since her last appointment.  She is mainly limited by some underlying orthopedic issues.  Blood pressure is well controlled lipids have been doing well blood work was recently reviewed and is stable.  Raynaud's has been well controlled no ulcerations.  Functional capacity has been good for 88 years old.  She is not due for any testing I will see her back in 12 months.         ADM 7/30 - 8/4/2024  CC: worsening dyspnea, 4 pillow orthopnea, worsening lower extremity edema x 2 weeks,  beginning after vacation in [place] with her    Her symptoms worsened suddenly the evening before  Not on torsemide or Lasix as an outpatient, per her    Patient was on torsemide prn and metoprolol at home in the past, however she has not been taking these medications since more than a year now.   HS troponin 17, 15 delta: -2    EKG: Normal sinus rhythm.  PACs.  ST segment changes inferiorly, similar to prior in 2023  CT chest showed moderate to large bilateral pleural effusion with pulmonary edema  Echo:  Preserved LV function, valvular heart disease, mild to moderate AS, AI; WMA: hypokinetic changes: basal inferolateral, mid inferolateral and apical lateral.    Dx: Acute on chronic HFpEF, responded well to diuresis  Repeat cxr shows improvement, after diuresis  She was evaluated by pulmonology who suspected pleural effusions were related to heart failure  Not followed by cardiology  Advised to weigh daily and to take an extra dose of lasix if she gains > 3 lbs   Dc wt: 126 pound  Dc cr: 1.46  Dc diuretic: Lasix 20 mg daily      8/7/24  Hospital follow up  PMH: Mild coronary artery calcifications on CT, chronic HFpEF, HTN, HLD, DM2, Raynaud's, chronic back pain, overactive bladder    She presents with her  and daughter    120/68  OV wt today  123 .  Similar at home  Cr now handing around 1.4 with the diuretic  She decompensated significantly and was deconditioned in the hospital.  She is now getting home PT and is doing better.  Therapy is coming in the home.  She is using a walker.  Her weight is down.  Family is trying to here to salt restricted diet.  On exam she appears very frail.  She appears fatigued.  She has a murmur of aortic stenosis, and 1+ bilateral lower extremity edema.  Breath sounds are diminished but clear  Will continue the current plan, with low-dose Lasix 20 mg daily.  Her blood pressure does not support the addition of Toprol at this time.  Will check labs in 5 days.  I will see her back in 2 weeks.  Advised family to call if they need something sooner          Assessment  Diagnoses and all orders for this visit:    Chronic heart failure with preserved ejection fraction (HFpEF) ([facility])  -     Basic metabolic panel; Future    Chronic diastolic HF (heart failure) ([facility])  -     furosemide (LASIX) 20 mg tablet; Take 1 tablet (20 mg total) by mouth in the morning    Primary hypertension    Diabetic gastroparesis associated with type 2 diabetes mellitus  ([facility])    Stage 3b chronic kidney disease  (HCC)    Dyslipidemia            Past Medical History:   Diagnosis Date    Anemia     Arthritis     Back pain     CHF (congestive heart failure) (HCC)     Chronic kidney disease     Stage 3b    Confusion 02/22/2023    Diabetes (HCC)     Diabetes mellitus (HCC)     Diabetic gastroparesis associated with type 2 diabetes mellitus  (HCC)     Diabetic polyneuropathy (HCC)     Diverticulosis     Herpes zoster     Hypertension     IBS (irritable bowel syndrome)     Neurogenic claudication due to lumbar spinal stenosis     Osteoarthritis     Osteoporosis     Pulmonary edema     Raynaud's phenomenon without gangrene     Seronegative arthropathy of multiple sites ([facility])     Sicca ([facility])        Review of Systems   Constitutional: Negative for chills and malaise/fatigue.   Cardiovascular:  Negative for chest pain, claudication, cyanosis, dyspnea on exertion, irregular heartbeat, leg swelling, near-syncope, orthopnea, palpitations, paroxysmal nocturnal dyspnea and syncope.   Respiratory:  Negative for cough and shortness of breath.    Musculoskeletal:  Positive for back pain.        Right-sided sciatica   Gastrointestinal:  Negative for heartburn and nausea.   Neurological:  Negative for dizziness, focal weakness, headaches, light-headedness and weakness.   All other systems reviewed and are negative.      Allergies   Allergen Reactions    Morphine Other (See Comments)     urinary retention    Ciprofloxacin Rash and Nausea Only     .    Current Outpatient Medications:     amitriptyline (ELAVIL) 10 mg tablet, TAKE TWO TABLETS BY MOUTH AT BEDTIME (Patient taking differently: in the morning), Disp: 180 tablet, Rfl: 1    aspirin 81 mg chewable tablet, Chew 1 tablet (81 mg total) daily, Disp: 30 tablet, Rfl: 0    atorvastatin (LIPITOR) 10 mg tablet, TAKE ONE TABLET BY MOUTH EVERY DAY, Disp: 90 tablet, Rfl: 3    Cholecalciferol (VITAMIN D3) 1000 units CAPS, Take 1 capsule by mouth daily , Disp: , Rfl:     dicyclomine (BENTYL) 10 mg  capsule, Take 1 capsule (10 mg total) by mouth 3 (three) times a day as needed (abd pain), Disp: 30 capsule, Rfl: 2    diphenoxylate-atropine (LOMOTIL) 2.5-0.025 mg per tablet, Take 1 tablet by mouth if needed, Disp: , Rfl:     DULoxetine (CYMBALTA) 60 mg delayed release capsule, TAKE ONE CAPSULE BY MOUTH ONCE DAILY, Disp: 30 capsule, Rfl: 5    furosemide (LASIX) 20 mg tablet, Take 1 tablet (20 mg total) by mouth in the morning, Disp: 90 tablet, Rfl: 3    hydroxychloroquine (PLAQUENIL) 200 mg tablet, Take 1 tablet (200 mg total) by mouth daily with breakfast, Disp: 90 tablet, Rfl: 1    Magnesium 250 MG TABS, Take 250 mg by mouth every evening, Disp: , Rfl:     Mirabegron ER (Myrbetriq) 50 MG TB24, Take 1 tablet by mouth daily as directed by physician., Disp: 90 tablet, Rfl: 1    omeprazole (PriLOSEC) 20 mg delayed release capsule, Take 20 mg by mouth daily, Disp: , Rfl:     pregabalin (LYRICA) 75 mg capsule, TAKE ONE CAPSULE BY MOUTH THREE TIMES A DAY (Patient taking differently: Take 100 mg by mouth 3 (three) times a day), Disp: 90 capsule, Rfl: 5    pyridoxine (B-6) 100 MG tablet, Take 100 mg by mouth daily, Disp: , Rfl:     diphenoxylate-atropine (LOMOTIL) 2.5-0.025 mg per tablet, Take 1 tablet by mouth 4 (four) times a day as needed for diarrhea, Disp: 30 tablet, Rfl: 0    metFORMIN (GLUCOPHAGE) 500 mg tablet, Take 500 mg by mouth 2 (two) times a day (Patient not taking: Reported on 5/10/2024), Disp: , Rfl:     Sodium Fluoride (PreviDent 5000 Booster Plus) 1.1 % PSTE, Apply on teeth every evening as directed (Patient not taking: Reported on 7/30/2024), Disp: 100 mL, Rfl: 3    Current Facility-Administered Medications:     denosumab (PROLIA) subcutaneous injection 60 mg, 60 mg, Subcutaneous, Q6 Months, , 60 mg at 05/29/24 1401        Social History     Socioeconomic History    Marital status: /Civil Union     Spouse name: Weston    Number of children: 2    Years of education: Not on file    Highest education  level: High school graduate   Occupational History    Not on file   Tobacco Use    Smoking status: Former     Current packs/day: 0.00     Types: Cigarettes     Quit date: 1975     Years since quittin.6    Smokeless tobacco: Never   Vaping Use    Vaping status: Never Used   Substance and Sexual Activity    Alcohol use: No    Drug use: No    Sexual activity: Not Currently     Partners: Male     Birth control/protection: None   Other Topics Concern    Not on file   Social History Narrative    Not on file     Social Determinants of Health     Financial Resource Strain: Not on file   Food Insecurity: No Food Insecurity (2024)    Hunger Vital Sign     Worried About Running Out of Food in the Last Year: Never true     Ran Out of Food in the Last Year: Never true   Transportation Needs: No Transportation Needs (2024)    PRAPARE - Transportation     Lack of Transportation (Medical): No     Lack of Transportation (Non-Medical): No   Physical Activity: Not on file   Stress: Not on file   Social Connections: Not on file   Intimate Partner Violence: Not on file   Housing Stability: Low Risk  (2024)    Housing Stability Vital Sign     Unable to Pay for Housing in the Last Year: No     Number of Times Moved in the Last Year: 0     Homeless in the Last Year: No       Family History   Problem Relation Age of Onset    Cancer Brother     Diabetes Mother     Hypertension Father     Heart disease Father        Physical Exam  Vitals and nursing note reviewed.   Constitutional:       General: She is not in acute distress.     Appearance: She is not diaphoretic.   HENT:      Head: Normocephalic and atraumatic.   Eyes:      Conjunctiva/sclera: Conjunctivae normal.   Cardiovascular:      Rate and Rhythm: Normal rate and regular rhythm.      Pulses: Intact distal pulses.      Heart sounds: Murmur heard.      High-pitched blowing holosystolic murmur is present with a grade of 2/6 at the apex.   Pulmonary:      Effort:  "Pulmonary effort is normal.      Breath sounds: Normal breath sounds.   Abdominal:      General: Bowel sounds are normal.      Palpations: Abdomen is soft.   Musculoskeletal:         General: Normal range of motion.      Cervical back: Normal range of motion and neck supple.   Skin:     General: Skin is warm and dry.   Neurological:      Mental Status: She is alert and oriented to person, place, and time.         Vitals: Blood pressure 120/68, pulse 80, height 4' 11\" (1.499 m), weight 55.8 kg (123 lb), SpO2 99%, not currently breastfeeding.   Wt Readings from Last 3 Encounters:   08/07/24 55.8 kg (123 lb)   08/04/24 57.3 kg (126 lb 6.4 oz)   07/08/24 60.8 kg (134 lb 0.6 oz)         Labs & Results:  Lab Results   Component Value Date    WBC 5.69 08/03/2024    HGB 11.6 08/03/2024    HCT 36.5 08/03/2024    MCV 94 08/03/2024     (L) 08/03/2024     BNP   Date Value Ref Range Status   07/30/2024 213 (H) 0 - 100 pg/mL Final   02/21/2023 153 (H) 0 - 100 pg/mL Final   06/21/2022 189 (H) 0 - 100 pg/mL Final     No components found for: \"CHEM\"  Total CK   Date Value Ref Range Status   01/07/2017 81 26 - 192 U/L Final     Troponin I   Date Value Ref Range Status   09/19/2021 0.02 <=0.04 ng/mL Final     Comment:     [employer] Chemistry analyzer 99% cutoff is > 0.04 ng/mL in network labs     o cTnI 99% cutoff is useful only when applied to patients in the clinical setting of myocardial ischemia   o cTnI 99% cutoff should be interpreted in the context of clinical history, ECG findings and possibly cardiac imaging to establish correct diagnosis.   o cTnI 99% cutoff may be suggestive but clearly not indicative of a coronary event without the clinical setting of myocardial ischemia.       Results for orders placed during the hospital encounter of 12/21/16    Echo complete with contrast if indicated    [facility]  [address]  [place] [address] [phone]    Transthoracic Echocardiogram  2D, " M-mode, Doppler, and Color Doppler    Study date:  23-Dec-2016    Patient: LIANA PARTIDA  MR number: [id number]  Account number: [id number]  : 1934  Age: 82 years  Gender: Female  Status: Inpatient  Location: Bedside  Height: 59 in  Weight: 155.8 lb  BP: 131/ 65 mmHg    Indications: Shortness of breath.    Diagnoses: R06.02 - Shortness of breath    Sonographer:  STORM Ghosh  Primary Physician:  Gregory Santoro DO  Referring Physician:  Joao Mendiola MD MPH  Group:  [facility]  Interpreting Physician:  Dayday Gómez MD    SUMMARY    LEFT VENTRICLE:  Systolic function was normal. Ejection fraction was estimated to be 60 %.  There were no regional wall motion abnormalities.  There was mild concentric hypertrophy.  Doppler parameters were consistent with abnormal left ventricular relaxation  (grade 1 diastolic dysfunction).    AORTIC VALVE:  There was mild regurgitation.    HISTORY: PRIOR HISTORY: hypertension, diabetes, CHF    PROCEDURE: The procedure was performed at the bedside. This was a routine  study. The transthoracic approach was used. The study included complete 2D  imaging, M-mode, complete spectral Doppler, and color Doppler. Image quality  was adequate.    LEFT VENTRICLE: Size was normal. Systolic function was normal. Ejection  fraction was estimated to be 60 %. There were no regional wall motion  abnormalities. Wall thickness was mildly increased. There was mild concentric  hypertrophy. DOPPLER: There was an increased relative contribution of atrial  contraction to ventricular filling. Doppler parameters were consistent with  abnormal left ventricular relaxation (grade 1 diastolic dysfunction).    RIGHT VENTRICLE: The size was normal. Systolic function was normal. Wall  thickness was normal.    LEFT ATRIUM: Size was normal.    RIGHT ATRIUM: Size was normal.    MITRAL VALVE: Valve structure was normal. There was normal leaflet separation.  DOPPLER: The  transmitral velocity was within the normal range. There was no  evidence for stenosis. There was trace regurgitation.    AORTIC VALVE: The valve was trileaflet. Leaflets exhibited normal thickness and  normal cuspal separation. DOPPLER: Transaortic velocity was within the normal  range. There was no evidence for stenosis. There was mild regurgitation.    TRICUSPID VALVE: The valve structure was normal. There was normal leaflet  separation. DOPPLER: The transtricuspid velocity was within the normal range.  There was no evidence for stenosis. There was trace regurgitation. Pulmonary  artery systolic pressure was within the normal range. Estimated peak PA  pressure was 18 mmHg.    PULMONIC VALVE: Leaflets exhibited normal thickness, no calcification, and  normal cuspal separation. DOPPLER: The transpulmonic velocity was within the  normal range. There was trace regurgitation.    PERICARDIUM: There was no pericardial effusion. The pericardium was normal in  appearance.    AORTA: The root exhibited normal size.    SYSTEMIC VEINS: IVC: The inferior vena cava was normal in size. Respirophasic  changes were normal.    SYSTEM MEASUREMENT TABLES    2D  %FS: 27.29 %  AV Diam: 2.58 cm  EDV(Teich): 57.32 ml  EF(Cube): 61.56 %  EF(Teich): 53.96 %  ESV(Cube): 19.13 ml  ESV(Teich): 26.39 ml  IVSd: 1.31 cm  LA Area: 12.85 cm2  LA Diam: 3.81 cm  LVEDV MOD A4C: 54.12 ml  LVEF MOD A4C: 40.47 %  LVESV MOD A4C: 32.22 ml  LVIDd: 3.68 cm  LVIDs: 2.67 cm  LVLd A4C: 7.13 cm  LVLs A4C: 6.02 cm  LVPWd: 1.36 cm  RA Area: 9.74 cm2  RV Diam: 2.74 cm  SI(Cube): 18.46 ml/m2  SI(Teich): 18.63 ml/m2  SV MOD A4C: 21.9 ml  SV(Cube): 30.64 ml  SV(Teich): 30.93 ml    CW  AR Dec George: 2.39 m/s2  AR Dec Time: 1677.2 ms  AR PHT: 486.39 ms  AR Vmax: 4 m/s  AR maxP.14 mmHg  TR MaxP.41 mmHg  TR Vmax: 2.03 m/s    MM  TAPSE: 1.64 cm    PW  E': 0.09 m/s    [facility]    Prepared and electronically  signed by    Dayday Gómez MD  Signed 23-Dec-2016 11:56:38    No results found for this or any previous visit.      This note was completed in part utilizing [employer] direct voice recognition software.   Grammatical errors, random word insertion, spelling mistakes, and incomplete sentences may be an occasional consequence of the system secondary to software limitations, ambient noise and hardware issues. At the time of dictation, efforts were made to edit, clarify and /or correct errors.  Please read the chart carefully and recognize, using context, where substitutions have occurred.  If you have any questions or concerns about the context, text or information contained within the body of this dictation, please contact myself, the provider, for further clarification

## 2024-08-07 ENCOUNTER — OFFICE VISIT (OUTPATIENT)
Dept: CARDIOLOGY CLINIC | Facility: CLINIC | Age: 89
End: 2024-08-07
Payer: MEDICARE

## 2024-08-07 VITALS
OXYGEN SATURATION: 99 % | HEIGHT: 59 IN | WEIGHT: 123 LBS | HEART RATE: 80 BPM | BODY MASS INDEX: 24.8 KG/M2 | SYSTOLIC BLOOD PRESSURE: 120 MMHG | DIASTOLIC BLOOD PRESSURE: 68 MMHG

## 2024-08-07 DIAGNOSIS — I50.32 CHRONIC HEART FAILURE WITH PRESERVED EJECTION FRACTION (HFPEF) (HCC): Primary | ICD-10-CM

## 2024-08-07 DIAGNOSIS — N18.32 STAGE 3B CHRONIC KIDNEY DISEASE (HCC): Chronic | ICD-10-CM

## 2024-08-07 DIAGNOSIS — I10 PRIMARY HYPERTENSION: ICD-10-CM

## 2024-08-07 DIAGNOSIS — I50.32 CHRONIC DIASTOLIC HF (HEART FAILURE) (HCC): ICD-10-CM

## 2024-08-07 DIAGNOSIS — K31.84 DIABETIC GASTROPARESIS ASSOCIATED WITH TYPE 2 DIABETES MELLITUS  (HCC): ICD-10-CM

## 2024-08-07 DIAGNOSIS — E78.5 DYSLIPIDEMIA: ICD-10-CM

## 2024-08-07 DIAGNOSIS — E11.43 DIABETIC GASTROPARESIS ASSOCIATED WITH TYPE 2 DIABETES MELLITUS  (HCC): ICD-10-CM

## 2024-08-07 PROBLEM — I48.92 ATRIAL FLUTTER, UNSPECIFIED TYPE (HCC): Status: RESOLVED | Noted: 2024-08-07 | Resolved: 2024-08-07

## 2024-08-07 PROBLEM — I48.92 ATRIAL FLUTTER, UNSPECIFIED TYPE (HCC): Status: ACTIVE | Noted: 2024-08-07

## 2024-08-07 PROCEDURE — 99214 OFFICE O/P EST MOD 30 MIN: CPT | Performed by: NURSE PRACTITIONER

## 2024-08-07 RX ORDER — FUROSEMIDE 20 MG/1
20 TABLET ORAL DAILY
Qty: 90 TABLET | Refills: 3 | Status: SHIPPED | OUTPATIENT
Start: 2024-08-07

## 2024-08-07 NOTE — LETTER
August 7, 2024     Gregory Santoro DO  [address] [place] [id number]    Patient: Tanya Stephen   YOB: 1934   Date of Visit: 8/7/2024       Dear Dr. Santoro:    Thank you for referring Tanya Stephen to me for evaluation. Below are my notes for this consultation.    If you have questions, please do not hesitate to call me. I look forward to following your patient along with you.         Sincerely,        JOSE G Valladares        CC: DO Joselyn Keith CRNP  8/7/2024  2:55 PM  Sign when Signing Visit  Cardiology   Follow Up   Office Visit Note  Tanya Stephen   89 y.o.   female   MRN: [id number]  [facility] [place]  [address] [facility]  [address]  [facility] [address]  [phone]    PCP: Gregory Santoro DO  Cardiologist: Dr. Portillo              Summary of recommendations  a salt restricted diet was reinforced  Continue Lasix 20 mg daily, ASA 81 mg/d; Daily weights.  They should assess her regularly for symptoms of hypovolemia also hypervolemia  agree with home PT, as she is very frail  BMP, BNP in 5 days  Follow-up with me in 2 weeks  Follow up will be scheduled with Dr Portillo         Assessment/plan  Chronic HFpEF.  Recent admission for decompensation 7/30 - 8/4/2025, likely secondary to sodium dietary indiscretion while she was on vacation, in the setting of valvular heart disease and new wall motion abnormalities  Echo 7/31/2024: EF 50% with WMA: Hypokinetic: basal inferolateral, mid inferolateral and apical lateral.  These  WMA do not appear to have been present 2/21/2023.  Will treat medically.  Wt Readings from Last 3 Encounters:   08/07/24 55.8 kg (123 lb)   08/04/24 57.3 kg (126 lb 6.4 oz)   07/08/24 60.8 kg (134 lb 0.6 oz)     -beta-blocker: Now off given BP.  Previously she was on 12.5 mg daily but that was over a year ago  --Diuretic:  now back on Lasix 20 mg/d;     PTA:   torsemide 10 mg MWF-This was held 7/6/22 due  to hypotension. 7/7/22:  Will make diuretic p.r.n. only-given her reduced p.o. intake, and recurrent falls  --ACE/ARB/ARNI:    --MRA:   --SLGT2I  --2 g sodium diet, 1800 cc fluid restriction. Daily weights,  Coronary artery calcifications, mild noted on CTA 6/21/22  on aspirin, statin  An echo July 2024 shows an EF of 50% with hypokinetic inferolateral and apical lateral segments.  These appear to be new compared to last year  Abnormal EKG: RBBB.  LAHB, bifascicular block  Valvular heart disease  Mild to moderate aortic regurgitation  Mild to moderate aortic stenosis  Hypertension, essential.  /68 on a low-dose loop diuretic  Hyperlipidemia, on atorvastatin 10 mg daily.  Not addressed today   Latest Reference Range & Units 04/14/22 18:37 05/16/24 12:22   Cholesterol See Comment mg/dL 154 118   Triglycerides See Comment mg/dL 98 51   HDL >=50 mg/dL 85 73   LDL Calculated 0 - 100 mg/dL 49 35   Type 2 diabetes mellitus with gastroparesis.  Hemoglobin A1c 7.6  CKD 3 b, baseline creatinine now around 1.4 with Lasix 20 mg daily  Hx of frequent falls/DJD / bilateral hip arthroplasties   Cardiac testing  TTE 4/15/222. EF 50%.  Mild global hypokinesis.  No LVH.  Mild-to-moderate AI.  Mild-to-moderate MR.  Mild TR.  Small to moderate left pleural effusion  SPECT 4/15/2022: Ordered, not completed.  TTE 2/21/2023.  EF 55%.  Wall motion normal.  Grade 1 DD.  Mild LAE.  Mild AI.  Mild aortic stenosis.  Mild MR.  Moderate TR.  Mild pulmonary regurgitation.  TTE 7/31/24. EF 50%.  Grade 1 DD. The following segments are hypokinetic: basal inferolateral, mid inferolateral and apical lateral.  Mild to moderate AI.  Mild to moderate aortic stenosis.  SHEEBA 1.63 cm².  Mean gradient 8. mild to moderate MR.  Mild TR.                    REINALDO Stephen is an 86 yo female with diabetes mellitus, essential hypertension, mixed dyslipidemia.      Adm 4/14-4/15/22  CC:  Chest pain, atypical  .  Chest x-ray: Mild vascular  congestion with left lower lobe atelectasis versus effusion  EKG: NSR. NSSTTW changes possible inferior ischemia, right bundle branch block  Troponins - normal  Dx:  Acute heart failure with preserved ejection fraction   Given low-dose IV diuretics  Discharge diuretic torsemide 10 mg daily  Discharge weight : 129 lb  discharge creatinine 1.30  An outpatient Nuclear stress test was ordered    She established cardiology care with Dr. Portillo following an admission 4/18/22 for atypical, sharp chest pain, worse with rotation and movement.  She has had no recurrence.  A conservative approach was recommended.  He decided to hold off on stress testing.  Her blood pressure was well controlled, she was maintained on aspirin and initiated on statin.  Follow-up was recommended in 3 months     ER Adm 6/21  Chief complaint:  Chest pain, radiating down her left arm.  Headache  CTH: No acute  EKG normal sinus rhythm   Chest x-ray superimposed edema, interstitial edema  Creatinine 1.1  ProBNP 189   Troponin is negative  Elevated D-dimer  CTA: No PE noted within the limits of the test, limited due to motion artifact.  Mild coronary artery calcification noted  Given 250 mL of IV fluid  Headache resolved, chest pain resolved  Discharged on aspirin, statin, beta-blocker, ACE-I, torsemide 10 mg daily  Advised follow-up with Cardiology    6/22/22 phone call from PCP.  Patient taking torsemide 10 mg 3 times a week    Adm 6/24-6/27/22  CC: mental status change-Incoherent speech reported by her  who found her on the floor at the bedside after a falling while trying to get onto her bed  Found to have VICENTE/creatinine 1.68.  Lower extremity cellulitis.  Dehydration  CTH : No acute.  EKG: Normal sinus rhythm right bundle branch block nonspecific ST T-wave changes, consider inferior ischemia 95 bpm  She did have hypotension, meds adjusted  BC neg after 5 d  Diuretic held.  Treated with antibiotics for LE cellulitis  Followed by  geriatrics  Due to concern for polypharmacy, metformin and lisinopril discontinued.  Amitriptyline dose adjusted  Dx TME  PT OT recommended rehab; discharged home with VNA  Discharge diuretic: torsemide 10 mg Monday/ Wednesday/ Friday , metoprolol succinate 12.5 mg daily  Discharge creatinine 1.19  Discharge weight 128 lb    7/5/22.  VNA note:  Low BP 92/58, 80/50.  Patient is asymptomatic  Directed by Cardiology to hold torsemide     7/7/22  Hospital follow-up.  She is accompanied by her , family  ROS:  Weak, fatigued.  Low back pain, chronic.  Ambulatory dysfunction, in a wheelchair.  Today, she denied chest pain.  Her  reports she has not been complaining of chest pain at home.  Her p.o. Intake is down.  She drinks very little fluid/food.  She does eat pretty well for dinner her  reports.  Clinically she appears dehydrated  I recommend torsemide only p.r.n..  She likely will not be needing that.  Encouraged 60 oz fluid a day including boost/Ensure for protein  Her EKG showed sinus rhythm with PVCs in a ventricular bigeminy, heart rate 81  I would recommend continuing metoprolol succinate 12.5 mg daily  Focus today-- increase her fluid and protein  If she is not eating, or drinking and her clinical status deteriorates, she may need further evaluation in the ED for failure to thrive    7/12/22. Note from PT  Patient fell onto the floor.  Hit head-on sulfa and hit her left side.    She fell a few days prior  Refused to go to the ED  BP 90/50 heart rate 66   only had a cup of coffee today     has water though unknown how much patient is drinking  patient states she is trying to eat or drink       8/15/22  Close follow up.  I am seeing her given scheduling constraints, in lieu of her cardiologist  Since the last visit she has been eating and drinking more.  Her blood pressures have been up.  Her torsemide is p.r.n. only.  She has not needed to take any.  She has had no falls.  Her  tells me  her rheumatologist recommended she rely on the walker more than the cane.  Today, she is with the cane.  We had a long discussion about the need for fall prevention.  She is going to physical therapy twice a week to enhance mobility.  She is also scheduled for a pain injection next month.  She has right-sided sciatica  111/62 automated, by me  122/58  By the MA  Her weight is up to 131 lb however I feel this is caloric weight.  She is euvolemic on exam    Interval history  2/24/2023 ADM   chief complaint: Chest pain across the whole chest onset a few hours prior to arrival. Denies radiation, SOB, palpitations  Troponin 82, 74, 74  EKG- NSR 95, old RBBB  Patient desaturated to 88% on room air on admission.   Chest Xray- Increasing bibasilar consolidations probably represent atelectasis related to hypoventilation unless there is compelling clinical evidence for pneumonia. Trace bilateral pleural effusions  Treated for pneumonia as well as a UTI  Recommended rehab, patient was taken home, per her       6/27/23 OV Dr Portillo  Per his note:   Overall since the last appointment she has been doing well.  She continues to remain active doing small jobs around the house.  She is limited by lower extremity osteoarthritis and uses a cane.  Chest pain is gone away.  Denies any shortness of breath.  There is been no palpitations, lightheadedness, dizziness, or syncope.  She has been taking her medications as prescribed.       Overall she has been doing well since her last appointment.  She is mainly limited by some underlying orthopedic issues.  Blood pressure is well controlled lipids have been doing well blood work was recently reviewed and is stable.  Raynaud's has been well controlled no ulcerations.  Functional capacity has been good for 88 years old.  She is not due for any testing I will see her back in 12 months.         ADM 7/30 - 8/4/2024  CC: worsening dyspnea, 4 pillow orthopnea, worsening lower extremity  edema x 2 weeks,  beginning after vacation in [place] with her    Her symptoms worsened suddenly the evening before  Not on torsemide or Lasix as an outpatient, per her    Patient was on torsemide prn and metoprolol at home in the past, however she has not been taking these medications since more than a year now.   HS troponin 17, 15 delta: -2    EKG: Normal sinus rhythm.  PACs.  ST segment changes inferiorly, similar to prior in 2023  CT chest showed moderate to large bilateral pleural effusion with pulmonary edema  Echo: Preserved LV function, valvular heart disease, mild to moderate AS, AI; WMA: hypokinetic changes: basal inferolateral, mid inferolateral and apical lateral.    Dx: Acute on chronic HFpEF, responded well to diuresis  Repeat cxr shows improvement, after diuresis  She was evaluated by pulmonology who suspected pleural effusions were related to heart failure  Not followed by cardiology  Advised to weigh daily and to take an extra dose of lasix if she gains > 3 lbs   Dc wt: 126 pound  Dc cr: 1.46  Dc diuretic: Lasix 20 mg daily      8/7/24  Hospital follow up  PMH: Mild coronary artery calcifications on CT, chronic HFpEF, HTN, HLD, DM2, Raynaud's, chronic back pain, overactive bladder    She presents with her  and daughter    120/68  OV wt today  123 .  Similar at home  Cr now handing around 1.4 with the diuretic  She decompensated significantly and was deconditioned in the hospital.  She is now getting home PT and is doing better.  Therapy is coming in the home.  She is using a walker.  Her weight is down.  Family is trying to here to salt restricted diet.  On exam she appears very frail.  She appears fatigued.  She has a murmur of aortic stenosis, and 1+ bilateral lower extremity edema.  Breath sounds are diminished but clear  Will continue the current plan, with low-dose Lasix 20 mg daily.  Her blood pressure does not support the addition of Toprol at this time.   Will check labs in 5 days.  I will see her back in 2 weeks.  Advised family to call if they need something sooner          Assessment  Diagnoses and all orders for this visit:    Chronic heart failure with preserved ejection fraction (HFpEF) ([facility])  -     Basic metabolic panel; Future    Chronic diastolic HF (heart failure) ([facility])  -     furosemide (LASIX) 20 mg tablet; Take 1 tablet (20 mg total) by mouth in the morning    Primary hypertension    Diabetic gastroparesis associated with type 2 diabetes mellitus  ([facility])    Stage 3b chronic kidney disease ([facility])    Dyslipidemia            Past Medical History:   Diagnosis Date   • Anemia    • Arthritis    • Back pain    • CHF (congestive heart failure) ([facility])    • Chronic kidney disease     Stage 3b   • Confusion 02/22/2023   • Diabetes ([facility])    • Diabetes mellitus ([facility])    • Diabetic gastroparesis associated with type 2 diabetes mellitus  ([facility])    • Diabetic polyneuropathy ([facility])    • Diverticulosis    • Herpes zoster    • Hypertension    • IBS (irritable bowel syndrome)    • Neurogenic claudication due to lumbar spinal stenosis    • Osteoarthritis    • Osteoporosis    • Pulmonary edema    • Raynaud's phenomenon without gangrene    • Seronegative arthropathy of multiple sites ([facility])    • Sicca ([facility])        Review of Systems   Constitutional: Negative for chills and malaise/fatigue.   Cardiovascular:  Negative for chest pain, claudication, cyanosis, dyspnea on exertion, irregular heartbeat, leg swelling, near-syncope, orthopnea, palpitations, paroxysmal nocturnal dyspnea and syncope.   Respiratory:  Negative for cough and shortness of breath.    Musculoskeletal:  Positive for back pain.        Right-sided sciatica   Gastrointestinal:  Negative for heartburn and nausea.   Neurological:  Negative for dizziness, focal weakness, headaches, light-headedness and weakness.   All other systems reviewed and are negative.      Allergies   Allergen Reactions   • Morphine Other (See  Comments)     urinary retention   • Ciprofloxacin Rash and Nausea Only     .    Current Outpatient Medications:   •  amitriptyline (ELAVIL) 10 mg tablet, TAKE TWO TABLETS BY MOUTH AT BEDTIME (Patient taking differently: in the morning), Disp: 180 tablet, Rfl: 1  •  aspirin 81 mg chewable tablet, Chew 1 tablet (81 mg total) daily, Disp: 30 tablet, Rfl: 0  •  atorvastatin (LIPITOR) 10 mg tablet, TAKE ONE TABLET BY MOUTH EVERY DAY, Disp: 90 tablet, Rfl: 3  •  Cholecalciferol (VITAMIN D3) 1000 units CAPS, Take 1 capsule by mouth daily , Disp: , Rfl:   •  dicyclomine (BENTYL) 10 mg capsule, Take 1 capsule (10 mg total) by mouth 3 (three) times a day as needed (abd pain), Disp: 30 capsule, Rfl: 2  •  diphenoxylate-atropine (LOMOTIL) 2.5-0.025 mg per tablet, Take 1 tablet by mouth if needed, Disp: , Rfl:   •  DULoxetine (CYMBALTA) 60 mg delayed release capsule, TAKE ONE CAPSULE BY MOUTH ONCE DAILY, Disp: 30 capsule, Rfl: 5  •  furosemide (LASIX) 20 mg tablet, Take 1 tablet (20 mg total) by mouth in the morning, Disp: 90 tablet, Rfl: 3  •  hydroxychloroquine (PLAQUENIL) 200 mg tablet, Take 1 tablet (200 mg total) by mouth daily with breakfast, Disp: 90 tablet, Rfl: 1  •  Magnesium 250 MG TABS, Take 250 mg by mouth every evening, Disp: , Rfl:   •  Mirabegron ER (Myrbetriq) 50 MG TB24, Take 1 tablet by mouth daily as directed by physician., Disp: 90 tablet, Rfl: 1  •  omeprazole (PriLOSEC) 20 mg delayed release capsule, Take 20 mg by mouth daily, Disp: , Rfl:   •  pregabalin (LYRICA) 75 mg capsule, TAKE ONE CAPSULE BY MOUTH THREE TIMES A DAY (Patient taking differently: Take 100 mg by mouth 3 (three) times a day), Disp: 90 capsule, Rfl: 5  •  pyridoxine (B-6) 100 MG tablet, Take 100 mg by mouth daily, Disp: , Rfl:   •  diphenoxylate-atropine (LOMOTIL) 2.5-0.025 mg per tablet, Take 1 tablet by mouth 4 (four) times a day as needed for diarrhea, Disp: 30 tablet, Rfl: 0  •  metFORMIN (GLUCOPHAGE) 500 mg tablet, Take 500 mg by  mouth 2 (two) times a day (Patient not taking: Reported on 5/10/2024), Disp: , Rfl:   •  Sodium Fluoride (PreviDent 5000 Booster Plus) 1.1 % PSTE, Apply on teeth every evening as directed (Patient not taking: Reported on 2024), Disp: 100 mL, Rfl: 3    Current Facility-Administered Medications:   •  denosumab (PROLIA) subcutaneous injection 60 mg, 60 mg, Subcutaneous, Q6 Months, , 60 mg at 24 1401        Social History     Socioeconomic History   • Marital status: /Civil Union     Spouse name: Weston   • Number of children: 2   • Years of education: Not on file   • Highest education level: High school graduate   Occupational History   • Not on file   Tobacco Use   • Smoking status: Former     Current packs/day: 0.00     Types: Cigarettes     Quit date:      Years since quittin.6   • Smokeless tobacco: Never   Vaping Use   • Vaping status: Never Used   Substance and Sexual Activity   • Alcohol use: No   • Drug use: No   • Sexual activity: Not Currently     Partners: Male     Birth control/protection: None   Other Topics Concern   • Not on file   Social History Narrative   • Not on file     Social Determinants of Health     Financial Resource Strain: Not on file   Food Insecurity: No Food Insecurity (2024)    Hunger Vital Sign    • Worried About Running Out of Food in the Last Year: Never true    • Ran Out of Food in the Last Year: Never true   Transportation Needs: No Transportation Needs (2024)    PRAPARE - Transportation    • Lack of Transportation (Medical): No    • Lack of Transportation (Non-Medical): No   Physical Activity: Not on file   Stress: Not on file   Social Connections: Not on file   Intimate Partner Violence: Not on file   Housing Stability: Low Risk  (2024)    Housing Stability Vital Sign    • Unable to Pay for Housing in the Last Year: No    • Number of Times Moved in the Last Year: 0    • Homeless in the Last Year: No       Family History   Problem Relation Age  "of Onset   • Cancer Brother    • Diabetes Mother    • Hypertension Father    • Heart disease Father        Physical Exam  Vitals and nursing note reviewed.   Constitutional:       General: She is not in acute distress.     Appearance: She is not diaphoretic.   HENT:      Head: Normocephalic and atraumatic.   Eyes:      Conjunctiva/sclera: Conjunctivae normal.   Cardiovascular:      Rate and Rhythm: Normal rate and regular rhythm.      Pulses: Intact distal pulses.      Heart sounds: Murmur heard.      High-pitched blowing holosystolic murmur is present with a grade of 2/6 at the apex.   Pulmonary:      Effort: Pulmonary effort is normal.      Breath sounds: Normal breath sounds.   Abdominal:      General: Bowel sounds are normal.      Palpations: Abdomen is soft.   Musculoskeletal:         General: Normal range of motion.      Cervical back: Normal range of motion and neck supple.   Skin:     General: Skin is warm and dry.   Neurological:      Mental Status: She is alert and oriented to person, place, and time.         Vitals: Blood pressure 120/68, pulse 80, height 4' 11\" (1.499 m), weight 55.8 kg (123 lb), SpO2 99%, not currently breastfeeding.   Wt Readings from Last 3 Encounters:   08/07/24 55.8 kg (123 lb)   08/04/24 57.3 kg (126 lb 6.4 oz)   07/08/24 60.8 kg (134 lb 0.6 oz)         Labs & Results:  Lab Results   Component Value Date    WBC 5.69 08/03/2024    HGB 11.6 08/03/2024    HCT 36.5 08/03/2024    MCV 94 08/03/2024     (L) 08/03/2024     BNP   Date Value Ref Range Status   07/30/2024 213 (H) 0 - 100 pg/mL Final   02/21/2023 153 (H) 0 - 100 pg/mL Final   06/21/2022 189 (H) 0 - 100 pg/mL Final     No components found for: \"CHEM\"  Total CK   Date Value Ref Range Status   01/07/2017 81 26 - 192 U/L Final     Troponin I   Date Value Ref Range Status   09/19/2021 0.02 <=0.04 ng/mL Final     Comment:     [employer] Chemistry analyzer 99% cutoff is > 0.04 ng/mL in network labs     o cTnI 99% cutoff is useful " only when applied to patients in the clinical setting of myocardial ischemia   o cTnI 99% cutoff should be interpreted in the context of clinical history, ECG findings and possibly cardiac imaging to establish correct diagnosis.   o cTnI 99% cutoff may be suggestive but clearly not indicative of a coronary event without the clinical setting of myocardial ischemia.       Results for orders placed during the hospital encounter of 16    Echo complete with contrast if indicated    [facility]  [address] [place], [place] [address] [phone]    Transthoracic Echocardiogram  2D, M-mode, Doppler, and Color Doppler    Study date:  23-Dec-2016    Patient: LIANA PARTIDA  MR number: [id number]  Account number: [id number]  : 1934  Age: 82 years  Gender: Female  Status: Inpatient  Location: Bedside  Height: 59 in  Weight: 155.8 lb  BP: 131/ 65 mmHg    Indications: Shortness of breath.    Diagnoses: R06.02 - Shortness of breath    Sonographer:  STORM Ghosh  Primary Physician:  Gregory Santoro DO  Referring Physician:  Joao Mendiola MD MPH  Group:  [facility]  Interpreting Physician:  Dayday Gómez MD    SUMMARY    LEFT VENTRICLE:  Systolic function was normal. Ejection fraction was estimated to be 60 %.  There were no regional wall motion abnormalities.  There was mild concentric hypertrophy.  Doppler parameters were consistent with abnormal left ventricular relaxation  (grade 1 diastolic dysfunction).    AORTIC VALVE:  There was mild regurgitation.    HISTORY: PRIOR HISTORY: hypertension, diabetes, CHF    PROCEDURE: The procedure was performed at the bedside. This was a routine  study. The transthoracic approach was used. The study included complete 2D  imaging, M-mode, complete spectral Doppler, and color Doppler. Image quality  was adequate.    LEFT VENTRICLE: Size was normal. Systolic function was normal. Ejection  fraction was estimated  to be 60 %. There were no regional wall motion  abnormalities. Wall thickness was mildly increased. There was mild concentric  hypertrophy. DOPPLER: There was an increased relative contribution of atrial  contraction to ventricular filling. Doppler parameters were consistent with  abnormal left ventricular relaxation (grade 1 diastolic dysfunction).    RIGHT VENTRICLE: The size was normal. Systolic function was normal. Wall  thickness was normal.    LEFT ATRIUM: Size was normal.    RIGHT ATRIUM: Size was normal.    MITRAL VALVE: Valve structure was normal. There was normal leaflet separation.  DOPPLER: The transmitral velocity was within the normal range. There was no  evidence for stenosis. There was trace regurgitation.    AORTIC VALVE: The valve was trileaflet. Leaflets exhibited normal thickness and  normal cuspal separation. DOPPLER: Transaortic velocity was within the normal  range. There was no evidence for stenosis. There was mild regurgitation.    TRICUSPID VALVE: The valve structure was normal. There was normal leaflet  separation. DOPPLER: The transtricuspid velocity was within the normal range.  There was no evidence for stenosis. There was trace regurgitation. Pulmonary  artery systolic pressure was within the normal range. Estimated peak PA  pressure was 18 mmHg.    PULMONIC VALVE: Leaflets exhibited normal thickness, no calcification, and  normal cuspal separation. DOPPLER: The transpulmonic velocity was within the  normal range. There was trace regurgitation.    PERICARDIUM: There was no pericardial effusion. The pericardium was normal in  appearance.    AORTA: The root exhibited normal size.    SYSTEMIC VEINS: IVC: The inferior vena cava was normal in size. Respirophasic  changes were normal.    SYSTEM MEASUREMENT TABLES    2D  %FS: 27.29 %  AV Diam: 2.58 cm  EDV(Teich): 57.32 ml  EF(Cube): 61.56 %  EF(Teich): 53.96 %  ESV(Cube): 19.13 ml  ESV(Teich): 26.39 ml  IVSd: 1.31 cm  LA Area: 12.85 cm2  LA  Diam: 3.81 cm  LVEDV MOD A4C: 54.12 ml  LVEF MOD A4C: 40.47 %  LVESV MOD A4C: 32.22 ml  LVIDd: 3.68 cm  LVIDs: 2.67 cm  LVLd A4C: 7.13 cm  LVLs A4C: 6.02 cm  LVPWd: 1.36 cm  RA Area: 9.74 cm2  RV Diam: 2.74 cm  SI(Cube): 18.46 ml/m2  SI(Teich): 18.63 ml/m2  SV MOD A4C: 21.9 ml  SV(Cube): 30.64 ml  SV(Teich): 30.93 ml    CW  AR Dec [place]: 2.39 m/s2  AR Dec [place]: 1677.2 ms  AR PHT: 486.39 ms  AR Vmax: 4 m/s  AR maxP.14 mmHg  TR MaxP.41 mmHg  TR Vmax: 2.03 m/s    MM  TAPSE: 1.64 cm    PW  E': 0.09 m/s    [facility]    Prepared and electronically signed by    Dayday Gómez MD  Signed 23-Dec-2016 11:56:38    No results found for this or any previous visit.      This note was completed in part utilizing [employer] direct voice recognition software.   Grammatical errors, random word insertion, spelling mistakes, and incomplete sentences may be an occasional consequence of the system secondary to software limitations, ambient noise and hardware issues. At the time of dictation, efforts were made to edit, clarify and /or correct errors.  Please read the chart carefully and recognize, using context, where substitutions have occurred.  If you have any questions or concerns about the context, text or information contained within the body of this dictation, please contact myself, the provider, for further clarification

## 2024-08-17 ENCOUNTER — APPOINTMENT (OUTPATIENT)
Dept: LAB | Facility: CLINIC | Age: 89
End: 2024-08-17
Payer: MEDICARE

## 2024-08-17 DIAGNOSIS — I50.32 CHRONIC HEART FAILURE WITH PRESERVED EJECTION FRACTION (HFPEF) (HCC): ICD-10-CM

## 2024-08-17 LAB
ANION GAP SERPL CALCULATED.3IONS-SCNC: 7 MMOL/L (ref 4–13)
BUN SERPL-MCNC: 25 MG/DL (ref 5–25)
CALCIUM SERPL-MCNC: 8.9 MG/DL (ref 8.4–10.2)
CHLORIDE SERPL-SCNC: 104 MMOL/L (ref 96–108)
CO2 SERPL-SCNC: 29 MMOL/L (ref 21–32)
CREAT SERPL-MCNC: 1.43 MG/DL (ref 0.6–1.3)
GFR SERPL CREATININE-BSD FRML MDRD: 32 ML/MIN/1.73SQ M
GLUCOSE SERPL-MCNC: 121 MG/DL (ref 65–140)
POTASSIUM SERPL-SCNC: 4 MMOL/L (ref 3.5–5.3)
SODIUM SERPL-SCNC: 140 MMOL/L (ref 135–147)

## 2024-08-17 PROCEDURE — 80048 BASIC METABOLIC PNL TOTAL CA: CPT

## 2024-08-17 PROCEDURE — 36415 COLL VENOUS BLD VENIPUNCTURE: CPT

## 2024-08-20 NOTE — PROGRESS NOTES
Cardiology   Follow Up   Office Visit Note  Tanya Stephen   89 y.o.   female   MRN: [id number]  [facility] [place]  [address]  [address]  [place] [address] [phone]    PCP: Gregory Santoro DO  Cardiologist: Dr. Portillo              Summary of recommendations  -a salt restricted diet was reinforced  -Continue Lasix 20 mg daily, with an extra Lasix 20 mg as needed for weight gain 3 pounds in 24 hours or 5 pounds in 5 days.  For the next 2 days at least her  will give her an extra dose of Lasix as she is above her dry weight.  We reviewed this extensively.  She will also increase potassium in the diet.  A handout was provided  -Nonfasting BMP/ BNP in 3 weeks  -Follow-up with me 4 to 6 weeks  Follow up will be scheduled with Dr Portillo in December        Assessment/plan  Acute on chronic HFpEF.  Recent admission for decompensation 7/30 - 8/4/2025, likely secondary to sodium dietary indiscretion while she was on vacation, in the setting of valvular heart disease and new wall motion abnormalities  Echo 7/31/2024: EF 50% with WMA: Hypokinetic: basal inferolateral, mid inferolateral and apical lateral.  These  WMA do not appear to have been present 2/21/2023.  Will treat medically.  Dry weight 122 pound  Wt Readings from Last 3 Encounters:   08/21/24 58.1 kg (128 lb)   08/07/24 55.8 kg (123 lb)   08/04/24 57.3 kg (126 lb 6.4 oz)     -beta-blocker: Now off given BP.  Previously she was on 12.5 mg daily but that was over a year ago  --Diuretic:  now back on Lasix 20 mg/d.  She can take an extra dose of Lasix 20 mg as needed for weight gain 3 pounds in 24 hours or 5 pounds in 5 days.  For the next 2 to 3 days her  will give her an extra dose of Lasix as she is 5 pounds above her dry weight  --ACE/ARB/ARNI:    --MRA:   --SLGT2I  --2 g sodium diet, 1800 cc fluid restriction. Daily weights,  Coronary artery calcifications, mild noted on CTA 6/21/22  on aspirin,  statin  An echo July 2024 shows an EF of 50% with hypokinetic inferolateral and apical lateral segments.  These appear to be new compared to last year  Abnormal EKG: RBBB.  LAHB, bifascicular block  Valvular heart disease  Mild to moderate aortic regurgitation  Mild to moderate aortic stenosis  Hypertension, essential.  /73 on a low-dose loop diuretic  Hyperlipidemia, on atorvastatin 10 mg daily.  Not addressed today   Latest Reference Range & Units 04/14/22 18:37 05/16/24 12:22   Cholesterol See Comment mg/dL 154 118   Triglycerides See Comment mg/dL 98 51   HDL >=50 mg/dL 85 73   LDL Calculated 0 - 100 mg/dL 49 35   Type 2 diabetes mellitus with gastroparesis.  Hemoglobin A1c 7.6  CKD 3 b, baseline creatinine now around 1.4 with Lasix 20 mg daily  Hx of frequent falls/DJD / bilateral hip arthroplasties   Cardiac testing  TTE 4/15/222. EF 50%.  Mild global hypokinesis.  No LVH.  Mild-to-moderate AI.  Mild-to-moderate MR.  Mild TR.  Small to moderate left pleural effusion  SPECT 4/15/2022: Ordered, not completed.  TTE 2/21/2023.  EF 55%.  Wall motion normal.  Grade 1 DD.  Mild LAE.  Mild AI.  Mild aortic stenosis.  Mild MR.  Moderate TR.  Mild pulmonary regurgitation.  TTE 7/31/24. EF 50%.  Grade 1 DD. The following segments are hypokinetic: basal inferolateral, mid inferolateral and apical lateral.  Mild to moderate AI.  Mild to moderate aortic stenosis.  SHEEBA 1.63 cm².  Mean gradient 8. mild to moderate MR.  Mild TR.                    REINALDO Stephen is an 88 yo female with diabetes mellitus, essential hypertension, mixed dyslipidemia.      Adm 4/14-4/15/22  CC:  Chest pain, atypical  .  Chest x-ray: Mild vascular congestion with left lower lobe atelectasis versus effusion  EKG: NSR. NSSTTW changes possible inferior ischemia, right bundle branch block  Troponins - normal  Dx:  Acute heart failure with preserved ejection fraction   Given low-dose IV diuretics  Discharge diuretic torsemide 10 mg  daily  Discharge weight : 129 lb  discharge creatinine 1.30  An outpatient Nuclear stress test was ordered    She established cardiology care with Dr. Portillo following an admission 4/18/22 for atypical, sharp chest pain, worse with rotation and movement.  She has had no recurrence.  A conservative approach was recommended.  He decided to hold off on stress testing.  Her blood pressure was well controlled, she was maintained on aspirin and initiated on statin.  Follow-up was recommended in 3 months     ER Adm 6/21  Chief complaint:  Chest pain, radiating down her left arm.  Headache  CTH: No acute  EKG normal sinus rhythm   Chest x-ray superimposed edema, interstitial edema  Creatinine 1.1  ProBNP 189   Troponin is negative  Elevated D-dimer  CTA: No PE noted within the limits of the test, limited due to motion artifact.  Mild coronary artery calcification noted  Given 250 mL of IV fluid  Headache resolved, chest pain resolved  Discharged on aspirin, statin, beta-blocker, ACE-I, torsemide 10 mg daily  Advised follow-up with Cardiology    6/22/22 phone call from PCP.  Patient taking torsemide 10 mg 3 times a week    Adm 6/24-6/27/22  CC: mental status change-Incoherent speech reported by her  who found her on the floor at the bedside after a falling while trying to get onto her bed  Found to have VICENTE/creatinine 1.68.  Lower extremity cellulitis.  Dehydration  CTH : No acute.  EKG: Normal sinus rhythm right bundle branch block nonspecific ST T-wave changes, consider inferior ischemia 95 bpm  She did have hypotension, meds adjusted  BC neg after 5 d  Diuretic held.  Treated with antibiotics for LE cellulitis  Followed by geriatrics  Due to concern for polypharmacy, metformin and lisinopril discontinued.  Amitriptyline dose adjusted  Dx TME  PT OT recommended rehab; discharged home with [facility]  Discharge diuretic: torsemide 10 mg Monday/ Wednesday/ Friday , metoprolol succinate 12.5 mg daily  Discharge creatinine  1.19  Discharge weight 128 lb    7/5/22.  [facility] note:  Low BP 92/58, 80/50.  Patient is asymptomatic  Directed by Cardiology to hold torsemide     7/7/22  Hospital follow-up.  She is accompanied by her , family  ROS:  Weak, fatigued.  Low back pain, chronic.  Ambulatory dysfunction, in a wheelchair.  Today, she denied chest pain.  Her  reports she has not been complaining of chest pain at home.  Her p.o. Intake is down.  She drinks very little fluid/food.  She does eat pretty well for dinner her  reports.  Clinically she appears dehydrated  I recommend torsemide only p.r.n..  She likely will not be needing that.  Encouraged 60 oz fluid a day including boost/Ensure for protein  Her EKG showed sinus rhythm with PVCs in a ventricular bigeminy, heart rate 81  I would recommend continuing metoprolol succinate 12.5 mg daily  Focus today-- increase her fluid and protein  If she is not eating, or drinking and her clinical status deteriorates, she may need further evaluation in the ED for failure to thrive    7/12/22. Note from PT  Patient fell onto the floor.  Hit head-on sulfa and hit her left side.    She fell a few days prior  Refused to go to the ED  BP 90/50 heart rate 66   only had a cup of coffee today     has water though unknown how much patient is drinking  patient states she is trying to eat or drink       8/15/22  Close follow up.  I am seeing her given scheduling constraints, in lieu of her cardiologist  Since the last visit she has been eating and drinking more.  Her blood pressures have been up.  Her torsemide is p.r.n. only.  She has not needed to take any.  She has had no falls.  Her  tells me her rheumatologist recommended she rely on the walker more than the cane.  Today, she is with the cane.  We had a long discussion about the need for fall prevention.  She is going to physical therapy twice a week to enhance mobility.  She is also scheduled for a pain injection next month.   She has right-sided sciatica  111/62 automated, by me  122/58  By the MA  Her weight is up to 131 lb however I feel this is caloric weight.  She is euvolemic on exam    Interval history  2/24/2023 ADM   chief complaint: Chest pain across the whole chest onset a few hours prior to arrival. Denies radiation, SOB, palpitations  Troponin 82, 74, 74  EKG- NSR 95, old RBBB  Patient desaturated to 88% on room air on admission.   Chest Xray- Increasing bibasilar consolidations probably represent atelectasis related to hypoventilation unless there is compelling clinical evidence for pneumonia. Trace bilateral pleural effusions  Treated for pneumonia as well as a UTI  Recommended rehab, patient was taken home, per her       6/27/23 OV Dr Portillo  Per his note:   Overall since the last appointment she has been doing well.  She continues to remain active doing small jobs around the house.  She is limited by lower extremity osteoarthritis and uses a cane.  Chest pain is gone away.  Denies any shortness of breath.  There is been no palpitations, lightheadedness, dizziness, or syncope.  She has been taking her medications as prescribed.       Overall she has been doing well since her last appointment.  She is mainly limited by some underlying orthopedic issues.  Blood pressure is well controlled lipids have been doing well blood work was recently reviewed and is stable.  Raynaud's has been well controlled no ulcerations.  Functional capacity has been good for 88 years old.  She is not due for any testing I will see her back in 12 months.         ADM 7/30 - 8/4/2024  CC: worsening dyspnea, 4 pillow orthopnea, worsening lower extremity edema x 2 weeks,  beginning after vacation in [place] with her    Her symptoms worsened suddenly the evening before  Not on torsemide or Lasix as an outpatient, per her    Patient was on torsemide prn and metoprolol at home in the past, however she has not been taking these  medications since more than a year now.   HS troponin 17, 15 delta: -2    EKG: Normal sinus rhythm.  PACs.  ST segment changes inferiorly, similar to prior in 2023  CT chest showed moderate to large bilateral pleural effusion with pulmonary edema  Echo: Preserved LV function, valvular heart disease, mild to moderate AS, AI; WMA: hypokinetic changes: basal inferolateral, mid inferolateral and apical lateral.    Dx: Acute on chronic HFpEF, responded well to diuresis  Repeat cxr shows improvement, after diuresis  She was evaluated by pulmonology who suspected pleural effusions were related to heart failure  Not followed by cardiology  Advised to weigh daily and to take an extra dose of lasix if she gains > 3 lbs   Dc wt: 126 pound  Dc cr: 1.46  Dc diuretic: Lasix 20 mg daily      8/7/24  Hospital follow up  PMH: Mild coronary artery calcifications on CT, chronic HFpEF, HTN, HLD, DM2, Raynaud's, chronic back pain, overactive bladder    She presents with her  and daughter    120/68  OV wt today  123 .  Similar at home  Cr now handing around 1.4 with the diuretic  She decompensated significantly and was deconditioned in the hospital.  She is now getting home PT and is doing better.  Therapy is coming in the home.  She is using a walker.  Her weight is down.  Family is trying to here to salt restricted diet.  On exam she appears very frail.  She appears fatigued.  She has a murmur of aortic stenosis, and 1+ bilateral lower extremity edema.  Breath sounds are diminished but clear  Will continue the current plan, with low-dose Lasix 20 mg daily.  Her blood pressure does not support the addition of Toprol at this time.  Will check labs in 5 days.  I will see her back in 2 weeks.  Advised family to call if they need something sooner      8/21/24  Close cardiology follow-up.  She is accompanied by her  and her daughter  PMH:Mild coronary artery calcifications on CT, chronic HFpEF, HTN, HLD, DM2, Raynaud's,  "chronic back pain, overactive bladder    ROS: She tells me she is \"doing okay. Never complains about breathing- just c/O back pain    OV wt 128 .  At home today 127.4.  yesterday 127.  The day before 121?.  Benchmark dry wt is 122 lb    /73  8/17:  cr 1.43 BUN 25 K 4.0    On Lasix 20 mg/ d  No BB, given BP     Ef 50 + WMA.  Mild -mod AS. Mod MR  On exam she appears fatigued.  She has a few bibasilar crackles.  A murmur of AAS is present.  She has 2+ bilateral lower extremity edema.  Her weight is up about 5 pounds  Her  will give her an extra dose of Lasix 20 mg for 2 to 3 days in a row, until she achieves a dry weight of 123.  They will increase potassium in the diet  Nonfasting labs in 3 weeks  They were advised to call me if she has any increasing interval worsening symptoms.            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[facility])    Primary hypertension    Diabetic polyneuropathy associated with type 2 diabetes mellitus ([facility])    Stage 3b chronic kidney disease ([facility])    Dyslipidemia    Nonrheumatic aortic valve stenosis    Other orders  -     metFORMIN HCl (GLUCOPHAGE PO);  (Patient not taking: Reported on 8/21/2024)  -     Gabapentin (NEURONTIN PO);  (Patient not taking: Reported on 8/21/2024)  -     Lisinopril (PRINIVIL PO);  (Patient not taking: Reported on 8/21/2024)              Past Medical History:   Diagnosis Date    Anemia     Arthritis     Back pain     CHF (congestive heart failure) ([facility])     Chronic kidney disease     Stage 3b    Confusion 02/22/2023    Diabetes ([facility]) "     Diabetes mellitus (HCC)     Diabetic gastroparesis associated with type 2 diabetes mellitus  (HCC)     Diabetic polyneuropathy (HCC)     Diverticulosis     Herpes zoster     Hypertension     IBS (irritable bowel syndrome)     Neurogenic claudication due to lumbar spinal stenosis     Osteoarthritis     Osteoporosis     Pulmonary edema     Raynaud's phenomenon without gangrene     Seronegative arthropathy of multiple sites (HCC)     Sicca (HCC)        Review of Systems   Constitutional: Positive for malaise/fatigue. Negative for chills.   Cardiovascular:  Positive for leg swelling. Negative for chest pain, claudication, cyanosis, dyspnea on exertion, irregular heartbeat, near-syncope, orthopnea, palpitations, paroxysmal nocturnal dyspnea and syncope.   Respiratory:  Negative for cough and shortness of breath.    Musculoskeletal:  Positive for back pain.        Right-sided sciatica   Gastrointestinal:  Negative for heartburn and nausea.   Neurological:  Negative for dizziness, focal weakness, headaches, light-headedness and weakness.   All other systems reviewed and are negative.      Allergies   Allergen Reactions    Morphine Other (See Comments)     urinary retention    Ciprofloxacin Nausea Only and Rash     .    Current Outpatient Medications:     amitriptyline (ELAVIL) 10 mg tablet, TAKE TWO TABLETS BY MOUTH AT BEDTIME (Patient taking differently: in the morning), Disp: 180 tablet, Rfl: 1    aspirin 81 mg chewable tablet, Chew 1 tablet (81 mg total) daily, Disp: 30 tablet, Rfl: 0    atorvastatin (LIPITOR) 10 mg tablet, TAKE ONE TABLET BY MOUTH EVERY DAY, Disp: 90 tablet, Rfl: 3    Cholecalciferol (VITAMIN D3) 1000 units CAPS, Take 1 capsule by mouth daily , Disp: , Rfl:     dicyclomine (BENTYL) 10 mg capsule, Take 1 capsule (10 mg total) by mouth 3 (three) times a day as needed (abd pain), Disp: 30 capsule, Rfl: 2    diphenoxylate-atropine (LOMOTIL) 2.5-0.025 mg per tablet, Take 1 tablet by mouth if needed,  Disp: , Rfl:     DULoxetine (CYMBALTA) 60 mg delayed release capsule, TAKE ONE CAPSULE BY MOUTH ONCE DAILY, Disp: 30 capsule, Rfl: 5    furosemide (LASIX) 20 mg tablet, One tablet daily and an extra 20 mg prn for wt gain 3 lb/ 24 hours or 5 lb/ 5 days, above a dry wt of 122 lb, Disp: 90 tablet, Rfl: 3    hydroxychloroquine (PLAQUENIL) 200 mg tablet, Take 1 tablet (200 mg total) by mouth daily with breakfast, Disp: 90 tablet, Rfl: 1    Magnesium 250 MG TABS, Take 250 mg by mouth every evening, Disp: , Rfl:     Mirabegron ER (Myrbetriq) 50 MG TB24, Take 1 tablet by mouth daily as directed by physician., Disp: 90 tablet, Rfl: 1    omeprazole (PriLOSEC) 20 mg delayed release capsule, Take 20 mg by mouth daily, Disp: , Rfl:     pregabalin (LYRICA) 75 mg capsule, TAKE ONE CAPSULE BY MOUTH THREE TIMES A DAY (Patient taking differently: Take 100 mg by mouth 3 (three) times a day Patient often misses middle dose), Disp: 90 capsule, Rfl: 5    pyridoxine (B-6) 100 MG tablet, Take 100 mg by mouth daily, Disp: , Rfl:     diphenoxylate-atropine (LOMOTIL) 2.5-0.025 mg per tablet, Take 1 tablet by mouth 4 (four) times a day as needed for diarrhea (Patient not taking: Reported on 8/21/2024), Disp: 30 tablet, Rfl: 0    Gabapentin (NEURONTIN PO), , Disp: , Rfl:     Lisinopril (PRINIVIL PO), , Disp: , Rfl:     metFORMIN (GLUCOPHAGE) 500 mg tablet, Take 500 mg by mouth 2 (two) times a day (Patient not taking: Reported on 5/10/2024), Disp: , Rfl:     metFORMIN HCl (GLUCOPHAGE PO), , Disp: , Rfl:     Sodium Fluoride (PreviDent 5000 Booster Plus) 1.1 % PSTE, Apply on teeth every evening as directed (Patient not taking: Reported on 7/30/2024), Disp: 100 mL, Rfl: 3    Current Facility-Administered Medications:     denosumab (PROLIA) subcutaneous injection 60 mg, 60 mg, Subcutaneous, Q6 Months, , 60 mg at 05/29/24 1401        Social History     Socioeconomic History    Marital status: /Civil Union     Spouse name: Weston Soto  children: 2    Years of education: Not on file    Highest education level: High school graduate   Occupational History    Not on file   Tobacco Use    Smoking status: Former     Current packs/day: 0.00     Types: Cigarettes     Quit date:      Years since quittin.6    Smokeless tobacco: Never   Vaping Use    Vaping status: Never Used   Substance and Sexual Activity    Alcohol use: No    Drug use: No    Sexual activity: Not Currently     Partners: Male     Birth control/protection: None   Other Topics Concern    Not on file   Social History Narrative    Not on file     Social Determinants of Health     Financial Resource Strain: Not on file   Food Insecurity: No Food Insecurity (2024)    Hunger Vital Sign     Worried About Running Out of Food in the Last Year: Never true     Ran Out of Food in the Last Year: Never true   Transportation Needs: No Transportation Needs (2024)    PRAPARE - Transportation     Lack of Transportation (Medical): No     Lack of Transportation (Non-Medical): No   Physical Activity: Not on file   Stress: Not on file   Social Connections: Not on file   Intimate Partner Violence: Not on file   Housing Stability: Low Risk  (2024)    Housing Stability Vital Sign     Unable to Pay for Housing in the Last Year: No     Number of Times Moved in the Last Year: 0     Homeless in the Last Year: No       Family History   Problem Relation Age of Onset    Cancer Brother     Diabetes Mother     Hypertension Father     Heart disease Father        Physical Exam  Vitals and nursing note reviewed.   Constitutional:       General: She is not in acute distress.     Appearance: She is not diaphoretic.   HENT:      Head: Normocephalic and atraumatic.   Eyes:      Conjunctiva/sclera: Conjunctivae normal.   Cardiovascular:      Rate and Rhythm: Normal rate and regular rhythm.      Pulses: Intact distal pulses.      Heart sounds: Murmur heard.      Harsh midsystolic murmur is present with a grade of  "2/6 at the upper right sternal border radiating to the neck.   Pulmonary:      Effort: Pulmonary effort is normal.      Breath sounds: Normal breath sounds.   Abdominal:      General: Bowel sounds are normal.      Palpations: Abdomen is soft.   Musculoskeletal:         General: Normal range of motion.      Cervical back: Normal range of motion and neck supple.      Right lower leg: Edema present.      Left lower leg: Edema present.   Skin:     General: Skin is warm and dry.   Neurological:      Mental Status: She is alert and oriented to person, place, and time.         Vitals: Blood pressure 125/73, pulse 92, height 4' 11\" (1.499 m), weight 58.1 kg (128 lb), not currently breastfeeding.   Wt Readings from Last 3 Encounters:   08/21/24 58.1 kg (128 lb)   08/07/24 55.8 kg (123 lb)   08/04/24 57.3 kg (126 lb 6.4 oz)         Labs & Results:  Lab Results   Component Value Date    WBC 5.69 08/03/2024    HGB 11.6 08/03/2024    HCT 36.5 08/03/2024    MCV 94 08/03/2024     (L) 08/03/2024     BNP   Date Value Ref Range Status   07/30/2024 213 (H) 0 - 100 pg/mL Final   02/21/2023 153 (H) 0 - 100 pg/mL Final   06/21/2022 189 (H) 0 - 100 pg/mL Final     No components found for: \"CHEM\"  Total CK   Date Value Ref Range Status   01/07/2017 81 26 - 192 U/L Final     Troponin I   Date Value Ref Range Status   09/19/2021 0.02 <=0.04 ng/mL Final     Comment:     [employer] Chemistry analyzer 99% cutoff is > 0.04 ng/mL in network labs     o cTnI 99% cutoff is useful only when applied to patients in the clinical setting of myocardial ischemia   o cTnI 99% cutoff should be interpreted in the context of clinical history, ECG findings and possibly cardiac imaging to establish correct diagnosis.   o cTnI 99% cutoff may be suggestive but clearly not indicative of a coronary event without the clinical setting of myocardial ischemia.       Results for orders placed during the hospital encounter of 12/21/16    Echo complete with contrast if " [facility]  [address] [place] [address]  [phone]    Transthoracic Echocardiogram  2D, M-mode, Doppler, and Color Doppler    Study date:  23-Dec-2016    Patient: LIANA PARTIDA  MR number: [id number]  Account number: [id number]  : 1934  Age: 82 years  Gender: Female  Status: Inpatient  Location: Bedside  Height: 59 in  Weight: 155.8 lb  BP: 131/ 65 mmHg    Indications: Shortness of breath.    Diagnoses: R06.02 - Shortness of breath    Sonographer:  STORM Ghosh  Primary Physician:  Gregory Santoro DO  Referring Physician:  Joao Mendiola MD MPH  Group:  [facility]  Interpreting Physician:  Dayday Gómez MD    SUMMARY    LEFT VENTRICLE:  Systolic function was normal. Ejection fraction was estimated to be 60 %.  There were no regional wall motion abnormalities.  There was mild concentric hypertrophy.  Doppler parameters were consistent with abnormal left ventricular relaxation  (grade 1 diastolic dysfunction).    AORTIC VALVE:  There was mild regurgitation.    HISTORY: PRIOR HISTORY: hypertension, diabetes, CHF    PROCEDURE: The procedure was performed at the bedside. This was a routine  study. The transthoracic approach was used. The study included complete 2D  imaging, M-mode, complete spectral Doppler, and color Doppler. Image quality  was adequate.    LEFT VENTRICLE: Size was normal. Systolic function was normal. Ejection  fraction was estimated to be 60 %. There were no regional wall motion  abnormalities. Wall thickness was mildly increased. There was mild concentric  hypertrophy. DOPPLER: There was an increased relative contribution of atrial  contraction to ventricular filling. Doppler parameters were consistent with  abnormal left ventricular relaxation (grade 1 diastolic dysfunction).    RIGHT VENTRICLE: The size was normal. Systolic function was normal. Wall  thickness was normal.    LEFT ATRIUM: Size was  normal.    RIGHT ATRIUM: Size was normal.    MITRAL VALVE: Valve structure was normal. There was normal leaflet separation.  DOPPLER: The transmitral velocity was within the normal range. There was no  evidence for stenosis. There was trace regurgitation.    AORTIC VALVE: The valve was trileaflet. Leaflets exhibited normal thickness and  normal cuspal separation. DOPPLER: Transaortic velocity was within the normal  range. There was no evidence for stenosis. There was mild regurgitation.    TRICUSPID VALVE: The valve structure was normal. There was normal leaflet  separation. DOPPLER: The transtricuspid velocity was within the normal range.  There was no evidence for stenosis. There was trace regurgitation. Pulmonary  artery systolic pressure was within the normal range. Estimated peak PA  pressure was 18 mmHg.    PULMONIC VALVE: Leaflets exhibited normal thickness, no calcification, and  normal cuspal separation. DOPPLER: The transpulmonic velocity was within the  normal range. There was trace regurgitation.    PERICARDIUM: There was no pericardial effusion. The pericardium was normal in  appearance.    AORTA: The root exhibited normal size.    SYSTEMIC VEINS: IVC: The inferior vena cava was normal in size. Respirophasic  changes were normal.    SYSTEM MEASUREMENT TABLES    2D  %FS: 27.29 %  AV Diam: 2.58 cm  EDV(Teich): 57.32 ml  EF(Cube): 61.56 %  EF(Teich): 53.96 %  ESV(Cube): 19.13 ml  ESV(Teich): 26.39 ml  IVSd: 1.31 cm  LA Area: 12.85 cm2  LA Diam: 3.81 cm  LVEDV MOD A4C: 54.12 ml  LVEF MOD A4C: 40.47 %  LVESV MOD A4C: 32.22 ml  LVIDd: 3.68 cm  LVIDs: 2.67 cm  LVLd A4C: 7.13 cm  LVLs A4C: 6.02 cm  LVPWd: 1.36 cm  RA Area: 9.74 cm2  RV Diam: 2.74 cm  SI(Cube): 18.46 ml/m2  SI(Teich): 18.63 ml/m2  SV MOD A4C: 21.9 ml  SV(Cube): 30.64 ml  SV(Teich): 30.93 ml    CW  AR Dec [place]: 2.39 m/s2  AR Dec Time: 1677.2 ms  AR PHT: 486.39 ms  AR Vmax: 4 m/s  AR maxP.14 mmHg  TR MaxP.41 mmHg  TR Vmax: 2.03  m/s    MM  TAPSE: 1.64 cm    PW  E': 0.09 m/s    [facility] Accredited [facility]ch[facility]    Prepared and electronically signed by    Dayday Gómez MD  Signed 23-Dec-2016 11:56:38    No results found for this or any previous visit.      This note was completed in part utilizing [employer] direct voice recognition software.   Grammatical errors, random word insertion, spelling mistakes, and incomplete sentences may be an occasional consequence of the system secondary to software limitations, ambient noise and hardware issues. At the time of dictation, efforts were made to edit, clarify and /or correct errors.  Please read the chart carefully and recognize, using context, where substitutions have occurred.  If you have any questions or concerns about the context, text or information contained within the body of this dictation, please contact myself, the provider, for further clarification

## 2024-08-21 ENCOUNTER — OFFICE VISIT (OUTPATIENT)
Dept: CARDIOLOGY CLINIC | Facility: CLINIC | Age: 89
End: 2024-08-21
Payer: MEDICARE

## 2024-08-21 VITALS
WEIGHT: 128 LBS | SYSTOLIC BLOOD PRESSURE: 125 MMHG | BODY MASS INDEX: 25.8 KG/M2 | DIASTOLIC BLOOD PRESSURE: 73 MMHG | HEIGHT: 59 IN | HEART RATE: 92 BPM

## 2024-08-21 DIAGNOSIS — I10 PRIMARY HYPERTENSION: ICD-10-CM

## 2024-08-21 DIAGNOSIS — E11.42 DIABETIC POLYNEUROPATHY ASSOCIATED WITH TYPE 2 DIABETES MELLITUS (HCC): ICD-10-CM

## 2024-08-21 DIAGNOSIS — I50.32 CHRONIC DIASTOLIC HF (HEART FAILURE) (HCC): ICD-10-CM

## 2024-08-21 DIAGNOSIS — I74.3 ARTERIAL EMBOLISM AND THROMBOSIS OF LOWER EXTREMITY (HCC): ICD-10-CM

## 2024-08-21 DIAGNOSIS — E78.5 DYSLIPIDEMIA: ICD-10-CM

## 2024-08-21 DIAGNOSIS — I50.33 ACUTE ON CHRONIC HEART FAILURE WITH PRESERVED EJECTION FRACTION (HFPEF) (HCC): Primary | ICD-10-CM

## 2024-08-21 DIAGNOSIS — I35.0 NONRHEUMATIC AORTIC VALVE STENOSIS: ICD-10-CM

## 2024-08-21 DIAGNOSIS — N18.32 STAGE 3B CHRONIC KIDNEY DISEASE (HCC): Chronic | ICD-10-CM

## 2024-08-21 PROCEDURE — 99214 OFFICE O/P EST MOD 30 MIN: CPT | Performed by: NURSE PRACTITIONER

## 2024-08-21 RX ORDER — FUROSEMIDE 20 MG
TABLET ORAL
Qty: 90 TABLET | Refills: 3 | Status: SHIPPED | OUTPATIENT
Start: 2024-08-21

## 2024-08-21 NOTE — LETTER
August 21, 2024     Gregory Santoro DO  [address] [place] [id number]    Patient: Tanya Stephen   YOB: 1934   Date of Visit: 8/21/2024       Dear Dr. Santoro:    Thank you for referring Tanya Stephen to me for evaluation. Below are my notes for this consultation.    If you have questions, please do not hesitate to call me. I look forward to following your patient along with you.         Sincerely,        JOSE G Valladares        CC: DO Joselyn Keith CRNP  8/21/2024  4:42 PM  Sign when Signing Visit  Cardiology   Follow Up   Office Visit Note  Tanya Stephen   89 y.o.   female   MRN: [id number]  [facility]  [address]  [address]  [facility] [id number] [phone]    PCP: Gregory Santoro DO  Cardiologist: Dr. Portillo              Summary of recommendations  -a salt restricted diet was reinforced  -Continue Lasix 20 mg daily, with an extra Lasix 20 mg as needed for weight gain 3 pounds in 24 hours or 5 pounds in 5 days.  For the next 2 days at least her  will give her an extra dose of Lasix as she is above her dry weight.  We reviewed this extensively.  She will also increase potassium in the diet.  A handout was provided  -Nonfasting BMP/ BNP in 3 weeks  -Follow-up with me 4 to 6 weeks  Follow up will be scheduled with Dr Portillo in December        Assessment/plan  Acute on chronic HFpEF.  Recent admission for decompensation 7/30 - 8/4/2025, likely secondary to sodium dietary indiscretion while she was on vacation, in the setting of valvular heart disease and new wall motion abnormalities  Echo 7/31/2024: EF 50% with WMA: Hypokinetic: basal inferolateral, mid inferolateral and apical lateral.  These  WMA do not appear to have been present 2/21/2023.  Will treat medically.  Dry weight 122 pound  Wt Readings from Last 3 Encounters:   08/21/24 58.1 kg (128 lb)   08/07/24 55.8 kg (123 lb)   08/04/24 57.3 kg  (126 lb 6.4 oz)     -beta-blocker: Now off given BP.  Previously she was on 12.5 mg daily but that was over a year ago  --Diuretic:  now back on Lasix 20 mg/d.  She can take an extra dose of Lasix 20 mg as needed for weight gain 3 pounds in 24 hours or 5 pounds in 5 days.  For the next 2 to 3 days her  will give her an extra dose of Lasix as she is 5 pounds above her dry weight  --ACE/ARB/ARNI:    --MRA:   --SLGT2I  --2 g sodium diet, 1800 cc fluid restriction. Daily weights,  Coronary artery calcifications, mild noted on CTA 6/21/22  on aspirin, statin  An echo July 2024 shows an EF of 50% with hypokinetic inferolateral and apical lateral segments.  These appear to be new compared to last year  Abnormal EKG: RBBB.  LAHB, bifascicular block  Valvular heart disease  Mild to moderate aortic regurgitation  Mild to moderate aortic stenosis  Hypertension, essential.  /73 on a low-dose loop diuretic  Hyperlipidemia, on atorvastatin 10 mg daily.  Not addressed today   Latest Reference Range & Units 04/14/22 18:37 05/16/24 12:22   Cholesterol See Comment mg/dL 154 118   Triglycerides See Comment mg/dL 98 51   HDL >=50 mg/dL 85 73   LDL Calculated 0 - 100 mg/dL 49 35   Type 2 diabetes mellitus with gastroparesis.  Hemoglobin A1c 7.6  CKD 3 b, baseline creatinine now around 1.4 with Lasix 20 mg daily  Hx of frequent falls/DJD / bilateral hip arthroplasties   Cardiac testing  TTE 4/15/222. EF 50%.  Mild global hypokinesis.  No LVH.  Mild-to-moderate AI.  Mild-to-moderate MR.  Mild TR.  Small to moderate left pleural effusion  SPECT 4/15/2022: Ordered, not completed.  TTE 2/21/2023.  EF 55%.  Wall motion normal.  Grade 1 DD.  Mild LAE.  Mild AI.  Mild aortic stenosis.  Mild MR.  Moderate TR.  Mild pulmonary regurgitation.  TTE 7/31/24. EF 50%.  Grade 1 DD. The following segments are hypokinetic: basal inferolateral, mid inferolateral and apical lateral.  Mild to moderate AI.  Mild to moderate aortic stenosis.  SHEEBA  1.63 cm².  Mean gradient 8. mild to moderate MR.  Mild TR.                    HPI  Tanya Stephen is an 88 yo female with diabetes mellitus, essential hypertension, mixed dyslipidemia.      Adm 4/14-4/15/22  CC:  Chest pain, atypical  .  Chest x-ray: Mild vascular congestion with left lower lobe atelectasis versus effusion  EKG: NSR. NSSTTW changes possible inferior ischemia, right bundle branch block  Troponins - normal  Dx:  Acute heart failure with preserved ejection fraction   Given low-dose IV diuretics  Discharge diuretic torsemide 10 mg daily  Discharge weight : 129 lb  discharge creatinine 1.30  An outpatient Nuclear stress test was ordered    She established cardiology care with Dr. Portillo following an admission 4/18/22 for atypical, sharp chest pain, worse with rotation and movement.  She has had no recurrence.  A conservative approach was recommended.  He decided to hold off on stress testing.  Her blood pressure was well controlled, she was maintained on aspirin and initiated on statin.  Follow-up was recommended in 3 months     ER Adm 6/21  Chief complaint:  Chest pain, radiating down her left arm.  Headache  CTH: No acute  EKG normal sinus rhythm   Chest x-ray superimposed edema, interstitial edema  Creatinine 1.1  ProBNP 189   Troponin is negative  Elevated D-dimer  CTA: No PE noted within the limits of the test, limited due to motion artifact.  Mild coronary artery calcification noted  Given 250 mL of IV fluid  Headache resolved, chest pain resolved  Discharged on aspirin, statin, beta-blocker, ACE-I, torsemide 10 mg daily  Advised follow-up with Cardiology    6/22/22 phone call from PCP.  Patient taking torsemide 10 mg 3 times a week    Adm 6/24-6/27/22  CC: mental status change-Incoherent speech reported by her  who found her on the floor at the bedside after a falling while trying to get onto her bed  Found to have VICENTE/creatinine 1.68.  Lower extremity cellulitis.   Dehydration  CTH : No acute.  EKG: Normal sinus rhythm right bundle branch block nonspecific ST T-wave changes, consider inferior ischemia 95 bpm  She did have hypotension, meds adjusted  BC neg after 5 d  Diuretic held.  Treated with antibiotics for LE cellulitis  Followed by geriatrics  Due to concern for polypharmacy, metformin and lisinopril discontinued.  Amitriptyline dose adjusted  Dx TME  PT OT recommended rehab; discharged home with VNA  Discharge diuretic: torsemide 10 mg Monday/ Wednesday/ Friday , metoprolol succinate 12.5 mg daily  Discharge creatinine 1.19  Discharge weight 128 lb    7/5/22.  [facility] note:  Low BP 92/58, 80/50.  Patient is asymptomatic  Directed by Cardiology to hold torsemide     7/7/22  Hospital follow-up.  She is accompanied by her , family  ROS:  Weak, fatigued.  Low back pain, chronic.  Ambulatory dysfunction, in a wheelchair.  Today, she denied chest pain.  Her  reports she has not been complaining of chest pain at home.  Her p.o. Intake is down.  She drinks very little fluid/food.  She does eat pretty well for dinner her  reports.  Clinically she appears dehydrated  I recommend torsemide only p.r.n..  She likely will not be needing that.  Encouraged 60 oz fluid a day including boost/Ensure for protein  Her EKG showed sinus rhythm with PVCs in a ventricular bigeminy, heart rate 81  I would recommend continuing metoprolol succinate 12.5 mg daily  Focus today-- increase her fluid and protein  If she is not eating, or drinking and her clinical status deteriorates, she may need further evaluation in the ED for failure to thrive    7/12/22. Note from PT  Patient fell onto the floor.  Hit head-on sulfa and hit her left side.    She fell a few days prior  Refused to go to the ED  BP 90/50 heart rate 66   only had a cup of coffee today     has water though unknown how much patient is drinking  patient states she is trying to eat or drink       8/15/22  Close follow up.   I am seeing her given scheduling constraints, in lieu of her cardiologist  Since the last visit she has been eating and drinking more.  Her blood pressures have been up.  Her torsemide is p.r.n. only.  She has not needed to take any.  She has had no falls.  Her  tells me her rheumatologist recommended she rely on the walker more than the cane.  Today, she is with the cane.  We had a long discussion about the need for fall prevention.  She is going to physical therapy twice a week to enhance mobility.  She is also scheduled for a pain injection next month.  She has right-sided sciatica  111/62 automated, by me  122/58  By the MA  Her weight is up to 131 lb however I feel this is caloric weight.  She is euvolemic on exam    Interval history  2/24/2023 ADM   chief complaint: Chest pain across the whole chest onset a few hours prior to arrival. Denies radiation, SOB, palpitations  Troponin 82, 74, 74  EKG- NSR 95, old RBBB  Patient desaturated to 88% on room air on admission.   Chest Xray- Increasing bibasilar consolidations probably represent atelectasis related to hypoventilation unless there is compelling clinical evidence for pneumonia. Trace bilateral pleural effusions  Treated for pneumonia as well as a UTI  Recommended rehab, patient was taken home, per her       6/27/23 OV Dr Portillo  Per his note:   Overall since the last appointment she has been doing well.  She continues to remain active doing small jobs around the house.  She is limited by lower extremity osteoarthritis and uses a cane.  Chest pain is gone away.  Denies any shortness of breath.  There is been no palpitations, lightheadedness, dizziness, or syncope.  She has been taking her medications as prescribed.       Overall she has been doing well since her last appointment.  She is mainly limited by some underlying orthopedic issues.  Blood pressure is well controlled lipids have been doing well blood work was recently reviewed and is  stable.  Raynaud's has been well controlled no ulcerations.  Functional capacity has been good for 88 years old.  She is not due for any testing I will see her back in 12 months.         ADM 7/30 - 8/4/2024  CC: worsening dyspnea, 4 pillow orthopnea, worsening lower extremity edema x 2 weeks,  beginning after vacation in [place] with her    Her symptoms worsened suddenly the evening before  Not on torsemide or Lasix as an outpatient, per her    Patient was on torsemide prn and metoprolol at home in the past, however she has not been taking these medications since more than a year now.   HS troponin 17, 15 delta: -2    EKG: Normal sinus rhythm.  PACs.  ST segment changes inferiorly, similar to prior in 2023  CT chest showed moderate to large bilateral pleural effusion with pulmonary edema  Echo: Preserved LV function, valvular heart disease, mild to moderate AS, AI; WMA: hypokinetic changes: basal inferolateral, mid inferolateral and apical lateral.    Dx: Acute on chronic HFpEF, responded well to diuresis  Repeat cxr shows improvement, after diuresis  She was evaluated by pulmonology who suspected pleural effusions were related to heart failure  Not followed by cardiology  Advised to weigh daily and to take an extra dose of lasix if she gains > 3 lbs   Dc wt: 126 pound  Dc cr: 1.46  Dc diuretic: Lasix 20 mg daily      8/7/24  Hospital follow up  PMH: Mild coronary artery calcifications on CT, chronic HFpEF, HTN, HLD, DM2, Raynaud's, chronic back pain, overactive bladder    She presents with her  and daughter    120/68  OV wt today  123 .  Similar at home  Cr now handing around 1.4 with the diuretic  She decompensated significantly and was deconditioned in the hospital.  She is now getting home PT and is doing better.  Therapy is coming in the home.  She is using a walker.  Her weight is down.  Family is trying to here to salt restricted diet.  On exam she appears very frail.  She  "appears fatigued.  She has a murmur of aortic stenosis, and 1+ bilateral lower extremity edema.  Breath sounds are diminished but clear  Will continue the current plan, with low-dose Lasix 20 mg daily.  Her blood pressure does not support the addition of Toprol at this time.  Will check labs in 5 days.  I will see her back in 2 weeks.  Advised family to call if they need something sooner      8/21/24  Close cardiology follow-up.  She is accompanied by her  and her daughter  PMH:Mild coronary artery calcifications on CT, chronic HFpEF, HTN, HLD, DM2, Raynaud's, chronic back pain, overactive bladder    ROS: She tells me she is \"doing okay. Never complains about breathing- just c/O back pain    OV wt 128 .  At home today 127.4.  yesterday 127.  The day before 121?.  Benchmark dry wt is 122 lb    /73  8/17:  cr 1.43 BUN 25 K 4.0    On Lasix 20 mg/ d  No BB, given BP     Ef 50 + WMA.  Mild -mod AS. Mod MR  On exam she appears fatigued.  She has a few bibasilar crackles.  A murmur of AAS is present.  She has 2+ bilateral lower extremity edema.  Her weight is up about 5 pounds  Her  will give her an extra dose of Lasix 20 mg for 2 to 3 days in a row, until she achieves a dry weight of 123.  They will increase potassium in the diet  Nonfasting labs in 3 weeks  They were advised to call me if she has any increasing interval worsening symptoms.            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)    Primary hypertension    Diabetic polyneuropathy associated with type 2 diabetes " mellitus (HCC)    Stage 3b chronic kidney disease (HCC)    Dyslipidemia    Nonrheumatic aortic valve stenosis    Other orders  -     metFORMIN HCl (GLUCOPHAGE PO);  (Patient not taking: Reported on 8/21/2024)  -     Gabapentin (NEURONTIN PO);  (Patient not taking: Reported on 8/21/2024)  -     Lisinopril (PRINIVIL PO);  (Patient not taking: Reported on 8/21/2024)              Past Medical History:   Diagnosis Date   • Anemia    • Arthritis    • Back pain    • CHF (congestive heart failure) (HCC)    • Chronic kidney disease     Stage 3b   • Confusion 02/22/2023   • Diabetes (HCC)    • Diabetes mellitus (HCC)    • Diabetic gastroparesis associated with type 2 diabetes mellitus  (HCC)    • Diabetic polyneuropathy (HCC)    • Diverticulosis    • Herpes zoster    • Hypertension    • IBS (irritable bowel syndrome)    • Neurogenic claudication due to lumbar spinal stenosis    • Osteoarthritis    • Osteoporosis    • Pulmonary edema    • Raynaud's phenomenon without gangrene    • Seronegative arthropathy of multiple sites (HCC)    • Sicca (HCC)        Review of Systems   Constitutional: Positive for malaise/fatigue. Negative for chills.   Cardiovascular:  Positive for leg swelling. Negative for chest pain, claudication, cyanosis, dyspnea on exertion, irregular heartbeat, near-syncope, orthopnea, palpitations, paroxysmal nocturnal dyspnea and syncope.   Respiratory:  Negative for cough and shortness of breath.    Musculoskeletal:  Positive for back pain.        Right-sided sciatica   Gastrointestinal:  Negative for heartburn and nausea.   Neurological:  Negative for dizziness, focal weakness, headaches, light-headedness and weakness.   All other systems reviewed and are negative.      Allergies   Allergen Reactions   • Morphine Other (See Comments)     urinary retention   • Ciprofloxacin Nausea Only and Rash     .    Current Outpatient Medications:   •  amitriptyline (ELAVIL) 10 mg tablet, TAKE TWO TABLETS BY MOUTH AT BEDTIME  (Patient taking differently: in the morning), Disp: 180 tablet, Rfl: 1  •  aspirin 81 mg chewable tablet, Chew 1 tablet (81 mg total) daily, Disp: 30 tablet, Rfl: 0  •  atorvastatin (LIPITOR) 10 mg tablet, TAKE ONE TABLET BY MOUTH EVERY DAY, Disp: 90 tablet, Rfl: 3  •  Cholecalciferol (VITAMIN D3) 1000 units CAPS, Take 1 capsule by mouth daily , Disp: , Rfl:   •  dicyclomine (BENTYL) 10 mg capsule, Take 1 capsule (10 mg total) by mouth 3 (three) times a day as needed (abd pain), Disp: 30 capsule, Rfl: 2  •  diphenoxylate-atropine (LOMOTIL) 2.5-0.025 mg per tablet, Take 1 tablet by mouth if needed, Disp: , Rfl:   •  DULoxetine (CYMBALTA) 60 mg delayed release capsule, TAKE ONE CAPSULE BY MOUTH ONCE DAILY, Disp: 30 capsule, Rfl: 5  •  furosemide (LASIX) 20 mg tablet, One tablet daily and an extra 20 mg prn for wt gain 3 lb/ 24 hours or 5 lb/ 5 days, above a dry wt of 122 lb, Disp: 90 tablet, Rfl: 3  •  hydroxychloroquine (PLAQUENIL) 200 mg tablet, Take 1 tablet (200 mg total) by mouth daily with breakfast, Disp: 90 tablet, Rfl: 1  •  Magnesium 250 MG TABS, Take 250 mg by mouth every evening, Disp: , Rfl:   •  Mirabegron ER (Myrbetriq) 50 MG TB24, Take 1 tablet by mouth daily as directed by physician., Disp: 90 tablet, Rfl: 1  •  omeprazole (PriLOSEC) 20 mg delayed release capsule, Take 20 mg by mouth daily, Disp: , Rfl:   •  pregabalin (LYRICA) 75 mg capsule, TAKE ONE CAPSULE BY MOUTH THREE TIMES A DAY (Patient taking differently: Take 100 mg by mouth 3 (three) times a day Patient often misses middle dose), Disp: 90 capsule, Rfl: 5  •  pyridoxine (B-6) 100 MG tablet, Take 100 mg by mouth daily, Disp: , Rfl:   •  diphenoxylate-atropine (LOMOTIL) 2.5-0.025 mg per tablet, Take 1 tablet by mouth 4 (four) times a day as needed for diarrhea (Patient not taking: Reported on 8/21/2024), Disp: 30 tablet, Rfl: 0  •  Gabapentin (NEURONTIN PO), , Disp: , Rfl:   •  Lisinopril (PRINIVIL PO), , Disp: , Rfl:   •  metFORMIN  (GLUCOPHAGE) 500 mg tablet, Take 500 mg by mouth 2 (two) times a day (Patient not taking: Reported on 5/10/2024), Disp: , Rfl:   •  metFORMIN HCl (GLUCOPHAGE PO), , Disp: , Rfl:   •  Sodium Fluoride (PreviDent 5000 Booster Plus) 1.1 % PSTE, Apply on teeth every evening as directed (Patient not taking: Reported on 2024), Disp: 100 mL, Rfl: 3    Current Facility-Administered Medications:   •  denosumab (PROLIA) subcutaneous injection 60 mg, 60 mg, Subcutaneous, Q6 Months, , 60 mg at 24 1401        Social History     Socioeconomic History   • Marital status: /Civil Union     Spouse name: Weston   • Number of children: 2   • Years of education: Not on file   • Highest education level: High school graduate   Occupational History   • Not on file   Tobacco Use   • Smoking status: Former     Current packs/day: 0.00     Types: Cigarettes     Quit date:      Years since quittin.6   • Smokeless tobacco: Never   Vaping Use   • Vaping status: Never Used   Substance and Sexual Activity   • Alcohol use: No   • Drug use: No   • Sexual activity: Not Currently     Partners: Male     Birth control/protection: None   Other Topics Concern   • Not on file   Social History Narrative   • Not on file     Social Determinants of Health     Financial Resource Strain: Not on file   Food Insecurity: No Food Insecurity (2024)    Hunger Vital Sign    • Worried About Running Out of Food in the Last Year: Never true    • Ran Out of Food in the Last Year: Never true   Transportation Needs: No Transportation Needs (2024)    PRAPARE - Transportation    • Lack of Transportation (Medical): No    • Lack of Transportation (Non-Medical): No   Physical Activity: Not on file   Stress: Not on file   Social Connections: Not on file   Intimate Partner Violence: Not on file   Housing Stability: Low Risk  (2024)    Housing Stability Vital Sign    • Unable to Pay for Housing in the Last Year: No    • Number of Times Moved in the  "Last Year: 0    • Homeless in the Last Year: No       Family History   Problem Relation Age of Onset   • Cancer Brother    • Diabetes Mother    • Hypertension Father    • Heart disease Father        Physical Exam  Vitals and nursing note reviewed.   Constitutional:       General: She is not in acute distress.     Appearance: She is not diaphoretic.   HENT:      Head: Normocephalic and atraumatic.   Eyes:      Conjunctiva/sclera: Conjunctivae normal.   Cardiovascular:      Rate and Rhythm: Normal rate and regular rhythm.      Pulses: Intact distal pulses.      Heart sounds: Murmur heard.      Harsh midsystolic murmur is present with a grade of 2/6 at the upper right sternal border radiating to the neck.   Pulmonary:      Effort: Pulmonary effort is normal.      Breath sounds: Normal breath sounds.   Abdominal:      General: Bowel sounds are normal.      Palpations: Abdomen is soft.   Musculoskeletal:         General: Normal range of motion.      Cervical back: Normal range of motion and neck supple.      Right lower leg: Edema present.      Left lower leg: Edema present.   Skin:     General: Skin is warm and dry.   Neurological:      Mental Status: She is alert and oriented to person, place, and time.         Vitals: Blood pressure 125/73, pulse 92, height 4' 11\" (1.499 m), weight 58.1 kg (128 lb), not currently breastfeeding.   Wt Readings from Last 3 Encounters:   08/21/24 58.1 kg (128 lb)   08/07/24 55.8 kg (123 lb)   08/04/24 57.3 kg (126 lb 6.4 oz)         Labs & Results:  Lab Results   Component Value Date    WBC 5.69 08/03/2024    HGB 11.6 08/03/2024    HCT 36.5 08/03/2024    MCV 94 08/03/2024     (L) 08/03/2024     BNP   Date Value Ref Range Status   07/30/2024 213 (H) 0 - 100 pg/mL Final   02/21/2023 153 (H) 0 - 100 pg/mL Final   06/21/2022 189 (H) 0 - 100 pg/mL Final     No components found for: \"CHEM\"  Total CK   Date Value Ref Range Status   01/07/2017 81 26 - 192 U/L Final     Troponin I   Date " Value Ref Range Status   2021 0.02 <=0.04 ng/mL Final     Comment:     [employer] Chemistry analyzer 99% cutoff is > 0.04 ng/mL in network labs     o cTnI 99% cutoff is useful only when applied to patients in the clinical setting of myocardial ischemia   o cTnI 99% cutoff should be interpreted in the context of clinical history, ECG findings and possibly cardiac imaging to establish correct diagnosis.   o cTnI 99% cutoff may be suggestive but clearly not indicative of a coronary event without the clinical setting of myocardial ischemia.       Results for orders placed during the hospital encounter of 16    Echo complete with contrast if indicated    [facility]  [address] [place] [address]  [phone]    Transthoracic Echocardiogram  2D, M-mode, Doppler, and Color Doppler    Study date:  23-Dec-2016    Patient: LIANA PARTIDA  MR number: [id number]  Account number: [id number]  : 1934  Age: 82 years  Gender: Female  Status: Inpatient  Location: Bedside  Height: 59 in  Weight: 155.8 lb  BP: 131/ 65 mmHg    Indications: Shortness of breath.    Diagnoses: R06.02 - Shortness of breath    Sonographer:  STORM Ghosh  Primary Physician:  Gregory Santoro DO  Referring Physician:  Joao Mendiola MD MPH  Group:  [facility]  Interpreting Physician:  Dayday Gómez MD    SUMMARY    LEFT VENTRICLE:  Systolic function was normal. Ejection fraction was estimated to be 60 %.  There were no regional wall motion abnormalities.  There was mild concentric hypertrophy.  Doppler parameters were consistent with abnormal left ventricular relaxation  (grade 1 diastolic dysfunction).    AORTIC VALVE:  There was mild regurgitation.    HISTORY: PRIOR HISTORY: hypertension, diabetes, CHF    PROCEDURE: The procedure was performed at the bedside. This was a routine  study. The transthoracic approach was used. The study included complete 2D  imaging,  M-mode, complete spectral Doppler, and color Doppler. Image quality  was adequate.    LEFT VENTRICLE: Size was normal. Systolic function was normal. Ejection  fraction was estimated to be 60 %. There were no regional wall motion  abnormalities. Wall thickness was mildly increased. There was mild concentric  hypertrophy. DOPPLER: There was an increased relative contribution of atrial  contraction to ventricular filling. Doppler parameters were consistent with  abnormal left ventricular relaxation (grade 1 diastolic dysfunction).    RIGHT VENTRICLE: The size was normal. Systolic function was normal. Wall  thickness was normal.    LEFT ATRIUM: Size was normal.    RIGHT ATRIUM: Size was normal.    MITRAL VALVE: Valve structure was normal. There was normal leaflet separation.  DOPPLER: The transmitral velocity was within the normal range. There was no  evidence for stenosis. There was trace regurgitation.    AORTIC VALVE: The valve was trileaflet. Leaflets exhibited normal thickness and  normal cuspal separation. DOPPLER: Transaortic velocity was within the normal  range. There was no evidence for stenosis. There was mild regurgitation.    TRICUSPID VALVE: The valve structure was normal. There was normal leaflet  separation. DOPPLER: The transtricuspid velocity was within the normal range.  There was no evidence for stenosis. There was trace regurgitation. Pulmonary  artery systolic pressure was within the normal range. Estimated peak PA  pressure was 18 mmHg.    PULMONIC VALVE: Leaflets exhibited normal thickness, no calcification, and  normal cuspal separation. DOPPLER: The transpulmonic velocity was within the  normal range. There was trace regurgitation.    PERICARDIUM: There was no pericardial effusion. The pericardium was normal in  appearance.    AORTA: The root exhibited normal size.    SYSTEMIC VEINS: IVC: The inferior vena cava was normal in size. Respirophasic  changes were normal.    SYSTEM MEASUREMENT  TABLES    2D  %FS: 27.29 %  AV Diam: 2.58 cm  EDV(Teich): 57.32 ml  EF(Cube): 61.56 %  EF(Teich): 53.96 %  ESV(Cube): 19.13 ml  ESV(Teich): 26.39 ml  IVSd: 1.31 cm  LA Area: 12.85 cm2  LA Diam: 3.81 cm  LVEDV MOD A4C: 54.12 ml  LVEF MOD A4C: 40.47 %  LVESV MOD A4C: 32.22 ml  LVIDd: 3.68 cm  LVIDs: 2.67 cm  LVLd A4C: 7.13 cm  LVLs A4C: 6.02 cm  LVPWd: 1.36 cm  RA Area: 9.74 cm2  RV Diam: 2.74 cm  SI(Cube): 18.46 ml/m2  SI(Teich): 18.63 ml/m2  SV MOD A4C: 21.9 ml  SV(Cube): 30.64 ml  SV(Teich): 30.93 ml    CW  AR Dec [place]: 2.39 m/s2  AR Dec Time: 1677.2 ms  AR PHT: 486.39 ms  AR Vmax: 4 m/s  AR maxP.14 mmHg  TR MaxP.41 mmHg  TR Vmax: 2.03 m/s    MM  TAPSE: 1.64 cm    PW  E': 0.09 m/s    [employer] Accredited Echocardiography Laboratory    Prepared and electronically signed by    Dayday Gómez MD  Signed 23-Dec-2016 11:56:38    No results found for this or any previous visit.      This note was completed in part utilizing [employer] direct voice recognition software.   Grammatical errors, random word insertion, spelling mistakes, and incomplete sentences may be an occasional consequence of the system secondary to software limitations, ambient noise and hardware issues. At the time of dictation, efforts were made to edit, clarify and /or correct errors.  Please read the chart carefully and recognize, using context, where substitutions have occurred.  If you have any questions or concerns about the context, text or information contained within the body of this dictation, please contact myself, the provider, for further clarification

## 2024-08-21 NOTE — PATIENT INSTRUCTIONS
"Patient Education     High-potassium diet   The Basics   Written by the doctors and editors at [facility]   Why do I need a high-potassium diet? -- Potassium is a mineral found in many foods. The body needs the right amount of potassium to work well. If you do not have enough potassium in your blood, you can have problems like muscle weakness, irregular heartbeat, or kidney problems. The medical term for low potassium is \"hypokalemia.\"  Your body can lose too much potassium if:   You have had a lot of vomiting or diarrhea.   You take a medicine called a \"diuretic\" - These medicines make you urinate much more than usual.  Hypokalemia is usually treated with potassium supplements. But what you eat can also affect your potassium level. Learn how much potassium is in the food you eat. Read food labels carefully to see if they list how much potassium is in a serving. Reading the labels helps you make healthy food choices.  Follow your doctor's instructions about how much potassium to eat.  What can I eat and drink on a high-potassium diet? -- All kinds of foods can have potassium in them. Some have more potassium than others (table 1).   Grains - Whole-grain breads, wheat bran, granola, granola bars.   Fruits - Apricots, avocado, bananas, coconut, melons, kiwi, shelbi, nectarines, oranges, orange juice, papaya, plantains, pomegranate (and juice), dried fruits (apricots, dates, figs, prunes, raisins), prune juice, yams, grapefruit, grapefruit juice.   Vegetables - Bamboo shoots, baked or refried beans, beets, broccoli (cooked), [place] sprouts, cabbage (Chinese), carrots (raw), chard (cooked), greens (except kale), kohlrabi, mushrooms (white, cooked), potatoes (white and sweet), yams, parsnips, pickles, pumpkin, rutabaga, sauerkraut, spinach (cooked), squash (acorn, butternut, [place]), tomato, tomato sauce, tomato juice, vegetable juice cocktail, beets, artichokes, okra.   Dairy - Milk and milk products, buttermilk, " "yogurt.   Lean meats, poultry, seafood, and proteins - Clams, sardines, scallops, lobster, whitefish, salmon (and most other fish), ground beef, sirloin steak (and most other beef products), refried beans, tofu, adzuki beans, legumes (lentils, thomas beans, kidney beans, black beans, navy beans), nut butter, nuts, most seeds.   Other foods and drinks - Imitation servin bits, salt substitutes, \"lite\" salt made with potassium, chocolate, salt-free broth, molasses, nutritional supplements (sample brand names: Ensure, Boost), coconut water.  What foods and drinks should I avoid on a high-potassium diet? -- There are no foods to avoid on this diet.  What else can I do to increase the potassium in my food? -- Some tips to help keep the potassium in the foods you eat:   Eat fresh fruits and vegetables whenever possible.   Think about how you store or cook your foods. Both freezing and boiling lower the amount of potassium in a food.   Rinse foods to clean them instead of soaking them in water. Soaking foods in water lowers the amount of potassium.   Cook foods with as little water as possible. Use the water that the food was cooked in to cook other foods, because it contains a lot of potassium.   Choose salt substitutes and low-sodium foods. These often use potassium to replace the salt.  All topics are updated as new evidence becomes available and our peer review process is complete.  This topic retrieved from [employer] on: Mar 23, 2024.  Topic 233792 Version 1.0  Release: 32.2.4 - C32.81  © 2024 UpToDate, Inc. and/or its affiliates. All rights reserved.  table 1: Some foods that are high in potassium  Fruits  Vegetables  Proteins  Other    Avocado  Bananas  Coconut  Cantaloupe and honeydew melons  Dates  Dried fruits  Figs  Kiwi  [place]  Nectarines  Oranges and orange juice  Prunes and prune juice  Raisins Artichokes  Baked beans  Beets  Broccoli  [place] sprouts  Cabbage (raw)  Carrots (raw)  Chard  Olives  Potatoes (white " and sweet)  Pickles  Pumpkin  [place]  Squash (acorn, butternut, taylor)  Tomatoes and tomato juice Black beans  Clams  Ground beef  Kidney beans  Lobster  Navy beans  [place] beans  Slemp  Sardines  Scallops  Steak  [place] Chocolate  Dairy products  Granola  Milk  Peanut butter  Soups that are salt-free or low-sodium  Soy milk  Sports drinks  Tomato sauce  Wheat bran and bran products  Whole-grain bread  Yogurt   The foods on this list are high in potassium. People who need to follow a low-potassium diet should avoid or limit these foods.  Graphic 93624 Version 3.0  Consumer Information Use and Disclaimer   Disclaimer: This generalized information is a limited summary of diagnosis, treatment, and/or medication information. It is not meant to be comprehensive and should be used as a tool to help the user understand and/or assess potential diagnostic and treatment options. It does NOT include all information about conditions, treatments, medications, side effects, or risks that may apply to a specific patient. It is not intended to be medical advice or a substitute for the medical advice, diagnosis, or treatment of a health care provider based on the health care provider's examination and assessment of a patient's specific and unique circumstances. Patients must speak with a health care provider for complete information about their health, medical questions, and treatment options, including any risks or benefits regarding use of medications. This information does not endorse any treatments or medications as safe, effective, or approved for treating a specific patient. UpToDate, Inc. and its affiliates disclaim any warranty or liability relating to this information or the use thereof.The use of this information is governed by the Terms of Use, available at https://www.wolterskluwer.com/en/know/clinical-effectiveness-terms. 2024© UpToDate, Inc. and its affiliates and/or licensors. All rights reserved.  Copyright    © 2024 [employer] and/or its affiliates. All rights reserved.

## 2024-08-30 ENCOUNTER — APPOINTMENT (EMERGENCY)
Dept: RADIOLOGY | Facility: HOSPITAL | Age: 89
DRG: 091 | End: 2024-08-30
Payer: MEDICARE

## 2024-08-30 ENCOUNTER — HOSPITAL ENCOUNTER (INPATIENT)
Facility: HOSPITAL | Age: 89
LOS: 1 days | Discharge: HOME | DRG: 091 | End: 2024-09-01
Attending: EMERGENCY MEDICINE | Admitting: INTERNAL MEDICINE
Payer: MEDICARE

## 2024-08-30 ENCOUNTER — APPOINTMENT (EMERGENCY)
Dept: CT IMAGING | Facility: HOSPITAL | Age: 89
DRG: 091 | End: 2024-08-30
Payer: MEDICARE

## 2024-08-30 DIAGNOSIS — M15.0 PRIMARY GENERALIZED (OSTEO)ARTHRITIS: ICD-10-CM

## 2024-08-30 DIAGNOSIS — G89.4 CHRONIC PAIN SYNDROME: ICD-10-CM

## 2024-08-30 DIAGNOSIS — N39.0 UTI (URINARY TRACT INFECTION): ICD-10-CM

## 2024-08-30 DIAGNOSIS — R41.82 ALTERED MENTAL STATUS: Primary | ICD-10-CM

## 2024-08-30 PROBLEM — J96.21 ACUTE ON CHRONIC HYPOXIC RESPIRATORY FAILURE (HCC): Status: ACTIVE | Noted: 2024-08-30

## 2024-08-30 PROBLEM — R93.0 ABNORMAL CT SCAN OF HEAD: Status: ACTIVE | Noted: 2024-08-30

## 2024-08-30 PROBLEM — I50.33 ACUTE ON CHRONIC HEART FAILURE WITH PRESERVED EJECTION FRACTION (HFPEF) (HCC): Status: RESOLVED | Noted: 2022-04-14 | Resolved: 2024-08-30

## 2024-08-30 PROBLEM — I50.32 CHRONIC DIASTOLIC (CONGESTIVE) HEART FAILURE (HCC): Status: ACTIVE | Noted: 2024-08-30

## 2024-08-30 LAB
2HR DELTA HS TROPONIN: 1 NG/L
4HR DELTA HS TROPONIN: 1 NG/L
ALBUMIN SERPL BCG-MCNC: 3.7 G/DL (ref 3.5–5)
ALP SERPL-CCNC: 104 U/L (ref 34–104)
ALT SERPL W P-5'-P-CCNC: 30 U/L (ref 7–52)
AMMONIA PLAS-SCNC: 20 UMOL/L (ref 18–72)
ANION GAP SERPL CALCULATED.3IONS-SCNC: 8 MMOL/L (ref 4–13)
AST SERPL W P-5'-P-CCNC: 35 U/L (ref 13–39)
ATRIAL RATE: 90 BPM
ATRIAL RATE: 90 BPM
BACTERIA UR QL AUTO: ABNORMAL /HPF
BASOPHILS # BLD AUTO: 0.01 THOUSANDS/ÂΜL (ref 0–0.1)
BASOPHILS NFR BLD AUTO: 0 % (ref 0–1)
BILIRUB SERPL-MCNC: 0.73 MG/DL (ref 0.2–1)
BILIRUB UR QL STRIP: NEGATIVE
BNP SERPL-MCNC: 390 PG/ML (ref 0–100)
BUN SERPL-MCNC: 37 MG/DL (ref 5–25)
CALCIUM SERPL-MCNC: 9.7 MG/DL (ref 8.4–10.2)
CARDIAC TROPONIN I PNL SERPL HS: 21 NG/L
CARDIAC TROPONIN I PNL SERPL HS: 22 NG/L
CARDIAC TROPONIN I PNL SERPL HS: 22 NG/L
CHLORIDE SERPL-SCNC: 99 MMOL/L (ref 96–108)
CLARITY UR: ABNORMAL
CO2 SERPL-SCNC: 32 MMOL/L (ref 21–32)
COLOR UR: YELLOW
CREAT SERPL-MCNC: 1.51 MG/DL (ref 0.6–1.3)
EOSINOPHIL # BLD AUTO: 0 THOUSAND/ÂΜL (ref 0–0.61)
EOSINOPHIL NFR BLD AUTO: 0 % (ref 0–6)
ERYTHROCYTE [DISTWIDTH] IN BLOOD BY AUTOMATED COUNT: 14.5 % (ref 11.6–15.1)
FLUAV RNA RESP QL NAA+PROBE: NEGATIVE
FLUBV RNA RESP QL NAA+PROBE: NEGATIVE
GFR SERPL CREATININE-BSD FRML MDRD: 30 ML/MIN/1.73SQ M
GLUCOSE SERPL-MCNC: 151 MG/DL (ref 65–140)
GLUCOSE SERPL-MCNC: 153 MG/DL (ref 65–140)
GLUCOSE SERPL-MCNC: 154 MG/DL (ref 65–140)
GLUCOSE UR STRIP-MCNC: ABNORMAL MG/DL
HCT VFR BLD AUTO: 38.6 % (ref 34.8–46.1)
HGB BLD-MCNC: 12.5 G/DL (ref 11.5–15.4)
HGB UR QL STRIP.AUTO: ABNORMAL
IMM GRANULOCYTES # BLD AUTO: 0.04 THOUSAND/UL (ref 0–0.2)
IMM GRANULOCYTES NFR BLD AUTO: 1 % (ref 0–2)
KETONES UR STRIP-MCNC: ABNORMAL MG/DL
LEUKOCYTE ESTERASE UR QL STRIP: ABNORMAL
LYMPHOCYTES # BLD AUTO: 0.92 THOUSANDS/ÂΜL (ref 0.6–4.47)
LYMPHOCYTES NFR BLD AUTO: 13 % (ref 14–44)
MAGNESIUM SERPL-MCNC: 1.9 MG/DL (ref 1.9–2.7)
MCH RBC QN AUTO: 29.8 PG (ref 26.8–34.3)
MCHC RBC AUTO-ENTMCNC: 32.4 G/DL (ref 31.4–37.4)
MCV RBC AUTO: 92 FL (ref 82–98)
MONOCYTES # BLD AUTO: 0.85 THOUSAND/ÂΜL (ref 0.17–1.22)
MONOCYTES NFR BLD AUTO: 12 % (ref 4–12)
NEUTROPHILS # BLD AUTO: 5.04 THOUSANDS/ÂΜL (ref 1.85–7.62)
NEUTS SEG NFR BLD AUTO: 74 % (ref 43–75)
NITRITE UR QL STRIP: NEGATIVE
NON-SQ EPI CELLS URNS QL MICRO: ABNORMAL /HPF
NRBC BLD AUTO-RTO: 0 /100 WBCS
P AXIS: 76 DEGREES
P AXIS: 87 DEGREES
PH UR STRIP.AUTO: 5.5 [PH]
PLATELET # BLD AUTO: 154 THOUSANDS/UL (ref 149–390)
PMV BLD AUTO: 10.9 FL (ref 8.9–12.7)
POTASSIUM SERPL-SCNC: 4 MMOL/L (ref 3.5–5.3)
PR INTERVAL: 200 MS
PR INTERVAL: 202 MS
PROT SERPL-MCNC: 7.4 G/DL (ref 6.4–8.4)
PROT UR STRIP-MCNC: ABNORMAL MG/DL
QRS AXIS: 124 DEGREES
QRS AXIS: 126 DEGREES
QRSD INTERVAL: 146 MS
QRSD INTERVAL: 148 MS
QT INTERVAL: 434 MS
QT INTERVAL: 436 MS
QTC INTERVAL: 530 MS
QTC INTERVAL: 533 MS
RBC # BLD AUTO: 4.2 MILLION/UL (ref 3.81–5.12)
RBC #/AREA URNS AUTO: ABNORMAL /HPF
RSV RNA RESP QL NAA+PROBE: NEGATIVE
SARS-COV-2 RNA RESP QL NAA+PROBE: NEGATIVE
SODIUM SERPL-SCNC: 139 MMOL/L (ref 135–147)
SP GR UR STRIP.AUTO: 1.03 (ref 1–1.03)
T WAVE AXIS: -18 DEGREES
T WAVE AXIS: -9 DEGREES
TSH SERPL DL<=0.05 MIU/L-ACNC: 3.86 UIU/ML (ref 0.45–4.5)
UROBILINOGEN UR STRIP-ACNC: <2 MG/DL
VENTRICULAR RATE: 90 BPM
VENTRICULAR RATE: 90 BPM
WBC # BLD AUTO: 6.86 THOUSAND/UL (ref 4.31–10.16)
WBC #/AREA URNS AUTO: ABNORMAL /HPF

## 2024-08-30 PROCEDURE — 82140 ASSAY OF AMMONIA: CPT

## 2024-08-30 PROCEDURE — 87086 URINE CULTURE/COLONY COUNT: CPT

## 2024-08-30 PROCEDURE — 70450 CT HEAD/BRAIN W/O DYE: CPT

## 2024-08-30 PROCEDURE — 93010 ELECTROCARDIOGRAM REPORT: CPT | Performed by: INTERNAL MEDICINE

## 2024-08-30 PROCEDURE — 87147 CULTURE TYPE IMMUNOLOGIC: CPT

## 2024-08-30 PROCEDURE — 82948 REAGENT STRIP/BLOOD GLUCOSE: CPT

## 2024-08-30 PROCEDURE — 99285 EMERGENCY DEPT VISIT HI MDM: CPT | Performed by: EMERGENCY MEDICINE

## 2024-08-30 PROCEDURE — 93005 ELECTROCARDIOGRAM TRACING: CPT

## 2024-08-30 PROCEDURE — 99285 EMERGENCY DEPT VISIT HI MDM: CPT

## 2024-08-30 PROCEDURE — 99223 1ST HOSP IP/OBS HIGH 75: CPT | Performed by: INTERNAL MEDICINE

## 2024-08-30 PROCEDURE — 83735 ASSAY OF MAGNESIUM: CPT

## 2024-08-30 PROCEDURE — 84484 ASSAY OF TROPONIN QUANT: CPT

## 2024-08-30 PROCEDURE — 83880 ASSAY OF NATRIURETIC PEPTIDE: CPT

## 2024-08-30 PROCEDURE — 71045 X-RAY EXAM CHEST 1 VIEW: CPT

## 2024-08-30 PROCEDURE — 81001 URINALYSIS AUTO W/SCOPE: CPT

## 2024-08-30 PROCEDURE — 85025 COMPLETE CBC W/AUTO DIFF WBC: CPT

## 2024-08-30 PROCEDURE — 80053 COMPREHEN METABOLIC PANEL: CPT

## 2024-08-30 PROCEDURE — 0241U HB NFCT DS VIR RESP RNA 4 TRGT: CPT

## 2024-08-30 PROCEDURE — 36415 COLL VENOUS BLD VENIPUNCTURE: CPT

## 2024-08-30 PROCEDURE — 84443 ASSAY THYROID STIM HORMONE: CPT

## 2024-08-30 RX ORDER — DULOXETIN HYDROCHLORIDE 60 MG/1
60 CAPSULE, DELAYED RELEASE ORAL DAILY
Status: DISCONTINUED | OUTPATIENT
Start: 2024-08-31 | End: 2024-08-31

## 2024-08-30 RX ORDER — POLYETHYLENE GLYCOL 3350 17 G/17G
17 POWDER, FOR SOLUTION ORAL DAILY PRN
Status: DISCONTINUED | OUTPATIENT
Start: 2024-08-30 | End: 2024-09-01 | Stop reason: HOSPADM

## 2024-08-30 RX ORDER — AMITRIPTYLINE HYDROCHLORIDE 10 MG/1
10 TABLET ORAL
Status: DISCONTINUED | OUTPATIENT
Start: 2024-08-30 | End: 2024-09-01 | Stop reason: HOSPADM

## 2024-08-30 RX ORDER — ACETAMINOPHEN 325 MG/1
650 TABLET ORAL EVERY 6 HOURS PRN
Status: DISCONTINUED | OUTPATIENT
Start: 2024-08-30 | End: 2024-09-01 | Stop reason: HOSPADM

## 2024-08-30 RX ORDER — HYDROXYCHLOROQUINE SULFATE 200 MG/1
200 TABLET, FILM COATED ORAL
Status: DISCONTINUED | OUTPATIENT
Start: 2024-08-31 | End: 2024-09-01 | Stop reason: HOSPADM

## 2024-08-30 RX ORDER — AMOXICILLIN 250 MG
1 CAPSULE ORAL
Status: DISCONTINUED | OUTPATIENT
Start: 2024-08-30 | End: 2024-08-30

## 2024-08-30 RX ORDER — VANCOMYCIN HYDROCHLORIDE 500 MG/100ML
10 INJECTION, SOLUTION INTRAVENOUS EVERY 24 HOURS
Status: DISCONTINUED | OUTPATIENT
Start: 2024-08-30 | End: 2024-08-30

## 2024-08-30 RX ORDER — ENOXAPARIN SODIUM 100 MG/ML
30 INJECTION SUBCUTANEOUS DAILY
Status: DISCONTINUED | OUTPATIENT
Start: 2024-08-31 | End: 2024-09-01 | Stop reason: HOSPADM

## 2024-08-30 RX ORDER — FUROSEMIDE 20 MG
20 TABLET ORAL DAILY
Status: DISCONTINUED | OUTPATIENT
Start: 2024-08-31 | End: 2024-09-01 | Stop reason: HOSPADM

## 2024-08-30 RX ORDER — LANOLIN ALCOHOL/MO/W.PET/CERES
400 CREAM (GRAM) TOPICAL DAILY
Status: DISCONTINUED | OUTPATIENT
Start: 2024-08-31 | End: 2024-09-01 | Stop reason: HOSPADM

## 2024-08-30 RX ORDER — ASPIRIN 81 MG/1
81 TABLET, CHEWABLE ORAL DAILY
Status: DISCONTINUED | OUTPATIENT
Start: 2024-08-31 | End: 2024-09-01 | Stop reason: HOSPADM

## 2024-08-30 RX ORDER — AMOXICILLIN 250 MG
1 CAPSULE ORAL 2 TIMES DAILY
Status: DISCONTINUED | OUTPATIENT
Start: 2024-08-31 | End: 2024-09-01 | Stop reason: HOSPADM

## 2024-08-30 RX ORDER — VANCOMYCIN HYDROCHLORIDE 750 MG/150ML
15 INJECTION, SOLUTION INTRAVENOUS DAILY PRN
Status: DISCONTINUED | OUTPATIENT
Start: 2024-08-30 | End: 2024-08-31 | Stop reason: DRUGHIGH

## 2024-08-30 RX ORDER — ATORVASTATIN CALCIUM 10 MG/1
10 TABLET, FILM COATED ORAL DAILY
Status: DISCONTINUED | OUTPATIENT
Start: 2024-08-31 | End: 2024-09-01 | Stop reason: HOSPADM

## 2024-08-30 RX ORDER — INSULIN LISPRO 100 [IU]/ML
1-5 INJECTION, SOLUTION INTRAVENOUS; SUBCUTANEOUS
Status: DISCONTINUED | OUTPATIENT
Start: 2024-08-30 | End: 2024-08-31

## 2024-08-30 RX ORDER — OXYBUTYNIN CHLORIDE 5 MG/1
10 TABLET, EXTENDED RELEASE ORAL DAILY
Status: DISCONTINUED | OUTPATIENT
Start: 2024-08-31 | End: 2024-09-01 | Stop reason: HOSPADM

## 2024-08-30 RX ORDER — PANTOPRAZOLE SODIUM 20 MG/1
20 TABLET, DELAYED RELEASE ORAL
Status: DISCONTINUED | OUTPATIENT
Start: 2024-08-31 | End: 2024-09-01 | Stop reason: HOSPADM

## 2024-08-30 RX ORDER — PREGABALIN 100 MG/1
100 CAPSULE ORAL 3 TIMES DAILY
Status: DISCONTINUED | OUTPATIENT
Start: 2024-08-30 | End: 2024-09-01 | Stop reason: HOSPADM

## 2024-08-30 RX ADMIN — PREGABALIN 100 MG: 100 CAPSULE ORAL at 21:09

## 2024-08-30 RX ADMIN — AMITRIPTYLINE HYDROCHLORIDE 10 MG: 10 TABLET, FILM COATED ORAL at 21:09

## 2024-08-30 RX ADMIN — CEFTRIAXONE SODIUM 1000 MG: 10 INJECTION, POWDER, FOR SOLUTION INTRAVENOUS at 15:29

## 2024-08-30 RX ADMIN — SODIUM CHLORIDE 500 ML: 0.9 INJECTION, SOLUTION INTRAVENOUS at 15:30

## 2024-08-30 RX ADMIN — INSULIN LISPRO 1 UNITS: 100 INJECTION, SOLUTION INTRAVENOUS; SUBCUTANEOUS at 20:29

## 2024-08-30 RX ADMIN — VANCOMYCIN HYDROCHLORIDE 1500 MG: 10 INJECTION, POWDER, LYOPHILIZED, FOR SOLUTION INTRAVENOUS at 16:29

## 2024-08-30 RX ADMIN — SENNOSIDES AND DOCUSATE SODIUM 1 TABLET: 8.6; 5 TABLET ORAL at 21:09

## 2024-08-30 NOTE — ED ATTENDING ATTESTATION
8/30/2024  I, Adriana Heath MD, saw and evaluated the patient. I have discussed the patient with the resident/non-physician practitioner and agree with the resident's/non-physician practitioner's findings, Plan of Care, and MDM as documented in the resident's/non-physician practitioner's note, except where noted. All available labs and Radiology studies were reviewed.  I was present for key portions of any procedure(s) performed by the resident/non-physician practitioner and I was immediately available to provide assistance.       At this point I agree with the current assessment done in the Emergency Department.  I have conducted an independent evaluation of this patient a history and physical is as follows:        Called to bedside by nursing for HR in the 200's.  Pt protecting airway, EKG with rate in 90's.  Initial rate on monitor was artifactual. Pt neuro intact.  Vitals stable.  Per family pt more disoriented this morning.  No h/o falls/trauma, change in med regimen, missed or doubled meds, fever or URI symptoms.  No seizure like activity.  Per family pt poor PO at baseline.  No vomiting or other abnormal GI losses.  Similar presentations in past associated with UTI.  Work up is consistent with UTI, imaging and rest of labs reassuring.  Pt admitted as she is not back to baseline.  Requires IV abx and further testing/provocative testing as indicated.      ED Course  ED Course as of 08/30/24 1630   Fri Aug 30, 2024   1153 Called to bedside by nursing.  Pt protecting airway, EKG with rate in 90's.  Neuro intact.  Stable.  Will initiate work up.    1239 Comprehensive metabolic panel(!)  Creat near baseline, otherwise Reassuring, no end organ damage, no AG, normal bicarb.       1245 hs TnI 0hr: 21  Noted, no active chest pain.  No obvious ischemia on serial EKG at present.    1309 FLU/RSV/COVID - if FLU/RSV clinically relevant  wnl   1309 TSH 3RD GENERATON: 3.859  wnl   1428 CT head without  contrast  IMPRESSION:  -No acute vascular territory infarct, intracranial hemorrhage or mass effect.  -Signal within the ainsley was also noted on prior and may represent artifact versus chronic insult.  -Large right mastoid effusion.                    Workstation performed: [id number]        1428 XR chest 1 view portable  IMPRESSION:     Pulmonary vascular congestion.           Workstation performed: [id number]           Exam Ended: 08/30/24 12:07           1445 UA w Reflex to Microscopic w Reflex to Culture(!)  Concerning for UTI   1445 Urine Microscopic(!)  Concerning for UTI   1502 HS Troponin I 2hr  Minimal delta   1503 ECG 12 lead  Normal sinus rhythm  Right bundle branch block  Left posterior fascicular block  Bifascicular block   T wave abnormality, consider inferior ischemia  Abnormal ECG  When compared with ECG of 30-JUL-2024 11:54,  Premature atrial complexes are no longer Present  Confirmed by Freddy Vergara ([id number]) on 8/30/2024 2:45:39 PM           Critical Care Time  Procedures

## 2024-08-30 NOTE — ED PROVIDER NOTES
"History  Chief Complaint   Patient presents with    Altered Mental Status     Patient woke this morning disoriented, patients daughter went to the home and put o2 on her. Patient states to daughter \"everything hurts\" patient has been moaning in pain but not able to say where. States feet and hand itchy, denies past liver issues. Patient had epidural in back yesterday. Patient had recent hospital stay, came home on 5th of this month, admitted for fluid around lungs. Patient still taking lasix, did not have dose this morning.        Altered Mental Status  Associated symptoms: no abdominal pain, no headaches, no light-headedness, no nausea and no vomiting        Tanya Stephen is a 89 y.o. female with history of T2DM, CKD, anemia, IBS, HTN, HLD, GERD presenting for altered mental status.    Family report patient work up this morning disorented with slowing of her speech. Reports decreased appetite and complained of vague generalized pain. Family reports she had been in her normal state of health until this morning. Daughter at bedside reports that she has had similar symptoms with UTI's in the past.  and daughter report patient has urinary retention and has been chronically on reduced dose of nitrofurantoin. They deny fevers at home. They deny changes in medication in the last week though she was diagnosed earlier in [facility] for heart failure and has been taking lasix, trauma, recent travel. Of note, family does report pain management injection, but was acting normally following this all evening.    Patient on evaluation denies headache, chest pain, palpitations, shortness of breath, abdominal pain, nausea, and new extremity weakness, swelling and pain. Reports normal sleep and normal appetite.    Prior to Admission Medications   Prescriptions Last Dose Informant Patient Reported? Taking?   Cholecalciferol (VITAMIN D3) 1000 units CAPS 8/29/2024 Spouse/Significant Other Yes Yes   Sig: Take 1 capsule by mouth " daily    DULoxetine (CYMBALTA) 60 mg delayed release capsule 8/29/2024 Spouse/Significant Other No Yes   Sig: TAKE ONE CAPSULE BY MOUTH ONCE DAILY   Gabapentin (NEURONTIN PO) Not Taking Spouse/Significant Other Yes No   Patient not taking: Reported on 8/21/2024   Lisinopril (PRINIVIL PO) Not Taking Spouse/Significant Other Yes No   Patient not taking: Reported on 8/21/2024   Magnesium 250 MG TABS 8/29/2024 Spouse/Significant Other Yes Yes   Sig: Take 250 mg by mouth every evening   Mirabegron ER (Myrbetriq) 50 MG TB24 8/29/2024 Spouse/Significant Other No Yes   Sig: Take 1 tablet by mouth daily as directed by physician.   Nitrofurantoin Monohyd Macro (MACROBID PO) 8/29/2024  Yes Yes   Sig: Take by mouth   Sodium Fluoride (PreviDent 5000 Booster Plus) 1.1 % PSTE 8/29/2024 Spouse/Significant Other No Yes   Sig: Apply on teeth every evening as directed   amitriptyline (ELAVIL) 10 mg tablet 8/29/2024 Spouse/Significant Other No Yes   Sig: TAKE TWO TABLETS BY MOUTH AT BEDTIME   Patient taking differently: in the morning   aspirin 81 mg chewable tablet 8/29/2024 Spouse/Significant Other No Yes   Sig: Chew 1 tablet (81 mg total) daily   atorvastatin (LIPITOR) 10 mg tablet 8/29/2024 Spouse/Significant Other No Yes   Sig: TAKE ONE TABLET BY MOUTH EVERY DAY   dicyclomine (BENTYL) 10 mg capsule Not Taking Spouse/Significant Other No No   Sig: Take 1 capsule (10 mg total) by mouth 3 (three) times a day as needed (abd pain)   Patient not taking: Reported on 8/30/2024   diphenoxylate-atropine (LOMOTIL) 2.5-0.025 mg per tablet Past Month Spouse/Significant Other Yes Yes   Sig: Take 1 tablet by mouth if needed   diphenoxylate-atropine (LOMOTIL) 2.5-0.025 mg per tablet Not Taking Spouse/Significant Other No No   Sig: Take 1 tablet by mouth 4 (four) times a day as needed for diarrhea   Patient not taking: Reported on 8/21/2024   furosemide (LASIX) 20 mg tablet 8/29/2024  No Yes   Sig: One tablet daily and an extra 20 mg prn for wt  gain 3 lb/ 24 hours or 5 lb/ 5 days, above a dry wt of 122 lb   hydroxychloroquine (PLAQUENIL) 200 mg tablet 8/29/2024 Spouse/Significant Other No Yes   Sig: Take 1 tablet (200 mg total) by mouth daily with breakfast   metFORMIN (GLUCOPHAGE) 500 mg tablet Not Taking Spouse/Significant Other Yes No   Sig: Take 500 mg by mouth 2 (two) times a day   Patient not taking: Reported on 5/10/2024   metFORMIN HCl (GLUCOPHAGE PO) Not Taking Spouse/Significant Other Yes No   Patient not taking: Reported on 8/21/2024   omeprazole (PriLOSEC) 20 mg delayed release capsule 8/30/2024 Spouse/Significant Other Yes Yes   Sig: Take 20 mg by mouth daily   pregabalin (LYRICA) 75 mg capsule 8/29/2024 Spouse/Significant Other No Yes   Sig: TAKE ONE CAPSULE BY MOUTH THREE TIMES A DAY   Patient taking differently: Take 100 mg by mouth 3 (three) times a day Patient often misses middle dose   pyridoxine (B-6) 100 MG tablet 8/29/2024 Spouse/Significant Other Yes Yes   Sig: Take 100 mg by mouth daily      Facility-Administered Medications Last Administration Doses Remaining   denosumab (PROLIA) subcutaneous injection 60 mg 5/29/2024  2:01 PM 1          Past Medical History:   Diagnosis Date    Anemia     Arthritis     Back pain     CHF (congestive heart failure) (HCC)     Chronic kidney disease     Stage 3b    Confusion 02/22/2023    Diabetes (HCC)     Diabetes mellitus (HCC)     Diabetic gastroparesis associated with type 2 diabetes mellitus  (HCC)     Diabetic polyneuropathy (HCC)     Diverticulosis     Herpes zoster     Hypertension     IBS (irritable bowel syndrome)     Neurogenic claudication due to lumbar spinal stenosis     Osteoarthritis     Osteoporosis     Pulmonary edema     Raynaud's phenomenon without gangrene     Seronegative arthropathy of multiple sites (HCC)     Sicca (HCC)        Past Surgical History:   Procedure Laterality Date    BACK SURGERY      COLONOSCOPY      EGD AND COLONOSCOPY N/A 12/23/2016    Procedure: EGD AND  COLONOSCOPY;  Surgeon: Elina Anderson MD;  Location: [facility];  Service:     JOINT REPLACEMENT      bilat knees and hips    OTHER SURGICAL HISTORY      pelvis rods    REPLACEMENT TOTAL KNEE BILATERAL      STOMACH SURGERY      UPPER GASTROINTESTINAL ENDOSCOPY         Family History   Problem Relation Age of Onset    Cancer Brother     Diabetes Mother     Hypertension Father     Heart disease Father      I have reviewed and agree with the history as documented.    E-Cigarette/Vaping    E-Cigarette Use Never User      E-Cigarette/Vaping Substances    Nicotine No     THC No     CBD No     Flavoring No     Other No      Social History     Tobacco Use    Smoking status: Former     Current packs/day: 0.00     Types: Cigarettes     Quit date:      Years since quittin.6    Smokeless tobacco: Never   Vaping Use    Vaping status: Never Used   Substance Use Topics    Alcohol use: No    Drug use: No        Review of Systems   Unable to perform ROS: Mental status change   Respiratory:  Negative for chest tightness and shortness of breath.    Cardiovascular:  Negative for chest pain.   Gastrointestinal:  Negative for abdominal pain, nausea and vomiting.   Genitourinary:  Positive for dysuria.   Neurological:  Negative for light-headedness and headaches.       Physical Exam  ED Triage Vitals   Temperature Pulse Respirations Blood Pressure SpO2   24 1140 24 1140 24 1140 24 1140 24 1140   97.6 °F (36.4 °C) 93 22 130/59 91 %      Temp Source Heart Rate Source Patient Position - Orthostatic VS BP Location FiO2 (%)   24 1517 24 1140 24 1140 24 1140 --   Oral Monitor Lying Right arm       Pain Score       24 1517       No Pain             Orthostatic Vital Signs  Vitals:    24 1140 24 1517 24 1550 24 2223   BP: 130/59 126/61 132/68 111/58   Pulse: 93 91 95 93   Patient Position - Orthostatic VS: Lying Lying         Physical Exam  Vitals and  nursing note reviewed.   Constitutional:       General: She is not in acute distress.     Appearance: She is well-developed.   HENT:      Head: Normocephalic and atraumatic.      Mouth/Throat:      Mouth: Mucous membranes are moist.      Pharynx: Oropharynx is clear.   Eyes:      Extraocular Movements: Extraocular movements intact.      Conjunctiva/sclera: Conjunctivae normal.   Cardiovascular:      Rate and Rhythm: Normal rate and regular rhythm.      Pulses: Normal pulses.      Heart sounds: Murmur heard.   Pulmonary:      Effort: Pulmonary effort is normal. No respiratory distress.      Breath sounds: Normal breath sounds. No wheezing, rhonchi or rales.   Abdominal:      General: Abdomen is flat.      Palpations: Abdomen is soft.      Tenderness: There is no abdominal tenderness.   Musculoskeletal:      Cervical back: Normal range of motion and neck supple.   Skin:     General: Skin is warm and dry.      Capillary Refill: Capillary refill takes less than 2 seconds.   Neurological:      General: No focal deficit present.      Mental Status: She is alert. She is confused.      Cranial Nerves: Cranial nerves 2-12 are intact.      Sensory: No sensory deficit.      Motor: Motor function is intact. No weakness or seizure activity.      Coordination: Heel to Shin Test normal. Rapid alternating movements normal.      Comments: Confused to situation  Alert to self, location, 2024   Psychiatric:         Mood and Affect: Mood normal.         Behavior: Behavior normal.         ED Medications  Medications   aspirin chewable tablet 81 mg (has no administration in time range)   Cholecalciferol (VITAMIN D3) tablet 1,000 Units (has no administration in time range)   DULoxetine (CYMBALTA) delayed release capsule 60 mg (has no administration in time range)   hydroxychloroquine (PLAQUENIL) tablet 200 mg (has no administration in time range)   magnesium Oxide (MAG-OX) tablet 400 mg (has no administration in time range)   pantoprazole  (PROTONIX) EC tablet 20 mg (has no administration in time range)   pregabalin (LYRICA) capsule 100 mg (100 mg Oral Given 8/30/24 2109)   amitriptyline (ELAVIL) tablet 10 mg (10 mg Oral Given 8/30/24 2109)   atorvastatin (LIPITOR) tablet 10 mg (has no administration in time range)   furosemide (LASIX) tablet 20 mg (has no administration in time range)   oxybutynin (DITROPAN-XL) 24 hr tablet 10 mg (has no administration in time range)   enoxaparin (LOVENOX) subcutaneous injection 30 mg (has no administration in time range)   insulin lispro (HumALOG/ADMELOG) 100 units/mL subcutaneous injection 1-5 Units (1 Units Subcutaneous Given 8/30/24 2029)   ceftriaxone (ROCEPHIN) 1 g/50 mL in dextrose IVPB (has no administration in time range)   vancomycin (VANCOCIN) IVPB (premix in dextrose) 750 mg 150 mL (has no administration in time range)   senna-docusate sodium (SENOKOT S) 8.6-50 mg per tablet 1 tablet (has no administration in time range)   polyethylene glycol (MIRALAX) packet 17 g (has no administration in time range)   acetaminophen (TYLENOL) tablet 650 mg (has no administration in time range)   ceftriaxone (ROCEPHIN) 1 g/50 mL in dextrose IVPB (1,000 mg Intravenous New Bag 8/30/24 1529)   sodium chloride 0.9 % bolus 500 mL (500 mL Intravenous New Bag 8/30/24 1530)   vancomycin (VANCOCIN) 1500 mg in sodium chloride 0.9% 250 mL IVPB (1,500 mg Intravenous New Bag 8/30/24 1629)       Diagnostic Studies  Results Reviewed       Procedure Component Value Units Date/Time    HS Troponin I 4hr [[id number]]  (Normal) Collected: 08/30/24 1612    Lab Status: Final result Specimen: Blood from Arm, Right Updated: 08/30/24 1640     hs TnI 4hr 22 ng/L      Delta 4hr hsTnI 1 ng/L     HS Troponin I 2hr [[id number]]  (Normal) Collected: 08/30/24 1415    Lab Status: Final result Specimen: Blood from Arm, Left Updated: 08/30/24 1446     hs TnI 2hr 22 ng/L      Delta 2hr hsTnI 1 ng/L     Urine Microscopic [[id number]]  (Abnormal) Collected:  08/30/24 1411    Lab Status: Final result Specimen: Urine, Other Updated: 08/30/24 1444     RBC, UA 1-2 /hpf      WBC, UA 20-30 /hpf      Epithelial Cells Occasional /hpf      Bacteria, UA Moderate /hpf     Urine culture [[id number]] Collected: 08/30/24 1411    Lab Status: In process Specimen: Urine, Other Updated: 08/30/24 1444    UA w Reflex to Microscopic w Reflex to Culture [[id number]]  (Abnormal) Collected: 08/30/24 1411    Lab Status: Final result Specimen: Urine, Other Updated: 08/30/24 1432     Color, UA Yellow     Clarity, UA Turbid     Specific Gravity, UA 1.033     pH, UA 5.5     Leukocytes, UA Moderate     Nitrite, UA Negative     Protein,  (2+) mg/dl      Glucose, UA 70 (7/100%) mg/dl      Ketones, UA Trace mg/dl      Urobilinogen, UA <2.0 mg/dl      Bilirubin, UA Negative     Occult Blood, UA Trace    TSH, 3rd generation with Free T4 reflex [[id number]]  (Normal) Collected: 08/30/24 1204    Lab Status: Final result Specimen: Blood from Arm, Left Updated: 08/30/24 1300     TSH 3RD GENERATON 3.859 uIU/mL     FLU/RSV/COVID - if FLU/RSV clinically relevant [[id number]]  (Normal) Collected: 08/30/24 1204    Lab Status: Final result Specimen: Nares from Nose Updated: 08/30/24 1252     SARS-CoV-2 Negative     INFLUENZA A PCR Negative     INFLUENZA B PCR Negative     RSV PCR Negative    Narrative:      This test has been performed using the CoV-2/Flu/RSV plus assay on the [employer] GeneXpert platform. This test has been validated by the  and verified by the performing laboratory.     This test is designed to amplify and detect the following: nucleocapsid (N), envelope (E), and RNA-dependent RNA polymerase (RdRP) genes of the SARS-CoV-2 genome; matrix (M), basic polymerase (PB2), and acidic protein (PA) segments of the influenza A genome; matrix (M) and non-structural protein (NS) segments of the influenza B genome, and the nucleocapsid genes of RSV A and RSV B.     Positive results are indicative  of the presence of Flu A, Flu B, RSV, and/or SARS-CoV-2 RNA. Positive results for SARS-CoV-2 or suspected novel influenza should be reported to state, local, or federal health departments according to local reporting requirements.      All results should be assessed in conjunction with clinical presentation and other laboratory markers for clinical management.     FOR PEDIATRIC PATIENTS - copy/paste COVID Guidelines URL to browser: [email or web address]       B-Type Natriuretic Peptide(BNP) [[id number]]  (Abnormal) Collected: 08/30/24 1204    Lab Status: Final result Specimen: Blood from Arm, Left Updated: 08/30/24 1246      pg/mL     HS Troponin 0hr (reflex protocol) [[id number]]  (Normal) Collected: 08/30/24 1204    Lab Status: Final result Specimen: Blood from Arm, Left Updated: 08/30/24 1242     hs TnI 0hr 21 ng/L     Comprehensive metabolic panel [[id number]]  (Abnormal) Collected: 08/30/24 1204    Lab Status: Final result Specimen: Blood from Arm, Left Updated: 08/30/24 1232     Sodium 139 mmol/L      Potassium 4.0 mmol/L      Chloride 99 mmol/L      CO2 32 mmol/L      ANION GAP 8 mmol/L      BUN 37 mg/dL      Creatinine 1.51 mg/dL      Glucose 154 mg/dL      Calcium 9.7 mg/dL      AST 35 U/L      ALT 30 U/L      Alkaline Phosphatase 104 U/L      Total Protein 7.4 g/dL      Albumin 3.7 g/dL      Total Bilirubin 0.73 mg/dL      eGFR 30 ml/min/1.73sq m     Narrative:      National Kidney Disease Foundation guidelines for Chronic Kidney Disease (CKD):     Stage 1 with normal or high GFR (GFR > 90 mL/min/1.73 square meters)    Stage 2 Mild CKD (GFR = 60-89 mL/min/1.73 square meters)    Stage 3A Moderate CKD (GFR = 45-59 mL/min/1.73 square meters)    Stage 3B Moderate CKD (GFR = 30-44 mL/min/1.73 square meters)    Stage 4 Severe CKD (GFR = 15-29 mL/min/1.73 square meters)    Stage 5 End Stage CKD (GFR <15 mL/min/1.73 square meters)  Note: GFR calculation is  accurate only with a steady state creatinine    Magnesium [[id number]]  (Normal) Collected: 08/30/24 1204    Lab Status: Final result Specimen: Blood from Arm, Left Updated: 08/30/24 1232     Magnesium 1.9 mg/dL     Ammonia [[id number]]  (Normal) Collected: 08/30/24 1204    Lab Status: Final result Specimen: Blood from Arm, Left Updated: 08/30/24 1230     Ammonia 20 umol/L     CBC and differential [[id number]]  (Abnormal) Collected: 08/30/24 1204    Lab Status: Final result Specimen: Blood from Arm, Left Updated: 08/30/24 1213     WBC 6.86 Thousand/uL      RBC 4.20 Million/uL      Hemoglobin 12.5 g/dL      Hematocrit 38.6 %      MCV 92 fL      MCH 29.8 pg      MCHC 32.4 g/dL      RDW 14.5 %      MPV 10.9 fL      Platelets 154 Thousands/uL      nRBC 0 /100 WBCs      Segmented % 74 %      Immature Grans % 1 %      Lymphocytes % 13 %      Monocytes % 12 %      Eosinophils Relative 0 %      Basophils Relative 0 %      Absolute Neutrophils 5.04 Thousands/µL      Absolute Immature Grans 0.04 Thousand/uL      Absolute Lymphocytes 0.92 Thousands/µL      Absolute Monocytes 0.85 Thousand/µL      Eosinophils Absolute 0.00 Thousand/µL      Basophils Absolute 0.01 Thousands/µL     Fingerstick Glucose (POCT) [[id number]]  (Abnormal) Collected: 08/30/24 1209    Lab Status: Final result Specimen: Blood Updated: 08/30/24 1211     POC Glucose 151 mg/dl                    CT head without contrast   Final Result by Alirio Xie MD (08/30 1426)   -No acute vascular territory infarct, intracranial hemorrhage or mass effect.   -Signal within the ainsley was also noted on prior and may represent artifact versus chronic insult.   -Large right mastoid effusion.                     Workstation performed: [id number]         XR chest 1 view portable   ED Interpretation by Dione Rodriguez MD (08/30 1230)   Per my interpretation: Low lung volumes. Mild pulmonary vascular congestion. Otherwise improved from previous CXR.      Final Result by Kendall  MD Leo (08/30 1350)      Pulmonary vascular congestion.            Workstation performed: [id number]               Procedures  ECG 12 Lead Documentation Only    Date/Time: 8/30/2024 11:45 AM    Performed by: Dione Rodriguez MD  Authorized by: Dione Rodriguez MD    Indications / Diagnosis:  AMS  ECG reviewed by me, the ED Provider: yes    Patient location:  ED  Previous ECG:     Previous ECG:  Compared to current    Comparison ECG info:  7/30/2024    Similarity:  No change  Interpretation:     Interpretation: non-specific    Quality:     Tracing quality:  Limited by artifact  Rate:     ECG rate:  90  Rhythm:     Rhythm: sinus rhythm    Ectopy:     Ectopy: none    QRS:     QRS axis:  Right    QRS intervals:  Wide  Conduction:     Conduction: abnormal      Abnormal conduction: complete RBBB, LPFB and bifascicular block    ST segments:     ST segments:  Non-specific  T waves:     T waves: non-specific    Comments:      QTc 530  ECG 12 Lead Documentation Only    Date/Time: 8/30/2024 11:52 AM    Performed by: Dione Rodriguez MD  Authorized by: Dione Rodriguez MD    Indications / Diagnosis:  AMS  ECG reviewed by me, the ED Provider: yes    Patient location:  ED  Previous ECG:     Previous ECG:  Compared to current    Comparison ECG info:  8/30/2024    Similarity:  No change    Comparison to cardiac monitor: Yes    Interpretation:     Interpretation: non-specific    Rate:     ECG rate:  90    ECG rate assessment: normal    Rhythm:     Rhythm: sinus rhythm    Ectopy:     Ectopy: none    QRS:     QRS axis:  Right    QRS intervals:  Wide  Conduction:     Conduction: abnormal      Abnormal conduction: complete RBBB, LPFB and bifascicular block    ST segments:     ST segments:  Non-specific  T waves:     T waves: non-specific    Comments:      QTc 533        ED Course  ED Course as of 08/30/24 2312   Fri Aug 30, 2024   1153 Presented to bedside at request of nursing. HR on initial monitor reading in the 200s,  artifact visible on monitor. Patient alert, answering questions, denying chest pain, shortness of breath, nausea. Pads were placed. Thankfully, upon recalibration, HR in 90s and regular, confirmed on ECG. Suspect initial HR was artifact.   1213 CBC and differential(!)   1213 Fingerstick Glucose (POCT)(!)   1232 Ammonia: 20  Normal   1234 MAGNESIUM: 1.9  Normal   1234 Creatinine(!): 1.51    Over the last month:  1.43-1.56     1235 Comprehensive metabolic panel(!)   1244 hs TnI 0hr: 21  Chronically elevated, denies chest pain, no new changes on ECG consistent with acute ischemia   1252 FLU/RSV/COVID - if FLU/RSV clinically relevant   1252 BNP(!): 390  Slightly worse than usual, exam at this time not most consistent with acute overload. Suspect more related to chronic CHF.   1301 TSH 3RD GENERATON: 3.859  Normal   1301 Pending CTH   1331 Re-evaluated at bedside. Discussed all available results. All questions answered.   1353 CT head without contrast  Pending CTH read   1435 Leukocytes, UA(!): Moderate   1436 Ketones, UA(!): Trace   1436 CT head without contrast  IMPRESSION:  -No acute vascular territory infarct, intracranial hemorrhage or mass effect.  -Signal within the ainsley was also noted on prior and may represent artifact versus chronic insult.  -Large right mastoid effusion.   1447 Delta 2hr hsTnI: 1  Unlikely to be ACS at this time.             HEART Risk Score      Flowsheet Row Most Recent Value   Heart Score Risk Calculator    History 0 Filed at: 08/30/2024 1245   ECG 1 Filed at: 08/30/2024 1245   Age 2 Filed at: 08/30/2024 1245   Risk Factors 1 Filed at: 08/30/2024 1245   Troponin 1 Filed at: 08/30/2024 1245   HEART Score 5 Filed at: 08/30/2024 1245                                  Medical Decision Making  Tanya Stephen is a 89 y.o. female presenting with altered mental status. Associated symptoms: dysuria. Vitals unremarkable. Exam remarkable for confusion to situation, no focal neurologic findings.  Exam otherwise grossly normal.      DDx including but not limited to: metabolic abnormality, intracranial etiology, cardiac etiology, substance abuse, infectious etiology including UTI, thyroid disease, hyperammonemia, delirium, dementia, overmedication.     Plan:  - CBC, UA, CXR for infection and anemia  - CMP, magnesium for metabolic abnormality  - Ammonia with AMS  - Troponin, BNP for signs of cardiac dysfunction  - CT head for signs of intracranial abnormality  - Flu/COVID/RSV    See ED course for further updates and interpretation of results.    Based on these results and H&P, results most consistent with UTI. As patient continues to be altered and had been on chronic antibiotics at home and still with UTI, will admit for antibiotics and monitoring for return to baseline.  Findings not consistent with acute cardiopulmonary etiology, also without clear intracranial etiology.    Results, clinical impressions, and plan were discussed with patient and family. They expressed understanding and were in agreement with plan. Patient was given the opportunity to ask questions in ED. All questions and concerns were addressed in ED.    After evaluation and workup in the emergency department Discussed patient's case with [facility] regarding admission who accepted the patient for further evaluation and management under Dr. Hassan (LISSETT).    Amount and/or Complexity of Data Reviewed  Independent Historian:      Details:  and daughter at bedside  External Data Reviewed: labs, radiology, ECG and notes.  Labs: ordered. Decision-making details documented in ED Course.  Radiology: ordered and independent interpretation performed. Decision-making details documented in ED Course.  ECG/medicine tests: ordered and independent interpretation performed.    Risk  Prescription drug management.  Decision regarding hospitalization.          Disposition  Final diagnoses:   Altered mental status   UTI (urinary tract infection)     Time  reflects when diagnosis was documented in both MDM as applicable and the Disposition within this note       Time User Action Codes Description Comment    8/30/2024  3:05 PM Dione Rodriguez Add [R41.82] Altered mental status     8/30/2024  3:05 PM Dione Rodriguez [N39.0] UTI (urinary tract infection)           ED Disposition       ED Disposition   Admit    Condition   Stable    Date/Time   Fri Aug 30, 2024 1505    Comment   Case was discussed with LISSETT and the patient's admission status was agreed to be Admission Status: observation status to the service of Dr. Hassan .               Follow-up Information    None         Current Discharge Medication List        CONTINUE these medications which have NOT CHANGED    Details   amitriptyline (ELAVIL) 10 mg tablet TAKE TWO TABLETS BY MOUTH AT BEDTIME  Qty: 180 tablet, Refills: 1    Associated Diagnoses: Post zoster neuralgia      aspirin 81 mg chewable tablet Chew 1 tablet (81 mg total) daily  Qty: 30 tablet, Refills: 0    Associated Diagnoses: Chest pain      atorvastatin (LIPITOR) 10 mg tablet TAKE ONE TABLET BY MOUTH EVERY DAY  Qty: 90 tablet, Refills: 3    Associated Diagnoses: Type 2 diabetes mellitus without complication, without long-term current use of insulin ([facility])      Cholecalciferol (VITAMIN D3) 1000 units CAPS Take 1 capsule by mouth daily       !! diphenoxylate-atropine (LOMOTIL) 2.5-0.025 mg per tablet Take 1 tablet by mouth if needed      DULoxetine (CYMBALTA) 60 mg delayed release capsule TAKE ONE CAPSULE BY MOUTH ONCE DAILY  Qty: 30 capsule, Refills: 5    Associated Diagnoses: Primary generalized (osteo)arthritis      furosemide (LASIX) 20 mg tablet One tablet daily and an extra 20 mg prn for wt gain 3 lb/ 24 hours or 5 lb/ 5 days, above a dry wt of 122 lb  Qty: 90 tablet, Refills: 3    Associated Diagnoses: Chronic diastolic HF (heart failure) ([facility])      hydroxychloroquine (PLAQUENIL) 200 mg tablet Take 1 tablet (200 mg total) by mouth  daily with breakfast  Qty: 90 tablet, Refills: 1    Associated Diagnoses: 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 (HCC)      Magnesium 250 MG TABS Take 250 mg by mouth every evening      Mirabegron ER (Myrbetriq) 50 MG TB24 Take 1 tablet by mouth daily as directed by physician.  Qty: 90 tablet, Refills: 1    Associated Diagnoses: OAB (overactive bladder)      Nitrofurantoin Monohyd Macro (MACROBID PO) Take by mouth      omeprazole (PriLOSEC) 20 mg delayed release capsule Take 20 mg by mouth daily      pregabalin (LYRICA) 75 mg capsule TAKE ONE CAPSULE BY MOUTH THREE TIMES A DAY  Qty: 90 capsule, Refills: 5    Associated Diagnoses: Chronic pain syndrome      pyridoxine (B-6) 100 MG tablet Take 100 mg by mouth daily      Sodium Fluoride (PreviDent 5000 Booster Plus) 1.1 % PSTE Apply on teeth every evening as directed  Qty: 100 mL, Refills: 3    Associated Diagnoses: Post zoster neuralgia      dicyclomine (BENTYL) 10 mg capsule Take 1 capsule (10 mg total) by mouth 3 (three) times a day as needed (abd pain)  Qty: 30 capsule, Refills: 2    Associated Diagnoses: Irritable bowel syndrome with diarrhea      !! diphenoxylate-atropine (LOMOTIL) 2.5-0.025 mg per tablet Take 1 tablet by mouth 4 (four) times a day as needed for diarrhea  Qty: 30 tablet, Refills: 0    Associated Diagnoses: Irritable bowel syndrome with diarrhea      Gabapentin (NEURONTIN PO)       Lisinopril (PRINIVIL PO)       !! metFORMIN (GLUCOPHAGE) 500 mg tablet Take 500 mg by mouth 2 (two) times a day      !! metFORMIN HCl (GLUCOPHAGE PO)        !! - Potential duplicate medications found. Please discuss with provider.        No discharge procedures on file.    PDMP Review       None             ED Provider  Attending physically available and evaluated Tanya Stephen. I managed the patient along with the ED Attending.    Electronically Signed by           Dione Rodriguez MD  08/30/24 1826

## 2024-08-30 NOTE — ASSESSMENT & PLAN NOTE
Per  states that she was in usual state of health until this morning upon waking up.  She seemed disoriented and that she had no appetite and was speaking differently.  Per , patient has UTIs about once or twice a year.  Denies recent illness. Last BM 8/28.    Plan:  Treat underlying disorder.  Most likely presumed etiology is UTI  Will add bowel regimen.  Will add delirium precautions.  Will consider consulting geriatrics for their recommendations if TME does not continue to improve.  Will consider checking for reversible causes of TME such as vitamin B12, iron, vitamin D, TSH if TME does not continue to improve.

## 2024-08-30 NOTE — ASSESSMENT & PLAN NOTE
Wt Readings from Last 3 Encounters:   08/30/24 58.1 kg (128 lb)   08/21/24 58.1 kg (128 lb)   08/07/24 55.8 kg (123 lb)     Most recently admitted for heart failure exacerbation earlier back in August.  Plan:  Will continue to monitor for symptoms.  Will continue with home dose of Lasix 20 p.o.  Will do daily weights.

## 2024-08-30 NOTE — H&P
[facility]  H&P  Name: Tanya Stephen 89 y.o. female I MRN: [id number]  Unit/Bed#: W -01 I Date of Admission: 8/30/2024   Date of Service: 8/30/2024 I Hospital Day: 0      Assessment & Plan   UTI (urinary tract infection)  Assessment & Plan  Patient has a history of Klebsiella and E faecalis UTI.  Per  patient usually has about 1-2 UTI per year.  Endorses mild dysuria.  UA demonstrated moderate leukocytosis, moderate bacteria, WBC 20-30, trace ketone, blood.   Plan:  Continue with Rocephin and vancomycin until sensitivities come back..  Will continue to monitor for any ongoing symptoms.    Toxic metabolic encephalopathy  Assessment & Plan  Per  states that she was in usual state of health until this morning upon waking up.  She seemed disoriented and that she had no appetite and was speaking differently.  Per , patient has UTIs about once or twice a year.  Denies recent illness. Last BM 8/28.    Plan:  Treat underlying disorder.  Most likely presumed etiology is UTI  Will add bowel regimen.  Will add delirium precautions.  Will consider consulting geriatrics for their recommendations if TME does not continue to improve.  Will consider checking for reversible causes of TME such as vitamin B12, iron, vitamin D, TSH if TME does not continue to improve.    Acute on chronic heart failure with preserved ejection fraction (HFpEF) ([facility])-resolved as of 8/30/2024  Assessment & Plan  Wt Readings from Last 3 Encounters:   08/30/24 58.1 kg (128 lb)   08/21/24 58.1 kg (128 lb)   08/07/24 55.8 kg (123 lb)     CXR demonstrated pulmonary vascular congestion. Recently hospitalized earlier in August for acute on chronic heart failure. Home Regimen: Lasix 20 mg daily.     Plan:  Consider a one-time dose of IV Lasix 20 or Lasix 40 p.o.        Abnormal CT scan of head  Assessment & Plan  Patient found to have large right mastoid effusion on CT head.  Patient does not endorse any  pain around the mastoid.  Plan:  Likely incidental finding  Will continue to monitor  Will consider referral to ENT and neuro outpatient for further monitoring, workup.       Chronic diastolic (congestive) heart failure (HCC)  Assessment & Plan  Wt Readings from Last 3 Encounters:   08/30/24 58.1 kg (128 lb)   08/21/24 58.1 kg (128 lb)   08/07/24 55.8 kg (123 lb)     Most recently admitted for heart failure exacerbation earlier back in August.  Plan:  Will continue to monitor for symptoms.  Will continue with home dose of Lasix 20 p.o.  Will do daily weights.       OAB (overactive bladder)  Assessment & Plan  Has a history of overactive bladder.   Continue with home mirabegron.     Stage 3b chronic kidney disease ([facility])  Assessment & Plan  Lab Results   Component Value Date    EGFR 30 08/30/2024    EGFR 32 08/17/2024    EGFR 31 08/04/2024    CREATININE 1.51 (H) 08/30/2024    CREATININE 1.43 (H) 08/17/2024    CREATININE 1.46 (H) 08/04/2024   Plan:  Continue to monitor.  Seems to be at baseline.    Diabetic gastroparesis associated with type 2 diabetes mellitus  ([facility])  Assessment & Plan  Lab Results   Component Value Date    HGBA1C 7.6 (H) 07/30/2024       Recent Labs     08/30/24  1209   POCGLU 151*       Blood Sugar Average: Last 72 hrs:  (P) 151    Plan:  Will start on insulin sliding scale.  Per patient does not take any oral medications for her diabetes.      Diabetic polyneuropathy associated with type 2 diabetes mellitus ([facility])  Assessment & Plan  Lab Results   Component Value Date    HGBA1C 7.6 (H) 07/30/2024       Recent Labs     08/30/24  1209   POCGLU 151*       Blood Sugar Average: Last 72 hrs:  (P) 151    Pt received pain mgmt injection 8/29.    Plan:  Continue with home gabapentin.           VTE Pharmacologic Prophylaxis: VTE Score: 11 High Risk (Score >/= 5) - Pharmacological DVT Prophylaxis Ordered: enoxaparin (Lovenox). Sequential Compression Devices Ordered.  Code Status: Level 1 - Full Code Level  "1  Discussion with family: Updated  () at bedside.    Anticipated Length of Stay: Patient will be admitted on an observation basis with an anticipated length of stay of less than 2 midnights secondary to UTI.    Chief Complaint: AMS    History of Present Illness:  Tanya Stephen is a 89 y.o. female with a PMH T2DM, CKD, HFpEF of who presents with SOB and disorientation this AM. Per , states that she was in her usual state of health until this morning upon waking up. She received a pain mgmt outpt procedure yesterday with an injection into her lower back. Per , she seemed disoriented - no appetite, speaking differently, though no slurring of words. The  put her on oxygen that she has at home, to which it helped. Per , gets UTIs about once a year, last one earlier in Dec/Danis. Endorses mild dysuria. Denies recent illness, travel, chest pain, SOB, NVD, abd pain, suprapubic pain, recent falls, hematuria.  states that this has never happened before. Last BM 2 days prior on 8/28.    At time of visit, per  states that she is acting \"better.\"  Was recently admitted to the hospital back earlier in August for acute on chronic heart failure exacerbation.    Review of Systems:  Review of Systems   Constitutional:  Negative for chills and fever.   HENT:  Negative for ear discharge, ear pain, facial swelling and sore throat.    Respiratory:  Negative for cough and shortness of breath.    Cardiovascular:  Negative for chest pain, palpitations and leg swelling.   Gastrointestinal:  Negative for abdominal pain, diarrhea, nausea and vomiting.   Genitourinary:  Positive for dysuria. Negative for hematuria.   Musculoskeletal:  Positive for back pain.   Psychiatric/Behavioral:  Negative for dysphoric mood.        Past Medical and Surgical History:   Past Medical History:   Diagnosis Date    Anemia     Arthritis     Back pain     CHF (congestive heart failure) (HCC)     " Chronic kidney disease     Stage 3b    Confusion 02/22/2023    Diabetes (HCC)     Diabetes mellitus (HCC)     Diabetic gastroparesis associated with type 2 diabetes mellitus  (HCC)     Diabetic polyneuropathy (HCC)     Diverticulosis     Herpes zoster     Hypertension     IBS (irritable bowel syndrome)     Neurogenic claudication due to lumbar spinal stenosis     Osteoarthritis     Osteoporosis     Pulmonary edema     Raynaud's phenomenon without gangrene     Seronegative arthropathy of multiple sites (HCC)     Sicca (HCC)        Past Surgical History:   Procedure Laterality Date    BACK SURGERY      COLONOSCOPY      EGD AND COLONOSCOPY N/A 12/23/2016    Procedure: EGD AND COLONOSCOPY;  Surgeon: Elina Anderson MD;  [facility]: [facility];  Service:     JOINT REPLACEMENT      bilat knees and hips    OTHER SURGICAL HISTORY      pelvis rods    REPLACEMENT TOTAL KNEE BILATERAL      STOMACH SURGERY      UPPER GASTROINTESTINAL ENDOSCOPY         Meds/Allergies:  Prior to Admission medications    Medication Sig Start Date End Date Taking? Authorizing Provider   amitriptyline (ELAVIL) 10 mg tablet TAKE TWO TABLETS BY MOUTH AT BEDTIME  Patient taking differently: in the morning 2/2/24   Danyell Dutta MD   aspirin 81 mg chewable tablet Chew 1 tablet (81 mg total) daily 4/15/22   Leland Gray MD   atorvastatin (LIPITOR) 10 mg tablet TAKE ONE TABLET BY MOUTH EVERY DAY 4/8/24   Mihir Portillo DO   Cholecalciferol (VITAMIN D3) 1000 units CAPS Take 1 capsule by mouth daily     Historical Provider, MD   dicyclomine (BENTYL) 10 mg capsule Take 1 capsule (10 mg total) by mouth 3 (three) times a day as needed (abd pain) 9/13/23   Cleveland Zaragoza MD   diphenoxylate-atropine (LOMOTIL) 2.5-0.025 mg per tablet Take 1 tablet by mouth if needed    Historical Provider, MD   diphenoxylate-atropine (LOMOTIL) 2.5-0.025 mg per tablet Take 1 tablet by mouth 4 (four) times a day as needed for diarrhea  Patient not taking: Reported  on 8/21/2024 9/13/23   Cleveland Zaragoza MD   DULoxetine (CYMBALTA) 60 mg delayed release capsule TAKE ONE CAPSULE BY MOUTH ONCE DAILY 4/22/24   Danyell Dutta MD   furosemide (LASIX) 20 mg tablet One tablet daily and an extra 20 mg prn for wt gain 3 lb/ 24 hours or 5 lb/ 5 days, above a dry wt of 122 lb 8/21/24   JOSE G Valladares   Gabapentin (NEURONTIN PO)     Historical Provider, MD   hydroxychloroquine (PLAQUENIL) 200 mg tablet Take 1 tablet (200 mg total) by mouth daily with breakfast 8/20/24 2/16/25  Yolanda Spence PA-C   Lisinopril (PRINIVIL PO)     Historical Provider, MD   Magnesium 250 MG TABS Take 250 mg by mouth every evening    Historical Provider, MD   metFORMIN (GLUCOPHAGE) 500 mg tablet Take 500 mg by mouth 2 (two) times a day  Patient not taking: Reported on 5/10/2024 1/23/23   Historical Provider, MD   metFORMIN HCl (GLUCOPHAGE PO)     Historical Provider, MD   Mirabegron ER (Myrbetriq) 50 MG TB24 Take 1 tablet by mouth daily as directed by physician. 3/28/24   Kendall Moore PA-C   omeprazole (PriLOSEC) 20 mg delayed release capsule Take 20 mg by mouth daily    Historical Provider, MD   pregabalin (LYRICA) 75 mg capsule TAKE ONE CAPSULE BY MOUTH THREE TIMES A DAY  Patient taking differently: Take 100 mg by mouth 3 (three) times a day Patient often misses middle dose 4/4/24   Danyell Dutta MD   pyridoxine (B-6) 100 MG tablet Take 100 mg by mouth daily    Historical Provider, MD   Sodium Fluoride (PreviDent 5000 Booster Plus) 1.1 % PSTE Apply on teeth every evening as directed  Patient not taking: Reported on 7/30/2024 11/18/21   Danyell Dutta MD     I have reviewed home medications with patient family member.    Allergies:   Allergies   Allergen Reactions    Morphine Other (See Comments)     urinary retention    Ciprofloxacin Nausea Only and Rash       Social History:  Marital Status: /Civil Union   Occupation: Retired  Patient Pre-hospital Living Situation: Home - 1  "story  Patient Pre-hospital Level of Mobility: walks with walker  Patient Pre-hospital Diet Restrictions: none.  Substance Use History:   Social History     Substance and Sexual Activity   Alcohol Use No     Social History     Tobacco Use   Smoking Status Former    Current packs/day: 0.00    Types: Cigarettes    Quit date:     Years since quittin.6   Smokeless Tobacco Never     Social History     Substance and Sexual Activity   Drug Use No       Family History:  Family History   Problem Relation Age of Onset    Cancer Brother     Diabetes Mother     Hypertension Father     Heart disease Father        Physical Exam:     Vitals:   Blood Pressure: 132/68 (24 1550)  Pulse: 95 (24 1550)  Temperature: 97.5 °F (36.4 °C) (24 1550)  Temp Source: Oral (24 1550)  Respirations: 18 (24 1550)  Height: 4' 11\" (149.9 cm) (24 1550)  Weight - Scale: 58.1 kg (128 lb) (24 1550)  SpO2: 94 % (24 1634)    Physical Exam  Vitals and nursing note reviewed.   Constitutional:       General: She is not in acute distress.     Appearance: She is well-developed.   HENT:      Head: Normocephalic and atraumatic.      Comments: No pain upon palpation.  No ear discharge.  Eyes:      Conjunctiva/sclera: Conjunctivae normal.   Cardiovascular:      Rate and Rhythm: Normal rate and regular rhythm.      Heart sounds: No murmur heard.     Comments: Mild JVD  Pulmonary:      Effort: Pulmonary effort is normal. No respiratory distress.      Breath sounds: Normal breath sounds.   Abdominal:      Palpations: Abdomen is soft.      Tenderness: There is no abdominal tenderness.   Musculoskeletal:         General: No swelling.      Cervical back: Neck supple.   Skin:     General: Skin is warm and dry.      Capillary Refill: Capillary refill takes less than 2 seconds.   Neurological:      Mental Status: She is alert.      Cranial Nerves: No cranial nerve deficit.      Sensory: No sensory deficit.      " Comments: A&Ox2 - states that it is 1924.      Psychiatric:         Mood and Affect: Mood normal.         Additional Data:     Lab Results:  Results from last 7 days   Lab Units 08/30/24  1204   WBC Thousand/uL 6.86   HEMOGLOBIN g/dL 12.5   HEMATOCRIT % 38.6   PLATELETS Thousands/uL 154   SEGS PCT % 74   LYMPHO PCT % 13*   MONO PCT % 12   EOS PCT % 0     Results from last 7 days   Lab Units 08/30/24  1204   SODIUM mmol/L 139   POTASSIUM mmol/L 4.0   CHLORIDE mmol/L 99   CO2 mmol/L 32   BUN mg/dL 37*   CREATININE mg/dL 1.51*   ANION GAP mmol/L 8   CALCIUM mg/dL 9.7   ALBUMIN g/dL 3.7   TOTAL BILIRUBIN mg/dL 0.73   ALK PHOS U/L 104   ALT U/L 30   AST U/L 35   GLUCOSE RANDOM mg/dL 154*         Results from last 7 days   Lab Units 08/30/24  1209   POC GLUCOSE mg/dl 151*     Lab Results   Component Value Date    HGBA1C 7.6 (H) 07/30/2024    HGBA1C 6.6 (H) 03/02/2024    HGBA1C 5.9 (H) 04/14/2022           Lines/Drains:  Invasive Devices       Peripheral Intravenous Line  Duration             Peripheral IV 08/30/24 Left Antecubital <1 day                        Imaging: Reviewed radiology reports from this admission including: chest xray, CT head  CT head without contrast   Final Result by Alirio Xie MD (08/30 4757)   -No acute vascular territory infarct, intracranial hemorrhage or mass effect.   -Signal within the ainsley was also noted on prior and may represent artifact versus chronic insult.   -Large right mastoid effusion.                     Workstation performed: [id number]         XR chest 1 view portable   ED Interpretation by Dione Rodriguez MD (08/30 1230)   Per my interpretation: Low lung volumes. Mild pulmonary vascular congestion. Otherwise improved from previous CXR.      Final Result by Kendall Bailey MD (08/30 1350)      Pulmonary vascular congestion.            Workstation performed: [id number]             EKG and Other Studies Reviewed on Admission:   EKG:  RBBB, unchanged from prior.    ** Please  Note: This note has been constructed using a voice recognition system. **

## 2024-08-30 NOTE — ASSESSMENT & PLAN NOTE
Lab Results   Component Value Date    HGBA1C 7.6 (H) 07/30/2024       Recent Labs     08/30/24  1209   POCGLU 151*       Blood Sugar Average: Last 72 hrs:  (P) 151    Plan:  Will start on insulin sliding scale.  Per patient does not take any oral medications for her diabetes.

## 2024-08-30 NOTE — ASSESSMENT & PLAN NOTE
Lab Results   Component Value Date    HGBA1C 7.6 (H) 07/30/2024       Recent Labs     08/30/24  1209   POCGLU 151*       Blood Sugar Average: Last 72 hrs:  (P) 151    Pt received pain mgmt injection 8/29.    Plan:  Continue with home gabapentin.

## 2024-08-30 NOTE — ED NOTES
The RN called for provider to bedside due to HGR over 200 with good pleth. Patient placed on 3L O2 NC, code cart at bedside.     Pearl Tee RN  08/30/24 4046

## 2024-08-30 NOTE — ASSESSMENT & PLAN NOTE
Lab Results   Component Value Date    EGFR 30 08/30/2024    EGFR 32 08/17/2024    EGFR 31 08/04/2024    CREATININE 1.51 (H) 08/30/2024    CREATININE 1.43 (H) 08/17/2024    CREATININE 1.46 (H) 08/04/2024   Plan:  Continue to monitor.  Seems to be at baseline.

## 2024-08-30 NOTE — PLAN OF CARE
Problem: PAIN - ADULT  Goal: Verbalizes/displays adequate comfort level or baseline comfort level  Description: Interventions:  - Encourage patient to monitor pain and request assistance  - Assess pain using appropriate pain scale  - Administer analgesics based on type and severity of pain and evaluate response  - Implement non-pharmacological measures as appropriate and evaluate response  - Consider cultural and social influences on pain and pain management  - Notify physician/advanced practitioner if interventions unsuccessful or patient reports new pain  Outcome: Progressing     Problem: INFECTION - ADULT  Goal: Absence or prevention of progression during hospitalization  Description: INTERVENTIONS:  - Assess and monitor for signs and symptoms of infection  - Monitor lab/diagnostic results  - Monitor all insertion sites, i.e. indwelling lines, tubes, and drains  - Monitor endotracheal if appropriate and nasal secretions for changes in amount and color  - [place] appropriate cooling/warming therapies per order  - Administer medications as ordered  - Instruct and encourage patient and family to use good hand hygiene technique  - Identify and instruct in appropriate isolation precautions for identified infection/condition  Outcome: Progressing  Goal: Absence of fever/infection during neutropenic period  Description: INTERVENTIONS:  - Monitor WBC    Outcome: Progressing     Problem: SAFETY ADULT  Goal: Patient will remain free of falls  Description: INTERVENTIONS:  - Educate patient/family on patient safety including physical limitations  - Instruct patient to call for assistance with activity   - Consult OT/PT to assist with strengthening/mobility   - Keep Call bell within reach  - Keep bed low and locked with side rails adjusted as appropriate  - Keep care items and personal belongings within reach  - Initiate and maintain comfort rounds  - Make Fall Risk Sign visible to staff  - Offer Toileting every 2 Hours,  in advance of need  - Initiate/Maintain bed alarm  - Apply yellow socks and bracelet for high fall risk patients  - Consider moving patient to room near nurses station  Outcome: Progressing  Goal: Maintain or return to baseline ADL function  Description: INTERVENTIONS:  -  Assess patient's ability to carry out ADLs; assess patient's baseline for ADL function and identify physical deficits which impact ability to perform ADLs (bathing, care of mouth/teeth, toileting, grooming, dressing, etc.)  - Assess/evaluate cause of self-care deficits   - Assess range of motion  - Assess patient's mobility; develop plan if impaired  - Assess patient's need for assistive devices and provide as appropriate  - Encourage maximum independence but intervene and supervise when necessary  - Involve family in performance of ADLs  - Assess for home care needs following discharge   - Consider OT consult to assist with ADL evaluation and planning for discharge  - Provide patient education as appropriate  Outcome: Progressing     Problem: DISCHARGE PLANNING  Goal: Discharge to home or other facility with appropriate resources  Description: INTERVENTIONS:  - Identify barriers to discharge w/patient and caregiver  - Arrange for needed discharge resources and transportation as appropriate  - Identify discharge learning needs (meds, wound care, etc.)  - Arrange for interpretive services to assist at discharge as needed  - Refer to Case Management Department for coordinating discharge planning if the patient needs post-hospital services based on physician/advanced practitioner order or complex needs related to functional status, cognitive ability, or social support system  Outcome: Progressing     Problem: Knowledge Deficit  Goal: Patient/family/caregiver demonstrates understanding of disease process, treatment plan, medications, and discharge instructions  Description: Complete learning assessment and assess knowledge base.  Interventions:  -  Provide teaching at level of understanding  - Provide teaching via preferred learning methods  Outcome: Progressing

## 2024-08-30 NOTE — ASSESSMENT & PLAN NOTE
Wt Readings from Last 3 Encounters:   08/30/24 58.1 kg (128 lb)   08/21/24 58.1 kg (128 lb)   08/07/24 55.8 kg (123 lb)     CXR demonstrated pulmonary vascular congestion. Recently hospitalized earlier in August for acute on chronic heart failure. Home Regimen: Lasix 20 mg daily.     Plan:  Consider a one-time dose of IV Lasix 20 or Lasix 40 p.o.

## 2024-08-30 NOTE — LETTER
Thank you for allowing us to participate in the care of your patient, Tanya Stephen, who was hospitalized from 8/30/2024 through 9/1/2024 with the admitting diagnosis of toxic metabolic encephalopathy.     Medication Changes:  Start taking the antibiotic cefpodoxime 200 mg daily for 3 days starting on 9/2    Outpatient testing recommended:  None    If you have any additional questions or would like to discuss further, please feel free to contact me.    Quita Bueno DO  [facility] Internal Medicine, Hospitalist  [phone]

## 2024-08-30 NOTE — ASSESSMENT & PLAN NOTE
>>ASSESSMENT AND PLAN FOR DIASTOLIC CHF, CHRONIC (HCC) WRITTEN ON 8/30/2024  6:20 PM BY AMALIA GOLDSTEIN MD    Wt Readings from Last 3 Encounters:   08/30/24 58.1 kg (128 lb)   08/21/24 58.1 kg (128 lb)   08/07/24 55.8 kg (123 lb)     CXR demonstrated pulmonary vascular congestion. Recently hospitalized earlier in August for acute on chronic heart failure. Home Regimen: Lasix 20 mg daily.     Plan:  Consider a one-time dose of IV Lasix 20 or Lasix 40 p.o.

## 2024-08-30 NOTE — ASSESSMENT & PLAN NOTE
Patient has a history of Klebsiella and E faecalis UTI.  Per  patient usually has about 1-2 UTI per year.  Endorses mild dysuria.  UA demonstrated moderate leukocytosis, moderate bacteria, WBC 20-30, trace ketone, blood.   Plan:  Continue with Rocephin and vancomycin until sensitivities come back..  Will continue to monitor for any ongoing symptoms.

## 2024-08-30 NOTE — Clinical Note
Case was discussed with LISSETT and the patient's admission status was agreed to be Admission Status: observation status to the service of Dr. sr .

## 2024-08-31 LAB
ALBUMIN SERPL BCG-MCNC: 3.1 G/DL (ref 3.5–5)
ALP SERPL-CCNC: 78 U/L (ref 34–104)
ALT SERPL W P-5'-P-CCNC: 24 U/L (ref 7–52)
ANION GAP SERPL CALCULATED.3IONS-SCNC: 5 MMOL/L (ref 4–13)
AST SERPL W P-5'-P-CCNC: 27 U/L (ref 13–39)
BASOPHILS # BLD AUTO: 0.01 THOUSANDS/ÂΜL (ref 0–0.1)
BASOPHILS NFR BLD AUTO: 0 % (ref 0–1)
BILIRUB SERPL-MCNC: 0.44 MG/DL (ref 0.2–1)
BUN SERPL-MCNC: 31 MG/DL (ref 5–25)
CALCIUM ALBUM COR SERPL-MCNC: 9.3 MG/DL (ref 8.3–10.1)
CALCIUM SERPL-MCNC: 8.6 MG/DL (ref 8.4–10.2)
CHLORIDE SERPL-SCNC: 104 MMOL/L (ref 96–108)
CO2 SERPL-SCNC: 30 MMOL/L (ref 21–32)
CREAT SERPL-MCNC: 1.32 MG/DL (ref 0.6–1.3)
EOSINOPHIL # BLD AUTO: 0.01 THOUSAND/ÂΜL (ref 0–0.61)
EOSINOPHIL NFR BLD AUTO: 0 % (ref 0–6)
ERYTHROCYTE [DISTWIDTH] IN BLOOD BY AUTOMATED COUNT: 14.7 % (ref 11.6–15.1)
GFR SERPL CREATININE-BSD FRML MDRD: 35 ML/MIN/1.73SQ M
GLUCOSE P FAST SERPL-MCNC: 114 MG/DL (ref 65–99)
GLUCOSE SERPL-MCNC: 112 MG/DL (ref 65–140)
GLUCOSE SERPL-MCNC: 114 MG/DL (ref 65–140)
GLUCOSE SERPL-MCNC: 142 MG/DL (ref 65–140)
GLUCOSE SERPL-MCNC: 164 MG/DL (ref 65–140)
GLUCOSE SERPL-MCNC: 203 MG/DL (ref 65–140)
HCT VFR BLD AUTO: 34.9 % (ref 34.8–46.1)
HGB BLD-MCNC: 11 G/DL (ref 11.5–15.4)
IMM GRANULOCYTES # BLD AUTO: 0.04 THOUSAND/UL (ref 0–0.2)
IMM GRANULOCYTES NFR BLD AUTO: 1 % (ref 0–2)
LYMPHOCYTES # BLD AUTO: 0.97 THOUSANDS/ÂΜL (ref 0.6–4.47)
LYMPHOCYTES NFR BLD AUTO: 14 % (ref 14–44)
MAGNESIUM SERPL-MCNC: 2 MG/DL (ref 1.9–2.7)
MCH RBC QN AUTO: 29.3 PG (ref 26.8–34.3)
MCHC RBC AUTO-ENTMCNC: 31.5 G/DL (ref 31.4–37.4)
MCV RBC AUTO: 93 FL (ref 82–98)
MONOCYTES # BLD AUTO: 0.71 THOUSAND/ÂΜL (ref 0.17–1.22)
MONOCYTES NFR BLD AUTO: 10 % (ref 4–12)
NEUTROPHILS # BLD AUTO: 5.34 THOUSANDS/ÂΜL (ref 1.85–7.62)
NEUTS SEG NFR BLD AUTO: 75 % (ref 43–75)
NRBC BLD AUTO-RTO: 0 /100 WBCS
PLATELET # BLD AUTO: 137 THOUSANDS/UL (ref 149–390)
PMV BLD AUTO: 11.3 FL (ref 8.9–12.7)
POTASSIUM SERPL-SCNC: 3.9 MMOL/L (ref 3.5–5.3)
PROT SERPL-MCNC: 6.2 G/DL (ref 6.4–8.4)
RBC # BLD AUTO: 3.76 MILLION/UL (ref 3.81–5.12)
SODIUM SERPL-SCNC: 139 MMOL/L (ref 135–147)
VANCOMYCIN SERPL-MCNC: 12.1 UG/ML (ref 10–20)
WBC # BLD AUTO: 7.08 THOUSAND/UL (ref 4.31–10.16)

## 2024-08-31 PROCEDURE — 83735 ASSAY OF MAGNESIUM: CPT

## 2024-08-31 PROCEDURE — 80053 COMPREHEN METABOLIC PANEL: CPT

## 2024-08-31 PROCEDURE — 85025 COMPLETE CBC W/AUTO DIFF WBC: CPT

## 2024-08-31 PROCEDURE — 82948 REAGENT STRIP/BLOOD GLUCOSE: CPT

## 2024-08-31 PROCEDURE — 99233 SBSQ HOSP IP/OBS HIGH 50: CPT | Performed by: INTERNAL MEDICINE

## 2024-08-31 PROCEDURE — 80202 ASSAY OF VANCOMYCIN: CPT | Performed by: INTERNAL MEDICINE

## 2024-08-31 RX ORDER — VANCOMYCIN HYDROCHLORIDE 500 MG/100ML
500 INJECTION, SOLUTION INTRAVENOUS EVERY 24 HOURS
Status: DISCONTINUED | OUTPATIENT
Start: 2024-08-31 | End: 2024-09-01 | Stop reason: DRUGHIGH

## 2024-08-31 RX ORDER — INSULIN LISPRO 100 [IU]/ML
1-5 INJECTION, SOLUTION INTRAVENOUS; SUBCUTANEOUS
Status: DISCONTINUED | OUTPATIENT
Start: 2024-08-31 | End: 2024-09-01 | Stop reason: HOSPADM

## 2024-08-31 RX ORDER — DULOXETIN HYDROCHLORIDE 30 MG/1
30 CAPSULE, DELAYED RELEASE ORAL DAILY
Status: DISCONTINUED | OUTPATIENT
Start: 2024-09-01 | End: 2024-09-01 | Stop reason: HOSPADM

## 2024-08-31 RX ADMIN — OXYBUTYNIN CHLORIDE 10 MG: 5 TABLET, EXTENDED RELEASE ORAL at 08:05

## 2024-08-31 RX ADMIN — ENOXAPARIN SODIUM 30 MG: 30 INJECTION SUBCUTANEOUS at 08:06

## 2024-08-31 RX ADMIN — PREGABALIN 100 MG: 100 CAPSULE ORAL at 15:40

## 2024-08-31 RX ADMIN — ASPIRIN 81 MG 81 MG: 81 TABLET ORAL at 08:06

## 2024-08-31 RX ADMIN — PANTOPRAZOLE SODIUM 20 MG: 20 TABLET, DELAYED RELEASE ORAL at 08:05

## 2024-08-31 RX ADMIN — HYDROXYCHLOROQUINE SULFATE 200 MG: 200 TABLET ORAL at 08:06

## 2024-08-31 RX ADMIN — DULOXETINE HYDROCHLORIDE 60 MG: 60 CAPSULE, DELAYED RELEASE ORAL at 08:05

## 2024-08-31 RX ADMIN — CEFTRIAXONE SODIUM 1000 MG: 10 INJECTION, POWDER, FOR SOLUTION INTRAVENOUS at 15:40

## 2024-08-31 RX ADMIN — ATORVASTATIN CALCIUM 10 MG: 10 TABLET, FILM COATED ORAL at 08:06

## 2024-08-31 RX ADMIN — AMITRIPTYLINE HYDROCHLORIDE 10 MG: 10 TABLET, FILM COATED ORAL at 21:01

## 2024-08-31 RX ADMIN — ACETAMINOPHEN 650 MG: 325 TABLET ORAL at 21:01

## 2024-08-31 RX ADMIN — PREGABALIN 100 MG: 100 CAPSULE ORAL at 08:06

## 2024-08-31 RX ADMIN — VANCOMYCIN HYDROCHLORIDE 500 MG: 500 INJECTION, SOLUTION INTRAVENOUS at 22:09

## 2024-08-31 RX ADMIN — Medication 400 MG: at 08:06

## 2024-08-31 RX ADMIN — SENNOSIDES AND DOCUSATE SODIUM 1 TABLET: 8.6; 5 TABLET ORAL at 08:05

## 2024-08-31 RX ADMIN — ACETAMINOPHEN 650 MG: 325 TABLET ORAL at 10:52

## 2024-08-31 RX ADMIN — Medication 1000 UNITS: at 08:05

## 2024-08-31 RX ADMIN — INSULIN LISPRO 1 UNITS: 100 INJECTION, SOLUTION INTRAVENOUS; SUBCUTANEOUS at 21:09

## 2024-08-31 RX ADMIN — INSULIN LISPRO 1 UNITS: 100 INJECTION, SOLUTION INTRAVENOUS; SUBCUTANEOUS at 16:51

## 2024-08-31 RX ADMIN — SENNOSIDES AND DOCUSATE SODIUM 1 TABLET: 8.6; 5 TABLET ORAL at 17:37

## 2024-08-31 RX ADMIN — FUROSEMIDE 20 MG: 20 TABLET ORAL at 08:05

## 2024-08-31 RX ADMIN — PREGABALIN 100 MG: 100 CAPSULE ORAL at 21:01

## 2024-08-31 NOTE — ASSESSMENT & PLAN NOTE
Wt Readings from Last 3 Encounters:   08/31/24 55.6 kg (122 lb 9.2 oz)   08/21/24 58.1 kg (128 lb)   08/07/24 55.8 kg (123 lb)     CXR demonstrated pulmonary vascular congestion. Recently hospitalized earlier in August for acute on chronic heart failure. Home Regimen: Lasix 20 mg daily.     Plan:  Resume home lasix dosing   Daily weights while inpatient, low threshold to escalate diuresis

## 2024-08-31 NOTE — ASSESSMENT & PLAN NOTE
Per  states that she was in usual state of health until this morning upon waking up.  She seemed disoriented and that she had no appetite and was speaking differently.  Per , patient has UTIs about once or twice a year.  Denies recent illness. Last BM 8/28. Suspect due to UTI this admission.     Plan:  Continue cefpodoxime for 2 remaining days starting 9/2  Home PT/OT  F/u with PCP

## 2024-08-31 NOTE — ASSESSMENT & PLAN NOTE
Per  states that she was in usual state of health until this morning upon waking up.  She seemed disoriented and that she had no appetite and was speaking differently.  Per , patient has UTIs about once or twice a year.  Denies recent illness. Last BM 8/28.    Plan:  Treat underlying disorder.  Most likely presumed etiology is UTI  Continue with bowel regimen and delirium precautions as previously ordered  Will consider consulting geriatrics for their recommendations if TME does not continue to improve.  Will consider checking for reversible causes of TME such as vitamin B12, iron, vitamin D, TSH if TME does not continue to improve.

## 2024-08-31 NOTE — ASSESSMENT & PLAN NOTE
Lab Results   Component Value Date    HGBA1C 7.6 (H) 07/30/2024       Recent Labs     08/31/24  1606 08/31/24 2018 09/01/24  0710 09/01/24  1044   POCGLU 203* 164* 160* 144*       Blood Sugar Average: Last 72 hrs:  (P) 153.625    Plan:  F/u with PCP

## 2024-08-31 NOTE — ASSESSMENT & PLAN NOTE
Wt Readings from Last 3 Encounters:   09/01/24 54.9 kg (121 lb 0.5 oz)   08/21/24 58.1 kg (128 lb)   08/07/24 55.8 kg (123 lb)     CXR demonstrated pulmonary vascular congestion. Recently hospitalized earlier in August for acute on chronic heart failure. Home Regimen: Lasix 20 mg daily.     Plan:  Resume home lasix dosing

## 2024-08-31 NOTE — ASSESSMENT & PLAN NOTE
Wt Readings from Last 3 Encounters:   08/31/24 55.6 kg (122 lb 9.2 oz)   08/21/24 58.1 kg (128 lb)   08/07/24 55.8 kg (123 lb)     Most recently admitted for heart failure exacerbation earlier back in August.  Plan:  Will continue to monitor for symptoms.  Will continue with home dose of Lasix 20 p.o.  Will do daily weights.

## 2024-08-31 NOTE — PROGRESS NOTES
Tanya Stephen is a 89 y.o. female who is currently ordered Vancomycin IV with management by the Pharmacy Consult service.  Relevant clinical data and objective / subjective history reviewed.  Vancomycin Assessment:  Indication and Goal AUC/Trough: Other, UTI, -600, trough >10  Clinical Status:  new  Micro:   pending  Renal Function:  SCr: 1.51 mg/dL  CrCl: 17.3 mL/min  Renal replacement: Not on dialysis  Days of Therapy: 1  Current Dose: 1,500mg IV once as a loading dose  Vancomycin Plan:  New Dosinmg IV once daily for random level <15.    Next Level: random  1700  Renal Function Monitoring: Daily BMP and UOP  Pharmacy will continue to follow closely for s/sx of nephrotoxicity, infusion reactions and appropriateness of therapy.  BMP and CBC will be ordered per protocol. We will continue to follow the patient’s culture results and clinical progress daily.    Radha Xie, Pharmacist

## 2024-08-31 NOTE — ASSESSMENT & PLAN NOTE
Lab Results   Component Value Date    HGBA1C 7.6 (H) 07/30/2024       Recent Labs     08/31/24  1606 08/31/24 2018 09/01/24  0710 09/01/24  1044   POCGLU 203* 164* 160* 144*       Blood Sugar Average: Last 72 hrs:  (P) 153.625    Pt received pain mgmt injection 8/29.    Plan:  While inpatient utilize sliding scale insulin for correction, algorithm two  Continue with home gabapentin.

## 2024-08-31 NOTE — PLAN OF CARE
Problem: PAIN - ADULT  Goal: Verbalizes/displays adequate comfort level or baseline comfort level  Description: Interventions:  - Encourage patient to monitor pain and request assistance  - Assess pain using appropriate pain scale  - Administer analgesics based on type and severity of pain and evaluate response  - Implement non-pharmacological measures as appropriate and evaluate response  - Consider cultural and social influences on pain and pain management  - Notify physician/advanced practitioner if interventions unsuccessful or patient reports new pain  Outcome: Progressing     Problem: INFECTION - ADULT  Goal: Absence or prevention of progression during hospitalization  Description: INTERVENTIONS:  - Assess and monitor for signs and symptoms of infection  - Monitor lab/diagnostic results  - Monitor all insertion sites, i.e. indwelling lines, tubes, and drains  - Monitor endotracheal if appropriate and nasal secretions for changes in amount and color  - Greene appropriate cooling/warming therapies per order  - Administer medications as ordered  - Instruct and encourage patient and family to use good hand hygiene technique  - Identify and instruct in appropriate isolation precautions for identified infection/condition  Outcome: Progressing  Goal: Absence of fever/infection during neutropenic period  Description: INTERVENTIONS:  - Monitor WBC    Outcome: Progressing     Problem: SAFETY ADULT  Goal: Patient will remain free of falls  Description: INTERVENTIONS:  - Educate patient/family on patient safety including physical limitations  - Instruct patient to call for assistance with activity   - Consult OT/PT to assist with strengthening/mobility   - Keep Call bell within reach  - Keep bed low and locked with side rails adjusted as appropriate  - Keep care items and personal belongings within reach  - Initiate and maintain comfort rounds  - Make Fall Risk Sign visible to staff  - Offer Toileting every 2 Hours,  in advance of need  - Initiate/Maintain bed alarm  - Obtain necessary fall risk management equipment: yellow socks  - Apply yellow socks and bracelet for high fall risk patients  - Consider moving patient to room near nurses station  Outcome: Progressing  Goal: Maintain or return to baseline ADL function  Description: INTERVENTIONS:  -  Assess patient's ability to carry out ADLs; assess patient's baseline for ADL function and identify physical deficits which impact ability to perform ADLs (bathing, care of mouth/teeth, toileting, grooming, dressing, etc.)  - Assess/evaluate cause of self-care deficits   - Assess range of motion  - Assess patient's mobility; develop plan if impaired  - Assess patient's need for assistive devices and provide as appropriate  - Encourage maximum independence but intervene and supervise when necessary  - Involve family in performance of ADLs  - Assess for home care needs following discharge   - Consider OT consult to assist with ADL evaluation and planning for discharge  - Provide patient education as appropriate  Outcome: Progressing     Problem: DISCHARGE PLANNING  Goal: Discharge to home or other facility with appropriate resources  Description: INTERVENTIONS:  - Identify barriers to discharge w/patient and caregiver  - Arrange for needed discharge resources and transportation as appropriate  - Identify discharge learning needs (meds, wound care, etc.)  - Arrange for interpretive services to assist at discharge as needed  - Refer to Case Management Department for coordinating discharge planning if the patient needs post-hospital services based on physician/advanced practitioner order or complex needs related to functional status, cognitive ability, or social support system  Outcome: Progressing     Problem: Knowledge Deficit  Goal: Patient/family/caregiver demonstrates understanding of disease process, treatment plan, medications, and discharge instructions  Description: Complete  learning assessment and assess knowledge base.  Interventions:  - Provide teaching at level of understanding  - Provide teaching via preferred learning methods  Outcome: Progressing     Problem: Prexisting or High Potential for Compromised Skin Integrity  Goal: Skin integrity is maintained or improved  Description: INTERVENTIONS:  - Identify patients at risk for skin breakdown  - Assess and monitor skin integrity  - Assess and monitor nutrition and hydration status  - Monitor labs   - Assess for incontinence   - Turn and reposition patient  - Assist with mobility/ambulation  - Relieve pressure over bony prominences  - Avoid friction and shearing  - Provide appropriate hygiene as needed including keeping skin clean and dry  - Evaluate need for skin moisturizer/barrier cream  - Collaborate with interdisciplinary team   - Patient/family teaching  - Consider wound care consult   Outcome: Progressing

## 2024-08-31 NOTE — PROGRESS NOTES
[facility]  Progress Note  Name: Tanya Stephen I  MRN: [id number]  Unit/Bed#: W -01 I Date of Admission: 8/30/2024   Date of Service: 8/31/2024 I Hospital Day: 0    Assessment & Plan   * Toxic metabolic encephalopathy  Assessment & Plan  Per  states that she was in usual state of health until this morning upon waking up.  She seemed disoriented and that she had no appetite and was speaking differently.  Per , patient has UTIs about once or twice a year.  Denies recent illness. Last BM 8/28.    Plan:  Treat underlying disorder.  Most likely presumed etiology is UTI  Continue with bowel regimen and delirium precautions as previously ordered  Will consider consulting geriatrics for their recommendations if TME does not continue to improve.  Will consider checking for reversible causes of TME such as vitamin B12, iron, vitamin D, TSH if TME does not continue to improve.    Abnormal CT scan of head  Assessment & Plan  Patient found to have large right mastoid effusion on CT head.  Patient does not endorse any pain around the mastoid.  Plan:  Likely incidental finding  Will continue to monitor  Will consider referral to ENT and neuro outpatient for further monitoring, workup.       Chronic diastolic (congestive) heart failure (HCC)  Assessment & Plan  Wt Readings from Last 3 Encounters:   08/31/24 55.6 kg (122 lb 9.2 oz)   08/21/24 58.1 kg (128 lb)   08/07/24 55.8 kg (123 lb)     Most recently admitted for heart failure exacerbation earlier back in August.  Plan:  Will continue to monitor for symptoms.  Will continue with home dose of Lasix 20 p.o.  Will do daily weights.       UTI (urinary tract infection)  Assessment & Plan  Patient has a history of Klebsiella and E faecalis UTI.  Per  patient usually has about 1-2 UTI per year.  Endorses mild dysuria.  UA demonstrated moderate leukocytosis, moderate bacteria, WBC 20-30, trace ketone, blood.   Plan:  Continue  with Rocephin and vancomycin until sensitivities come back..  Will continue to monitor for any ongoing symptoms.    OAB (overactive bladder)  Assessment & Plan  Has a history of overactive bladder.   Continue with home mirabegron.     Acute on chronic heart failure with preserved ejection fraction (HFpEF) ([facility])  Assessment & Plan  Wt Readings from Last 3 Encounters:   08/31/24 55.6 kg (122 lb 9.2 oz)   08/21/24 58.1 kg (128 lb)   08/07/24 55.8 kg (123 lb)     CXR demonstrated pulmonary vascular congestion. Recently hospitalized earlier in August for acute on chronic heart failure. Home Regimen: Lasix 20 mg daily.     Plan:  Resume home lasix dosing   Daily weights while inpatient, low threshold to escalate diuresis        Stage 3b chronic kidney disease ([facility])  Assessment & Plan  Lab Results   Component Value Date    EGFR 35 08/31/2024    EGFR 30 08/30/2024    EGFR 32 08/17/2024    CREATININE 1.32 (H) 08/31/2024    CREATININE 1.51 (H) 08/30/2024    CREATININE 1.43 (H) 08/17/2024   Plan:  Continue to monitor.  Seems to be at baseline.    Diabetic gastroparesis associated with type 2 diabetes mellitus  ([facility])  Assessment & Plan  Lab Results   Component Value Date    HGBA1C 7.6 (H) 07/30/2024       Recent Labs     08/30/24  1209 08/30/24 2002 08/31/24  0736 08/31/24  1103   POCGLU 151* 153* 112 142*         Blood Sugar Average: Last 72 hrs:  (P) 139.5    Plan:  Will start on insulin sliding scale.  Per patient does not take any oral medications for her diabetes.      Diabetic polyneuropathy associated with type 2 diabetes mellitus ([facility])  Assessment & Plan  Lab Results   Component Value Date    HGBA1C 7.6 (H) 07/30/2024       Recent Labs     08/30/24  1209 08/30/24 2002 08/31/24  0736 08/31/24  1103   POCGLU 151* 153* 112 142*         Blood Sugar Average: Last 72 hrs:  (P) 139.5    Pt received pain mgmt injection 8/29.    Plan:  While inpatient utilize sliding scale insulin for correction, algorithm two  Continue with  home gabapentin.         VTE Pharmacologic Prophylaxis: VTE Score: 11 Moderate Risk (Score 3-4) - Pharmacological DVT Prophylaxis Ordered: enoxaparin (Lovenox).    Mobility:   Basic Mobility Inpatient Raw Score: 16  JH-HLM Goal: 5: Stand one or more mins  JH-HLM Achieved: 5: Stand (1 or more minutes)  JH-HLM Goal achieved. Continue to encourage appropriate mobility.    Patient Centered Rounds: I performed bedside rounds with nursing staff today.  Discussions with Specialists or Other Care Team Provider: Attending     Education and Discussions with Family / Patient: Updated  () via phone.  Voicemail left.    Current Length of Stay: 0 day(s)  Current Patient Status: Inpatient   Discharge Plan: Anticipate discharge in 24-48 hrs to home with home services.    Code Status: Level 1 - Full Code    Subjective:   Patient seen and examined at bedside.  Patient stated she was feeling overall well, ANO x 3 on exam with slow but improved mentation from prior.  Patient endorsed good p.o. intake of food and water, adequate urination but no bowel movement yet.  Plan of care concerning de-escalation of antibiotics as able outlined to patient who verbalized understanding at this time.    Objective:     Vitals:   Temp (24hrs), Av.7 °F (36.5 °C), Min:97.4 °F (36.3 °C), Max:98.4 °F (36.9 °C)    Temp:  [97.4 °F (36.3 °C)-98.4 °F (36.9 °C)] 97.4 °F (36.3 °C)  HR:  [91-95] 91  Resp:  [18-20] 18  BP: (111-132)/(58-68) 112/60  SpO2:  [94 %-100 %] 100 %  Body mass index is 24.76 kg/m².     Input and Output Summary (last 24 hours):     Intake/Output Summary (Last 24 hours) at 2024 1354  Last data filed at 2024 0900  Gross per 24 hour   Intake 460 ml   Output 0 ml   Net 460 ml       Physical Exam:   Physical Exam  Vitals and nursing note reviewed.   Constitutional:       General: She is not in acute distress.     Appearance: She is well-developed.   HENT:      Head: Normocephalic and atraumatic.      Comments:  No pain upon palpation.  No ear discharge.     Right Ear: External ear normal.      Left Ear: External ear normal.   Eyes:      Conjunctiva/sclera: Conjunctivae normal.   Cardiovascular:      Rate and Rhythm: Normal rate and regular rhythm.      Heart sounds: No murmur heard.     Comments: Mild JVD  Pulmonary:      Effort: Pulmonary effort is normal. No respiratory distress.      Breath sounds: Normal breath sounds.   Abdominal:      Palpations: Abdomen is soft.      Tenderness: There is no abdominal tenderness.   Musculoskeletal:         General: No swelling.      Cervical back: Neck supple.   Skin:     General: Skin is warm and dry.      Capillary Refill: Capillary refill takes less than 2 seconds.   Neurological:      Mental Status: She is alert.      Cranial Nerves: No cranial nerve deficit.      Sensory: No sensory deficit.      Comments: A&Ox3 on exam today, slow to answer but answers correctly      Psychiatric:         Mood and Affect: Mood normal.          Additional Data:     Labs:  Results from last 7 days   Lab Units 08/31/24  0454   WBC Thousand/uL 7.08   HEMOGLOBIN g/dL 11.0*   HEMATOCRIT % 34.9   PLATELETS Thousands/uL 137*   SEGS PCT % 75   LYMPHO PCT % 14   MONO PCT % 10   EOS PCT % 0     Results from last 7 days   Lab Units 08/31/24  0454   SODIUM mmol/L 139   POTASSIUM mmol/L 3.9   CHLORIDE mmol/L 104   CO2 mmol/L 30   BUN mg/dL 31*   CREATININE mg/dL 1.32*   ANION GAP mmol/L 5   CALCIUM mg/dL 8.6   ALBUMIN g/dL 3.1*   TOTAL BILIRUBIN mg/dL 0.44   ALK PHOS U/L 78   ALT U/L 24   AST U/L 27   GLUCOSE RANDOM mg/dL 114         Results from last 7 days   Lab Units 08/31/24  1103 08/31/24  0736 08/30/24  2002 08/30/24  1209   POC GLUCOSE mg/dl 142* 112 153* 151*               Lines/Drains:  Invasive Devices       Peripheral Intravenous Line  Duration             Peripheral IV 08/30/24 Left Antecubital 1 day                          Imaging: Reviewed radiology reports from this admission including: chest  xray and CT head and Personally reviewed the following imaging: chest CT scan and CT head    Recent Cultures (last 7 days):   Results from last 7 days   Lab Units 08/30/24  1411   URINE CULTURE  Culture results to follow.       Last 24 Hours Medication List:   Current Facility-Administered Medications   Medication Dose Route Frequency Provider Last Rate    acetaminophen  650 mg Oral Q6H PRN Aziza Knapp MD      amitriptyline  10 mg Oral HS Wili Pace MD      aspirin  81 mg Oral Daily Wili Pace MD      atorvastatin  10 mg Oral Daily Wili Pace MD      cefTRIAXone  1,000 mg Intravenous Q24H Wili Pace MD      Cholecalciferol  1,000 Units Oral Daily Wili Pace MD      [START ON 9/1/2024] DULoxetine  30 mg Oral Daily Milli King MD      enoxaparin  30 mg Subcutaneous Daily Wili Pace MD      furosemide  20 mg Oral Daily Wili Pace MD      hydroxychloroquine  200 mg Oral Daily With Breakfast Wili Pace MD      insulin lispro  1-5 Units Subcutaneous 4x Daily (AC & HS) Femi Gonzalez MD      magnesium Oxide  400 mg Oral Daily Wili Pace MD      oxybutynin  10 mg Oral Daily Wili Pace MD      pantoprazole  20 mg Oral Daily Before Breakfast Wili Pace MD      polyethylene glycol  17 g Oral Daily PRN Aziza Knapp MD      pregabalin  100 mg Oral TID Wili Pace MD      senna-docusate sodium  1 tablet Oral BID Aziza Knapp MD      vancomycin  15 mg/kg Intravenous Daily PRN Gita Garrison MD          Today, Patient Was Seen By: Femi Gonzalez MD    **Please Note: This note may have been constructed using a voice recognition system.**

## 2024-08-31 NOTE — ASSESSMENT & PLAN NOTE
Wt Readings from Last 3 Encounters:   08/31/24 55.6 kg (122 lb 9.2 oz)   08/21/24 58.1 kg (128 lb)   08/07/24 55.8 kg (123 lb)     Most recently admitted for heart failure exacerbation earlier back in August.  Plan:  continue with home dose of Lasix 20 p.o.

## 2024-08-31 NOTE — DISCHARGE SUMMARY
[facility]  Discharge- Tanya Stephen 7/21/1934, 89 y.o. female MRN: [id number]  Unit/Bed#: W -01 Encounter: [id number]  Primary Care Provider: Gregory Santoro DO   Date and time admitted to hospital: 8/30/2024 11:32 AM    * Toxic metabolic encephalopathy  Assessment & Plan  Per  states that she was in usual state of health until this morning upon waking up.  She seemed disoriented and that she had no appetite and was speaking differently.  Per , patient has UTIs about once or twice a year.  Denies recent illness. Last BM 8/28. Suspect due to UTI this admission.     Plan:  Continue cefpodoxime for 2 remaining days starting 9/2  Home PT/OT  F/u with PCP    UTI (urinary tract infection)  Assessment & Plan  Patient has a history of Klebsiella and E faecalis UTI.  Per  patient usually has about 1-2 UTI per year.  Endorses mild dysuria.  UA demonstrated moderate leukocytosis, moderate bacteria, WBC 20-30, trace ketone, blood. Ucx mixed contaminants x4.     Plan:  Continue cefpodoxime for 2 remaining days starting 9/2  Home PT/OT  F/u with PCP    Abnormal CT scan of head  Assessment & Plan  Patient found to have large right mastoid effusion on CT head.  Patient does not endorse any pain around the mastoid.  Plan:  Likely incidental finding  F/u with PCP       Chronic diastolic (congestive) heart failure (HCC)  Assessment & Plan  Wt Readings from Last 3 Encounters:   08/31/24 55.6 kg (122 lb 9.2 oz)   08/21/24 58.1 kg (128 lb)   08/07/24 55.8 kg (123 lb)     Most recently admitted for heart failure exacerbation earlier back in August.  Plan:  continue with home dose of Lasix 20 p.o.       OAB (overactive bladder)  Assessment & Plan  Has a history of overactive bladder.   Continue with home mirabegron.     Acute on chronic heart failure with preserved ejection fraction (HFpEF) ([facility])  Assessment & Plan  Wt Readings from Last 3 Encounters:   09/01/24 54.9 kg (121 lb 0.5  oz)   08/21/24 58.1 kg (128 lb)   08/07/24 55.8 kg (123 lb)     CXR demonstrated pulmonary vascular congestion. Recently hospitalized earlier in August for acute on chronic heart failure. Home Regimen: Lasix 20 mg daily.     Plan:  Resume home lasix dosing         Stage 3b chronic kidney disease (HCC)  Assessment & Plan  Lab Results   Component Value Date    EGFR 28 09/01/2024    EGFR 35 08/31/2024    EGFR 30 08/30/2024    CREATININE 1.60 (H) 09/01/2024    CREATININE 1.32 (H) 08/31/2024    CREATININE 1.51 (H) 08/30/2024   Plan:  At baseline  PCP f/u    Diabetic gastroparesis associated with type 2 diabetes mellitus  ([facility])  Assessment & Plan  Lab Results   Component Value Date    HGBA1C 7.6 (H) 07/30/2024       Recent Labs     08/31/24  1606 08/31/24 2018 09/01/24  0710 09/01/24  1044   POCGLU 203* 164* 160* 144*       Blood Sugar Average: Last 72 hrs:  (P) 153.625    Plan:  F/u with PCP      Diabetic polyneuropathy associated with type 2 diabetes mellitus ([facility])  Assessment & Plan  Lab Results   Component Value Date    HGBA1C 7.6 (H) 07/30/2024       Recent Labs     08/31/24  1606 08/31/24 2018 09/01/24  0710 09/01/24  1044   POCGLU 203* 164* 160* 144*       Blood Sugar Average: Last 72 hrs:  (P) 153.625    Pt received pain mgmt injection 8/29.    Plan:  While inpatient utilize sliding scale insulin for correction, algorithm two  Continue with home gabapentin.        Medical Problems       Resolved Problems  Date Reviewed: 8/7/2024   None       Discharging Resident: Quita Bueno DO  Discharging Attending: Milli King MD  PCP: Gregory Santoro DO  Admission Date:   Admission Orders (From admission, onward)       Ordered        08/31/24 1345  INPATIENT ADMISSION  Once            08/30/24 1506  Place in Observation  Once                          Discharge Date: 09/01/24    Consultations During Hospital Stay:  None    Procedures Performed:   None    Significant Findings / Test Results:   CT head without  contrast    Result Date: 8/30/2024  Impression: -No acute vascular territory infarct, intracranial hemorrhage or mass effect. -Signal within the ainsely was also noted on prior and may represent artifact versus chronic insult. -Large right mastoid effusion. Workstation performed: [id number]     XR chest 1 view portable    Result Date: 8/30/2024  Impression: Pulmonary vascular congestion. Workstation performed: [id number]       XR chest 1 view portable    Result Date: 8/30/2024  Impression Pulmonary vascular congestion. Workstation performed: [id number]          Incidental Findings:   None    Test Results Pending at Discharge (will require follow up):  None     Outpatient Tests Requested:  None    Complications: None    Reason for Admission: Toxic metabolic encephalopathy presumed secondary to UTI.    Hospital Course:   Tanya Stephen is a 89 y.o. female patient with PMHx of DM2 with gastroparesis and polyneuropathy, HFpEF, overactive bladder, and CKD stage IIIb who originally presented to the hospital on 8/30/2024 due to SOB and disorientation and found to have toxic metabolic encephalopathy secondary to UTI. Received epidural/nerve block lower back day prior to admission.  Per  seemed disoriented and speaking differently, also with dysuria.  CT head negative, large right mastoid effusion.  CXR mild pulmonary vascular congestion.  .  UA positive. On 8/30, patient was started on ceftriaxone and vancomycin.  Has a history of Klebsiella and E faecalis UTI. On 8/31, per  patient has returned to baseline. Urine cultures grew mixed contaminants x4. Patient received 3 days of abx and is to be discharged with po abx for remaining 2 days. She was referred to home PT/OT. Patient stable and discharged home.       Please see above list of diagnoses and related plan for additional information.     Condition at Discharge: good    Discharge Day Visit / Exam:   Subjective: Patient seen and evaluated  "bedside  Vitals: Blood Pressure: 112/59 (09/01/24 0713)  Pulse: 92 (09/01/24 1122)  Temperature: 97.5 °F (36.4 °C) (09/01/24 0713)  Temp Source: Oral (08/31/24 0727)  Respirations: 16 (09/01/24 0713)  Height: 4' 11\" (149.9 cm) (08/30/24 1550)  Weight - Scale: 54.9 kg (121 lb 0.5 oz) (09/01/24 0600)  SpO2: 93 % (09/01/24 1122)    Exam:   Physical Exam  Vitals reviewed.   Constitutional:       Appearance: Normal appearance. She is not ill-appearing, toxic-appearing or diaphoretic.   HENT:      Head: Normocephalic and atraumatic.      Right Ear: External ear normal.      Left Ear: External ear normal.      Nose: Nose normal.      Mouth/Throat:      Mouth: Mucous membranes are moist.      Pharynx: Oropharynx is clear.   Eyes:      Extraocular Movements: Extraocular movements intact.      Conjunctiva/sclera: Conjunctivae normal.      Pupils: Pupils are equal, round, and reactive to light.   Cardiovascular:      Rate and Rhythm: Normal rate and regular rhythm.      Pulses: Normal pulses.      Heart sounds: Normal heart sounds.   Pulmonary:      Effort: Pulmonary effort is normal.      Breath sounds: Normal breath sounds.   Abdominal:      General: Abdomen is flat.      Palpations: Abdomen is soft.   Musculoskeletal:         General: Normal range of motion.      Cervical back: Normal range of motion and neck supple.      Right lower leg: No edema.      Left lower leg: No edema.   Skin:     General: Skin is warm.      Capillary Refill: Capillary refill takes less than 2 seconds.      Coloration: Skin is not jaundiced.      Findings: No erythema.   Neurological:      General: No focal deficit present.      Mental Status: She is alert. Mental status is at baseline.   Psychiatric:         Mood and Affect: Mood normal.         Behavior: Behavior normal.          Discussion with Family: Attempted to update  () via phone. Unable to contact.    Discharge instructions/Information to patient and family:   See " after visit summary for information provided to patient and family.      Provisions for Follow-Up Care:  See after visit summary for information related to follow-up care and any pertinent home health orders.      Mobility at time of Discharge:   Basic Mobility Inpatient Raw Score: 17  JH-HLM Goal: 5: Stand one or more mins  JH-HLM Achieved: 6: Walk 10 steps or more  HLM Goal achieved. Continue to encourage appropriate mobility.     Disposition:   Home    Planned Readmission: No    Discharge Medications:  See after visit summary for reconciled discharge medications provided to patient and/or family.      **Please Note: This note may have been constructed using a voice recognition system**

## 2024-08-31 NOTE — HOSPITAL COURSE
"89-year-old female with PMHx of DM2 with gastroparesis and polyneuropathy, HFpEF, overactive bladder, and CKD stage IIIb that presents with SOB and disorientation since this a.m.  Received epidural/nerve block lower back day prior to admission.  Per  seemed disoriented and speaking differently, also with dysuria.  CT head negative, large right mastoid effusion.  CXR mild pulmonary vascular congestion.  .  UA positive.     8/30 -patient was started on ceftriaxone and vancomycin.  Has a history of Klebsiella and E faecalis UTI.  At time of admission per  the patient seemed to be \"100% better.  \"  8/31 -per  patient has returned to baseline.  Discharge pending on urine cultures.    "

## 2024-08-31 NOTE — ASSESSMENT & PLAN NOTE
Patient found to have large right mastoid effusion on CT head.  Patient does not endorse any pain around the mastoid.  Plan:  Likely incidental finding  Will continue to monitor  Will consider referral to ENT and neuro outpatient for further monitoring, workup.

## 2024-08-31 NOTE — ASSESSMENT & PLAN NOTE
Patient has a history of Klebsiella and E faecalis UTI.  Per  patient usually has about 1-2 UTI per year.  Endorses mild dysuria.  UA demonstrated moderate leukocytosis, moderate bacteria, WBC 20-30, trace ketone, blood. Ucx mixed contaminants x4.     Plan:  Continue cefpodoxime for 2 remaining days starting 9/2  Home PT/OT  F/u with PCP

## 2024-08-31 NOTE — ASSESSMENT & PLAN NOTE
Lab Results   Component Value Date    HGBA1C 7.6 (H) 07/30/2024       Recent Labs     08/30/24  1209 08/30/24 2002 08/31/24  0736 08/31/24  1103   POCGLU 151* 153* 112 142*         Blood Sugar Average: Last 72 hrs:  (P) 139.5    Pt received pain mgmt injection 8/29.    Plan:  While inpatient utilize sliding scale insulin for correction, algorithm two  Continue with home gabapentin.

## 2024-08-31 NOTE — ASSESSMENT & PLAN NOTE
>>ASSESSMENT AND PLAN FOR DIASTOLIC CHF, CHRONIC (HCC) WRITTEN ON 8/31/2024  1:54 PM BY PAKO SMITH MD    Wt Readings from Last 3 Encounters:   08/31/24 55.6 kg (122 lb 9.2 oz)   08/21/24 58.1 kg (128 lb)   08/07/24 55.8 kg (123 lb)     CXR demonstrated pulmonary vascular congestion. Recently hospitalized earlier in August for acute on chronic heart failure. Home Regimen: Lasix 20 mg daily.     Plan:  Resume home lasix dosing   Daily weights while inpatient, low threshold to escalate diuresis

## 2024-08-31 NOTE — ASSESSMENT & PLAN NOTE
>>ASSESSMENT AND PLAN FOR DIASTOLIC CHF, CHRONIC (HCC) WRITTEN ON 9/1/2024  2:05 PM BY DEBBY LORENZO DO    Wt Readings from Last 3 Encounters:   09/01/24 54.9 kg (121 lb 0.5 oz)   08/21/24 58.1 kg (128 lb)   08/07/24 55.8 kg (123 lb)     CXR demonstrated pulmonary vascular congestion. Recently hospitalized earlier in August for acute on chronic heart failure. Home Regimen: Lasix 20 mg daily.     Plan:  Resume home lasix dosing

## 2024-08-31 NOTE — ASSESSMENT & PLAN NOTE
Lab Results   Component Value Date    EGFR 28 09/01/2024    EGFR 35 08/31/2024    EGFR 30 08/30/2024    CREATININE 1.60 (H) 09/01/2024    CREATININE 1.32 (H) 08/31/2024    CREATININE 1.51 (H) 08/30/2024   Plan:  At baseline  PCP f/u

## 2024-08-31 NOTE — ASSESSMENT & PLAN NOTE
Patient found to have large right mastoid effusion on CT head.  Patient does not endorse any pain around the mastoid.  Plan:  Likely incidental finding  F/u with PCP

## 2024-08-31 NOTE — ASSESSMENT & PLAN NOTE
Lab Results   Component Value Date    EGFR 35 08/31/2024    EGFR 30 08/30/2024    EGFR 32 08/17/2024    CREATININE 1.32 (H) 08/31/2024    CREATININE 1.51 (H) 08/30/2024    CREATININE 1.43 (H) 08/17/2024   Plan:  Continue to monitor.  Seems to be at baseline.

## 2024-08-31 NOTE — PLAN OF CARE
Problem: PAIN - ADULT  Goal: Verbalizes/displays adequate comfort level or baseline comfort level  Description: Interventions:  - Encourage patient to monitor pain and request assistance  - Assess pain using appropriate pain scale  - Administer analgesics based on type and severity of pain and evaluate response  - Implement non-pharmacological measures as appropriate and evaluate response  - Consider cultural and social influences on pain and pain management  - Notify physician/advanced practitioner if interventions unsuccessful or patient reports new pain  Outcome: Progressing     Problem: INFECTION - ADULT  Goal: Absence or prevention of progression during hospitalization  Description: INTERVENTIONS:  - Assess and monitor for signs and symptoms of infection  - Monitor lab/diagnostic results  - Monitor all insertion sites, i.e. indwelling lines, tubes, and drains  - Monitor endotracheal if appropriate and nasal secretions for changes in amount and color  - [place] appropriate cooling/warming therapies per order  - Administer medications as ordered  - Instruct and encourage patient and family to use good hand hygiene technique  - Identify and instruct in appropriate isolation precautions for identified infection/condition  Outcome: Progressing  Goal: Absence of fever/infection during neutropenic period  Description: INTERVENTIONS:  - Monitor WBC    Outcome: Progressing     Problem: SAFETY ADULT  Goal: Patient will remain free of falls  Description: INTERVENTIONS:  - Educate patient/family on patient safety including physical limitations  - Instruct patient to call for assistance with activity   - Consult OT/PT to assist with strengthening/mobility   - Keep Call bell within reach  - Keep bed low and locked with side rails adjusted as appropriate  - Keep care items and personal belongings within reach  - Initiate and maintain comfort rounds  - Make Fall Risk Sign visible to staff  - Offer Toileting every 2 Hours,  in advance of need  - Initiate/Maintain 2 alarm  - Obtain necessary fall risk management equipment: 2  - Apply yellow socks and bracelet for high fall risk patients  - Consider moving patient to room near nurses station  Outcome: Progressing  Goal: Maintain or return to baseline ADL function  Description: INTERVENTIONS:  -  Assess patient's ability to carry out ADLs; assess patient's baseline for ADL function and identify physical deficits which impact ability to perform ADLs (bathing, care of mouth/teeth, toileting, grooming, dressing, etc.)  - Assess/evaluate cause of self-care deficits   - Assess range of motion  - Assess patient's mobility; develop plan if impaired  - Assess patient's need for assistive devices and provide as appropriate  - Encourage maximum independence but intervene and supervise when necessary  - Involve family in performance of ADLs  - Assess for home care needs following discharge   - Consider OT consult to assist with ADL evaluation and planning for discharge  - Provide patient education as appropriate  Outcome: Progressing     Problem: DISCHARGE PLANNING  Goal: Discharge to home or other facility with appropriate resources  Description: INTERVENTIONS:  - Identify barriers to discharge w/patient and caregiver  - Arrange for needed discharge resources and transportation as appropriate  - Identify discharge learning needs (meds, wound care, etc.)  - Arrange for interpretive services to assist at discharge as needed  - Refer to Case Management Department for coordinating discharge planning if the patient needs post-hospital services based on physician/advanced practitioner order or complex needs related to functional status, cognitive ability, or social support system  Outcome: Progressing     Problem: Knowledge Deficit  Goal: Patient/family/caregiver demonstrates understanding of disease process, treatment plan, medications, and discharge instructions  Description: Complete learning  assessment and assess knowledge base.  Interventions:  - Provide teaching at level of understanding  - Provide teaching via preferred learning methods  Outcome: Progressing     Problem: Prexisting or High Potential for Compromised Skin Integrity  Goal: Skin integrity is maintained or improved  Description: INTERVENTIONS:  - Identify patients at risk for skin breakdown  - Assess and monitor skin integrity  - Assess and monitor nutrition and hydration status  - Monitor labs   - Assess for incontinence   - Turn and reposition patient  - Assist with mobility/ambulation  - Relieve pressure over bony prominences  - Avoid friction and shearing  - Provide appropriate hygiene as needed including keeping skin clean and dry  - Evaluate need for skin moisturizer/barrier cream  - Collaborate with interdisciplinary team   - Patient/family teaching  - Consider wound care consult   Outcome: Progressing

## 2024-08-31 NOTE — ASSESSMENT & PLAN NOTE
Lab Results   Component Value Date    HGBA1C 7.6 (H) 07/30/2024       Recent Labs     08/30/24  1209 08/30/24 2002 08/31/24  0736 08/31/24  1103   POCGLU 151* 153* 112 142*         Blood Sugar Average: Last 72 hrs:  (P) 139.5    Plan:  Will start on insulin sliding scale.  Per patient does not take any oral medications for her diabetes.

## 2024-09-01 VITALS
HEART RATE: 92 BPM | WEIGHT: 121.03 LBS | SYSTOLIC BLOOD PRESSURE: 112 MMHG | HEIGHT: 59 IN | BODY MASS INDEX: 24.4 KG/M2 | OXYGEN SATURATION: 93 % | DIASTOLIC BLOOD PRESSURE: 59 MMHG | RESPIRATION RATE: 16 BRPM | TEMPERATURE: 97.5 F

## 2024-09-01 LAB
ANION GAP SERPL CALCULATED.3IONS-SCNC: 5 MMOL/L (ref 4–13)
BACTERIA UR CULT: NORMAL
BASOPHILS # BLD AUTO: 0.03 THOUSANDS/ÂΜL (ref 0–0.1)
BASOPHILS NFR BLD AUTO: 1 % (ref 0–1)
BUN SERPL-MCNC: 33 MG/DL (ref 5–25)
CALCIUM SERPL-MCNC: 8.6 MG/DL (ref 8.4–10.2)
CHLORIDE SERPL-SCNC: 102 MMOL/L (ref 96–108)
CO2 SERPL-SCNC: 30 MMOL/L (ref 21–32)
CREAT SERPL-MCNC: 1.6 MG/DL (ref 0.6–1.3)
EOSINOPHIL # BLD AUTO: 0.04 THOUSAND/ÂΜL (ref 0–0.61)
EOSINOPHIL NFR BLD AUTO: 1 % (ref 0–6)
ERYTHROCYTE [DISTWIDTH] IN BLOOD BY AUTOMATED COUNT: 14.7 % (ref 11.6–15.1)
GFR SERPL CREATININE-BSD FRML MDRD: 28 ML/MIN/1.73SQ M
GLUCOSE SERPL-MCNC: 144 MG/DL (ref 65–140)
GLUCOSE SERPL-MCNC: 160 MG/DL (ref 65–140)
GLUCOSE SERPL-MCNC: 161 MG/DL (ref 65–140)
HCT VFR BLD AUTO: 34.8 % (ref 34.8–46.1)
HGB BLD-MCNC: 11 G/DL (ref 11.5–15.4)
IMM GRANULOCYTES # BLD AUTO: 0.04 THOUSAND/UL (ref 0–0.2)
IMM GRANULOCYTES NFR BLD AUTO: 1 % (ref 0–2)
LYMPHOCYTES # BLD AUTO: 1.48 THOUSANDS/ÂΜL (ref 0.6–4.47)
LYMPHOCYTES NFR BLD AUTO: 23 % (ref 14–44)
MAGNESIUM SERPL-MCNC: 2 MG/DL (ref 1.9–2.7)
MCH RBC QN AUTO: 29.4 PG (ref 26.8–34.3)
MCHC RBC AUTO-ENTMCNC: 31.6 G/DL (ref 31.4–37.4)
MCV RBC AUTO: 93 FL (ref 82–98)
MONOCYTES # BLD AUTO: 0.79 THOUSAND/ÂΜL (ref 0.17–1.22)
MONOCYTES NFR BLD AUTO: 12 % (ref 4–12)
NEUTROPHILS # BLD AUTO: 4.1 THOUSANDS/ÂΜL (ref 1.85–7.62)
NEUTS SEG NFR BLD AUTO: 62 % (ref 43–75)
NRBC BLD AUTO-RTO: 0 /100 WBCS
PLATELET # BLD AUTO: 136 THOUSANDS/UL (ref 149–390)
PMV BLD AUTO: 11.3 FL (ref 8.9–12.7)
POTASSIUM SERPL-SCNC: 4.1 MMOL/L (ref 3.5–5.3)
RBC # BLD AUTO: 3.74 MILLION/UL (ref 3.81–5.12)
SODIUM SERPL-SCNC: 137 MMOL/L (ref 135–147)
WBC # BLD AUTO: 6.48 THOUSAND/UL (ref 4.31–10.16)

## 2024-09-01 PROCEDURE — 83735 ASSAY OF MAGNESIUM: CPT

## 2024-09-01 PROCEDURE — 97163 PT EVAL HIGH COMPLEX 45 MIN: CPT

## 2024-09-01 PROCEDURE — 99239 HOSP IP/OBS DSCHRG MGMT >30: CPT | Performed by: INTERNAL MEDICINE

## 2024-09-01 PROCEDURE — 82948 REAGENT STRIP/BLOOD GLUCOSE: CPT

## 2024-09-01 PROCEDURE — 80048 BASIC METABOLIC PNL TOTAL CA: CPT

## 2024-09-01 PROCEDURE — 85025 COMPLETE CBC W/AUTO DIFF WBC: CPT

## 2024-09-01 RX ORDER — PREGABALIN 75 MG/1
100 CAPSULE ORAL 3 TIMES DAILY
Status: CANCELLED | OUTPATIENT
Start: 2024-09-01 | End: 2024-09-11

## 2024-09-01 RX ORDER — DULOXETIN HYDROCHLORIDE 30 MG/1
30 CAPSULE, DELAYED RELEASE ORAL DAILY
Qty: 30 CAPSULE | Refills: 0 | Status: SHIPPED | OUTPATIENT
Start: 2024-09-02 | End: 2024-10-02

## 2024-09-01 RX ORDER — CEFPODOXIME PROXETIL 200 MG/1
200 TABLET, FILM COATED ORAL DAILY
Status: DISCONTINUED | OUTPATIENT
Start: 2024-09-02 | End: 2024-09-01 | Stop reason: HOSPADM

## 2024-09-01 RX ORDER — VANCOMYCIN HYDROCHLORIDE 500 MG/100ML
10 INJECTION, SOLUTION INTRAVENOUS ONCE AS NEEDED
Status: DISCONTINUED | OUTPATIENT
Start: 2024-09-02 | End: 2024-09-01

## 2024-09-01 RX ORDER — CEFPODOXIME PROXETIL 200 MG/1
200 TABLET, FILM COATED ORAL DAILY
Qty: 3 TABLET | Refills: 0 | Status: SHIPPED | OUTPATIENT
Start: 2024-09-02 | End: 2024-09-05

## 2024-09-01 RX ADMIN — HYDROXYCHLOROQUINE SULFATE 200 MG: 200 TABLET ORAL at 08:29

## 2024-09-01 RX ADMIN — PREGABALIN 100 MG: 100 CAPSULE ORAL at 08:27

## 2024-09-01 RX ADMIN — Medication 400 MG: at 08:27

## 2024-09-01 RX ADMIN — FUROSEMIDE 20 MG: 20 TABLET ORAL at 08:27

## 2024-09-01 RX ADMIN — ATORVASTATIN CALCIUM 10 MG: 10 TABLET, FILM COATED ORAL at 08:27

## 2024-09-01 RX ADMIN — INSULIN LISPRO 1 UNITS: 100 INJECTION, SOLUTION INTRAVENOUS; SUBCUTANEOUS at 08:26

## 2024-09-01 RX ADMIN — SENNOSIDES AND DOCUSATE SODIUM 1 TABLET: 8.6; 5 TABLET ORAL at 08:27

## 2024-09-01 RX ADMIN — ENOXAPARIN SODIUM 30 MG: 30 INJECTION SUBCUTANEOUS at 08:27

## 2024-09-01 RX ADMIN — Medication 1000 UNITS: at 08:27

## 2024-09-01 RX ADMIN — OXYBUTYNIN CHLORIDE 10 MG: 5 TABLET, EXTENDED RELEASE ORAL at 08:27

## 2024-09-01 RX ADMIN — CEFTRIAXONE SODIUM 1000 MG: 10 INJECTION, POWDER, FOR SOLUTION INTRAVENOUS at 12:29

## 2024-09-01 RX ADMIN — ASPIRIN 81 MG 81 MG: 81 TABLET ORAL at 08:27

## 2024-09-01 RX ADMIN — DULOXETINE HYDROCHLORIDE 30 MG: 30 CAPSULE, DELAYED RELEASE ORAL at 08:27

## 2024-09-01 RX ADMIN — PANTOPRAZOLE SODIUM 20 MG: 20 TABLET, DELAYED RELEASE ORAL at 06:12

## 2024-09-01 NOTE — DISCHARGE INSTR - AVS FIRST PAGE
Dear Tanya Setphen,     It was our pleasure to care for you here at [facility].  It is our hope that we were always able to exceed the expected standards for your care during your stay.  You were hospitalized due to toxic metabolic encephalopathy.  You were cared for on the fourth floor by Quita Bueno DO under the service of Milli King MD with the [facility] who covers for your primary care physician (PCP), Gregory Santoro DO, while you were hospitalized.  If you have any questions or concerns related to this hospitalization, you may contact us at .  For follow up as well as any medication refills, we recommend that you follow up with your primary care physician.  A registered nurse will reach out to you by phone within a few days after your discharge to answer any additional questions that you may have after going home.  However, at this time we provide for you here, the most important instructions / recommendations at discharge:     Notable Medication Adjustments -   Start taking the antibiotic cefpodoxime 200 mg daily for 3 days starting on 9/2  Testing Required after Discharge -   None  Important follow up information -   Please follow-up with your PCP within 1 week  Please follow-up with your physical therapy and Occupational Therapy  Other Instructions -   None  Please review this entire after visit summary as additional general instructions including medication list, appointments, activity, diet, any pertinent wound care, and other additional recommendations from your care team that may be provided for you.      Sincerely,     Quita Bueno DO

## 2024-09-01 NOTE — PLAN OF CARE
Problem: PAIN - ADULT  Goal: Verbalizes/displays adequate comfort level or baseline comfort level  Description: Interventions:  - Encourage patient to monitor pain and request assistance  - Assess pain using appropriate pain scale  - Administer analgesics based on type and severity of pain and evaluate response  - Implement non-pharmacological measures as appropriate and evaluate response  - Consider cultural and social influences on pain and pain management  - Notify physician/advanced practitioner if interventions unsuccessful or patient reports new pain  Outcome: Progressing     Problem: INFECTION - ADULT  Goal: Absence or prevention of progression during hospitalization  Description: INTERVENTIONS:  - Assess and monitor for signs and symptoms of infection  - Monitor lab/diagnostic results  - Monitor all insertion sites, i.e. indwelling lines, tubes, and drains  - Monitor endotracheal if appropriate and nasal secretions for changes in amount and color  - [place] appropriate cooling/warming therapies per order  - Administer medications as ordered  - Instruct and encourage patient and family to use good hand hygiene technique  - Identify and instruct in appropriate isolation precautions for identified infection/condition  Outcome: Progressing  Goal: Absence of fever/infection during neutropenic period  Description: INTERVENTIONS:  - Monitor WBC    Outcome: Progressing     Problem: SAFETY ADULT  Goal: Patient will remain free of falls  Description: INTERVENTIONS:  - Educate patient/family on patient safety including physical limitations  - Instruct patient to call for assistance with activity   - Consult OT/PT to assist with strengthening/mobility   - Keep Call bell within reach  - Keep bed low and locked with side rails adjusted as appropriate  - Keep care items and personal belongings within reach  - Initiate and maintain comfort rounds  - Make Fall Risk Sign visible to staff  - Apply yellow socks and bracelet  for high fall risk patients  - Consider moving patient to room near nurses station  Outcome: Progressing  Goal: Maintain or return to baseline ADL function  Description: INTERVENTIONS:  -  Assess patient's ability to carry out ADLs; assess patient's baseline for ADL function and identify physical deficits which impact ability to perform ADLs (bathing, care of mouth/teeth, toileting, grooming, dressing, etc.)  - Assess/evaluate cause of self-care deficits   - Assess range of motion  - Assess patient's mobility; develop plan if impaired  - Assess patient's need for assistive devices and provide as appropriate  - Encourage maximum independence but intervene and supervise when necessary  - Involve family in performance of ADLs  - Assess for home care needs following discharge   - Consider OT consult to assist with ADL evaluation and planning for discharge  - Provide patient education as appropriate  Outcome: Progressing     Problem: DISCHARGE PLANNING  Goal: Discharge to home or other facility with appropriate resources  Description: INTERVENTIONS:  - Identify barriers to discharge w/patient and caregiver  - Arrange for needed discharge resources and transportation as appropriate  - Identify discharge learning needs (meds, wound care, etc.)  - Arrange for interpretive services to assist at discharge as needed  - Refer to Case Management Department for coordinating discharge planning if the patient needs post-hospital services based on physician/advanced practitioner order or complex needs related to functional status, cognitive ability, or social support system  Outcome: Progressing     Problem: Knowledge Deficit  Goal: Patient/family/caregiver demonstrates understanding of disease process, treatment plan, medications, and discharge instructions  Description: Complete learning assessment and assess knowledge base.  Interventions:  - Provide teaching at level of understanding  - Provide teaching via preferred learning  methods  Outcome: Progressing     Problem: Prexisting or High Potential for Compromised Skin Integrity  Goal: Skin integrity is maintained or improved  Description: INTERVENTIONS:  - Identify patients at risk for skin breakdown  - Assess and monitor skin integrity  - Assess and monitor nutrition and hydration status  - Monitor labs   - Assess for incontinence   - Turn and reposition patient  - Assist with mobility/ambulation  - Relieve pressure over bony prominences  - Avoid friction and shearing  - Provide appropriate hygiene as needed including keeping skin clean and dry  - Evaluate need for skin moisturizer/barrier cream  - Collaborate with interdisciplinary team   - Patient/family teaching  - Consider wound care consult   Outcome: Progressing

## 2024-09-01 NOTE — PLAN OF CARE
"  Problem: PHYSICAL THERAPY ADULT  Goal: Performs mobility at highest level of function for planned discharge setting.  See evaluation for individualized goals.  Description: Treatment/Interventions: Functional transfer training, LE strengthening/ROM, Elevations, Cognitive reorientation, Patient/family training, Equipment eval/education, Bed mobility, Gait training, Endurance training, Therapeutic exercise, Spoke to nursing, Spoke to case management, Spoke to MD, Family  Equipment Recommended: Walker       See flowsheet documentation for full assessment, interventions and recommendations.  9/1/2024 1345 by Jessica Lennon PT  Note: Prognosis: Fair  Problem List: Decreased strength, Decreased endurance, Impaired balance, Decreased mobility, Decreased coordination, Decreased cognition, Impaired judgement, Decreased safety awareness, Impaired hearing, Decreased skin integrity (gait deviations)  Assessment: Pt is a 89 y.o. female seen for PT evaluation s/p admit to [facility] on 8/30/2024 w/ Toxic metabolic encephalopathy, abd CT head, UTI, heart failure.  Order placed for PT.      Prior to last admission in July 2024: Pt lived w/ spouse on one story home, 3STE w/BUE support (rail and \"gate\"). Spouse reports pt primarily uses rollator walker at home, and did not need physical A for mobility other than S for DAVID. Pt did get HHPT between admissions. Upon evaluation: Pt needed no physical A for bed mobility and transfers, but min A for ambulation     Pt's clinical presentation is currently unstable/unpredictable given the functional mobility deficits above, especially (but not limited to) weakness, gait deviations, and decreased functional mobility tolerance, and combined with medical complications including abnormal renal lab values, abnormal H&H, abnormal platelets, and readmission to hospital.  Pt IS NOT at her mobility baseline but may be adequate for DC with only intermittent min needed for mobility.  She is " at risk for falls based on hx of falls, impaired balance, and decreased cognition.       During this admission, pt would benefit from continued skilled inpatient PT in the acute care setting in order to address deficits as defined above to maximize function and mobility.      Recommendations:     From a PT standpoint, recommend next several sessions focus on continued gait trials w/rolling walker, additional stair training, therex.  Barriers to Discharge: Inaccessible home environment     Rehab Resource Intensity Level, PT: III (Minimum Resource Intensity)    See flowsheet documentation for full assessment.

## 2024-09-01 NOTE — PHYSICAL THERAPY NOTE
PHYSICAL THERAPY EVALUATION  NAME:  Tanya Stephen  DATE: 09/01/24    AGE:   89 y.o.  Mrn:   [id number]  Principal problem: Principal Problem:    Toxic metabolic encephalopathy  Active Problems:    Diabetic polyneuropathy associated with type 2 diabetes mellitus (HCC)    Diabetic gastroparesis associated with type 2 diabetes mellitus  (HCC)    Stage 3b chronic kidney disease (HCC)    Acute on chronic heart failure with preserved ejection fraction (HFpEF) (HCC)    OAB (overactive bladder)    UTI (urinary tract infection)    Chronic diastolic (congestive) heart failure (HCC)    Abnormal CT scan of head      Vitals:    09/01/24 0300 09/01/24 0600 09/01/24 0713 09/01/24 1122   BP:   112/59    BP Location:       Pulse:   84 92   Resp:   16    Temp:   97.5 °F (36.4 °C)    TempSrc:       SpO2:   92% 93%   Weight: 54.9 kg (121 lb 0.5 oz) 54.9 kg (121 lb 0.5 oz)     Height:           Length Of Stay: 1  Performed at least 2 patient identifiers during session: Name and Epic photo  PHYSICAL THERAPY EVALUATION :    09/01/24 1132   PT Last Visit   PT Visit Date 09/01/24   Note Type   Note type Evaluation   Pain Assessment   Pain Assessment Tool 0-10   Pain Score No Pain   Restrictions/Precautions   Weight Bearing Precautions Per Order No   Other Precautions Bed Alarm;Chair Alarm;Cognitive;Fall Risk;Hard of hearing   Home Living   Type of Home House   Home Layout One level  (3STE)   Home Equipment Walker;Cane  (transport WC. Has rolling walker and rollator walker--prefers the latter device. Per prior PT eval: PTA pt was amb mod I w/ RW w/in house (but walker does not fit into bathroom), uses manual WC vs SPC in community)   Additional Comments Spouse reports pt tends to use the rollator walker   Prior Function   Level of [place] Needs assistance with IADLS;Independent with functional mobility  (At true baseline, Pt/spouse reports pt does not need physical A for bed mobility, transfers, amb w/walker; has S for stairs.)    Lives With Spouse   Receives Help From Home health;Family  (Local supportive daughter and niece visit often)   IADLs Family/Friend/Other provides meals;Family/Friend/Other provides medication management   Falls in the last 6 months (S)  0  (Per spouse, pt has NOT had any falls in last 6 months. However per last admission PT mara 8/1/2024: pt had > 10 falls in last 6 mos, up to 3-4/month)   General   Additional Pertinent History This is pt's third ED trip since 7/8/2024   Family/Caregiver Present Yes  (spouse)   Cognition   Overall Cognitive Status WFL   Arousal/Participation Alert   Orientation Level Oriented to person   Memory Decreased recall of precautions;Decreased short term memory;Decreased recall of recent events   Following Commands Follows one step commands with increased time or repetition   Subjective   Subjective Pt pleasant and c   RLE Assessment   RLE Assessment   (bruise-like appearances throughout BLEs which spouse reports is chronic)   Strength RLE   R Hip Flexion 4/5   R Knee Extension 4/5   R Ankle Dorsiflexion 4/5   Strength LLE   L Hip Flexion 4/5   L Knee Extension 4-/5   L Ankle Dorsiflexion 4/5   Vision-Basic Assessment   Current Vision Wears glasses all the time   Light Touch   RLE Light Touch Grossly intact  (per pt)   LLE Light Touch Grossly intact  (per pt)   Bed Mobility   Supine to Sit 5  Supervision   Additional items Assist x 1;Increased time required;Verbal cues;LE management;Bedrails;HOB elevated  (R egress w/ elevated HOB)   Sit to Supine Unable to assess   Transfers   Sit to Stand 5  Supervision   Additional items Assist x 1;Increased time required;Verbal cues;Armrests   Stand to Sit 5  Supervision   Additional items Assist x 1;Increased time required;Verbal cues;Armrests   Toilet transfer 5  Supervision   Additional items Increased time required  (toilet, grab bar)   Ambulation/Elevation   Gait pattern Excessively slow;Step through pattern  (increased walker advancement, no  "uncorrected losses of balance)   Gait Assistance 4  Minimal assist  (steayding A <25% of time, otherwise S)   Additional items Assist x 1;Verbal cues   Assistive Device Rolling walker   Distance 20'+5'+10'+15'   Ambulation/Elevation Additional Comments Performed 7\" curb step w/BUE support w/ steadying A to ascend and min A to descend.   Balance   Static Sitting Good   Static Standing Fair -   Ambulatory Fair -   Endurance Deficit   Endurance Deficit Yes   Endurance Deficit Description limited amb pace and endurance, but Spo2 on RA @90% or above w/ rest and activity spot checks.   Activity Tolerance   Activity Tolerance Patient limited by fatigue   Medical Staff Made Aware messaged case mangement; Bernice zhang from [facility]   Nurse Made Aware message/spoke to Liliana from nursing     Assessment:   Pt is a 89 y.o. female seen for PT evaluation s/p admit to [facility] on 8/30/2024 w/ Toxic metabolic encephalopathy, abd CT head, UTI, heart failure.  Order placed for PT.      Prior to last admission in July 2024: Pt lived w/ spouse on one story home, 3STE w/BUE support (rail and \"gate\"). Spouse reports pt primarily uses rollator walker at home, and did not need physical A for mobility other than S for DAVID. Pt did get HHPT between admissions. Upon evaluation: Pt needed no physical A for bed mobility and transfers, but min A for ambulation     Pt's clinical presentation is currently unstable/unpredictable given the functional mobility deficits above, especially (but not limited to) weakness, gait deviations, and decreased functional mobility tolerance, and combined with medical complications including abnormal renal lab values, abnormal H&H, abnormal platelets, and readmission to hospital.  Pt IS NOT at her mobility baseline but may be adequate for DC with only intermittent min needed for mobility.  She is at risk for falls based on hx of falls, impaired balance, and decreased cognition.       During this admission, " pt would benefit from continued skilled inpatient PT in the acute care setting in order to address deficits as defined above to maximize function and mobility.      Recommendations:    From a PT standpoint, recommend next several sessions focus on continued gait trials w/rolling walker, additional stair training, therex.     Prognosis Fair   Problem List Decreased strength;Decreased endurance;Impaired balance;Decreased mobility;Decreased coordination;Decreased cognition;Impaired judgement;Decreased safety awareness;Impaired hearing;Decreased skin integrity  (gait deviations)   Barriers to Discharge Inaccessible home environment   Goals   Patient Goals to go home   STG Expiration Date 09/11/24   Short Term Goal #1 Goals: Pt will: Perform rolling  and supine<>sit bed mobility tasks with Supervision to prepare for transfers and reposition in bed. Perform transfers with Supervision to promote proper hand placement and approach. Perform ambulation with LRAD for at least 50' with Supervision to increase Indep in home environment and promote proper use of assistive device. Perform 3 stairs w/ railing +/- DME and w/no more than min A to decrease burden of care and return to home with DAVID.   PT Treatment Day 1   Plan   Treatment/Interventions Functional transfer training;LE strengthening/ROM;Elevations;Cognitive reorientation;Patient/family training;Equipment eval/education;Bed mobility;Gait training;Endurance training;Therapeutic exercise;Spoke to nursing;Spoke to case management;Spoke to MD;Family   PT Frequency 3-5x/wk   Discharge Recommendation   Rehab Resource Intensity Level, PT III (Minimum Resource Intensity)   Equipment Recommended Walker   Additional Comments 2 Personal factors affecting pt at time of IE include: steps to enter environment, limited home support, advanced age, past experience, inability to ambulate household distances, inability to navigate community distances, limited insight into impairments, and  "? Falls x 6 months .   -PAC Basic Mobility Inpatient   Turning in Flat Bed Without Bedrails 3   Lying on Back to Sitting on Edge of Flat Bed Without Bedrails 3   Moving Bed to Chair 3   Standing Up From Chair Using Arms 3   Walk in Room 3   Climb 3-5 Stairs With Railing 2   Basic Mobility Inpatient Raw Score 17   Basic Mobility Standardized Score 39.67   [facility] Highest Level Of Mobility   -HL Goal 5: Stand one or more mins   -HL Achieved 6: Walk 10 steps or more   End of Consult   Patient Position at End of Consult All needs within reach;Bed/Chair alarm activated;Bedside chair   (Please find full objective findings from PT assessment regarding body systems outlined above).     The patient's -[facility] Basic Mobility Inpatient Short Form Raw Score is 17. A Raw score of greater than 16 suggests the patient may benefit from discharge to home, which DOES coincide with CURRENT above PT recommendations.     However please refer to therapist recommendation for discharge planning given other factors that may influence destination.     Adapted from Brigido CARNES, Trinity J, Jarrett J, Billie J. Association of -[facility] “6-Clicks” Basic Mobility and Daily Activity Scores With Discharge Destination. Physical Therapy, 2021;101:1-9. DOI: 10.1093/ptj/[email or web address]    Portions of the record may have been created with voice recognition software.  Occasional wrong word or \"sound a like\" substitutions may have occurred due to the inherent limitations of voice recognition software.  Read the chart carefully and recognize, using context, where substitutions have occurred    "

## 2024-09-01 NOTE — PROGRESS NOTES
Tanya Stephen is a 89 y.o. female who is currently ordered Vancomycin IV with management by the Pharmacy Consult service.  Relevant clinical data and objective / subjective history reviewed.  Vancomycin Assessment:  Indication and Goal AUC/Trough: Other, UTI, -600, trough >10  Clinical Status: stable  Micro:     Renal Function:  SCr: 1.32 mg/dL  CrCl: 21 mL/min  Renal replacement: Not on dialysis  Days of Therapy: 2  Current Dose: 750 mg IV once as needed for random level < or = 15  Vancomycin Plan:  New Dosin mg IV every 24 hours  Estimated AUC: 488 mcg*hr/mL  Estimated Trough: 16 mcg/mL  Next Level:  @ 0600  Renal Function Monitoring: Daily BMP and UOP  Pharmacy will continue to follow closely for s/sx of nephrotoxicity, infusion reactions and appropriateness of therapy.  BMP and CBC will be ordered per protocol. We will continue to follow the patient’s culture results and clinical progress daily.    Mallory Powers, Pharmacist

## 2024-09-01 NOTE — CASE MANAGEMENT
Case Management Discharge Planning Note    Patient name Tanya Stephen  Location W /W -01 MRN [id number]  : 1934 Date 2024       Current Admission Date: 2024  Current Admission Diagnosis:Toxic metabolic encephalopathy   Patient Active Problem List    Diagnosis Date Noted Date Diagnosed    Chronic diastolic (congestive) heart failure (HCC) 2024     Abnormal CT scan of head 2024     Nonrheumatic aortic valve stenosis 2024     Pleural effusion 2024     Parenchymal renal hypertension 2024     Chronic kidney disease, stage IV (severe) ([facility]) 2024     Diabetic nephropathy associated with type 2 diabetes mellitus ([facility]) 2024     Incontinence of feces with fecal urgency 2023     Elevated liver enzymes 2023     Gastroesophageal reflux disease without esophagitis 2023     Dysphagia 2023     Raynaud's phenomenon without gangrene 2023     UTI (urinary tract infection) 2023     Elevated LFTs 2023     Ambulatory dysfunction 2023     SI (sacroiliac) joint dysfunction 10/18/2022     Spinal stenosis of lumbar region with neurogenic claudication 2022     Chronic pain syndrome 2022     Dyslipidemia 2022     Hypertension 2022     Toxic metabolic encephalopathy 2022     Cellulitis 2022     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[facility]) 2022     Chest pain 2022     Leukocytosis 2022     Acute on chronic heart failure with preserved ejection fraction (HFpEF) ([facility]) 2022     OAB (overactive bladder) 2022     Post zoster neuralgia 2021     Primary generalized (osteo)arthritis 2021     Seronegative arthropathy of multiple sites ([facility]) 2021     Senile osteoporosis 2021     Lumbar spondylosis 2021     Diabetic polyneuropathy associated with type 2 diabetes mellitus ([facility]) 2021     Diabetic gastroparesis associated  with type 2 diabetes mellitus  (HCC) 11/19/2021     Irritable bowel syndrome with diarrhea 11/19/2021     Stage 3b chronic kidney disease (HCC) 11/19/2021     Traumatic injury of sacrum 11/19/2021     Tendinitis of left rotator cuff 11/19/2021     Abnormality of gait due to impairment of balance 11/19/2021     Sicca syndrome (HCC) 11/19/2021     Sinobronchitis 02/14/2020     Iron deficiency anemia secondary to inadequate dietary iron intake 12/23/2019     Urinary frequency 02/13/2018     Hypomagnesemia 12/22/2016     DM (diabetes mellitus), type 2 (HCC) 12/22/2016     Anemia 12/21/2016     Shortness of breath 12/21/2016       LOS (days): 1  Geometric Mean LOS (GMLOS) (days):   Days to GMLOS:     OBJECTIVE:  Risk of Unplanned Readmission Score: 22.3         Current admission status: Inpatient   Preferred Pharmacy:   [facility] [address] [place], [place] [address]  Phone: [phone] Fax: [phone]    [facility] [place] [address] [address]  Phone: [phone] Fax: [phone]    [facility] [address]  [address]  [facility] [address]  Phone: [phone] Fax: [phone]    Primary Care Provider: Gregory Santoro DO    Primary Insurance: MEDICARE  Secondary Insurance: [employer]URANCE    DISCHARGE DETAILS:    Discharge planning discussed with:: patient and spouse  Freedom of Choice: Yes  Comments - Freedom of Choice: family interested in referral for [facility] for home PT and OT through [facility]. Cm submitted referral into [facility]. Cm advised family that they have not accepted patient yet but Cm will follow up with them if they cannot continue to work with patietn  CM contacted family/caregiver?: Yes  Were Treatment Team discharge recommendations reviewed with patient/caregiver?: Yes  Did patient/caregiver verbalize  understanding of patient care needs?: Yes  Were patient/caregiver advised of the risks associated with not following Treatment Team discharge recommendations?: Yes    Contacts  Patient Contacts: Weston Stephen (Spouse)  Relationship to Patient:: Family  Contact Method: In Person  Reason/Outcome: Discharge Planning    Requested Home Health Care         Is the patient interested in [facility] at discharge?: Yes  Home Health Discipline requested:: Occupational Therapy, Physical Therapy  Home Health Agency Name:: [facility]  HHA External Referral Reason (only applicable if external HHA name selected): Patient has established relationship with provider  Home Health Follow-Up Provider:: PCP  Home Health Services Needed:: Strengthening/Theraputic Exercises to Improve Function, Gait/ADL Training, Evaluate Functional Status and Safety  Homebound Criteria Met:: Uses an Assist Device (i.e. cane, walker, etc), Requires the Assistance of Another Person for Safe Ambulation or to Leave the Home  Supporting Clincal Findings:: Fatigues Easliy in Short Distances, Limited Endurance    DME Referral Provided  Referral made for DME?: No    Other Referral/Resources/Interventions Provided:  Interventions: HHC  Referral Comments: [facility] referral to [facility] [place] referral in [facility] if they cannot accept

## 2024-09-01 NOTE — PROGRESS NOTES
Tanya Stephen is a 89 y.o. female who is currently ordered Vancomycin IV with management by the Pharmacy Consult service.  Relevant clinical data and objective / subjective history reviewed.  Vancomycin Assessment:  Indication and Goal AUC/Trough: Other, UTI, -600, trough >10  Clinical Status: stable  Micro:     Renal Function:  SCr: 1.6 mg/dL  CrCl: 15.2 mL/min  Renal replacement: Not on dialysis  Days of Therapy: 3  Current Dose: 500 mg eveyr 24 hours  Vancomycin Plan:  New Dosin mg once as needed for vancomycin level < 15  Next Level:  @ 0600  Renal Function Monitoring: Daily BMP and UOP  Pharmacy will continue to follow closely for s/sx of nephrotoxicity, infusion reactions and appropriateness of therapy.  BMP and CBC will be ordered per protocol. We will continue to follow the patient’s culture results and clinical progress daily.    Geetha Young, Pharmacist

## 2024-09-01 NOTE — CASE MANAGEMENT
Case Management Progress Note    Patient name Tanya Stephen  Location W /W -01 MRN [id number]  : 1934 Date 2024       LOS (days): 1  Geometric Mean LOS (GMLOS) (days):   Days to GMLOS:        OBJECTIVE:        Current admission status: Inpatient  Preferred Pharmacy:   [facility] [address] [place] [address] [address] [address]  Phone: [phone] Fax: [phone]    [facility] [place] [address] [address]  Phone: [phone] Fax: [phone]    [facility] [address]  [address]  [facility] [address]  Phone: [phone] Fax: [phone]    Primary Care Provider: Gregory Santoro DO    Primary Insurance: MEDICARE  Secondary Insurance: [employer] INSURANCE    PROGRESS NOTE:    Cm advised patient interested in HHC for home PT upon dc. CM submitted blanket referral in [facility].

## 2024-09-04 ENCOUNTER — OFFICE VISIT (OUTPATIENT)
Age: 89
End: 2024-09-04
Payer: MEDICARE

## 2024-09-04 VITALS
TEMPERATURE: 98.7 F | HEIGHT: 59 IN | WEIGHT: 123.6 LBS | SYSTOLIC BLOOD PRESSURE: 120 MMHG | HEART RATE: 64 BPM | DIASTOLIC BLOOD PRESSURE: 60 MMHG | BODY MASS INDEX: 24.92 KG/M2

## 2024-09-04 DIAGNOSIS — R13.19 ESOPHAGEAL DYSPHAGIA: Chronic | ICD-10-CM

## 2024-09-04 DIAGNOSIS — N32.81 OAB (OVERACTIVE BLADDER): Chronic | ICD-10-CM

## 2024-09-04 DIAGNOSIS — K58.0 IRRITABLE BOWEL SYNDROME WITH DIARRHEA: Chronic | ICD-10-CM

## 2024-09-04 DIAGNOSIS — K21.9 GASTROESOPHAGEAL REFLUX DISEASE WITHOUT ESOPHAGITIS: Chronic | ICD-10-CM

## 2024-09-04 DIAGNOSIS — G89.4 CHRONIC PAIN SYNDROME: ICD-10-CM

## 2024-09-04 DIAGNOSIS — G31.84 MILD COGNITIVE IMPAIRMENT: Primary | Chronic | ICD-10-CM

## 2024-09-04 DIAGNOSIS — M06.09 SERONEGATIVE ARTHROPATHY OF MULTIPLE SITES (HCC): Chronic | ICD-10-CM

## 2024-09-04 DIAGNOSIS — M48.062 SPINAL STENOSIS OF LUMBAR REGION WITH NEUROGENIC CLAUDICATION: Chronic | ICD-10-CM

## 2024-09-04 DIAGNOSIS — E11.22 TYPE 2 DIABETES MELLITUS WITH STAGE 3B CHRONIC KIDNEY DISEASE, WITHOUT LONG-TERM CURRENT USE OF INSULIN (HCC): Chronic | ICD-10-CM

## 2024-09-04 DIAGNOSIS — M15.0 PRIMARY GENERALIZED (OSTEO)ARTHRITIS: Chronic | ICD-10-CM

## 2024-09-04 DIAGNOSIS — I50.32 CHRONIC DIASTOLIC (CONGESTIVE) HEART FAILURE (HCC): Chronic | ICD-10-CM

## 2024-09-04 DIAGNOSIS — B02.29 POST ZOSTER NEURALGIA: ICD-10-CM

## 2024-09-04 DIAGNOSIS — R26.89 ABNORMALITY OF GAIT DUE TO IMPAIRMENT OF BALANCE: Chronic | ICD-10-CM

## 2024-09-04 DIAGNOSIS — N18.32 TYPE 2 DIABETES MELLITUS WITH STAGE 3B CHRONIC KIDNEY DISEASE, WITHOUT LONG-TERM CURRENT USE OF INSULIN (HCC): Chronic | ICD-10-CM

## 2024-09-04 PROBLEM — I10 HYPERTENSION: Status: RESOLVED | Noted: 2022-07-04 | Resolved: 2024-09-04

## 2024-09-04 PROBLEM — M81.0 SENILE OSTEOPOROSIS: Chronic | Status: ACTIVE | Noted: 2021-11-19

## 2024-09-04 PROBLEM — N18.4 CHRONIC KIDNEY DISEASE, STAGE IV (SEVERE) (HCC): Status: RESOLVED | Noted: 2024-05-01 | Resolved: 2024-09-04

## 2024-09-04 PROBLEM — R79.89 ELEVATED LFTS: Status: RESOLVED | Noted: 2023-02-21 | Resolved: 2024-09-04

## 2024-09-04 PROBLEM — J90 PLEURAL EFFUSION: Status: RESOLVED | Noted: 2024-07-30 | Resolved: 2024-09-04

## 2024-09-04 PROBLEM — M75.82 TENDINITIS OF LEFT ROTATOR CUFF: Status: RESOLVED | Noted: 2021-11-19 | Resolved: 2024-09-04

## 2024-09-04 PROBLEM — R26.2 AMBULATORY DYSFUNCTION: Status: RESOLVED | Noted: 2023-02-21 | Resolved: 2024-09-04

## 2024-09-04 PROBLEM — R74.8 ELEVATED LIVER ENZYMES: Status: RESOLVED | Noted: 2023-05-18 | Resolved: 2024-09-04

## 2024-09-04 PROBLEM — G92.8 TOXIC METABOLIC ENCEPHALOPATHY: Status: RESOLVED | Noted: 2022-06-24 | Resolved: 2024-09-04

## 2024-09-04 PROBLEM — L03.90 CELLULITIS: Status: RESOLVED | Noted: 2022-06-24 | Resolved: 2024-09-04

## 2024-09-04 PROBLEM — J40 SINOBRONCHITIS: Status: RESOLVED | Noted: 2020-02-14 | Resolved: 2024-09-04

## 2024-09-04 PROBLEM — S39.92XA: Status: RESOLVED | Noted: 2021-11-19 | Resolved: 2024-09-04

## 2024-09-04 PROBLEM — R35.0 URINARY FREQUENCY: Status: RESOLVED | Noted: 2018-02-13 | Resolved: 2024-09-04

## 2024-09-04 PROBLEM — H70.11 CHRONIC MASTOIDITIS OF RIGHT SIDE: Status: ACTIVE | Noted: 2024-08-30

## 2024-09-04 PROBLEM — R15.2 INCONTINENCE OF FECES WITH FECAL URGENCY: Status: RESOLVED | Noted: 2023-09-13 | Resolved: 2024-09-04

## 2024-09-04 PROBLEM — D72.829 LEUKOCYTOSIS: Status: RESOLVED | Noted: 2022-04-14 | Resolved: 2024-09-04

## 2024-09-04 PROBLEM — J32.9 SINOBRONCHITIS: Status: RESOLVED | Noted: 2020-02-14 | Resolved: 2024-09-04

## 2024-09-04 PROBLEM — E11.43: Status: RESOLVED | Noted: 2021-11-19 | Resolved: 2024-09-04

## 2024-09-04 PROBLEM — R07.9 CHEST PAIN: Status: RESOLVED | Noted: 2022-04-14 | Resolved: 2024-09-04

## 2024-09-04 PROBLEM — N39.0 UTI (URINARY TRACT INFECTION): Status: RESOLVED | Noted: 2023-02-22 | Resolved: 2024-09-04

## 2024-09-04 PROBLEM — R15.9 INCONTINENCE OF FECES WITH FECAL URGENCY: Status: RESOLVED | Noted: 2023-09-13 | Resolved: 2024-09-04

## 2024-09-04 PROBLEM — E78.5 DYSLIPIDEMIA: Status: RESOLVED | Noted: 2022-07-04 | Resolved: 2024-09-04

## 2024-09-04 PROBLEM — D50.8 IRON DEFICIENCY ANEMIA SECONDARY TO INADEQUATE DIETARY IRON INTAKE: Status: RESOLVED | Noted: 2019-12-23 | Resolved: 2024-09-04

## 2024-09-04 PROBLEM — M53.3 SI (SACROILIAC) JOINT DYSFUNCTION: Status: RESOLVED | Noted: 2022-10-18 | Resolved: 2024-09-04

## 2024-09-04 PROBLEM — M47.816 LUMBAR SPONDYLOSIS: Status: RESOLVED | Noted: 2021-11-19 | Resolved: 2024-09-04

## 2024-09-04 PROBLEM — I12.9 PARENCHYMAL RENAL HYPERTENSION: Status: RESOLVED | Noted: 2024-05-01 | Resolved: 2024-09-04

## 2024-09-04 PROBLEM — K31.84: Status: RESOLVED | Noted: 2021-11-19 | Resolved: 2024-09-04

## 2024-09-04 PROBLEM — E11.21 DIABETIC NEPHROPATHY ASSOCIATED WITH TYPE 2 DIABETES MELLITUS (HCC): Status: RESOLVED | Noted: 2024-05-01 | Resolved: 2024-09-04

## 2024-09-04 PROCEDURE — 99205 OFFICE O/P NEW HI 60 MIN: CPT | Performed by: INTERNAL MEDICINE

## 2024-09-04 PROCEDURE — G2212 PROLONG OUTPT/OFFICE VIS: HCPCS | Performed by: INTERNAL MEDICINE

## 2024-09-04 RX ORDER — PREGABALIN 75 MG/1
75 CAPSULE ORAL 2 TIMES DAILY
Qty: 60 CAPSULE | Refills: 5 | Status: SHIPPED | OUTPATIENT
Start: 2024-09-04

## 2024-09-04 RX ORDER — SENNOSIDES 8.6 MG
650 CAPSULE ORAL EVERY 8 HOURS PRN
COMMUNITY
End: 2024-09-04

## 2024-09-04 RX ORDER — AMITRIPTYLINE HYDROCHLORIDE 10 MG/1
10 TABLET ORAL
Status: CANCELLED | COMMUNITY
Start: 2024-09-04

## 2024-09-04 RX ORDER — ACETAMINOPHEN 325 MG/1
650 TABLET ORAL 3 TIMES DAILY
Qty: 540 TABLET | Refills: 3 | Status: SHIPPED | OUTPATIENT
Start: 2024-09-04

## 2024-09-04 NOTE — ASSESSMENT & PLAN NOTE
Patient with significantly abnormal MoCA today 11/26.  Pattern of deficits on MoCA including executive functioning verbal fluency and short-term memory are typical of Alzheimer's, however her affect is not typical of Alzheimer's and she has significant polypharmacy with significant anticholinergic burden.  Reviewed risks and benefits of different medications and agreed on stopping amitriptyline and reducing pregabalin back to 75 mg twice a day.  Patient does have prescription of Lomotil although she is only using this very occasionally.  As well patient has significant hearing deficits and is recovering from post hospital delirium which may both be affecting her result on cognitive testing today.  At this point agreed on reducing polypharmacy and rechecking cognitive testing in another 3 months or so.  Depending on clinical results could consider MRI brain NeuroQuant, although as she would not be a candidate for antibody disease modifying therapy for Alzheimer's, further workup may be more academic in nature.    Diagnosis: Mild cognitive impairment    Staging: N/A    Decision-making capacity: May have limitations in complex medical or financial decisions.  Consider neuropsychology evaluation for further determination    Medication Review: Patient with polypharmacy concerning for higher anticholinergic burden.  Discussed options and agreed on stopping amitriptyline as patient sleeps very well and without recent post zoster pain.  As well we will decrease Lyrica back to dose of 75 mg twice a day which is her usual frequency.  Unclear that higher doses of Lyrica have been helpful for her at all and could consider tapering off completely.  Agreed on starting course of Tylenol 650 mg 3 times a day which patient has reported as being helpful

## 2024-09-04 NOTE — ASSESSMENT & PLAN NOTE
Recommend A1c goal of less than 8.5 considering age and frailty.  Continue to monitor conservatively.  Continue to monitor renal function as well.

## 2024-09-04 NOTE — ASSESSMENT & PLAN NOTE
Recently appears to be doing well.  Continue to use Lomotil rarely.  Reviewed high risks with this medication considering significant anticholinergic burden

## 2024-09-04 NOTE — ASSESSMENT & PLAN NOTE
Now with mild fluid overload.  Will continue with furosemide daily.  Recommend considering going back to torsemide to decrease urinary frequency

## 2024-09-04 NOTE — ASSESSMENT & PLAN NOTE
Concerning for sicca syndrome related dysphagia.  Continue adjust diet with softer foods and drinking plenty of liquids

## 2024-09-04 NOTE — PROGRESS NOTES
ASSESSMENT AND PLAN:  1. Mild cognitive impairment  Assessment & Plan:  Patient with significantly abnormal MoCA today 11/26.  Pattern of deficits on MoCA including executive functioning verbal fluency and short-term memory are typical of Alzheimer's, however her affect is not typical of Alzheimer's and she has significant polypharmacy with significant anticholinergic burden.  Reviewed risks and benefits of different medications and agreed on stopping amitriptyline and reducing pregabalin back to 75 mg twice a day.  Patient does have prescription of Lomotil although she is only using this very occasionally.  As well patient has significant hearing deficits and is recovering from post hospital delirium which may both be affecting her result on cognitive testing today.  At this point agreed on reducing polypharmacy and rechecking cognitive testing in another 3 months or so.  Depending on clinical results could consider MRI brain NeuroQuant, although as she would not be a candidate for antibody disease modifying therapy for Alzheimer's, further workup may be more academic in nature.    Diagnosis: Mild cognitive impairment    Staging: N/A    Decision-making capacity: May have limitations in complex medical or financial decisions.  Consider neuropsychology evaluation for further determination    Medication Review: Patient with polypharmacy concerning for higher anticholinergic burden.  Discussed options and agreed on stopping amitriptyline as patient sleeps very well and without recent post zoster pain.  As well we will decrease Lyrica back to dose of 75 mg twice a day which is her usual frequency.  Unclear that higher doses of Lyrica have been helpful for her at all and could consider tapering off completely.  Agreed on starting course of Tylenol 650 mg 3 times a day which patient has reported as being helpful  Orders:  -     Ambulatory Referral to Internal Medicine; Future  2. Primary generalized  (osteo)arthritis  Assessment & Plan:  Start acetaminophen 650 mg 3 times a day  Orders:  -     Ambulatory Referral to Internal Medicine; Future  -     acetaminophen (TYLENOL) 325 mg tablet; Take 2 tablets (650 mg total) by mouth 3 (three) times a day  3. Seronegative arthropathy of multiple sites (HCC)  Assessment & Plan:  Continue with hydroxychloroquine therapy and follow-up with rheumatology  Orders:  -     Ambulatory Referral to Internal Medicine; Future  4. Chronic pain syndrome  -     pregabalin (LYRICA) 75 mg capsule; Take 1 capsule (75 mg total) by mouth 2 (two) times a day  5. Esophageal dysphagia  Assessment & Plan:  Concerning for sicca syndrome related dysphagia.  Continue adjust diet with softer foods and drinking plenty of liquids  Orders:  -     Ambulatory Referral to Internal Medicine; Future  6. Chronic diastolic (congestive) heart failure ([facility])  Assessment & Plan:  Now with mild fluid overload.  Will continue with furosemide daily.  Recommend considering going back to torsemide to decrease urinary frequency  7. Abnormality of gait due to impairment of balance  Assessment & Plan:  Continue with PT OT.  Related to chronic arthritis  Orders:  -     Ambulatory Referral to Internal Medicine; Future  8. Gastroesophageal reflux disease without esophagitis  Assessment & Plan:  Stop PPI and monitor for symptoms.  9. Type 2 diabetes mellitus with stage 3b chronic kidney disease, without long-term current use of insulin ([facility])  Assessment & Plan:  Recommend A1c goal of less than 8.5 considering age and frailty.  Continue to monitor conservatively.  Continue to monitor renal function as well.  10. Irritable bowel syndrome with diarrhea  Assessment & Plan:  Recently appears to be doing well.  Continue to use Lomotil rarely.  Reviewed high risks with this medication considering significant anticholinergic burden  11. OAB (overactive bladder)  Assessment & Plan:  Continue with mirabegron therapy  12. 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 female in Geriatric consultation today, along with  and daughter to provide further history.     reports patient can remember things from 50 years ago, but at times has a harder time remembering things from a week.    COGNITION:  Memory Issues noticed since 1 year(s)    Memory affected: short term memory loss    Symptoms started: gradual  Over time the memory has:  stable  Memory issue(s) were noted by: family   Difficulty finding the right word while speaking: No  Requires repeat information or asking the same question repeatedly: Yes, at times, both  Fluctuation in alertness: No  Changes in mood or personality: No  Seems anxious: No  Seems depressed: Yes, maybe.  Related to chronic pain  With suicidal ideation: No    Current or previous treatment for depression or anxiety: No    Family member with dementia and what type? no  History of head trauma: No  History of stroke: No  History of alcohol or substance abuse: No    FUNCTIONAL STATUS:  BADLs  Does patient require assistance with:  Bathing: No  Dressing: No  Transferring: No  Continence: No  Toileting: No  Feeding: No    IADLs  Dose patient require assistance with:  Telephone: No  Shopping: No  Food Preparation: No  Housekeeping: No  Laundry: No  Transportation: Yes, quit driving due to back issues  Medications: Yes, but only mild.   fills the pillbox due to hand arthritis  Finances: No    NEUROPSYCH SYMPTOMS:  Does patient less interested in his or her usual activities or in the activities and plans of others? No  Does patient get angry or hostile?  Resist care from others? No  Does patient act impatient and cranky? Does mood frequently change for no  apparent reason? No  Does patient have trouble sleeping at night? No  Does patient see or hear things that no one else can see or hear? No  Does patient act suspicious without good reason (example: believes that others are stealing from him or her, or planning to harm him or her in some way)? No    SAFETY:  Hearing issue: Yes, saw audiology  Vision issue: No  Any gait or balance disorder: Yes  Uses: Rollator  Any falls in the last year: Yes  Any history of wandering: No  Are there firearms or guns in the home: No  Does patient drive: No  Any driving accidents or citations in the last year: No  Any concerns about patient's ability to drive: Yes  POA completed?: Yes    Allergies   Allergen Reactions    Morphine Other (See Comments)     urinary retention    Ciprofloxacin Nausea Only and Rash       Medications:    Current Outpatient Medications on File Prior to Visit   Medication Sig Dispense Refill    aspirin 81 mg chewable tablet Chew 1 tablet (81 mg total) daily 30 tablet 0    atorvastatin (LIPITOR) 10 mg tablet TAKE ONE TABLET BY MOUTH EVERY DAY 90 tablet 3    cefpodoxime (VANTIN) 200 mg tablet Take 1 tablet (200 mg total) by mouth in the morning for 3 doses Do not start before September 2, 2024. 3 tablet 0    Cholecalciferol (VITAMIN D3) 1000 units CAPS Take 1 capsule by mouth daily       diphenoxylate-atropine (LOMOTIL) 2.5-0.025 mg per tablet Take 1 tablet by mouth if needed      DULoxetine (CYMBALTA) 30 mg delayed release capsule Take 1 capsule (30 mg total) by mouth daily 30 capsule 0    furosemide (LASIX) 20 mg tablet One tablet daily and an extra 20 mg prn for wt gain 3 lb/ 24 hours or 5 lb/ 5 days, above a dry wt of 122 lb 90 tablet 3    hydroxychloroquine (PLAQUENIL) 200 mg tablet Take 1 tablet (200 mg total) by mouth daily with breakfast 90 tablet 1    Magnesium 250 MG TABS Take 250 mg by mouth every evening      Mirabegron ER (Myrbetriq) 50 MG TB24 Take 1 tablet by mouth daily as directed by physician. 90  tablet 1    pyridoxine (B-6) 100 MG tablet Take 100 mg by mouth daily      Sodium Fluoride (PreviDent 5000 Booster Plus) 1.1 % PSTE Apply on teeth every evening as directed 100 mL 3    [DISCONTINUED] acetaminophen (TYLENOL) 650 mg CR tablet Take 650 mg by mouth every 8 (eight) hours as needed for mild pain      [DISCONTINUED] amitriptyline (ELAVIL) 10 mg tablet TAKE TWO TABLETS BY MOUTH AT BEDTIME (Patient taking differently: in the morning) 180 tablet 1    [DISCONTINUED] omeprazole (PriLOSEC) 20 mg delayed release capsule Take 20 mg by mouth daily      [DISCONTINUED] pregabalin (LYRICA) 75 mg capsule TAKE ONE CAPSULE BY MOUTH THREE TIMES A DAY (Patient taking differently: Take 100 mg by mouth 3 (three) times a day Patient often misses middle dose) 90 capsule 5     Current Facility-Administered Medications on File Prior to Visit   Medication Dose Route Frequency Provider Last Rate Last Admin    denosumab (PROLIA) subcutaneous injection 60 mg  60 mg Subcutaneous Q6 Months    60 mg at 05/29/24 1401       History:  Past Medical History:   Diagnosis Date    Anemia     Arthritis     Back pain     CHF (congestive heart failure) (HCC)     Chronic kidney disease     Stage 3b    Confusion 02/22/2023    Diabetes (HCC)     Diabetes mellitus (HCC)     Diabetic gastroparesis associated with type 2 diabetes mellitus  (HCC)     Diabetic polyneuropathy (HCC)     Diverticulosis     Herpes zoster     Hypertension     IBS (irritable bowel syndrome)     Neurogenic claudication due to lumbar spinal stenosis     Osteoarthritis     Osteoporosis     Pulmonary edema     Raynaud's phenomenon without gangrene     Seronegative arthropathy of multiple sites (HCC)     Sicca (HCC)      Past Surgical History:   Procedure Laterality Date    BACK SURGERY      COLONOSCOPY      EGD AND COLONOSCOPY N/A 12/23/2016    Procedure: EGD AND COLONOSCOPY;  Surgeon: Elina Anderson MD;  Location: [facility];  Service:     JOINT REPLACEMENT      bilat knees and  hips    OTHER SURGICAL HISTORY      pelvis rods    REPLACEMENT TOTAL KNEE BILATERAL      STOMACH SURGERY      UPPER GASTROINTESTINAL ENDOSCOPY       Family History   Problem Relation Age of Onset    Cancer Brother     Diabetes Mother     Hypertension Father     Heart disease Father      Social History     Socioeconomic History    Marital status: /Civil Union     Spouse name: Weston    Number of children: 2    Years of education: Not on file    Highest education level: High school graduate   Occupational History    Not on file   Tobacco Use    Smoking status: Former     Current packs/day: 0.00     Types: Cigarettes     Quit date:      Years since quittin.7    Smokeless tobacco: Never   Vaping Use    Vaping status: Never Used   Substance and Sexual Activity    Alcohol use: No    Drug use: No    Sexual activity: Not Currently     Partners: Male     Birth control/protection: None   Other Topics Concern    Not on file   Social History Narrative    Not on file     Social Determinants of Health     Financial Resource Strain: Not on file   Food Insecurity: No Food Insecurity (2024)    Hunger Vital Sign     Worried About Running Out of Food in the Last Year: Never true     Ran Out of Food in the Last Year: Never true   Transportation Needs: No Transportation Needs (2024)    PRAPARE - Transportation     Lack of Transportation (Medical): No     Lack of Transportation (Non-Medical): No   Physical Activity: Not on file   Stress: Not on file   Social Connections: Not on file   Intimate Partner Violence: Not on file   Housing Stability: Low Risk  (2024)    Housing Stability Vital Sign     Unable to Pay for Housing in the Last Year: No     Number of Times Moved in the Last Year: 0     Homeless in the Last Year: No     Past Surgical History:   Procedure Laterality Date    BACK SURGERY      COLONOSCOPY      EGD AND COLONOSCOPY N/A 2016    Procedure: EGD AND COLONOSCOPY;  Surgeon: Elina Anderson MD;   "Location: [facility];  Service:     JOINT REPLACEMENT      bilat knees and hips    OTHER SURGICAL HISTORY      pelvis rods    REPLACEMENT TOTAL KNEE BILATERAL      STOMACH SURGERY      UPPER GASTROINTESTINAL ENDOSCOPY         OBJECTIVE:  Vitals:    09/04/24 0957   BP: 120/60   BP Location: Left arm   Patient Position: Sitting   Cuff Size: Standard   Pulse: 64   Temp: 98.7 °F (37.1 °C)   TempSrc: Temporal   Weight: 56.1 kg (123 lb 9.6 oz)   Height: 4' 11\" (1.499 m)     Body mass index is 24.96 kg/m².  Physical Exam  Constitutional:       Appearance: Normal appearance.   HENT:      Head: Normocephalic and atraumatic.      Nose: Nose normal.      Mouth/Throat:      Mouth: Mucous membranes are moist.   Eyes:      General: No scleral icterus.        Right eye: No discharge.         Left eye: No discharge.      Conjunctiva/sclera: Conjunctivae normal.   Pulmonary:      Effort: Pulmonary effort is normal. No respiratory distress.   Abdominal:      General: There is no distension.   Skin:     Coloration: Skin is not pale.      Findings: No erythema.   Neurological:      General: No focal deficit present.      Mental Status: She is alert.      Gait: Gait abnormal.   Psychiatric:         Mood and Affect: Mood normal.         MoCA: 11/26        Labs & Imaging:  Lab Results   Component Value Date    WBC 6.48 09/01/2024    HGB 11.0 (L) 09/01/2024    HCT 34.8 09/01/2024    MCV 93 09/01/2024     (L) 09/01/2024     Lab Results   Component Value Date     10/09/2015    SODIUM 137 09/01/2024    K 4.1 09/01/2024     09/01/2024    CO2 30 09/01/2024    ANIONGAP 8 10/09/2015    AGAP 5 09/01/2024    BUN 33 (H) 09/01/2024    CREATININE 1.60 (H) 09/01/2024    GLUC 161 (H) 09/01/2024    GLUF 114 (H) 08/31/2024    CALCIUM 8.6 09/01/2024    AST 27 08/31/2024    ALT 24 08/31/2024    ALKPHOS 78 08/31/2024    TP 6.2 (L) 08/31/2024    TBILI 0.44 08/31/2024    EGFR 28 09/01/2024     Lab Results   Component Value Date    HGBA1C 7.6 " (H) 07/30/2024     Lab Results   Component Value Date    CHOLESTEROL 118 05/16/2024    CHOLESTEROL 154 04/14/2022    CHOLESTEROL 155 08/25/2020     Lab Results   Component Value Date    HDL 73 05/16/2024    HDL 85 04/14/2022    HDL 72 08/25/2020     Lab Results   Component Value Date    TRIG 51 05/16/2024    TRIG 98 04/14/2022    TRIG 52 08/25/2020     Lab Results   Component Value Date    NONHDLC 83 08/25/2020     Lab Results   Component Value Date    LDLCALC 35 05/16/2024    LDLCALC 49 04/14/2022     Lab Results   Component Value Date    QEJJJCCV90 269 02/23/2021     Lab Results   Component Value Date    UFI3NRXGTVYT 3.859 08/30/2024     Lab Results   Component Value Date    ALPC32HLKEQS 41.7 05/16/2024      Results for orders placed during the hospital encounter of 08/30/24    CT head without contrast    Narrative  CT BRAIN - WITHOUT CONTRAST    INDICATION:   AMS.    COMPARISON: CT head from 7/8/2024    TECHNIQUE:  CT examination of the brain was performed.  Multiplanar 2D reformatted images were created from the source data.    Radiation dose length product (DLP) for this visit:  1003 mGy-cm .  This examination, like all CT scans performed in the  Network, was performed utilizing techniques to minimize radiation dose exposure, including the use of iterative  reconstruction and automated exposure control.    IMAGE QUALITY:  Diagnostic.    FINDINGS:    PARENCHYMA: Decreased attenuation is noted in periventricular and subcortical white matter demonstrating an appearance that is statistically most likely to represent mild microangiopathic change.    No intracranial mass, mass effect or midline shift.  No acute parenchymal hemorrhage. Hypodensity within the ainsley is in the region beam hardening on sagittal and coronal sequences and was also noted on prior exam from 7/8/2024 on series 2 image 12,  favored to be artifactual.. MRI can be obtained if clinically indicated. Atherosclerotic calcifications  of the carotid siphons and intradural vertebral arteries.    VENTRICLES AND EXTRA-AXIAL SPACES:  Normal for the patient's age.    VISUALIZED ORBITS:  Sequela bilateral cataract surgery.    PARANASAL SINUSES:  Mild mucosal thickening of the left ethmoid air cells.    CALVARIUM AND EXTRACRANIAL SOFT TISSUES: Large right mastoid effusion. Advanced bilateral TMJ osteoarthrosis.    Impression  -No acute vascular territory infarct, intracranial hemorrhage or mass effect.  -Signal within the ainsley was also noted on prior and may represent artifact versus chronic insult.  -Large right mastoid effusion.          I have spent 89 minutes with Patient and family today including taking history from family, patient, review of records, charting, and counseling regarding diagnosis and management of conditions.

## 2024-09-10 ENCOUNTER — TELEPHONE (OUTPATIENT)
Age: 89
End: 2024-09-10

## 2024-09-10 NOTE — TELEPHONE ENCOUNTER
of the patient was calling in regards to a bill he got in the mail for his wife. He was told by [facility] billing that it was from the Urology office for paperwork.      would like a call back as what paperwork was charged for and why?    Only thing the office filled out for them was for patient assistance and that has been filled out every year with no cost.     He would like to speak to someone from the office    Weston  [phone]

## 2024-09-11 ENCOUNTER — HOSPITAL ENCOUNTER (EMERGENCY)
Facility: HOSPITAL | Age: 89
Discharge: HOME | End: 2024-09-11
Attending: EMERGENCY MEDICINE
Payer: MEDICARE

## 2024-09-11 ENCOUNTER — APPOINTMENT (EMERGENCY)
Dept: RADIOLOGY | Facility: HOSPITAL | Age: 89
End: 2024-09-11
Payer: MEDICARE

## 2024-09-11 ENCOUNTER — OFFICE VISIT (OUTPATIENT)
Dept: CARDIOLOGY CLINIC | Facility: CLINIC | Age: 89
End: 2024-09-11
Payer: MEDICARE

## 2024-09-11 ENCOUNTER — APPOINTMENT (EMERGENCY)
Dept: CT IMAGING | Facility: HOSPITAL | Age: 89
End: 2024-09-11
Payer: MEDICARE

## 2024-09-11 VITALS
TEMPERATURE: 98.6 F | HEART RATE: 77 BPM | DIASTOLIC BLOOD PRESSURE: 57 MMHG | RESPIRATION RATE: 17 BRPM | SYSTOLIC BLOOD PRESSURE: 116 MMHG | OXYGEN SATURATION: 98 %

## 2024-09-11 VITALS
SYSTOLIC BLOOD PRESSURE: 118 MMHG | DIASTOLIC BLOOD PRESSURE: 64 MMHG | WEIGHT: 121.2 LBS | BODY MASS INDEX: 24.44 KG/M2 | HEART RATE: 84 BPM | HEIGHT: 59 IN
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
## 2024-09-11 DIAGNOSIS — G31.84 MILD COGNITIVE IMPAIRMENT: Chronic | ICD-10-CM

## 2024-09-11 DIAGNOSIS — R26.89 ABNORMALITY OF GAIT DUE TO IMPAIRMENT OF BALANCE: Chronic | ICD-10-CM

## 2024-09-11 DIAGNOSIS — N18.32 TYPE 2 DIABETES MELLITUS WITH STAGE 3B CHRONIC KIDNEY DISEASE, WITHOUT LONG-TERM CURRENT USE OF INSULIN (HCC): Chronic | ICD-10-CM

## 2024-09-11 DIAGNOSIS — I50.32 CHRONIC DIASTOLIC (CONGESTIVE) HEART FAILURE (HCC): Chronic | ICD-10-CM

## 2024-09-11 DIAGNOSIS — E11.22 TYPE 2 DIABETES MELLITUS WITH STAGE 3B CHRONIC KIDNEY DISEASE, WITHOUT LONG-TERM CURRENT USE OF INSULIN (HCC): Chronic | ICD-10-CM

## 2024-09-11 LAB
ALBUMIN SERPL BCG-MCNC: 3.8 G/DL (ref 3.5–5)
ALP SERPL-CCNC: 97 U/L (ref 34–104)
ALT SERPL W P-5'-P-CCNC: 64 U/L (ref 7–52)
ANION GAP SERPL CALCULATED.3IONS-SCNC: 7 MMOL/L (ref 4–13)
AST SERPL W P-5'-P-CCNC: 48 U/L (ref 13–39)
BASOPHILS # BLD AUTO: 0.04 THOUSANDS/ÂΜL (ref 0–0.1)
BASOPHILS NFR BLD AUTO: 1 % (ref 0–1)
BILIRUB SERPL-MCNC: 0.59 MG/DL (ref 0.2–1)
BUN SERPL-MCNC: 28 MG/DL (ref 5–25)
CALCIUM SERPL-MCNC: 10.3 MG/DL (ref 8.4–10.2)
CHLORIDE SERPL-SCNC: 100 MMOL/L (ref 96–108)
CO2 SERPL-SCNC: 32 MMOL/L (ref 21–32)
CREAT SERPL-MCNC: 1.5 MG/DL (ref 0.6–1.3)
EOSINOPHIL # BLD AUTO: 0.11 THOUSAND/ÂΜL (ref 0–0.61)
EOSINOPHIL NFR BLD AUTO: 1 % (ref 0–6)
ERYTHROCYTE [DISTWIDTH] IN BLOOD BY AUTOMATED COUNT: 15.6 % (ref 11.6–15.1)
GFR SERPL CREATININE-BSD FRML MDRD: 30 ML/MIN/1.73SQ M
GLUCOSE SERPL-MCNC: 103 MG/DL (ref 65–140)
HCT VFR BLD AUTO: 36.6 % (ref 34.8–46.1)
HGB BLD-MCNC: 11.5 G/DL (ref 11.5–15.4)
IMM GRANULOCYTES # BLD AUTO: 0.08 THOUSAND/UL (ref 0–0.2)
IMM GRANULOCYTES NFR BLD AUTO: 1 % (ref 0–2)
LYMPHOCYTES # BLD AUTO: 1.35 THOUSANDS/ÂΜL (ref 0.6–4.47)
LYMPHOCYTES NFR BLD AUTO: 18 % (ref 14–44)
MCH RBC QN AUTO: 29.8 PG (ref 26.8–34.3)
MCHC RBC AUTO-ENTMCNC: 31.4 G/DL (ref 31.4–37.4)
MCV RBC AUTO: 95 FL (ref 82–98)
MONOCYTES # BLD AUTO: 1.12 THOUSAND/ÂΜL (ref 0.17–1.22)
MONOCYTES NFR BLD AUTO: 15 % (ref 4–12)
NEUTROPHILS # BLD AUTO: 4.95 THOUSANDS/ÂΜL (ref 1.85–7.62)
NEUTS SEG NFR BLD AUTO: 64 % (ref 43–75)
NRBC BLD AUTO-RTO: 0 /100 WBCS
PLATELET # BLD AUTO: 172 THOUSANDS/UL (ref 149–390)
PMV BLD AUTO: 11.2 FL (ref 8.9–12.7)
POTASSIUM SERPL-SCNC: 3.9 MMOL/L (ref 3.5–5.3)
PROT SERPL-MCNC: 7.1 G/DL (ref 6.4–8.4)
RBC # BLD AUTO: 3.86 MILLION/UL (ref 3.81–5.12)
SODIUM SERPL-SCNC: 139 MMOL/L (ref 135–147)
WBC # BLD AUTO: 7.65 THOUSAND/UL (ref 4.31–10.16)

## 2024-09-11 PROCEDURE — 80053 COMPREHEN METABOLIC PANEL: CPT

## 2024-09-11 PROCEDURE — 70450 CT HEAD/BRAIN W/O DYE: CPT

## 2024-09-11 PROCEDURE — 36415 COLL VENOUS BLD VENIPUNCTURE: CPT

## 2024-09-11 PROCEDURE — 71250 CT THORAX DX C-: CPT

## 2024-09-11 PROCEDURE — 93005 ELECTROCARDIOGRAM TRACING: CPT

## 2024-09-11 PROCEDURE — 70486 CT MAXILLOFACIAL W/O DYE: CPT

## 2024-09-11 PROCEDURE — 72125 CT NECK SPINE W/O DYE: CPT

## 2024-09-11 PROCEDURE — 85025 COMPLETE CBC W/AUTO DIFF WBC: CPT

## 2024-09-11 PROCEDURE — 99285 EMERGENCY DEPT VISIT HI MDM: CPT | Performed by: EMERGENCY MEDICINE

## 2024-09-11 PROCEDURE — 99214 OFFICE O/P EST MOD 30 MIN: CPT | Performed by: NURSE PRACTITIONER

## 2024-09-11 PROCEDURE — 74176 CT ABD & PELVIS W/O CONTRAST: CPT

## 2024-09-11 PROCEDURE — 96374 THER/PROPH/DIAG INJ IV PUSH: CPT

## 2024-09-11 PROCEDURE — 99284 EMERGENCY DEPT VISIT MOD MDM: CPT

## 2024-09-11 RX ORDER — ACETAMINOPHEN 10 MG/ML
1000 INJECTION, SOLUTION INTRAVENOUS ONCE
Status: COMPLETED | OUTPATIENT
Start: 2024-09-11 | End: 2024-09-11

## 2024-09-11 RX ORDER — ONDANSETRON 2 MG/ML
4 INJECTION INTRAMUSCULAR; INTRAVENOUS ONCE
Status: COMPLETED | OUTPATIENT
Start: 2024-09-11 | End: 2024-09-11

## 2024-09-11 RX ADMIN — ACETAMINOPHEN 1000 MG: 10 INJECTION INTRAVENOUS at 16:45

## 2024-09-11 RX ADMIN — ONDANSETRON 4 MG: 2 INJECTION INTRAMUSCULAR; INTRAVENOUS at 16:44

## 2024-09-11 NOTE — ED PROVIDER NOTES
Emergency Department Trauma Note  Tanya Stephen 89 y.o. female MRN: [id number]  Unit/Bed#: ED-01/ED-01 Encounter: [id number]      Trauma Alert: Trauma Acuity: C  Model of Arrival: Mode of Arrival:  (in by family) via    Trauma Team: Current Providers  Attending Provider: Sabiha Perez MD  Registered Nurse: Ana Burch RN  ED Technician: Gisela Monteiro  Resident: Jj Mosley MD  Registered Nurse: Leigh Ann Carmona, BENJAMÍN  Consultants:     None      History of Present Illness     Chief Complaint:   Chief Complaint   Patient presents with    Fall       Mechanism:Details of Incident: fall on asa Injury Date: 09/11/24        89-year-old woman presenting with witnessed mechanical fall 2 days ago on aspirin.   states she was trying to set up a tray table and the tray table fell and she fell over hitting her face.  Fall was witnessed by  and he denies loss of consciousness.  He states she only hit the front of her head.  She has been mentating normally and is A and O x 3.  History of CHF, HTN, multiple spinal reconstruction surgeries and chronic back pain, CKD, diabetes.  The patient takes Tylenol and Lyrica daily and states this helps some with her pain however she has wanted to avoid opioid pain medication.  She is complaining of pain in her neck and back, pain in her face, pain in her right lower back and hip.  She also complains of blurry vision in the left eye yesterday that is better today but she is unsure if it is completely resolved.  She states she wears glasses and needs to get her eyes checked again and is unsure if this is her normal vision or if it is slightly blurrier than normal, she denies eye pain.  She denies fevers, chills, headache, chest pain, shortness of breath, nausea, vomiting.        Fall  Associated symptoms: abdominal pain, back pain and neck pain    Associated symptoms: no chest pain, no headaches, no nausea, no seizures and no vomiting      Review of Systems    Constitutional:  Negative for chills, diaphoresis and fever.   HENT:  Positive for sinus pain. Negative for congestion, ear pain, postnasal drip, rhinorrhea, sore throat and voice change.    Eyes:  Negative for pain and visual disturbance.   Respiratory:  Negative for cough, chest tightness and shortness of breath.    Cardiovascular:  Negative for chest pain and palpitations.   Gastrointestinal:  Positive for abdominal pain. Negative for constipation, diarrhea, nausea and vomiting.   Genitourinary:  Negative for difficulty urinating, dyspareunia, dysuria and hematuria.   Musculoskeletal:  Positive for back pain and neck pain. Negative for arthralgias.   Skin:  Positive for color change (Ecchymosis). Negative for rash.   Neurological:  Negative for dizziness, seizures, syncope, weakness, light-headedness, numbness and headaches.   All other systems reviewed and are negative.      Historical Information     Immunizations:   Immunization History   Administered Date(s) Administered    COVID-19 MODERNA VACC 0.5 ML IM 01/20/2021, 02/17/2021, 11/22/2021, 06/04/2022    COVID-19 Pfizer Vac BIVALENT Fawad-sucrose 12 Yr+ IM 10/26/2022    INFLUENZA 01/12/2013, 12/04/2018, 09/29/2020, 11/18/2022, 10/12/2023    Pneumococcal Polysaccharide PPV23 03/01/2008    Tdap 10/12/2023    Zoster Vaccine Recombinant 05/20/2022       Past Medical History:   Diagnosis Date    Anemia     Arthritis     Back pain     CHF (congestive heart failure) (HCC)     Chronic kidney disease     Stage 3b    Confusion 02/22/2023    Diabetes (HCC)     Diabetes mellitus (HCC)     Diabetic gastroparesis associated with type 2 diabetes mellitus  (HCC)     Diabetic polyneuropathy (HCC)     Diverticulosis     Herpes zoster     Hypertension     IBS (irritable bowel syndrome)     Neurogenic claudication due to lumbar spinal stenosis     Osteoarthritis     Osteoporosis     Pulmonary edema     Raynaud's phenomenon without gangrene     Seronegative arthropathy of  multiple sites (HCC)     Sicca (HCC)        Family History   Problem Relation Age of Onset    Cancer Brother     Diabetes Mother     Hypertension Father     Heart disease Father      Past Surgical History:   Procedure Laterality Date    BACK SURGERY      COLONOSCOPY      EGD AND COLONOSCOPY N/A 2016    Procedure: EGD AND COLONOSCOPY;  Surgeon: Elina Anderson MD;  Location: [facility];  Service:     JOINT REPLACEMENT      bilat knees and hips    OTHER SURGICAL HISTORY      pelvis rods    REPLACEMENT TOTAL KNEE BILATERAL      STOMACH SURGERY      UPPER GASTROINTESTINAL ENDOSCOPY       Social History     Tobacco Use    Smoking status: Former     Current packs/day: 0.00     Types: Cigarettes     Quit date:      Years since quittin.7    Smokeless tobacco: Never   Vaping Use    Vaping status: Never Used   Substance Use Topics    Alcohol use: No    Drug use: No     E-Cigarette/Vaping    E-Cigarette Use Never User      E-Cigarette/Vaping Substances    Nicotine No     THC No     CBD No     Flavoring No     Other No        Family History: non-contributory    Meds/Allergies   Prior to Admission Medications   Prescriptions Last Dose Informant Patient Reported? Taking?   Cholecalciferol (VITAMIN D3) 1000 units CAPS 2024 Child Yes Yes   Sig: Take 1 capsule by mouth daily    DULoxetine (CYMBALTA) 30 mg delayed release capsule 2024 Child No Yes   Sig: Take 1 capsule (30 mg total) by mouth daily   Magnesium 250 MG TABS 2024 Child Yes Yes   Sig: Take 250 mg by mouth every evening   Mirabegron ER (Myrbetriq) 50 MG  Child No Yes   Sig: Take 1 tablet by mouth daily as directed by physician.   Sodium Fluoride (PreviDent 5000 Booster Plus) 1.1 % PSTE 2024 Child No Yes   Sig: Apply on teeth every evening as directed   acetaminophen (TYLENOL) 325 mg tablet 2024 Child No Yes   Sig: Take 2 tablets (650 mg total) by mouth 3 (three) times a day   aspirin 81 mg chewable tablet 2024 Child  No Yes   Sig: Chew 1 tablet (81 mg total) daily   atorvastatin (LIPITOR) 10 mg tablet 9/11/2024 Child No Yes   Sig: TAKE ONE TABLET BY MOUTH EVERY DAY   diphenoxylate-atropine (LOMOTIL) 2.5-0.025 mg per tablet 9/11/2024 Child Yes Yes   Sig: Take 1 tablet by mouth if needed   furosemide (LASIX) 20 mg tablet 9/11/2024 Child No Yes   Sig: One tablet daily and an extra 20 mg prn for wt gain 3 lb/ 24 hours or 5 lb/ 5 days, above a dry wt of 122 lb   hydroxychloroquine (PLAQUENIL) 200 mg tablet 9/11/2024 Child No Yes   Sig: Take 1 tablet (200 mg total) by mouth daily with breakfast   pregabalin (LYRICA) 75 mg capsule 9/11/2024 Child No Yes   Sig: Take 1 capsule (75 mg total) by mouth 2 (two) times a day   pyridoxine (B-6) 100 MG tablet 9/11/2024 Child Yes Yes   Sig: Take 100 mg by mouth daily      Facility-Administered Medications Last Administration Doses Remaining   denosumab (PROLIA) subcutaneous injection 60 mg 5/29/2024  2:01 PM 1          Allergies   Allergen Reactions    Morphine Other (See Comments)     urinary retention    Ciprofloxacin Nausea Only and Rash       PHYSICAL EXAM    PE limited by: none    Objective   Vitals:   First set: Temperature: 98.6 °F (37 °C) (09/11/24 1612)  Pulse: (!) 112 (09/11/24 1612)  Respirations: 19 (09/11/24 1612)  Blood Pressure: 118/57 (09/11/24 1612)  SpO2: 94 % (09/11/24 1612)    Primary Survey:   (A) Airway: Patent  (B) Breathing: bilateral breath sounds  (C) Circulation: Pulses:   normal  (D) Disabliity:  GCS Total:  15  (E) Expose:  Completed    Secondary Survey: (Click on Physical Exam tab above)  Physical Exam  Constitutional:       General: She is not in acute distress.     Appearance: She is not diaphoretic.   HENT:      Head: Normocephalic. Raccoon eyes and abrasion present. No Goldstein's sign, right periorbital erythema or left periorbital erythema.      Jaw: No tenderness or pain on movement.      Comments: Bilateral raccoon eyes, ecchymosis over left eyelid.     Right  Ear: Tympanic membrane normal.      Left Ear: Tympanic membrane normal.      Nose: No congestion or rhinorrhea.      Mouth/Throat:      Mouth: Mucous membranes are moist.      Pharynx: No oropharyngeal exudate.   Eyes:      General: Vision grossly intact. Gaze aligned appropriately. No visual field deficit or scleral icterus.        Right eye: No foreign body or discharge.         Left eye: No foreign body or discharge.      Extraocular Movements: Extraocular movements intact.      Conjunctiva/sclera: Conjunctivae normal.   Cardiovascular:      Rate and Rhythm: Normal rate and regular rhythm.      Heart sounds: Normal heart sounds. No murmur heard.     No friction rub. No gallop.   Pulmonary:      Effort: No respiratory distress.      Breath sounds: Normal breath sounds. No wheezing, rhonchi or rales.   Abdominal:      General: Abdomen is flat. There is no distension.      Palpations: Abdomen is soft.      Tenderness: There is abdominal tenderness in the left lower quadrant. There is no guarding.   Lymphadenopathy:      Cervical: No cervical adenopathy.   Skin:     General: Skin is warm and dry.      Capillary Refill: Capillary refill takes less than 2 seconds.      Findings: Abrasion, bruising and ecchymosis present. No rash.      Comments: Small abrasion on nose.  Left forehead hematoma without ecchymosis.  Bilateral raccoon eyes, worse on left.  Ecchymosis over left eyebrow without edema.   Neurological:      General: No focal deficit present.      Mental Status: She is alert and oriented to person, place, and time.         Cervical spine cleared by clinical criteria? No (imaging required)      Invasive Devices       Peripheral Intravenous Line  Duration             Peripheral IV 08/30/24 Left Antecubital 12 days    Peripheral IV 09/11/24 Left;Proximal;Ventral (anterior) Forearm <1 day                    Lab Results:   Results Reviewed       Procedure Component Value Units Date/Time    Comprehensive metabolic  panel [[id number]]  (Abnormal) Collected: 09/11/24 1637    Lab Status: Final result Specimen: Blood from Arm, Left Updated: 09/11/24 1704     Sodium 139 mmol/L      Potassium 3.9 mmol/L      Chloride 100 mmol/L      CO2 32 mmol/L      ANION GAP 7 mmol/L      BUN 28 mg/dL      Creatinine 1.50 mg/dL      Glucose 103 mg/dL      Calcium 10.3 mg/dL      AST 48 U/L      ALT 64 U/L      Alkaline Phosphatase 97 U/L      Total Protein 7.1 g/dL      Albumin 3.8 g/dL      Total Bilirubin 0.59 mg/dL      eGFR 30 ml/min/1.73sq m     Narrative:      [employer] Kid[employer] guidelines for Chronic Kidney Disease (CKD):     Stage 1 with normal or high GFR (GFR > 90 mL/min/1.73 square meters)    Stage 2 Mild CKD (GFR = 60-89 mL/min/1.73 square meters)    Stage 3A Moderate CKD (GFR = 45-59 mL/min/1.73 square meters)    Stage 3B Moderate CKD (GFR = 30-44 mL/min/1.73 square meters)    Stage 4 Severe CKD (GFR = 15-29 mL/min/1.73 square meters)    Stage 5 End Stage CKD (GFR <15 mL/min/1.73 square meters)  Note: GFR calculation is accurate only with a steady state creatinine    CBC and differential [[id number]]  (Abnormal) Collected: 09/11/24 1637    Lab Status: Final result Specimen: Blood from Arm, Left Updated: 09/11/24 1650     WBC 7.65 Thousand/uL      RBC 3.86 Million/uL      Hemoglobin 11.5 g/dL      Hematocrit 36.6 %      MCV 95 fL      MCH 29.8 pg      MCHC 31.4 g/dL      RDW 15.6 %      MPV 11.2 fL      Platelets 172 Thousands/uL      nRBC 0 /100 WBCs      Segmented % 64 %      Immature Grans % 1 %      Lymphocytes % 18 %      Monocytes % 15 %      Eosinophils Relative 1 %      Basophils Relative 1 %      Absolute Neutrophils 4.95 Thousands/µL      Absolute Immature Grans 0.08 Thousand/uL      Absolute Lymphocytes 1.35 Thousands/µL      Absolute Monocytes 1.12 Thousand/µL      Eosinophils Absolute 0.11 Thousand/µL      Basophils Absolute 0.04 Thousands/µL                    Imaging Studies:   Direct to CT: No  CT  cervical spine without contrast   Final Result by Nicholas Escalera DO (09/11 1753)      Degenerative changes are noted. No fracture or traumatic malalignment.      The study was marked in EPIC for immediate notification.                  Workstation performed: [id number]         CT facial bones without contrast   Final Result by Nicholas Escalera DO (09/11 1753)      No evidence of facial bone fracture. Mild frontal extracranial soft tissue swelling. Severe degenerative changes in the TMJ bilaterally.      The study was marked in EPIC for immediate notification.               Workstation performed: [id number]         CT head without contrast   Final Result by Nicholas Escalera DO (09/11 1753)      No acute intracranial abnormality.  Chronic microangiopathic changes.      The study was marked in EPIC for immediate notification.                  Workstation performed: [id number]         CT chest abdomen pelvis wo contrast   Final Result by Nicholas Escalera DO (09/11 1803)      No acute findings in the chest, abdomen or pelvis within the limits of unenhanced technique.      Consolidation left upper lobe laterally and anteriorly has slightly improved from the prior study likely improving atelectasis.      New pleural-based opacity anteriorly in the right upper lobe measuring 8 mm. This is likely infectious or inflammatory given that it is new from the prior study. Consider repeat CT chest in 6 months.            The study was marked in EPIC for immediate notification.         Workstation performed: [id number]               Procedures  ECG 12 Lead Documentation Only    Date/Time: 9/11/2024 5:33 PM    Performed by: Jj Mosley MD  Authorized by: Jj Mosley MD    Indications / Diagnosis:  Fall  ECG reviewed by me, the ED Provider: yes    Patient location:  ED  Previous ECG:     Previous ECG:  Compared to current    Similarity:  Changes noted (Flattened T waves in lead II on prior EKG are now inverted.   Inverted T waves in leads III and aVF remain unchanged.)    Comparison to cardiac monitor: No    Interpretation:     Interpretation: abnormal    Rate:     ECG rate:  80    ECG rate assessment: normal    Rhythm:     Rhythm: sinus rhythm    QRS:     QRS axis:  Right    QRS intervals:  Wide  Conduction:     Conduction: abnormal      Abnormal conduction: complete RBBB    ST segments:     ST segments:  Normal  T waves:     T waves: inverted      Inverted:  II, III and aVF           ED Course           Medical Decision Making  Patient seen and examined.  The patient takes aspirin.  She has significant facial tenderness with left-sided scalp hematoma, bilateral raccoon eyes with ecchymosis over the left eyelid.  No Goldstein sign.  There is no C-spine tenderness.  There is T-spine tenderness with step-off deformity that family notes is not new.  There is right sided abdominal and lower back and pelvic tenderness.  Neurologic exam is normal with cranial nerves II through XII intact and strength and sensation symmetric.  Extraocular movements are intact, visual fields are normal.  Concern for facial bones fracture given ecchymosis, differential also includes epidural hematoma, pelvic fracture, intra-abdominal injury.  Will get basic labs and image head, C-spine, chest abdomen pelvis.  Last Tylenol dose approximately 8 AM today, will give 1 g Tylenol IV for pain control and reassess if more is needed given patient's age and not on chronic opioids.    Patient states pain is well-controlled with Tylenol.  Will hold stronger pain medication at this time.  Expectations discussed with patient and family, we discussed that admission versus discharge home will depend on the results of the scans and any injuries we find.  Family states understanding and is agreeable at this time.    Labs WNL.  Imaging shows no fractures, no intracranial hemorrhage.  Incidental finding of pleural-based 8 mm opacity in anterior right lung found, this is  completely new from prior chest CT.  Radiology recommends follow-up scan in 6 months and suggests likely inflammatory or infectious allergy.  All results discussed with patient and family.  A copy of the CT report was printed out for the family with the incidental finding highlighted.  I discussed follow-up with the primary care doctor and repeat scan in approximately 6 months with patient and family, they expressed understanding.  All questions answered.  Family and patient are agreeable to discharge home, all questions answered and return precautions discussed.    Amount and/or Complexity of Data Reviewed  Labs: ordered.  Radiology: ordered.    Risk  Prescription drug management.                Disposition  [facility] Transfer: No  Final diagnoses:   Fall, initial encounter     Time reflects when diagnosis was documented in both MDM as applicable and the Disposition within this note       Time User Action Codes Description Comment    9/11/2024  6:46 PM Jj Mosley Add [W19.XXXA] Fall, initial encounter           ED Disposition       ED Disposition   Discharge    Condition   Stable    Date/Time   Wed Sep 11, 2024  6:46 PM    Comment   Tanya Stephen discharge to home/self care.                   Follow-up Information       Follow up With Specialties Details Why Contact Info    Gregory Santoro DO Family Medicine In 1 week  [address] [place] [id number]  [phone]            Patient's Medications   Discharge Prescriptions    No medications on file     No discharge procedures on file.    PDMP Review         Value Time User    PDMP Reviewed  Yes 9/4/2024 10:53 AM Tyrone Butler MD            ED Provider  Electronically Signed by           Jj Mosley MD  09/11/24 [id number]       Jj Mosley MD  09/11/24 1146

## 2024-09-11 NOTE — PROGRESS NOTES
"Cardiology   Follow Up   Office Visit Note  Tanya Stephen   89 y.o.   female   MRN: [id number]  [facility]  [address]  [address]  [facility] [address] [phone]    PCP: Gregory Santoro DO  Cardiologist: Dr. Portillo              Summary of recommendations  I recommend she be evaluated in the ED for a fall with head trauma 2 days ago.  She complains of a headache and blurred vision of the right eye.  She complains of pain \"all over\". she is on ASA 81 mg daily; no anticoagulation. The pt her  and her daughter who are with her now, are agreeable.    Our staff will take her to the [place] ED      Assessment/ Plan  Mechanical fall 2 days ago.  No reported LOC.  Positive head injury.  Positive headache.  Positive right blurred vision.  She is on aspirin 81 daily.  No anticoagulation.  I recommend she be evaluated in the ED.  She and her  who are present, are agreeable.  Our staff will take her   chronic HFpEF.    Recent admission for decompensation 7/30 - 8/4/2025, likely secondary to sodium dietary indiscretion while she was on vacation, in the setting of valvular heart disease and new wall motion abnormalities  Echo 7/31/2024: EF 50% with WMA: Hypokinetic: basal inferolateral, mid inferolateral and apical lateral.  These  WMA do not appear to have been present 2/21/2023.  Will treat medically.  Dry weight 122 pound  Wt Readings from Last 3 Encounters:   09/11/24 55 kg (121 lb 3.2 oz)   09/04/24 56.1 kg (123 lb 9.6 oz)   09/01/24 54.9 kg (121 lb 0.5 oz)     -beta-blocker: Now off; BP precludes  --Diuretic:  now back on Lasix 20 mg/d.  She can take an extra dose of Lasix 20 mg as needed for weight gain 3 pounds in 24 hours or 5 pounds in 5 days.    --ACE/ARB/ARNI:    --MRA:   --SLGT2I  --2 g sodium diet, 1800 cc fluid restriction. Daily weights,  Coronary artery calcifications, mild noted on CTA 6/21/22  on aspirin, statin  An echo July 2024 shows an EF " of 50% with hypokinetic inferolateral and apical lateral segments.  These appear to be new compared to last year  Abnormal EKG: RBBB.  LAHB, bifascicular block  Valvular heart disease  Mild to moderate aortic regurgitation  Mild to moderate aortic stenosis  Hypertension, essential.  .84  on a low-dose loop diuretic  Hyperlipidemia, on atorvastatin 10 mg daily.  Can check a direct LDL with next labs   Latest Reference Range & Units 04/14/22 18:37 05/16/24 12:22   Cholesterol See Comment mg/dL 154 118   Triglycerides See Comment mg/dL 98 51   HDL >=50 mg/dL 85 73   LDL Calculated 0 - 100 mg/dL 49 35   Type 2 diabetes mellitus with gastroparesis.  Hemoglobin A1c 7.6  CKD 3 b, baseline creatinine  1.4-1.6   Hx of frequent falls/DJD / bilateral hip arthroplasties   Cardiac testing  TTE 4/15/222. EF 50%.  Mild global hypokinesis.  No LVH.  Mild-to-moderate AI.  Mild-to-moderate MR.  Mild TR.  Small to moderate left pleural effusion  SPECT 4/15/2022: Ordered, not completed.  TTE 2/21/2023.  EF 55%.  Wall motion normal.  Grade 1 DD.  Mild LAE.  Mild AI.  Mild aortic stenosis.  Mild MR.  Moderate TR.  Mild pulmonary regurgitation.  TTE 7/31/24. EF 50%.  Grade 1 DD. The following segments are hypokinetic: basal inferolateral, mid inferolateral and apical lateral.  Mild to moderate AI.  Mild to moderate aortic stenosis.  SHEEBA 1.63 cm².  Mean gradient 8. mild to moderate MR.  Mild TR.                    REINALDO Stephen is an 88 yo female with diabetes mellitus, essential hypertension, mixed dyslipidemia.      Adm 4/14-4/15/22  CC:  Chest pain, atypical  .  Chest x-ray: Mild vascular congestion with left lower lobe atelectasis versus effusion  EKG: NSR. NSSTTW changes possible inferior ischemia, right bundle branch block  Troponins - normal  Dx:  Acute heart failure with preserved ejection fraction   Given low-dose IV diuretics  Discharge diuretic torsemide 10 mg daily  Discharge weight : 129 lb  discharge  creatinine 1.30  An outpatient Nuclear stress test was ordered    She established cardiology care with Dr. Portillo following an admission 4/18/22 for atypical, sharp chest pain, worse with rotation and movement.  She has had no recurrence.  A conservative approach was recommended.  He decided to hold off on stress testing.  Her blood pressure was well controlled, she was maintained on aspirin and initiated on statin.  Follow-up was recommended in 3 months     ER Adm 6/21  Chief complaint:  Chest pain, radiating down her left arm.  Headache  CTH: No acute  EKG normal sinus rhythm   Chest x-ray superimposed edema, interstitial edema  Creatinine 1.1  ProBNP 189   Troponin is negative  Elevated D-dimer  CTA: No PE noted within the limits of the test, limited due to motion artifact.  Mild coronary artery calcification noted  Given 250 mL of IV fluid  Headache resolved, chest pain resolved  Discharged on aspirin, statin, beta-blocker, ACE-I, torsemide 10 mg daily  Advised follow-up with Cardiology    6/22/22 phone call from PCP.  Patient taking torsemide 10 mg 3 times a week    Adm 6/24-6/27/22  CC: mental status change-Incoherent speech reported by her  who found her on the floor at the bedside after a falling while trying to get onto her bed  Found to have VICENTE/creatinine 1.68.  Lower extremity cellulitis.  Dehydration  CTH : No acute.  EKG: Normal sinus rhythm right bundle branch block nonspecific ST T-wave changes, consider inferior ischemia 95 bpm  She did have hypotension, meds adjusted  BC neg after 5 d  Diuretic held.  Treated with antibiotics for LE cellulitis  Followed by geriatrics  Due to concern for polypharmacy, metformin and lisinopril discontinued.  Amitriptyline dose adjusted  Dx TME  PT OT recommended rehab; discharged home with VNA  Discharge diuretic: torsemide 10 mg Monday/ Wednesday/ Friday , metoprolol succinate 12.5 mg daily  Discharge creatinine 1.19  Discharge weight 128 lb    7/5/22.  VNA  note:  Low BP 92/58, 80/50.  Patient is asymptomatic  Directed by Cardiology to hold torsemide     7/7/22  Hospital follow-up.  She is accompanied by her , family  ROS:  Weak, fatigued.  Low back pain, chronic.  Ambulatory dysfunction, in a wheelchair.  Today, she denied chest pain.  Her  reports she has not been complaining of chest pain at home.  Her p.o. Intake is down.  She drinks very little fluid/food.  She does eat pretty well for dinner her  reports.  Clinically she appears dehydrated  I recommend torsemide only p.r.n..  She likely will not be needing that.  Encouraged 60 oz fluid a day including boost/Ensure for protein  Her EKG showed sinus rhythm with PVCs in a ventricular bigeminy, heart rate 81  I would recommend continuing metoprolol succinate 12.5 mg daily  Focus today-- increase her fluid and protein  If she is not eating, or drinking and her clinical status deteriorates, she may need further evaluation in the ED for failure to thrive    7/12/22. Note from PT  Patient fell onto the floor.  Hit head-on sulfa and hit her left side.    She fell a few days prior  Refused to go to the ED  BP 90/50 heart rate 66   only had a cup of coffee today     has water though unknown how much patient is drinking  patient states she is trying to eat or drink       8/15/22  Close follow up.  I am seeing her given scheduling constraints, in lieu of her cardiologist  Since the last visit she has been eating and drinking more.  Her blood pressures have been up.  Her torsemide is p.r.n. only.  She has not needed to take any.  She has had no falls.  Her  tells me her rheumatologist recommended she rely on the walker more than the cane.  Today, she is with the cane.  We had a long discussion about the need for fall prevention.  She is going to physical therapy twice a week to enhance mobility.  She is also scheduled for a pain injection next month.  She has right-sided sciatica  111/62 automated,  by me  122/58  By the MA  Her weight is up to 131 lb however I feel this is caloric weight.  She is euvolemic on exam    Interval history  2/24/2023 ADM   chief complaint: Chest pain across the whole chest onset a few hours prior to arrival. Denies radiation, SOB, palpitations  Troponin 82, 74, 74  EKG- NSR 95, old RBBB  Patient desaturated to 88% on room air on admission.   Chest Xray- Increasing bibasilar consolidations probably represent atelectasis related to hypoventilation unless there is compelling clinical evidence for pneumonia. Trace bilateral pleural effusions  Treated for pneumonia as well as a UTI  Recommended rehab, patient was taken home, per her       6/27/23 OV Dr Portillo  Per his note:   Overall since the last appointment she has been doing well.  She continues to remain active doing small jobs around the house.  She is limited by lower extremity osteoarthritis and uses a cane.  Chest pain is gone away.  Denies any shortness of breath.  There is been no palpitations, lightheadedness, dizziness, or syncope.  She has been taking her medications as prescribed.       Overall she has been doing well since her last appointment.  She is mainly limited by some underlying orthopedic issues.  Blood pressure is well controlled lipids have been doing well blood work was recently reviewed and is stable.  Raynaud's has been well controlled no ulcerations.  Functional capacity has been good for 88 years old.  She is not due for any testing I will see her back in 12 months.         ADM 7/30 - 8/4/2024  CC: worsening dyspnea, 4 pillow orthopnea, worsening lower extremity edema x 2 weeks,  beginning after vacation in [place] with her    Her symptoms worsened suddenly the evening before  Not on torsemide or Lasix as an outpatient, per her    Patient was on torsemide prn and metoprolol at home in the past, however she has not been taking these medications since more than a year now.   HS  troponin 17, 15 delta: -2    EKG: Normal sinus rhythm.  PACs.  ST segment changes inferiorly, similar to prior in 2023  CT chest showed moderate to large bilateral pleural effusion with pulmonary edema  Echo: Preserved LV function, valvular heart disease, mild to moderate AS, AI; WMA: hypokinetic changes: basal inferolateral, mid inferolateral and apical lateral.    Dx: Acute on chronic HFpEF, responded well to diuresis  Repeat cxr shows improvement, after diuresis  She was evaluated by pulmonology who suspected pleural effusions were related to heart failure  Not followed by cardiology  Advised to weigh daily and to take an extra dose of lasix if she gains > 3 lbs   Dc wt: 126 pound  Dc cr: 1.46  Dc diuretic: Lasix 20 mg daily      8/7/24  Hospital follow up  PMH: Mild coronary artery calcifications on CT, chronic HFpEF, HTN, HLD, DM2, Raynaud's, chronic back pain, overactive bladder    She presents with her  and daughter    120/68  OV wt today  123 .  Similar at home  Cr now handing around 1.4 with the diuretic  She decompensated significantly and was deconditioned in the hospital.  She is now getting home PT and is doing better.  Therapy is coming in the home.  She is using a walker.  Her weight is down.  Family is trying to here to salt restricted diet.  On exam she appears very frail.  She appears fatigued.  She has a murmur of aortic stenosis, and 1+ bilateral lower extremity edema.  Breath sounds are diminished but clear  Will continue the current plan, with low-dose Lasix 20 mg daily.  Her blood pressure does not support the addition of Toprol at this time.  Will check labs in 5 days.  I will see her back in 2 weeks.  Advised family to call if they need something sooner      8/21/24  Close cardiology follow-up.  She is accompanied by her  and her daughter  PMH:Mild coronary artery calcifications on CT, chronic HFpEF, HTN, HLD, DM2, Raynaud's, chronic back pain, overactive bladder    ROS:  "She tells me she is \"doing okay. Never complains about breathing- just c/O back pain    OV wt 128 .  At home today 127.4.  yesterday 127.  The day before 121?.  Benchmark dry wt is 122 lb    /73  8/17:  cr 1.43 BUN 25 K 4.0    On Lasix 20 mg/ d  No BB, given BP     Ef 50 + WMA.  Mild -mod AS. Mod MR  On exam she appears fatigued.  She has a few bibasilar crackles.  A murmur of AAS is present.  She has 2+ bilateral lower extremity edema.  Her weight is up about 5 pounds  Her  will give her an extra dose of Lasix 20 mg for 2 to 3 days in a row, until she achieves a dry weight of 123.  They will increase potassium in the diet  Nonfasting labs in 3 weeks  They were advised to call me if she has any increasing interval worsening symptoms.      Adm 8/30-9/2/24  Change in MS: TME  Dx likely 2/2 UTI  DCd on Abx  Felt to be euvolemic  Dc diuretic: Lasix 20 mg/d  Wt: 122 lb        9/4/24  OV Geriatrics, given Mild cognitive impairment  Polypharmacy  Meds adjusted        9/9/24  PMH: Mild coronary artery calcifications on CT, chronic HFpEF, HTN, HLD, DM2, Raynaud's, chronic back pain, overactive bladder  One hosp adm for ADHF this year 7/30-8/4/24    Cardiology follow-up.  She is accompanied yes Latisha she is primarily wearing by her  and her daughter.  She is in a wheelchair.  Exam to the room, she has marked ecchymosis of her face/head, and more localized ecchymosis about the left periorbital area    Her daughter tells me she had a spinal injection not long ago for chronic back pain.  After this, her pain had improved and she felt like she could do more activities at home.  2 days ago, she was working with a wooden end table.  She fell back against the end table.  She did hit her head.  There is no report of LOC.  The patient is able to tell me about this fall.  She did not go to the emergency room.    The patient reports \"I hurt all over\".  She tells me she does have a headache.  She reports blurry vision " of the left eye.  Briefly, her heart tones are regular.  Respirations are unlabored.  She is in no acute distress.  She appears tired.  Her mouth is dry  I recommend she be evaluated in the ED.      The patient and her  and her daughter are all in agreement.  Our staff will take her by wheelchair        Assessment  Diagnoses and all orders for this visit:    Fall, initial encounter    Chronic diastolic (congestive) heart failure (HCC)    Type 2 diabetes mellitus with stage 3b chronic kidney disease, without long-term current use of insulin ([facility])    Abnormality of gait due to impairment of balance    Mild cognitive impairment                Past Medical History:   Diagnosis Date    Anemia     Arthritis     Back pain     CHF (congestive heart failure) ([facility])     Chronic kidney disease     Stage 3b    Confusion 02/22/2023    Diabetes ([facility])     Diabetes mellitus ([facility])     Diabetic gastroparesis associated with type 2 diabetes mellitus  ([facility])     Diabetic polyneuropathy ([facility])     Diverticulosis     Herpes zoster     Hypertension     IBS (irritable bowel syndrome)     Neurogenic claudication due to lumbar spinal stenosis     Osteoarthritis     Osteoporosis     Pulmonary edema     Raynaud's phenomenon without gangrene     Seronegative arthropathy of multiple sites ([facility])     Sicca ([facility])        Review of Systems   Constitutional: Positive for malaise/fatigue. Negative for chills.   Eyes:  Positive for blurred vision.   Cardiovascular:  Negative for chest pain, claudication, cyanosis, dyspnea on exertion, irregular heartbeat, leg swelling, near-syncope, orthopnea, palpitations, paroxysmal nocturnal dyspnea and syncope.   Respiratory:  Negative for cough and shortness of breath.    Musculoskeletal:  Negative for back pain.        + Gait dysfunction   Gastrointestinal:  Negative for heartburn and nausea.   Neurological:  Positive for headaches. Negative for dizziness, focal weakness, light-headedness and weakness.   All  other systems reviewed and are negative.      Allergies   Allergen Reactions    Morphine Other (See Comments)     urinary retention    Ciprofloxacin Nausea Only and Rash     .    Current Outpatient Medications:     acetaminophen (TYLENOL) 325 mg tablet, Take 2 tablets (650 mg total) by mouth 3 (three) times a day, Disp: 540 tablet, Rfl: 3    aspirin 81 mg chewable tablet, Chew 1 tablet (81 mg total) daily, Disp: 30 tablet, Rfl: 0    atorvastatin (LIPITOR) 10 mg tablet, TAKE ONE TABLET BY MOUTH EVERY DAY, Disp: 90 tablet, Rfl: 3    Cholecalciferol (VITAMIN D3) 1000 units CAPS, Take 1 capsule by mouth daily , Disp: , Rfl:     diphenoxylate-atropine (LOMOTIL) 2.5-0.025 mg per tablet, Take 1 tablet by mouth if needed, Disp: , Rfl:     DULoxetine (CYMBALTA) 30 mg delayed release capsule, Take 1 capsule (30 mg total) by mouth daily, Disp: 30 capsule, Rfl: 0    furosemide (LASIX) 20 mg tablet, One tablet daily and an extra 20 mg prn for wt gain 3 lb/ 24 hours or 5 lb/ 5 days, above a dry wt of 122 lb, Disp: 90 tablet, Rfl: 3    hydroxychloroquine (PLAQUENIL) 200 mg tablet, Take 1 tablet (200 mg total) by mouth daily with breakfast, Disp: 90 tablet, Rfl: 1    Magnesium 250 MG TABS, Take 250 mg by mouth every evening, Disp: , Rfl:     Mirabegron ER (Myrbetriq) 50 MG TB24, Take 1 tablet by mouth daily as directed by physician., Disp: 90 tablet, Rfl: 1    pregabalin (LYRICA) 75 mg capsule, Take 1 capsule (75 mg total) by mouth 2 (two) times a day, Disp: 60 capsule, Rfl: 5    pyridoxine (B-6) 100 MG tablet, Take 100 mg by mouth daily, Disp: , Rfl:     Sodium Fluoride (PreviDent 5000 Booster Plus) 1.1 % PSTE, Apply on teeth every evening as directed, Disp: 100 mL, Rfl: 3    Current Facility-Administered Medications:     denosumab (PROLIA) subcutaneous injection 60 mg, 60 mg, Subcutaneous, Q6 Months, , 60 mg at 05/29/24 1401        Social History     Socioeconomic History    Marital status: /Civil Union     Spouse name:  Weston    Number of children: 2    Years of education: Not on file    Highest education level: High school graduate   Occupational History    Not on file   Tobacco Use    Smoking status: Former     Current packs/day: 0.00     Types: Cigarettes     Quit date:      Years since quittin.7    Smokeless tobacco: Never   Vaping Use    Vaping status: Never Used   Substance and Sexual Activity    Alcohol use: No    Drug use: No    Sexual activity: Not Currently     Partners: Male     Birth control/protection: None   Other Topics Concern    Not on file   Social History Narrative    Not on file     Social Determinants of Health     Financial Resource Strain: Not on file   Food Insecurity: No Food Insecurity (2024)    Hunger Vital Sign     Worried About Running Out of Food in the Last Year: Never true     Ran Out of Food in the Last Year: Never true   Transportation Needs: No Transportation Needs (2024)    PRAPARE - Transportation     Lack of Transportation (Medical): No     Lack of Transportation (Non-Medical): No   Physical Activity: Not on file   Stress: Not on file   Social Connections: Not on file   Intimate Partner Violence: Not on file   Housing Stability: Low Risk  (2024)    Housing Stability Vital Sign     Unable to Pay for Housing in the Last Year: No     Number of Times Moved in the Last Year: 0     Homeless in the Last Year: No       Family History   Problem Relation Age of Onset    Cancer Brother     Diabetes Mother     Hypertension Father     Heart disease Father        Physical Exam  Vitals and nursing note reviewed.   Constitutional:       General: She is not in acute distress.     Appearance: She is ill-appearing. She is not diaphoretic.   HENT:      Head: Normocephalic and atraumatic.   Eyes:      Conjunctiva/sclera: Conjunctivae normal.      Comments: Marked facial bruising, periorbital ecchymosis particularly about the left eye   Cardiovascular:      Rate and Rhythm: Normal rate and  "regular rhythm.      Pulses: Intact distal pulses.      Heart sounds: Murmur heard.      Harsh midsystolic murmur is present with a grade of 2/6 at the upper right sternal border radiating to the neck.   Pulmonary:      Effort: Pulmonary effort is normal.      Breath sounds: Normal breath sounds.   Abdominal:      General: Bowel sounds are normal.      Palpations: Abdomen is soft.   Musculoskeletal:         General: Normal range of motion.      Cervical back: Normal range of motion and neck supple.      Right lower leg: No edema.      Left lower leg: No edema.   Skin:     General: Skin is warm and dry.      Findings: Bruising present.   Neurological:      Mental Status: She is alert. Mental status is at baseline.         Vitals: Blood pressure 118/64, pulse 84, height 4' 11\" (1.499 m), weight 55 kg (121 lb 3.2 oz), not currently breastfeeding.   Wt Readings from Last 3 Encounters:   09/11/24 55 kg (121 lb 3.2 oz)   09/04/24 56.1 kg (123 lb 9.6 oz)   09/01/24 54.9 kg (121 lb 0.5 oz)         Labs & Results:  Lab Results   Component Value Date    WBC 6.48 09/01/2024    HGB 11.0 (L) 09/01/2024    HCT 34.8 09/01/2024    MCV 93 09/01/2024     (L) 09/01/2024     BNP   Date Value Ref Range Status   08/30/2024 390 (H) 0 - 100 pg/mL Final   07/30/2024 213 (H) 0 - 100 pg/mL Final   02/21/2023 153 (H) 0 - 100 pg/mL Final     No components found for: \"CHEM\"  Total CK   Date Value Ref Range Status   01/07/2017 81 26 - 192 U/L Final     Troponin I   Date Value Ref Range Status   09/19/2021 0.02 <=0.04 ng/mL Final     Comment:     [employer] Chemistry analyzer 99% cutoff is > 0.04 ng/mL in network labs     o cTnI 99% cutoff is useful only when applied to patients in the clinical setting of myocardial ischemia   o cTnI 99% cutoff should be interpreted in the context of clinical history, ECG findings and possibly cardiac imaging to establish correct diagnosis.   o cTnI 99% cutoff may be suggestive but clearly not indicative of a " coronary event without the clinical setting of myocardial ischemia.       Results for orders placed during the hospital encounter of 16    Echo complete with contrast if indicated    [facility]  [address], [place] [id number] [phone]    Transthoracic Echocardiogram  2D, M-mode, Doppler, and Color Doppler    Study date:  23-Dec-2016    Patient: LIANA PARTIDA  MR number: [id number]  Account number: [id number]  : 1934  Age: 82 years  Gender: Female  Status: Inpatient  Location: Bedside  Height: 59 in  Weight: 155.8 lb  BP: 131/ 65 mmHg    Indications: Shortness of breath.    Diagnoses: R06.02 - Shortness of breath    Sonographer:  STORM Ghosh  Primary Physician:  Gregory Santoro DO  Referring Physician:  Joao Mendiola MD MPH  Group:  [facility]  Interpreting Physician:  Dayday Gómez MD    SUMMARY    LEFT VENTRICLE:  Systolic function was normal. Ejection fraction was estimated to be 60 %.  There were no regional wall motion abnormalities.  There was mild concentric hypertrophy.  Doppler parameters were consistent with abnormal left ventricular relaxation  (grade 1 diastolic dysfunction).    AORTIC VALVE:  There was mild regurgitation.    HISTORY: PRIOR HISTORY: hypertension, diabetes, CHF    PROCEDURE: The procedure was performed at the bedside. This was a routine  study. The transthoracic approach was used. The study included complete 2D  imaging, M-mode, complete spectral Doppler, and color Doppler. Image quality  was adequate.    LEFT VENTRICLE: Size was normal. Systolic function was normal. Ejection  fraction was estimated to be 60 %. There were no regional wall motion  abnormalities. Wall thickness was mildly increased. There was mild concentric  hypertrophy. DOPPLER: There was an increased relative contribution of atrial  contraction to ventricular filling. Doppler parameters were consistent with  abnormal left  ventricular relaxation (grade 1 diastolic dysfunction).    RIGHT VENTRICLE: The size was normal. Systolic function was normal. Wall  thickness was normal.    LEFT ATRIUM: Size was normal.    RIGHT ATRIUM: Size was normal.    MITRAL VALVE: Valve structure was normal. There was normal leaflet separation.  DOPPLER: The transmitral velocity was within the normal range. There was no  evidence for stenosis. There was trace regurgitation.    AORTIC VALVE: The valve was trileaflet. Leaflets exhibited normal thickness and  normal cuspal separation. DOPPLER: Transaortic velocity was within the normal  range. There was no evidence for stenosis. There was mild regurgitation.    TRICUSPID VALVE: The valve structure was normal. There was normal leaflet  separation. DOPPLER: The transtricuspid velocity was within the normal range.  There was no evidence for stenosis. There was trace regurgitation. Pulmonary  artery systolic pressure was within the normal range. Estimated peak PA  pressure was 18 mmHg.    PULMONIC VALVE: Leaflets exhibited normal thickness, no calcification, and  normal cuspal separation. DOPPLER: The transpulmonic velocity was within the  normal range. There was trace regurgitation.    PERICARDIUM: There was no pericardial effusion. The pericardium was normal in  appearance.    AORTA: The root exhibited normal size.    SYSTEMIC VEINS: IVC: The inferior vena cava was normal in size. Respirophasic  changes were normal.    SYSTEM MEASUREMENT TABLES    2D  %FS: 27.29 %  AV Diam: 2.58 cm  EDV(Teich): 57.32 ml  EF(Cube): 61.56 %  EF(Teich): 53.96 %  ESV(Cube): 19.13 ml  ESV(Teich): 26.39 ml  IVSd: 1.31 cm  LA Area: 12.85 cm2  LA Diam: 3.81 cm  LVEDV MOD A4C: 54.12 ml  LVEF MOD A4C: 40.47 %  LVESV MOD A4C: 32.22 ml  LVIDd: 3.68 cm  LVIDs: 2.67 cm  LVLd A4C: 7.13 cm  LVLs A4C: 6.02 cm  LVPWd: 1.36 cm  RA Area: 9.74 cm2  RV Diam: 2.74 cm  SI(Cube): 18.46 ml/m2  SI(Teich): 18.63 ml/m2  SV MOD A4C: 21.9 ml  SV(Cube): 30.64  ml  SV(Teich): 30.93 ml    CW  AR Dec Bureau: 2.39 m/s2  AR Dec Time: 1677.2 ms  AR PHT: 486.39 ms  AR Vmax: 4 m/s  AR maxP.14 mmHg  TR MaxP.41 mmHg  TR Vmax: 2.03 m/s    MM  TAPSE: 1.64 cm    PW  E': 0.09 m/s    [facility] Acc[facility]    Prepared and electronically signed by    Dayday Gómez MD  Signed 23-Dec-2016 11:56:38    No results found for this or any previous visit.      This note was completed in part utilizing [employer] direct voice recognition software.   Grammatical errors, random word insertion, spelling mistakes, and incomplete sentences may be an occasional consequence of the system secondary to software limitations, ambient noise and hardware issues. At the time of dictation, efforts were made to edit, clarify and /or correct errors.  Please read the chart carefully and recognize, using context, where substitutions have occurred.  If you have any questions or concerns about the context, text or information contained within the body of this dictation, please contact myself, the provider, for further clarification

## 2024-09-11 NOTE — DISCHARGE INSTRUCTIONS
Please follow-up with primary care provider within 1 week.    An incidental finding was found on your chest CT.  There is an 8 mm collection in your lung that is new from your last CT.  We recommend following up with your primary care doctor and a repeat CT scan in approximately 6 months.    Please return if you develop fevers or chills, chest pain, trouble breathing, increasing pain that cannot be managed with Tylenol.

## 2024-09-11 NOTE — LETTER
"September 11, 2024     Gregory Santoro DO  [address] [place] [id number]    Patient: Tanya Stephen   YOB: 1934   Date of Visit: 9/11/2024       Dear Dr. Santoro:    Thank you for referring Tanya Stephen to me for evaluation. Below are my notes for this consultation.    If you have questions, please do not hesitate to call me. I look forward to following your patient along with you.         Sincerely,        JOSE G Valladares        CC: DO Joselyn Keith CRNP  9/11/2024  4:06 PM  Sign when Signing Visit  Cardiology   Follow Up   Office Visit Note  Tanya Stephen   89 y.o.   female   MRN: [id number]  [facility] [place]  [address] [place] [address]  [address]  [facility] [address]  [phone]    PCP: Gregory Santoro DO  Cardiologist: Dr. Portillo              Summary of recommendations  I recommend she be evaluated in the ED for a fall with head trauma 2 days ago.  She complains of a headache and blurred vision of the right eye.  She complains of pain \"all over\". she is on ASA 81 mg daily; no anticoagulation. The pt her  and her daughter who are with her now, are agreeable.    Our staff will take her to the [place] ED      Assessment/ Plan  Mechanical fall 2 days ago.  No reported LOC.  Positive head injury.  Positive headache.  Positive right blurred vision.  She is on aspirin 81 daily.  No anticoagulation.  I recommend she be evaluated in the ED.  She and her  who are present, are agreeable.  Our staff will take her   chronic HFpEF.    Recent admission for decompensation 7/30 - 8/4/2025, likely secondary to sodium dietary indiscretion while she was on vacation, in the setting of valvular heart disease and new wall motion abnormalities  Echo 7/31/2024: EF 50% with WMA: Hypokinetic: basal inferolateral, mid inferolateral and apical lateral.  These  WMA do not appear to have been present 2/21/2023.  Will treat " medically.  Dry weight 122 pound  Wt Readings from Last 3 Encounters:   09/11/24 55 kg (121 lb 3.2 oz)   09/04/24 56.1 kg (123 lb 9.6 oz)   09/01/24 54.9 kg (121 lb 0.5 oz)     -beta-blocker: Now off; BP precludes  --Diuretic:  now back on Lasix 20 mg/d.  She can take an extra dose of Lasix 20 mg as needed for weight gain 3 pounds in 24 hours or 5 pounds in 5 days.    --ACE/ARB/ARNI:    --MRA:   --SLGT2I  --2 g sodium diet, 1800 cc fluid restriction. Daily weights,  Coronary artery calcifications, mild noted on CTA 6/21/22  on aspirin, statin  An echo July 2024 shows an EF of 50% with hypokinetic inferolateral and apical lateral segments.  These appear to be new compared to last year  Abnormal EKG: RBBB.  LAHB, bifascicular block  Valvular heart disease  Mild to moderate aortic regurgitation  Mild to moderate aortic stenosis  Hypertension, essential.  .84  on a low-dose loop diuretic  Hyperlipidemia, on atorvastatin 10 mg daily.  Can check a direct LDL with next labs   Latest Reference Range & Units 04/14/22 18:37 05/16/24 12:22   Cholesterol See Comment mg/dL 154 118   Triglycerides See Comment mg/dL 98 51   HDL >=50 mg/dL 85 73   LDL Calculated 0 - 100 mg/dL 49 35   Type 2 diabetes mellitus with gastroparesis.  Hemoglobin A1c 7.6  CKD 3 b, baseline creatinine  1.4-1.6   Hx of frequent falls/DJD / bilateral hip arthroplasties   Cardiac testing  TTE 4/15/222. EF 50%.  Mild global hypokinesis.  No LVH.  Mild-to-moderate AI.  Mild-to-moderate MR.  Mild TR.  Small to moderate left pleural effusion  SPECT 4/15/2022: Ordered, not completed.  TTE 2/21/2023.  EF 55%.  Wall motion normal.  Grade 1 DD.  Mild LAE.  Mild AI.  Mild aortic stenosis.  Mild MR.  Moderate TR.  Mild pulmonary regurgitation.  TTE 7/31/24. EF 50%.  Grade 1 DD. The following segments are hypokinetic: basal inferolateral, mid inferolateral and apical lateral.  Mild to moderate AI.  Mild to moderate aortic stenosis.  SHEEBA 1.63 cm².  Mean gradient 8.  mild to moderate MR.  Mild TR.                    HPI  Tanya Stephen is an 88 yo female with diabetes mellitus, essential hypertension, mixed dyslipidemia.      Adm 4/14-4/15/22  CC:  Chest pain, atypical  .  Chest x-ray: Mild vascular congestion with left lower lobe atelectasis versus effusion  EKG: NSR. NSSTTW changes possible inferior ischemia, right bundle branch block  Troponins - normal  Dx:  Acute heart failure with preserved ejection fraction   Given low-dose IV diuretics  Discharge diuretic torsemide 10 mg daily  Discharge weight : 129 lb  discharge creatinine 1.30  An outpatient Nuclear stress test was ordered    She established cardiology care with Dr. Portillo following an admission 4/18/22 for atypical, sharp chest pain, worse with rotation and movement.  She has had no recurrence.  A conservative approach was recommended.  He decided to hold off on stress testing.  Her blood pressure was well controlled, she was maintained on aspirin and initiated on statin.  Follow-up was recommended in 3 months     ER Adm 6/21  Chief complaint:  Chest pain, radiating down her left arm.  Headache  CTH: No acute  EKG normal sinus rhythm   Chest x-ray superimposed edema, interstitial edema  Creatinine 1.1  ProBNP 189   Troponin is negative  Elevated D-dimer  CTA: No PE noted within the limits of the test, limited due to motion artifact.  Mild coronary artery calcification noted  Given 250 mL of IV fluid  Headache resolved, chest pain resolved  Discharged on aspirin, statin, beta-blocker, ACE-I, torsemide 10 mg daily  Advised follow-up with Cardiology    6/22/22 phone call from PCP.  Patient taking torsemide 10 mg 3 times a week    Adm 6/24-6/27/22  CC: mental status change-Incoherent speech reported by her  who found her on the floor at the bedside after a falling while trying to get onto her bed  Found to have VICENTE/creatinine 1.68.  Lower extremity cellulitis.  Dehydration  CTH : No acute.  EKG: Normal  sinus rhythm right bundle branch block nonspecific ST T-wave changes, consider inferior ischemia 95 bpm  She did have hypotension, meds adjusted  BC neg after 5 d  Diuretic held.  Treated with antibiotics for LE cellulitis  Followed by geriatrics  Due to concern for polypharmacy, metformin and lisinopril discontinued.  Amitriptyline dose adjusted  Dx TME  PT OT recommended rehab; discharged home with VNA  Discharge diuretic: torsemide 10 mg Monday/ Wednesday/ Friday , metoprolol succinate 12.5 mg daily  Discharge creatinine 1.19  Discharge weight 128 lb    7/5/22.  VNA note:  Low BP 92/58, 80/50.  Patient is asymptomatic  Directed by Cardiology to hold torsemide     7/7/22  Hospital follow-up.  She is accompanied by her , family  ROS:  Weak, fatigued.  Low back pain, chronic.  Ambulatory dysfunction, in a wheelchair.  Today, she denied chest pain.  Her  reports she has not been complaining of chest pain at home.  Her p.o. Intake is down.  She drinks very little fluid/food.  She does eat pretty well for dinner her  reports.  Clinically she appears dehydrated  I recommend torsemide only p.r.n..  She likely will not be needing that.  Encouraged 60 oz fluid a day including boost/Ensure for protein  Her EKG showed sinus rhythm with PVCs in a ventricular bigeminy, heart rate 81  I would recommend continuing metoprolol succinate 12.5 mg daily  Focus today-- increase her fluid and protein  If she is not eating, or drinking and her clinical status deteriorates, she may need further evaluation in the ED for failure to thrive    7/12/22. Note from PT  Patient fell onto the floor.  Hit head-on sulfa and hit her left side.    She fell a few days prior  Refused to go to the ED  BP 90/50 heart rate 66   only had a cup of coffee today     has water though unknown how much patient is drinking  patient states she is trying to eat or drink       8/15/22  Close follow up.  I am seeing her given scheduling  constraints, in lieu of her cardiologist  Since the last visit she has been eating and drinking more.  Her blood pressures have been up.  Her torsemide is p.r.n. only.  She has not needed to take any.  She has had no falls.  Her  tells me her rheumatologist recommended she rely on the walker more than the cane.  Today, she is with the cane.  We had a long discussion about the need for fall prevention.  She is going to physical therapy twice a week to enhance mobility.  She is also scheduled for a pain injection next month.  She has right-sided sciatica  111/62 automated, by me  122/58  By the MA  Her weight is up to 131 lb however I feel this is caloric weight.  She is euvolemic on exam    Interval history  2/24/2023 ADM   chief complaint: Chest pain across the whole chest onset a few hours prior to arrival. Denies radiation, SOB, palpitations  Troponin 82, 74, 74  EKG- NSR 95, old RBBB  Patient desaturated to 88% on room air on admission.   Chest Xray- Increasing bibasilar consolidations probably represent atelectasis related to hypoventilation unless there is compelling clinical evidence for pneumonia. Trace bilateral pleural effusions  Treated for pneumonia as well as a UTI  Recommended rehab, patient was taken home, per her       6/27/23 OV Dr Portillo  Per his note:   Overall since the last appointment she has been doing well.  She continues to remain active doing small jobs around the house.  She is limited by lower extremity osteoarthritis and uses a cane.  Chest pain is gone away.  Denies any shortness of breath.  There is been no palpitations, lightheadedness, dizziness, or syncope.  She has been taking her medications as prescribed.       Overall she has been doing well since her last appointment.  She is mainly limited by some underlying orthopedic issues.  Blood pressure is well controlled lipids have been doing well blood work was recently reviewed and is stable.  Raynaud's has been well  controlled no ulcerations.  Functional capacity has been good for 88 years old.  She is not due for any testing I will see her back in 12 months.         ADM 7/30 - 8/4/2024  CC: worsening dyspnea, 4 pillow orthopnea, worsening lower extremity edema x 2 weeks,  beginning after vacation in [place] with her    Her symptoms worsened suddenly the evening before  Not on torsemide or Lasix as an outpatient, per her    Patient was on torsemide prn and metoprolol at home in the past, however she has not been taking these medications since more than a year now.   HS troponin 17, 15 delta: -2    EKG: Normal sinus rhythm.  PACs.  ST segment changes inferiorly, similar to prior in 2023  CT chest showed moderate to large bilateral pleural effusion with pulmonary edema  Echo: Preserved LV function, valvular heart disease, mild to moderate AS, AI; WMA: hypokinetic changes: basal inferolateral, mid inferolateral and apical lateral.    Dx: Acute on chronic HFpEF, responded well to diuresis  Repeat cxr shows improvement, after diuresis  She was evaluated by pulmonology who suspected pleural effusions were related to heart failure  Not followed by cardiology  Advised to weigh daily and to take an extra dose of lasix if she gains > 3 lbs   Dc wt: 126 pound  Dc cr: 1.46  Dc diuretic: Lasix 20 mg daily      8/7/24  Hospital follow up  PMH: Mild coronary artery calcifications on CT, chronic HFpEF, HTN, HLD, DM2, Raynaud's, chronic back pain, overactive bladder    She presents with her  and daughter    120/68  OV wt today  123 .  Similar at home  Cr now handing around 1.4 with the diuretic  She decompensated significantly and was deconditioned in the hospital.  She is now getting home PT and is doing better.  Therapy is coming in the home.  She is using a walker.  Her weight is down.  Family is trying to here to salt restricted diet.  On exam she appears very frail.  She appears fatigued.  She has a  "murmur of aortic stenosis, and 1+ bilateral lower extremity edema.  Breath sounds are diminished but clear  Will continue the current plan, with low-dose Lasix 20 mg daily.  Her blood pressure does not support the addition of Toprol at this time.  Will check labs in 5 days.  I will see her back in 2 weeks.  Advised family to call if they need something sooner      8/21/24  Close cardiology follow-up.  She is accompanied by her  and her daughter  PMH:Mild coronary artery calcifications on CT, chronic HFpEF, HTN, HLD, DM2, Raynaud's, chronic back pain, overactive bladder    ROS: She tells me she is \"doing okay. Never complains about breathing- just c/O back pain    OV wt 128 .  At home today 127.4.  yesterday 127.  The day before 121?.  Benchmark dry wt is 122 lb    /73  8/17:  cr 1.43 BUN 25 K 4.0    On Lasix 20 mg/ d  No BB, given BP     Ef 50 + WMA.  Mild -mod AS. Mod MR  On exam she appears fatigued.  She has a few bibasilar crackles.  A murmur of AAS is present.  She has 2+ bilateral lower extremity edema.  Her weight is up about 5 pounds  Her  will give her an extra dose of Lasix 20 mg for 2 to 3 days in a row, until she achieves a dry weight of 123.  They will increase potassium in the diet  Nonfasting labs in 3 weeks  They were advised to call me if she has any increasing interval worsening symptoms.      Adm 8/30-9/2/24  Change in MS: TME  Dx likely 2/2 UTI  DCd on Abx  Felt to be euvolemic  Dc diuretic: Lasix 20 mg/d  Wt: 122 lb        9/4/24  OV Geriatrics, given Mild cognitive impairment  Polypharmacy  Meds adjusted        9/9/24  PMH: Mild coronary artery calcifications on CT, chronic HFpEF, HTN, HLD, DM2, Raynaud's, chronic back pain, overactive bladder  One hosp adm for ADHF this year 7/30-8/4/24    Cardiology follow-up.  She is accompanied yes Latisha she is primarily wearing by her  and her daughter.  She is in a wheelchair.  Exam to the room, she has marked ecchymosis of her " "face/head, and more localized ecchymosis about the left periorbital area    Her daughter tells me she had a spinal injection not long ago for chronic back pain.  After this, her pain had improved and she felt like she could do more activities at home.  2 days ago, she was working with a wooden end table.  She fell back against the end table.  She did hit her head.  There is no report of LOC.  The patient is able to tell me about this fall.  She did not go to the emergency room.    The patient reports \"I hurt all over\".  She tells me she does have a headache.  She reports blurry vision of the left eye.  Briefly, her heart tones are regular.  Respirations are unlabored.  She is in no acute distress.  She appears tired.  Her mouth is dry  I recommend she be evaluated in the ED.      The patient and her  and her daughter are all in agreement.  Our staff will take her by wheelchair        Assessment  Diagnoses and all orders for this visit:    Fall, initial encounter    Chronic diastolic (congestive) heart failure (HCC)    Type 2 diabetes mellitus with stage 3b chronic kidney disease, without long-term current use of insulin (HCC)    Abnormality of gait due to impairment of balance    Mild cognitive impairment                Past Medical History:   Diagnosis Date   • Anemia    • Arthritis    • Back pain    • CHF (congestive heart failure) (HCC)    • Chronic kidney disease     Stage 3b   • Confusion 02/22/2023   • Diabetes (HCC)    • Diabetes mellitus (HCC)    • Diabetic gastroparesis associated with type 2 diabetes mellitus  (HCC)    • Diabetic polyneuropathy (HCC)    • Diverticulosis    • Herpes zoster    • Hypertension    • IBS (irritable bowel syndrome)    • Neurogenic claudication due to lumbar spinal stenosis    • Osteoarthritis    • Osteoporosis    • Pulmonary edema    • Raynaud's phenomenon without gangrene    • Seronegative arthropathy of multiple sites (HCC)    • [facility])        Review of Systems "   Constitutional: Positive for malaise/fatigue. Negative for chills.   Eyes:  Positive for blurred vision.   Cardiovascular:  Negative for chest pain, claudication, cyanosis, dyspnea on exertion, irregular heartbeat, leg swelling, near-syncope, orthopnea, palpitations, paroxysmal nocturnal dyspnea and syncope.   Respiratory:  Negative for cough and shortness of breath.    Musculoskeletal:  Negative for back pain.        + Gait dysfunction   Gastrointestinal:  Negative for heartburn and nausea.   Neurological:  Positive for headaches. Negative for dizziness, focal weakness, light-headedness and weakness.   All other systems reviewed and are negative.      Allergies   Allergen Reactions   • Morphine Other (See Comments)     urinary retention   • Ciprofloxacin Nausea Only and Rash     .    Current Outpatient Medications:   •  acetaminophen (TYLENOL) 325 mg tablet, Take 2 tablets (650 mg total) by mouth 3 (three) times a day, Disp: 540 tablet, Rfl: 3  •  aspirin 81 mg chewable tablet, Chew 1 tablet (81 mg total) daily, Disp: 30 tablet, Rfl: 0  •  atorvastatin (LIPITOR) 10 mg tablet, TAKE ONE TABLET BY MOUTH EVERY DAY, Disp: 90 tablet, Rfl: 3  •  Cholecalciferol (VITAMIN D3) 1000 units CAPS, Take 1 capsule by mouth daily , Disp: , Rfl:   •  diphenoxylate-atropine (LOMOTIL) 2.5-0.025 mg per tablet, Take 1 tablet by mouth if needed, Disp: , Rfl:   •  DULoxetine (CYMBALTA) 30 mg delayed release capsule, Take 1 capsule (30 mg total) by mouth daily, Disp: 30 capsule, Rfl: 0  •  furosemide (LASIX) 20 mg tablet, One tablet daily and an extra 20 mg prn for wt gain 3 lb/ 24 hours or 5 lb/ 5 days, above a dry wt of 122 lb, Disp: 90 tablet, Rfl: 3  •  hydroxychloroquine (PLAQUENIL) 200 mg tablet, Take 1 tablet (200 mg total) by mouth daily with breakfast, Disp: 90 tablet, Rfl: 1  •  Magnesium 250 MG TABS, Take 250 mg by mouth every evening, Disp: , Rfl:   •  Mirabegron ER (Myrbetriq) 50 MG TB24, Take 1 tablet by mouth daily as  directed by physician., Disp: 90 tablet, Rfl: 1  •  pregabalin (LYRICA) 75 mg capsule, Take 1 capsule (75 mg total) by mouth 2 (two) times a day, Disp: 60 capsule, Rfl: 5  •  pyridoxine (B-6) 100 MG tablet, Take 100 mg by mouth daily, Disp: , Rfl:   •  Sodium Fluoride (PreviDent 5000 Booster Plus) 1.1 % PSTE, Apply on teeth every evening as directed, Disp: 100 mL, Rfl: 3    Current Facility-Administered Medications:   •  denosumab (PROLIA) subcutaneous injection 60 mg, 60 mg, Subcutaneous, Q6 Months, , 60 mg at 24 1401        Social History     Socioeconomic History   • Marital status: /Civil Union     Spouse name: Weston   • Number of children: 2   • Years of education: Not on file   • Highest education level: High school graduate   Occupational History   • Not on file   Tobacco Use   • Smoking status: Former     Current packs/day: 0.00     Types: Cigarettes     Quit date:      Years since quittin.7   • Smokeless tobacco: Never   Vaping Use   • Vaping status: Never Used   Substance and Sexual Activity   • Alcohol use: No   • Drug use: No   • Sexual activity: Not Currently     Partners: Male     Birth control/protection: None   Other Topics Concern   • Not on file   Social History Narrative   • Not on file     Social Determinants of Health     Financial Resource Strain: Not on file   Food Insecurity: No Food Insecurity (2024)    Hunger Vital Sign    • Worried About Running Out of Food in the Last Year: Never true    • Ran Out of Food in the Last Year: Never true   Transportation Needs: No Transportation Needs (2024)    PRAPARE - Transportation    • Lack of Transportation (Medical): No    • Lack of Transportation (Non-Medical): No   Physical Activity: Not on file   Stress: Not on file   Social Connections: Not on file   Intimate Partner Violence: Not on file   Housing Stability: Low Risk  (2024)    Housing Stability Vital Sign    • Unable to Pay for Housing in the Last Year: No    •  "Number of Times Moved in the Last Year: 0    • Homeless in the Last Year: No       Family History   Problem Relation Age of Onset   • Cancer Brother    • Diabetes Mother    • Hypertension Father    • Heart disease Father        Physical Exam  Vitals and nursing note reviewed.   Constitutional:       General: She is not in acute distress.     Appearance: She is ill-appearing. She is not diaphoretic.   HENT:      Head: Normocephalic and atraumatic.   Eyes:      Conjunctiva/sclera: Conjunctivae normal.      Comments: Marked facial bruising, periorbital ecchymosis particularly about the left eye   Cardiovascular:      Rate and Rhythm: Normal rate and regular rhythm.      Pulses: Intact distal pulses.      Heart sounds: Murmur heard.      Harsh midsystolic murmur is present with a grade of 2/6 at the upper right sternal border radiating to the neck.   Pulmonary:      Effort: Pulmonary effort is normal.      Breath sounds: Normal breath sounds.   Abdominal:      General: Bowel sounds are normal.      Palpations: Abdomen is soft.   Musculoskeletal:         General: Normal range of motion.      Cervical back: Normal range of motion and neck supple.      Right lower leg: No edema.      Left lower leg: No edema.   Skin:     General: Skin is warm and dry.      Findings: Bruising present.   Neurological:      Mental Status: She is alert. Mental status is at baseline.         Vitals: Blood pressure 118/64, pulse 84, height 4' 11\" (1.499 m), weight 55 kg (121 lb 3.2 oz), not currently breastfeeding.   Wt Readings from Last 3 Encounters:   09/11/24 55 kg (121 lb 3.2 oz)   09/04/24 56.1 kg (123 lb 9.6 oz)   09/01/24 54.9 kg (121 lb 0.5 oz)         Labs & Results:  Lab Results   Component Value Date    WBC 6.48 09/01/2024    HGB 11.0 (L) 09/01/2024    HCT 34.8 09/01/2024    MCV 93 09/01/2024     (L) 09/01/2024     BNP   Date Value Ref Range Status   08/30/2024 390 (H) 0 - 100 pg/mL Final   07/30/2024 213 (H) 0 - 100 pg/mL " "Final   2023 153 (H) 0 - 100 pg/mL Final     No components found for: \"CHEM\"  Total CK   Date Value Ref Range Status   2017 81 26 - 192 U/L Final     Troponin I   Date Value Ref Range Status   2021 0.02 <=0.04 ng/mL Final     Comment:     [employer] Chemistry analyzer 99% cutoff is > 0.04 ng/mL in network labs     o cTnI 99% cutoff is useful only when applied to patients in the clinical setting of myocardial ischemia   o cTnI 99% cutoff should be interpreted in the context of clinical history, ECG findings and possibly cardiac imaging to establish correct diagnosis.   o cTnI 99% cutoff may be suggestive but clearly not indicative of a coronary event without the clinical setting of myocardial ischemia.       Results for orders placed during the hospital encounter of 16    Echo complete with contrast if indicated    [facility]  [address], [place] [id number] [phone]    Transthoracic Echocardiogram  2D, M-mode, Doppler, and Color Doppler    Study date:  23-Dec-2016    Patient: LIANA PARTIDA  MR number: [id number]  Account number: [id number]  : 1934  Age: 82 years  Gender: Female  Status: Inpatient  Location: Bedside  Height: 59 in  Weight: 155.8 lb  BP: 131/ 65 mmHg    Indications: Shortness of breath.    Diagnoses: R06.02 - Shortness of breath    Sonographer:  STORM Ghosh  Primary Physician:  Gregory Santoro DO  Referring Physician:  Joao Mendiola MD MPH  Group:  [facility]  Interpreting Physician:  Dayday Gómez MD    SUMMARY    LEFT VENTRICLE:  Systolic function was normal. Ejection fraction was estimated to be 60 %.  There were no regional wall motion abnormalities.  There was mild concentric hypertrophy.  Doppler parameters were consistent with abnormal left ventricular relaxation  (grade 1 diastolic dysfunction).    AORTIC VALVE:  There was mild regurgitation.    HISTORY: PRIOR HISTORY: hypertension, " diabetes, CHF    PROCEDURE: The procedure was performed at the bedside. This was a routine  study. The transthoracic approach was used. The study included complete 2D  imaging, M-mode, complete spectral Doppler, and color Doppler. Image quality  was adequate.    LEFT VENTRICLE: Size was normal. Systolic function was normal. Ejection  fraction was estimated to be 60 %. There were no regional wall motion  abnormalities. Wall thickness was mildly increased. There was mild concentric  hypertrophy. DOPPLER: There was an increased relative contribution of atrial  contraction to ventricular filling. Doppler parameters were consistent with  abnormal left ventricular relaxation (grade 1 diastolic dysfunction).    RIGHT VENTRICLE: The size was normal. Systolic function was normal. Wall  thickness was normal.    LEFT ATRIUM: Size was normal.    RIGHT ATRIUM: Size was normal.    MITRAL VALVE: Valve structure was normal. There was normal leaflet separation.  DOPPLER: The transmitral velocity was within the normal range. There was no  evidence for stenosis. There was trace regurgitation.    AORTIC VALVE: The valve was trileaflet. Leaflets exhibited normal thickness and  normal cuspal separation. DOPPLER: Transaortic velocity was within the normal  range. There was no evidence for stenosis. There was mild regurgitation.    TRICUSPID VALVE: The valve structure was normal. There was normal leaflet  separation. DOPPLER: The transtricuspid velocity was within the normal range.  There was no evidence for stenosis. There was trace regurgitation. Pulmonary  artery systolic pressure was within the normal range. Estimated peak PA  pressure was 18 mmHg.    PULMONIC VALVE: Leaflets exhibited normal thickness, no calcification, and  normal cuspal separation. DOPPLER: The transpulmonic velocity was within the  normal range. There was trace regurgitation.    PERICARDIUM: There was no pericardial effusion. The pericardium was normal  in  appearance.    AORTA: The root exhibited normal size.    SYSTEMIC VEINS: IVC: The inferior vena cava was normal in size. Respirophasic  changes were normal.    SYSTEM MEASUREMENT TABLES    2D  %FS: 27.29 %  AV Diam: 2.58 cm  EDV(Teich): 57.32 ml  EF(Cube): 61.56 %  EF(Teich): 53.96 %  ESV(Cube): 19.13 ml  ESV(Teich): 26.39 ml  IVSd: 1.31 cm  LA Area: 12.85 cm2  LA Diam: 3.81 cm  LVEDV MOD A4C: 54.12 ml  LVEF MOD A4C: 40.47 %  LVESV MOD A4C: 32.22 ml  LVIDd: 3.68 cm  LVIDs: 2.67 cm  LVLd A4C: 7.13 cm  LVLs A4C: 6.02 cm  LVPWd: 1.36 cm  RA Area: 9.74 cm2  RV Diam: 2.74 cm  SI(Cube): 18.46 ml/m2  SI(Teich): 18.63 ml/m2  SV MOD A4C: 21.9 ml  SV(Cube): 30.64 ml  SV(Teich): 30.93 ml    CW  AR Dec Posey: 2.39 m/s2  AR Dec Time: 1677.2 ms  AR PHT: 486.39 ms  AR Vmax: 4 m/s  AR maxP.14 mmHg  TR MaxP.41 mmHg  TR Vmax: 2.03 m/s    MM  TAPSE: 1.64 cm    PW  E': 0.09 m/s    [employer] Accredited [facility]    Prepared and electronically signed by    Dayday Gómez MD  Signed 23-Dec-2016 11:56:38    No results found for this or any previous visit.      This note was completed in part utilizing i-nexus direct voice recognition software.   Grammatical errors, random word insertion, spelling mistakes, and incomplete sentences may be an occasional consequence of the system secondary to software limitations, ambient noise and hardware issues. At the time of dictation, efforts were made to edit, clarify and /or correct errors.  Please read the chart carefully and recognize, using context, where substitutions have occurred.  If you have any questions or concerns about the context, text or information contained within the body of this dictation, please contact myself, the provider, for further clarification

## 2024-09-11 NOTE — ED ATTENDING ATTESTATION
9/11/2024  I, Sabiha Perez MD, saw and evaluated the patient. I have discussed the patient with the resident/non-physician practitioner and agree with the resident's/non-physician practitioner's findings, Plan of Care, and MDM as documented in the resident's/non-physician practitioner's note, except where noted. All available labs and Radiology studies were reviewed.  I was present for key portions of any procedure(s) performed by the resident/non-physician practitioner and I was immediately available to provide assistance.       At this point I agree with the current assessment done in the Emergency Department.  I have conducted an independent evaluation of this patient a history and physical is as follows:    Patient is a 89-year-old female presents to the emergency department for evaluation after office appointment and recommendation to come in given recent fall.  She fell on Monday (2 days ago) after a tray table she was setting up collapsed.  She fell forward hitting the left side of her forehead on the ground.  Since then she has appreciated intensification of generalized discomfort as well as right lower back pain.  She reported having transient diplopia and vision blurring intermittently on Monday and Tuesday.  She does not specifically appreciate vision disturbance today and notes that she is due for an eye exam.  She has not appreciated pain in the eyes.  No chest pain or dyspnea.  She has been able to eat and drink well without nausea or vomiting.  She has been ambulating with a walker (as typical) and denies any new urinary or bowel symptoms.  She does take aspirin and bruises quite easily.  She points out areas of ecchymosis over her arms relating they are from varied episodes of phlebotomy and mild trauma.    On exam she is alert and with clear speech.  Ecchymosis is appreciated over much of the facial region including bilateral periorbital distribution.  No swelling noted.   Conjunctiva is clear.  No epistaxis.  Heart sounds regular.  Lungs clear to auscultation bilaterally.  She has diffuse posterior thoracic and lumbar tenderness especially over right CVA region.  She does have some tenderness over the right mid and lower abdomen as well.  Good upper and lower extremity movements without swelling.  +2 radial and PT pulses.  She endorses tenderness throughout much areas palpated.  She does have ecchymosis without swelling over multiple areas of the upper extremities and over bilateral knees.  There is one more concentrated deep purple area of ecchymosis which is soft over the medial anterior aspect of the right knee.    Fall mechanical in nature.  No preceding symptoms or health concerns.  She remains eating and drinking well and without dyspnea.  Obtaining broad imaging and consideration of injury beyond contusion and strain.    She has been taking acetaminophen 3 times daily with last dose this morning.  Cervical collar is in place and she will be given IV dose here.  If study results are unremarkable she will likely return home with family.        ED Course         Critical Care Time  Procedures

## 2024-09-12 LAB
ATRIAL RATE: 80 BPM
P AXIS: 79 DEGREES
PR INTERVAL: 208 MS
QRS AXIS: 121 DEGREES
QRSD INTERVAL: 154 MS
QT INTERVAL: 444 MS
QTC INTERVAL: 512 MS
T WAVE AXIS: -28 DEGREES
VENTRICULAR RATE: 80 BPM

## 2024-09-12 PROCEDURE — 93010 ELECTROCARDIOGRAM REPORT: CPT | Performed by: INTERNAL MEDICINE

## 2024-10-14 ENCOUNTER — TELEPHONE (OUTPATIENT)
Age: 89
End: 2024-10-14

## 2024-10-17 DIAGNOSIS — N32.81 OAB (OVERACTIVE BLADDER): ICD-10-CM

## 2024-10-17 RX ORDER — MIRABEGRON 50 MG/1
1 TABLET, FILM COATED, EXTENDED RELEASE ORAL DAILY
Qty: 90 TABLET | Refills: 1 | Status: SHIPPED | OUTPATIENT
Start: 2024-10-17

## 2024-10-28 ENCOUNTER — TELEPHONE (OUTPATIENT)
Dept: NEPHROLOGY | Facility: CLINIC | Age: 89
End: 2024-10-28

## 2024-10-28 PROBLEM — D63.1 ANEMIA OF CHRONIC RENAL FAILURE, STAGE 4 (SEVERE)  (HCC): Status: ACTIVE | Noted: 2024-10-28

## 2024-10-28 PROBLEM — N18.4 CHRONIC KIDNEY DISEASE, STAGE IV (SEVERE) (HCC): Status: ACTIVE | Noted: 2024-10-28

## 2024-10-28 PROBLEM — N18.4 ANEMIA OF CHRONIC RENAL FAILURE, STAGE 4 (SEVERE)  (HCC): Status: ACTIVE | Noted: 2024-10-28

## 2024-10-28 PROBLEM — E11.21 DIABETIC NEPHROPATHY ASSOCIATED WITH TYPE 2 DIABETES MELLITUS (HCC): Status: ACTIVE | Noted: 2024-10-28

## 2024-10-28 PROBLEM — I12.9 PARENCHYMAL RENAL HYPERTENSION: Status: ACTIVE | Noted: 2024-10-28

## 2024-10-28 NOTE — PROGRESS NOTES
RENAL FOLLOW UP NOTE:.td    ASSESSMENT AND PLAN:  89 y.o. year old female with a history of DM2/hypertension/chronic diastolic CHF/PAD/IBS/anemia/dyslipidemia/Raynaud's-seronegative arthropathy who we are asked to see for CKD     1. CKD stage 4  Etiology: Diabetic kidney disease/hypertensive nephrosclerosis/arteriolar nephrosclerosis/cardiorenal syndrome/nephron loss from aging.   Baseline creatinine: 1.4-1.6 recently, most recently 1.59 from 4/20/2024  Recommend:  Workup:  Current creatinine: 1.50 from 9/11/2024 at baseline  UA with microscopic: No proteinuria, no hematuria, no RBCs no WBCs seen from 5/16/2024  Urine protein creatinine ratio: 0.56 g at goal from 5/16/2024  Urine eosinophils on PPI: 2% borderline significance  Renal ultrasound with PVR: No signs of obstructive uropathy moderate right-sided and severe left-sided renal atrophy; CT scan done recently 9/11/2024 was negative for obstructive uropathy     Treatment:  Treat hypertension, please see below for recommendations  Treat dyslipidemia  Avoid nephrotoxic agents such as NSAIDs, and proton pump inhibitors as able; patient counseled as such  Good overall health recommendations including weight loss as appropriate, isotonic exercise as able, and avoidance of salt; patient counseled as such  Kidney smart referral   Advanced care plan meeting for goals  Increase AST ALT would recommend follow-up with us to      2.  Volume: Euvolemic on furosemide 20 mg daily    3.  Hypertension:       Current blood pressure averages:       None today    Goal blood pressure: Less than 130/80    Recommendations:  Push nonmedical regimen including weight loss, isotonic exercise and a low sodium diet.  Patient has been counseled the such.  MedicationChanges today:    Low normal today they will send in a week of blood pressures  Continue furosemide 20 mg daily    4.  Electrolytes:  All acceptable    5.  Mineral bone disorder:  Of chronic kidney  disease:  Calcium/magnesium/phosphorus:  Magnesium and calcium acceptable  PTH intact: 50.9 from 5/16/2024 at goal  Vitamin-D: 41.7 from 5/16/2024 at goal    6.  Dyslipidemia:  Goal LDL: Less than 100  Current lipid profile: LDL 35/HDL 73/triglycerides 51  Recommendations:   Low-cholesterol/low-fat diet / weight loss as appropriate and isotonic exercise   Medication changes today: At goal no further changes no further follow-up in this regards    7.  Anemia:  Current hemoglobin: 11.5 from 9/11/2024  History of iron deficiency anemia:     8.  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  SGLT2 inhibitors: Are contraindicated with frequent urinary tract infections  PAD  IBS  Overactive bladder  Admission 7/30/2024: CHF with IV diuretics improved bilateral pleural effusions Home oxygen furosemide 20 mg daily followed by cardiology  Recent admission 8/30/2024: UTI with toxic metabolic status post epidural nerve block treated with antibiotics: Patient will go for another UA with reflex if persistent dysuria at this time ordered by Dr. Dutta from 11/6/2020               GI HEALTH MAINTENANCE: LAST COLONOSCOPY: Based on age no further evaluation/colonoscopy unless an acute issue            PATIENT INSTRUCTIONS:    Patient Instructions   Visit summary:  - Overall kidney function is stable and at baseline  - Blood pressure is low normal but I will have you send in a week of readings using the left arm just sitting before blood pressure or any other medications both morning and evening along with heart rate and send those into the office  - I will also have you go for a urine to make sure you do not have a urinary tract infection that will be in the computer    - Finally, minor elevation in liver tests on going to have you go for lab work today as well    We are also going to set you up for an advance care plan meeting which is  like a living well for the computer/chart we will set this up at your next meeting        1. Medication changes today:  No medication changes today    2.  General instructions:  Please avoid salt  Remain as active as you are able to    3.  Please go for non fasting  lab work at this time including the urine as well as a blood test in the next day or so    4.  Please take 1 week a blood pressure readings as outlined above at this time just sitting using left arm    AS FOLLOWS  MORNING AND EVENING, SITTING AND STANDING as follows:  TAKE THE MORNING READINGS BEFORE ANY MEDICATIONS AND WHEN YOU ARE RELAXED FOR SEVERAL MINUTES  TAKE THE EVENING READINGS:  BETWEEN 7-10 P.M.; PRIOR TO ANY MEDICATIONS; AT LEAST IN OUR  FROM DINNER; AND CERTAINLY AFTER RELAXING FOR A FEW MINUTES  PLEASE INCLUDE HEART RATE WITH YOUR BLOOD PRESSURE READINGS  When taking standing readings, keep your arm supported at heart level and not dangling  Make sure you are sitting with your back supported and feet on the ground and do not cross your legs or feet  Make sure you have not taken any coffee or caffeine products or exercised or smoke cigarettes at least 30 minutes before taking your blood pressure  Then please mail these readings into the office            5.  Follow-up in 4-5 months  Please bring in 1 week a blood pressure readings morning evening, sitting and standing is outlined above  PLEASE BRING AN YOUR BLOOD PRESSURE MACHINE TO CORRELATE WITH THE OFFICE MACHINE AT THIS NEXT SCHEDULED VISIT  Please go for fasting lab work 1-2 weeks prior to your appointment      6.  General non medical recommendations:  AVOID SALT BUT NOT ADDING AN READING LABELS TO MAKE SURE THERE IS LOW-SALT IN THE FOOD THAT YOU ARE EATING  Goal is less than 2 g of sodium intake or less than 5 g of sodium chloride intake per day    Avoid nonsteroidal anti-inflammatory drugs such as Naprosyn, ibuprofen, Aleve, Advil, Celebrex, Meloxicam (Mobic) etc.  You can  use Tylenol as needed if you do not have any liver condition to be concerned about    Avoid medications such as Sudafed or decongestants and antihistamines that contained the D component which is the decongestant.  You can take antihistamines without the decongestant or D component.    Try to avoid medications such as pantoprazole or  Protonix/Nexium or Esomeprazole)/Prilosec or omeprazole/Prevacid or lansoprazole/AcipHex or Rabeprazole.  If you are able to, use Pepcid as this is safer for your kidneys.    Try to exercise at least 30 minutes 3 days a week to begin with with an ultimate goal of 5 days a week for at least 30 minutes    Please do not drink more than 2 glasses of alcohol/wine on a daily basis as this may contribute to your high blood pressure.            Subjective:   There has been no hospitalizations or acute illnesses since last visit.  The patient overall feeling well except she has lower back pain radiating to her right hip  No fevers, chills, or cough or colds.  Good appetite and good energy  No hematuria, positive dysuria possible frequency and urgency, occasional bubbles, see above  No gastrointestinal symptoms  No cardiovascular symptoms including swelling of the legs  No headaches, rare dizziness or lightheadedness  Blood pressure medications:  Furosemide 20 mg daily in the morning nothing in the evening required    Renal pertinent medications:  Atorvastatin 10 mg daily  Baby aspirin 81 mg daily  Vitamin D 1000 units daily  Magnesium 250 mg daily  Myrbetriq 50 mg daily    ROS:  See HPI, otherwise review of systems as completely reviewed with the patient are negative    Past Medical History:   Diagnosis Date    Anemia     Arthritis     Back pain     CHF (congestive heart failure) (HCC)     Chronic kidney disease     Stage 3b    Confusion 02/22/2023    Diabetes (HCC)     Diabetes mellitus (HCC)     Diabetic gastroparesis associated with type 2 diabetes mellitus  (HCC)     Diabetic polyneuropathy  (HCC)     Diverticulosis     Herpes zoster     Hypertension     IBS (irritable bowel syndrome)     Neurogenic claudication due to lumbar spinal stenosis     Osteoarthritis     Osteoporosis     Pulmonary edema     Raynaud's phenomenon without gangrene     Seronegative arthropathy of multiple sites (HCC)     Sicca (HCC)      Past Surgical History:   Procedure Laterality Date    BACK SURGERY      COLONOSCOPY      EGD AND COLONOSCOPY N/A 12/23/2016    Procedure: EGD AND COLONOSCOPY;  Surgeon: Elina Anderson MD;  Location: [facility];  Service:     JOINT REPLACEMENT      bilat knees and hips    OTHER SURGICAL HISTORY      pelvis rods    REPLACEMENT TOTAL KNEE BILATERAL      STOMACH SURGERY      UPPER GASTROINTESTINAL ENDOSCOPY       Family History   Problem Relation Age of Onset    Cancer Brother     Diabetes Mother     Hypertension Father     Heart disease Father       reports that she quit smoking about 49 years ago. Her smoking use included cigarettes. She has never used smokeless tobacco. She reports that she does not drink alcohol and does not use drugs.    I COMPLETELY REVIEWED THE PAST MEDICAL HISTORY/PAST SURGICAL HISTORY/SOCIAL HISTORY/FAMILY HISTORY/AND MEDICATIONS  AND UPDATED ALL    Objective:     Vitals:   BP sitting on left: 108/52 with a heart rate of 88 and regular  BP standing on left: 110/54 with a heart rate of 88 and regular  Vitals:    11/06/24 1348   BP: 135/74   Pulse: 93   SpO2: 97%       Weight (last 2 days)       Date/Time Weight    11/06/24 1348 56.7 (125)          Wt Readings from Last 3 Encounters:   11/06/24 56.7 kg (125 lb)   09/11/24 55 kg (121 lb 3.2 oz)   09/04/24 56.1 kg (123 lb 9.6 oz)       Body mass index is 25.25 kg/m².    Physical Exam: General:  No acute distress  Skin:  No acute rash  Eyes:  No scleral icterus, noninjected, no discharge from eyes  ENT:  Moist mucous membranes  Neck:  Supple, no jugular venous distention, trachea is midline, no lymphadenopathy and no  thyromegaly  Back   No CVAT  Chest:  Clear to auscultation and percussion, good respiratory effort  CVS:  Regular rate and rhythm without a rub, or gallops or murmurs  Abdomen:  Soft and nontender with normal bowel sounds  Extremities:  No cyanosis and no edema, no arthritic changes, normal range of motion  Neuro:  Grossly intact  Psych:  Alert, oriented x3 and appropriate      Medications:    Current Outpatient Medications:     acetaminophen (TYLENOL) 325 mg tablet, Take 2 tablets (650 mg total) by mouth 3 (three) times a day, Disp: 540 tablet, Rfl: 3    aspirin 81 mg chewable tablet, Chew 1 tablet (81 mg total) daily, Disp: 30 tablet, Rfl: 0    atorvastatin (LIPITOR) 10 mg tablet, TAKE ONE TABLET BY MOUTH EVERY DAY, Disp: 90 tablet, Rfl: 3    Cholecalciferol (VITAMIN D3) 1000 units CAPS, Take 1 capsule by mouth daily , Disp: , Rfl:     diphenoxylate-atropine (LOMOTIL) 2.5-0.025 mg per tablet, Take 1 tablet by mouth if needed, Disp: , Rfl:     DULoxetine (CYMBALTA) 60 mg delayed release capsule, TAKE ONE CAPSULE BY MOUTH ONCE DAILY, Disp: 30 capsule, Rfl: 5    furosemide (LASIX) 20 mg tablet, One tablet daily and an extra 20 mg prn for wt gain 3 lb/ 24 hours or 5 lb/ 5 days, above a dry wt of 122 lb, Disp: 90 tablet, Rfl: 3    hydroxychloroquine (PLAQUENIL) 200 mg tablet, Take 1 tablet (200 mg total) by mouth daily with breakfast, Disp: 90 tablet, Rfl: 1    Magnesium 250 MG TABS, Take 250 mg by mouth every evening, Disp: , Rfl:     Mirabegron ER (Myrbetriq) 50 MG TB24, Take 1 tablet (50 mg total) by mouth in the morning, Disp: 90 tablet, Rfl: 1    pregabalin (LYRICA) 75 mg capsule, Take 1 capsule (75 mg total) by mouth 2 (two) times a day, Disp: 60 capsule, Rfl: 5    pyridoxine (B-6) 100 MG tablet, Take 100 mg by mouth daily, Disp: , Rfl:     Sodium Fluoride (PreviDent 5000 Booster Plus) 1.1 % PSTE, Apply on teeth every evening as directed, Disp: 100 mL, Rfl: 3    Current Facility-Administered Medications:      denosumab (PROLIA) subcutaneous injection 60 mg, 60 mg, Subcutaneous, Q6 Months, , 60 mg at 05/29/24 1401    Lab, Imaging and other studies: I have personally reviewed pertinent labs.  Laboratory Results:  Results for orders placed or performed during the hospital encounter of 09/11/24   ECG 12 lead    Collection Time: 09/11/24  4:30 PM   Result Value Ref Range    Ventricular Rate 80 BPM    Atrial Rate 80 BPM    IN Interval 208 ms    QRSD Interval 154 ms    QT Interval 444 ms    QTC Interval 512 ms    P [place] 79 degrees    QRS Axis 121 degrees    T Wave Axis -28 degrees   CBC and differential    Collection Time: 09/11/24  4:37 PM   Result Value Ref Range    WBC 7.65 4.31 - 10.16 Thousand/uL    RBC 3.86 3.81 - 5.12 Million/uL    Hemoglobin 11.5 11.5 - 15.4 g/dL    Hematocrit 36.6 34.8 - 46.1 %    MCV 95 82 - 98 fL    MCH 29.8 26.8 - 34.3 pg    MCHC 31.4 31.4 - 37.4 g/dL    RDW 15.6 (H) 11.6 - 15.1 %    MPV 11.2 8.9 - 12.7 fL    Platelets 172 149 - 390 Thousands/uL    nRBC 0 /100 WBCs    Segmented % 64 43 - 75 %    Immature Grans % 1 0 - 2 %    Lymphocytes % 18 14 - 44 %    Monocytes % 15 (H) 4 - 12 %    Eosinophils Relative 1 0 - 6 %    Basophils Relative 1 0 - 1 %    Absolute Neutrophils 4.95 1.85 - 7.62 Thousands/µL    Absolute Immature Grans 0.08 0.00 - 0.20 Thousand/uL    Absolute Lymphocytes 1.35 0.60 - 4.47 Thousands/µL    Absolute Monocytes 1.12 0.17 - 1.22 Thousand/µL    Eosinophils Absolute 0.11 0.00 - 0.61 Thousand/µL    Basophils Absolute 0.04 0.00 - 0.10 Thousands/µL   Comprehensive metabolic panel    Collection Time: 09/11/24  4:37 PM   Result Value Ref Range    Sodium 139 135 - 147 mmol/L    Potassium 3.9 3.5 - 5.3 mmol/L    Chloride 100 96 - 108 mmol/L    CO2 32 21 - 32 mmol/L    ANION GAP 7 4 - 13 mmol/L    BUN 28 (H) 5 - 25 mg/dL    Creatinine 1.50 (H) 0.60 - 1.30 mg/dL    Glucose 103 65 - 140 mg/dL    Calcium 10.3 (H) 8.4 - 10.2 mg/dL    AST 48 (H) 13 - 39 U/L    ALT 64 (H) 7 - 52 U/L    Alkaline  "Phosphatase 97 34 - 104 U/L    Total Protein 7.1 6.4 - 8.4 g/dL    Albumin 3.8 3.5 - 5.0 g/dL    Total Bilirubin 0.59 0.20 - 1.00 mg/dL    eGFR 30 ml/min/1.73sq m             Invalid input(s): \"ALBUMIN\"      Radiology review:   chest X-ray    Ultrasound      Portions of the record may have been created with voice recognition software.  Occasional wrong word or \"sound a like\" substitutions may have occurred due to the inherent limitations of voice recognition software.  Read the chart carefully and recognize, using context, where substitutions have occurred.                    "

## 2024-10-28 NOTE — TELEPHONE ENCOUNTER
----- Message from Fabián Godoy MD sent at 10/28/2024  8:14 AM EDT -----  For the upcoming appointment thank you please also have her bring in a week of blood pressures of possible

## 2024-11-04 DIAGNOSIS — R30.0 DYSURIA: Primary | ICD-10-CM

## 2024-11-05 ENCOUNTER — LAB (OUTPATIENT)
Dept: LAB | Facility: CLINIC | Age: 89
End: 2024-11-05
Payer: MEDICARE

## 2024-11-05 DIAGNOSIS — R74.8 ELEVATED ALKALINE PHOSPHATASE LEVEL: ICD-10-CM

## 2024-11-05 DIAGNOSIS — E11.42 DIABETIC POLYNEUROPATHY ASSOCIATED WITH TYPE 2 DIABETES MELLITUS (HCC): ICD-10-CM

## 2024-11-05 DIAGNOSIS — E11.43 DIABETIC GASTROPARESIS ASSOCIATED WITH TYPE 2 DIABETES MELLITUS  (HCC): ICD-10-CM

## 2024-11-05 DIAGNOSIS — N18.4 ANEMIA OF CHRONIC RENAL FAILURE, STAGE 4 (SEVERE)  (HCC): ICD-10-CM

## 2024-11-05 DIAGNOSIS — M06.09 SERONEGATIVE ARTHROPATHY OF MULTIPLE SITES (HCC): ICD-10-CM

## 2024-11-05 DIAGNOSIS — E78.5 DYSLIPIDEMIA: ICD-10-CM

## 2024-11-05 DIAGNOSIS — I50.32 CHRONIC DIASTOLIC HEART FAILURE (HCC): Chronic | ICD-10-CM

## 2024-11-05 DIAGNOSIS — I50.32 CHRONIC DIASTOLIC HF (HEART FAILURE) (HCC): ICD-10-CM

## 2024-11-05 DIAGNOSIS — E11.21 DIABETIC NEPHROPATHY ASSOCIATED WITH TYPE 2 DIABETES MELLITUS (HCC): ICD-10-CM

## 2024-11-05 DIAGNOSIS — R30.0 DYSURIA: ICD-10-CM

## 2024-11-05 DIAGNOSIS — I12.9 PARENCHYMAL RENAL HYPERTENSION, STAGE 1 THROUGH STAGE 4 OR UNSPECIFIED CHRONIC KIDNEY DISEASE: ICD-10-CM

## 2024-11-05 DIAGNOSIS — Z79.899 ENCOUNTER FOR LONG-TERM (CURRENT) USE OF OTHER MEDICATIONS: ICD-10-CM

## 2024-11-05 DIAGNOSIS — N18.4 STAGE 4 CHRONIC KIDNEY DISEASE (HCC): ICD-10-CM

## 2024-11-05 DIAGNOSIS — E83.42 HYPOMAGNESEMIA: ICD-10-CM

## 2024-11-05 DIAGNOSIS — D63.1 ANEMIA OF CHRONIC RENAL FAILURE, STAGE 4 (SEVERE)  (HCC): ICD-10-CM

## 2024-11-05 DIAGNOSIS — I50.32 CHRONIC DIASTOLIC (CONGESTIVE) HEART FAILURE (HCC): Chronic | ICD-10-CM

## 2024-11-05 DIAGNOSIS — K31.84 DIABETIC GASTROPARESIS ASSOCIATED WITH TYPE 2 DIABETES MELLITUS  (HCC): ICD-10-CM

## 2024-11-05 DIAGNOSIS — N18.4 CHRONIC KIDNEY DISEASE, STAGE IV (SEVERE) (HCC): ICD-10-CM

## 2024-11-06 ENCOUNTER — TELEPHONE (OUTPATIENT)
Dept: NEPHROLOGY | Facility: CLINIC | Age: 89
End: 2024-11-06

## 2024-11-06 ENCOUNTER — OFFICE VISIT (OUTPATIENT)
Dept: NEPHROLOGY | Facility: CLINIC | Age: 89
End: 2024-11-06
Payer: MEDICARE

## 2024-11-06 VITALS
HEIGHT: 59 IN | OXYGEN SATURATION: 97 % | DIASTOLIC BLOOD PRESSURE: 74 MMHG | BODY MASS INDEX: 25.2 KG/M2 | HEART RATE: 93 BPM | WEIGHT: 125 LBS | SYSTOLIC BLOOD PRESSURE: 135 MMHG

## 2024-11-06 DIAGNOSIS — D63.1 ANEMIA OF CHRONIC RENAL FAILURE, STAGE 4 (SEVERE)  (HCC): ICD-10-CM

## 2024-11-06 DIAGNOSIS — E11.21 DIABETIC NEPHROPATHY ASSOCIATED WITH TYPE 2 DIABETES MELLITUS (HCC): ICD-10-CM

## 2024-11-06 DIAGNOSIS — R30.0 DYSURIA: ICD-10-CM

## 2024-11-06 DIAGNOSIS — N18.4 ANEMIA OF CHRONIC RENAL FAILURE, STAGE 4 (SEVERE)  (HCC): ICD-10-CM

## 2024-11-06 DIAGNOSIS — I12.9 PARENCHYMAL RENAL HYPERTENSION, STAGE 1 THROUGH STAGE 4 OR UNSPECIFIED CHRONIC KIDNEY DISEASE: Primary | ICD-10-CM

## 2024-11-06 DIAGNOSIS — N18.4 CHRONIC KIDNEY DISEASE, STAGE IV (SEVERE) (HCC): ICD-10-CM

## 2024-11-06 DIAGNOSIS — I50.32 CHRONIC DIASTOLIC (CONGESTIVE) HEART FAILURE (HCC): Chronic | ICD-10-CM

## 2024-11-06 PROCEDURE — 99214 OFFICE O/P EST MOD 30 MIN: CPT | Performed by: INTERNAL MEDICINE

## 2024-11-06 PROCEDURE — G2211 COMPLEX E/M VISIT ADD ON: HCPCS | Performed by: INTERNAL MEDICINE

## 2024-11-06 NOTE — LETTER
November 6, 2024     Gregory Santoro DO  [address] [place] [id number]    Patient: Tanya Stephen   YOB: 1934   Date of Visit: 11/6/2024       Dear Dr. Santoro:    Thank you for referring Tanya Stephen to me for evaluation. Below are my notes for this consultation.    If you have questions, please do not hesitate to call me. I look forward to following your patient along with you.         Sincerely,        Fabián Godoy MD        CC: MD Fabián Santiago MD  11/6/2024  2:15 PM  Sign when Signing Visit  RENAL FOLLOW UP NOTE:.td    ASSESSMENT AND PLAN:  89 y.o. year old female with a history of DM2/hypertension/chronic diastolic CHF/PAD/IBS/anemia/dyslipidemia/Raynaud's-seronegative arthropathy who we are asked to see for CKD     1. CKD stage 4  Etiology: Diabetic kidney disease/hypertensive nephrosclerosis/arteriolar nephrosclerosis/cardiorenal syndrome/nephron loss from aging.   Baseline creatinine: 1.4-1.6 recently, most recently 1.59 from 4/20/2024  Recommend:  Workup:  Current creatinine: 1.50 from 9/11/2024 at baseline  UA with microscopic: No proteinuria, no hematuria, no RBCs no WBCs seen from 5/16/2024  Urine protein creatinine ratio: 0.56 g at goal from 5/16/2024  Urine eosinophils on PPI: 2% borderline significance  Renal ultrasound with PVR: No signs of obstructive uropathy moderate right-sided and severe left-sided renal atrophy; CT scan done recently 9/11/2024 was negative for obstructive uropathy     Treatment:  Treat hypertension, please see below for recommendations  Treat dyslipidemia  Avoid nephrotoxic agents such as NSAIDs, and proton pump inhibitors as able; patient counseled as such  Good overall health recommendations including weight loss as appropriate, isotonic exercise as able, and avoidance of salt; patient counseled as such  Kidney smart referral   Advanced care plan meeting for goals  Increase AST ALT would recommend follow-up with us  to      2.  Volume: Euvolemic on furosemide 20 mg daily    3.  Hypertension:       Current blood pressure averages:       None today    Goal blood pressure: Less than 130/80    Recommendations:  Push nonmedical regimen including weight loss, isotonic exercise and a low sodium diet.  Patient has been counseled the such.  MedicationChanges today:    Low normal today they will send in a week of blood pressures  Continue furosemide 20 mg daily    4.  Electrolytes:  All acceptable    5.  Mineral bone disorder:  Of chronic kidney disease:  Calcium/magnesium/phosphorus:  Magnesium and calcium acceptable  PTH intact: 50.9 from 5/16/2024 at goal  Vitamin-D: 41.7 from 5/16/2024 at goal    6.  Dyslipidemia:  Goal LDL: Less than 100  Current lipid profile: LDL 35/HDL 73/triglycerides 51  Recommendations:   Low-cholesterol/low-fat diet / weight loss as appropriate and isotonic exercise   Medication changes today: At goal no further changes no further follow-up in this regards    7.  Anemia:  Current hemoglobin: 11.5 from 9/11/2024  History of iron deficiency anemia:     8.  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  SGLT2 inhibitors: Are contraindicated with frequent urinary tract infections  PAD  IBS  Overactive bladder  Admission 7/30/2024: CHF with IV diuretics improved bilateral pleural effusions Home oxygen furosemide 20 mg daily followed by cardiology  Recent admission 8/30/2024: UTI with toxic metabolic status post epidural nerve block treated with antibiotics: Patient will go for another UA with reflex if persistent dysuria at this time ordered by Dr. Dutta from 11/6/2020               GI HEALTH MAINTENANCE: LAST COLONOSCOPY: Based on age no further evaluation/colonoscopy unless an acute issue            PATIENT INSTRUCTIONS:    Patient Instructions   Visit summary:  - Overall kidney function is stable and at baseline  -  Blood pressure is low normal but I will have you send in a week of readings using the left arm just sitting before blood pressure or any other medications both morning and evening along with heart rate and send those into the office  - I will also have you go for a urine to make sure you do not have a urinary tract infection that will be in the computer    - Finally, minor elevation in liver tests on going to have you go for lab work today as well    We are also going to set you up for an advance care plan meeting which is like a living well for the computer/chart we will set this up at your next meeting        1. Medication changes today:  No medication changes today    2.  General instructions:  Please avoid salt  Remain as active as you are able to    3.  Please go for non fasting  lab work at this time including the urine as well as a blood test in the next day or so    4.  Please take 1 week a blood pressure readings as outlined above at this time just sitting using left arm    AS FOLLOWS  MORNING AND EVENING, SITTING AND STANDING as follows:  TAKE THE MORNING READINGS BEFORE ANY MEDICATIONS AND WHEN YOU ARE RELAXED FOR SEVERAL MINUTES  TAKE THE EVENING READINGS:  BETWEEN 7-10 P.M.; PRIOR TO ANY MEDICATIONS; AT LEAST IN OUR  FROM DINNER; AND CERTAINLY AFTER RELAXING FOR A FEW MINUTES  PLEASE INCLUDE HEART RATE WITH YOUR BLOOD PRESSURE READINGS  When taking standing readings, keep your arm supported at heart level and not dangling  Make sure you are sitting with your back supported and feet on the ground and do not cross your legs or feet  Make sure you have not taken any coffee or caffeine products or exercised or smoke cigarettes at least 30 minutes before taking your blood pressure  Then please mail these readings into the office            5.  Follow-up in 4-5 months  Please bring in 1 week a blood pressure readings morning evening, sitting and standing is outlined above  PLEASE BRING AN YOUR BLOOD  PRESSURE MACHINE TO CORRELATE WITH THE OFFICE MACHINE AT THIS NEXT SCHEDULED VISIT  Please go for fasting lab work 1-2 weeks prior to your appointment      6.  General non medical recommendations:  AVOID SALT BUT NOT ADDING AN READING LABELS TO MAKE SURE THERE IS LOW-SALT IN THE FOOD THAT YOU ARE EATING  Goal is less than 2 g of sodium intake or less than 5 g of sodium chloride intake per day    Avoid nonsteroidal anti-inflammatory drugs such as Naprosyn, ibuprofen, Aleve, Advil, Celebrex, Meloxicam (Mobic) etc.  You can use Tylenol as needed if you do not have any liver condition to be concerned about    Avoid medications such as Sudafed or decongestants and antihistamines that contained the D component which is the decongestant.  You can take antihistamines without the decongestant or D component.    Try to avoid medications such as pantoprazole or  Protonix/Nexium or Esomeprazole)/Prilosec or omeprazole/Prevacid or lansoprazole/AcipHex or Rabeprazole.  If you are able to, use Pepcid as this is safer for your kidneys.    Try to exercise at least 30 minutes 3 days a week to begin with with an ultimate goal of 5 days a week for at least 30 minutes    Please do not drink more than 2 glasses of alcohol/wine on a daily basis as this may contribute to your high blood pressure.            Subjective:   There has been no hospitalizations or acute illnesses since last visit.  The patient overall feeling well except she has lower back pain radiating to her right hip  No fevers, chills, or cough or colds.  Good appetite and good energy  No hematuria, positive dysuria possible frequency and urgency, occasional bubbles, see above  No gastrointestinal symptoms  No cardiovascular symptoms including swelling of the legs  No headaches, rare dizziness or lightheadedness  Blood pressure medications:  Furosemide 20 mg daily in the morning nothing in the evening required    Renal pertinent medications:  Atorvastatin 10 mg daily  Baby  aspirin 81 mg daily  Vitamin D 1000 units daily  Magnesium 250 mg daily  Myrbetriq 50 mg daily    ROS:  See HPI, otherwise review of systems as completely reviewed with the patient are negative    Past Medical History:   Diagnosis Date   • Anemia    • Arthritis    • Back pain    • CHF (congestive heart failure) (HCC)    • Chronic kidney disease     Stage 3b   • Confusion 02/22/2023   • Diabetes (HCC)    • Diabetes mellitus (HCC)    • Diabetic gastroparesis associated with type 2 diabetes mellitus  (HCC)    • Diabetic polyneuropathy (HCC)    • Diverticulosis    • Herpes zoster    • Hypertension    • IBS (irritable bowel syndrome)    • Neurogenic claudication due to lumbar spinal stenosis    • Osteoarthritis    • Osteoporosis    • Pulmonary edema    • Raynaud's phenomenon without gangrene    • Seronegative arthropathy of multiple sites (HCC)    • Sicca (HCC)      Past Surgical History:   Procedure Laterality Date   • BACK SURGERY     • COLONOSCOPY     • EGD AND COLONOSCOPY N/A 12/23/2016    Procedure: EGD AND COLONOSCOPY;  Surgeon: Elina Anderson MD;  Location: [facility];  Service:    • JOINT REPLACEMENT      bilat knees and hips   • OTHER SURGICAL HISTORY      pelvis rods   • REPLACEMENT TOTAL KNEE BILATERAL     • STOMACH SURGERY     • UPPER GASTROINTESTINAL ENDOSCOPY       Family History   Problem Relation Age of Onset   • Cancer Brother    • Diabetes Mother    • Hypertension Father    • Heart disease Father       reports that she quit smoking about 49 years ago. Her smoking use included cigarettes. She has never used smokeless tobacco. She reports that she does not drink alcohol and does not use drugs.    I COMPLETELY REVIEWED THE PAST MEDICAL HISTORY/PAST SURGICAL HISTORY/SOCIAL HISTORY/FAMILY HISTORY/AND MEDICATIONS  AND UPDATED ALL    Objective:     Vitals:   BP sitting on left: 108/52 with a heart rate of 88 and regular  BP standing on left: 110/54 with a heart rate of 88 and regular  Vitals:    11/06/24 1348    BP: 135/74   Pulse: 93   SpO2: 97%       Weight (last 2 days)       Date/Time Weight    11/06/24 1348 56.7 (125)          Wt Readings from Last 3 Encounters:   11/06/24 56.7 kg (125 lb)   09/11/24 55 kg (121 lb 3.2 oz)   09/04/24 56.1 kg (123 lb 9.6 oz)       Body mass index is 25.25 kg/m².    Physical Exam: General:  No acute distress  Skin:  No acute rash  Eyes:  No scleral icterus, noninjected, no discharge from eyes  ENT:  Moist mucous membranes  Neck:  Supple, no jugular venous distention, trachea is midline, no lymphadenopathy and no thyromegaly  Back   No CVAT  Chest:  Clear to auscultation and percussion, good respiratory effort  CVS:  Regular rate and rhythm without a rub, or gallops or murmurs  Abdomen:  Soft and nontender with normal bowel sounds  Extremities:  No cyanosis and no edema, no arthritic changes, normal range of motion  Neuro:  Grossly intact  Psych:  Alert, oriented x3 and appropriate      Medications:    Current Outpatient Medications:   •  acetaminophen (TYLENOL) 325 mg tablet, Take 2 tablets (650 mg total) by mouth 3 (three) times a day, Disp: 540 tablet, Rfl: 3  •  aspirin 81 mg chewable tablet, Chew 1 tablet (81 mg total) daily, Disp: 30 tablet, Rfl: 0  •  atorvastatin (LIPITOR) 10 mg tablet, TAKE ONE TABLET BY MOUTH EVERY DAY, Disp: 90 tablet, Rfl: 3  •  Cholecalciferol (VITAMIN D3) 1000 units CAPS, Take 1 capsule by mouth daily , Disp: , Rfl:   •  diphenoxylate-atropine (LOMOTIL) 2.5-0.025 mg per tablet, Take 1 tablet by mouth if needed, Disp: , Rfl:   •  DULoxetine (CYMBALTA) 60 mg delayed release capsule, TAKE ONE CAPSULE BY MOUTH ONCE DAILY, Disp: 30 capsule, Rfl: 5  •  furosemide (LASIX) 20 mg tablet, One tablet daily and an extra 20 mg prn for wt gain 3 lb/ 24 hours or 5 lb/ 5 days, above a dry wt of 122 lb, Disp: 90 tablet, Rfl: 3  •  hydroxychloroquine (PLAQUENIL) 200 mg tablet, Take 1 tablet (200 mg total) by mouth daily with breakfast, Disp: 90 tablet, Rfl: 1  •  Magnesium  250 MG TABS, Take 250 mg by mouth every evening, Disp: , Rfl:   •  Mirabegron ER (Myrbetriq) 50 MG TB24, Take 1 tablet (50 mg total) by mouth in the morning, Disp: 90 tablet, Rfl: 1  •  pregabalin (LYRICA) 75 mg capsule, Take 1 capsule (75 mg total) by mouth 2 (two) times a day, Disp: 60 capsule, Rfl: 5  •  pyridoxine (B-6) 100 MG tablet, Take 100 mg by mouth daily, Disp: , Rfl:   •  Sodium Fluoride (PreviDent 5000 Booster Plus) 1.1 % PSTE, Apply on teeth every evening as directed, Disp: 100 mL, Rfl: 3    Current Facility-Administered Medications:   •  denosumab (PROLIA) subcutaneous injection 60 mg, 60 mg, Subcutaneous, Q6 Months, , 60 mg at 05/29/24 1401    Lab, Imaging and other studies: I have personally reviewed pertinent labs.  Laboratory Results:  Results for orders placed or performed during the hospital encounter of 09/11/24   ECG 12 lead    Collection Time: 09/11/24  4:30 PM   Result Value Ref Range    Ventricular Rate 80 BPM    Atrial Rate 80 BPM    AK Interval 208 ms    QRSD Interval 154 ms    QT Interval 444 ms    QTC Interval 512 ms    P [place] 79 degrees    QRS Axis 121 degrees    T Wave Axis -28 degrees   CBC and differential    Collection Time: 09/11/24  4:37 PM   Result Value Ref Range    WBC 7.65 4.31 - 10.16 Thousand/uL    RBC 3.86 3.81 - 5.12 Million/uL    Hemoglobin 11.5 11.5 - 15.4 g/dL    Hematocrit 36.6 34.8 - 46.1 %    MCV 95 82 - 98 fL    MCH 29.8 26.8 - 34.3 pg    MCHC 31.4 31.4 - 37.4 g/dL    RDW 15.6 (H) 11.6 - 15.1 %    MPV 11.2 8.9 - 12.7 fL    Platelets 172 149 - 390 Thousands/uL    nRBC 0 /100 WBCs    Segmented % 64 43 - 75 %    Immature Grans % 1 0 - 2 %    Lymphocytes % 18 14 - 44 %    Monocytes % 15 (H) 4 - 12 %    Eosinophils Relative 1 0 - 6 %    Basophils Relative 1 0 - 1 %    Absolute Neutrophils 4.95 1.85 - 7.62 Thousands/µL    Absolute Immature Grans 0.08 0.00 - 0.20 Thousand/uL    Absolute Lymphocytes 1.35 0.60 - 4.47 Thousands/µL    Absolute Monocytes 1.12 0.17 - 1.22  "Thousand/µL    Eosinophils Absolute 0.11 0.00 - 0.61 Thousand/µL    Basophils Absolute 0.04 0.00 - 0.10 Thousands/µL   Comprehensive metabolic panel    Collection Time: 09/11/24  4:37 PM   Result Value Ref Range    Sodium 139 135 - 147 mmol/L    Potassium 3.9 3.5 - 5.3 mmol/L    Chloride 100 96 - 108 mmol/L    CO2 32 21 - 32 mmol/L    ANION GAP 7 4 - 13 mmol/L    BUN 28 (H) 5 - 25 mg/dL    Creatinine 1.50 (H) 0.60 - 1.30 mg/dL    Glucose 103 65 - 140 mg/dL    Calcium 10.3 (H) 8.4 - 10.2 mg/dL    AST 48 (H) 13 - 39 U/L    ALT 64 (H) 7 - 52 U/L    Alkaline Phosphatase 97 34 - 104 U/L    Total Protein 7.1 6.4 - 8.4 g/dL    Albumin 3.8 3.5 - 5.0 g/dL    Total Bilirubin 0.59 0.20 - 1.00 mg/dL    eGFR 30 ml/min/1.73sq m             Invalid input(s): \"ALBUMIN\"      Radiology review:   chest X-ray    Ultrasound      Portions of the record may have been created with voice recognition software.  Occasional wrong word or \"sound a like\" substitutions may have occurred due to the inherent limitations of voice recognition software.  Read the chart carefully and recognize, using context, where substitutions have occurred.                    "

## 2024-11-06 NOTE — TELEPHONE ENCOUNTER
Pt was seen today 11/6 and I forgot to add BP sheets to her paperwork at check out. Called pt and LVM to apologize and let her know that we are mailing her BP sheets for Dr. Godoy. Asked that she call back if she has any questions.

## 2024-11-07 ENCOUNTER — TELEPHONE (OUTPATIENT)
Dept: UROLOGY | Facility: MEDICAL CENTER | Age: 89
End: 2024-11-07

## 2024-11-07 ENCOUNTER — APPOINTMENT (OUTPATIENT)
Dept: LAB | Facility: CLINIC | Age: 89
End: 2024-11-07
Payer: MEDICARE

## 2024-11-07 ENCOUNTER — TELEPHONE (OUTPATIENT)
Dept: NEPHROLOGY | Facility: CLINIC | Age: 89
End: 2024-11-07

## 2024-11-07 DIAGNOSIS — D63.1 ANEMIA OF CHRONIC RENAL FAILURE, STAGE 4 (SEVERE)  (HCC): ICD-10-CM

## 2024-11-07 DIAGNOSIS — N18.4 CHRONIC KIDNEY DISEASE, STAGE IV (SEVERE) (HCC): ICD-10-CM

## 2024-11-07 DIAGNOSIS — I12.9 PARENCHYMAL RENAL HYPERTENSION, STAGE 1 THROUGH STAGE 4 OR UNSPECIFIED CHRONIC KIDNEY DISEASE: ICD-10-CM

## 2024-11-07 DIAGNOSIS — N18.4 ANEMIA OF CHRONIC RENAL FAILURE, STAGE 4 (SEVERE)  (HCC): ICD-10-CM

## 2024-11-07 DIAGNOSIS — N39.0 RECURRENT UTI: Primary | ICD-10-CM

## 2024-11-07 DIAGNOSIS — R30.0 DYSURIA: ICD-10-CM

## 2024-11-07 DIAGNOSIS — I50.32 CHRONIC DIASTOLIC (CONGESTIVE) HEART FAILURE (HCC): Chronic | ICD-10-CM

## 2024-11-07 DIAGNOSIS — E11.21 DIABETIC NEPHROPATHY ASSOCIATED WITH TYPE 2 DIABETES MELLITUS (HCC): ICD-10-CM

## 2024-11-07 LAB
ALBUMIN SERPL BCG-MCNC: 3.7 G/DL (ref 3.5–5)
ALP SERPL-CCNC: 101 U/L (ref 34–104)
ALT SERPL W P-5'-P-CCNC: 36 U/L (ref 7–52)
ANION GAP SERPL CALCULATED.3IONS-SCNC: 6 MMOL/L (ref 4–13)
AST SERPL W P-5'-P-CCNC: 37 U/L (ref 13–39)
BACTERIA UR QL AUTO: ABNORMAL /HPF
BASOPHILS # BLD AUTO: 0.03 THOUSANDS/ΜL (ref 0–0.1)
BASOPHILS NFR BLD AUTO: 0 % (ref 0–1)
BILIRUB SERPL-MCNC: 0.77 MG/DL (ref 0.2–1)
BILIRUB UR QL STRIP: NEGATIVE
BNP SERPL-MCNC: 170 PG/ML (ref 0–100)
BUN SERPL-MCNC: 28 MG/DL (ref 5–25)
CALCIUM SERPL-MCNC: 9.3 MG/DL (ref 8.4–10.2)
CHLORIDE SERPL-SCNC: 101 MMOL/L (ref 96–108)
CHOLEST SERPL-MCNC: 111 MG/DL
CLARITY UR: CLEAR
CO2 SERPL-SCNC: 29 MMOL/L (ref 21–32)
COLOR UR: ABNORMAL
CREAT SERPL-MCNC: 1.56 MG/DL (ref 0.6–1.3)
CREAT UR-MCNC: 60.9 MG/DL
CRP SERPL QL: 1 MG/L
EOSINOPHIL # BLD AUTO: 0.14 THOUSAND/ΜL (ref 0–0.61)
EOSINOPHIL NFR BLD AUTO: 2 % (ref 0–6)
ERYTHROCYTE [DISTWIDTH] IN BLOOD BY AUTOMATED COUNT: 15.1 % (ref 11.6–15.1)
ERYTHROCYTE [SEDIMENTATION RATE] IN BLOOD: 23 MM/HOUR (ref 0–29)
FERRITIN SERPL-MCNC: 61 NG/ML (ref 11–307)
GFR SERPL CREATININE-BSD FRML MDRD: 29 ML/MIN/1.73SQ M
GGT SERPL-CCNC: 24 U/L (ref 9–64)
GLUCOSE P FAST SERPL-MCNC: 124 MG/DL (ref 65–99)
GLUCOSE UR STRIP-MCNC: NEGATIVE MG/DL
HCT VFR BLD AUTO: 38.2 % (ref 34.8–46.1)
HDLC SERPL-MCNC: 69 MG/DL
HGB BLD-MCNC: 12 G/DL (ref 11.5–15.4)
HGB UR QL STRIP.AUTO: NEGATIVE
IMM GRANULOCYTES # BLD AUTO: 0.03 THOUSAND/UL (ref 0–0.2)
IMM GRANULOCYTES NFR BLD AUTO: 0 % (ref 0–2)
IRON SATN MFR SERPL: 31 % (ref 15–50)
IRON SERPL-MCNC: 94 UG/DL (ref 50–212)
KETONES UR STRIP-MCNC: NEGATIVE MG/DL
LDLC SERPL CALC-MCNC: 34 MG/DL (ref 0–100)
LEUKOCYTE ESTERASE UR QL STRIP: ABNORMAL
LYMPHOCYTES # BLD AUTO: 1.16 THOUSANDS/ΜL (ref 0.6–4.47)
LYMPHOCYTES NFR BLD AUTO: 17 % (ref 14–44)
MAGNESIUM SERPL-MCNC: 1.9 MG/DL (ref 1.9–2.7)
MCH RBC QN AUTO: 29.9 PG (ref 26.8–34.3)
MCHC RBC AUTO-ENTMCNC: 31.4 G/DL (ref 31.4–37.4)
MCV RBC AUTO: 95 FL (ref 82–98)
MONOCYTES # BLD AUTO: 0.93 THOUSAND/ΜL (ref 0.17–1.22)
MONOCYTES NFR BLD AUTO: 13 % (ref 4–12)
NEUTROPHILS # BLD AUTO: 4.75 THOUSANDS/ΜL (ref 1.85–7.62)
NEUTS SEG NFR BLD AUTO: 68 % (ref 43–75)
NITRITE UR QL STRIP: NEGATIVE
NON-SQ EPI CELLS URNS QL MICRO: ABNORMAL /HPF
NRBC BLD AUTO-RTO: 0 /100 WBCS
PH UR STRIP.AUTO: 5 [PH]
PHOSPHATE SERPL-MCNC: 4 MG/DL (ref 2.3–4.1)
PLATELET # BLD AUTO: 117 THOUSANDS/UL (ref 149–390)
PMV BLD AUTO: 12 FL (ref 8.9–12.7)
POTASSIUM SERPL-SCNC: 3.8 MMOL/L (ref 3.5–5.3)
PROT SERPL-MCNC: 6.9 G/DL (ref 6.4–8.4)
PROT UR STRIP-MCNC: ABNORMAL MG/DL
PROT UR-MCNC: 25.8 MG/DL
PROT/CREAT UR: 0.4 MG/G{CREAT} (ref 0–0.1)
PTH-INTACT SERPL-MCNC: 56.7 PG/ML (ref 12–88)
RBC # BLD AUTO: 4.01 MILLION/UL (ref 3.81–5.12)
RBC #/AREA URNS AUTO: ABNORMAL /HPF
SODIUM SERPL-SCNC: 136 MMOL/L (ref 135–147)
SP GR UR STRIP.AUTO: 1.01 (ref 1–1.03)
TIBC SERPL-MCNC: 301 UG/DL (ref 250–450)
TRIGL SERPL-MCNC: 42 MG/DL
UIBC SERPL-MCNC: 207 UG/DL (ref 155–355)
UROBILINOGEN UR STRIP-ACNC: <2 MG/DL
WBC # BLD AUTO: 7.04 THOUSAND/UL (ref 4.31–10.16)
WBC #/AREA URNS AUTO: ABNORMAL /HPF

## 2024-11-07 PROCEDURE — 86140 C-REACTIVE PROTEIN: CPT

## 2024-11-07 PROCEDURE — 83880 ASSAY OF NATRIURETIC PEPTIDE: CPT

## 2024-11-07 PROCEDURE — 85652 RBC SED RATE AUTOMATED: CPT

## 2024-11-07 PROCEDURE — 82570 ASSAY OF URINE CREATININE: CPT | Performed by: INTERNAL MEDICINE

## 2024-11-07 PROCEDURE — 83970 ASSAY OF PARATHORMONE: CPT

## 2024-11-07 PROCEDURE — 84156 ASSAY OF PROTEIN URINE: CPT | Performed by: INTERNAL MEDICINE

## 2024-11-07 PROCEDURE — 83550 IRON BINDING TEST: CPT

## 2024-11-07 PROCEDURE — 80061 LIPID PANEL: CPT

## 2024-11-07 PROCEDURE — 81001 URINALYSIS AUTO W/SCOPE: CPT

## 2024-11-07 PROCEDURE — 83735 ASSAY OF MAGNESIUM: CPT

## 2024-11-07 PROCEDURE — 82728 ASSAY OF FERRITIN: CPT

## 2024-11-07 PROCEDURE — 87086 URINE CULTURE/COLONY COUNT: CPT

## 2024-11-07 PROCEDURE — 80053 COMPREHEN METABOLIC PANEL: CPT

## 2024-11-07 PROCEDURE — 84100 ASSAY OF PHOSPHORUS: CPT

## 2024-11-07 PROCEDURE — 85025 COMPLETE CBC W/AUTO DIFF WBC: CPT

## 2024-11-07 PROCEDURE — 82977 ASSAY OF GGT: CPT

## 2024-11-07 PROCEDURE — 83540 ASSAY OF IRON: CPT

## 2024-11-07 PROCEDURE — 36415 COLL VENOUS BLD VENIPUNCTURE: CPT

## 2024-11-07 RX ORDER — NITROFURANTOIN MACROCRYSTALS 50 MG/1
50 CAPSULE ORAL DAILY
Qty: 90 CAPSULE | Refills: 3 | Status: SHIPPED | OUTPATIENT
Start: 2024-11-07

## 2024-11-07 NOTE — TELEPHONE ENCOUNTER
Patient called the RX Refill Line. Message is being forwarded to the office.     Patient is requesting a refill on   nitrofurantoin (MACRODANTIN) 50 mg capsule . It is not on her active med list. If appropriate, please send to [facility] on [address].

## 2024-11-07 NOTE — TELEPHONE ENCOUNTER
Spoke with patient's , Weston, about the following, he expressed understanding and thanked us for the call:    ----- Message from Fabián Godoy MD sent at 11/7/2024  1:18 PM EST -----  All labs look good including kidney labs liver tests and calcium!  No changes  ----- Message -----  From: Lab, Background User  Sent: 11/7/2024  12:26 PM EST  To: Fabián Godoy MD

## 2024-11-08 LAB — BACTERIA UR CULT: NORMAL

## 2024-11-15 ENCOUNTER — HOSPITAL ENCOUNTER (INPATIENT)
Facility: HOSPITAL | Age: 89
LOS: 2 days | Discharge: HOME | DRG: 092 | End: 2024-11-17
Attending: EMERGENCY MEDICINE | Admitting: HOSPITALIST
Payer: MEDICARE

## 2024-11-15 ENCOUNTER — APPOINTMENT (EMERGENCY)
Dept: CT IMAGING | Facility: HOSPITAL | Age: 89
DRG: 092 | End: 2024-11-15
Payer: MEDICARE

## 2024-11-15 DIAGNOSIS — J90 BILATERAL PLEURAL EFFUSION: ICD-10-CM
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
## 2024-11-15 DIAGNOSIS — R26.2 AMBULATORY DYSFUNCTION: Primary | ICD-10-CM

## 2024-11-15 DIAGNOSIS — T14.8XXA MULTIPLE SKIN TEARS: ICD-10-CM

## 2024-11-15 DIAGNOSIS — J18.9 BILATERAL PNEUMONIA: ICD-10-CM

## 2024-11-15 DIAGNOSIS — S09.90XA TRAUMATIC INJURY OF HEAD, INITIAL ENCOUNTER: ICD-10-CM

## 2024-11-15 LAB
ANION GAP SERPL CALCULATED.3IONS-SCNC: 6 MMOL/L (ref 4–13)
APTT PPP: 29 SECONDS (ref 23–34)
BASOPHILS # BLD AUTO: 0.03 THOUSANDS/ÂΜL (ref 0–0.1)
BASOPHILS NFR BLD AUTO: 1 % (ref 0–1)
BILIRUB UR QL STRIP: NEGATIVE
BUN SERPL-MCNC: 32 MG/DL (ref 5–25)
CALCIUM SERPL-MCNC: 9.4 MG/DL (ref 8.4–10.2)
CHLORIDE SERPL-SCNC: 105 MMOL/L (ref 96–108)
CLARITY UR: CLEAR
CO2 SERPL-SCNC: 29 MMOL/L (ref 21–32)
COLOR UR: NORMAL
CREAT SERPL-MCNC: 1.54 MG/DL (ref 0.6–1.3)
EOSINOPHIL # BLD AUTO: 0.2 THOUSAND/ÂΜL (ref 0–0.61)
EOSINOPHIL NFR BLD AUTO: 4 % (ref 0–6)
ERYTHROCYTE [DISTWIDTH] IN BLOOD BY AUTOMATED COUNT: 15.1 % (ref 11.6–15.1)
EST. AVERAGE GLUCOSE BLD GHB EST-MCNC: 146 MG/DL
GFR SERPL CREATININE-BSD FRML MDRD: 29 ML/MIN/1.73SQ M
GLUCOSE SERPL-MCNC: 129 MG/DL (ref 65–140)
GLUCOSE SERPL-MCNC: 164 MG/DL (ref 65–140)
GLUCOSE SERPL-MCNC: 92 MG/DL (ref 65–140)
GLUCOSE UR STRIP-MCNC: NEGATIVE MG/DL
HBA1C MFR BLD: 6.7 %
HCT VFR BLD AUTO: 36.2 % (ref 34.8–46.1)
HGB BLD-MCNC: 11.4 G/DL (ref 11.5–15.4)
HGB UR QL STRIP.AUTO: NEGATIVE
IMM GRANULOCYTES # BLD AUTO: 0.04 THOUSAND/UL (ref 0–0.2)
IMM GRANULOCYTES NFR BLD AUTO: 1 % (ref 0–2)
INR PPP: 1.06 (ref 0.85–1.19)
KETONES UR STRIP-MCNC: NEGATIVE MG/DL
LEUKOCYTE ESTERASE UR QL STRIP: NEGATIVE
LYMPHOCYTES # BLD AUTO: 0.92 THOUSANDS/ÂΜL (ref 0.6–4.47)
LYMPHOCYTES NFR BLD AUTO: 18 % (ref 14–44)
MCH RBC QN AUTO: 29.8 PG (ref 26.8–34.3)
MCHC RBC AUTO-ENTMCNC: 31.5 G/DL (ref 31.4–37.4)
MCV RBC AUTO: 95 FL (ref 82–98)
MONOCYTES # BLD AUTO: 0.73 THOUSAND/ÂΜL (ref 0.17–1.22)
MONOCYTES NFR BLD AUTO: 14 % (ref 4–12)
NEUTROPHILS # BLD AUTO: 3.34 THOUSANDS/ÂΜL (ref 1.85–7.62)
NEUTS SEG NFR BLD AUTO: 62 % (ref 43–75)
NITRITE UR QL STRIP: NEGATIVE
NRBC BLD AUTO-RTO: 0 /100 WBCS
PH UR STRIP.AUTO: 5 [PH]
PLATELET # BLD AUTO: 126 THOUSANDS/UL (ref 149–390)
PLATELET # BLD AUTO: 129 THOUSANDS/UL (ref 149–390)
PMV BLD AUTO: 10.9 FL (ref 8.9–12.7)
PMV BLD AUTO: 11.1 FL (ref 8.9–12.7)
POTASSIUM SERPL-SCNC: 3.8 MMOL/L (ref 3.5–5.3)
PROCALCITONIN SERPL-MCNC: 0.09 NG/ML
PROT UR STRIP-MCNC: NEGATIVE MG/DL
PROTHROMBIN TIME: 14.5 SECONDS (ref 12.3–15)
RBC # BLD AUTO: 3.82 MILLION/UL (ref 3.81–5.12)
SODIUM SERPL-SCNC: 140 MMOL/L (ref 135–147)
SP GR UR STRIP.AUTO: 1.01 (ref 1–1.03)
UROBILINOGEN UR STRIP-ACNC: <2 MG/DL
WBC # BLD AUTO: 5.26 THOUSAND/UL (ref 4.31–10.16)

## 2024-11-15 PROCEDURE — 71250 CT THORAX DX C-: CPT

## 2024-11-15 PROCEDURE — 81003 URINALYSIS AUTO W/O SCOPE: CPT

## 2024-11-15 PROCEDURE — 82948 REAGENT STRIP/BLOOD GLUCOSE: CPT

## 2024-11-15 PROCEDURE — 72125 CT NECK SPINE W/O DYE: CPT

## 2024-11-15 PROCEDURE — 70450 CT HEAD/BRAIN W/O DYE: CPT

## 2024-11-15 PROCEDURE — 84145 PROCALCITONIN (PCT): CPT

## 2024-11-15 PROCEDURE — 72131 CT LUMBAR SPINE W/O DYE: CPT

## 2024-11-15 PROCEDURE — 99223 1ST HOSP IP/OBS HIGH 75: CPT | Performed by: HOSPITALIST

## 2024-11-15 PROCEDURE — 36415 COLL VENOUS BLD VENIPUNCTURE: CPT

## 2024-11-15 PROCEDURE — 85025 COMPLETE CBC W/AUTO DIFF WBC: CPT

## 2024-11-15 PROCEDURE — 83036 HEMOGLOBIN GLYCOSYLATED A1C: CPT

## 2024-11-15 PROCEDURE — 96365 THER/PROPH/DIAG IV INF INIT: CPT

## 2024-11-15 PROCEDURE — 80048 BASIC METABOLIC PNL TOTAL CA: CPT

## 2024-11-15 PROCEDURE — 85730 THROMBOPLASTIN TIME PARTIAL: CPT

## 2024-11-15 PROCEDURE — 85610 PROTHROMBIN TIME: CPT

## 2024-11-15 PROCEDURE — 85049 AUTOMATED PLATELET COUNT: CPT

## 2024-11-15 PROCEDURE — 99285 EMERGENCY DEPT VISIT HI MDM: CPT

## 2024-11-15 PROCEDURE — 99285 EMERGENCY DEPT VISIT HI MDM: CPT | Performed by: EMERGENCY MEDICINE

## 2024-11-15 RX ORDER — PANTOPRAZOLE SODIUM 40 MG/1
40 TABLET, DELAYED RELEASE ORAL
Status: DISCONTINUED | OUTPATIENT
Start: 2024-11-16 | End: 2024-11-17 | Stop reason: HOSPADM

## 2024-11-15 RX ORDER — SODIUM CHLORIDE 9 MG/ML
75 INJECTION, SOLUTION INTRAVENOUS CONTINUOUS
Status: DISCONTINUED | OUTPATIENT
Start: 2024-11-15 | End: 2024-11-16

## 2024-11-15 RX ORDER — INSULIN LISPRO 100 [IU]/ML
1-5 INJECTION, SOLUTION INTRAVENOUS; SUBCUTANEOUS
Status: DISCONTINUED | OUTPATIENT
Start: 2024-11-15 | End: 2024-11-17 | Stop reason: HOSPADM

## 2024-11-15 RX ORDER — GINSENG 100 MG
1 CAPSULE ORAL ONCE
Status: COMPLETED | OUTPATIENT
Start: 2024-11-15 | End: 2024-11-15

## 2024-11-15 RX ORDER — OXYBUTYNIN CHLORIDE 5 MG/1
10 TABLET, EXTENDED RELEASE ORAL DAILY
Status: DISCONTINUED | OUTPATIENT
Start: 2024-11-16 | End: 2024-11-17 | Stop reason: HOSPADM

## 2024-11-15 RX ORDER — HYDROXYCHLOROQUINE SULFATE 200 MG/1
200 TABLET, FILM COATED ORAL
Status: DISCONTINUED | OUTPATIENT
Start: 2024-11-16 | End: 2024-11-17 | Stop reason: HOSPADM

## 2024-11-15 RX ORDER — ATORVASTATIN CALCIUM 10 MG/1
10 TABLET, FILM COATED ORAL DAILY
Status: DISCONTINUED | OUTPATIENT
Start: 2024-11-16 | End: 2024-11-17 | Stop reason: HOSPADM

## 2024-11-15 RX ORDER — LANOLIN ALCOHOL/MO/W.PET/CERES
400 CREAM (GRAM) TOPICAL DAILY
Status: DISCONTINUED | OUTPATIENT
Start: 2024-11-16 | End: 2024-11-17 | Stop reason: HOSPADM

## 2024-11-15 RX ORDER — DULOXETIN HYDROCHLORIDE 60 MG/1
60 CAPSULE, DELAYED RELEASE ORAL DAILY
Status: DISCONTINUED | OUTPATIENT
Start: 2024-11-16 | End: 2024-11-17 | Stop reason: HOSPADM

## 2024-11-15 RX ORDER — HEPARIN SODIUM 5000 [USP'U]/ML
5000 INJECTION, SOLUTION INTRAVENOUS; SUBCUTANEOUS EVERY 8 HOURS SCHEDULED
Status: DISCONTINUED | OUTPATIENT
Start: 2024-11-15 | End: 2024-11-17 | Stop reason: HOSPADM

## 2024-11-15 RX ORDER — DOXYCYCLINE 100 MG/1
100 CAPSULE ORAL ONCE
Status: COMPLETED | OUTPATIENT
Start: 2024-11-15 | End: 2024-11-15

## 2024-11-15 RX ORDER — PREGABALIN 75 MG/1
75 CAPSULE ORAL 2 TIMES DAILY
Status: DISCONTINUED | OUTPATIENT
Start: 2024-11-15 | End: 2024-11-17 | Stop reason: HOSPADM

## 2024-11-15 RX ORDER — ACETAMINOPHEN 325 MG/1
650 TABLET ORAL 3 TIMES DAILY
Status: DISCONTINUED | OUTPATIENT
Start: 2024-11-15 | End: 2024-11-17 | Stop reason: HOSPADM

## 2024-11-15 RX ORDER — FUROSEMIDE 40 MG/1
20 TABLET ORAL DAILY
Status: DISCONTINUED | OUTPATIENT
Start: 2024-11-16 | End: 2024-11-17 | Stop reason: HOSPADM

## 2024-11-15 RX ORDER — ASPIRIN 81 MG/1
81 TABLET, CHEWABLE ORAL DAILY
Status: DISCONTINUED | OUTPATIENT
Start: 2024-11-16 | End: 2024-11-17 | Stop reason: HOSPADM

## 2024-11-15 RX ORDER — DIPHENOXYLATE HYDROCHLORIDE AND ATROPINE SULFATE 2.5; .025 MG/1; MG/1
1 TABLET ORAL 4 TIMES DAILY PRN
Status: DISCONTINUED | OUTPATIENT
Start: 2024-11-15 | End: 2024-11-17 | Stop reason: HOSPADM

## 2024-11-15 RX ORDER — PYRIDOXINE HCL (VITAMIN B6) 50 MG
100 TABLET ORAL DAILY
Status: DISCONTINUED | OUTPATIENT
Start: 2024-11-16 | End: 2024-11-17 | Stop reason: HOSPADM

## 2024-11-15 RX ORDER — ACETAMINOPHEN 325 MG/1
650 TABLET ORAL ONCE
Status: COMPLETED | OUTPATIENT
Start: 2024-11-15 | End: 2024-11-15

## 2024-11-15 RX ORDER — ACETAMINOPHEN 325 MG/1
650 TABLET ORAL EVERY 6 HOURS PRN
Status: DISCONTINUED | OUTPATIENT
Start: 2024-11-15 | End: 2024-11-15

## 2024-11-15 RX ADMIN — ACETAMINOPHEN 650 MG: 325 TABLET, FILM COATED ORAL at 18:15

## 2024-11-15 RX ADMIN — HEPARIN SODIUM 5000 UNITS: 5000 INJECTION INTRAVENOUS; SUBCUTANEOUS at 18:19

## 2024-11-15 RX ADMIN — BACITRACIN 1 LARGE APPLICATION: 500 OINTMENT TOPICAL at 12:39

## 2024-11-15 RX ADMIN — DOXYCYCLINE 100 MG: 100 CAPSULE ORAL at 14:46

## 2024-11-15 RX ADMIN — CEFTRIAXONE 1000 MG: 2 INJECTION, POWDER, FOR SOLUTION INTRAMUSCULAR; INTRAVENOUS at 14:46

## 2024-11-15 RX ADMIN — PREGABALIN 75 MG: 75 CAPSULE ORAL at 18:15

## 2024-11-15 RX ADMIN — INSULIN LISPRO 1 UNITS: 100 INJECTION, SOLUTION INTRAVENOUS; SUBCUTANEOUS at 21:58

## 2024-11-15 RX ADMIN — SODIUM CHLORIDE 75 ML/HR: 0.9 INJECTION, SOLUTION INTRAVENOUS at 18:18

## 2024-11-15 RX ADMIN — ACETAMINOPHEN 650 MG: 325 TABLET, FILM COATED ORAL at 12:04

## 2024-11-15 NOTE — ASSESSMENT & PLAN NOTE
"Lab Results   Component Value Date    HGBA1C 7.6 (H) 07/30/2024       No results for input(s): \"POCGLU\" in the last 72 hours.    Blood Sugar Average: Last 72 hrs:  Will monitor sugars while inpatient  SSI    "

## 2024-11-15 NOTE — ASSESSMENT & PLAN NOTE
"Lab Results   Component Value Date    HGBA1C 7.6 (H) 07/30/2024       No results for input(s): \"POCGLU\" in the last 72 hours.    Blood Sugar Average: Last 72 hrs:  Monitor sugars, SSI    "

## 2024-11-15 NOTE — ASSESSMENT & PLAN NOTE
Wt Readings from Last 3 Encounters:   11/06/24 56.7 kg (125 lb)   09/11/24 55 kg (121 lb 3.2 oz)   09/04/24 56.1 kg (123 lb 9.6 oz)   Weights have been stable, between 120 to 126 pounds at home, currently she is at 125 pounds  She does have some bilateral JVD, decreased breath sounds at the bases, trace pitting edema bilateral lower extremities  Reports compliance with home Lasix 20 mg daily  Hemodynamically stable, not requiring any oxygen  Echo July 2024 showing EF 50%, grade 1 diastolic dysfunction  Patient denying any worsening SOB or leg swelling, no weight gain  Do not believe that she is in CHF exacerbation  CT chest showing some mild bilateral pleural effusions    Plan:  Will continue her Lasix 20 mg p.o. daily  Monitor volume status

## 2024-11-15 NOTE — ASSESSMENT & PLAN NOTE
Lab Results   Component Value Date    EGFR 29 11/15/2024    EGFR 29 11/07/2024    EGFR 30 09/11/2024    CREATININE 1.54 (H) 11/15/2024    CREATININE 1.56 (H) 11/07/2024    CREATININE 1.50 (H) 09/11/2024   Currently at baseline, monitor BMPs

## 2024-11-15 NOTE — ASSESSMENT & PLAN NOTE
Patient presenting with a fall, mechanical in nature, has had history of multiple falls in the past.  Hit her head, she is on aspirin.  No other blood thinners.    Gets PT OT therapy at home 1 or 2 sessions a week.  Has been offered inpatient rehab in the past and refused.  Does not appear to have any signs of infection at this time  Alert and oriented x 3, appears to be at her baseline  Trauma workup is negative.    Plan:  Fall precautions  PT OT case management consult  Possible rehab placement  Will check urinalysis

## 2024-11-15 NOTE — ED PROVIDER NOTES
Emergency Department Trauma Note  Tanya Stephen 89 y.o. female MRN: [id number]  Unit/Bed#: ED-26/ED-26 Encounter: [id number]      Trauma Alert: Trauma Acuity: C  Model of Arrival: Mode of Arrival: Direct from scene via    Trauma Team: Current Providers  Attending Provider: Mihir Bolivar MD  Attending Provider: Jay De Leon MD  ED Technician: Sonam Flood  Resident: Hakan Quintana MD  Registered Nurse: Belén Herrmann RN  Resident: jJ Mosley MD  ED Technician: Spencer Griffin  Consultants:     None      History of Present Illness     Chief Complaint:   Chief Complaint   Patient presents with    Fall     Patient presents for fall with headstrike on ASA yesterday. Patient denies LOC, complains of periodic headaches, and left wrist pain.      Mechanism:Details of Incident: Fall from sitting on bed Injury Date: 11/14/24 Injury Time: 1900      89-year-old woman presenting with fall.  She fell out of bed onto a rug rug slipped and she hit her head on the floor.  She reports multiple abrasions on the right knee and shin, right hand, left hand from the rug.  She endorses her head.  She is on aspirin.  She denies pain beyond the abrasions.  She denies lightheadedness, dizziness, headaches, chest pain, palpitations, shortness of breath, fever, chills, nausea, vomiting, hematuria, dysuria.      History provided by:  Patient  Fall  Associated symptoms: back pain    Associated symptoms: no abdominal pain, no chest pain, no headaches, no nausea, no seizures and no vomiting      Review of Systems   Constitutional:  Negative for chills, diaphoresis and fever.   HENT:  Negative for congestion, ear pain, postnasal drip, rhinorrhea and sore throat.    Eyes:  Negative for pain and visual disturbance.   Respiratory:  Negative for cough, chest tightness and shortness of breath.    Cardiovascular:  Negative for chest pain and palpitations.   Gastrointestinal:  Negative for abdominal pain, constipation, diarrhea,  nausea and vomiting.   Genitourinary:  Negative for dysuria and hematuria.   Musculoskeletal:  Positive for back pain. Negative for arthralgias.   Skin:  Negative for color change and rash.   Neurological:  Negative for dizziness, seizures, syncope, weakness, light-headedness, numbness and headaches.   All other systems reviewed and are negative.      Historical Information     Immunizations:   Immunization History   Administered Date(s) Administered    COVID-19 MODERNA VACC 0.5 ML IM 01/20/2021, 02/17/2021, 11/22/2021, 06/04/2022    COVID-19 Pfizer Vac BIVALENT Fawad-sucrose 12 Yr+ IM 10/26/2022    INFLUENZA 01/12/2013, 12/04/2018, 09/29/2020, 11/18/2022, 10/12/2023    Pneumococcal Polysaccharide PPV23 03/01/2008    Tdap 10/12/2023    Zoster Vaccine Recombinant 05/20/2022       Past Medical History:   Diagnosis Date    Anemia     Arthritis     Back pain     CHF (congestive heart failure) (HCC)     Chronic kidney disease     Stage 3b    Confusion 02/22/2023    Diabetes (HCC)     Diabetes mellitus (HCC)     Diabetic gastroparesis associated with type 2 diabetes mellitus  (HCC)     Diabetic polyneuropathy (HCC)     Diverticulosis     Herpes zoster     Hypertension     IBS (irritable bowel syndrome)     Neurogenic claudication due to lumbar spinal stenosis     Osteoarthritis     Osteoporosis     Pulmonary edema     Raynaud's phenomenon without gangrene     Seronegative arthropathy of multiple sites (HCC)     Sicca (HCC)        Family History   Problem Relation Age of Onset    Cancer Brother     Diabetes Mother     Hypertension Father     Heart disease Father      Past Surgical History:   Procedure Laterality Date    BACK SURGERY      COLONOSCOPY      EGD AND COLONOSCOPY N/A 12/23/2016    Procedure: EGD AND COLONOSCOPY;  Surgeon: Elina Anderson MD;  Location: [facility];  Service:     JOINT REPLACEMENT      bilat knees and hips    OTHER SURGICAL HISTORY      pelvis rods    REPLACEMENT TOTAL KNEE BILATERAL      STOMACH  SURGERY      UPPER GASTROINTESTINAL ENDOSCOPY       Social History     Tobacco Use    Smoking status: Former     Current packs/day: 0.00     Types: Cigarettes     Quit date:      Years since quittin.9    Smokeless tobacco: Never   Vaping Use    Vaping status: Never Used   Substance Use Topics    Alcohol use: No    Drug use: No     E-Cigarette/Vaping    E-Cigarette Use Never User      E-Cigarette/Vaping Substances    Nicotine No     THC No     CBD No     Flavoring No     Other No        Family History: non-contributory    Meds/Allergies   Prior to Admission Medications   Prescriptions Last Dose Informant Patient Reported? Taking?   Cholecalciferol (VITAMIN D3) 1000 units CAPS  Child Yes No   Sig: Take 1 capsule by mouth daily    DULoxetine (CYMBALTA) 60 mg delayed release capsule   No No   Sig: TAKE ONE CAPSULE BY MOUTH ONCE DAILY   Magnesium 250 MG TABS  Child Yes No   Sig: Take 250 mg by mouth every evening   Mirabegron ER (Myrbetriq) 50 MG TB24   No No   Sig: Take 1 tablet (50 mg total) by mouth in the morning   Sodium Fluoride (PreviDent 5000 Booster Plus) 1.1 % PSTE  Child No No   Sig: Apply on teeth every evening as directed   acetaminophen (TYLENOL) 325 mg tablet  Child No No   Sig: Take 2 tablets (650 mg total) by mouth 3 (three) times a day   aspirin 81 mg chewable tablet  Child No No   Sig: Chew 1 tablet (81 mg total) daily   atorvastatin (LIPITOR) 10 mg tablet  Child No No   Sig: TAKE ONE TABLET BY MOUTH EVERY DAY   diphenoxylate-atropine (LOMOTIL) 2.5-0.025 mg per tablet  Child Yes No   Sig: Take 1 tablet by mouth if needed   furosemide (LASIX) 20 mg tablet  Child No No   Sig: One tablet daily and an extra 20 mg prn for wt gain 3 lb/ 24 hours or 5 lb/ 5 days, above a dry wt of 122 lb   hydroxychloroquine (PLAQUENIL) 200 mg tablet  Child No No   Sig: Take 1 tablet (200 mg total) by mouth daily with breakfast   nitrofurantoin (MACRODANTIN) 50 mg capsule   No No   Sig: Take 1 capsule (50 mg total)  by mouth in the morning   pregabalin (LYRICA) 75 mg capsule  Child No No   Sig: Take 1 capsule (75 mg total) by mouth 2 (two) times a day   pyridoxine (B-6) 100 MG tablet  Child Yes No   Sig: Take 100 mg by mouth daily      Facility-Administered Medications Last Administration Doses Remaining   denosumab (PROLIA) subcutaneous injection 60 mg 5/29/2024  2:01 PM 1          Allergies   Allergen Reactions    Morphine Other (See Comments)     urinary retention    Ciprofloxacin Nausea Only and Rash       PHYSICAL EXAM      Objective   Vitals:   First set: Temperature: 97.7 °F (36.5 °C) (11/15/24 1141)  Pulse: 68 (11/15/24 1141)  Respirations: 18 (11/15/24 1141)  Blood Pressure: 118/57 (11/15/24 1141)  SpO2: 98 % (11/15/24 1141)    Primary Survey:   (A) Airway: intact  (B) Breathing: Bilateral breath sounds  (C) Circulation: Pulses:   normal  (D) Disabliity:  GCS Total:  15  (E) Expose:  Completed    Secondary Survey: (Click on Physical Exam tab above)  Physical Exam  Constitutional:       General: She is not in acute distress.     Appearance: She is not diaphoretic.   HENT:      Head: Normocephalic and atraumatic. No right periorbital erythema or left periorbital erythema.      Jaw: There is normal jaw occlusion.      Nose: No congestion or rhinorrhea.      Mouth/Throat:      Mouth: Mucous membranes are moist.      Pharynx: No oropharyngeal exudate.   Eyes:      General: No scleral icterus.  Cardiovascular:      Rate and Rhythm: Normal rate and regular rhythm.      Heart sounds: Normal heart sounds. No murmur heard.     No friction rub. No gallop.   Pulmonary:      Effort: No respiratory distress.      Breath sounds: Normal breath sounds. No wheezing, rhonchi or rales.   Abdominal:      General: Abdomen is flat. There is no distension.      Palpations: Abdomen is soft.      Tenderness: There is no abdominal tenderness. There is no guarding.   Lymphadenopathy:      Cervical: No cervical adenopathy.   Skin:     General:  Skin is warm and dry.      Capillary Refill: Capillary refill takes less than 2 seconds.      Findings: No rash.      Comments: Skin tear on dorsum of right hand, 2 x 2 cm.  Skin tear on dorsum of left hand, 3 x 4 cm, small skin tear on right knee.  Bleeding controlled at all wounds.   Neurological:      General: No focal deficit present.      Mental Status: She is alert and oriented to person, place, and time.         Cervical spine cleared by clinical criteria? No (imaging required)      Invasive Devices       Peripheral Intravenous Line  Duration             Peripheral IV 11/15/24 Right Antecubital <1 day                    Lab Results:   Results Reviewed       Procedure Component Value Units Date/Time    Basic metabolic panel [[id number]]  (Abnormal) Collected: 11/15/24 1201    Lab Status: Final result Specimen: Blood from Arm, Right Updated: 11/15/24 1233     Sodium 140 mmol/L      Potassium 3.8 mmol/L      Chloride 105 mmol/L      CO2 29 mmol/L      ANION GAP 6 mmol/L      BUN 32 mg/dL      Creatinine 1.54 mg/dL      Glucose 129 mg/dL      Calcium 9.4 mg/dL      eGFR 29 ml/min/1.73sq m     Narrative:      [employer] Kid[employer] guidelines for Chronic Kidney Disease (CKD):     Stage 1 with normal or high GFR (GFR > 90 mL/min/1.73 square meters)    Stage 2 Mild CKD (GFR = 60-89 mL/min/1.73 square meters)    Stage 3A Moderate CKD (GFR = 45-59 mL/min/1.73 square meters)    Stage 3B Moderate CKD (GFR = 30-44 mL/min/1.73 square meters)    Stage 4 Severe CKD (GFR = 15-29 mL/min/1.73 square meters)    Stage 5 End Stage CKD (GFR <15 mL/min/1.73 square meters)  Note: GFR calculation is accurate only with a steady state creatinine    Protime-INR [[id number]]  (Normal) Collected: 11/15/24 1201    Lab Status: Final result Specimen: Blood from Arm, Right Updated: 11/15/24 1229     Protime 14.5 seconds      INR 1.06    Narrative:      INR Therapeutic Range    Indication                                              INR Range      Atrial Fibrillation                                               2.0-3.0  Hypercoagulable State                                    2.0.2.3  Left Ventricular Asist Device                            2.0-3.0  Mechanical Heart Valve                                  -    Aortic(with afib, MI, embolism, HF, LA enlargement,    and/or coagulopathy)                                     2.0-3.0 (2.5-3.5)     Mitral                                                             2.5-3.5  Prosthetic/Bioprosthetic Heart Valve               2.0-3.0  Venous thromboembolism (VTE: VT, PE        2.0-3.0    APTT [[id number]]  (Normal) Collected: 11/15/24 1201    Lab Status: Final result Specimen: Blood from Arm, Right Updated: 11/15/24 1229     PTT 29 seconds     CBC and differential [[id number]]  (Abnormal) Collected: 11/15/24 1201    Lab Status: Final result Specimen: Blood from Arm, Right Updated: 11/15/24 1211     WBC 5.26 Thousand/uL      RBC 3.82 Million/uL      Hemoglobin 11.4 g/dL      Hematocrit 36.2 %      MCV 95 fL      MCH 29.8 pg      MCHC 31.5 g/dL      RDW 15.1 %      MPV 11.1 fL      Platelets 126 Thousands/uL      nRBC 0 /100 WBCs      Segmented % 62 %      Immature Grans % 1 %      Lymphocytes % 18 %      Monocytes % 14 %      Eosinophils Relative 4 %      Basophils Relative 1 %      Absolute Neutrophils 3.34 Thousands/µL      Absolute Immature Grans 0.04 Thousand/uL      Absolute Lymphocytes 0.92 Thousands/µL      Absolute Monocytes 0.73 Thousand/µL      Eosinophils Absolute 0.20 Thousand/µL      Basophils Absolute 0.03 Thousands/µL                    Imaging Studies:   Direct to CT: No  CT chest without contrast   Final Result by Martínez Gurrola MD (11/15 1416)      New mild pulmonary edema-like pattern, bilateral pleural effusions with adjacent consolidation.      Scattered sub-5 mm nodules bilaterally, new since CT chest from 9/11/2024 along with accompanying mild diffuse mosaicism. This is favored  favored infectious/inflammatory in etiology. As a precaution, recommend follow-up CT chest in 4 to 6 weeks.      Stable bandlike opacity in the lingula extending to the pleural surface, probably round atelectasis.      The study was marked in EPIC for immediate notification.         Workstation performed: [id number]         CT head without contrast   Final Result by Mingo Serrato MD (11/15 1255)      No acute intracranial abnormality.      Similar mild chronic microangiopathy.                  Workstation performed: [id number]         CT cervical spine without contrast   Final Result by Mingo Serrato MD (11/15 1301)      No acute cervical spine fracture or traumatic malalignment.      Small focal opacity in left apical lung, new since 9/11/2024. Differential includes infectious/inflammatory, aspiration, or small focal atelectasis. Consider CT chest without contrast for further evaluation of lungs.      Additional chronic/incidental findings as detailed above.      The study was marked in EPIC for immediate notification.                  Workstation performed: [id number]         CT lumbar spine without contrast   Final Result by Mingo Serrato MD (11/15 1310)      No acute osseous abnormality of postsurgical lumbar spine.      Partially imaged lower thoracic spine- bilateral iliac spinal fixation hardware. No acute hardware complication of visualized spinal fixation hardware.      Partially imaged small bilateral pleural effusions with associated passive atelectasis. Consider follow-up CT chest without contrast for further evaluation.      Additional chronic/incidental findings as detailed above.      The study was marked in EPIC for immediate notification.            Workstation performed: [id number]               Procedures  Procedures         ED Course           Medical Decision Making  Patient seen and examined.  Mechanical fall on aspirin.  There is right lumbar paraspinal tenderness and  "right flank pain.  Patient has known history of herpetic neuralgia and states this pain has been present since before the fall.  She has a skin tear on the right hand, 2 cm x 2 cm, skin tear on dorsum of left hand, 3 cm x 4 cm, skin tear on the left knee.  She can range all joints with no pain or decreased range of motion.  She does not complain of head pain.  Remainder of exam is within normal limits.  Appropriate orders placed.    Labs show elevated creatinine and BUN, within patient's normal range.  CT head within normal limits.  CT C-spine shows no acute fractures but does show a focal opacity in the left lung and recommends chest CT for further characterization.  CT lumbar spine shows no acute abnormalities.    CT chest shows \"bilateral pleural effusions with adjacent consolidation. Scattered sub-5 mm nodules bilaterally, new since CT chest from 9/11/2024 along with accompanying mild diffuse mosaicism. This is favored favored infectious/inflammatory in etiology\".  Most likely pneumonia.  Antibiotics started.  Given patient's age and elevated BUN with pleural effusions patient meets criteria for admission.  Case discussed with slim, accepted for admission by Dr. Gomez.  Patient is agreeable to admission, all questions answered.      Amount and/or Complexity of Data Reviewed  Labs: ordered.  Radiology: ordered.    Risk  OTC drugs.  Prescription drug management.  Decision regarding hospitalization.                Disposition  [facility] Transfer: No  Final diagnoses:   Bilateral pneumonia   Bilateral pleural effusion   Fall from bed, initial encounter   Traumatic injury of head, initial encounter   Multiple skin tears     Time reflects when diagnosis was documented in both MDM as applicable and the Disposition within this note       Time User Action Codes Description Comment    11/15/2024  3:11 PM Mihir Bolivar Add [J18.9] Bilateral pneumonia     11/15/2024  3:11 PM Mihir Bolivar Add [J90] Bilateral " pleural effusion     11/15/2024  3:11 PM Mihir Bolivar [W06.XXXA] Fall from bed, initial encounter     11/15/2024  3:11 PM Mihir Bolivar [S09.90XA] Traumatic injury of head, initial encounter     11/15/2024  3:12 PM Mihir Bolivar [T14.8XXA] Multiple skin tears           ED Disposition       ED Disposition   Admit    Condition   Stable    Date/Time   Fri Nov 15, 2024  3:12 PM    Comment   Case was discussed with [facility] and the patient's admission status was agreed to be Admission Status: inpatient status to the service of Dr. De Leon .               Follow-up Information    None       Patient's Medications   Discharge Prescriptions    No medications on file     No discharge procedures on file.    PDMP Review         Value Time User    PDMP Reviewed  Yes 9/4/2024 10:53 AM Tyrone Butler MD            ED Provider  Electronically Signed by           Jj Mosley MD  11/15/24 [id number]

## 2024-11-15 NOTE — H&P
"H&P - Hospitalist   Name: Tanya Stephen 89 y.o. female I MRN: [id number]  Unit/Bed#: ED-26 I Date of Admission: 11/15/2024   Date of Service: 11/15/2024 I Hospital Day: 0     Assessment & Plan  Ambulatory dysfunction  Patient presenting with a fall, mechanical in nature, has had history of multiple falls in the past.  Hit her head, she is on aspirin.  No other blood thinners.    Gets PT OT therapy at home 1 or 2 sessions a week.  Has been offered inpatient rehab in the past and refused.  Does not appear to have any signs of infection at this time  Alert and oriented x 3, appears to be at her baseline  Trauma workup is negative.    Plan:  Fall precautions  PT OT case management consult  Possible rehab placement  Will check urinalysis  Type 2 diabetes mellitus with stage 3b chronic kidney disease, without long-term current use of insulin (HCC)  Lab Results   Component Value Date    HGBA1C 7.6 (H) 07/30/2024       No results for input(s): \"POCGLU\" in the last 72 hours.    Blood Sugar Average: Last 72 hrs:  Monitor sugars, SSI    Gastroesophageal reflux disease without esophagitis  Continue Protonix 40 mg daily  Chronic diastolic (congestive) heart failure (HCC)  Wt Readings from Last 3 Encounters:   11/06/24 56.7 kg (125 lb)   09/11/24 55 kg (121 lb 3.2 oz)   09/04/24 56.1 kg (123 lb 9.6 oz)   Weights have been stable, between 120 to 126 pounds at home, currently she is at 125 pounds  She does have some bilateral JVD, decreased breath sounds at the bases, trace pitting edema bilateral lower extremities  Reports compliance with home Lasix 20 mg daily  Hemodynamically stable, not requiring any oxygen  Echo July 2024 showing EF 50%, grade 1 diastolic dysfunction  Patient denying any worsening SOB or leg swelling, no weight gain  Do not believe that she is in CHF exacerbation  CT chest showing some mild bilateral pleural effusions    Plan:  Will continue her Lasix 20 mg p.o. daily  Monitor volume status        Chronic " "kidney disease, stage IV (severe) ([facility])  Lab Results   Component Value Date    EGFR 29 11/15/2024    EGFR 29 11/07/2024    EGFR 30 09/11/2024    CREATININE 1.54 (H) 11/15/2024    CREATININE 1.56 (H) 11/07/2024    CREATININE 1.50 (H) 09/11/2024   Currently at baseline, monitor BMPs  Diabetic nephropathy associated with type 2 diabetes mellitus ([facility])  Lab Results   Component Value Date    HGBA1C 7.6 (H) 07/30/2024       No results for input(s): \"POCGLU\" in the last 72 hours.    Blood Sugar Average: Last 72 hrs:  Will monitor sugars while inpatient  SSI        VTE Pharmacologic Prophylaxis: VTE Score: 4 High Risk (Score >/= 5) - Pharmacological DVT Prophylaxis Ordered: heparin. Sequential Compression Devices Ordered.  Code Status: Level 1 - Full Code confirmed with patient and family at the bedside  Discussion with family: Updated  ( and daughter) at bedside.    Anticipated Length of Stay: Patient will be admitted on an observation basis with an anticipated length of stay of less than 2 midnights secondary to ambulatory dysfunction.    History of Present Illness   Chief Complaint: Yara Stephen is a 89 y.o. female with a PMH of diastolic heart failure, CKD 4, hypertension, type 2 diabetes, anemia, ambulatory dysfunction who presents with a mechanical fall occurring yesterday.  Patient fell from the bed, tripped over her comforter and fell to the ground hitting her head.  She is on aspirin.  No blood thinners.  No dizziness or lightheadedness.  Patient denies any shortness of breath or chest pain.  Weights have been stable between 1 22-1 26, she is currently 125 pounds.  She endorses compliance with her Lasix 20 mg daily.  Patient denies any coughing, fevers or chills, sick contacts or travel.    CT chest during trauma workup showed possible pneumonia.  Patient however does not have any symptoms of pneumonia.  She received a dose of antibiotics in the ED.  The rest of the trauma workup " was negative.  Patient did have some skin breaks during the fall but are wrapped on her bilateral hands and right knee.    Review of Systems   Constitutional:  Negative for chills and fever.   HENT:  Negative for ear pain and sore throat.    Eyes:  Negative for pain and visual disturbance.   Respiratory:  Negative for cough and shortness of breath.    Cardiovascular:  Negative for chest pain and palpitations.   Gastrointestinal:  Negative for abdominal pain and vomiting.   Genitourinary:  Negative for dysuria and hematuria.   Musculoskeletal:  Positive for gait problem. Negative for arthralgias and back pain.   Skin:  Negative for color change and rash.   Neurological:  Negative for seizures, syncope and headaches.   Psychiatric/Behavioral:  Negative for confusion.    All other systems reviewed and are negative.      Historical Information   Past Medical History:   Diagnosis Date    Anemia     Arthritis     Back pain     CHF (congestive heart failure) ([facility])     Chronic kidney disease     Stage 3b    Confusion 02/22/2023    Diabetes (HCC)     Diabetes mellitus (HCC)     Diabetic gastroparesis associated with type 2 diabetes mellitus  (HCC)     Diabetic polyneuropathy (HCC)     Diverticulosis     Herpes zoster     Hypertension     IBS (irritable bowel syndrome)     Neurogenic claudication due to lumbar spinal stenosis     Osteoarthritis     Osteoporosis     Pulmonary edema     Raynaud's phenomenon without gangrene     Seronegative arthropathy of multiple sites (HCC)     Sicca ([facility])      Past Surgical History:   Procedure Laterality Date    BACK SURGERY      COLONOSCOPY      EGD AND COLONOSCOPY N/A 12/23/2016    Procedure: EGD AND COLONOSCOPY;  Surgeon: Elina Anderson MD;  [facility];  Service:     JOINT REPLACEMENT      bilat knees and hips    OTHER SURGICAL HISTORY      pelvis rods    REPLACEMENT TOTAL KNEE BILATERAL      STOMACH SURGERY      UPPER GASTROINTESTINAL ENDOSCOPY       Social History     Tobacco  Use    Smoking status: Former     Current packs/day: 0.00     Types: Cigarettes     Quit date:      Years since quittin.9    Smokeless tobacco: Never   Vaping Use    Vaping status: Never Used   Substance and Sexual Activity    Alcohol use: No    Drug use: No    Sexual activity: Not Currently     Partners: Male     Birth control/protection: None     E-Cigarette/Vaping    E-Cigarette Use Never User      E-Cigarette/Vaping Substances    Nicotine No     THC No     CBD No     Flavoring No     Other No      Family History   Problem Relation Age of Onset    Cancer Brother     Diabetes Mother     Hypertension Father     Heart disease Father      Social History:  Marital Status: /Civil Union   Occupation:   Patient Pre-hospital Living Situation: Home  Patient Pre-hospital Level of Mobility: walks with cane  Patient Pre-hospital Diet Restrictions:     Meds/Allergies   I have reviewed home medications with patient family member.  Prior to Admission medications    Medication Sig Start Date End Date Taking? Authorizing Provider   acetaminophen (TYLENOL) 325 mg tablet Take 2 tablets (650 mg total) by mouth 3 (three) times a day 24  Yes Tyrone Butler MD   aspirin 81 mg chewable tablet Chew 1 tablet (81 mg total) daily 4/15/22  Yes Leland Gray MD   atorvastatin (LIPITOR) 10 mg tablet TAKE ONE TABLET BY MOUTH EVERY DAY 24  Yes Mihir Portillo DO   Cholecalciferol (VITAMIN D3) 1000 units CAPS Take 1 capsule by mouth daily    Yes Historical Provider, MD   DULoxetine (CYMBALTA) 60 mg delayed release capsule TAKE ONE CAPSULE BY MOUTH ONCE DAILY 10/30/24  Yes Danyell Dutta MD   furosemide (LASIX) 20 mg tablet One tablet daily and an extra 20 mg prn for wt gain 3 lb/ 24 hours or 5 lb/ 5 days, above a dry wt of 122 lb 24  Yes JOSE G Valladares   hydroxychloroquine (PLAQUENIL) 200 mg tablet Take 1 tablet (200 mg total) by mouth daily with breakfast 24 Yes Yolanda Spence  FLAQUITO   Magnesium 250 MG TABS Take 250 mg by mouth every evening   Yes Historical Provider, MD   Mirabegron ER (Myrbetriq) 50 MG TB24 Take 1 tablet (50 mg total) by mouth in the morning 10/17/24  Yes Kendall Moore PA-C   nitrofurantoin (MACRODANTIN) 50 mg capsule Take 1 capsule (50 mg total) by mouth in the morning 11/7/24  Yes JOSE G Morales   omeprazole (PriLOSEC) 20 mg delayed release capsule Take 20 mg by mouth daily 10/1/24  Yes Historical Provider, MD   pregabalin (LYRICA) 75 mg capsule Take 1 capsule (75 mg total) by mouth 2 (two) times a day 9/4/24  Yes Tyrone Butler MD   pyridoxine (B-6) 100 MG tablet Take 100 mg by mouth daily   Yes Historical Provider, MD   Sodium Fluoride (PreviDent 5000 Booster Plus) 1.1 % PSTE Apply on teeth every evening as directed 11/18/21  Yes Danyell Dutta MD   diphenoxylate-atropine (LOMOTIL) 2.5-0.025 mg per tablet Take 1 tablet by mouth if needed    Historical Provider, MD     Allergies   Allergen Reactions    Morphine Other (See Comments)     urinary retention    Ciprofloxacin Nausea Only and Rash       Objective :  Temp:  [97.7 °F (36.5 °C)] 97.7 °F (36.5 °C)  HR:  [68-92] 82  BP: (113-127)/(55-76) 121/63  Resp:  [16-18] 16  SpO2:  [92 %-98 %] 96 %  O2 Device: None (Room air)    Physical Exam  Vitals and nursing note reviewed.   Constitutional:       General: She is not in acute distress.     Appearance: Normal appearance. She is well-developed. She is not ill-appearing.   HENT:      Head: Normocephalic and atraumatic.      Mouth/Throat:      Mouth: Mucous membranes are moist.   Eyes:      General: No scleral icterus.     Extraocular Movements: Extraocular movements intact.      Conjunctiva/sclera: Conjunctivae normal.      Pupils: Pupils are equal, round, and reactive to light.   Cardiovascular:      Rate and Rhythm: Normal rate and regular rhythm.      Pulses: Normal pulses.      Heart sounds: Normal heart sounds. No murmur heard.     No friction rub.  No gallop.   Pulmonary:      Effort: Pulmonary effort is normal. No respiratory distress.      Breath sounds: No wheezing or rales.      Comments: Decreased breath sounds bilateral bases  Abdominal:      General: There is no distension.      Palpations: Abdomen is soft.      Tenderness: There is no abdominal tenderness. There is no guarding.   Musculoskeletal:         General: No swelling.      Cervical back: Neck supple.      Right lower leg: Edema (trace) present.      Left lower leg: Edema (trace) present.   Skin:     General: Skin is warm and dry.      Capillary Refill: Capillary refill takes less than 2 seconds.   Neurological:      Mental Status: She is alert and oriented to person, place, and time.   Psychiatric:         Mood and Affect: Mood normal.         Behavior: Behavior normal.          Lines/Drains:            Lab Results: I have reviewed the following results:  Results from last 7 days   Lab Units 11/15/24  1201   WBC Thousand/uL 5.26   HEMOGLOBIN g/dL 11.4*   HEMATOCRIT % 36.2   PLATELETS Thousands/uL 126*   SEGS PCT % 62   LYMPHO PCT % 18   MONO PCT % 14*   EOS PCT % 4     Results from last 7 days   Lab Units 11/15/24  1201   SODIUM mmol/L 140   POTASSIUM mmol/L 3.8   CHLORIDE mmol/L 105   CO2 mmol/L 29   BUN mg/dL 32*   CREATININE mg/dL 1.54*   ANION GAP mmol/L 6   CALCIUM mg/dL 9.4   GLUCOSE RANDOM mg/dL 129     Results from last 7 days   Lab Units 11/15/24  1201   INR  1.06         Lab Results   Component Value Date    HGBA1C 7.6 (H) 07/30/2024    HGBA1C 6.6 (H) 03/02/2024    HGBA1C 5.9 (H) 04/14/2022     Results from last 7 days   Lab Units 11/15/24  1201   PROCALCITONIN ng/ml 0.09       Imaging Results Review: I personally reviewed the following image studies/reports in PACS and discussed pertinent findings with Radiology: CT chest, CT abdomen/pelvis, and CT head. My interpretation of the radiology images/reports is: Trauma workup negative..  Other Study Results Review: EKG was reviewed.      Administrative Statements   I have spent a total time of 30 minutes in caring for this patient on the day of the visit/encounter including Diagnostic results.    ** Please Note: This note has been constructed using a voice recognition system. **

## 2024-11-16 LAB
ANION GAP SERPL CALCULATED.3IONS-SCNC: 9 MMOL/L (ref 4–13)
BASOPHILS # BLD AUTO: 0.03 THOUSANDS/ÂΜL (ref 0–0.1)
BASOPHILS NFR BLD AUTO: 1 % (ref 0–1)
BUN SERPL-MCNC: 29 MG/DL (ref 5–25)
CALCIUM SERPL-MCNC: 9.2 MG/DL (ref 8.4–10.2)
CHLORIDE SERPL-SCNC: 105 MMOL/L (ref 96–108)
CO2 SERPL-SCNC: 27 MMOL/L (ref 21–32)
CREAT SERPL-MCNC: 1.46 MG/DL (ref 0.6–1.3)
EOSINOPHIL # BLD AUTO: 0.23 THOUSAND/ÂΜL (ref 0–0.61)
EOSINOPHIL NFR BLD AUTO: 4 % (ref 0–6)
ERYTHROCYTE [DISTWIDTH] IN BLOOD BY AUTOMATED COUNT: 15.1 % (ref 11.6–15.1)
GFR SERPL CREATININE-BSD FRML MDRD: 31 ML/MIN/1.73SQ M
GLUCOSE SERPL-MCNC: 104 MG/DL (ref 65–140)
GLUCOSE SERPL-MCNC: 117 MG/DL (ref 65–140)
GLUCOSE SERPL-MCNC: 206 MG/DL (ref 65–140)
GLUCOSE SERPL-MCNC: 81 MG/DL (ref 65–140)
GLUCOSE SERPL-MCNC: 89 MG/DL (ref 65–140)
HCT VFR BLD AUTO: 38.3 % (ref 34.8–46.1)
HGB BLD-MCNC: 12.1 G/DL (ref 11.5–15.4)
IMM GRANULOCYTES # BLD AUTO: 0.03 THOUSAND/UL (ref 0–0.2)
IMM GRANULOCYTES NFR BLD AUTO: 1 % (ref 0–2)
LYMPHOCYTES # BLD AUTO: 0.92 THOUSANDS/ÂΜL (ref 0.6–4.47)
LYMPHOCYTES NFR BLD AUTO: 14 % (ref 14–44)
MCH RBC QN AUTO: 30.1 PG (ref 26.8–34.3)
MCHC RBC AUTO-ENTMCNC: 31.6 G/DL (ref 31.4–37.4)
MCV RBC AUTO: 95 FL (ref 82–98)
MONOCYTES # BLD AUTO: 0.78 THOUSAND/ÂΜL (ref 0.17–1.22)
MONOCYTES NFR BLD AUTO: 12 % (ref 4–12)
NEUTROPHILS # BLD AUTO: 4.62 THOUSANDS/ÂΜL (ref 1.85–7.62)
NEUTS SEG NFR BLD AUTO: 68 % (ref 43–75)
NRBC BLD AUTO-RTO: 0 /100 WBCS
PLATELET # BLD AUTO: 138 THOUSANDS/UL (ref 149–390)
PMV BLD AUTO: 11.6 FL (ref 8.9–12.7)
POTASSIUM SERPL-SCNC: 3.7 MMOL/L (ref 3.5–5.3)
PROCALCITONIN SERPL-MCNC: 0.11 NG/ML
RBC # BLD AUTO: 4.02 MILLION/UL (ref 3.81–5.12)
SODIUM SERPL-SCNC: 141 MMOL/L (ref 135–147)
WBC # BLD AUTO: 6.61 THOUSAND/UL (ref 4.31–10.16)

## 2024-11-16 PROCEDURE — 80048 BASIC METABOLIC PNL TOTAL CA: CPT

## 2024-11-16 PROCEDURE — 99232 SBSQ HOSP IP/OBS MODERATE 35: CPT | Performed by: INTERNAL MEDICINE

## 2024-11-16 PROCEDURE — 85025 COMPLETE CBC W/AUTO DIFF WBC: CPT

## 2024-11-16 PROCEDURE — 82948 REAGENT STRIP/BLOOD GLUCOSE: CPT

## 2024-11-16 PROCEDURE — 84145 PROCALCITONIN (PCT): CPT

## 2024-11-16 RX ADMIN — PREGABALIN 75 MG: 75 CAPSULE ORAL at 08:41

## 2024-11-16 RX ADMIN — OXYBUTYNIN CHLORIDE 10 MG: 5 TABLET, EXTENDED RELEASE ORAL at 08:41

## 2024-11-16 RX ADMIN — ASPIRIN 81 MG CHEWABLE TABLET 81 MG: 81 TABLET CHEWABLE at 08:41

## 2024-11-16 RX ADMIN — INSULIN LISPRO 1 UNITS: 100 INJECTION, SOLUTION INTRAVENOUS; SUBCUTANEOUS at 20:10

## 2024-11-16 RX ADMIN — ACETAMINOPHEN 650 MG: 325 TABLET, FILM COATED ORAL at 08:41

## 2024-11-16 RX ADMIN — ATORVASTATIN CALCIUM 10 MG: 10 TABLET, FILM COATED ORAL at 08:41

## 2024-11-16 RX ADMIN — SODIUM CHLORIDE 75 ML/HR: 0.9 INJECTION, SOLUTION INTRAVENOUS at 00:37

## 2024-11-16 RX ADMIN — DULOXETINE HYDROCHLORIDE 60 MG: 60 CAPSULE, DELAYED RELEASE ORAL at 08:41

## 2024-11-16 RX ADMIN — ACETAMINOPHEN 650 MG: 325 TABLET, FILM COATED ORAL at 17:02

## 2024-11-16 RX ADMIN — Medication 400 MG: at 08:49

## 2024-11-16 RX ADMIN — HEPARIN SODIUM 5000 UNITS: 5000 INJECTION INTRAVENOUS; SUBCUTANEOUS at 20:02

## 2024-11-16 RX ADMIN — HEPARIN SODIUM 5000 UNITS: 5000 INJECTION INTRAVENOUS; SUBCUTANEOUS at 06:07

## 2024-11-16 RX ADMIN — Medication 1000 UNITS: at 08:41

## 2024-11-16 RX ADMIN — PANTOPRAZOLE SODIUM 40 MG: 40 TABLET, DELAYED RELEASE ORAL at 06:12

## 2024-11-16 RX ADMIN — HYDROXYCHLOROQUINE SULFATE 200 MG: 200 TABLET, FILM COATED ORAL at 08:45

## 2024-11-16 RX ADMIN — PREGABALIN 75 MG: 75 CAPSULE ORAL at 17:02

## 2024-11-16 RX ADMIN — PYRIDOXINE HCL TAB 50 MG 100 MG: 50 TAB at 08:41

## 2024-11-16 RX ADMIN — HEPARIN SODIUM 5000 UNITS: 5000 INJECTION INTRAVENOUS; SUBCUTANEOUS at 13:16

## 2024-11-16 RX ADMIN — ACETAMINOPHEN 650 MG: 325 TABLET, FILM COATED ORAL at 20:02

## 2024-11-16 NOTE — PROGRESS NOTES
Progress Note - Hospitalist   Name: Tanya Stephen 89 y.o. female I MRN: [id number]  Unit/Bed#: W -01 I Date of Admission: 11/15/2024   Date of Service: 11/16/2024 I Hospital Day: 1    Assessment & Plan  Ambulatory dysfunction  Patient presenting with a fall, mechanical in nature, has had history of multiple falls in the past.  Hit her head, she is on aspirin.  No other blood thinners.    Gets PT OT therapy at home 1 or 2 sessions a week.  Has been offered inpatient rehab in the past and refused.  Does not appear to have any signs of infection at this time  Alert and oriented x 3, appears to be at her baseline  Trauma workup is negative.  UA neg    Plan:  Fall precautions  PT OT case management consult    Type 2 diabetes mellitus with stage 3b chronic kidney disease, without long-term current use of insulin (HCC)  Lab Results   Component Value Date    HGBA1C 6.7 (H) 11/15/2024       Recent Labs     11/15/24  1807 11/15/24  2155   POCGLU 92 164*       Blood Sugar Average: Last 72 hrs:  (P) 128Monitor sugars, SSI    Gastroesophageal reflux disease without esophagitis  Continue Protonix 40 mg daily  Chronic diastolic (congestive) heart failure (HCC)  Wt Readings from Last 3 Encounters:   11/06/24 56.7 kg (125 lb)   09/11/24 55 kg (121 lb 3.2 oz)   09/04/24 56.1 kg (123 lb 9.6 oz)   Weights have been stable, between 120 to 126 pounds at home, currently she is at 125 pounds  She does have some bilateral JVD, decreased breath sounds at the bases, trace pitting edema bilateral lower extremities  Reports compliance with home Lasix 20 mg daily  Hemodynamically stable, not requiring any oxygen  Echo July 2024 showing EF 50%, grade 1 diastolic dysfunction  Patient denying any worsening SOB or leg swelling, no weight gain  Do not believe that she is in CHF exacerbation  CT chest showing some mild bilateral pleural effusions    Plan:  Will continue her Lasix 20 mg p.o. daily today  IS  Monitor volume  status        Chronic kidney disease, stage IV (severe) ([facility])  Lab Results   Component Value Date    EGFR 31 11/16/2024    EGFR 29 11/15/2024    EGFR 29 11/07/2024    CREATININE 1.46 (H) 11/16/2024    CREATININE 1.54 (H) 11/15/2024    CREATININE 1.56 (H) 11/07/2024   Currently at baseline, monitor BMPs  Diabetic nephropathy associated with type 2 diabetes mellitus ([facility])  Lab Results   Component Value Date    HGBA1C 6.7 (H) 11/15/2024       Recent Labs     11/15/24  1807 11/15/24  2155   POCGLU 92 164*       Blood Sugar Average: Last 72 hrs:  (P) 128Will monitor sugars while inpatient  SSI    Fall  PT/ OT eval    VTE Pharmacologic Prophylaxis: VTE Score: 4 Moderate Risk (Score 3-4) - Pharmacological DVT Prophylaxis Ordered: heparin.    Mobility:   Basic Mobility Inpatient Raw Score: 22  JH-HLM Goal: 7: Walk 25 feet or more  JH-HLM Achieved: 1: Laying in bed  JH-HLM Goal NOT achieved. Continue with multidisciplinary rounding and encourage appropriate mobility to improve upon JH-HLM goals.    Patient Centered Rounds: I performed bedside rounds with nursing staff today.   Discussions with Specialists or Other Care Team Provider: None    Education and Discussions with Family / Patient: Attempted to update  () via phone. Left voicemail.     Current Length of Stay: 1 day(s)  Current Patient Status: Inpatient   Certification Statement: The patient will continue to require additional inpatient hospital stay due to pending placement  Discharge Plan: Anticipate discharge in 24-48 hrs to discharge location to be determined pending rehab evaluations.    Code Status: Level 1 - Full Code    Subjective   Patient was seen and examined at bedside this morning.  OOA x 3.  NAD.  No overnight event.  Patient reported right flank pain likely second to fall.  Denied any other acute complaints.    Objective :  Temp:  [97.5 °F (36.4 °C)-97.8 °F (36.6 °C)] 97.5 °F (36.4 °C)  HR:  [68-92] 88  BP: ()/(53-76)  112/54  Resp:  [16-18] 16  SpO2:  [92 %-98 %] 96 %  O2 Device: None (Room air)    There is no height or weight on file to calculate BMI.     Input and Output Summary (last 24 hours):     Intake/Output Summary (Last 24 hours) at 11/16/2024 0604  Last data filed at 11/15/2024 1752  Gross per 24 hour   Intake 50 ml   Output --   Net 50 ml       Physical Exam  Vitals and nursing note reviewed.   Constitutional:       General: She is not in acute distress.     Appearance: Normal appearance. She is well-developed. She is not ill-appearing.   HENT:      Head: Normocephalic and atraumatic.      Mouth/Throat:      Mouth: Mucous membranes are moist.   Eyes:      General: No scleral icterus.     Extraocular Movements: Extraocular movements intact.      Conjunctiva/sclera: Conjunctivae normal.   Cardiovascular:      Rate and Rhythm: Normal rate and regular rhythm.      Pulses: Normal pulses.      Heart sounds: Normal heart sounds. No murmur heard.     No friction rub. No gallop.   Pulmonary:      Effort: Pulmonary effort is normal. No respiratory distress.      Breath sounds: No wheezing or rales.      Comments: Decreased breath sounds bilateral bases  Abdominal:      General: There is no distension.      Palpations: Abdomen is soft.      Tenderness: There is no abdominal tenderness. There is no guarding.   Musculoskeletal:         General: No swelling.      Cervical back: Neck supple.      Right lower leg: No edema.      Left lower leg: No edema.   Skin:     General: Skin is warm and dry.      Capillary Refill: Capillary refill takes less than 2 seconds.   Neurological:      Mental Status: She is alert and oriented to person, place, and time.   Psychiatric:         Mood and Affect: Mood normal.         Behavior: Behavior normal.         Lines/Drains:              Lab Results: I have reviewed the following results:   Results from last 7 days   Lab Units 11/16/24  0442   WBC Thousand/uL 6.61   HEMOGLOBIN g/dL 12.1   HEMATOCRIT %  38.3   PLATELETS Thousands/uL 138*   SEGS PCT % 68   LYMPHO PCT % 14   MONO PCT % 12   EOS PCT % 4     Results from last 7 days   Lab Units 11/16/24  0442   SODIUM mmol/L 141   POTASSIUM mmol/L 3.7   CHLORIDE mmol/L 105   CO2 mmol/L 27   BUN mg/dL 29*   CREATININE mg/dL 1.46*   ANION GAP mmol/L 9   CALCIUM mg/dL 9.2   GLUCOSE RANDOM mg/dL 89     Results from last 7 days   Lab Units 11/15/24  1201   INR  1.06     Results from last 7 days   Lab Units 11/15/24  2155 11/15/24  1807   POC GLUCOSE mg/dl 164* 92     Results from last 7 days   Lab Units 11/15/24  1828   HEMOGLOBIN A1C % 6.7*     Results from last 7 days   Lab Units 11/16/24  0442 11/15/24  1201   PROCALCITONIN ng/ml 0.11 0.09       Recent Cultures (last 7 days):         Imaging Results Review: I reviewed radiology reports from this admission including: CT chest, CT abdomen/pelvis, CT head, and CT C-spine.  Other Study Results Review: No additional pertinent studies reviewed.    Last 24 Hours Medication List:     Current Facility-Administered Medications:     acetaminophen (TYLENOL) tablet 650 mg, TID    aspirin chewable tablet 81 mg, Daily    atorvastatin (LIPITOR) tablet 10 mg, Daily    Cholecalciferol (VITAMIN D3) tablet 1,000 Units, Daily    diphenoxylate-atropine (LOMOTIL) 2.5-0.025 mg per tablet 1 tablet, 4x Daily PRN    DULoxetine (CYMBALTA) delayed release capsule 60 mg, Daily    [Held by provider] furosemide (LASIX) tablet 20 mg, Daily    heparin (porcine) subcutaneous injection 5,000 Units, Q8H MEGGAN **AND** [COMPLETED] Platelet count, Once    hydroxychloroquine (PLAQUENIL) tablet 200 mg, Daily With Breakfast    insulin lispro (HumALOG/ADMELOG) 100 units/mL subcutaneous injection 1-5 Units, 4x Daily (AC & HS) **AND** Fingerstick Glucose (POCT), 4x Daily AC and at bedtime    magnesium Oxide (MAG-OX) tablet 400 mg, Daily    oxybutynin (DITROPAN-XL) 24 hr tablet 10 mg, Daily    pantoprazole (PROTONIX) EC tablet 40 mg, Daily Before Breakfast     pregabalin (LYRICA) capsule 75 mg, BID    pyridoxine (VITAMIN B6) tablet 100 mg, Daily    sodium chloride 0.9 % infusion, Continuous, Last Rate: 75 mL/hr (11/16/24 0037)    Administrative Statements   Today, Patient Was Seen By: Darvin Montero MD      **Please Note: This note may have been constructed using a voice recognition system.**

## 2024-11-16 NOTE — ASSESSMENT & PLAN NOTE
Lab Results   Component Value Date    HGBA1C 6.7 (H) 11/15/2024       Recent Labs     11/15/24  1807 11/15/24  2155   POCGLU 92 164*       Blood Sugar Average: Last 72 hrs:  (P) 128Monitor sugars, SSI

## 2024-11-16 NOTE — ASSESSMENT & PLAN NOTE
Lab Results   Component Value Date    HGBA1C 6.7 (H) 11/15/2024       Recent Labs     11/15/24  1807 11/15/24  2155   POCGLU 92 164*       Blood Sugar Average: Last 72 hrs:  (P) 128Will monitor sugars while inpatient  SSI

## 2024-11-16 NOTE — ASSESSMENT & PLAN NOTE
Lab Results   Component Value Date    EGFR 31 11/16/2024    EGFR 29 11/15/2024    EGFR 29 11/07/2024    CREATININE 1.46 (H) 11/16/2024    CREATININE 1.54 (H) 11/15/2024    CREATININE 1.56 (H) 11/07/2024   Currently at baseline, monitor BMPs

## 2024-11-16 NOTE — ASSESSMENT & PLAN NOTE
Patient presenting with a fall, mechanical in nature, has had history of multiple falls in the past.  Hit her head, she is on aspirin.  No other blood thinners.    Gets PT OT therapy at home 1 or 2 sessions a week.  Has been offered inpatient rehab in the past and refused.  Does not appear to have any signs of infection at this time  Alert and oriented x 3, appears to be at her baseline  Trauma workup is negative.  UA neg    Plan:  Fall precautions  PT OT case management consult

## 2024-11-16 NOTE — ASSESSMENT & PLAN NOTE
Wt Readings from Last 3 Encounters:   11/06/24 56.7 kg (125 lb)   09/11/24 55 kg (121 lb 3.2 oz)   09/04/24 56.1 kg (123 lb 9.6 oz)   Weights have been stable, between 120 to 126 pounds at home, currently she is at 125 pounds  She does have some bilateral JVD, decreased breath sounds at the bases, trace pitting edema bilateral lower extremities  Reports compliance with home Lasix 20 mg daily  Hemodynamically stable, not requiring any oxygen  Echo July 2024 showing EF 50%, grade 1 diastolic dysfunction  Patient denying any worsening SOB or leg swelling, no weight gain  Do not believe that she is in CHF exacerbation  CT chest showing some mild bilateral pleural effusions    Plan:  Will continue her Lasix 20 mg p.o. daily today  IS  Monitor volume status

## 2024-11-17 VITALS
DIASTOLIC BLOOD PRESSURE: 65 MMHG | OXYGEN SATURATION: 91 % | SYSTOLIC BLOOD PRESSURE: 128 MMHG | RESPIRATION RATE: 18 BRPM | TEMPERATURE: 97.8 F | HEART RATE: 91 BPM

## 2024-11-17 LAB
ANION GAP SERPL CALCULATED.3IONS-SCNC: 7 MMOL/L (ref 4–13)
BUN SERPL-MCNC: 29 MG/DL (ref 5–25)
CALCIUM SERPL-MCNC: 8.7 MG/DL (ref 8.4–10.2)
CHLORIDE SERPL-SCNC: 106 MMOL/L (ref 96–108)
CO2 SERPL-SCNC: 26 MMOL/L (ref 21–32)
CREAT SERPL-MCNC: 1.5 MG/DL (ref 0.6–1.3)
GFR SERPL CREATININE-BSD FRML MDRD: 30 ML/MIN/1.73SQ M
GLUCOSE SERPL-MCNC: 130 MG/DL (ref 65–140)
GLUCOSE SERPL-MCNC: 142 MG/DL (ref 65–140)
GLUCOSE SERPL-MCNC: 196 MG/DL (ref 65–140)
POTASSIUM SERPL-SCNC: 3.8 MMOL/L (ref 3.5–5.3)
SODIUM SERPL-SCNC: 139 MMOL/L (ref 135–147)

## 2024-11-17 PROCEDURE — 82948 REAGENT STRIP/BLOOD GLUCOSE: CPT

## 2024-11-17 PROCEDURE — 99239 HOSP IP/OBS DSCHRG MGMT >30: CPT | Performed by: INTERNAL MEDICINE

## 2024-11-17 PROCEDURE — 97166 OT EVAL MOD COMPLEX 45 MIN: CPT

## 2024-11-17 PROCEDURE — 80048 BASIC METABOLIC PNL TOTAL CA: CPT

## 2024-11-17 PROCEDURE — 97163 PT EVAL HIGH COMPLEX 45 MIN: CPT

## 2024-11-17 RX ADMIN — PREGABALIN 75 MG: 75 CAPSULE ORAL at 08:21

## 2024-11-17 RX ADMIN — Medication 400 MG: at 08:20

## 2024-11-17 RX ADMIN — HEPARIN SODIUM 5000 UNITS: 5000 INJECTION INTRAVENOUS; SUBCUTANEOUS at 05:14

## 2024-11-17 RX ADMIN — ASPIRIN 81 MG CHEWABLE TABLET 81 MG: 81 TABLET CHEWABLE at 08:21

## 2024-11-17 RX ADMIN — DULOXETINE HYDROCHLORIDE 60 MG: 60 CAPSULE, DELAYED RELEASE ORAL at 08:21

## 2024-11-17 RX ADMIN — ATORVASTATIN CALCIUM 10 MG: 10 TABLET, FILM COATED ORAL at 08:21

## 2024-11-17 RX ADMIN — PANTOPRAZOLE SODIUM 40 MG: 40 TABLET, DELAYED RELEASE ORAL at 08:26

## 2024-11-17 RX ADMIN — Medication 1000 UNITS: at 08:21

## 2024-11-17 RX ADMIN — INSULIN LISPRO 1 UNITS: 100 INJECTION, SOLUTION INTRAVENOUS; SUBCUTANEOUS at 12:10

## 2024-11-17 RX ADMIN — HYDROXYCHLOROQUINE SULFATE 200 MG: 200 TABLET, FILM COATED ORAL at 08:21

## 2024-11-17 RX ADMIN — OXYBUTYNIN CHLORIDE 10 MG: 5 TABLET, EXTENDED RELEASE ORAL at 08:21

## 2024-11-17 RX ADMIN — ACETAMINOPHEN 650 MG: 325 TABLET, FILM COATED ORAL at 08:21

## 2024-11-17 RX ADMIN — FUROSEMIDE 20 MG: 40 TABLET ORAL at 08:21

## 2024-11-17 RX ADMIN — PYRIDOXINE HCL TAB 50 MG 100 MG: 50 TAB at 08:21

## 2024-11-17 NOTE — OCCUPATIONAL THERAPY NOTE
Occupational Therapy Evaluation     Patient Name: Tanya Stpehen  Today's Date: 11/17/2024  Problem List  Principal Problem:    Ambulatory dysfunction  Active Problems:    Type 2 diabetes mellitus with stage 3b chronic kidney disease, without long-term current use of insulin (HCC)    Gastroesophageal reflux disease without esophagitis    Chronic diastolic (congestive) heart failure (HCC)    Fall    Chronic kidney disease, stage IV (severe) (HCC)    Diabetic nephropathy associated with type 2 diabetes mellitus (HCC)    Past Medical History  Past Medical History:   Diagnosis Date    Anemia     Arthritis     Back pain     CHF (congestive heart failure) (HCC)     Chronic kidney disease     Stage 3b    Confusion 02/22/2023    Diabetes (HCC)     Diabetes mellitus (HCC)     Diabetic gastroparesis associated with type 2 diabetes mellitus  (HCC)     Diabetic polyneuropathy (HCC)     Diverticulosis     Herpes zoster     Hypertension     IBS (irritable bowel syndrome)     Neurogenic claudication due to lumbar spinal stenosis     Osteoarthritis     Osteoporosis     Pulmonary edema     Raynaud's phenomenon without gangrene     Seronegative arthropathy of multiple sites (HCC)     Sicca (HCC)      Past Surgical History  Past Surgical History:   Procedure Laterality Date    BACK SURGERY      COLONOSCOPY      EGD AND COLONOSCOPY N/A 12/23/2016    Procedure: EGD AND COLONOSCOPY;  Surgeon: Elina Anderson MD;  [facility];  Service:     JOINT REPLACEMENT      bilat knees and hips    OTHER SURGICAL HISTORY      pelvis rods    REPLACEMENT TOTAL KNEE BILATERAL      STOMACH SURGERY      UPPER GASTROINTESTINAL ENDOSCOPY           11/17/24 1001   Note Type   Note type Evaluation   Pain Assessment   Pain Assessment Tool 0-10   Pain Score No Pain   Restrictions/Precautions   Weight Bearing Precautions Per Order No   Other Precautions Cognitive;Chair Alarm;Bed Alarm;Fall Risk;Hard of hearing   Home Living   Type of Home  House  (3 steps from back + 3 steps to  main)   Home Layout One level   Bathroom Shower/Tub Walk-in shower   Bathroom Toilet Standard   Bathroom Equipment Grab bars in shower;Grab bars around toilet   Home Equipment Cane;Wheelchair-manual  (rollater)   Prior Function   Level of [place] Independent with ADLs;Independent with IADLS;Independent with functional mobility   Lives With Spouse   Receives Help From Family   IADLs Family/Friend/Other provides transportation;Independent with meal prep;Independent with medication management   Falls in the last 6 months 1 to 4  (Pt has had multiple falls per chart reveiw)   Comments Patient states uses the WC in community. Patient is receiving HHPT and has been working on the steps   General   Additional Pertinent History Pt is currently receiving home PT and OT   Additional General Comments Pt  is home to assist her   ADL   Eating Assistance 7  Independent   Grooming Assistance 5  Supervision/Setup   UB Bathing Assistance 5  Supervision/Setup   UB Dressing Assistance 5  Supervision/Setup   LB Dressing Assistance 5  Supervision/Setup   Toileting Assistance  5  Supervision/Setup   Additional Comments Pt was educated on safety with LB dressing to perform i a chair that is stable and safety when donning socks and pants   Bed Mobility   Additional Comments Pt was received in recliner   Transfers   Sit to Stand 5  Supervision   Additional items Assist x 1;Increased time required;Verbal cues   Stand to Sit 5  Supervision   Additional items Assist x 1;Increased time required;Verbal cues   Toilet transfer 5  Supervision   Additional items Assist x 1;Increased time required;Verbal cues   Functional Mobility   Additional Comments Patient ambulated at Sup with RW   Balance   Static Sitting Good   Dynamic Sitting Good   Static Standing Fair   Dynamic Standing Fair -   Activity Tolerance   Activity Tolerance Patient tolerated treatment well   Nurse Made Aware RN   RUE Assessment    RUE Assessment WFL   LUE Assessment   LUE Assessment WFL   Vision-Basic Assessment   Current Vision Wears glasses all the time   Cognition   Orientation Level Oriented X4   Following Commands Follows one step commands with increased time or repetition   Comments Pt ID by wristband name and    Assessment   Limitation Decreased ADL status;Decreased Safe judgement during ADL;Decreased high-level ADLs;Decreased self-care trans;Decreased endurance   Prognosis Fair   Assessment Patient evaluated by Occupational Therapy.  Patient admitted with Ambulatory dysfunction. Patient I S/P a mechanical fall from EOB. The patients occupational profile, medical and therapy history includes a expanded review of medical and/or therapy records and additional review of physical, cognitive, or psychosocial history related to current functional performance.  Comorbidities affecting functional mobility and ADLS include: CHF, CKD, and diabetes.  Prior to admission, patient was independent with functional mobility with rollater, independent with ADLS, independent with IADLS, and living with spouse in a 1 level home with 3+3 steps to enter.  See above for activity levels.The evaluation identifies the following performance deficits: weakness, impaired balance, decreased endurance, decreased coordination, increased fall risk, decreased ADLS, decreased IADLS, and decreased activity tolerance, that result in activity limitations and/or participation restrictions. This evaluation requires clinical decision making of moderate complexity, because the patient may present with comorbidities that affect occupational performance and required minimal or moderate modification of tasks or assistance with the consideration of several treatment options.  The Barthel Index was used as a functional outcome tool presenting with a score of Barthel Index Score: 70,  The patient's raw score on the AM-PAC Daily Activity Inpatient Short Form is 19. A raw score  "of greater than or equal to 19 suggests the patient may benefit from discharge to home. Please refer to the recommendation of the Occupational Therapist for safe discharge planning. Patient will benefit from skilled Occupational Therapy services to address above deficits and facilitate a safe return to prior level of function.   Goals   Patient Goals \"go home \"   LTG Time Frame   (8-14)   Long Term Goal #1 see below   Plan   Treatment Interventions ADL retraining;Functional transfer training;Patient/family training;Neuromuscular reeducation;Compensatory technique education;Continued evaluation;Energy conservation;Activityengagement   Goal Expiration Date 12/01/24   OT Frequency 2-3x/wk   Discharge Recommendation   Rehab Resource Intensity Level, OT III (Minimum Resource Intensity)   AM-PAC Daily Activity Inpatient   Lower Body Dressing 3   Bathing 3   Toileting 3   Upper Body Dressing 3   Grooming 3   Eating 4   Daily Activity Raw Score 19   Daily Activity Standardized Score (Calc for Raw Score >=11) 40.22   AM-PAC Applied Cognition Inpatient   Following a Speech/Presentation 4   Understanding Ordinary Conversation 4   Taking Medications 4   Remembering Where Things Are Placed or Put Away 3   Remembering List of 4-5 Errands 3   Taking Care of Complicated Tasks 3   Applied Cognition Raw Score 21   Applied Cognition Standardized Score 44.3   Barthel Index   Feeding 10   Bathing 5   Grooming Score 0   Dressing Score 5   Bladder Score 10   Bowels Score 10   Toilet Use Score 5   Transfers (Bed/Chair) Score 10   Mobility (Level Surface) Score 10   Stairs Score 5   Barthel Index Score 70   End of Consult   Patient Position at End of Consult Bedside chair;Bed/Chair alarm activated;All needs within reach   Nurse Communication Nurse aware of consult   GOALS    1) Pt will improve activity tolerance to G for min 30 min txment sessions for increase engagement in functional tasks    2) Pt will complete LB dressing/self care w/ " mod I using adaptive device and DME as needed    3) Pt will complete bathing at  Mod I w/ use of AE and DME as needed    4) Pt will complete toileting at  mod I w/ G hygiene/thoroughness using DME as needed    5) Pt will improve functional transfers to Mod I on/off all surfaces using DME as needed w/ G balance/safety     6) Pt will improve functional mobility during ADL/IADL/leisure tasks to Mod I using DME as needed w/ G balance/safety

## 2024-11-17 NOTE — PHYSICAL THERAPY NOTE
PHYSICAL THERAPY EVALUATION NOTE          Patient Name: Tanya Stephen  Today's Date: 2024        AGE:   89 y.o.  Mrn:   [id number]  ADMIT DX:  Head injury [S09.90XA]  Bilateral pneumonia [J18.9]  Bilateral pleural effusion [J90]  Multiple skin tears [T14.8XXA]  Fall from bed, initial encounter [W06.XXXA]  Traumatic injury of head, initial encounter [S09.90XA]    Past Medical History:  Past Medical History:   Diagnosis Date    Anemia     Arthritis     Back pain     CHF (congestive heart failure) (HCC)     Chronic kidney disease     Stage 3b    Confusion 2023    Diabetes (HCC)     Diabetes mellitus (HCC)     Diabetic gastroparesis associated with type 2 diabetes mellitus  (HCC)     Diabetic polyneuropathy (HCC)     Diverticulosis     Herpes zoster     Hypertension     IBS (irritable bowel syndrome)     Neurogenic claudication due to lumbar spinal stenosis     Osteoarthritis     Osteoporosis     Pulmonary edema     Raynaud's phenomenon without gangrene     Seronegative arthropathy of multiple sites (HCC)     Sicca (HCC)        Past Surgical History:  Past Surgical History:   Procedure Laterality Date    BACK SURGERY      COLONOSCOPY      EGD AND COLONOSCOPY N/A 2016    Procedure: EGD AND COLONOSCOPY;  Surgeon: Elina Anderson MD;  [facility];  Service:     JOINT REPLACEMENT      bilat knees and hips    OTHER SURGICAL HISTORY      pelvis rods    REPLACEMENT TOTAL KNEE BILATERAL      STOMACH SURGERY      UPPER GASTROINTESTINAL ENDOSCOPY       Length Of Stay: 2        PHYSICAL THERAPY EVALUATION:    Patient's identity confirmed via 2 patient identifiers (full name and ) at start of session       24 0940   PT Last Visit   PT Visit Date 24   Note Type   Note type Evaluation   Pain Assessment   Pain Assessment Tool 0-10   Pain Score No Pain   Restrictions/Precautions   Weight Bearing Precautions Per Order No   Other  "Precautions Cognitive;Chair Alarm;Bed Alarm;Fall Risk;Hard of hearing   Home Living   Type of Home House   Home Layout One level;Performs ADLs on one level;Able to live on main level with bedroom/bathroom;Stairs to enter with rails  (3 DAVDI through back entrance)   Bathroom Shower/Tub Walk-in shower   Bathroom Toilet Standard   Bathroom Equipment Grab bars in shower;Grab bars around toilet   Bathroom Accessibility   (not accessible via RW, pt reports \"bars all over the place\" which she holds onto)   Home Equipment Walker;Cane;Wheelchair-manual  (SPC, rollator walker)   Prior Function   Level of [place] Independent with functional mobility;Independent with ADLs;Needs assistance with IADLS   Lives With Spouse   Receives Help From Family;Home health  (daughter, HHPT, HHOT)   IADLs Family/Friend/Other provides transportation;Family/Friend/Other provides medication management;Independent with meal prep   Falls in the last 6 months 1 to 4  (2 per pt, + fall history per chart review)   Comments At baseline pt amb ind w/ RW w/in house (except for in the bathroom where she holds onto the grab bars), uses WC for community distances/shopping, reports having assistance on the 3 DAVID   General   Family/Caregiver Present Yes  (pt's  arrived at end of session)   Cognition   Overall Cognitive Status Impaired   Arousal/Participation Cooperative   Orientation Level Oriented X4   Memory Decreased recall of precautions   Following Commands Follows one step commands without difficulty   Comments Pt ID via name and ; pt agreeable to PT eval and mobility, pleasant throughout   Subjective   Subjective \"everybody always likes my chair!\"   Strength RLE   RLE Overall Strength   (grossly assessed w/ functional mobility, at least 3+/5)   Strength LLE   LLE Overall Strength   (grossly assessed w/ functional mobility, at least 3+/5)   Vision-Basic Assessment   Current Vision Wears glasses all the time   Bed Mobility   Additional " Comments pt OOB in recliner chair upon arrival to room, returned to chair at end of session   Transfers   Sit to Stand 5  Supervision   Additional items Assist x 1;Increased time required;Verbal cues   Stand to Sit 5  Supervision   Additional items Assist x 1;Increased time required;Verbal cues   Toilet transfer 5  Supervision   Additional items Assist x 1;Increased time required;Standard toilet  (use of R grab bar)   Ambulation/Elevation   Gait pattern Improper Weight shift;Decreased foot clearance;Short stride;Excessively slow;Decreased hip extension;Decreased heel strike   Gait Assistance 5  Supervision   Additional items Assist x 1;Verbal cues   Assistive Device Rolling walker   Distance 80'+20'   Stair Management Assistance 5  Supervision   Additional items Assist x 1;Verbal cues   Stair Management Technique Two rails;Step to pattern   Number of Stairs 3   Balance   Static Sitting Good   Dynamic Sitting Fair +   Static Standing Fair  (w/ RW)   Ambulatory Fair -  (w/ RW)   Activity Tolerance   Activity Tolerance Patient tolerated treatment well   Medical Staff Made Aware Pt benefited from PT/OT care coordination w/ OT Ivana due to to allow for challenge of pt's activity tolerance, PT and OT goals were addressed individually during session; Resident Dr. Veronique Zamarripa updated post   Nurse Made Aware BENJAMÍN Hernández   Assessment   Prognosis Good   Problem List Decreased strength;Decreased endurance;Impaired balance;Decreased mobility;Decreased cognition   Assessment Tanya Stephen is a 89 y.o. Female who presents to [facility] on [facility]/15[facility] due to fall and diagnosis of ambulatory dysfunction. Orders for PT eval and treat received, w/ activity orders of up and OOB as tolerated. Comorbidities affecting pt's functional mobility at time of evaluation include: DM, CKD, chronic pain syndrome, MCI. Personal factors affecting DC include: ambulating w/ assistive device, stairs to enter home, positive fall history, and inability to  perform IADLs. At baseline, pt mobilizes independently w/ rollator walker, and w/ + fall(s) in the previous 6 months. Upon evaluation, pt presents w/ the following deficits: impaired strength, impaired balance, impaired cognition, and gait deviations. Pt currently requires  supervision for transfers, supervision w/ RW for ambulation, supervision w/ B UE support for stair negotiation. Pt's clinical presentation is unstable/unpredictable due to abnormal lab values, need for input for mobility technique, recent h/o falls, ongoing medical management. From a PT/mobility standpoint given the above findings, DC recommendation is level: III (Minimum Rehab Resource Intensity). During current admission, pt will benefit from continued skilled inpatient PT in the acute care setting in order to address the above deficits and to maximize function and mobility prior to DC from acute care.   Goals   Patient Goals to go home today   STG Expiration Date 11/27/24   Short Term Goal #1 Pt will: perform bed mobility w/ mod I to decrease pt's burden of care and increase pt's independence w/ repositioning in bed; perform transfers w/ mod I to promote OOB mobility; ambulate 150' w/ LRAD and mod I to increase pt's ambulatory endurance/tolerance; negotiate 3 stair(s) w/ UE support and supervision to facilitate pt returning to previous living environment; increase all balance ratings by at least 1 grade to decrease pt's risk of falls   PT Treatment Day 0   Plan   Treatment/Interventions Functional transfer training;LE strengthening/ROM;Elevations;Therapeutic exercise;Endurance training;Patient/family training;Equipment eval/education;Bed mobility;Gait training;Compensatory technique education   PT Frequency 2-3x/wk   Discharge Recommendation   Rehab Resource Intensity Level, PT III (Minimum Resource Intensity)   Equipment Recommended Walker   Walker Package Recommended Wheeled walker   Change/add to Walker Package? Yes, Change Size   Walker  "Size Arturo (Ht <5'1\")   AM-PAC Basic Mobility Inpatient   Turning in Flat Bed Without Bedrails 4   Lying on Back to Sitting on Edge of Flat Bed Without Bedrails 4   Moving Bed to Chair 3   Standing Up From Chair Using Arms 3   Walk in Room 3   Climb 3-5 Stairs With Railing 3   Basic Mobility Inpatient Raw Score 20   Basic Mobility Standardized Score 43.99   [facility] Highest Level Of Mobility   -HLM Goal 6: Walk 10 steps or more   -HLM Achieved 7: Walk 25 feet or more   End of Consult   Patient Position at End of Consult Bedside chair;Bed/Chair alarm activated;All needs within reach       The patient's AM-PAC Basic Mobility Inpatient Short Form Raw Score is 20. A Raw score of greater than 16 suggests the patient may benefit from discharge to home. Please also refer to the recommendation of the Physical Therapist for safe discharge planning.    Pt will benefit from skilled inpatient PT during this admission in order to facilitate progress towards goals and to maximize functional independence prior to DC      DC rec: level III (Minimum Rehab Resource Intensity)        Jerri Martinez, PT, DPT  11/17/24          "

## 2024-11-17 NOTE — CASE MANAGEMENT
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     Chronic kidney disease, stage IV (severe) ([facility]) 10/28/2024     Diabetic nephropathy associated with type 2 diabetes mellitus ([facility]) 10/28/2024     Anemia of chronic renal failure, stage 4 (severe)  ([facility]) 10/28/2024     Fall 2024     Mild cognitive impairment 2024     Chronic mastoiditis of right side 2024     Nonrheumatic aortic valve stenosis 2024     Gastroesophageal reflux disease without esophagitis 2023     Esophageal dysphagia 2023     Raynaud's phenomenon without gangrene 2023     Spinal stenosis of lumbar region with neurogenic claudication 2022     Chronic pain syndrome 2022     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[facility]) 2022     OAB (overactive bladder) 2022     Chronic diastolic (congestive) heart failure ([facility]) 2022     Post zoster neuralgia 2021     Primary generalized (osteo)arthritis 2021     Seronegative arthropathy of multiple sites ([facility]) 2021     Senile osteoporosis 2021     Diabetic polyneuropathy associated with type 2 diabetes mellitus ([facility]) 2021     Irritable bowel syndrome with diarrhea 2021     Abnormality of gait due to impairment of balance 2021     Sicca syndrome ([facility]) 2021     Hypomagnesemia 2016     Type 2 diabetes mellitus with stage 3b chronic kidney disease, without long-term current use of insulin ([facility]) 2016       LOS (days): 2  Geometric Mean LOS (GMLOS) (days):   Days to GMLOS:     OBJECTIVE:    Risk of Unplanned Readmission  Score: 23.07         Current admission status: Inpatient       Preferred Pharmacy:   [id number] - [place], [place] - [address]  [address] [place] [address]  Phone: [phone] Fax: [phone]    [facility] [place] - [address]  [address]  [place] [address]  Phone: [phone] Fax: [phone]    [facility] [place], [place] - [address]  [address]  [facility] [address]  Phone: [phone] Fax: [phone]    Primary Care Provider: Gregory Santoro DO    Primary Insurance: MEDICARE  Secondary Insurance: [employer]    ASSESSMENT:  Active Health Care Proxies    There are no active Health Care Proxies on file.                      Patient Information  Admitted from:: Home  Mental Status: Confused, Alert  During Assessment patient was accompanied by: Spouse  Assessment information provided by:: Spouse  Primary Caregiver: Self  Support Systems: Self, Spouse/significant other, Family members, Children  Home entry access options. Select all that apply.: Stairs  Number of steps to enter home.: 3  Do the steps have railings?: Yes  Type of Current Residence: [facility]  Living Arrangements: Lives w/ Spouse/significant other    Activities of Daily Living Prior to Admission  Functional Status: Independent  Completes ADLs independently?: Yes  Ambulates independently?: Yes  Does patient use assisted devices?: Yes  Assisted Devices (DME) used: Straight Cane, Walker  Does patient currently own DME?: Yes  What DME does the patient currently own?: Walker, Straight Cane  Does patient have a history of Outpatient Therapy (PT/OT)?: No  Does the patient have a history of Short-Term Rehab?: No  Does patient have a history of HHC?: Yes  Does patient currently have HHC?: Yes    Current Home Health Care  Type of Current Home Care Services: Home PT, Home OT  Home Health Agency Name:: [facility]  Current Home Health Follow-Up Provider:: PCP    Patient Information Continued  Income Source: Pension/penitentiary  Does patient have prescription coverage?: Yes  Does patient receive dialysis treatments?: No  Does patient have a history of substance abuse?: No  Does patient have a history of Mental Health Diagnosis?: No         Means of Transportation  Means of Transport to Appts:: Family transport          DISCHARGE DETAILS:    Discharge planning discussed with:: Patient and spouse  Freedom of Choice: Yes  Comments - Freedom of Choice: Cm met with patient and spouse to review CM role. Patient discharging today. Patient active with [facility] and would like to continue working with them. Referral back to [facility] in [place]. Patient and spouse report no other needs at this time  CM contacted family/caregiver?: Yes  Were Treatment Team discharge recommendations reviewed with patient/caregiver?: Yes  Did patient/caregiver verbalize understanding of patient care needs?: Yes  Were patient/caregiver advised of the risks associated with not following Treatment Team discharge recommendations?: Yes    Contacts  Patient Contacts: Weston Stephen (Spouse)  Relationship to Patient:: Family  Contact Method: In Person  Reason/Outcome: Discharge Planning    Requested Home Health Care         Is the patient interested in HHC at discharge?: Yes  Home Health Discipline requested:: Occupational Therapy, Physical Therapy  Home Health Agency Name:: [facility]  HHA External Referral Reason (only applicable if external HHA name selected): Patient has established relationship with provider  Home Health Follow-Up Provider:: [facility]  Home Health Services Needed:: Gait/ADL Training, Strengthening/Theraputic Exercises to Improve Function, Evaluate Functional Status and Safety  Homebound Criteria Met:: Uses an Assist Device (i.e. cane, walker, etc), Requires the Assistance of Another Person for Safe Ambulation or to  Leave the Home  Supporting Clincal Findings:: Limited Endurance, Fatigues Easliy in Short Distances         Other Referral/Resources/Interventions Provided:  Interventions: C  Referral Comments: Referral back to [facility]    Would you like to participate in our [facility] service program?  : No - Declined    Treatment Team Recommendation: Home with Home Health Care  Discharge Destination Plan:: Home with Home Health Care  Transport at Discharge : Family

## 2024-11-17 NOTE — PLAN OF CARE
Problem: PHYSICAL THERAPY ADULT  Goal: Performs mobility at highest level of function for planned discharge setting.  See evaluation for individualized goals.  Description: Treatment/Interventions: Functional transfer training, LE strengthening/ROM, Elevations, Therapeutic exercise, Endurance training, Patient/family training, Equipment eval/education, Bed mobility, Gait training, Compensatory technique education  Equipment Recommended: Walker       See flowsheet documentation for full assessment, interventions and recommendations.  11/17/2024 1134 by Jerri Martinez PT  Note: Prognosis: Good  Problem List: Decreased strength, Decreased endurance, Impaired balance, Decreased mobility, Decreased cognition  Assessment: Tanya Stephen is a 89 y.o. Female who presents to [facility] on 11/15/24 due to fall and diagnosis of ambulatory dysfunction. Orders for PT eval and treat received, w/ activity orders of up and OOB as tolerated. Comorbidities affecting pt's functional mobility at time of evaluation include: DM, CKD, chronic pain syndrome, MCI. Personal factors affecting DC include: ambulating w/ assistive device, stairs to enter home, positive fall history, and inability to perform IADLs. At baseline, pt mobilizes independently w/ rollator walker, and w/ + fall(s) in the previous 6 months. Upon evaluation, pt presents w/ the following deficits: impaired strength, impaired balance, impaired cognition, and gait deviations. Pt currently requires  supervision for transfers, supervision w/ RW for ambulation, supervision w/ B UE support for stair negotiation. Pt's clinical presentation is unstable/unpredictable due to abnormal lab values, need for input for mobility technique, recent h/o falls, ongoing medical management. From a PT/mobility standpoint given the above findings, DC recommendation is level: III (Minimum Rehab Resource Intensity). During current admission, pt will benefit from continued skilled inpatient PT in  the acute care setting in order to address the above deficits and to maximize function and mobility prior to DC from acute care.        Rehab Resource Intensity Level, PT: III (Minimum Resource Intensity)    See flowsheet documentation for full assessment.

## 2024-11-17 NOTE — ASSESSMENT & PLAN NOTE
Patient presenting with a fall, mechanical in nature, has had history of multiple falls in the past.  Hit her head, she is on aspirin.  No other blood thinners.    Gets PT OT therapy at home 1 or 2 sessions a week.  Has been offered inpatient rehab in the past and refused.  Does not appear to have any signs of infection at this time  Alert and oriented x 3, appears to be at her baseline  Trauma workup is negative.  UA neg    Plan:  PT/OT Level III. Pt to be discharged with [facility] PT/OT

## 2024-11-17 NOTE — DISCHARGE SUMMARY
Discharge Summary - Hospitalist   Name: Tanya Stephen 89 y.o. female I MRN: [id number]  Unit/Bed#: W -01 I Date of Admission: 11/15/2024   Date of Service: 11/17/2024 I Hospital Day: 2     Assessment & Plan  Ambulatory dysfunction  Patient presenting with a fall, mechanical in nature, has had history of multiple falls in the past.  Hit her head, she is on aspirin.  No other blood thinners.    Gets PT OT therapy at home 1 or 2 sessions a week.  Has been offered inpatient rehab in the past and refused.  Does not appear to have any signs of infection at this time  Alert and oriented x 3, appears to be at her baseline  Trauma workup is negative.  UA neg    Plan:  PT/OT Level III. Pt to be discharged with [facility] PT/OT  Type 2 diabetes mellitus with stage 3b chronic kidney disease, without long-term current use of insulin ([facility])  Lab Results   Component Value Date    HGBA1C 6.7 (H) 11/15/2024       Recent Labs     11/16/24  1608 11/16/24 2006 11/17/24  0648 11/17/24  1113   POCGLU 117 206* 130 196*       Blood Sugar Average: Last 72 hrs:  (P) 136.25    Plan:  Follow up with PCP outpatient  Gastroesophageal reflux disease without esophagitis  Home Prilosec 20 mg daily  Chronic diastolic (congestive) heart failure (HCC)  Wt Readings from Last 3 Encounters:   11/06/24 56.7 kg (125 lb)   09/11/24 55 kg (121 lb 3.2 oz)   09/04/24 56.1 kg (123 lb 9.6 oz)   Weights have been stable, between 120 to 126 pounds at home, currently she is at 125 pounds  Reports compliance with home Lasix 20 mg daily  Hemodynamically stable, not requiring any oxygen  Echo July 2024 showing EF 50%, grade 1 diastolic dysfunction  Patient denying any worsening SOB or leg swelling, no weight gain  Do not believe that she is in CHF exacerbation  CT chest showing some mild bilateral pleural effusions    Plan:  Continue Lasix 20 mg p.o. daily today  F/up with PCP outpatient  Chronic kidney disease, stage IV (severe) (HCC)  Lab Results   Component  "Value Date    EGFR 30 11/17/2024    EGFR 31 11/16/2024    EGFR 29 11/15/2024    CREATININE 1.50 (H) 11/17/2024    CREATININE 1.46 (H) 11/16/2024    CREATININE 1.54 (H) 11/15/2024   Currently at baseline, monitor BMPs  Diabetic nephropathy associated with type 2 diabetes mellitus (HCC)  Lab Results   Component Value Date    HGBA1C 6.7 (H) 11/15/2024       Recent Labs     11/16/24  1608 11/16/24 2006 11/17/24  0648 11/17/24  1113   POCGLU 117 206* 130 196*       Blood Sugar Average: Last 72 hrs:  (P) 136.25    Follow-up with PCP outpatient  Fall  Patient presented with mechanical fall with skin breaks  Trauma workup unremarkable    Plan:  Home wound care for bilateral hands and right knee lacerations  Rest of plan under \"Ambulatory dysfunction\"     Medical Problems       Resolved Problems  Date Reviewed: 11/6/2024   None         MESSAGE TO PCP (Gregory Santoro DO) FOR FOLLOW UP:   Thank you for allowing us to participate in the care of your patient, Tanya Stephen, who was hospitalized from 11/15/2024 through 11/17/24 with the admitting diagnosis of ambulatory dysfunction. PT/OT evaluated Tanya and she is to be discharged with home health care PT/OT services with [facility].      Medication Changes:  None  Outpatient testing recommended:  Repeat CT Chest in 4-6 weeks  If you have any additional questions or would like to discuss further, please feel free to contact me.  Veronique Zamarripa MD  [facility] Internal Medicine, Hospitalist, [phone]     Admission Date:   Admission Orders (From admission, onward)       Ordered        11/15/24 1512  INPATIENT ADMISSION  Once                          Discharge Date: 11/17/24    Consultations During Hospital Stay:  PT/OT    Procedures Performed:   None    Significant Findings / Test Results:   CT chest without contrast   Final Result by Martínez Gurrola MD (11/15 1416)      New mild pulmonary edema-like pattern, bilateral pleural effusions with adjacent " consolidation.      Scattered sub-5 mm nodules bilaterally, new since CT chest from 9/11/2024 along with accompanying mild diffuse mosaicism. This is favored favored infectious/inflammatory in etiology. As a precaution, recommend follow-up CT chest in 4 to 6 weeks.      Stable bandlike opacity in the lingula extending to the pleural surface, probably round atelectasis.      The study was marked in EPIC for immediate notification.         Workstation performed: [id number]         CT head without contrast   Final Result by Mingo Serrato MD (11/15 1255)      No acute intracranial abnormality.      Similar mild chronic microangiopathy.                  Workstation performed: [id number]         CT cervical spine without contrast   Final Result by Mingo Serrato MD (11/15 1301)      No acute cervical spine fracture or traumatic malalignment.      Small focal opacity in left apical lung, new since 9/11/2024. Differential includes infectious/inflammatory, aspiration, or small focal atelectasis. Consider CT chest without contrast for further evaluation of lungs.      Additional chronic/incidental findings as detailed above.      The study was marked in EPIC for immediate notification.                  Workstation performed: [id number]         CT lumbar spine without contrast   Final Result by Mingo Serrato MD (11/15 1310)      No acute osseous abnormality of postsurgical lumbar spine.      Partially imaged lower thoracic spine- bilateral iliac spinal fixation hardware. No acute hardware complication of visualized spinal fixation hardware.      Partially imaged small bilateral pleural effusions with associated passive atelectasis. Consider follow-up CT chest without contrast for further evaluation.      Additional chronic/incidental findings as detailed above.      The study was marked in EPIC for immediate notification.            Workstation performed: [id number]           Results Reviewed        Procedure Component Value Units Date/Time    Procalcitonin [[id number]]  (Normal) Collected: 11/16/24 0442    Lab Status: Final result Specimen: Blood from Arm, Left Updated: 11/16/24 0554     Procalcitonin 0.11 ng/ml     Basic metabolic panel [[id number]]  (Abnormal) Collected: 11/16/24 0442    Lab Status: Final result Specimen: Blood from Arm, Left Updated: 11/16/24 0546     Sodium 141 mmol/L      Potassium 3.7 mmol/L      Chloride 105 mmol/L      CO2 27 mmol/L      ANION GAP 9 mmol/L      BUN 29 mg/dL      Creatinine 1.46 mg/dL      Glucose 89 mg/dL      Calcium 9.2 mg/dL      eGFR 31 ml/min/1.73sq m     Narrative:      National Kidney Disease Foundation guidelines for Chronic Kidney Disease (CKD):     Stage 1 with normal or high GFR (GFR > 90 mL/min/1.73 square meters)    Stage 2 Mild CKD (GFR = 60-89 mL/min/1.73 square meters)    Stage 3A Moderate CKD (GFR = 45-59 mL/min/1.73 square meters)    Stage 3B Moderate CKD (GFR = 30-44 mL/min/1.73 square meters)    Stage 4 Severe CKD (GFR = 15-29 mL/min/1.73 square meters)    Stage 5 End Stage CKD (GFR <15 mL/min/1.73 square meters)  Note: GFR calculation is accurate only with a steady state creatinine    CBC and differential [[id number]]  (Abnormal) Collected: 11/16/24 0442    Lab Status: Final result Specimen: Blood from Arm, Left Updated: 11/16/24 0525     WBC 6.61 Thousand/uL      RBC 4.02 Million/uL      Hemoglobin 12.1 g/dL      Hematocrit 38.3 %      MCV 95 fL      MCH 30.1 pg      MCHC 31.6 g/dL      RDW 15.1 %      MPV 11.6 fL      Platelets 138 Thousands/uL      nRBC 0 /100 WBCs      Segmented % 68 %      Immature Grans % 1 %      Lymphocytes % 14 %      Monocytes % 12 %      Eosinophils Relative 4 %      Basophils Relative 1 %      Absolute Neutrophils 4.62 Thousands/µL      Absolute Immature Grans 0.03 Thousand/uL      Absolute Lymphocytes 0.92 Thousands/µL      Absolute Monocytes 0.78 Thousand/µL      Eosinophils Absolute 0.23 Thousand/µL      Basophils  Absolute 0.03 Thousands/µL     Hemoglobin A1c w/EAG Estimation (Prechecked if no A1C within 90 days) [[id number]]  (Abnormal) Collected: 11/15/24 1828    Lab Status: Final result Specimen: Blood from Arm, Left Updated: 11/15/24 2337     Hemoglobin A1C 6.7 %       mg/dl     Fingerstick Glucose (POCT) [[id number]]  (Abnormal) Collected: 11/15/24 2155    Lab Status: Final result Specimen: Blood Updated: 11/15/24 2156     POC Glucose 164 mg/dl     UA w Reflex to Microscopic w Reflex to Culture [[id number]] Collected: 11/15/24 1815    Lab Status: Final result Specimen: Urine, Clean Catch Updated: 11/15/24 1838     Color, UA Light Yellow     Clarity, UA Clear     Specific Gravity, UA 1.010     pH, UA 5.0     Leukocytes, UA Negative     Nitrite, UA Negative     Protein, UA Negative mg/dl      Glucose, UA Negative mg/dl      Ketones, UA Negative mg/dl      Urobilinogen, UA <2.0 mg/dl      Bilirubin, UA Negative     Occult Blood, UA Negative    Platelet count [[id number]]  (Abnormal) Collected: 11/15/24 1828    Lab Status: Final result Specimen: Blood from Arm, Left Updated: 11/15/24 1834     Platelets 129 Thousands/uL      MPV 10.9 fL     Fingerstick Glucose (POCT) [[id number]]  (Normal) Collected: 11/15/24 1807    Lab Status: Final result Specimen: Blood Updated: 11/15/24 1809     POC Glucose 92 mg/dl     Procalcitonin [[id number]]  (Normal) Collected: 11/15/24 1201    Lab Status: Final result Specimen: Blood from Arm, Right Updated: 11/15/24 1749     Procalcitonin 0.09 ng/ml     Basic metabolic panel [[id number]]  (Abnormal) Collected: 11/15/24 1201    Lab Status: Final result Specimen: Blood from Arm, Right Updated: 11/15/24 1233     Sodium 140 mmol/L      Potassium 3.8 mmol/L      Chloride 105 mmol/L      CO2 29 mmol/L      ANION GAP 6 mmol/L      BUN 32 mg/dL      Creatinine 1.54 mg/dL      Glucose 129 mg/dL      Calcium 9.4 mg/dL      eGFR 29 ml/min/1.73sq m     Narrative:      National Kidney Disease Foundation  guidelines for Chronic Kidney Disease (CKD):     Stage 1 with normal or high GFR (GFR > 90 mL/min/1.73 square meters)    Stage 2 Mild CKD (GFR = 60-89 mL/min/1.73 square meters)    Stage 3A Moderate CKD (GFR = 45-59 mL/min/1.73 square meters)    Stage 3B Moderate CKD (GFR = 30-44 mL/min/1.73 square meters)    Stage 4 Severe CKD (GFR = 15-29 mL/min/1.73 square meters)    Stage 5 End Stage CKD (GFR <15 mL/min/1.73 square meters)  Note: GFR calculation is accurate only with a steady state creatinine    Protime-INR [[id number]]  (Normal) Collected: 11/15/24 1201    Lab Status: Final result Specimen: Blood from Arm, Right Updated: 11/15/24 1229     Protime 14.5 seconds      INR 1.06    Narrative:      INR Therapeutic Range    Indication                                             INR Range      Atrial Fibrillation                                               2.0-3.0  Hypercoagulable State                                    2.0.2.3  Left Ventricular Asist Device                            2.0-3.0  Mechanical Heart Valve                                  -    Aortic(with afib, MI, embolism, HF, LA enlargement,    and/or coagulopathy)                                     2.0-3.0 (2.5-3.5)     Mitral                                                             2.5-3.5  Prosthetic/Bioprosthetic Heart Valve               2.0-3.0  Venous thromboembolism (VTE: VT, PE        2.0-3.0    APTT [[id number]]  (Normal) Collected: 11/15/24 1201    Lab Status: Final result Specimen: Blood from Arm, Right Updated: 11/15/24 1229     PTT 29 seconds     CBC and differential [[id number]]  (Abnormal) Collected: 11/15/24 1201    Lab Status: Final result Specimen: Blood from Arm, Right Updated: 11/15/24 1211     WBC 5.26 Thousand/uL      RBC 3.82 Million/uL      Hemoglobin 11.4 g/dL      Hematocrit 36.2 %      MCV 95 fL      MCH 29.8 pg      MCHC 31.5 g/dL      RDW 15.1 %      MPV 11.1 fL      Platelets 126 Thousands/uL      nRBC 0 /100 WBCs       Segmented % 62 %      Immature Grans % 1 %      Lymphocytes % 18 %      Monocytes % 14 %      Eosinophils Relative 4 %      Basophils Relative 1 %      Absolute Neutrophils 3.34 Thousands/µL      Absolute Immature Grans 0.04 Thousand/uL      Absolute Lymphocytes 0.92 Thousands/µL      Absolute Monocytes 0.73 Thousand/µL      Eosinophils Absolute 0.20 Thousand/µL      Basophils Absolute 0.03 Thousands/µL              Incidental Findings:   CT cervical spine without contrast:   No acute cervical spine fracture or traumatic malalignment., Small focal opacity in left apical lung, new since 9/11/2024. Differential includes infectious/inflammatory, aspiration, or small focal atelectasis. Consider CT chest without contrast for further evaluation of lungs.   CT lumbar spine without contrast: Partially imaged small bilateral pleural effusions with associated passive atelectasis. Consider follow-up CT chest without contrast for further evaluation.   CT chest without contrast: New mild pulmonary edema-like pattern, bilateral pleural effusions with adjacent consolidation., Scattered sub-5 mm nodules bilaterally, new since CT chest from 9/11/2024 along with accompanying mild diffuse mosaicism. This is favored favored infectious/inflammatory in etiology. As a precaution, recommend follow-up CT chest in 4 to 6 weeks., Stable bandlike opacity in the lingula extending to the pleural surface, probably round atelectasis.    I reviewed the above mentioned incidental findings with the patient and/or family and they expressed understanding.    Test Results Pending at Discharge (will require follow up):   None     Complications:  None    Reason for Admission: Fall    Hospital Course:   Tanya Stephen is a 89 y.o. female patient PMHx CHF, DM2, ambulatory dysfunction who originally presented to the hospital on 11/15/2024 due to mechanical fall fall with head strike. No LOC.  In ED, patient's trauma workup was unremarkable but CT imaging  in the ED did show small nodular opacities.  Patient denied cough, shortness of breath or fevers.  Rest of infectious workup unremarkable.  Low suspicion for infectious etiology.  Patient did receive IV antibiotics in the ED and further antibiotics were held.  Patient was admitted to [facility] service for ambulatory dysfunction with PT/OT eval.  PT/OT recommended level 3 and patient to be discharged with home health care PT/OT.  On 11/17/2024, patient deemed medically stable for discharge home with home health care.        Please see above list of diagnoses and related plan for additional information.     Condition at Discharge: stable    Discharge Day Visit / Exam:   Subjective:  No acute overnight events.  Saw and examined patient at bedside this morning.  Patient states that she is overall feeling well and was able to sleep through the night.  She denies fever, shortness of breath, chest pain or cough.  Vitals: Blood Pressure: 128/65 (11/17/24 0822)  Pulse: 91 (11/17/24 0822)  Temperature: 97.8 °F (36.6 °C) (11/17/24 0649)  Temp Source: Oral (11/16/24 1610)  Respirations: 18 (11/17/24 0649)  SpO2: 91 % (11/17/24 0822)  Physical Exam  Vitals and nursing note reviewed.   Constitutional:       General: She is not in acute distress.     Appearance: Normal appearance. She is well-developed. She is not ill-appearing.   HENT:      Head: Normocephalic and atraumatic.      Mouth/Throat:      Mouth: Mucous membranes are moist.   Eyes:      General: No scleral icterus.     Extraocular Movements: Extraocular movements intact.      Conjunctiva/sclera: Conjunctivae normal.   Cardiovascular:      Rate and Rhythm: Normal rate and regular rhythm.      Pulses: Normal pulses.      Heart sounds: Normal heart sounds. No murmur heard.     No friction rub. No gallop.   Pulmonary:      Effort: Pulmonary effort is normal. No respiratory distress.      Breath sounds: No wheezing or rales.      Comments: Decreased breath sounds bilateral  bases  Abdominal:      General: There is no distension.      Palpations: Abdomen is soft.      Tenderness: There is no abdominal tenderness. There is no guarding.   Musculoskeletal:         General: No swelling.      Cervical back: Neck supple.      Right lower leg: No edema.      Left lower leg: No edema.   Skin:     General: Skin is warm and dry.      Capillary Refill: Capillary refill takes less than 2 seconds.   Neurological:      Mental Status: She is alert and oriented to person, place, and time.   Psychiatric:         Mood and Affect: Mood normal.         Behavior: Behavior normal.          Discussion with Family: Updated  () at bedside.    Discharge instructions/Information to patient and family:   See after visit summary for information provided to patient and family.      Provisions for Follow-Up Care:  See after visit summary for information related to follow-up care and any pertinent home health orders.      Mobility at time of Discharge:   Basic Mobility Inpatient Raw Score: 20  JH-HLM Goal: 6: Walk 10 steps or more  JH-HLM Achieved: 7: Walk 25 feet or more  HLM Goal achieved. Continue to encourage appropriate mobility.     Disposition:   Home with [facility]    Planned Readmission: No    Discharge Medications:  See after visit summary for reconciled discharge medications provided to patient and/or family.      Administrative Statements   Discharge Statement:  I have spent a total time of 30 minutes in caring for this patient on the day of the visit/encounter. >30 minutes of time was spent on: Patient and family education, Impressions, Counseling / Coordination of care, and Documenting in the medical record.    **Please Note: This note may have been constructed using a voice recognition system**

## 2024-11-17 NOTE — DISCHARGE INSTR - AVS FIRST PAGE
Dear Tanya Stephen,     It was our pleasure to care for you here at [facility].  It is our hope that we were always able to exceed the expected standards for your care during your stay.  You were hospitalized due to ambulatory dysfunction.  You were cared for on the west fourth floor by Veronique Zamarripa MD under the service of Ember Olivia MD with the [facility] Hospitalist Group who covers for your primary care physician (PCP), Gregory Santoro DO, while you were hospitalized.  If you have any questions or concerns related to this hospitalization, you may contact us at .  For follow up as well as any medication refills, we recommend that you follow up with your primary care physician.  A registered nurse will reach out to you by phone within a few days after your discharge to answer any additional questions that you may have after going home.  However, at this time we provide for you here, the most important instructions / recommendations at discharge:     Notable Medication Adjustments -   Please continue the rest of your home medications as prescribed  Testing Required after Discharge -   Repeat CT chest in 4 to 6 weeks.  Your primary care provider has been notified.  Follow-up with your primary care provider for any additional outpatient testing wish to conduct.  Important follow up information -   Please follow-up with your primary care provider within 1 week.  Please follow-up with home health services for outpatient Physical Therapy and Occupational Therapy  Visiting nurses will perform wound care and dressing changes to your lacerations.  Dressing changes should occur every other day.  Other Instructions -   If you begin to experience lightheadedness, weakness, fever, chills, or chest pain, please return to the hospital.  Please review this entire after visit summary as additional general instructions including medication list, appointments,  activity, diet, any pertinent wound care, and other additional recommendations from your care team that may be provided for you.      Sincerely,     Veronique Zamarripa MD

## 2024-11-17 NOTE — ASSESSMENT & PLAN NOTE
Lab Results   Component Value Date    EGFR 30 11/17/2024    EGFR 31 11/16/2024    EGFR 29 11/15/2024    CREATININE 1.50 (H) 11/17/2024    CREATININE 1.46 (H) 11/16/2024    CREATININE 1.54 (H) 11/15/2024   Currently at baseline, monitor BMPs

## 2024-11-17 NOTE — ASSESSMENT & PLAN NOTE
Lab Results   Component Value Date    HGBA1C 6.7 (H) 11/15/2024       Recent Labs     11/16/24  1608 11/16/24  2006 11/17/24  0648 11/17/24  1113   POCGLU 117 206* 130 196*       Blood Sugar Average: Last 72 hrs:  (P) 136.25    Follow-up with PCP outpatient

## 2024-11-17 NOTE — INCIDENTAL FINDINGS
The following findings require follow up:  Radiographic finding   Finding:   CT cervical spine without contrast:   No acute cervical spine fracture or traumatic malalignment., Small focal opacity in left apical lung, new since 9/11/2024. Differential includes infectious/inflammatory, aspiration, or small focal atelectasis. Consider CT chest without contrast for further evaluation of lungs.   CT lumbar spine without contrast: Partially imaged small bilateral pleural effusions with associated passive atelectasis. Consider follow-up CT chest without contrast for further evaluation.   CT chest without contrast: New mild pulmonary edema-like pattern, bilateral pleural effusions with adjacent consolidation., Scattered sub-5 mm nodules bilaterally, new since CT chest from 9/11/2024 along with accompanying mild diffuse mosaicism. This is favored favored infectious/inflammatory in etiology. As a precaution, recommend follow-up CT chest in 4 to 6 weeks., Stable bandlike opacity in the lingula extending to the pleural surface, probably round atelectasis.      Follow up required: CT chest in 4 to 6 weeks   Follow up should be done within 4-6 week(s)    Please notify the following clinician to assist with the follow up:   Dr. Gregory Santoro (PCP)    Incidental finding results were discussed with the Patient by Veronique Zamarripa MD on 11/17/24.   They expressed understanding and all questions answered.

## 2024-11-17 NOTE — PLAN OF CARE
Problem: OCCUPATIONAL THERAPY ADULT  Goal: Performs self-care activities at highest level of function for planned discharge setting.  See evaluation for individualized goals.  Description: Treatment Interventions: ADL retraining, Functional transfer training, Patient/family training, Neuromuscular reeducation, Compensatory technique education, Continued evaluation, Energy conservation, Activityengagement          See flowsheet documentation for full assessment, interventions and recommendations.   Note: Limitation: Decreased ADL status, Decreased Safe judgement during ADL, Decreased high-level ADLs, Decreased self-care trans, Decreased endurance  Prognosis: Fair  Assessment: Patient evaluated by Occupational Therapy.  Patient admitted with Ambulatory dysfunction. Patient I S/P a mechanical fall from EOB. The patients occupational profile, medical and therapy history includes a expanded review of medical and/or therapy records and additional review of physical, cognitive, or psychosocial history related to current functional performance.  Comorbidities affecting functional mobility and ADLS include: CHF, CKD, and diabetes.  Prior to admission, patient was independent with functional mobility with rollater, independent with ADLS, independent with IADLS, and living with spouse in a 1 level home with 3+3 steps to enter.  See above for activity levels.The evaluation identifies the following performance deficits: weakness, impaired balance, decreased endurance, decreased coordination, increased fall risk, decreased ADLS, decreased IADLS, and decreased activity tolerance, that result in activity limitations and/or participation restrictions. This evaluation requires clinical decision making of moderate complexity, because the patient may present with comorbidities that affect occupational performance and required minimal or moderate modification of tasks or assistance with the consideration of several treatment options.   The Barthel Index was used as a functional outcome tool presenting with a score of Barthel Index Score: 70,  The patient's raw score on the -PAC Daily Activity Inpatient Short Form is 19. A raw score of greater than or equal to 19 suggests the patient may benefit from discharge to home. Please refer to the recommendation of the Occupational Therapist for safe discharge planning. Patient will benefit from skilled Occupational Therapy services to address above deficits and facilitate a safe return to prior level of function.     Rehab Resource Intensity Level, OT: III (Minimum Resource Intensity)

## 2024-11-17 NOTE — ASSESSMENT & PLAN NOTE
Wt Readings from Last 3 Encounters:   11/06/24 56.7 kg (125 lb)   09/11/24 55 kg (121 lb 3.2 oz)   09/04/24 56.1 kg (123 lb 9.6 oz)   Weights have been stable, between 120 to 126 pounds at home, currently she is at 125 pounds  Reports compliance with home Lasix 20 mg daily  Hemodynamically stable, not requiring any oxygen  Echo July 2024 showing EF 50%, grade 1 diastolic dysfunction  Patient denying any worsening SOB or leg swelling, no weight gain  Do not believe that she is in CHF exacerbation  CT chest showing some mild bilateral pleural effusions    Plan:  Continue Lasix 20 mg p.o. daily today  F/up with PCP outpatient

## 2024-11-17 NOTE — ASSESSMENT & PLAN NOTE
Lab Results   Component Value Date    HGBA1C 6.7 (H) 11/15/2024       Recent Labs     11/16/24  1608 11/16/24  2006 11/17/24  0648 11/17/24  1113   POCGLU 117 206* 130 196*       Blood Sugar Average: Last 72 hrs:  (P) 136.25    Plan:  Follow up with PCP outpatient

## 2024-11-17 NOTE — ASSESSMENT & PLAN NOTE
"Patient presented with mechanical fall with skin breaks  Trauma workup unremarkable    Plan:  Home wound care for bilateral hands and right knee lacerations  Rest of plan under \"Ambulatory dysfunction\"  "

## 2024-12-05 ENCOUNTER — RESULTS FOLLOW-UP (OUTPATIENT)
Dept: OTHER | Facility: HOSPITAL | Age: 89
End: 2024-12-05

## 2024-12-05 ENCOUNTER — APPOINTMENT (OUTPATIENT)
Dept: LAB | Facility: CLINIC | Age: 89
End: 2024-12-05
Payer: MEDICARE

## 2024-12-05 DIAGNOSIS — I12.9 PARENCHYMAL RENAL HYPERTENSION, STAGE 1 THROUGH STAGE 4 OR UNSPECIFIED CHRONIC KIDNEY DISEASE: ICD-10-CM

## 2024-12-05 DIAGNOSIS — R30.0 DYSURIA: ICD-10-CM

## 2024-12-05 DIAGNOSIS — E11.21 DIABETIC NEPHROPATHY ASSOCIATED WITH TYPE 2 DIABETES MELLITUS (HCC): ICD-10-CM

## 2024-12-05 DIAGNOSIS — R74.8 ELEVATED ALKALINE PHOSPHATASE LEVEL: ICD-10-CM

## 2024-12-05 DIAGNOSIS — N18.4 CHRONIC KIDNEY DISEASE, STAGE IV (SEVERE) (HCC): ICD-10-CM

## 2024-12-05 DIAGNOSIS — N18.4 ANEMIA OF CHRONIC RENAL FAILURE, STAGE 4 (SEVERE)  (HCC): ICD-10-CM

## 2024-12-05 DIAGNOSIS — D63.1 ANEMIA OF CHRONIC RENAL FAILURE, STAGE 4 (SEVERE)  (HCC): ICD-10-CM

## 2024-12-05 DIAGNOSIS — I50.32 CHRONIC DIASTOLIC (CONGESTIVE) HEART FAILURE (HCC): Chronic | ICD-10-CM

## 2024-12-05 DIAGNOSIS — N18.4 CHRONIC KIDNEY DISEASE, STAGE IV (SEVERE) (HCC): Primary | ICD-10-CM

## 2024-12-05 LAB
ALBUMIN SERPL BCG-MCNC: 3.8 G/DL (ref 3.5–5)
ALP SERPL-CCNC: 115 U/L (ref 34–104)
ALT SERPL W P-5'-P-CCNC: 32 U/L (ref 7–52)
ANION GAP SERPL CALCULATED.3IONS-SCNC: 11 MMOL/L (ref 4–13)
AST SERPL W P-5'-P-CCNC: 42 U/L (ref 13–39)
BILIRUB SERPL-MCNC: 0.79 MG/DL (ref 0.2–1)
BUN SERPL-MCNC: 28 MG/DL (ref 5–25)
CALCIUM SERPL-MCNC: 10.1 MG/DL (ref 8.4–10.2)
CHLORIDE SERPL-SCNC: 98 MMOL/L (ref 96–108)
CHOLEST SERPL-MCNC: 110 MG/DL (ref ?–200)
CO2 SERPL-SCNC: 32 MMOL/L (ref 21–32)
CREAT SERPL-MCNC: 1.72 MG/DL (ref 0.6–1.3)
FERRITIN SERPL-MCNC: 80 NG/ML (ref 11–307)
GFR SERPL CREATININE-BSD FRML MDRD: 25 ML/MIN/1.73SQ M
GLUCOSE P FAST SERPL-MCNC: 98 MG/DL (ref 65–99)
HDLC SERPL-MCNC: 65 MG/DL
LDLC SERPL CALC-MCNC: 33 MG/DL (ref 0–100)
MAGNESIUM SERPL-MCNC: 2.1 MG/DL (ref 1.9–2.7)
POTASSIUM SERPL-SCNC: 4 MMOL/L (ref 3.5–5.3)
PROT SERPL-MCNC: 7.2 G/DL (ref 6.4–8.4)
PTH-INTACT SERPL-MCNC: 21.9 PG/ML (ref 12–88)
SODIUM SERPL-SCNC: 141 MMOL/L (ref 135–147)
TRIGL SERPL-MCNC: 58 MG/DL (ref ?–150)

## 2024-12-05 PROCEDURE — 80053 COMPREHEN METABOLIC PANEL: CPT

## 2024-12-05 PROCEDURE — 83735 ASSAY OF MAGNESIUM: CPT

## 2024-12-05 PROCEDURE — 36415 COLL VENOUS BLD VENIPUNCTURE: CPT

## 2024-12-05 PROCEDURE — 83970 ASSAY OF PARATHORMONE: CPT

## 2024-12-05 PROCEDURE — 82728 ASSAY OF FERRITIN: CPT

## 2024-12-05 PROCEDURE — 80061 LIPID PANEL: CPT

## 2024-12-05 NOTE — RESULT ENCOUNTER NOTE
Labs reviewed.  Please call patient.  Creatinine slightly above baseline.  Encourage adequate hydration.  Repeat BMP in 2 weeks

## 2024-12-05 NOTE — TELEPHONE ENCOUNTER
Nirmala Moran PA-C  [facility]  Labs reviewed.  Please call patient.  Creatinine slightly above baseline.  Encourage adequate hydration.  Repeat BMP in 2 weeks      Pt's  informed with results and recommendations. Lab order in the system.

## 2024-12-06 LAB — MISCELLANEOUS LAB TEST RESULT: NORMAL

## 2024-12-09 ENCOUNTER — OFFICE VISIT (OUTPATIENT)
Age: 89
End: 2024-12-09
Payer: MEDICARE

## 2024-12-09 VITALS
DIASTOLIC BLOOD PRESSURE: 80 MMHG | BODY MASS INDEX: 24.56 KG/M2 | HEIGHT: 59 IN | WEIGHT: 121.8 LBS | SYSTOLIC BLOOD PRESSURE: 122 MMHG | TEMPERATURE: 97.5 F

## 2024-12-09 DIAGNOSIS — M48.062 SPINAL STENOSIS OF LUMBAR REGION WITH NEUROGENIC CLAUDICATION: ICD-10-CM

## 2024-12-09 DIAGNOSIS — M15.0 PRIMARY GENERALIZED (OSTEO)ARTHRITIS: ICD-10-CM

## 2024-12-09 DIAGNOSIS — E11.42 DIABETIC POLYNEUROPATHY ASSOCIATED WITH TYPE 2 DIABETES MELLITUS (HCC): ICD-10-CM

## 2024-12-09 DIAGNOSIS — M06.09 SERONEGATIVE ARTHROPATHY OF MULTIPLE SITES (HCC): Primary | ICD-10-CM

## 2024-12-09 DIAGNOSIS — N18.32 STAGE 3B CHRONIC KIDNEY DISEASE (HCC): ICD-10-CM

## 2024-12-09 DIAGNOSIS — K21.9 GASTROESOPHAGEAL REFLUX DISEASE WITHOUT ESOPHAGITIS: ICD-10-CM

## 2024-12-09 DIAGNOSIS — M06.09 RHEUMATOID ARTHRITIS OF MULTIPLE SITES WITHOUT RHEUMATOID FACTOR (HCC): ICD-10-CM

## 2024-12-09 DIAGNOSIS — E11.43 DIABETIC GASTROPARESIS ASSOCIATED WITH TYPE 2 DIABETES MELLITUS  (HCC): ICD-10-CM

## 2024-12-09 DIAGNOSIS — K31.84 DIABETIC GASTROPARESIS ASSOCIATED WITH TYPE 2 DIABETES MELLITUS  (HCC): ICD-10-CM

## 2024-12-09 DIAGNOSIS — M81.0 SENILE OSTEOPOROSIS: ICD-10-CM

## 2024-12-09 PROCEDURE — 99214 OFFICE O/P EST MOD 30 MIN: CPT | Performed by: INTERNAL MEDICINE

## 2024-12-09 NOTE — PROGRESS NOTES
Assessment/Plan:    Seronegative arthropathy of multiple sites (HCC)  Seronegative arthropathy stable on low-dose Plaquenil 200 mg daily.  No sign of active inflammation or synovitis on exam at this visit.  Continue follow-up with eye doctor to monitor for Plaquenil toxicity every 6 months.  Monitor disease activity and medication toxicity with lab work as ordered.  Recent Plaquenil level dated 12/5/2024 borderline elevated at 1265.9.  I told the patient to decrease Plaquenil dosing to 6 days weekly with 200 mg on most days and to hold Plaquenil on Sundays.  Will plan to recheck Plaquenil Avise to monitor Plaquenil level and make adjustments in dosing after the next office visit in 6 months if needed.  Follow-up 6 months or sooner if needed.    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  Diabetic neuropathy contributes to balance difficulties as well.  Patient has had only short-term success with recent transforaminal epidural steroid injections right S1 joint most recently in August of this year, and had recent hospitalization for gait disturbance and fall last month.  She did have home PT which she completed recently and she has been encouraged to continue with home exercise program with strengthening exercises as instructed by the physical therapist.  Encouraged use of her walker for ambulation assistance and fall prevention.  Patient does have generalized osteoarthritis multiple joints as well as cervical spondylosis with no current radicular symptoms at this time.  Monitor labs for medication toxicity.  Follow-up 6 months or sooner if needed.    Spinal stenosis of lumbar region with neurogenic claudication  Severe spinal stenosis with history multilevel extensive  laminectomies with fusion, with chronic severe right-sided low back pain.  She has had follow-up with pain management and is status post trans steroid injections in May and August of this year with only short-term benefit foraminal epidural approximately 1 week duration.  She continues with Cymbalta, and Lyrica for pain management with overall benefit.  Encourage home TENS unit for symptomatic relief.  Continue with gentle chiropractic manipulation as tolerated.  Encouraged her to use walker for ambulation assistance to prevent falls in view of her increased risk for fracture.  She completed home PT for balance and gait disturbance following recent hospitalization in November for gait disturbance and fall.  Monitor labs for medication toxicity.  Follow-up 6 months or sooner if needed.  Continue to monitor.    Senile osteoporosis  Patient with osteoporosis who continues on Prolia every 6 months.  Her last Prolia injection was 5/29/2024.  She is due for her next Prolia at this office visit, however, in view of low GFR estimated at 25, likely secondary to decrease in hydration, will have patient try and increase fluid intake, and repeat BMP in the next week or 2 with plans for Prolia after lab work reviewed, if estimated GFR is a bit higher.  Will continue to check serum calcium and kidney function 2 weeks prior and 2 weeks after each Prolia injection in view of her low GFR.  Most recent DEXA was March 2018 which included only her forearm in view of extensive back surgery and bilateral total hip arthroplasties.  Additional DEXA studies at this time would not add benefit since we can only evaluate her forearm.  She will continue with calcium and vitamin D supplement.  Practice fall prevention particularly with using her walker for ambulation assistance.  Encourage increasing her hydration in view of chronic kidney disease and fluctuating GFR's based on her water intake.  Follow-up 6 months after her Prolia injection for  office visit and next Prolia injection. Continue to monitor.    Diabetic polyneuropathy associated with type 2 diabetes mellitus (HCC)    Lab Results   Component Value Date    HGBA1C 6.7 (H) 11/15/2024   Continue diabetic management as ordered by primary care.  Encourage diet control.  Hemoglobin A1c in target range.  Continue to monitor hemoglobin A1c.  Continue Cymbalta and Lyrica for neuropathic symptoms and chronic pain.  Encourage use of walker for fall prevention in view of neuropathic symptoms feet from diabetes and spinal stenosis with neurogenic symptoms. Continue to monitor.    Stage 3b chronic kidney disease (HCC)  Lab Results   Component Value Date    EGFR 27 12/21/2024    EGFR 25 12/05/2024    EGFR 30 11/17/2024    CREATININE 1.65 (H) 12/21/2024    CREATININE 1.72 (H) 12/05/2024    CREATININE 1.50 (H) 11/17/2024   Chronic kidney disease fluctuating between 3b and 4, likely dependent on hydration.  We did hold her Prolia injection at this visit and instructed the patient to increase hydration and repeat kidney function in the next week or so, and if improved, will give her updated Prolia injection.  Avoid nephrotoxic agents like nonsteroidals.  Follow-up nephrology.  Continue to monitor.    Gastroesophageal reflux disease without esophagitis  Documented esophageal dysmotility on barium swallow.  She continues to be careful with chewing and cutting food into small pieces so she does not have symptoms of dysphagia.  Encourage small more frequent meals.  Continue omeprazole.  Follow-up GI.  Continue to monitor.         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  Diabetic neuropathy contributes to balance  difficulties as well.  Patient has had only short-term success with recent transforaminal epidural steroid injections right S1 joint most recently in August of this year, and had recent hospitalization for gait disturbance and fall last month.  She did have home PT which she completed recently and she has been encouraged to continue with home exercise program with strengthening exercises as instructed by the physical therapist.  Encouraged use of her walker for ambulation assistance and fall prevention.  Patient does have generalized osteoarthritis multiple joints as well as cervical spondylosis with no current radicular symptoms at this time.  Monitor labs for medication toxicity.  Follow-up 6 months or sooner if needed.         Seronegative arthropathy of multiple sites (HCC) - Primary (Chronic)    Seronegative arthropathy stable on low-dose Plaquenil 200 mg daily.  No sign of active inflammation or synovitis on exam at this visit.  Continue follow-up with eye doctor to monitor for Plaquenil toxicity every 6 months.  Monitor disease activity and medication toxicity with lab work as ordered.  Recent Plaquenil level dated 12/5/2024 borderline elevated at 1265.9.  I told the patient to decrease Plaquenil dosing to 6 days weekly with 200 mg on most days and to hold Plaquenil on Sundays.  Will plan to recheck Plaquenil Avise to monitor Plaquenil level and make adjustments in dosing after the next office visit in 6 months if needed.  Follow-up 6 months or sooner if needed.         Relevant Orders    C-reactive protein    Sedimentation rate, automated    CBC and differential    Comprehensive metabolic panel    Urinalysis with microscopic    Senile osteoporosis (Chronic)    Patient with osteoporosis who continues on Prolia every 6 months.  Her last Prolia injection was 5/29/2024.  She is due for her next Prolia at this office visit, however, in view of low GFR estimated at 25, likely secondary to decrease in hydration,  will have patient try and increase fluid intake, and repeat BMP in the next week or 2 with plans for Prolia after lab work reviewed, if estimated GFR is a bit higher.  Will continue to check serum calcium and kidney function 2 weeks prior and 2 weeks after each Prolia injection in view of her low GFR.  Most recent DEXA was March 2018 which included only her forearm in view of extensive back surgery and bilateral total hip arthroplasties.  Additional DEXA studies at this time would not add benefit since we can only evaluate her forearm.  She will continue with calcium and vitamin D supplement.  Practice fall prevention particularly with using her walker for ambulation assistance.  Encourage increasing her hydration in view of chronic kidney disease and fluctuating GFR's based on her water intake.  Follow-up 6 months after her Prolia injection for office visit and next Prolia injection. Continue to monitor.         Relevant Orders    Comprehensive metabolic panel    Diabetic polyneuropathy associated with type 2 diabetes mellitus (HCC)      Lab Results   Component Value Date    HGBA1C 6.7 (H) 11/15/2024   Continue diabetic management as ordered by primary care.  Encourage diet control.  Hemoglobin A1c in target range.  Continue to monitor hemoglobin A1c.  Continue Cymbalta and Lyrica for neuropathic symptoms and chronic pain.  Encourage use of walker for fall prevention in view of neuropathic symptoms feet from diabetes and spinal stenosis with neurogenic symptoms. Continue to monitor.         Stage 3b chronic kidney disease (HCC)    Lab Results   Component Value Date    EGFR 27 12/21/2024    EGFR 25 12/05/2024    EGFR 30 11/17/2024    CREATININE 1.65 (H) 12/21/2024    CREATININE 1.72 (H) 12/05/2024    CREATININE 1.50 (H) 11/17/2024   Chronic kidney disease fluctuating between 3b and 4, likely dependent on hydration.  We did hold her Prolia injection at this visit and instructed the patient to increase hydration and  repeat kidney function in the next week or so, and if improved, will give her updated Prolia injection.  Avoid nephrotoxic agents like nonsteroidals.  Follow-up nephrology.  Continue to monitor.         Relevant Orders    Comprehensive metabolic panel    Urinalysis with microscopic    Spinal stenosis of lumbar region with neurogenic claudication (Chronic)    Severe spinal stenosis with history multilevel extensive laminectomies with fusion, with chronic severe right-sided low back pain.  She has had follow-up with pain management and is status post trans steroid injections in May and August of this year with only short-term benefit foraminal epidural approximately 1 week duration.  She continues with Cymbalta, and Lyrica for pain management with overall benefit.  Encourage home TENS unit for symptomatic relief.  Continue with gentle chiropractic manipulation as tolerated.  Encouraged her to use walker for ambulation assistance to prevent falls in view of her increased risk for fracture.  She completed home PT for balance and gait disturbance following recent hospitalization in November for gait disturbance and fall.  Monitor labs for medication toxicity.  Follow-up 6 months or sooner if needed.  Continue to monitor.         Gastroesophageal reflux disease without esophagitis (Chronic)    Documented esophageal dysmotility on barium swallow.  She continues to be careful with chewing and cutting food into small pieces so she does not have symptoms of dysphagia.  Encourage small more frequent meals.  Continue omeprazole.  Follow-up GI.  Continue to monitor.        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  Counseled patient.  Discussion regarding my findings and recommendations.  Office visit with documentation 35 min.    Subjective:      Patient ID: Tanya Stephen is a 89 y.o. female.    Patient with primary generalized osteoarthritis with probable seronegative inflammatory component, who has been on Plaquenil since February of 2018, with lumbar spondylosis/stenosis status post revision decompression multiple levels, with chronic neurogenic symptoms.  Type 2 diabetes with neuropathy feet with carpal tunnel syndrome with thenar atrophy, with history improved post herpetic neuralgia pain following H zoster end September 2021.  Chronic pain has been generally stable on Lyrica in divided doses 4 times daily, with chronic and intermittent back pain which can be severe at times, limiting her activities.  She has history of balance difficulties and recurrent falls with history of recent hospitalization 11/15/2024 for gait disturbance and fall.  Patient did complete home physical therapy post hospitalization.  She does have a walker for daily use, but occasionally uses her cane when she is out of the house.  She did have an epidural per pain management approximately 3-4 months ago with temporary relief of 1 week duration, with no longer term benefit for fall prevention.  She had right S1 transforaminal epidural steroid injection 8/23/2024 and 5/13/2024.  History of gastroparesis likely related to type 2 diabetes, with irritable bowel with history urgency and diarrhea after eating, but she has improved with the addition of a Probiotic which has helped her irritable bowel symptoms.  Barium swallow significant for esophageal dysmotility.  She continues on increased omeprazole 20 mg twice daily.  She has minimal morning stiffness but does have chronic pain in particular in her thumbs, and feet.  She has no further heel pain.  She has chronic and persistent  nonradicular low back pain with prominent neurogenic symptoms with some referred pain into her right buttock and hip area.  She has chronic dry eyes and dry mouth, hearing loss with tinnitus and dizziness, sleep disturbance with chronic fatigue, anxiety depression, abnormal gait with incoordination, and OAB with urinary frequency, nocturia, urgency, incontinence which have improved with Myrbetriq.  She has urinary incontinence most prominent when she stands up from a sitting position. She has chronic ankle edema.  She has Raynaud's phenomenon with no digital ulcers.  She has complained of sore throat and difficulty swallowing. She has known diastolic dysfunction and follows with Cardiology.  Past medical history otherwise significant for osteoporosis on Prolia, primary Raynaud's, chronic kidney disease, history overactive bladder, hypertension, history of plantar fasciitis secondary to mechanical foot deformities, and prominent 1st CMC joint osteoarthritis bilaterally.  She is on chronic suppression with low-dose Macrodantin 50 mg daily for history of recurrent UTI.  She follows with urology.  Patient was recently treated with a 14-day course of Xifaxan as well as neomycin for presumed SIBO per GI.  She noted some benefit with the antibiotic therapy.  She has been using CBD cream with benefit.    Medical history significant for hospitalization for urinary tract infection and pneumonia.  She has had recurrent UTIs twice required hospitalization.  UTIs have been quiescent since she has been placed on chronic suppression with low-dose Macrodantin.     Lab work dated 12/5/2024 significant for BUN 28.  Creatinine 1.72 increased with estimated GFR lower at 25.  PTH 21.9.  Calcium 10.1.  AST 42.  ALT 32.  Alk phos 115.  Magnesium 2.1.  Ferritin 80.  Total cholesterol 110.  Triglycerides 58.  HDL 65.  LDL 33.  Plaquenil level slightly high at 1265.9.  She was told to take 200 mg 6 days weekly with no Plaquenil on Sundays to  accommodate for slightly elevated.  Review of lab work dated 6/20/2024 significant for creatinine 1.50 with estimated GFR 30 slightly lower than prior study.  Glucose 178.  Calcium 9.7.  Review of lab work dated 5/16/2024 significant for CRP less than 1.0.  Alk phos 105.  AST mildly elevated at 43.  ALT 40.  Total bilirubin 0.59.  Glucose 128.  Creatinine 1.30 with estimated GFR 36.  Magnesium 2.0.  Phosphorus 3.4.  Review of lab work dated 12/16/2023 significant for BUN/creatinine 28/1.43 with estimated GFR 32.  Calcium 9.6.  CBC remarkable for MCHC 30.8 with platelet count 162.  Sed rate 19.  CRP less than 1.0.  6/15/2023 significant for urinalysis positive for dipstick leukocytes with no symptoms.  Lab work dated 6/14/2023 significant for CRP less than 1.0.  Sed rate 26.  Creatinine 1.31 with estimated GFR 36.  Calcium 9.3.  Glucose 114.  CBC with elevated eosinophils of 8%.  CÉSAR positive in a dilution of 80 in a cytoplasmic pattern.  Review of lab work dated 2/23/2023 significant for hemoglobin/hematocrit 11.5/34.9.  Platelet count 134 stable.  BUN 28.  Creatinine 1.10.  Glucose 89.  Estimated GFR 44 stable.  Calcium 8.7.      Most recent DEXA dated 03/27/2018 of the forearm with a T-score of-2.7 with an increase of 2.5% as compared to the prior study.    Review of CT scan of the lumbar spine dated 12/21/2021 significant for decompression of the central canal secondary to prior laminectomies infusion.  X-ray pelvis dated 11/19/2021 significant for bilateral total hip replacement.        Allergies  Allergies   Allergen Reactions   • Morphine Other (See Comments)     urinary retention   • Ciprofloxacin Nausea Only and Rash       Home Medications    Current Outpatient Medications:   •  acetaminophen (TYLENOL) 325 mg tablet, Take 2 tablets (650 mg total) by mouth 3 (three) times a day, Disp: 540 tablet, Rfl: 3  •  aspirin 81 mg chewable tablet, Chew 1 tablet (81 mg total) daily, Disp: 30 tablet, Rfl: 0  •   atorvastatin (LIPITOR) 10 mg tablet, TAKE ONE TABLET BY MOUTH EVERY DAY, Disp: 90 tablet, Rfl: 3  •  Cholecalciferol (VITAMIN D3) 1000 units CAPS, Take 1 capsule by mouth daily , Disp: , Rfl:   •  diphenoxylate-atropine (LOMOTIL) 2.5-0.025 mg per tablet, Take 1 tablet by mouth if needed, Disp: , Rfl:   •  DULoxetine (CYMBALTA) 60 mg delayed release capsule, TAKE ONE CAPSULE BY MOUTH ONCE DAILY, Disp: 30 capsule, Rfl: 5  •  furosemide (LASIX) 20 mg tablet, One tablet daily and an extra 20 mg prn for wt gain 3 lb/ 24 hours or 5 lb/ 5 days, above a dry wt of 122 lb, Disp: 90 tablet, Rfl: 3  •  hydroxychloroquine (PLAQUENIL) 200 mg tablet, Take 1 tablet (200 mg total) by mouth daily with breakfast, Disp: 90 tablet, Rfl: 1  •  Magnesium 250 MG TABS, Take 250 mg by mouth every evening, Disp: , Rfl:   •  Mirabegron ER (Myrbetriq) 50 MG TB24, Take 1 tablet (50 mg total) by mouth in the morning, Disp: 90 tablet, Rfl: 1  •  nitrofurantoin (MACRODANTIN) 50 mg capsule, Take 1 capsule (50 mg total) by mouth in the morning, Disp: 90 capsule, Rfl: 3  •  omeprazole (PriLOSEC) 20 mg delayed release capsule, Take 20 mg by mouth daily, Disp: , Rfl:   •  pregabalin (LYRICA) 75 mg capsule, Take 1 capsule (75 mg total) by mouth 2 (two) times a day, Disp: 60 capsule, Rfl: 5  •  pyridoxine (B-6) 100 MG tablet, Take 100 mg by mouth daily, Disp: , Rfl:   •  Sodium Fluoride (PreviDent 5000 Booster Plus) 1.1 % PSTE, Apply on teeth every evening as directed, Disp: 100 mL, Rfl: 3    Past Medical History  Past Medical History:   Diagnosis Date   • Anemia    • Arthritis    • Back pain    • CHF (congestive heart failure) (HCC)    • Chronic kidney disease     Stage 3b   • Confusion 02/22/2023   • Diabetes (HCC)    • Diabetes mellitus (HCC)    • Diabetic gastroparesis associated with type 2 diabetes mellitus  (HCC)    • Diabetic polyneuropathy (HCC)    • Diverticulosis    • Herpes zoster    • Hypertension    • IBS (irritable bowel syndrome)    •  Neurogenic claudication due to lumbar spinal stenosis    • Osteoarthritis    • Osteoporosis    • Pulmonary edema    • Raynaud's phenomenon without gangrene    • Seronegative arthropathy of multiple sites (HCC)    • Sicca (HCC)        Past Surgical History   Past Surgical History:   Procedure Laterality Date   • BACK SURGERY     • COLONOSCOPY     • EGD AND COLONOSCOPY N/A 12/23/2016    Procedure: EGD AND COLONOSCOPY;  Surgeon: Elina Anderson MD;  Location: [facility];  Service:    • JOINT REPLACEMENT      bilat knees and hips   • OTHER SURGICAL HISTORY      pelvis rods   • REPLACEMENT TOTAL KNEE BILATERAL     • STOMACH SURGERY     • UPPER GASTROINTESTINAL ENDOSCOPY         Family History   Family History   Problem Relation Age of Onset   • Cancer Brother    • Diabetes Mother    • Hypertension Father    • Heart disease Father        The following portions of the patient's history were reviewed and updated as appropriate: allergies, current medications, past family history, past medical history, past social history, past surgical history, and problem list.    Review of Systems   Constitutional:  Negative for chills and fever.   HENT:  Positive for hearing loss and tinnitus. Negative for ear pain and sore throat.    Eyes:  Negative for pain and visual disturbance.   Respiratory:  Negative for cough and shortness of breath.    Cardiovascular:  Negative for chest pain and palpitations.   Gastrointestinal:  Negative for abdominal pain and vomiting.        Chronic gastroparesis.  IBS symptoms improved with leaky gut revive for IBS.  Difficulty swallowing with history of barium swallow significant for esophageal dysmotility .  She continues on omeprazole.   Genitourinary:  Positive for frequency and urgency. Negative for dysuria and hematuria.        Nocturia, frequency, urgency, and urinary stress incontinence  have improved with Myrbetriq for overactive bladder.  Recurrent UTIs on bladder suppression with low-dose  "Macrodantin.   Musculoskeletal:  Positive for arthralgias, back pain, gait problem, joint swelling, myalgias and neck stiffness.        Raynaud's without digital ulcerations.  Chronic dry eyes and dry mouth.  Ankle edema secondary to venous insufficiency.  Chronic low back pain with neurogenic symptoms  on Lyrica and Elavil.  Chronic thumb arthralgias.  Chronic foot arthralgias.  Neuropathic symptoms stocking distribution.   Skin:  Negative for color change and rash.        History senile purpura extremities.   Neurological:  Positive for dizziness and numbness. Negative for seizures and syncope.        Neuropathic symptoms feet.  Carpal tunnel symptoms hands.  Dizziness intermittent.  It may be exacerbated by her medicines.  Neuropathic symptoms feet.  Hx zoster neuralgia low back radiating into abdomen severe essentially quiescent.  Abnormal gait with incoordination likely secondary to neuropathy and spinal stenosis. Recurrent falls.   Psychiatric/Behavioral:  Positive for sleep disturbance.         Anxiety. Sleep disturbance with chronic fatigue exacerbated by pain.   All other systems reviewed and are negative.        Objective:      /80   Temp 97.5 °F (36.4 °C) (Temporal)   Ht 4' 11\" (1.499 m)   Wt 55.2 kg (121 lb 12.8 oz)   LMP  (LMP Unknown)   BMI 24.60 kg/m²          Physical Exam  Vitals reviewed.   Constitutional:       Appearance: Normal appearance.   HENT:      Head: Normocephalic.      Nose:      Comments: Nose and throat unremarkable.  Eyes:      Extraocular Movements: Extraocular movements intact.   Neck:      Comments: Without masses, thyromegaly, lymphadenopathy  Cardiovascular:      Rate and Rhythm: Regular rhythm.      Comments: Varicosities lower extremities with trace edema ankles and feet.  Pulmonary:      Breath sounds: Normal breath sounds.   Abdominal:      Palpations: Abdomen is soft.   Musculoskeletal:      Cervical back: Neck supple.      Comments: Neck decreased lateral " flexion.  Shoulders left decreased abduction and external rotation with decreased internal rotation with marked tenderness supraspinatus tendon insertion.  Bilateral shoulder crepitus.  Elbows full range of motion.  Wrists decreased range of motion right greater than left with positive Tinel's and chronic thickening bilateral wrists.  Positive Tinel's bilateral elbows right greater than left.  Hands squaring 1st carpometacarpal joints bilaterally, with Heberden's nodes, interossei wasting, thenar atrophy right greater than left, Dupuytren's right greater than left, thickening flexor tendon sheaths right ring finger greater than right middle finger, and deformity wrists.  No synovitis appreciated.  Back dorsal kyphosis with scoliosis.  Marked spasm noted dorsal and lumbosacral paraspinals.  Straight leg raising negative bilaterally.  Hips full range of motion with patellofemoral crepitus.  Ankles full range of motion.  Valgus deformity bilateral ankles.  Feet tight heel cords.  No synovitis appreciated.   Skin:     General: Skin is warm.      Comments: Senile purpura extremities. Dry skin. Varicosities lower extremities. Raynaud's. No digital ulcerations appreciated.   Neurological:      Comments: Decreased sensation stocking distribution.               This note was written in part using the assistance of the [employer] Direct [id number] microphone system. Those portions using this system have been dictated and not read.

## 2024-12-09 NOTE — PATIENT INSTRUCTIONS
Drink a lot of water and repeat Blood test that Dr. Godoy ordered in 2 weeks.  As long as that is okay, we will bring you back to get her Prolia shot in 2 weeks.  I also put in for lab work to get before your next office visit in 6 months.  Continue follow-up with pain management, cardiology, primary care, nephrology for follow-up evaluation.

## 2024-12-12 ENCOUNTER — OFFICE VISIT (OUTPATIENT)
Dept: UROLOGY | Facility: AMBULATORY SURGERY CENTER | Age: 89
End: 2024-12-12
Payer: MEDICARE

## 2024-12-12 VITALS
WEIGHT: 121 LBS | HEIGHT: 59 IN | SYSTOLIC BLOOD PRESSURE: 120 MMHG | BODY MASS INDEX: 24.39 KG/M2 | DIASTOLIC BLOOD PRESSURE: 64 MMHG

## 2024-12-12 DIAGNOSIS — R30.0 DYSURIA: ICD-10-CM

## 2024-12-12 DIAGNOSIS — N32.81 OAB (OVERACTIVE BLADDER): Primary | ICD-10-CM

## 2024-12-12 LAB — POST-VOID RESIDUAL VOLUME, ML POC: 175 ML

## 2024-12-12 PROCEDURE — 99213 OFFICE O/P EST LOW 20 MIN: CPT

## 2024-12-12 PROCEDURE — 51798 US URINE CAPACITY MEASURE: CPT

## 2024-12-12 NOTE — PROGRESS NOTES
Office Visit- Urology  Tanya Stephen 7/21/1934 MRN: [id number]      Assessment/Discussion/Plan    89 y.o. female managed by     Overactive bladder  -Patient is on Myrbetriq 50 mg daily she feels that this is effective.  -PVR today is 175 mL  -Coverage for the Myrbetriq will be only until January.  Can consider Gemtesa as an alternative that this would be covered for if need to be trospium as the most cognitively safe antispasmodic.  Plan agreed upon was that we will continue to use Myrbetriq until coverage runs out and then see how patient does off the Myrbetriq if there is significant bothersome symptoms we can consider the above options     2.  Recurrent urinary tract infection  -Patient has not had any urinary tract infections since being seen in June 2023  -Patient is on daily prophylactic antibiosis with 50 mg of Macrodantin as previous urine cultures demonstrated growth of enteroccouc.  Patient and daughter previously wished to continue this due to prior history of urosepsis.   -patients creatinine clearance measured at 19 ml/min on most recent data from [facility] on 12/5/2024  -advised moises at patient's mo renal function testst her renal function would not be adequate enough to support continued use of nitrofurantoin and certainly can increase her risk of adverse events such as liver dysfunction or pulmonary fibrosis/interstitial lung disease.  I recommended that we trial coming off the nitrofurantoin at this point in time.  She and family is agreeable   -Patient states that she has intermittent dysuria at times.  Offered trial of vaginal estrogen cream discussed administration and possible adverse events but that because it is topical with little systemic absorption is not felt to convey significant risk in terms of breast cancer risk, DVT etc. they defer this option to right now but we will reassess after each outpatient dose off of the nitrofurantoin  -Follow-up in 4 months       Chief Complaint:   Tanya  is a 89 y.o. female presenting to the office for a follow up visit regarding overactive bladder        Subjective    Hx 11/28/2023   Patient is an 89-year-old female with a history of overactive bladder and recurrent UTI presents for follow-up.  She was last in the office in June 2023.  She is accompanied today by her daughter.  She has been on Macrobid 50 mg for recurrent UTI prophylaxis and has not had a UTI since being seen in the office in June 2023.  She also utilizes Myrbetriq 50 mg for overactive bladder and has been on this for a number of years.  She is currently happy with her urinary symptoms.  Denies any gross hematuria, dysuria, flank pain.  She denies any shortness of breath, chronic cough or difficulty breathing.     Hx 12/12/2024  Patient presents to the office today for follow-up.  She is accompanied by her spouse.  They were inform that coverage for Myrbetriq will end around January.  Patient has been having some persistent urinary symptoms despite Myrbetriq.ed she is also continuing to utilize nitrofurantoin for prevention of UTIs.  This is at patient and daughters request I last visit.  Unfortunately her most recent renal function test reveals that her kidney function has decreased and her creatinine clearance is measured at 19 mL/min        ROS:   Review of Systems   Constitutional: Negative.  Negative for chills, fatigue and fever.   HENT: Negative.     Respiratory:  Negative for shortness of breath.    Cardiovascular:  Negative for chest pain.   Gastrointestinal: Negative.  Negative for abdominal pain.   Endocrine: Negative.    Musculoskeletal: Negative.    Skin: Negative.    Neurological: Negative.  Negative for dizziness and light-headedness.   Hematological: Negative.    Psychiatric/Behavioral: Negative.           Past Medical History  Past Medical History:   Diagnosis Date    Anemia     Arthritis     Back pain     CHF (congestive heart failure) (HCC)     Chronic kidney disease     Stage 3b     Confusion 2023    Diabetes (HCC)     Diabetes mellitus (HCC)     Diabetic gastroparesis associated with type 2 diabetes mellitus  (HCC)     Diabetic polyneuropathy (HCC)     Diverticulosis     Herpes zoster     Hypertension     IBS (irritable bowel syndrome)     Neurogenic claudication due to lumbar spinal stenosis     Osteoarthritis     Osteoporosis     Pulmonary edema     Raynaud's phenomenon without gangrene     Seronegative arthropathy of multiple sites (HCC)     Sicca (HCC)        Past Surgical History  Past Surgical History:   Procedure Laterality Date    BACK SURGERY      COLONOSCOPY      EGD AND COLONOSCOPY N/A 2016    Procedure: EGD AND COLONOSCOPY;  Surgeon: Elina Anderson MD;  [facility];  Service:     JOINT REPLACEMENT      bilat knees and hips    OTHER SURGICAL HISTORY      pelvis rods    REPLACEMENT TOTAL KNEE BILATERAL      STOMACH SURGERY      UPPER GASTROINTESTINAL ENDOSCOPY         Past Family History  Family History   Problem Relation Age of Onset    Cancer Brother     Diabetes Mother     Hypertension Father     Heart disease Father        Past Social history  Social History     Socioeconomic History    Marital status: /Civil Union     Spouse name: Weston    Number of children: 2    Years of education: Not on file    Highest education level: High school graduate   Occupational History    Not on file   Tobacco Use    Smoking status: Former     Current packs/day: 0.00     Types: Cigarettes     Quit date:      Years since quittin.9     Passive exposure: Never    Smokeless tobacco: Never   Vaping Use    Vaping status: Never Used   Substance and Sexual Activity    Alcohol use: No    Drug use: No    Sexual activity: Not Currently     Partners: Male     Birth control/protection: None   Other Topics Concern    Not on file   Social History Narrative    Not on file     Social Drivers of Health     Financial Resource Strain: Not on file   Food Insecurity: No Food  Insecurity (2024)    Nursing - Inadequate Food Risk Classification     Worried About Running Out of Food in the Last Year: Never true     Ran Out of Food in the Last Year: Never true     Ran Out of Food in the Last Year: 1   Transportation Needs: No Transportation Needs (2024)    Nursing - Transportation Risk Classification     Lack of Transportation: Not on file     Lack of Transportation: 2   Physical Activity: Not on file   Stress: Not on file   Social Connections: Not on file   Intimate Partner Violence: Unknown (2024)    Nursing IPS     Feels Physically and Emotionally Safe: Not on file     Physically Hurt by Someone: Not on file     Humiliated or Emotionally Abused by Someone: Not on file     Physically Hurt by Someone: 2     Hurt or Threatened by Someone: 2   Housing Stability: Unknown (2024)    Nursing: Inadequate Housing Risk Classification     Has Housing: Not on file     Worried About Losing Housing: Not on file     Unable to Get Utilities: Not on file     Unable to Pay for Housing in the Last Year: 2     Has Housin       Current Medications  Current Outpatient Medications   Medication Sig Dispense Refill    acetaminophen (TYLENOL) 325 mg tablet Take 2 tablets (650 mg total) by mouth 3 (three) times a day 540 tablet 3    aspirin 81 mg chewable tablet Chew 1 tablet (81 mg total) daily 30 tablet 0    atorvastatin (LIPITOR) 10 mg tablet TAKE ONE TABLET BY MOUTH EVERY DAY 90 tablet 3    Cholecalciferol (VITAMIN D3) 1000 units CAPS Take 1 capsule by mouth daily       diphenoxylate-atropine (LOMOTIL) 2.5-0.025 mg per tablet Take 1 tablet by mouth if needed      DULoxetine (CYMBALTA) 60 mg delayed release capsule TAKE ONE CAPSULE BY MOUTH ONCE DAILY 30 capsule 5    furosemide (LASIX) 20 mg tablet One tablet daily and an extra 20 mg prn for wt gain 3 lb/ 24 hours or 5 lb/ 5 days, above a dry wt of 122 lb 90 tablet 3    hydroxychloroquine (PLAQUENIL) 200 mg tablet Take 1 tablet (200 mg  "total) by mouth daily with breakfast 90 tablet 1    Magnesium 250 MG TABS Take 250 mg by mouth every evening      Mirabegron ER (Myrbetriq) 50 MG TB24 Take 1 tablet (50 mg total) by mouth in the morning 90 tablet 1    nitrofurantoin (MACRODANTIN) 50 mg capsule Take 1 capsule (50 mg total) by mouth in the morning 90 capsule 3    omeprazole (PriLOSEC) 20 mg delayed release capsule Take 20 mg by mouth daily      pregabalin (LYRICA) 75 mg capsule Take 1 capsule (75 mg total) by mouth 2 (two) times a day 60 capsule 5    pyridoxine (B-6) 100 MG tablet Take 100 mg by mouth daily      Sodium Fluoride (PreviDent 5000 Booster Plus) 1.1 % PSTE Apply on teeth every evening as directed 100 mL 3     Current Facility-Administered Medications   Medication Dose Route Frequency Provider Last Rate Last Admin    denosumab (PROLIA) subcutaneous injection 60 mg  60 mg Subcutaneous Q6 Months    60 mg at 05/29/24 1401       Allergies  Allergies   Allergen Reactions    Morphine Other (See Comments)     urinary retention    Ciprofloxacin Nausea Only and Rash       OBJECTIVE    Vitals   Vitals:    12/12/24 1543   BP: 120/64   BP Location: Left arm   Patient Position: Sitting   Cuff Size: Standard   Weight: 54.9 kg (121 lb)   Height: 4' 11\" (1.499 m)       PVR:    Physical Exam  Constitutional:       General: She is not in acute distress.     Appearance: Normal appearance. She is normal weight. She is not ill-appearing or toxic-appearing.   HENT:      Head: Normocephalic and atraumatic.   Eyes:      Conjunctiva/sclera: Conjunctivae normal.   Cardiovascular:      Rate and Rhythm: Normal rate.   Pulmonary:      Effort: Pulmonary effort is normal. No respiratory distress.   Skin:     General: Skin is warm and dry.   Neurological:      General: No focal deficit present.      Mental Status: She is alert and oriented to person, place, and time.      Cranial Nerves: No cranial nerve deficit.   Psychiatric:         Mood and Affect: Mood normal.        "  Behavior: Behavior normal.         Thought Content: Thought content normal.          Labs:     Lab Results   Component Value Date    CREATININE 1.72 (H) 12/05/2024      Lab Results   Component Value Date    HGBA1C 6.7 (H) 11/15/2024     Lab Results   Component Value Date    GLUCOSE 205 (H) 10/09/2015    CALCIUM 10.1 12/05/2024     10/09/2015    K 4.0 12/05/2024    CO2 32 12/05/2024    CL 98 12/05/2024    BUN 28 (H) 12/05/2024    CREATININE 1.72 (H) 12/05/2024       I have personally reviewed all pertinent lab results and reviewed with patient    Imaging       Kendall Moore PA-C  Date: 12/12/2024 Time: 4:20 PM  [facility]    This note was written using fluency dictation software. Please excuse any resulting minor grammatical errors.

## 2024-12-18 ENCOUNTER — OFFICE VISIT (OUTPATIENT)
Dept: CARDIOLOGY CLINIC | Facility: CLINIC | Age: 89
End: 2024-12-18
Payer: MEDICARE

## 2024-12-18 VITALS
OXYGEN SATURATION: 96 % | DIASTOLIC BLOOD PRESSURE: 70 MMHG | WEIGHT: 119 LBS | SYSTOLIC BLOOD PRESSURE: 122 MMHG | HEIGHT: 59 IN | HEART RATE: 90 BPM | BODY MASS INDEX: 23.99 KG/M2

## 2024-12-18 DIAGNOSIS — I73.00 RAYNAUD'S PHENOMENON WITHOUT GANGRENE: ICD-10-CM

## 2024-12-18 DIAGNOSIS — I35.0 NONRHEUMATIC AORTIC VALVE STENOSIS: ICD-10-CM

## 2024-12-18 DIAGNOSIS — R26.2 AMBULATORY DYSFUNCTION: ICD-10-CM

## 2024-12-18 DIAGNOSIS — I50.32 CHRONIC DIASTOLIC (CONGESTIVE) HEART FAILURE (HCC): Primary | Chronic | ICD-10-CM

## 2024-12-18 DIAGNOSIS — I50.33 ACUTE ON CHRONIC DIASTOLIC CHF (CONGESTIVE HEART FAILURE) (HCC): ICD-10-CM

## 2024-12-18 PROCEDURE — 99214 OFFICE O/P EST MOD 30 MIN: CPT | Performed by: INTERNAL MEDICINE

## 2024-12-18 NOTE — PROGRESS NOTES
Cardiology Follow Up    Tanya Stephen  7/21/1934  [id number]  [facility]  [address] [facility] [address]  [address]  [facility] [address] [phone]    1. Chronic diastolic (congestive) heart failure (HCC)        2. Acute on chronic diastolic CHF (congestive heart failure) (HCC)  Ambulatory Referral to Cardiology      3. Nonrheumatic aortic valve stenosis        4. Ambulatory dysfunction        5. Raynaud's phenomenon without gangrene            Discussion/Summary: Main issue lately seems to be ambulatory dysfunction with frequent falls.  No evidence of any AV block or any cardiovascular cause for syncope.  Aortic stenosis is mild to moderate.  I suspect most of the issues driving this are related to dehydration.  She does not much in terms of oral intake.  Overall she is feeling well in the office today blood pressure well-controlled.  Lipids been stable.  Raynaud's has not been significantly active recently.  Continue to increase oral hydration and follow-up next year.    Interval History:  Very pleasant 89-year-old female with a history of diabetes, hypertension presented to the hospital last week with an episode of chest pain.  She describes it as a sharp sensation located in the left upper chest and left shoulder.  This was worse with some rotation and movement.  Denies any exertional or anginal component.  There has been no palpitations, lower extremity edema, PND, orthopnea.  She had an echocardiogram basic blood work done in the hospital.  Overall she has been doing well since then.    She continues to remain active within the house.  She does have ambulatory dysfunction and has had several falls with some mechanical in nature.  Denies any chest pain or discomfort.  Has been no lightheadedness dizziness or syncope.  She does use a walker.  There is been no change in weight.  No signs of decompensated heart failure.  No lower  extremity edema.  Unfortunately she does not eat or drink well    Medical Problems                 Problem List       Symptomatic anemia    Shortness of breath    Hypomagnesemia    DM2 (diabetes mellitus, type 2) ([facility])      Lab Results   Component Value Date    HGBA1C 6.7 (H) 11/15/2024         Urinary frequency    Iron deficiency anemia secondary to inadequate dietary iron intake    Sinobronchitis    Post zoster neuralgia    Primary generalized (osteo)arthritis    Seronegative arthropathy of multiple sites ([facility])    Senile osteoporosis    Lumbar spondylosis    Diabetic polyneuropathy associated with type 2 diabetes mellitus ([facility])      Lab Results   Component Value Date    HGBA1C 6.7 (H) 11/15/2024         Diabetic gastroparesis associated with type 2 diabetes mellitus ([facility])      Lab Results   Component Value Date    HGBA1C 6.7 (H) 11/15/2024         Irritable bowel syndrome with diarrhea    Stage 3b chronic kidney disease ([facility])    Lab Results   Component Value Date    EGFR 25 12/05/2024    EGFR 30 11/17/2024    EGFR 31 11/16/2024    CREATININE 1.72 (H) 12/05/2024    CREATININE 1.50 (H) 11/17/2024    CREATININE 1.46 (H) 11/16/2024         Traumatic injury of sacrum    Tendinitis of left rotator cuff    Abnormality of gait due to impairment of balance    Sicca syndrome ([facility])    Chest pain    Leukocytosis    Chronic diastolic heart failure ([facility])    Wt Readings from Last 3 Encounters:   12/18/24 54 kg (119 lb)   12/12/24 54.9 kg (121 lb)   12/09/24 55.2 kg (121 lb 12.8 oz)                 Overactive bladder    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[facility])                  Past Medical History:   Diagnosis Date    Anemia     Arthritis     Back pain     CHF (congestive heart failure) ([facility])     Chronic kidney disease     Stage 3b    Confusion 02/22/2023    Diabetes ([facility])     Diabetes mellitus ([facility])     Diabetic gastroparesis associated with type 2 diabetes mellitus  ([facility])     Diabetic polyneuropathy ([facility])      Diverticulosis     Herpes zoster     Hypertension     IBS (irritable bowel syndrome)     Neurogenic claudication due to lumbar spinal stenosis     Osteoarthritis     Osteoporosis     Pulmonary edema     Raynaud's phenomenon without gangrene     Seronegative arthropathy of multiple sites (HCC)     Sicca (HCC)      Social History     Socioeconomic History    Marital status: /Civil Union     Spouse name: Weston    Number of children: 2    Years of education: Not on file    Highest education level: High school graduate   Occupational History    Not on file   Tobacco Use    Smoking status: Former     Current packs/day: 0.00     Types: Cigarettes     Quit date:      Years since quittin.9     Passive exposure: Never    Smokeless tobacco: Never   Vaping Use    Vaping status: Never Used   Substance and Sexual Activity    Alcohol use: No    Drug use: No    Sexual activity: Not Currently     Partners: Male     Birth control/protection: None   Other Topics Concern    Not on file   Social History Narrative    Not on file     Social Drivers of Health     Financial Resource Strain: Not on file   Food Insecurity: No Food Insecurity (2024)    Nursing - Inadequate Food Risk Classification     Worried About Running Out of Food in the Last Year: Never true     Ran Out of Food in the Last Year: Never true     Ran Out of Food in the Last Year: 1   Transportation Needs: No Transportation Needs (2024)    Nursing - Transportation Risk Classification     Lack of Transportation: Not on file     Lack of Transportation: 2   Physical Activity: Not on file   Stress: Not on file   Social Connections: Not on file   Intimate Partner Violence: Unknown (2024)    Nursing IPS     Feels Physically and Emotionally Safe: Not on file     Physically Hurt by Someone: Not on file     Humiliated or Emotionally Abused by Someone: Not on file     Physically Hurt by Someone: 2     Hurt or Threatened by Someone: 2   Housing  Stability: Unknown (2024)    Nursing: Inadequate Housing Risk Classification     Has Housing: Not on file     Worried About Losing Housing: Not on file     Unable to Get Utilities: Not on file     Unable to Pay for Housing in the Last Year: 2     Has Housin      Family History   Problem Relation Age of Onset    Cancer Brother     Diabetes Mother     Hypertension Father     Heart disease Father      Past Surgical History:   Procedure Laterality Date    BACK SURGERY      COLONOSCOPY      EGD AND COLONOSCOPY N/A 2016    Procedure: EGD AND COLONOSCOPY;  Surgeon: Elina Anderson MD;  Location[facility];  Service:     JOINT REPLACEMENT      bilat knees and hips    OTHER SURGICAL HISTORY      pelvis rods    REPLACEMENT TOTAL KNEE BILATERAL      STOMACH SURGERY      UPPER GASTROINTESTINAL ENDOSCOPY         Current Outpatient Medications:     acetaminophen (TYLENOL) 325 mg tablet, Take 2 tablets (650 mg total) by mouth 3 (three) times a day, Disp: 540 tablet, Rfl: 3    aspirin 81 mg chewable tablet, Chew 1 tablet (81 mg total) daily, Disp: 30 tablet, Rfl: 0    atorvastatin (LIPITOR) 10 mg tablet, TAKE ONE TABLET BY MOUTH EVERY DAY, Disp: 90 tablet, Rfl: 3    Cholecalciferol (VITAMIN D3) 1000 units CAPS, Take 1 capsule by mouth daily , Disp: , Rfl:     diphenoxylate-atropine (LOMOTIL) 2.5-0.025 mg per tablet, Take 1 tablet by mouth if needed, Disp: , Rfl:     DULoxetine (CYMBALTA) 60 mg delayed release capsule, TAKE ONE CAPSULE BY MOUTH ONCE DAILY, Disp: 30 capsule, Rfl: 5    furosemide (LASIX) 20 mg tablet, One tablet daily and an extra 20 mg prn for wt gain 3 lb/ 24 hours or 5 lb/ 5 days, above a dry wt of 122 lb, Disp: 90 tablet, Rfl: 3    hydroxychloroquine (PLAQUENIL) 200 mg tablet, Take 1 tablet (200 mg total) by mouth daily with breakfast, Disp: 90 tablet, Rfl: 1    Magnesium 250 MG TABS, Take 250 mg by mouth every evening, Disp: , Rfl:     Mirabegron ER (Myrbetriq) 50 MG TB24, Take 1 tablet (50 mg  "total) by mouth in the morning, Disp: 90 tablet, Rfl: 1    omeprazole (PriLOSEC) 20 mg delayed release capsule, Take 20 mg by mouth daily, Disp: , Rfl:     pregabalin (LYRICA) 75 mg capsule, Take 1 capsule (75 mg total) by mouth 2 (two) times a day, Disp: 60 capsule, Rfl: 5    pyridoxine (B-6) 100 MG tablet, Take 100 mg by mouth daily, Disp: , Rfl:     Sodium Fluoride (PreviDent 5000 Booster Plus) 1.1 % PSTE, Apply on teeth every evening as directed, Disp: 100 mL, Rfl: 3    nitrofurantoin (MACRODANTIN) 50 mg capsule, Take 1 capsule (50 mg total) by mouth in the morning, Disp: 90 capsule, Rfl: 3    Current Facility-Administered Medications:     denosumab (PROLIA) subcutaneous injection 60 mg, 60 mg, Subcutaneous, Q6 Months, , 60 mg at 05/29/24 1401  Allergies   Allergen Reactions    Morphine Other (See Comments)     urinary retention    Ciprofloxacin Nausea Only and Rash       Labs:     Chemistry        Component Value Date/Time     10/09/2015 1041    K 4.0 12/05/2024 1100    K 5.1 10/09/2015 1041    CL 98 12/05/2024 1100     10/09/2015 1041    CO2 32 12/05/2024 1100    CO2 27.9 10/09/2015 1041    BUN 28 (H) 12/05/2024 1100    BUN 43 (H) 10/09/2015 1041    CREATININE 1.72 (H) 12/05/2024 1100    CREATININE 1.47 (H) 10/09/2015 1041        Component Value Date/Time    CALCIUM 10.1 12/05/2024 1100    CALCIUM 9.3 10/09/2015 1041    ALKPHOS 115 (H) 12/05/2024 1100    AST 42 (H) 12/05/2024 1100    ALT 32 12/05/2024 1100            No results found for: \"CHOL\"  Lab Results   Component Value Date    HDL 65 12/05/2024    HDL 69 11/07/2024    HDL 73 05/16/2024     Lab Results   Component Value Date    LDLCALC 33 12/05/2024    LDLCALC 34 11/07/2024    LDLCALC 35 05/16/2024     Lab Results   Component Value Date    TRIG 58 12/05/2024    TRIG 42 11/07/2024    TRIG 51 05/16/2024     No results found for: \"CHOLHDL\"    Imaging: XR chest 1 view portable    Result Date: 4/15/2022  Narrative: CHEST INDICATION:   cp. " "COMPARISON:  12/21/2021 EXAM PERFORMED/VIEWS:  XR CHEST PORTABLE FINDINGS: Cardiomediastinal silhouette appears unremarkable. New infiltrate and small effusion are present as compared to the study of 12/21/2021.  Mild pulmonary vascular congestion is also identified with hypoventilation. No pneumothorax or pleural effusion. Fusion hardware is noted in the thoracolumbar spine.  Degenerative changes of the shoulders are noted left greater than right.     Impression: New mild pulmonary vascular congestion with left lower lobe atelectasis versus infiltrate and small effusion. The study was marked in EPIC for significant notification. Workstation performed: [id number]     Echo complete w/ contrast if indicated    Result Date: 4/15/2022  Narrative:   Left Ventricle: Left ventricular cavity size is normal. Wall thickness is normal. The left ventricular ejection fraction is 50%. Systolic function is mildly reduced. There is mild global hypokinesis. There is no concentric hypertrophy.   Aortic Valve: There is mild to moderate regurgitation. There is no evidence of stenosis.   Mitral Valve: There is mild to moderate regurgitation.   Tricuspid Valve: There is mild regurgitation.   Pericardium: There is a small to moderate left pleural effusion.       ECG:        ROS    Vitals:    12/18/24 1319   BP: 122/70   Pulse: 90   SpO2: 96%     Vitals:    12/18/24 1319   Weight: 54 kg (119 lb)     Height: 4' 11\" (149.9 cm)   Body mass index is 24.04 kg/m².    Physical Exam:  Vital signs reviewed  General:  Alert and cooperative, appears stated age, no acute distress  HEENT:  PERRLA, EOMI, no scleral icterus, no conjunctival pallor  Neck:  No lymphadenopathy, no thyromegaly, no carotid bruits, no elevated JVP  Heart:  Regular rate and rhythm, normal S1/S2, no S3/S4, no murmur, rubs or gallops.  PMI nondisplaced  Lungs:  Clear to auscultation bilaterally, no wheezes rales or rhonchi  Abdomen:  Soft, non-tender, positive bowel sounds, no " rebound or guarding,   no organomegaly   Extremities:  Normal range of motion.  No clubbing, cyanosis or edema   Vascular:  2+ pedal pulses  Skin:  No rashes or lesions on exposed skin  Neurologic:  Cranial nerves II-XII grossly intact without focal deficits  Psych:  Normal mood and affect

## 2024-12-21 ENCOUNTER — APPOINTMENT (OUTPATIENT)
Dept: LAB | Facility: CLINIC | Age: 89
End: 2024-12-21
Payer: MEDICARE

## 2024-12-21 DIAGNOSIS — R74.8 ELEVATED ALKALINE PHOSPHATASE LEVEL: ICD-10-CM

## 2024-12-21 DIAGNOSIS — N18.4 CHRONIC KIDNEY DISEASE, STAGE IV (SEVERE) (HCC): ICD-10-CM

## 2024-12-21 LAB
ALBUMIN SERPL BCG-MCNC: 3.6 G/DL (ref 3.5–5)
ALP SERPL-CCNC: 124 U/L (ref 34–104)
ALT SERPL W P-5'-P-CCNC: 31 U/L (ref 7–52)
ANION GAP SERPL CALCULATED.3IONS-SCNC: 7 MMOL/L (ref 4–13)
AST SERPL W P-5'-P-CCNC: 41 U/L (ref 13–39)
BILIRUB SERPL-MCNC: 0.66 MG/DL (ref 0.2–1)
BUN SERPL-MCNC: 34 MG/DL (ref 5–25)
CALCIUM SERPL-MCNC: 9.7 MG/DL (ref 8.4–10.2)
CHLORIDE SERPL-SCNC: 101 MMOL/L (ref 96–108)
CO2 SERPL-SCNC: 32 MMOL/L (ref 21–32)
CREAT SERPL-MCNC: 1.65 MG/DL (ref 0.6–1.3)
GFR SERPL CREATININE-BSD FRML MDRD: 27 ML/MIN/1.73SQ M
GGT SERPL-CCNC: 27 U/L (ref 9–64)
GLUCOSE SERPL-MCNC: 145 MG/DL (ref 65–140)
POTASSIUM SERPL-SCNC: 3.6 MMOL/L (ref 3.5–5.3)
PROT SERPL-MCNC: 7 G/DL (ref 6.4–8.4)
SODIUM SERPL-SCNC: 140 MMOL/L (ref 135–147)

## 2024-12-21 PROCEDURE — 36415 COLL VENOUS BLD VENIPUNCTURE: CPT

## 2024-12-21 PROCEDURE — 82977 ASSAY OF GGT: CPT

## 2024-12-21 PROCEDURE — 80053 COMPREHEN METABOLIC PANEL: CPT

## 2024-12-23 ENCOUNTER — RESULTS FOLLOW-UP (OUTPATIENT)
Dept: OTHER | Facility: HOSPITAL | Age: 89
End: 2024-12-23

## 2024-12-23 ENCOUNTER — CLINICAL SUPPORT (OUTPATIENT)
Age: 89
End: 2024-12-23
Payer: MEDICARE

## 2024-12-23 ENCOUNTER — RESULTS FOLLOW-UP (OUTPATIENT)
Age: 89
End: 2024-12-23

## 2024-12-23 VITALS — HEART RATE: 82 BPM | HEIGHT: 59 IN | OXYGEN SATURATION: 99 % | WEIGHT: 121.6 LBS | BODY MASS INDEX: 24.52 KG/M2

## 2024-12-23 DIAGNOSIS — M81.0 SENILE OSTEOPOROSIS: Primary | ICD-10-CM

## 2024-12-23 DIAGNOSIS — N18.32 STAGE 3B CHRONIC KIDNEY DISEASE (HCC): Primary | ICD-10-CM

## 2024-12-23 PROCEDURE — 96372 THER/PROPH/DIAG INJ SC/IM: CPT

## 2024-12-23 NOTE — TELEPHONE ENCOUNTER
Spoke with patient's , Weston, about the following, he expressed understanding and thanked us for the call:    ----- Message from Fabián Godoy MD sent at 12/23/2024  8:07 AM EST -----  Labs are stable liver tests just slightly elevated recommend CMP alkaline phosphatase isoenzymes in 1 month  ----- Message -----  From: Lab, Background User  Sent: 12/21/2024  11:35 AM EST  To: Fabián Godoy MD

## 2024-12-23 NOTE — PROGRESS NOTES
"Assessment/Plan:    Tanya Stephen came into the [facility] Rheumatology Office today 12/23/24 to receive Prolia injection.      Verbal consent obtained.  Consent given by: patient    patient states patient has been medically healthy with no underlining concerns/complications.      Tanya Stephen presents with no symptoms today.       All insturctions were reviewed with the patient.    If the patient should have any questions/concerns, advised patient to contacted [facility] Rhe[facility]ology Office.       Subjective:     History provided by: patient    Patient ID: Tanya Stephen is a 89 y.o. female      Objective:    Vitals:    12/23/24 1349   Pulse: 82   SpO2: 99%   Weight: 55.2 kg (121 lb 9.6 oz)   Height: 4' 11\" (1.499 m)       Patient tolerated the injection well without any complications.  Injection site/s Upper left arm.  Medication was provided by Office.    Patient signed consent form yes   Patient signed ABN form yes (If no patient is not a medicare patient).   Patient waited 15 minutes after injection no (This only applies to patient's receiving first time injection).       Last Visit: 12/9/2024  Next visit:Visit date not found      "

## 2024-12-28 ENCOUNTER — PATIENT MESSAGE (OUTPATIENT)
Dept: UROLOGY | Facility: AMBULATORY SURGERY CENTER | Age: 89
End: 2024-12-28

## 2024-12-28 DIAGNOSIS — N39.0 URINARY TRACT INFECTION WITHOUT HEMATURIA, SITE UNSPECIFIED: Primary | ICD-10-CM

## 2024-12-30 NOTE — PATIENT COMMUNICATION
Advised patient to avoid bladder irritating foods and beverages.  Maintain adequate hydration of water intake, if not on fluid restrictions.    Ordered urine testing-urine micro and culture.    Reviewed ED precautions

## 2025-01-06 NOTE — ASSESSMENT & PLAN NOTE
Documented esophageal dysmotility on barium swallow.  She continues to be careful with chewing and cutting food into small pieces so she does not have symptoms of dysphagia.  Encourage small more frequent meals.  Continue omeprazole.  Follow-up GI.  Continue to monitor.

## 2025-01-06 NOTE — ASSESSMENT & PLAN NOTE
Lab Results   Component Value Date    HGBA1C 6.7 (H) 11/15/2024   Continue diabetic management as ordered by primary care.  Encourage diet control.  Hemoglobin A1c in target range.  Continue to monitor hemoglobin A1c.  Continue Cymbalta and Lyrica for neuropathic symptoms and chronic pain.  Encourage use of walker for fall prevention in view of neuropathic symptoms feet from diabetes and spinal stenosis with neurogenic symptoms. Continue to monitor.

## 2025-01-06 NOTE — ASSESSMENT & PLAN NOTE
Severe spinal stenosis with history multilevel extensive laminectomies with fusion, with chronic severe right-sided low back pain.  She has had follow-up with pain management and is status post trans steroid injections in May and August of this year with only short-term benefit foraminal epidural approximately 1 week duration.  She continues with Cymbalta, and Lyrica for pain management with overall benefit.  Encourage home TENS unit for symptomatic relief.  Continue with gentle chiropractic manipulation as tolerated.  Encouraged her to use walker for ambulation assistance to prevent falls in view of her increased risk for fracture.  She completed home PT for balance and gait disturbance following recent hospitalization in November for gait disturbance and fall.  Monitor labs for medication toxicity.  Follow-up 6 months or sooner if needed.  Continue to monitor.

## 2025-01-06 NOTE — ASSESSMENT & PLAN NOTE
Seronegative arthropathy stable on low-dose Plaquenil 200 mg daily.  No sign of active inflammation or synovitis on exam at this visit.  Continue follow-up with eye doctor to monitor for Plaquenil toxicity every 6 months.  Monitor disease activity and medication toxicity with lab work as ordered.  Recent Plaquenil level dated 12/5/2024 borderline elevated at 1265.9.  I told the patient to decrease Plaquenil dosing to 6 days weekly with 200 mg on most days and to hold Plaquenil on Sundays.  Will plan to recheck Plaquenil Avise to monitor Plaquenil level and make adjustments in dosing after the next office visit in 6 months if needed.  Follow-up 6 months or sooner if needed.

## 2025-01-06 NOTE — ASSESSMENT & PLAN NOTE
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  Diabetic neuropathy contributes to balance difficulties as well.  Patient has had only short-term success with recent transforaminal epidural steroid injections right S1 joint most recently in August of this year, and had recent hospitalization for gait disturbance and fall last month.  She did have home PT which she completed recently and she has been encouraged to continue with home exercise program with strengthening exercises as instructed by the physical therapist.  Encouraged use of her walker for ambulation assistance and fall prevention.  Patient does have generalized osteoarthritis multiple joints as well as cervical spondylosis with no current radicular symptoms at this time.  Monitor labs for medication toxicity.  Follow-up 6 months or sooner if needed.

## 2025-01-06 NOTE — ASSESSMENT & PLAN NOTE
Lab Results   Component Value Date    EGFR 27 12/21/2024    EGFR 25 12/05/2024    EGFR 30 11/17/2024    CREATININE 1.65 (H) 12/21/2024    CREATININE 1.72 (H) 12/05/2024    CREATININE 1.50 (H) 11/17/2024   Chronic kidney disease fluctuating between 3b and 4, likely dependent on hydration.  We did hold her Prolia injection at this visit and instructed the patient to increase hydration and repeat kidney function in the next week or so, and if improved, will give her updated Prolia injection.  Avoid nephrotoxic agents like nonsteroidals.  Follow-up nephrology.  Continue to monitor.

## 2025-01-06 NOTE — ASSESSMENT & PLAN NOTE
Patient with osteoporosis who continues on Prolia every 6 months.  Her last Prolia injection was 5/29/2024.  She is due for her next Prolia at this office visit, however, in view of low GFR estimated at 25, likely secondary to decrease in hydration, will have patient try and increase fluid intake, and repeat BMP in the next week or 2 with plans for Prolia after lab work reviewed, if estimated GFR is a bit higher.  Will continue to check serum calcium and kidney function 2 weeks prior and 2 weeks after each Prolia injection in view of her low GFR.  Most recent DEXA was March 2018 which included only her forearm in view of extensive back surgery and bilateral total hip arthroplasties.  Additional DEXA studies at this time would not add benefit since we can only evaluate her forearm.  She will continue with calcium and vitamin D supplement.  Practice fall prevention particularly with using her walker for ambulation assistance.  Encourage increasing her hydration in view of chronic kidney disease and fluctuating GFR's based on her water intake.  Follow-up 6 months after her Prolia injection for office visit and next Prolia injection. Continue to monitor.

## 2025-01-07 ENCOUNTER — TELEPHONE (OUTPATIENT)
Age: OVER 89
End: 2025-01-07

## 2025-01-07 NOTE — TELEPHONE ENCOUNTER
Patient's 3/18/25 6 month follow up appointment was cancelled via [employer].    Please contact this office to reschedule.

## 2025-01-08 ENCOUNTER — TELEPHONE (OUTPATIENT)
Dept: UROLOGY | Facility: HOSPITAL | Age: OVER 89
End: 2025-01-08

## 2025-01-09 ENCOUNTER — OFFICE VISIT (OUTPATIENT)
Dept: OBGYN CLINIC | Facility: CLINIC | Age: OVER 89
End: 2025-01-09
Payer: MEDICARE

## 2025-01-09 VITALS
DIASTOLIC BLOOD PRESSURE: 70 MMHG | BODY MASS INDEX: 24.88 KG/M2 | WEIGHT: 123.4 LBS | HEIGHT: 59 IN | SYSTOLIC BLOOD PRESSURE: 134 MMHG

## 2025-01-09 DIAGNOSIS — N90.7 SEBACEOUS CYST OF LABIA: Primary | ICD-10-CM

## 2025-01-09 PROCEDURE — 99213 OFFICE O/P EST LOW 20 MIN: CPT | Performed by: OBSTETRICS & GYNECOLOGY

## 2025-01-09 NOTE — PROGRESS NOTES
Name: Tanya Stephen      : 1934      MRN: [id number]  Encounter Provider: Karolina Hinton DO  Encounter Date: 2025   Encounter department: [facility]  :  Assessment & Plan  Sebaceous cyst of labia         Patient reassured.  Discussed over-the-counter topical agents.  Reviewed all precautions.  All questions answered.  Return to office as needed.    History of Present Illness   HPI  Tanya Stephen is a 89 y.o. female who presents     This is a pleasant 89-year-old female P3 ( x 3 accompanied with her daughter today presents complaining of small right labial lump that had some slight spotting over the last week.  She does wear incontinence pads on a daily basis.  She denies any itching or irritation.    She continues to have problems with chronic low back pain and has been unstable with multiple falls.  She recently had another steroid injection, history of bilateral hip replacements, bilateral knee replacements, spinal fusion.      Patient went through menopause at age 50.  She was on hormones for approximately 5 years thereafter.  Patient has been  for over 68 years and lives independently with her .        History obtained from: patient    Review of Systems   Constitutional:  Negative for fatigue, fever and unexpected weight change.   Respiratory:  Negative for cough, chest tightness, shortness of breath and wheezing.    Cardiovascular: Negative.  Negative for chest pain and palpitations.   Gastrointestinal: Negative.  Negative for abdominal distention, abdominal pain, blood in stool, constipation, diarrhea, nausea and vomiting.   Genitourinary: Negative.  Negative for difficulty urinating, dyspareunia, dysuria, flank pain, frequency, genital sores, hematuria, pelvic pain, urgency, vaginal bleeding, vaginal discharge and vaginal pain.   Skin:  Negative for rash.     Current Outpatient Medications on File Prior to Visit   Medication Sig Dispense Refill     "acetaminophen (TYLENOL) 325 mg tablet Take 2 tablets (650 mg total) by mouth 3 (three) times a day 540 tablet 3    aspirin 81 mg chewable tablet Chew 1 tablet (81 mg total) daily 30 tablet 0    atorvastatin (LIPITOR) 10 mg tablet TAKE ONE TABLET BY MOUTH EVERY DAY 90 tablet 3    diphenoxylate-atropine (LOMOTIL) 2.5-0.025 mg per tablet Take 1 tablet by mouth if needed      DULoxetine (CYMBALTA) 60 mg delayed release capsule TAKE ONE CAPSULE BY MOUTH ONCE DAILY 30 capsule 5    hydroxychloroquine (PLAQUENIL) 200 mg tablet Take 1 tablet (200 mg total) by mouth daily with breakfast 90 tablet 1    Magnesium 250 MG TABS Take 250 mg by mouth every evening      Mirabegron ER (Myrbetriq) 50 MG TB24 Take 1 tablet (50 mg total) by mouth in the morning 90 tablet 1    omeprazole (PriLOSEC) 20 mg delayed release capsule Take 20 mg by mouth daily      pregabalin (LYRICA) 75 mg capsule Take 1 capsule (75 mg total) by mouth 2 (two) times a day 60 capsule 5    pyridoxine (B-6) 100 MG tablet Take 100 mg by mouth daily      Cholecalciferol (VITAMIN D3) 1000 units CAPS Take 1 capsule by mouth daily  (Patient not taking: Reported on 1/9/2025)      furosemide (LASIX) 20 mg tablet One tablet daily and an extra 20 mg prn for wt gain 3 lb/ 24 hours or 5 lb/ 5 days, above a dry wt of 122 lb (Patient not taking: Reported on 1/9/2025) 90 tablet 3    nitrofurantoin (MACRODANTIN) 50 mg capsule Take 1 capsule (50 mg total) by mouth in the morning 90 capsule 3    Sodium Fluoride (PreviDent 5000 Booster Plus) 1.1 % PSTE Apply on teeth every evening as directed (Patient not taking: Reported on 1/9/2025) 100 mL 3     No current facility-administered medications on file prior to visit.         Objective   /70   Ht 4' 11\" (1.499 m)   Wt 56 kg (123 lb 6.4 oz)   LMP  (LMP Unknown)   BMI 24.92 kg/m²      Physical Exam  Constitutional:       Appearance: Normal appearance.   Pulmonary:      Effort: Pulmonary effort is normal.   Abdominal:      " General: Bowel sounds are normal.      Palpations: Abdomen is soft.   Genitourinary:     Labia:         Right: No rash, tenderness or lesion.         Left: No rash, tenderness or lesion.       Vagina: No signs of injury. No vaginal discharge or tenderness.      Cervix: No cervical motion tenderness, discharge, friability, lesion or erythema.      Uterus: Not enlarged and not tender.       Adnexa:         Right: No mass or tenderness.          Left: No mass or tenderness.     Neurological:      Mental Status: She is alert.       Small (5mm) calcified sebaceous cyst 7 o'clock hymen ring, no signs of infection      Administrative Statements   I have spent a total time of 20 minutes in caring for this patient on the day of the visit/encounter including Prognosis, Risks and benefits of tx options, Instructions for management, Impressions, Counseling / Coordination of care, Documenting in the medical record, Reviewing / ordering tests, medicine, procedures  , and Obtaining or reviewing history  .

## 2025-01-17 ENCOUNTER — APPOINTMENT (OUTPATIENT)
Dept: LAB | Facility: CLINIC | Age: OVER 89
End: 2025-01-17
Payer: MEDICARE

## 2025-01-17 DIAGNOSIS — N39.0 URINARY TRACT INFECTION WITHOUT HEMATURIA, SITE UNSPECIFIED: ICD-10-CM

## 2025-01-17 DIAGNOSIS — N18.32 STAGE 3B CHRONIC KIDNEY DISEASE (HCC): ICD-10-CM

## 2025-01-17 LAB
ALBUMIN SERPL BCG-MCNC: 4 G/DL (ref 3.5–5)
ALP SERPL-CCNC: 93 U/L (ref 34–104)
ALT SERPL W P-5'-P-CCNC: 39 U/L (ref 7–52)
ANION GAP SERPL CALCULATED.3IONS-SCNC: 7 MMOL/L (ref 4–13)
AST SERPL W P-5'-P-CCNC: 37 U/L (ref 13–39)
BACTERIA UR QL AUTO: ABNORMAL /HPF
BILIRUB SERPL-MCNC: 0.7 MG/DL (ref 0.2–1)
BILIRUB UR QL STRIP: NEGATIVE
BUN SERPL-MCNC: 34 MG/DL (ref 5–25)
CALCIUM SERPL-MCNC: 10.1 MG/DL (ref 8.4–10.2)
CHLORIDE SERPL-SCNC: 105 MMOL/L (ref 96–108)
CLARITY UR: CLEAR
CO2 SERPL-SCNC: 28 MMOL/L (ref 21–32)
COLOR UR: YELLOW
CREAT SERPL-MCNC: 1.54 MG/DL (ref 0.6–1.3)
GFR SERPL CREATININE-BSD FRML MDRD: 29 ML/MIN/1.73SQ M
GLUCOSE P FAST SERPL-MCNC: 120 MG/DL (ref 65–99)
GLUCOSE UR STRIP-MCNC: NEGATIVE MG/DL
HGB UR QL STRIP.AUTO: NEGATIVE
KETONES UR STRIP-MCNC: NEGATIVE MG/DL
LEUKOCYTE ESTERASE UR QL STRIP: ABNORMAL
NITRITE UR QL STRIP: NEGATIVE
NON-SQ EPI CELLS URNS QL MICRO: ABNORMAL /HPF
PH UR STRIP.AUTO: 5.5 [PH]
POTASSIUM SERPL-SCNC: 4.5 MMOL/L (ref 3.5–5.3)
PROT SERPL-MCNC: 7.5 G/DL (ref 6.4–8.4)
PROT UR STRIP-MCNC: ABNORMAL MG/DL
RBC #/AREA URNS AUTO: ABNORMAL /HPF
SODIUM SERPL-SCNC: 140 MMOL/L (ref 135–147)
SP GR UR STRIP.AUTO: 1.02 (ref 1–1.03)
UROBILINOGEN UR STRIP-ACNC: <2 MG/DL
WBC #/AREA URNS AUTO: ABNORMAL /HPF

## 2025-01-17 PROCEDURE — 36415 COLL VENOUS BLD VENIPUNCTURE: CPT

## 2025-01-17 PROCEDURE — 80053 COMPREHEN METABOLIC PANEL: CPT

## 2025-01-17 PROCEDURE — 84080 ASSAY ALKALINE PHOSPHATASES: CPT

## 2025-01-17 PROCEDURE — 87077 CULTURE AEROBIC IDENTIFY: CPT

## 2025-01-17 PROCEDURE — 84075 ASSAY ALKALINE PHOSPHATASE: CPT

## 2025-01-17 PROCEDURE — 87147 CULTURE TYPE IMMUNOLOGIC: CPT

## 2025-01-17 PROCEDURE — 81001 URINALYSIS AUTO W/SCOPE: CPT

## 2025-01-17 PROCEDURE — 87186 SC STD MICRODIL/AGAR DIL: CPT

## 2025-01-17 PROCEDURE — 87086 URINE CULTURE/COLONY COUNT: CPT

## 2025-01-19 LAB
BACTERIA UR CULT: ABNORMAL
BACTERIA UR CULT: ABNORMAL

## 2025-01-20 ENCOUNTER — RESULTS FOLLOW-UP (OUTPATIENT)
Dept: UROLOGY | Facility: CLINIC | Age: OVER 89
End: 2025-01-20

## 2025-01-20 DIAGNOSIS — N39.0 URINARY TRACT INFECTION WITHOUT HEMATURIA, SITE UNSPECIFIED: Primary | ICD-10-CM

## 2025-01-20 LAB
BACTERIA UR CULT: ABNORMAL
BACTERIA UR CULT: ABNORMAL

## 2025-01-20 RX ORDER — AMPICILLIN 500 MG/1
500 CAPSULE ORAL 2 TIMES DAILY
Qty: 14 CAPSULE | Refills: 0 | Status: SHIPPED | OUTPATIENT
Start: 2025-01-20 | End: 2025-01-27

## 2025-01-20 NOTE — TELEPHONE ENCOUNTER
Patient symptomatic.  Growing both Enterococcus and 10,000 CFU of Staphylococcus.  Unfortunately the only antibiotic option that covers both microbial species is Macrobid.  Patient was previously on Macrobid but given that her creatinine clearance is measured at 21 mL/min I do not think that this is necessarily the safest option for patient.  Because most of the bacteria is enteric coccus and Staphylococcus could simply represent contamination within the sample we will plan to treat with ampicillin 500 mg every 12 hours   Private car

## 2025-01-27 ENCOUNTER — OFFICE VISIT (OUTPATIENT)
Dept: GASTROENTEROLOGY | Facility: CLINIC | Age: OVER 89
End: 2025-01-27
Payer: MEDICARE

## 2025-01-27 VITALS
BODY MASS INDEX: 23.99 KG/M2 | HEIGHT: 59 IN | HEART RATE: 88 BPM | SYSTOLIC BLOOD PRESSURE: 108 MMHG | WEIGHT: 119 LBS | DIASTOLIC BLOOD PRESSURE: 58 MMHG

## 2025-01-27 DIAGNOSIS — K63.8219 SMALL INTESTINAL BACTERIAL OVERGROWTH (SIBO): ICD-10-CM

## 2025-01-27 DIAGNOSIS — R13.19 ESOPHAGEAL DYSPHAGIA: Chronic | ICD-10-CM

## 2025-01-27 DIAGNOSIS — K21.9 GASTROESOPHAGEAL REFLUX DISEASE WITHOUT ESOPHAGITIS: Primary | Chronic | ICD-10-CM

## 2025-01-27 DIAGNOSIS — K58.0 IRRITABLE BOWEL SYNDROME WITH DIARRHEA: Chronic | ICD-10-CM

## 2025-01-27 DIAGNOSIS — D69.6 THROMBOCYTOPENIA (HCC): ICD-10-CM

## 2025-01-27 PROCEDURE — 99214 OFFICE O/P EST MOD 30 MIN: CPT | Performed by: INTERNAL MEDICINE

## 2025-01-27 RX ORDER — DIPHENOXYLATE HYDROCHLORIDE AND ATROPINE SULFATE 2.5; .025 MG/1; MG/1
TABLET ORAL
Qty: 30 TABLET | Refills: 0 | OUTPATIENT
Start: 2025-01-27

## 2025-01-27 NOTE — TELEPHONE ENCOUNTER
04/19/2024 04/17/2024 Diphenoxylate Hcl-atropine Sulfate (Tablet) 100.0 12 0.025 MG-2.5 MG NA AYO JORGE       Last ordered by another provider. Please review.

## 2025-01-27 NOTE — PROGRESS NOTES
Name: Tanya Stephen      : 1934      MRN: [id number]  Encounter Provider: Johana Barrientos DO  Encounter Date: 2025   Encounter department: [facility] [place]  :  Assessment & Plan  Small intestinal bacterial overgrowth (SIBO)  Treated by my colleague  Orders:  •  Ambulatory Referral to Senior Care; Future    Irritable bowel syndrome with diarrhea  Improved        Gastroesophageal reflux disease without esophagitis  Continue lifestyle modifications; pt and hsuband already doing a lot of these        Esophageal dysphagia  Main complaint today is fear of choking at bedtime, gets anxious and feels like she can't control her own saliva  Discussed barium showing severe esophageal dysmotility likely due to advanced age and presbyesophagus  We will do speech eval with video swallow study  Will refer to geriatrician-  states she is taking amitriptyline but I don't have it on her list- perhaps if she is can increase this dose to 25 mg; he is going to confirm  Gave referral for geriatrician Geetha Green but she is no longer doing this so gave other names that her office gave; pt's  inquiring about this   Discussed peg tube but we all agree not a good option given her advanced age   Continue diet as tolerated for now    Close follow up  Orders:  •  FL barium swallow video w speech; Future  •  Ambulatory Referral to Speech Therapy; Future  •  Ambulatory Referral to Senior Care; Future  •  FL barium swallow ROUTINE esophagus; Future    Thrombocytopenia (HCC)             History of Present Illness   HPI  Tanya Stephen is a 89 y.o. female who presents for follow up.  Is new to me.  Has seen my colleagues a few months ago.  Had SIBO and treated.  Main complaint today is severe dysphagia but wieght is pretty stable.  Gets scared at night due to can't control saliva.    History obtained from: patient and patient's Significant Other    Review of  "Systems       Objective   /58   Pulse 88   Ht 4' 11\" (1.499 m)   Wt 54 kg (119 lb)   LMP  (LMP Unknown)   BMI 24.04 kg/m²      Physical Exam  Constitutional:       Appearance: Normal appearance.   Cardiovascular:      Rate and Rhythm: Normal rate and regular rhythm.   Pulmonary:      Effort: Pulmonary effort is normal.      Breath sounds: Normal breath sounds.   Abdominal:      General: Abdomen is flat. Bowel sounds are normal.      Palpations: Abdomen is soft.   Neurological:      Mental Status: She is alert.           "

## 2025-01-27 NOTE — ASSESSMENT & PLAN NOTE
Main complaint today is fear of choking at bedtime, gets anxious and feels like she can't control her own saliva  Discussed barium showing severe esophageal dysmotility likely due to advanced age and presbyesophagus  We will do speech eval with video swallow study  Will refer to geriatrician-  states she is taking amitriptyline but I don't have it on her list- perhaps if she is can increase this dose to 25 mg; he is going to confirm  Gave referral for geriatrician Geetha Green but she is no longer doing this so gave other names that her office gave; pt's  inquiring about this   Discussed peg tube but we all agree not a good option given her advanced age   Continue diet as tolerated for now    Close follow up  Orders:  •  FL barium swallow video w speech; Future  •  Ambulatory Referral to Speech Therapy; Future  •  Ambulatory Referral to Senior Care; Future  •  FL barium swallow ROUTINE esophagus; Future

## 2025-01-28 LAB
ALP BONE CFR SERPL: 23 % (ref 14–68)
ALP INTEST CFR SERPL: 18 % (ref 0–18)
ALP LIVER CFR SERPL: 59 % (ref 18–85)
ALP SERPL-CCNC: 110 IU/L (ref 44–121)

## 2025-01-28 NOTE — TELEPHONE ENCOUNTER
----- Message from Fabián Godoy MD sent at 1/28/2025  7:58 AM EST -----  Labs look good  ----- Message -----  From: Lab, Background User  Sent: 1/17/2025  11:36 AM EST  To: Fabián Godoy MD

## 2025-01-29 DIAGNOSIS — N18.32 TYPE 2 DIABETES MELLITUS WITH STAGE 3B CHRONIC KIDNEY DISEASE, WITHOUT LONG-TERM CURRENT USE OF INSULIN (HCC): Primary | Chronic | ICD-10-CM

## 2025-01-29 DIAGNOSIS — K58.0 IRRITABLE BOWEL SYNDROME WITH DIARRHEA: Chronic | ICD-10-CM

## 2025-01-29 DIAGNOSIS — E11.22 TYPE 2 DIABETES MELLITUS WITH STAGE 3B CHRONIC KIDNEY DISEASE, WITHOUT LONG-TERM CURRENT USE OF INSULIN (HCC): Primary | Chronic | ICD-10-CM

## 2025-01-29 DIAGNOSIS — N18.32 STAGE 3B CHRONIC KIDNEY DISEASE (HCC): ICD-10-CM

## 2025-01-30 ENCOUNTER — TELEPHONE (OUTPATIENT)
Dept: GASTROENTEROLOGY | Facility: CLINIC | Age: OVER 89
End: 2025-01-30

## 2025-01-30 NOTE — TELEPHONE ENCOUNTER
LMOM advising of the below.     ===View-only below this line===  ----- Message -----  From: Johana Barrientos DO  Sent: 1/29/2025   3:34 PM EST  To: *  Subject: FW: Referral Appropriateness                     Please call pt and let them know dr julian stone does not do primary care geriatrics anymore.  See the three names below as these would be a good  place for them to start if they would like to see a primary care geriatrician.    Tiffany  ----- Message -----  From: Shannan Olivares  Sent: 1/29/2025  11:20 AM EST  To: Johana Barrientos DO  Subject: FW: Referral Appropriateness                     Dr. Barrientos,    You could one of the following [facility]iatric Medicine PCPs:    Onesimo Andres MD;  Yu Mcallister MD; or  Key Burks MD  ----- Message -----  From: Johana Barrientos DO  Sent: 1/29/2025  10:59 AM EST  To: Shannan Olivares  Subject: RE: Referral Appropriateness                     Do you know where I can refer for geriatric primary care?  ----- Message -----  From: Shannan Olivares  Sent: 1/29/2025   9:10 AM EST  To: Johana Barrientos DO  Subject: Referral Appropriateness                         Dr. Barrientos,    Thank you for the referral of this patient.  Unfortunately our practice no longer provider geriatric primary care. We are now a cognitive speciality practice, and would gladly provider care to this patient for cognitive issues.    Please advise.

## 2025-02-04 ENCOUNTER — HOSPITAL ENCOUNTER (OUTPATIENT)
Dept: RADIOLOGY | Facility: HOSPITAL | Age: OVER 89
Discharge: HOME | End: 2025-02-04
Attending: INTERNAL MEDICINE
Payer: MEDICARE

## 2025-02-04 ENCOUNTER — RESULTS FOLLOW-UP (OUTPATIENT)
Dept: GASTROENTEROLOGY | Facility: CLINIC | Age: OVER 89
End: 2025-02-04

## 2025-02-04 DIAGNOSIS — R13.19 ESOPHAGEAL DYSPHAGIA: ICD-10-CM

## 2025-02-04 DIAGNOSIS — R13.19 ESOPHAGEAL DYSPHAGIA: Chronic | ICD-10-CM

## 2025-02-04 PROCEDURE — 74230 X-RAY XM SWLNG FUNCJ C+: CPT

## 2025-02-04 PROCEDURE — 92611 MOTION FLUOROSCOPY/SWALLOW: CPT

## 2025-02-04 PROCEDURE — 74220 X-RAY XM ESOPHAGUS 1CNTRST: CPT

## 2025-02-04 NOTE — RESULT ENCOUNTER NOTE
Called and spoke to pt's .  Reviewed speech pathologist's recommendation.  Also discussed esophagram showing severe dysmotility likely due to presbyesophagus.  Per , she is tolerating food and ate out for breakfast today and had no issues.  He was appreciate of my call.

## 2025-02-04 NOTE — PROCEDURES
Video Swallow Study      Patient Name: Tanya Stephen  Today's Date: 2/4/2025        Past Medical History  Past Medical History:   Diagnosis Date    Anemia     Arthritis     Back pain     CHF (congestive heart failure) (HCC)     Chronic kidney disease     Stage 3b    Confusion 02/22/2023    Diabetes (HCC)     Diabetes mellitus (HCC)     Diabetic gastroparesis associated with type 2 diabetes mellitus  (HCC)     Diabetic polyneuropathy (HCC)     Diverticulosis     Herpes zoster     Hypertension     IBS (irritable bowel syndrome)     Neurogenic claudication due to lumbar spinal stenosis     Osteoarthritis     Osteoporosis     Pulmonary edema     Raynaud's phenomenon without gangrene     Seronegative arthropathy of multiple sites (HCC)     Sicca (HCC)         Past Surgical History  Past Surgical History:   Procedure Laterality Date    BACK SURGERY      COLONOSCOPY      EGD AND COLONOSCOPY N/A 12/23/2016    Procedure: EGD AND COLONOSCOPY;  Surgeon: Elina Anderson MD;  Location: [facility];  Service:     JOINT REPLACEMENT      bilat knees and hips    OTHER SURGICAL HISTORY      pelvis rods    REPLACEMENT TOTAL KNEE BILATERAL      STOMACH SURGERY      UPPER GASTROINTESTINAL ENDOSCOPY       Modified (Video) Barium Swallow Study    Summary:  Images are on PACS for review.     Oral stage: Mild  Adequate labial seal and retrieval. Mild oral manipulation. Prolonged mastication with regular solid. Functional AP transfer.    Pharyngeal stage: Mild  Mildly decreased laryngeal excursion. Inconsistent epiglottic inversion. Narrowing at UES with moderate pharyngeal retention increasing with solid trials. Mild transient penetration with nectar thick liquids and thins cup x1. Mild silent aspiration with thins large consecutive straw sips. No aspiration/penetration see with solid trials.     Per gross esophageal screen:  Moderate stasis noted with all consistencies. Slowed but eventual passage of  barium tablet with cup sip of thin passing through stomach. Would recommend GI f/u.     Recommendations:  Diet: Dysphagia 3 (Dental soft) add moisture to dry solids, avoid dry/dense foods  Liquids: Thins SMALL SIPS ONLY  Meds: Crushed in puree  Strategies: SMALL BITES/SIPS, slow rate of pace   Frequent/thorough oral care  Upright position  Consider consult with: OP SLP, GI  Repeat MBS as necessary  If a dedicated assessment of the esophagus is desired:  Consider esophagram/routine barium swallow w/ barium tablet administration, FL Upper GI/UGI,   or EGD (Options may be limited if pt cannot stand/maneuver for barium studies.)      Pt is a 89 year old female referred for VBS secondary to esophageal dysphagia and fear of choking. Per pt report, she feels as if food gets stuck and cuts her food up into small pieces at home. VBS warranted to r/o aspiration and to assess least restrictive diet.       Functional Oral Intake Scale (FOIS)  Hakan Perry PhD, F-BREANA  (Does not include liquids)  Nothing by mouth (NPO).  Tube dependent with minimal attempts of food or liquid.  Tube dependent with consistent intake of food or liquid.  Total oral diet of a single consistency.  Total oral diet with multiple consistencies but requiring special preparation or compensations.  Total oral diet with multiple consistencies without special preparations, but with specific food limitations.  Total oral diet with no restriction.    H&P/pertinent provider notes: (PMH noted above)  GI 1/27/25:  Main complaint today is fear of choking at bedtime, gets anxious and feels like she can't control her own saliva  Discussed barium showing severe esophageal dysmotility likely due to advanced age and presbyesophagus  We will do speech eval with video swallow study  Will refer to geriatrician-  states she is taking amitriptyline but I don't have it on her list- perhaps if she is can increase this dose to 25 mg; he is going to confirm  Gave  referral for geriatrician Geetha Green but she is no longer doing this so gave other names that her office gave; pt's  inquiring about this   Discussed peg tube but we all agree not a good option given her advanced age   Continue diet as tolerated for now    Special Studies:  No recent studies, pending barium swallow     Previous MBS:  None found on Epic or per pt report     Food allergies: NKFA   Current diet: Regular/thin chopping solid food   Premorbid diet: Regular/thin   Dentition: Natural   O2 requirement: RA   Oral mech: WNL   Vocal quality/speech: WNL   Cognitive status: WNL       Consistencies administered: Puree, hard cookie, barium pill w/ thin, thin, nectar    Thin liquid by: single cup/straw sip, consecutive straw  Nectar thick liquid by: single cup/straw sip    Pt was viewed seated laterally at 90 degrees.

## 2025-02-19 DIAGNOSIS — M48.062 SPINAL STENOSIS OF LUMBAR REGION WITH NEUROGENIC CLAUDICATION: Chronic | ICD-10-CM

## 2025-02-19 DIAGNOSIS — R26.2 AMBULATORY DYSFUNCTION: Primary | ICD-10-CM
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
## 2025-02-19 DIAGNOSIS — R29.898 MUSCULAR DECONDITIONING: ICD-10-CM

## 2025-02-22 ENCOUNTER — HOSPITAL ENCOUNTER (INPATIENT)
Facility: HOSPITAL | Age: OVER 89
LOS: 2 days | Discharge: HOME | End: 2025-02-24
Attending: EMERGENCY MEDICINE
Payer: MEDICARE

## 2025-02-22 ENCOUNTER — APPOINTMENT (EMERGENCY)
Dept: CT IMAGING | Facility: HOSPITAL | Age: OVER 89
End: 2025-02-22
Payer: MEDICARE
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
## 2025-02-22 DIAGNOSIS — R53.81 PHYSICAL DECONDITIONING: ICD-10-CM

## 2025-02-22 DIAGNOSIS — R26.89 ABNORMALITY OF GAIT DUE TO IMPAIRMENT OF BALANCE: ICD-10-CM

## 2025-02-22 DIAGNOSIS — R41.82 AMS (ALTERED MENTAL STATUS): ICD-10-CM

## 2025-02-22 DIAGNOSIS — R26.2 AMBULATORY DYSFUNCTION: ICD-10-CM

## 2025-02-22 DIAGNOSIS — I50.32 CHRONIC DIASTOLIC HF (HEART FAILURE) (HCC): ICD-10-CM

## 2025-02-22 PROBLEM — R09.02 HYPOXIA: Status: ACTIVE | Noted: 2025-02-22

## 2025-02-22 PROBLEM — G93.40 ACUTE ENCEPHALOPATHY: Status: ACTIVE | Noted: 2022-06-24

## 2025-02-22 LAB
ALBUMIN SERPL BCG-MCNC: 3.9 G/DL (ref 3.5–5)
ALP SERPL-CCNC: 108 U/L (ref 34–104)
ALT SERPL W P-5'-P-CCNC: 39 U/L (ref 7–52)
ANION GAP SERPL CALCULATED.3IONS-SCNC: 8 MMOL/L (ref 4–13)
AST SERPL W P-5'-P-CCNC: 44 U/L (ref 13–39)
BASOPHILS # BLD AUTO: 0.05 THOUSANDS/ΜL (ref 0–0.1)
BASOPHILS NFR BLD AUTO: 1 % (ref 0–1)
BILIRUB SERPL-MCNC: 0.95 MG/DL (ref 0.2–1)
BNP SERPL-MCNC: 188 PG/ML (ref 0–100)
BUN SERPL-MCNC: 29 MG/DL (ref 5–25)
CALCIUM SERPL-MCNC: 9.4 MG/DL (ref 8.4–10.2)
CHLORIDE SERPL-SCNC: 103 MMOL/L (ref 96–108)
CO2 SERPL-SCNC: 26 MMOL/L (ref 21–32)
CREAT SERPL-MCNC: 1.43 MG/DL (ref 0.6–1.3)
EOSINOPHIL # BLD AUTO: 0.17 THOUSAND/ΜL (ref 0–0.61)
EOSINOPHIL NFR BLD AUTO: 2 % (ref 0–6)
ERYTHROCYTE [DISTWIDTH] IN BLOOD BY AUTOMATED COUNT: 16.2 % (ref 11.6–15.1)
GFR SERPL CREATININE-BSD FRML MDRD: 32 ML/MIN/1.73SQ M
GLUCOSE SERPL-MCNC: 127 MG/DL (ref 65–140)
HCT VFR BLD AUTO: 37.3 % (ref 34.8–46.1)
HGB BLD-MCNC: 11.8 G/DL (ref 11.5–15.4)
IMM GRANULOCYTES # BLD AUTO: 0.05 THOUSAND/UL (ref 0–0.2)
IMM GRANULOCYTES NFR BLD AUTO: 1 % (ref 0–2)
LYMPHOCYTES # BLD AUTO: 1.19 THOUSANDS/ΜL (ref 0.6–4.47)
LYMPHOCYTES NFR BLD AUTO: 15 % (ref 14–44)
MCH RBC QN AUTO: 30 PG (ref 26.8–34.3)
MCHC RBC AUTO-ENTMCNC: 31.6 G/DL (ref 31.4–37.4)
MCV RBC AUTO: 95 FL (ref 82–98)
MONOCYTES # BLD AUTO: 0.97 THOUSAND/ΜL (ref 0.17–1.22)
MONOCYTES NFR BLD AUTO: 13 % (ref 4–12)
NEUTROPHILS # BLD AUTO: 5.34 THOUSANDS/ΜL (ref 1.85–7.62)
NEUTS SEG NFR BLD AUTO: 68 % (ref 43–75)
NRBC BLD AUTO-RTO: 0 /100 WBCS
PLATELET # BLD AUTO: 156 THOUSANDS/UL (ref 149–390)
PMV BLD AUTO: 11.6 FL (ref 8.9–12.7)
POTASSIUM SERPL-SCNC: 4.1 MMOL/L (ref 3.5–5.3)
PROT SERPL-MCNC: 7.3 G/DL (ref 6.4–8.4)
RBC # BLD AUTO: 3.93 MILLION/UL (ref 3.81–5.12)
SODIUM SERPL-SCNC: 137 MMOL/L (ref 135–147)
WBC # BLD AUTO: 7.77 THOUSAND/UL (ref 4.31–10.16)

## 2025-02-22 PROCEDURE — 99285 EMERGENCY DEPT VISIT HI MDM: CPT | Performed by: EMERGENCY MEDICINE

## 2025-02-22 PROCEDURE — 70450 CT HEAD/BRAIN W/O DYE: CPT

## 2025-02-22 PROCEDURE — 96365 THER/PROPH/DIAG IV INF INIT: CPT

## 2025-02-22 PROCEDURE — 85025 COMPLETE CBC W/AUTO DIFF WBC: CPT

## 2025-02-22 PROCEDURE — 93005 ELECTROCARDIOGRAM TRACING: CPT

## 2025-02-22 PROCEDURE — 36415 COLL VENOUS BLD VENIPUNCTURE: CPT

## 2025-02-22 PROCEDURE — 99222 1ST HOSP IP/OBS MODERATE 55: CPT | Performed by: PHYSICIAN ASSISTANT

## 2025-02-22 PROCEDURE — 99284 EMERGENCY DEPT VISIT MOD MDM: CPT

## 2025-02-22 PROCEDURE — 96366 THER/PROPH/DIAG IV INF ADDON: CPT

## 2025-02-22 PROCEDURE — 96374 THER/PROPH/DIAG INJ IV PUSH: CPT

## 2025-02-22 PROCEDURE — 72125 CT NECK SPINE W/O DYE: CPT

## 2025-02-22 PROCEDURE — 71260 CT THORAX DX C+: CPT

## 2025-02-22 PROCEDURE — 83880 ASSAY OF NATRIURETIC PEPTIDE: CPT | Performed by: EMERGENCY MEDICINE

## 2025-02-22 PROCEDURE — 74177 CT ABD & PELVIS W/CONTRAST: CPT

## 2025-02-22 PROCEDURE — 80053 COMPREHEN METABOLIC PANEL: CPT

## 2025-02-22 RX ORDER — FUROSEMIDE 10 MG/ML
20 INJECTION INTRAMUSCULAR; INTRAVENOUS ONCE
Status: COMPLETED | OUTPATIENT
Start: 2025-02-22 | End: 2025-02-22

## 2025-02-22 RX ORDER — ACETAMINOPHEN 10 MG/ML
1000 INJECTION, SOLUTION INTRAVENOUS ONCE
Status: COMPLETED | OUTPATIENT
Start: 2025-02-22 | End: 2025-02-22

## 2025-02-22 RX ADMIN — IOHEXOL 85 ML: 350 INJECTION, SOLUTION INTRAVENOUS at 19:36

## 2025-02-22 RX ADMIN — FUROSEMIDE 20 MG: 10 INJECTION, SOLUTION INTRAMUSCULAR; INTRAVENOUS at 21:27

## 2025-02-22 RX ADMIN — ACETAMINOPHEN 1000 MG: 10 INJECTION INTRAVENOUS at 18:58

## 2025-02-22 RX ADMIN — SODIUM CHLORIDE 500 ML: 0.9 INJECTION, SOLUTION INTRAVENOUS at 18:58

## 2025-02-22 NOTE — Clinical Note
Case was discussed with LISSETT and the patient's admission status was agreed to be Admission Status: inpatient status to the service of Dr. KINZA Eng

## 2025-02-23 PROBLEM — I50.33 ACUTE ON CHRONIC DIASTOLIC (CONGESTIVE) HEART FAILURE (HCC): Status: ACTIVE | Noted: 2022-04-14

## 2025-02-23 LAB
AMMONIA PLAS-SCNC: 15 UMOL/L (ref 18–72)
ANION GAP SERPL CALCULATED.3IONS-SCNC: 7 MMOL/L (ref 4–13)
APAP SERPL-MCNC: 15 UG/ML (ref 10–20)
BACTERIA UR QL AUTO: ABNORMAL /HPF
BASE EX.OXY STD BLDV CALC-SCNC: 72.4 % (ref 60–80)
BASE EXCESS BLDV CALC-SCNC: -2.3 MMOL/L
BASOPHILS # BLD AUTO: 0.06 THOUSANDS/ÂΜL (ref 0–0.1)
BASOPHILS NFR BLD AUTO: 1 % (ref 0–1)
BILIRUB UR QL STRIP: NEGATIVE
BUN SERPL-MCNC: 28 MG/DL (ref 5–25)
CALCIUM SERPL-MCNC: 8.9 MG/DL (ref 8.4–10.2)
CHLORIDE SERPL-SCNC: 104 MMOL/L (ref 96–108)
CLARITY UR: CLEAR
CO2 SERPL-SCNC: 27 MMOL/L (ref 21–32)
COLOR UR: ABNORMAL
CREAT SERPL-MCNC: 1.39 MG/DL (ref 0.6–1.3)
EOSINOPHIL # BLD AUTO: 0.16 THOUSAND/ÂΜL (ref 0–0.61)
EOSINOPHIL NFR BLD AUTO: 2 % (ref 0–6)
ERYTHROCYTE [DISTWIDTH] IN BLOOD BY AUTOMATED COUNT: 17.1 % (ref 11.6–15.1)
ETHANOL SERPL-MCNC: <10 MG/DL
GFR SERPL CREATININE-BSD FRML MDRD: 33 ML/MIN/1.73SQ M
GLUCOSE SERPL-MCNC: 101 MG/DL (ref 65–140)
GLUCOSE SERPL-MCNC: 131 MG/DL (ref 65–140)
GLUCOSE SERPL-MCNC: 151 MG/DL (ref 65–140)
GLUCOSE SERPL-MCNC: 169 MG/DL (ref 65–140)
GLUCOSE SERPL-MCNC: 90 MG/DL (ref 65–140)
GLUCOSE SERPL-MCNC: 96 MG/DL (ref 65–140)
GLUCOSE UR STRIP-MCNC: NEGATIVE MG/DL
HCO3 BLDV-SCNC: 23.7 MMOL/L (ref 24–30)
HCT VFR BLD AUTO: 35.6 % (ref 34.8–46.1)
HGB BLD-MCNC: 11 G/DL (ref 11.5–15.4)
HGB UR QL STRIP.AUTO: ABNORMAL
IMM GRANULOCYTES # BLD AUTO: 0.07 THOUSAND/UL (ref 0–0.2)
IMM GRANULOCYTES NFR BLD AUTO: 1 % (ref 0–2)
KETONES UR STRIP-MCNC: NEGATIVE MG/DL
LEUKOCYTE ESTERASE UR QL STRIP: NEGATIVE
LYMPHOCYTES # BLD AUTO: 1.12 THOUSANDS/ÂΜL (ref 0.6–4.47)
LYMPHOCYTES NFR BLD AUTO: 16 % (ref 14–44)
MAGNESIUM SERPL-MCNC: 1.9 MG/DL (ref 1.9–2.7)
MCH RBC QN AUTO: 29.2 PG (ref 26.8–34.3)
MCHC RBC AUTO-ENTMCNC: 30.9 G/DL (ref 31.4–37.4)
MCV RBC AUTO: 94 FL (ref 82–98)
MONOCYTES # BLD AUTO: 0.81 THOUSAND/ÂΜL (ref 0.17–1.22)
MONOCYTES NFR BLD AUTO: 12 % (ref 4–12)
NEUTROPHILS # BLD AUTO: 4.62 THOUSANDS/ÂΜL (ref 1.85–7.62)
NEUTS SEG NFR BLD AUTO: 68 % (ref 43–75)
NITRITE UR QL STRIP: NEGATIVE
NON-SQ EPI CELLS URNS QL MICRO: ABNORMAL /HPF
NRBC BLD AUTO-RTO: 0 /100 WBCS
O2 CT BLDV-SCNC: 11.3 ML/DL
PCO2 BLDV: 45.8 MM HG (ref 42–50)
PH BLDV: 7.33 [PH] (ref 7.3–7.4)
PH UR STRIP.AUTO: 5 [PH]
PLATELET # BLD AUTO: 134 THOUSANDS/UL (ref 149–390)
PMV BLD AUTO: 12.8 FL (ref 8.9–12.7)
PO2 BLDV: 43.2 MM HG (ref 35–45)
POTASSIUM SERPL-SCNC: 3.6 MMOL/L (ref 3.5–5.3)
PROT UR STRIP-MCNC: NEGATIVE MG/DL
RBC # BLD AUTO: 3.77 MILLION/UL (ref 3.81–5.12)
RBC #/AREA URNS AUTO: ABNORMAL /HPF
SALICYLATES SERPL-MCNC: <5 MG/DL (ref 3–20)
SODIUM SERPL-SCNC: 138 MMOL/L (ref 135–147)
SP GR UR STRIP.AUTO: 1.02 (ref 1–1.03)
T4 FREE SERPL-MCNC: 1 NG/DL (ref 0.61–1.12)
TSH SERPL DL<=0.05 MIU/L-ACNC: 7 UIU/ML (ref 0.45–4.5)
UROBILINOGEN UR STRIP-ACNC: <2 MG/DL
VIT B12 SERPL-MCNC: 354 PG/ML (ref 180–914)
WBC # BLD AUTO: 6.84 THOUSAND/UL (ref 4.31–10.16)
WBC #/AREA URNS AUTO: ABNORMAL /HPF

## 2025-02-23 PROCEDURE — 81001 URINALYSIS AUTO W/SCOPE: CPT | Performed by: PHYSICIAN ASSISTANT

## 2025-02-23 PROCEDURE — 80179 DRUG ASSAY SALICYLATE: CPT | Performed by: PHYSICIAN ASSISTANT

## 2025-02-23 PROCEDURE — 82607 VITAMIN B-12: CPT | Performed by: PHYSICIAN ASSISTANT

## 2025-02-23 PROCEDURE — 80048 BASIC METABOLIC PNL TOTAL CA: CPT

## 2025-02-23 PROCEDURE — 82948 REAGENT STRIP/BLOOD GLUCOSE: CPT

## 2025-02-23 PROCEDURE — 97163 PT EVAL HIGH COMPLEX 45 MIN: CPT

## 2025-02-23 PROCEDURE — 84443 ASSAY THYROID STIM HORMONE: CPT | Performed by: PHYSICIAN ASSISTANT

## 2025-02-23 PROCEDURE — 83735 ASSAY OF MAGNESIUM: CPT

## 2025-02-23 PROCEDURE — 82077 ASSAY SPEC XCP UR&BREATH IA: CPT | Performed by: PHYSICIAN ASSISTANT

## 2025-02-23 PROCEDURE — 80143 DRUG ASSAY ACETAMINOPHEN: CPT | Performed by: PHYSICIAN ASSISTANT

## 2025-02-23 PROCEDURE — 97116 GAIT TRAINING THERAPY: CPT

## 2025-02-23 PROCEDURE — 84439 ASSAY OF FREE THYROXINE: CPT | Performed by: PHYSICIAN ASSISTANT

## 2025-02-23 PROCEDURE — 97167 OT EVAL HIGH COMPLEX 60 MIN: CPT

## 2025-02-23 PROCEDURE — 82805 BLOOD GASES W/O2 SATURATION: CPT | Performed by: PHYSICIAN ASSISTANT

## 2025-02-23 PROCEDURE — 97530 THERAPEUTIC ACTIVITIES: CPT

## 2025-02-23 PROCEDURE — 99232 SBSQ HOSP IP/OBS MODERATE 35: CPT | Performed by: STUDENT IN AN ORGANIZED HEALTH CARE EDUCATION/TRAINING PROGRAM

## 2025-02-23 PROCEDURE — 82140 ASSAY OF AMMONIA: CPT | Performed by: PHYSICIAN ASSISTANT

## 2025-02-23 PROCEDURE — 85025 COMPLETE CBC W/AUTO DIFF WBC: CPT

## 2025-02-23 RX ORDER — PYRIDOXINE HCL (VITAMIN B6) 50 MG
50 TABLET ORAL DAILY
Status: DISCONTINUED | OUTPATIENT
Start: 2025-02-23 | End: 2025-02-24 | Stop reason: HOSPADM

## 2025-02-23 RX ORDER — ACETAMINOPHEN 325 MG/1
650 TABLET ORAL EVERY 6 HOURS SCHEDULED
Status: DISCONTINUED | OUTPATIENT
Start: 2025-02-23 | End: 2025-02-24 | Stop reason: HOSPADM

## 2025-02-23 RX ORDER — CYANOCOBALAMIN 1000 UG/ML
1000 INJECTION, SOLUTION INTRAMUSCULAR; SUBCUTANEOUS
Status: DISCONTINUED | OUTPATIENT
Start: 2025-02-23 | End: 2025-02-24 | Stop reason: HOSPADM

## 2025-02-23 RX ORDER — INSULIN LISPRO 100 [IU]/ML
1-5 INJECTION, SOLUTION INTRAVENOUS; SUBCUTANEOUS
Status: DISCONTINUED | OUTPATIENT
Start: 2025-02-23 | End: 2025-02-24 | Stop reason: HOSPADM

## 2025-02-23 RX ORDER — ASPIRIN 81 MG/1
81 TABLET, CHEWABLE ORAL DAILY
Status: DISCONTINUED | OUTPATIENT
Start: 2025-02-23 | End: 2025-02-24 | Stop reason: HOSPADM

## 2025-02-23 RX ORDER — DULOXETIN HYDROCHLORIDE 60 MG/1
60 CAPSULE, DELAYED RELEASE ORAL DAILY
Status: DISCONTINUED | OUTPATIENT
Start: 2025-02-23 | End: 2025-02-24 | Stop reason: HOSPADM

## 2025-02-23 RX ORDER — METHOCARBAMOL 500 MG/1
500 TABLET, FILM COATED ORAL EVERY 8 HOURS SCHEDULED
Status: DISCONTINUED | OUTPATIENT
Start: 2025-02-23 | End: 2025-02-24 | Stop reason: HOSPADM

## 2025-02-23 RX ORDER — HYDROXYCHLOROQUINE SULFATE 200 MG/1
200 TABLET, FILM COATED ORAL
Status: DISCONTINUED | OUTPATIENT
Start: 2025-02-23 | End: 2025-02-24 | Stop reason: HOSPADM

## 2025-02-23 RX ORDER — ATORVASTATIN CALCIUM 10 MG/1
10 TABLET, FILM COATED ORAL DAILY
Status: DISCONTINUED | OUTPATIENT
Start: 2025-02-23 | End: 2025-02-24 | Stop reason: HOSPADM

## 2025-02-23 RX ORDER — PREGABALIN 75 MG/1
75 CAPSULE ORAL 2 TIMES DAILY
Status: DISCONTINUED | OUTPATIENT
Start: 2025-02-23 | End: 2025-02-24 | Stop reason: HOSPADM

## 2025-02-23 RX ORDER — HEPARIN SODIUM 5000 [USP'U]/ML
5000 INJECTION, SOLUTION INTRAVENOUS; SUBCUTANEOUS EVERY 8 HOURS SCHEDULED
Status: DISCONTINUED | OUTPATIENT
Start: 2025-02-23 | End: 2025-02-24 | Stop reason: HOSPADM

## 2025-02-23 RX ORDER — PANTOPRAZOLE SODIUM 20 MG/1
20 TABLET, DELAYED RELEASE ORAL
Status: DISCONTINUED | OUTPATIENT
Start: 2025-02-23 | End: 2025-02-24 | Stop reason: HOSPADM

## 2025-02-23 RX ORDER — FUROSEMIDE 20 MG/1
20 TABLET ORAL DAILY
Status: DISCONTINUED | OUTPATIENT
Start: 2025-02-23 | End: 2025-02-24 | Stop reason: HOSPADM

## 2025-02-23 RX ADMIN — ATORVASTATIN CALCIUM 10 MG: 10 TABLET, FILM COATED ORAL at 08:29

## 2025-02-23 RX ADMIN — DULOXETINE 60 MG: 60 CAPSULE, DELAYED RELEASE ORAL at 08:28

## 2025-02-23 RX ADMIN — PREGABALIN 75 MG: 75 CAPSULE ORAL at 18:29

## 2025-02-23 RX ADMIN — HEPARIN SODIUM 5000 UNITS: 5000 INJECTION INTRAVENOUS; SUBCUTANEOUS at 15:10

## 2025-02-23 RX ADMIN — ACETAMINOPHEN 650 MG: 325 TABLET, FILM COATED ORAL at 06:19

## 2025-02-23 RX ADMIN — PANTOPRAZOLE SODIUM 20 MG: 20 TABLET, DELAYED RELEASE ORAL at 06:19

## 2025-02-23 RX ADMIN — ACETAMINOPHEN 650 MG: 325 TABLET, FILM COATED ORAL at 15:10

## 2025-02-23 RX ADMIN — HEPARIN SODIUM 5000 UNITS: 5000 INJECTION INTRAVENOUS; SUBCUTANEOUS at 06:19

## 2025-02-23 RX ADMIN — HYDROXYCHLOROQUINE SULFATE 200 MG: 200 TABLET ORAL at 08:28

## 2025-02-23 RX ADMIN — METHOCARBAMOL 500 MG: 500 TABLET ORAL at 15:10

## 2025-02-23 RX ADMIN — FUROSEMIDE 20 MG: 20 TABLET ORAL at 09:20

## 2025-02-23 RX ADMIN — INSULIN LISPRO 1 UNITS: 100 INJECTION, SOLUTION INTRAVENOUS; SUBCUTANEOUS at 01:35

## 2025-02-23 RX ADMIN — ACETAMINOPHEN 650 MG: 325 TABLET, FILM COATED ORAL at 18:27

## 2025-02-23 RX ADMIN — ASPIRIN 81 MG CHEWABLE TABLET 81 MG: 81 TABLET CHEWABLE at 08:28

## 2025-02-23 RX ADMIN — CYANOCOBALAMIN 1000 MCG: 1000 INJECTION, SOLUTION INTRAMUSCULAR at 09:24

## 2025-02-23 RX ADMIN — PYRIDOXINE HCL TAB 50 MG 50 MG: 50 TAB at 08:29

## 2025-02-23 RX ADMIN — INSULIN LISPRO 1 UNITS: 100 INJECTION, SOLUTION INTRAVENOUS; SUBCUTANEOUS at 16:00

## 2025-02-23 RX ADMIN — PREGABALIN 75 MG: 75 CAPSULE ORAL at 08:29

## 2025-02-23 RX ADMIN — METHOCARBAMOL 500 MG: 500 TABLET ORAL at 21:49

## 2025-02-23 RX ADMIN — HEPARIN SODIUM 5000 UNITS: 5000 INJECTION INTRAVENOUS; SUBCUTANEOUS at 21:49

## 2025-02-23 NOTE — PROGRESS NOTES
Progress Note - Hospitalist   Name: Tanya Stephen 89 y.o. female I MRN: [id number]  Unit/Bed#: W -01 I Date of Admission: 2/22/2025   Date of Service: 2/23/2025 I Hospital Day: 1    Assessment & Plan  Acute encephalopathy  Patient with history of mild cognitive impairment presents with increased confusion, generalized weakness, increased falls at home.   Has been evaluated by geriatrics in the past, suspected that polypharmacy contributing to patient's mental status. Her amitriptyline was discontinued and Lyrica decreased to 75mg BID.   Patient is currently oriented to self only. Patient also is hard of hearing.   CT head without acute findings   Suspect encephalopathy 2/2 hypoxia, patient with bilateral pleural effusions seen on CT chest (R>L)  Obtain UA, TSH, vitamin B12, coma panel, VBG, ammonia  Geriatrics consulted  Fall precautions   Delirium precautions  PT/OT  Ambulatory dysfunction  Patient has been dealing with ambulatory dysfunction with frequent falls for the past few months now. But increased in frequency over the past week.   She was recently admitted for this and was discharged with home PT/OT.  states that he has outpatient PT/OT set up for her in a few days.   Patient has a history of diabetic neuropathy, seronegative arthropathy of multiple sites, generalized OA, spinal stenosis of lumbar region, and senile osteoporosis, all of which can affect patient's gait   states that patient has not been practicing her home exercises and basically sleeps all day   Trauma work-up negative   Likely 2/2 overall physical deconditioning  Fall precautions  PT/OT  Bilateral pleural effusion  CT chest shows moderate right and small left pleural effusions.  History of mild bilateral pleural effusions. Currently seem to have slightly increased in size.   Likely secondary to CHF. Patient did have hypoxia in the ER,  reports patient with OLIVAREZ at home. She has mild LE edema.   S/p IV Lasix  20mg x 1 on admission  Continue home lasix 20 mg daily PO  Patient titrated to room air  Hypoxia  spO2 86% on room air and placed on 4L NC in the ER. Now stable on room air after receiving dose of IV Lasix 20mgx.   Patient has chronic bilateral pleural effusions (R>L) which seem to have increased in size.   Suspect hypoxia 2/2 CHF, pleural effusions   S/p IV Lasix 20mg x 1   Ambulatory pulse ox before discharge  Acute on chronic diastolic (congestive) heart failure (HCC)  Wt Readings from Last 3 Encounters:   02/23/25 62 kg (136 lb 11 oz)   01/27/25 54 kg (119 lb)   01/09/25 56 kg (123 lb 6.4 oz)     Suspect a mild CHF exacerbation in the setting of missed doses of Lasix.  states that he has been skipping days intentionally to avoid dehydration as this is normally an issue for her due to poor oral intake.   On physical exam, patient with documented hypoxia on room air with mild bilateral LE edema. She has bilateral pleural effusions R>L noted on CT chest.   Home diuretic: PO lasix 20mg daily, additional 20mg for weight gain/leg swelling   S/p IV Lasix 20mg x 1 on admission  Continue home lasix 20 mg daily PO  Patient titrated to room air        Type 2 diabetes mellitus with stage 3b chronic kidney disease, without long-term current use of insulin ([facility])  Lab Results   Component Value Date    HGBA1C 6.7 (H) 11/15/2024       Recent Labs     02/23/25  0024 02/23/25  0748 02/23/25  1047   POCGLU 151* 90 101       Blood Sugar Average: Last 72 hrs:  (P) 114  Well controlled with diet at home   SSI with accuchecks   Avoid hypoglycemia   Stage 3b chronic kidney disease (HCC)  Lab Results   Component Value Date    EGFR 33 02/23/2025    EGFR 32 02/22/2025    EGFR 29 01/17/2025    CREATININE 1.39 (H) 02/23/2025    CREATININE 1.43 (H) 02/22/2025    CREATININE 1.54 (H) 01/17/2025     Cr 1.43 on admission, baseline 1.4-1.6   Monitor BMP   Sicca syndrome (HCC)  Hx of SICCA syndrome related dysphagia   She has chronic dry  eyes and dry mouth  Evaluated by speech therapy on previous admission -- dysphagia dental soft, thin liquid diet     Chronic pain syndrome  Patient with history of chronic pain of back, hands, feet 2/2 RA, lumbar spinal stenosis, senile osteoporosis, OA, diabetic neuropathy   Tylenol 650mg q6h, Lyrica 75mg BID  Continue Plaquenil   Added robaxin 500 mg q8h  PRN oxy 5 mg q8h for moderate pain  Gastroesophageal reflux disease without esophagitis  Continue PPI   Nonrheumatic aortic valve stenosis  Mild to moderate AS  Unlikely to be cause of patient's ambulatory dysfunction/frequent falls per cardiology in the past   Continue outpatient follow up     VTE Pharmacologic Prophylaxis: VTE Score: 4 Moderate Risk (Score 3-4) - Pharmacological DVT Prophylaxis Ordered: heparin.    Mobility:   Basic Mobility Inpatient Raw Score: 11  JH-HLM Goal: 4: Move to chair/commode  JH-HLM Achieved: 6: Walk 10 steps or more  JH-HLM Goal achieved. Continue to encourage appropriate mobility.    Patient Centered Rounds: I performed bedside rounds with nursing staff today.   Discussions with Specialists or Other Care Team Provider: case management    Education and Discussions with Family / Patient: Updated  () at bedside.    Current Length of Stay: 1 day(s)  Current Patient Status: Inpatient   Certification Statement: The patient will continue to require additional inpatient hospital stay due to pending rehab placement  Discharge Plan: Anticipate discharge in 24-48 hrs to rehab facility.    Code Status: Level 1 - Full Code    Subjective   Pt states she has chronic back pain and left leg pain    Objective :  Temp:  [97 °F (36.1 °C)-97.7 °F (36.5 °C)] 97.6 °F (36.4 °C)  HR:  [] 87  BP: (112-136)/(59-77) 115/59  Resp:  [16-18] 18  SpO2:  [91 %-100 %] 94 %  O2 Device: None (Room air)    Body mass index is 27.61 kg/m².     Input and Output Summary (last 24 hours):     Intake/Output Summary (Last 24 hours) at 2/23/2025  1334  Last data filed at 2/23/2025 0830  Gross per 24 hour   Intake 600 ml   Output 725 ml   Net -125 ml       Physical Exam  Vitals and nursing note reviewed.   Constitutional:       General: She is not in acute distress.     Appearance: She is well-developed. She is ill-appearing.   HENT:      Head: Normocephalic and atraumatic.   Eyes:      Conjunctiva/sclera: Conjunctivae normal.   Cardiovascular:      Rate and Rhythm: Normal rate and regular rhythm.      Heart sounds: No murmur heard.  Pulmonary:      Effort: Pulmonary effort is normal. No respiratory distress.      Breath sounds: Normal breath sounds.   Abdominal:      Palpations: Abdomen is soft.      Tenderness: There is no abdominal tenderness.   Musculoskeletal:         General: No swelling.      Cervical back: Neck supple.   Skin:     General: Skin is warm and dry.   Neurological:      Mental Status: She is alert. Mental status is at baseline.   Psychiatric:         Mood and Affect: Mood normal.           Lines/Drains:              Lab Results: I have reviewed the following results:   Results from last 7 days   Lab Units 02/23/25  0639   WBC Thousand/uL 6.84   HEMOGLOBIN g/dL 11.0*   HEMATOCRIT % 35.6   PLATELETS Thousands/uL 134*   SEGS PCT % 68   LYMPHO PCT % 16   MONO PCT % 12   EOS PCT % 2     Results from last 7 days   Lab Units 02/23/25  0812 02/22/25  1850   SODIUM mmol/L 138 137   POTASSIUM mmol/L 3.6 4.1   CHLORIDE mmol/L 104 103   CO2 mmol/L 27 26   BUN mg/dL 28* 29*   CREATININE mg/dL 1.39* 1.43*   ANION GAP mmol/L 7 8   CALCIUM mg/dL 8.9 9.4   ALBUMIN g/dL  --  3.9   TOTAL BILIRUBIN mg/dL  --  0.95   ALK PHOS U/L  --  108*   ALT U/L  --  39   AST U/L  --  44*   GLUCOSE RANDOM mg/dL 96 127         Results from last 7 days   Lab Units 02/23/25  1047 02/23/25  0748 02/23/25  0024   POC GLUCOSE mg/dl 101 90 151*               Recent Cultures (last 7 days):         Imaging Results Review: I reviewed radiology reports from this admission including:  CT chest, CT abdomen/pelvis, and CT C-spine.  Other Study Results Review: No additional pertinent studies reviewed.    Last 24 Hours Medication List:     Current Facility-Administered Medications:     acetaminophen (TYLENOL) tablet 650 mg, Q6H MEGGAN    aspirin chewable tablet 81 mg, Daily    atorvastatin (LIPITOR) tablet 10 mg, Daily    cyanocobalamin injection 1,000 mcg, Q30 Days    DULoxetine (CYMBALTA) delayed release capsule 60 mg, Daily    furosemide (LASIX) tablet 20 mg, Daily    heparin (porcine) subcutaneous injection 5,000 Units, Q8H MEGGAN    hydroxychloroquine (PLAQUENIL) tablet 200 mg, Daily With Breakfast    insulin lispro (HumALOG/ADMELOG) 100 units/mL subcutaneous injection 1-5 Units, TID AC **AND** Fingerstick Glucose (POCT), TID AC    insulin lispro (HumALOG/ADMELOG) 100 units/mL subcutaneous injection 1-5 Units, HS    methocarbamol (ROBAXIN) tablet 500 mg, Q8H MEGGAN    oxyCODONE (ROXICODONE) split tablet 2.5 mg, Q8H PRN    pantoprazole (PROTONIX) EC tablet 20 mg, Early Morning    pregabalin (LYRICA) capsule 75 mg, BID    pyridoxine (VITAMIN B6) tablet 50 mg, Daily    Administrative Statements   Today, Patient Was Seen By: Katja Mccurdy DO      **Please Note: This note may have been constructed using a voice recognition system.**

## 2025-02-23 NOTE — ASSESSMENT & PLAN NOTE
CT chest shows moderate right and small left pleural effusions.  History of mild bilateral pleural effusions. Currently seem to have slightly increased in size.   Likely secondary to CHF. Patient did have hypoxia in the ER,  reports patient with OLIVAREZ at home. She has mild LE edema.   S/p IV Lasix 20mg x 1 on admission  Continue home lasix 20 mg daily PO  Patient titrated to room air

## 2025-02-23 NOTE — ASSESSMENT & PLAN NOTE
spO2 86% on room air and placed on 4L NC in the ER. Now stable on room air after receiving dose of IV Lasix 20mgx.   Patient has chronic bilateral pleural effusions (R>L) which seem to have increased in size.   Suspect hypoxia 2/2 CHF, pleural effusions   S/p IV Lasix 20mg x 1   Ambulatory pulse ox before discharge

## 2025-02-23 NOTE — ASSESSMENT & PLAN NOTE
Wt Readings from Last 3 Encounters:   02/23/25 62 kg (136 lb 11 oz)   01/27/25 54 kg (119 lb)   01/09/25 56 kg (123 lb 6.4 oz)     Suspect a mild CHF exacerbation in the setting of missed doses of Lasix.  states that he has been skipping days intentionally to avoid dehydration as this is normally an issue for her due to poor oral intake.   On physical exam, patient with documented hypoxia on room air with mild bilateral LE edema. She has bilateral pleural effusions R>L noted on CT chest.   Home diuretic: PO lasix 20mg daily, additional 20mg for weight gain/leg swelling   S/p IV Lasix 20mg x 1 on admission  Continue home lasix 20 mg daily PO  Patient titrated to room air

## 2025-02-23 NOTE — ED ATTENDING ATTESTATION
2/22/2025  IFemi DO, saw and evaluated the patient. I have discussed the patient with the resident/non-physician practitioner and agree with the resident's/non-physician practitioner's findings, Plan of Care, and MDM as documented in the resident's/non-physician practitioner's note, except where noted. All available labs and Radiology studies were reviewed.  I was present for key portions of any procedure(s) performed by the resident/non-physician practitioner and I was immediately available to provide assistance.       At this point I agree with the current assessment done in the Emergency Department.  I have conducted an independent evaluation of this patient a history and physical is as follows:    89 yo F coming into the ED for multiple falls and head strike, on aspirin. C/o weakness. Not taking lasix daily per  for CHF.    PE:  The patient is well appearing, non-toxic, in NAD. Head: normocephalic, atraumatic. HEENT: mucous membranes moist.  Lungs: CTA b/l, no resp distress. Heart: RRR. No M/R/G. Abdomen: NT, ND, no R/R/G. Neuro: CN2-12 intact, GCS 15. Normal strength and sensation, normal speech and gait. Cap refill < 2 sec, skin warm and dry. No rashes or lesions.    Trauma workup negative, however pt hasn't been taking her lasix and appears fluid overloaded w/ pitting edema to the legs and b/l pleural effusions. Admit for weakness, amb dysfunction, CHF exacerbation.      ED Course         Critical Care Time  Procedures

## 2025-02-23 NOTE — PLAN OF CARE
Problem: Prexisting or High Potential for Compromised Skin Integrity  Goal: Skin integrity is maintained or improved  Description: INTERVENTIONS:  - Identify patients at risk for skin breakdown  - Assess and monitor skin integrity  - Assess and monitor nutrition and hydration status  - Monitor labs   - Assess for incontinence   - Turn and reposition patient  - Assist with mobility/ambulation  - Relieve pressure over bony prominences  - Avoid friction and shearing  - Provide appropriate hygiene as needed including keeping skin clean and dry  - Evaluate need for skin moisturizer/barrier cream  - Collaborate with interdisciplinary team   - Patient/family teaching  - Consider wound care consult   Outcome: Progressing     Problem: PAIN - ADULT  Goal: Verbalizes/displays adequate comfort level or baseline comfort level  Description: Interventions:  - Encourage patient to monitor pain and request assistance  - Assess pain using appropriate pain scale  - Administer analgesics based on type and severity of pain and evaluate response  - Implement non-pharmacological measures as appropriate and evaluate response  - Consider cultural and social influences on pain and pain management  - Notify physician/advanced practitioner if interventions unsuccessful or patient reports new pain  Outcome: Progressing     Problem: INFECTION - ADULT  Goal: Absence or prevention of progression during hospitalization  Description: INTERVENTIONS:  - Assess and monitor for signs and symptoms of infection  - Monitor lab/diagnostic results  - Monitor all insertion sites, i.e. indwelling lines, tubes, and drains  - Monitor endotracheal if appropriate and nasal secretions for changes in amount and color  - [place] appropriate cooling/warming therapies per order  - Administer medications as ordered  - Instruct and encourage patient and family to use good hand hygiene technique  - Identify and instruct in appropriate isolation precautions for  identified infection/condition  Outcome: Progressing  Goal: Absence of fever/infection during neutropenic period  Description: INTERVENTIONS:  - Monitor WBC    Outcome: Progressing     Problem: SAFETY ADULT  Goal: Patient will remain free of falls  Description: INTERVENTIONS:  - Educate patient/family on patient safety including physical limitations  - Instruct patient to call for assistance with activity   - Consult OT/PT to assist with strengthening/mobility   - Keep Call bell within reach  - Keep bed low and locked with side rails adjusted as appropriate  - Keep care items and personal belongings within reach  - Initiate and maintain comfort rounds  - Make Fall Risk Sign visible to staff  - Offer Toileting every  Hours, in advance of need  - Initiate/Maintain alarm  - Obtain necessary fall risk management equipment:   - Apply yellow socks and bracelet for high fall risk patients  - Consider moving patient to room near nurses station  Outcome: Progressing  Goal: Maintain or return to baseline ADL function  Description: INTERVENTIONS:  -  Assess patient's ability to carry out ADLs; assess patient's baseline for ADL function and identify physical deficits which impact ability to perform ADLs (bathing, care of mouth/teeth, toileting, grooming, dressing, etc.)  - Assess/evaluate cause of self-care deficits   - Assess range of motion  - Assess patient's mobility; develop plan if impaired  - Assess patient's need for assistive devices and provide as appropriate  - Encourage maximum independence but intervene and supervise when necessary  - Involve family in performance of ADLs  - Assess for home care needs following discharge   - Consider OT consult to assist with ADL evaluation and planning for discharge  - Provide patient education as appropriate  Outcome: Progressing  Goal: Maintains/Returns to pre admission functional level  Description: INTERVENTIONS:  - Perform AM-PAC 6 Click Basic Mobility/ Daily Activity  assessment daily.  - Set and communicate daily mobility goal to care team and patient/family/caregiver.   - Collaborate with rehabilitation services on mobility goals if consulted  - Perform Range of Motion times a day.  - Reposition patient every  hours.  - Dangle patient  times a day  - Stand patient  times a day  - Ambulate patient  times a day  - Out of bed to chair  times a day   - Out of bed for meals  times a day  - Out of bed for toileting  - Record patient progress and toleration of activity level   Outcome: Progressing     Problem: DISCHARGE PLANNING  Goal: Discharge to home or other facility with appropriate resources  Description: INTERVENTIONS:  - Identify barriers to discharge w/patient and caregiver  - Arrange for needed discharge resources and transportation as appropriate  - Identify discharge learning needs (meds, wound care, etc.)  - Arrange for interpretive services to assist at discharge as needed  - Refer to Case Management Department for coordinating discharge planning if the patient needs post-hospital services based on physician/advanced practitioner order or complex needs related to functional status, cognitive ability, or social support system  Outcome: Progressing     Problem: Knowledge Deficit  Goal: Patient/family/caregiver demonstrates understanding of disease process, treatment plan, medications, and discharge instructions  Description: Complete learning assessment and assess knowledge base.  Interventions:  - Provide teaching at level of understanding  - Provide teaching via preferred learning methods  Outcome: Progressing

## 2025-02-23 NOTE — ED PROVIDER NOTES
Emergency Department Trauma Note  Tanya Stephen 89 y.o. female MRN: [id number]  Unit/Bed#: W /W -01 Encounter: [id number]      Trauma Alert: Trauma Acuity: C  Model of Arrival: Mode of Arrival: Direct from scene via    Trauma Team: Current Providers  Attending Provider: Femi Rodriguez DO  Attending Provider: Kathleen Alfaro DO  Attending Provider: Katja Mccurdy DO  Registered Nurse: Jerri Beltrán RN  Registered Nurse: Leigh Ann Ford RN  Resident: Hakan Forrester MD  Respiratory Therapist: Paz Boss, RT  Advanced Practitioner: Mikki Toney PA-C  Registered Nurse: Cortez King RN  Registered Nurse: Carie Suresh, BENJAMÍN  Patient Care Assistant: Ainsley Jennings  Consulting Physician: Servando Gonzales MD  Registered Nurse: Flor Rene RN  Registered Nurse: Tatyana King RN  Patient Care Assistant: Ana Iniguez  Respiratory Therapist: Rosmery Prakash, RT  Physical Therapist: Radha Pacheco, PT  : Marley Dewey LM  Registered Nurse: Mallory Oro RN  Patient Care Assistant: Rubi Mendoza  Registered Nurse: Carie Suresh RN  Consultants:     None      History of Present Illness     Chief Complaint:   Chief Complaint   Patient presents with    Fall     Pt arrives with  and daughter with c/o 3 falls in last 2 days. Pt  reports her legs just give out. +head strike, +ASA. -LOC. Pt c/o pain to posterior head and buttock     HPI:  Tanya Stephen is a 89 y.o. female who presents with multiple falls including one with head strike including today. History obtained from patient as well as family members who stated that over the last few weeks she has had worsening mental status and global weakness which they believe both have resulted in her having frequent falls.  Today she hit her head a few hours ago falling down, unwitnessed.  Patient and family members do not endorse any specific injuries that they have noticed but patient has stated that her whole body feels  weaker than usual and she believes she hit her head during some of the falls.  Patient is unreliable historian and is difficult to ascertain information regarding the specifics of the falls.  No syncope or emesis or vision changes noted..  Mechanism:       Fall    Patient is a 89-year-old female who presented to the ED for multiple falls including today with head strike including today.  History obtained from patient as well as family members who stated that over the last few weeks she has had worsening mental status and global weakness which they believe both have resulted in her having frequent falls.  Today she hit her head a few hours ago falling down, unwitnessed.  Patient and family members do not endorse any specific injuries that they have noticed but patient has stated that her whole body feels weaker than usual and she believes she hit her head during some of the falls.  Patient is unreliable historian and is difficult to ascertain information regarding the specifics of the falls.  No syncope or emesis or vision changes noted.        Review of Systems   Constitutional:  Positive for fatigue. Negative for fever.   HENT: Negative.     Respiratory:  Negative for cough and shortness of breath.    Cardiovascular:  Negative for chest pain and palpitations.   Gastrointestinal:  Negative for abdominal pain, nausea and vomiting.   Genitourinary:  Negative for dysuria, frequency and urgency.   Skin:  Negative for color change and pallor.   Neurological:  Positive for weakness. Negative for dizziness and syncope.   Psychiatric/Behavioral: Negative.         Historical Information     Immunizations:   Immunization History   Administered Date(s) Administered    COVID-19 MODERNA VACC 0.5 ML IM 01/20/2021, 02/02/2021, 02/17/2021, 03/02/2021, 11/22/2021, 06/04/2022    COVID-19 Pfizer Vac BIVALENT Fawad-sucrose 12 Yr+ IM 10/26/2022    INFLUENZA 01/12/2013, 12/04/2018, 09/29/2020, 11/18/2022, 10/12/2023    Pneumococcal  Polysaccharide PPV23 2008    Tdap 10/12/2023    Zoster Vaccine Recombinant 2022       Past Medical History:   Diagnosis Date    Anemia     Arthritis     Back pain     CHF (congestive heart failure) (HCC)     Chronic kidney disease     Stage 3b    Confusion 2023    Diabetes (HCC)     Diabetes mellitus (HCC)     Diabetic gastroparesis associated with type 2 diabetes mellitus  (HCC)     Diabetic polyneuropathy (HCC)     Diverticulosis     Herpes zoster     Hypertension     IBS (irritable bowel syndrome)     Neurogenic claudication due to lumbar spinal stenosis     Osteoarthritis     Osteoporosis     Pulmonary edema     Raynaud's phenomenon without gangrene     Seronegative arthropathy of multiple sites (HCC)     Sicca (HCC)        Family History   Problem Relation Age of Onset    Cancer Brother     Diabetes Mother     Hypertension Father     Heart disease Father      Past Surgical History:   Procedure Laterality Date    BACK SURGERY      COLONOSCOPY      EGD AND COLONOSCOPY N/A 2016    Procedure: EGD AND COLONOSCOPY;  Surgeon: Elina Anderson MD;  Location: [facility];  Service:     JOINT REPLACEMENT      bilat knees and hips    OTHER SURGICAL HISTORY      pelvis rods    REPLACEMENT TOTAL KNEE BILATERAL      STOMACH SURGERY      UPPER GASTROINTESTINAL ENDOSCOPY       Social History     Tobacco Use    Smoking status: Former     Current packs/day: 0.00     Types: Cigarettes     Quit date:      Years since quittin.1     Passive exposure: Never    Smokeless tobacco: Never   Vaping Use    Vaping status: Never Used   Substance Use Topics    Alcohol use: Not Currently    Drug use: No     E-Cigarette/Vaping    E-Cigarette Use Never User      E-Cigarette/Vaping Substances    Nicotine No     THC No     CBD No     Flavoring No     Other No        Family History: non-contributory    Meds/Allergies   Prior to Admission Medications   Prescriptions Last Dose Informant Patient Reported? Taking?    Cholecalciferol (VITAMIN D3) 1000 units CAPS 2/22/2025 Child, Spouse/Significant Other Yes Yes   Sig: Take 1 capsule by mouth daily   DULoxetine (CYMBALTA) 60 mg delayed release capsule 2/21/2025 Child, Spouse/Significant Other No Yes   Sig: TAKE ONE CAPSULE BY MOUTH ONCE DAILY   Magnesium 250 MG TABS Unknown Child, Spouse/Significant Other Yes No   Sig: Take 250 mg by mouth every evening   Mirabegron ER (Myrbetriq) 50 MG TB24 2/22/2025 Child, Spouse/Significant Other No Yes   Sig: Take 1 tablet (50 mg total) by mouth in the morning   Sodium Fluoride (PreviDent 5000 Booster Plus) 1.1 % PSTE 2/22/2025 Child, Spouse/Significant Other No Yes   Sig: Apply on teeth every evening as directed   acetaminophen (TYLENOL) 325 mg tablet 2/22/2025 Child, Spouse/Significant Other No Yes   Sig: Take 2 tablets (650 mg total) by mouth 3 (three) times a day   aspirin 81 mg chewable tablet Unknown Child, Spouse/Significant Other No No   Sig: Chew 1 tablet (81 mg total) daily   atorvastatin (LIPITOR) 10 mg tablet Unknown Child, Spouse/Significant Other No No   Sig: TAKE ONE TABLET BY MOUTH EVERY DAY   diphenoxylate-atropine (LOMOTIL) 2.5-0.025 mg per tablet Not Taking Child, Spouse/Significant Other Yes No   Sig: Take 1 tablet by mouth if needed   Patient not taking: Reported on 2/22/2025   furosemide (LASIX) 20 mg tablet Past Week Child, Spouse/Significant Other No Yes   Sig: One tablet daily and an extra 20 mg prn for wt gain 3 lb/ 24 hours or 5 lb/ 5 days, above a dry wt of 122 lb   hydroxychloroquine (PLAQUENIL) 200 mg tablet  Child, Spouse/Significant Other No No   Sig: Take 1 tablet (200 mg total) by mouth daily with breakfast   omeprazole (PriLOSEC) 20 mg delayed release capsule Past Week Child, Spouse/Significant Other Yes Yes   Sig: Take 20 mg by mouth daily   pregabalin (LYRICA) 75 mg capsule Unknown Child, Spouse/Significant Other No No   Sig: Take 1 capsule (75 mg total) by mouth 2 (two) times a day   pyridoxine (B-6) 100  MG tablet Unknown Child, Spouse/Significant Other Yes No   Sig: Take 100 mg by mouth daily      Facility-Administered Medications: None       Allergies   Allergen Reactions    Morphine Other (See Comments)     urinary retention    Ciprofloxacin Nausea Only and Rash       PHYSICAL EXAM    PE limited by: Cognitive impairment    Objective   Vitals:   First set: Temperature: (!) 97 °F (36.1 °C) (02/22/25 1831)  Pulse: 97 (02/22/25 1838)  Respirations: 16 (02/22/25 1830)  Blood Pressure: 129/63 (02/22/25 1830)  SpO2: 91 % (02/22/25 1845)    Primary Survey:   (A) Airway: Patent  (B) Breathing: Equal b/l  (C) Circulation: Pulses:   normal  (D) Disabliity:  GCS Total:  14  (E) Expose:  Completed    Secondary Survey: (Click on Physical Exam tab above)  Physical Exam  Vitals and nursing note reviewed.   HENT:      Head: Normocephalic and atraumatic.      Right Ear: Tympanic membrane, ear canal and external ear normal.      Left Ear: Tympanic membrane, ear canal and external ear normal.      Nose: Nose normal.      Mouth/Throat:      Mouth: Mucous membranes are moist.      Pharynx: Oropharynx is clear.   Eyes:      Extraocular Movements: Extraocular movements intact.      Conjunctiva/sclera: Conjunctivae normal.      Pupils: Pupils are equal, round, and reactive to light.   Neck:      Comments: Cervical spine tenderness on exam but no stepoffs or deformities.  Cardiovascular:      Rate and Rhythm: Normal rate and regular rhythm.      Pulses: Normal pulses.      Heart sounds: Normal heart sounds.   Pulmonary:      Effort: Pulmonary effort is normal.      Breath sounds: Normal breath sounds.   Abdominal:      General: Abdomen is flat. Bowel sounds are normal.      Palpations: Abdomen is soft.   Musculoskeletal:         General: Tenderness present. No deformity.      Cervical back: Tenderness present.      Comments: CTL spine tenderness on exam but no stepoffs or deformities.  No tenderness over other bony prominences including  shoulders, hips, pelvis, legs, feet, hands, arms, sternum.   Skin:     General: Skin is warm and dry.      Capillary Refill: Capillary refill takes less than 2 seconds.   Neurological:      General: No focal deficit present.      Mental Status: She is alert.      Cranial Nerves: No cranial nerve deficit.      Comments: A&Ox2. No lateralizing deficits and moving all extremities spontaneously. CN 2-12 intact.   Psychiatric:         Mood and Affect: Mood normal.         Behavior: Behavior normal.         Thought Content: Thought content normal.         Cervical spine cleared by clinical criteria? No (imaging required)      Invasive Devices       Peripheral Intravenous Line  Duration             Peripheral IV 02/22/25 Left Antecubital <1 day                    Lab Results:   Results Reviewed       Procedure Component Value Units Date/Time    B-Type Natriuretic Peptide(BNP) [[id number]]  (Abnormal) Collected: 02/22/25 1850    Lab Status: Final result Specimen: Blood from Arm, Right Updated: 02/22/25 2107      pg/mL     Comprehensive metabolic panel [[id number]]  (Abnormal) Collected: 02/22/25 1850    Lab Status: Final result Specimen: Blood from Arm, Right Updated: 02/22/25 1920     Sodium 137 mmol/L      Potassium 4.1 mmol/L      Chloride 103 mmol/L      CO2 26 mmol/L      ANION GAP 8 mmol/L      BUN 29 mg/dL      Creatinine 1.43 mg/dL      Glucose 127 mg/dL      Calcium 9.4 mg/dL      AST 44 U/L      ALT 39 U/L      Alkaline Phosphatase 108 U/L      Total Protein 7.3 g/dL      Albumin 3.9 g/dL      Total Bilirubin 0.95 mg/dL      eGFR 32 ml/min/1.73sq m     Narrative:      National Kid[employer] guidelines for Chronic Kidney Disease (CKD):     Stage 1 with normal or high GFR (GFR > 90 mL/min/1.73 square meters)    Stage 2 Mild CKD (GFR = 60-89 mL/min/1.73 square meters)    Stage 3A Moderate CKD (GFR = 45-59 mL/min/1.73 square meters)    Stage 3B Moderate CKD (GFR = 30-44 mL/min/1.73 square meters)     Stage 4 Severe CKD (GFR = 15-29 mL/min/1.73 square meters)    Stage 5 End Stage CKD (GFR <15 mL/min/1.73 square meters)  Note: GFR calculation is accurate only with a steady state creatinine    CBC and differential [[id number]]  (Abnormal) Collected: 02/22/25 1850    Lab Status: Final result Specimen: Blood from Arm, Right Updated: 02/22/25 1902     WBC 7.77 Thousand/uL      RBC 3.93 Million/uL      Hemoglobin 11.8 g/dL      Hematocrit 37.3 %      MCV 95 fL      MCH 30.0 pg      MCHC 31.6 g/dL      RDW 16.2 %      MPV 11.6 fL      Platelets 156 Thousands/uL      nRBC 0 /100 WBCs      Segmented % 68 %      Immature Grans % 1 %      Lymphocytes % 15 %      Monocytes % 13 %      Eosinophils Relative 2 %      Basophils Relative 1 %      Absolute Neutrophils 5.34 Thousands/µL      Absolute Immature Grans 0.05 Thousand/uL      Absolute Lymphocytes 1.19 Thousands/µL      Absolute Monocytes 0.97 Thousand/µL      Eosinophils Absolute 0.17 Thousand/µL      Basophils Absolute 0.05 Thousands/µL                    Imaging Studies:   Direct to CT: No  CT head without contrast   Final Result by Kenzie Perales MD (02/22 2036)      No acute intracranial abnormality.  Stable chronic microangiopathic changes within the brain.                  Workstation performed: [id number]         CT spine cervical without contrast   Final Result by Kenzie Perales MD (02/22 2015)      No cervical spine fracture or traumatic malalignment.                  Workstation performed: [id number]         CT chest abdomen pelvis w contrast   Final Result by Kenzie Perales MD (02/22 2032)      No evidence of acute traumatic injury throughout the chest, abdomen or pelvis.      Moderate size right and small left pleural effusion               Workstation performed: [id number]         CT recon only thoracolumbar   Final Result by Kenzie Perales MD (02/22 2045)      No fracture or traumatic subluxation.               Workstation performed: [id number]                Procedures  Procedures         ED Course           Medical Decision Making  Patient is a 89-year-old female who presented to the ED for multiple falls including today with head strike including today.  History obtained from patient as well as family members who stated that over the last few weeks she has had worsening mental status and global weakness which they believe both have resulted in her having frequent falls.  Today she hit her head a few hours ago falling down, unwitnessed.  Patient and family members do not endorse any specific injuries that they have noticed but patient has stated that her whole body feels weaker than usual and she believes she hit her head during some of the falls.  Patient is unreliable historian and is difficult to ascertain information regarding the specifics of the falls.  No syncope or emesis or vision changes noted.    Ddx includes but is not limited to failure to thrive/progressively worsening chronic disease, dementia, arrhythmia, electrolyte abnormality, infection including UTI, pneumonia, anemia, fracture, strain/sprain.  CBC and CMP grossly unremarkable with exception of mildly elevated alkaline phosphatase as well as elevated creatinine, however creatinine is baseline for the patient.  Given the patient's multiple falls with head strikes, unreliable neurologic status, global weakness, worsening cognitive status, and CTL spine tenderness on exam, CT head, CT cervical spine, CT chest abdomen pelvis, as well as CT thoracolumbar recon were performed which were all negative for acute process.  However, given the patient's global weakness and history given from family of acutely worsened cognitive function, patient was admitted to medicine for further workup and management.    Amount and/or Complexity of Data Reviewed  Labs: ordered.  Radiology: ordered.    Risk  Prescription drug management.  Decision regarding hospitalization.                Disposition  Priority One  Transfer: No  Final diagnoses:   Fall, initial encounter   AMS (altered mental status)   Physical deconditioning     Time reflects when diagnosis was documented in both MDM as applicable and the Disposition within this note       Time User Action Codes Description Comment    2/22/2025  9:27 PM Hkaan Forrester [W19.XXXA] Fall, initial encounter     2/22/2025  9:27 PM Hakan Forrester [R41.82] AMS (altered mental status)     2/22/2025  9:27 PM Hakan Forrester [R53.81] Physical deconditioning     2/23/2025 12:03 AM Mikki Toney [R26.89] Abnormality of gait due to impairment of balance     2/23/2025 12:04 AM Mikki Toney [R26.2] Ambulatory dysfunction           ED Disposition       ED Disposition   Admit    Condition   Stable    Date/Time   Sat Feb 22, 2025  9:27 PM    Comment   Case was discussed with LIS[facility]T and the patient's admission status was agreed to be Admission Status: inpatient status to the service of Dr. Alfaro.               Follow-up Information    None       Current Discharge Medication List        CONTINUE these medications which have NOT CHANGED    Details   acetaminophen (TYLENOL) 325 mg tablet Take 2 tablets (650 mg total) by mouth 3 (three) times a day  Qty: 540 tablet, Refills: 3    Associated Diagnoses: Primary generalized (osteo)arthritis      Cholecalciferol (VITAMIN D3) 1000 units CAPS Take 1 capsule by mouth daily      DULoxetine (CYMBALTA) 60 mg delayed release capsule TAKE ONE CAPSULE BY MOUTH ONCE DAILY  Qty: 30 capsule, Refills: 5    Associated Diagnoses: Primary generalized (osteo)arthritis      furosemide (LASIX) 20 mg tablet One tablet daily and an extra 20 mg prn for wt gain 3 lb/ 24 hours or 5 lb/ 5 days, above a dry wt of 122 lb  Qty: 90 tablet, Refills: 3    Associated Diagnoses: Chronic diastolic HF (heart failure) (HCC)      Mirabegron ER (Myrbetriq) 50 MG TB24 Take 1 tablet (50 mg total) by mouth in the morning  Qty: 90 tablet, Refills: 1     Associated Diagnoses: OAB (overactive bladder)      omeprazole (PriLOSEC) 20 mg delayed release capsule Take 20 mg by mouth daily      Sodium Fluoride (PreviDent 5000 Booster Plus) 1.1 % PSTE Apply on teeth every evening as directed  Qty: 100 mL, Refills: 3    Associated Diagnoses: Post zoster neuralgia      aspirin 81 mg chewable tablet Chew 1 tablet (81 mg total) daily  Qty: 30 tablet, Refills: 0    Associated Diagnoses: Chest pain      atorvastatin (LIPITOR) 10 mg tablet TAKE ONE TABLET BY MOUTH EVERY DAY  Qty: 90 tablet, Refills: 3    Associated Diagnoses: Type 2 diabetes mellitus without complication, without long-term current use of insulin ([facility])      diphenoxylate-atropine (LOMOTIL) 2.5-0.025 mg per tablet Take 1 tablet by mouth if needed      hydroxychloroquine (PLAQUENIL) 200 mg tablet Take 1 tablet (200 mg total) by mouth daily with breakfast  Qty: 90 tablet, Refills: 1    Associated Diagnoses: 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 ([facility])      Magnesium 250 MG TABS Take 250 mg by mouth every evening      pregabalin (LYRICA) 75 mg capsule Take 1 capsule (75 mg total) by mouth 2 (two) times a day  Qty: 60 capsule, Refills: 5    Associated Diagnoses: Chronic pain syndrome      pyridoxine (B-6) 100 MG tablet Take 100 mg by mouth daily           No discharge procedures on file.    PDMP Review         Value Time User    PDMP Reviewed  Yes 9/4/2024 10:53 AM Tyrone Butler MD            ED Provider  Electronically Signed by           Hakan Forrester MD  02/23/25 [id number]

## 2025-02-23 NOTE — PLAN OF CARE
Problem: OCCUPATIONAL THERAPY ADULT  Goal: Performs self-care activities at highest level of function for planned discharge setting.  See evaluation for individualized goals.  Description: Treatment Interventions: ADL retraining, Functional transfer training, Endurance training, Patient/family training, Neuromuscular reeducation, Compensatory technique education, Continued evaluation, Energy conservation, Activityengagement          See flowsheet documentation for full assessment, interventions and recommendations.   Note: Limitation: Decreased ADL status, Decreased cognition, Decreased endurance, Decreased self-care trans, Decreased high-level ADLs  Prognosis: Fair  Assessment: Patient evaluated by Occupational Therapy. Patient is S/ P fall. Patient admitted with Acute encephalopathy.  The patients occupational profile, medical and therapy history includes a expanded review of medical and/or therapy records and additional review of physical, cognitive, or psychosocial history related to current functional performance.  Comorbidities affecting functional mobility and ADLS include: CKD and diabetes.  Prior to admission, patient was independent with functional mobility with rollater, requiring assist for ADLS, requiring assist for IADLS, and living with spouse in a 1 level home with 3 steps to enter. See above for performance levels. The evaluation identifies the following performance deficits: weakness, impaired balance, decreased endurance, decreased coordination, increased fall risk, new onset of impairment of functional mobility, decreased ADLS, decreased IADLS, decreased activity tolerance, decreased safety awareness, and impaired judgement, that result in activity limitations and/or participation restrictions. This evaluation requires clinical decision making of high complexity, because the patient presents with comorbidites that affect occupational performance and required significant modification of tasks or  assistance with consideration of multiple treatment options.  The Barthel Index was used as a functional outcome tool presenting with a score of Barthel Index Score: 40, i The patient's raw score on the -PAC Daily Activity Inpatient Short Form is 16. A raw score of less than 19 suggests the patient may benefit from discharge to post-acute rehabilitation services. Please refer to the recommendation of the Occupational Therapist for safe discharge planning. Patient will benefit from skilled Occupational Therapy services to address above deficits and facilitate a safe return to prior level of function.     Rehab Resource Intensity Level, OT: II (Moderate Resource Intensity)

## 2025-02-23 NOTE — PLAN OF CARE
Problem: Prexisting or High Potential for Compromised Skin Integrity  Goal: Skin integrity is maintained or improved  Description: INTERVENTIONS:  - Identify patients at risk for skin breakdown  - Assess and monitor skin integrity  - Assess and monitor nutrition and hydration status  - Monitor labs   - Assess for incontinence   - Turn and reposition patient  - Assist with mobility/ambulation  - Relieve pressure over bony prominences  - Avoid friction and shearing  - Provide appropriate hygiene as needed including keeping skin clean and dry  - Evaluate need for skin moisturizer/barrier cream  - Collaborate with interdisciplinary team   - Patient/family teaching  - Consider wound care consult   Outcome: Progressing     Problem: PAIN - ADULT  Goal: Verbalizes/displays adequate comfort level or baseline comfort level  Description: Interventions:  - Encourage patient to monitor pain and request assistance  - Assess pain using appropriate pain scale  - Administer analgesics based on type and severity of pain and evaluate response  - Implement non-pharmacological measures as appropriate and evaluate response  - Consider cultural and social influences on pain and pain management  - Notify physician/advanced practitioner if interventions unsuccessful or patient reports new pain  Outcome: Progressing     Problem: INFECTION - ADULT  Goal: Absence or prevention of progression during hospitalization  Description: INTERVENTIONS:  - Assess and monitor for signs and symptoms of infection  - Monitor lab/diagnostic results  - Monitor all insertion sites, i.e. indwelling lines, tubes, and drains  - Monitor endotracheal if appropriate and nasal secretions for changes in amount and color  - [place] appropriate cooling/warming therapies per order  - Administer medications as ordered  - Instruct and encourage patient and family to use good hand hygiene technique  - Identify and instruct in appropriate isolation precautions for  identified infection/condition  Outcome: Progressing     Problem: DISCHARGE PLANNING  Goal: Discharge to home or other facility with appropriate resources  Description: INTERVENTIONS:  - Identify barriers to discharge w/patient and caregiver  - Arrange for needed discharge resources and transportation as appropriate  - Identify discharge learning needs (meds, wound care, etc.)  - Arrange for interpretive services to assist at discharge as needed  - Refer to Case Management Department for coordinating discharge planning if the patient needs post-hospital services based on physician/advanced practitioner order or complex needs related to functional status, cognitive ability, or social support system  Outcome: Progressing

## 2025-02-23 NOTE — ASSESSMENT & PLAN NOTE
Lab Results   Component Value Date    HGBA1C 6.7 (H) 11/15/2024       Recent Labs     02/23/25  0024 02/23/25  0748 02/23/25  1047   POCGLU 151* 90 101       Blood Sugar Average: Last 72 hrs:  (P) 114  Well controlled with diet at home   SSI with accuchecks   Avoid hypoglycemia

## 2025-02-23 NOTE — ASSESSMENT & PLAN NOTE
Patient with history of chronic pain of back, hands, feet 2/2 RA, lumbar spinal stenosis, senile osteoporosis, OA, diabetic neuropathy   Tylenol 650mg q6h, Lyrica 75mg BID  Continue Plaquenil   Added robaxin 500 mg q8h  PRN oxy 5 mg q8h for moderate pain

## 2025-02-23 NOTE — ASSESSMENT & PLAN NOTE
Patient with history of mild cognitive impairment presents with increased confusion, generalized weakness, increased falls at home.   Has been evaluated by geriatrics in the past, suspected that polypharmacy contributing to patient's mental status. Her amitriptyline was discontinued and Lyrica decreased to 75mg BID.   Patient is currently oriented to self only. Patient also is hard of hearing.   CT head without acute findings   Suspect encephalopathy 2/2 hypoxia, patient with bilateral pleural effusions seen on CT chest (R>L)  Obtain UA, TSH, vitamin B12, coma panel, VBG, ammonia  Geriatrics consulted  Fall precautions   Delirium precautions  PT/OT

## 2025-02-23 NOTE — PLAN OF CARE
Problem: PHYSICAL THERAPY ADULT  Goal: Performs mobility at highest level of function for planned discharge setting.  See evaluation for individualized goals.  Description: Treatment/Interventions: Functional transfer training, LE strengthening/ROM, Elevations, Therapeutic exercise, Endurance training, Cognitive reorientation, Patient/family training, Equipment eval/education, Bed mobility, Gait training, Spoke to MD, Spoke to nursing     See flowsheet documentation for full assessment, interventions and recommendations.  Outcome: Progressing  Note: Prognosis: Fair  Problem List: Decreased strength, Decreased range of motion, Decreased endurance, Impaired balance, Decreased mobility, Decreased coordination, Decreased cognition, Impaired judgement, Decreased safety awareness, Impaired hearing, Impaired sensation, Decreased skin integrity, Pain  Assessment: Pt seen for PT evaluation for mobility assessment & discharge needs. Pt admitted 2/22/2025 w/ increasing weakness, increasing number of falls, AMS, dx Acute encephalopathy. During PT IE, pt requires MAXA 1 for bed mobility in hospital bed, MODA 2 person for STS transfer from EOB, and MODA 2 person + RW for ambulatory transition from EOB to BSC. During additional treatment time, pt progresses to complete multiple additional transfers with ANABELLA, ambulatory transition to recliner chair with MODA 1 person, and then additional ambulation trial of 8ft fwd with RW+ ANABELLA + chair follow. Pt displays above outlined functional impairments & limitations, and presents below her baseline level of functional mobility. The AM-PAC & Barthel Index outcome tools were used to assist in determining pt safety w/ mobility/self care & appropriate d/c recommendations, see above for scores. Pt is at risk of falls d/t multiple comorbidities, h/o falls, impaired balance, impaired cognition, impaired insight/safety awareness, use of ambulatory aid, varying levels of pain, advanced age,  acuity of medical illness, ongoing medical treatment of primary dx, and polypharmacy. Pt's clinical presentation is currently unstable/unpredictable as seen in pt's presentation of changing level of pain, varying levels of cognitive performance, increased fall risk, new onset of impairment of functional mobility, decreased endurance, and new onset of weakness. Pt will benefit from continued PT services in order to address impairments, decrease risk of falls, maximize independence w/ fnxl mobility, & ensure safety w/ mobility for transition to next level of care. Based on pt presentation & impairments, pt would most appropriately benefit from Level II (moderate PT intensity) resources upon d/c.    Barriers to Discharge: Decreased caregiver support, Inaccessible home environment     Rehab Resource Intensity Level, PT: II (Moderate Resource Intensity)    See flowsheet documentation for full assessment.

## 2025-02-23 NOTE — ASSESSMENT & PLAN NOTE
Lab Results   Component Value Date    HGBA1C 6.7 (H) 11/15/2024       Recent Labs     02/23/25  0024   POCGLU 151*       Blood Sugar Average: Last 72 hrs:  (P) 151  Well controlled with diet at home   SSI with accuchecks   Avoid hypoglycemia

## 2025-02-23 NOTE — OCCUPATIONAL THERAPY NOTE
Occupational Therapy Evaluation     Patient Name: Tanya Stephen  Today's Date: 2/23/2025  Problem List  Principal Problem:    Acute encephalopathy  Active Problems:    Type 2 diabetes mellitus with stage 3b chronic kidney disease, without long-term current use of insulin (HCC)    Stage 3b chronic kidney disease (HCC)    Sicca syndrome (HCC)    Chronic pain syndrome    Gastroesophageal reflux disease without esophagitis    Bilateral pleural effusion    Nonrheumatic aortic valve stenosis    Acute on chronic diastolic (congestive) heart failure (HCC)    Ambulatory dysfunction    Hypoxia    Past Medical History  Past Medical History:   Diagnosis Date    Anemia     Arthritis     Back pain     CHF (congestive heart failure) (HCC)     Chronic kidney disease     Stage 3b    Confusion 02/22/2023    Diabetes (HCC)     Diabetes mellitus (HCC)     Diabetic gastroparesis associated with type 2 diabetes mellitus  (HCC)     Diabetic polyneuropathy (HCC)     Diverticulosis     Herpes zoster     Hypertension     IBS (irritable bowel syndrome)     Neurogenic claudication due to lumbar spinal stenosis     Osteoarthritis     Osteoporosis     Pulmonary edema     Raynaud's phenomenon without gangrene     Seronegative arthropathy of multiple sites (HCC)     Sicca (HCC)      Past Surgical History  Past Surgical History:   Procedure Laterality Date    BACK SURGERY      COLONOSCOPY      EGD AND COLONOSCOPY N/A 12/23/2016    Procedure: EGD AND COLONOSCOPY;  Surgeon: Elina Anderson MD;  Location: [facility];  Service:     JOINT REPLACEMENT      bilat knees and hips    OTHER SURGICAL HISTORY      pelvis rods    REPLACEMENT TOTAL KNEE BILATERAL      STOMACH SURGERY      UPPER GASTROINTESTINAL ENDOSCOPY           02/23/25 0802   Note Type   Note type Evaluation   Pain Assessment   Pain Assessment Tool FLACC   Pain Rating: FLACC (Rest) - Face 1   Pain Rating: FLACC (Rest) - Legs 0   Pain Rating: FLACC (Rest) - Activity 1   Pain Rating:  FLACC (Rest) - Cry 1   Pain Rating: FLACC (Rest) - Consolability 0   Score: FLACC (Rest) 3   Pain Rating: FLACC (Activity) - Face 1   Pain Rating: FLACC (Activity) - Legs 0   Pain Rating: FLACC (Activity) - Activity 1   Pain Rating: FLACC (Activity) - Cry 1   Pain Rating: FLACC (Activity) - Consolability 0   Score: FLACC (Activity) 3   Restrictions/Precautions   Weight Bearing Precautions Per Order No   Other Precautions Cognitive;Chair Alarm;Bed Alarm;Pain;Hard of hearing;Multiple lines   Home Living   Type of Home House  (3  DAVID 1 level home)   Home Layout One level;Stairs to enter with rails   Bathroom Shower/Tub Walk-in shower   Bathroom Toilet Standard   Bathroom Equipment Shower chair;Grab bars around toilet   Home Equipment Walker;Cane  (rollater and transport chair)   Additional Comments Patient ambulates at baseline with rollater and uses a transport chair in community   Prior Function   Level of [place] Independent with ADLs;Needs assistance with IADLS;Independent with functional mobility   Lives With Spouse   Receives Help From Family   IADLs Family/Friend/Other provides transportation;Family/Friend/Other provides meals;Family/Friend/Other provides medication management   Falls in the last 6 months >10   Comments Patient states that spouse assists with IADL, her PM medication but she does her medication in AM. Patient states that he assists with ADL and supervision when bathing   General   Additional Pertinent History (S)  Patient was DC in 11/24 and received HHOT/ PT and has a scheduled to start outpatient PT   Additional General Comments Phas had multiple falls recently and had a fall with head strike this admission   ADL   Eating Assistance 7  Independent   Grooming Assistance 5  Supervision/Setup   UB Bathing Assistance 5  Supervision/Setup   UB Dressing Assistance 5  Supervision/Setup   LB Dressing Assistance 3  Moderate Assistance   LB Dressing Deficit Thread RLE into underwear;Thread LLE into  underwear;Pull up over hips   Toileting Assistance  3  Moderate Assistance   Toileting Deficit Clothing management up;Clothing management down;Perineal hygiene.    Bed Mobility   Supine to Sit 2  Maximal assistance   Additional items Assist x 1;Increased time required;Verbal cues;HOB elevated   Transfers   Sit to Stand 3  Moderate assistance   Additional items Assist x 2;Increased time required;Verbal cues  (required verbal cues for hand placement)   Stand to Sit 3  Moderate assistance   Additional items Assist x 2;Increased time required;Verbal cues   Stand pivot 3  Moderate assistance  (required verbal cues for hand placement)   Additional items Assist x 2;Increased time required;Verbal cues   Toilet transfer 3  Moderate assistance   Additional items Assist x 1;Increased time required;Verbal cues  (Patient had retropulsion when standing for perihygiene noted required increase standing support)   Functional Mobility   Additional Comments Patient ambulated with RW SHD at Mod A initially progressing Min A with chair follow.   Balance   Static Sitting Fair   Dynamic Sitting Fair -   Static Standing Poor   Dynamic Standing Poor   Activity Tolerance   Activity Tolerance Patient limited by fatigue   Nurse Made Aware RN   RUE Assessment   RUE Assessment WFL   LUE Assessment   LUE Assessment WFL   Vision-Basic Assessment   Current Vision Wears glasses all the time   Vision - Complex Assessment   Acuity Able to read clock/calendar on wall without difficulty   Cognition   Overall Cognitive Status Impaired   Orientation Level Oriented to person;Oriented to place;Oriented to situation   Following Commands Follows one step commands with increased time or repetition   Comments Pt ID by wristband name and    Assessment   Limitation Decreased ADL status;Decreased cognition;Decreased endurance;Decreased self-care trans;Decreased high-level ADLs   Prognosis Fair   Assessment Patient evaluated by Occupational Therapy. Patient is  "S/ P fall. Patient admitted with Acute encephalopathy.  The patients occupational profile, medical and therapy history includes a expanded review of medical and/or therapy records and additional review of physical, cognitive, or psychosocial history related to current functional performance.  Comorbidities affecting functional mobility and ADLS include: CKD and diabetes.  Prior to admission, patient was independent with functional mobility with rollater, requiring assist for ADLS, requiring assist for IADLS, and living with spouse in a 1 level home with 3 steps to enter. See above for performance levels. The evaluation identifies the following performance deficits: weakness, impaired balance, decreased endurance, decreased coordination, increased fall risk, new onset of impairment of functional mobility, decreased ADLS, decreased IADLS, decreased activity tolerance, decreased safety awareness, and impaired judgement, that result in activity limitations and/or participation restrictions. This evaluation requires clinical decision making of high complexity, because the patient presents with comorbidites that affect occupational performance and required significant modification of tasks or assistance with consideration of multiple treatment options.  The Barthel Index was used as a functional outcome tool presenting with a score of Barthel Index Score: 40, i The patient's raw score on the AM-PAC Daily Activity Inpatient Short Form is 16. A raw score of less than 19 suggests the patient may benefit from discharge to post-acute rehabilitation services. Please refer to the recommendation of the Occupational Therapist for safe discharge planning. Patient will benefit from skilled Occupational Therapy services to address above deficits and facilitate a safe return to prior level of function.   Goals   Patient Goals \" stop falling to much\"   LTG Time Frame   (8-14)   Long Term Goal #1 see below   Plan   Treatment " Interventions ADL retraining;Functional transfer training;Endurance training;Patient/family training;Neuromuscular reeducation;Compensatory technique education;Continued evaluation;Energy conservation;Activityengagement   Goal Expiration Date 03/09/25   OT Frequency 3-5x/wk   Discharge Recommendation   Rehab Resource Intensity Level, OT II (Moderate Resource Intensity)   AM-PAC Daily Activity Inpatient   Lower Body Dressing 2   Bathing 2   Toileting 2   Upper Body Dressing 3   Grooming 3   Eating 4   Daily Activity Raw Score 16   Daily Activity Standardized Score (Calc for Raw Score >=11) 35.96   AM-PAC Applied Cognition Inpatient   Following a Speech/Presentation 4   Understanding Ordinary Conversation 4   Taking Medications 2   Remembering Where Things Are Placed or Put Away 3   Remembering List of 4-5 Errands 3   Taking Care of Complicated Tasks 2   Applied Cognition Raw Score 18   Applied Cognition Standardized Score 38.07   Barthel Index   Feeding 10   Bathing 0   Grooming Score 0   Dressing Score 5   Bladder Score 10   Bowels Score 10   Toilet Use Score 5   Transfers (Bed/Chair) Score 5   Mobility (Level Surface) Score 0   Stairs Score 0   Barthel Index Score 45   Modified Emmons Scale   Modified Emmons Scale 4   End of Consult   Patient Position at End of Consult Bed/Chair alarm activated;All needs within reach;Bedside chair   Nurse Communication Nurse aware of consult   GOALS:    1) Pt will improve activity tolerance to G for 30 min txment sessions for increased engagement in functional tasks    2) Pt will complete LB dressing/self care w/ mod I using adaptive device and DME as needed    3) Pt will complete bathing w/ Supervision w/ use of AE and DME as needed    4) Pt will complete toileting Supervision w/ G hygiene/thoroughness using DME as needed    5) Pt will improve functional transfers to Mod I on/off all surfaces using DME as needed w/ G balance/safety     6) Pt will improve functional mobility during  ADL/IADL/leisure tasks to Mod I using DME as needed w/ G balance/safety     7) Pt will improve bed mobility to Mod I and sit EOB w/ G balance/trunk control as a prerequisite for further engagement in meaningful tasks

## 2025-02-23 NOTE — ASSESSMENT & PLAN NOTE
Wt Readings from Last 3 Encounters:   02/22/25 61.7 kg (136 lb 0.4 oz)   01/27/25 54 kg (119 lb)   01/09/25 56 kg (123 lb 6.4 oz)     Suspect a mild CHF exacerbation in the setting of missed doses of Lasix.  states that he has been skipping days intentionally to avoid dehydration as this is normally an issue for her due to poor oral intake.   On physical exam, patient with documented hypoxia on room air with mild bilateral LE edema. She has bilateral pleural effusions R>L noted on CT chest.   Home diuretic: PO lasix 20mg daily, additional 20mg for weight gain/leg swelling   S/p IV Lasix 20mg x 1 -- monitor need for further IV diuresis tomorrow   I/O, Daily weights

## 2025-02-23 NOTE — ASSESSMENT & PLAN NOTE
Patient with history of chronic pain of back, hands, feet 2/2 RA, lumbar spinal stenosis, senile osteoporosis, OA, diabetic neuropathy   Tylenol 650mg q6h, Lyrica 75mg BID  Continue Plaquenil

## 2025-02-23 NOTE — ASSESSMENT & PLAN NOTE
Patient with history of mild cognitive impairment presents with increased confusion, generalized weakness, increased falls at home.   Has been evaluated by geriatrics in the past, suspected that polypharmacy contributing to patient's mental status. Her amitriptyline was discontinued and Lyrica decreased to 75mg BID.   Patient is currently oriented to self only. Patient also is hard of hearing.   CT head without acute findings   Suspect encephalopathy 2/2 hypoxia, patient with bilateral pleural effusions seen on CT chest (R>L)  Obtain UA, TSH, vitamin B12, coma panel, VBG, ammonia  Geriatrics consult appreciated   Fall precautions   Delirium precautions  PT/OT

## 2025-02-23 NOTE — ASSESSMENT & PLAN NOTE
Mild to moderate AS  Unlikely to be cause of patient's ambulatory dysfunction/frequent falls per cardiology in the past   Continue outpatient follow up

## 2025-02-23 NOTE — ASSESSMENT & PLAN NOTE
Lab Results   Component Value Date    EGFR 32 02/22/2025    EGFR 29 01/17/2025    EGFR 27 12/21/2024    CREATININE 1.43 (H) 02/22/2025    CREATININE 1.54 (H) 01/17/2025    CREATININE 1.65 (H) 12/21/2024     Cr 1.43 on admission, baseline 1.4-1.6   Monitor BMP

## 2025-02-23 NOTE — PHYSICAL THERAPY NOTE
"   PHYSICAL THERAPY EVALUATION & TREATMENT  DATE: 02/23/25  TIME: 0815-0849    NAME:  Tanya Stephen  AGE:   89 y.o.  Mrn:   [id number]  Length Of Stay: 1    ADMIT DX:  Physical deconditioning [R53.81]  Fall, initial encounter [W19.XXXA]  Unspecified multiple injuries, initial encounter [T07.XXXA]  AMS (altered mental status) [R41.82]    Past Medical History:   Diagnosis Date    Anemia     Arthritis     Back pain     CHF (congestive heart failure) (HCC)     Chronic kidney disease     Stage 3b    Confusion 02/22/2023    Diabetes (HCC)     Diabetes mellitus (HCC)     Diabetic gastroparesis associated with type 2 diabetes mellitus  (HCC)     Diabetic polyneuropathy (HCC)     Diverticulosis     Herpes zoster     Hypertension     IBS (irritable bowel syndrome)     Neurogenic claudication due to lumbar spinal stenosis     Osteoarthritis     Osteoporosis     Pulmonary edema     Raynaud's phenomenon without gangrene     Seronegative arthropathy of multiple sites (HCC)     Sicca (HCC)      Past Surgical History:   Procedure Laterality Date    BACK SURGERY      COLONOSCOPY      EGD AND COLONOSCOPY N/A 12/23/2016    Procedure: EGD AND COLONOSCOPY;  Surgeon: Elina Anderson MD;  [facility];  Service:     JOINT REPLACEMENT      bilat knees and hips    OTHER SURGICAL HISTORY      pelvis rods    REPLACEMENT TOTAL KNEE BILATERAL      STOMACH SURGERY      UPPER GASTROINTESTINAL ENDOSCOPY         Performed at least 2 patient identifiers during session: Name, Birthday, ID bracelet, and Epic photo     02/23/25 0849   PT Last Visit   PT Visit Date 02/23/25   Note Type   Note type Evaluation  (& treatment)   Additional Comments Pt goes by \"Tanisha\"   Pain Assessment   Pain Assessment Tool FLACC   Pain Location/Orientation Orientation: Right;Orientation: Lower;Location: Abdomen   Pain Onset/Description Frequency: Intermittent;Descriptor: Sharp   Effect of Pain on Daily Activities limits comfort   Patient's Stated Pain Goal No " "pain   Hospital Pain Intervention(s) Repositioned;Ambulation/increased activity;Emotional support   Multiple Pain Sites No   Pain Rating: FLACC (Rest) - Face 1   Pain Rating: FLACC (Rest) - Legs 0   Pain Rating: FLACC (Rest) - Activity 1   Pain Rating: FLACC (Rest) - Cry 1   Pain Rating: FLACC (Rest) - Consolability 0   Score: FLACC (Rest) 3   Pain Rating: FLACC (Activity) - Face 1   Pain Rating: FLACC (Activity) - Legs 0   Pain Rating: FLACC (Activity) - Activity 1   Pain Rating: FLACC (Activity) - Cry 1   Pain Rating: FLACC (Activity) - Consolability 0   Score: FLACC (Activity) 3   Restrictions/Precautions   Weight Bearing Precautions Per Order No   Other Precautions Cognitive;Chair Alarm;Bed Alarm;Multiple lines;Fall Risk;Pain;Hard of hearing  (bill; HIGH fall risk)   Home Living   Type of Home House   Home Layout One level;Stairs to enter with rails  (3 DAVID through back entrance, single level living)   Bathroom Shower/Tub Walk-in shower   Bathroom Toilet Standard   Bathroom Equipment Grab bars in shower;Grab bars around toilet   Bathroom Accessibility Other (Comment)  (RW does not fit in bathroom, but has grab bars \"all over\" which she holds onto.)   Home Equipment Walker;Cane;Other (Comment)  (RW and rollator walker; transport WC)   Prior Function   Level of [place] Independent with ADLs;Independent with functional mobility;Needs assistance with IADLS  (spouse assist with LBD prn, pt reports able to shower independently; GIULIA household mobility with rollator walker; dependent transport WC mobility in community; always has assistance on stairs to enter/exit home)   Lives With Spouse   Receives Help From Family  (Previously had HHPT, but they stopped and pt now set up to begin OPPT.)   IADLs Family/Friend/Other provides transportation;Family/Friend/Other provides meals;Family/Friend/Other provides medication management   Falls in the last 6 months >10   Vocational Retired   Comments Pt reports that at " "baseline she mobilizes at GIULIA level with rollator walker in the home, uses transport WC for community mobility (dependent propulsion). Recently with significant decline in mobility and independence d/t increasing weakness and number of falls.   General   Additional Pertinent History Pt is a 89 yr old female admitted 2/22/25 s/p multiple falls with head strikes, worsening mental status and global weakness over the last few weeks. CT head (-). Dx: acute encephalopathy, ambulatory dysfunction, hypoxia, mild CHF exacerbation. PMH includes: mild cognitive impairment, arthritis, falls, chronic back pain, CKD, CHF, DM, diabetic neuropathy, HTN, osteoporosis, B NIHARIKA, B TKA.   Family/Caregiver Present No   Cognition   Overall Cognitive Status Impaired   Arousal/Participation Cooperative   Orientation Level Oriented to person;Oriented to place;Oriented to situation  (oriented to year only)   Memory Decreased short term memory;Decreased recall of recent events;Decreased recall of precautions   Following Commands Follows one step commands with increased time or repetition  (limited due to cognition and hearing impairment)   Subjective   Subjective \"I can't figure out why I keep falling.\"   RUE Assessment   RUE Assessment WFL   RUE Strength   RUE Overall Strength Within Functional Limits - able to perform ADL tasks with strength   LUE Assessment   LUE Assessment WFL   LUE Strength   LUE Overall Strength Within Functional Limits - able to perform ADL tasks with strength   RLE Assessment   RLE Assessment X  (grossly 2+/5 to 3-/5 MMT throughout, significant bruising t/o entirety of B LEs)   LLE Assessment   LLE Assessment X  (grossly 2+/5 to 3-/5 MMT throughout, significant bruising t/o entirety of B LEs)   Vision-Basic Assessment   Current Vision Wears glasses all the time   Coordination   Movements are Fluid and Coordinated 0   Coordination and Movement Description Pt with moderate B UE and head tremors at rest and with mobility; " questionable essential tremor vs tardive dyskinesia?   Sensation X  (baseline neuropathy to B LEs)   Light Touch   RLE Light Touch Impaired   LLE Light Touch Impaired   Bed Mobility   Supine to Sit 2  Maximal assistance   Additional items Assist x 1;HOB elevated;Bedrails;Increased time required;Verbal cues;LE management  (trunk management)   Sit to Supine   (NT as pt was left seated OOB in recliner chair with alarm engaged and needs in reach)   Additional Comments Pt denies lightheadedness or dizziness with changes in positioning. Once positioned with MODA, pt able to maintain upright sitting balance EOB with close S.   Transfers   Sit to Stand 3  Moderate assistance   Additional items Assist x 2;Increased time required;Verbal cues  (RW; cues for hand placement for safety and optimal mechanics, pt with poor application as she was pulling up on RW grasps to achieve standing.)   Stand to Sit 3  Moderate assistance   Additional items Assist x 2;Increased time required;Verbal cues;Armrests  (RW; cues and assist for hand placement for safety and controlled descent, pt needing hand over hand placement onto armrests.)   Stand pivot 3  Moderate assistance   Additional items Assist x 2;Increased time required;Verbal cues  (RW; cues and assistance for RW management, surface proximity, safe / controlled descent to target surface)   Toilet transfer 3  Moderate assistance   Additional items Assist x 1;Armrests;Increased time required;Verbal cues;Commode  (RW; cues and assistance for hand placement for safety and optimal mechanics; MODA 1 person + RW support needed to maintain upright standing during post toileting hygiene care and underwear management (pt with significant retropulsion).)   Ambulation/Elevation   Gait pattern Poor UE support;Improper Weight shift;Narrow LES;Forward Flexion;Antalgic;Decreased foot clearance;Short stride;Excessively slow;Knees flexed   Gait Assistance 3  Moderate assist   Additional items Assist x  2;Verbal cues;Tactile cues   Assistive Device Rolling walker   Distance 3ft from EOB to BSC  (increased time needed on BSC, however see additional treatment time below for further transfers and ambulation training, with progression from 2 person assist to 1 person assist)   Stair Management Assistance Not tested  (pt has 3 DAVID her home)   Balance   Static Sitting Fair   Dynamic Sitting Fair -   Static Standing Poor  (w/ RW)   Dynamic Standing Poor  (w/ RW)   Ambulatory Poor  (w/ RW)   Endurance Deficit   Endurance Deficit Yes   Activity Tolerance   Activity Tolerance Patient limited by fatigue;Patient limited by pain   Medical Staff Made Aware Spoke with BENJAMÍN Zamudio, LISSETT Mccurdy   Assessment   Prognosis Fair   Problem List Decreased strength;Decreased range of motion;Decreased endurance;Impaired balance;Decreased mobility;Decreased coordination;Decreased cognition;Impaired judgement;Decreased safety awareness;Impaired hearing;Impaired sensation;Decreased skin integrity;Pain   Assessment Pt seen for PT evaluation for mobility assessment & discharge needs. Pt admitted 2/22/2025 w/ increasing weakness, increasing number of falls, AMS, dx Acute encephalopathy. During PT IE, pt requires MAXA 1 for bed mobility in hospital bed, MODA 2 person for STS transfer from EOB, and MODA 2 person + RW for ambulatory transition from EOB to BSC. During additional treatment time, pt progresses to complete multiple additional transfers with ANABELLA, ambulatory transition to recliner chair with MODA 1 person, and then additional ambulation trial of 8ft fwd with RW+ ANABELLA + chair follow. Pt displays above outlined functional impairments & limitations, and presents below her baseline level of functional mobility. The AM-PAC & Barthel Index outcome tools were used to assist in determining pt safety w/ mobility/self care & appropriate d/c recommendations, see above for scores. Pt is at risk of falls d/t multiple comorbidities, h/o falls,  "impaired balance, impaired cognition, impaired insight/safety awareness, use of ambulatory aid, varying levels of pain, advanced age, acuity of medical illness, ongoing medical treatment of primary dx, and polypharmacy. Pt's clinical presentation is currently unstable/unpredictable as seen in pt's presentation of changing level of pain, varying levels of cognitive performance, increased fall risk, new onset of impairment of functional mobility, decreased endurance, and new onset of weakness. Pt will benefit from continued PT services in order to address impairments, decrease risk of falls, maximize independence w/ fnxl mobility, & ensure safety w/ mobility for transition to next level of care. Based on pt presentation & impairments, pt would most appropriately benefit from Level II (moderate PT intensity) resources upon d/c.   Barriers to Discharge Decreased caregiver support;Inaccessible home environment   Goals   Patient Goals \"to stop falling so much\"   [facility] Expiration Date 03/09/25   Short Term Goal #1 Patient PT goals established in order to address pt self reported goal of \"to stop falling so much\". Pt will: complete all bed mobility in flat bed with S in order to promote increased OOB functional mobility and simulate home environment; complete all transfers with RW and S in order to increase safety with functional mobility; ambulate >80ft with RW and S in order to increase safety with household distance functional mobility; negotiate 3 stairs with HR assist and no greater than ANABELLA in order to facilitate safe access to her home with family assist; improve B LE strength to >/= 4+/5 MMT t/o in order to increase safety with functional mobility and decrease risk of falls; demonstrate understanding and independence with LE strengthening HEP; improve ambulatory balance to >/= good grade with RW in order to promote safety and increased independence with mobility; tolerate >3hrs OOB in upright position, in order to " improve muscular endurance and respiratory status; improve AM-PAC score to >/= 16/24 in order to increase independence with mobility and decrease burden of care; improve Barthel Index score to >/= 50/100 in order to increase independence and decrease risk of falls.   PT Treatment Day 1   Plan   Treatment/Interventions Functional transfer training;LE strengthening/ROM;Elevations;Therapeutic exercise;Endurance training;Cognitive reorientation;Patient/family training;Equipment eval/education;Bed mobility;Gait training;Spoke to MD;Spoke to nursing   PT Frequency 3-5x/wk   Discharge Recommendation   Rehab Resource Intensity Level, PT II (Moderate Resource Intensity)   AM-PAC Basic Mobility Inpatient   Turning in Flat Bed Without Bedrails 2   Lying on Back to Sitting on Edge of Flat Bed Without Bedrails 2   Moving Bed to Chair 2   Standing Up From Chair Using Arms 2   Walk in Room 2   Climb 3-5 Stairs With Railing 1   Basic Mobility Inpatient Raw Score 11   Basic Mobility Standardized Score 30.25   [facility] Highest Level Of Mobility   -[facility] Goal 4: Move to chair/commode   -[facility] Achieved 6: Walk 10 steps or more   Modified [place] Scale   Modified [place] Scale 4   Barthel Index   Feeding 5   Bathing 0   Grooming Score 0   Dressing Score 5   Bladder Score 10   Bowels Score 10   Toilet Use Score 5   Transfers (Bed/Chair) Score 5   Mobility (Level Surface) Score 0   Stairs Score 0   Barthel Index Score 40   Additional Treatment Session   Start Time 0826   End Time 0849   Treatment Assessment Pt is agreeable to participate in additional mobility/gait training post IE. Pt demonstrates ability to progress from 2 person assist for transfers and ambulation to 1 person assistance during this treatment time. Pt requires MODA 1 person for ambulatory transition approx 5ft from BSC to recliner chair, MODA for safe/controlled descent to target surface + assistance for RW management during pivot approach to chair. After brief  functional rest break, pt is agreeable to additional ambulation trial of straight pathway ambulation. Pt transfers STS from recliner chair with ANABELLA, and ambulates an additional 8ft with RW and ANABELLA + chair follow. Ambulation distance limited at this time due to generalized fatigue and weakness. At end of session pt was left seated OOB in recliner chair with alarm engaged and needs in reach. Regarding functional mobility, pt remains below her baseline level of functional mobility and continues to require extensive skilled PT services. Continue to recommend Level II (moderate PT intensity) resources once medically cleared for d/c from the acute care setting. Will continue skilled PT POC as able and appropriate to address functional impairments and progress towards therapy goals. Next session, plan for intervention of increased gait and LE strengthening as able.   Additional Treatment Day 1   End of Consult   Patient Position at End of Consult Bedside chair;Bed/Chair alarm activated;All needs within reach       This session, pt required and most appropriately benefited from partial or full skilled PT/OT co-eval due to extensive physical assistance of SKILLED therapists, cognitive-communication impairments, significant regression from baseline level of mobility, decreased activity tolerance, and unpredictable medical and/or functional status. PT and OT goals were addressed separately as seen in documentation.    Based on patient's [facility] Highest Level of Mobility scores today, patient currently has a goal of -[facility] Levels: 6: WALK 10 STEPS OR MORE, to be completed with RN staffing each shift, in order to improve overall activity tolerance and mobility, combat hospital related deconditioning, and maximize outcomes for d/c from the acute care setting.     The patient's AM-PAC Basic Mobility Inpatient Short Form Raw Score is 11. A Raw score of less than or equal to 16 suggests the patient may benefit from discharge  to post-acute rehabilitation services. Please also refer to the recommendation of the Physical Therapist for safe discharge planning.      Radha Pacheco PT, DPT   Available via [employer] [place]  NPI # [id number]  [place] License - [id number]  2/23/2025

## 2025-02-23 NOTE — ASSESSMENT & PLAN NOTE
Hx of SICCA syndrome related dysphagia   She has chronic dry eyes and dry mouth  Evaluated by speech therapy on previous admission -- dysphagia dental soft, thin liquid diet

## 2025-02-23 NOTE — ASSESSMENT & PLAN NOTE
CT chest shows moderate right and small left pleural effusions.  History of mild bilateral pleural effusions. Currently seem to have slightly increased in size.   Likely secondary to CHF. Patient did have hypoxia in the ER,  reports patient with OLIVAREZ at home. She has mild LE edema.   S/p IV Lasix 20mg x 1   Cautious use of IV diuresis as patient at baseline is normally dehydrated  Consider IR consult for thora if no improvement in symptoms

## 2025-02-23 NOTE — CASE MANAGEMENT
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     Chronic kidney disease, stage IV (severe) ([facility]) 10/28/2024     Diabetic nephropathy associated with type 2 diabetes mellitus ([facility]) 10/28/2024     Anemia of chronic renal failure, stage 4 (severe)  ([facility]) 10/28/2024     Fall 2024     Mild cognitive impairment 2024     Chronic mastoiditis of right side 2024     Nonrheumatic aortic valve stenosis 2024     Bilateral pleural effusion 2024     Gastroesophageal reflux disease without esophagitis 2023     Esophageal dysphagia 2023     Raynaud's phenomenon without gangrene 2023     Spinal stenosis of lumbar region with neurogenic claudication 2022     Chronic pain syndrome 2022     Acute encephalopathy 2022     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[facility]) 2022     OAB (overactive bladder) 2022     Acute on chronic diastolic (congestive) heart failure ([facility]) 2022     Post zoster neuralgia 2021     Primary generalized (osteo)arthritis 2021     Seronegative arthropathy of multiple sites (HCC) 2021     Senile osteoporosis 2021     Diabetic polyneuropathy associated with type 2 diabetes mellitus ([facility]) 2021     Irritable bowel syndrome with diarrhea 2021     Stage 3b chronic kidney disease ([facility]) 2021     Abnormality of gait due to impairment of balance 2021     Sicca syndrome ([facility]) 2021     Hypomagnesemia 2016      Type 2 diabetes mellitus with stage 3b chronic kidney disease, without long-term current use of insulin (HCC) 12/22/2016       LOS (days): 1  Geometric Mean LOS (GMLOS) (days):   Days to GMLOS:     OBJECTIVE:  Risk of Unplanned Readmission Score: 25.45         Current admission status: Inpatient   Preferred Pharmacy:   [id number] - [place], [place] - [address]  [address] [address]  Phone: [phone] Fax: [phone]    [facility] [address] [place] - [id number] [facility] [address]  Phone: [phone] Fax: [phone]    [facility] [address]  [address]  [facility] [address]  Phone: [phone] Fax: [phone]    Primary Care Provider: Gregory Santoro DO    Primary Insurance: MEDICARE  Secondary Insurance: [employer]    DISCHARGE DETAILS:    Discharge planning discussed with:: Patient and spouse  Freedom of Choice: Yes  Comments - Freedom of Choice: CM met with patient and spouse at bedside to introduce self and role. Patient being recommended for STR at this time. Spouse states he has patient scheduled for outpatient therapy and plans to continue with that. He is not interested in STR at this time. Provider updated  CM contacted family/caregiver?: Yes  Were Treatment Team discharge recommendations reviewed with patient/caregiver?: Yes  Did patient/caregiver verbalize understanding of patient care needs?: Yes  Were patient/caregiver advised of the risks associated with not following Treatment Team discharge recommendations?: Yes    Contacts  Patient Contacts: Weston Stephen (Spouse)  Relationship to Patient:: Family  Contact Method: In Person  Reason/Outcome: Discharge Planning    Requested Home Health Care         Is the patient interested in HHC at discharge?: No    DME Referral Provided  Referral made for DME?: No

## 2025-02-23 NOTE — ASSESSMENT & PLAN NOTE
Lab Results   Component Value Date    EGFR 33 02/23/2025    EGFR 32 02/22/2025    EGFR 29 01/17/2025    CREATININE 1.39 (H) 02/23/2025    CREATININE 1.43 (H) 02/22/2025    CREATININE 1.54 (H) 01/17/2025     Cr 1.43 on admission, baseline 1.4-1.6   Monitor BMP

## 2025-02-23 NOTE — ASSESSMENT & PLAN NOTE
Patient has been dealing with ambulatory dysfunction with frequent falls for the past few months now. But increased in frequency over the past week.   She was recently admitted for this and was discharged with home PT/OT.  states that he has outpatient PT/OT set up for her in a few days.   Patient has a history of diabetic neuropathy, seronegative arthropathy of multiple sites, generalized OA, spinal stenosis of lumbar region, and senile osteoporosis, all of which can affect patient's gait   states that patient has not been practicing her home exercises and basically sleeps all day   Trauma work-up negative   Likely 2/2 overall physical deconditioning  Fall precautions  PT/OT

## 2025-02-23 NOTE — H&P
H&P - Hospitalist   Name: Tanya Stephen 89 y.o. female I MRN: [id number]  Unit/Bed#: W -01 I Date of Admission: 2/22/2025   Date of Service: 2/23/2025 I Hospital Day: 1     Assessment & Plan  Acute encephalopathy  Patient with history of mild cognitive impairment presents with increased confusion, generalized weakness, increased falls at home.   Has been evaluated by geriatrics in the past, suspected that polypharmacy contributing to patient's mental status. Her amitriptyline was discontinued and Lyrica decreased to 75mg BID.   Patient is currently oriented to self only. Patient also is hard of hearing.   CT head without acute findings   Suspect encephalopathy 2/2 hypoxia, patient with bilateral pleural effusions seen on CT chest (R>L)  Obtain UA, TSH, vitamin B12, coma panel, VBG, ammonia  Geriatrics consult appreciated   Fall precautions   Delirium precautions  PT/OT  Ambulatory dysfunction  Patient has been dealing with ambulatory dysfunction with frequent falls for the past few months now. But increased in frequency over the past week.   She was recently admitted for this and was discharged with home PT/OT.  states that he has outpatient PT/OT set up for her in a few days.   Patient has a history of diabetic neuropathy, seronegative arthropathy of multiple sites, generalized OA, spinal stenosis of lumbar region, and senile osteoporosis, all of which can affect patient's gait   states that patient has not been practicing her home exercises and basically sleeps all day   Trauma work-up negative   Likely 2/2 overall physical deconditioning  Fall precautions  PT/OT  Bilateral pleural effusion  CT chest shows moderate right and small left pleural effusions.  History of mild bilateral pleural effusions. Currently seem to have slightly increased in size.   Likely secondary to CHF. Patient did have hypoxia in the ER,  reports patient with OLIVAREZ at home. She has mild LE edema.   S/p IV Lasix  20mg x 1   Cautious use of IV diuresis as patient at baseline is normally dehydrated  Consider IR consult for thora if no improvement in symptoms   Hypoxia  spO2 86% on room air and placed on 4L NC in the ER. Now stable on room air after receiving dose of IV Lasix 20mgx.   Patient has chronic bilateral pleural effusions (R>L) which seem to have increased in size.   Suspect hypoxia 2/2 CHF, pleural effusions   S/p IV Lasix 20mg x 1   Ambulatory pulse ox before discharge  Acute on chronic diastolic (congestive) heart failure (HCC)  Wt Readings from Last 3 Encounters:   02/22/25 61.7 kg (136 lb 0.4 oz)   01/27/25 54 kg (119 lb)   01/09/25 56 kg (123 lb 6.4 oz)     Suspect a mild CHF exacerbation in the setting of missed doses of Lasix.  states that he has been skipping days intentionally to avoid dehydration as this is normally an issue for her due to poor oral intake.   On physical exam, patient with documented hypoxia on room air with mild bilateral LE edema. She has bilateral pleural effusions R>L noted on CT chest.   Home diuretic: PO lasix 20mg daily, additional 20mg for weight gain/leg swelling   S/p IV Lasix 20mg x 1 -- monitor need for further IV diuresis tomorrow   I/O, Daily weights         Type 2 diabetes mellitus with stage 3b chronic kidney disease, without long-term current use of insulin (HCC)  Lab Results   Component Value Date    HGBA1C 6.7 (H) 11/15/2024       Recent Labs     02/23/25  0024   POCGLU 151*       Blood Sugar Average: Last 72 hrs:  (P) 151  Well controlled with diet at home   SSI with accuchecks   Avoid hypoglycemia   Stage 3b chronic kidney disease (HCC)  Lab Results   Component Value Date    EGFR 32 02/22/2025    EGFR 29 01/17/2025    EGFR 27 12/21/2024    CREATININE 1.43 (H) 02/22/2025    CREATININE 1.54 (H) 01/17/2025    CREATININE 1.65 (H) 12/21/2024     Cr 1.43 on admission, baseline 1.4-1.6   Monitor BMP   Sicca syndrome (HCC)  Hx of SICCA syndrome related dysphagia   She  "has chronic dry eyes and dry mouth  Evaluated by speech therapy on previous admission -- dysphagia dental soft, thin liquid diet     Chronic pain syndrome  Patient with history of chronic pain of back, hands, feet 2/2 RA, lumbar spinal stenosis, senile osteoporosis, OA, diabetic neuropathy   Tylenol 650mg q6h, Lyrica 75mg BID  Continue Plaquenil   Gastroesophageal reflux disease without esophagitis  Continue PPI   Nonrheumatic aortic valve stenosis  Mild to moderate AS  Unlikely to be cause of patient's ambulatory dysfunction/frequent falls per cardiology in the past   Continue outpatient follow up       VTE Pharmacologic Prophylaxis: VTE Score: 4 Moderate Risk (Score 3-4) - Pharmacological DVT Prophylaxis Ordered: heparin.  Code Status: Level 1 - Full Code   Discussion with family: Updated  () via phone.    Anticipated Length of Stay: Patient will be admitted on an inpatient basis with an anticipated length of stay of greater than 2 midnights secondary to acute encephalopathy, hypoxia, bilateral pleural effusions, mild CHF exacerbation, ambulatory dysfunction with frequent falls.    History of Present Illness   Chief Complaint: Increased confusion and multiple falls at home over the past week    Tanya Stephen is a 89 y.o. female with a PMH of mild cognitive impairment, diastolic heart failure, type 2 diabetes, chronic pain syndrome, GERD,SICCA, CKD, dysphagia  who presents with increased confusion and multiple falls at home.  Patient is currently oriented to self only and unable to provide accurate history.  Spoke with patient's  on the phone who states that over the past week, she has had increased confusion beyond her baseline and increased frequency of falls.  Patient is on aspirin daily.  According to the , patient states that she feels like her legs are \" rubber\" and just kept giving out on her.   states that he noticed that she does seem short of breath with " exertion at home.  He also states that she has very poor oral intake at baseline and is usually dehydrated.  For this reason, he has not been giving her oral Lasix daily and will sometimes skip days in between to avoid dehydration.  He states that she was recently treated for a UTI about 1 month ago.  She does have a history of frequent UTI.  He states that she has not been complaining of any urinary symptoms at home.  No fever/chills.  No recent travel or sick contacts.    Per chart review, patient was admitted for ambulatory dysfunction and was discharged with home PT/OT which was completed. However, since then, she has not been doing her home exercises and is usually just sitting around or sleeping all day.  states that he has outpatient PT set up for her in a few days.     Review of Systems   Unable to perform ROS: Mental status change       Historical Information   Past Medical History:   Diagnosis Date    Anemia     Arthritis     Back pain     CHF (congestive heart failure) (HCC)     Chronic kidney disease     Stage 3b    Confusion 02/22/2023    Diabetes (HCC)     Diabetes mellitus (HCC)     Diabetic gastroparesis associated with type 2 diabetes mellitus  (HCC)     Diabetic polyneuropathy (HCC)     Diverticulosis     Herpes zoster     Hypertension     IBS (irritable bowel syndrome)     Neurogenic claudication due to lumbar spinal stenosis     Osteoarthritis     Osteoporosis     Pulmonary edema     Raynaud's phenomenon without gangrene     Seronegative arthropathy of multiple sites (HCC)     Sicca (HCC)      Past Surgical History:   Procedure Laterality Date    BACK SURGERY      COLONOSCOPY      EGD AND COLONOSCOPY N/A 12/23/2016    Procedure: EGD AND COLONOSCOPY;  Surgeon: Elina Anderson MD;  Location: [facility];  Service:     JOINT REPLACEMENT      bilat knees and hips    OTHER SURGICAL HISTORY      pelvis rods    REPLACEMENT TOTAL KNEE BILATERAL      STOMACH SURGERY      UPPER GASTROINTESTINAL  ENDOSCOPY       Social History     Tobacco Use    Smoking status: Former     Current packs/day: 0.00     Types: Cigarettes     Quit date:      Years since quittin.1     Passive exposure: Never    Smokeless tobacco: Never   Vaping Use    Vaping status: Never Used   Substance and Sexual Activity    Alcohol use: No    Drug use: No    Sexual activity: Not Currently     Partners: Male     Birth control/protection: None     E-Cigarette/Vaping    E-Cigarette Use Never User      E-Cigarette/Vaping Substances    Nicotine No     THC No     CBD No     Flavoring No     Other No      Family History   Problem Relation Age of Onset    Cancer Brother     Diabetes Mother     Hypertension Father     Heart disease Father      Social History:  Marital Status: /Civil Union   Patient Pre-hospital Living Situation: Home  Patient Pre-hospital Level of Mobility: walks with walker  Patient Pre-hospital Diet Restrictions: diabetic    Meds/Allergies   I have reviewed home medications using recent Epic encounter.  Prior to Admission medications    Medication Sig Start Date End Date Taking? Authorizing Provider   acetaminophen (TYLENOL) 325 mg tablet Take 2 tablets (650 mg total) by mouth 3 (three) times a day 24  Yes Tyrone Butler MD   aspirin 81 mg chewable tablet Chew 1 tablet (81 mg total) daily 4/15/22  Yes Leland Gray MD   atorvastatin (LIPITOR) 10 mg tablet TAKE ONE TABLET BY MOUTH EVERY DAY 24  Yes Mihir Portillo DO   Cholecalciferol (VITAMIN D3) 1000 units CAPS Take 1 capsule by mouth daily   Yes Historical Provider, MD   DULoxetine (CYMBALTA) 60 mg delayed release capsule TAKE ONE CAPSULE BY MOUTH ONCE DAILY 10/30/24  Yes Danyell Dutta MD   furosemide (LASIX) 20 mg tablet One tablet daily and an extra 20 mg prn for wt gain 3 lb/ 24 hours or 5 lb/ 5 days, above a dry wt of 122 lb 24  Yes JOSE G Valladares   Magnesium 250 MG TABS Take 250 mg by mouth every evening   Yes Historical  Provider, MD   Mirabegron ER (Myrbetriq) 50 MG TB24 Take 1 tablet (50 mg total) by mouth in the morning 10/17/24  Yes Kendall Moore PA-C   omeprazole (PriLOSEC) 20 mg delayed release capsule Take 20 mg by mouth daily 10/1/24  Yes Historical Provider, MD   pregabalin (LYRICA) 75 mg capsule Take 1 capsule (75 mg total) by mouth 2 (two) times a day 9/4/24  Yes Tyrone Butler MD   pyridoxine (B-6) 100 MG tablet Take 100 mg by mouth daily   Yes Historical Provider, MD   Sodium Fluoride (PreviDent 5000 Booster Plus) 1.1 % PSTE Apply on teeth every evening as directed 11/18/21  Yes Danyell Dutta MD   diphenoxylate-atropine (LOMOTIL) 2.5-0.025 mg per tablet Take 1 tablet by mouth if needed  Patient not taking: Reported on 2/22/2025    Historical Provider, MD   hydroxychloroquine (PLAQUENIL) 200 mg tablet Take 1 tablet (200 mg total) by mouth daily with breakfast 8/20/24 2/16/25  Yolanda Spence PA-C     Allergies   Allergen Reactions    Morphine Other (See Comments)     urinary retention    Ciprofloxacin Nausea Only and Rash       Objective :  Temp:  [97 °F (36.1 °C)-97.7 °F (36.5 °C)] 97.7 °F (36.5 °C)  HR:  [] 93  BP: (119-136)/(59-77) 125/70  Resp:  [16-18] 18  SpO2:  [91 %-100 %] 94 %  O2 Device: None (Room air)    Physical Exam  Constitutional:       General: She is not in acute distress.     Comments: frail   HENT:      Mouth/Throat:      Mouth: Mucous membranes are moist.   Eyes:      General: No scleral icterus.  Cardiovascular:      Rate and Rhythm: Normal rate and regular rhythm.      Heart sounds: Normal heart sounds.   Pulmonary:      Effort: Pulmonary effort is normal.      Breath sounds: Normal breath sounds.   Abdominal:      General: Abdomen is flat. Bowel sounds are normal.      Palpations: Abdomen is soft.   Musculoskeletal:      Right lower leg: Edema present.      Left lower leg: Edema present.   Skin:     General: Skin is warm and dry.   Neurological:      General: No focal deficit  present.      Mental Status: She is alert. She is disoriented.      Cranial Nerves: No cranial nerve deficit, dysarthria or facial asymmetry.      Comments: Oriented to self only          Lines/Drains:            Lab Results: I have reviewed the following results:  Results from last 7 days   Lab Units 02/22/25  1850   WBC Thousand/uL 7.77   HEMOGLOBIN g/dL 11.8   HEMATOCRIT % 37.3   PLATELETS Thousands/uL 156   SEGS PCT % 68   LYMPHO PCT % 15   MONO PCT % 13*   EOS PCT % 2     Results from last 7 days   Lab Units 02/22/25  1850   SODIUM mmol/L 137   POTASSIUM mmol/L 4.1   CHLORIDE mmol/L 103   CO2 mmol/L 26   BUN mg/dL 29*   CREATININE mg/dL 1.43*   ANION GAP mmol/L 8   CALCIUM mg/dL 9.4   ALBUMIN g/dL 3.9   TOTAL BILIRUBIN mg/dL 0.95   ALK PHOS U/L 108*   ALT U/L 39   AST U/L 44*   GLUCOSE RANDOM mg/dL 127         Results from last 7 days   Lab Units 02/23/25  0024   POC GLUCOSE mg/dl 151*     Lab Results   Component Value Date    HGBA1C 6.7 (H) 11/15/2024    HGBA1C 7.6 (H) 07/30/2024    HGBA1C 6.6 (H) 03/02/2024           Imaging Results Review: I reviewed radiology reports from this admission including: CT chest, CT abdomen/pelvis, CT head, and CT C-spine.  Other Study Results Review: No additional pertinent studies reviewed.    Administrative Statements   I have spent a total time of 75 minutes in caring for this patient on the day of the visit/encounter including Diagnostic results, Prognosis, Risks and benefits of tx options, Instructions for management, Patient and family education, Importance of tx compliance, Risk factor reductions, Impressions, Counseling / Coordination of care, Documenting in the medical record, Reviewing/placing orders in the medical record (including tests, medications, and/or procedures), Obtaining or reviewing history  , and Communicating with other healthcare professionals .    ** Please Note: This note has been constructed using a voice recognition system. **

## 2025-02-24 VITALS
BODY MASS INDEX: 28.89 KG/M2 | OXYGEN SATURATION: 93 % | HEIGHT: 59 IN | TEMPERATURE: 97.3 F | RESPIRATION RATE: 12 BRPM | WEIGHT: 143.3 LBS | DIASTOLIC BLOOD PRESSURE: 63 MMHG | SYSTOLIC BLOOD PRESSURE: 118 MMHG | HEART RATE: 94 BPM

## 2025-02-24 LAB
ALBUMIN SERPL BCG-MCNC: 3 G/DL (ref 3.5–5)
ANION GAP SERPL CALCULATED.3IONS-SCNC: 10 MMOL/L (ref 4–13)
BUN SERPL-MCNC: 28 MG/DL (ref 5–25)
CALCIUM ALBUM COR SERPL-MCNC: 9.6 MG/DL (ref 8.3–10.1)
CALCIUM SERPL-MCNC: 8.8 MG/DL (ref 8.4–10.2)
CHLORIDE SERPL-SCNC: 104 MMOL/L (ref 96–108)
CO2 SERPL-SCNC: 25 MMOL/L (ref 21–32)
CREAT SERPL-MCNC: 1.45 MG/DL (ref 0.6–1.3)
ERYTHROCYTE [DISTWIDTH] IN BLOOD BY AUTOMATED COUNT: 16 % (ref 11.6–15.1)
GFR SERPL CREATININE-BSD FRML MDRD: 31 ML/MIN/1.73SQ M
GLUCOSE SERPL-MCNC: 114 MG/DL (ref 65–140)
GLUCOSE SERPL-MCNC: 118 MG/DL (ref 65–140)
GLUCOSE SERPL-MCNC: 123 MG/DL (ref 65–140)
HCT VFR BLD AUTO: 31.8 % (ref 34.8–46.1)
HGB BLD-MCNC: 10.2 G/DL (ref 11.5–15.4)
MAGNESIUM SERPL-MCNC: 1.7 MG/DL (ref 1.9–2.7)
MCH RBC QN AUTO: 29.6 PG (ref 26.8–34.3)
MCHC RBC AUTO-ENTMCNC: 32.1 G/DL (ref 31.4–37.4)
MCV RBC AUTO: 92 FL (ref 82–98)
PHOSPHATE SERPL-MCNC: 4.6 MG/DL (ref 2.3–4.1)
PLATELET # BLD AUTO: 125 THOUSANDS/UL (ref 149–390)
PMV BLD AUTO: 11.5 FL (ref 8.9–12.7)
POTASSIUM SERPL-SCNC: 3.7 MMOL/L (ref 3.5–5.3)
RBC # BLD AUTO: 3.45 MILLION/UL (ref 3.81–5.12)
SODIUM SERPL-SCNC: 139 MMOL/L (ref 135–147)
WBC # BLD AUTO: 5.99 THOUSAND/UL (ref 4.31–10.16)

## 2025-02-24 PROCEDURE — 85027 COMPLETE CBC AUTOMATED: CPT | Performed by: STUDENT IN AN ORGANIZED HEALTH CARE EDUCATION/TRAINING PROGRAM

## 2025-02-24 PROCEDURE — 83735 ASSAY OF MAGNESIUM: CPT | Performed by: STUDENT IN AN ORGANIZED HEALTH CARE EDUCATION/TRAINING PROGRAM

## 2025-02-24 PROCEDURE — 80069 RENAL FUNCTION PANEL: CPT | Performed by: STUDENT IN AN ORGANIZED HEALTH CARE EDUCATION/TRAINING PROGRAM

## 2025-02-24 PROCEDURE — 82948 REAGENT STRIP/BLOOD GLUCOSE: CPT

## 2025-02-24 RX ORDER — FUROSEMIDE 20 MG/1
TABLET ORAL
Qty: 90 TABLET | Refills: 0 | Status: SHIPPED | OUTPATIENT
Start: 2025-02-24

## 2025-02-24 RX ORDER — MAGNESIUM SULFATE HEPTAHYDRATE 40 MG/ML
2 INJECTION, SOLUTION INTRAVENOUS ONCE
Status: COMPLETED | OUTPATIENT
Start: 2025-02-24 | End: 2025-02-24

## 2025-02-24 RX ADMIN — PANTOPRAZOLE SODIUM 20 MG: 20 TABLET, DELAYED RELEASE ORAL at 06:18

## 2025-02-24 RX ADMIN — PYRIDOXINE HCL TAB 50 MG 50 MG: 50 TAB at 08:56

## 2025-02-24 RX ADMIN — ASPIRIN 81 MG CHEWABLE TABLET 81 MG: 81 TABLET CHEWABLE at 08:56

## 2025-02-24 RX ADMIN — ACETAMINOPHEN 650 MG: 325 TABLET, FILM COATED ORAL at 00:04

## 2025-02-24 RX ADMIN — FUROSEMIDE 20 MG: 20 TABLET ORAL at 09:01

## 2025-02-24 RX ADMIN — METHOCARBAMOL 500 MG: 500 TABLET ORAL at 06:18

## 2025-02-24 RX ADMIN — ATORVASTATIN CALCIUM 10 MG: 10 TABLET, FILM COATED ORAL at 08:56

## 2025-02-24 RX ADMIN — HEPARIN SODIUM 5000 UNITS: 5000 INJECTION INTRAVENOUS; SUBCUTANEOUS at 06:18

## 2025-02-24 RX ADMIN — DULOXETINE 60 MG: 60 CAPSULE, DELAYED RELEASE ORAL at 08:56

## 2025-02-24 RX ADMIN — HYDROXYCHLOROQUINE SULFATE 200 MG: 200 TABLET ORAL at 08:56

## 2025-02-24 RX ADMIN — ACETAMINOPHEN 650 MG: 325 TABLET, FILM COATED ORAL at 06:18

## 2025-02-24 RX ADMIN — PREGABALIN 75 MG: 75 CAPSULE ORAL at 08:56

## 2025-02-24 RX ADMIN — MAGNESIUM SULFATE HEPTAHYDRATE 2 G: 40 INJECTION, SOLUTION INTRAVENOUS at 09:03

## 2025-02-24 NOTE — DISCHARGE SUMMARY
Discharge Summary - Hospitalist   Name: Tanya Stephen 89 y.o. female I MRN: [id number]  Unit/Bed#: W -01 I Date of Admission: 2/22/2025   Date of Service: 2/24/2025 I Hospital Day: 2   { ?Quick Links I Problem List I [facility] I Billing Tip:81838}  Assessment & Plan  Acute encephalopathy  Patient with history of mild cognitive impairment presents with increased confusion, generalized weakness, increased falls at home.   Has been evaluated by geriatrics in the past, suspected that polypharmacy contributing to patient's mental status. Her amitriptyline was discontinued and Lyrica decreased to 75mg BID.   Patient is currently oriented to self only. Patient also is hard of hearing.   CT head without acute findings   Suspect encephalopathy 2/2 hypoxia, patient with bilateral pleural effusions seen on CT chest (R>L)  Obtain UA, TSH, vitamin B12, coma panel, VBG, ammonia  Geriatrics consulted  Fall precautions   Delirium precautions  PT/OT  Ambulatory dysfunction  Patient has been dealing with ambulatory dysfunction with frequent falls for the past few months now. But increased in frequency over the past week.   She was recently admitted for this and was discharged with home PT/OT.  states that he has outpatient PT/OT set up for her in a few days.   Patient has a history of diabetic neuropathy, seronegative arthropathy of multiple sites, generalized OA, spinal stenosis of lumbar region, and senile osteoporosis, all of which can affect patient's gait   states that patient has not been practicing her home exercises and basically sleeps all day   Trauma work-up negative   Likely 2/2 overall physical deconditioning  Fall precautions  PT/OT  Bilateral pleural effusion  CT chest shows moderate right and small left pleural effusions.  History of mild bilateral pleural effusions. Currently seem to have slightly increased in size.   Likely secondary to CHF. Patient did have hypoxia in the ER,  reports  patient with OLIVAREZ at home. She has mild LE edema.   S/p IV Lasix 20mg x 1 on admission  Continue home lasix 20 mg daily PO  Patient titrated to room air  Hypoxia  spO2 86% on room air and placed on 4L NC in the ER. Now stable on room air after receiving dose of IV Lasix 20mgx.   Patient has chronic bilateral pleural effusions (R>L) which seem to have increased in size.   Suspect hypoxia 2/2 CHF, pleural effusions   S/p IV Lasix 20mg x 1   Ambulatory pulse ox before discharge  Acute on chronic diastolic (congestive) heart failure (HCC)  Wt Readings from Last 3 Encounters:   02/24/25 65 kg (143 lb 4.8 oz)   01/27/25 54 kg (119 lb)   01/09/25 56 kg (123 lb 6.4 oz)     Suspect a mild CHF exacerbation in the setting of missed doses of Lasix.  states that he has been skipping days intentionally to avoid dehydration as this is normally an issue for her due to poor oral intake.   On physical exam, patient with documented hypoxia on room air with mild bilateral LE edema. She has bilateral pleural effusions R>L noted on CT chest.   Home diuretic: PO lasix 20mg daily, additional 20mg for weight gain/leg swelling   S/p IV Lasix 20mg x 1 on admission  Continue home lasix 20 mg daily PO  Patient titrated to room air        Type 2 diabetes mellitus with stage 3b chronic kidney disease, without long-term current use of insulin (HCC)  Lab Results   Component Value Date    HGBA1C 6.7 (H) 11/15/2024       Recent Labs     02/23/25  1047 02/23/25  1550 02/23/25  2130 02/24/25  0754   POCGLU 101 169* 131 114       Blood Sugar Average: Last 72 hrs:  (P) 126  Well controlled with diet at home   SSI with accuchecks   Avoid hypoglycemia   Stage 3b chronic kidney disease (HCC)  Lab Results   Component Value Date    EGFR 31 02/24/2025    EGFR 33 02/23/2025    EGFR 32 02/22/2025    CREATININE 1.45 (H) 02/24/2025    CREATININE 1.39 (H) 02/23/2025    CREATININE 1.43 (H) 02/22/2025     Cr 1.43 on admission, baseline 1.4-1.6   Monitor BMP    Sicca syndrome (HCC)  Hx of SICCA syndrome related dysphagia   She has chronic dry eyes and dry mouth  Evaluated by speech therapy on previous admission -- dysphagia dental soft, thin liquid diet     Chronic pain syndrome  Patient with history of chronic pain of back, hands, feet 2/2 RA, lumbar spinal stenosis, senile osteoporosis, OA, diabetic neuropathy   Tylenol 650mg q6h, Lyrica 75mg BID  Continue Plaquenil   Added robaxin 500 mg q8h  PRN oxy 5 mg q8h for moderate pain  Gastroesophageal reflux disease without esophagitis  Continue PPI   Nonrheumatic aortic valve stenosis  Mild to moderate AS  Unlikely to be cause of patient's ambulatory dysfunction/frequent falls per cardiology in the past   Continue outpatient follow up      Medical Problems       Resolved Problems  Date Reviewed: 12/9/2024   None       Discharging Physician / Practitioner: Katja Mccurdy DO  PCP: Gregory Santoro DO  Admission Date:   Admission Orders (From admission, onward)       Ordered        02/22/25 2128  INPATIENT ADMISSION  Once                          Discharge Date: 02/24/25    Consultations During Hospital Stay:  Pt/ot    Procedures Performed:   none    Significant Findings / Test Results:   ***    Incidental Findings:   ***   {SLIM Discharge Incidential Findings:18770}    Test Results Pending at Discharge (will require follow up):   ***     Outpatient Tests Requested:  ***    Complications:  ***    Reason for Admission: ***    Hospital Course:   Tanya Stephen is a 89 y.o. female patient who originally presented to the hospital on 2/22/2025 due to ***    {Hospital Course:01672}    {Complete this smartphrase if the patient is an inpatient and being discharged earlier than 2 midnights. If this does not apply, please delete this line:63222}  Please see above list of diagnoses and related plan for additional information.     Condition at Discharge: {Condition:31164}    Discharge Day Visit / Exam:   {SL IP SLIM DISCHARGE EXAM  OPTIONS:00205}    Discussion with Family: {Family Communication:28173}    Discharge instructions/Information to patient and family:   See after visit summary for information provided to patient and family.      Provisions for Follow-Up Care:  See after visit summary for information related to follow-up care and any pertinent home health orders.      Mobility at time of Discharge:   Basic Mobility Inpatient Raw Score: 11  -[facility] Goal: 4: Move to chair/commode  -[facility] Achieved: 5: Stand (1 or more minutes)  {Mobility:91742}     Disposition:   {Disposition on Discharge:59126}    Planned Readmission: ***    Discharge Medications:  See after visit summary for reconciled discharge medications provided to patient and/or family.      Administrative Statements   Discharge Statement:  I have spent a total time of *** minutes in caring for this patient on the day of the visit/encounter. {>30 minutes of time was spent on:47719}.    **Please Note: This note may have been constructed using a voice recognition system**

## 2025-02-24 NOTE — PLAN OF CARE
Problem: Prexisting or High Potential for Compromised Skin Integrity  Goal: Skin integrity is maintained or improved  Description: INTERVENTIONS:  - Identify patients at risk for skin breakdown  - Assess and monitor skin integrity  - Assess and monitor nutrition and hydration status  - Monitor labs   - Assess for incontinence   - Turn and reposition patient  - Assist with mobility/ambulation  - Relieve pressure over bony prominences  - Avoid friction and shearing  - Provide appropriate hygiene as needed including keeping skin clean and dry  - Evaluate need for skin moisturizer/barrier cream  - Collaborate with interdisciplinary team   - Patient/family teaching  - Consider wound care consult   Outcome: Progressing     Problem: PAIN - ADULT  Goal: Verbalizes/displays adequate comfort level or baseline comfort level  Description: Interventions:  - Encourage patient to monitor pain and request assistance  - Assess pain using appropriate pain scale  - Administer analgesics based on type and severity of pain and evaluate response  - Implement non-pharmacological measures as appropriate and evaluate response  - Consider cultural and social influences on pain and pain management  - Notify physician/advanced practitioner if interventions unsuccessful or patient reports new pain  Outcome: Progressing     Problem: INFECTION - ADULT  Goal: Absence or prevention of progression during hospitalization  Description: INTERVENTIONS:  - Assess and monitor for signs and symptoms of infection  - Monitor lab/diagnostic results  - Monitor all insertion sites, i.e. indwelling lines, tubes, and drains  - Monitor endotracheal if appropriate and nasal secretions for changes in amount and color  - [place] appropriate cooling/warming therapies per order  - Administer medications as ordered  - Instruct and encourage patient and family to use good hand hygiene technique  - Identify and instruct in appropriate isolation precautions for  identified infection/condition  Outcome: Progressing  Goal: Absence of fever/infection during neutropenic period  Description: INTERVENTIONS:  - Monitor WBC    Outcome: Progressing     Problem: SAFETY ADULT  Goal: Patient will remain free of falls  Description: INTERVENTIONS:  - Educate patient/family on patient safety including physical limitations  - Instruct patient to call for assistance with activity   - Consult OT/PT to assist with strengthening/mobility   - Keep Call bell within reach  - Keep bed low and locked with side rails adjusted as appropriate  - Keep care items and personal belongings within reach  - Initiate and maintain comfort rounds  - Make Fall Risk Sign visible to staff  - Offer Toileting every  Hours, in advance of need  - Initiate/Maintain alarm  - Obtain necessary fall risk management equipment:   - Apply yellow socks and bracelet for high fall risk patients  - Consider moving patient to room near nurses station  Outcome: Progressing  Goal: Maintain or return to baseline ADL function  Description: INTERVENTIONS:  -  Assess patient's ability to carry out ADLs; assess patient's baseline for ADL function and identify physical deficits which impact ability to perform ADLs (bathing, care of mouth/teeth, toileting, grooming, dressing, etc.)  - Assess/evaluate cause of self-care deficits   - Assess range of motion  - Assess patient's mobility; develop plan if impaired  - Assess patient's need for assistive devices and provide as appropriate  - Encourage maximum independence but intervene and supervise when necessary  - Involve family in performance of ADLs  - Assess for home care needs following discharge   - Consider OT consult to assist with ADL evaluation and planning for discharge  - Provide patient education as appropriate  Outcome: Progressing  Goal: Maintains/Returns to pre admission functional level  Description: INTERVENTIONS:  - Perform AM-PAC 6 Click Basic Mobility/ Daily Activity  assessment daily.  - Set and communicate daily mobility goal to care team and patient/family/caregiver.   - Collaborate with rehabilitation services on mobility goals if consulted  - Perform Range of Motion times a day.  - Reposition patient every  hours.  - Dangle patient  times a day  - Stand patient  times a day  - Ambulate patient  times a day  - Out of bed to chair  times a day   - Out of bed for meals  times a day  - Out of bed for toileting  - Record patient progress and toleration of activity level   Outcome: Progressing     Problem: DISCHARGE PLANNING  Goal: Discharge to home or other facility with appropriate resources  Description: INTERVENTIONS:  - Identify barriers to discharge w/patient and caregiver  - Arrange for needed discharge resources and transportation as appropriate  - Identify discharge learning needs (meds, wound care, etc.)  - Arrange for interpretive services to assist at discharge as needed  - Refer to Case Management Department for coordinating discharge planning if the patient needs post-hospital services based on physician/advanced practitioner order or complex needs related to functional status, cognitive ability, or social support system  Outcome: Progressing     Problem: Knowledge Deficit  Goal: Patient/family/caregiver demonstrates understanding of disease process, treatment plan, medications, and discharge instructions  Description: Complete learning assessment and assess knowledge base.  Interventions:  - Provide teaching at level of understanding  - Provide teaching via preferred learning methods  Outcome: Progressing

## 2025-02-24 NOTE — PLAN OF CARE
Problem: Prexisting or High Potential for Compromised Skin Integrity  Goal: Skin integrity is maintained or improved  Description: INTERVENTIONS:  - Identify patients at risk for skin breakdown  - Assess and monitor skin integrity  - Assess and monitor nutrition and hydration status  - Monitor labs   - Assess for incontinence   - Turn and reposition patient  - Assist with mobility/ambulation  - Relieve pressure over bony prominences  - Avoid friction and shearing  - Provide appropriate hygiene as needed including keeping skin clean and dry  - Evaluate need for skin moisturizer/barrier cream  - Collaborate with interdisciplinary team   - Patient/family teaching  - Consider wound care consult   Outcome: Progressing     Problem: PAIN - ADULT  Goal: Verbalizes/displays adequate comfort level or baseline comfort level  Description: Interventions:  - Encourage patient to monitor pain and request assistance  - Assess pain using appropriate pain scale  - Administer analgesics based on type and severity of pain and evaluate response  - Implement non-pharmacological measures as appropriate and evaluate response  - Consider cultural and social influences on pain and pain management  - Notify physician/advanced practitioner if interventions unsuccessful or patient reports new pain  Outcome: Progressing     Problem: INFECTION - ADULT  Goal: Absence or prevention of progression during hospitalization  Description: INTERVENTIONS:  - Assess and monitor for signs and symptoms of infection  - Monitor lab/diagnostic results  - Monitor all insertion sites, i.e. indwelling lines, tubes, and drains  - Monitor endotracheal if appropriate and nasal secretions for changes in amount and color  - [place] appropriate cooling/warming therapies per order  - Administer medications as ordered  - Instruct and encourage patient and family to use good hand hygiene technique  - Identify and instruct in appropriate isolation precautions for  identified infection/condition  Outcome: Progressing  Goal: Absence of fever/infection during neutropenic period  Description: INTERVENTIONS:  - Monitor WBC    Outcome: Progressing     Problem: SAFETY ADULT  Goal: Patient will remain free of falls  Description: INTERVENTIONS:  - Educate patient/family on patient safety including physical limitations  - Instruct patient to call for assistance with activity   - Consult OT/PT to assist with strengthening/mobility   - Keep Call bell within reach  - Keep bed low and locked with side rails adjusted as appropriate  - Keep care items and personal belongings within reach  - Initiate and maintain comfort rounds  - Make Fall Risk Sign visible to staff  - Offer Toileting every  Hours, in advance of need  - Initiate/Maintain alarm  - Obtain necessary fall risk management equipment:   - Apply yellow socks and bracelet for high fall risk patients  - Consider moving patient to room near nurses station  Outcome: Progressing  Goal: Maintain or return to baseline ADL function  Description: INTERVENTIONS:  -  Assess patient's ability to carry out ADLs; assess patient's baseline for ADL function and identify physical deficits which impact ability to perform ADLs (bathing, care of mouth/teeth, toileting, grooming, dressing, etc.)  - Assess/evaluate cause of self-care deficits   - Assess range of motion  - Assess patient's mobility; develop plan if impaired  - Assess patient's need for assistive devices and provide as appropriate  - Encourage maximum independence but intervene and supervise when necessary  - Involve family in performance of ADLs  - Assess for home care needs following discharge   - Consider OT consult to assist with ADL evaluation and planning for discharge  - Provide patient education as appropriate  Outcome: Progressing  Goal: Maintains/Returns to pre admission functional level  Description: INTERVENTIONS:  - Perform AM-PAC 6 Click Basic Mobility/ Daily Activity  assessment daily.  - Set and communicate daily mobility goal to care team and patient/family/caregiver.   - Collaborate with rehabilitation services on mobility goals if consulted  - Perform Range of Motion times a day.  - Reposition patient every  hours.  - Dangle patient  times a day  - Stand patient  times a day  - Ambulate patient  times a day  - Out of bed to chair  times a day   - Out of bed for meals  times a day  - Out of bed for toileting  - Record patient progress and toleration of activity level   Outcome: Progressing     Problem: DISCHARGE PLANNING  Goal: Discharge to home or other facility with appropriate resources  Description: INTERVENTIONS:  - Identify barriers to discharge w/patient and caregiver  - Arrange for needed discharge resources and transportation as appropriate  - Identify discharge learning needs (meds, wound care, etc.)  - Arrange for interpretive services to assist at discharge as needed  - Refer to Case Management Department for coordinating discharge planning if the patient needs post-hospital services based on physician/advanced practitioner order or complex needs related to functional status, cognitive ability, or social support system  Outcome: Progressing     Problem: Knowledge Deficit  Goal: Patient/family/caregiver demonstrates understanding of disease process, treatment plan, medications, and discharge instructions  Description: Complete learning assessment and assess knowledge base.  Interventions:  - Provide teaching at level of understanding  - Provide teaching via preferred learning methods  Outcome: Progressing

## 2025-02-28 ENCOUNTER — EVALUATION (OUTPATIENT)
Dept: PHYSICAL THERAPY | Facility: CLINIC | Age: OVER 89
End: 2025-02-28
Payer: MEDICARE

## 2025-02-28 DIAGNOSIS — R26.2 AMBULATORY DYSFUNCTION: ICD-10-CM
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
## 2025-02-28 PROCEDURE — 97112 NEUROMUSCULAR REEDUCATION: CPT

## 2025-02-28 PROCEDURE — 97162 PT EVAL MOD COMPLEX 30 MIN: CPT

## 2025-02-28 NOTE — PROGRESS NOTES
PT Evaluation     Today's date: 2025  Patient name: Tanya Stephen  : 1934  MRN: [id number]  Referring provider: Danyell Dutta MD  Dx:   Encounter Diagnosis     ICD-10-CM    1. Ambulatory dysfunction  R26.2 Ambulatory referral to Physical Therapy      2. Fall, subsequent encounter  W19.XXXD Ambulatory referral to Physical Therapy                     Assessment  Impairments: abnormal gait, activity intolerance, impaired balance, impaired physical strength, lacks appropriate home exercise program, pain with function, safety issue, participation limitations, activity limitations and endurance    Assessment details: Tanya Stephen is a pleasant 89 y.o. female  who was referred to outpatient physical therapy with the chief complaint generalized weakness and frequent falls. Currently, she utilizes a rollator for mobility. She presents with the goals to improve her strength and balance to fall less.    PT examination findings include: abnormal gait deviations such as forward flexed posture, reduced foot clearance bilaterally, trendelenburg gait; slow gait speed of 0.47 m/s compared to gender and age matched controls of 0.94 m/s; decreased LE strength and power shown by her 5x STS time; impaired balance demonstrated via her TUG time; and limited endurance given her 6 MWT distance of 75 ft compared to gender and age matched controls of 1286 ft and inability to ambulate for longer than 2 minutes. These finding classify her as a high falls risk, categorizes her as a limited community ambulator, and decreases her safety with functional mobility.     Time was spent educating her on proper hand placement for STS transitions of bring hands to chair prior to sitting and not pulling on her rollator to stand for safety reasons. She and her  verbalized understanding and is in agreement with this plan. The patient would benefit from skilled physical therapy to provide exercises, neuromuscular  reeducation, gait training as deemed necessary in order to help her achieve her goals and maximize her safety and independence to decrease her falls risk and reduce caregiver burden.          Understanding of Dx/Px/POC: good     Prognosis: fair    Goals  STGs in 4 weeks  1. Patient will demonstrate independence with basic HEP.  2. Patient will improve her gait speed by at least 0.1 m/s with appropriate AD for improved community ambulation.  3. Patient will improve her 5x STS time by 6 seconds for improved LE strength.  4. Patient will improve her 6 MWT distance by at least 100 ft with appropriate AD for improved endurance.  5. Patient will improve her TUG time by 6 seconds for improved mobility.    LTGs in 8 weeks  1. Patient will improve her 5x STS time to less than 15 seconds for improved LE strength.  2. Patient will achieve a TUG time less than 13.5 secs with appropriate AD for improved safety during functional mobility (cut-off for falls = 13.5 secs)  3. Patient will improve her 6 MWT distance by at least 200 ft with appropriate AD for improved endurance.  4. Patient will improve her gait speed to at least 0.75 m/s with appropriate AD for improved community ambulation.  5. Patient will be independent with comprehensive HEP.          Plan  Patient would benefit from: PT eval and skilled physical therapy    Planned therapy interventions: abdominal trunk stabilization, balance, neuromuscular re-education, patient/caregiver education, postural training, strengthening, stretching, therapeutic activities, therapeutic exercise, gait training, graded exercise, group therapy and home exercise program    Frequency: 2x week  Duration in weeks: 8  Plan of Care beginning date: 2/28/2025  Plan of Care expiration date: 4/28/2025  Treatment plan discussed with: patient and family      Subjective Evaluation    History of Present Illness  Mechanism of injury: Tanya presents with her , [place]. She says that she can't use  her L leg and they give out on her. She has to use the rollator. She is having frequent falls, tending to fall backwards. She has little muscle mass and no strength in her legs. Sometimes, she tries to do things that she shouldn't like picking up something from the floor. She denies any dizziness. She was recently in the hospital following a fall with full body scan which did not find any fractures.     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     She has previous knee replacements bilaterally, hip replacements bilaterally, and back fusion.  Patient Goals  Patient goals for therapy: decreased pain, improved balance and increased strength  Patient goal: fall less  Pain  Current pain rating: 10 (R hip)  Location: generalized pain    Social Support  Steps to enter house: yes (3 steps with handrails)          Objective      Balance Test  IE    6 Minute Walk Test (ft):  75 ft with rollator (stopped with 4 min 40 sec remaining)   Gait Speed (m/s):  10m/21.2s = 0.47 m/s with rollator   5x Sit To Stand (s):  30.9 (BUE use)   TU.6 with rollator   ABC Scale:  8.75%       Sensation Left Right   Light Touch Grossly intact Grossly intact       Manual Muscle Testing - Hip Left Right   Flexion 2- 3     Manual Muscle Testing - Knee Left Right   Flexion 3+ 3+   Extension 3+ 3+     Manual Muscle Testing - Ankle Left Right   Doriflexion 2+ 2-        Gait Assessment:  Forward flexed posture, reduced foot clearance bilaterally, trendelenburg gait           Short Term Goal Expiration Date:(3/28/2025)  Long Term Goal Expiration Date: (2025)  POC Expiration Date: (2025)      POC expires Unit limit Auth Expiration date PT/OT/ST + Visit Limit?   25 6 nmr PT/OT N/a BOMN                             Visit/Unit Tracking  AUTH Status:  Date              BOMN Used 1 IE              To PN  Of 10                   Precautions FALLS RISK; DM; osteoporosis; CHF;  spinal stenosis       Manuals 2/28                       Pt/family edu Safety for STS transition--push from chair, back up to chair, reach back for chair               Neuro Re-Ed         STS        HKM        Lat ambu        Bwd ambu        Hurdles         Step ups                Ther Ex        Nu step        LE TE HEP NV                                                       Ther Activity                        Gait Training                        Modalities

## 2025-03-02 LAB
ATRIAL RATE: 100 BPM
P AXIS: 104 DEGREES
PR INTERVAL: 198 MS
QRS AXIS: 122 DEGREES
QRSD INTERVAL: 142 MS
QT INTERVAL: 362 MS
QTC INTERVAL: 466 MS
T WAVE AXIS: -28 DEGREES
VENTRICULAR RATE: 100 BPM

## 2025-03-02 PROCEDURE — 93010 ELECTROCARDIOGRAM REPORT: CPT | Performed by: INTERNAL MEDICINE

## 2025-03-03 ENCOUNTER — TELEPHONE (OUTPATIENT)
Dept: FAMILY MEDICINE CLINIC | Facility: CLINIC | Age: OVER 89
End: 2025-03-03

## 2025-03-04 ENCOUNTER — OFFICE VISIT (OUTPATIENT)
Dept: PHYSICAL THERAPY | Facility: CLINIC | Age: OVER 89
End: 2025-03-04
Payer: MEDICARE

## 2025-03-04 DIAGNOSIS — R26.2 AMBULATORY DYSFUNCTION: Primary | ICD-10-CM
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
## 2025-03-04 PROCEDURE — 97110 THERAPEUTIC EXERCISES: CPT

## 2025-03-04 PROCEDURE — 97112 NEUROMUSCULAR REEDUCATION: CPT

## 2025-03-04 NOTE — PROGRESS NOTES
Daily Note     Today's date: 3/4/2025  Patient name: Tanya Stephen  : 1934  MRN: [id number]  Referring provider: Danyell Dutta MD  Dx:   Encounter Diagnosis     ICD-10-CM    1. Ambulatory dysfunction  R26.2       2. Fall, subsequent encounter  W19.XXXD                      Subjective: Tanya states that her vision is blurry today. Her  notes that she has dry eyes and sometimes that and the drops make her vision blurry. Denies any falls over the weekend.       Objective: See treatment diary below      Assessment: Tanya tolerated treatment fair with minor posterior hip discomfort as anticipated given pain after most recent fall. Session was focused on standing LE TE that will likely become her HEP after ensuring she is safe to complete these and no adverse effects. She had wide LES throughout session with limited ROM bilaterally in all planes of motion. Observationally, more difficulty with R hip range, particularly laterally. Nustep was used for cardiovascular and muscular endurance. She would benefit from continued skilled PT to progress her balance and mobility to decrease frequency of falls as able.       Plan: Continue per plan of care.  Progress treatment as tolerated.       Short Term Goal Expiration Date:(3/28/2025)  Long Term Goal Expiration Date: (2025)  POC Expiration Date: (2025)      POC expires Unit limit Auth Expiration date PT/OT/ST + Visit Limit?   25 6 nmr PT/OT N/a BOMN                             Visit/Unit Tracking  AUTH Status:  Date 2/28 3/4            BOMN Used 1 IE 2             To PN  Of 10 Of 10                  Precautions FALLS RISK; DM; osteoporosis; CHF; spinal stenosis       Manuals 2/28 3/4                      Pt/family edu Safety for STS transition--push from chair, back up to chair, reach back for chair               Neuro Re-Ed         STS  Throughout session with BUE use    Vc for hand placement      HKM        Lat ambu        Bwd  "ambu        Hurdles         Step taps  With BUE use     Up to 4\" step    2x10 ea LE              Ther Ex        Nu step  L2 x10 min    For CV & muscular endurance      LE TE HEP NV Stand march BUE use 2x10 ea LE    Stand hip abd BUE use 2x10 ea LE    Stand hip ext BUE use 2x10 ea LE                                                        Ther Activity                        Gait Training                        Modalities                                       "

## 2025-03-07 ENCOUNTER — APPOINTMENT (OUTPATIENT)
Dept: PHYSICAL THERAPY | Facility: CLINIC | Age: OVER 89
End: 2025-03-07
Payer: MEDICARE

## 2025-03-07 NOTE — PROGRESS NOTES
"Daily Note     Today's date: 3/7/2025  Patient name: Tanya Stephen  : 1934  MRN: [id number]  Referring provider: Danyell Dutta MD  Dx:   Encounter Diagnosis     ICD-10-CM    1. Ambulatory dysfunction  R26.2       2. Fall, subsequent encounter  W19.XXXD                      Subjective: ***      Objective: See treatment diary below      Assessment: Tolerated treatment {Tolerated treatment :[id number]}. Patient {assessment:[id number]}      Plan: Continue per plan of care.  Progress treatment as tolerated.       Short Term Goal Expiration Date:(3/28/2025)  Long Term Goal Expiration Date: (2025)  POC Expiration Date: (2025)      POC expires Unit limit Auth Expiration date PT/OT/ST + Visit Limit?   25 6 nmr PT/OT N/a BOMN                             Visit/Unit Tracking  AUTH Status:  Date 2/28 3/4 3/7***           BOMN Used 1 IE 2 3            To PN  Of 10 Of 10 Of 10                 Precautions FALLS RISK; DM; osteoporosis; CHF; spinal stenosis       Manuals 2/28 3/4 3/7                     Pt/family edu Safety for STS transition--push from chair, back up to chair, reach back for chair               Neuro Re-Ed         STS  Throughout session with BUE use    Vc for hand placement ***     HKM   ***     Lat ambu   ***     Bwd ambu   ***     Hurdles         Step taps  With BUE use     Up to 4\" step    2x10 ea LE ***             Ther Ex        Nu step  L2 x10 min    For CV & muscular endurance ***     LE TE HEP NV Stand march BUE use 2x10 ea LE    Stand hip abd BUE use 2x10 ea LE    Stand hip ext BUE use 2x10 ea LE                                                        Ther Activity                        Gait Training                        Modalities                                         "

## 2025-03-10 ENCOUNTER — OFFICE VISIT (OUTPATIENT)
Dept: PHYSICAL THERAPY | Facility: CLINIC | Age: OVER 89
End: 2025-03-10
Payer: MEDICARE

## 2025-03-10 DIAGNOSIS — R26.2 AMBULATORY DYSFUNCTION: Primary | ICD-10-CM
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
## 2025-03-10 PROCEDURE — 97110 THERAPEUTIC EXERCISES: CPT

## 2025-03-10 NOTE — PROGRESS NOTES
"Daily Note     Today's date: 3/10/2025  Patient name: Tanya Stephen  : 1934  MRN: [id number]  Referring provider: Danyell Dutta MD  Dx:   Encounter Diagnosis     ICD-10-CM    1. Ambulatory dysfunction  R26.2       2. Fall, subsequent encounter  W19.XXXD                      Subjective: Patient arrived 24 minutes late however accommodated.  states that she has almost fallen several times since her last session however luckily someone was always with her to prevent fall.       Objective: See treatment diary below      Assessment: Tolerated treatment well. Treatment modified due to arriving late;plan to resume full treatment plan NV. Gait belt and CG provided with all gait and exercises within the parallel bars due to high fall risk. Patient demonstrated fatigue post treatment, exhibited good technique with therapeutic exercises, and would benefit from continued PT      Plan: Continue per plan of care.  Progress treatment as tolerated.       Short Term Goal Expiration Date:(3/28/2025)  Long Term Goal Expiration Date: (2025)  POC Expiration Date: (2025)      POC expires Unit limit Auth Expiration date PT/OT/ST + Visit Limit?   25 6 nmr PT/OT N/a BOMN                             Visit/Unit Tracking  AUTH Status:  Date 2/28 3/4 3/10          BOMN Used 1 IE 2 3           To PN  Of 10 Of 10 Of 10                Precautions FALLS RISK; DM; osteoporosis; CHF; spinal stenosis       Manuals 2/28 3/4 3/10                  Pt/family edu Safety for STS transition--push from chair, back up to chair, reach back for chair             Neuro Re-Ed    3/10    STS  Throughout session with BUE use    Vc for hand placement Throughout session with BUE use    HKM       Lat ambu       Bwd ambu       Hurdles        Step taps  With BUE use     Up to 4\" step    2x10 ea LE With BUE use    Up to 4\" step    2x10 ea LE           Ther Ex   3/10    Nu step  L2 x10 min    For CV & muscular endurance Resume NV  "   LE TE HEP NV Stand march BUE use 2x10 ea LE    Stand hip abd BUE use 2x10 ea LE    Stand hip ext BUE use 2x10 ea LE   // bars  Stand march BUE use 2x10 ea LE    Stand hip abd BUE use 2x10 ea LE    Stand hip ext BUE use 2x10 ea LE                                              Ther Activity                     Gait Training                     Modalities

## 2025-03-11 DIAGNOSIS — G89.4 CHRONIC PAIN SYNDROME: ICD-10-CM

## 2025-03-12 ENCOUNTER — APPOINTMENT (OUTPATIENT)
Dept: PHYSICAL THERAPY | Facility: CLINIC | Age: OVER 89
End: 2025-03-12
Payer: MEDICARE

## 2025-03-13 ENCOUNTER — TELEPHONE (OUTPATIENT)
Dept: NEPHROLOGY | Facility: CLINIC | Age: OVER 89
End: 2025-03-13

## 2025-03-13 RX ORDER — PREGABALIN 75 MG/1
75 CAPSULE ORAL 2 TIMES DAILY
Qty: 60 CAPSULE | Refills: 5 | OUTPATIENT
Start: 2025-03-13

## 2025-03-14 ENCOUNTER — APPOINTMENT (EMERGENCY)
Dept: CT IMAGING | Facility: HOSPITAL | Age: OVER 89
End: 2025-03-14
Payer: MEDICARE

## 2025-03-14 ENCOUNTER — APPOINTMENT (EMERGENCY)
Dept: RADIOLOGY | Facility: HOSPITAL | Age: OVER 89
End: 2025-03-14
Payer: MEDICARE

## 2025-03-14 ENCOUNTER — HOSPITAL ENCOUNTER (EMERGENCY)
Facility: HOSPITAL | Age: OVER 89
Discharge: HOME | End: 2025-03-14
Attending: EMERGENCY MEDICINE
Payer: MEDICARE

## 2025-03-14 VITALS
OXYGEN SATURATION: 94 % | DIASTOLIC BLOOD PRESSURE: 72 MMHG | SYSTOLIC BLOOD PRESSURE: 149 MMHG | RESPIRATION RATE: 18 BRPM | HEART RATE: 92 BPM | TEMPERATURE: 98.1 F

## 2025-03-14 DIAGNOSIS — G89.4 CHRONIC PAIN SYNDROME: ICD-10-CM
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
## 2025-03-14 LAB
ALBUMIN SERPL BCG-MCNC: 3.5 G/DL (ref 3.5–5)
ALP SERPL-CCNC: 84 U/L (ref 34–104)
ALT SERPL W P-5'-P-CCNC: 31 U/L (ref 7–52)
ANION GAP SERPL CALCULATED.3IONS-SCNC: 9 MMOL/L (ref 4–13)
AST SERPL W P-5'-P-CCNC: 71 U/L (ref 13–39)
BASOPHILS # BLD AUTO: 0.05 THOUSANDS/ÂΜL (ref 0–0.1)
BASOPHILS NFR BLD AUTO: 1 % (ref 0–1)
BILIRUB SERPL-MCNC: 0.97 MG/DL (ref 0.2–1)
BUN SERPL-MCNC: 21 MG/DL (ref 5–25)
CALCIUM SERPL-MCNC: 8.6 MG/DL (ref 8.4–10.2)
CHLORIDE SERPL-SCNC: 104 MMOL/L (ref 96–108)
CO2 SERPL-SCNC: 27 MMOL/L (ref 21–32)
CREAT SERPL-MCNC: 1.26 MG/DL (ref 0.6–1.3)
EOSINOPHIL # BLD AUTO: 0.1 THOUSAND/ÂΜL (ref 0–0.61)
EOSINOPHIL NFR BLD AUTO: 2 % (ref 0–6)
ERYTHROCYTE [DISTWIDTH] IN BLOOD BY AUTOMATED COUNT: 17.2 % (ref 11.6–15.1)
GFR SERPL CREATININE-BSD FRML MDRD: 37 ML/MIN/1.73SQ M
GLUCOSE SERPL-MCNC: 107 MG/DL (ref 65–140)
GLUCOSE SERPL-MCNC: 95 MG/DL (ref 65–140)
HCT VFR BLD AUTO: 38.6 % (ref 34.8–46.1)
HGB BLD-MCNC: 12 G/DL (ref 11.5–15.4)
IMM GRANULOCYTES # BLD AUTO: 0.03 THOUSAND/UL (ref 0–0.2)
IMM GRANULOCYTES NFR BLD AUTO: 1 % (ref 0–2)
LYMPHOCYTES # BLD AUTO: 1.04 THOUSANDS/ÂΜL (ref 0.6–4.47)
LYMPHOCYTES NFR BLD AUTO: 20 % (ref 14–44)
MCH RBC QN AUTO: 30.1 PG (ref 26.8–34.3)
MCHC RBC AUTO-ENTMCNC: 31.1 G/DL (ref 31.4–37.4)
MCV RBC AUTO: 97 FL (ref 82–98)
MONOCYTES # BLD AUTO: 0.59 THOUSAND/ÂΜL (ref 0.17–1.22)
MONOCYTES NFR BLD AUTO: 11 % (ref 4–12)
NEUTROPHILS # BLD AUTO: 3.36 THOUSANDS/ÂΜL (ref 1.85–7.62)
NEUTS SEG NFR BLD AUTO: 65 % (ref 43–75)
NRBC BLD AUTO-RTO: 0 /100 WBCS
PLATELET # BLD AUTO: 155 THOUSANDS/UL (ref 149–390)
PMV BLD AUTO: 11.2 FL (ref 8.9–12.7)
POTASSIUM SERPL-SCNC: 4.2 MMOL/L (ref 3.5–5.3)
PROT SERPL-MCNC: 6.5 G/DL (ref 6.4–8.4)
RBC # BLD AUTO: 3.99 MILLION/UL (ref 3.81–5.12)
SODIUM SERPL-SCNC: 140 MMOL/L (ref 135–147)
WBC # BLD AUTO: 5.17 THOUSAND/UL (ref 4.31–10.16)

## 2025-03-14 PROCEDURE — 82948 REAGENT STRIP/BLOOD GLUCOSE: CPT

## 2025-03-14 PROCEDURE — 70450 CT HEAD/BRAIN W/O DYE: CPT

## 2025-03-14 PROCEDURE — 85025 COMPLETE CBC W/AUTO DIFF WBC: CPT

## 2025-03-14 PROCEDURE — 96361 HYDRATE IV INFUSION ADD-ON: CPT

## 2025-03-14 PROCEDURE — 71045 X-RAY EXAM CHEST 1 VIEW: CPT

## 2025-03-14 PROCEDURE — 93005 ELECTROCARDIOGRAM TRACING: CPT

## 2025-03-14 PROCEDURE — 72128 CT CHEST SPINE W/O DYE: CPT

## 2025-03-14 PROCEDURE — 99285 EMERGENCY DEPT VISIT HI MDM: CPT

## 2025-03-14 PROCEDURE — 36415 COLL VENOUS BLD VENIPUNCTURE: CPT

## 2025-03-14 PROCEDURE — 72131 CT LUMBAR SPINE W/O DYE: CPT

## 2025-03-14 PROCEDURE — 96360 HYDRATION IV INFUSION INIT: CPT

## 2025-03-14 PROCEDURE — 99284 EMERGENCY DEPT VISIT MOD MDM: CPT | Performed by: EMERGENCY MEDICINE

## 2025-03-14 PROCEDURE — 80053 COMPREHEN METABOLIC PANEL: CPT

## 2025-03-14 RX ORDER — GINSENG 100 MG
1 CAPSULE ORAL ONCE
Status: COMPLETED | OUTPATIENT
Start: 2025-03-14 | End: 2025-03-14

## 2025-03-14 RX ADMIN — SODIUM CHLORIDE 1000 ML: 0.9 INJECTION, SOLUTION INTRAVENOUS at 20:08

## 2025-03-14 RX ADMIN — BACITRACIN 1 LARGE APPLICATION: 500 OINTMENT TOPICAL at 20:41

## 2025-03-14 NOTE — ED PROVIDER NOTES
Emergency Department Trauma Note  Tanya Stephen 89 y.o. female MRN: [id number]  Unit/Bed#: ED-05/ED-05 Encounter: [id number]      Trauma Alert: Trauma Acuity: C  Model of Arrival:   via    Trauma Team: Current Providers  Attending Provider: Timothy English MD  Registered Nurse: Lea Garrido RN  Resident: Butch Kruse MD  Registered Nurse: Marium Sharma RN  Consultants:     None      History of Present Illness     Chief Complaint:   Chief Complaint   Patient presents with    Fall     Pt fell backwards while making bed. Fell backwards hitting head on dresser. Abrasions to back. +head strike + Asprin       HPI:  Tanya Stephen is a 89 y.o. female who presents with .  Mechanism:Details of Incident: Patient fell backwards while making her bed and hit her head Injury Date: 03/13/25 Injury Time: 0800      Patient is a 89-year-old female on 81 mg ASA presenting status post mechanical fall yesterday.  Patient reports that she was in her bedroom and took a step backwards, losing her balance and falling backwards striking her head on the knob of her dresser and then also striking her mid/lower back.  Patient feeling members decided to transport her to the ED as patient was reporting back pain.  Per family, patient is mentating at baseline.  Denies any focal weakness.  Denies any midline cervical spinal tenderness.            Review of Systems   Musculoskeletal:  Positive for back pain.   All other systems reviewed and are negative.      Historical Information     Immunizations:   Immunization History   Administered Date(s) Administered    COVID-19 MODERNA VACC 0.5 ML IM 01/20/2021, 02/02/2021, 02/17/2021, 03/02/2021, 11/22/2021, 06/04/2022    COVID-19 Pfizer Vac BIVALENT Fawad-sucrose 12 Yr+ IM 10/26/2022    INFLUENZA 01/12/2013, 12/04/2018, 09/29/2020, 11/18/2022, 10/12/2023    Pneumococcal Polysaccharide PPV23 03/01/2008    Tdap 10/12/2023    Zoster Vaccine Recombinant 05/20/2022       Past Medical History:    Diagnosis Date    Anemia     Arthritis     Back pain     CHF (congestive heart failure) (HCC)     Chronic kidney disease     Stage 3b    Confusion 2023    Diabetes (HCC)     Diabetes mellitus (HCC)     Diabetic gastroparesis associated with type 2 diabetes mellitus  (HCC)     Diabetic polyneuropathy (HCC)     Diverticulosis     Herpes zoster     Hypertension     IBS (irritable bowel syndrome)     Neurogenic claudication due to lumbar spinal stenosis     Osteoarthritis     Osteoporosis     Pulmonary edema     Raynaud's phenomenon without gangrene     Seronegative arthropathy of multiple sites (HCC)     Sicca (HCC)        Family History   Problem Relation Age of Onset    Cancer Brother     Diabetes Mother     Hypertension Father     Heart disease Father      Past Surgical History:   Procedure Laterality Date    BACK SURGERY      COLONOSCOPY      EGD AND COLONOSCOPY N/A 2016    Procedure: EGD AND COLONOSCOPY;  Surgeon: Elina Anderson MD;  Location: [facility];  Service:     JOINT REPLACEMENT      bilat knees and hips    OTHER SURGICAL HISTORY      pelvis rods    REPLACEMENT TOTAL KNEE BILATERAL      STOMACH SURGERY      UPPER GASTROINTESTINAL ENDOSCOPY       Social History     Tobacco Use    Smoking status: Former     Current packs/day: 0.00     Types: Cigarettes     Quit date:      Years since quittin.2     Passive exposure: Never    Smokeless tobacco: Never   Vaping Use    Vaping status: Never Used   Substance Use Topics    Alcohol use: Not Currently    Drug use: No     E-Cigarette/Vaping    E-Cigarette Use Never User      E-Cigarette/Vaping Substances    Nicotine No     THC No     CBD No     Flavoring No     Other No        Family History: non-contributory    Meds/Allergies   Prior to Admission Medications   Prescriptions Last Dose Informant Patient Reported? Taking?   Cholecalciferol (VITAMIN D3) 1000 units CAPS  Child, Spouse/Significant Other Yes No   Sig: Take 1 capsule by mouth daily    DULoxetine (CYMBALTA) 60 mg delayed release capsule  Child, Spouse/Significant Other No No   Sig: TAKE ONE CAPSULE BY MOUTH ONCE DAILY   Magnesium 250 MG TABS  Child, Spouse/Significant Other Yes No   Sig: Take 250 mg by mouth every evening   Mirabegron ER (Myrbetriq) 50 MG TB24  Child, Spouse/Significant Other No No   Sig: Take 1 tablet (50 mg total) by mouth in the morning   Sodium Fluoride (PreviDent 5000 Booster Plus) 1.1 % PSTE  Child, Spouse/Significant Other No No   Sig: Apply on teeth every evening as directed   acetaminophen (TYLENOL) 325 mg tablet  Child, Spouse/Significant Other No No   Sig: Take 2 tablets (650 mg total) by mouth 3 (three) times a day   aspirin 81 mg chewable tablet  Child, Spouse/Significant Other No No   Sig: Chew 1 tablet (81 mg total) daily   atorvastatin (LIPITOR) 10 mg tablet  Child, Spouse/Significant Other No No   Sig: TAKE ONE TABLET BY MOUTH EVERY DAY   diphenoxylate-atropine (LOMOTIL) 2.5-0.025 mg per tablet  Child, Spouse/Significant Other Yes No   Sig: Take 1 tablet by mouth if needed   Patient not taking: Reported on 2/22/2025   furosemide (LASIX) 20 mg tablet   No No   Sig: One tablet daily and an extra 20 mg prn for wt gain 3 lb/ 24 hours or 5 lb/ 5 days, above a dry wt of 122 lb   hydroxychloroquine (PLAQUENIL) 200 mg tablet  Child, Spouse/Significant Other No No   Sig: Take 1 tablet (200 mg total) by mouth daily with breakfast   omeprazole (PriLOSEC) 20 mg delayed release capsule  Child, Spouse/Significant Other Yes No   Sig: Take 20 mg by mouth daily   pregabalin (LYRICA) 75 mg capsule  Child, Spouse/Significant Other No No   Sig: Take 1 capsule (75 mg total) by mouth 2 (two) times a day   pyridoxine (B-6) 100 MG tablet  Child, Spouse/Significant Other Yes No   Sig: Take 100 mg by mouth daily      Facility-Administered Medications: None       Allergies   Allergen Reactions    Morphine Other (See Comments)     urinary retention    Ciprofloxacin Nausea Only and Rash        PHYSICAL EXAM      Objective   Vitals:   First set: Temperature: 98.1 °F (36.7 °C) (03/14/25 1701)  Pulse: 95 (03/14/25 1701)  Respirations: 16 (03/14/25 1701)  Blood Pressure: 144/64 (03/14/25 1701)  SpO2: 96 % (03/14/25 1701)    Primary Survey:   (A) Airway: Intact  (B) Breathing: Bilateral  (C) Circulation: Pulses:   pedal  2/4 and radial  2/4  (D) Disabliity:  GCS Total:  15  (E) Expose:  Completed    Secondary Survey: (Click on Physical Exam tab above)  Physical Exam  Vitals and nursing note reviewed.   Constitutional:       General: She is not in acute distress.     Appearance: Normal appearance.   HENT:      Head: Normocephalic and atraumatic.      Right Ear: Tympanic membrane normal.      Left Ear: Tympanic membrane normal.      Ears:      Comments: No hemotympanum     Mouth/Throat:      Mouth: Mucous membranes are dry.   Eyes:      Extraocular Movements: Extraocular movements intact.      Conjunctiva/sclera: Conjunctivae normal.   Neck:      Comments: No midline cervical spinal tenderness  Cardiovascular:      Rate and Rhythm: Normal rate and regular rhythm.      Pulses: Normal pulses.      Heart sounds: Normal heart sounds. No murmur heard.  Pulmonary:      Effort: Pulmonary effort is normal. No respiratory distress.      Breath sounds: Normal breath sounds.   Abdominal:      General: There is no distension.      Palpations: Abdomen is soft.      Tenderness: There is no abdominal tenderness. There is no guarding or rebound.   Musculoskeletal:         General: No tenderness. Normal range of motion.      Cervical back: Normal range of motion. No tenderness.      Right lower leg: No edema.      Left lower leg: No edema.      Comments: Abrasion over L1/L2.  Tenderness to palpation of L1/L2.  No obvious step-off or deformity appreciated.  Range of motion normal in bilateral upper lower extremities.  Strength 5/5.   Skin:     General: Skin is warm and dry.      Capillary Refill: Capillary refill takes less  than 2 seconds.      Findings: No rash.   Neurological:      General: No focal deficit present.      Mental Status: She is alert and oriented to person, place, and time.      Sensory: No sensory deficit.   Psychiatric:         Mood and Affect: Mood normal.         Behavior: Behavior normal.         Cervical spine cleared by clinical criteria? Yes     Invasive Devices       None                   Lab Results:   Results Reviewed       Procedure Component Value Units Date/Time    Comprehensive metabolic panel [[id number]]  (Abnormal) Collected: 03/14/25 2004    Lab Status: Final result Specimen: Blood from Arm, Right Updated: 03/14/25 2044     Sodium 140 mmol/L      Potassium 4.2 mmol/L      Chloride 104 mmol/L      CO2 27 mmol/L      ANION GAP 9 mmol/L      BUN 21 mg/dL      Creatinine 1.26 mg/dL      Glucose 107 mg/dL      Calcium 8.6 mg/dL      AST 71 U/L      ALT 31 U/L      Alkaline Phosphatase 84 U/L      Total Protein 6.5 g/dL      Albumin 3.5 g/dL      Total Bilirubin 0.97 mg/dL      eGFR 37 ml/min/1.73sq m     Narrative:      National Kid[employer] Disease Foundation guidelines for Chronic Kidney Disease (CKD):     Stage 1 with normal or high GFR (GFR > 90 mL/min/1.73 square meters)    Stage 2 Mild CKD (GFR = 60-89 mL/min/1.73 square meters)    Stage 3A Moderate CKD (GFR = 45-59 mL/min/1.73 square meters)    Stage 3B Moderate CKD (GFR = 30-44 mL/min/1.73 square meters)    Stage 4 Severe CKD (GFR = 15-29 mL/min/1.73 square meters)    Stage 5 End Stage CKD (GFR <15 mL/min/1.73 square meters)  Note: GFR calculation is accurate only with a steady state creatinine    CBC and differential [[id number]]  (Abnormal) Collected: 03/14/25 2004    Lab Status: Final result Specimen: Blood from Arm, Right Updated: 03/14/25 2022     WBC 5.17 Thousand/uL      RBC 3.99 Million/uL      Hemoglobin 12.0 g/dL      Hematocrit 38.6 %      MCV 97 fL      MCH 30.1 pg      MCHC 31.1 g/dL      RDW 17.2 %      MPV 11.2 fL      Platelets 155  Thousands/uL      nRBC 0 /100 WBCs      Segmented % 65 %      Immature Grans % 1 %      Lymphocytes % 20 %      Monocytes % 11 %      Eosinophils Relative 2 %      Basophils Relative 1 %      Absolute Neutrophils 3.36 Thousands/µL      Absolute Immature Grans 0.03 Thousand/uL      Absolute Lymphocytes 1.04 Thousands/µL      Absolute Monocytes 0.59 Thousand/µL      Eosinophils Absolute 0.10 Thousand/µL      Basophils Absolute 0.05 Thousands/µL     Fingerstick Glucose (POCT) [[id number]]  (Normal) Collected: 03/14/25 1829    Lab Status: Final result Specimen: Blood Updated: 03/14/25 1830     POC Glucose 95 mg/dl                    Imaging Studies:   Direct to CT: No  TRAUMA - CT head wo contrast   Final Result by Dutch Le DO (03/14 2011)      No acute intracranial abnormality.  Chronic microangiopathic changes.                  Workstation performed: [id number]         CT spine thoracic and lumbar wo contrast   Final Result by Dutch Le DO (03/14 2027)      No acute fracture or traumatic malalignment.      Intact thoracolumbar T10-S1 with bilateral SI joint fusion.      No significant change in appearance of the spine with multilevel degenerative changes.      Moderate right greater than left pleural effusions, similar to the prior study with mild bibasilar compressive atelectasis.            Workstation performed: [id number]         XR Trauma chest portable   Final Result by Dutch Le DO (03/14 1840)      No acute traumatic injury detected.      Minimal blunting of the costophrenic angles could represent residual trace pleural effusions.      Workstation performed: [id number]               Procedures  Procedures         ED Course  ED Course as of 03/15/25 0155   Sat Mar 15, 2025   0154 ECG compared to prior February 22, 2025-normal sinus rhythm, rate 91, right bundle branch block, no ST/T-segment changes indicative of acute ischemia           Medical Decision Making  Patient is a  "89-year-old female on 81 mg ASA presenting status post mechanical fall yesterday.  Patient reports that she was in her bedroom and took a step backwards, losing her balance and falling backwards striking her head on the knob of her dresser and then also striking her mid/lower back.  Patient feeling members decided to transport her to the ED as patient was reporting back pain.  Per family, patient is mentating at baseline.  Denies any focal weakness.  Denies any midline cervical spinal tenderness.    CT head and thoracolumbar spine negative for acute intraosseous process    Patient mentating at baseline.  Physical exam unremarkable.  Family noted that patient has been \"not herself\" for past 3 weeks following a hospital admission.  After it was request, laboratory evaluation was also conducted to assess for possible metabolic or infectious etiology.  Laboratory evaluation within normal limits.   Discussed with family the benefits of PT.  Encouraged them to continue physical therapy and to follow-up with patient's primary care doctor    Patient is aware of and agreeable to plan. All questions answered. Patient discharged in stable condition with strict return precautions and instructions to follow up with their PCP.    Amount and/or Complexity of Data Reviewed  Labs: ordered.  Radiology: ordered.    Risk  OTC drugs.                Disposition  [facility] Transfer: No  Final diagnoses:   Fall, initial encounter     Time reflects when diagnosis was documented in both MDM as applicable and the Disposition within this note       Time User Action Codes Description Comment    3/14/2025  9:27 PM Butch Kruse Add [W19.XXXA] Fall, initial encounter           ED Disposition       ED Disposition   Discharge    Condition   Stable    Date/Time   Fri Mar 14, 2025  9:27 PM    Comment   Tanya Stephen discharge to home/self care.                   Follow-up Information       Follow up With Specialties Details Why Contact Info "    Gregory Santoro, DO Family Medicine   [address] [place] [id number]  [phone]            Discharge Medication List as of 3/14/2025  9:28 PM        CONTINUE these medications which have NOT CHANGED    Details   acetaminophen (TYLENOL) 325 mg tablet Take 2 tablets (650 mg total) by mouth 3 (three) times a day, Starting Wed 9/4/2024, Normal      aspirin 81 mg chewable tablet Chew 1 tablet (81 mg total) daily, Starting Fri 4/15/2022, No Print      atorvastatin (LIPITOR) 10 mg tablet TAKE ONE TABLET BY MOUTH EVERY DAY, Normal      Cholecalciferol (VITAMIN D3) 1000 units CAPS Take 1 capsule by mouth daily, Historical Med      diphenoxylate-atropine (LOMOTIL) 2.5-0.025 mg per tablet Take 1 tablet by mouth if needed, Historical Med      DULoxetine (CYMBALTA) 60 mg delayed release capsule TAKE ONE CAPSULE BY MOUTH ONCE DAILY, Starting Wed 10/30/2024, Normal      furosemide (LASIX) 20 mg tablet One tablet daily and an extra 20 mg prn for wt gain 3 lb/ 24 hours or 5 lb/ 5 days, above a dry wt of 122 lb, Normal      hydroxychloroquine (PLAQUENIL) 200 mg tablet Take 1 tablet (200 mg total) by mouth daily with breakfast, Starting Tue 8/20/2024, Until Sun 2/16/2025, Normal      Magnesium 250 MG TABS Take 250 mg by mouth every evening, Historical Med      Mirabegron ER (Myrbetriq) 50 MG TB24 Take 1 tablet (50 mg total) by mouth in the morning, Starting Thu 10/17/2024, Normal      omeprazole (PriLOSEC) 20 mg delayed release capsule Take 20 mg by mouth daily, Starting Tue 10/1/2024, Historical Med      pregabalin (LYRICA) 75 mg capsule Take 1 capsule (75 mg total) by mouth 2 (two) times a day, Starting Wed 9/4/2024, Normal      pyridoxine (B-6) 100 MG tablet Take 100 mg by mouth daily, Historical Med      Sodium Fluoride (PreviDent 5000 Booster Plus) 1.1 % PSTE Apply on teeth every evening as directed, Normal           No discharge procedures on file.    PDMP Review         Value Time User    PDMP Reviewed  Yes  3/13/2025  9:55 AM Tyrone Butler MD            ED Provider  Electronically Signed by           Butch Kruse MD  03/15/25 0155

## 2025-03-14 NOTE — ED ATTENDING ATTESTATION
3/14/2025  I, Timothy English MD, saw and evaluated the patient. I have discussed the patient with the resident/non-physician practitioner and agree with the resident's/non-physician practitioner's findings, Plan of Care, and MDM as documented in the resident's/non-physician practitioner's note, except where noted. All available labs and Radiology studies were reviewed.  I was present for key portions of any procedure(s) performed by the resident/non-physician practitioner and I was immediately available to provide assistance.       At this point I agree with the current assessment done in the Emergency Department.  I have conducted an independent evaluation of this patient a history and physical is as follows:    89-year-old female brought for evaluation after she lost her balance and had a fall against her dresser yesterday morning.  She struck her head on the dresser as well as her back and actually broke one of the knobs of the dresser.  No loss of consciousness.  Takes aspirin.  Denies headache, neck pain.  Does have some back pain, some of which is chronic.  She also has new abrasions just to the right of the mid thoracic spine as well as a small laceration overlying the sacral spine.  Unfortunately, laceration is over 24 hours old so not appropriate for closure at this time.    Family reports for the last few weeks she has been generally weak, decreased appetite.  She was recently admitted to the hospital and found to have small bilateral pleural effusions and was initiated on Lasix.  She has continued taking this despite not eating or drinking nearly anything at all the last few days.  She has had other falls, often when she gets up to urinate in the middle the night.      Her lips and oropharynx are quite dry.  Plan imaging to rule out traumatic injuries, chest x-ray, labs to check renal function, and likely admit for dehydration and ambulatory dysfunction, failure to thrive.      ED Course     Imaging  and lab work with no acute changes.  On rediscussion with the patient and family family has been eating normally just not drinking a significant amount as she does not like to drink water.  They would prefer discharge home and continue outpatient PT and PCP follow-up.  Will return for any acutely worsening symptoms.    Critical Care Time  Procedures

## 2025-03-14 NOTE — ED NOTES
PIV attempt x2 unsuccessful. Provider notified and other assistance pursued for line placement at this time.      Lea Garrido RN  03/14/25 1929

## 2025-03-15 NOTE — DISCHARGE INSTRUCTIONS
Please continue with her physical therapy.  Cognitive and physical engagement will be the most effective in improving her energy levels, mood, and overall health.    Please follow-up with her primary care doctor as needed.    Please return to the emergency department for any worsening symptoms or new concerns

## 2025-03-15 NOTE — ED NOTES
Wounds on mid-back and buttocks dressed with non-adhesive dressing and gauze.     Marium Sharma RN  03/14/25 [id number]

## 2025-03-16 DIAGNOSIS — E11.9 TYPE 2 DIABETES MELLITUS WITHOUT COMPLICATION, WITHOUT LONG-TERM CURRENT USE OF INSULIN (HCC): ICD-10-CM

## 2025-03-17 ENCOUNTER — OFFICE VISIT (OUTPATIENT)
Dept: PHYSICAL THERAPY | Facility: CLINIC | Age: OVER 89
End: 2025-03-17
Payer: MEDICARE

## 2025-03-17 DIAGNOSIS — R26.2 AMBULATORY DYSFUNCTION: Primary | ICD-10-CM
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
## 2025-03-17 PROCEDURE — 97112 NEUROMUSCULAR REEDUCATION: CPT

## 2025-03-17 PROCEDURE — 97110 THERAPEUTIC EXERCISES: CPT

## 2025-03-17 RX ORDER — PREGABALIN 75 MG/1
75 CAPSULE ORAL 2 TIMES DAILY
Qty: 60 CAPSULE | Refills: 5 | Status: ON HOLD | OUTPATIENT
Start: 2025-03-17 | End: 2025-03-28

## 2025-03-17 RX ORDER — ATORVASTATIN CALCIUM 10 MG/1
10 TABLET, FILM COATED ORAL DAILY
Qty: 90 TABLET | Refills: 1 | Status: SHIPPED | OUTPATIENT
Start: 2025-03-17

## 2025-03-17 NOTE — PROGRESS NOTES
Daily Note     Today's date: 3/17/2025  Patient name: Tanya Stephen  : 1934  MRN: [id number]  Referring provider: Danyell Dutta MD  Dx:   Encounter Diagnosis     ICD-10-CM    1. Ambulatory dysfunction  R26.2       2. Fall, subsequent encounter  W19.XXXD           Start Time: 1230  Stop Time: 1310  Total time in clinic (min): 40 minutes    Subjective: Had a fall the other day when she was changing the sheets and making the bed and fell backwards. (-) CT spine and head. Her back still hurts from the fall. No new falls noted per patient and .       Objective: See treatment diary below      Assessment: Tolerated treatment well. Continued per primary therapist POC. Continued in //bar as per plan. Trying to work on more dynamic activities. Ambulatory to session via rollator, requires cuing for navigation of clinic. Significant L trendelenburg patterning. Decreased activity tolerance in general that is evident. Patient demonstrated fatigue post treatment, exhibited good technique with therapeutic exercises, and would benefit from continued PT. Cuing during session for more glute med contraction during L side WB - fairly improved L sided trendelenburg but not significantly.       Plan: Continue per plan of care.  Progress treatment as tolerated.       Short Term Goal Expiration Date:(3/28/2025)  Long Term Goal Expiration Date: (2025)  POC Expiration Date: (2025)      POC expires Unit limit Auth Expiration date PT/OT/ST + Visit Limit?   25 6 nmr PT/OT N/a BOMN                             Visit/Unit Tracking  AUTH Status:  Date 2/28 3/4 3/10          BOMN Used 1 IE 2 3           To PN  Of 10 Of 10 Of 10                Precautions FALLS RISK; DM; osteoporosis; CHF; spinal stenosis       Manuals 2/28 3/4 3/10 3/17                  Pt/family edu Safety for STS transition--push from chair, back up to chair, reach back for chair             Neuro Re-Ed    3/10 3/17    STS  Throughout session  "with BUE use    Vc for hand placement Throughout session with BUE use Throughout BUE use - 3x5 focused    HKM       Lat ambu    X2 laps  BUE support   Bwd ambu    Fwd/bwd amb 2x2 laps BUE     Hurdles     2x laps fwd BUE support    Step taps  With BUE use     Up to 4\" step    2x10 ea LE With BUE use    Up to 4\" step    2x10 ea LE           Ther Ex   3/10    Nu step  L2 x10 min    For CV & muscular endurance Resume NV L3 x10 min CV and msk endurance   LE TE HEP NV Stand march BUE use 2x10 ea LE    Stand hip abd BUE use 2x10 ea LE    Stand hip ext BUE use 2x10 ea LE   // bars  Stand march BUE use 2x10 ea LE    Stand hip abd BUE use 2x10 ea LE    Stand hip ext BUE use 2x10 ea LE                                              Ther Activity                     Gait Training                     Modalities                                        "

## 2025-03-19 ENCOUNTER — HOSPITAL ENCOUNTER (INPATIENT)
Facility: HOSPITAL | Age: OVER 89
LOS: 9 days | Discharge: NURSING, REHAB OR LONG-TERM CARE | DRG: 871 | End: 2025-03-28
Attending: EMERGENCY MEDICINE | Admitting: INTERNAL MEDICINE
Payer: MEDICARE

## 2025-03-19 ENCOUNTER — APPOINTMENT (EMERGENCY)
Dept: CT IMAGING | Facility: HOSPITAL | Age: OVER 89
DRG: 871 | End: 2025-03-19
Payer: MEDICARE

## 2025-03-19 ENCOUNTER — APPOINTMENT (EMERGENCY)
Dept: RADIOLOGY | Facility: HOSPITAL | Age: OVER 89
DRG: 871 | End: 2025-03-19
Payer: MEDICARE

## 2025-03-19 DIAGNOSIS — J90 CHRONIC BILATERAL PLEURAL EFFUSIONS: ICD-10-CM

## 2025-03-19 DIAGNOSIS — Z79.60 LONG-TERM USE OF IMMUNOSUPPRESSANT MEDICATION: ICD-10-CM

## 2025-03-19 DIAGNOSIS — G93.40 ACUTE ENCEPHALOPATHY: ICD-10-CM

## 2025-03-19 DIAGNOSIS — R82.71 BACTERIA IN URINE: Primary | ICD-10-CM

## 2025-03-19 DIAGNOSIS — K21.9 GASTROESOPHAGEAL REFLUX DISEASE WITHOUT ESOPHAGITIS: ICD-10-CM

## 2025-03-19 DIAGNOSIS — N18.4 CHRONIC KIDNEY DISEASE, STAGE IV (SEVERE) (HCC): ICD-10-CM

## 2025-03-19 DIAGNOSIS — I95.9 HYPOTENSION: ICD-10-CM

## 2025-03-19 DIAGNOSIS — B95.61 MSSA BACTEREMIA: ICD-10-CM

## 2025-03-19 DIAGNOSIS — E11.9 TYPE 2 DIABETES MELLITUS (HCC): ICD-10-CM

## 2025-03-19 DIAGNOSIS — R78.81 MSSA BACTEREMIA: ICD-10-CM

## 2025-03-19 DIAGNOSIS — G89.4 CHRONIC PAIN SYNDROME: ICD-10-CM

## 2025-03-19 DIAGNOSIS — E83.42 HYPOMAGNESEMIA: ICD-10-CM

## 2025-03-19 DIAGNOSIS — N32.81 OAB (OVERACTIVE BLADDER): ICD-10-CM

## 2025-03-19 DIAGNOSIS — R65.20 SEVERE SEPSIS (HCC): Primary | ICD-10-CM

## 2025-03-19 DIAGNOSIS — A41.9 SEVERE SEPSIS (HCC): Primary | ICD-10-CM

## 2025-03-19 DIAGNOSIS — M15.0 PRIMARY GENERALIZED (OSTEO)ARTHRITIS: ICD-10-CM

## 2025-03-19 PROBLEM — N18.32 STAGE 3B CHRONIC KIDNEY DISEASE (HCC): Status: RESOLVED | Noted: 2021-11-19 | Resolved: 2025-03-19

## 2025-03-19 LAB
ACANTHOCYTES BLD QL SMEAR: PRESENT
ALBUMIN SERPL BCG-MCNC: 3.3 G/DL (ref 3.5–5)
ALP SERPL-CCNC: 104 U/L (ref 34–104)
ALT SERPL W P-5'-P-CCNC: 26 U/L (ref 7–52)
ANION GAP SERPL CALCULATED.3IONS-SCNC: 10 MMOL/L (ref 4–13)
ANISOCYTOSIS BLD QL SMEAR: PRESENT
APTT PPP: 28 SECONDS (ref 23–34)
AST SERPL W P-5'-P-CCNC: 31 U/L (ref 13–39)
BACTERIA UR QL AUTO: ABNORMAL /HPF
BASOPHILS # BLD MANUAL: 0 THOUSAND/UL (ref 0–0.1)
BASOPHILS NFR MAR MANUAL: 0 % (ref 0–1)
BILIRUB SERPL-MCNC: 1.93 MG/DL (ref 0.2–1)
BILIRUB UR QL STRIP: NEGATIVE
BUN SERPL-MCNC: 24 MG/DL (ref 5–25)
BURR CELLS BLD QL SMEAR: PRESENT
CALCIUM ALBUM COR SERPL-MCNC: 9.2 MG/DL (ref 8.3–10.1)
CALCIUM SERPL-MCNC: 8.6 MG/DL (ref 8.4–10.2)
CHLORIDE SERPL-SCNC: 100 MMOL/L (ref 96–108)
CLARITY UR: CLEAR
CO2 SERPL-SCNC: 26 MMOL/L (ref 21–32)
COLOR UR: YELLOW
CREAT SERPL-MCNC: 1.33 MG/DL (ref 0.6–1.3)
DACRYOCYTES BLD QL SMEAR: PRESENT
EOSINOPHIL # BLD MANUAL: 0 THOUSAND/UL (ref 0–0.4)
EOSINOPHIL NFR BLD MANUAL: 0 % (ref 0–6)
ERYTHROCYTE [DISTWIDTH] IN BLOOD BY AUTOMATED COUNT: 17.1 % (ref 11.6–15.1)
FLUAV AG UPPER RESP QL IA.RAPID: NEGATIVE
FLUBV AG UPPER RESP QL IA.RAPID: NEGATIVE
GFR SERPL CREATININE-BSD FRML MDRD: 35 ML/MIN/1.73SQ M
GLUCOSE SERPL-MCNC: 203 MG/DL (ref 65–140)
GLUCOSE UR STRIP-MCNC: ABNORMAL MG/DL
HCT VFR BLD AUTO: 39.3 % (ref 34.8–46.1)
HGB BLD-MCNC: 12.1 G/DL (ref 11.5–15.4)
HGB UR QL STRIP.AUTO: ABNORMAL
INR PPP: 1.25 (ref 0.85–1.19)
KETONES UR STRIP-MCNC: NEGATIVE MG/DL
LACTATE SERPL-SCNC: 2.8 MMOL/L (ref 0.5–2)
LACTATE SERPL-SCNC: 2.9 MMOL/L (ref 0.5–2)
LEUKOCYTE ESTERASE UR QL STRIP: NEGATIVE
LYMPHOCYTES # BLD AUTO: 0.47 THOUSAND/UL (ref 0.6–4.47)
LYMPHOCYTES # BLD AUTO: 3 % (ref 14–44)
MACROCYTES BLD QL AUTO: PRESENT
MCH RBC QN AUTO: 29.9 PG (ref 26.8–34.3)
MCHC RBC AUTO-ENTMCNC: 30.8 G/DL (ref 31.4–37.4)
MCV RBC AUTO: 97 FL (ref 82–98)
METAMYELOCYTE ABSOLUTE CT: 0.62 THOUSAND/UL (ref 0–0.1)
METAMYELOCYTES NFR BLD MANUAL: 4 % (ref 0–1)
MONOCYTES # BLD AUTO: 0.31 THOUSAND/UL (ref 0–1.22)
MONOCYTES NFR BLD: 2 % (ref 4–12)
NEUTROPHILS # BLD MANUAL: 14.12 THOUSAND/UL (ref 1.85–7.62)
NEUTS BAND NFR BLD MANUAL: 10 % (ref 0–8)
NEUTS SEG NFR BLD AUTO: 81 % (ref 43–75)
NITRITE UR QL STRIP: NEGATIVE
NON-SQ EPI CELLS URNS QL MICRO: ABNORMAL /HPF
PH UR STRIP.AUTO: 7 [PH]
PLATELET # BLD AUTO: 120 THOUSANDS/UL (ref 149–390)
PLATELET BLD QL SMEAR: ADEQUATE
PMV BLD AUTO: 11.5 FL (ref 8.9–12.7)
POIKILOCYTOSIS BLD QL SMEAR: PRESENT
POLYCHROMASIA BLD QL SMEAR: PRESENT
POTASSIUM SERPL-SCNC: 3.6 MMOL/L (ref 3.5–5.3)
PROCALCITONIN SERPL-MCNC: 1.1 NG/ML
PROT SERPL-MCNC: 6.3 G/DL (ref 6.4–8.4)
PROT UR STRIP-MCNC: ABNORMAL MG/DL
PROTHROMBIN TIME: 16.5 SECONDS (ref 12.3–15)
RBC # BLD AUTO: 4.05 MILLION/UL (ref 3.81–5.12)
RBC #/AREA URNS AUTO: ABNORMAL /HPF
RBC MORPH BLD: PRESENT
SARS-COV+SARS-COV-2 AG RESP QL IA.RAPID: NEGATIVE
SMUDGE CELLS BLD QL SMEAR: PRESENT
SODIUM SERPL-SCNC: 136 MMOL/L (ref 135–147)
SP GR UR STRIP.AUTO: 1.02 (ref 1–1.03)
UROBILINOGEN UR STRIP-ACNC: 2 MG/DL
WBC # BLD AUTO: 15.52 THOUSAND/UL (ref 4.31–10.16)
WBC #/AREA URNS AUTO: ABNORMAL /HPF

## 2025-03-19 PROCEDURE — 83605 ASSAY OF LACTIC ACID: CPT | Performed by: EMERGENCY MEDICINE

## 2025-03-19 PROCEDURE — 80053 COMPREHEN METABOLIC PANEL: CPT | Performed by: EMERGENCY MEDICINE

## 2025-03-19 PROCEDURE — 74176 CT ABD & PELVIS W/O CONTRAST: CPT

## 2025-03-19 PROCEDURE — 96366 THER/PROPH/DIAG IV INF ADDON: CPT

## 2025-03-19 PROCEDURE — 96365 THER/PROPH/DIAG IV INF INIT: CPT

## 2025-03-19 PROCEDURE — 81001 URINALYSIS AUTO W/SCOPE: CPT | Performed by: EMERGENCY MEDICINE

## 2025-03-19 PROCEDURE — 93005 ELECTROCARDIOGRAM TRACING: CPT

## 2025-03-19 PROCEDURE — 85027 COMPLETE CBC AUTOMATED: CPT | Performed by: EMERGENCY MEDICINE

## 2025-03-19 PROCEDURE — 99285 EMERGENCY DEPT VISIT HI MDM: CPT

## 2025-03-19 PROCEDURE — 85007 BL SMEAR W/DIFF WBC COUNT: CPT | Performed by: EMERGENCY MEDICINE

## 2025-03-19 PROCEDURE — 71250 CT THORAX DX C-: CPT

## 2025-03-19 PROCEDURE — 84145 PROCALCITONIN (PCT): CPT | Performed by: EMERGENCY MEDICINE

## 2025-03-19 PROCEDURE — 96367 TX/PROPH/DG ADDL SEQ IV INF: CPT

## 2025-03-19 PROCEDURE — 87186 SC STD MICRODIL/AGAR DIL: CPT | Performed by: EMERGENCY MEDICINE

## 2025-03-19 PROCEDURE — 87154 CUL TYP ID BLD PTHGN 6+ TRGT: CPT | Performed by: EMERGENCY MEDICINE

## 2025-03-19 PROCEDURE — 85730 THROMBOPLASTIN TIME PARTIAL: CPT | Performed by: EMERGENCY MEDICINE

## 2025-03-19 PROCEDURE — 36415 COLL VENOUS BLD VENIPUNCTURE: CPT | Performed by: EMERGENCY MEDICINE

## 2025-03-19 PROCEDURE — 71045 X-RAY EXAM CHEST 1 VIEW: CPT

## 2025-03-19 PROCEDURE — 87077 CULTURE AEROBIC IDENTIFY: CPT | Performed by: EMERGENCY MEDICINE

## 2025-03-19 PROCEDURE — 87811 SARS-COV-2 COVID19 W/OPTIC: CPT | Performed by: EMERGENCY MEDICINE

## 2025-03-19 PROCEDURE — 85610 PROTHROMBIN TIME: CPT | Performed by: EMERGENCY MEDICINE

## 2025-03-19 PROCEDURE — 96368 THER/DIAG CONCURRENT INF: CPT

## 2025-03-19 PROCEDURE — 87804 INFLUENZA ASSAY W/OPTIC: CPT | Performed by: EMERGENCY MEDICINE

## 2025-03-19 PROCEDURE — 87040 BLOOD CULTURE FOR BACTERIA: CPT | Performed by: EMERGENCY MEDICINE

## 2025-03-19 RX ORDER — ACETAMINOPHEN 10 MG/ML
1000 INJECTION, SOLUTION INTRAVENOUS ONCE
Status: COMPLETED | OUTPATIENT
Start: 2025-03-19 | End: 2025-03-19

## 2025-03-19 RX ORDER — CHLORHEXIDINE GLUCONATE ORAL RINSE 1.2 MG/ML
15 SOLUTION DENTAL EVERY 12 HOURS SCHEDULED
Status: DISCONTINUED | OUTPATIENT
Start: 2025-03-19 | End: 2025-03-24

## 2025-03-19 RX ORDER — INSULIN LISPRO 100 [IU]/ML
1-5 INJECTION, SOLUTION INTRAVENOUS; SUBCUTANEOUS
Status: DISCONTINUED | OUTPATIENT
Start: 2025-03-19 | End: 2025-03-28 | Stop reason: HOSPADM

## 2025-03-19 RX ORDER — ENOXAPARIN SODIUM 100 MG/ML
40 INJECTION SUBCUTANEOUS DAILY
Status: DISCONTINUED | OUTPATIENT
Start: 2025-03-20 | End: 2025-03-20 | Stop reason: DRUGHIGH

## 2025-03-19 RX ORDER — SODIUM CHLORIDE, SODIUM GLUCONATE, SODIUM ACETATE, POTASSIUM CHLORIDE, MAGNESIUM CHLORIDE, SODIUM PHOSPHATE, DIBASIC, AND POTASSIUM PHOSPHATE .53; .5; .37; .037; .03; .012; .00082 G/100ML; G/100ML; G/100ML; G/100ML; G/100ML; G/100ML; G/100ML
1000 INJECTION, SOLUTION INTRAVENOUS ONCE
Status: COMPLETED | OUTPATIENT
Start: 2025-03-19 | End: 2025-03-19

## 2025-03-19 RX ORDER — VANCOMYCIN HYDROCHLORIDE 1 G/200ML
15 INJECTION, SOLUTION INTRAVENOUS ONCE
Status: DISCONTINUED | OUTPATIENT
Start: 2025-03-19 | End: 2025-03-19

## 2025-03-19 RX ORDER — AMITRIPTYLINE HYDROCHLORIDE 10 MG/1
10 TABLET ORAL
COMMUNITY

## 2025-03-19 RX ORDER — SODIUM CHLORIDE, SODIUM GLUCONATE, SODIUM ACETATE, POTASSIUM CHLORIDE, MAGNESIUM CHLORIDE, SODIUM PHOSPHATE, DIBASIC, AND POTASSIUM PHOSPHATE .53; .5; .37; .037; .03; .012; .00082 G/100ML; G/100ML; G/100ML; G/100ML; G/100ML; G/100ML; G/100ML
500 INJECTION, SOLUTION INTRAVENOUS ONCE
Status: COMPLETED | OUTPATIENT
Start: 2025-03-19 | End: 2025-03-19

## 2025-03-19 RX ORDER — INSULIN LISPRO 100 [IU]/ML
1-5 INJECTION, SOLUTION INTRAVENOUS; SUBCUTANEOUS
Status: DISCONTINUED | OUTPATIENT
Start: 2025-03-19 | End: 2025-03-19

## 2025-03-19 RX ADMIN — VANCOMYCIN HYDROCHLORIDE 1500 MG: 5 INJECTION, POWDER, LYOPHILIZED, FOR SOLUTION INTRAVENOUS at 19:48

## 2025-03-19 RX ADMIN — CEFTRIAXONE SODIUM 1000 MG: 10 INJECTION, POWDER, FOR SOLUTION INTRAVENOUS at 18:58

## 2025-03-19 RX ADMIN — ACETAMINOPHEN 1000 MG: 10 INJECTION INTRAVENOUS at 18:58

## 2025-03-19 RX ADMIN — NOREPINEPHRINE BITARTRATE 5 MCG/MIN: 1 INJECTION, SOLUTION, CONCENTRATE INTRAVENOUS at 21:09

## 2025-03-19 RX ADMIN — SODIUM CHLORIDE, SODIUM GLUCONATE, SODIUM ACETATE, POTASSIUM CHLORIDE, MAGNESIUM CHLORIDE, SODIUM PHOSPHATE, DIBASIC, AND POTASSIUM PHOSPHATE 500 ML: .53; .5; .37; .037; .03; .012; .00082 INJECTION, SOLUTION INTRAVENOUS at 19:22

## 2025-03-19 RX ADMIN — SODIUM CHLORIDE, SODIUM GLUCONATE, SODIUM ACETATE, POTASSIUM CHLORIDE, MAGNESIUM CHLORIDE, SODIUM PHOSPHATE, DIBASIC, AND POTASSIUM PHOSPHATE 1000 ML: .53; .5; .37; .037; .03; .012; .00082 INJECTION, SOLUTION INTRAVENOUS at 19:56

## 2025-03-19 NOTE — ED PROVIDER NOTES
Time reflects when diagnosis was documented in both MDM as applicable and the Disposition within this note       Time User Action Codes Description Comment    3/19/2025 10:23 PM Andria Baird Add [A41.9,  R65.20] Severe sepsis (HCC)     3/19/2025 10:23 PM Andria Baird Add [I95.9] Hypotension     3/19/2025 10:24 PM Andria Baird Add [G93.40] Acute encephalopathy           ED Disposition       ED Disposition   Admit    Condition   Stable    Date/Time   Wed Mar 19, 2025 10:23 PM    Comment   Case was discussed with Dr. Aponte and the patient's admission status was agreed to be Admission Status: inpatient status to the service of Dr. Aponte .             Assessment & Plan       Medical Decision Making  89-year-old female presenting with acute encephalopathy.  Vital signs reviewed, febrile, tachycardic, not hypotensive or hypoxic.  Differential diagnosis includes underlying infection such as urinary tract infection, viral respiratory illness, pneumonia, versus another source of infection.  The small wound to patient's lower lumbar area is draining purulent discharge, however, does not appear to be the etiology of patient's systemic symptoms.  Plan for sepsis workup.  Microbiology from prior urine cultures reviewed, patient has previously grown Enterococcus faecalis and staph hemolyticus, therefore, I am administering ceftriaxone and vancomycin empirically.  Plan for labs, resuscitation, and admission.  Patient did have hypotension ultimately requiring pressors.  Patient admitted to ICU for further evaluation and treatment of her severe sepsis/septic shock.      Critical Care Time Statement: Upon my evaluation, this patient had a high probability of imminent or life-threatening deterioration due to acute encephalopathy, severe sepsis/septic shock, which required my direct attention, intervention, and personal management.  I spent a total of 47 minutes directly providing critical care services, including evaluating for  the presence of life-threatening injuries or illnesses, management of organ system failure(s) , complex medical decision making (to support/prevent further life-threatening deterioration)., interpretation of hemodynamic data, titration of vasoactive medications, and titration of continuous IV medications (drips). This time is exclusive of procedures, teaching, treating other patients, family meetings, and any prior time recorded by providers other than myself.          Problems Addressed:  Acute encephalopathy: acute illness or injury  Chronic bilateral pleural effusions: acute illness or injury  Hypotension: acute illness or injury  Severe sepsis (HCC): acute illness or injury    Amount and/or Complexity of Data Reviewed  Independent Historian: spouse  External Data Reviewed: radiology, ECG and notes.  Labs: ordered. Decision-making details documented in ED Course.  Radiology: ordered and independent interpretation performed. Decision-making details documented in ED Course.  ECG/medicine tests: ordered and independent interpretation performed. Decision-making details documented in ED Course.    Risk  Prescription drug management.  Decision regarding hospitalization.             Medications   NOREPINEPHRINE 4 MG  ML NSS (CMPD ORDER) infusion (3 mcg/min Intravenous Rate/Dose Change 3/19/25 2245)   chlorhexidine (PERIDEX) 0.12 % oral rinse 15 mL (has no administration in time range)   enoxaparin (LOVENOX) subcutaneous injection 40 mg (has no administration in time range)   vancomycin (VANCOCIN) 1,750 mg in sodium chloride 0.9 % 500 mL IVPB (has no administration in time range)   ceftriaxone (ROCEPHIN) 1 g/50 mL in dextrose IVPB (has no administration in time range)   ceftriaxone (ROCEPHIN) 1 g/50 mL in dextrose IVPB (0 mg Intravenous Stopped 3/19/25 1931)   multi-electrolyte (Plasmalyte-A/Isolyte-S PH 7.4/Normosol-R) IV bolus 500 mL (0 mL Intravenous Stopped 3/19/25 1951)   acetaminophen (Ofirmev) injection  1,000 mg (0 mg Intravenous Stopped 3/19/25 1922)   vancomycin (VANCOCIN) 1500 mg in sodium chloride 0.9% 250 mL IVPB (0 mg Intravenous Stopped 3/19/25 2152)   multi-electrolyte (Plasmalyte-A/Isolyte-S PH 7.4/Normosol-R) IV bolus 1,000 mL (0 mL Intravenous Stopped 3/19/25 2108)       ED Risk Strat Scores                            SBIRT 20yo+      Flowsheet Row Most Recent Value   Initial Alcohol Screen: US AUDIT-C     1. How often do you have a drink containing alcohol? 0 Filed at: 03/19/2025 1931   2. How many drinks containing alcohol do you have on a typical day you are drinking?  0 Filed at: 03/19/2025 1931   3a. Male UNDER 65: How often do you have five or more drinks on one occasion? 0 Filed at: 03/19/2025 1931   3b. FEMALE Any Age, or MALE 65+: How often do you have 4 or more drinks on one occassion? 0 Filed at: 03/19/2025 1931   Audit-C Score 0 Filed at: 03/19/2025 1931   XIOMY: How many times in the past year have you...    Used an illegal drug or used a prescription medication for non-medical reasons? Never Filed at: 03/19/2025 1931                            History of Present Illness       Chief Complaint   Patient presents with    Weakness - Generalized     Pt arrives via EMS from home. EMS normally can walk with walker and today cannot stand. Reports increased weakness and decreased appetite.        Past Medical History:   Diagnosis Date    Anemia     Arthritis     Back pain     CHF (congestive heart failure) (HCC)     Chronic kidney disease     Stage 3b    Confusion 02/22/2023    Diabetes (HCC)     Diabetes mellitus (HCC)     Diabetic gastroparesis associated with type 2 diabetes mellitus  (HCC)     Diabetic polyneuropathy (HCC)     Diverticulosis     Herpes zoster     Hypertension     IBS (irritable bowel syndrome)     Neurogenic claudication due to lumbar spinal stenosis     Osteoarthritis     Osteoporosis     Pulmonary edema     Raynaud's phenomenon without gangrene     Seronegative arthropathy of  multiple sites (HCC)     Sicca (HCC)       Past Surgical History:   Procedure Laterality Date    BACK SURGERY      COLONOSCOPY      EGD AND COLONOSCOPY N/A 2016    Procedure: EGD AND COLONOSCOPY;  Surgeon: Elina Anderson MD;  Location: [facility];  Service:     JOINT REPLACEMENT      bilat knees and hips    OTHER SURGICAL HISTORY      pelvis rods    REPLACEMENT TOTAL KNEE BILATERAL      STOMACH SURGERY      UPPER GASTROINTESTINAL ENDOSCOPY        Family History   Problem Relation Age of Onset    Cancer Brother     Diabetes Mother     Hypertension Father     Heart disease Father       Social History     Tobacco Use    Smoking status: Former     Current packs/day: 0.00     Types: Cigarettes     Quit date:      Years since quittin.2     Passive exposure: Never    Smokeless tobacco: Never   Vaping Use    Vaping status: Never Used   Substance Use Topics    Alcohol use: Not Currently    Drug use: No      E-Cigarette/Vaping    E-Cigarette Use Never User       E-Cigarette/Vaping Substances    Nicotine No     THC No     CBD No     Flavoring No     Other No       I have reviewed and agree with the history as documented.     89-year-old female with past medical history of diabetes mellitus, diabetic polyneuropathy, aortic valve stenosis, CKD, CHF, recurrent UTIs, presenting with altered mental status.  History obtained from patient's  and daughter.  This morning, patient never got out of bed and has been confused all day.  She did take her morning medications, however, she has not been up and about as she normally is.  She is also very confused.  Patient did have a mechanical fall 4 days ago for which she has been seen in our emergency department.        Review of Systems   Unable to perform ROS: Mental status change   Neurological:  Positive for weakness. Negative for syncope.           Objective       ED Triage Vitals   Temperature Pulse Blood Pressure Respirations SpO2 Patient Position - Orthostatic VS    03/19/25 1833 03/19/25 1819 03/19/25 1819 03/19/25 1819 03/19/25 1819 03/19/25 2300   (!) 102.2 °F (39 °C) (!) 116 123/59 16 95 % Lying      Temp Source Heart Rate Source BP Location FiO2 (%) Pain Score    03/19/25 1833 03/19/25 1819 03/19/25 1819 -- --    Rectal Monitor Right arm        Vitals      Date and Time Temp Pulse SpO2 Resp BP Pain Score FACES Pain Rating User   03/19/25 2300 -- 108 94 % 18 93/53 -- -- CH   03/19/25 2245 -- 107 95 % 18 103/56 -- -- CH   03/19/25 2230 -- 108 95 % 18 120/60 -- -- CH   03/19/25 2215 -- 107 96 % 18 112/63 -- -- CH   03/19/25 2200 -- 109 94 % 20 116/64 -- -- CH   03/19/25 2145 -- 108 95 % 20 119/64 -- -- CH   03/19/25 2130 -- 109 -- -- 100/60 -- -- CH   03/19/25 2115 -- 101 -- -- 91/53 -- --    03/19/25 2100 -- 97 98 % 20 79/48 provider aware -- -- CH   03/19/25 2050 98 °F (36.7 °C) 102 99 % 20 81/49 -- --    03/19/25 2015 -- 100 94 % 20 84/49 -- -- CH   03/19/25 2000 -- 101 94 % --  86/50 Provider aware -- -- CH   03/19/25 1945 -- 98 95 % 20 80/47 -- -- CH   03/19/25 1930 -- 102 94 % 20 82/49 -- -- CH   03/19/25 1900 -- 110 95 % 20 -- -- -- CH   03/19/25 1833 102.2 °F (39 °C) -- -- -- -- -- -- OK   03/19/25 1819 -- 116 95 % 16 123/59 -- -- MICHA            Physical Exam  ED Triage Vitals   Temperature Pulse Respirations Blood Pressure SpO2   03/19/25 1833 03/19/25 1819 03/19/25 1819 03/19/25 1819 03/19/25 1819   (!) 102.2 °F (39 °C) (!) 116 16 123/59 95 %      Temp Source Heart Rate Source Patient Position - Orthostatic VS BP Location FiO2 (%)   03/19/25 1833 03/19/25 1819 03/19/25 2300 03/19/25 1819 --   Rectal Monitor Lying Right arm       Pain Score       --                  Vital signs and nursing notes reviewed    CONSTITUTIONAL: female appearing stated age resting in bed, in no acute distress, frail  HEENT: atraumatic, normocephalic. Sclera anicteric, conjunctiva are not injected. Moist oral mucosa  CARDIOVASCULAR/CHEST: Tachycardic, regular rhythm, rate in 120s, no  M/R/G. 2+ radial pulses  PULMONARY: Breathing comfortably on RA. Breath sounds are equal and clear to auscultation  ABDOMEN: non-distended. BS present, normoactive. Non-tender  MSK: moves all extremities, no deformities, no peripheral edema, no calf asymmetry.  Spine reveals ecchymosis across upper lumbar area with healing abrasions, as well as a small wound to lower L-spine that is oozing purulent drainage.  NEURO: Somnolent, Awakens when spoken to, is able to state her name, not oriented otherwise.  Face symmetric. Moves all extremities spontaneously. No focal neurologic deficits  SKIN: Warm, appears well-perfused  MENTAL STATUS: Altered      Results Reviewed       Procedure Component Value Units Date/Time    Platelet count [[id number]]     Lab Status: No result Specimen: Blood     Lactic acid 2 Hours [[id number]]  (Abnormal) Collected: 03/19/25 2215    Lab Status: Final result Specimen: Blood from Arm, Left Updated: 03/19/25 2243     LACTIC ACID 2.9 mmol/L     Narrative:      Result may be elevated if tourniquet was used during collection.    RBC Morphology Reflex Test [[id number]] Collected: 03/19/25 1842    Lab Status: Final result Specimen: Blood from Arm, Right Updated: 03/19/25 2001    Urine Microscopic [[id number]]  (Abnormal) Collected: 03/19/25 1923    Lab Status: Final result Specimen: Urine, Clean Catch Updated: 03/19/25 1939     RBC, UA 10-20 /hpf      WBC, UA 2-4 /hpf      Epithelial Cells Occasional /hpf      Bacteria, UA Occasional /hpf     UA w Reflex to Microscopic w Reflex to Culture [[id number]]  (Abnormal) Collected: 03/19/25 1923    Lab Status: Final result Specimen: Urine, Clean Catch Updated: 03/19/25 1934     Color, UA Yellow     Clarity, UA Clear     Specific Gravity, UA 1.018     pH, UA 7.0     Leukocytes, UA Negative     Nitrite, UA Negative     Protein, UA 30 (1+) mg/dl      Glucose,  (1/5%) mg/dl      Ketones, UA Negative mg/dl      Urobilinogen, UA 2.0 mg/dl      Bilirubin, UA  Negative     Occult Blood, UA Small    FLU/COVID Rapid Antigen (30 min. TAT) - Preferred screening test in ED [[id number]]  (Normal) Collected: 03/19/25 1842    Lab Status: Final result Specimen: Nares from Nose Updated: 03/19/25 1923     SARS COV Rapid Antigen Negative     Influenza A Rapid Antigen Negative     Influenza B Rapid Antigen Negative    Narrative:      This test has been performed using the CrepeGuysidel Michelle 2 FLU+SARS Antigen test under the Emergency Use Authorization (EUA). This test has been validated by the  and verified by the performing laboratory. The Michelle uses lateral flow immunofluorescent sandwich assay to detect SARS-COV, Influenza A and Influenza B Antigen.     The Quidel Michelle 2 SARS Antigen test does not differentiate between SARS-CoV and SARS-CoV-2.     Negative results are presumptive and may be confirmed with a molecular assay, if necessary, for patient management. Negative results do not rule out SARS-CoV-2 or influenza infection and should not be used as the sole basis for treatment or patient management decisions. A negative test result may occur if the level of antigen in a sample is below the limit of detection of this test.     Positive results are indicative of the presence of viral antigens, but do not rule out bacterial infection or co-infection with other viruses.     All test results should be used as an adjunct to clinical observations and other information available to the provider.    FOR PEDIATRIC PATIENTS - copy/paste COVID Guidelines URL to browser: [email or web address]    Manual Differential(PHLEBS Do Not Order) [[id number]]  (Abnormal) Collected: 03/19/25 1842    Lab Status: Final result Specimen: Blood from Arm, Right Updated: 03/19/25 1922     Segmented % 81 %      Bands % 10 %      Lymphocytes % 3 %      Monocytes % 2 %      Eosinophils % 0 %      Basophils % 0 %      Metamyelocytes % 4 %      Absolute  Neutrophils 14.12 Thousand/uL      Absolute Lymphocytes 0.47 Thousand/uL      Absolute Monocytes 0.31 Thousand/uL      Absolute Eosinophils 0.00 Thousand/uL      Absolute Basophils 0.00 Thousand/uL      Absolute Metamyelocytes 0.62 Thousand/uL      Total Counted --     Smudge Cells Present     RBC Morphology Present     Platelet Estimate Adequate     Acanthocytes Present     Anisocytosis Present     [place] Cells Present     Macrocytes Present     Poikilocytes Present     Polychromasia Present     Tear Drop Cells Present    CBC and differential [[id number]]  (Abnormal) Collected: 03/19/25 1842    Lab Status: Final result Specimen: Blood from Arm, Right Updated: 03/19/25 1922     WBC 15.52 Thousand/uL      RBC 4.05 Million/uL      Hemoglobin 12.1 g/dL      Hematocrit 39.3 %      MCV 97 fL      MCH 29.9 pg      MCHC 30.8 g/dL      RDW 17.1 %      MPV 11.5 fL      Platelets 120 Thousands/uL     Lactic acid [[id number]]  (Abnormal) Collected: 03/19/25 1842    Lab Status: Final result Specimen: Blood from Arm, Right Updated: 03/19/25 1921     LACTIC ACID 2.8 mmol/L     Narrative:      Result may be elevated if tourniquet was used during collection.    Procalcitonin [[id number]]  (Abnormal) Collected: 03/19/25 1842    Lab Status: Final result Specimen: Blood from Arm, Right Updated: 03/19/25 1916     Procalcitonin 1.10 ng/ml     Comprehensive metabolic panel [[id number]]  (Abnormal) Collected: 03/19/25 1842    Lab Status: Final result Specimen: Blood from Arm, Right Updated: 03/19/25 1909     Sodium 136 mmol/L      Potassium 3.6 mmol/L      Chloride 100 mmol/L      CO2 26 mmol/L      ANION GAP 10 mmol/L      BUN 24 mg/dL      Creatinine 1.33 mg/dL      Glucose 203 mg/dL      Calcium 8.6 mg/dL      Corrected Calcium 9.2 mg/dL      AST 31 U/L      ALT 26 U/L      Alkaline Phosphatase 104 U/L      Total Protein 6.3 g/dL      Albumin 3.3 g/dL      Total Bilirubin 1.93 mg/dL      eGFR 35 ml/min/1.73sq m     Narrative:      National  Kidney Disease Foundation guidelines for Chronic Kidney Disease (CKD):     Stage 1 with normal or high GFR (GFR > 90 mL/min/1.73 square meters)    Stage 2 Mild CKD (GFR = 60-89 mL/min/1.73 square meters)    Stage 3A Moderate CKD (GFR = 45-59 mL/min/1.73 square meters)    Stage 3B Moderate CKD (GFR = 30-44 mL/min/1.73 square meters)    Stage 4 Severe CKD (GFR = 15-29 mL/min/1.73 square meters)    Stage 5 End Stage CKD (GFR <15 mL/min/1.73 square meters)  Note: GFR calculation is accurate only with a steady state creatinine    Protime-INR [[id number]]  (Abnormal) Collected: 03/19/25 1842    Lab Status: Final result Specimen: Blood from Arm, Left Updated: 03/19/25 1908     Protime 16.5 seconds      INR 1.25    Narrative:      INR Therapeutic Range    Indication                                             INR Range      Atrial Fibrillation                                               2.0-3.0  Hypercoagulable State                                    2.0.2.3  Left Ventricular Asist Device                            2.0-3.0  Mechanical Heart Valve                                  -    Aortic(with afib, MI, embolism, HF, LA enlargement,    and/or coagulopathy)                                     2.0-3.0 (2.5-3.5)     Mitral                                                             2.5-3.5  Prosthetic/Bioprosthetic Heart Valve               2.0-3.0  Venous thromboembolism (VTE: VT, PE        2.0-3.0    APTT [[id number]]  (Normal) Collected: 03/19/25 1842    Lab Status: Final result Specimen: Blood from Arm, Left Updated: 03/19/25 1908     PTT 28 seconds     Blood culture #2 [[id number]] Collected: 03/19/25 1842    Lab Status: In process Specimen: Blood from Arm, Right Updated: 03/19/25 1846    Blood culture #1 [[id number]] Collected: 03/19/25 1842    Lab Status: In process Specimen: Blood from Arm, Left Updated: 03/19/25 1846            CT chest abdomen pelvis wo contrast   Final Interpretation by Yves Bai MD  (03/19 2134)      1.  Moderate right and small left pleural effusions. The right pleural effusion has slightly increased since the prior study. There is associated bilateral lower lobe atelectasis.   2.  No identifiable acute abnormality/source of infection within the abdomen or pelvis.      3.  Please refer to the report body for description of other incidental, chronic and/or benign findings.               Workstation performed: [id number]         XR chest portable   ED Interpretation by Andria Baird MD (03/19 1955)   Concern for infiltrates as well as a right-sided pleural effusion.  Formal read is pending.          ECG 12 Lead Documentation Only    Date/Time: 3/19/2025 6:40 PM    Performed by: Andria Baird MD  Authorized by: Andria Baird MD    Comments:      Sinus tachycardia, ventricular rate 114, printable 188,  consistent with right bundle branch block, QTc 460, rightward axis, Q waves in lead V2 suggestive of age-indeterminate septal infarct, no STEMI, compared to prior EKG dated 3/14/2025, patient is tachycardic, and has Q waves in lead V2.      ED Medication and Procedure Management   Prior to Admission Medications   Prescriptions Last Dose Informant Patient Reported? Taking?   Cholecalciferol (VITAMIN D3) 1000 units CAPS  Child, Spouse/Significant Other Yes No   Sig: Take 1 capsule by mouth daily   DULoxetine (CYMBALTA) 60 mg delayed release capsule 3/19/2025 Morning Child, Spouse/Significant Other No Yes   Sig: TAKE ONE CAPSULE BY MOUTH ONCE DAILY   Magnesium 250 MG TABS 3/18/2025 Evening Child, Spouse/Significant Other Yes Yes   Sig: Take 250 mg by mouth every evening   Mirabegron ER (Myrbetriq) 50 MG TB24  Child, Spouse/Significant Other No No   Sig: Take 1 tablet (50 mg total) by mouth in the morning   Sodium Fluoride (PreviDent 5000 Booster Plus) 1.1 % PSTE  Child, Spouse/Significant Other No No   Sig: Apply on teeth every evening as directed   acetaminophen (TYLENOL) 325 mg tablet Past  Week Child, Spouse/Significant Other No Yes   Sig: Take 2 tablets (650 mg total) by mouth 3 (three) times a day   amitriptyline (ELAVIL) 10 mg tablet 3/18/2025 Evening  Yes Yes   Sig: Take 10 mg by mouth daily at bedtime   aspirin 81 mg chewable tablet 3/18/2025 Bedtime Child, Spouse/Significant Other No Yes   Sig: Chew 1 tablet (81 mg total) daily   atorvastatin (LIPITOR) 10 mg tablet 3/18/2025 Evening  No Yes   Sig: TAKE ONE TABLET BY MOUTH EVERY DAY   diphenoxylate-atropine (LOMOTIL) 2.5-0.025 mg per tablet  Child, Spouse/Significant Other Yes No   Sig: Take 1 tablet by mouth if needed   Patient not taking: Reported on 2/22/2025   furosemide (LASIX) 20 mg tablet 3/19/2025 Morning  No Yes   Sig: One tablet daily and an extra 20 mg prn for wt gain 3 lb/ 24 hours or 5 lb/ 5 days, above a dry wt of 122 lb   hydroxychloroquine (PLAQUENIL) 200 mg tablet 3/19/2025 Morning  No Yes   Sig: Take 1 tablet (200 mg total) by mouth daily with breakfast   omeprazole (PriLOSEC) 20 mg delayed release capsule 3/19/2025 Morning Child, Spouse/Significant Other Yes Yes   Sig: Take 20 mg by mouth daily   pregabalin (LYRICA) 75 mg capsule 3/19/2025 Noon  No Yes   Sig: TAKE ONE CAPSULE BY MOUTH TWICE A DAY   pyridoxine (B-6) 100 MG tablet  Child, Spouse/Significant Other Yes No   Sig: Take 100 mg by mouth daily      Facility-Administered Medications: None     Patient's Medications   Discharge Prescriptions    No medications on file     No discharge procedures on file.  ED SEPSIS DOCUMENTATION   Time reflects when diagnosis was documented in both MDM as applicable and the Disposition within this note       Time User Action Codes Description Comment    3/19/2025 10:23 PM Andria Baird [A41.9,  R65.20] Severe sepsis (HCC)     3/19/2025 10:23 PM Andria Baird [I95.9] Hypotension     3/19/2025 10:24 PM Andria Baird [G93.40] Acute encephalopathy                  Andria Baird MD  03/19/25 1857       Andria Baird MD  03/26/25  2847

## 2025-03-20 ENCOUNTER — APPOINTMENT (INPATIENT)
Dept: NON INVASIVE DIAGNOSTICS | Facility: HOSPITAL | Age: OVER 89
DRG: 871 | End: 2025-03-20
Payer: MEDICARE

## 2025-03-20 ENCOUNTER — APPOINTMENT (OUTPATIENT)
Dept: PHYSICAL THERAPY | Facility: CLINIC | Age: OVER 89
End: 2025-03-20
Payer: MEDICARE

## 2025-03-20 PROBLEM — B95.61 BACTEREMIA DUE TO METHICILLIN SUSCEPTIBLE STAPHYLOCOCCUS AUREUS (MSSA): Status: ACTIVE | Noted: 2025-03-20

## 2025-03-20 PROBLEM — I50.9 CHF (CONGESTIVE HEART FAILURE) (HCC): Status: ACTIVE | Noted: 2025-03-20

## 2025-03-20 PROBLEM — R78.81 BACTEREMIA DUE TO METHICILLIN SUSCEPTIBLE STAPHYLOCOCCUS AUREUS (MSSA): Status: ACTIVE | Noted: 2025-03-20

## 2025-03-20 PROBLEM — G93.41 METABOLIC ENCEPHALOPATHY: Status: ACTIVE | Noted: 2025-03-20

## 2025-03-20 LAB
ACANTHOCYTES BLD QL SMEAR: PRESENT
ANION GAP SERPL CALCULATED.3IONS-SCNC: 11 MMOL/L (ref 4–13)
ANISOCYTOSIS BLD QL SMEAR: PRESENT
AORTIC ROOT: 2.4 CM
AORTIC VALVE MEAN VELOCITY: 12.2 M/S
ASCENDING AORTA: 2.7 CM
AV AREA BY CONTINUOUS VTI: 1.5 CM2
AV AREA PEAK VELOCITY: 1.3 CM2
AV LVOT MEAN GRADIENT: 1 MMHG
AV LVOT PEAK GRADIENT: 2 MMHG
AV MEAN PRESS GRAD SYS DOP V1V2: 7 MMHG
AV ORIFICE AREA US: 1.49 CM2
AV PEAK GRADIENT: 10 MMHG
AV REGURGITATION PRESSURE HALF TIME: 234 MS
AV VELOCITY RATIO: 0.47
AV VMAX SYS DOP: 1.62 M/S
BASOPHILS # BLD MANUAL: 0.17 THOUSAND/UL (ref 0–0.1)
BASOPHILS NFR MAR MANUAL: 1 % (ref 0–1)
BILIRUB DIRECT SERPL-MCNC: 0.75 MG/DL (ref 0–0.2)
BSA FOR ECHO PROCEDURE: 1.54 M2
BUN SERPL-MCNC: 25 MG/DL (ref 5–25)
CA-I BLD-SCNC: 0.98 MMOL/L (ref 1.12–1.32)
CALCIUM SERPL-MCNC: 7.8 MG/DL (ref 8.4–10.2)
CHLORIDE SERPL-SCNC: 102 MMOL/L (ref 96–108)
CO2 SERPL-SCNC: 25 MMOL/L (ref 21–32)
CREAT SERPL-MCNC: 1.32 MG/DL (ref 0.6–1.3)
DOP CALC AO VTI: 28.62 CM
DOP CALC LVOT AREA: 3.14 CM2
DOP CALC LVOT CARDIAC INDEX: 2.58 L/MIN/M2
DOP CALC LVOT CARDIAC OUTPUT: 3.98 L/MIN
DOP CALC LVOT DIAMETER: 2 CM
DOP CALC LVOT PEAK VEL VTI: 13.59 CM
DOP CALC LVOT PEAK VEL: 0.69 M/S
DOP CALC LVOT STROKE INDEX: 26.6 ML/M2
DOP CALC LVOT STROKE VOLUME: 42.67
E WAVE DECELERATION TIME: 156 MS
E/A RATIO: 1.26
EOSINOPHIL # BLD MANUAL: 0 THOUSAND/UL (ref 0–0.4)
EOSINOPHIL NFR BLD MANUAL: 0 % (ref 0–6)
ERYTHROCYTE [DISTWIDTH] IN BLOOD BY AUTOMATED COUNT: 17.3 % (ref 11.6–15.1)
EST. AVERAGE GLUCOSE BLD GHB EST-MCNC: 146 MG/DL
FRACTIONAL SHORTENING: 35 (ref 28–44)
GFR SERPL CREATININE-BSD FRML MDRD: 35 ML/MIN/1.73SQ M
GLUCOSE SERPL-MCNC: 123 MG/DL (ref 65–140)
GLUCOSE SERPL-MCNC: 150 MG/DL (ref 65–140)
GLUCOSE SERPL-MCNC: 151 MG/DL (ref 65–140)
GLUCOSE SERPL-MCNC: 167 MG/DL (ref 65–140)
GLUCOSE SERPL-MCNC: 228 MG/DL (ref 65–140)
HBA1C MFR BLD: 6.7 %
HCT VFR BLD AUTO: 36.2 % (ref 34.8–46.1)
HGB BLD-MCNC: 11.6 G/DL (ref 11.5–15.4)
INTERVENTRICULAR SEPTUM IN DIASTOLE (PARASTERNAL SHORT AXIS VIEW): 0.8 CM
INTERVENTRICULAR SEPTUM: 0.8 CM (ref 0.6–1.1)
LAAS-AP2: 17 CM2
LAAS-AP4: 17 CM2
LACTATE SERPL-SCNC: 2.2 MMOL/L (ref 0.5–2)
LACTATE SERPL-SCNC: 2.3 MMOL/L (ref 0.5–2)
LACTATE SERPL-SCNC: 2.7 MMOL/L (ref 0.5–2)
LEFT ATRIUM SIZE: 3.7 CM
LEFT ATRIUM VOLUME (MOD BIPLANE): 48 ML
LEFT ATRIUM VOLUME INDEX (MOD BIPLANE): 31.2 ML/M2
LEFT INTERNAL DIMENSION IN SYSTOLE: 3 CM (ref 2.1–4)
LEFT VENTRICULAR INTERNAL DIMENSION IN DIASTOLE: 4.6 CM (ref 3.5–6)
LEFT VENTRICULAR POSTERIOR WALL IN END DIASTOLE: 0.7 CM
LEFT VENTRICULAR STROKE VOLUME: 63 ML
LV EF US.2D.A4C+ESTIMATED: 49 %
LVSV (TEICH): 63 ML
LYMPHOCYTES # BLD AUTO: 0 % (ref 14–44)
LYMPHOCYTES # BLD AUTO: 0 THOUSAND/UL (ref 0.6–4.47)
MAGNESIUM SERPL-MCNC: 1.8 MG/DL (ref 1.9–2.7)
MCH RBC QN AUTO: 30.4 PG (ref 26.8–34.3)
MCHC RBC AUTO-ENTMCNC: 32 G/DL (ref 31.4–37.4)
MCV RBC AUTO: 95 FL (ref 82–98)
MITRAL REGURGITATION PEAK VELOCITY: 4.32 M/S
MITRAL VALVE MEAN INFLOW VELOCITY: 3.2 M/S
MITRAL VALVE REGURGITANT PEAK GRADIENT: 75 MMHG
MONOCYTES # BLD AUTO: 0.35 THOUSAND/UL (ref 0–1.22)
MONOCYTES NFR BLD: 2 % (ref 4–12)
MV E'TISSUE VEL-SEP: 6 CM/S
MV PEAK A VEL: 0.62 M/S
MV PEAK E VEL: 78 CM/S
MV STENOSIS PRESSURE HALF TIME: 45 MS
MV VALVE AREA P 1/2 METHOD: 4.89
NEUTROPHILS # BLD MANUAL: 16.8 THOUSAND/UL (ref 1.85–7.62)
NEUTS BAND NFR BLD MANUAL: 9 % (ref 0–8)
NEUTS SEG NFR BLD AUTO: 88 % (ref 43–75)
PHOSPHATE SERPL-MCNC: 2.7 MG/DL (ref 2.3–4.1)
PLATELET # BLD AUTO: 118 THOUSANDS/UL (ref 149–390)
PLATELET # BLD AUTO: 128 THOUSANDS/UL (ref 149–390)
PLATELET BLD QL SMEAR: ABNORMAL
PMV BLD AUTO: 11.1 FL (ref 8.9–12.7)
PMV BLD AUTO: 11.3 FL (ref 8.9–12.7)
POIKILOCYTOSIS BLD QL SMEAR: PRESENT
POTASSIUM SERPL-SCNC: 3.3 MMOL/L (ref 3.5–5.3)
PROCALCITONIN SERPL-MCNC: 6.56 NG/ML
RA PRESSURE ESTIMATED: 15 MMHG
RBC # BLD AUTO: 3.82 MILLION/UL (ref 3.81–5.12)
RBC MORPH BLD: PRESENT
RIGHT ATRIUM AREA SYSTOLE A4C: 11.3 CM2
RIGHT VENTRICLE ID DIMENSION: 2.2 CM
RV PSP: 33 MMHG
SINOTUBULAR JUNCTION: 1.9 CM
SL CV AV DECELERATION TIME RETROGRADE: 807 MS
SL CV AV PEAK GRADIENT RETROGRADE: 38 MMHG
SL CV DOP CALC MV VTI RETROGRADE: 121.7 CM
SL CV LEFT ATRIUM LENGTH A2C: 4.8 CM
SL CV LV EF: 50
SL CV MV MEAN GRADIENT RETROGRADE: 47 MMHG
SL CV PED ECHO LEFT VENTRICLE DIASTOLIC VOLUME (MOD BIPLANE) 2D: 98 ML
SL CV PED ECHO LEFT VENTRICLE SYSTOLIC VOLUME (MOD BIPLANE) 2D: 35 ML
SL CV SINUS OF VALSALVA 2D: 2.6 CM
SODIUM SERPL-SCNC: 138 MMOL/L (ref 135–147)
STJ: 1.9 CM
TASV: 9 CM/S
TR MAX PG: 18 MMHG
TR PEAK VELOCITY: 2.1 M/S
TRICUSPID ANNULAR PLANE SYSTOLIC EXCURSION: 1.3 CM
TRICUSPID VALVE PEAK REGURGITATION VELOCITY: 2.11 M/S
WBC # BLD AUTO: 17.32 THOUSAND/UL (ref 4.31–10.16)

## 2025-03-20 PROCEDURE — 83735 ASSAY OF MAGNESIUM: CPT

## 2025-03-20 PROCEDURE — 80048 BASIC METABOLIC PNL TOTAL CA: CPT

## 2025-03-20 PROCEDURE — 99223 1ST HOSP IP/OBS HIGH 75: CPT | Performed by: INTERNAL MEDICINE

## 2025-03-20 PROCEDURE — 82948 REAGENT STRIP/BLOOD GLUCOSE: CPT

## 2025-03-20 PROCEDURE — 93005 ELECTROCARDIOGRAM TRACING: CPT

## 2025-03-20 PROCEDURE — 84100 ASSAY OF PHOSPHORUS: CPT

## 2025-03-20 PROCEDURE — NC001 PR NO CHARGE: Performed by: INTERNAL MEDICINE

## 2025-03-20 PROCEDURE — 84145 PROCALCITONIN (PCT): CPT

## 2025-03-20 PROCEDURE — 82330 ASSAY OF CALCIUM: CPT

## 2025-03-20 PROCEDURE — 85049 AUTOMATED PLATELET COUNT: CPT

## 2025-03-20 PROCEDURE — 85027 COMPLETE CBC AUTOMATED: CPT

## 2025-03-20 PROCEDURE — 93306 TTE W/DOPPLER COMPLETE: CPT | Performed by: STUDENT IN AN ORGANIZED HEALTH CARE EDUCATION/TRAINING PROGRAM

## 2025-03-20 PROCEDURE — 85007 BL SMEAR W/DIFF WBC COUNT: CPT

## 2025-03-20 PROCEDURE — 82248 BILIRUBIN DIRECT: CPT | Performed by: INTERNAL MEDICINE

## 2025-03-20 PROCEDURE — 83605 ASSAY OF LACTIC ACID: CPT

## 2025-03-20 PROCEDURE — 92610 EVALUATE SWALLOWING FUNCTION: CPT

## 2025-03-20 PROCEDURE — 87081 CULTURE SCREEN ONLY: CPT

## 2025-03-20 PROCEDURE — 93306 TTE W/DOPPLER COMPLETE: CPT

## 2025-03-20 PROCEDURE — G0545 PR INHERENT VISIT TO INPT: HCPCS | Performed by: INTERNAL MEDICINE

## 2025-03-20 PROCEDURE — 83036 HEMOGLOBIN GLYCOSYLATED A1C: CPT

## 2025-03-20 RX ORDER — HYDROXYCHLOROQUINE SULFATE 200 MG/1
200 TABLET, FILM COATED ORAL
Status: DISCONTINUED | OUTPATIENT
Start: 2025-03-21 | End: 2025-03-28 | Stop reason: HOSPADM

## 2025-03-20 RX ORDER — POTASSIUM CHLORIDE 14.9 MG/ML
20 INJECTION INTRAVENOUS
Status: DISCONTINUED | OUTPATIENT
Start: 2025-03-20 | End: 2025-03-20

## 2025-03-20 RX ORDER — ACETAMINOPHEN 325 MG/1
650 TABLET ORAL EVERY 6 HOURS PRN
Status: DISCONTINUED | OUTPATIENT
Start: 2025-03-20 | End: 2025-03-23

## 2025-03-20 RX ORDER — MAGNESIUM SULFATE HEPTAHYDRATE 40 MG/ML
2 INJECTION, SOLUTION INTRAVENOUS ONCE
Status: COMPLETED | OUTPATIENT
Start: 2025-03-20 | End: 2025-03-20

## 2025-03-20 RX ORDER — POTASSIUM CHLORIDE 20MEQ/15ML
40 LIQUID (ML) ORAL ONCE
Status: DISCONTINUED | OUTPATIENT
Start: 2025-03-20 | End: 2025-03-20

## 2025-03-20 RX ORDER — ASPIRIN 81 MG/1
81 TABLET, CHEWABLE ORAL DAILY
Status: DISCONTINUED | OUTPATIENT
Start: 2025-03-20 | End: 2025-03-28 | Stop reason: HOSPADM

## 2025-03-20 RX ORDER — SODIUM CHLORIDE, SODIUM LACTATE, POTASSIUM CHLORIDE, CALCIUM CHLORIDE 600; 310; 30; 20 MG/100ML; MG/100ML; MG/100ML; MG/100ML
50 INJECTION, SOLUTION INTRAVENOUS CONTINUOUS
Status: DISPENSED | OUTPATIENT
Start: 2025-03-20 | End: 2025-03-25

## 2025-03-20 RX ORDER — POTASSIUM CHLORIDE 1500 MG/1
20 TABLET, EXTENDED RELEASE ORAL
Status: DISCONTINUED | OUTPATIENT
Start: 2025-03-20 | End: 2025-03-20

## 2025-03-20 RX ORDER — DULOXETIN HYDROCHLORIDE 60 MG/1
60 CAPSULE, DELAYED RELEASE ORAL DAILY
Status: DISCONTINUED | OUTPATIENT
Start: 2025-03-20 | End: 2025-03-28 | Stop reason: HOSPADM

## 2025-03-20 RX ORDER — OXYBUTYNIN CHLORIDE 5 MG/1
10 TABLET, EXTENDED RELEASE ORAL DAILY
Status: DISCONTINUED | OUTPATIENT
Start: 2025-03-20 | End: 2025-03-28 | Stop reason: HOSPADM

## 2025-03-20 RX ORDER — ENOXAPARIN SODIUM 100 MG/ML
30 INJECTION SUBCUTANEOUS DAILY
Status: DISCONTINUED | OUTPATIENT
Start: 2025-03-20 | End: 2025-03-28 | Stop reason: HOSPADM

## 2025-03-20 RX ORDER — POTASSIUM CHLORIDE 14.9 MG/ML
20 INJECTION INTRAVENOUS
Status: DISPENSED | OUTPATIENT
Start: 2025-03-20 | End: 2025-03-20

## 2025-03-20 RX ORDER — ATORVASTATIN CALCIUM 10 MG/1
10 TABLET, FILM COATED ORAL DAILY
Status: DISCONTINUED | OUTPATIENT
Start: 2025-03-20 | End: 2025-03-28 | Stop reason: HOSPADM

## 2025-03-20 RX ORDER — VANCOMYCIN HYDROCHLORIDE 500 MG/100ML
500 INJECTION, SOLUTION INTRAVENOUS EVERY 24 HOURS
Status: DISCONTINUED | OUTPATIENT
Start: 2025-03-20 | End: 2025-03-20

## 2025-03-20 RX ORDER — PREGABALIN 75 MG/1
75 CAPSULE ORAL 2 TIMES DAILY
Status: DISCONTINUED | OUTPATIENT
Start: 2025-03-20 | End: 2025-03-28 | Stop reason: HOSPADM

## 2025-03-20 RX ORDER — SODIUM CHLORIDE, SODIUM LACTATE, POTASSIUM CHLORIDE, CALCIUM CHLORIDE 600; 310; 30; 20 MG/100ML; MG/100ML; MG/100ML; MG/100ML
50 INJECTION, SOLUTION INTRAVENOUS CONTINUOUS
Status: DISPENSED | OUTPATIENT
Start: 2025-03-20 | End: 2025-03-20

## 2025-03-20 RX ORDER — CEFAZOLIN SODIUM 2 G/50ML
2000 SOLUTION INTRAVENOUS EVERY 12 HOURS
Status: DISCONTINUED | OUTPATIENT
Start: 2025-03-20 | End: 2025-03-28 | Stop reason: HOSPADM

## 2025-03-20 RX ORDER — PANTOPRAZOLE SODIUM 40 MG/1
40 TABLET, DELAYED RELEASE ORAL
Status: DISCONTINUED | OUTPATIENT
Start: 2025-03-21 | End: 2025-03-28 | Stop reason: HOSPADM

## 2025-03-20 RX ORDER — CALCIUM GLUCONATE 20 MG/ML
1 INJECTION, SOLUTION INTRAVENOUS ONCE
Status: COMPLETED | OUTPATIENT
Start: 2025-03-20 | End: 2025-03-20

## 2025-03-20 RX ORDER — PYRIDOXINE HCL (VITAMIN B6) 50 MG
100 TABLET ORAL DAILY
Status: DISCONTINUED | OUTPATIENT
Start: 2025-03-20 | End: 2025-03-28 | Stop reason: HOSPADM

## 2025-03-20 RX ADMIN — ACETAMINOPHEN 650 MG: 325 TABLET, FILM COATED ORAL at 11:53

## 2025-03-20 RX ADMIN — PREGABALIN 75 MG: 75 CAPSULE ORAL at 17:52

## 2025-03-20 RX ADMIN — CALCIUM GLUCONATE 1 G: 20 INJECTION, SOLUTION INTRAVENOUS at 07:35

## 2025-03-20 RX ADMIN — ACETAMINOPHEN 650 MG: 325 TABLET, FILM COATED ORAL at 20:39

## 2025-03-20 RX ADMIN — POTASSIUM CHLORIDE 20 MEQ: 14.9 INJECTION, SOLUTION INTRAVENOUS at 07:35

## 2025-03-20 RX ADMIN — CHLORHEXIDINE GLUCONATE 15 ML: 1.2 SOLUTION ORAL at 00:28

## 2025-03-20 RX ADMIN — DULOXETINE 60 MG: 60 CAPSULE, DELAYED RELEASE ORAL at 14:59

## 2025-03-20 RX ADMIN — POTASSIUM CHLORIDE 20 MEQ: 14.9 INJECTION, SOLUTION INTRAVENOUS at 12:26

## 2025-03-20 RX ADMIN — INSULIN LISPRO 1 UNITS: 100 INJECTION, SOLUTION INTRAVENOUS; SUBCUTANEOUS at 20:41

## 2025-03-20 RX ADMIN — OXYBUTYNIN CHLORIDE 10 MG: 5 TABLET, EXTENDED RELEASE ORAL at 14:58

## 2025-03-20 RX ADMIN — SODIUM CHLORIDE, SODIUM LACTATE, POTASSIUM CHLORIDE, AND CALCIUM CHLORIDE 50 ML/HR: .6; .31; .03; .02 INJECTION, SOLUTION INTRAVENOUS at 11:06

## 2025-03-20 RX ADMIN — ASPIRIN 81 MG CHEWABLE TABLET 81 MG: 81 TABLET CHEWABLE at 13:33

## 2025-03-20 RX ADMIN — ENOXAPARIN SODIUM 30 MG: 30 INJECTION SUBCUTANEOUS at 08:20

## 2025-03-20 RX ADMIN — INSULIN LISPRO 2 UNITS: 100 INJECTION, SOLUTION INTRAVENOUS; SUBCUTANEOUS at 12:01

## 2025-03-20 RX ADMIN — ATORVASTATIN CALCIUM 10 MG: 10 TABLET, FILM COATED ORAL at 13:33

## 2025-03-20 RX ADMIN — Medication 1000 UNITS: at 13:33

## 2025-03-20 RX ADMIN — CHLORHEXIDINE GLUCONATE 15 ML: 1.2 SOLUTION ORAL at 08:20

## 2025-03-20 RX ADMIN — CHLORHEXIDINE GLUCONATE 15 ML: 1.2 SOLUTION ORAL at 20:39

## 2025-03-20 RX ADMIN — PREGABALIN 75 MG: 75 CAPSULE ORAL at 13:33

## 2025-03-20 RX ADMIN — POTASSIUM CHLORIDE 20 MEQ: 14.9 INJECTION, SOLUTION INTRAVENOUS at 09:56

## 2025-03-20 RX ADMIN — POTASSIUM CHLORIDE 20 MEQ: 14.9 INJECTION, SOLUTION INTRAVENOUS at 15:46

## 2025-03-20 RX ADMIN — CEFAZOLIN SODIUM 2000 MG: 2 SOLUTION INTRAVENOUS at 15:54

## 2025-03-20 RX ADMIN — MAGNESIUM SULFATE HEPTAHYDRATE 2 G: 40 INJECTION, SOLUTION INTRAVENOUS at 08:20

## 2025-03-20 RX ADMIN — PYRIDOXINE HCL TAB 50 MG 100 MG: 50 TAB at 14:58

## 2025-03-20 NOTE — ASSESSMENT & PLAN NOTE
>>ASSESSMENT AND PLAN FOR TYPE 2 DIABETES MELLITUS (HCC) WRITTEN ON 3/20/2025  3:37 PM BY KEILA KNUTSON PA-C    Lab Results   Component Value Date    HGBA1C 6.7 (H) 03/20/2025   -glycemic management per primary     Recent Labs     03/20/25  0726 03/20/25  1126   POCGLU 123 228*       Blood Sugar Average: Last 72 hrs:  (P) 175.5

## 2025-03-20 NOTE — ASSESSMENT & PLAN NOTE
Metabolic encephalopathy (POA) in the setting of sepsis as evidenced by increased confusion, inability to ambulate, increased weakness being treated with IV fluid bolus  Blood cultures growing gram positive cocci in clusters x2  Likely due to bacteremia    Plan:  ICU level care  Treatment as above

## 2025-03-20 NOTE — ASSESSMENT & PLAN NOTE
" >>ASSESSMENT AND PLAN FOR TYPE 2 DIABETES MELLITUS (HCC) WRITTEN ON 3/19/2025 11:39 PM BY TIMMY BURTON, DO    Lab Results   Component Value Date    HGBA1C 6.7 (H) 11/15/2024       No results for input(s): \"POCGLU\" in the last 72 hours.    Blood Sugar Average: Last 72 hrs:    -History of type 2 diabetes is well-controlled at home  -SSI with POCT glucose, hypoglycemia protocol    "

## 2025-03-20 NOTE — QUICK NOTE
Family including  and daughter updated at bedside. We discussed the current plan for Magdalene given an unclear source of infection as well as overall improvement in clinical status with her no longer needed blood pressure support, no longer having a fever, and being less confused. Family notes she likes to eat sweets and has slow motility through her esophagus so she eats slowly and does better with food cut up into small pieces. All questions answered.

## 2025-03-20 NOTE — CONSULTS
Vancomycin IV Pharmacy-to-Dose Consultation    Tanya Stephen is a 89 y.o. female who was receiving Vancomycin IV with management by the Pharmacy Consult service for treatment of Urinary tract infection (goal -600, trough >10)  .       The patient’s Vancomycin therapy has been discontinued. Thank you for allowing us to take part in this patient's care. Pharmacy will sign-off now; please call or re-consult if there are any questions.      Pattie Lee, PharmD  Critical Care & Internal Medicine Clinical Pharmacist   Available via Epic Secure Chat

## 2025-03-20 NOTE — CASE MANAGEMENT
Case Management Assessment & Discharge Planning Note    Patient name Tanya Stephen  [facility] ICU 12/ICU 12 MRN [id number]  : 1934 Date 3/20/2025       Current Admission Date: 3/19/2025  Current Admission Diagnosis:Severe sepsis ([facility])   Patient Active Problem List    Diagnosis Date Noted Date Diagnosed    CHF (congestive heart failure) ([facility]) 2025     Bacteremia 2025     Metabolic encephalopathy 2025     Severe sepsis ([facility]) 2025     Hypoxia 2025     Small intestinal bacterial overgrowth (SIBO) 2025     Thrombocytopenia ([facility]) 2025     Ambulatory dysfunction 11/15/2024     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     Chronic kidney disease, stage IV (severe) ([facility]) 10/28/2024     Diabetic nephropathy associated with type 2 diabetes mellitus ([facility]) 10/28/2024     Anemia of chronic renal failure, stage 4 (severe)  ([facility]) 10/28/2024     Fall 2024     Mild cognitive impairment 2024     Chronic mastoiditis of right side 2024     Nonrheumatic aortic valve stenosis 2024     Chronic bilateral pleural effusions 2024     Gastroesophageal reflux disease without esophagitis 2023     Esophageal dysphagia 2023     Raynaud's phenomenon without gangrene 2023     Spinal stenosis of lumbar region with neurogenic claudication 2022     Chronic pain syndrome 2022     Acute encephalopathy 2022     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[facility]) 2022     OAB (overactive bladder) 2022     Acute on chronic diastolic (congestive) heart failure ([facility]) 2022     Post zoster neuralgia 2021     Primary generalized (osteo)arthritis 2021     Seronegative arthropathy of multiple sites ([facility]) 2021     Senile osteoporosis 2021     Diabetic polyneuropathy associated with type 2 diabetes mellitus ([facility]) 2021     Irritable bowel syndrome with diarrhea 2021     Abnormality of  gait due to impairment of balance 11/19/2021     Sicca syndrome (HCC) 11/19/2021     Hypomagnesemia 12/22/2016     Type 2 diabetes mellitus (HCC) 12/22/2016       LOS (days): 1  Geometric Mean LOS (GMLOS) (days): 3.5  Days to GMLOS:2.8     OBJECTIVE:  PATIENT READMITTED TO HOSPITAL  Risk of Unplanned Readmission Score: 32.92     Current admission status: Inpatient    Preferred Pharmacy:   [facility] [address] [place] [address], [place] [id number]  Phone: [phone] Fax: [phone]    [facility] [address] [facility] [place] [id number]  Phone: [phone] Fax: [phone]    [facility] [address]  [address]  [place] [address]  Phone: [phone] Fax: [phone]    Primary Care Provider: Gregory Santoro DO    Primary Insurance: MEDICARE  Secondary Insurance: [employer]    ASSESSMENT:  Active Health Care Proxies    There are no active Health Care Proxies on file.       Patient Information  Admitted from:: Home  Mental Status: Alert, Other (Comment) (Sleeping)  During Assessment patient was accompanied by: Daughter, Spouse, Other-Comment  Assessment information provided by:: Spouse  Primary Caregiver: Family  Caregiver's Name:: , Weston  Support Systems: Spouse/significant other, Family members, Daughter  County of Residence: [place]  [place]  Home entry access options. Select all that apply.: Stairs  Number of steps to enter home.: 3 (through the back and 4 through the front)  Type of Current Residence: [facility]  Living Arrangements: Lives w/ Spouse/significant other    Activities of Daily Living Prior to Admission  Functional Status: Assistance  Completes ADLs independently?: No  Level of ADL dependence: Assistance  Ambulates independently?: Yes  Does patient use assisted devices?:  Yes  Assisted Devices (DME) used: Walker  Does patient currently own DME?: Yes  What DME does the patient currently own?: Walker  Does patient have a history of Outpatient Therapy (PT/OT)?: Yes  Does the patient have a history of Short-Term Rehab?: No  Does patient have a history of HHC?: Yes  Does patient currently have HHC?: No    Patient Information Continued  Income Source: Pension/FDC  Does patient have prescription coverage?: Yes  Does patient receive dialysis treatments?: No  Does patient have a history of substance abuse?: No  Does patient have a history of Mental Health Diagnosis?: No    Means of Transportation  Means of Transport to Appts:: Family transport    DISCHARGE DETAILS:    Discharge planning discussed with:: Pt's  - Weston Daughter Nevaeh and ANSON  Freedom of Choice: Yes  Comments - Freedom of Choice: STR    Contacts  Patient Contacts: Weston Stephen (Spouse)  Relationship to Patient:: Family  Contact Method: In Person  Reason/Outcome: Discharge Planning, Emergency Contact, Continuity of Care    Other Referral/Resources/Interventions Provided:  Interventions: Short Term Rehab  Referral Comments: CM met with pt's  - Weston, daughter - Nevaeh, and ANSON at bedside. CM introduced self/role with dcp. Pt sleeping. Nevaeh reports she spoke with ID and pt will likely need IV abx for atleast 2 weeks. As per daughter tests still pending. Inquiring about facility placement for pt. CM did review that pt will likely need rehab, but will need to be evaluated by PT/OT. CM did review a rehab facility will be able to manage IV abx as well. Pt's  and daughter are agreeable to rehab referrals. They will also discuss with pt. Aware CM will place referrals once therapy evaluates pt and there is a better timeframe for dc readiness. Family aware CM will present family with a list to review. No other questions for CM at this time. Aware CM available if they have any questions.

## 2025-03-20 NOTE — ASSESSMENT & PLAN NOTE
Lab Results   Component Value Date    EGFR 35 03/19/2025    EGFR 37 03/14/2025    EGFR 31 02/24/2025    CREATININE 1.33 (H) 03/19/2025    CREATININE 1.26 03/14/2025    CREATININE 1.45 (H) 02/24/2025        -Close monitoring of urine output and renal indices  -Avoid nephrotoxic agents

## 2025-03-20 NOTE — ASSESSMENT & PLAN NOTE
Admission blood cultures 2 of 2 sets growing MSSA on preliminary BCID panel. Possible skin source as portal of entry with multiple extremity abrasions/wounds.   -transition to Cefazolin 2 g IV q 8 hours  -follow-up cultures and adjust antibiotics as needed  -follow up TTE  -repeat blood cultures with am labs  -duration of antibiotic dependent on blood culture clearance, echo results, and pending clinical course

## 2025-03-20 NOTE — ASSESSMENT & PLAN NOTE
Patient lethargic is setting of acute illness, but responsive to name and exam. 3/14/2025 CT head: No acute intercranial abnormality.  -antibiotic/work up as above  -monitor mentation  -symptomatic management per critical care team  -aspiration precautions

## 2025-03-20 NOTE — CONSULTS
Consultation - Infectious Disease   Name: Tanya Stephen 89 y.o. female I MRN: [id number]  Unit/Bed#: ICU 12 I Date of Admission: 3/19/2025   Date of Service: 3/20/2025 I Hospital Day: 1   Inpatient consult to Infectious Diseases  Consult performed by: Ana Abernathy PA-C  Consult ordered by: Belkys Polanco MD      Physician Requesting Evaluation: Savanah Aponte DO   Reason for Evaluation / Principal Problem: 1. Severe sepsis 2. Acute encephalopathy    Assessment & Plan  Severe sepsis (HCC)  E/b fever, tachycardia, leukocytosis, hypotension requiring Vasopressor support. In setting of #2  -antibiotics as below  -follow-up cultures and adjust antibiotics as needed  -monitor temperature and hemodynamics  -serial exam  -additional inventions pending clinical course  -monitoring serial CBC and BMP for treatment response and any developing toxicities  -Vasopressor management per critical care team  Bacteremia due to methicillin susceptible Staphylococcus aureus (MSSA)  Admission blood cultures 2 of 2 sets growing MSSA on preliminary BCID panel. Possible skin source as portal of entry with multiple extremity abrasions/wounds.   -transition to Cefazolin 2 g IV q 8 hours  -follow-up cultures and adjust antibiotics as needed  -follow up TTE  -repeat blood cultures with am labs  -duration of antibiotic dependent on blood culture clearance, echo results, and pending clinical course   Acute encephalopathy  Patient lethargic is setting of acute illness, but responsive to name and exam. 3/14/2025 CT head: No acute intercranial abnormality.  -antibiotic/work up as above  -monitor mentation  -symptomatic management per critical care team  -aspiration precautions  Chronic kidney disease, stage IV (severe) ([facility])  Lab Results   Component Value Date    EGFR 35 03/20/2025    EGFR 35 03/19/2025    EGFR 37 03/14/2025    CREATININE 1.32 (H) 03/20/2025    CREATININE 1.33 (H) 03/19/2025    CREATININE 1.26 03/14/2025   -renal dose adjust  antibiotic as needed  -volume management   -recheck BMP  Type 2 diabetes mellitus (HCC)  Lab Results   Component Value Date    HGBA1C 6.7 (H) 03/20/2025   -glycemic management per primary     Recent Labs     03/20/25  0726 03/20/25  1126   POCGLU 123 228*       Blood Sugar Average: Last 72 hrs:  (P) 175.5    Chronic bilateral pleural effusions  -volume management per CC  CHF (congestive heart failure) ([facility])  Wt Readings from Last 3 Encounters:   03/20/25 59.8 kg (131 lb 13.4 oz)   02/24/25 65 kg (143 lb 4.8 oz)   01/27/25 54 kg (119 lb)   -volume management per CC    I have discussed with patient and family at bedside, RN, and critical care team regarding the above plan to adjust antibiotics as above and monitor closely. They agree with the plan.    Antibiotics:  Vancomycin/Ceftriaxone D2    History of Present Illness   Tanya Stephen is a 89 y.o. year old female with CHF, CKD, prior recurrent UTI treatments, T2DM, and chronic low back pain s/p remote lumbar surgeries and hip replacements who presented to [facility] 3/19/25 with altered mental status.  On arrival, patient noted to be febrile, tachycardic and developed hypotension. Blood cultures were obtained. No definite source of infection identified on exam or imaging though skin wounds were noted. Patient was admitted to SD for vasopressor management. Now blood cultures x 2 + for MSSA on BCID panel. Infectious Disease is now consulted regarding evaluation and management of severe sepsis and acute encephalopathy.  Patient is lethargic on visit and poor history. Family at bedside reports patient fell backwards last week while trying to make her bed, fell into her dresser and broke the drawer knob off with her fall. She generally ambulates through there ranch home with a walker. She has on and off chronic dysuria and was on chronic macrobid outpatient and follows with urology. No known fever, chills, sweats at home, no CP, SOB, cough, no N/V/D. + chronic back pain,  no recent worsening pain since fall.      A complete review of systems is negative other than that noted in the HPI.    Historical Information   Past Medical History:   Diagnosis Date    Anemia     Arthritis     Back pain     CHF (congestive heart failure) (HCC)     Chronic kidney disease     Stage 3b    Confusion 2023    Diabetes (HCC)     Diabetes mellitus (HCC)     Diabetic gastroparesis associated with type 2 diabetes mellitus  (HCC)     Diabetic polyneuropathy (HCC)     Diverticulosis     Herpes zoster     Hypertension     IBS (irritable bowel syndrome)     Neurogenic claudication due to lumbar spinal stenosis     Osteoarthritis     Osteoporosis     Pulmonary edema     Raynaud's phenomenon without gangrene     Seronegative arthropathy of multiple sites (HCC)     Sicca (HCC)      Past Surgical History:   Procedure Laterality Date    BACK SURGERY      COLONOSCOPY      EGD AND COLONOSCOPY N/A 2016    Procedure: EGD AND COLONOSCOPY;  Surgeon: Elina Anderson MD;  Location: [facility];  Service:     JOINT REPLACEMENT      bilat knees and hips    OTHER SURGICAL HISTORY      pelvis rods    REPLACEMENT TOTAL KNEE BILATERAL      STOMACH SURGERY      UPPER GASTROINTESTINAL ENDOSCOPY       Social History     Tobacco Use    Smoking status: Former     Current packs/day: 0.00     Types: Cigarettes     Quit date:      Years since quittin.2     Passive exposure: Never    Smokeless tobacco: Never   Vaping Use    Vaping status: Never Used   Substance and Sexual Activity    Alcohol use: Not Currently    Drug use: No    Sexual activity: Not Currently     Partners: Male     Birth control/protection: None     E-Cigarette/Vaping    E-Cigarette Use Never User      E-Cigarette/Vaping Substances    Nicotine No     THC No     CBD No     Flavoring No     Other No          Objective :  Temp:  [97.5 °F (36.4 °C)-102.2 °F (39 °C)] 99.3 °F (37.4 °C)  HR:  [] 101  BP: ()/(47-68) 107/57  Resp:  [9-26] 19  SpO2:   [91 %-100 %] 98 %  O2 Device: Nasal cannula  Nasal Cannula O2 Flow Rate (L/min):  [2 L/min-5 L/min] 5 L/min      General Appearance:  89 year old female, chronically debilitated, lethargic, propped in bed, in no acute resp distress   Head:  Normocephalic, without obvious abnormality, atraumatic   Eyes:  Conjunctiva pink and sclera anicteric, both eyes, opens eyes briefly   Nose: Nares normal, mucosa normal, no drainage   Throat: Oropharynx moist without lesions, missing teeth   Neck: Supple, symmetrical, no adenopathy, no tenderness/mass/nodules   Back:   Symmetric, no curvature, no CVA tenderness, propped on pillows in bed   Lungs:   Decreased breath sounds fairly clear to auscultation bilaterally, respirations unlabored on NCO2   Chest Wall:  No tenderness or deformity   Heart:  RRR; no murmur, rub or gallop   Abdomen:   Soft, non-tender, non-distended, positive bowel sounds    Extremities: No cyanosis, clubbing or edema   Skin: No rashes or lesions. No draining wounds noted. Multiple wounds/scabs/ecchymoses noted on exam and reviewed on media section photos. IV site nontender   Lymph nodes: Cervical, supraclavicular nodes normal   : No diaz, no SPT   Neurologic: Arousable briefly to name and on exam, can move extremities in bed         Lab Results: I have reviewed the following results:  Results from last 7 days   Lab Units 03/20/25 0427 03/20/25  0045 03/19/25  1842 03/14/25 2004   WBC Thousand/uL 17.32*  --  15.52* 5.17   HEMOGLOBIN g/dL 11.6  --  12.1 12.0   PLATELETS Thousands/uL 118* 128* 120* 155     Results from last 7 days   Lab Units 03/20/25  0427 03/19/25  1842 03/14/25 2004   SODIUM mmol/L 138 136 140   POTASSIUM mmol/L 3.3* 3.6 4.2   CHLORIDE mmol/L 102 100 104   CO2 mmol/L 25 26 27   BUN mg/dL 25 24 21   CREATININE mg/dL 1.32* 1.33* 1.26   EGFR ml/min/1.73sq m 35 35 37   CALCIUM mg/dL 7.8* 8.6 8.6   AST U/L  --  31 71*   ALT U/L  --  26 31   ALK PHOS U/L  --  104 84   ALBUMIN g/dL  --  3.3*  3.5     Results from last 7 days   Lab Units 03/19/25  1842   GRAM STAIN RESULT  Gram positive cocci in clusters*  Gram positive cocci in clusters*     Results from last 7 days   Lab Units 03/20/25  0745 03/19/25  1842   PROCALCITONIN ng/ml 6.56* 1.10*                   Imaging Results Review: I personally reviewed the following image studies in PACS and associated radiology reports: CT chest, CT abdomen/pelvis, CT head, and CT T/L spine. My interpretation of the radiology images/reports is: 3/19/25 CT C/A/P: Moderate right and small left pleural effusions.  3/14/2025 CT head: No acute intercranial abnormality.  3/14/2025 CT thoracic/lumbar spine: No acute fracture.  Intact thoracolumbar T10 to S1 with bilateral SI joint fusion..  Other Study Results Review: My interpretation of other studies include: 3/20/25 TTE pending.

## 2025-03-20 NOTE — ASSESSMENT & PLAN NOTE
E/b fever, tachycardia, leukocytosis, hypotension requiring Vasopressor support. In setting of #2  -antibiotics as below  -follow-up cultures and adjust antibiotics as needed  -monitor temperature and hemodynamics  -serial exam  -additional inventions pending clinical course  -monitoring serial CBC and BMP for treatment response and any developing toxicities  -Vasopressor management per critical care team

## 2025-03-20 NOTE — ASSESSMENT & PLAN NOTE
-Found to be in severe sepsis due to meeting SIRS criteria with leukocytosis and fever.  Severe sepsis due to lactate greater than 2.  -She has a history of recurrent UTIs and is complaining of dysuria and suprapubic pressure, however UA does not suggest infection at this time.  -CT chest abdomen pelvis showed no specific findings to suggest intrathoracic or intra-abdominal reasons for sepsis.  -Patient treated with broad-spectrum antibiotics including ceftriaxone and vancomycin added due to history of urine culture growing staph species.  -Patient given 1.5 L bolus in the ER after period of hypotension.  -Due to concern for fluid overload with history of CHF, patient was started on Levophed when hypotension persisted.  -Continue broad-spectrum antibiotics Rocephin/vancomycin  -Monitor for improvement of lactate and other endpoints

## 2025-03-20 NOTE — ASSESSMENT & PLAN NOTE
"Lab Results   Component Value Date    HGBA1C 6.7 (H) 11/15/2024       No results for input(s): \"POCGLU\" in the last 72 hours.    Blood Sugar Average: Last 72 hrs:    -History of type 2 diabetes is well-controlled at home  -SSI with POCT glucose, hypoglycemia protocol    "

## 2025-03-20 NOTE — PROGRESS NOTES
Progress Note - Critical Care/ICU   Name: Tanya Stephen 89 y.o. female I MRN: [id number]  Unit/Bed#: ICU 12 I Date of Admission: 3/19/2025   Date of Service: 3/20/2025 I Hospital Day: 1      Critical Care Interval Transfer Note:    Brief Hospital Summary:  Patient admitted to ICU for severe sepsis requiring pressors. Treated for presumed infection with unknown source so broad spectrum antibiotics used. Patient no longer requiring pressors, no longer febrile, and no longer confused. Blood cultures growing staph. ID consulted and narrowed antibiotics to ancef. Patient remains stable for SD2 .    Barriers to discharge:   IV abx  PT/OT eval  Disposition     Consults: IP CONSULT TO CASE MANAGEMENT  IP CONSULT TO NUTRITION SERVICES  IP CONSULT TO PHARMACY  IP CONSULT TO INFECTIOUS DISEASES    Recommended to review admission imaging for incidental findings and document in discharge navigator: Chart reviewed, no known incidental findings noted at this time.      Discharge Plan: Anticipate discharge in 24-48 hrs to discharge location to be determined pending rehab evaluations.       Patient seen and evaluated by Critical Care today and deemed to be appropriate for transfer to Stepdown Level 2. Spoke to Dr. Alfaro from [facility] to accept transfer. Critical care can be contacted via SecureChat with any questions or concerns. Please use the Critical Care AP Role in Secure Chat for any provider inquires until the patient is transferred out of the ICU or until tomorrow at 0600.

## 2025-03-20 NOTE — ASSESSMENT & PLAN NOTE
Lab Results   Component Value Date    EGFR 35 03/20/2025    EGFR 35 03/19/2025    EGFR 37 03/14/2025    CREATININE 1.32 (H) 03/20/2025    CREATININE 1.33 (H) 03/19/2025    CREATININE 1.26 03/14/2025   -renal dose adjust antibiotic as needed  -volume management   -recheck BMP

## 2025-03-20 NOTE — ASSESSMENT & PLAN NOTE
-Shown on multiple previous images  -CT today showed interval increase in size of bilateral pleural effusions  -Likely secondary to CHF  -Patient reports no current shortness of breath  -Patient was not hypoxic in the ER.  -Continue to monitor and repeat imaging if patient does develop symptoms.

## 2025-03-20 NOTE — PROGRESS NOTES
Tanya Stephen is a 89 y.o. female who is currently ordered Vancomycin IV with management by the Pharmacy Consult service.  Relevant clinical data and objective / subjective history reviewed.  Vancomycin Assessment:  Indication and Goal AUC/Trough: Urinary tract infection (goal -600, trough >10), history VSE and Staph haemolyticus, now with 2/2 blood culture with GPC in clusters  Clinical Status: critically ill  Micro:   3/19 blood: 2/2 gram positive cocci in clusters  3/19 BCID: pending  Renal Function:  SCr: 1.32 mg/dL (baseline 1.2-1.5)  CrCl: 23.8 mL/min  No urine output documented   Renal replacement: Not on dialysis  Days of Therapy: 2  Current Dose: 1500 mg (25 mg/kg) IV loading dose  Vancomycin Plan:  New Dosin mg IV Q24H  Estimated AUC: 496 mcg*hr/mL  Estimated Trough: 16.4 mcg/mL  Next Level: 3/21 at 0600  Renal Function Monitoring: Daily BMP and UOP  Pharmacy will continue to follow closely for s/sx of nephrotoxicity, infusion reactions and appropriateness of therapy.  BMP and CBC will be ordered per protocol. We will continue to follow the patient’s culture results and clinical progress daily.    Pattie Lee, Pharmacist

## 2025-03-20 NOTE — H&P
"H&P - Critical Care/ICU   Name: Tanya Stephen 89 y.o. female I MRN: [id number]  Unit/Bed#: ICU 12 I Date of Admission: 3/19/2025   Date of Service: 3/20/2025 I Hospital Day: 1       Assessment & Plan  Severe sepsis (HCC)  -Found to be in severe sepsis due to meeting SIRS criteria with leukocytosis and fever.  Severe sepsis due to lactate greater than 2.  -She has a history of recurrent UTIs and is complaining of dysuria and suprapubic pressure, however UA does not suggest infection at this time.  -CT chest abdomen pelvis showed no specific findings to suggest intrathoracic or intra-abdominal reasons for sepsis.  -Patient treated with broad-spectrum antibiotics including ceftriaxone and vancomycin added due to history of urine culture growing staph species.  -Patient given 1.5 L bolus in the ER after period of hypotension.  -Due to concern for fluid overload with history of CHF, patient was started on Levophed when hypotension persisted.  -Continue broad-spectrum antibiotics Rocephin/vancomycin  -Monitor for improvement of lactate and other endpoints  Hypomagnesemia  -Daily BMPs, replete as needed  Type 2 diabetes mellitus (HCC)  Lab Results   Component Value Date    HGBA1C 6.7 (H) 11/15/2024       No results for input(s): \"POCGLU\" in the last 72 hours.    Blood Sugar Average: Last 72 hrs:    -History of type 2 diabetes is well-controlled at home  -SSI with POCT glucose, hypoglycemia protocol    Primary generalized (osteo)arthritis  -Continue home amitriptyline and scheduled Tylenol for chronic pain  -Treat acute pain as needed  OAB (overactive bladder)  -Continue home Myrbetriq  Acute encephalopathy  -Likely secondary to severe sepsis  -Every 2 hour neuro checks  Chronic pain syndrome  -Patient states she has no new pain in the ER, only continued chronic pain  -Continue home amitriptyline and scheduled Tylenol.  -Treat acute pain as needed  Gastroesophageal reflux disease without esophagitis  -Replace home " omeprazole with equivalent dose of pantoprazole.  Chronic bilateral pleural effusions  -Shown on multiple previous images  -CT today showed interval increase in size of bilateral pleural effusions  -Likely secondary to CHF  -Patient reports no current shortness of breath  -Patient was not hypoxic in the ER.  -Continue to monitor and repeat imaging if patient does develop symptoms.  Chronic kidney disease, stage IV (severe) (HCC)  Lab Results   Component Value Date    EGFR 35 03/19/2025    EGFR 37 03/14/2025    EGFR 31 02/24/2025    CREATININE 1.33 (H) 03/19/2025    CREATININE 1.26 03/14/2025    CREATININE 1.45 (H) 02/24/2025        -Close monitoring of urine output and renal indices  -Avoid nephrotoxic agents     Stage 3b chronic kidney disease (HCC) (Resolved: 3/19/2025)  Lab Results   Component Value Date    EGFR 35 03/19/2025    EGFR 37 03/14/2025    EGFR 31 02/24/2025    CREATININE 1.33 (H) 03/19/2025    CREATININE 1.26 03/14/2025    CREATININE 1.45 (H) 02/24/2025     -Close monitoring of urine output and renal indices  -Avoid nephrotoxic agents    Disposition: Critical care    History of Present Illness   Tanya Stephen is a 89 y.o. who presents with altered mental status.  Patient does have a history of CHF, CKD, recurrent UTIs, T2DM, chronic low back pain.  Only current symptoms noted by the patient are dysuria and suprapubic fullness.  She denies current dyspnea, chest pain.   and 2 daughters are bedside and states she just seems more confused over the last few days.  With her history of recurrent UTIs, UA was conducted and did not show significantly infected urine.  Patient does have a history of ambulatory dysfunction as well, no recent falls since her last admission per family.    History obtained from spouse and child and the patient.  Review of Systems: See HPI for Review of Systems    Historical Information   Past Medical History:  No date: Anemia  No date: Arthritis  No date: Back pain  No  date: CHF (congestive heart failure) (Formerly [facility])  No date: Chronic kidney disease      Comment:  Stage 3b  02/22/2023: Confusion  No date: Diabetes (Formerly [facility])  No date: Diabetes mellitus (Formerly [facility])  No date: Diabetic gastroparesis associated with type 2 diabetes   mellitus  (Formerly [facility])  No date: Diabetic polyneuropathy (Formerly [facility])  No date: Diverticulosis  No date: Herpes zoster  No date: Hypertension  No date: IBS (irritable bowel syndrome)  No date: Neurogenic claudication due to lumbar spinal stenosis  No date: Osteoarthritis  No date: Osteoporosis  No date: Pulmonary edema  No date: Raynaud's phenomenon without gangrene  No date: Seronegative arthropathy of multiple sites (Formerly [facility])  No date: Sicca (Formerly [facility]) Past Surgical History:  No date: BACK SURGERY  No date: COLONOSCOPY  12/23/2016: EGD AND COLONOSCOPY; N/A      Comment:  Procedure: EGD AND COLONOSCOPY;  Surgeon: Elina Anderson MD;  [facility];  Service:   No date: JOINT REPLACEMENT      Comment:  bilat knees and hips  No date: OTHER SURGICAL HISTORY      Comment:  pelvis rods  No date: REPLACEMENT TOTAL KNEE BILATERAL  No date: STOMACH SURGERY  No date: UPPER GASTROINTESTINAL ENDOSCOPY   Current Outpatient Medications   Medication Instructions    acetaminophen (TYLENOL) 650 mg, Oral, 3 times daily    amitriptyline (ELAVIL) 10 mg, Daily at bedtime    aspirin 81 mg, Oral, Daily    atorvastatin (LIPITOR) 10 mg, Oral, Daily    Cholecalciferol (VITAMIN D3) 1000 units CAPS 1 capsule, Daily    diphenoxylate-atropine (LOMOTIL) 2.5-0.025 mg per tablet 1 tablet, As needed    DULoxetine (CYMBALTA) 60 mg, Oral, Daily    furosemide (LASIX) 20 mg tablet One tablet daily and an extra 20 mg prn for wt gain 3 lb/ 24 hours or 5 lb/ 5 days, above a dry wt of 122 lb    hydroxychloroquine (PLAQUENIL) 200 mg, Oral, Daily with breakfast    Magnesium 250 mg, Every evening    Mirabegron ER (MYRBETRIQ) 50 mg, Oral, Daily    omeprazole (PRILOSEC) 20 mg, Daily    pregabalin (LYRICA) 75 mg, Oral, 2  times daily    pyridoxine (B-6) 100 mg, Daily    Sodium Fluoride (PreviDent 5000 Booster Plus) 1.1 % PSTE Apply on teeth every evening as directed    Allergies   Allergen Reactions    Morphine Other (See Comments)     urinary retention    Ciprofloxacin Nausea Only and Rash      Social History     Tobacco Use    Smoking status: Former     Current packs/day: 0.00     Types: Cigarettes     Quit date:      Years since quittin.2     Passive exposure: Never    Smokeless tobacco: Never   Vaping Use    Vaping status: Never Used   Substance Use Topics    Alcohol use: Not Currently    Drug use: No    Family History   Problem Relation Age of Onset    Cancer Brother     Diabetes Mother     Hypertension Father     Heart disease Father           Objective :                   Vitals I/O      Most Recent Min/Max in 24hrs   Temp 98 °F (36.7 °C) Temp  Min: 98 °F (36.7 °C)  Max: 102.2 °F (39 °C)   Pulse 104 Pulse  Min: 97  Max: 116   Resp 20 Resp  Min: 16  Max: 20   BP 99/55 BP  Min: 79/48  Max: 123/59   O2 Sat 94 % SpO2  Min: 92 %  Max: 99 %      Intake/Output Summary (Last 24 hours) at 3/20/2025 0001  Last data filed at 3/19/2025 2152  Gross per 24 hour   Intake 1900 ml   Output --   Net 1900 ml       Diet Regular; Regular House    Invasive Monitoring           Physical Exam   Physical Exam  Vitals and nursing note reviewed.   Eyes:      Extraocular Movements: Extraocular movements intact.      Pupils: Pupils are equal, round, and reactive to light.   Skin:     General: Skin is warm and dry.      Findings: Wound (Multiple small superficial abrasions on her back.  1 near L5/sacrum did have minor purulent drainage, otherwise well-appearing with no signs of abscess) present.   HENT:      Head: Normocephalic and atraumatic.      Ears:      Comments: Baseline difficulty hearing per family at bedside.     Nose: No congestion or rhinorrhea.      Mouth/Throat:      Mouth: Mucous membranes are dry.      Pharynx: No oropharyngeal  exudate.   Cardiovascular:      Rate and Rhythm: Regular rhythm. Tachycardia present.   Musculoskeletal:         General: Normal range of motion.   Abdominal:      Palpations: Abdomen is soft.      Tenderness: There is no abdominal tenderness.   Constitutional:       General: She is not in acute distress.     Appearance: She is ill-appearing (chronic).   Pulmonary:      Effort: Pulmonary effort is normal. No accessory muscle usage or accessory muscle usage.      Breath sounds: Normal breath sounds. No wheezing, rhonchi or rales.   Neurological:      Mental Status: She is alert. She is calm.          Diagnostic Studies        Lab Results: I have reviewed the following results:     Medications:  Scheduled PRN   cefTRIAXone, 1,000 mg, Q24H  chlorhexidine, 15 mL, Q12H MEGGAN  enoxaparin, 40 mg, Daily  insulin lispro, 1-5 Units, 4x Daily (AC & HS)  vancomycin, 25 mg/kg, Q24H          Continuous    norepinephrine, 1-30 mcg/min, Last Rate: 3 mcg/min (03/19/25 2245)         Labs:   CBC    Recent Labs     03/19/25  1842   WBC 15.52*   HGB 12.1   HCT 39.3   *   BANDSPCT 10*     BMP    Recent Labs     03/19/25  1842   SODIUM 136   K 3.6      CO2 26   AGAP 10   BUN 24   CREATININE 1.33*   CALCIUM 8.6       Coags    Recent Labs     03/19/25  1842   INR 1.25*   PTT 28        Additional Electrolytes  No recent results       Blood Gas    No recent results  No recent results LFTs  Recent Labs     03/19/25  1842   ALT 26   AST 31   ALKPHOS 104   ALB 3.3*   TBILI 1.93*       Infectious  Recent Labs     03/19/25  1842   PROCALCITONI 1.10*     Glucose  Recent Labs     03/19/25  1842   GLUC 203*

## 2025-03-20 NOTE — ASSESSMENT & PLAN NOTE
" >>ASSESSMENT AND PLAN FOR TYPE 2 DIABETES MELLITUS (HCC) WRITTEN ON 3/20/2025 12:01 AM BY TIMMY BURTON, DO    Lab Results   Component Value Date    HGBA1C 6.7 (H) 11/15/2024       No results for input(s): \"POCGLU\" in the last 72 hours.    Blood Sugar Average: Last 72 hrs:    -History of type 2 diabetes is well-controlled at home  -SSI with POCT glucose, hypoglycemia protocol    " 12 hour chart check   Monitor summary     SR: 60-90  .18/.10/.38

## 2025-03-20 NOTE — ASSESSMENT & PLAN NOTE
Lab Results   Component Value Date    HGBA1C 6.7 (H) 03/20/2025   -glycemic management per primary     Recent Labs     03/20/25  0726 03/20/25  1126   POCGLU 123 228*       Blood Sugar Average: Last 72 hrs:  (P) 175.5

## 2025-03-20 NOTE — QUICK NOTE
Family notified at bedside of change in level of care and eventual move out of the ICU when a bed is available.

## 2025-03-20 NOTE — ASSESSMENT & PLAN NOTE
Confusion, weakness, febrile  Gram positive cocci in clusters x2  Unclear source    Plan:  On rocephin and vancomycin  Consult ID

## 2025-03-20 NOTE — ASSESSMENT & PLAN NOTE
-Patient states she has no new pain in the ER, only continued chronic pain  -Continue home amitriptyline and scheduled Tylenol.  -Treat acute pain as needed

## 2025-03-20 NOTE — ASSESSMENT & PLAN NOTE
Wt Readings from Last 3 Encounters:   03/20/25 59.8 kg (131 lb 13.4 oz)   02/24/25 65 kg (143 lb 4.8 oz)   01/27/25 54 kg (119 lb)   -volume management per CC

## 2025-03-20 NOTE — SPEECH THERAPY NOTE
Speech-Language Pathology Bedside Swallow Evaluation        Patient Name: Tanya Stephen    Today's Date: 3/20/2025     Problem List  Principal Problem:    Severe sepsis (HCC)  Active Problems:    Hypomagnesemia    Type 2 diabetes mellitus (HCC)    Primary generalized (osteo)arthritis    OAB (overactive bladder)    Acute encephalopathy    Chronic pain syndrome    Gastroesophageal reflux disease without esophagitis    Chronic bilateral pleural effusions    Chronic kidney disease, stage IV (severe) (HCC)    CHF (congestive heart failure) (HCC)    Bacteremia    Metabolic encephalopathy           Summary    Pt presents with hx of mild oropharyngeal dysphagia per VBS 2/4/25, oral and pharyngeal swallowing skills appeared WFL at this time w/ no overt s/s aspiration.      Recommendations:   Diet: regular diet and thin liquids   Meds:  as tolerated     Frequent Oral care: 2x/day  Aspiration precautions-feed when awake and alert; single sips   Other Recommendations/ considerations: no further follow up needed.        Current Medical Status  Pt is a 89 y.o. female who presented to [facility]  with severe sepsis. Only current symptoms noted by the patient are dysuria and suprapubic fullness.  She denies current dyspnea, chest pain.   and 2 daughters are bedside and states she just seems more confused over the last few days.  With her history of recurrent UTIs, UA was conducted and did not show significantly infected urine.      Past medical history:   Please see H&P for details    Special Studies:  CT-Chest/abd/pelvis: 3/19/25 LUNGS: Bilateral lower lobe and dependent right middle lobe atelectasis. There is grossly unchanged chronic pleural-parenchymal scarring within the lingula. No convincing separate lobar consolidation.     VBS: 2/4/2025: Mild oral/pharyngeal dysphagia w/ inconsistent epiglottic inversion, moderate pharyngeal retention, mild transient penetration w/ NTL and thin liquids by cup x1; mild  silent aspiration w/ large consecutive straw sips of thin liquids.     Social/Education/Vocational Hx:  Pt lives with family    Swallow Information   Prior speech/swallowing tx: none   Current Risks for Dysphagia & Aspiration: known history of dysphagia and AMS  Current Symptoms/Concerns:  AMS  Current Diet: regular diet, thin liquids, and NPO   Baseline Diet: regular diet and thin liquids  Takes pills- whole w/ water per pt     Baseline Assessment   Behavior/Cognition: alert  Speech/Language Status: able to participate in basic conversation and able to follow commands  Patient Positioning: upright in bed     Swallow Mechanism Exam   Facial: symmetrical  Labial: WFL  Lingual: WFL  Velum: unable to visualize  Mandible: adequate ROM  Dentition: limited dentition  Vocal quality:clear/adequate   Volitional Cough: unable to initiate volitional cough   Respiratory: NC    Consistencies Assessed and Performance   Consistencies Administered:  pt seen at breakfast w/ NTL and thin liquids by cup and straw, cream of rice, scrambled eggs, pancake, sausage- few bites, refusing most due to c/o not being hungry     Oral Stage: pt w/ resistant to bites of food, but bolus retrieval was adequate when willing to take po. Able to drink from cup and straw w/o labial leakage. Mastication/manipulation appeared WFL. Pt w/ good oral control/transfer w/ NTL and thin liquids.     Pharyngeal Stage: swallow initiation was timely without overt s/s aspiration. Pt needed reminders to take single sips.       Esophageal Concerns: none reported      Results Reviewed with: patient, RN, and MD Annia Cardenas MA [facility]-SL[facility]  Speech Pathologist  [place] license # [id number]  [place] license # [id number]  Available via Secure Chat

## 2025-03-21 LAB
ACANTHOCYTES BLD QL SMEAR: PRESENT
ALBUMIN SERPL BCG-MCNC: 2.5 G/DL (ref 3.5–5)
ALP SERPL-CCNC: 109 U/L (ref 34–104)
ALT SERPL W P-5'-P-CCNC: 24 U/L (ref 7–52)
ANION GAP SERPL CALCULATED.3IONS-SCNC: 7 MMOL/L (ref 4–13)
ANISOCYTOSIS BLD QL SMEAR: PRESENT
AST SERPL W P-5'-P-CCNC: 48 U/L (ref 13–39)
BASOPHILS # BLD MANUAL: 0 THOUSAND/UL (ref 0–0.1)
BASOPHILS NFR MAR MANUAL: 0 % (ref 0–1)
BILIRUB SERPL-MCNC: 0.95 MG/DL (ref 0.2–1)
BUN SERPL-MCNC: 32 MG/DL (ref 5–25)
CA-I BLD-SCNC: 0.99 MMOL/L (ref 1.12–1.32)
CALCIUM ALBUM COR SERPL-MCNC: 8.7 MG/DL (ref 8.3–10.1)
CALCIUM SERPL-MCNC: 7.5 MG/DL (ref 8.4–10.2)
CHLORIDE SERPL-SCNC: 103 MMOL/L (ref 96–108)
CO2 SERPL-SCNC: 24 MMOL/L (ref 21–32)
CREAT SERPL-MCNC: 1.43 MG/DL (ref 0.6–1.3)
EOSINOPHIL # BLD MANUAL: 0 THOUSAND/UL (ref 0–0.4)
EOSINOPHIL NFR BLD MANUAL: 0 % (ref 0–6)
ERYTHROCYTE [DISTWIDTH] IN BLOOD BY AUTOMATED COUNT: 17.1 % (ref 11.6–15.1)
GFR SERPL CREATININE-BSD FRML MDRD: 32 ML/MIN/1.73SQ M
GLUCOSE SERPL-MCNC: 123 MG/DL (ref 65–140)
GLUCOSE SERPL-MCNC: 126 MG/DL (ref 65–140)
GLUCOSE SERPL-MCNC: 132 MG/DL (ref 65–140)
GLUCOSE SERPL-MCNC: 165 MG/DL (ref 65–140)
GLUCOSE SERPL-MCNC: 171 MG/DL (ref 65–140)
HCT VFR BLD AUTO: 31.7 % (ref 34.8–46.1)
HGB BLD-MCNC: 10.1 G/DL (ref 11.5–15.4)
LACTATE SERPL-SCNC: 1.1 MMOL/L (ref 0.5–2)
LYMPHOCYTES # BLD AUTO: 0.21 THOUSAND/UL (ref 0.6–4.47)
LYMPHOCYTES # BLD AUTO: 3 % (ref 14–44)
MAGNESIUM SERPL-MCNC: 2.3 MG/DL (ref 1.9–2.7)
MCH RBC QN AUTO: 30.4 PG (ref 26.8–34.3)
MCHC RBC AUTO-ENTMCNC: 31.9 G/DL (ref 31.4–37.4)
MCV RBC AUTO: 96 FL (ref 82–98)
MONOCYTES # BLD AUTO: 0 THOUSAND/UL (ref 0–1.22)
MONOCYTES NFR BLD: 0 % (ref 4–12)
MRSA NOSE QL CULT: NORMAL
NEUTROPHILS # BLD MANUAL: 6.95 THOUSAND/UL (ref 1.85–7.62)
NEUTS BAND NFR BLD MANUAL: 2 % (ref 0–8)
NEUTS SEG NFR BLD AUTO: 95 % (ref 43–75)
OVALOCYTES BLD QL SMEAR: PRESENT
PHOSPHATE SERPL-MCNC: 2.3 MG/DL (ref 2.3–4.1)
PLATELET # BLD AUTO: 100 THOUSANDS/UL (ref 149–390)
PLATELET BLD QL SMEAR: ABNORMAL
PMV BLD AUTO: 11.8 FL (ref 8.9–12.7)
POIKILOCYTOSIS BLD QL SMEAR: PRESENT
POTASSIUM SERPL-SCNC: 4.3 MMOL/L (ref 3.5–5.3)
PROT SERPL-MCNC: 5.1 G/DL (ref 6.4–8.4)
RBC # BLD AUTO: 3.32 MILLION/UL (ref 3.81–5.12)
RBC MORPH BLD: PRESENT
SODIUM SERPL-SCNC: 134 MMOL/L (ref 135–147)
WBC # BLD AUTO: 7.16 THOUSAND/UL (ref 4.31–10.16)

## 2025-03-21 PROCEDURE — 82948 REAGENT STRIP/BLOOD GLUCOSE: CPT

## 2025-03-21 PROCEDURE — 84100 ASSAY OF PHOSPHORUS: CPT

## 2025-03-21 PROCEDURE — 85007 BL SMEAR W/DIFF WBC COUNT: CPT

## 2025-03-21 PROCEDURE — 87077 CULTURE AEROBIC IDENTIFY: CPT

## 2025-03-21 PROCEDURE — 80053 COMPREHEN METABOLIC PANEL: CPT

## 2025-03-21 PROCEDURE — 83605 ASSAY OF LACTIC ACID: CPT

## 2025-03-21 PROCEDURE — 99233 SBSQ HOSP IP/OBS HIGH 50: CPT | Performed by: FAMILY MEDICINE

## 2025-03-21 PROCEDURE — 87040 BLOOD CULTURE FOR BACTERIA: CPT

## 2025-03-21 PROCEDURE — 82330 ASSAY OF CALCIUM: CPT

## 2025-03-21 PROCEDURE — 97167 OT EVAL HIGH COMPLEX 60 MIN: CPT

## 2025-03-21 PROCEDURE — 83735 ASSAY OF MAGNESIUM: CPT

## 2025-03-21 PROCEDURE — 99233 SBSQ HOSP IP/OBS HIGH 50: CPT | Performed by: INTERNAL MEDICINE

## 2025-03-21 PROCEDURE — 85027 COMPLETE CBC AUTOMATED: CPT

## 2025-03-21 PROCEDURE — G0545 PR INHERENT VISIT TO INPT: HCPCS | Performed by: INTERNAL MEDICINE

## 2025-03-21 RX ADMIN — ENOXAPARIN SODIUM 30 MG: 30 INJECTION SUBCUTANEOUS at 08:35

## 2025-03-21 RX ADMIN — ACETAMINOPHEN 650 MG: 325 TABLET, FILM COATED ORAL at 20:57

## 2025-03-21 RX ADMIN — PREGABALIN 75 MG: 75 CAPSULE ORAL at 18:41

## 2025-03-21 RX ADMIN — OXYBUTYNIN CHLORIDE 10 MG: 5 TABLET, EXTENDED RELEASE ORAL at 08:42

## 2025-03-21 RX ADMIN — INSULIN LISPRO 1 UNITS: 100 INJECTION, SOLUTION INTRAVENOUS; SUBCUTANEOUS at 16:29

## 2025-03-21 RX ADMIN — Medication 1000 UNITS: at 08:35

## 2025-03-21 RX ADMIN — CHLORHEXIDINE GLUCONATE 15 ML: 1.2 SOLUTION ORAL at 20:53

## 2025-03-21 RX ADMIN — ATORVASTATIN CALCIUM 10 MG: 10 TABLET, FILM COATED ORAL at 08:36

## 2025-03-21 RX ADMIN — CEFAZOLIN SODIUM 2000 MG: 2 SOLUTION INTRAVENOUS at 15:28

## 2025-03-21 RX ADMIN — HYDROXYCHLOROQUINE SULFATE 200 MG: 200 TABLET ORAL at 06:40

## 2025-03-21 RX ADMIN — PYRIDOXINE HCL TAB 50 MG 100 MG: 50 TAB at 08:42

## 2025-03-21 RX ADMIN — CEFAZOLIN SODIUM 2000 MG: 2 SOLUTION INTRAVENOUS at 03:05

## 2025-03-21 RX ADMIN — DULOXETINE 60 MG: 60 CAPSULE, DELAYED RELEASE ORAL at 08:42

## 2025-03-21 RX ADMIN — CHLORHEXIDINE GLUCONATE 15 ML: 1.2 SOLUTION ORAL at 08:35

## 2025-03-21 RX ADMIN — PREGABALIN 75 MG: 75 CAPSULE ORAL at 08:36

## 2025-03-21 RX ADMIN — ASPIRIN 81 MG CHEWABLE TABLET 81 MG: 81 TABLET CHEWABLE at 08:36

## 2025-03-21 RX ADMIN — ACETAMINOPHEN 650 MG: 325 TABLET, FILM COATED ORAL at 14:14

## 2025-03-21 RX ADMIN — PANTOPRAZOLE SODIUM 40 MG: 40 TABLET, DELAYED RELEASE ORAL at 06:40

## 2025-03-21 RX ADMIN — INSULIN LISPRO 1 UNITS: 100 INJECTION, SOLUTION INTRAVENOUS; SUBCUTANEOUS at 21:03

## 2025-03-21 NOTE — PROGRESS NOTES
Progress Note - Hospitalist   Name: Tanya Stephen 89 y.o. female I MRN: [id number]  Unit/Bed#: ICU 12 I Date of Admission: 3/19/2025   Date of Service: 3/21/2025 I Hospital Day: 2    Assessment & Plan  Severe sepsis (HCC)  -Found to be in severe sepsis due to meeting SIRS criteria with leukocytosis and fever.  Severe sepsis due to lactate greater than 2.  -She has a history of recurrent UTIs and is complaining of dysuria and suprapubic pressure, however UA does not suggest infection at this time.  -CT chest abdomen pelvis showed no specific findings to suggest intrathoracic or intra-abdominal reasons for sepsis.  -Patient given 1.5 L bolus in the ER after period of hypotension.  -Due to concern for fluid overload with history of CHF, patient was started on Levophed when hypotension persisted.  -Admitted under ICU service, since patient remained stable, downgraded to medicine service  -Both sets of blood culture is growing Staph aureus, MSSA-infectious disease on board, antibiotic adjusted to renally dosed cefazolin.    TTE negative for vegetation  Follow repeat blood culture from 3/21/2025    Bacteremia  Both sets of blood cultures growing Staph aureus, MSSA  No definitive source identified, suspect multiple skin and soft tissue wounds, could be disorders  ID on board, patient had remained on renally dose adjusted cefazolin  Follow repeat blood culture  Transthoracic echocardiogram negative for vegetation  Thrombocytopenia (HCC)  Platelet count is 100,000, most likely secondary to sepsis  Continue to monitor, no active bleeding noted.  Patient remained on Lovenox  Hypomagnesemia  Resolved  Type 2 diabetes mellitus (HCC)  Lab Results   Component Value Date    HGBA1C 6.7 (H) 03/20/2025       Recent Labs     03/20/25  1126 03/20/25  1600 03/20/25 2034 03/21/25  0742   POCGLU 228* 150* 167* 123       Blood Sugar Average: Last 72 hrs:  (P) 158.2  Continue insulin, follow hypoglycemia precaution.  OAB (overactive  bladder)  On Myrbetriq  Acute encephalopathy  Metabolic in nature blood culture positive, patient also remain on cefazolin-if cognitive function deteriorate, consider to discuss with ID to switch antibiotic to avoid cefazolin toxicity  Evaluated by speech, recommendation regular diet thin liquids.  Maintain electrolytes, renal function      Chronic pain syndrome  -Patient states she has no new pain in the ER, only continued chronic pain  -Continue home amitriptyline and scheduled Tylenol.  -Treat acute pain as needed  Chronic bilateral pleural effusions  -Shown on multiple previous images  -CT today showed interval increase in size of bilateral pleural effusions  -Likely secondary to CHF  -Patient reports no current shortness of breath  -Patient was not hypoxic in the ER.  -Continue to monitor and repeat imaging if patient does develop symptoms.  Chronic kidney disease, stage IV (severe) (HCC)  Lab Results   Component Value Date    EGFR 32 03/21/2025    EGFR 35 03/20/2025    EGFR 35 03/19/2025    CREATININE 1.43 (H) 03/21/2025    CREATININE 1.32 (H) 03/20/2025    CREATININE 1.33 (H) 03/19/2025   Continue to monitor, avoid nephrotoxic medication, follow tension protocol    VTE Pharmacologic Prophylaxis:   Moderate Risk (Score 3-4) - Pharmacological DVT Prophylaxis Ordered: enoxaparin (Lovenox).    Mobility:   Basic Mobility Inpatient Raw Score: 7  JH-HLM Goal: 2: Bed activities/Dependent transfer  JH-HLM Achieved: 2: Bed activities/Dependent transfer  JH-HLM Goal achieved. Continue to encourage appropriate mobility.    Patient Centered Rounds: I performed bedside rounds with nursing staff today.   Discussions with Specialists or Other Care Team Provider: ID note reviewed.    Education and Discussions with Family / Patient: Updated  (daughter) via phone.  Daughter Enma updated over the phone    Current Length of Stay: 2 day(s)  Current Patient Status: Inpatient   Certification Statement: The patient  will continue to require additional inpatient hospital stay due to monitorable conditions  Discharge Plan: Anticipate discharge in 48-72 hrs to to be due to mild    Code Status: Level 1 - Full Code    Subjective   Seen and evaluated during the rounding.  Resting comfortably.  No overnight issues    Objective :  Temp:  [96.8 °F (36 °C)-99.3 °F (37.4 °C)] 97.8 °F (36.6 °C)  HR:  [] 97  BP: ()/(50-62) 115/51  Resp:  [11-28] 19  SpO2:  [86 %-100 %] 100 %  O2 Device: Nasal cannula    Body mass index is 26.45 kg/m².     Input and Output Summary (last 24 hours):     Intake/Output Summary (Last 24 hours) at 3/21/2025 0843  Last data filed at 3/21/2025 0600  Gross per 24 hour   Intake 1975.83 ml   Output 450 ml   Net 1525.83 ml       Physical Exam  Vitals and nursing note reviewed.   Constitutional:       Appearance: She is not ill-appearing or diaphoretic.   HENT:      Mouth/Throat:      Mouth: Mucous membranes are moist.      Pharynx: No oropharyngeal exudate.   Eyes:      General: No scleral icterus.        Left eye: No discharge.      Extraocular Movements: Extraocular movements intact.      Conjunctiva/sclera: Conjunctivae normal.      Pupils: Pupils are equal, round, and reactive to light.   Cardiovascular:      Rate and Rhythm: Normal rate.      Heart sounds:      No friction rub. No gallop.   Pulmonary:      Effort: Pulmonary effort is normal. No respiratory distress.      Breath sounds: No stridor. No wheezing or rhonchi.   Abdominal:      General: There is no distension.      Palpations: There is no mass.      Tenderness: There is no abdominal tenderness.      Hernia: No hernia is present.   Musculoskeletal:      Right lower leg: No edema.      Left lower leg: No edema.   Neurological:      Mental Status: She is alert. Mental status is at baseline.      Cranial Nerves: No cranial nerve deficit.      Sensory: No sensory deficit.      Motor: No weakness.      Coordination: Coordination normal.            Lines/Drains:              Lab Results: I have reviewed the following results:   Results from last 7 days   Lab Units 03/21/25  0442 03/19/25  1842 03/14/25 2004   WBC Thousand/uL 7.16   < > 5.17   HEMOGLOBIN g/dL 10.1*   < > 12.0   HEMATOCRIT % 31.7*   < > 38.6   PLATELETS Thousands/uL 100*   < > 155   BANDS PCT % 2   < >  --    SEGS PCT %  --   --  65   LYMPHO PCT % 3*   < > 20   MONO PCT % 0*   < > 11   EOS PCT % 0   < > 2    < > = values in this interval not displayed.     Results from last 7 days   Lab Units 03/21/25 0442   SODIUM mmol/L 134*   POTASSIUM mmol/L 4.3   CHLORIDE mmol/L 103   CO2 mmol/L 24   BUN mg/dL 32*   CREATININE mg/dL 1.43*   ANION GAP mmol/L 7   CALCIUM mg/dL 7.5*   ALBUMIN g/dL 2.5*   TOTAL BILIRUBIN mg/dL 0.95   ALK PHOS U/L 109*   ALT U/L 24   AST U/L 48*   GLUCOSE RANDOM mg/dL 126     Results from last 7 days   Lab Units 03/19/25  1842   INR  1.25*     Results from last 7 days   Lab Units 03/21/25  0742 03/20/25  2034 03/20/25  1600 03/20/25  1126 03/20/25  0726 03/14/25  1829   POC GLUCOSE mg/dl 123 167* 150* 228* 123 95     Results from last 7 days   Lab Units 03/20/25  0045   HEMOGLOBIN A1C % 6.7*     Results from last 7 days   Lab Units 03/21/25  0442 03/20/25  1501 03/20/25  1107 03/20/25  0745 03/19/25  2215 03/19/25  1842   LACTIC ACID mmol/L 1.1 2.3* 2.2* 2.7*   < > 2.8*   PROCALCITONIN ng/ml  --   --   --  6.56*  --  1.10*    < > = values in this interval not displayed.       Recent Cultures (last 7 days):   Results from last 7 days   Lab Units 03/19/25  1842   BLOOD CULTURE  Staphylococcus aureus*   GRAM STAIN RESULT  Gram positive cocci in clusters*  Gram positive cocci in clusters*       Imaging Results Review: No pertinent imaging studies reviewed.  Other Study Results Review: No additional pertinent studies reviewed.    Last 24 Hours Medication List:     Current Facility-Administered Medications:     acetaminophen (TYLENOL) tablet 650 mg, Q6H PRN    aspirin  chewable tablet 81 mg, Daily    atorvastatin (LIPITOR) tablet 10 mg, Daily    ceFAZolin (ANCEF) IVPB (premix in dextrose) 2,000 mg 50 mL, Q12H, Last Rate: Stopped (03/21/25 0400)    chlorhexidine (PERIDEX) 0.12 % oral rinse 15 mL, Q12H MEGGAN    Cholecalciferol (VITAMIN D3) tablet 1,000 Units, Daily    DULoxetine (CYMBALTA) delayed release capsule 60 mg, Daily    enoxaparin (LOVENOX) subcutaneous injection 30 mg, Daily    hydroxychloroquine (PLAQUENIL) tablet 200 mg, Daily With Breakfast    insulin lispro (HumALOG/ADMELOG) 100 units/mL subcutaneous injection 1-5 Units, 4x Daily (AC & HS) **AND** Fingerstick Glucose (POCT), 4x Daily AC and at bedtime    lactated ringers infusion, Continuous, Last Rate: 50 mL/hr (03/20/25 1211)    NOREPINEPHRINE 4 MG  ML NSS (CMPD ORDER) infusion, Titrated, Last Rate: Stopped (03/20/25 0410)    oxybutynin (DITROPAN-XL) 24 hr tablet 10 mg, Daily    pantoprazole (PROTONIX) EC tablet 40 mg, Early Morning    pregabalin (LYRICA) capsule 75 mg, BID    pyridoxine (VITAMIN B6) tablet 100 mg, Daily    trimethobenzamide (TIGAN) IM injection 200 mg, Q6H PRN    Administrative Statements   Today, Patient Was Seen By: Edi Evans MD      **Please Note: This note may have been constructed using a voice recognition system.**

## 2025-03-21 NOTE — ASSESSMENT & PLAN NOTE
>>ASSESSMENT AND PLAN FOR TYPE 2 DIABETES MELLITUS (HCC) WRITTEN ON 3/21/2025 12:23 PM BY KEILA KNUTSON PA-C    Lab Results   Component Value Date    HGBA1C 6.7 (H) 03/20/2025   -glycemic management per primary     Recent Labs     03/20/25  1600 03/20/25 2034 03/21/25  0742 03/21/25  1106   POCGLU 150* 167* 123 132       Blood Sugar Average: Last 72 hrs:  (P) [id number]

## 2025-03-21 NOTE — OCCUPATIONAL THERAPY NOTE
Occupational Therapy Evaluation     Patient Name: Tanya Stephen  Today's Date: 3/21/2025  Problem List  Principal Problem:    Severe sepsis (HCC)  Active Problems:    Hypomagnesemia    Type 2 diabetes mellitus (HCC)    Primary generalized (osteo)arthritis    OAB (overactive bladder)    Acute encephalopathy    Chronic pain syndrome    Gastroesophageal reflux disease without esophagitis    Chronic bilateral pleural effusions    Chronic kidney disease, stage IV (severe) (HCC)    Thrombocytopenia (HCC)    CHF (congestive heart failure) (HCC)    Bacteremia    Metabolic encephalopathy    Past Medical History  Past Medical History:   Diagnosis Date    Anemia     Arthritis     Back pain     CHF (congestive heart failure) (HCC)     Chronic kidney disease     Stage 3b    Confusion 2023    Diabetes (HCC)     Diabetes mellitus (HCC)     Diabetic gastroparesis associated with type 2 diabetes mellitus  (HCC)     Diabetic polyneuropathy (HCC)     Diverticulosis     Herpes zoster     Hypertension     IBS (irritable bowel syndrome)     Neurogenic claudication due to lumbar spinal stenosis     Osteoarthritis     Osteoporosis     Pulmonary edema     Raynaud's phenomenon without gangrene     Seronegative arthropathy of multiple sites (HCC)     Sicca (HCC)      Past Surgical History  Past Surgical History:   Procedure Laterality Date    BACK SURGERY      COLONOSCOPY      EGD AND COLONOSCOPY N/A 2016    Procedure: EGD AND COLONOSCOPY;  Surgeon: Elina Anderson MD;  Location: [facility];  Service:     JOINT REPLACEMENT      bilat knees and hips    OTHER SURGICAL HISTORY      pelvis rods    REPLACEMENT TOTAL KNEE BILATERAL      STOMACH SURGERY      UPPER GASTROINTESTINAL ENDOSCOPY         Patient's identity confirmed via 2 patient identifiers (full name and ) at start of session      [id number]   OT Last Visit   OT Visit Date 25   Note Type   Note type Evaluation   Pain Assessment   Pain Assessment Tool FLACC    Pain Location/Orientation Orientation: Lower;Location: Back   Pain Onset/Description Onset: Ongoing;Descriptor: Aching   Effect of Pain on Daily Activities limits actvity tolerance, comfort, speed of ADLs/mobility   Patient's Stated Pain Goal No pain   Hospital Pain Intervention(s) Repositioned;Ambulation/increased activity;Emotional support   Multiple Pain Sites Yes   Pain Rating: FLACC (Rest) - Face 1   Pain Rating: FLACC (Rest) - Legs 1   Pain Rating: FLACC (Rest) - Activity 1   Pain Rating: FLACC (Rest) - Cry 1   Pain Rating: FLACC (Rest) - Consolability 1   Score: FLACC (Rest) 5   Pain Rating: FLACC (Activity) - Face 1   Pain Rating: FLACC (Activity) - Legs 1   Pain Rating: FLACC (Activity) - Activity 1   Pain Rating: FLACC (Activity) - Cry 1   Pain Rating: FLACC (Activity) - Consolability 1   Score: FLACC (Activity) 5   Pain 2   [place] FACES Pain Rating 2 6   Pain Location/Orientation 2 Orientation: Bilateral;Location: Knee   Pain Onset/Description 2 Onset: Ongoing;Descriptor: Aching   Patient's Stated Pain Goal 2 No pain   Hospital Pain Intervention(s) 2 Repositioned;Ambulation/increased activity;Emotional support   Restrictions/Precautions   Weight Bearing Precautions Per Order No   Other Precautions Cognitive;Chair Alarm;Bed Alarm;Multiple lines;Telemetry;O2;Fall Risk;Pain;Hard of hearing  (IV)   Home Living   Type of Home House   Home Layout One level;Performs ADLs on one level;Able to live on main level with bedroom/bathroom;Stairs to enter with rails  (3 DAVID back, 4 DAVID front)   Bathroom Shower/Tub Walk-in shower   Bathroom Toilet Standard   Bathroom Equipment Grab bars in shower;Shower chair  (does not use shower chair baseline)   Bathroom Accessibility Accessible   Home Equipment Walker;Cane  (rollator)   Prior Function   Level of [place] Independent with ADLs;Independent with functional mobility;Needs assistance with IADLS  (mostly (I) w/ ADLs, occasional assist from spouse)   Lives With  "Spouse   Receives Help From Family   IADLs Family/Friend/Other provides transportation;Family/Friend/Other provides meals;Family/Friend/Other provides medication management   Falls in the last 6 months 1 to 4  (1 recent fall)   Vocational Retired   Comments Pt is a questionable historian, spouse at bedside to provide PLOF. Pt reports being (I) w/ ADLs, light A to wash back for bathing. Uses rollator for mobility. Was going to OPPT.   Lifestyle   Autonomy Pt lives w/ spouse in a 1 level house and is (I) w/ ADLs PTA, rollator, (+) falls, (-)    Reciprocal Relationships Supportive spouse, local daughter   Service to Others Retired   Intrinsic Gratification Pt enjoys spending time w/ family   General   Additional Pertinent History Pt is a 89 yo female admitted to [facility] w/ AMS. Found to be in severe sepsis due to meeting SIRS criteria with leukocytosis and fever. CT showed interval increase in size of bilateral pleural effusions. PMH includes CHF, CKD, recurrent UTIs, T2DM, chronic low back pain and ambulatory dysfunction.   Family/Caregiver Present Yes  (Spouse Weston)   Subjective   Subjective \"My throne\" (after sitting in recliner)   ADL   Where Assessed Other (Comment)  (BSC vs recliner)   Eating Assistance 4  Minimal Assistance   Grooming Assistance 4  Minimal Assistance   UB Bathing Assistance 4  Minimal Assistance   LB Bathing Assistance 2  Maximal Assistance   UB Dressing Assistance 4  Minimal Assistance   LB Dressing Assistance 2  Maximal Assistance   LB Dressing Deficit Don/doff R sock;Don/doff L sock;Setup  (sitting EOB)   Toileting Assistance  2  Maximal Assistance   Toileting Deficit Setup;Bedside commode;Perineal hygiene;Steadying;Verbal cueing;Supervison/safety;Increased time to complete  (urinate on BSC, max A in stance for hygiene)   Bed Mobility   Supine to Sit 2  Maximal assistance   Additional items Assist x 1;HOB elevated;Bedrails;Increased time required;Verbal cues;LE management   Additional " Comments Initially required min A to maintain sitting EOB, progressing to S w/ optimal positioning. Intermittent min A w/ fatigue. OOB to recliner at end of session.   Transfers   Sit to Stand 3  Moderate assistance   Additional items Assist x 1;Increased time required;Verbal cues   Stand to Sit 3  Moderate assistance   Additional items Assist x 1;Increased time required;Verbal cues   Stand pivot 2  Maximal assistance   Additional items Assist x 1;Increased time required;Verbal cues  (from EOB>BSC; progressing to mod A x 1 from BSC>recliner)   Additional Comments Initial STS from EOB w/ no AD and BUE HHA, max A x 1 for SPT from EOB>BSC. Continues to require mod A for STS from BSC to RW but progressing to mod A x 1 for few steps from EOB>recliner   Functional Mobility   Functional Mobility 3  Moderate assistance   Additional Comments Few side steps from BSC>recliner w/ mod A x 1 and RW, limited by fatigue   Additional items Rolling walker   Balance   Static Sitting Fair -   Dynamic Sitting Poor +   Static Standing Poor +   Dynamic Standing Poor   Activity Tolerance   Activity Tolerance Patient limited by fatigue;Patient limited by pain  (cognition)   Medical Staff Made Aware Spoke to PT ROD Velazquez   Nurse Made Aware yes, RN César marques to see pt and updated on outcomes   RUE Assessment   RUE Assessment X  (grossly WFL however significant strength deficits; limited shoulder AROM)   LUE Assessment   LUE Assessment X  (grossly WFL however significant strength deficits; limited shoulder AROM)   Hand Function   Gross Motor Coordination Functional  (grossly WFL, impaired due to strength deficits, able to give high five)   Fine Motor Coordination Impaired  (arthritic changes present b/l hands)   Sensation   Light Touch No apparent deficits   Vision-Basic Assessment   Current Vision Wears glasses all the time   Cognition   Overall Cognitive Status Impaired   Arousal/Participation Alert;Cooperative   Attention Attends  with cues to redirect   Orientation Level Oriented X4  (grossly oriented to month/year)   Memory Decreased recall of recent events;Decreased recall of precautions;Decreased short term memory;Decreased recall of biographical information   Following Commands Follows one step commands with increased time or repetition   Comments Pleasantly confused, [place], required repetition of commands for initiation and carryover. VC for safety, sequencing, attention. [place]. Spouse reports she is below baseline mentation   Assessment   Limitation Decreased ADL status;Decreased Safe judgement during ADL;Decreased cognition;Decreased endurance;Decreased self-care trans;Decreased high-level ADLs  (pain, balance, fxnl mobility, act bryanna, fxnl reach, trunk control, standing bryanna, strength, fxnl sitting balance, and fxnl sitting bryanna, attention to task, safety awareness, insight, problem solving, learning new tasks, appropriate responses, response time)   Prognosis Good   Assessment Pt is a 89 y.o. female seen for OT evaluation s/p admit to [facility] on 3/19/2025 w/Severe sepsis (HCC). Prior to admission, pt was living with spouse in a 1 level house, (I) with ADLs, (A) with IADLs, rollator for mobility, (+) falls, (-) . Personal and environmental factors affecting patient at time of evaluation include advanced age, current habits and behavioral patterns, limited social support, inaccessible home environment, inaccessible bathroom environment, and difficulty completing ADLs. Personal factors supporting patient at time of evaluation include (I) PLOF, supportive spouse and local family, attitude towards recovery, and FFSU. Based upon this evaluation, pt is functioning below baseline due to significant generalized weakness/deconditioning. Pt will benefit from continued skilled inpatient OT to maximize safety, level of independence and overall performance in ADLs, functional transfers, functional mobility to return to functional  baseline/highest level of function.   Goals   Patient Goals to feel better   LTG Time Frame 10-14   Long Term Goal #1 see goals below   Plan   Treatment Interventions ADL retraining;Functional transfer training;Endurance training;Cognitive reorientation;Patient/family training;Equipment evaluation/education;Compensatory technique education;Continued evaluation;Energy conservation;Activityengagement;UE strengthening/ROM   Goal Expiration Date 03/31/25   OT Treatment Day 0   OT Frequency 3-5x/wk   Discharge Recommendation   Rehab Resource Intensity Level, OT II (Moderate Resource Intensity)   Equipment Recommended Bedside commode   Commode Type Standard   AM-PAC Daily Activity Inpatient   Lower Body Dressing 2   Bathing 2   Toileting 2   Upper Body Dressing 3   Grooming 3   Eating 3   Daily Activity Raw Score 15   Daily Activity Standardized Score (Calc for Raw Score >=11) 34.69   AM-PAC Applied Cognition Inpatient   Following a Speech/Presentation 2   Understanding Ordinary Conversation 3   Taking Medications 1   Remembering Where Things Are Placed or Put Away 2   Remembering List of 4-5 Errands 1   Taking Care of Complicated Tasks 1   Applied Cognition Raw Score 10   Applied Cognition Standardized Score 24.98   End of Consult   Patient Position at End of Consult Bedside chair;Bed/Chair alarm activated;All needs within reach   Nurse Communication Nurse aware of consult     GOALS:    *Goals established to promote patient goal of to get better:      *Patient will perform grooming tasks standing at sink with (S) in order to increase overall independence     *UB ADL with (I) for inc'd independence with self care    *LB ADL with Min (A)for inc'd independence with self care and decreased caregiver burden    *Toileting with Min (A) for clothing management and hygiene to increase hygiene/thoroughness in order to reduce caregiver burden    *Patient will verbalize and demonstrate use of energy conservation/deep breathing  techniques and work simplification skills during functional activities with no verbal cues.     *ADL transfers with (S) for inc'd independence with ADLs/purposeful tasks    *Bed mobility- Min (A) for inc'd independence to manage own comfort and initiate EOB & OOB purposeful tasks    *Patient will increase functional mobility to and from bathroom with rolling walker with min assist to increase independence with toileting    *Patient will increase OOB/sitting tolerance to 2-4 hours per day to increase participation in self-care and leisure tasks with no s/s of exertion.     *Increase stand tolerance x 3-5  m for inc'd tolerance with standing purposeful tasks including grooming/purposeful tasks    *Patient will improve functional activity tolerance to 20 minutes of sustained functional tasks to increase participation in basic self-care and decrease assistance level.     *Patient will increase static/dynamic standing balance to fair/fair- to maximize safety/performance of higher level ADLs/grooming tasks at sink    *Patient will engage in ongoing cognitive assessment to assist with safe discharge planning/recommendations.     *Caregiver will demonstrate good body mechanics and safe technique when assisting pt during functional ADLs/transfers to maximize safety upon DC.    The patient's raw score on the AM-PAC Daily Activity Inpatient Short Form is 15. A raw score of less than 19 suggests the patient may benefit from discharge to post-acute rehabilitation services. Please also refer to the recommendation of the Occupational Therapist for safe discharge planning.      ANIA Morgan, OTR/L    [place] License [id number]  [place] License [id number]

## 2025-03-21 NOTE — PLAN OF CARE
Problem: Potential for Falls  Goal: Patient will remain free of falls  Description: INTERVENTIONS:  - Educate patient/family on patient safety including physical limitations  - Instruct patient to call for assistance with activity   - Consult OT/PT to assist with strengthening/mobility   - Keep Call bell within reach  - Keep bed low and locked with side rails adjusted as appropriate  - Keep care items and personal belongings within reach  - Initiate and maintain comfort rounds  - Make Fall Risk Sign visible to staff  Problem: PAIN - ADULT  Goal: Verbalizes/displays adequate comfort level or baseline comfort level  Description: Interventions:  - Encourage patient to monitor pain and request assistance  - Assess pain using appropriate pain scale  - Administer analgesics based on type and severity of pain and evaluate response  - Implement non-pharmacological measures as appropriate and evaluate response  - Consider cultural and social influences on pain and pain management  - Notify physician/advanced practitioner if interventions unsuccessful or patient reports new pain  Outcome: Progressing     - Apply yellow socks and bracelet for high fall risk patients  - Consider moving patient to room near nurses station  Outcome: Progressing

## 2025-03-21 NOTE — ASSESSMENT & PLAN NOTE
Lab Results   Component Value Date    EGFR 32 03/21/2025    EGFR 35 03/20/2025    EGFR 35 03/19/2025    CREATININE 1.43 (H) 03/21/2025    CREATININE 1.32 (H) 03/20/2025    CREATININE 1.33 (H) 03/19/2025   -renal dose adjust antibiotic as needed  -volume management   -recheck BMP

## 2025-03-21 NOTE — ASSESSMENT & PLAN NOTE
Wt Readings from Last 3 Encounters:   03/21/25 59.4 kg (130 lb 15.3 oz)   02/24/25 65 kg (143 lb 4.8 oz)   01/27/25 54 kg (119 lb)   -volume management per CC

## 2025-03-21 NOTE — ASSESSMENT & PLAN NOTE
E/b fever, tachycardia, leukocytosis, hypotension requiring Vasopressor support. In setting of #2  -antibiotics as below  -follow-up cultures and adjust antibiotics as needed  -monitor temperature and hemodynamics  -serial exam  -additional inventions pending clinical course  -monitoring serial CBC and BMP for treatment response and any developing toxicities  -Vasopressor management weaned off 3/20/25 per critical care team

## 2025-03-21 NOTE — ASSESSMENT & PLAN NOTE
Metabolic in nature blood culture positive, patient also remain on cefazolin-if cognitive function deteriorate, consider to discuss with ID to switch antibiotic to avoid cefazolin toxicity  Evaluated by speech, recommendation regular diet thin liquids.  Maintain electrolytes, renal function

## 2025-03-21 NOTE — ASSESSMENT & PLAN NOTE
-Found to be in severe sepsis due to meeting SIRS criteria with leukocytosis and fever.  Severe sepsis due to lactate greater than 2.  -She has a history of recurrent UTIs and is complaining of dysuria and suprapubic pressure, however UA does not suggest infection at this time.  -CT chest abdomen pelvis showed no specific findings to suggest intrathoracic or intra-abdominal reasons for sepsis.  -Patient given 1.5 L bolus in the ER after period of hypotension.  -Due to concern for fluid overload with history of CHF, patient was started on Levophed when hypotension persisted.  -Admitted under ICU service, since patient remained stable, downgraded to medicine service  -Both sets of blood culture is growing Staph aureus, MSSA-infectious disease on board, antibiotic adjusted to renally dosed cefazolin.    TTE negative for vegetation  Follow repeat blood culture from 3/21/2025

## 2025-03-21 NOTE — ASSESSMENT & PLAN NOTE
Lab Results   Component Value Date    HGBA1C 6.7 (H) 03/20/2025   -glycemic management per primary     Recent Labs     03/20/25  1600 03/20/25 2034 03/21/25  0742 03/21/25  1106   POCGLU 150* 167* 123 132       Blood Sugar Average: Last 72 hrs:  (P) 153.4865034414991133

## 2025-03-21 NOTE — PLAN OF CARE
Problem: OCCUPATIONAL THERAPY ADULT  Goal: Performs self-care activities at highest level of function for planned discharge setting.  See evaluation for individualized goals.  Description: Treatment Interventions: ADL retraining, Functional transfer training, Endurance training, Cognitive reorientation, Patient/family training, Equipment evaluation/education, Compensatory technique education, Continued evaluation, Energy conservation, Activityengagement, UE strengthening/ROM  Equipment Recommended: Bedside commode       See flowsheet documentation for full assessment, interventions and recommendations.   Note: Limitation: Decreased ADL status, Decreased Safe judgement during ADL, Decreased cognition, Decreased endurance, Decreased self-care trans, Decreased high-level ADLs (pain, balance, fxnl mobility, act bryanna, fxnl reach, trunk control, standing bryanna, strength, fxnl sitting balance, and fxnl sitting bryanna, attention to task, safety awareness, insight, problem solving, learning new tasks, appropriate responses, response time)  Prognosis: Good  Assessment: Pt is a 89 y.o. female seen for OT evaluation s/p admit to [facility] on 3/19/2025 w/Severe sepsis (HCC). Prior to admission, pt was living with spouse in a 1 level house, (I) with ADLs, (A) with IADLs, rollator for mobility, (+) falls, (-) . Personal and environmental factors affecting patient at time of evaluation include advanced age, current habits and behavioral patterns, limited social support, inaccessible home environment, inaccessible bathroom environment, and difficulty completing ADLs. Personal factors supporting patient at time of evaluation include (I) PLOF, supportive spouse and local family, attitude towards recovery, and FFSU. Based upon this evaluation, pt is functioning below baseline due to significant generalized weakness/deconditioning. Pt will benefit from continued skilled inpatient OT to maximize safety, level of independence  and overall performance in ADLs, functional transfers, functional mobility to return to functional baseline/highest level of function.     Rehab Resource Intensity Level, OT: II (Moderate Resource Intensity)     ANIA Morgan, OTR/L  [place] License [id number]  [place] License [id number]

## 2025-03-21 NOTE — ASSESSMENT & PLAN NOTE
Admission blood cultures 2 of 2 sets growing MSSA on preliminary BCID panel. Possible skin source as portal of entry with multiple extremity abrasions/wounds. 3/20/25 TTE no vegetation seen  -Continue Cefazolin 2 g IV q 12 hours, renal dose adjusted high dose  -follow-up cultures and adjust antibiotics as needed  -follow up 3/21/25 repeat blood cultures  -duration of antibiotic dependent on blood culture clearance and pending clinical course

## 2025-03-21 NOTE — ASSESSMENT & PLAN NOTE
Platelet count is 100,000, most likely secondary to sepsis  Continue to monitor, no active bleeding noted.  Patient remained on Lovenox

## 2025-03-21 NOTE — ASSESSMENT & PLAN NOTE
Lab Results   Component Value Date    EGFR 32 03/21/2025    EGFR 35 03/20/2025    EGFR 35 03/19/2025    CREATININE 1.43 (H) 03/21/2025    CREATININE 1.32 (H) 03/20/2025    CREATININE 1.33 (H) 03/19/2025   Continue to monitor, avoid nephrotoxic medication, follow tension protocol   stretcher

## 2025-03-21 NOTE — ASSESSMENT & PLAN NOTE
Lab Results   Component Value Date    HGBA1C 6.7 (H) 03/20/2025       Recent Labs     03/20/25  1126 03/20/25  1600 03/20/25 2034 03/21/25  0742   POCGLU 228* 150* 167* 123       Blood Sugar Average: Last 72 hrs:  (P) 158.2  Continue insulin, follow hypoglycemia precaution.

## 2025-03-21 NOTE — ASSESSMENT & PLAN NOTE
>>ASSESSMENT AND PLAN FOR TYPE 2 DIABETES MELLITUS (HCC) WRITTEN ON 3/21/2025  8:52 AM BY AMELIA ZAMAN MD    Lab Results   Component Value Date    HGBA1C 6.7 (H) 03/20/2025       Recent Labs     03/20/25  1126 03/20/25  1600 03/20/25 2034 03/21/25  0742   POCGLU 228* 150* 167* 123       Blood Sugar Average: Last 72 hrs:  (P) 158.2  Continue insulin, follow hypoglycemia precaution.

## 2025-03-21 NOTE — PROGRESS NOTES
Progress Note - Infectious Disease   Name: Tanya Stephen 89 y.o. female I MRN: [id number]  Unit/Bed#: ICU 12 I Date of Admission: 3/19/2025   Date of Service: 3/21/2025 I Hospital Day: 2    Assessment & Plan  Severe sepsis (HCC)  E/b fever, tachycardia, leukocytosis, hypotension requiring Vasopressor support. In setting of #2  -antibiotics as below  -follow-up cultures and adjust antibiotics as needed  -monitor temperature and hemodynamics  -serial exam  -additional inventions pending clinical course  -monitoring serial CBC and BMP for treatment response and any developing toxicities  -Vasopressor management weaned off 3/20/25 per critical care team  Bacteremia  Admission blood cultures 2 of 2 sets growing MSSA on preliminary BCID panel. Possible skin source as portal of entry with multiple extremity abrasions/wounds. 3/20/25 TTE no vegetation seen  -Continue Cefazolin 2 g IV q 12 hours, renal dose adjusted high dose  -follow-up cultures and adjust antibiotics as needed  -follow up 3/21/25 repeat blood cultures  -duration of antibiotic dependent on blood culture clearance and pending clinical course   Acute encephalopathy  Patient lethargic is setting of acute illness, but responsive to name and exam. 3/14/2025 CT head: No acute intercranial abnormality.  -antibiotic/work up as above  -monitor mentation  -symptomatic management per critical care team  -aspiration precautions  Chronic kidney disease, stage IV (severe) ([facility])  Lab Results   Component Value Date    EGFR 32 03/21/2025    EGFR 35 03/20/2025    EGFR 35 03/19/2025    CREATININE 1.43 (H) 03/21/2025    CREATININE 1.32 (H) 03/20/2025    CREATININE 1.33 (H) 03/19/2025   -renal dose adjust antibiotic as needed  -volume management   -recheck BMP  Type 2 diabetes mellitus ([facility])  Lab Results   Component Value Date    HGBA1C 6.7 (H) 03/20/2025   -glycemic management per primary     Recent Labs     03/20/25  1600 03/20/25 2034 03/21/25  0742 03/21/25  1106    POCGLU 150* 167* 123 132       Blood Sugar Average: Last 72 hrs:  (P) [id number]    Chronic bilateral pleural effusions  -volume management per CC  CHF (congestive heart failure) ([facility])  Wt Readings from Last 3 Encounters:   03/21/25 59.4 kg (130 lb 15.3 oz)   02/24/25 65 kg (143 lb 4.8 oz)   01/27/25 54 kg (119 lb)   -volume management per CC        I have discussed with patient and hsuband at bedside, RN, and critical care team regarding the above plan to continue antibiotic as above and monitor closely. They agree with the plan.  We will see patient again 3/24/25 if here. Please call ID on call physician in meantime if questions.    Antibiotics:  Cefazolin D2    Subjective   Patient has no fever, chills, sweats overnight; no nausea, vomiting, diarrhea; no cough, shortness of breath; + chronic back pain not worse now that patient's OOB to chair. + chronic intermittent dysuria/pressure discomfort. No new symptoms.    Objective :  Temp:  [96.8 °F (36 °C)-98.6 °F (37 °C)] 98.4 °F (36.9 °C)  HR:  [] 97  BP: ()/(50-62) 115/51  Resp:  [11-28] 19  SpO2:  [86 %-100 %] 100 %  O2 Device: Nasal cannula    General Appearance:  89 year old elderly, chronically debilitated female, nontoxic, no acute distress, able to transfer from bed to recline with walker and OT assist   HEENT: Atraumatic normocephalic   Throat: Oropharynx moist. Poor dentition   Pulmonary:   Normal respiratory excursion without accessory muscle use, statting 100% on NCO2   Cardiac:  RRR decreased tones   Abdomen:   Soft, non-tender, non-distended   Extremities: No edema   : No diaz, no focal SPT or flank tenderness on exam   Psychiatric: Awake, cooperative   Skin: No new rashes. IV site nontender. Multiple stages of ecchymoses          Lab Results: I have reviewed the following results:  Results from last 7 days   Lab Units 03/21/25  0442 03/20/25  0427 03/20/25  0045 03/19/25  1842   WBC Thousand/uL 7.16 17.32*  --  15.52*    HEMOGLOBIN g/dL 10.1* 11.6  --  12.1   PLATELETS Thousands/uL 100* 118* 128* 120*     Results from last 7 days   Lab Units 03/21/25  0442 03/20/25  0427 03/19/25  1842 03/14/25 2004   SODIUM mmol/L 134* 138 136 140   POTASSIUM mmol/L 4.3 3.3* 3.6 4.2   CHLORIDE mmol/L 103 102 100 104   CO2 mmol/L 24 25 26 27   BUN mg/dL 32* 25 24 21   CREATININE mg/dL 1.43* 1.32* 1.33* 1.26   EGFR ml/min/1.73sq m 32 35 35 37   CALCIUM mg/dL 7.5* 7.8* 8.6 8.6   AST U/L 48*  --  31 71*   ALT U/L 24  --  26 31   ALK PHOS U/L 109*  --  104 84   ALBUMIN g/dL 2.5*  --  3.3* 3.5     Results from last 7 days   Lab Units 03/21/25  0509 03/21/25  0459 03/19/25  1842   BLOOD CULTURE  Received in Microbiology Lab. Culture in Progress. Received in Microbiology Lab. Culture in Progress. Staphylococcus aureus*   GRAM STAIN RESULT   --   --  Gram positive cocci in clusters*  Gram positive cocci in clusters*     Results from last 7 days   Lab Units 03/20/25  0745 03/19/25  1842   PROCALCITONIN ng/ml 6.56* 1.10*                 Imaging Results Review: No pertinent imaging studies reviewed.  Other Study Results Review: My interpretation of other studies include: 3/20/25 TTE no vegetation seen.

## 2025-03-21 NOTE — ASSESSMENT & PLAN NOTE
Both sets of blood cultures growing Staph aureus, MSSA  No definitive source identified, suspect multiple skin and soft tissue wounds, could be disorders  ID on board, patient had remained on renally dose adjusted cefazolin  Follow repeat blood culture  Transthoracic echocardiogram negative for vegetation

## 2025-03-21 NOTE — PLAN OF CARE
Problem: Potential for Falls  Goal: Patient will remain free of falls  Description: INTERVENTIONS:  - Educate patient/family on patient safety including physical limitations  - Instruct patient to call for assistance with activity   - Consult OT/PT to assist with strengthening/mobility   - Keep Call bell within reach  - Keep bed low and locked with side rails adjusted as appropriate  - Keep care items and personal belongings within reach  - Initiate and maintain comfort rounds  - Make Fall Risk Sign visible to staff  - Offer Toileting every 2 Hours, in advance of need  - Initiate/Maintain bed alarm  - Obtain necessary fall risk management equipment: bed alarm  - Apply yellow socks and bracelet for high fall risk patients  - Consider moving patient to room near nurses station  Outcome: Progressing     Problem: PAIN - ADULT  Goal: Verbalizes/displays adequate comfort level or baseline comfort level  Description: Interventions:  - Encourage patient to monitor pain and request assistance  - Assess pain using appropriate pain scale  - Administer analgesics based on type and severity of pain and evaluate response  - Implement non-pharmacological measures as appropriate and evaluate response  - Consider cultural and social influences on pain and pain management  - Notify physician/advanced practitioner if interventions unsuccessful or patient reports new pain  Outcome: Progressing     Problem: INFECTION - ADULT  Goal: Absence or prevention of progression during hospitalization  Description: INTERVENTIONS:  - Assess and monitor for signs and symptoms of infection  - Monitor lab/diagnostic results  - Monitor all insertion sites, i.e. indwelling lines, tubes, and drains  - Monitor endotracheal if appropriate and nasal secretions for changes in amount and color  - [place] appropriate cooling/warming therapies per order  - Administer medications as ordered  - Instruct and encourage patient and family to use good hand  hygiene technique  - Identify and instruct in appropriate isolation precautions for identified infection/condition  Outcome: Progressing  Goal: Absence of fever/infection during neutropenic period  Description: INTERVENTIONS:  - Monitor WBC    Outcome: Progressing     Problem: SAFETY ADULT  Goal: Patient will remain free of falls  Description: INTERVENTIONS:  - Educate patient/family on patient safety including physical limitations  - Instruct patient to call for assistance with activity   - Consult OT/PT to assist with strengthening/mobility   - Keep Call bell within reach  - Keep bed low and locked with side rails adjusted as appropriate  - Keep care items and personal belongings within reach  - Initiate and maintain comfort rounds  - Make Fall Risk Sign visible to staff  - Offer Toileting every 2 Hours, in advance of need  - Initiate/Maintain bed alarm  - Obtain necessary fall risk management equipment: bed alarm   - Apply yellow socks and bracelet for high fall risk patients  - Consider moving patient to room near nurses station  Outcome: Progressing  Goal: Maintain or return to baseline ADL function  Description: INTERVENTIONS:  -  Assess patient's ability to carry out ADLs; assess patient's baseline for ADL function and identify physical deficits which impact ability to perform ADLs (bathing, care of mouth/teeth, toileting, grooming, dressing, etc.)  - Assess/evaluate cause of self-care deficits   - Assess range of motion  - Assess patient's mobility; develop plan if impaired  - Assess patient's need for assistive devices and provide as appropriate  - Encourage maximum independence but intervene and supervise when necessary  - Involve family in performance of ADLs  - Assess for home care needs following discharge   - Consider OT consult to assist with ADL evaluation and planning for discharge  - Provide patient education as appropriate  Outcome: Progressing  Goal: Maintains/Returns to pre admission functional  level  Description: INTERVENTIONS:  - Perform AM-PAC 6 Click Basic Mobility/ Daily Activity assessment daily.  - Set and communicate daily mobility goal to care team and patient/family/caregiver.   - Collaborate with rehabilitation services on mobility goals if consulted  - Perform Range of Motion 3 times a day.  - Reposition patient every 2 hours.  - Dangle patient 3 times a day  - Stand patient 3 times a day  - Ambulate patient 3 times a day  - Out of bed to chair 3 times a day   - Out of bed for meals 3 times a day  - Out of bed for toileting  - Record patient progress and toleration of activity level   Outcome: Progressing     Problem: DISCHARGE PLANNING  Goal: Discharge to home or other facility with appropriate resources  Description: INTERVENTIONS:  - Identify barriers to discharge w/patient and caregiver  - Arrange for needed discharge resources and transportation as appropriate  - Identify discharge learning needs (meds, wound care, etc.)  - Arrange for interpretive services to assist at discharge as needed  - Refer to Case Management Department for coordinating discharge planning if the patient needs post-hospital services based on physician/advanced practitioner order or complex needs related to functional status, cognitive ability, or social support system  Outcome: Progressing     Problem: Knowledge Deficit  Goal: Patient/family/caregiver demonstrates understanding of disease process, treatment plan, medications, and discharge instructions  Description: Complete learning assessment and assess knowledge base.  Interventions:  - Provide teaching at level of understanding  - Provide teaching via preferred learning methods  Outcome: Progressing     Problem: Nutrition/Hydration-ADULT  Goal: Nutrient/Hydration intake appropriate for improving, restoring or maintaining nutritional needs  Description: Monitor and assess patient's nutrition/hydration status for malnutrition. Collaborate with interdisciplinary  team and initiate plan and interventions as ordered.  Monitor patient's weight and dietary intake as ordered or per policy. Utilize nutrition screening tool and intervene as necessary. Determine patient's food preferences and provide high-protein, high-caloric foods as appropriate.     INTERVENTIONS:  - Monitor oral intake, urinary output, labs, and treatment plans  - Assess nutrition and hydration status and recommend course of action  - Evaluate amount of meals eaten  - Assist patient with eating if necessary   - Allow adequate time for meals  - Recommend/ encourage appropriate diets, oral nutritional supplements, and vitamin/mineral supplements  - Order, calculate, and assess calorie counts as needed  - Recommend, monitor, and adjust tube feedings and TPN/PPN based on assessed needs  - Assess need for intravenous fluids  - Provide specific nutrition/hydration education as appropriate  - Include patient/family/caregiver in decisions related to nutrition  Outcome: Progressing     Problem: Prexisting or High Potential for Compromised Skin Integrity  Goal: Skin integrity is maintained or improved  Description: INTERVENTIONS:  - Identify patients at risk for skin breakdown  - Assess and monitor skin integrity  - Assess and monitor nutrition and hydration status  - Monitor labs   - Assess for incontinence   - Turn and reposition patient  - Assist with mobility/ambulation  - Relieve pressure over bony prominences  - Avoid friction and shearing  - Provide appropriate hygiene as needed including keeping skin clean and dry  - Evaluate need for skin moisturizer/barrier cream  - Collaborate with interdisciplinary team   - Patient/family teaching  - Consider wound care consult   Outcome: Progressing

## 2025-03-22 LAB
ALBUMIN SERPL BCG-MCNC: 2.7 G/DL (ref 3.5–5)
ALP SERPL-CCNC: 201 U/L (ref 34–104)
ALT SERPL W P-5'-P-CCNC: 9 U/L (ref 7–52)
ANION GAP SERPL CALCULATED.3IONS-SCNC: 7 MMOL/L (ref 4–13)
AST SERPL W P-5'-P-CCNC: 61 U/L (ref 13–39)
ATRIAL RATE: 103 BPM
ATRIAL RATE: 91 BPM
BACTERIA BLD CULT: ABNORMAL
BACTERIA BLD CULT: ABNORMAL
BASOPHILS # BLD AUTO: 0.02 THOUSANDS/ÂΜL (ref 0–0.1)
BASOPHILS NFR BLD AUTO: 0 % (ref 0–1)
BILIRUB SERPL-MCNC: 1.02 MG/DL (ref 0.2–1)
BUN SERPL-MCNC: 32 MG/DL (ref 5–25)
CALCIUM ALBUM COR SERPL-MCNC: 8.8 MG/DL (ref 8.3–10.1)
CALCIUM SERPL-MCNC: 7.8 MG/DL (ref 8.4–10.2)
CHLORIDE SERPL-SCNC: 102 MMOL/L (ref 96–108)
CO2 SERPL-SCNC: 24 MMOL/L (ref 21–32)
CREAT SERPL-MCNC: 1.42 MG/DL (ref 0.6–1.3)
EOSINOPHIL # BLD AUTO: 0.07 THOUSAND/ÂΜL (ref 0–0.61)
EOSINOPHIL NFR BLD AUTO: 1 % (ref 0–6)
ERYTHROCYTE [DISTWIDTH] IN BLOOD BY AUTOMATED COUNT: 16.8 % (ref 11.6–15.1)
GFR SERPL CREATININE-BSD FRML MDRD: 32 ML/MIN/1.73SQ M
GLUCOSE SERPL-MCNC: 119 MG/DL (ref 65–140)
GLUCOSE SERPL-MCNC: 122 MG/DL (ref 65–140)
GLUCOSE SERPL-MCNC: 126 MG/DL (ref 65–140)
GLUCOSE SERPL-MCNC: 157 MG/DL (ref 65–140)
GLUCOSE SERPL-MCNC: 162 MG/DL (ref 65–140)
GRAM STN SPEC: ABNORMAL
GRAM STN SPEC: ABNORMAL
HCT VFR BLD AUTO: 33.6 % (ref 34.8–46.1)
HGB BLD-MCNC: 10.7 G/DL (ref 11.5–15.4)
IMM GRANULOCYTES # BLD AUTO: 0.05 THOUSAND/UL (ref 0–0.2)
IMM GRANULOCYTES NFR BLD AUTO: 1 % (ref 0–2)
LYMPHOCYTES # BLD AUTO: 0.43 THOUSANDS/ÂΜL (ref 0.6–4.47)
LYMPHOCYTES NFR BLD AUTO: 6 % (ref 14–44)
MCH RBC QN AUTO: 29.8 PG (ref 26.8–34.3)
MCHC RBC AUTO-ENTMCNC: 31.8 G/DL (ref 31.4–37.4)
MCV RBC AUTO: 94 FL (ref 82–98)
MONOCYTES # BLD AUTO: 0.7 THOUSAND/ÂΜL (ref 0.17–1.22)
MONOCYTES NFR BLD AUTO: 9 % (ref 4–12)
NEUTROPHILS # BLD AUTO: 6.46 THOUSANDS/ÂΜL (ref 1.85–7.62)
NEUTS SEG NFR BLD AUTO: 83 % (ref 43–75)
NRBC BLD AUTO-RTO: 0 /100 WBCS
P AXIS: 70 DEGREES
P AXIS: 73 DEGREES
PLATELET # BLD AUTO: 98 THOUSANDS/UL (ref 149–390)
PMV BLD AUTO: 11.9 FL (ref 8.9–12.7)
POTASSIUM SERPL-SCNC: 4 MMOL/L (ref 3.5–5.3)
PR INTERVAL: 170 MS
PR INTERVAL: 206 MS
PROT SERPL-MCNC: 5.7 G/DL (ref 6.4–8.4)
QRS AXIS: 137 DEGREES
QRS AXIS: 141 DEGREES
QRSD INTERVAL: 134 MS
QRSD INTERVAL: 148 MS
QT INTERVAL: 398 MS
QT INTERVAL: 446 MS
QTC INTERVAL: 521 MS
QTC INTERVAL: 548 MS
RBC # BLD AUTO: 3.59 MILLION/UL (ref 3.81–5.12)
S AUREUS DNA BLD POS QL NAA+NON-PROBE: DETECTED
SODIUM SERPL-SCNC: 133 MMOL/L (ref 135–147)
T WAVE AXIS: -28 DEGREES
T WAVE AXIS: -32 DEGREES
VENTRICULAR RATE: 103 BPM
VENTRICULAR RATE: 91 BPM
WBC # BLD AUTO: 7.73 THOUSAND/UL (ref 4.31–10.16)

## 2025-03-22 PROCEDURE — 93005 ELECTROCARDIOGRAM TRACING: CPT

## 2025-03-22 PROCEDURE — 85025 COMPLETE CBC W/AUTO DIFF WBC: CPT | Performed by: FAMILY MEDICINE

## 2025-03-22 PROCEDURE — 82948 REAGENT STRIP/BLOOD GLUCOSE: CPT

## 2025-03-22 PROCEDURE — 99232 SBSQ HOSP IP/OBS MODERATE 35: CPT | Performed by: STUDENT IN AN ORGANIZED HEALTH CARE EDUCATION/TRAINING PROGRAM

## 2025-03-22 PROCEDURE — 80053 COMPREHEN METABOLIC PANEL: CPT | Performed by: FAMILY MEDICINE

## 2025-03-22 PROCEDURE — 93010 ELECTROCARDIOGRAM REPORT: CPT | Performed by: INTERNAL MEDICINE

## 2025-03-22 RX ORDER — ACETAMINOPHEN 10 MG/ML
1000 INJECTION, SOLUTION INTRAVENOUS ONCE
Status: COMPLETED | OUTPATIENT
Start: 2025-03-22 | End: 2025-03-22

## 2025-03-22 RX ADMIN — ASPIRIN 81 MG CHEWABLE TABLET 81 MG: 81 TABLET CHEWABLE at 08:21

## 2025-03-22 RX ADMIN — INSULIN LISPRO 1 UNITS: 100 INJECTION, SOLUTION INTRAVENOUS; SUBCUTANEOUS at 17:34

## 2025-03-22 RX ADMIN — CEFAZOLIN SODIUM 2000 MG: 2 SOLUTION INTRAVENOUS at 15:42

## 2025-03-22 RX ADMIN — CHLORHEXIDINE GLUCONATE 15 ML: 1.2 SOLUTION ORAL at 21:19

## 2025-03-22 RX ADMIN — DULOXETINE 60 MG: 60 CAPSULE, DELAYED RELEASE ORAL at 08:22

## 2025-03-22 RX ADMIN — ACETAMINOPHEN 1000 MG: 10 INJECTION INTRAVENOUS at 21:19

## 2025-03-22 RX ADMIN — HYDROXYCHLOROQUINE SULFATE 200 MG: 200 TABLET ORAL at 08:21

## 2025-03-22 RX ADMIN — PANTOPRAZOLE SODIUM 40 MG: 40 TABLET, DELAYED RELEASE ORAL at 06:16

## 2025-03-22 RX ADMIN — PREGABALIN 75 MG: 75 CAPSULE ORAL at 17:36

## 2025-03-22 RX ADMIN — PYRIDOXINE HCL TAB 50 MG 100 MG: 50 TAB at 08:22

## 2025-03-22 RX ADMIN — CHLORHEXIDINE GLUCONATE 15 ML: 1.2 SOLUTION ORAL at 08:21

## 2025-03-22 RX ADMIN — ATORVASTATIN CALCIUM 10 MG: 10 TABLET, FILM COATED ORAL at 08:21

## 2025-03-22 RX ADMIN — CEFAZOLIN SODIUM 2000 MG: 2 SOLUTION INTRAVENOUS at 03:13

## 2025-03-22 RX ADMIN — PREGABALIN 75 MG: 75 CAPSULE ORAL at 08:21

## 2025-03-22 RX ADMIN — ENOXAPARIN SODIUM 30 MG: 30 INJECTION SUBCUTANEOUS at 08:21

## 2025-03-22 RX ADMIN — SODIUM CHLORIDE, SODIUM LACTATE, POTASSIUM CHLORIDE, AND CALCIUM CHLORIDE 50 ML/HR: .6; .31; .03; .02 INJECTION, SOLUTION INTRAVENOUS at 04:54

## 2025-03-22 RX ADMIN — INSULIN LISPRO 1 UNITS: 100 INJECTION, SOLUTION INTRAVENOUS; SUBCUTANEOUS at 21:19

## 2025-03-22 RX ADMIN — OXYBUTYNIN CHLORIDE 10 MG: 5 TABLET, EXTENDED RELEASE ORAL at 08:22

## 2025-03-22 RX ADMIN — Medication 1000 UNITS: at 08:21

## 2025-03-22 NOTE — ASSESSMENT & PLAN NOTE
Metabolic in nature blood culture positive, continue cefazolin at this time  Evaluated by speech, recommendation regular diet thin liquids.  Maintain electrolytes, renal function  Appreciate infectious disease recommendations

## 2025-03-22 NOTE — PROGRESS NOTES
Progress Note - Hospitalist   Name: Tanya Stephen 89 y.o. female I MRN: [id number]  Unit/Bed#: ICU 12 I Date of Admission: 3/19/2025   Date of Service: 3/22/2025 I Hospital Day: 3     Assessment & Plan  Severe sepsis (HCC)  Severe sepsis on admission  History of recurrent UTIs, CT chest abdomen pelvis showed no specific findings to suggest intrathoracic or intra-abdominal reasons for sepsis. Patient given 1.5 L bolus in the ER after period of hypotension.  Due to concern for fluid overload with history of CHF, patient was started on Levophed when hypotension persisted.    -Admitted under ICU service, since patient remained stable, downgraded to medicine service  -Both sets of blood culture is growing Staph aureus, MSSA-infectious disease on board  -Continue renally dosed cefazolin  -TTE negative for vegetation  Repeat blood cultures pending at this time    Bacteremia  Both sets of blood cultures growing Staph aureus, MSSA  No definitive source identified, suspect multiple skin and soft tissue wounds, could be disorders  ID on board, patient had remained on renally dose adjusted cefazolin  Follow repeat blood culture  Transthoracic echocardiogram negative for vegetation  Thrombocytopenia (HCC)  Platelet count is 100,000, most likely secondary to sepsis  Continue to monitor, no active bleeding noted.  Patient remained on Lovenox  Hypomagnesemia  Resolved  Type 2 diabetes mellitus (HCC)  Lab Results   Component Value Date    HGBA1C 6.7 (H) 03/20/2025       Recent Labs     03/21/25  1106 03/21/25  1625 03/21/25 2027 03/22/25  0740   POCGLU 132 171* 165* 119       Blood Sugar Average: Last 72 hrs:  (P) 153.9134311471877051  Continue insulin, follow hypoglycemia precaution  OAB (overactive bladder)  On Myrbetriq, continue at this time  Acute encephalopathy  Metabolic in nature blood culture positive, continue cefazolin at this time  Evaluated by speech, recommendation regular diet thin liquids.  Maintain  electrolytes, renal function  Appreciate infectious disease recommendations  Chronic pain syndrome  -Patient states she has no new pain in the ER, only continued chronic pain  -Continue home amitriptyline and scheduled Tylenol.  -Treat acute pain as needed  Chronic bilateral pleural effusions  -Shown on multiple previous images  -CT today showed interval increase in size of bilateral pleural effusions  -Likely secondary to CHF  -Patient reports no current shortness of breath  -Patient was not hypoxic in the ER.  -Continue to monitor and repeat imaging if patient does develop symptoms.  Chronic kidney disease, stage IV (severe) (HCC)  Lab Results   Component Value Date    EGFR 32 03/22/2025    EGFR 32 03/21/2025    EGFR 35 03/20/2025    CREATININE 1.42 (H) 03/22/2025    CREATININE 1.43 (H) 03/21/2025    CREATININE 1.32 (H) 03/20/2025   Continue to monitor, avoid nephrotoxic medication    VTE Pharmacologic Prophylaxis:   High Risk (Score >/= 5) - Pharmacological DVT Prophylaxis Ordered: enoxaparin (Lovenox). Sequential Compression Devices Ordered.    Mobility:   Basic Mobility Inpatient Raw Score: 9  -HLM Goal: 3: Sit at edge of bed  JH-HLM Achieved: 2: Bed activities/Dependent transfer  JH-HLM Goal NOT achieved. Continue with multidisciplinary rounding and encourage appropriate mobility to improve upon JH-HLM goals.    Patient Centered Rounds: I performed bedside rounds with nursing staff today.   Discussions with Specialists or Other Care Team Provider: Appreciate infectious disease recommendations    Education and Discussions with Family / Patient: Plan to discuss with team at bedside    Current Length of Stay: 3 day(s)  Current Patient Status: Inpatient   Certification Statement: The patient will continue to require additional inpatient hospital stay due to bacteremia requiring IV antibiotic therapy  Discharge Plan: Anticipate discharge in 48 hrs to rehab facility.    Code Status: Level 1 - Full  Code    Subjective   Patient seen and examined at bedside this morning, overall in good spirits, no acute concerns, patient denies shortness of breath or chest pain.    Objective :  Temp:  [97.8 °F (36.6 °C)-98.5 °F (36.9 °C)] 97.8 °F (36.6 °C)  HR:  [] 93  BP: (105-132)/(55-59) 111/55  Resp:  [13-24] 13  SpO2:  [100 %] 100 %  O2 Device: Nasal cannula    Body mass index is 27.47 kg/m².     Input and Output Summary (last 24 hours):     Intake/Output Summary (Last 24 hours) at 3/22/2025 0965  Last data filed at 3/22/2025 0926  Gross per 24 hour   Intake 900 ml   Output 50 ml   Net 850 ml       Physical Exam  Vitals and nursing note reviewed.   Constitutional:       General: She is not in acute distress.     Appearance: She is well-developed.   HENT:      Head: Normocephalic and atraumatic.   Eyes:      General: No scleral icterus.     Conjunctiva/sclera: Conjunctivae normal.   Cardiovascular:      Rate and Rhythm: Normal rate and regular rhythm.      Pulses: Normal pulses.      Heart sounds: Murmur heard.   Pulmonary:      Effort: Pulmonary effort is normal. No respiratory distress.      Breath sounds: Rhonchi present.   Abdominal:      General: Bowel sounds are normal. There is no distension.      Palpations: Abdomen is soft.      Tenderness: There is no abdominal tenderness.   Musculoskeletal:         General: No swelling.      Cervical back: Neck supple.   Skin:     General: Skin is warm and dry.      Capillary Refill: Capillary refill takes less than 2 seconds.   Neurological:      General: No focal deficit present.      Mental Status: She is alert and oriented to person, place, and time. Mental status is at baseline.   Psychiatric:         Mood and Affect: Mood normal.         Behavior: Behavior normal.           Lines/Drains:              Lab Results: I have reviewed the following results:   Results from last 7 days   Lab Units 03/22/25  0443 03/21/25  0442   WBC Thousand/uL 7.73 7.16   HEMOGLOBIN g/dL  10.7* 10.1*   HEMATOCRIT % 33.6* 31.7*   PLATELETS Thousands/uL 98* 100*   BANDS PCT %  --  2   SEGS PCT % 83*  --    LYMPHO PCT % 6* 3*   MONO PCT % 9 0*   EOS PCT % 1 0     Results from last 7 days   Lab Units 03/22/25  0443   SODIUM mmol/L 133*   POTASSIUM mmol/L 4.0   CHLORIDE mmol/L 102   CO2 mmol/L 24   BUN mg/dL 32*   CREATININE mg/dL 1.42*   ANION GAP mmol/L 7   CALCIUM mg/dL 7.8*   ALBUMIN g/dL 2.7*   TOTAL BILIRUBIN mg/dL 1.02*   ALK PHOS U/L 201*   ALT U/L 9   AST U/L 61*   GLUCOSE RANDOM mg/dL 126     Results from last 7 days   Lab Units 03/19/25  1842   INR  1.25*     Results from last 7 days   Lab Units 03/22/25  0740 03/21/25  2027 03/21/25  1625 03/21/25  1106 03/21/25  0742 03/20/25  2034 03/20/25  1600 03/20/25  1126 03/20/25  0726   POC GLUCOSE mg/dl 119 165* 171* 132 123 167* 150* 228* 123     Results from last 7 days   Lab Units 03/20/25  0045   HEMOGLOBIN A1C % 6.7*     Results from last 7 days   Lab Units 03/21/25  0442 03/20/25  1501 03/20/25  1107 03/20/25  0745 03/19/25  2215 03/19/25  1842   LACTIC ACID mmol/L 1.1 2.3* 2.2* 2.7*   < > 2.8*   PROCALCITONIN ng/ml  --   --   --  6.56*  --  1.10*    < > = values in this interval not displayed.       Recent Cultures (last 7 days):   Results from last 7 days   Lab Units 03/21/25  0509 03/21/25  0459 03/19/25  1842   BLOOD CULTURE  Received in Microbiology Lab. Culture in Progress. Received in Microbiology Lab. Culture in Progress. Staphylococcus aureus*  Staphylococcus aureus*   GRAM STAIN RESULT   --   --  Gram positive cocci in clusters*  Gram positive cocci in clusters*         Last 24 Hours Medication List:     Current Facility-Administered Medications:     acetaminophen (TYLENOL) tablet 650 mg, Q6H PRN    aspirin chewable tablet 81 mg, Daily    atorvastatin (LIPITOR) tablet 10 mg, Daily    ceFAZolin (ANCEF) IVPB (premix in dextrose) 2,000 mg 50 mL, Q12H, Last Rate: 2,000 mg (03/22/25 0001)    chlorhexidine (PERIDEX) 0.12 % oral rinse 15  mL, Q12H MEGGAN    Cholecalciferol (VITAMIN D3) tablet 1,000 Units, Daily    DULoxetine (CYMBALTA) delayed release capsule 60 mg, Daily    enoxaparin (LOVENOX) subcutaneous injection 30 mg, Daily    hydroxychloroquine (PLAQUENIL) tablet 200 mg, Daily With Breakfast    insulin lispro (HumALOG/ADMELOG) 100 units/mL subcutaneous injection 1-5 Units, 4x Daily (AC & HS) **AND** Fingerstick Glucose (POCT), 4x Daily AC and at bedtime    lactated ringers infusion, Continuous, Last Rate: 50 mL/hr (03/22/25 9060)    NOREPINEPHRINE 4 MG  ML NSS (CMPD ORDER) infusion, Titrated, Last Rate: Stopped (03/20/25 5370)    oxybutynin (DITROPAN-XL) 24 hr tablet 10 mg, Daily    pantoprazole (PROTONIX) EC tablet 40 mg, Early Morning    pregabalin (LYRICA) capsule 75 mg, BID    pyridoxine (VITAMIN B6) tablet 100 mg, Daily    trimethobenzamide (TIGAN) IM injection 200 mg, Q6H PRN    Administrative Statements   Today, Patient Was Seen By: Pancho Ferreira DO      **Please Note: This note may have been constructed using a voice recognition system.**

## 2025-03-22 NOTE — ASSESSMENT & PLAN NOTE
Lab Results   Component Value Date    HGBA1C 6.7 (H) 03/20/2025       Recent Labs     03/21/25  1106 03/21/25  1625 03/21/25 2027 03/22/25  0740   POCGLU 132 171* 165* 119       Blood Sugar Average: Last 72 hrs:  (P) 153.9023730918701801  Continue insulin, follow hypoglycemia precaution

## 2025-03-22 NOTE — ASSESSMENT & PLAN NOTE
Continue Cymbalta, pain management as needed.  Patient does complain of a lot of pain.  I did give him a small dose of oxycodone.  Unfortunately cannot give NSAIDs.

## 2025-03-22 NOTE — PLAN OF CARE
Problem: Potential for Falls  Goal: Patient will remain free of falls  Description: INTERVENTIONS:  - Educate patient/family on patient safety including physical limitations  - Instruct patient to call for assistance with activity   - Consult OT/PT to assist with strengthening/mobility   - Keep Call bell within reach  - Keep bed low and locked with side rails adjusted as appropriate  - Keep care items and personal belongings within reach  - Initiate and maintain comfort rounds  - Make Fall Risk Sign visible to staff  - Apply yellow socks and bracelet for high fall risk patients  - Consider moving patient to room near nurses station  Outcome: Progressing     Problem: PAIN - ADULT  Goal: Verbalizes/displays adequate comfort level or baseline comfort level  Description: Interventions:  - Encourage patient to monitor pain and request assistance  - Assess pain using appropriate pain scale  - Administer analgesics based on type and severity of pain and evaluate response  - Implement non-pharmacological measures as appropriate and evaluate response  - Consider cultural and social influences on pain and pain management  - Notify physician/advanced practitioner if interventions unsuccessful or patient reports new pain  Outcome: Progressing     Problem: INFECTION - ADULT  Goal: Absence or prevention of progression during hospitalization  Description: INTERVENTIONS:  - Assess and monitor for signs and symptoms of infection  - Monitor lab/diagnostic results  - Monitor all insertion sites, i.e. indwelling lines, tubes, and drains  - Monitor endotracheal if appropriate and nasal secretions for changes in amount and color  - [place] appropriate cooling/warming therapies per order  - Administer medications as ordered  - Instruct and encourage patient and family to use good hand hygiene technique  - Identify and instruct in appropriate isolation precautions for identified infection/condition  Outcome: Progressing  Goal: Absence  of fever/infection during neutropenic period  Description: INTERVENTIONS:  - Monitor WBC    Outcome: Progressing     Problem: SAFETY ADULT  Goal: Patient will remain free of falls  Description: INTERVENTIONS:  - Educate patient/family on patient safety including physical limitations  - Instruct patient to call for assistance with activity   - Consult OT/PT to assist with strengthening/mobility   - Keep Call bell within reach  - Keep bed low and locked with side rails adjusted as appropriate  - Keep care items and personal belongings within reach  - Initiate and maintain comfort rounds  - Make Fall Risk Sign visible to staff  - Apply yellow socks and bracelet for high fall risk patients  - Consider moving patient to room near nurses station  Outcome: Progressing  Goal: Maintain or return to baseline ADL function  Description: INTERVENTIONS:  -  Assess patient's ability to carry out ADLs; assess patient's baseline for ADL function and identify physical deficits which impact ability to perform ADLs (bathing, care of mouth/teeth, toileting, grooming, dressing, etc.)  - Assess/evaluate cause of self-care deficits   - Assess range of motion  - Assess patient's mobility; develop plan if impaired  - Assess patient's need for assistive devices and provide as appropriate  - Encourage maximum independence but intervene and supervise when necessary  - Involve family in performance of ADLs  - Assess for home care needs following discharge   - Consider OT consult to assist with ADL evaluation and planning for discharge  - Provide patient education as appropriate  Outcome: Progressing  Goal: Maintains/Returns to pre admission functional level  Description: INTERVENTIONS:  - Perform AM-PAC 6 Click Basic Mobility/ Daily Activity assessment daily.  - Set and communicate daily mobility goal to care team and patient/family/caregiver.   - Collaborate with rehabilitation services on mobility goals if consulted  - Out of bed for  toileting  - Record patient progress and toleration of activity level   Outcome: Progressing     Problem: DISCHARGE PLANNING  Goal: Discharge to home or other facility with appropriate resources  Description: INTERVENTIONS:  - Identify barriers to discharge w/patient and caregiver  - Arrange for needed discharge resources and transportation as appropriate  - Identify discharge learning needs (meds, wound care, etc.)  - Arrange for interpretive services to assist at discharge as needed  - Refer to Case Management Department for coordinating discharge planning if the patient needs post-hospital services based on physician/advanced practitioner order or complex needs related to functional status, cognitive ability, or social support system  Outcome: Progressing     Problem: Knowledge Deficit  Goal: Patient/family/caregiver demonstrates understanding of disease process, treatment plan, medications, and discharge instructions  Description: Complete learning assessment and assess knowledge base.  Interventions:  - Provide teaching at level of understanding  - Provide teaching via preferred learning methods  Outcome: Progressing     Problem: Nutrition/Hydration-ADULT  Goal: Nutrient/Hydration intake appropriate for improving, restoring or maintaining nutritional needs  Description: Monitor and assess patient's nutrition/hydration status for malnutrition. Collaborate with interdisciplinary team and initiate plan and interventions as ordered.  Monitor patient's weight and dietary intake as ordered or per policy. Utilize nutrition screening tool and intervene as necessary. Determine patient's food preferences and provide high-protein, high-caloric foods as appropriate.     INTERVENTIONS:  - Monitor oral intake, urinary output, labs, and treatment plans  - Assess nutrition and hydration status and recommend course of action  - Evaluate amount of meals eaten  - Assist patient with eating if necessary   - Allow adequate time for  meals  - Recommend/ encourage appropriate diets, oral nutritional supplements, and vitamin/mineral supplements  - Order, calculate, and assess calorie counts as needed  - Recommend, monitor, and adjust tube feedings and TPN/PPN based on assessed needs  - Assess need for intravenous fluids  - Provide specific nutrition/hydration education as appropriate  - Include patient/family/caregiver in decisions related to nutrition  Outcome: Progressing     Problem: Prexisting or High Potential for Compromised Skin Integrity  Goal: Skin integrity is maintained or improved  Description: INTERVENTIONS:  - Identify patients at risk for skin breakdown  - Assess and monitor skin integrity  - Assess and monitor nutrition and hydration status  - Monitor labs   - Assess for incontinence   - Turn and reposition patient  - Assist with mobility/ambulation  - Relieve pressure over bony prominences  - Avoid friction and shearing  - Provide appropriate hygiene as needed including keeping skin clean and dry  - Evaluate need for skin moisturizer/barrier cream  - Collaborate with interdisciplinary team   - Patient/family teaching  - Consider wound care consult   Outcome: Progressing

## 2025-03-22 NOTE — ASSESSMENT & PLAN NOTE
Wt Readings from Last 3 Encounters:   03/22/25 61.7 kg (136 lb 0.4 oz)   02/24/25 65 kg (143 lb 4.8 oz)   01/27/25 54 kg (119 lb)     History of chronic pleural effusions, monitor, elevated kidney function, diuretic as needed for volume control

## 2025-03-22 NOTE — ASSESSMENT & PLAN NOTE
>>ASSESSMENT AND PLAN FOR TYPE 2 DIABETES MELLITUS (HCC) WRITTEN ON 3/22/2025  9:53 AM BY MELANIE VANCE, DO    Lab Results   Component Value Date    HGBA1C 6.7 (H) 03/20/2025       Recent Labs     03/21/25  1106 03/21/25  1625 03/21/25 2027 03/22/25  0740   POCGLU 132 171* 165* 119       Blood Sugar Average: Last 72 hrs:  (P) [id number]  Continue insulin, follow hypoglycemia precaution

## 2025-03-22 NOTE — ASSESSMENT & PLAN NOTE
Lab Results   Component Value Date    EGFR 32 03/22/2025    EGFR 32 03/21/2025    EGFR 35 03/20/2025    CREATININE 1.42 (H) 03/22/2025    CREATININE 1.43 (H) 03/21/2025    CREATININE 1.32 (H) 03/20/2025   Continue to monitor, avoid nephrotoxic medication

## 2025-03-22 NOTE — PLAN OF CARE
Problem: Potential for Falls  Goal: Patient will remain free of falls  Description: INTERVENTIONS:  - Educate patient/family on patient safety including physical limitations  - Instruct patient to call for assistance with activity   - Consult OT/PT to assist with strengthening/mobility   - Keep Call bell within reach  - Keep bed low and locked with side rails adjusted as appropriate  - Keep care items and personal belongings within reach  - Initiate and maintain comfort rounds  - Make Fall Risk Sign visible to staff  - Apply yellow socks and bracelet for high fall risk patients  - Consider moving patient to room near nurses station  Outcome: Progressing     Problem: PAIN - ADULT  Goal: Verbalizes/displays adequate comfort level or baseline comfort level  Description: Interventions:  - Encourage patient to monitor pain and request assistance  - Assess pain using appropriate pain scale  - Administer analgesics based on type and severity of pain and evaluate response  - Implement non-pharmacological measures as appropriate and evaluate response  - Consider cultural and social influences on pain and pain management  - Notify physician/advanced practitioner if interventions unsuccessful or patient reports new pain  Outcome: Progressing     Problem: INFECTION - ADULT  Goal: Absence or prevention of progression during hospitalization  Description: INTERVENTIONS:  - Assess and monitor for signs and symptoms of infection  - Monitor lab/diagnostic results  - Monitor all insertion sites, i.e. indwelling lines, tubes, and drains  - Monitor endotracheal if appropriate and nasal secretions for changes in amount and color  - [place] appropriate cooling/warming therapies per order  - Administer medications as ordered  - Instruct and encourage patient and family to use good hand hygiene technique  - Identify and instruct in appropriate isolation precautions for identified infection/condition  Outcome: Progressing  Goal: Absence  of fever/infection during neutropenic period  Description: INTERVENTIONS:  - Monitor WBC    Outcome: Progressing     Problem: SAFETY ADULT  Goal: Patient will remain free of falls  Description: INTERVENTIONS:  - Educate patient/family on patient safety including physical limitations  - Instruct patient to call for assistance with activity   - Consult OT/PT to assist with strengthening/mobility   - Keep Call bell within reach  - Keep bed low and locked with side rails adjusted as appropriate  - Keep care items and personal belongings within reach  - Initiate and maintain comfort rounds  - Make Fall Risk Sign visible to staff  - Apply yellow socks and bracelet for high fall risk patients  - Consider moving patient to room near nurses station  Outcome: Progressing  Goal: Maintain or return to baseline ADL function  Description: INTERVENTIONS:  -  Assess patient's ability to carry out ADLs; assess patient's baseline for ADL function and identify physical deficits which impact ability to perform ADLs (bathing, care of mouth/teeth, toileting, grooming, dressing, etc.)  - Assess/evaluate cause of self-care deficits   - Assess range of motion  - Assess patient's mobility; develop plan if impaired  - Assess patient's need for assistive devices and provide as appropriate  - Encourage maximum independence but intervene and supervise when necessary  - Involve family in performance of ADLs  - Assess for home care needs following discharge   - Consider OT consult to assist with ADL evaluation and planning for discharge  - Provide patient education as appropriate  Outcome: Progressing  Goal: Maintains/Returns to pre admission functional level  Description: INTERVENTIONS:  - Perform AM-PAC 6 Click Basic Mobility/ Daily Activity assessment daily.  - Set and communicate daily mobility goal to care team and patient/family/caregiver.   - Collaborate with rehabilitation services on mobility goals if consulted  - Out of bed for  toileting  - Record patient progress and toleration of activity level   Outcome: Progressing     Problem: DISCHARGE PLANNING  Goal: Discharge to home or other facility with appropriate resources  Description: INTERVENTIONS:  - Identify barriers to discharge w/patient and caregiver  - Arrange for needed discharge resources and transportation as appropriate  - Identify discharge learning needs (meds, wound care, etc.)  - Arrange for interpretive services to assist at discharge as needed  - Refer to Case Management Department for coordinating discharge planning if the patient needs post-hospital services based on physician/advanced practitioner order or complex needs related to functional status, cognitive ability, or social support system  Outcome: Progressing     Problem: Knowledge Deficit  Goal: Patient/family/caregiver demonstrates understanding of disease process, treatment plan, medications, and discharge instructions  Description: Complete learning assessment and assess knowledge base.  Interventions:  - Provide teaching at level of understanding  - Provide teaching via preferred learning methods  Outcome: Progressing     Problem: Nutrition/Hydration-ADULT  Goal: Nutrient/Hydration intake appropriate for improving, restoring or maintaining nutritional needs  Description: Monitor and assess patient's nutrition/hydration status for malnutrition. Collaborate with interdisciplinary team and initiate plan and interventions as ordered.  Monitor patient's weight and dietary intake as ordered or per policy. Utilize nutrition screening tool and intervene as necessary. Determine patient's food preferences and provide high-protein, high-caloric foods as appropriate.     INTERVENTIONS:  - Monitor oral intake, urinary output, labs, and treatment plans  - Assess nutrition and hydration status and recommend course of action  - Evaluate amount of meals eaten  - Assist patient with eating if necessary   - Allow adequate time for  meals  - Recommend/ encourage appropriate diets, oral nutritional supplements, and vitamin/mineral supplements  - Order, calculate, and assess calorie counts as needed  - Recommend, monitor, and adjust tube feedings and TPN/PPN based on assessed needs  - Assess need for intravenous fluids  - Provide specific nutrition/hydration education as appropriate  - Include patient/family/caregiver in decisions related to nutrition  Outcome: Progressing     Problem: Prexisting or High Potential for Compromised Skin Integrity  Goal: Skin integrity is maintained or improved  Description: INTERVENTIONS:  - Identify patients at risk for skin breakdown  - Assess and monitor skin integrity  - Assess and monitor nutrition and hydration status  - Monitor labs   - Assess for incontinence   - Turn and reposition patient  - Assist with mobility/ambulation  - Relieve pressure over bony prominences  - Avoid friction and shearing  - Provide appropriate hygiene as needed including keeping skin clean and dry  - Evaluate need for skin moisturizer/barrier cream  - Collaborate with interdisciplinary team   - Patient/family teaching  - Consider wound care consult   Outcome: Progressing

## 2025-03-22 NOTE — ASSESSMENT & PLAN NOTE
Severe sepsis on admission  History of recurrent UTIs, CT chest abdomen pelvis showed no specific findings to suggest intrathoracic or intra-abdominal reasons for sepsis. Patient given 1.5 L bolus in the ER after period of hypotension.  Due to concern for fluid overload with history of CHF, patient was started on Levophed when hypotension persisted.    -Admitted under ICU service, since patient remained stable, downgraded to medicine service  -Both sets of blood culture is growing Staph aureus, MSSA-infectious disease on board  -Continue renally dosed cefazolin  -TTE negative for vegetation  Repeat blood cultures pending at this time

## 2025-03-23 LAB
ALBUMIN SERPL BCG-MCNC: 2.8 G/DL (ref 3.5–5)
ALP SERPL-CCNC: 280 U/L (ref 34–104)
ALT SERPL W P-5'-P-CCNC: <3 U/L (ref 7–52)
ANION GAP SERPL CALCULATED.3IONS-SCNC: 8 MMOL/L (ref 4–13)
ANISOCYTOSIS BLD QL SMEAR: PRESENT
AST SERPL W P-5'-P-CCNC: 80 U/L (ref 13–39)
BASOPHILS # BLD AUTO: 0.02 THOUSANDS/ÂΜL (ref 0–0.1)
BASOPHILS NFR BLD AUTO: 0 % (ref 0–1)
BILIRUB SERPL-MCNC: 1.06 MG/DL (ref 0.2–1)
BUN SERPL-MCNC: 30 MG/DL (ref 5–25)
CALCIUM ALBUM COR SERPL-MCNC: 8.6 MG/DL (ref 8.3–10.1)
CALCIUM SERPL-MCNC: 7.6 MG/DL (ref 8.4–10.2)
CHLORIDE SERPL-SCNC: 102 MMOL/L (ref 96–108)
CO2 SERPL-SCNC: 21 MMOL/L (ref 21–32)
CREAT SERPL-MCNC: 1.31 MG/DL (ref 0.6–1.3)
EOSINOPHIL # BLD AUTO: 0.04 THOUSAND/ÂΜL (ref 0–0.61)
EOSINOPHIL NFR BLD AUTO: 1 % (ref 0–6)
ERYTHROCYTE [DISTWIDTH] IN BLOOD BY AUTOMATED COUNT: 16.8 % (ref 11.6–15.1)
GFR SERPL CREATININE-BSD FRML MDRD: 35 ML/MIN/1.73SQ M
GLUCOSE SERPL-MCNC: 132 MG/DL (ref 65–140)
GLUCOSE SERPL-MCNC: 137 MG/DL (ref 65–140)
GLUCOSE SERPL-MCNC: 149 MG/DL (ref 65–140)
GLUCOSE SERPL-MCNC: 152 MG/DL (ref 65–140)
GLUCOSE SERPL-MCNC: 166 MG/DL (ref 65–140)
GLUCOSE SERPL-MCNC: 181 MG/DL (ref 65–140)
HCT VFR BLD AUTO: 32.1 % (ref 34.8–46.1)
HGB BLD-MCNC: 10.5 G/DL (ref 11.5–15.4)
IMM GRANULOCYTES # BLD AUTO: 0.1 THOUSAND/UL (ref 0–0.2)
IMM GRANULOCYTES NFR BLD AUTO: 1 % (ref 0–2)
LYMPHOCYTES # BLD AUTO: 0.65 THOUSANDS/ÂΜL (ref 0.6–4.47)
LYMPHOCYTES NFR BLD AUTO: 8 % (ref 14–44)
MCH RBC QN AUTO: 29.8 PG (ref 26.8–34.3)
MCHC RBC AUTO-ENTMCNC: 32.7 G/DL (ref 31.4–37.4)
MCV RBC AUTO: 91 FL (ref 82–98)
MONOCYTES # BLD AUTO: 0.74 THOUSAND/ÂΜL (ref 0.17–1.22)
MONOCYTES NFR BLD AUTO: 9 % (ref 4–12)
NEUTROPHILS # BLD AUTO: 6.29 THOUSANDS/ÂΜL (ref 1.85–7.62)
NEUTS SEG NFR BLD AUTO: 81 % (ref 43–75)
NRBC BLD AUTO-RTO: 0 /100 WBCS
PLATELET # BLD AUTO: 85 THOUSANDS/UL (ref 149–390)
PLATELET BLD QL SMEAR: ABNORMAL
PMV BLD AUTO: 12.7 FL (ref 8.9–12.7)
POTASSIUM SERPL-SCNC: 4.3 MMOL/L (ref 3.5–5.3)
PROT SERPL-MCNC: 5.8 G/DL (ref 6.4–8.4)
RBC # BLD AUTO: 3.52 MILLION/UL (ref 3.81–5.12)
RBC MORPH BLD: PRESENT
SODIUM SERPL-SCNC: 131 MMOL/L (ref 135–147)
WBC # BLD AUTO: 7.84 THOUSAND/UL (ref 4.31–10.16)

## 2025-03-23 PROCEDURE — 80053 COMPREHEN METABOLIC PANEL: CPT | Performed by: STUDENT IN AN ORGANIZED HEALTH CARE EDUCATION/TRAINING PROGRAM

## 2025-03-23 PROCEDURE — 82948 REAGENT STRIP/BLOOD GLUCOSE: CPT

## 2025-03-23 PROCEDURE — 99233 SBSQ HOSP IP/OBS HIGH 50: CPT | Performed by: FAMILY MEDICINE

## 2025-03-23 PROCEDURE — 85025 COMPLETE CBC W/AUTO DIFF WBC: CPT | Performed by: STUDENT IN AN ORGANIZED HEALTH CARE EDUCATION/TRAINING PROGRAM

## 2025-03-23 RX ORDER — ACETAMINOPHEN 325 MG/1
975 TABLET ORAL EVERY 8 HOURS SCHEDULED
Status: DISCONTINUED | OUTPATIENT
Start: 2025-03-23 | End: 2025-03-28 | Stop reason: HOSPADM

## 2025-03-23 RX ORDER — ALBUMIN (HUMAN) 12.5 G/50ML
25 SOLUTION INTRAVENOUS ONCE
Status: COMPLETED | OUTPATIENT
Start: 2025-03-23 | End: 2025-03-23

## 2025-03-23 RX ADMIN — INSULIN LISPRO 1 UNITS: 100 INJECTION, SOLUTION INTRAVENOUS; SUBCUTANEOUS at 12:02

## 2025-03-23 RX ADMIN — CHLORHEXIDINE GLUCONATE 15 ML: 1.2 SOLUTION ORAL at 08:23

## 2025-03-23 RX ADMIN — Medication 2.5 MG: at 22:19

## 2025-03-23 RX ADMIN — OXYBUTYNIN CHLORIDE 10 MG: 5 TABLET, EXTENDED RELEASE ORAL at 08:23

## 2025-03-23 RX ADMIN — INSULIN LISPRO 1 UNITS: 100 INJECTION, SOLUTION INTRAVENOUS; SUBCUTANEOUS at 17:00

## 2025-03-23 RX ADMIN — CHLORHEXIDINE GLUCONATE 15 ML: 1.2 SOLUTION ORAL at 22:17

## 2025-03-23 RX ADMIN — HYDROXYCHLOROQUINE SULFATE 200 MG: 200 TABLET ORAL at 08:21

## 2025-03-23 RX ADMIN — ACETAMINOPHEN 975 MG: 325 TABLET, FILM COATED ORAL at 14:54

## 2025-03-23 RX ADMIN — Medication 2.5 MG: at 11:11

## 2025-03-23 RX ADMIN — CEFAZOLIN SODIUM 2000 MG: 2 SOLUTION INTRAVENOUS at 04:00

## 2025-03-23 RX ADMIN — PREGABALIN 75 MG: 75 CAPSULE ORAL at 18:20

## 2025-03-23 RX ADMIN — ACETAMINOPHEN 650 MG: 325 TABLET, FILM COATED ORAL at 08:24

## 2025-03-23 RX ADMIN — DULOXETINE 60 MG: 60 CAPSULE, DELAYED RELEASE ORAL at 08:23

## 2025-03-23 RX ADMIN — PREGABALIN 75 MG: 75 CAPSULE ORAL at 08:23

## 2025-03-23 RX ADMIN — ACETAMINOPHEN 975 MG: 325 TABLET, FILM COATED ORAL at 22:17

## 2025-03-23 RX ADMIN — PYRIDOXINE HCL TAB 50 MG 100 MG: 50 TAB at 08:22

## 2025-03-23 RX ADMIN — INSULIN LISPRO 1 UNITS: 100 INJECTION, SOLUTION INTRAVENOUS; SUBCUTANEOUS at 23:00

## 2025-03-23 RX ADMIN — ENOXAPARIN SODIUM 30 MG: 30 INJECTION SUBCUTANEOUS at 08:23

## 2025-03-23 RX ADMIN — ATORVASTATIN CALCIUM 10 MG: 10 TABLET, FILM COATED ORAL at 08:23

## 2025-03-23 RX ADMIN — PANTOPRAZOLE SODIUM 40 MG: 40 TABLET, DELAYED RELEASE ORAL at 05:08

## 2025-03-23 RX ADMIN — CEFAZOLIN SODIUM 2000 MG: 2 SOLUTION INTRAVENOUS at 14:53

## 2025-03-23 RX ADMIN — Medication 1000 UNITS: at 08:23

## 2025-03-23 RX ADMIN — ASPIRIN 81 MG CHEWABLE TABLET 81 MG: 81 TABLET CHEWABLE at 08:23

## 2025-03-23 RX ADMIN — ALBUMIN (HUMAN) 25 G: 0.25 INJECTION, SOLUTION INTRAVENOUS at 10:00

## 2025-03-23 NOTE — ASSESSMENT & PLAN NOTE
Metabolic in nature blood culture positive, continue cefazolin at this time  Evaluated by speech, recommendation regular diet thin liquids.  Maintain electrolytes, renal function  Appreciate infectious disease recommendations.  Continue with antibiotics for now.  High-dose cefazolin.

## 2025-03-23 NOTE — ASSESSMENT & PLAN NOTE
Lab Results   Component Value Date    HGBA1C 6.7 (H) 03/20/2025       Recent Labs     03/22/25  0740 03/22/25  1118 03/22/25  1638 03/22/25 2050   POCGLU 119 122 162* 157*       Blood Sugar Average: Last 72 hrs:  (P) [id number]  Continue insulin, follow hypoglycemia precaution

## 2025-03-23 NOTE — ASSESSMENT & PLAN NOTE
-Shown on multiple previous images  -CT today showed interval increase in size of bilateral pleural effusions  -Likely secondary to CHF  -Patient reports no current shortness of breath  -Patient was not hypoxic in the ER.  -Continue to monitor and repeat imaging if patient does develop symptoms.  Patient is stable from the respiratory standpoint.

## 2025-03-23 NOTE — ASSESSMENT & PLAN NOTE
Severe sepsis on admission  History of recurrent UTIs, CT chest abdomen pelvis showed no specific findings to suggest intrathoracic or intra-abdominal reasons for sepsis. Patient given 1.5 L bolus in the ER after period of hypotension.  Due to concern for fluid overload with history of CHF, patient was started on Levophed when hypotension persisted.    -Admitted under ICU service, since patient remained stable, downgraded to medicine service  -Both sets of blood culture is growing Staph aureus, MSSA-  -Continue renally dosed cefazolin  -TTE negative for vegetation  Repeat blood cultures pending at this time.  ID is following the patient.  Looks like 1 out of 2 blood cultures are still positive.  Did reach out to ID for their recommendations

## 2025-03-23 NOTE — ASSESSMENT & PLAN NOTE
>>ASSESSMENT AND PLAN FOR TYPE 2 DIABETES MELLITUS (HCC) WRITTEN ON 3/23/2025 11:18 AM BY OLIVA HENDRICKS MD    Lab Results   Component Value Date    HGBA1C 6.7 (H) 03/20/2025       Recent Labs     03/22/25  0740 03/22/25  1118 03/22/25  1638 03/22/25 2050   POCGLU 119 122 162* 157*       Blood Sugar Average: Last 72 hrs:  (P) [id number]  Continue insulin, follow hypoglycemia precaution

## 2025-03-23 NOTE — ASSESSMENT & PLAN NOTE
Both sets of blood cultures growing Staph aureus, MSSA  No definitive source identified, suspect multiple skin and soft tissue wounds, could be disorders  ID on board, patient had remained on renally dose adjusted cefazolin  Looks like 1 out of 2 blood cultures are positive on repeat.  Will await ID recs.  Transthoracic echocardiogram negative for vegetation

## 2025-03-23 NOTE — PLAN OF CARE
Problem: Potential for Falls  Goal: Patient will remain free of falls  Description: INTERVENTIONS:  - Educate patient/family on patient safety including physical limitations  - Instruct patient to call for assistance with activity   - Consult OT/PT to assist with strengthening/mobility   - Keep Call bell within reach  - Keep bed low and locked with side rails adjusted as appropriate  - Keep care items and personal belongings within reach  - Initiate and maintain comfort rounds  - Make Fall Risk Sign visible to staff  - Apply yellow socks and bracelet for high fall risk patients  - Consider moving patient to room near nurses station  Outcome: Progressing     Problem: PAIN - ADULT  Goal: Verbalizes/displays adequate comfort level or baseline comfort level  Description: Interventions:  - Encourage patient to monitor pain and request assistance  - Assess pain using appropriate pain scale  - Administer analgesics based on type and severity of pain and evaluate response  - Implement non-pharmacological measures as appropriate and evaluate response  - Consider cultural and social influences on pain and pain management  - Notify physician/advanced practitioner if interventions unsuccessful or patient reports new pain  Outcome: Progressing     Problem: INFECTION - ADULT  Goal: Absence or prevention of progression during hospitalization  Description: INTERVENTIONS:  - Assess and monitor for signs and symptoms of infection  - Monitor lab/diagnostic results  - Monitor all insertion sites, i.e. indwelling lines, tubes, and drains  - Monitor endotracheal if appropriate and nasal secretions for changes in amount and color  - [place] appropriate cooling/warming therapies per order  - Administer medications as ordered  - Instruct and encourage patient and family to use good hand hygiene technique  - Identify and instruct in appropriate isolation precautions for identified infection/condition  Outcome: Progressing  Goal: Absence  of fever/infection during neutropenic period  Description: INTERVENTIONS:  - Monitor WBC    Outcome: Progressing     Problem: SAFETY ADULT  Goal: Patient will remain free of falls  Description: INTERVENTIONS:  - Educate patient/family on patient safety including physical limitations  - Instruct patient to call for assistance with activity   - Consult OT/PT to assist with strengthening/mobility   - Keep Call bell within reach  - Keep bed low and locked with side rails adjusted as appropriate  - Keep care items and personal belongings within reach  - Initiate and maintain comfort rounds  - Make Fall Risk Sign visible to staff  - Apply yellow socks and bracelet for high fall risk patients  - Consider moving patient to room near nurses station  Outcome: Progressing  Goal: Maintain or return to baseline ADL function  Description: INTERVENTIONS:  -  Assess patient's ability to carry out ADLs; assess patient's baseline for ADL function and identify physical deficits which impact ability to perform ADLs (bathing, care of mouth/teeth, toileting, grooming, dressing, etc.)  - Assess/evaluate cause of self-care deficits   - Assess range of motion  - Assess patient's mobility; develop plan if impaired  - Assess patient's need for assistive devices and provide as appropriate  - Encourage maximum independence but intervene and supervise when necessary  - Involve family in performance of ADLs  - Assess for home care needs following discharge   - Consider OT consult to assist with ADL evaluation and planning for discharge  - Provide patient education as appropriate  Outcome: Progressing  Goal: Maintains/Returns to pre admission functional level  Description: INTERVENTIONS:  - Perform AM-PAC 6 Click Basic Mobility/ Daily Activity assessment daily.  - Set and communicate daily mobility goal to care team and patient/family/caregiver.   - Collaborate with rehabilitation services on mobility goals if consulted  - Out of bed for  toileting  - Record patient progress and toleration of activity level   Outcome: Progressing     Problem: DISCHARGE PLANNING  Goal: Discharge to home or other facility with appropriate resources  Description: INTERVENTIONS:  - Identify barriers to discharge w/patient and caregiver  - Arrange for needed discharge resources and transportation as appropriate  - Identify discharge learning needs (meds, wound care, etc.)  - Arrange for interpretive services to assist at discharge as needed  - Refer to Case Management Department for coordinating discharge planning if the patient needs post-hospital services based on physician/advanced practitioner order or complex needs related to functional status, cognitive ability, or social support system  Outcome: Progressing     Problem: Knowledge Deficit  Goal: Patient/family/caregiver demonstrates understanding of disease process, treatment plan, medications, and discharge instructions  Description: Complete learning assessment and assess knowledge base.  Interventions:  - Provide teaching at level of understanding  - Provide teaching via preferred learning methods  Outcome: Progressing     Problem: Nutrition/Hydration-ADULT  Goal: Nutrient/Hydration intake appropriate for improving, restoring or maintaining nutritional needs  Description: Monitor and assess patient's nutrition/hydration status for malnutrition. Collaborate with interdisciplinary team and initiate plan and interventions as ordered.  Monitor patient's weight and dietary intake as ordered or per policy. Utilize nutrition screening tool and intervene as necessary. Determine patient's food preferences and provide high-protein, high-caloric foods as appropriate.     INTERVENTIONS:  - Monitor oral intake, urinary output, labs, and treatment plans  - Assess nutrition and hydration status and recommend course of action  - Evaluate amount of meals eaten  - Assist patient with eating if necessary   - Allow adequate time for  meals  - Recommend/ encourage appropriate diets, oral nutritional supplements, and vitamin/mineral supplements  - Order, calculate, and assess calorie counts as needed  - Recommend, monitor, and adjust tube feedings and TPN/PPN based on assessed needs  - Assess need for intravenous fluids  - Provide specific nutrition/hydration education as appropriate  - Include patient/family/caregiver in decisions related to nutrition  Outcome: Progressing     Problem: Prexisting or High Potential for Compromised Skin Integrity  Goal: Skin integrity is maintained or improved  Description: INTERVENTIONS:  - Identify patients at risk for skin breakdown  - Assess and monitor skin integrity  - Assess and monitor nutrition and hydration status  - Monitor labs   - Assess for incontinence   - Turn and reposition patient  - Assist with mobility/ambulation  - Relieve pressure over bony prominences  - Avoid friction and shearing  - Provide appropriate hygiene as needed including keeping skin clean and dry  - Evaluate need for skin moisturizer/barrier cream  - Collaborate with interdisciplinary team   - Patient/family teaching  - Consider wound care consult   Outcome: Progressing

## 2025-03-23 NOTE — PLAN OF CARE
Problem: Potential for Falls  Goal: Patient will remain free of falls  Description: INTERVENTIONS:  - Educate patient/family on patient safety including physical limitations  - Instruct patient to call for assistance with activity   - Consult OT/PT to assist with strengthening/mobility   - Keep Call bell within reach  - Keep bed low and locked with side rails adjusted as appropriate  - Keep care items and personal belongings within reach  - Initiate and maintain comfort rounds  - Make Fall Risk Sign visible to staff  - Apply yellow socks and bracelet for high fall risk patients  - Consider moving patient to room near nurses station  Outcome: Progressing     Problem: PAIN - ADULT  Goal: Verbalizes/displays adequate comfort level or baseline comfort level  Description: Interventions:  - Encourage patient to monitor pain and request assistance  - Assess pain using appropriate pain scale  - Administer analgesics based on type and severity of pain and evaluate response  - Implement non-pharmacological measures as appropriate and evaluate response  - Consider cultural and social influences on pain and pain management  - Notify physician/advanced practitioner if interventions unsuccessful or patient reports new pain  Outcome: Progressing     Problem: INFECTION - ADULT  Goal: Absence or prevention of progression during hospitalization  Description: INTERVENTIONS:  - Assess and monitor for signs and symptoms of infection  - Monitor lab/diagnostic results  - Monitor all insertion sites, i.e. indwelling lines, tubes, and drains  - Monitor endotracheal if appropriate and nasal secretions for changes in amount and color  - [place] appropriate cooling/warming therapies per order  - Administer medications as ordered  - Instruct and encourage patient and family to use good hand hygiene technique  - Identify and instruct in appropriate isolation precautions for identified infection/condition  Outcome: Progressing  Goal: Absence  of fever/infection during neutropenic period  Description: INTERVENTIONS:  - Monitor WBC    Outcome: Progressing     Problem: SAFETY ADULT  Goal: Patient will remain free of falls  Description: INTERVENTIONS:  - Educate patient/family on patient safety including physical limitations  - Instruct patient to call for assistance with activity   - Consult OT/PT to assist with strengthening/mobility   - Keep Call bell within reach  - Keep bed low and locked with side rails adjusted as appropriate  - Keep care items and personal belongings within reach  - Initiate and maintain comfort rounds  - Make Fall Risk Sign visible to staff  - Apply yellow socks and bracelet for high fall risk patients  - Consider moving patient to room near nurses station  Outcome: Progressing  Goal: Maintain or return to baseline ADL function  Description: INTERVENTIONS:  -  Assess patient's ability to carry out ADLs; assess patient's baseline for ADL function and identify physical deficits which impact ability to perform ADLs (bathing, care of mouth/teeth, toileting, grooming, dressing, etc.)  - Assess/evaluate cause of self-care deficits   - Assess range of motion  - Assess patient's mobility; develop plan if impaired  - Assess patient's need for assistive devices and provide as appropriate  - Encourage maximum independence but intervene and supervise when necessary  - Involve family in performance of ADLs  - Assess for home care needs following discharge   - Consider OT consult to assist with ADL evaluation and planning for discharge  - Provide patient education as appropriate  Outcome: Progressing  Goal: Maintains/Returns to pre admission functional level  Description: INTERVENTIONS:  - Perform AM-PAC 6 Click Basic Mobility/ Daily Activity assessment daily.  - Set and communicate daily mobility goal to care team and patient/family/caregiver.   - Collaborate with rehabilitation services on mobility goals if consulted  - Out of bed for  toileting  - Record patient progress and toleration of activity level   Outcome: Progressing     Problem: DISCHARGE PLANNING  Goal: Discharge to home or other facility with appropriate resources  Description: INTERVENTIONS:  - Identify barriers to discharge w/patient and caregiver  - Arrange for needed discharge resources and transportation as appropriate  - Identify discharge learning needs (meds, wound care, etc.)  - Arrange for interpretive services to assist at discharge as needed  - Refer to Case Management Department for coordinating discharge planning if the patient needs post-hospital services based on physician/advanced practitioner order or complex needs related to functional status, cognitive ability, or social support system  Outcome: Progressing     Problem: Knowledge Deficit  Goal: Patient/family/caregiver demonstrates understanding of disease process, treatment plan, medications, and discharge instructions  Description: Complete learning assessment and assess knowledge base.  Interventions:  - Provide teaching at level of understanding  - Provide teaching via preferred learning methods  Outcome: Progressing     Problem: Nutrition/Hydration-ADULT  Goal: Nutrient/Hydration intake appropriate for improving, restoring or maintaining nutritional needs  Description: Monitor and assess patient's nutrition/hydration status for malnutrition. Collaborate with interdisciplinary team and initiate plan and interventions as ordered.  Monitor patient's weight and dietary intake as ordered or per policy. Utilize nutrition screening tool and intervene as necessary. Determine patient's food preferences and provide high-protein, high-caloric foods as appropriate.     INTERVENTIONS:  - Monitor oral intake, urinary output, labs, and treatment plans  - Assess nutrition and hydration status and recommend course of action  - Evaluate amount of meals eaten  - Assist patient with eating if necessary   - Allow adequate time for  meals  - Recommend/ encourage appropriate diets, oral nutritional supplements, and vitamin/mineral supplements  - Order, calculate, and assess calorie counts as needed  - Recommend, monitor, and adjust tube feedings and TPN/PPN based on assessed needs  - Assess need for intravenous fluids  - Provide specific nutrition/hydration education as appropriate  - Include patient/family/caregiver in decisions related to nutrition  Outcome: Progressing     Problem: Prexisting or High Potential for Compromised Skin Integrity  Goal: Skin integrity is maintained or improved  Description: INTERVENTIONS:  - Identify patients at risk for skin breakdown  - Assess and monitor skin integrity  - Assess and monitor nutrition and hydration status  - Monitor labs   - Assess for incontinence   - Turn and reposition patient  - Assist with mobility/ambulation  - Relieve pressure over bony prominences  - Avoid friction and shearing  - Provide appropriate hygiene as needed including keeping skin clean and dry  - Evaluate need for skin moisturizer/barrier cream  - Collaborate with interdisciplinary team   - Patient/family teaching  - Consider wound care consult   Outcome: Progressing

## 2025-03-23 NOTE — ASSESSMENT & PLAN NOTE
Platelet count is 100,000, most likely secondary to sepsis  Continue to monitor, no active bleeding noted.  Patient remained on Lovenox.  Patient's platelet counts 85 today.  Continue to monitor CBC if it drops further tomorrow to consider holding Lovenox

## 2025-03-23 NOTE — ASSESSMENT & PLAN NOTE
-Patient states she has no new pain in the ER, only continued chronic pain  -Continue home amitriptyline and scheduled Tylenol.  -Treat acute pain as needed.  Did add a low-dose of oxycodone given significant pain

## 2025-03-23 NOTE — ASSESSMENT & PLAN NOTE
Lab Results   Component Value Date    EGFR 35 03/23/2025    EGFR 32 03/22/2025    EGFR 32 03/21/2025    CREATININE 1.31 (H) 03/23/2025    CREATININE 1.42 (H) 03/22/2025    CREATININE 1.43 (H) 03/21/2025   Continue to monitor, avoid nephrotoxic medication    Creatinine is stable baseline is 1.3-1.4

## 2025-03-24 ENCOUNTER — TELEPHONE (OUTPATIENT)
Dept: PHYSICAL THERAPY | Facility: CLINIC | Age: OVER 89
End: 2025-03-24

## 2025-03-24 LAB
ANION GAP SERPL CALCULATED.3IONS-SCNC: 6 MMOL/L (ref 4–13)
ATRIAL RATE: 111 BPM
ATRIAL RATE: 114 BPM
BUN SERPL-MCNC: 27 MG/DL (ref 5–25)
CALCIUM SERPL-MCNC: 7.6 MG/DL (ref 8.4–10.2)
CHLORIDE SERPL-SCNC: 104 MMOL/L (ref 96–108)
CO2 SERPL-SCNC: 24 MMOL/L (ref 21–32)
CREAT SERPL-MCNC: 1.21 MG/DL (ref 0.6–1.3)
ERYTHROCYTE [DISTWIDTH] IN BLOOD BY AUTOMATED COUNT: 16.6 % (ref 11.6–15.1)
GFR SERPL CREATININE-BSD FRML MDRD: 39 ML/MIN/1.73SQ M
GLUCOSE SERPL-MCNC: 125 MG/DL (ref 65–140)
GLUCOSE SERPL-MCNC: 132 MG/DL (ref 65–140)
GLUCOSE SERPL-MCNC: 138 MG/DL (ref 65–140)
GLUCOSE SERPL-MCNC: 146 MG/DL (ref 65–140)
GLUCOSE SERPL-MCNC: 160 MG/DL (ref 65–140)
HCT VFR BLD AUTO: 32.9 % (ref 34.8–46.1)
HGB BLD-MCNC: 10.4 G/DL (ref 11.5–15.4)
MCH RBC QN AUTO: 29.3 PG (ref 26.8–34.3)
MCHC RBC AUTO-ENTMCNC: 31.6 G/DL (ref 31.4–37.4)
MCV RBC AUTO: 93 FL (ref 82–98)
P AXIS: 102 DEGREES
PLATELET # BLD AUTO: 92 THOUSANDS/UL (ref 149–390)
PMV BLD AUTO: 12.7 FL (ref 8.9–12.7)
POTASSIUM SERPL-SCNC: 3.7 MMOL/L (ref 3.5–5.3)
PR INTERVAL: 188 MS
QRS AXIS: 115 DEGREES
QRS AXIS: 143 DEGREES
QRSD INTERVAL: 140 MS
QRSD INTERVAL: 142 MS
QT INTERVAL: 334 MS
QT INTERVAL: 404 MS
QTC INTERVAL: 460 MS
QTC INTERVAL: 544 MS
RBC # BLD AUTO: 3.55 MILLION/UL (ref 3.81–5.12)
SODIUM SERPL-SCNC: 134 MMOL/L (ref 135–147)
T WAVE AXIS: -14 DEGREES
T WAVE AXIS: -25 DEGREES
VENTRICULAR RATE: 109 BPM
VENTRICULAR RATE: 114 BPM
WBC # BLD AUTO: 7.91 THOUSAND/UL (ref 4.31–10.16)

## 2025-03-24 PROCEDURE — 85027 COMPLETE CBC AUTOMATED: CPT | Performed by: FAMILY MEDICINE

## 2025-03-24 PROCEDURE — 87040 BLOOD CULTURE FOR BACTERIA: CPT | Performed by: FAMILY MEDICINE

## 2025-03-24 PROCEDURE — 80048 BASIC METABOLIC PNL TOTAL CA: CPT | Performed by: FAMILY MEDICINE

## 2025-03-24 PROCEDURE — 82948 REAGENT STRIP/BLOOD GLUCOSE: CPT

## 2025-03-24 PROCEDURE — 93010 ELECTROCARDIOGRAM REPORT: CPT | Performed by: INTERNAL MEDICINE

## 2025-03-24 PROCEDURE — 99232 SBSQ HOSP IP/OBS MODERATE 35: CPT | Performed by: INTERNAL MEDICINE

## 2025-03-24 PROCEDURE — G0545 PR INHERENT VISIT TO INPT: HCPCS | Performed by: INTERNAL MEDICINE

## 2025-03-24 PROCEDURE — 99232 SBSQ HOSP IP/OBS MODERATE 35: CPT | Performed by: PHYSICIAN ASSISTANT

## 2025-03-24 RX ORDER — FUROSEMIDE 20 MG/1
20 TABLET ORAL DAILY
Status: DISCONTINUED | OUTPATIENT
Start: 2025-03-24 | End: 2025-03-28 | Stop reason: HOSPADM

## 2025-03-24 RX ORDER — AMITRIPTYLINE HYDROCHLORIDE 10 MG/1
10 TABLET ORAL
Status: DISCONTINUED | OUTPATIENT
Start: 2025-03-24 | End: 2025-03-28 | Stop reason: HOSPADM

## 2025-03-24 RX ORDER — ACETAMINOPHEN 325 MG/1
650 TABLET ORAL 3 TIMES DAILY
Status: DISCONTINUED | OUTPATIENT
Start: 2025-03-24 | End: 2025-03-24 | Stop reason: SDUPTHER

## 2025-03-24 RX ORDER — ACETAMINOPHEN 10 MG/ML
1000 INJECTION, SOLUTION INTRAVENOUS ONCE
Status: COMPLETED | OUTPATIENT
Start: 2025-03-24 | End: 2025-03-24

## 2025-03-24 RX ADMIN — ACETAMINOPHEN 1000 MG: 10 INJECTION INTRAVENOUS at 21:29

## 2025-03-24 RX ADMIN — DULOXETINE 60 MG: 60 CAPSULE, DELAYED RELEASE ORAL at 08:58

## 2025-03-24 RX ADMIN — CEFAZOLIN SODIUM 2000 MG: 2 SOLUTION INTRAVENOUS at 03:30

## 2025-03-24 RX ADMIN — FUROSEMIDE 20 MG: 20 TABLET ORAL at 11:44

## 2025-03-24 RX ADMIN — Medication 1000 UNITS: at 08:56

## 2025-03-24 RX ADMIN — INSULIN LISPRO 1 UNITS: 100 INJECTION, SOLUTION INTRAVENOUS; SUBCUTANEOUS at 17:05

## 2025-03-24 RX ADMIN — CEFAZOLIN SODIUM 2000 MG: 2 SOLUTION INTRAVENOUS at 15:03

## 2025-03-24 RX ADMIN — PANTOPRAZOLE SODIUM 40 MG: 40 TABLET, DELAYED RELEASE ORAL at 05:48

## 2025-03-24 RX ADMIN — ASPIRIN 81 MG CHEWABLE TABLET 81 MG: 81 TABLET CHEWABLE at 08:56

## 2025-03-24 RX ADMIN — PREGABALIN 75 MG: 75 CAPSULE ORAL at 17:05

## 2025-03-24 RX ADMIN — CHLORHEXIDINE GLUCONATE 15 ML: 1.2 SOLUTION ORAL at 08:59

## 2025-03-24 RX ADMIN — Medication 2.5 MG: at 11:44

## 2025-03-24 RX ADMIN — PYRIDOXINE HCL TAB 50 MG 100 MG: 50 TAB at 08:58

## 2025-03-24 RX ADMIN — HYDROXYCHLOROQUINE SULFATE 200 MG: 200 TABLET ORAL at 08:56

## 2025-03-24 RX ADMIN — AMITRIPTYLINE HYDROCHLORIDE 10 MG: 10 TABLET, FILM COATED ORAL at 21:29

## 2025-03-24 RX ADMIN — OXYBUTYNIN CHLORIDE 10 MG: 5 TABLET, EXTENDED RELEASE ORAL at 08:58

## 2025-03-24 RX ADMIN — PREGABALIN 75 MG: 75 CAPSULE ORAL at 08:56

## 2025-03-24 RX ADMIN — ATORVASTATIN CALCIUM 10 MG: 10 TABLET, FILM COATED ORAL at 08:56

## 2025-03-24 RX ADMIN — SODIUM CHLORIDE, SODIUM LACTATE, POTASSIUM CHLORIDE, AND CALCIUM CHLORIDE 50 ML/HR: .6; .31; .03; .02 INJECTION, SOLUTION INTRAVENOUS at 01:29

## 2025-03-24 RX ADMIN — ENOXAPARIN SODIUM 30 MG: 30 INJECTION SUBCUTANEOUS at 08:57

## 2025-03-24 RX ADMIN — ACETAMINOPHEN 975 MG: 325 TABLET, FILM COATED ORAL at 05:48

## 2025-03-24 NOTE — ASSESSMENT & PLAN NOTE
Lab Results   Component Value Date    EGFR 39 03/24/2025    EGFR 35 03/23/2025    EGFR 32 03/22/2025    CREATININE 1.21 03/24/2025    CREATININE 1.31 (H) 03/23/2025    CREATININE 1.42 (H) 03/22/2025   Continue to monitor, avoid nephrotoxic medication  Creatinine is stable baseline is 1.4-1.6  Follows with nephrology as an outpatient

## 2025-03-24 NOTE — ASSESSMENT & PLAN NOTE
>>ASSESSMENT AND PLAN FOR TYPE 2 DIABETES MELLITUS ([facility]CC) WRITTEN ON 3/24/2025  2:43 PM BY KEILA KNUTSON PA-C    Lab Results   Component Value Date    HGBA1C 6.7 (H) 03/20/2025   -glycemic management per primary     Recent Labs     03/23/25 2059 03/23/25 2248 03/24/25  0803 03/24/25  1151   POCGLU 149* 166* 138 132       Blood Sugar Average: Last 72 hrs:  (P) [id number]

## 2025-03-24 NOTE — PLAN OF CARE
Problem: Potential for Falls  Goal: Patient will remain free of falls  Description: INTERVENTIONS:  - Educate patient/family on patient safety including physical limitations  - Instruct patient to call for assistance with activity   - Consult OT/PT to assist with strengthening/mobility   - Keep Call bell within reach  - Keep bed low and locked with side rails adjusted as appropriate  - Keep care items and personal belongings within reach  - Initiate and maintain comfort rounds  - Make Fall Risk Sign visible to staff  - Apply yellow socks and bracelet for high fall risk patients  - Consider moving patient to room near nurses station  Outcome: Progressing     Problem: PAIN - ADULT  Goal: Verbalizes/displays adequate comfort level or baseline comfort level  Description: Interventions:  - Encourage patient to monitor pain and request assistance  - Assess pain using appropriate pain scale  - Administer analgesics based on type and severity of pain and evaluate response  - Implement non-pharmacological measures as appropriate and evaluate response  - Consider cultural and social influences on pain and pain management  - Notify physician/advanced practitioner if interventions unsuccessful or patient reports new pain  Outcome: Progressing     Problem: INFECTION - ADULT  Goal: Absence or prevention of progression during hospitalization  Description: INTERVENTIONS:  - Assess and monitor for signs and symptoms of infection  - Monitor lab/diagnostic results  - Monitor all insertion sites, i.e. indwelling lines, tubes, and drains  - Monitor endotracheal if appropriate and nasal secretions for changes in amount and color  - [place] appropriate cooling/warming therapies per order  - Administer medications as ordered  - Instruct and encourage patient and family to use good hand hygiene technique  - Identify and instruct in appropriate isolation precautions for identified infection/condition  Outcome: Progressing  Goal: Absence  of fever/infection during neutropenic period  Description: INTERVENTIONS:  - Monitor WBC    Outcome: Progressing     Problem: SAFETY ADULT  Goal: Patient will remain free of falls  Description: INTERVENTIONS:  - Educate patient/family on patient safety including physical limitations  - Instruct patient to call for assistance with activity   - Consult OT/PT to assist with strengthening/mobility   - Keep Call bell within reach  - Keep bed low and locked with side rails adjusted as appropriate  - Keep care items and personal belongings within reach  - Initiate and maintain comfort rounds  - Make Fall Risk Sign visible to staff  - Apply yellow socks and bracelet for high fall risk patients  - Consider moving patient to room near nurses station  Outcome: Progressing  Goal: Maintain or return to baseline ADL function  Description: INTERVENTIONS:  -  Assess patient's ability to carry out ADLs; assess patient's baseline for ADL function and identify physical deficits which impact ability to perform ADLs (bathing, care of mouth/teeth, toileting, grooming, dressing, etc.)  - Assess/evaluate cause of self-care deficits   - Assess range of motion  - Assess patient's mobility; develop plan if impaired  - Assess patient's need for assistive devices and provide as appropriate  - Encourage maximum independence but intervene and supervise when necessary  - Involve family in performance of ADLs  - Assess for home care needs following discharge   - Consider OT consult to assist with ADL evaluation and planning for discharge  - Provide patient education as appropriate  Outcome: Progressing  Goal: Maintains/Returns to pre admission functional level  Description: INTERVENTIONS:  - Perform AM-PAC 6 Click Basic Mobility/ Daily Activity assessment daily.  - Set and communicate daily mobility goal to care team and patient/family/caregiver.   - Collaborate with rehabilitation services on mobility goals if consulted  - Out of bed for  toileting  - Record patient progress and toleration of activity level   Outcome: Progressing     Problem: DISCHARGE PLANNING  Goal: Discharge to home or other facility with appropriate resources  Description: INTERVENTIONS:  - Identify barriers to discharge w/patient and caregiver  - Arrange for needed discharge resources and transportation as appropriate  - Identify discharge learning needs (meds, wound care, etc.)  - Arrange for interpretive services to assist at discharge as needed  - Refer to Case Management Department for coordinating discharge planning if the patient needs post-hospital services based on physician/advanced practitioner order or complex needs related to functional status, cognitive ability, or social support system  Outcome: Progressing     Problem: Knowledge Deficit  Goal: Patient/family/caregiver demonstrates understanding of disease process, treatment plan, medications, and discharge instructions  Description: Complete learning assessment and assess knowledge base.  Interventions:  - Provide teaching at level of understanding  - Provide teaching via preferred learning methods  Outcome: Progressing     Problem: Nutrition/Hydration-ADULT  Goal: Nutrient/Hydration intake appropriate for improving, restoring or maintaining nutritional needs  Description: Monitor and assess patient's nutrition/hydration status for malnutrition. Collaborate with interdisciplinary team and initiate plan and interventions as ordered.  Monitor patient's weight and dietary intake as ordered or per policy. Utilize nutrition screening tool and intervene as necessary. Determine patient's food preferences and provide high-protein, high-caloric foods as appropriate.     INTERVENTIONS:  - Monitor oral intake, urinary output, labs, and treatment plans  - Assess nutrition and hydration status and recommend course of action  - Evaluate amount of meals eaten  - Assist patient with eating if necessary   - Allow adequate time for  meals  - Recommend/ encourage appropriate diets, oral nutritional supplements, and vitamin/mineral supplements  - Order, calculate, and assess calorie counts as needed  - Recommend, monitor, and adjust tube feedings and TPN/PPN based on assessed needs  - Assess need for intravenous fluids  - Provide specific nutrition/hydration education as appropriate  - Include patient/family/caregiver in decisions related to nutrition  Outcome: Progressing     Problem: Prexisting or High Potential for Compromised Skin Integrity  Goal: Skin integrity is maintained or improved  Description: INTERVENTIONS:  - Identify patients at risk for skin breakdown  - Assess and monitor skin integrity  - Assess and monitor nutrition and hydration status  - Monitor labs   - Assess for incontinence   - Turn and reposition patient  - Assist with mobility/ambulation  - Relieve pressure over bony prominences  - Avoid friction and shearing  - Provide appropriate hygiene as needed including keeping skin clean and dry  - Evaluate need for skin moisturizer/barrier cream  - Collaborate with interdisciplinary team   - Patient/family teaching  - Consider wound care consult   Outcome: Progressing

## 2025-03-24 NOTE — APP STUDENT NOTE
[facility] Internal Medicine Progress Note  Patient: Tanya Stephen 89 y.o. female   MRN: [id number]  PCP: Gregory Santoro DO  Unit/Bed#: ICU 12 Encounter: [id number]  Date Of Visit: 03/24/25    Assessment/Plan:    Severe Sepsis  Severe sepsis on admission with lactic acid 2.8 on 3/19, has since lowered to 1.1 on 3/21 and platelets 92 on 3/24.   No longer meeting SIRS criteria, but blood cultures positive for Staph aureus, MSSA  Recheck blood cultures   Continue IV cefazolin (Ancef) Q12H @100 ML/hr over 30 min  Bacteremia  Blood cultures positive for Staph aureus, MSSA  Follow up with repeat blood cultures  TTE negative for vegetation  Continue IV cefazolin (Ancef) Q12H @100 ML/hr over 30 min  Thrombocytopenia   Platelet count went from 85 on 3/23 to 92 on 3/24 most likely secondary to bacteremia.  Patient has ecchymosis on b/l lower extremities  Patient on ASA and Lovenox   Continue to monitor CBC  Type 2 DM  HgbA1C 6.7 on 3/20 at baseline   Continue insulin lispro   CKD stage IV  Cr 1.21 on 3/23   Continue to monitor    VTE Pharmacologic Prophylaxis:   Pharmacologic: Enoxaparin (Lovenox)  Mechanical VTE Prophylaxis in Place: Yes    Patient Centered Rounds: I have performed bedside rounds with nursing staff today.    Discussions with Specialists or Other Care Team Provider:     Education and Discussions with Family / Patient:     Time Spent for Care: 15 minutes.  More than 50% of total time spent on counseling and coordination of care as described above.    Current Length of Stay: 5 day(s)    Current Patient Status: Inpatient   Certification Statement: The patient will continue to require additional inpatient hospital stay due to bacteremia     Discharge Plan / Estimated Discharge Date: continue abx until bacteremia has resolved     Code Status: Level 1 - Full Code      Subjective:   COSME is a 89 year old female on HD5 with a PMH of CHF, CKD stage 4, and DM2 who presented with AMS. Upon admission patient was  found to be in severe sepsis and have positive blood cultures for Staph aureus MSSA. She is doing better today. She has generalized body aches especially back pain which she admits is chronic. She denies any chest pain, SOB, abdominal pain, dysuria, N/V/D.     Objective:   Vitals:   Temp (24hrs), Av.8 °F (36.6 °C), Min:97.5 °F (36.4 °C), Max:98.4 °F (36.9 °C)    Temp:  [97.5 °F (36.4 °C)-98.4 °F (36.9 °C)] 97.5 °F (36.4 °C)  HR:  [] 91  Resp:  [17-22] 17  BP: (103-115)/(56-76) 113/56  SpO2:  [95 %-100 %] 95 %  Body mass index is 27.47 kg/m².     Input and Output Summary (last 24 hours):       Intake/Output Summary (Last 24 hours) at 3/24/2025 0942  Last data filed at 3/24/2025 0600  Gross per 24 hour   Intake 1350 ml   Output 100 ml   Net 1250 ml       Physical Exam:     Physical Exam  Constitutional:       General: She is not in acute distress.     Appearance: Normal appearance. She is normal weight.   HENT:      Head: Normocephalic and atraumatic.   Cardiovascular:      Rate and Rhythm: Normal rate and regular rhythm.      Pulses: Normal pulses.      Heart sounds: No murmur heard.     No friction rub. No gallop.   Pulmonary:      Effort: Pulmonary effort is normal.      Breath sounds: Normal breath sounds. No wheezing, rhonchi or rales.   Abdominal:      General: Bowel sounds are normal.      Palpations: Abdomen is soft.      Tenderness: There is no abdominal tenderness.   Musculoskeletal:         General: No swelling.      Right lower leg: No edema.      Left lower leg: No edema.   Skin:     General: Skin is warm and dry.      Findings: Bruising present.   Neurological:      Mental Status: She is oriented to person, place, and time. Mental status is at baseline.         Additional Data:     Labs:    Results from last 7 days   Lab Units 25  0402 25  0553   WBC Thousand/uL 7.91 7.84   HEMOGLOBIN g/dL 10.4* 10.5*   HEMATOCRIT % 32.9* 32.1*   PLATELETS Thousands/uL 92* 85*   SEGS PCT %  --  81*    LYMPHO PCT %  --  8*   MONO PCT %  --  9   EOS PCT %  --  1     Results from last 7 days   Lab Units 03/24/25  0402 03/23/25  0439   POTASSIUM mmol/L 3.7 4.3   CHLORIDE mmol/L 104 102   CO2 mmol/L 24 21   BUN mg/dL 27* 30*   CREATININE mg/dL 1.21 1.31*   CALCIUM mg/dL 7.6* 7.6*   ALK PHOS U/L  --  280*   ALT U/L  --  <3*   AST U/L  --  80*     Results from last 7 days   Lab Units 03/19/25  1842   INR  1.25*       * I Have Reviewed All Lab Data Listed Above.  * Additional Pertinent Lab Tests Reviewed: All Labs For Current Hospital Admission Reviewed    Imaging:    Imaging Reports Reviewed Today Include:  Imaging Personally Reviewed by Myself Includes:     Recent Cultures (last 7 days):     Results from last 7 days   Lab Units 03/21/25  0509 03/21/25  0459 03/19/25  1842   BLOOD CULTURE  No Growth at 48 hrs.  --  Staphylococcus aureus*  Staphylococcus aureus*   GRAM STAIN RESULT   --  Gram positive cocci in clusters* Gram positive cocci in clusters*  Gram positive cocci in clusters*       Last 24 Hours Medication List:   Current Facility-Administered Medications   Medication Dose Route Frequency Provider Last Rate    acetaminophen  975 mg Oral Q8H [facility] Thiago Xie MD      aspirin  81 mg Oral Daily Tyrone Santos, DO      atorvastatin  10 mg Oral Daily Tyrone Santos DO      cefazolin  2,000 mg Intravenous Q12H Ana Abernathy PA-C Stopped (03/24/25 0400)    chlorhexidine  15 mL Mouth/Throat Q12H [facility] Tyrone Santos DO      Cholecalciferol  1,000 Units Oral Daily Tyrone Santos DO      DULoxetine  60 mg Oral Daily Tyrone Santos, DO      enoxaparin  30 mg Subcutaneous Daily Tyrone Santos DO      hydroxychloroquine  200 mg Oral Daily With Breakfast Tyrone Santos, DO      insulin lispro  1-5 Units Subcutaneous 4x Daily (AC & HS) Tyrone Santos, DO      lactated ringers  50 mL/hr Intravenous Continuous Jj Mosley MD 50 mL/hr (03/24/25  0129)    norepinephrine  1-30 mcg/min Intravenous Titrated Andria Baird MD Stopped (03/20/25 0490)    oxybutynin  10 mg Oral Daily Tyrone Santos, DO      oxyCODONE  2.5 mg Oral Q6H PRN Thiago Xie MD      pantoprazole  40 mg Oral Early Morning Tyrone Santos, DO      pregabalin  75 mg Oral BID Tyrone Santos, DO      pyridoxine  100 mg Oral Daily Tyrone Santos, DO      trimethobenzamide  200 mg Intramuscular Q6H PRN Jj Mosley MD          Today, Patient Was Seen By: Fabián Erickson    ** Please Note: This note has been constructed using a voice recognition system. **

## 2025-03-24 NOTE — PLAN OF CARE
Problem: Potential for Falls  Goal: Patient will remain free of falls  Description: INTERVENTIONS:  - Educate patient/family on patient safety including physical limitations  - Instruct patient to call for assistance with activity   - Consult OT/PT to assist with strengthening/mobility   - Keep Call bell within reach  - Keep bed low and locked with side rails adjusted as appropriate  - Keep care items and personal belongings within reach  - Initiate and maintain comfort rounds  - Make Fall Risk Sign visible to staff  - Apply yellow socks and bracelet for high fall risk patients  - Consider moving patient to room near nurses station  Outcome: Progressing     Problem: PAIN - ADULT  Goal: Verbalizes/displays adequate comfort level or baseline comfort level  Description: Interventions:  - Encourage patient to monitor pain and request assistance  - Assess pain using appropriate pain scale  - Administer analgesics based on type and severity of pain and evaluate response  - Implement non-pharmacological measures as appropriate and evaluate response  - Consider cultural and social influences on pain and pain management  - Notify physician/advanced practitioner if interventions unsuccessful or patient reports new pain  Outcome: Progressing     Problem: INFECTION - ADULT  Goal: Absence or prevention of progression during hospitalization  Description: INTERVENTIONS:  - Assess and monitor for signs and symptoms of infection  - Monitor lab/diagnostic results  - Monitor all insertion sites, i.e. indwelling lines, tubes, and drains  - Monitor endotracheal if appropriate and nasal secretions for changes in amount and color  - [place] appropriate cooling/warming therapies per order  - Administer medications as ordered  - Instruct and encourage patient and family to use good hand hygiene technique  - Identify and instruct in appropriate isolation precautions for identified infection/condition  Outcome: Progressing  Goal: Absence  of fever/infection during neutropenic period  Description: INTERVENTIONS:  - Monitor WBC    Outcome: Progressing     Problem: SAFETY ADULT  Goal: Patient will remain free of falls  Description: INTERVENTIONS:  - Educate patient/family on patient safety including physical limitations  - Instruct patient to call for assistance with activity   - Consult OT/PT to assist with strengthening/mobility   - Keep Call bell within reach  - Keep bed low and locked with side rails adjusted as appropriate  - Keep care items and personal belongings within reach  - Initiate and maintain comfort rounds  - Make Fall Risk Sign visible to staff  - Apply yellow socks and bracelet for high fall risk patients  - Consider moving patient to room near nurses station  Outcome: Progressing  Goal: Maintain or return to baseline ADL function  Description: INTERVENTIONS:  -  Assess patient's ability to carry out ADLs; assess patient's baseline for ADL function and identify physical deficits which impact ability to perform ADLs (bathing, care of mouth/teeth, toileting, grooming, dressing, etc.)  - Assess/evaluate cause of self-care deficits   - Assess range of motion  - Assess patient's mobility; develop plan if impaired  - Assess patient's need for assistive devices and provide as appropriate  - Encourage maximum independence but intervene and supervise when necessary  - Involve family in performance of ADLs  - Assess for home care needs following discharge   - Consider OT consult to assist with ADL evaluation and planning for discharge  - Provide patient education as appropriate  Outcome: Progressing  Goal: Maintains/Returns to pre admission functional level  Description: INTERVENTIONS:  - Perform AM-PAC 6 Click Basic Mobility/ Daily Activity assessment daily.  - Set and communicate daily mobility goal to care team and patient/family/caregiver.   - Collaborate with rehabilitation services on mobility goals if consulted  - Out of bed for  toileting  - Record patient progress and toleration of activity level   Outcome: Progressing     Problem: DISCHARGE PLANNING  Goal: Discharge to home or other facility with appropriate resources  Description: INTERVENTIONS:  - Identify barriers to discharge w/patient and caregiver  - Arrange for needed discharge resources and transportation as appropriate  - Identify discharge learning needs (meds, wound care, etc.)  - Arrange for interpretive services to assist at discharge as needed  - Refer to Case Management Department for coordinating discharge planning if the patient needs post-hospital services based on physician/advanced practitioner order or complex needs related to functional status, cognitive ability, or social support system  Outcome: Progressing     Problem: Knowledge Deficit  Goal: Patient/family/caregiver demonstrates understanding of disease process, treatment plan, medications, and discharge instructions  Description: Complete learning assessment and assess knowledge base.  Interventions:  - Provide teaching at level of understanding  - Provide teaching via preferred learning methods  Outcome: Progressing     Problem: Nutrition/Hydration-ADULT  Goal: Nutrient/Hydration intake appropriate for improving, restoring or maintaining nutritional needs  Description: Monitor and assess patient's nutrition/hydration status for malnutrition. Collaborate with interdisciplinary team and initiate plan and interventions as ordered.  Monitor patient's weight and dietary intake as ordered or per policy. Utilize nutrition screening tool and intervene as necessary. Determine patient's food preferences and provide high-protein, high-caloric foods as appropriate.     INTERVENTIONS:  - Monitor oral intake, urinary output, labs, and treatment plans  - Assess nutrition and hydration status and recommend course of action  - Evaluate amount of meals eaten  - Assist patient with eating if necessary   - Allow adequate time for  meals  - Recommend/ encourage appropriate diets, oral nutritional supplements, and vitamin/mineral supplements  - Order, calculate, and assess calorie counts as needed  - Recommend, monitor, and adjust tube feedings and TPN/PPN based on assessed needs  - Assess need for intravenous fluids  - Provide specific nutrition/hydration education as appropriate  - Include patient/family/caregiver in decisions related to nutrition  Outcome: Progressing     Problem: Prexisting or High Potential for Compromised Skin Integrity  Goal: Skin integrity is maintained or improved  Description: INTERVENTIONS:  - Identify patients at risk for skin breakdown  - Assess and monitor skin integrity  - Assess and monitor nutrition and hydration status  - Monitor labs   - Assess for incontinence   - Turn and reposition patient  - Assist with mobility/ambulation  - Relieve pressure over bony prominences  - Avoid friction and shearing  - Provide appropriate hygiene as needed including keeping skin clean and dry  - Evaluate need for skin moisturizer/barrier cream  - Collaborate with interdisciplinary team   - Patient/family teaching  - Consider wound care consult   Outcome: Progressing

## 2025-03-24 NOTE — ASSESSMENT & PLAN NOTE
CT CAP: 3/19/25: Moderate right and small left pleural effusions. The right pleural effusion has slightly increased since the prior study. There is associated bilateral lower lobe atelectasis. Shown on multiple previous images  Patient is not hypoxic or having respiratory symptoms.  Recommend outpatient follow-up

## 2025-03-24 NOTE — ASSESSMENT & PLAN NOTE
Noted to have metabolic encephalopathy initially.  Felt secondary to sepsis.  Mental status now improved

## 2025-03-24 NOTE — ASSESSMENT & PLAN NOTE
Lab Results   Component Value Date    HGBA1C 6.7 (H) 03/20/2025       Recent Labs     03/23/25 2059 03/23/25 2248 03/24/25  0803 03/24/25  1151   POCGLU 149* 166* 138 132       Blood Sugar Average: Last 72 hrs:  (P) [id number]  Currently on sliding scale insulin  Only requiring approximately 2 to 3 units/day

## 2025-03-24 NOTE — ASSESSMENT & PLAN NOTE
Noted to have fever, tachycardia, leukocytosis and required vasopressor support  Secondary to MSSA bacteremia  Subsequently transferred from ICU to medical surgical floor  Infectious disease following  Most recent blood cultures still positive.  Repeat blood cultures obtained this morning.  Stop fluids tonight

## 2025-03-24 NOTE — ASSESSMENT & PLAN NOTE
Lab Results   Component Value Date    EGFR 39 03/24/2025    EGFR 35 03/23/2025    EGFR 32 03/22/2025    CREATININE 1.21 03/24/2025    CREATININE 1.31 (H) 03/23/2025    CREATININE 1.42 (H) 03/22/2025   Estimated Creatinine Clearance: 26.3 mL/min (by C-G formula based on SCr of 1.21 mg/dL).  -renal dose adjust antibiotic as needed  -volume management   -recheck BMP

## 2025-03-24 NOTE — ASSESSMENT & PLAN NOTE
Lab Results   Component Value Date    HGBA1C 6.7 (H) 03/20/2025   -glycemic management per primary     Recent Labs     03/23/25 2059 03/23/25  2248 03/24/25  0803 03/24/25  1151   POCGLU 149* 166* 138 132       Blood Sugar Average: Last 72 hrs:  (P) [id number]

## 2025-03-24 NOTE — ASSESSMENT & PLAN NOTE
Continue home amitriptyline which was resumed today after initially being held and scheduled Tylenol.  Continue low-dose oxycodone

## 2025-03-24 NOTE — ASSESSMENT & PLAN NOTE
Blood cultures from 3/19/2025 both positive for MSSA.    Repeat blood cultures from 3/21/2025 1 out of 2 positive.    Repeat blood cultures being obtained this morning.    Echocardiogram with no evidence of vegetation   Skin source felt to be portal of entry given multiple extremity abrasions and wounds   Continue IV Ancef 2 g every 12 hours  Discussed with infectious disease   The patient is a 36y Male complaining of back pain general.

## 2025-03-24 NOTE — PROGRESS NOTES
Progress Note - Hospitalist   Name: Tanya Stephen 89 y.o. female I MRN: [id number]  Unit/Bed#: ICU 12 I Date of Admission: 3/19/2025   Date of Service: 3/24/2025 I Hospital Day: 5    Assessment & Plan  Severe sepsis (HCC)  Noted to have fever, tachycardia, leukocytosis and required vasopressor support  Secondary to MSSA bacteremia  Subsequently transferred from ICU to medical surgical floor  Infectious disease following  Most recent blood cultures still positive.  Repeat blood cultures obtained this morning.  Stop fluids tonight  MSSA bacteremia  Blood cultures from 3/19/2025 both positive for MSSA.    Repeat blood cultures from 3/21/2025 1 out of 2 positive.    Repeat blood cultures being obtained this morning.    Echocardiogram with no evidence of vegetation   Skin source felt to be portal of entry given multiple extremity abrasions and wounds   Continue IV Ancef 2 g every 12 hours  Discussed with infectious disease  Acute encephalopathy  Noted to have metabolic encephalopathy initially.  Felt secondary to sepsis.  Mental status now improved   Thrombocytopenia (HCC)  Thrombocytopenia felt to be likely related to acute infection/bacteremia   Baseline has platelets in the 1 25-1 50 range.    Presented with platelet count of 120.  Did decrease to 85 yesterday.  Currently 92.   Chronic kidney disease, stage IV (severe) ([facility])  Lab Results   Component Value Date    EGFR 39 03/24/2025    EGFR 35 03/23/2025    EGFR 32 03/22/2025    CREATININE 1.21 03/24/2025    CREATININE 1.31 (H) 03/23/2025    CREATININE 1.42 (H) 03/22/2025   Continue to monitor, avoid nephrotoxic medication  Creatinine is stable baseline is 1.4-1.6  Follows with nephrology as an outpatient  Type 2 diabetes mellitus ([facility])  Lab Results   Component Value Date    HGBA1C 6.7 (H) 03/20/2025       Recent Labs     03/23/25 2059 03/23/25  2248 03/24/25  0803 03/24/25  1151   POCGLU 149* 166* 138 132       Blood Sugar Average: Last 72 hrs:  (P)  [id number]  Currently on sliding scale insulin  Only requiring approximately 2 to 3 units/day  OAB (overactive bladder)  On Myrbetriq - substituted with oxybutynin here  Chronic pain syndrome  Continue home amitriptyline which was resumed today after initially being held and scheduled Tylenol.  Continue low-dose oxycodone  Chronic bilateral pleural effusions  CT CAP: 3/19/25: Moderate right and small left pleural effusions. The right pleural effusion has slightly increased since the prior study. There is associated bilateral lower lobe atelectasis. Shown on multiple previous images  Patient is not hypoxic or having respiratory symptoms.  Recommend outpatient follow-up    VTE Pharmacologic Prophylaxis:   Moderate Risk (Score 3-4) - Pharmacological DVT Prophylaxis Ordered: enoxaparin (Lovenox).    Mobility:   Basic Mobility Inpatient Raw Score: 10  JH-HLM Goal: 4: Move to chair/commode  JH-HLM Achieved: 4: Move to chair/commode  JH-HLM Goal achieved. Continue to encourage appropriate mobility.    Patient Centered Rounds: I performed bedside rounds with nursing staff today.   Discussions with Specialists or Other Care Team Provider: nursing, ID    Education and Discussions with Family / Patient: Updated  () at bedside.    Current Length of Stay: 5 day(s)  Current Patient Status: Inpatient   Certification Statement: The patient will continue to require additional inpatient hospital stay due to awaiting clearance of blood cultures. Will eventually need rehab  Discharge Plan: Anticipate discharge in >72 hrs to discharge location to be determined pending rehab evaluations.    Code Status: Level 1 - Full Code    Subjective   Feeling slightly better today.  Wants to be out of bed.  Got out of bed into the chair with myself, nursing and PCA.  Reports that she is having back pain.  At home she typically takes Tylenol for this which is typically sufficient for the pain.    Objective :  Temp:   [97.5 °F (36.4 °C)-98.4 °F (36.9 °C)] 97.5 °F (36.4 °C)  HR:  [] 91  BP: (103-115)/(56-76) 113/56  Resp:  [17-22] 17  SpO2:  [95 %-100 %] 97 %  O2 Device: None (Room air)  Nasal Cannula O2 Flow Rate (L/min):  [3 L/min] 3 L/min    Body mass index is 27.47 kg/m².     Input and Output Summary (last 24 hours):     Intake/Output Summary (Last 24 hours) at 3/24/2025 1427  Last data filed at 3/24/2025 1023  Gross per 24 hour   Intake 1350 ml   Output 300 ml   Net 1050 ml       Physical Exam  Vitals and nursing note reviewed.   Constitutional:       General: She is not in acute distress.     Appearance: Normal appearance. She is ill-appearing. She is not diaphoretic.   HENT:      Head: Normocephalic and atraumatic.   Cardiovascular:      Rate and Rhythm: Normal rate and regular rhythm.   Pulmonary:      Effort: Pulmonary effort is normal.      Breath sounds: Normal breath sounds. No wheezing or rales.   Abdominal:      General: Bowel sounds are normal.      Palpations: Abdomen is soft. There is no mass.      Tenderness: There is no abdominal tenderness. There is no guarding.   Musculoskeletal:      Right lower leg: No edema.      Left lower leg: No edema.   Skin:     General: Skin is warm and dry.      Findings: Bruising (arms and legs) present.   Neurological:      Mental Status: She is alert and oriented to person, place, and time.      Comments: Follows commands. Answers questions appropriately.   Psychiatric:         Mood and Affect: Mood normal.         Behavior: Behavior normal.           Lines/Drains:              Lab Results: I have reviewed the following results:   Results from last 7 days   Lab Units 03/24/25  0402 03/23/25  0553 03/22/25  0443 03/21/25  0442   WBC Thousand/uL 7.91 7.84   < > 7.16   HEMOGLOBIN g/dL 10.4* 10.5*   < > 10.1*   HEMATOCRIT % 32.9* 32.1*   < > 31.7*   PLATELETS Thousands/uL 92* 85*   < > 100*   BANDS PCT %  --   --   --  2   SEGS PCT %  --  81*   < >  --    LYMPHO PCT %  --  8*    < > 3*   MONO PCT %  --  9   < > 0*   EOS PCT %  --  1   < > 0    < > = values in this interval not displayed.     Results from last 7 days   Lab Units 03/24/25  0402 03/23/25  0439   SODIUM mmol/L 134* 131*   POTASSIUM mmol/L 3.7 4.3   CHLORIDE mmol/L 104 102   CO2 mmol/L 24 21   BUN mg/dL 27* 30*   CREATININE mg/dL 1.21 1.31*   ANION GAP mmol/L 6 8   CALCIUM mg/dL 7.6* 7.6*   ALBUMIN g/dL  --  2.8*   TOTAL BILIRUBIN mg/dL  --  1.06*   ALK PHOS U/L  --  280*   ALT U/L  --  <3*   AST U/L  --  80*   GLUCOSE RANDOM mg/dL 146* 132     Results from last 7 days   Lab Units 03/19/25  1842   INR  1.25*     Results from last 7 days   Lab Units 03/24/25  1151 03/24/25  0803 03/23/25  2248 03/23/25  2059 03/23/25  1516 03/23/25  1138 03/23/25  0730 03/22/25  2050 03/22/25  1638 03/22/25  1118 03/22/25  0740 03/21/25 2027   POC GLUCOSE mg/dl 132 138 166* 149* 181* 152* 137 157* 162* 122 119 165*     Results from last 7 days   Lab Units 03/20/25  0045   HEMOGLOBIN A1C % 6.7*     Results from last 7 days   Lab Units 03/21/25  0442 03/20/25  1501 03/20/25  1107 03/20/25  0745 03/19/25  2215 03/19/25  1842   LACTIC ACID mmol/L 1.1 2.3* 2.2* 2.7*   < > 2.8*   PROCALCITONIN ng/ml  --   --   --  6.56*  --  1.10*    < > = values in this interval not displayed.       Recent Cultures (last 7 days):   Results from last 7 days   Lab Units 03/21/25  0509 03/21/25  0459 03/19/25  1842   BLOOD CULTURE  No Growth at 72 hrs. Staphylococcus aureus* Staphylococcus aureus*  Staphylococcus aureus*   GRAM STAIN RESULT   --  Gram positive cocci in clusters* Gram positive cocci in clusters*  Gram positive cocci in clusters*       Imaging Results Review: I reviewed radiology reports from this admission including: CT chest and CT abdomen/pelvis.  Other Study Results Review: No additional pertinent studies reviewed.    Last 24 Hours Medication List:     Current Facility-Administered Medications:     acetaminophen (TYLENOL) tablet 975 mg, Q8H MEGGAN     amitriptyline (ELAVIL) tablet 10 mg, HS    aspirin chewable tablet 81 mg, Daily    atorvastatin (LIPITOR) tablet 10 mg, Daily    ceFAZolin (ANCEF) IVPB (premix in dextrose) 2,000 mg 50 mL, Q12H, Last Rate: Stopped (03/24/25 0400)    Cholecalciferol (VITAMIN D3) tablet 1,000 Units, Daily    DULoxetine (CYMBALTA) delayed release capsule 60 mg, Daily    enoxaparin (LOVENOX) subcutaneous injection 30 mg, Daily    furosemide (LASIX) tablet 20 mg, Daily    hydroxychloroquine (PLAQUENIL) tablet 200 mg, Daily With Breakfast    insulin lispro (HumALOG/ADMELOG) 100 units/mL subcutaneous injection 1-5 Units, 4x Daily (AC & HS) **AND** Fingerstick Glucose (POCT), 4x Daily AC and at bedtime    lactated ringers infusion, Continuous, Last Rate: 50 mL/hr (03/24/25 0129)    oxybutynin (DITROPAN-XL) 24 hr tablet 10 mg, Daily    oxyCODONE (ROXICODONE) split tablet 2.5 mg, Q6H PRN    pantoprazole (PROTONIX) EC tablet 40 mg, Early Morning    pregabalin (LYRICA) capsule 75 mg, BID    pyridoxine (VITAMIN B6) tablet 100 mg, Daily    trimethobenzamide (TIGAN) IM injection 200 mg, Q6H PRN    Administrative Statements   Today, Patient Was Seen By: Elmira Sheppard PA-C      **Please Note: This note may have been constructed using a voice recognition system.**

## 2025-03-24 NOTE — ASSESSMENT & PLAN NOTE
>>ASSESSMENT AND PLAN FOR TYPE 2 DIABETES MELLITUS (HCC) WRITTEN ON 3/24/2025  2:42 PM BY PENNY FOWLER PA-C    Lab Results   Component Value Date    HGBA1C 6.7 (H) 03/20/2025       Recent Labs     03/23/25 2059 03/23/25 2248 03/24/25  0803 03/24/25  1151   POCGLU 149* 166* 138 132       Blood Sugar Average: Last 72 hrs:  (P) 147.6255111706734577  Currently on sliding scale insulin  Only requiring approximately 2 to 3 units/day

## 2025-03-24 NOTE — ASSESSMENT & PLAN NOTE
Thrombocytopenia felt to be likely related to acute infection/bacteremia   Baseline has platelets in the 1 25-1 50 range.    Presented with platelet count of 120.  Did decrease to 85 yesterday.  Currently 92.

## 2025-03-24 NOTE — ASSESSMENT & PLAN NOTE
Wt Readings from Last 3 Encounters:   03/22/25 61.7 kg (136 lb 0.4 oz)   02/24/25 65 kg (143 lb 4.8 oz)   01/27/25 54 kg (119 lb)   -volume management per primary

## 2025-03-24 NOTE — TELEPHONE ENCOUNTER
PT called Tanya Ontiveros's  to check in as she was hospitalized last week. He said that she is still there and likely will be for the rest of the week. He believes the plan will be discharging her to a rehab center; however, nothing is planned yet. PT advised that all her future appts will be removed for now and when she is ready to return, they are to call. He appreciated this and will be in touch once a plan is made.

## 2025-03-24 NOTE — PROGRESS NOTES
Progress Note - Infectious Disease   Name: Tanya Stephen 89 y.o. female I MRN: [id number]  Unit/Bed#: ICU 12 I Date of Admission: 3/19/2025   Date of Service: 3/24/2025 I Hospital Day: 5    Assessment & Plan  Severe sepsis (HCC)  E/b fever, tachycardia, leukocytosis, hypotension requiring Vasopressor support. In setting of #2. Vasopressor management weaned off 3/20/25 by critical care team  -antibiotics as below  -follow-up cultures and adjust antibiotics as needed  -monitor temperature and hemodynamics  -serial exam  -additional inventions pending clinical course  -monitoring serial CBC and BMP for treatment response and any developing toxicities    MSSA bacteremia  Admission blood cultures 2 of 2 sets growing MSSA. Possible skin source as portal of entry with multiple extremity abrasions/wounds. 3/20/25 TTE no vegetation seen  3/21/25 Blood culture 1 of 2 sets + Staph aureus  -Continue Cefazolin 2 g IV q 12 hours, renal dose adjusted high dose  -follow up 3/21/25 repeat blood cultures  -follow up 3/24/25 repeat blood cultures  -duration of antibiotic dependent on blood culture clearance and pending clinical course   Acute encephalopathy  Patient lethargic is setting of acute illness, but responsive to name and exam. 3/14/2025 CT head: No acute intercranial abnormality.  -antibiotic/work up as above  -monitor mentation  -symptomatic management per critical care team  -aspiration precautions  Chronic kidney disease, stage IV (severe) ([facility])  Lab Results   Component Value Date    EGFR 39 03/24/2025    EGFR 35 03/23/2025    EGFR 32 03/22/2025    CREATININE 1.21 03/24/2025    CREATININE 1.31 (H) 03/23/2025    CREATININE 1.42 (H) 03/22/2025   Estimated Creatinine Clearance: 26.3 mL/min (by C-G formula based on SCr of 1.21 mg/dL).  -renal dose adjust antibiotic as needed  -volume management   -recheck BMP  Type 2 diabetes mellitus ([facility])  Lab Results   Component Value Date    HGBA1C 6.7 (H) 03/20/2025   -glycemic  management per primary     Recent Labs     03/23/25 2059 03/23/25  2248 03/24/25  0803 03/24/25  1151   POCGLU 149* 166* 138 132       Blood Sugar Average: Last 72 hrs:  (P) 147.2412358283952395    Chronic bilateral pleural effusions  -volume management per CC  CHF (congestive heart failure) ([facility])  Wt Readings from Last 3 Encounters:   03/22/25 61.7 kg (136 lb 0.4 oz)   02/24/25 65 kg (143 lb 4.8 oz)   01/27/25 54 kg (119 lb)   -volume management per primary        I have discussed with patient and  at bedside, RN, and primary care team regarding the above plan to continue antibiotic as above and monitor closely. They agree with the plan.    Antibiotics:  Cefazolin D5    Subjective   Patient has no fever, chills, sweats overnight; no nausea, vomiting, diarrhea; no cough, shortness of breath; + chronic back pain and difficult for patient to find a comfortable position in recliner today. + chronic intermittent dysuria/pressure discomfort; no SPT today. No new symptoms.    Objective :  Temp:  [97.5 °F (36.4 °C)-98.4 °F (36.9 °C)] 97.5 °F (36.4 °C)  HR:  [] 91  BP: (103-115)/(56-76) 113/56  Resp:  [17-22] 17  SpO2:  [95 %-100 %] 97 %  O2 Device: None (Room air)  Nasal Cannula O2 Flow Rate (L/min):  [3 L/min] 3 L/min    General Appearance:  89 year old elderly, chronically debilitated female, nontoxic, no acute distress, resting in recliner   HEENT: Atraumatic normocephalic   Throat: Oropharynx moist. Poor dentition   Pulmonary:   Normal respiratory excursion without accessory muscle use, statting 97% on RA   Cardiac:  RRR decreased tones   Abdomen:   Soft, non-tender, non-distended   Extremities: No edema   : No diaz, no focal SPT or flank tenderness on exam   Psychiatric: Awake, cooperative   Skin: No new rashes. IV site nontender. Multiple stages of ecchymoses of skin. Weeping RUE skin propped on absorptive pad         Lab Results: I have reviewed the following results:  Results from last 7 days   Lab  Units 03/24/25  0402 03/23/25  0553 03/22/25  0443   WBC Thousand/uL 7.91 7.84 7.73   HEMOGLOBIN g/dL 10.4* 10.5* 10.7*   PLATELETS Thousands/uL 92* 85* 98*     Results from last 7 days   Lab Units 03/24/25  0402 03/23/25  0439 03/22/25  0443 03/21/25  0442   SODIUM mmol/L 134* 131* 133* 134*   POTASSIUM mmol/L 3.7 4.3 4.0 4.3   CHLORIDE mmol/L 104 102 102 103   CO2 mmol/L 24 21 24 24   BUN mg/dL 27* 30* 32* 32*   CREATININE mg/dL 1.21 1.31* 1.42* 1.43*   EGFR ml/min/1.73sq m 39 35 32 32   CALCIUM mg/dL 7.6* 7.6* 7.8* 7.5*   AST U/L  --  80* 61* 48*   ALT U/L  --  <3* 9 24   ALK PHOS U/L  --  280* 201* 109*   ALBUMIN g/dL  --  2.8* 2.7* 2.5*     Results from last 7 days   Lab Units 03/21/25  0509 03/21/25  0459 03/20/25  1106 03/19/25  1842   BLOOD CULTURE  No Growth at 72 hrs. Staphylococcus aureus*  --  Staphylococcus aureus*  Staphylococcus aureus*   GRAM STAIN RESULT   --  Gram positive cocci in clusters*  --  Gram positive cocci in clusters*  Gram positive cocci in clusters*   MRSA CULTURE ONLY   --   --  No Methicillin Resistant Staphlyococcus aureus (MRSA) isolated  --      Results from last 7 days   Lab Units 03/20/25  0745 03/19/25  1842   PROCALCITONIN ng/ml 6.56* 1.10*                 Imaging Results Review: No pertinent imaging studies reviewed.  Other Study Results Review: My interpretation of other studies include: 3/20/25 TTE no vegetation seen.

## 2025-03-25 ENCOUNTER — APPOINTMENT (OUTPATIENT)
Dept: PHYSICAL THERAPY | Facility: CLINIC | Age: OVER 89
End: 2025-03-25
Payer: MEDICARE

## 2025-03-25 LAB
ALBUMIN SERPL BCG-MCNC: 2.6 G/DL (ref 3.5–5)
ALP SERPL-CCNC: 275 U/L (ref 34–104)
ALT SERPL W P-5'-P-CCNC: <3 U/L (ref 7–52)
ANION GAP SERPL CALCULATED.3IONS-SCNC: 10 MMOL/L (ref 4–13)
ANISOCYTOSIS BLD QL SMEAR: PRESENT
AST SERPL W P-5'-P-CCNC: 44 U/L (ref 13–39)
BACTERIA BLD CULT: ABNORMAL
BASOPHILS # BLD MANUAL: 0 THOUSAND/UL (ref 0–0.1)
BASOPHILS NFR MAR MANUAL: 0 % (ref 0–1)
BILIRUB SERPL-MCNC: 0.92 MG/DL (ref 0.2–1)
BUN SERPL-MCNC: 26 MG/DL (ref 5–25)
CALCIUM ALBUM COR SERPL-MCNC: 8.7 MG/DL (ref 8.3–10.1)
CALCIUM SERPL-MCNC: 7.6 MG/DL (ref 8.4–10.2)
CHLORIDE SERPL-SCNC: 103 MMOL/L (ref 96–108)
CO2 SERPL-SCNC: 21 MMOL/L (ref 21–32)
CREAT SERPL-MCNC: 1.11 MG/DL (ref 0.6–1.3)
EOSINOPHIL # BLD MANUAL: 0.09 THOUSAND/UL (ref 0–0.4)
EOSINOPHIL NFR BLD MANUAL: 1 % (ref 0–6)
ERYTHROCYTE [DISTWIDTH] IN BLOOD BY AUTOMATED COUNT: 17 % (ref 11.6–15.1)
GFR SERPL CREATININE-BSD FRML MDRD: 43 ML/MIN/1.73SQ M
GLUCOSE SERPL-MCNC: 123 MG/DL (ref 65–140)
GLUCOSE SERPL-MCNC: 143 MG/DL (ref 65–140)
GLUCOSE SERPL-MCNC: 178 MG/DL (ref 65–140)
GLUCOSE SERPL-MCNC: 99 MG/DL (ref 65–140)
GRAM STN SPEC: ABNORMAL
HCT VFR BLD AUTO: 31.9 % (ref 34.8–46.1)
HGB BLD-MCNC: 10.2 G/DL (ref 11.5–15.4)
LYMPHOCYTES # BLD AUTO: 1.01 THOUSAND/UL (ref 0.6–4.47)
LYMPHOCYTES # BLD AUTO: 10 % (ref 14–44)
MACROCYTES BLD QL AUTO: PRESENT
MCH RBC QN AUTO: 29.3 PG (ref 26.8–34.3)
MCHC RBC AUTO-ENTMCNC: 32 G/DL (ref 31.4–37.4)
MCV RBC AUTO: 92 FL (ref 82–98)
MONOCYTES # BLD AUTO: 1.01 THOUSAND/UL (ref 0–1.22)
MONOCYTES NFR BLD: 11 % (ref 4–12)
NEUTROPHILS # BLD MANUAL: 7.1 THOUSAND/UL (ref 1.85–7.62)
NEUTS SEG NFR BLD AUTO: 77 % (ref 43–75)
PLATELET # BLD AUTO: 125 THOUSANDS/UL (ref 149–390)
PLATELET BLD QL SMEAR: ABNORMAL
PMV BLD AUTO: 12.5 FL (ref 8.9–12.7)
POIKILOCYTOSIS BLD QL SMEAR: PRESENT
POTASSIUM SERPL-SCNC: 3.7 MMOL/L (ref 3.5–5.3)
PROT SERPL-MCNC: 5.3 G/DL (ref 6.4–8.4)
RBC # BLD AUTO: 3.48 MILLION/UL (ref 3.81–5.12)
RBC MORPH BLD: PRESENT
SODIUM SERPL-SCNC: 134 MMOL/L (ref 135–147)
VARIANT LYMPHS # BLD AUTO: 1 %
WBC # BLD AUTO: 9.22 THOUSAND/UL (ref 4.31–10.16)

## 2025-03-25 PROCEDURE — 97535 SELF CARE MNGMENT TRAINING: CPT

## 2025-03-25 PROCEDURE — 85027 COMPLETE CBC AUTOMATED: CPT | Performed by: PHYSICIAN ASSISTANT

## 2025-03-25 PROCEDURE — 99232 SBSQ HOSP IP/OBS MODERATE 35: CPT | Performed by: INTERNAL MEDICINE

## 2025-03-25 PROCEDURE — 93005 ELECTROCARDIOGRAM TRACING: CPT

## 2025-03-25 PROCEDURE — 80053 COMPREHEN METABOLIC PANEL: CPT | Performed by: PHYSICIAN ASSISTANT

## 2025-03-25 PROCEDURE — 85007 BL SMEAR W/DIFF WBC COUNT: CPT | Performed by: PHYSICIAN ASSISTANT

## 2025-03-25 PROCEDURE — 99232 SBSQ HOSP IP/OBS MODERATE 35: CPT | Performed by: PHYSICIAN ASSISTANT

## 2025-03-25 PROCEDURE — 97530 THERAPEUTIC ACTIVITIES: CPT

## 2025-03-25 PROCEDURE — 82948 REAGENT STRIP/BLOOD GLUCOSE: CPT

## 2025-03-25 PROCEDURE — 97163 PT EVAL HIGH COMPLEX 45 MIN: CPT

## 2025-03-25 PROCEDURE — G0545 PR INHERENT VISIT TO INPT: HCPCS | Performed by: INTERNAL MEDICINE

## 2025-03-25 RX ADMIN — CEFAZOLIN SODIUM 2000 MG: 2 SOLUTION INTRAVENOUS at 14:43

## 2025-03-25 RX ADMIN — ACETAMINOPHEN 975 MG: 325 TABLET, FILM COATED ORAL at 11:49

## 2025-03-25 RX ADMIN — PREGABALIN 75 MG: 75 CAPSULE ORAL at 08:00

## 2025-03-25 RX ADMIN — PANTOPRAZOLE SODIUM 40 MG: 40 TABLET, DELAYED RELEASE ORAL at 05:21

## 2025-03-25 RX ADMIN — AMITRIPTYLINE HYDROCHLORIDE 10 MG: 10 TABLET, FILM COATED ORAL at 21:20

## 2025-03-25 RX ADMIN — ATORVASTATIN CALCIUM 10 MG: 10 TABLET, FILM COATED ORAL at 08:00

## 2025-03-25 RX ADMIN — FUROSEMIDE 20 MG: 20 TABLET ORAL at 08:00

## 2025-03-25 RX ADMIN — OXYBUTYNIN CHLORIDE 10 MG: 5 TABLET, EXTENDED RELEASE ORAL at 08:01

## 2025-03-25 RX ADMIN — CEFAZOLIN SODIUM 2000 MG: 2 SOLUTION INTRAVENOUS at 04:30

## 2025-03-25 RX ADMIN — INSULIN LISPRO 1 UNITS: 100 INJECTION, SOLUTION INTRAVENOUS; SUBCUTANEOUS at 17:09

## 2025-03-25 RX ADMIN — Medication 1000 UNITS: at 08:00

## 2025-03-25 RX ADMIN — PREGABALIN 75 MG: 75 CAPSULE ORAL at 17:13

## 2025-03-25 RX ADMIN — ENOXAPARIN SODIUM 30 MG: 30 INJECTION SUBCUTANEOUS at 08:01

## 2025-03-25 RX ADMIN — DULOXETINE 60 MG: 60 CAPSULE, DELAYED RELEASE ORAL at 08:00

## 2025-03-25 RX ADMIN — ACETAMINOPHEN 975 MG: 325 TABLET, FILM COATED ORAL at 21:20

## 2025-03-25 RX ADMIN — HYDROXYCHLOROQUINE SULFATE 200 MG: 200 TABLET ORAL at 08:00

## 2025-03-25 RX ADMIN — ASPIRIN 81 MG CHEWABLE TABLET 81 MG: 81 TABLET CHEWABLE at 08:00

## 2025-03-25 RX ADMIN — PYRIDOXINE HCL TAB 50 MG 100 MG: 50 TAB at 08:01

## 2025-03-25 NOTE — ASSESSMENT & PLAN NOTE
Thrombocytopenia felt to be likely related to acute infection/bacteremia   Baseline has platelets in the 125-150 range.    Presented with platelet count of 120.  Did decrease to 85 and now increased to 125.

## 2025-03-25 NOTE — ASSESSMENT & PLAN NOTE
Lab Results   Component Value Date    EGFR 43 03/25/2025    EGFR 39 03/24/2025    EGFR 35 03/23/2025    CREATININE 1.11 03/25/2025    CREATININE 1.21 03/24/2025    CREATININE 1.31 (H) 03/23/2025   Estimated Creatinine Clearance: 28.7 mL/min (by C-G formula based on SCr of 1.11 mg/dL).  -renal dose adjust antibiotic as needed  -volume management   -recheck BMP

## 2025-03-25 NOTE — ASSESSMENT & PLAN NOTE
E/b fever, tachycardia, leukocytosis, hypotension requiring Vasopressor support. In setting of #2. Vasopressor management weaned off 3/20/25 by critical care team  -antibiotics as below  -follow-up cultures  -monitor temperature and hemodynamics  -serial exam  -additional inventions pending clinical course  -monitoring serial CBC and BMP for treatment response and any developing toxicities

## 2025-03-25 NOTE — OCCUPATIONAL THERAPY NOTE
Occupational Therapy Progress Note     Patient Name: Tanya Stephen  Today's Date: 3/25/2025  Problem List  Principal Problem:    Severe sepsis (HCC)  Active Problems:    Hypomagnesemia    Type 2 diabetes mellitus (HCC)    Primary generalized (osteo)arthritis    OAB (overactive bladder)    Acute encephalopathy    Chronic pain syndrome    Gastroesophageal reflux disease without esophagitis    Chronic bilateral pleural effusions    Chronic kidney disease, stage IV (severe) (HCC)    Thrombocytopenia (HCC)    CHF (congestive heart failure) (HCC)    MSSA bacteremia    Metabolic encephalopathy          03/25/25 1112   OT Last Visit   OT Visit Date 03/25/25   Note Type   Note Type Treatment   Pain Assessment   Pain Assessment Tool FLACC   Pain Rating: FLACC (Rest) - Face 1   Pain Rating: FLACC (Rest) - Legs 0   Pain Rating: FLACC (Rest) - Activity 1   Pain Rating: FLACC (Rest) - Cry 1   Pain Rating: FLACC (Rest) - Consolability 1   Score: FLACC (Rest) 4   Pain Rating: FLACC (Activity) - Face 1   Pain Rating: FLACC (Activity) - Legs 0   Pain Rating: FLACC (Activity) - Activity 1   Pain Rating: FLACC (Activity) - Cry 1   Pain Rating: FLACC (Activity) - Consolability 1   Score: FLACC (Activity) 4   Restrictions/Precautions   Weight Bearing Precautions Per Order No   Other Precautions Cognitive;Chair Alarm;Bed Alarm;Fall Risk;Pain;Telemetry;Multiple lines   Lifestyle   Autonomy Pt lives w/ spouse in a 1 level house and is (I) w/ ADLs PTA, rollator, (+) falls, (-)    Reciprocal Relationships Supportive spouse, local daughter   Service to Others Retired   Intrinsic Gratification Pt enjoys spending time w/ family   ADL   Grooming Assistance 3  Moderate Assistance   Grooming Deficit Setup;Verbal cueing;Increased time to complete;Brushing hair   Grooming Comments Pt sitting in chair set up w/ brush requiring VCs for initation and attention to task, pt able to brush front parts of hair   LB Dressing Assistance 2  Maximal  "Assistance   LB Dressing Deficit Setup;Requires assistive device for steadying;Verbal cueing;Supervision/safety;Increased time to complete;Thread RLE into underwear;Thread LLE into underwear;Pull up over hips   LB Dressing Comments Pt sitting in chair requiring A to thread BLEs into underwear, A to stand w/ RW and pull over hips   Functional Standing Tolerance   Time ~20 seconds   Activity Pt in standing to pull underwear over hips   Comments w/ RW and max Ax2   Bed Mobility   Additional Comments Not assessed pt recieved and ended session in chair w/ alarm on and all needs met.   Transfers   Sit to Stand 2  Maximal assistance   Additional items Assist x 2;Increased time required;Verbal cues  (STSx3, x2 from chair to RW, x1 from chair w/ HHA. Pt requiring max Ax2 due to increased weakness and lethargy. B knee block required.)   Stand to Sit 2  Maximal assistance   Additional items Assist x 2;Increased time required;Verbal cues  (Ax2 for controlled descent, VCs for body positioning)   Additional Comments Pt w/ limited standing tolerance of ~20 seconds due to increased weakness/lethargy w/ max Ax2 to maintain standing   Functional Mobility   Additional Comments Not able to assess at this time due to increased weakness, continue to assess as appropriate   Subjective   Subjective \"He's alright\" re her  at bedside   Cognition   Overall Cognitive Status Impaired   Arousal/Participation Alert;Cooperative   Attention Attends with cues to redirect   Orientation Level Oriented to person;Oriented to place;Oriented to time  (Grossly oriented to place able to report we are in a hospital; grossly oriented to month w/ prompting)   Memory Decreased short term memory;Decreased recall of recent events;Decreased recall of precautions   Following Commands Follows one step commands with increased time or repetition   Comments Pt pleasantly confused, agreeable to therapy despite pain, highly lethargic requiring tactle and verbal " cues to keep eyes open. Requiring increased repetition of commands due to deficits in short term memory.   Activity Tolerance   Activity Tolerance Patient limited by fatigue;Patient limited by pain;Treatment limited secondary to medical complications (Comment)  (Limited by cognition)   Medical Staff Made Aware Care coordinated w/ PT Jerri due to medical complexity of pt and requirement of two person transfer, BENJAMÍN Celaya   Assessment   Assessment Pt seen for skilled acute care OT treatment session this date. Session was focused on LB dressing, grooming in sitting and functional STS transfers. Pt was received in chair and ended session in chair w/ alarm on and all needs met. Compared to eval, pt has not demonstrated any significant progression towards goals this session. Pt required increased A to complete grooming tasks in sitting, increased A to perform functional transfers and increased A to maintain standing balance. Pt also demonstrated overall decreased activity tolerance due to weakness, lethargy and increased confusion. Pt continues to perform below baseline due to above deficits and would continue to benefit for skilled acute care OT 3-5x/week. Continue to recommend level II resources upon d/c at this time.   Plan   Treatment Interventions ADL retraining;Functional transfer training;UE strengthening/ROM;Cognitive reorientation;Endurance training;Patient/family training;Equipment evaluation/education;Compensatory technique education;Continued evaluation;Energy conservation;Activityengagement   Goal Expiration Date 03/31/25   OT Treatment Day 1   OT Frequency 3-5x/wk   Discharge Recommendation   Rehab Resource Intensity Level, OT II (Moderate Resource Intensity)   Equipment Recommended Bedside commode   Commode Type Standard   AM-PAC Daily Activity Inpatient   Lower Body Dressing 2   Bathing 2   Toileting 2   Upper Body Dressing 2   Grooming 2   Eating 2   Daily Activity Raw Score 12   Daily Activity Standardized  Score (Calc for Raw Score >=11) 30.6   AM-PAC Applied Cognition Inpatient   Following a Speech/Presentation 1   Understanding Ordinary Conversation 2   Taking Medications 1   Remembering Where Things Are Placed or Put Away 2   Remembering List of 4-5 Errands 1   Taking Care of Complicated Tasks 1   Applied Cognition Raw Score 8   Applied Cognition Standardized Score 19.32   End of Consult   Education Provided Yes;Family or social support of family present for education by provider   Patient Position at End of Consult Bedside chair;Bed/Chair alarm activated;All needs within reach   Nurse Communication Nurse aware of consult  (BENJAMÍN Celaya)     The patient's raw score on the AM-PAC Daily Activity Inpatient Short Form is 12. A raw score of less than 19 suggests the patient may benefit from discharge to post-acute rehabilitation services. Please refer to the recommendation of the Occupational Therapist for safe discharge planning.     GOALS:     *Goals established to promote patient goal of to get better:       *Patient will perform grooming tasks standing at sink with (S) in order to increase overall independence (NOT PROGRESSING)     *UB ADL with (I) for inc'd independence with self care     *LB ADL with Min (A)for inc'd independence with self care and decreased caregiver burden (NOT PROGRESSING)     *Toileting with Min (A) for clothing management and hygiene to increase hygiene/thoroughness in order to reduce caregiver burden     *Patient will verbalize and demonstrate use of energy conservation/deep breathing techniques and work simplification skills during functional activities with no verbal cues.      *ADL transfers with (S) for inc'd independence with ADLs/purposeful tasks (NOT PROGRESSING)     *Bed mobility- Min (A) for inc'd independence to manage own comfort and initiate EOB & OOB purposeful tasks     *Patient will increase functional mobility to and from bathroom with rolling walker with min assist to increase  independence with toileting     *Patient will increase OOB/sitting tolerance to 2-4 hours per day to increase participation in self-care and leisure tasks with no s/s of exertion. (PROGRESSING- pt received and ended session sitting in chair)     *Increase stand tolerance x 3-5  m for inc'd tolerance with standing purposeful tasks including grooming/purposeful tasks (NOT PROGRESSING)     *Patient will improve functional activity tolerance to 20 minutes of sustained functional tasks to increase participation in basic self-care and decrease assistance level.  (NOT PROGRESSING)     *Patient will increase static/dynamic standing balance to fair/fair- to maximize safety/performance of higher level ADLs/grooming tasks at sink (NOT PROGRESSING)     *Patient will engage in ongoing cognitive assessment to assist with safe discharge planning/recommendations.      *Caregiver will demonstrate good body mechanics and safe technique when assisting pt during functional ADLs/transfers to maximize safety upon DC.    Geetha Ndiaye, OTS

## 2025-03-25 NOTE — ASSESSMENT & PLAN NOTE
Lab Results   Component Value Date    EGFR 43 03/25/2025    EGFR 39 03/24/2025    EGFR 35 03/23/2025    CREATININE 1.11 03/25/2025    CREATININE 1.21 03/24/2025    CREATININE 1.31 (H) 03/23/2025   Continue to monitor, avoid nephrotoxic medication  Creatinine is stable baseline is 1.4-1.6  Follows with nephrology as an outpatient

## 2025-03-25 NOTE — PHYSICAL THERAPY NOTE
PHYSICAL THERAPY EVALUATION NOTE          Patient Name: Tanya Stephen  Today's Date: 3/25/2025          AGE:   89 y.o.  Mrn:   [id number]  ADMIT DX:  Chronic kidney disease, stage IV (severe) (HCC) [N18.4]  Hypomagnesemia [E83.42]  Chronic pain syndrome [G89.4]  Weakness [R53.1]  Hypotension [I95.9]  Gastroesophageal reflux disease without esophagitis [K21.9]  Type 2 diabetes mellitus (HCC) [E11.9]  Primary generalized (osteo)arthritis [M15.0]  OAB (overactive bladder) [N32.81]  Acute encephalopathy [G93.40]  Severe sepsis (HCC) [A41.9, R65.20]  Chronic bilateral pleural effusions [J90]    Past Medical History:  Past Medical History:   Diagnosis Date    Anemia     Arthritis     Back pain     CHF (congestive heart failure) (HCC)     Chronic kidney disease     Stage 3b    Confusion 2023    Diabetes (HCC)     Diabetes mellitus (HCC)     Diabetic gastroparesis associated with type 2 diabetes mellitus  (HCC)     Diabetic polyneuropathy (HCC)     Diverticulosis     Herpes zoster     Hypertension     IBS (irritable bowel syndrome)     Neurogenic claudication due to lumbar spinal stenosis     Osteoarthritis     Osteoporosis     Pulmonary edema     Raynaud's phenomenon without gangrene     Seronegative arthropathy of multiple sites (HCC)     Sicca (HCC)        Past Surgical History:  Past Surgical History:   Procedure Laterality Date    BACK SURGERY      COLONOSCOPY      EGD AND COLONOSCOPY N/A 2016    Procedure: EGD AND COLONOSCOPY;  Surgeon: Elina Anderson MD;  Location: [facility];  Service:     JOINT REPLACEMENT      bilat knees and hips    OTHER SURGICAL HISTORY      pelvis rods    REPLACEMENT TOTAL KNEE BILATERAL      STOMACH SURGERY      UPPER GASTROINTESTINAL ENDOSCOPY       Length Of Stay: 6        PHYSICAL THERAPY EVALUATION:    Patient's identity confirmed via 2 patient identifiers (full name and ) at start of session       25 [id number]   PT  Last Visit   PT Visit Date 03/25/25   Note Type   Note type Evaluation   Pain Assessment   Pain Assessment Tool FLACC   Pain Location/Orientation Location: Back   Pain Onset/Description Onset: Ongoing   Effect of Pain on Daily Activities limits activity tolerance   Patient's Stated Pain Goal No pain   Hospital Pain Intervention(s) Repositioned;Ambulation/increased activity;Emotional support   Pain Rating: FLACC (Rest) - Face 1   Pain Rating: FLACC (Rest) - Legs 0   Pain Rating: FLACC (Rest) - Activity 0   Pain Rating: FLACC (Rest) - Cry 1   Pain Rating: FLACC (Rest) - Consolability 1   Score: FLACC (Rest) 3   Pain Rating: FLACC (Activity) - Face 1   Pain Rating: FLACC (Activity) - Legs 1   Pain Rating: FLACC (Activity) - Activity 1   Pain Rating: FLACC (Activity) - Cry 1   Pain Rating: FLACC (Activity) - Consolability 1   Score: FLACC (Activity) 5   Restrictions/Precautions   Weight Bearing Precautions Per Order No   Other Precautions Cognitive;Chair Alarm;Bed Alarm;Fall Risk;Pain   Home Living   Type of Home House   Home Layout One level;Performs ADLs on one level;Able to live on main level with bedroom/bathroom;Stairs to enter with rails  (3 DAVID in back entrance, 4 DAVID in front)   Bathroom Shower/Tub Walk-in shower   Bathroom Toilet Standard   Bathroom Equipment Grab bars in shower;Shower chair   Home Equipment Walker;Cane  (rollator walker)   Prior Function   Level of [place] Independent with functional mobility;Independent with ADLs;Needs assistance with IADLS   Lives With Spouse   Receives Help From Family   IADLs Family/Friend/Other provides transportation;Family/Friend/Other provides meals;Family/Friend/Other provides medication management   Falls in the last 6 months 1 to 4   Comments At baseline pt amb mod I w/ rollator walker, was going to OPPT   General   Family/Caregiver Present Yes  (pt's  and family friend)   Cognition   Overall Cognitive Status Impaired   Arousal/Participation Cooperative    Attention Attends with cues to redirect   Orientation Level Oriented to person;Oriented to place   Memory Decreased short term memory;Decreased recall of recent events;Decreased recall of precautions   Following Commands Follows one step commands with increased time or repetition   Comments Pt ID via name and ; pt lethargic, benefits from encouragement   Strength RLE   RLE Overall Strength   (assessed w/ functional mobility, grossly 3/5)   Strength LLE   LLE Overall Strength   (assessed w/ functional mobility, grossly 3/5)   Bed Mobility   Additional Comments pt OOB in recliner chair   Transfers   Sit to Stand 2  Maximal assistance   Additional items Assist x 2;Armrests;Increased time required;Verbal cues   Stand to Sit 2  Maximal assistance   Additional items Assist x 2;Armrests;Increased time required;Verbal cues   Additional Comments 3 sit<>stand transfers, 2 w/ RW and 1 w/ bilateral hand-held assist. Pt able to tolerate upright standing position for ~15-20 sec w/ max-mod Ax2, benefits from bilateral knee block   Ambulation/Elevation   Gait pattern Not appropriate;Not tested   Ambulation/Elevation Additional Comments ambulation deferred due to limited standing tolerance, fatigue   Balance   Static Sitting Fair -   Dynamic Sitting Poor +   Static Standing Zero  (Ax2)   Endurance Deficit   Endurance Deficit Yes   Endurance Deficit Description limited overall activity tolerance   Activity Tolerance   Activity Tolerance Patient limited by fatigue;Patient limited by pain   Medical Staff Made Aware Pt benefited from PT/OT care coordination w/ OT Taz and OTS Geetha due to signficant level of assistance required w/ mobility and allow for challenge of pt's activity tolerance, PT and OT goals were addressed individually during session   Nurse Made Aware BENJAMÍN Celaya   Assessment   Prognosis Good   Problem List Decreased strength;Decreased endurance;Impaired balance;Decreased mobility;Decreased cognition;Decreased  safety awareness;Pain   Assessment Tanya Stephen is a 89 y.o. Female who [facility] due to generalized weakness and diagnosis of severe sepsis. Orders for PT eval and treat received. Comorbidities affecting pt's functional mobility at time of evaluation include: DM, chronic pain syndrome, CHF, osteoporosis, MCI, CKD, metabolic encephalopathy. Personal factors affecting DC include: stairs to enter home, inability to ambulate household distances, inability to navigate level surfaces w/o external assistance, decreased cognition, and positive fall history. At baseline, pt mobilizes w/ rollator walker, and w/ 1-4 fall(s) in the previous 6 months. Upon evaluation, pt presents w/ the following deficits: impaired strength, impaired balance, impaired cognition, decreased endurance/activity tolerance, and pain affecting mobility. Pt currently requires  max Ax2 for transfers. Pt's clinical presentation is unstable/unpredictable due to abnormal lab values, need for increased assistance w/ functional mobility compared to baseline, pain affecting mobility tolerance, need for input for mobility technique, need for input for task focus, recent drastic decline in mobility status, recent h/o falls, ongoing medical management. From a PT/mobility standpoint given the above findings, DC recommendation is level: II (Moderate Rehab Resource Intensity). During current admission, pt will benefit from continued skilled inpatient PT in the acute care setting in order to address the above deficits and to maximize function and mobility prior to DC from acute care.   Goals   [facility] Expiration Date 04/04/25   Short Term Goal #1 Pt will: perform bed mobility w/ min Ax1 to decrease pt's burden of care and increase pt's independence w/ repositioning in bed; perform transfers w/ mod Ax1 to promote OOB mobility; ambulate 25' w/ LRAD and mod Ax1 to increase pt's ambulatory endurance/tolerance; increase all balance ratings by at least  "1 grade to decrease pt's risk of falls   PT Treatment Day 0   Plan   Treatment/Interventions Functional transfer training;LE strengthening/ROM;Therapeutic exercise;Endurance training;Cognitive reorientation;Patient/family training;Equipment eval/education;Bed mobility;Gait training;Compensatory technique education   PT Frequency 3-5x/wk   Discharge Recommendation   Rehab Resource Intensity Level, PT II (Moderate Resource Intensity)   Equipment Recommended Walker   Walker Package Recommended Wheeled walker   Change/add to Walker Package? Yes, Change Size   Walker Size Arturo (Ht <5'1\")   AM-PAC Basic Mobility Inpatient   Turning in Flat Bed Without Bedrails 2   Lying on Back to Sitting on Edge of Flat Bed Without Bedrails 2   Moving Bed to Chair 1   Standing Up From Chair Using Arms 1   Walk in Room 1   Climb 3-5 Stairs With Railing 1   Basic Mobility Inpatient Raw Score 8   [facility] Level Of Mobility   [facility] Goal 3: Sit at edge of bed   [facility] Achieved 4: Move to chair/commode   End of Consult   Patient Position at End of Consult Bedside chair;Bed/Chair alarm activated;All needs within reach       The patient's AM-PAC Basic Mobility Inpatient Short Form Raw Score is 8. A Raw score of less than or equal to 16 suggests the patient may benefit from discharge to post-acute rehabilitation services. Please also refer to the recommendation of the Physical Therapist for safe discharge planning.    Pt will benefit from skilled inpatient PT during this admission in order to facilitate progress towards goals and to maximize functional independence prior to DC      DC rec: level II (Moderate Rehab Resource Intensity)        Jerri Martinez, PT, DPT  03/25/25          "

## 2025-03-25 NOTE — ASSESSMENT & PLAN NOTE
Lab Results   Component Value Date    HGBA1C 6.7 (H) 03/20/2025       Recent Labs     03/24/25  1151 03/24/25  1601 03/24/25  2113 03/25/25  1145   POCGLU 132 160* 125 123       Blood Sugar Average: Last 72 hrs:  (P) 144.5  Currently on sliding scale insulin  Only requiring approximately 2 to 3 units/day

## 2025-03-25 NOTE — ASSESSMENT & PLAN NOTE
Noted to have fever, tachycardia, leukocytosis and required vasopressor support  Secondary to MSSA bacteremia  Subsequently transferred from ICU to medical surgical floor  Infectious disease following  Blood cultures from 3/21 and 3/19 positive. Repeat blood cultures from 3/24 negative thus far.  Stop fluids tonight

## 2025-03-25 NOTE — ASSESSMENT & PLAN NOTE
Lab Results   Component Value Date    HGBA1C 6.7 (H) 03/20/2025   -glycemic management per primary     Recent Labs     03/24/25  0803 03/24/25  1151 03/24/25  1601 03/24/25  2113   POCGLU 138 132 160* 125       Blood Sugar Average: Last 72 hrs:  (P) [id number]

## 2025-03-25 NOTE — PROGRESS NOTES
Progress Note - Hospitalist   Name: Tanya Stephen 89 y.o. female I MRN: [id number]  Unit/Bed#: ICU 12 I Date of Admission: 3/19/2025   Date of Service: 3/25/2025 I Hospital Day: 6    Assessment & Plan  Severe sepsis (HCC)  Noted to have fever, tachycardia, leukocytosis and required vasopressor support  Secondary to MSSA bacteremia  Subsequently transferred from ICU to medical surgical floor  Infectious disease following  Blood cultures from 3/21 and 3/19 positive. Repeat blood cultures from 3/24 negative thus far.  Stop fluids tonight  MSSA bacteremia  Blood cultures from 3/19/2025 both positive for MSSA.    Repeat blood cultures from 3/21/2025 1 out of 2 positive.    Repeat blood cultures 3/24 negative thus far  Echocardiogram with no evidence of vegetation   Skin source felt to be portal of entry given multiple extremity abrasions and wounds   Continue IV Ancef 2 g every 12 hours  Discussed with infectious disease  Acute encephalopathy  Noted to have metabolic encephalopathy initially.  Felt secondary to sepsis.  Mental status improved but this AM was confused/delirious again but then improved  Thrombocytopenia (HCC)  Thrombocytopenia felt to be likely related to acute infection/bacteremia   Baseline has platelets in the 125-150 range.    Presented with platelet count of 120.  Did decrease to 85 and now increased to 125.  Chronic kidney disease, stage IV (severe) ([facility])  Lab Results   Component Value Date    EGFR 43 03/25/2025    EGFR 39 03/24/2025    EGFR 35 03/23/2025    CREATININE 1.11 03/25/2025    CREATININE 1.21 03/24/2025    CREATININE 1.31 (H) 03/23/2025   Continue to monitor, avoid nephrotoxic medication  Creatinine is stable baseline is 1.4-1.6  Follows with nephrology as an outpatient  Type 2 diabetes mellitus ([facility])  Lab Results   Component Value Date    HGBA1C 6.7 (H) 03/20/2025       Recent Labs     03/24/25  1151 03/24/25  1601 03/24/25  2113 03/25/25  1145   POCGLU 132 160* 125 123       Blood  Sugar Average: Last 72 hrs:  (P) 144.5  Currently on sliding scale insulin  Only requiring approximately 2 to 3 units/day  OAB (overactive bladder)  On Myrbetriq - substituted with oxybutynin here  Chronic pain syndrome  Continue home amitriptyline which was resumed today after initially being held and scheduled Tylenol.  Continue low-dose oxycodone  Chronic bilateral pleural effusions  CT CAP: 3/19/25: Moderate right and small left pleural effusions. The right pleural effusion has slightly increased since the prior study. There is associated bilateral lower lobe atelectasis. Shown on multiple previous images  Patient is not hypoxic or having respiratory symptoms.  Recommend outpatient follow-up    VTE Pharmacologic Prophylaxis: VTE Score: 6 High Risk (Score >/= 5) - Pharmacological DVT Prophylaxis Ordered: enoxaparin (Lovenox). Sequential Compression Devices Ordered.    Mobility:   Basic Mobility Inpatient Raw Score: 12  JH-HLM Goal: 4: Move to chair/commode  JH-HLM Achieved: 4: Move to chair/commode  JH-HLM Goal achieved. Continue to encourage appropriate mobility.    Patient Centered Rounds: I performed bedside rounds with nursing staff today.   Discussions with Specialists or Other Care Team Provider: nursing, ID    Education and Discussions with Family / Patient: Attempted to update  () via phone. Left voicemail.  1400    Current Length of Stay: 6 day(s)  Current Patient Status: Inpatient   Certification Statement: The patient will continue to require additional inpatient hospital stay due to iv abx, follow up blood cultures  Discharge Plan: Anticipate discharge in >72 hrs to rehab facility.    Code Status: Level 1 - Full Code    Subjective   No overnight events.  This morning was drowsy and sleeping and delirious however then improved upon further reassessment.    Objective :  Temp:  [97.7 °F (36.5 °C)-97.8 °F (36.6 °C)] 97.7 °F (36.5 °C)  HR:  [] 100  BP: (112-121)/(57-62)  121/62  Resp:  [16] 16  SpO2:  [95 %] 95 %  O2 Device: None (Room air)    Body mass index is 27.47 kg/m².     Input and Output Summary (last 24 hours):     Intake/Output Summary (Last 24 hours) at 3/25/2025 1403  Last data filed at 3/25/2025 1001  Gross per 24 hour   Intake 280 ml   Output 550 ml   Net -270 ml       Physical Exam  Vitals and nursing note reviewed.   Constitutional:       General: She is not in acute distress.     Appearance: Normal appearance. She is ill-appearing. She is not diaphoretic.   HENT:      Head: Normocephalic and atraumatic.      Mouth/Throat:      Mouth: Mucous membranes are dry.   Cardiovascular:      Rate and Rhythm: Normal rate and regular rhythm.   Pulmonary:      Effort: Pulmonary effort is normal.      Breath sounds: Normal breath sounds. No wheezing or rales.   Abdominal:      General: Bowel sounds are normal.      Palpations: Abdomen is soft.      Tenderness: There is no abdominal tenderness. There is no guarding.   Musculoskeletal:      Right lower leg: No edema.      Left lower leg: No edema.   Skin:     General: Skin is warm and dry.      Findings: Bruising present.      Comments: Multiple skin abrasions   Neurological:      Mental Status: She is alert.      Comments: Initially confused however upon reassessment patient is oriented.  Able to answer questions appropriately.  Follows commands.  Oriented to person place and time.           Lines/Drains:              Lab Results: I have reviewed the following results:   Results from last 7 days   Lab Units 03/25/25  0554 03/24/25  0402 03/23/25  0553 03/22/25  0443 03/21/25  0442   WBC Thousand/uL 9.22   < > 7.84   < > 7.16   HEMOGLOBIN g/dL 10.2*   < > 10.5*   < > 10.1*   HEMATOCRIT % 31.9*   < > 32.1*   < > 31.7*   PLATELETS Thousands/uL 125*   < > 85*   < > 100*   BANDS PCT %  --   --   --   --  2   SEGS PCT %  --   --  81*   < >  --    LYMPHO PCT % 10*  --  8*   < > 3*   MONO PCT % 11  --  9   < > 0*   EOS PCT % 1  --  1   <  > 0    < > = values in this interval not displayed.     Results from last 7 days   Lab Units 03/25/25  0554   SODIUM mmol/L 134*   POTASSIUM mmol/L 3.7   CHLORIDE mmol/L 103   CO2 mmol/L 21   BUN mg/dL 26*   CREATININE mg/dL 1.11   ANION GAP mmol/L 10   CALCIUM mg/dL 7.6*   ALBUMIN g/dL 2.6*   TOTAL BILIRUBIN mg/dL 0.92   ALK PHOS U/L 275*   ALT U/L <3*   AST U/L 44*   GLUCOSE RANDOM mg/dL 99     Results from last 7 days   Lab Units 03/19/25  1842   INR  1.25*     Results from last 7 days   Lab Units 03/25/25  1145 03/24/25  2113 03/24/25  1601 03/24/25  1151 03/24/25  0803 03/23/25  2248 03/23/25  2059 03/23/25  1516 03/23/25  1138 03/23/25  0730 03/22/25  2050 03/22/25  1638   POC GLUCOSE mg/dl 123 125 160* 132 138 166* 149* 181* 152* 137 157* 162*     Results from last 7 days   Lab Units 03/20/25  0045   HEMOGLOBIN A1C % 6.7*     Results from last 7 days   Lab Units 03/21/25  0442 03/20/25  1501 03/20/25  1107 03/20/25  0745 03/19/25  2215 03/19/25  1842   LACTIC ACID mmol/L 1.1 2.3* 2.2* 2.7*   < > 2.8*   PROCALCITONIN ng/ml  --   --   --  6.56*  --  1.10*    < > = values in this interval not displayed.       Recent Cultures (last 7 days):   Results from last 7 days   Lab Units 03/24/25  1151 03/24/25  1037 03/21/25  0509 03/21/25  0459 03/19/25  1842   BLOOD CULTURE  Received in Microbiology Lab. Culture in Progress. No Growth at 24 hrs. No Growth After 4 Days. Staphylococcus aureus* Staphylococcus aureus*  Staphylococcus aureus*   GRAM STAIN RESULT   --   --   --  Gram positive cocci in clusters* Gram positive cocci in clusters*  Gram positive cocci in clusters*       Imaging Results Review: No pertinent imaging studies reviewed.  Other Study Results Review: No additional pertinent studies reviewed.    Last 24 Hours Medication List:     Current Facility-Administered Medications:     acetaminophen (TYLENOL) tablet 975 mg, Q8H MEGGAN    amitriptyline (ELAVIL) tablet 10 mg, HS    aspirin chewable tablet 81 mg,  Daily    atorvastatin (LIPITOR) tablet 10 mg, Daily    ceFAZolin (ANCEF) IVPB (premix in dextrose) 2,000 mg 50 mL, Q12H, Last Rate: Stopped (03/25/25 1001)    Cholecalciferol (VITAMIN D3) tablet 1,000 Units, Daily    DULoxetine (CYMBALTA) delayed release capsule 60 mg, Daily    enoxaparin (LOVENOX) subcutaneous injection 30 mg, Daily    furosemide (LASIX) tablet 20 mg, Daily    hydroxychloroquine (PLAQUENIL) tablet 200 mg, Daily With Breakfast    insulin lispro (HumALOG/ADMELOG) 100 units/mL subcutaneous injection 1-5 Units, 4x Daily (AC & HS) **AND** Fingerstick Glucose (POCT), 4x Daily AC and at bedtime    oxybutynin (DITROPAN-XL) 24 hr tablet 10 mg, Daily    oxyCODONE (ROXICODONE) split tablet 2.5 mg, Q6H PRN    pantoprazole (PROTONIX) EC tablet 40 mg, Early Morning    pregabalin (LYRICA) capsule 75 mg, BID    pyridoxine (VITAMIN B6) tablet 100 mg, Daily    trimethobenzamide (TIGAN) IM injection 200 mg, Q6H PRN    Administrative Statements   Today, Patient Was Seen By: Elmira Sheppard PA-C      **Please Note: This note may have been constructed using a voice recognition system.**

## 2025-03-25 NOTE — PLAN OF CARE
Problem: OCCUPATIONAL THERAPY ADULT  Goal: Performs self-care activities at highest level of function for planned discharge setting.  See evaluation for individualized goals.  Description: Treatment Interventions: ADL retraining, Functional transfer training, Endurance training, Cognitive reorientation, Patient/family training, Equipment evaluation/education, Compensatory technique education, Continued evaluation, Energy conservation, Activityengagement, UE strengthening/ROM  Equipment Recommended: Bedside commode       See flowsheet documentation for full assessment, interventions and recommendations.   3/25/2025 1345 by Geetha Ndiaye  Outcome: Not Progressing  Note: Limitation: Decreased ADL status, Decreased Safe judgement during ADL, Decreased cognition, Decreased endurance, Decreased self-care trans, Decreased high-level ADLs (pain, balance, fxnl mobility, act bryanna, fxnl reach, trunk control, standing bryanna, strength, fxnl sitting balance, and fxnl sitting bryanna, attention to task, safety awareness, insight, problem solving, learning new tasks, appropriate responses, response time)  Prognosis: Good  Assessment: Pt seen for skilled acute care OT treatment session this date. Session was focused on LB dressing, grooming in sitting and functional STS transfers. Pt was received in chair and ended session in chair w/ alarm on and all needs met. Compared to eval, pt has not demonstrated any significant progression towards goals this session. Pt required increased A to complete grooming tasks in sitting, increased A to perform functional transfers and increased A to maintain standing balance. Pt also demonstrated overall decreased activity tolerance due to weakness, lethargy and increased confusion. Pt continues to perform below baseline due to above deficits and would continue to benefit for skilled acute care OT 3-5x/week. Continue to recommend level II resources upon d/c at this time.     Rehab Resource Intensity  Level, OT: II (Moderate Resource Intensity)    Geetha Ndiaye, OTS

## 2025-03-25 NOTE — PROGRESS NOTES
Progress Note - Infectious Disease   Name: Tanya Stephen 89 y.o. female I MRN: [id number]  Unit/Bed#: ICU 12 I Date of Admission: 3/19/2025   Date of Service: 3/25/2025 I Hospital Day: 6    Assessment & Plan  Severe sepsis (HCC)  E/b fever, tachycardia, leukocytosis, hypotension requiring Vasopressor support. In setting of #2. Vasopressor management weaned off 3/20/25 by critical care team  -antibiotics as below  -follow-up cultures  -monitor temperature and hemodynamics  -serial exam  -additional inventions pending clinical course  -monitoring serial CBC and BMP for treatment response and any developing toxicities    MSSA bacteremia  Admission blood cultures 2 of 2 sets growing MSSA. Possible skin source as portal of entry with multiple extremity abrasions/wounds. 3/20/25 TTE no vegetation seen  3/21/25 Blood culture 1 of 2 sets + Staph aureus  -Continue Cefazolin 2 g IV q 12 hours, renal dose adjusted high dose  -follow up 3/21/25 repeat blood cultures  -follow up 3/24/25 repeat blood cultures  -duration of antibiotic dependent on blood culture clearance and pending clinical course   Acute encephalopathy  Patient lethargic is setting of acute illness, but responsive to name and exam. 3/14/2025 CT head: No acute intercranial abnormality.  -antibiotic/work up as above  -monitor mentation  -symptomatic management per critical care team  -aspiration precautions  Chronic kidney disease, stage IV (severe) ([facility])  Lab Results   Component Value Date    EGFR 43 03/25/2025    EGFR 39 03/24/2025    EGFR 35 03/23/2025    CREATININE 1.11 03/25/2025    CREATININE 1.21 03/24/2025    CREATININE 1.31 (H) 03/23/2025   Estimated Creatinine Clearance: 28.7 mL/min (by C-G formula based on SCr of 1.11 mg/dL).  -renal dose adjust antibiotic as needed  -volume management   -recheck BMP  Type 2 diabetes mellitus ([facility])  Lab Results   Component Value Date    HGBA1C 6.7 (H) 03/20/2025   -glycemic management per primary     Recent Labs      03/24/25  0803 03/24/25  1151 03/24/25  1601 03/24/25  2113   POCGLU 138 132 160* 125       Blood Sugar Average: Last 72 hrs:  (P) 146.2758925659860743    Chronic bilateral pleural effusions  -volume management per primary  CHF (congestive heart failure) ([facility])  Wt Readings from Last 3 Encounters:   03/22/25 61.7 kg (136 lb 0.4 oz)   02/24/25 65 kg (143 lb 4.8 oz)   01/27/25 54 kg (119 lb)   -volume management per primary      I have discussed with patient and  at bedside, RN, and primary care team regarding the above plan to continue antibiotic as above and monitor closely. They agree with the plan.    Antibiotics:  Cefazolin D6    Subjective   Patient has no fever, chills, sweats overnight; no nausea, vomiting, diarrhea; no cough, shortness of breath; + chronic back pain. + chronic intermittent dysuria/pressure discomfort; no SPT today. Poor appetite. No new symptoms.    Objective :  Temp:  [97.7 °F (36.5 °C)-97.8 °F (36.6 °C)] 97.7 °F (36.5 °C)  HR:  [] 100  BP: (112-121)/(57-62) 121/62  Resp:  [16] 16  SpO2:  [95 %] 95 %  O2 Device: None (Room air)    General Appearance:  89 year old elderly, chronically debilitated female, nontoxic, no acute distress, resting in recliner, sluggish and fatigued   HEENT: Atraumatic normocephalic   Throat: Oropharynx moist. Poor dentition   Pulmonary:   Normal respiratory excursion without accessory muscle use, statting 95% on RA   Cardiac:  RRR decreased tones   Abdomen:   Soft, non-tender, non-distended   Extremities: No edema   : No diaz, no focal SPT or flank tenderness on exam   Psychiatric: Awake, cooperative   Skin: No new rashes. IV site nontender. Multiple stages of ecchymoses of skin.          Lab Results: I have reviewed the following results:  Results from last 7 days   Lab Units 03/25/25  0554 03/24/25  0402 03/23/25  0553   WBC Thousand/uL 9.22 7.91 7.84   HEMOGLOBIN g/dL 10.2* 10.4* 10.5*   PLATELETS Thousands/uL 125* 92* 85*     Results from last 7  days   Lab Units 03/25/25  0554 03/24/25  0402 03/23/25  0439 03/22/25  0443   SODIUM mmol/L 134* 134* 131* 133*   POTASSIUM mmol/L 3.7 3.7 4.3 4.0   CHLORIDE mmol/L 103 104 102 102   CO2 mmol/L 21 24 21 24   BUN mg/dL 26* 27* 30* 32*   CREATININE mg/dL 1.11 1.21 1.31* 1.42*   EGFR ml/min/1.73sq m 43 39 35 32   CALCIUM mg/dL 7.6* 7.6* 7.6* 7.8*   AST U/L 44*  --  80* 61*   ALT U/L <3*  --  <3* 9   ALK PHOS U/L 275*  --  280* 201*   ALBUMIN g/dL 2.6*  --  2.8* 2.7*     Results from last 7 days   Lab Units 03/24/25  1151 03/24/25  1037 03/21/25  0509 03/21/25  0459 03/20/25  1106 03/19/25  1842   BLOOD CULTURE  Received in Microbiology Lab. Culture in Progress. Received in Microbiology Lab. Culture in Progress. No Growth After 4 Days. Staphylococcus aureus*  --  Staphylococcus aureus*  Staphylococcus aureus*   GRAM STAIN RESULT   --   --   --  Gram positive cocci in clusters*  --  Gram positive cocci in clusters*  Gram positive cocci in clusters*   MRSA CULTURE ONLY   --   --   --   --  No Methicillin Resistant Staphlyococcus aureus (MRSA) isolated  --      Results from last 7 days   Lab Units 03/20/25  0745 03/19/25  1842   PROCALCITONIN ng/ml 6.56* 1.10*                 Imaging Results Review: No pertinent imaging studies reviewed.  Other Study Results Review: My interpretation of other studies include: 3/20/25 TTE no vegetation seen.

## 2025-03-25 NOTE — ASSESSMENT & PLAN NOTE
>>ASSESSMENT AND PLAN FOR TYPE 2 DIABETES MELLITUS (HCC) WRITTEN ON 3/25/2025 11:47 AM BY KEILA KNUTSON PA-C    Lab Results   Component Value Date    HGBA1C 6.7 (H) 03/20/2025   -glycemic management per primary     Recent Labs     03/24/25  0803 03/24/25  1151 03/24/25  1601 03/24/25  2113   POCGLU 138 132 160* 125       Blood Sugar Average: Last 72 hrs:  (P) [id number]

## 2025-03-25 NOTE — PLAN OF CARE
Problem: Potential for Falls  Goal: Patient will remain free of falls  Description: INTERVENTIONS:  - Educate patient/family on patient safety including physical limitations  - Instruct patient to call for assistance with activity   - Consult OT/PT to assist with strengthening/mobility   - Keep Call bell within reach  - Keep bed low and locked with side rails adjusted as appropriate  - Keep care items and personal belongings within reach  - Initiate and maintain comfort rounds  - Make Fall Risk Sign visible to staff  - Apply yellow socks and bracelet for high fall risk patients  - Consider moving patient to room near nurses station  Outcome: Progressing     Problem: PAIN - ADULT  Goal: Verbalizes/displays adequate comfort level or baseline comfort level  Description: Interventions:  - Encourage patient to monitor pain and request assistance  - Assess pain using appropriate pain scale  - Administer analgesics based on type and severity of pain and evaluate response  - Implement non-pharmacological measures as appropriate and evaluate response  - Consider cultural and social influences on pain and pain management  - Notify physician/advanced practitioner if interventions unsuccessful or patient reports new pain  Outcome: Progressing     Problem: INFECTION - ADULT  Goal: Absence or prevention of progression during hospitalization  Description: INTERVENTIONS:  - Assess and monitor for signs and symptoms of infection  - Monitor lab/diagnostic results  - Monitor all insertion sites, i.e. indwelling lines, tubes, and drains  - Monitor endotracheal if appropriate and nasal secretions for changes in amount and color  - [place] appropriate cooling/warming therapies per order  - Administer medications as ordered  - Instruct and encourage patient and family to use good hand hygiene technique  - Identify and instruct in appropriate isolation precautions for identified infection/condition  Outcome: Progressing  Goal: Absence  of fever/infection during neutropenic period  Description: INTERVENTIONS:  - Monitor WBC    Outcome: Progressing     Problem: SAFETY ADULT  Goal: Patient will remain free of falls  Description: INTERVENTIONS:  - Educate patient/family on patient safety including physical limitations  - Instruct patient to call for assistance with activity   - Consult OT/PT to assist with strengthening/mobility   - Keep Call bell within reach  - Keep bed low and locked with side rails adjusted as appropriate  - Keep care items and personal belongings within reach  - Initiate and maintain comfort rounds  - Make Fall Risk Sign visible to staff  - Apply yellow socks and bracelet for high fall risk patients  - Consider moving patient to room near nurses station  Outcome: Progressing  Goal: Maintain or return to baseline ADL function  Description: INTERVENTIONS:  -  Assess patient's ability to carry out ADLs; assess patient's baseline for ADL function and identify physical deficits which impact ability to perform ADLs (bathing, care of mouth/teeth, toileting, grooming, dressing, etc.)  - Assess/evaluate cause of self-care deficits   - Assess range of motion  - Assess patient's mobility; develop plan if impaired  - Assess patient's need for assistive devices and provide as appropriate  - Encourage maximum independence but intervene and supervise when necessary  - Involve family in performance of ADLs  - Assess for home care needs following discharge   - Consider OT consult to assist with ADL evaluation and planning for discharge  - Provide patient education as appropriate  Outcome: Progressing  Goal: Maintains/Returns to pre admission functional level  Description: INTERVENTIONS:  - Perform AM-PAC 6 Click Basic Mobility/ Daily Activity assessment daily.  - Set and communicate daily mobility goal to care team and patient/family/caregiver.   - Collaborate with rehabilitation services on mobility goals if consulted  - Out of bed for  toileting  - Record patient progress and toleration of activity level   Outcome: Progressing     Problem: DISCHARGE PLANNING  Goal: Discharge to home or other facility with appropriate resources  Description: INTERVENTIONS:  - Identify barriers to discharge w/patient and caregiver  - Arrange for needed discharge resources and transportation as appropriate  - Identify discharge learning needs (meds, wound care, etc.)  - Arrange for interpretive services to assist at discharge as needed  - Refer to Case Management Department for coordinating discharge planning if the patient needs post-hospital services based on physician/advanced practitioner order or complex needs related to functional status, cognitive ability, or social support system  Outcome: Progressing     Problem: Knowledge Deficit  Goal: Patient/family/caregiver demonstrates understanding of disease process, treatment plan, medications, and discharge instructions  Description: Complete learning assessment and assess knowledge base.  Interventions:  - Provide teaching at level of understanding  - Provide teaching via preferred learning methods  Outcome: Progressing     Problem: Nutrition/Hydration-ADULT  Goal: Nutrient/Hydration intake appropriate for improving, restoring or maintaining nutritional needs  Description: Monitor and assess patient's nutrition/hydration status for malnutrition. Collaborate with interdisciplinary team and initiate plan and interventions as ordered.  Monitor patient's weight and dietary intake as ordered or per policy. Utilize nutrition screening tool and intervene as necessary. Determine patient's food preferences and provide high-protein, high-caloric foods as appropriate.     INTERVENTIONS:  - Monitor oral intake, urinary output, labs, and treatment plans  - Assess nutrition and hydration status and recommend course of action  - Evaluate amount of meals eaten  - Assist patient with eating if necessary   - Allow adequate time for  meals  - Recommend/ encourage appropriate diets, oral nutritional supplements, and vitamin/mineral supplements  - Order, calculate, and assess calorie counts as needed  - Recommend, monitor, and adjust tube feedings and TPN/PPN based on assessed needs  - Assess need for intravenous fluids  - Provide specific nutrition/hydration education as appropriate  - Include patient/family/caregiver in decisions related to nutrition  Outcome: Progressing     Problem: Prexisting or High Potential for Compromised Skin Integrity  Goal: Skin integrity is maintained or improved  Description: INTERVENTIONS:  - Identify patients at risk for skin breakdown  - Assess and monitor skin integrity  - Assess and monitor nutrition and hydration status  - Monitor labs   - Assess for incontinence   - Turn and reposition patient  - Assist with mobility/ambulation  - Relieve pressure over bony prominences  - Avoid friction and shearing  - Provide appropriate hygiene as needed including keeping skin clean and dry  - Evaluate need for skin moisturizer/barrier cream  - Collaborate with interdisciplinary team   - Patient/family teaching  - Consider wound care consult   Outcome: Progressing

## 2025-03-25 NOTE — ASSESSMENT & PLAN NOTE
Admission blood cultures 2 of 2 sets growing MSSA. Possible skin source as portal of entry with multiple extremity abrasions/wounds. 3/20/25 TTE no vegetation seen  3/21/25 Blood culture 1 of 2 sets + Staph aureus  -Continue Cefazolin 2 g IV q 12 hours, renal dose adjusted high dose  -follow up 3/21/25 repeat blood cultures  -follow up 3/24/25 repeat blood cultures  -duration of antibiotic dependent on blood culture clearance and pending clinical course

## 2025-03-25 NOTE — ASSESSMENT & PLAN NOTE
>>ASSESSMENT AND PLAN FOR TYPE 2 DIABETES MELLITUS (HCC) WRITTEN ON 3/25/2025  2:07 PM BY PENNY FOWLER PA-C    Lab Results   Component Value Date    HGBA1C 6.7 (H) 03/20/2025       Recent Labs     03/24/25  1151 03/24/25  1601 03/24/25  2113 03/25/25  1145   POCGLU 132 160* 125 123       Blood Sugar Average: Last 72 hrs:  (P) 144.5  Currently on sliding scale insulin  Only requiring approximately 2 to 3 units/day

## 2025-03-25 NOTE — OCCUPATIONAL THERAPY NOTE
Pt seen for skilled acute care OT treatment session this date. Session was focused on LB dressing, grooming in sitting and functional STS transfers. Pt was received in chair and ended session in chair w/ alarm on and all needs met. Compared to eval, pt has not demonstrated any significant progression towards goals this session. Pt required increased A to complete grooming tasks in sitting, increased A to perform functional transfers and increased A to maintain standing balance. Pt also demonstrated overall decreased activity tolerance due to weakness and lethargy. Pt continues to perform below baseline due to above deficits and would continue to benefit for skilled acute care OT 3-5x/week. Continue to recommend level II resources upon d/c at this time.      GOALS:     *Goals established to promote patient goal of to get better:       *Patient will perform grooming tasks standing at sink with (S) in order to increase overall independence (NOT PROGRESSING)     *UB ADL with (I) for inc'd independence with self care     *LB ADL with Min (A)for inc'd independence with self care and decreased caregiver burden (NOT PROGRESSING)     *Toileting with Min (A) for clothing management and hygiene to increase hygiene/thoroughness in order to reduce caregiver burden     *Patient will verbalize and demonstrate use of energy conservation/deep breathing techniques and work simplification skills during functional activities with no verbal cues.      *ADL transfers with (S) for inc'd independence with ADLs/purposeful tasks (NOT PROGRESSING)     *Bed mobility- Min (A) for inc'd independence to manage own comfort and initiate EOB & OOB purposeful tasks     *Patient will increase functional mobility to and from bathroom with rolling walker with min assist to increase independence with toileting     *Patient will increase OOB/sitting tolerance to 2-4 hours per day to increase participation in self-care and leisure tasks with no s/s of  exertion. (PROGRESSING- pt received and ended session sitting in chair)     *Increase stand tolerance x 3-5  m for inc'd tolerance with standing purposeful tasks including grooming/purposeful tasks (NOT PROGRESSING)     *Patient will improve functional activity tolerance to 20 minutes of sustained functional tasks to increase participation in basic self-care and decrease assistance level.  (NOT PROGRESSING)     *Patient will increase static/dynamic standing balance to fair/fair- to maximize safety/performance of higher level ADLs/grooming tasks at sink (NOT PROGRESSING)     *Patient will engage in ongoing cognitive assessment to assist with safe discharge planning/recommendations.      *Caregiver will demonstrate good body mechanics and safe technique when assisting pt during functional ADLs/transfers to maximize safety upon DC.

## 2025-03-25 NOTE — PLAN OF CARE
Problem: Potential for Falls  Goal: Patient will remain free of falls  Description: INTERVENTIONS:  - Educate patient/family on patient safety including physical limitations  - Instruct patient to call for assistance with activity   - Consult OT/PT to assist with strengthening/mobility   - Keep Call bell within reach  - Keep bed low and locked with side rails adjusted as appropriate  - Keep care items and personal belongings within reach  - Initiate and maintain comfort rounds  - Make Fall Risk Sign visible to staff  - Apply yellow socks and bracelet for high fall risk patients  - Consider moving patient to room near nurses station  Outcome: Progressing     Problem: PAIN - ADULT  Goal: Verbalizes/displays adequate comfort level or baseline comfort level  Description: Interventions:  - Encourage patient to monitor pain and request assistance  - Assess pain using appropriate pain scale  - Administer analgesics based on type and severity of pain and evaluate response  - Implement non-pharmacological measures as appropriate and evaluate response  - Consider cultural and social influences on pain and pain management  - Notify physician/advanced practitioner if interventions unsuccessful or patient reports new pain  Outcome: Progressing     Problem: INFECTION - ADULT  Goal: Absence or prevention of progression during hospitalization  Description: INTERVENTIONS:  - Assess and monitor for signs and symptoms of infection  - Monitor lab/diagnostic results  - Monitor all insertion sites, i.e. indwelling lines, tubes, and drains  - Monitor endotracheal if appropriate and nasal secretions for changes in amount and color  - [place] appropriate cooling/warming therapies per order  - Administer medications as ordered  - Instruct and encourage patient and family to use good hand hygiene technique  - Identify and instruct in appropriate isolation precautions for identified infection/condition  Outcome: Progressing  Goal: Absence  of fever/infection during neutropenic period  Description: INTERVENTIONS:  - Monitor WBC    Outcome: Progressing     Problem: SAFETY ADULT  Goal: Patient will remain free of falls  Description: INTERVENTIONS:  - Educate patient/family on patient safety including physical limitations  - Instruct patient to call for assistance with activity   - Consult OT/PT to assist with strengthening/mobility   - Keep Call bell within reach  - Keep bed low and locked with side rails adjusted as appropriate  - Keep care items and personal belongings within reach  - Initiate and maintain comfort rounds  - Make Fall Risk Sign visible to staff  - Apply yellow socks and bracelet for high fall risk patients  - Consider moving patient to room near nurses station  Outcome: Progressing  Goal: Maintain or return to baseline ADL function  Description: INTERVENTIONS:  -  Assess patient's ability to carry out ADLs; assess patient's baseline for ADL function and identify physical deficits which impact ability to perform ADLs (bathing, care of mouth/teeth, toileting, grooming, dressing, etc.)  - Assess/evaluate cause of self-care deficits   - Assess range of motion  - Assess patient's mobility; develop plan if impaired  - Assess patient's need for assistive devices and provide as appropriate  - Encourage maximum independence but intervene and supervise when necessary  - Involve family in performance of ADLs  - Assess for home care needs following discharge   - Consider OT consult to assist with ADL evaluation and planning for discharge  - Provide patient education as appropriate  Outcome: Progressing  Goal: Maintains/Returns to pre admission functional level  Description: INTERVENTIONS:  - Perform AM-PAC 6 Click Basic Mobility/ Daily Activity assessment daily.  - Set and communicate daily mobility goal to care team and patient/family/caregiver.   - Collaborate with rehabilitation services on mobility goals if consulted  - Out of bed for  toileting  - Record patient progress and toleration of activity level   Outcome: Progressing     Problem: DISCHARGE PLANNING  Goal: Discharge to home or other facility with appropriate resources  Description: INTERVENTIONS:  - Identify barriers to discharge w/patient and caregiver  - Arrange for needed discharge resources and transportation as appropriate  - Identify discharge learning needs (meds, wound care, etc.)  - Arrange for interpretive services to assist at discharge as needed  - Refer to Case Management Department for coordinating discharge planning if the patient needs post-hospital services based on physician/advanced practitioner order or complex needs related to functional status, cognitive ability, or social support system  Outcome: Progressing     Problem: Knowledge Deficit  Goal: Patient/family/caregiver demonstrates understanding of disease process, treatment plan, medications, and discharge instructions  Description: Complete learning assessment and assess knowledge base.  Interventions:  - Provide teaching at level of understanding  - Provide teaching via preferred learning methods  Outcome: Progressing     Problem: Nutrition/Hydration-ADULT  Goal: Nutrient/Hydration intake appropriate for improving, restoring or maintaining nutritional needs  Description: Monitor and assess patient's nutrition/hydration status for malnutrition. Collaborate with interdisciplinary team and initiate plan and interventions as ordered.  Monitor patient's weight and dietary intake as ordered or per policy. Utilize nutrition screening tool and intervene as necessary. Determine patient's food preferences and provide high-protein, high-caloric foods as appropriate.     INTERVENTIONS:  - Monitor oral intake, urinary output, labs, and treatment plans  - Assess nutrition and hydration status and recommend course of action  - Evaluate amount of meals eaten  - Assist patient with eating if necessary   - Allow adequate time for  meals  - Recommend/ encourage appropriate diets, oral nutritional supplements, and vitamin/mineral supplements  - Order, calculate, and assess calorie counts as needed  - Recommend, monitor, and adjust tube feedings and TPN/PPN based on assessed needs  - Assess need for intravenous fluids  - Provide specific nutrition/hydration education as appropriate  - Include patient/family/caregiver in decisions related to nutrition  Outcome: Progressing     Problem: Prexisting or High Potential for Compromised Skin Integrity  Goal: Skin integrity is maintained or improved  Description: INTERVENTIONS:  - Identify patients at risk for skin breakdown  - Assess and monitor skin integrity  - Assess and monitor nutrition and hydration status  - Monitor labs   - Assess for incontinence   - Turn and reposition patient  - Assist with mobility/ambulation  - Relieve pressure over bony prominences  - Avoid friction and shearing  - Provide appropriate hygiene as needed including keeping skin clean and dry  - Evaluate need for skin moisturizer/barrier cream  - Collaborate with interdisciplinary team   - Patient/family teaching  - Consider wound care consult   Outcome: Progressing

## 2025-03-25 NOTE — APP STUDENT NOTE
[facility] Internal Medicine Progress Note  Patient: Tanya Stephen 89 y.o. female   MRN: [id number]  PCP: Gregory Santoro DO  Unit/Bed#: ICU 12 Encounter: [id number]  Date Of Visit: 03/25/25    Assessment/Plan:    Severe sepsis   Noted to have severe sepsis on admission with lactic acid 2.8 currently 1.1 and platelets 85 on 3/23, currently 125  Most likely secondary to MSSA bacteremia   Awaiting results from repeat blood cultures taken 3/24  MSSA Bacteremia   Blood cultures on 3/19 both positive for MSSA  1 out 2 blood cultures on 3/21 positive for MSSA   Awaiting repeat blood cultures from 3/24  Echo from 3/20 showed no evidence of vegetation   Continue cefazolin (Ancef) 2 mg Q12H  Continue following with ID  Thrombocytopenia   Most likely due to bacteremia   Platelets decreased to 85 on 3/23, today they are 125  Baseline is between 125-150  CKD stage 4 (severe)  Cr 1.11 today  Baseline Cr is 1.4-1.6  Patient follows with outpatient nephrology   Continue to monitor   Type 2 DM  HgbA1C 6.7 on 3/20  Blood sugar controlled with insulin lispro  Chronic Pain  Chronic pain controlled with Tylenol and oxycodone PRN pain     VTE Pharmacologic Prophylaxis:   Pharmacologic: Enoxaparin (Lovenox)  Mechanical VTE Prophylaxis in Place: Yes    Patient Centered Rounds: I have performed bedside rounds with nursing staff today.    Discussions with Specialists or Other Care Team Provider:     Education and Discussions with Family / Patient:     Time Spent for Care: 15 minutes.  More than 50% of total time spent on counseling and coordination of care as described above.    Current Length of Stay: 6 day(s)    Current Patient Status: Inpatient   Certification Statement: The patient will continue to require additional inpatient hospital stay due to bacteremia.    Discharge Plan / Estimated Discharge Date: Patient requires more than 72 hours before discharge     Code Status: Level 1 - Full Code      Subjective:   COSME is a 89 year old  female HD6 with a PMH of CHF, CKD stage 4, and DM2 who presented with AMS. Upon admission patient was found to be in severe sepsis and have positive blood cultures for Staph aureus MSSA. She is doing better today. She is out of bed sitting in her chair. She reports she still has occasional back pain which is typically controlled with Tylenol. She denies any other symptoms at this time.      Objective:     Vitals:   Temp (24hrs), Av.7 °F (36.5 °C), Min:97.7 °F (36.5 °C), Max:97.8 °F (36.6 °C)    Temp:  [97.7 °F (36.5 °C)-97.8 °F (36.6 °C)] 97.7 °F (36.5 °C)  HR:  [] 100  Resp:  [16] 16  BP: (112-121)/(57-62) 121/62  SpO2:  [95 %] 95 %  Body mass index is 27.47 kg/m².     Input and Output Summary (last 24 hours):       Intake/Output Summary (Last 24 hours) at 3/25/2025 0919  Last data filed at 3/25/2025 0000  Gross per 24 hour   Intake 100 ml   Output 600 ml   Net -500 ml       Physical Exam:     Physical Exam  Constitutional:       General: She is not in acute distress.     Appearance: She is ill-appearing.   HENT:      Head: Normocephalic and atraumatic.   Cardiovascular:      Rate and Rhythm: Normal rate and regular rhythm.      Heart sounds: No murmur heard.     No friction rub. No gallop.   Pulmonary:      Effort: Pulmonary effort is normal.      Breath sounds: Normal breath sounds. No wheezing, rhonchi or rales.   Abdominal:      General: Abdomen is flat. Bowel sounds are normal.      Palpations: Abdomen is soft.      Tenderness: There is no abdominal tenderness.   Musculoskeletal:      Right lower leg: No edema.      Left lower leg: No edema.   Skin:     General: Skin is warm and dry.      Findings: Ecchymosis (b/l upper and lower extremities) present.   Neurological:      Mental Status: She is oriented to person, place, and time. She is lethargic.   Psychiatric:         Behavior: Behavior is cooperative.           Additional Data:     Labs:    Results from last 7 days   Lab Units 25  0591  03/24/25  0402 03/23/25  0553   WBC Thousand/uL 9.22   < > 7.84   HEMOGLOBIN g/dL 10.2*   < > 10.5*   HEMATOCRIT % 31.9*   < > 32.1*   PLATELETS Thousands/uL 125*   < > 85*   SEGS PCT %  --   --  81*   LYMPHO PCT % 10*  --  8*   MONO PCT % 11  --  9   EOS PCT % 1  --  1    < > = values in this interval not displayed.     Results from last 7 days   Lab Units 03/25/25  0554   POTASSIUM mmol/L 3.7   CHLORIDE mmol/L 103   CO2 mmol/L 21   BUN mg/dL 26*   CREATININE mg/dL 1.11   CALCIUM mg/dL 7.6*   ALK PHOS U/L 275*   ALT U/L <3*   AST U/L 44*     Results from last 7 days   Lab Units 03/19/25  1842   INR  1.25*       * I Have Reviewed All Lab Data Listed Above.  * Additional Pertinent Lab Tests Reviewed: All Labs For Current Hospital Admission Reviewed    Imaging:    Imaging Reports Reviewed Today Include:   Imaging Personally Reviewed by Myself Includes:      Recent Cultures (last 7 days):     Results from last 7 days   Lab Units 03/24/25  1151 03/24/25  1037 03/21/25  0509 03/21/25  0459 03/19/25  1842   BLOOD CULTURE  Received in Microbiology Lab. Culture in Progress. Received in Microbiology Lab. Culture in Progress. No Growth at 72 hrs. Staphylococcus aureus* Staphylococcus aureus*  Staphylococcus aureus*   GRAM STAIN RESULT   --   --   --  Gram positive cocci in clusters* Gram positive cocci in clusters*  Gram positive cocci in clusters*       Last 24 Hours Medication List:   Current Facility-Administered Medications   Medication Dose Route Frequency Provider Last Rate    acetaminophen  975 mg Oral Q8H [facility] Thiago Xie MD      amitriptyline  10 mg Oral HS Tyrone Santos DO      aspirin  81 mg Oral Daily Jj Mosley MD      atorvastatin  10 mg Oral Daily Jj Mosley MD      cefazolin  2,000 mg Intravenous Q12H Jj Mosley MD 2,000 mg (03/25/25 0430)    Cholecalciferol  1,000 Units Oral Daily Jj Mosley MD      DULoxetine  60 mg Oral Daily Jj Mosley MD      enoxaparin  30 mg Subcutaneous  Daily Jj Mosley MD      furosemide  20 mg Oral Daily Tyrone Santos DO      hydroxychloroquine  200 mg Oral Daily With Breakfast Jj Mosley MD      insulin lispro  1-5 Units Subcutaneous 4x Daily (AC & HS) Jj Mosley MD      oxybutynin  10 mg Oral Daily Jj Mosley MD      oxyCODONE  2.5 mg Oral Q6H PRN Thiago Devang Xie MD      pantoprazole  40 mg Oral Early Morning Jj Mosley MD      pregabalin  75 mg Oral BID Jj Mosley MD      pyridoxine  100 mg Oral Daily Jj Mosley MD      trimethobenzamide  200 mg Intramuscular Q6H PRN Jj Mosley MD          Today, Patient Was Seen By: Fabián Erickson    ** Please Note: This note has been constructed using a voice recognition system. **

## 2025-03-25 NOTE — ASSESSMENT & PLAN NOTE
Blood cultures from 3/19/2025 both positive for MSSA.    Repeat blood cultures from 3/21/2025 1 out of 2 positive.    Repeat blood cultures 3/24 negative thus far  Echocardiogram with no evidence of vegetation   Skin source felt to be portal of entry given multiple extremity abrasions and wounds   Continue IV Ancef 2 g every 12 hours  Discussed with infectious disease

## 2025-03-25 NOTE — PLAN OF CARE
Problem: PHYSICAL THERAPY ADULT  Goal: Performs mobility at highest level of function for planned discharge setting.  See evaluation for individualized goals.  Description: Treatment/Interventions: Functional transfer training, LE strengthening/ROM, Therapeutic exercise, Endurance training, Cognitive reorientation, Patient/family training, Equipment eval/education, Bed mobility, Gait training, Compensatory technique education  Equipment Recommended: Walker       See flowsheet documentation for full assessment, interventions and recommendations.  3/25/2025 1327 by Jerri Martinez PT  Note: Prognosis: Good  Problem List: Decreased strength, Decreased endurance, Impaired balance, Decreased mobility, Decreased cognition, Decreased safety awareness, Pain  Assessment: Tanya Stephen is a 89 y.o. Female who presents to [facility] on [facility]/19[facility]25 due to generalized weakness and diagnosis of severe sepsis. Orders for PT eval and treat received. Comorbidities affecting pt's functional mobility at time of evaluation include: DM, chronic pain syndrome, CHF, osteoporosis, MCI, CKD, metabolic encephalopathy. Personal factors affecting DC include: stairs to enter home, inability to ambulate household distances, inability to navigate level surfaces w/o external assistance, decreased cognition, and positive fall history. At baseline, pt mobilizes w/ rollator walker, and w/ 1-4 fall(s) in the previous 6 months. Upon evaluation, pt presents w/ the following deficits: impaired strength, impaired balance, impaired cognition, decreased endurance/activity tolerance, and pain affecting mobility. Pt currently requires  max Ax2 for transfers. Pt's clinical presentation is unstable/unpredictable due to abnormal lab values, need for increased assistance w/ functional mobility compared to baseline, pain affecting mobility tolerance, need for input for mobility technique, need for input for task focus, recent drastic decline in mobility status, recent  h/o falls, ongoing medical management. From a PT/mobility standpoint given the above findings, DC recommendation is level: II (Moderate Rehab Resource Intensity). During current admission, pt will benefit from continued skilled inpatient PT in the acute care setting in order to address the above deficits and to maximize function and mobility prior to DC from acute care.        Rehab Resource Intensity Level, PT: II (Moderate Resource Intensity)    See flowsheet documentation for full assessment.

## 2025-03-25 NOTE — CASE MANAGEMENT
Case Management Discharge Planning Note    Patient name Tanya Stephen  Location ICU 12/ICU 12 MRN [id number]  : 1934 Date 3/25/2025       Current Admission Date: 3/19/2025  Current Admission Diagnosis:Severe sepsis ([facility])   Patient Active Problem List    Diagnosis Date Noted Date Diagnosed    CHF (congestive heart failure) ([facility]) 2025     MSSA bacteremia 2025     Metabolic encephalopathy 2025     Severe sepsis ([facility]) 2025     Hypoxia 2025     Small intestinal bacterial overgrowth (SIBO) 2025     Thrombocytopenia ([facility]) 2025     Ambulatory dysfunction 11/15/2024     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     Chronic kidney disease, stage IV (severe) ([facility]) 10/28/2024     Diabetic nephropathy associated with type 2 diabetes mellitus ([facility]) 10/28/2024     Anemia of chronic renal failure, stage 4 (severe)  ([facility]) 10/28/2024     Fall 2024     Mild cognitive impairment 2024     Chronic mastoiditis of right side 2024     Nonrheumatic aortic valve stenosis 2024     Chronic bilateral pleural effusions 2024     Gastroesophageal reflux disease without esophagitis 2023     Esophageal dysphagia 2023     Raynaud's phenomenon without gangrene 2023     Spinal stenosis of lumbar region with neurogenic claudication 2022     Chronic pain syndrome 2022     Acute encephalopathy 2022     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[facility]) 2022     OAB (overactive bladder) 2022     Acute on chronic diastolic (congestive) heart failure ([facility]) 2022     Post zoster neuralgia 2021     Primary generalized (osteo)arthritis 2021     Seronegative arthropathy of multiple sites ([facility]) 2021     Senile osteoporosis 2021     Diabetic polyneuropathy associated with type 2 diabetes mellitus ([facility]) 2021     Irritable bowel syndrome with diarrhea 2021     Abnormality of gait due to  impairment of balance 11/19/2021     Sicca syndrome (HCC) 11/19/2021     Hypomagnesemia 12/22/2016     Type 2 diabetes mellitus (HCC) 12/22/2016       LOS (days): 6  Geometric Mean LOS (GMLOS) (days): 4.9  Days to GMLOS:-0.7     OBJECTIVE:  Risk of Unplanned Readmission Score: 31.41         Current admission status: Inpatient   Preferred Pharmacy:   [facility] [address] [facility] - [address] [place] [address]  Phone: [phone] Fax: [phone]    [facility] [place]  [address] [address]  Phone: [phone] Fax: [phone]    [facility] [address]  [address]  [facility] [address]  Phone: [phone] Fax: [phone]    Primary Care Provider: Gregory Santoro DO    Primary Insurance: MEDICARE  Secondary Insurance: [employer]    DISCHARGE DETAILS:    Discharge planning discussed with:: Pt's , Weston  Freedom of Choice: Yes  Comments - Freedom of Choice: STR referrals    Contacts  Patient Contacts: Weston Stephen (Spouse)  Relationship to Patient:: Family  Contact Method: In Person  Reason/Outcome: Discharge Planning, Emergency Contact, Continuity of Care, Referral    Other Referral/Resources/Interventions Provided:  Interventions: Short Term Rehab  Referral Comments: CM met with pt (sleeping) and her , Weston, at bedside. CM reviewed recommendations for STR. Discussed acute vs sub acute. At this time Weston does not feel that pt can tolerate acute rehab based on how pt did with PT/OT today when they were in to see her. He is aware if pt improves that referrals can be placed to acute. Wesotn is agreeable to referrals to sub acute. Aware CM will send referrals to the local facilities. This will generate a list for him to review with the pt and their family. No other questions for CM at this time. Referrals placed in  Tania.    Treatment Team Recommendation: Short Term Rehab  Discharge Destination Plan:: Short Term Rehab

## 2025-03-25 NOTE — ASSESSMENT & PLAN NOTE
Noted to have metabolic encephalopathy initially.  Felt secondary to sepsis.  Mental status improved but this AM was confused/delirious again but then improved

## 2025-03-26 ENCOUNTER — APPOINTMENT (INPATIENT)
Dept: CT IMAGING | Facility: HOSPITAL | Age: OVER 89
DRG: 871 | End: 2025-03-26
Payer: MEDICARE

## 2025-03-26 PROBLEM — R29.90 STROKE-LIKE SYMPTOMS: Status: ACTIVE | Noted: 2025-03-26

## 2025-03-26 LAB
2HR DELTA HS TROPONIN: -2 NG/L
4HR DELTA HS TROPONIN: 0 NG/L
ACANTHOCYTES BLD QL SMEAR: PRESENT
ALBUMIN SERPL BCG-MCNC: 3 G/DL (ref 3.5–5)
ALP SERPL-CCNC: 276 U/L (ref 34–104)
ALT SERPL W P-5'-P-CCNC: <3 U/L (ref 7–52)
AMMONIA PLAS-SCNC: 29 UMOL/L (ref 18–72)
ANION GAP SERPL CALCULATED.3IONS-SCNC: 10 MMOL/L (ref 4–13)
ANION GAP SERPL CALCULATED.3IONS-SCNC: 9 MMOL/L (ref 4–13)
ANISOCYTOSIS BLD QL SMEAR: PRESENT
ARTERIAL PATENCY WRIST A: YES
AST SERPL W P-5'-P-CCNC: 39 U/L (ref 13–39)
BACTERIA BLD CULT: NORMAL
BASE EXCESS BLDA CALC-SCNC: 2.2 MMOL/L
BASOPHILS # BLD MANUAL: 0 THOUSAND/UL (ref 0–0.1)
BASOPHILS NFR MAR MANUAL: 0 % (ref 0–1)
BILIRUB SERPL-MCNC: 0.94 MG/DL (ref 0.2–1)
BUN SERPL-MCNC: 26 MG/DL (ref 5–25)
BUN SERPL-MCNC: 27 MG/DL (ref 5–25)
CA-I BLD-SCNC: 0.96 MMOL/L (ref 1.12–1.32)
CALCIUM ALBUM COR SERPL-MCNC: 8.8 MG/DL (ref 8.3–10.1)
CALCIUM SERPL-MCNC: 7.8 MG/DL (ref 8.4–10.2)
CALCIUM SERPL-MCNC: 8 MG/DL (ref 8.4–10.2)
CARDIAC TROPONIN I PNL SERPL HS: 11 NG/L (ref ?–50)
CARDIAC TROPONIN I PNL SERPL HS: 13 NG/L (ref ?–50)
CARDIAC TROPONIN I PNL SERPL HS: 13 NG/L (ref ?–50)
CHLORIDE SERPL-SCNC: 100 MMOL/L (ref 96–108)
CHLORIDE SERPL-SCNC: 102 MMOL/L (ref 96–108)
CO2 SERPL-SCNC: 22 MMOL/L (ref 21–32)
CO2 SERPL-SCNC: 24 MMOL/L (ref 21–32)
CREAT SERPL-MCNC: 1.22 MG/DL (ref 0.6–1.3)
CREAT SERPL-MCNC: 1.36 MG/DL (ref 0.6–1.3)
EOSINOPHIL # BLD MANUAL: 0.25 THOUSAND/UL (ref 0–0.4)
EOSINOPHIL NFR BLD MANUAL: 2 % (ref 0–6)
ERYTHROCYTE [DISTWIDTH] IN BLOOD BY AUTOMATED COUNT: 16.9 % (ref 11.6–15.1)
ERYTHROCYTE [DISTWIDTH] IN BLOOD BY AUTOMATED COUNT: 16.9 % (ref 11.6–15.1)
GFR SERPL CREATININE-BSD FRML MDRD: 34 ML/MIN/1.73SQ M
GFR SERPL CREATININE-BSD FRML MDRD: 39 ML/MIN/1.73SQ M
GLUCOSE SERPL-MCNC: 117 MG/DL (ref 65–140)
GLUCOSE SERPL-MCNC: 120 MG/DL (ref 65–140)
GLUCOSE SERPL-MCNC: 123 MG/DL (ref 65–140)
GLUCOSE SERPL-MCNC: 131 MG/DL (ref 65–140)
GLUCOSE SERPL-MCNC: 133 MG/DL (ref 65–140)
GLUCOSE SERPL-MCNC: 143 MG/DL (ref 65–140)
GLUCOSE SERPL-MCNC: 223 MG/DL (ref 65–140)
HCO3 BLDA-SCNC: 26 MMOL/L (ref 22–28)
HCT VFR BLD AUTO: 32.2 % (ref 34.8–46.1)
HCT VFR BLD AUTO: 34.9 % (ref 34.8–46.1)
HGB BLD-MCNC: 10.2 G/DL (ref 11.5–15.4)
HGB BLD-MCNC: 11.2 G/DL (ref 11.5–15.4)
LYMPHOCYTES # BLD AUTO: 1.11 THOUSAND/UL (ref 0.6–4.47)
LYMPHOCYTES # BLD AUTO: 9 % (ref 14–44)
MAGNESIUM SERPL-MCNC: 2 MG/DL (ref 1.9–2.7)
MAGNESIUM SERPL-MCNC: 2.1 MG/DL (ref 1.9–2.7)
MCH RBC QN AUTO: 29.6 PG (ref 26.8–34.3)
MCH RBC QN AUTO: 29.7 PG (ref 26.8–34.3)
MCHC RBC AUTO-ENTMCNC: 31.7 G/DL (ref 31.4–37.4)
MCHC RBC AUTO-ENTMCNC: 32.1 G/DL (ref 31.4–37.4)
MCV RBC AUTO: 92 FL (ref 82–98)
MCV RBC AUTO: 94 FL (ref 82–98)
METAMYELOCYTE ABSOLUTE CT: 0.37 THOUSAND/UL (ref 0–0.1)
METAMYELOCYTES NFR BLD MANUAL: 3 % (ref 0–1)
MONOCYTES # BLD AUTO: 0.86 THOUSAND/UL (ref 0–1.22)
MONOCYTES NFR BLD: 7 % (ref 4–12)
MYELOCYTE ABSOLUTE CT: 0.25 THOUSAND/UL (ref 0–0.1)
MYELOCYTES NFR BLD MANUAL: 2 % (ref 0–1)
NEUTROPHILS # BLD MANUAL: 9.5 THOUSAND/UL (ref 1.85–7.62)
NEUTS SEG NFR BLD AUTO: 77 % (ref 43–75)
NON VENT ROOM AIR: 21 %
O2 CT BLDA-SCNC: 13.7 ML/DL (ref 16–23)
OXYHGB MFR BLDA: 93 % (ref 94–97)
PCO2 BLDA: 37.2 MM HG (ref 36–44)
PH BLDA: 7.46 [PH] (ref 7.35–7.45)
PHOSPHATE SERPL-MCNC: 1.9 MG/DL (ref 2.3–4.1)
PLATELET # BLD AUTO: 150 THOUSANDS/UL (ref 149–390)
PLATELET # BLD AUTO: 154 THOUSANDS/UL (ref 149–390)
PLATELET BLD QL SMEAR: ADEQUATE
PMV BLD AUTO: 11.8 FL (ref 8.9–12.7)
PMV BLD AUTO: 11.9 FL (ref 8.9–12.7)
PO2 BLDA: 74.7 MM HG (ref 75–129)
POIKILOCYTOSIS BLD QL SMEAR: PRESENT
POLYCHROMASIA BLD QL SMEAR: PRESENT
POTASSIUM SERPL-SCNC: 3.5 MMOL/L (ref 3.5–5.3)
POTASSIUM SERPL-SCNC: 3.5 MMOL/L (ref 3.5–5.3)
PROT SERPL-MCNC: 6.1 G/DL (ref 6.4–8.4)
RBC # BLD AUTO: 3.43 MILLION/UL (ref 3.81–5.12)
RBC # BLD AUTO: 3.79 MILLION/UL (ref 3.81–5.12)
RBC MORPH BLD: PRESENT
SODIUM SERPL-SCNC: 133 MMOL/L (ref 135–147)
SODIUM SERPL-SCNC: 134 MMOL/L (ref 135–147)
SPECIMEN SOURCE: ABNORMAL
WBC # BLD AUTO: 12.1 THOUSAND/UL (ref 4.31–10.16)
WBC # BLD AUTO: 12.34 THOUSAND/UL (ref 4.31–10.16)

## 2025-03-26 PROCEDURE — 36600 WITHDRAWAL OF ARTERIAL BLOOD: CPT

## 2025-03-26 PROCEDURE — 93005 ELECTROCARDIOGRAM TRACING: CPT

## 2025-03-26 PROCEDURE — 99232 SBSQ HOSP IP/OBS MODERATE 35: CPT | Performed by: INTERNAL MEDICINE

## 2025-03-26 PROCEDURE — NC001 PR NO CHARGE: Performed by: INTERNAL MEDICINE

## 2025-03-26 PROCEDURE — 70496 CT ANGIOGRAPHY HEAD: CPT

## 2025-03-26 PROCEDURE — 70498 CT ANGIOGRAPHY NECK: CPT

## 2025-03-26 PROCEDURE — 99291 CRITICAL CARE FIRST HOUR: CPT | Performed by: PSYCHIATRY & NEUROLOGY

## 2025-03-26 PROCEDURE — G0545 PR INHERENT VISIT TO INPT: HCPCS | Performed by: INTERNAL MEDICINE

## 2025-03-26 PROCEDURE — 99232 SBSQ HOSP IP/OBS MODERATE 35: CPT

## 2025-03-26 PROCEDURE — 82805 BLOOD GASES W/O2 SATURATION: CPT

## 2025-03-26 PROCEDURE — 82140 ASSAY OF AMMONIA: CPT

## 2025-03-26 PROCEDURE — 83735 ASSAY OF MAGNESIUM: CPT | Performed by: PHYSICIAN ASSISTANT

## 2025-03-26 PROCEDURE — 80048 BASIC METABOLIC PNL TOTAL CA: CPT

## 2025-03-26 PROCEDURE — 85007 BL SMEAR W/DIFF WBC COUNT: CPT | Performed by: PHYSICIAN ASSISTANT

## 2025-03-26 PROCEDURE — 85027 COMPLETE CBC AUTOMATED: CPT | Performed by: PHYSICIAN ASSISTANT

## 2025-03-26 PROCEDURE — 84100 ASSAY OF PHOSPHORUS: CPT

## 2025-03-26 PROCEDURE — 85027 COMPLETE CBC AUTOMATED: CPT

## 2025-03-26 PROCEDURE — 84484 ASSAY OF TROPONIN QUANT: CPT

## 2025-03-26 PROCEDURE — 82948 REAGENT STRIP/BLOOD GLUCOSE: CPT

## 2025-03-26 PROCEDURE — 82330 ASSAY OF CALCIUM: CPT

## 2025-03-26 PROCEDURE — 83735 ASSAY OF MAGNESIUM: CPT

## 2025-03-26 PROCEDURE — 80053 COMPREHEN METABOLIC PANEL: CPT | Performed by: PHYSICIAN ASSISTANT

## 2025-03-26 RX ORDER — LIDOCAINE 50 MG/G
1 PATCH TOPICAL DAILY
Status: DISCONTINUED | OUTPATIENT
Start: 2025-03-26 | End: 2025-03-28 | Stop reason: HOSPADM

## 2025-03-26 RX ADMIN — PREGABALIN 75 MG: 75 CAPSULE ORAL at 16:48

## 2025-03-26 RX ADMIN — ACETAMINOPHEN 975 MG: 325 TABLET, FILM COATED ORAL at 14:00

## 2025-03-26 RX ADMIN — INSULIN LISPRO 1 UNITS: 100 INJECTION, SOLUTION INTRAVENOUS; SUBCUTANEOUS at 22:36

## 2025-03-26 RX ADMIN — Medication 1000 UNITS: at 08:35

## 2025-03-26 RX ADMIN — DULOXETINE 60 MG: 60 CAPSULE, DELAYED RELEASE ORAL at 08:36

## 2025-03-26 RX ADMIN — IOHEXOL 85 ML: 350 INJECTION, SOLUTION INTRAVENOUS at 09:21

## 2025-03-26 RX ADMIN — ENOXAPARIN SODIUM 30 MG: 30 INJECTION SUBCUTANEOUS at 08:35

## 2025-03-26 RX ADMIN — HYDROXYCHLOROQUINE SULFATE 200 MG: 200 TABLET ORAL at 08:37

## 2025-03-26 RX ADMIN — PYRIDOXINE HCL TAB 50 MG 100 MG: 50 TAB at 08:35

## 2025-03-26 RX ADMIN — ATORVASTATIN CALCIUM 10 MG: 10 TABLET, FILM COATED ORAL at 08:35

## 2025-03-26 RX ADMIN — FUROSEMIDE 20 MG: 20 TABLET ORAL at 08:35

## 2025-03-26 RX ADMIN — CEFAZOLIN SODIUM 2000 MG: 2 SOLUTION INTRAVENOUS at 03:45

## 2025-03-26 RX ADMIN — CEFAZOLIN SODIUM 2000 MG: 2 SOLUTION INTRAVENOUS at 16:47

## 2025-03-26 RX ADMIN — LIDOCAINE 1 PATCH: 50 PATCH CUTANEOUS at 10:16

## 2025-03-26 RX ADMIN — OXYBUTYNIN CHLORIDE 10 MG: 5 TABLET, EXTENDED RELEASE ORAL at 08:36

## 2025-03-26 RX ADMIN — ASPIRIN 81 MG CHEWABLE TABLET 81 MG: 81 TABLET CHEWABLE at 08:35

## 2025-03-26 RX ADMIN — PREGABALIN 75 MG: 75 CAPSULE ORAL at 08:35

## 2025-03-26 RX ADMIN — PANTOPRAZOLE SODIUM 40 MG: 40 TABLET, DELAYED RELEASE ORAL at 03:51

## 2025-03-26 RX ADMIN — Medication 2.5 MG: at 05:51

## 2025-03-26 NOTE — ASSESSMENT & PLAN NOTE
Patient lethargic is setting of acute illness and s/p pain med, but responsive to name and exam. 3/14/2025 CT head: No acute intercranial abnormality. 3/26/25 CT head no mass effect, acute hemorrhage or recent infarction evidence  -antibiotic/work up as above  -monitor mentation  -symptomatic management per critical care team  -aspiration precautions  -f/u brain imaging

## 2025-03-26 NOTE — ASSESSMENT & PLAN NOTE
Continue home amitriptyline which was resumed today after initially being held and scheduled Tylenol.  D/c low dose oxy 2/2 AMS after receiving   Continue above  Lidocaine patch to lower back

## 2025-03-26 NOTE — ASSESSMENT & PLAN NOTE
Noted to have metabolic encephalopathy initially.  Felt secondary to sepsis.  Mental status improved but this AM was confused/delirious again but then improved  3/26 difficult to arouse with noted slurred speech. Noted to be given oxy 2.5 around 6am, and has been receiving oxybutynin giving concern for possible TME-- stroke alert called.  Labs unremarkable, ABG pending   CT head/CTA unremarkable   Neurology following, appreciate recs   Recommending continue aspirin daily   MRI ordered

## 2025-03-26 NOTE — ASSESSMENT & PLAN NOTE
Tanya Stephen is a 89 y.o. with a pertinent PMH of HTN, DM2, CKD, CHF, and recurrent UTIs who is currently addmited with severe sepsis likely related to MSSA bacteremia on 3/26/2025 at 0855 stroke alert activated for symptoms concerning for stroke consisting of AMS with decreased level of arousal, difficulty answering questions/following commands, and generalized weakness. Last known well unclear but sometime night prior. Of note patient received oxycodone a few hours prior to noted change in mental status which reportedly  said happen 2 days ago. At time of alert, NIHSS 15 although artificially elevated in the setting of level of arousal, SBP in the 120s , FSBG 123. NC CTH without acute intracranial pathology and CTA H/N did not reveal evidence of LVO or high grade stenosis.   As a result of > 4.5 hours from time of symptom onset pt was not determined to be a candidate for thrombolysis (TNK) and no IR target therefore not a candidate for mechanical thrombectomy.     Relevant PMH  Home Antithrombotic Therapy -  Aspirin 81 mg QD  Stroke Risk Factors - HTN and DM  Prior Stroke Hx - Unknown  Past Neurological Hx - no neurological problems    Impression: Clinical presentation suggestive of toxic metabolic encephalopathy with evidence of new leukocytosis and in the setting of polypharmacy however cannot fully r/o acute vascular process work up outlined below.    Plan: Discussed plan with neurology attending, Dr. Uriostegui  Admit along the stroke pathway with telemetry monitoring  Frequent Neuro Checks - STAT CTH for change in exam  Strict NPO prior to dysphagia screen   BP Goals: Permissive Hypertension - SBP < 220/120 for first 24 hrs, followed by gradual reduction if neurologically stable   AP/AC: Continue ASA  High Intensity Statin: Start Atorvastatin 40 mg QHS  Imaging/Diagnostics: MRI Brain w/o contrast, Transthoracic Echocardiogram   Labs: Lipid Panel, A1c  Euglycemia (-180) and Euthermia    PT/OT/ST/PMR evaluations when able  Stroke education and counseling  Rest of other care per primary team appreciated    Disposition: PT Recommendations - Rehab Resource Intensity Level, PT: II (Moderate Resource Intensity)

## 2025-03-26 NOTE — ASSESSMENT & PLAN NOTE
Lab Results   Component Value Date    EGFR 39 03/26/2025    EGFR 34 03/26/2025    EGFR 43 03/25/2025    CREATININE 1.22 03/26/2025    CREATININE 1.36 (H) 03/26/2025    CREATININE 1.11 03/25/2025

## 2025-03-26 NOTE — ASSESSMENT & PLAN NOTE
Admission blood cultures 2 of 2 sets growing MSSA. Possible skin source as portal of entry with multiple extremity abrasions/wounds. 3/20/25 TTE no vegetation seen  3/21/25 Blood culture 1 of 2 sets + MSSA 3/24/25 blood cultures negative at 48 hours  -Continue Cefazolin 2 g IV q 12 hours, renal dose adjusted high dose  -follow up 3/24/25 repeat blood cultures  -can place PICC line 3/27/25 if 3/24/25 repeats remain negative  -duration of antibiotic dependent on blood culture clearance; anticipate completing 6 week IV Cefazolin course from blood culture clearance through 5/4/25.

## 2025-03-26 NOTE — ASSESSMENT & PLAN NOTE
Lab Results   Component Value Date    HGBA1C 6.7 (H) 03/20/2025       Recent Labs     03/25/25  1631 03/25/25 2055 03/26/25  0710 03/26/25  0859   POCGLU 178* 143* 131 120       Blood Sugar Average: Last 72 hrs:  (P) 145.5012923020799879

## 2025-03-26 NOTE — ASSESSMENT & PLAN NOTE
Wt Readings from Last 3 Encounters:   03/22/25 61.7 kg (136 lb 0.4 oz)   02/24/25 65 kg (143 lb 4.8 oz)   01/27/25 54 kg (119 lb)

## 2025-03-26 NOTE — ASSESSMENT & PLAN NOTE
Noted to have fever, tachycardia, leukocytosis and required vasopressor support  Secondary to MSSA bacteremia  Subsequently transferred from ICU to medical surgical floor  Infectious disease following  Blood cultures from 3/21 and 3/19 positive. Repeat blood cultures from 3/24 negative x24 hours  Fluids d/c'd 3/25  Bump in leukocytosis noted this am - afebrile. Continue to monitor on CBC daily

## 2025-03-26 NOTE — PROGRESS NOTES
Progress Note - Hospitalist   Name: Tanya Stephen 89 y.o. female I MRN: [id number]  Unit/Bed#: ICU 12 I Date of Admission: 3/19/2025   Date of Service: 3/26/2025 I Hospital Day: 7    Assessment & Plan  Severe sepsis (HCC)  Noted to have fever, tachycardia, leukocytosis and required vasopressor support  Secondary to MSSA bacteremia  Subsequently transferred from ICU to medical surgical floor  Infectious disease following  Blood cultures from 3/21 and 3/19 positive. Repeat blood cultures from 3/24 negative x24 hours  Fluids d/c'd 3/25  Bump in leukocytosis noted this am - afebrile. Continue to monitor on CBC daily   MSSA bacteremia  Blood cultures from 3/19/2025 both positive for MSSA.    Repeat blood cultures from 3/21/2025 1 out of 2 positive.    Repeat blood cultures 3/24 negative thus far  Will need PICC placed when negative x72 hours -- anticipate 6 week course abx after clearance of bacteremia   Echocardiogram with no evidence of vegetation   Skin source felt to be portal of entry given multiple extremity abrasions and wounds   Continue IV Ancef 2 g every 12 hours  Acute encephalopathy  Noted to have metabolic encephalopathy initially.  Felt secondary to sepsis.  Mental status improved but this AM was confused/delirious again but then improved  3/26 difficult to arouse with noted slurred speech. Noted to be given oxy 2.5 around 6am, and has been receiving oxybutynin giving concern for possible TME-- stroke alert called.  Labs unremarkable, ABG pending   CT head/CTA unremarkable   Neurology following, appreciate recs   Recommending continue aspirin daily   MRI ordered  Thrombocytopenia ([facility])  Thrombocytopenia felt to be likely related to acute infection/bacteremia   Baseline has platelets in the 125-150 range.    Presented with platelet count of 120.  Did decrease to 85 and now increased to 125.  Chronic kidney disease, stage IV (severe) ([facility])  Lab Results   Component Value Date    EGFR 34 03/26/2025    EGFR  43 03/25/2025    EGFR 39 03/24/2025    CREATININE 1.36 (H) 03/26/2025    CREATININE 1.11 03/25/2025    CREATININE 1.21 03/24/2025   Continue to monitor, avoid nephrotoxic medication  Creatinine is stable baseline is 1.4-1.6  Follows with nephrology as an outpatient  Within baseline, continue to monitor on BMP daily   Type 2 diabetes mellitus (HCC)  Lab Results   Component Value Date    HGBA1C 6.7 (H) 03/20/2025       Recent Labs     03/25/25  1145 03/25/25  1631 03/25/25  2055 03/26/25  0710   POCGLU 123 178* 143* 131       Blood Sugar Average: Last 72 hrs:  (P) 147.1780091773509522  Currently on sliding scale insulin  Only requiring approximately 2 to 3 units/day  OAB (overactive bladder)  On Myrbetriq - substituted with oxybutynin here  Chronic pain syndrome  Continue home amitriptyline which was resumed today after initially being held and scheduled Tylenol.  D/c low dose oxy 2/2 AMS after receiving   Continue above  Lidocaine patch to lower back   Chronic bilateral pleural effusions  CT CAP: 3/19/25: Moderate right and small left pleural effusions. The right pleural effusion has slightly increased since the prior study. There is associated bilateral lower lobe atelectasis. Shown on multiple previous images  Patient is not hypoxic or having respiratory symptoms.  Recommend outpatient follow-up    VTE Pharmacologic Prophylaxis: VTE Score: 6 High Risk (Score >/= 5) - Pharmacological DVT Prophylaxis Ordered: enoxaparin (Lovenox). Sequential Compression Devices Ordered.    Mobility:   Basic Mobility Inpatient Raw Score: 12  JH-HLM Goal: 4: Move to chair/commode  JH-HLM Achieved: 4: Move to chair/commode  JH-HLM Goal achieved. Continue to encourage appropriate mobility.    Patient Centered Rounds: I performed bedside rounds with nursing staff today.   Discussions with Specialists or Other Care Team Provider: Neuro    Education and Discussions with Family / Patient: Updated  () at  bedside.    Current Length of Stay: 7 day(s)  Current Patient Status: Inpatient   Certification Statement: The patient will continue to require additional inpatient hospital stay due to continued IV abx, brain MRI, safe dispo  Discharge Plan: Anticipate discharge in >72 hrs to discharge location to be determined pending rehab evaluations.    Code Status: Level 1 - Full Code    Subjective   Called to bedside this am for AMS and slurred speech. Patient difficult to arouse. Alert to self. Was not following commands. Stroke alert was called. Was noted to have oxy 2.5 around 6am likely contributing. Evaluated again after CT a little more awake. Continues to complain of back and RLE pain.     Objective :  Temp:  [97.3 °F (36.3 °C)-97.9 °F (36.6 °C)] 97.4 °F (36.3 °C)  HR:  [] 101  BP: (104-121)/(54-62) 111/56  Resp:  [16-20] 20  SpO2:  [93 %-99 %] 95 %  O2 Device: None (Room air)    Body mass index is 27.47 kg/m².     Input and Output Summary (last 24 hours):     Intake/Output Summary (Last 24 hours) at 3/26/2025 0736  Last data filed at 3/26/2025 0400  Gross per 24 hour   Intake 430 ml   Output 237 ml   Net 193 ml       Physical Exam  Constitutional:       Appearance: She is ill-appearing.   HENT:      Head: Normocephalic and atraumatic.      Nose: Nose normal.      Mouth/Throat:      Mouth: Mucous membranes are dry.   Eyes:      Conjunctiva/sclera: Conjunctivae normal.   Cardiovascular:      Rate and Rhythm: Normal rate.      Heart sounds: Normal heart sounds.   Pulmonary:      Effort: Pulmonary effort is normal.      Breath sounds: Normal breath sounds.   Abdominal:      General: There is no distension.      Palpations: Abdomen is soft.      Tenderness: There is no abdominal tenderness.   Musculoskeletal:      Right lower leg: No edema.      Left lower leg: No edema.   Skin:     Findings: Bruising and erythema present.      Comments: Bilateral LE, UE   Neurological:      General: No focal deficit present.       Mental Status: She is disoriented.      Cranial Nerves: No facial asymmetry.      Motor: Weakness (LE) present.      Comments: Unable to follow commands  Equal strength UE   Notable slurred speech       Lines/Drains:        Lab Results: I have reviewed the following results:   Results from last 7 days   Lab Units 03/26/25  0437 03/24/25  0402 03/23/25  0553 03/22/25  0443 03/21/25  0442   WBC Thousand/uL 12.34*   < > 7.84   < > 7.16   HEMOGLOBIN g/dL 11.2*   < > 10.5*   < > 10.1*   HEMATOCRIT % 34.9   < > 32.1*   < > 31.7*   PLATELETS Thousands/uL 154   < > 85*   < > 100*   BANDS PCT %  --   --   --   --  2   SEGS PCT %  --   --  81*   < >  --    LYMPHO PCT % 9*   < > 8*   < > 3*   MONO PCT % 7   < > 9   < > 0*   EOS PCT % 2   < > 1   < > 0    < > = values in this interval not displayed.     Results from last 7 days   Lab Units 03/26/25  0437   SODIUM mmol/L 133*   POTASSIUM mmol/L 3.5   CHLORIDE mmol/L 100   CO2 mmol/L 24   BUN mg/dL 26*   CREATININE mg/dL 1.36*   ANION GAP mmol/L 9   CALCIUM mg/dL 8.0*   ALBUMIN g/dL 3.0*   TOTAL BILIRUBIN mg/dL 0.94   ALK PHOS U/L 276*   ALT U/L <3*   AST U/L 39   GLUCOSE RANDOM mg/dL 133     Results from last 7 days   Lab Units 03/19/25  1842   INR  1.25*     Results from last 7 days   Lab Units 03/26/25  0710 03/25/25 2055 03/25/25  1631 03/25/25  1145 03/24/25  2113 03/24/25  1601 03/24/25  1151 03/24/25  0803 03/23/25  2248 03/23/25  2059 03/23/25  1516 03/23/25  1138   POC GLUCOSE mg/dl 131 143* 178* 123 125 160* 132 138 166* 149* 181* 152*     Results from last 7 days   Lab Units 03/20/25  0045   HEMOGLOBIN A1C % 6.7*     Results from last 7 days   Lab Units 03/21/25  0442 03/20/25  1501 03/20/25  1107 03/20/25  0745 03/19/25  2215 03/19/25  1842   LACTIC ACID mmol/L 1.1 2.3* 2.2* 2.7*   < > 2.8*   PROCALCITONIN ng/ml  --   --   --  6.56*  --  1.10*    < > = values in this interval not displayed.       Recent Cultures (last 7 days):   Results from last 7 days   Lab Units  03/24/25  1151 03/24/25  1037 03/21/25  0509 03/21/25  0459 03/19/25  1842   BLOOD CULTURE  No Growth at 24 hrs. No Growth at 24 hrs. No Growth After 4 Days. Staphylococcus aureus* Staphylococcus aureus*  Staphylococcus aureus*   GRAM STAIN RESULT   --   --   --  Gram positive cocci in clusters* Gram positive cocci in clusters*  Gram positive cocci in clusters*       Imaging Results Review: I reviewed radiology reports from this admission including: CT head.  Other Study Results Review: EKG was reviewed.     Last 24 Hours Medication List:     Current Facility-Administered Medications:     acetaminophen (TYLENOL) tablet 975 mg, Q8H MEGGAN    amitriptyline (ELAVIL) tablet 10 mg, HS    aspirin chewable tablet 81 mg, Daily    atorvastatin (LIPITOR) tablet 10 mg, Daily    ceFAZolin (ANCEF) IVPB (premix in dextrose) 2,000 mg 50 mL, Q12H, Last Rate: 2,000 mg (03/26/25 0345)    Cholecalciferol (VITAMIN D3) tablet 1,000 Units, Daily    DULoxetine (CYMBALTA) delayed release capsule 60 mg, Daily    enoxaparin (LOVENOX) subcutaneous injection 30 mg, Daily    furosemide (LASIX) tablet 20 mg, Daily    hydroxychloroquine (PLAQUENIL) tablet 200 mg, Daily With Breakfast    insulin lispro (HumALOG/ADMELOG) 100 units/mL subcutaneous injection 1-5 Units, 4x Daily (AC & HS) **AND** Fingerstick Glucose (POCT), 4x Daily AC and at bedtime    oxybutynin (DITROPAN-XL) 24 hr tablet 10 mg, Daily    oxyCODONE (ROXICODONE) split tablet 2.5 mg, Q6H PRN    pantoprazole (PROTONIX) EC tablet 40 mg, Early Morning    pregabalin (LYRICA) capsule 75 mg, BID    pyridoxine (VITAMIN B6) tablet 100 mg, Daily    trimethobenzamide (TIGAN) IM injection 200 mg, Q6H PRN    Administrative Statements   Today, Patient Was Seen By: Pattie Villaseñor PA-C    **Please Note: This note may have been constructed using a voice recognition system.**

## 2025-03-26 NOTE — ASSESSMENT & PLAN NOTE
Lab Results   Component Value Date    HGBA1C 6.7 (H) 03/20/2025       Recent Labs     03/25/25  1631 03/25/25 2055 03/26/25  0710 03/26/25  0859   POCGLU 178* 143* 131 120       Blood Sugar Average: Last 72 hrs:  (P) 145.1986854838461144

## 2025-03-26 NOTE — ASSESSMENT & PLAN NOTE
Lab Results   Component Value Date    EGFR 34 03/26/2025    EGFR 43 03/25/2025    EGFR 39 03/24/2025    CREATININE 1.36 (H) 03/26/2025    CREATININE 1.11 03/25/2025    CREATININE 1.21 03/24/2025

## 2025-03-26 NOTE — RAPID RESPONSE
Rapid Response Note  Tanya Stephen 89 y.o. female MRN: [id number]  Unit/Bed#: ICU 12 Encounter: [id number]    Rapid Response Notification(s):   Response called date/time:  3/26/2025 8:55 AM  Response team arrival date/time:  3/26/2025 8:55 AM  Response end date/time:  3/26/2025 9:00 AM  Level of care:  Stepdown 1  Rapid response location:  ICU  Primary reason for rapid response call:  Acute change in neuro status    Rapid Response Intervention(s):   Airway:  None  Breathing:  None  Circulation:  None  Fluids administered:  None  Medications administered:  None       Assessment:   Acute change in mental status  Generalized but symmetric weakness  Slow to respond, can only name one of 3 items  Blood glucose 120s, AMS not due to hypoglycemia  Unlikely stroke given no focal neurologic deficits  Suspect slow to wake up    Plan:   Repeat labs, check electrolytes  CT head per neurology     Rapid Response Outcome:   Transfer:  Other (comment) (remain SD1)  Code Status: Level 1 (Full Code)      Family notified: Primary team to notify Family Member       Background/Situation:   Tanya Stephen is a 89 y.o. female who was found to be altered this AM by hospitalist. Rapid response called then stroke alert called. Patient is confused and slow to respond. Generalized but symmetric weakness. Able to name only 1 of 3 objects. Difficulty keeping eyes open but symmetric. Neurology at bedside. Per nursing patient had been sleeping all night. Last known well yesterday evening.    Review of Systems   Constitutional:  Negative for chills, diaphoresis and fever.   HENT:  Negative for congestion, ear pain, postnasal drip, rhinorrhea and sore throat.    Eyes:  Negative for pain and visual disturbance.   Respiratory:  Negative for cough, chest tightness and shortness of breath.    Cardiovascular:  Negative for chest pain and palpitations.   Gastrointestinal:  Negative for abdominal pain, constipation, diarrhea, nausea and vomiting.    Genitourinary:  Negative for dysuria and hematuria.   Musculoskeletal:  Negative for arthralgias and back pain.   Skin:  Negative for color change and rash.   Neurological:  Positive for weakness. Negative for dizziness, seizures, syncope, light-headedness, numbness and headaches.   Psychiatric/Behavioral:  Positive for confusion.    All other systems reviewed and are negative.      Objective:   Vitals:    03/26/25 0300 03/26/25 0400 03/26/25 0500 03/26/25 0700   BP:    111/56   BP Location:    Left arm   Pulse: 101 101 101 101   Resp:       Temp:    (!) 97.4 °F (36.3 °C)   TempSrc:    Oral   SpO2: 97% 98% 97% 95%   Weight:       Height:         Physical Exam  Constitutional:       General: She is not in acute distress.     Appearance: She is not diaphoretic.   HENT:      Head: Normocephalic and atraumatic.      Nose: No congestion or rhinorrhea.      Mouth/Throat:      Mouth: Mucous membranes are moist.      Pharynx: No oropharyngeal exudate.   Eyes:      General: No scleral icterus.  Cardiovascular:      Rate and Rhythm: Normal rate and regular rhythm.      Heart sounds: Normal heart sounds. No murmur heard.     No friction rub. No gallop.   Pulmonary:      Effort: No respiratory distress.      Breath sounds: Normal breath sounds. No wheezing, rhonchi or rales.   Abdominal:      General: Abdomen is flat. There is no distension.      Palpations: Abdomen is soft.      Tenderness: There is no abdominal tenderness. There is no guarding.   Lymphadenopathy:      Cervical: No cervical adenopathy.   Skin:     General: Skin is warm and dry.      Capillary Refill: Capillary refill takes less than 2 seconds.      Findings: No rash.   Neurological:      General: No focal deficit present.      Mental Status: She is confused.      GCS: GCS eye subscore is 3. GCS verbal subscore is 4. GCS motor subscore is 6.      Cranial Nerves: Cranial nerves 2-12 are intact.      Sensory: Sensation is intact.      Motor: Motor function is  intact.

## 2025-03-26 NOTE — ASSESSMENT & PLAN NOTE
>>ASSESSMENT AND PLAN FOR TYPE 2 DIABETES MELLITUS (HCC) WRITTEN ON 3/26/2025 11:28 AM BY KEILA KNUTSON PA-C    Lab Results   Component Value Date    HGBA1C 6.7 (H) 03/20/2025   -glycemic management per primary     Recent Labs     03/25/25 2055 03/26/25  0710 03/26/25  0859 03/26/25  1124   POCGLU 143* 131 120 143*       Blood Sugar Average: Last 72 hrs:  (P) 145.2

## 2025-03-26 NOTE — ASSESSMENT & PLAN NOTE
>>ASSESSMENT AND PLAN FOR TYPE 2 DIABETES MELLITUS (HCC) WRITTEN ON 3/26/2025  7:38 AM BY MARTIN GALVEZ PA-C    Lab Results   Component Value Date    HGBA1C 6.7 (H) 03/20/2025       Recent Labs     03/25/25  1145 03/25/25  1631 03/25/25 2055 03/26/25  0710   POCGLU 123 178* 143* 131       Blood Sugar Average: Last 72 hrs:  (P) [id number]  Currently on sliding scale insulin  Only requiring approximately 2 to 3 units/day

## 2025-03-26 NOTE — PROGRESS NOTES
Progress Note - Infectious Disease   Name: Tanya Stephen 89 y.o. female I MRN: [id number]  Unit/Bed#: ICU 12 I Date of Admission: 3/19/2025   Date of Service: 3/26/2025 I Hospital Day: 7    Assessment & Plan  Severe sepsis (HCC)  E/b fever, tachycardia, leukocytosis, hypotension requiring Vasopressor support. In setting of #2. Vasopressor management weaned off 3/20/25 by critical care team  -antibiotics as below  -follow-up cultures  -monitor temperature and hemodynamics  -serial exam  -additional inventions pending clinical course  -monitoring serial CBC and BMP for treatment response and any developing toxicities    MSSA bacteremia  Admission blood cultures 2 of 2 sets growing MSSA. Possible skin source as portal of entry with multiple extremity abrasions/wounds. 3/20/25 TTE no vegetation seen  3/21/25 Blood culture 1 of 2 sets + MSSA 3/24/25 blood cultures negative at 48 hours  -Continue Cefazolin 2 g IV q 12 hours, renal dose adjusted high dose  -follow up 3/24/25 repeat blood cultures  -can place PICC line 3/27/25 if 3/24/25 repeats remain negative  -duration of antibiotic dependent on blood culture clearance; anticipate completing 6 week IV Cefazolin course from blood culture clearance through 5/4/25.  Acute encephalopathy  Patient lethargic is setting of acute illness and s/p pain med, but responsive to name and exam. 3/14/2025 CT head: No acute intercranial abnormality. 3/26/25 CT head no mass effect, acute hemorrhage or recent infarction evidence  -antibiotic/work up as above  -monitor mentation  -symptomatic management per critical care team  -aspiration precautions  -f/u brain imaging  Chronic kidney disease, stage IV (severe) ([facility])  Lab Results   Component Value Date    EGFR 39 03/26/2025    EGFR 34 03/26/2025    EGFR 43 03/25/2025    CREATININE 1.22 03/26/2025    CREATININE 1.36 (H) 03/26/2025    CREATININE 1.11 03/25/2025   Estimated Creatinine Clearance: 26.1 mL/min (by C-G formula based on SCr of  1.22 mg/dL).  -renal dose adjust antibiotic as needed  -volume management   -recheck BMP  Type 2 diabetes mellitus ([facility])  Lab Results   Component Value Date    HGBA1C 6.7 (H) 03/20/2025   -glycemic management per primary     Recent Labs     03/25/25 2055 03/26/25  0710 03/26/25  0859 03/26/25  1124   POCGLU 143* 131 120 143*       Blood Sugar Average: Last 72 hrs:  (P) 145.2    Chronic bilateral pleural effusions  -volume management per primary  CHF (congestive heart failure) ([facility] [place])  Wt Readings from Last 3 Encounters:   03/22/25 61.7 kg (136 lb 0.4 oz)   02/24/25 65 kg (143 lb 4.8 oz)   01/27/25 54 kg (119 lb)   -volume management per primary    Stroke-like symptoms      I have discussed with patient and  at bedside, RN, and Pattie of primary care team regarding the above plan to continue antibiotic as above and monitor closely. They agree with the plan.    Antibiotics:  Cefazolin D7 - D3 from 1st negative blood cultures    Subjective   Patient has no fever, chills, sweats overnight; no nausea, vomiting, diarrhea; no cough, shortness of breath; + chronic back pain. + chronic intermittent dysuria/pressure discomfort; no SPT today. Poor appetite.   Rapid response called this am with patient noted to be more confused/lethargic s/p oxycodone at 5 am > CT head no mass effect, acute hemorrhage or recent infarction evidence.     Objective :  Temp:  [97.2 °F (36.2 °C)-97.9 °F (36.6 °C)] 97.2 °F (36.2 °C)  HR:  [] 92  BP: (104-125)/(54-75) 125/75  Resp:  [16-20] 16  SpO2:  [93 %-99 %] 97 %  O2 Device: None (Room air)    General Appearance:  89 year old elderly, chronically debilitated female, nontoxic, no acute distress, resting in bed, sluggish and fatigued, arousable to exam   HEENT: Atraumatic normocephalic   Throat: Oropharynx moist. Poor dentition   Pulmonary:   Normal respiratory excursion without accessory muscle use, statting 97% on RA   Cardiac:  RRR decreased tones   Abdomen:   Soft, non-tender,  non-distended   Extremities: No edema   : No diaz, no focal SPT or flank tenderness on exam   Psychiatric: Arousable briefly to name/exam, cooperative   Skin: No new rashes. IV site nontender. Multiple stages of ecchymoses of skin.          Lab Results: I have reviewed the following results:  Results from last 7 days   Lab Units 03/26/25  0903 03/26/25  0437 03/25/25  0554   WBC Thousand/uL 12.10* 12.34* 9.22   HEMOGLOBIN g/dL 10.2* 11.2* 10.2*   PLATELETS Thousands/uL 150 154 125*     Results from last 7 days   Lab Units 03/26/25  0903 03/26/25  0437 03/25/25  0554 03/24/25  0402 03/23/25  0439   SODIUM mmol/L 134* 133* 134*   < > 131*   POTASSIUM mmol/L 3.5 3.5 3.7   < > 4.3   CHLORIDE mmol/L 102 100 103   < > 102   CO2 mmol/L 22 24 21   < > 21   BUN mg/dL 27* 26* 26*   < > 30*   CREATININE mg/dL 1.22 1.36* 1.11   < > 1.31*   EGFR ml/min/1.73sq m 39 34 43   < > 35   CALCIUM mg/dL 7.8* 8.0* 7.6*   < > 7.6*   AST U/L  --  39 44*  --  80*   ALT U/L  --  <3* <3*  --  <3*   ALK PHOS U/L  --  276* 275*  --  280*   ALBUMIN g/dL  --  3.0* 2.6*  --  2.8*    < > = values in this interval not displayed.     Results from last 7 days   Lab Units 03/24/25  1151 03/24/25  1037 03/21/25  0509 03/21/25  0459 03/20/25  1106 03/19/25  1842   BLOOD CULTURE  No Growth at 24 hrs. No Growth at 24 hrs. No Growth After 5 Days. Staphylococcus aureus*  --  Staphylococcus aureus*  Staphylococcus aureus*   GRAM STAIN RESULT   --   --   --  Gram positive cocci in clusters*  --  Gram positive cocci in clusters*  Gram positive cocci in clusters*   MRSA CULTURE ONLY   --   --   --   --  No Methicillin Resistant Staphlyococcus aureus (MRSA) isolated  --      Results from last 7 days   Lab Units 03/20/25  0745 03/19/25  1842   PROCALCITONIN ng/ml 6.56* 1.10*                 Imaging Results Review: I personally reviewed the following image studies in PACS and associated radiology reports: CT head. My interpretation of the radiology  images/reports is: 3/26/25 CT head no mass effect, acute hemorrhage or recent infarction evidence.  Other Study Results Review: My interpretation of other studies include: 3/20/25 TTE no vegetation seen.

## 2025-03-26 NOTE — ASSESSMENT & PLAN NOTE
Blood cultures from 3/19/2025 both positive for MSSA.    Repeat blood cultures from 3/21/2025 1 out of 2 positive.    Repeat blood cultures 3/24 negative thus far  Will need PICC placed when negative x72 hours -- anticipate 6 week course abx after clearance of bacteremia   Echocardiogram with no evidence of vegetation   Skin source felt to be portal of entry given multiple extremity abrasions and wounds   Continue IV Ancef 2 g every 12 hours

## 2025-03-26 NOTE — ASSESSMENT & PLAN NOTE
Lab Results   Component Value Date    EGFR 39 03/26/2025    EGFR 34 03/26/2025    EGFR 43 03/25/2025    CREATININE 1.22 03/26/2025    CREATININE 1.36 (H) 03/26/2025    CREATININE 1.11 03/25/2025   Estimated Creatinine Clearance: 26.1 mL/min (by C-G formula based on SCr of 1.22 mg/dL).  -renal dose adjust antibiotic as needed  -volume management   -recheck BMP

## 2025-03-26 NOTE — ASSESSMENT & PLAN NOTE
>>ASSESSMENT AND PLAN FOR TYPE 2 DIABETES MELLITUS (HCC) WRITTEN ON 3/26/2025 10:56 AM BY ANGY RANGEL, DO    Lab Results   Component Value Date    HGBA1C 6.7 (H) 03/20/2025       Recent Labs     03/25/25  1631 03/25/25 2055 03/26/25  0710 03/26/25  0859   POCGLU 178* 143* 131 120       Blood Sugar Average: Last 72 hrs:  (P) 145.3468902922246831

## 2025-03-26 NOTE — NURSING NOTE
Patient was confused, lethargic, delayed responses, having trouble finding words and altered. Called rapid response and stroke alert.

## 2025-03-26 NOTE — ASSESSMENT & PLAN NOTE
Lab Results   Component Value Date    HGBA1C 6.7 (H) 03/20/2025       Recent Labs     03/25/25  1145 03/25/25  1631 03/25/25 2055 03/26/25  0710   POCGLU 123 178* 143* 131       Blood Sugar Average: Last 72 hrs:  (P) 147.2866960862941417  Currently on sliding scale insulin  Only requiring approximately 2 to 3 units/day

## 2025-03-26 NOTE — ASSESSMENT & PLAN NOTE
Lab Results   Component Value Date    EGFR 34 03/26/2025    EGFR 43 03/25/2025    EGFR 39 03/24/2025    CREATININE 1.36 (H) 03/26/2025    CREATININE 1.11 03/25/2025    CREATININE 1.21 03/24/2025   Continue to monitor, avoid nephrotoxic medication  Creatinine is stable baseline is 1.4-1.6  Follows with nephrology as an outpatient  Within baseline, continue to monitor on BMP daily

## 2025-03-26 NOTE — CONSULTS
Consultation - Neurology   Name: Tanya Stephen 89 y.o. female I MRN: [id number]  Unit/Bed#: ICU 12 I Date of Admission: 3/19/2025   Date of Service: 3/26/2025 I Hospital Day: 7  Inpatient consult to Neurology  Consult performed by: Devon Finley DO  Consult ordered by: Pattie Villaseñor PA-C      Physician Requesting Evaluation: Luis Baires DO   Reason for Evaluation / Principal Problem: Stroke Alert        Assessment & Plan  Stroke-like symptoms  Tanya Stephen is a 89 y.o. with a pertinent PMH of HTN, DM2, CKD, CHF, and recurrent UTIs who is currently addmited with severe sepsis likely related to MSSA bacteremia on 3/26/2025 at 0855 stroke alert activated for symptoms concerning for stroke consisting of AMS with decreased level of arousal, difficulty answering questions/following commands, and generalized weakness. Last known well unclear but sometime night prior. Of note patient received oxycodone a few hours prior to noted change in mental status which reportedly  said happen 2 days ago. At time of alert, NIHSS 15 although artificially elevated in the setting of level of arousal, SBP in the 120s , FSBG 123. NC CTH without acute intracranial pathology and CTA H/N did not reveal evidence of LVO or high grade stenosis.   As a result of > 4.5 hours from time of symptom onset pt was not determined to be a candidate for thrombolysis (TNK) and no IR target therefore not a candidate for mechanical thrombectomy.     Relevant PMH  Home Antithrombotic Therapy -  Aspirin 81 mg QD  Stroke Risk Factors - HTN and DM  Prior Stroke Hx - Unknown  Past Neurological Hx - no neurological problems    Impression: Clinical presentation suggestive of toxic metabolic encephalopathy with evidence of new leukocytosis and in the setting of polypharmacy however cannot fully r/o acute vascular process work up outlined below.    Plan: Discussed plan with neurology attending, Dr. Moody Ritchie along the stroke pathway  with telemetry monitoring  Frequent Neuro Checks - STAT CTH for change in exam  Strict NPO prior to dysphagia screen   BP Goals: Permissive Hypertension - SBP < 220/120 for first 24 hrs, followed by gradual reduction if neurologically stable   AP/AC: Continue ASA  High Intensity Statin: Start Atorvastatin 40 mg QHS  Imaging/Diagnostics: MRI Brain w/o contrast, Transthoracic Echocardiogram   Labs: Lipid Panel, A1c  Euglycemia (-180) and Euthermia   PT/OT/ST/PMR evaluations when able  Stroke education and counseling  Rest of other care per primary team appreciated    Disposition: PT Recommendations - Rehab Resource Intensity Level, PT: II (Moderate Resource Intensity)  Type 2 diabetes mellitus (HCC)  Lab Results   Component Value Date    HGBA1C 6.7 (H) 03/20/2025       Recent Labs     03/25/25  1631 03/25/25  2055 03/26/25  0710 03/26/25  0859   POCGLU 178* 143* 131 120       Blood Sugar Average: Last 72 hrs:  (P) 145.2436941910663625    Chronic kidney disease, stage IV (severe) (Formerly [facility])  Lab Results   Component Value Date    EGFR 39 03/26/2025    EGFR 34 03/26/2025    EGFR 43 03/25/2025    CREATININE 1.22 03/26/2025    CREATININE 1.36 (H) 03/26/2025    CREATININE 1.11 03/25/2025     Thrombocytopenia (HCC)    CHF (congestive heart failure) (Formerly [facility])  Wt Readings from Last 3 Encounters:   03/22/25 61.7 kg (136 lb 0.4 oz)   02/24/25 65 kg (143 lb 4.8 oz)   01/27/25 54 kg (119 lb)         I have discussed the above management plan in detail with the emergency medicine service. Neurology service will follow. Please contact the SecureChat role for the Neurology service with any questions/concerns.    Thrombolytic Decision: Patient not a candidate. Unclear time of onset outside appropriate time window.    Neurology Follow Up: Recommendations for outpatient neurological follow up have yet to be determined.    History of Present Illness   Hx and PE limited by: mental status  Patient last known well: Time - Unclear Date -  3/25/25  Stroke alert called: 0855 on 3/26/2025   Neurology time of arrival: Immediate    HPI: Tanya Stephen is a 89 y.o. right handed female who is currently addmited for altered mental status found to have severe sepsis requiring vasopressors early this admission  stroke alert was called at 855 this AM after patient was noted to have a change it mental status as she was not following commands, had decreased level of arousal, and was not answering questions appropriately. It was reported that patient had similar presentation 2 days ago after receiving oxycodone which did have administered this AM at around 5 AM for chronic back pain.      Review of Systems   Unable to perform ROS: Mental status change     Past Medical History  I have reviewed the patient's PMH, PSH, Social History, Family History, Meds, and Allergies  Historical Information   Past Medical History: She  has a past medical history of Anemia, Arthritis, Back pain, CHF (congestive heart failure) (HCC), Chronic kidney disease, Confusion, Diabetes (HCC), Diabetes mellitus (HCC), Diabetic gastroparesis associated with type 2 diabetes mellitus  (HCC), Diabetic polyneuropathy (HCC), Diverticulosis, Herpes zoster, Hypertension, IBS (irritable bowel syndrome), Neurogenic claudication due to lumbar spinal stenosis, Osteoarthritis, Osteoporosis, Pulmonary edema, Raynaud's phenomenon without gangrene, Seronegative arthropathy of multiple sites (HCC), and Sicca (HCC).   Past Surgical History: She  has a past surgical history that includes Joint replacement; Other surgical history; Back surgery; EGD AND COLONOSCOPY (N/A, 12/23/2016); Replacement total knee bilateral; Stomach surgery; Colonoscopy; and Upper gastrointestinal endoscopy.  Family History   Problem Relation Age of Onset    Cancer Brother     Diabetes Mother     Hypertension Father     Heart disease Father         Social History: She  reports that she quit smoking about 50 years ago. Her smoking use  included cigarettes. She has never been exposed to tobacco smoke. She has never used smokeless tobacco. She reports that she does not currently use alcohol. She reports that she does not use drugs.       Current Facility-Administered Medications:     acetaminophen (TYLENOL) tablet 975 mg, Q8H MEGGAN    amitriptyline (ELAVIL) tablet 10 mg, HS    aspirin chewable tablet 81 mg, Daily    atorvastatin (LIPITOR) tablet 10 mg, Daily    ceFAZolin (ANCEF) IVPB (premix in dextrose) 2,000 mg 50 mL, Q12H, Last Rate: 2,000 mg (03/26/25 0345)    Cholecalciferol (VITAMIN D3) tablet 1,000 Units, Daily    DULoxetine (CYMBALTA) delayed release capsule 60 mg, Daily    enoxaparin (LOVENOX) subcutaneous injection 30 mg, Daily    furosemide (LASIX) tablet 20 mg, Daily    hydroxychloroquine (PLAQUENIL) tablet 200 mg, Daily With Breakfast    insulin lispro (HumALOG/ADMELOG) 100 units/mL subcutaneous injection 1-5 Units, 4x Daily (AC & HS) **AND** Fingerstick Glucose (POCT), 4x Daily AC and at bedtime    lidocaine (LIDODERM) 5 % patch 1 patch, Daily    oxybutynin (DITROPAN-XL) 24 hr tablet 10 mg, Daily    pantoprazole (PROTONIX) EC tablet 40 mg, Early Morning    pregabalin (LYRICA) capsule 75 mg, BID    pyridoxine (VITAMIN B6) tablet 100 mg, Daily    trimethobenzamide (TIGAN) IM injection 200 mg, Q6H PRN  Prior to Admission Medications   Prescriptions Last Dose Informant Patient Reported? Taking?   Cholecalciferol (VITAMIN D3) 1000 units CAPS  Child, Spouse/Significant Other Yes No   Sig: Take 1 capsule by mouth daily   DULoxetine (CYMBALTA) 60 mg delayed release capsule 3/19/2025 Morning Child, Spouse/Significant Other No Yes   Sig: TAKE ONE CAPSULE BY MOUTH ONCE DAILY   Magnesium 250 MG TABS 3/18/2025 Evening Child, Spouse/Significant Other Yes Yes   Sig: Take 250 mg by mouth every evening   Mirabegron ER (Myrbetriq) 50 MG TB24  Child, Spouse/Significant Other No No   Sig: Take 1 tablet (50 mg total) by mouth in the morning   Sodium Fluoride  (PreviDent 5000 Booster Plus) 1.1 % PSTE  Child, Spouse/Significant Other No No   Sig: Apply on teeth every evening as directed   acetaminophen (TYLENOL) 325 mg tablet Past Week Child, Spouse/Significant Other No Yes   Sig: Take 2 tablets (650 mg total) by mouth 3 (three) times a day   amitriptyline (ELAVIL) 10 mg tablet 3/18/2025 Evening  Yes Yes   Sig: Take 10 mg by mouth daily at bedtime   aspirin 81 mg chewable tablet 3/18/2025 Bedtime Child, Spouse/Significant Other No Yes   Sig: Chew 1 tablet (81 mg total) daily   atorvastatin (LIPITOR) 10 mg tablet 3/18/2025 Evening  No Yes   Sig: TAKE ONE TABLET BY MOUTH EVERY DAY   diphenoxylate-atropine (LOMOTIL) 2.5-0.025 mg per tablet  Child, Spouse/Significant Other Yes No   Sig: Take 1 tablet by mouth if needed   Patient not taking: Reported on 2/22/2025   furosemide (LASIX) 20 mg tablet 3/19/2025 Morning  No Yes   Sig: One tablet daily and an extra 20 mg prn for wt gain 3 lb/ 24 hours or 5 lb/ 5 days, above a dry wt of 122 lb   hydroxychloroquine (PLAQUENIL) 200 mg tablet 3/19/2025 Morning  No Yes   Sig: Take 1 tablet (200 mg total) by mouth daily with breakfast   omeprazole (PriLOSEC) 20 mg delayed release capsule 3/19/2025 Morning Child, Spouse/Significant Other Yes Yes   Sig: Take 20 mg by mouth daily   pregabalin (LYRICA) 75 mg capsule 3/19/2025 Noon  No Yes   Sig: TAKE ONE CAPSULE BY MOUTH TWICE A DAY   pyridoxine (B-6) 100 MG tablet  Child, Spouse/Significant Other Yes No   Sig: Take 100 mg by mouth daily      Facility-Administered Medications: None     Allergies: Morphine and Ciprofloxacin       Objective :  Temp:  [97.3 °F (36.3 °C)-97.9 °F (36.6 °C)] 97.4 °F (36.3 °C)  HR:  [] 101  BP: (104-118)/(54-59) 111/56  Resp:  [16-20] 20  SpO2:  [93 %-99 %] 95 %  O2 Device: None (Room air)      NEUROLOGIC  EXAM:  MENTAL STATUS:   Alertness: lethargic  Orientation: person  Speech/Language  Difficulty naming objects  Commands: Difficulty following commands  consistently  CRANIAL NERVES:  I Not tested   II Visual Fields normal   II, Ill,  IV, VI Pupils equal, round, reactive to light and accommodation  EOM full and intact   V facial sensation was normal and symmetrical   VII facial symmetry equal   VIII Normal hearing to speech   IX, X Normal Palatal Elevation, No Uvular Deviation   XI Shoulder shrug and head turn is normal   XII Midline tongue protrusion      MOTOR: Moves all four extremities antigravity    NIHSS:  1a.Level of Consciousness: 1 = Not alert, but arousable with minimal stimulation   1b. LOC Questions: 2 = Answers neither correctly   1c. LOC Commands: 2 = Obeys neither correctly   2. Best Gaze: 0 = Normal   3. Visual: 0 = No visual field loss   4. Facial Palsy: 0=Normal symmetric movement   5a. Motor Right Arm: 2 = Some effort against gravity, limb cannot get to or maintain (if cured) 90 (or 45) degrees, drifts down to bed, but has some effort against gravity   5b. Motor Left Arm: 2 = Some effort against gravity, limb cannot get to or maintain (if cured) 90 (or 45) degrees, drifts down to bed, but has some effort against gravity   6a. Motor Right Le = Some effort against gravity, limb cannot get to or maintain (if cured) 30 degrees, drifts down to bed, but has some effort against gravity   6b. Motor Left Le = Some effort against gravity, limb cannot get to or maintain (if cured) 30 degrees, drifts down to bed, but has some effort against gravity   7. Limb Ataxia:  0 = Absent   8. Sensory: 0=Normal; no sensory loss   9. Best Language:  2 = Severe aphasia; fragmentary expression, inference needed, cannot identify materials   10. Dysarthria: 0 = Normal articulation   11. Extinction: 0=No abnormality   Total Score: 15   Time NIHSS was completed: Upon neurology arrival    Modified Dutche[place] Score: 1 (No significant disability. Able to carry out all usual activities, despite some symptoms)      Lab Results: I have reviewed the following results:  Results  from last 7 days   Lab Units 03/26/25  0903 03/26/25  0437 03/24/25  0402 03/23/25  0553 03/22/25  0443 03/21/25  0442   WBC Thousand/uL 12.10* 12.34*   < > 7.84   < > 7.16   HEMOGLOBIN g/dL 10.2* 11.2*   < > 10.5*   < > 10.1*   HEMATOCRIT % 32.2* 34.9   < > 32.1*   < > 31.7*   PLATELETS Thousands/uL 150 154   < > 85*   < > 100*   BANDS PCT %  --   --   --   --   --  2   SEGS PCT %  --   --   --  81*   < >  --    LYMPHO PCT %  --  9*   < > 8*   < > 3*   MONO PCT %  --  7   < > 9   < > 0*   EOS PCT %  --  2   < > 1   < > 0    < > = values in this interval not displayed.     Results from last 7 days   Lab Units 03/26/25  0903 03/26/25  0437   SODIUM mmol/L 134* 133*   POTASSIUM mmol/L 3.5 3.5   CHLORIDE mmol/L 102 100   CO2 mmol/L 22 24   BUN mg/dL 27* 26*   CREATININE mg/dL 1.22 1.36*   ANION GAP mmol/L 10 9   CALCIUM mg/dL 7.8* 8.0*   ALBUMIN g/dL  --  3.0*   TOTAL BILIRUBIN mg/dL  --  0.94   ALK PHOS U/L  --  276*   ALT U/L  --  <3*   AST U/L  --  39   GLUCOSE RANDOM mg/dL 123 133     Results from last 7 days   Lab Units 03/19/25  1842   PROTIME seconds 16.5*   INR  1.25*   PTT seconds 28     Results from last 7 days   Lab Units 03/26/25  0903   HS TNI 0HR ng/L 13     Results from last 7 days   Lab Units 03/26/25  0859 03/26/25  0710 03/25/25 2055 03/25/25  1631 03/25/25  1145 03/24/25  2113 03/24/25  1601 03/24/25  1151 03/24/25  0803 03/23/25  2248 03/23/25  2059 03/23/25  1516   POC GLUCOSE mg/dl 120 131 143* 178* 123 125 160* 132 138 166* 149* 181*     Results from last 7 days   Lab Units 03/21/25  0442 03/20/25  1501 03/20/25  1107 03/20/25  0745 03/19/25  2215 03/19/25  1842   LACTIC ACID mmol/L 1.1 2.3* 2.2* 2.7*   < > 2.8*   PROCALCITONIN ng/ml  --   --   --  6.56*  --  1.10*    < > = values in this interval not displayed.     Microbiology:  Results from last 7 days   Lab Units 03/19/25  1923   COLOR UA  Yellow   CLARITY UA  Clear   SPEC GRAV UA  1.018   PH UA  7.0   LEUKOCYTES UA  Negative   NITRITE UA   Negative   GLUCOSE UA mg/dl 200 (1/5%)*   KETONES UA mg/dl Negative   BILIRUBIN UA  Negative   BLOOD UA  Small*     Lab Results   Component Value Date    BLOODCX No Growth at 24 hrs. 03/24/2025    BLOODCX No Growth at 24 hrs. 03/24/2025    BLOODCX No Growth After 5 Days. 03/21/2025    URINECX >100,000 cfu/ml Enterococcus faecalis (A) 01/17/2025    URINECX 10,000-19,000 cfu/ml Staphylococcus haemolyticus (A) 01/17/2025    URINECX <10,000 cfu/ml 11/07/2024       Imaging Results Review: I personally reviewed the following image studies/reports in PACS and discussed pertinent findings with Radiology: CT head, CTA Head/Neck.  XR chest portable  Result Date: 3/20/2025  Impression: Bibasilar airspace disease and bilateral pleural effusions, right greater than left. See subsequent CT report. Workstation performed: [id number]     CT chest abdomen pelvis wo contrast  Result Date: 3/19/2025  Impression: 1.  Moderate right and small left pleural effusions. The right pleural effusion has slightly increased since the prior study. There is associated bilateral lower lobe atelectasis. 2.  No identifiable acute abnormality/source of infection within the abdomen or pelvis. 3.  Please refer to the report body for description of other incidental, chronic and/or benign findings. Workstation performed: [id number]       Other Study Results Review: No additional pertinent studies reviewed.    VTE Mechanical Prophylaxis: Sequential Compression Device  VTE Pharmacologic Prophylaxis: VTE covered by:  enoxaparin, Subcutaneous, 30 mg at 03/26/25 0835        Code Status: Level 1 - Full Code  Advance Directive and Living Will:      Power of :    POLST:        Devon Finley,  [facility] Residency, PGY-3  Neurology

## 2025-03-26 NOTE — CASE MANAGEMENT
Case Management Progress Note    Patient name Tanya Stephen  Location ICU 12/ICU 12 MRN [id number]  : 1934 Date 3/26/2025       LOS (days): 7  Geometric Mean LOS (GMLOS) (days): 4.9  Days to GMLOS:-1.8        OBJECTIVE:        Current admission status: Inpatient  Preferred Pharmacy:   [facility] [address] [place] [id number]  Phone: [phone] Fax: [phone]    [facility] [place], [place] [address] [address]  Phone: [phone] Fax: [phone]    [facility] - [place], [place] - [address]  [address]  [facility] [address]  Phone: [phone] Fax: [phone]    Primary Care Provider: Gregory Santoro DO    Primary Insurance: MEDICARE  Secondary Insurance: [employer]    PROGRESS NOTE:    CM met with pt's , Weston, at bedside. Provided a list of accepting facilities for him to review. He plans to review with his family tonight. Since unsure of dc date requested they do pick top 3 facilities.

## 2025-03-26 NOTE — ASSESSMENT & PLAN NOTE
Lab Results   Component Value Date    HGBA1C 6.7 (H) 03/20/2025   -glycemic management per primary     Recent Labs     03/25/25 2055 03/26/25  0710 03/26/25  0859 03/26/25  1124   POCGLU 143* 131 120 143*       Blood Sugar Average: Last 72 hrs:  (P) 145.2

## 2025-03-27 ENCOUNTER — APPOINTMENT (INPATIENT)
Dept: MRI IMAGING | Facility: HOSPITAL | Age: OVER 89
DRG: 871 | End: 2025-03-27
Payer: MEDICARE

## 2025-03-27 ENCOUNTER — APPOINTMENT (INPATIENT)
Dept: RADIOLOGY | Facility: HOSPITAL | Age: OVER 89
DRG: 871 | End: 2025-03-27
Payer: MEDICARE

## 2025-03-27 ENCOUNTER — APPOINTMENT (OUTPATIENT)
Dept: PHYSICAL THERAPY | Facility: CLINIC | Age: OVER 89
End: 2025-03-27
Payer: MEDICARE

## 2025-03-27 PROBLEM — D69.6 THROMBOCYTOPENIA (HCC): Status: RESOLVED | Noted: 2025-01-27 | Resolved: 2025-03-27

## 2025-03-27 LAB
ANION GAP SERPL CALCULATED.3IONS-SCNC: 7 MMOL/L (ref 4–13)
BUN SERPL-MCNC: 28 MG/DL (ref 5–25)
CALCIUM SERPL-MCNC: 7.7 MG/DL (ref 8.4–10.2)
CHLORIDE SERPL-SCNC: 103 MMOL/L (ref 96–108)
CO2 SERPL-SCNC: 22 MMOL/L (ref 21–32)
CREAT SERPL-MCNC: 1.22 MG/DL (ref 0.6–1.3)
ERYTHROCYTE [DISTWIDTH] IN BLOOD BY AUTOMATED COUNT: 17.1 % (ref 11.6–15.1)
GFR SERPL CREATININE-BSD FRML MDRD: 39 ML/MIN/1.73SQ M
GLUCOSE SERPL-MCNC: 114 MG/DL (ref 65–140)
GLUCOSE SERPL-MCNC: 124 MG/DL (ref 65–140)
GLUCOSE SERPL-MCNC: 127 MG/DL (ref 65–140)
GLUCOSE SERPL-MCNC: 164 MG/DL (ref 65–140)
GLUCOSE SERPL-MCNC: 177 MG/DL (ref 65–140)
HCT VFR BLD AUTO: 31.2 % (ref 34.8–46.1)
HGB BLD-MCNC: 10.2 G/DL (ref 11.5–15.4)
MAGNESIUM SERPL-MCNC: 2.2 MG/DL (ref 1.9–2.7)
MCH RBC QN AUTO: 30.3 PG (ref 26.8–34.3)
MCHC RBC AUTO-ENTMCNC: 32.7 G/DL (ref 31.4–37.4)
MCV RBC AUTO: 93 FL (ref 82–98)
PLATELET # BLD AUTO: 159 THOUSANDS/UL (ref 149–390)
PMV BLD AUTO: 11.8 FL (ref 8.9–12.7)
POTASSIUM SERPL-SCNC: 3.6 MMOL/L (ref 3.5–5.3)
RBC # BLD AUTO: 3.37 MILLION/UL (ref 3.81–5.12)
SODIUM SERPL-SCNC: 132 MMOL/L (ref 135–147)
WBC # BLD AUTO: 12.69 THOUSAND/UL (ref 4.31–10.16)

## 2025-03-27 PROCEDURE — 85027 COMPLETE CBC AUTOMATED: CPT

## 2025-03-27 PROCEDURE — 02HV33Z INSERTION OF INFUSION DEVICE INTO SUPERIOR VENA CAVA, PERCUTANEOUS APPROACH: ICD-10-PCS | Performed by: RADIOLOGY

## 2025-03-27 PROCEDURE — 97535 SELF CARE MNGMENT TRAINING: CPT

## 2025-03-27 PROCEDURE — 36573 INSJ PICC RS&I 5 YR+: CPT | Performed by: RADIOLOGY

## 2025-03-27 PROCEDURE — 99232 SBSQ HOSP IP/OBS MODERATE 35: CPT | Performed by: INTERNAL MEDICINE

## 2025-03-27 PROCEDURE — G0545 PR INHERENT VISIT TO INPT: HCPCS | Performed by: INTERNAL MEDICINE

## 2025-03-27 PROCEDURE — 36573 INSJ PICC RS&I 5 YR+: CPT

## 2025-03-27 PROCEDURE — 83735 ASSAY OF MAGNESIUM: CPT

## 2025-03-27 PROCEDURE — 70551 MRI BRAIN STEM W/O DYE: CPT

## 2025-03-27 PROCEDURE — 99232 SBSQ HOSP IP/OBS MODERATE 35: CPT

## 2025-03-27 PROCEDURE — 80048 BASIC METABOLIC PNL TOTAL CA: CPT

## 2025-03-27 PROCEDURE — C1751 CATH, INF, PER/CENT/MIDLINE: HCPCS

## 2025-03-27 PROCEDURE — 82948 REAGENT STRIP/BLOOD GLUCOSE: CPT

## 2025-03-27 RX ORDER — SODIUM CHLORIDE 9 MG/ML
50 INJECTION, SOLUTION INTRAVENOUS CONTINUOUS
Status: DISPENSED | OUTPATIENT
Start: 2025-03-27 | End: 2025-03-28

## 2025-03-27 RX ORDER — LIDOCAINE WITH 8.4% SOD BICARB 0.9%(10ML)
SYRINGE (ML) INJECTION AS NEEDED
Status: COMPLETED | OUTPATIENT
Start: 2025-03-27 | End: 2025-03-27

## 2025-03-27 RX ADMIN — CEFAZOLIN SODIUM 2000 MG: 2 SOLUTION INTRAVENOUS at 02:42

## 2025-03-27 RX ADMIN — PYRIDOXINE HCL TAB 50 MG 100 MG: 50 TAB at 10:02

## 2025-03-27 RX ADMIN — ACETAMINOPHEN 975 MG: 325 TABLET, FILM COATED ORAL at 12:53

## 2025-03-27 RX ADMIN — ATORVASTATIN CALCIUM 10 MG: 10 TABLET, FILM COATED ORAL at 10:02

## 2025-03-27 RX ADMIN — ACETAMINOPHEN 975 MG: 325 TABLET, FILM COATED ORAL at 21:36

## 2025-03-27 RX ADMIN — SODIUM CHLORIDE 50 ML/HR: 0.9 INJECTION, SOLUTION INTRAVENOUS at 18:01

## 2025-03-27 RX ADMIN — FUROSEMIDE 20 MG: 20 TABLET ORAL at 10:02

## 2025-03-27 RX ADMIN — HYDROXYCHLOROQUINE SULFATE 200 MG: 200 TABLET ORAL at 10:02

## 2025-03-27 RX ADMIN — INSULIN LISPRO 1 UNITS: 100 INJECTION, SOLUTION INTRAVENOUS; SUBCUTANEOUS at 10:03

## 2025-03-27 RX ADMIN — PREGABALIN 75 MG: 75 CAPSULE ORAL at 10:02

## 2025-03-27 RX ADMIN — ENOXAPARIN SODIUM 30 MG: 30 INJECTION SUBCUTANEOUS at 10:03

## 2025-03-27 RX ADMIN — ASPIRIN 81 MG CHEWABLE TABLET 81 MG: 81 TABLET CHEWABLE at 10:02

## 2025-03-27 RX ADMIN — Medication 10 ML: at 14:49

## 2025-03-27 RX ADMIN — LIDOCAINE 1 PATCH: 50 PATCH CUTANEOUS at 10:03

## 2025-03-27 RX ADMIN — PREGABALIN 75 MG: 75 CAPSULE ORAL at 18:01

## 2025-03-27 RX ADMIN — PANTOPRAZOLE SODIUM 40 MG: 40 TABLET, DELAYED RELEASE ORAL at 05:41

## 2025-03-27 RX ADMIN — Medication 1000 UNITS: at 10:02

## 2025-03-27 RX ADMIN — ACETAMINOPHEN 975 MG: 325 TABLET, FILM COATED ORAL at 02:45

## 2025-03-27 RX ADMIN — INSULIN LISPRO 1 UNITS: 100 INJECTION, SOLUTION INTRAVENOUS; SUBCUTANEOUS at 23:30

## 2025-03-27 RX ADMIN — OXYBUTYNIN CHLORIDE 10 MG: 5 TABLET, EXTENDED RELEASE ORAL at 10:03

## 2025-03-27 RX ADMIN — AMITRIPTYLINE HYDROCHLORIDE 10 MG: 10 TABLET, FILM COATED ORAL at 21:36

## 2025-03-27 RX ADMIN — DULOXETINE 60 MG: 60 CAPSULE, DELAYED RELEASE ORAL at 10:02

## 2025-03-27 RX ADMIN — CEFAZOLIN SODIUM 2000 MG: 2 SOLUTION INTRAVENOUS at 15:55

## 2025-03-27 NOTE — DISCHARGE INSTRUCTIONS
Peripherally Inserted Central Catheter     WHAT YOU NEED TO KNOW:   A PICC is an IV placed into a large blood vessel near your heart. It is usually inserted through a blood vessel in your arm. Your PICC may have multiple ports. Ports are tubes where you can inject medicine. A PICC can stay in place for several weeks or months. You may need a PICC to get nutrition, medicine, or fluids. Blood samples can be removed from your PICC and sent to the lab for tests.   DISCHARGE INSTRUCTIONS:    [facility] patients,    Contact Interventional Radiology at [phone]   [facility] PATIENTS: Contact Interventional Radiology at [phone]   CROW PATIENTS: Contact Interventional Radiology at [phone] if:  Blood soaks through your bandage.    Your arm or leg feels warm, tender, and painful. It may look swollen and red.   You have trouble moving your arm.    Your catheter falls out.  You have a fever or swelling, redness, pain, or pus where the catheter was inserted.  Persistent nausea or vomiting.    You cannot flush your catheter, or you feel pain when you flush your catheter.    You see a hole or crack in the tubing of your catheter.    You see fluid leaking from the insertion site.    You run out of supplies to care for your catheter.    You have questions or concerns about your condition or care.

## 2025-03-27 NOTE — ASSESSMENT & PLAN NOTE
>>ASSESSMENT AND PLAN FOR TYPE 2 DIABETES MELLITUS (HCC) WRITTEN ON 3/27/2025 12:29 PM BY KEILA KNUTSON PA-C    Lab Results   Component Value Date    HGBA1C 6.7 (H) 03/20/2025   -glycemic management per primary     Recent Labs     03/26/25  1629 03/26/25 2055 03/27/25  0741 03/27/25  1213   POCGLU 117 223* 177* 127       Blood Sugar Average: Last 72 hrs:  (P) 145.5

## 2025-03-27 NOTE — SEDATION DOCUMENTATION
Procedure completed by Dr Rosa. Pt tolerated without issues, VSS. Education provided to pt and family prior to procedure, questions answered as offered. Chlorhexidine dressing to site. Transported back to [facility] by IR staff, bedside report given.

## 2025-03-27 NOTE — ASSESSMENT & PLAN NOTE
Admission blood cultures 2 of 2 sets growing MSSA. Possible skin source as portal of entry with multiple extremity abrasions/wounds. 3/20/25 TTE no vegetation seen  3/21/25 Blood culture 1 of 2 sets + MSSA 3/24/25 blood cultures negative at 48 hours  -Continue Cefazolin 2 g IV q 12 hours, renal dose adjusted high dose  -follow up 3/24/25 repeat blood cultures  -can place PICC line anytime as 3/24/25 repeats remain negative at 72 hours  -Plan to complete6 week IV Cefazolin course from blood culture clearance through 5/4/25.

## 2025-03-27 NOTE — PROGRESS NOTES
Progress Note - Hospitalist   Name: Tanya Stephen 89 y.o. female I MRN: [id number]  Unit/Bed#: S -[facility] I Date of Admission: 3/19/2025   Date of Service: 3/27/2025 I Hospital Day: 8    Assessment & Plan  Severe sepsis (HCC)  Noted to have fever, tachycardia, leukocytosis and required vasopressor support  Secondary to MSSA bacteremia  Subsequently transferred from ICU to medical surgical floor  Infectious disease following  Blood cultures from 3/21 and 3/19 positive. Repeat blood cultures from 3/24 negative x24 hours  Fluids d/c'd 3/25  Continues with slightly elevated leukocytosis. Remains afebrile. Continue to monitor on CBC daily   MSSA bacteremia  Blood cultures from 3/19/2025 both positive for MSSA.    Repeat blood cultures from 3/21/2025 1 out of 2 positive.    Repeat blood cultures 3/24 negative thus far  Will need PICC placed when negative x72 hours -- anticipate 6 week course abx after clearance of bacteremia   Echocardiogram with no evidence of vegetation   Skin source felt to be portal of entry given multiple extremity abrasions and wounds   Continue IV Ancef 2 g every 12 hours  IR consulted for PICC line placement   Acute encephalopathy  Noted to have metabolic encephalopathy initially.  Felt secondary to sepsis.  Mental status improved but this AM was confused/delirious again but then improved  3/26 difficult to arouse with noted slurred speech. Noted to be given oxy 2.5 around 6am, and has been receiving oxybutynin giving concern for possible TME-- stroke alert called.  Labs unremarkable, ABG pending   CT head/CTA unremarkable   Neurology following, appreciate recs   Recommending continue aspirin daily   MRI ordered and pending   Thrombocytopenia (HCC) (Resolved: 3/27/2025)  Thrombocytopenia felt to be likely related to acute infection/bacteremia   Baseline has platelets in the 125-150 range.    Presented with platelet count of 120.  Did decrease to 85  Has now returned to normal   Chronic kidney  disease, stage IV (severe) ([facility])  Lab Results   Component Value Date    EGFR 39 03/26/2025    EGFR 34 03/26/2025    EGFR 43 03/25/2025    CREATININE 1.22 03/26/2025    CREATININE 1.36 (H) 03/26/2025    CREATININE 1.11 03/25/2025   Continue to monitor, avoid nephrotoxic medication  Creatinine is stable baseline is 1.4-1.6  Follows with nephrology as an outpatient  Within baseline, continue to monitor on BMP daily   Type 2 diabetes mellitus ([facility])  Lab Results   Component Value Date    HGBA1C 6.7 (H) 03/20/2025       Recent Labs     03/26/25  0859 03/26/25  1124 03/26/25  1629 03/26/25 2055   POCGLU 120 143* 117 223*       Blood Sugar Average: Last 72 hrs:  (P) [id number]  Currently on sliding scale insulin  Only requiring approximately 2 to 3 units/day  OAB (overactive bladder)  On Myrbetriq - substituted with oxybutynin here  Chronic pain syndrome  Continue home amitriptyline which was resumed today after initially being held and scheduled Tylenol.  D/c low dose oxy 2/2 AMS after receiving   Continue above  Lidocaine patch to lower back   Chronic bilateral pleural effusions  CT CAP: 3/19/25: Moderate right and small left pleural effusions. The right pleural effusion has slightly increased since the prior study. There is associated bilateral lower lobe atelectasis. Shown on multiple previous images  Patient is not hypoxic or having respiratory symptoms.  Recommend outpatient follow-up  Stroke-like symptoms      VTE Pharmacologic Prophylaxis: VTE Score: 6 High Risk (Score >/= 5) - Pharmacological DVT Prophylaxis Ordered: enoxaparin (Lovenox). Sequential Compression Devices Ordered.    Mobility:   Basic Mobility Inpatient Raw Score: 12  JH-HLM Goal: 4: Move to chair/commode  JH-HLM Achieved: 1: Laying in bed  JH-HLM Goal NOT achieved. Continue with multidisciplinary rounding and encourage appropriate mobility to improve upon JH-HLM goals.    Patient Centered Rounds: I performed bedside rounds with nursing  staff today.   Discussions with Specialists or Other Care Team Provider: None    Education and Discussions with Family / Patient: Updated  () via phone.    Current Length of Stay: 8 day(s)  Current Patient Status: Inpatient   Certification Statement: The patient will continue to require additional inpatient hospital stay due to PICC line placement, IV antibiotics, pending safe dispo  Discharge Plan: Anticipate discharge in 24-48 hrs to rehab facility.    Code Status: Level 1 - Full Code    Subjective   Seen and examined. No acute events overnight.  Continues to complain of chronic back/leg pain.  Reports she is eating and drinking without difficulty.  Urinating and having bowel movements.    Objective :  Temp:  [97.2 °F (36.2 °C)-97.7 °F (36.5 °C)] 97.5 °F (36.4 °C)  HR:  [] 105  BP: (104-125)/(56-75) 124/66  Resp:  [16-18] 18  SpO2:  [91 %-100 %] 99 %    Body mass index is 27.47 kg/m².     Input and Output Summary (last 24 hours):     Intake/Output Summary (Last 24 hours) at 3/27/2025 0723  Last data filed at 3/27/2025 0500  Gross per 24 hour   Intake 360 ml   Output 250 ml   Net 110 ml       Physical Exam  Constitutional:       General: She is not in acute distress.  HENT:      Head: Normocephalic and atraumatic.      Nose: Nose normal.      Mouth/Throat:      Mouth: Mucous membranes are moist.      Comments: Poor dentition   Eyes:      Conjunctiva/sclera: Conjunctivae normal.   Cardiovascular:      Rate and Rhythm: Normal rate.      Heart sounds: Normal heart sounds. No murmur heard.     No friction rub. No gallop.   Pulmonary:      Effort: Pulmonary effort is normal.      Breath sounds: No wheezing, rhonchi or rales.   Abdominal:      Palpations: Abdomen is soft.      Tenderness: There is no abdominal tenderness.   Musculoskeletal:      Right lower leg: No edema.      Left lower leg: No edema.   Skin:     Findings: Bruising and erythema present.   Neurological:      General: No focal  deficit present.   Psychiatric:         Mood and Affect: Mood normal.       Lines/Drains:        Telemetry:  Telemetry Orders (From admission, onward)               24 Hour Telemetry Monitoring  Continuous x 24 Hours (Telem)        Expiring   Question:  Reason for 24 Hour Telemetry  Answer:  TIA/Suspected CVA/ Confirmed CVA                     Telemetry Reviewed: Normal Sinus Rhythm  Indication for Continued Telemetry Use: No indication for continued use. Will discontinue.                Lab Results: I have reviewed the following results:   Results from last 7 days   Lab Units 03/27/25  0440 03/26/25  0903 03/26/25  0437 03/24/25  0402 03/23/25  0553 03/22/25  0443 03/21/25  0442   WBC Thousand/uL 12.69*   < > 12.34*   < > 7.84   < > 7.16   HEMOGLOBIN g/dL 10.2*   < > 11.2*   < > 10.5*   < > 10.1*   HEMATOCRIT % 31.2*   < > 34.9   < > 32.1*   < > 31.7*   PLATELETS Thousands/uL 159   < > 154   < > 85*   < > 100*   BANDS PCT %  --   --   --   --   --   --  2   SEGS PCT %  --   --   --   --  81*   < >  --    LYMPHO PCT %  --   --  9*   < > 8*   < > 3*   MONO PCT %  --   --  7   < > 9   < > 0*   EOS PCT %  --   --  2   < > 1   < > 0    < > = values in this interval not displayed.     Results from last 7 days   Lab Units 03/26/25  0903 03/26/25  0437   SODIUM mmol/L 134* 133*   POTASSIUM mmol/L 3.5 3.5   CHLORIDE mmol/L 102 100   CO2 mmol/L 22 24   BUN mg/dL 27* 26*   CREATININE mg/dL 1.22 1.36*   ANION GAP mmol/L 10 9   CALCIUM mg/dL 7.8* 8.0*   ALBUMIN g/dL  --  3.0*   TOTAL BILIRUBIN mg/dL  --  0.94   ALK PHOS U/L  --  276*   ALT U/L  --  <3*   AST U/L  --  39   GLUCOSE RANDOM mg/dL 123 133         Results from last 7 days   Lab Units 03/26/25  2055 03/26/25  1629 03/26/25  1124 03/26/25  0859 03/26/25  0710 03/25/25  2055 03/25/25  1631 03/25/25  1145 03/24/25  2113 03/24/25  1601 03/24/25  1151 03/24/25  0803   POC GLUCOSE mg/dl 223* 117 143* 120 131 143* 178* 123 125 160* 132 138         Results from last 7 days    Lab Units 03/21/25  0442 03/20/25  1501 03/20/25  1107 03/20/25  0745   LACTIC ACID mmol/L 1.1 2.3* 2.2* 2.7*   PROCALCITONIN ng/ml  --   --   --  6.56*       Recent Cultures (last 7 days):   Results from last 7 days   Lab Units 03/24/25  1151 03/24/25  1037 03/21/25  0509 03/21/25  0459   BLOOD CULTURE  No Growth at 48 hrs. No Growth at 48 hrs. No Growth After 5 Days. Staphylococcus aureus*   GRAM STAIN RESULT   --   --   --  Gram positive cocci in clusters*       Imaging Results Review: No pertinent imaging studies reviewed.  Other Study Results Review: No additional pertinent studies reviewed.    Last 24 Hours Medication List:     Current Facility-Administered Medications:     acetaminophen (TYLENOL) tablet 975 mg, Q8H MEGGAN    amitriptyline (ELAVIL) tablet 10 mg, HS    aspirin chewable tablet 81 mg, Daily    atorvastatin (LIPITOR) tablet 10 mg, Daily    ceFAZolin (ANCEF) IVPB (premix in dextrose) 2,000 mg 50 mL, Q12H, Last Rate: 2,000 mg (03/27/25 0242)    Cholecalciferol (VITAMIN D3) tablet 1,000 Units, Daily    DULoxetine (CYMBALTA) delayed release capsule 60 mg, Daily    enoxaparin (LOVENOX) subcutaneous injection 30 mg, Daily    furosemide (LASIX) tablet 20 mg, Daily    hydroxychloroquine (PLAQUENIL) tablet 200 mg, Daily With Breakfast    insulin lispro (HumALOG/ADMELOG) 100 units/mL subcutaneous injection 1-5 Units, 4x Daily (AC & HS) **AND** Fingerstick Glucose (POCT), 4x Daily AC and at bedtime    lidocaine (LIDODERM) 5 % patch 1 patch, Daily    oxybutynin (DITROPAN-XL) 24 hr tablet 10 mg, Daily    pantoprazole (PROTONIX) EC tablet 40 mg, Early Morning    pregabalin (LYRICA) capsule 75 mg, BID    pyridoxine (VITAMIN B6) tablet 100 mg, Daily    trimethobenzamide (TIGAN) IM injection 200 mg, Q6H PRN    Administrative Statements   Today, Patient Was Seen By: Pattie Villaseñor PA-C    **Please Note: This note may have been constructed using a voice recognition system.**

## 2025-03-27 NOTE — PROGRESS NOTES
Progress Note - Infectious Disease   Name: Tanya Stephen 89 y.o. female I MRN: [id number]  Unit/Bed#: S -01 I Date of Admission: 3/19/2025   Date of Service: 3/27/2025 I Hospital Day: 8    Assessment & Plan  Severe sepsis (HCC)  E/b fever, tachycardia, leukocytosis, hypotension requiring Vasopressor support. In setting of #2. Vasopressor management weaned off 3/20/25 by critical care team  -antibiotics as below  -follow-up cultures  -monitor temperature and hemodynamics  -serial exam  -additional inventions pending clinical course  -monitoring serial CBC and BMP for treatment response and any developing toxicities    MSSA bacteremia  Admission blood cultures 2 of 2 sets growing MSSA. Possible skin source as portal of entry with multiple extremity abrasions/wounds. 3/20/25 TTE no vegetation seen  3/21/25 Blood culture 1 of 2 sets + MSSA 3/24/25 blood cultures negative at 48 hours  -Continue Cefazolin 2 g IV q 12 hours, renal dose adjusted high dose  -follow up 3/24/25 repeat blood cultures  -can place PICC line anytime as 3/24/25 repeats remain negative at 72 hours  -Plan to complete6 week IV Cefazolin course from blood culture clearance through 5/4/25.  Acute encephalopathy  Patient lethargic is setting of acute illness and s/p pain med, but responsive to name and exam. 3/14/2025 CT head: No acute intercranial abnormality. 3/26/25 CT head no mass effect, acute hemorrhage or recent infarction evidence.  3/27/25 MRI brain: No acute infarct, significant mass effect or midline shift.  -antibiotic/work up as above  -monitor mentation  -symptomatic management per critical care team  -aspiration precautions  -f/u brain imaging  Chronic kidney disease, stage IV (severe) (HCC)  Lab Results   Component Value Date    EGFR 39 03/27/2025    EGFR 39 03/26/2025    EGFR 34 03/26/2025    CREATININE 1.22 03/27/2025    CREATININE 1.22 03/26/2025    CREATININE 1.36 (H) 03/26/2025   Estimated Creatinine Clearance: 26.1 mL/min  (by C-G formula based on SCr of 1.22 mg/dL).  -renal dose adjust antibiotic as needed  -volume management   -recheck BMP  Type 2 diabetes mellitus ([facility])  Lab Results   Component Value Date    HGBA1C 6.7 (H) 03/20/2025   -glycemic management per primary     Recent Labs     03/26/25  1629 03/26/25  2055 03/27/25  0741 03/27/25  1213   POCGLU 117 223* 177* 127       Blood Sugar Average: Last 72 hrs:  (P) 145.5    Chronic bilateral pleural effusions  -volume management per primary  CHF (congestive heart failure) ([facility])  Wt Readings from Last 3 Encounters:   03/22/25 61.7 kg (136 lb 0.4 oz)   02/24/25 65 kg (143 lb 4.8 oz)   01/27/25 54 kg (119 lb)   -volume management per primary      I have discussed with patient and  at bedside, RN, and Pattie of primary care team regarding the above plan to continue antibiotic as above and monitor closely. They agree with the plan.    Antibiotics:  Cefazolin D8 - D4 from 1st negative blood cultures    Subjective   Patient has no fever, chills, sweats overnight; no nausea, vomiting, diarrhea; no cough, shortness of breath; + chronic back pain. + chronic intermittent dysuria/pressure discomfort; no SPT today. Poor appetite.   Patient sleepy s/p MRI brain     Objective :  Temp:  [97.5 °F (36.4 °C)-97.7 °F (36.5 °C)] 97.5 °F (36.4 °C)  HR:  [] 94  BP: (104-124)/(56-66) 105/58  Resp:  [16-19] 19  SpO2:  [91 %-100 %] 96 %  O2 Device: None (Room air)    General Appearance:  89 year old elderly, chronically debilitated female, nontoxic, no acute distress, resting in bed, not easily arousable to exam   HEENT: Atraumatic normocephalic   Throat: Oropharynx moist. Poor dentition   Pulmonary:   Normal respiratory excursion without accessory muscle use, statting 96% on RA   Cardiac:  RRR decreased tones   Abdomen:   Soft, non-tender, non-distended   Extremities: No edema   : No diaz, no focal SPT or flank tenderness on exam   Psychiatric: Sleeping soundly, cooperative   Skin: No new  rashes. IV site nontender. Multiple stages of ecchymoses of skin.          Lab Results: I have reviewed the following results:  Results from last 7 days   Lab Units 03/27/25  0440 03/26/25  0903 03/26/25  0437   WBC Thousand/uL 12.69* 12.10* 12.34*   HEMOGLOBIN g/dL 10.2* 10.2* 11.2*   PLATELETS Thousands/uL 159 150 154     Results from last 7 days   Lab Units 03/27/25  1140 03/26/25  0903 03/26/25  0437 03/25/25  0554 03/24/25  0402 03/23/25  0439   SODIUM mmol/L 132* 134* 133* 134*   < > 131*   POTASSIUM mmol/L 3.6 3.5 3.5 3.7   < > 4.3   CHLORIDE mmol/L 103 102 100 103   < > 102   CO2 mmol/L 22 22 24 21   < > 21   BUN mg/dL 28* 27* 26* 26*   < > 30*   CREATININE mg/dL 1.22 1.22 1.36* 1.11   < > 1.31*   EGFR ml/min/1.73sq m 39 39 34 43   < > 35   CALCIUM mg/dL 7.7* 7.8* 8.0* 7.6*   < > 7.6*   AST U/L  --   --  39 44*  --  80*   ALT U/L  --   --  <3* <3*  --  <3*   ALK PHOS U/L  --   --  276* 275*  --  280*   ALBUMIN g/dL  --   --  3.0* 2.6*  --  2.8*    < > = values in this interval not displayed.     Results from last 7 days   Lab Units 03/24/25  1151 03/24/25  1037 03/21/25  0509 03/21/25  0459   BLOOD CULTURE  No Growth at 48 hrs. No Growth at 48 hrs. No Growth After 5 Days. Staphylococcus aureus*   GRAM STAIN RESULT   --   --   --  Gram positive cocci in clusters*                       Imaging Results Review: I personally reviewed the following image studies in PACS and associated radiology reports: MRI brain. My interpretation of the radiology images/reports is: 3/27/25 MRI brain: No acute infarct, significant mass effect or midline shift.  Bilateral nonspecific mastoid effusions.  Other Study Results Review: My interpretation of other studies include: 3/20/25 TTE no vegetation seen.

## 2025-03-27 NOTE — PROCEDURES
Venous Access Line Insertion    Date/Time: 3/27/2025 3:00 PM    Performed by: Ac Rosa MD  Authorized by: Luis Baires DO    Patient location:  IR  Consent:     Consent obtained:  Written    Consent given by:  Healthcare agent    Risks discussed:  Bleeding and infection    Alternatives discussed:  No treatment  Universal protocol:     Procedure explained and questions answered to patient or proxy's satisfaction: yes      Immediately prior to procedure, a time out was called: yes      Relevant documents present and verified: yes      Test results available and properly labeled: yes      Radiology Images displayed and confirmed.  If images not available, report reviewed: yes      Required blood products, implants, devices, and special equipment available: yes      Site/side marked: yes      Patient identity confirmed:  Verbally with patient, arm band and provided demographic data  Pre-procedure details:     Hand hygiene: Hand hygiene performed prior to insertion      Sterile barrier technique: All elements of maximal sterile technique followed      Skin preparation:  ChloraPrep    Skin preparation agent: Skin preparation agent completely dried prior to procedure    Procedure details:     Complex Venous Access Line Type: PICC      Complex Venous Access Line Indications: long term antibiotics      Catheter tip vessel location: atriocaval junction      Orientation:  Right    Location:  Basilic    Procedural supplies:  Single lumen    Catheter size:  4 Fr    Total catheter length (cm):  41    Catheter out on skin (cm):  0    Arm circumference:  24    Patient evaluated for contraindications to access (i.e. fistula, thrombosis, etc): Yes      Approach: percutaneous technique used      Patient position:  Flat    Ultrasound image availability:  Images available in PACS    Sterile ultrasound techniques: Sterile gel and sterile probe covers were used      Number of attempts:  1    Successful placement: yes      Landmarks  identified: yes      Vessel of catheter tip end:  Sherlock 3CG confirmed  Anesthesia (see MAR for exact dosages):     Anesthesia method:  Local infiltration    Local anesthetic:  Lidocaine 1% w/o epi  Post-procedure details:     Post-procedure:  Dressing applied    Assessment:  Blood return through all ports, no pneumothorax on x-ray and placement verified by x-ray    Post-procedure complications: none      Patient tolerance of procedure:  Tolerated well, no immediate complications    Observer: Yes      Observer name:  Estee Santos RN

## 2025-03-27 NOTE — ASSESSMENT & PLAN NOTE
>>ASSESSMENT AND PLAN FOR TYPE 2 DIABETES MELLITUS (HCC) WRITTEN ON 3/27/2025  7:23 AM BY MARTIN GALVEZ PA-C    Lab Results   Component Value Date    HGBA1C 6.7 (H) 03/20/2025       Recent Labs     03/26/25  0859 03/26/25  1124 03/26/25  1629 03/26/25 2055   POCGLU 120 143* 117 223*       Blood Sugar Average: Last 72 hrs:  (P) 144.4831149436374236  Currently on sliding scale insulin  Only requiring approximately 2 to 3 units/day

## 2025-03-27 NOTE — ASSESSMENT & PLAN NOTE
Lab Results   Component Value Date    EGFR 39 03/26/2025    EGFR 34 03/26/2025    EGFR 43 03/25/2025    CREATININE 1.22 03/26/2025    CREATININE 1.36 (H) 03/26/2025    CREATININE 1.11 03/25/2025   Continue to monitor, avoid nephrotoxic medication  Creatinine is stable baseline is 1.4-1.6  Follows with nephrology as an outpatient  Within baseline, continue to monitor on BMP daily

## 2025-03-27 NOTE — ASSESSMENT & PLAN NOTE
Thrombocytopenia felt to be likely related to acute infection/bacteremia   Baseline has platelets in the 125-150 range.    Presented with platelet count of 120.  Did decrease to 85  Has now returned to normal

## 2025-03-27 NOTE — CASE MANAGEMENT
Case Management Discharge Planning Note    Patient name Tanya SWEENEY /S -01 MRN [id number]  : 1934 Date 3/27/2025       Current Admission Date: 3/19/2025  Current Admission Diagnosis:Severe sepsis ([facility])   Patient Active Problem List    Diagnosis Date Noted Date Diagnosed    Stroke-like symptoms 2025     CHF (congestive heart failure) ([facility]) 2025     MSSA bacteremia 2025     Metabolic encephalopathy 2025     Severe sepsis ([facility]) 2025     Hypoxia 2025     Small intestinal bacterial overgrowth (SIBO) 2025     Ambulatory dysfunction 11/15/2024     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     Chronic kidney disease, stage IV (severe) ([facility]) 10/28/2024     Diabetic nephropathy associated with type 2 diabetes mellitus ([facility]) 10/28/2024     Anemia of chronic renal failure, stage 4 (severe)  ([facility]) 10/28/2024     Fall 2024     Mild cognitive impairment 2024     Chronic mastoiditis of right side 2024     Nonrheumatic aortic valve stenosis 2024     Chronic bilateral pleural effusions 2024     Gastroesophageal reflux disease without esophagitis 2023     Esophageal dysphagia 2023     Raynaud's phenomenon without gangrene 2023     Spinal stenosis of lumbar region with neurogenic claudication 2022     Chronic pain syndrome 2022     Acute encephalopathy 2022     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[facility]) 2022     OAB (overactive bladder) 2022     Acute on chronic diastolic (congestive) heart failure ([facility]) 2022     Post zoster neuralgia 2021     Primary generalized (osteo)arthritis 2021     Seronegative arthropathy of multiple sites ([facility]) 2021     Senile osteoporosis 2021     Diabetic polyneuropathy associated with type 2 diabetes mellitus ([facility]) 2021     Irritable bowel syndrome with diarrhea 2021     Abnormality of gait  due to impairment of balance 11/19/2021     Sicca syndrome (HCC) 11/19/2021     Hypomagnesemia 12/22/2016     Type 2 diabetes mellitus (HCC) 12/22/2016       LOS (days): 8  Geometric Mean LOS (GMLOS) (days): 4.9  Days to GMLOS:-2.7     OBJECTIVE:  Risk of Unplanned Readmission Score: 34.01         Current admission status: Inpatient   Preferred Pharmacy:   [id number] - [place] - [address] [address]  Phone: [phone] Fax: [phone]    [facility] [place]  [address]  [place] [address]  Phone: [phone] Fax: [phone]    [facility] - [place], [place] - [address]  [address]  [place] [address]  Phone: [phone] Fax: [phone]    Primary Care Provider: Gregory Santoro DO    Primary Insurance: MEDICARE  Secondary Insurance: [employer]    DISCHARGE DETAILS:    Discharge planning discussed with:: patient's  [place]on  Freedom of Choice: Yes  Comments - Freedom of Choice: CM f/u with pt and pt's  Weston at bedside re: STR choice. Patient sleeping at time of visit. Spouse relayed first choice for [facility], second choice for [facility], and third choice for [facility]. CM f/u with [facility] via aidin - notified of family choice. [facility] to reach out directly to spouse for further information needed prior to accepting pt. Spouse aware [facility] to reach out.  CM contacted family/caregiver?: Yes  Were Treatment Team discharge recommendations reviewed with patient/caregiver?: Yes  Did patient/caregiver verbalize understanding of patient care needs?: N/A- going to facility  Were patient/caregiver advised of the risks associated with not following Treatment Team discharge recommendations?: Yes    Contacts  Patient Contacts: Weston Stephen (Spouse)  Relationship to Patient:: Family  Contact Method: In  Person  Reason/Outcome: Discharge Planning, Emergency Contact, Continuity of Care, Referral

## 2025-03-27 NOTE — PLAN OF CARE
Problem: Potential for Falls  Goal: Patient will remain free of falls  Description: INTERVENTIONS:  - Educate patient/family on patient safety including physical limitations  - Instruct patient to call for assistance with activity   - Consult OT/PT to assist with strengthening/mobility   - Keep Call bell within reach  - Keep bed low and locked with side rails adjusted as appropriate  - Keep care items and personal belongings within reach  - Initiate and maintain comfort rounds  - Make Fall Risk Sign visible to staff  - Apply yellow socks and bracelet for high fall risk patients  - Consider moving patient to room near nurses station  Outcome: Progressing     Problem: PAIN - ADULT  Goal: Verbalizes/displays adequate comfort level or baseline comfort level  Description: Interventions:  - Encourage patient to monitor pain and request assistance  - Assess pain using appropriate pain scale  - Administer analgesics based on type and severity of pain and evaluate response  - Implement non-pharmacological measures as appropriate and evaluate response  - Consider cultural and social influences on pain and pain management  - Notify physician/advanced practitioner if interventions unsuccessful or patient reports new pain  Outcome: Progressing     Problem: INFECTION - ADULT  Goal: Absence or prevention of progression during hospitalization  Description: INTERVENTIONS:  - Assess and monitor for signs and symptoms of infection  - Monitor lab/diagnostic results  - Monitor all insertion sites, i.e. indwelling lines, tubes, and drains  - Monitor endotracheal if appropriate and nasal secretions for changes in amount and color  - [place] appropriate cooling/warming therapies per order  - Administer medications as ordered  - Instruct and encourage patient and family to use good hand hygiene technique  - Identify and instruct in appropriate isolation precautions for identified infection/condition  Outcome: Progressing  Goal: Absence  of fever/infection during neutropenic period  Description: INTERVENTIONS:  - Monitor WBC    Outcome: Progressing     Problem: SAFETY ADULT  Goal: Patient will remain free of falls  Description: INTERVENTIONS:  - Educate patient/family on patient safety including physical limitations  - Instruct patient to call for assistance with activity   - Consult OT/PT to assist with strengthening/mobility   - Keep Call bell within reach  - Keep bed low and locked with side rails adjusted as appropriate  - Keep care items and personal belongings within reach  - Initiate and maintain comfort rounds  - Make Fall Risk Sign visible to staff  - Apply yellow socks and bracelet for high fall risk patients  - Consider moving patient to room near nurses station  Outcome: Progressing  Goal: Maintain or return to baseline ADL function  Description: INTERVENTIONS:  -  Assess patient's ability to carry out ADLs; assess patient's baseline for ADL function and identify physical deficits which impact ability to perform ADLs (bathing, care of mouth/teeth, toileting, grooming, dressing, etc.)  - Assess/evaluate cause of self-care deficits   - Assess range of motion  - Assess patient's mobility; develop plan if impaired  - Assess patient's need for assistive devices and provide as appropriate  - Encourage maximum independence but intervene and supervise when necessary  - Involve family in performance of ADLs  - Assess for home care needs following discharge   - Consider OT consult to assist with ADL evaluation and planning for discharge  - Provide patient education as appropriate  Outcome: Progressing  Goal: Maintains/Returns to pre admission functional level  Description: INTERVENTIONS:  - Perform AM-PAC 6 Click Basic Mobility/ Daily Activity assessment daily.  - Set and communicate daily mobility goal to care team and patient/family/caregiver.   - Collaborate with rehabilitation services on mobility goals if consulted  - Out of bed for  toileting  - Record patient progress and toleration of activity level   Outcome: Progressing     Problem: DISCHARGE PLANNING  Goal: Discharge to home or other facility with appropriate resources  Description: INTERVENTIONS:  - Identify barriers to discharge w/patient and caregiver  - Arrange for needed discharge resources and transportation as appropriate  - Identify discharge learning needs (meds, wound care, etc.)  - Arrange for interpretive services to assist at discharge as needed  - Refer to Case Management Department for coordinating discharge planning if the patient needs post-hospital services based on physician/advanced practitioner order or complex needs related to functional status, cognitive ability, or social support system  Outcome: Progressing     Problem: Knowledge Deficit  Goal: Patient/family/caregiver demonstrates understanding of disease process, treatment plan, medications, and discharge instructions  Description: Complete learning assessment and assess knowledge base.  Interventions:  - Provide teaching at level of understanding  - Provide teaching via preferred learning methods  Outcome: Progressing     Problem: Nutrition/Hydration-ADULT  Goal: Nutrient/Hydration intake appropriate for improving, restoring or maintaining nutritional needs  Description: Monitor and assess patient's nutrition/hydration status for malnutrition. Collaborate with interdisciplinary team and initiate plan and interventions as ordered.  Monitor patient's weight and dietary intake as ordered or per policy. Utilize nutrition screening tool and intervene as necessary. Determine patient's food preferences and provide high-protein, high-caloric foods as appropriate.     INTERVENTIONS:  - Monitor oral intake, urinary output, labs, and treatment plans  - Assess nutrition and hydration status and recommend course of action  - Evaluate amount of meals eaten  - Assist patient with eating if necessary   - Allow adequate time for  meals  - Recommend/ encourage appropriate diets, oral nutritional supplements, and vitamin/mineral supplements  - Order, calculate, and assess calorie counts as needed  - Recommend, monitor, and adjust tube feedings and TPN/PPN based on assessed needs  - Assess need for intravenous fluids  - Provide specific nutrition/hydration education as appropriate  - Include patient/family/caregiver in decisions related to nutrition  Outcome: Progressing     Problem: Prexisting or High Potential for Compromised Skin Integrity  Goal: Skin integrity is maintained or improved  Description: INTERVENTIONS:  - Identify patients at risk for skin breakdown  - Assess and monitor skin integrity  - Assess and monitor nutrition and hydration status  - Monitor labs   - Assess for incontinence   - Turn and reposition patient  - Assist with mobility/ambulation  - Relieve pressure over bony prominences  - Avoid friction and shearing  - Provide appropriate hygiene as needed including keeping skin clean and dry  - Evaluate need for skin moisturizer/barrier cream  - Collaborate with interdisciplinary team   - Patient/family teaching  - Consider wound care consult   Outcome: Progressing

## 2025-03-27 NOTE — APP STUDENT NOTE
[facility] Internal Medicine Progress Note  Patient: Tanya Stephen 89 y.o. female   MRN: [id number]  PCP: Gregory Santoro DO  Unit/Bed#: S -01 Encounter: [id number]  Date Of Visit: 03/27/25    Assessment/Plan:    Severe sepsis most likely secondary to MSSA bacteremia   Severe sepsis on admission with fever, tachycardia, leukocytosis and required vasopressor support  Blood cultures from 3/21 and 3/19 positive for MSSA  Blood cultures from 3/24 negative x48 hours  Leukocytes elevated at 12.69 today, no fever noted. Continue to monitor with daily CBCs  MSSA Bacteremia   Blood cultures from 3/19 and 3/21 positive for MSSA  Blood cultures from 3/24 negative x 48 hours   If blood cultures remain negative for 72 hours, place PICC and continue with 6 weeks of antibiotics potentially discharge to rehab facility   Echo had no evidence of vegetation   Continue IV ancef 2 g Q12H  Following with ID  Acute Encephalopathy   Metabolic encephalopathy upon admission most likely secondary to sepsis and bacteremia   3/26 had episode of slurred speech and difficult to arouse for which a stroke alert was called. Doing better today, still slightly lethargic but cooperative and answering questions  Labs and CT head/CTA were unremarkable   Following with neurology who recommends ASA daily   MRI ordered   Thrombocytopenia   On admission plts 120, decreased to 85 on 3/23  Currently back at baseline at 159  CKD stage 4 (severe)  Baseline Cr 1.4-1.6  Patient follows with outpatient nephrology   Cr at baseline   Continue to monitor with daily BMP  Type 2 DM  HgbA1C 6.7 on 3/20  Controlled with sliding insulin   Continue to monitor with fingerstick glucose       VTE Pharmacologic Prophylaxis:   Pharmacologic: Enoxaparin (Lovenox)  Mechanical VTE Prophylaxis in Place: Yes    Patient Centered Rounds: I have performed bedside rounds with nursing staff today.    Discussions with Specialists or Other Care Team Provider:     Education and  Discussions with Family / Patient:     Time Spent for Care: 15 minutes.  More than 50% of total time spent on counseling and coordination of care as described above.    Current Length of Stay: 8 day(s)    Current Patient Status: Inpatient   Certification Statement: The patient will continue to require additional inpatient hospital stay due to bacteremia     Discharge Plan / Estimated Discharge Date: Discharge in 1-2 days to possible rehab facility depending on blood culture results     Code Status: Level 1 - Full Code      Subjective:   COSME is a 89 year old female HD8 with a PMH of CHF, CKD stage 4, and DM2 who presented with AMS. Upon admission patient was found to be in severe sepsis and have positive blood cultures for Staph aureus MSSA. She is doing better today. She is lethargic, but cooperative and answering questions. She is out of bed sitting in her chair. She reports she still has back pain and leg pain that is worse at night. She denies any numbness, tingling, chills, chest pain, SOB.    Objective:     Vitals:   Temp (24hrs), Av.5 °F (36.4 °C), Min:97.2 °F (36.2 °C), Max:97.7 °F (36.5 °C)    Temp:  [97.2 °F (36.2 °C)-97.7 °F (36.5 °C)] 97.5 °F (36.4 °C)  HR:  [] 94  Resp:  [16-19] 19  BP: (104-125)/(56-75) 105/58  SpO2:  [91 %-100 %] 96 %  Body mass index is 27.47 kg/m².     Input and Output Summary (last 24 hours):       Intake/Output Summary (Last 24 hours) at 3/27/2025 0952  Last data filed at 3/27/2025 0500  Gross per 24 hour   Intake 360 ml   Output 250 ml   Net 110 ml       Physical Exam:     Physical Exam  Constitutional:       General: She is not in acute distress.     Appearance: She is ill-appearing.   HENT:      Head: Normocephalic and atraumatic.   Cardiovascular:      Rate and Rhythm: Normal rate and regular rhythm.      Pulses: Normal pulses.      Heart sounds: No murmur heard.     No friction rub. No gallop.   Pulmonary:      Effort: Pulmonary effort is normal.      Breath sounds:  Normal breath sounds. No wheezing, rhonchi or rales.   Abdominal:      General: Bowel sounds are normal.      Palpations: Abdomen is soft.      Tenderness: There is no abdominal tenderness.   Musculoskeletal:      Right lower leg: No edema.      Left lower leg: No edema.   Skin:     General: Skin is warm and dry.      Findings: Ecchymosis (throughout upper and lower extremities b/l) present.   Neurological:      Mental Status: She is lethargic.   Psychiatric:         Behavior: Behavior is cooperative.         Additional Data:     Labs:    Results from last 7 days   Lab Units 03/27/25  0440 03/26/25  0903 03/26/25  0437 03/24/25  0402 03/23/25  0553   WBC Thousand/uL 12.69*   < > 12.34*   < > 7.84   HEMOGLOBIN g/dL 10.2*   < > 11.2*   < > 10.5*   HEMATOCRIT % 31.2*   < > 34.9   < > 32.1*   PLATELETS Thousands/uL 159   < > 154   < > 85*   SEGS PCT %  --   --   --   --  81*   LYMPHO PCT %  --   --  9*   < > 8*   MONO PCT %  --   --  7   < > 9   EOS PCT %  --   --  2   < > 1    < > = values in this interval not displayed.     Results from last 7 days   Lab Units 03/26/25  0903 03/26/25  0437   POTASSIUM mmol/L 3.5 3.5   CHLORIDE mmol/L 102 100   CO2 mmol/L 22 24   BUN mg/dL 27* 26*   CREATININE mg/dL 1.22 1.36*   CALCIUM mg/dL 7.8* 8.0*   ALK PHOS U/L  --  276*   ALT U/L  --  <3*   AST U/L  --  39           * I Have Reviewed All Lab Data Listed Above.  * Additional Pertinent Lab Tests Reviewed: All Labs For Current Hospital Admission Reviewed    Imaging:    Imaging Reports Reviewed Today Include:   Imaging Personally Reviewed by Myself Includes:      Recent Cultures (last 7 days):     Results from last 7 days   Lab Units 03/24/25  1151 03/24/25  1037 03/21/25  0509 03/21/25  0459   BLOOD CULTURE  No Growth at 48 hrs. No Growth at 48 hrs. No Growth After 5 Days. Staphylococcus aureus*   GRAM STAIN RESULT   --   --   --  Gram positive cocci in clusters*       Last 24 Hours Medication List:   Current Facility-Administered  Medications   Medication Dose Route Frequency Provider Last Rate    acetaminophen  975 mg Oral Q8H MEGGAN Refugio Tijerina-Mable, CRNP      amitriptyline  10 mg Oral HS Refugio Jensen, CRNP      aspirin  81 mg Oral Daily Refguio Jensen, CRNP      atorvastatin  10 mg Oral Daily Refugio MullenYrn, CRNP      cefazolin  2,000 mg Intravenous Q12H Refugio TijerinaMable, CRNP 2,000 mg (03/27/25 0242)    Cholecalciferol  1,000 Units Oral Daily Refugio Jensen, CRNP      DULoxetine  60 mg Oral Daily Refugio Jensen, CRNP      enoxaparin  30 mg Subcutaneous Daily Refugio Jensen, CRNP      furosemide  20 mg Oral Daily Refugio MullenYrn, CRNP      hydroxychloroquine  200 mg Oral Daily With Breakfast Refugio Jensen, CRNP      insulin lispro  1-5 Units Subcutaneous 4x Daily (AC & HS) Refugio Jensen, CRNP      lidocaine  1 patch Topical Daily Reufgio Jensen, CRNP      oxybutynin  10 mg Oral Daily Refugio Jensen, CRNP      pantoprazole  40 mg Oral Early Morning Refugio Dano Jensen, CRNP      pregabalin  75 mg Oral BID [facility], CRNP      pyridoxine  100 mg Oral Daily Refugio Dano Jensen, CRNP      trimethobenzamide  200 mg Intramuscular Q6H PRN Refugio Dano MullensreedharChrisdiane, CRNP          Today, Patient Was Seen By: Fabián Erickson    ** Please Note: This note has been constructed using a voice recognition system. **

## 2025-03-27 NOTE — ASSESSMENT & PLAN NOTE
Lab Results   Component Value Date    HGBA1C 6.7 (H) 03/20/2025   -glycemic management per primary     Recent Labs     03/26/25  1629 03/26/25 2055 03/27/25  0741 03/27/25  1213   POCGLU 117 223* 177* 127       Blood Sugar Average: Last 72 hrs:  (P) 145.5

## 2025-03-27 NOTE — ASSESSMENT & PLAN NOTE
Noted to have metabolic encephalopathy initially.  Felt secondary to sepsis.  Mental status improved but this AM was confused/delirious again but then improved  3/26 difficult to arouse with noted slurred speech. Noted to be given oxy 2.5 around 6am, and has been receiving oxybutynin giving concern for possible TME-- stroke alert called.  Labs unremarkable, ABG pending   CT head/CTA unremarkable   Neurology following, appreciate recs   Recommending continue aspirin daily   MRI ordered and pending

## 2025-03-27 NOTE — ASSESSMENT & PLAN NOTE
Lab Results   Component Value Date    HGBA1C 6.7 (H) 03/20/2025       Recent Labs     03/26/25  0859 03/26/25  1124 03/26/25  1629 03/26/25 2055   POCGLU 120 143* 117 223*       Blood Sugar Average: Last 72 hrs:  (P) [id number]  Currently on sliding scale insulin  Only requiring approximately 2 to 3 units/day

## 2025-03-27 NOTE — PLAN OF CARE
Problem: OCCUPATIONAL THERAPY ADULT  Goal: Performs self-care activities at highest level of function for planned discharge setting.  See evaluation for individualized goals.  Description: Treatment Interventions: ADL retraining, Functional transfer training, Endurance training, Cognitive reorientation, Patient/family training, Equipment evaluation/education, Compensatory technique education, Continued evaluation, Energy conservation, Activityengagement, UE strengthening/ROM  Equipment Recommended: Bedside commode       See flowsheet documentation for full assessment, interventions and recommendations.   Outcome: Progressing  Note: Limitation: Decreased ADL status, Decreased Safe judgement during ADL, Decreased cognition, Decreased endurance, Decreased self-care trans, Decreased high-level ADLs (pain, balance, fxnl mobility, act bryanna, fxnl reach, trunk control, standing bryanna, strength, fxnl sitting balance, and fxnl sitting bryanna, attention to task, safety awareness, insight, problem solving, learning new tasks, appropriate responses, response time)  Prognosis: Good  Assessment: Pt seen on this date for skilled OT treatment session. At start of session pt supine in bed. Requiring alerting stimuli for wakening and OOB. Upon sitting EOB pt requesting use of BSC. Requiring increased time for transfer and step by step cuing for sequencing of transfer. Requiring (A) with hygiene and management of clothing during toileting. Continues to require step by step cuing for transfer off toilet. Limited by overall fatigue, strength, endurance, activity tolerance. Pt would continue to benefit from skilled OT treatment sessions in order to address remaining deficits     Rehab Resource Intensity Level, OT: II (Moderate Resource Intensity)

## 2025-03-27 NOTE — ASSESSMENT & PLAN NOTE
Noted to have fever, tachycardia, leukocytosis and required vasopressor support  Secondary to MSSA bacteremia  Subsequently transferred from ICU to medical surgical floor  Infectious disease following  Blood cultures from 3/21 and 3/19 positive. Repeat blood cultures from 3/24 negative x24 hours  Fluids d/c'd 3/25  Continues with slightly elevated leukocytosis. Remains afebrile. Continue to monitor on CBC daily

## 2025-03-27 NOTE — ASSESSMENT & PLAN NOTE
Patient lethargic is setting of acute illness and s/p pain med, but responsive to name and exam. 3/14/2025 CT head: No acute intercranial abnormality. 3/26/25 CT head no mass effect, acute hemorrhage or recent infarction evidence.  3/27/25 MRI brain: No acute infarct, significant mass effect or midline shift.  -antibiotic/work up as above  -monitor mentation  -symptomatic management per critical care team  -aspiration precautions  -f/u brain imaging

## 2025-03-27 NOTE — OCCUPATIONAL THERAPY NOTE
Occupational Therapy Progress Note     Patient Name: Tanya Stephen  Today's Date: 3/27/2025  Problem List  Principal Problem:    Severe sepsis (HCC)  Active Problems:    Hypomagnesemia    Type 2 diabetes mellitus (HCC)    Primary generalized (osteo)arthritis    OAB (overactive bladder)    Acute encephalopathy    Chronic pain syndrome    Gastroesophageal reflux disease without esophagitis    Chronic bilateral pleural effusions    Chronic kidney disease, stage IV (severe) (HCC)    CHF (congestive heart failure) ([facility])    MSSA bacteremia    Metabolic encephalopathy    Stroke-like symptoms              03/27/25 0832   OT Last Visit   OT Visit Date 03/27/25   Note Type   Note Type Treatment   Pain Assessment   Pain Assessment Tool 0-10   Pain Score No Pain   Restrictions/Precautions   Weight Bearing Precautions Per Order No   Other Precautions Cognitive;Chair Alarm;Bed Alarm;Multiple lines;O2;Fall Risk;Pain  (1L O2 - not on pt upon arrival and O2 sats at 99%, BENJAMÍN Landa made aware)   Lifestyle   Autonomy Pt lives w/ spouse in a 1 level house and is (I) w/ ADLs PTA, rollator, (+) falls, (-)    Reciprocal Relationships Supportive spouse, local daughter   Service to Others Retired   Intrinsic Gratification Pt enjoys spending time w/ family   ADL   Eating Assistance 5  Supervision/Setup   Eating Deficit Setup   Eating Comments A with opening containers, able to feel self   Grooming Assistance 4  Minimal Assistance   Grooming Deficit Increased time to complete;Supervision/safety;Verbal cueing;Wash/dry hands;Wash/dry face   Grooming Comments sitting in chair to wash face + hands   UB Dressing Assistance 3  Moderate Assistance   UB Dressing Deficit Increased time to complete;Thread RUE;Thread LUE;Verbal cueing;Supervision/safety;Pull around back   UB Dressing Comments doff/donning gown   Toileting Assistance  2  Maximal Assistance   Toileting Deficit Clothing management down;Perineal hygiene;Clothing management  "up;Bedside commode   Toileting Comments A with clothing management, able to initiate hygiene, A with thoroughness   Functional Standing Tolerance   Time 30 sec   Activity perihygiene in standing   Comments max A x1 to maintain balance in standing   Bed Mobility   Supine to Sit 2  Maximal assistance   Additional items Assist x 1;Increased time required;Verbal cues;LE management   Additional Comments retropulsive upon sitting EOB, with use of grab bar for UE support improving to close (S) for sitting balanc   Transfers   Sit to Stand 2  Maximal assistance   Additional items Assist x 1;Increased time required;Verbal cues   Stand to Sit 2  Maximal assistance  (quick descent - requiring cuing for hand placement)   Additional items Assist x 1;Increased time required;Verbal cues   Toilet transfer 2  Maximal assistance   Additional items Assist x 1;Increased time required;Verbal cues;Commode   Additional Comments use of RW for transfer   Functional Mobility   Functional Mobility 2  Maximal assistance   Additional Comments Ax1, side stepping from EOB to BSC and BSC to recliner chair. Retropulsive and requiring verbal/visual cuing for where to step to achieve distance   Additional items Rolling walker   Subjective   Subjective \"Oh I need to get up right now, I have to pee\"   Cognition   Overall Cognitive Status Impaired   Arousal/Participation Alert;Cooperative   Attention Attends with cues to redirect   Orientation Level Oriented to person;Oriented to place;Disoriented to time;Disoriented to situation   Memory Decreased short term memory;Decreased recall of recent events;Decreased recall of precautions   Following Commands Follows one step commands with increased time or repetition   Comments pleasant and cooperative, lethargic intermittently. Requiring consistent VC for   Activity Tolerance   Activity Tolerance Patient limited by fatigue;Other (Comment)  (limited by cognition)   Medical Staff Made Aware BENJAMÍN Landa   Assessment "   Assessment Pt seen on this date for skilled OT treatment session. At start of session pt supine in bed. Requiring alerting stimuli for wakening and OOB. Upon sitting EOB pt requesting use of BSC. Requiring increased time for transfer and step by step cuing for sequencing of transfer. Requiring (A) with hygiene and management of clothing during toileting. Continues to require step by step cuing for transfer off toilet. Limited by overall fatigue, strength, endurance, activity tolerance. Pt would continue to benefit from skilled OT treatment sessions in order to address remaining deficits   Plan   Treatment Interventions ADL retraining;Functional transfer training;Endurance training;Cognitive reorientation;Patient/family training;Equipment evaluation/education;Compensatory technique education;Energy conservation;Activityengagement   Goal Expiration Date 04/06/25   OT Treatment Day 2   OT Frequency 3-5x/wk   Discharge Recommendation   Rehab Resource Intensity Level, OT II (Moderate Resource Intensity)   AM-PAC Daily Activity Inpatient   Lower Body Dressing 2   Bathing 2   Toileting 2   Upper Body Dressing 2   Grooming 3   Eating 3   Daily Activity Raw Score 14   Daily Activity Standardized Score (Calc for Raw Score >=11) 33.39   AM-PAC Applied Cognition Inpatient   Following a Speech/Presentation 1   Understanding Ordinary Conversation 2   Taking Medications 1   Remembering Where Things Are Placed or Put Away 2   Remembering List of 4-5 Errands 1   Taking Care of Complicated Tasks 1   Applied Cognition Raw Score 8   Applied Cognition Standardized Score 19.32   End of Consult   Patient Position at End of Consult Bedside chair;Bed/Chair alarm activated;All needs within reach     GOALS:     *Goals established to promote patient goal of to get better:       *Patient will perform grooming tasks standing at sink with (S) in order to increase overall independence (PROGRESSING)     *UB ADL with (I) for inc'd independence  with self care     *LB ADL with Min (A)for inc'd independence with self care and decreased caregiver burden     *Toileting with Min (A) for clothing management and hygiene to increase hygiene/thoroughness in order to reduce caregiver burden (PROGRESSING)     *Patient will verbalize and demonstrate use of energy conservation/deep breathing techniques and work simplification skills during functional activities with no verbal cues.      *ADL transfers with (S) for inc'd independence with ADLs/purposeful tasks (PROGRESSING)     *Bed mobility- Min (A) for inc'd independence to manage own comfort and initiate EOB & OOB purposeful tasks (PROGRESSING)     *Patient will increase functional mobility to and from bathroom with rolling walker with min assist to increase independence with toileting (PROGRESSING)     *Patient will increase OOB/sitting tolerance to 2-4 hours per day to increase participation in self-care and leisure tasks with no s/s of exertion.      *Increase stand tolerance x 3-5  m for inc'd tolerance with standing purposeful tasks including grooming/purposeful tasks (LIMITED PROGRESS)     *Patient will improve functional activity tolerance to 20 minutes of sustained functional tasks to increase participation in basic self-care and decrease assistance level.       *Patient will increase static/dynamic standing balance to fair/fair- to maximize safety/performance of higher level ADLs/grooming tasks at sink (PROGRESSING)     *Patient will engage in ongoing cognitive assessment to assist with safe discharge planning/recommendations.      *Caregiver will demonstrate good body mechanics and safe technique when assisting pt during functional ADLs/transfers to maximize safety upon DC.       The patient's raw score on the -PAC Daily Activity Inpatient Short Form is 14. A raw score of less than 19 suggests the patient may benefit from discharge to post-acute rehabilitation services. HOWEVER please refer to the  recommendation of the Occupational Therapist for safe discharge planning.    Krysten Valles MS, OTR/L

## 2025-03-27 NOTE — ASSESSMENT & PLAN NOTE
Blood cultures from 3/19/2025 both positive for MSSA.    Repeat blood cultures from 3/21/2025 1 out of 2 positive.    Repeat blood cultures 3/24 negative thus far  Will need PICC placed when negative x72 hours -- anticipate 6 week course abx after clearance of bacteremia   Echocardiogram with no evidence of vegetation   Skin source felt to be portal of entry given multiple extremity abrasions and wounds   Continue IV Ancef 2 g every 12 hours  IR consulted for PICC line placement

## 2025-03-27 NOTE — ASSESSMENT & PLAN NOTE
Lab Results   Component Value Date    EGFR 39 03/27/2025    EGFR 39 03/26/2025    EGFR 34 03/26/2025    CREATININE 1.22 03/27/2025    CREATININE 1.22 03/26/2025    CREATININE 1.36 (H) 03/26/2025   Estimated Creatinine Clearance: 26.1 mL/min (by C-G formula based on SCr of 1.22 mg/dL).  -renal dose adjust antibiotic as needed  -volume management   -recheck BMP

## 2025-03-28 ENCOUNTER — APPOINTMENT (OUTPATIENT)
Dept: PHYSICAL THERAPY | Facility: CLINIC | Age: OVER 89
End: 2025-03-28
Payer: MEDICARE

## 2025-03-28 VITALS
SYSTOLIC BLOOD PRESSURE: 93 MMHG | OXYGEN SATURATION: 95 % | TEMPERATURE: 97.4 F | HEART RATE: 100 BPM | HEIGHT: 59 IN | RESPIRATION RATE: 18 BRPM | DIASTOLIC BLOOD PRESSURE: 56 MMHG | BODY MASS INDEX: 26 KG/M2 | WEIGHT: 128.97 LBS

## 2025-03-28 LAB
ANION GAP SERPL CALCULATED.3IONS-SCNC: 9 MMOL/L (ref 4–13)
ATRIAL RATE: 98 BPM
ATRIAL RATE: 99 BPM
BUN SERPL-MCNC: 28 MG/DL (ref 5–25)
CALCIUM SERPL-MCNC: 7.8 MG/DL (ref 8.4–10.2)
CHLORIDE SERPL-SCNC: 102 MMOL/L (ref 96–108)
CO2 SERPL-SCNC: 26 MMOL/L (ref 21–32)
CREAT SERPL-MCNC: 1.14 MG/DL (ref 0.6–1.3)
ERYTHROCYTE [DISTWIDTH] IN BLOOD BY AUTOMATED COUNT: 17.7 % (ref 11.6–15.1)
GFR SERPL CREATININE-BSD FRML MDRD: 42 ML/MIN/1.73SQ M
GLUCOSE SERPL-MCNC: 110 MG/DL (ref 65–140)
GLUCOSE SERPL-MCNC: 125 MG/DL (ref 65–140)
GLUCOSE SERPL-MCNC: 127 MG/DL (ref 65–140)
GLUCOSE SERPL-MCNC: 149 MG/DL (ref 65–140)
HCT VFR BLD AUTO: 30 % (ref 34.8–46.1)
HGB BLD-MCNC: 9.9 G/DL (ref 11.5–15.4)
MCH RBC QN AUTO: 30.5 PG (ref 26.8–34.3)
MCHC RBC AUTO-ENTMCNC: 33 G/DL (ref 31.4–37.4)
MCV RBC AUTO: 92 FL (ref 82–98)
P AXIS: 75 DEGREES
P AXIS: 88 DEGREES
PLATELET # BLD AUTO: 160 THOUSANDS/UL (ref 149–390)
PMV BLD AUTO: 11.7 FL (ref 8.9–12.7)
POTASSIUM SERPL-SCNC: 3.4 MMOL/L (ref 3.5–5.3)
PR INTERVAL: 186 MS
PR INTERVAL: 194 MS
QRS AXIS: 145 DEGREES
QRS AXIS: 146 DEGREES
QRSD INTERVAL: 142 MS
QRSD INTERVAL: 150 MS
QT INTERVAL: 414 MS
QT INTERVAL: 424 MS
QTC INTERVAL: 531 MS
QTC INTERVAL: 541 MS
RBC # BLD AUTO: 3.25 MILLION/UL (ref 3.81–5.12)
SODIUM SERPL-SCNC: 137 MMOL/L (ref 135–147)
T WAVE AXIS: -13 DEGREES
T WAVE AXIS: -6 DEGREES
VENTRICULAR RATE: 98 BPM
VENTRICULAR RATE: 99 BPM
WBC # BLD AUTO: 9.78 THOUSAND/UL (ref 4.31–10.16)

## 2025-03-28 PROCEDURE — 92526 ORAL FUNCTION THERAPY: CPT

## 2025-03-28 PROCEDURE — 97530 THERAPEUTIC ACTIVITIES: CPT

## 2025-03-28 PROCEDURE — 93010 ELECTROCARDIOGRAM REPORT: CPT | Performed by: INTERNAL MEDICINE

## 2025-03-28 PROCEDURE — 82948 REAGENT STRIP/BLOOD GLUCOSE: CPT

## 2025-03-28 PROCEDURE — 99239 HOSP IP/OBS DSCHRG MGMT >30: CPT

## 2025-03-28 PROCEDURE — G0545 PR INHERENT VISIT TO INPT: HCPCS | Performed by: INTERNAL MEDICINE

## 2025-03-28 PROCEDURE — 99232 SBSQ HOSP IP/OBS MODERATE 35: CPT | Performed by: INTERNAL MEDICINE

## 2025-03-28 PROCEDURE — 80048 BASIC METABOLIC PNL TOTAL CA: CPT

## 2025-03-28 PROCEDURE — 85027 COMPLETE CBC AUTOMATED: CPT

## 2025-03-28 RX ORDER — CEFAZOLIN SODIUM 2 G/50ML
2000 SOLUTION INTRAVENOUS EVERY 12 HOURS
Qty: 3600 ML | Refills: 0
Start: 2025-03-29 | End: 2025-05-04

## 2025-03-28 RX ORDER — PREGABALIN 75 MG/1
75 CAPSULE ORAL 2 TIMES DAILY
Qty: 20 CAPSULE | Refills: 0 | Status: SHIPPED | OUTPATIENT
Start: 2025-03-28 | End: 2025-04-07

## 2025-03-28 RX ADMIN — HYDROXYCHLOROQUINE SULFATE 200 MG: 200 TABLET ORAL at 09:05

## 2025-03-28 RX ADMIN — DULOXETINE 60 MG: 60 CAPSULE, DELAYED RELEASE ORAL at 08:55

## 2025-03-28 RX ADMIN — ASPIRIN 81 MG CHEWABLE TABLET 81 MG: 81 TABLET CHEWABLE at 08:54

## 2025-03-28 RX ADMIN — CEFAZOLIN SODIUM 2000 MG: 2 SOLUTION INTRAVENOUS at 15:27

## 2025-03-28 RX ADMIN — PANTOPRAZOLE SODIUM 40 MG: 40 TABLET, DELAYED RELEASE ORAL at 05:09

## 2025-03-28 RX ADMIN — PREGABALIN 75 MG: 75 CAPSULE ORAL at 17:39

## 2025-03-28 RX ADMIN — ATORVASTATIN CALCIUM 10 MG: 10 TABLET, FILM COATED ORAL at 08:55

## 2025-03-28 RX ADMIN — OXYBUTYNIN CHLORIDE 10 MG: 5 TABLET, EXTENDED RELEASE ORAL at 08:54

## 2025-03-28 RX ADMIN — PYRIDOXINE HCL TAB 50 MG 100 MG: 50 TAB at 08:55

## 2025-03-28 RX ADMIN — LIDOCAINE 1 PATCH: 50 PATCH CUTANEOUS at 08:55

## 2025-03-28 RX ADMIN — Medication 1000 UNITS: at 08:55

## 2025-03-28 RX ADMIN — ENOXAPARIN SODIUM 30 MG: 30 INJECTION SUBCUTANEOUS at 08:55

## 2025-03-28 RX ADMIN — PREGABALIN 75 MG: 75 CAPSULE ORAL at 08:55

## 2025-03-28 RX ADMIN — CEFAZOLIN SODIUM 2000 MG: 2 SOLUTION INTRAVENOUS at 05:09

## 2025-03-28 RX ADMIN — ACETAMINOPHEN 975 MG: 325 TABLET, FILM COATED ORAL at 12:23

## 2025-03-28 RX ADMIN — ACETAMINOPHEN 975 MG: 325 TABLET, FILM COATED ORAL at 05:09

## 2025-03-28 NOTE — ASSESSMENT & PLAN NOTE
Noted to have metabolic encephalopathy initially.  Felt secondary to sepsis.  Mental status improved but this AM was confused/delirious again but then improved  3/26 difficult to arouse with noted slurred speech. Noted to be given oxy 2.5 around 6am, and has been receiving oxybutynin giving concern for possible TME-- stroke alert called.  Labs unremarkable, ABG pending   CT head/CTA unremarkable   Neurology following, appreciate recs   Recommending continue aspirin daily   MRI acutely negative

## 2025-03-28 NOTE — ASSESSMENT & PLAN NOTE
Wt Readings from Last 3 Encounters:   03/28/25 58.5 kg (128 lb 15.5 oz)   02/24/25 65 kg (143 lb 4.8 oz)   01/27/25 54 kg (119 lb)   -volume management per primary

## 2025-03-28 NOTE — ASSESSMENT & PLAN NOTE
Patient lethargic is setting of acute illness and s/p pain med, but responsive to name and exam. 3/14/2025 CT head: No acute intercranial abnormality. 3/26/25 CT head no mass effect, acute hemorrhage or recent infarction evidence.  3/27/25 MRI brain: No acute infarct, significant mass effect or midline shift.  -antibiotic/work up as above  -monitor mentation  -symptomatic management per critical care team  -aspiration precautions

## 2025-03-28 NOTE — ASSESSMENT & PLAN NOTE
Noted to have fever, tachycardia, leukocytosis and required vasopressor support  Secondary to MSSA bacteremia  Subsequently transferred from ICU to medical surgical floor  Infectious disease following  Blood cultures from 3/21 and 3/19 positive. Repeat blood cultures from 3/24 negative x72 hours  Fluids d/c'd 3/25  Continues with slightly elevated leukocytosis. Remains afebrile. Continue to monitor on CBC daily

## 2025-03-28 NOTE — PROGRESS NOTES
Progress Note - Infectious Disease   Name: Tanya Stephen 89 y.o. female I MRN: [id number]  Unit/Bed#: S -01 I Date of Admission: 3/19/2025   Date of Service: 3/28/2025 I Hospital Day: 9    Assessment & Plan  Severe sepsis (HCC)  E/b fever, tachycardia, leukocytosis, hypotension requiring Vasopressor support. In setting of #2. Vasopressor management weaned off 3/20/25 by critical care team  -antibiotics as below  -follow-up cultures  -monitor temperature and hemodynamics  -serial exam  -additional inventions pending clinical course  -monitoring serial CBC and BMP for treatment response and any developing toxicities    MSSA bacteremia  Admission blood cultures 2 of 2 sets growing MSSA. Possible skin source as portal of entry with multiple extremity abrasions/wounds. 3/20/25 TTE no vegetation seen  3/21/25 Blood culture 1 of 2 sets + MSSA 3/24/25 blood cultures negative at 48 hours  -Continue Cefazolin 2 g IV q 12 hours, renal dose adjusted high dose  -follow up 3/24/25 repeat blood cultures  -Right arm PICC line placed 3/27/25   -Plan to complete 6 week IV Cefazolin course from blood culture clearance through 5/4/25.  -Will check weekly CBCD, BMP while on IV Cefazolin.  -Outpatient ID telemedicine office appt with me 4/9/25 at 2:30 pm   Acute encephalopathy  Patient lethargic is setting of acute illness and s/p pain med, but responsive to name and exam. 3/14/2025 CT head: No acute intercranial abnormality. 3/26/25 CT head no mass effect, acute hemorrhage or recent infarction evidence.  3/27/25 MRI brain: No acute infarct, significant mass effect or midline shift.  -antibiotic/work up as above  -monitor mentation  -symptomatic management per critical care team  -aspiration precautions  Chronic kidney disease, stage IV (severe) (HCC)  Lab Results   Component Value Date    EGFR 42 03/28/2025    EGFR 39 03/27/2025    EGFR 39 03/26/2025    CREATININE 1.14 03/28/2025    CREATININE 1.22 03/27/2025    CREATININE  1.22 03/26/2025   Estimated Creatinine Clearance: 25.3 mL/min (by C-G formula based on SCr of 1.14 mg/dL).  -renal dose adjust antibiotic as needed  -volume management   -recheck BMP  Type 2 diabetes mellitus (HCC)  Lab Results   Component Value Date    HGBA1C 6.7 (H) 03/20/2025   -glycemic management per primary     Recent Labs     03/27/25  1632 03/27/25  2045 03/28/25  0709 03/28/25  1204   POCGLU 114 164* 127 125       Blood Sugar Average: Last 72 hrs:  (P) 143.9140833226799125    Chronic bilateral pleural effusions  -volume management per primary  CHF (congestive heart failure) ([facility])  Wt Readings from Last 3 Encounters:   03/28/25 58.5 kg (128 lb 15.5 oz)   02/24/25 65 kg (143 lb 4.8 oz)   01/27/25 54 kg (119 lb)   -volume management per primary      I have discussed with patient and /daughter at bedside, RN, and Von of primary care team regarding the above plan to continue antibiotic as above and monitor closely. They agree with the plan.    Antibiotics:  Cefazolin D9 - D5 from 1st negative blood cultures    Subjective   Patient has no fever, chills, sweats overnight; no nausea, vomiting, diarrhea; no cough, shortness of breath; + chronic back pain. + chronic intermittent dysuria/pressure discomfort; no SPT today. Appetite better today.     Objective :  Temp:  [97.5 °F (36.4 °C)-98 °F (36.7 °C)] 97.7 °F (36.5 °C)  HR:  [85-98] 91  BP: ()/(45-60) 97/60  Resp:  [18] 18  SpO2:  [95 %-100 %] 100 %  O2 Device: None (Room air)    General Appearance:  89 year old elderly, chronically debilitated female, nontoxic, no acute distress, resting in recliner, more awake today   HEENT: Atraumatic normocephalic   Throat: Oropharynx moist. Poor dentition   Pulmonary:   Normal respiratory excursion without accessory muscle use, statting % on RA   Cardiac:  RRR decreased tones   Abdomen:   Soft, non-tender, non-distended   Extremities: No edema   : No diaz, no focal SPT or flank tenderness on exam    Psychiatric: awake, cooperative   Skin: No new rashes. IV and new right arm PICC site nontender. Multiple stages of ecchymoses of skin.          Lab Results: I have reviewed the following results:  Results from last 7 days   Lab Units 03/28/25  0521 03/27/25  0440 03/26/25  0903   WBC Thousand/uL 9.78 12.69* 12.10*   HEMOGLOBIN g/dL 9.9* 10.2* 10.2*   PLATELETS Thousands/uL 160 159 150     Results from last 7 days   Lab Units 03/28/25  0521 03/27/25  1140 03/26/25  0903 03/26/25  0437 03/25/25  0554 03/24/25  0402 03/23/25  0439   SODIUM mmol/L 137 132* 134* 133* 134*   < > 131*   POTASSIUM mmol/L 3.4* 3.6 3.5 3.5 3.7   < > 4.3   CHLORIDE mmol/L 102 103 102 100 103   < > 102   CO2 mmol/L 26 22 22 24 21   < > 21   BUN mg/dL 28* 28* 27* 26* 26*   < > 30*   CREATININE mg/dL 1.14 1.22 1.22 1.36* 1.11   < > 1.31*   EGFR ml/min/1.73sq m 42 39 39 34 43   < > 35   CALCIUM mg/dL 7.8* 7.7* 7.8* 8.0* 7.6*   < > 7.6*   AST U/L  --   --   --  39 44*  --  80*   ALT U/L  --   --   --  <3* <3*  --  <3*   ALK PHOS U/L  --   --   --  276* 275*  --  280*   ALBUMIN g/dL  --   --   --  3.0* 2.6*  --  2.8*    < > = values in this interval not displayed.     Results from last 7 days   Lab Units 03/24/25  1151 03/24/25  1037   BLOOD CULTURE  No Growth at 72 hrs. No Growth After 4 Days.                       Imaging Results Review: I personally reviewed the following image studies in PACS and associated radiology reports: MRI brain. My interpretation of the radiology images/reports is: 3/27/25 MRI brain: No acute infarct, significant mass effect or midline shift.  Bilateral nonspecific mastoid effusions.  Other Study Results Review: My interpretation of other studies include: 3/20/25 TTE no vegetation seen.

## 2025-03-28 NOTE — PLAN OF CARE
Problem: PHYSICAL THERAPY ADULT  Goal: Performs mobility at highest level of function for planned discharge setting.  See evaluation for individualized goals.  Description: Treatment/Interventions: Functional transfer training, LE strengthening/ROM, Therapeutic exercise, Endurance training, Cognitive reorientation, Patient/family training, Equipment eval/education, Bed mobility, Gait training, Compensatory technique education  Equipment Recommended: Walker       See flowsheet documentation for full assessment, interventions and recommendations.  Outcome: Not Progressing  Note: Prognosis: Good  Problem List: Decreased strength, Decreased endurance, Impaired balance, Decreased mobility, Decreased cognition, Impaired judgement, Decreased safety awareness, Pain  Assessment: pt began tx session lying supine in the bed w/  present. PT to focus on bed mobility, transfer training, standing balance/tolerance, TE  activities and increasing pt activity tolerance. pt continues to be limited with bed mobility and functional transfers requiring max ax1 for supine<>sit EOB transfer and STS transfers from EOB w/ RW. pt required LE and trunk management in order to sit EOB and demonstrated a posterior lean that required min ax1 to mod ax1 for correcting LOB. pt also demonstrated retropulsion with static standing that required mod ax1 to correct. pt continues to be at an increased risk for falls and injuries due to retropulsion, difficulty with RW management and proper UE placement. pt also required max ax1 for SPT from EOB to recliner with RW management, Vc's for lateral stepping. pt would benefit from continued skilled PT intervention in order to address pt deficits listed above. Continue to recommedn DC w/ level 2 moderate rehab resource intensity when medically cleared        Rehab Resource Intensity Level, PT: II (Moderate Resource Intensity)    See flowsheet documentation for full assessment.

## 2025-03-28 NOTE — ASSESSMENT & PLAN NOTE
Lab Results   Component Value Date    HGBA1C 6.7 (H) 03/20/2025       Recent Labs     03/27/25  1632 03/27/25  2045 03/28/25  0709 03/28/25  1204   POCGLU 114 164* 127 125       Blood Sugar Average: Last 72 hrs:  (P) [id number]  Currently on sliding scale insulin  Only requiring approximately 2 to 3 units/day

## 2025-03-28 NOTE — ASSESSMENT & PLAN NOTE
Lab Results   Component Value Date    EGFR 42 03/28/2025    EGFR 39 03/27/2025    EGFR 39 03/26/2025    CREATININE 1.14 03/28/2025    CREATININE 1.22 03/27/2025    CREATININE 1.22 03/26/2025   Continue to monitor, avoid nephrotoxic medication  Creatinine is stable baseline is 1.4-1.6  Follows with nephrology as an outpatient  Within baseline, continue to monitor on BMP daily

## 2025-03-28 NOTE — SPEECH THERAPY NOTE
Speech Language/Pathology    Speech/Language Pathology Progress Note    Patient Name: Tanya Stephen  Today's Date: 3/28/2025       Subjective:  Message rec'd from provider that nsg reported pt had difficulty swallowing at breakfast this am, asked to re-eval for possible DC today.   Spoke w/ nsg, pt had difficulty w/ scrambled eggs- rolling around in mouth, may have been dry.  Pt ate cream of wheat and took pills w/ water w/o difficulty.    Pt w/ known hx of esophageal dysphagia- follows w/ Dr. Barrientos; family at bedside.     Objective:  Pt seen sitting in chair,  feeding pt small bites of chopped meatloaf, zucchini, and lemon meringue pie and took sips of soda by straw. Mastication/manipulation  appeared WFL-family reported pt does not have her denture in the hospital. Good oral control noted with liquids. No coughing, throat clearing or c/o food dysphagia reported. Pt/family independently alternating food and liquids. Reviewed  w/ family potential foods that pt has difficulty swallowing ie dense meats (pork, steak, roast beef), bread, pasta.     Assessment:  Pt appears to be tolerating soft foods and thin liquids w/o overt oral or pharyngeal dysphagia and no esophageal symptoms at this time     Plan/Recommendations:  Cont regular diet as tolerated- choose softer foods.  Meds as tolerated in applesauce or w/ water  No further speech/ swallow tx needed.         Annia Cardenas MA CCC-SLP  Speech Pathologist  Available via [employer]

## 2025-03-28 NOTE — QUICK NOTE
MRI Reviewed - no evidence of acute infarct on imaging. No further recommendations for inpatient work up from at this time. Neurology will sign off. Please call or TT with any questions or concerns.

## 2025-03-28 NOTE — ASSESSMENT & PLAN NOTE
>>ASSESSMENT AND PLAN FOR TYPE 2 DIABETES MELLITUS (HCC) WRITTEN ON 3/28/2025  3:03 PM BY KEILA KNUTSON PA-C    Lab Results   Component Value Date    HGBA1C 6.7 (H) 03/20/2025   -glycemic management per primary     Recent Labs     03/27/25  1632 03/27/25  2045 03/28/25  0709 03/28/25  1204   POCGLU 114 164* 127 125       Blood Sugar Average: Last 72 hrs:  (P) [id number]

## 2025-03-28 NOTE — ASSESSMENT & PLAN NOTE
Blood cultures from 3/19/2025 both positive for MSSA.    Repeat blood cultures from 3/21/2025 1 out of 2 positive.    Repeat blood cultures 3/24 negative thus far  Will need PICC placed when negative x72 hours -- anticipate 6 week course abx after clearance of bacteremia   Echocardiogram with no evidence of vegetation   Skin source felt to be portal of entry given multiple extremity abrasions and wounds   Continue IV Ancef 2 g every 12 hours, continue in the outpatient setting with last day 5/4  IR consulted for PICC line placement-placed on 3/27

## 2025-03-28 NOTE — ASSESSMENT & PLAN NOTE
Admission blood cultures 2 of 2 sets growing MSSA. Possible skin source as portal of entry with multiple extremity abrasions/wounds. 3/20/25 TTE no vegetation seen  3/21/25 Blood culture 1 of 2 sets + MSSA 3/24/25 blood cultures negative at 48 hours  -Continue Cefazolin 2 g IV q 12 hours, renal dose adjusted high dose  -follow up 3/24/25 repeat blood cultures  -Right arm PICC line placed 3/27/25   -Plan to complete 6 week IV Cefazolin course from blood culture clearance through 5/4/25.  -Will check weekly CBCD, BMP while on IV Cefazolin.  -Outpatient ID telemedicine office appt with me 4/9/25 at 2:30 pm

## 2025-03-28 NOTE — CASE MANAGEMENT
Case Management Discharge Planning Note    Patient name Tanya SWEENEY /S -01 MRN [id number]  : 1934 Date 3/28/2025       Current Admission Date: 3/19/2025  Current Admission Diagnosis:Severe sepsis ([facility])   Patient Active Problem List    Diagnosis Date Noted Date Diagnosed    Stroke-like symptoms 2025     CHF (congestive heart failure) ([facility]) 2025     MSSA bacteremia 2025     Metabolic encephalopathy 2025     Severe sepsis ([facility]) 2025     Hypoxia 2025     Small intestinal bacterial overgrowth (SIBO) 2025     Ambulatory dysfunction 11/15/2024     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     Chronic kidney disease, stage IV (severe) ([facility]) 10/28/2024     Diabetic nephropathy associated with type 2 diabetes mellitus ([facility]) 10/28/2024     Anemia of chronic renal failure, stage 4 (severe)  ([facility]) 10/28/2024     Fall 2024     Mild cognitive impairment 2024     Chronic mastoiditis of right side 2024     Nonrheumatic aortic valve stenosis 2024     Chronic bilateral pleural effusions 2024     Gastroesophageal reflux disease without esophagitis 2023     Esophageal dysphagia 2023     Raynaud's phenomenon without gangrene 2023     Spinal stenosis of lumbar region with neurogenic claudication 2022     Chronic pain syndrome 2022     Acute encephalopathy 2022     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[facility]) 2022     OAB (overactive bladder) 2022     Acute on chronic diastolic (congestive) heart failure ([facility]) 2022     Post zoster neuralgia 2021     Primary generalized (osteo)arthritis 2021     Seronegative arthropathy of multiple sites ([facility]) 2021     Senile osteoporosis 2021     Diabetic polyneuropathy associated with type 2 diabetes mellitus ([facility]) 2021     Irritable bowel syndrome with diarrhea 2021     Abnormality of gait  due to impairment of balance 11/19/2021     Sicca syndrome (HCC) 11/19/2021     Hypomagnesemia 12/22/2016     Type 2 diabetes mellitus (HCC) 12/22/2016       LOS (days): 9  Geometric Mean LOS (GMLOS) (days): 4.9  Days to GMLOS:-3.8     OBJECTIVE:  Risk of Unplanned Readmission Score: 34.97         Current admission status: Inpatient   Preferred Pharmacy:   [id number]  [place] - [address] [address]  Phone: [phone] Fax: [phone]    [facility]  [place] [address]  Phone: [phone] Fax: [phone]    [facility] - [facility] [address] [facility] [address]  [address]  [place] [address]  Phone: [phone] Fax: [phone]    Primary Care Provider: Gregory Santoro DO    Primary Insurance: MEDICARE  Secondary Insurance: [employer]    DISCHARGE DETAILS:             CM contacted family/caregiver?: Yes             Contacts  Patient Contacts: Weston (spouse), Enma (dtr) and Nevaeh (dtr)  Relationship to Patient:: Family  Contact Method: In Person  Reason/Outcome: Discharge Planning, Emergency Contact, Continuity of Care, Referral    Requested Home Health Care         Is the patient interested in C at discharge?: No    DME Referral Provided  Referral made for DME?: No    Other Referral/Resources/Interventions Provided:  Interventions: Transportation  Referral Comments: Per SLIM patient medically stable for d/c today. CM f/u with [facility] STR via aidin re: same - confirmed facility able to accept pt today. Transport referral placed to [facility], confirmed for 1800 p/u time via [employer] [facility] to  STR today. All parties notified of same.         Treatment Team Recommendation: Short Term Rehab  Discharge Destination Plan:: Short Term Rehab  Transport at Discharge : Stretcher van        Transported  by (Company and Unit #): [employer]  ETA of Transport (Date): 03/28/25  ETA of Transport (Time): 1800           IMM reviewed with patient and caregivers, patient and caregivers agrees with discharge determination.  IMM Given (Date):: 03/28/25  IMM Given to:: Patient  Family notified:: Pt, pt's spouse Weston, and dtr Enma       Accepting Facility Name, City & State : [facility]  Receiving Facility/Agency Phone Number: [phone]. [phone]  Facility/Agency Fax Number: [phone]

## 2025-03-28 NOTE — PHYSICAL THERAPY NOTE
PHYSICAL THERAPY NOTE          Patient Name: Tanya Stephen  Today's Date: 3/28/2025           03/28/25 1131   PT Last Visit   PT Visit Date 03/28/25   Note Type   Note Type Treatment   Pain Assessment   Pain Assessment Tool 0-10   Pain Onset/Description Onset: Ongoing   Effect of Pain on Daily Activities limited activity tolerance, standing tolerance   Patient's Stated Pain Goal No pain   Hospital Pain Intervention(s) Repositioned;Ambulation/increased activity;Rest   Multiple Pain Sites No   Pain Rating: FLACC (Rest) - Face 0   Pain Rating: FLACC (Rest) - Legs 0   Pain Rating: FLACC (Rest) - Activity 0   Pain Rating: FLACC (Rest) - Cry 0   Pain Rating: FLACC (Rest) - Consolability 0   Score: FLACC (Rest) 0   Pain Rating: FLACC (Activity) - Face 0   Pain Rating: FLACC (Activity) - Legs 0   Pain Rating: FLACC (Activity) - Activity 0   Pain Rating: FLACC (Activity) - Cry 0   Pain Rating: FLACC (Activity) - Consolability 0   Score: FLACC (Activity) 0   Restrictions/Precautions   Weight Bearing Precautions Per Order No   Other Precautions Cognitive;Chair Alarm;Bed Alarm;Fall Risk  (pt on room air pre/post tx session, Sp02 >90% throughout tx session)   General   Chart Reviewed Yes   Response to Previous Treatment Patient with no complaints from previous session.   Family/Caregiver Present Yes   Cognition   Overall Cognitive Status Impaired   Arousal/Participation Alert;Responsive;Cooperative   Attention Attends with cues to redirect   Orientation Level Oriented to person;Disoriented to place;Disoriented to time;Disoriented to situation   Memory Decreased short term memory;Decreased recall of recent events;Decreased recall of precautions   Following Commands Follows one step commands with increased time or repetition   Bed Mobility   Supine to Sit 2  Maximal assistance   Additional items Assist x 1;HOB elevated;Bedrails;Increased time  required;Verbal cues;LE management;Other  (trunk management)   Sit to Supine Unable to assess   Additional Comments pt seated OOB in the recliner post tx w/ call bell, legs elevated, chair alarm activated and all pt needs met.   Transfers   Sit to Stand 2  Maximal assistance   Additional items Assist x 1;Increased time required;Verbal cues   Stand to Sit 2  Maximal assistance   Additional items Assist x 1;Armrests;Increased time required;Verbal cues   Stand pivot 2  Maximal assistance   Additional items Assist x 1;Armrests;Increased time required;Verbal cues   Additional Comments pt required max ax1 for all functional transfers with RW, RW management, Vc's for hand placement   Ambulation/Elevation   Gait pattern Not tested;Not appropriate  (severe retropulsion with static standing and SPT from EOB to recliner)   Ambulation/Elevation Additional Comments ambulation not attempted due to safety concerns as pt demonstarted a heavy retropulsion with static standing   Balance   Static Sitting Poor  (poor+ to poor)   Dynamic Sitting Poor -   Static Standing Poor   Dynamic Standing Poor -   Ambulatory Zero   Endurance Deficit   Endurance Deficit Yes   Endurance Deficit Description limited activity tolerance   Activity Tolerance   Activity Tolerance Patient limited by fatigue;Other (Comment)  (generalized weakness, cognition)   Nurse Made Aware Spoke to RN   Exercises   Knee AROM Long Arc Quad Sitting;10 reps;AROM;Bilateral   Ankle Pumps Sitting;10 reps;AROM;Bilateral  (long sit position)   Assessment   Prognosis Good   Problem List Decreased strength;Decreased endurance;Impaired balance;Decreased mobility;Decreased cognition;Impaired judgement;Decreased safety awareness;Pain   Assessment pt began tx session lying supine in the bed w/  present. PT to focus on bed mobility, transfer training, standing balance/tolerance, TE  activities and increasing pt activity tolerance. pt continues to be limited with bed mobility and  "functional transfers requiring max ax1 for supine<>sit EOB transfer and STS transfers from EOB w/ RW. pt required LE and trunk management in order to sit EOB and demonstrated a posterior lean that required min ax1 to mod ax1 for correcting LOB. pt also demonstrated retropulsion with static standing that required mod ax1 to correct. pt continues to be at an increased risk for falls and injuries due to retropulsion, difficulty with RW management and proper UE placement. pt also required max ax1 for SPT from EOB to recliner with RW management, Vc's for lateral stepping. pt would benefit from continued skilled PT intervention in order to address pt deficits listed above. Continue to recommedn DC w/ level 2 moderate rehab resource intensity when medically cleared   Goals   Patient Goals none stated   STG Expiration Date 04/04/25   PT Treatment Day 1   Plan   Treatment/Interventions Functional transfer training;LE strengthening/ROM;Therapeutic exercise;Endurance training;Patient/family training;Equipment eval/education;Bed mobility;Gait training;Spoke to nursing;Compensatory technique education;Cognitive reorientation   PT Frequency 3-5x/wk   Discharge Recommendation   Rehab Resource Intensity Level, PT II (Moderate Resource Intensity)   Equipment Recommended Walker   Walker Package Recommended Wheeled walker   Change/add to Walker Package? Yes, Change Size   Walker Size Arturo (Ht <5'1\")   AM-PAC Basic Mobility Inpatient   Turning in Flat Bed Without Bedrails 2   Lying on Back to Sitting on Edge of Flat Bed Without Bedrails 2   Moving Bed to Chair 2   Standing Up From Chair Using Arms 2   Walk in Room 2   Climb 3-5 Stairs With Railing 1   Basic Mobility Inpatient Raw Score 11   Basic Mobility Standardized Score 30.25   [facility] Highest Level Of Mobility   -[facility] Goal 4: Move to chair/commode   Education   Education Provided Mobility training;Assistive device   Patient Reinforcement needed   End of Consult   Patient " Position at End of Consult Bedside chair;Bed/Chair alarm activated;All needs within reach   The patient's AM-PAC Basic Mobility Inpatient Short Form Raw Score is 11. A Raw score of less than or equal to 16 suggests the patient may benefit from discharge to post-acute rehabilitation services. Please also refer to the recommendation of the Physical Therapist for safe discharge planning.    Arie Arita

## 2025-03-28 NOTE — ASSESSMENT & PLAN NOTE
Lab Results   Component Value Date    EGFR 42 03/28/2025    EGFR 39 03/27/2025    EGFR 39 03/26/2025    CREATININE 1.14 03/28/2025    CREATININE 1.22 03/27/2025    CREATININE 1.22 03/26/2025   Estimated Creatinine Clearance: 25.3 mL/min (by C-G formula based on SCr of 1.14 mg/dL).  -renal dose adjust antibiotic as needed  -volume management   -recheck BMP

## 2025-03-28 NOTE — ASSESSMENT & PLAN NOTE
Lab Results   Component Value Date    HGBA1C 6.7 (H) 03/20/2025   -glycemic management per primary     Recent Labs     03/27/25  1632 03/27/25  2045 03/28/25  0709 03/28/25  1204   POCGLU 114 164* 127 125       Blood Sugar Average: Last 72 hrs:  (P) [id number]

## 2025-03-28 NOTE — DISCHARGE INSTR - AVS FIRST PAGE
Please be aware I prescribed an antibiotic called cefazolin.  This is an IV antibiotic that should be taken every 12 hours with the completion date of 5/4/2025.  It is also recommended that you obtain weekly lab work such as a CBC and a BMP to follow-up electrolyte and blood work abnormalities.  Please see your PCP within 1 week for further continuity care and continue to follow-up with infectious disease team upon discharge.

## 2025-03-28 NOTE — PLAN OF CARE
Problem: Potential for Falls  Goal: Patient will remain free of falls  Description: INTERVENTIONS:  - Educate patient/family on patient safety including physical limitations  - Instruct patient to call for assistance with activity   - Consult OT/PT to assist with strengthening/mobility   - Keep Call bell within reach  - Keep bed low and locked with side rails adjusted as appropriate  - Keep care items and personal belongings within reach  - Initiate and maintain comfort rounds  - Make Fall Risk Sign visible to staff  - Apply yellow socks and bracelet for high fall risk patients  - Consider moving patient to room near nurses station  Outcome: Adequate for Discharge     Problem: PAIN - ADULT  Goal: Verbalizes/displays adequate comfort level or baseline comfort level  Description: Interventions:  - Encourage patient to monitor pain and request assistance  - Assess pain using appropriate pain scale  - Administer analgesics based on type and severity of pain and evaluate response  - Implement non-pharmacological measures as appropriate and evaluate response  - Consider cultural and social influences on pain and pain management  - Notify physician/advanced practitioner if interventions unsuccessful or patient reports new pain  Outcome: Adequate for Discharge     Problem: INFECTION - ADULT  Goal: Absence or prevention of progression during hospitalization  Description: INTERVENTIONS:  - Assess and monitor for signs and symptoms of infection  - Monitor lab/diagnostic results  - Monitor all insertion sites, i.e. indwelling lines, tubes, and drains  - Monitor endotracheal if appropriate and nasal secretions for changes in amount and color  - [place]ell appropriate cooling/warming therapies per order  - Administer medications as ordered  - Instruct and encourage patient and family to use good hand hygiene technique  - Identify and instruct in appropriate isolation precautions for identified infection/condition  Outcome:  Adequate for Discharge  Goal: Absence of fever/infection during neutropenic period  Description: INTERVENTIONS:  - Monitor WBC    Outcome: Adequate for Discharge     Problem: SAFETY ADULT  Goal: Patient will remain free of falls  Description: INTERVENTIONS:  - Educate patient/family on patient safety including physical limitations  - Instruct patient to call for assistance with activity   - Consult OT/PT to assist with strengthening/mobility   - Keep Call bell within reach  - Keep bed low and locked with side rails adjusted as appropriate  - Keep care items and personal belongings within reach  - Initiate and maintain comfort rounds  - Make Fall Risk Sign visible to staff  - Apply yellow socks and bracelet for high fall risk patients  - Consider moving patient to room near nurses station  Outcome: Adequate for Discharge  Goal: Maintain or return to baseline ADL function  Description: INTERVENTIONS:  -  Assess patient's ability to carry out ADLs; assess patient's baseline for ADL function and identify physical deficits which impact ability to perform ADLs (bathing, care of mouth/teeth, toileting, grooming, dressing, etc.)  - Assess/evaluate cause of self-care deficits   - Assess range of motion  - Assess patient's mobility; develop plan if impaired  - Assess patient's need for assistive devices and provide as appropriate  - Encourage maximum independence but intervene and supervise when necessary  - Involve family in performance of ADLs  - Assess for home care needs following discharge   - Consider OT consult to assist with ADL evaluation and planning for discharge  - Provide patient education as appropriate  Outcome: Adequate for Discharge  Goal: Maintains/Returns to pre admission functional level  Description: INTERVENTIONS:  - Perform AM-PAC 6 Click Basic Mobility/ Daily Activity assessment daily.  - Set and communicate daily mobility goal to care team and patient/family/caregiver.   - Collaborate with  rehabilitation services on mobility goals if consulted  - Out of bed for toileting  - Record patient progress and toleration of activity level   Outcome: Adequate for Discharge     Problem: DISCHARGE PLANNING  Goal: Discharge to home or other facility with appropriate resources  Description: INTERVENTIONS:  - Identify barriers to discharge w/patient and caregiver  - Arrange for needed discharge resources and transportation as appropriate  - Identify discharge learning needs (meds, wound care, etc.)  - Arrange for interpretive services to assist at discharge as needed  - Refer to Case Management Department for coordinating discharge planning if the patient needs post-hospital services based on physician/advanced practitioner order or complex needs related to functional status, cognitive ability, or social support system  Outcome: Adequate for Discharge     Problem: Knowledge Deficit  Goal: Patient/family/caregiver demonstrates understanding of disease process, treatment plan, medications, and discharge instructions  Description: Complete learning assessment and assess knowledge base.  Interventions:  - Provide teaching at level of understanding  - Provide teaching via preferred learning methods  Outcome: Adequate for Discharge     Problem: Nutrition/Hydration-ADULT  Goal: Nutrient/Hydration intake appropriate for improving, restoring or maintaining nutritional needs  Description: Monitor and assess patient's nutrition/hydration status for malnutrition. Collaborate with interdisciplinary team and initiate plan and interventions as ordered.  Monitor patient's weight and dietary intake as ordered or per policy. Utilize nutrition screening tool and intervene as necessary. Determine patient's food preferences and provide high-protein, high-caloric foods as appropriate.     INTERVENTIONS:  - Monitor oral intake, urinary output, labs, and treatment plans  - Assess nutrition and hydration status and recommend course of  action  - Evaluate amount of meals eaten  - Assist patient with eating if necessary   - Allow adequate time for meals  - Recommend/ encourage appropriate diets, oral nutritional supplements, and vitamin/mineral supplements  - Order, calculate, and assess calorie counts as needed  - Recommend, monitor, and adjust tube feedings and TPN/PPN based on assessed needs  - Assess need for intravenous fluids  - Provide specific nutrition/hydration education as appropriate  - Include patient/family/caregiver in decisions related to nutrition  Outcome: Adequate for Discharge     Problem: Prexisting or High Potential for Compromised Skin Integrity  Goal: Skin integrity is maintained or improved  Description: INTERVENTIONS:  - Identify patients at risk for skin breakdown  - Assess and monitor skin integrity  - Assess and monitor nutrition and hydration status  - Monitor labs   - Assess for incontinence   - Turn and reposition patient  - Assist with mobility/ambulation  - Relieve pressure over bony prominences  - Avoid friction and shearing  - Provide appropriate hygiene as needed including keeping skin clean and dry  - Evaluate need for skin moisturizer/barrier cream  - Collaborate with interdisciplinary team   - Patient/family teaching  - Consider wound care consult   Outcome: Adequate for Discharge

## 2025-03-28 NOTE — ASSESSMENT & PLAN NOTE
>>ASSESSMENT AND PLAN FOR TYPE 2 DIABETES MELLITUS (HCC) WRITTEN ON 3/28/2025  2:45 PM BY CHRIS MERCADO PA-C    Lab Results   Component Value Date    HGBA1C 6.7 (H) 03/20/2025       Recent Labs     03/27/25  1632 03/27/25  2045 03/28/25  0709 03/28/25  1204   POCGLU 114 164* 127 125       Blood Sugar Average: Last 72 hrs:  (P) [id number]  Currently on sliding scale insulin  Only requiring approximately 2 to 3 units/day

## 2025-03-28 NOTE — DISCHARGE SUMMARY
Discharge Summary - Hospitalist   Name: Tanya Stephen 89 y.o. female I MRN: [id number]  Unit/Bed#: S -01 I Date of Admission: 3/19/2025   Date of Service: 3/28/2025 I Hospital Day: 9     Assessment & Plan  Severe sepsis (HCC)  Noted to have fever, tachycardia, leukocytosis and required vasopressor support  Secondary to MSSA bacteremia  Subsequently transferred from ICU to medical surgical floor  Infectious disease following  Blood cultures from 3/21 and 3/19 positive. Repeat blood cultures from 3/24 negative x72 hours  Fluids d/c'd 3/25  Continues with slightly elevated leukocytosis. Remains afebrile. Continue to monitor on CBC daily   MSSA bacteremia  Blood cultures from 3/19/2025 both positive for MSSA.    Repeat blood cultures from 3/21/2025 1 out of 2 positive.    Repeat blood cultures 3/24 negative thus far  Will need PICC placed when negative x72 hours -- anticipate 6 week course abx after clearance of bacteremia   Echocardiogram with no evidence of vegetation   Skin source felt to be portal of entry given multiple extremity abrasions and wounds   Continue IV Ancef 2 g every 12 hours, continue in the outpatient setting with last day 5/4  IR consulted for PICC line placement-placed on 3/27  Acute encephalopathy  Noted to have metabolic encephalopathy initially.  Felt secondary to sepsis.  Mental status improved but this AM was confused/delirious again but then improved  3/26 difficult to arouse with noted slurred speech. Noted to be given oxy 2.5 around 6am, and has been receiving oxybutynin giving concern for possible TME-- stroke alert called.  Labs unremarkable, ABG pending   CT head/CTA unremarkable   Neurology following, appreciate recs   Recommending continue aspirin daily   MRI acutely negative  Chronic kidney disease, stage IV (severe) (HCC)  Lab Results   Component Value Date    EGFR 42 03/28/2025    EGFR 39 03/27/2025    EGFR 39 03/26/2025    CREATININE 1.14 03/28/2025    CREATININE 1.22  03/27/2025    CREATININE 1.22 03/26/2025   Continue to monitor, avoid nephrotoxic medication  Creatinine is stable baseline is 1.4-1.6  Follows with nephrology as an outpatient  Within baseline, continue to monitor on BMP daily   Type 2 diabetes mellitus (HCC)  Lab Results   Component Value Date    HGBA1C 6.7 (H) 03/20/2025       Recent Labs     03/27/25  1632 03/27/25  2045 03/28/25  0709 03/28/25  1204   POCGLU 114 164* 127 125       Blood Sugar Average: Last 72 hrs:  (P) [id number]  Currently on sliding scale insulin  Only requiring approximately 2 to 3 units/day  OAB (overactive bladder)  On Myrbetriq - substituted with oxybutynin here  Chronic pain syndrome  Continue home amitriptyline which was resumed today after initially being held and scheduled Tylenol.  D/c low dose oxy 2/2 AMS after receiving   Continue above  Lidocaine patch to lower back   Chronic bilateral pleural effusions  CT CAP: 3/19/25: Moderate right and small left pleural effusions. The right pleural effusion has slightly increased since the prior study. There is associated bilateral lower lobe atelectasis. Shown on multiple previous images  Patient is not hypoxic or having respiratory symptoms.  Recommend outpatient follow-up  Stroke-like symptoms  MRI negative for stroke     Discharging Physician / Practitioner: Von Aragon PA-C  PCP: Gregory Santoro DO  Admission Date:   Admission Orders (From admission, onward)       Ordered        03/19/25 2302  Inpatient Admission  Once                          Discharge Date: 03/28/25    Disposition:    To [facility]    Discharge Diagnoses:   Please see assessment and plan section above for further details regarding discharge diagnoses.     Consultations During Hospital Stay:  IP CONSULT TO CASE MANAGEMENT  IP CONSULT TO NUTRITION SERVICES  IP CONSULT TO PHARMACY  IP CONSULT TO INFECTIOUS DISEASES  IP CONSULT TO NEUROLOGY    Procedures Performed:   None     Significant Findings / Test  Results:   XR chest portable  Result Date: 3/20/2025  Impression: Bibasilar airspace disease and bilateral pleural effusions, right greater than left. See subsequent CT report. Workstation performed: [id number]     CT chest abdomen pelvis wo contrast  Result Date: 3/19/2025  Impression: 1.  Moderate right and small left pleural effusions. The right pleural effusion has slightly increased since the prior study. There is associated bilateral lower lobe atelectasis. 2.  No identifiable acute abnormality/source of infection within the abdomen or pelvis. 3.  Please refer to the report body for description of other incidental, chronic and/or benign findings. Workstation performed: [id number]       No Chest XR results available for this patient.     Results for orders placed during the hospital encounter of 03/19/25    Echo complete w/ contrast if indicated    Interpretation Summary    Left Ventricle: Left ventricular cavity size is normal. Wall thickness is normal. The left ventricular ejection fraction is 50%. Systolic function is low normal. Although no diagnostic regional wall motion abnormality was identified, this possibility cannot be completely excluded on the basis of this study. Diastolic function is normal.    IVS: There is abnormal septal motion of unclear etiology.    Right Ventricle: Systolic function is mildly reduced.    Aortic Valve: The aortic valve is probably trileaflet. The leaflets are moderately thickened. The leaflets are moderately calcified. There is moderately reduced mobility. There is moderate regurgitation. There is mild to moderate stenosis. Aortic valve peak velocity is 1.62 m/s. AV peak gradient is 10 mmHg. AV mean gradient is 7 mmHg. DVI is 0.47. AV valve area is 1.49 cm2. LVOT stroke volume index is 26.60 ml/m2. The aortic valve velocity is increased due to stenosis but lower than expected due to the presence of decreased flow.    Mitral Valve: There is mild annular calcification. There is  "moderate regurgitation.    Tricuspid Valve: There is mild regurgitation.    Prior TTE study available for comparison. Prior study date: 7/31/2024. No significant changes noted compared to the prior study.      No results for input(s): \"BLOODCX\", \"SPUTUMCULTUR\", \"GRAMSTAIN\", \"URINECX\", \"WOUNDCULT\", \"BODYFLUIDCUL\", \"MRSACULTURE\", \"INFLUAPCR\", \"INFLUBPCR\", \"RSVPCR\", \"LEGIONELLAUR\", \"STPU\", \"CDIFFTOXINB\" in the last 72 hours.    Incidental Findings:   None other than noted above. I reviewed the above mentioned incidental findings with the patient and/or family and they expressed understanding    Test Results Pending at Discharge (will require follow up):   None      Outpatient Tests Requested:  Monitor serial CBC and BMP    Complications:  none     Reason for Admission: Weakness - Generalized (Pt arrives via EMS from home. EMS normally can walk with walker and today cannot stand. Reports increased weakness and decreased appetite. )     Hospital Course:    Patient initially presented with an altered mental status.  She underwent an infectious workup was found to meet severe sepsis criteria with significant hypotension requiring IV Levophed and an admission to the critical care team.  While under the critical care team, patient was given broad-spectrum IV antibiotics and continued to be monitored for infectious workup.  Patient was found to have positive blood cultures and the patient was able to be transitioned to the internal medicine team service.  The infectious disease team was consulted who recommended a high dose of IV cefazolin while waiting for blood culture susceptibilities.  Repeat blood cultures were then obtained as well as an echocardiogram to rule out endocarditis.  Echocardiogram was negative for vegetation and repeat blood cultures continue to show no growth.  During hospitalization patient had waning and waxing mental status and on 3/26 she was found to have associated slurred speech.  Prior to slurred " "speech patient was given oxycodone, however to rule out stroke and neuro team was consulted and ultimately an MRI was ordered.  MRI definitively ruled out acute stroke.  Patient then remained in the hospital until repeat blood cultures continue to show no growth for 72 hours in which a PICC line could be placed and outpatient antibiotics could be established.  PICC line was placed on 3/27 and the patient was able to be sent to [facility] on 3/28 with IV antibiotics.  Patient is instructed to continue to follow-up with the infectious disease team and continuing IV antibiotics until 5/4/2025.  At this time patient is medically stable to be discharged and agreeable for plan at discharge.  For further information regards to course hospitalization, please see notes attached.    Condition at Discharge: fair    Discharge Day Visit / Exam:   Subjective: Patient reports to be feeling relatively well.  Currently denies any chest pain/pressure, palpitations, lightheadedness, nausea, shortness of breath, or chills.  Offers no new complaints at this time. No acute events reported overnight. Understanding of plan.  All questions answered to the best of my ability at this time to patient satisfaction. Plan of care agreed upon.    Vitals: Blood Pressure: 97/60 (03/28/25 1054)  Pulse: 91 (03/28/25 1054)  Temperature: 97.7 °F (36.5 °C) (03/28/25 1054)  Temp Source: Oral (03/28/25 0704)  Respirations: 18 (03/28/25 1054)  Height: 4' 11\" (149.9 cm) (03/20/25 1530)  Weight - Scale: 58.5 kg (128 lb 15.5 oz) (03/28/25 0634)  SpO2: 100 % (03/28/25 1054)  Exam:   Physical Exam  Vitals and nursing note reviewed.   Constitutional:       General: She is not in acute distress.     Appearance: She is normal weight. She is not ill-appearing, toxic-appearing or diaphoretic.   HENT:      Head: Normocephalic.      Nose: Nose normal.      Mouth/Throat:      Mouth: Mucous membranes are moist.      Pharynx: Oropharynx is clear.   Eyes:      General: No " scleral icterus.     Conjunctiva/sclera: Conjunctivae normal.      Pupils: Pupils are equal, round, and reactive to light.   Cardiovascular:      Rate and Rhythm: Normal rate and regular rhythm.      Heart sounds: No murmur heard.     No friction rub. No gallop.   Pulmonary:      Effort: Pulmonary effort is normal. No respiratory distress.      Breath sounds: Normal breath sounds. No stridor. No wheezing, rhonchi or rales.   Abdominal:      General: Abdomen is flat.      Palpations: Abdomen is soft.   Musculoskeletal:         General: Normal range of motion.      Cervical back: Normal range of motion and neck supple.      Right lower leg: No edema.      Left lower leg: No edema.   Lymphadenopathy:      Cervical: No cervical adenopathy.   Skin:     General: Skin is warm.      Coloration: Skin is not jaundiced or pale.      Findings: No bruising, erythema or lesion.   Neurological:      General: No focal deficit present.      Mental Status: She is alert and oriented to person, place, and time. Mental status is at baseline.      Cranial Nerves: No cranial nerve deficit.      Motor: No weakness.   Psychiatric:         Mood and Affect: Mood normal.         Behavior: Behavior normal.         Thought Content: Thought content normal.          Discussion with Family: Daughter and     Medication Adjustments and Discharge Medications:  Discharge Medication List: See after visit summary for reconciled discharge medications.   Medication Dosing Tapers - Please refer to Discharge Medication List for details on any medication dosing tapers (if applicable to patient).   Summary of Medication Adjustments made as a result of this hospitalization: as noted on med rec  Medications being temporarily held (include recommended restart time): as noted on med rec    Wound Care Recommendations:  When applicable, please see wound care section of After Visit Summary.      Diet Recommendations at Discharge:  Diet -        Diet Orders    (From admission, onward)                 Start     Ordered    03/25/25 1348  Dietary nutrition supplements  Once        Question Answer Comment   Select Supplement: Magic Cup Chocolate    Frequency Dinner        03/25/25 1347    03/25/25 1347  Diet Regular; Regular House  Diet effective now        Comments: Patient has slow esophageal motility and does better with food cut into small pieces   References:    Adult Nutrition Support Algorithm    RD Therapeutic Diet Order Protocol   Question Answer Comment   Diet Type Regular    Regular Regular House    Special Instructions Minced/ground meats    RD to adjust per protocol Yes        03/25/25 1347    03/20/25 1353  Dietary nutrition supplements  Once        Question Answer Comment   Select Supplement: Glucerna-Vanilla    Frequency Breakfast        03/20/25 1352                    Mobility at time of Discharge:   Basic Mobility Inpatient Raw Score: 11  -HLM Goal: 4: Move to chair/commode  JH-HLM Achieved: 1: Laying in bed  HLM Goal NOT achieved. Continue to encourage mobility in post discharge setting.    Goals of Care Discussions:  Code Status at Discharge: Level 1 - Full Code  Goals of care were not discussed during this admission.    Discharge instructions/Information to patient and family:   See after visit summary section titled Discharge Instructions for information provided to patient and family.      Planned Readmission: none      Discharge Statement:  I spent 65 minutes discharging the patient. This time was spent on the day of discharge. I had direct contact with the patient on the day of discharge. Greater than 50% of the total time was spent examining patient, answering all patient questions, arranging and discussing plan of care with patient as well as directly providing post-discharge instructions.  Additional time then spent on discharge activities.    **Please Note: This note may have been constructed using a voice recognition system.**

## 2025-03-28 NOTE — PLAN OF CARE
Problem: Potential for Falls  Goal: Patient will remain free of falls  Description: INTERVENTIONS:  - Educate patient/family on patient safety including physical limitations  - Instruct patient to call for assistance with activity   - Consult OT/PT to assist with strengthening/mobility   - Keep Call bell within reach  - Keep bed low and locked with side rails adjusted as appropriate  - Keep care items and personal belongings within reach  - Initiate and maintain comfort rounds  - Make Fall Risk Sign visible to staff  - Apply yellow socks and bracelet for high fall risk patients  - Consider moving patient to room near nurses station  Outcome: Progressing     Problem: PAIN - ADULT  Goal: Verbalizes/displays adequate comfort level or baseline comfort level  Description: Interventions:  - Encourage patient to monitor pain and request assistance  - Assess pain using appropriate pain scale  - Administer analgesics based on type and severity of pain and evaluate response  - Implement non-pharmacological measures as appropriate and evaluate response  - Consider cultural and social influences on pain and pain management  - Notify physician/advanced practitioner if interventions unsuccessful or patient reports new pain  Outcome: Progressing     Problem: INFECTION - ADULT  Goal: Absence or prevention of progression during hospitalization  Description: INTERVENTIONS:  - Assess and monitor for signs and symptoms of infection  - Monitor lab/diagnostic results  - Monitor all insertion sites, i.e. indwelling lines, tubes, and drains  - Monitor endotracheal if appropriate and nasal secretions for changes in amount and color  - [place] appropriate cooling/warming therapies per order  - Administer medications as ordered  - Instruct and encourage patient and family to use good hand hygiene technique  - Identify and instruct in appropriate isolation precautions for identified infection/condition  Outcome: Progressing  Goal: Absence  of fever/infection during neutropenic period  Description: INTERVENTIONS:  - Monitor WBC    Outcome: Progressing     Problem: Knowledge Deficit  Goal: Patient/family/caregiver demonstrates understanding of disease process, treatment plan, medications, and discharge instructions  Description: Complete learning assessment and assess knowledge base.  Interventions:  - Provide teaching at level of understanding  - Provide teaching via preferred learning methods  Outcome: Progressing     Problem: Nutrition/Hydration-ADULT  Goal: Nutrient/Hydration intake appropriate for improving, restoring or maintaining nutritional needs  Description: Monitor and assess patient's nutrition/hydration status for malnutrition. Collaborate with interdisciplinary team and initiate plan and interventions as ordered.  Monitor patient's weight and dietary intake as ordered or per policy. Utilize nutrition screening tool and intervene as necessary. Determine patient's food preferences and provide high-protein, high-caloric foods as appropriate.     INTERVENTIONS:  - Monitor oral intake, urinary output, labs, and treatment plans  - Assess nutrition and hydration status and recommend course of action  - Evaluate amount of meals eaten  - Assist patient with eating if necessary   - Allow adequate time for meals  - Recommend/ encourage appropriate diets, oral nutritional supplements, and vitamin/mineral supplements  - Order, calculate, and assess calorie counts as needed  - Recommend, monitor, and adjust tube feedings and TPN/PPN based on assessed needs  - Assess need for intravenous fluids  - Provide specific nutrition/hydration education as appropriate  - Include patient/family/caregiver in decisions related to nutrition  Outcome: Progressing     Problem: Prexisting or High Potential for Compromised Skin Integrity  Goal: Skin integrity is maintained or improved  Description: INTERVENTIONS:  - Identify patients at risk for skin breakdown  - Assess  and monitor skin integrity  - Assess and monitor nutrition and hydration status  - Monitor labs   - Assess for incontinence   - Turn and reposition patient  - Assist with mobility/ambulation  - Relieve pressure over bony prominences  - Avoid friction and shearing  - Provide appropriate hygiene as needed including keeping skin clean and dry  - Evaluate need for skin moisturizer/barrier cream  - Collaborate with interdisciplinary team   - Patient/family teaching  - Consider wound care consult   Outcome: Progressing     Problem: DISCHARGE PLANNING  Goal: Discharge to home or other facility with appropriate resources  Description: INTERVENTIONS:  - Identify barriers to discharge w/patient and caregiver  - Arrange for needed discharge resources and transportation as appropriate  - Identify discharge learning needs (meds, wound care, etc.)  - Arrange for interpretive services to assist at discharge as needed  - Refer to Case Management Department for coordinating discharge planning if the patient needs post-hospital services based on physician/advanced practitioner order or complex needs related to functional status, cognitive ability, or social support system  Outcome: Progressing

## 2025-03-29 ENCOUNTER — HOSPITAL ENCOUNTER (EMERGENCY)
Facility: HOSPITAL | Age: OVER 89
Discharge: HOME | End: 2025-03-29
Attending: EMERGENCY MEDICINE
Payer: MEDICARE

## 2025-03-29 ENCOUNTER — TELEPHONE (OUTPATIENT)
Dept: OTHER | Facility: OTHER | Age: OVER 89
End: 2025-03-29

## 2025-03-29 VITALS
TEMPERATURE: 97 F | OXYGEN SATURATION: 96 % | RESPIRATION RATE: 20 BRPM | SYSTOLIC BLOOD PRESSURE: 106 MMHG | DIASTOLIC BLOOD PRESSURE: 57 MMHG | HEART RATE: 93 BPM

## 2025-03-29 DIAGNOSIS — Z00.00 ADULT GENERAL MEDICAL EXAM: Primary | ICD-10-CM

## 2025-03-29 LAB
BACTERIA BLD CULT: NORMAL
BACTERIA BLD CULT: NORMAL

## 2025-03-29 PROCEDURE — 99283 EMERGENCY DEPT VISIT LOW MDM: CPT | Performed by: EMERGENCY MEDICINE

## 2025-03-29 PROCEDURE — 99283 EMERGENCY DEPT VISIT LOW MDM: CPT

## 2025-03-29 NOTE — DISCHARGE INSTRUCTIONS
You were evaluated in the Emergency Department today evaluation of picc line. Make sure PICC line is not clamped before accessing.     Please schedule an appointment with your primary care physician within the next 2-3 days.    Return to the Emergency Department if you experience worsening or uncontrolled pain, fevers 100.4°F or greater, recurrent vomiting, inability to tolerate food or fluids by mouth, bloody stools or vomit, black or tarry stools, or any other concerning symptoms.    Thank you for choosing us for your care.

## 2025-03-29 NOTE — ED PROVIDER NOTES
Time reflects when diagnosis was documented in both MDM as applicable and the Disposition within this note       Time User Action Codes Description Comment    3/29/2025  2:46 AM [email or web address] Add [Z00.00] Adult general medical exam           ED Disposition       ED Disposition   Discharge    Condition   Stable    Date/Time   Sat Mar 29, 2025  2:45 AM    Comment   Tanya Stephen discharge to home/self care.                   Assessment & Plan       Medical Decision Making  89 year old female sent from NH for evaluation of picc line in RUE. Pt does not have any other complaints at this time. No chest pain, sob, abdominal pain.     On exam VSS, abd snt. RUE PICC line site without TTP, drainage, or signs of infection. Flushes and pulls fine.   Will exchange dressing on PICC line.   Will d/c home with instructions to f/u with pcp.              Medications - No data to display    ED Risk Strat Scores                    Identification of Seniors at Risk      Flowsheet Row Most Recent Value   (ISAR) Identification of Seniors at Risk    Before the illness or injury that brought you to the Emergency, did you need someone to help you on a regular basis? 1 Filed at: 03/29/2025 0234   In the last 24 hours, have you needed more help than usual? 1 Filed at: 03/29/2025 0234   Have you been hospitalized for one or more nights during the past 6 months? 1 Filed at: 03/29/2025 0234   In general, do you see well? 0 Filed at: 03/29/2025 0234   In general, do you have serious problems with your memory? 0 Filed at: 03/29/2025 0234   Do you take more than three different medications every day? 1 Filed at: 03/29/2025 0234   ISAR Score 4 Filed at: 03/29/2025 0234                SBIRT 22yo+      Flowsheet Row Most Recent Value   Initial Alcohol Screen: US AUDIT-C     1. How often do you have a drink containing alcohol? 0 Filed at: 03/29/2025 0234   2. How many drinks containing alcohol do you have on a typical day you are drinking?  0 Filed at:  03/29/2025 0234   3a. Male UNDER 65: How often do you have five or more drinks on one occasion? 0 Filed at: 03/29/2025 0234   3b. FEMALE Any Age, or MALE 65+: How often do you have 4 or more drinks on one occassion? 0 Filed at: 03/29/2025 0234   Audit-C Score 0 Filed at: 03/29/2025 0234   XIOMY: How many times in the past year have you...    Used an illegal drug or used a prescription medication for non-medical reasons? Never Filed at: 03/29/2025 0234                            History of Present Illness       Chief Complaint   Patient presents with    Vascular Access Problem     Patient was recently dx from [place] for sepsis, sent to [facility] and is receiving ABX through a PICC line, nurse went to assess pt and there was antibiotics on her body and in the bed that it is not working, PICC line is able to be flushed during triage and blood return noted        Past Medical History:   Diagnosis Date    Anemia     Arthritis     Back pain     CHF (congestive heart failure) (HCC)     Chronic kidney disease     Stage 3b    Confusion 02/22/2023    Diabetes (HCC)     Diabetes mellitus (HCC)     Diabetic gastroparesis associated with type 2 diabetes mellitus  (HCC)     Diabetic polyneuropathy (HCC)     Diverticulosis     Herpes zoster     Hypertension     IBS (irritable bowel syndrome)     Neurogenic claudication due to lumbar spinal stenosis     Osteoarthritis     Osteoporosis     Pulmonary edema     Raynaud's phenomenon without gangrene     Seronegative arthropathy of multiple sites (HCC)     Sicca (HCC)       Past Surgical History:   Procedure Laterality Date    BACK SURGERY      COLONOSCOPY      EGD AND COLONOSCOPY N/A 12/23/2016    Procedure: EGD AND COLONOSCOPY;  Surgeon: Elina Anderson MD;  [facility];  Service:     IR PICC PLACEMENT SINGLE LUMEN  3/27/2025    JOINT REPLACEMENT      bilat knees and hips    OTHER SURGICAL HISTORY      pelvis rods    REPLACEMENT TOTAL KNEE BILATERAL      STOMACH SURGERY       UPPER GASTROINTESTINAL ENDOSCOPY        Family History   Problem Relation Age of Onset    Cancer Brother     Diabetes Mother     Hypertension Father     Heart disease Father       Social History     Tobacco Use    Smoking status: Former     Current packs/day: 0.00     Types: Cigarettes     Quit date:      Years since quittin.2     Passive exposure: Never    Smokeless tobacco: Never   Vaping Use    Vaping status: Never Used   Substance Use Topics    Alcohol use: Not Currently    Drug use: No      E-Cigarette/Vaping    E-Cigarette Use Never User       E-Cigarette/Vaping Substances    Nicotine No     THC No     CBD No     Flavoring No     Other No       I have reviewed and agree with the history as documented.     HPI    Review of Systems   Constitutional:  Negative for appetite change, chills, diaphoresis, fever and unexpected weight change.   HENT:  Negative for dental problem, ear pain, facial swelling, sore throat and trouble swallowing.    Eyes:  Negative for pain and visual disturbance.   Respiratory:  Negative for cough, chest tightness and shortness of breath.    Cardiovascular:  Negative for chest pain, palpitations and leg swelling.   Gastrointestinal:  Negative for abdominal distention, abdominal pain, constipation, diarrhea, nausea and vomiting.   Endocrine: Negative for polyuria.   Genitourinary:  Negative for difficulty urinating, dysuria and hematuria.   Musculoskeletal:  Negative for arthralgias and back pain.   Skin:  Negative for color change and rash.   Neurological:  Negative for dizziness, seizures, syncope, light-headedness and headaches.   Psychiatric/Behavioral:  Negative for confusion.    All other systems reviewed and are negative.          Objective       ED Triage Vitals   Temperature Pulse Blood Pressure Respirations SpO2 Patient Position - Orthostatic VS   25 0237 25 0231 25 0231 25 0231 25 0231 25 0231   (!) 97 °F (36.1 °C) 93 106/57 20 96  % Lying      Temp Source Heart Rate Source BP Location FiO2 (%) Pain Score    03/29/25 0237 03/29/25 0231 03/29/25 0231 -- 03/29/25 0231    Axillary Monitor Right arm  No Pain      Vitals      Date and Time Temp Pulse SpO2 Resp BP Pain Score FACES Pain Rating User   03/29/25 0237 97 °F (36.1 °C) -- -- -- -- -- -- OB   03/29/25 0231 -- 93 96 % 20 106/57 No Pain -- KS            Physical Exam  Vitals and nursing note reviewed.   Constitutional:       General: She is not in acute distress.     Appearance: Normal appearance. She is not ill-appearing.   HENT:      Head: Normocephalic and atraumatic.      Right Ear: External ear normal.      Left Ear: External ear normal.      Nose: Nose normal.      Mouth/Throat:      Mouth: Mucous membranes are moist.   Eyes:      General: No scleral icterus.        Right eye: No discharge.      Extraocular Movements: Extraocular movements intact.      Conjunctiva/sclera: Conjunctivae normal.   Cardiovascular:      Rate and Rhythm: Normal rate and regular rhythm.      Pulses: Normal pulses.      Heart sounds: No murmur heard.  Pulmonary:      Effort: Pulmonary effort is normal.      Breath sounds: Normal breath sounds. No wheezing.   Chest:      Chest wall: No tenderness.   Abdominal:      General: Abdomen is flat. There is no distension.      Palpations: Abdomen is soft.      Tenderness: There is no abdominal tenderness.   Musculoskeletal:         General: No deformity or signs of injury. Normal range of motion.      Cervical back: Normal range of motion and neck supple. No rigidity or tenderness.      Right lower leg: No edema.      Left lower leg: No edema.      Comments: RUE PICC line site without TTP, drainage, or signs of infection. Flushes and pulls fine.    Skin:     General: Skin is warm and dry.   Neurological:      General: No focal deficit present.      Mental Status: She is alert and oriented to person, place, and time.      Motor: No weakness.         Results Reviewed        None            No orders to display       Procedures    ED Medication and Procedure Management   Prior to Admission Medications   Prescriptions Last Dose Informant Patient Reported? Taking?   Cholecalciferol (VITAMIN D3) 1000 units CAPS  Child, Spouse/Significant Other Yes No   Sig: Take 1 capsule by mouth daily   DULoxetine (CYMBALTA) 60 mg delayed release capsule  Child, Spouse/Significant Other No No   Sig: TAKE ONE CAPSULE BY MOUTH ONCE DAILY   Magnesium 250 MG TABS  Child, Spouse/Significant Other Yes No   Sig: Take 250 mg by mouth every evening   Mirabegron ER (Myrbetriq) 50 MG TB24  Child, Spouse/Significant Other No No   Sig: Take 1 tablet (50 mg total) by mouth in the morning   Sodium Fluoride (PreviDent 5000 Booster Plus) 1.1 % PSTE  Child, Spouse/Significant Other No No   Sig: Apply on teeth every evening as directed   acetaminophen (TYLENOL) 325 mg tablet  Child, Spouse/Significant Other No No   Sig: Take 2 tablets (650 mg total) by mouth 3 (three) times a day   amitriptyline (ELAVIL) 10 mg tablet   Yes No   Sig: Take 10 mg by mouth daily at bedtime   aspirin 81 mg chewable tablet  Child, Spouse/Significant Other No No   Sig: Chew 1 tablet (81 mg total) daily   atorvastatin (LIPITOR) 10 mg tablet   No No   Sig: TAKE ONE TABLET BY MOUTH EVERY DAY   ceFAZolin (ANCEF) 2000 mg IVPB   No No   Sig: Inject 2,000 mg into a catheter in a vein over 30 minutes at 100 mL/hr every 12 (twelve) hours   diphenoxylate-atropine (LOMOTIL) 2.5-0.025 mg per tablet  Child, Spouse/Significant Other Yes No   Sig: Take 1 tablet by mouth if needed   Patient not taking: Reported on 2/22/2025   furosemide (LASIX) 20 mg tablet   No No   Sig: One tablet daily and an extra 20 mg prn for wt gain 3 lb/ 24 hours or 5 lb/ 5 days, above a dry wt of 122 lb   hydroxychloroquine (PLAQUENIL) 200 mg tablet   No No   Sig: Take 1 tablet (200 mg total) by mouth daily with breakfast   omeprazole (PriLOSEC) 20 mg delayed release capsule   Child, Spouse/Significant Other Yes No   Sig: Take 20 mg by mouth daily   pregabalin (LYRICA) 75 mg capsule   No No   Sig: Take 1 capsule (75 mg total) by mouth 2 (two) times a day for 10 days   pyridoxine (B-6) 100 MG tablet  Child, Spouse/Significant Other Yes No   Sig: Take 100 mg by mouth daily      Facility-Administered Medications: None     Patient's Medications   Discharge Prescriptions    No medications on file     No discharge procedures on file.  ED SEPSIS DOCUMENTATION   Time reflects when diagnosis was documented in both MDM as applicable and the Disposition within this note       Time User Action Codes Description Comment    3/29/2025  2:46 AM Skip Shook Add [Z00.00] Adult general medical exam                  Skip Shook DO  03/29/25 0420

## 2025-03-29 NOTE — ED NOTES
RN called [facility] to inform them of issue with patients PICC line and how to assess pts PICC line appropriately. Facility made aware of discharge.      Craoline Dennis RN  03/29/25 0636       Caroline Dennis RN  03/29/25 0636

## 2025-03-29 NOTE — ED ATTENDING ATTESTATION
3/29/2025  I, Jake Sanchez MD, saw and evaluated the patient. I have discussed the patient with the resident/non-physician practitioner and agree with the resident's/non-physician practitioner's findings, Plan of Care, and MDM as documented in the resident's/non-physician practitioner's note, except where noted. All available labs and Radiology studies were reviewed.  I was present for key portions of any procedure(s) performed by the resident/non-physician practitioner and I was immediately available to provide assistance.       At this point I agree with the current assessment done in the Emergency Department.  I have conducted an independent evaluation of this patient a history and physical is as follows:    Final Diagnosis:  1. Adult general medical exam      Chief Complaint   Patient presents with    Vascular Access Problem     Patient was recently dx from [place] for sepsis, sent to [facility] and is receiving ABX through a PICC line, nurse went to assess pt and there was antibiotics on her body and in the bed that it is not working, PICC line is able to be flushed during triage and blood return noted            A:  -89-year-old female who presents for PICC line evaluation.      P:  -Discharge with reassurance.  PICC line functioning normally.      H:    89-year-old female who presents with PICC line issue.  Patient discharged from the hospital yesterday after an admission secondary to MSSA bacteremia.  Patient is to receive antibiotics through her PICC line.  However, per report, her PICC line was not working this evening and the antibiotics were on the bed.  Nursing here able to flush and pull back on the PICC line without any issue.  Patient has no complaints.      PMH:  Past Medical History:   Diagnosis Date    Anemia     Arthritis     Back pain     CHF (congestive heart failure) ([facility])     Chronic kidney disease     Stage 3b    Confusion 02/22/2023    Diabetes ([facility])     Diabetes mellitus ([facility])      Diabetic gastroparesis associated with type 2 diabetes mellitus  (HCC)     Diabetic polyneuropathy (HCC)     Diverticulosis     Herpes zoster     Hypertension     IBS (irritable bowel syndrome)     Neurogenic claudication due to lumbar spinal stenosis     Osteoarthritis     Osteoporosis     Pulmonary edema     Raynaud's phenomenon without gangrene     Seronegative arthropathy of multiple sites (HCC)     Sicca (HCC)        PSH:  Past Surgical History:   Procedure Laterality Date    BACK SURGERY      COLONOSCOPY      EGD AND COLONOSCOPY N/A 12/23/2016    Procedure: EGD AND COLONOSCOPY;  Surgeon: Elina Anderson MD;  [facility];  Service:     IR PICC PLACEMENT SINGLE LUMEN  3/27/2025    JOINT REPLACEMENT      bilat knees and hips    OTHER SURGICAL HISTORY      pelvis rods    REPLACEMENT TOTAL KNEE BILATERAL      STOMACH SURGERY      UPPER GASTROINTESTINAL ENDOSCOPY           PE:   Vitals:    03/29/25 0231 03/29/25 0237   BP: 106/57    BP Location: Right arm    Pulse: 93    Resp: 20    Temp:  (!) 97 °F (36.1 °C)   TempSrc:  Axillary   SpO2: 96%          Constitutional: Chronically ill-appearing..   Cardiovascular: Normal rate, regular rhythm.  Pulmonary/Chest: Effort normal.   Abdominal: Soft. Normal appearance.   Neurological: She is alert.   Skin: Skin is warm, dry and intact.           - 13 point ROS was performed and all are normal unless stated in the history above.   - Nursing note reviewed. Vitals reviewed.   - Orders placed by myself and/or advanced practitioner / resident.    - Previous chart was reviewed  - No language barrier.   - History obtained from patient.   - There are no limitations to the history obtained. Reasons ROS could not be obtained:  N/A         Medications - No data to display  No orders to display     No orders of the defined types were placed in this encounter.    Labs Reviewed - No data to display  Time reflects when diagnosis was documented in both MDM as applicable and the  Disposition within this note       Time User Action Codes Description Comment    3/29/2025  2:46 AM Skip Shook Add [Z00.00] Adult general medical exam           ED Disposition       ED Disposition   Discharge    Condition   Stable    Date/Time   Sat Mar 29, 2025  2:45 AM    Comment   Tanya Stephen discharge to home/self care.                   Follow-up Information       Follow up With Specialties Details Why Contact Info    Gregory Santoro DO Family Medicine   [address] [place] [address]  [phone]            Patient's Medications   Discharge Prescriptions    No medications on file     No discharge procedures on file.  Prior to Admission Medications   Prescriptions Last Dose Informant Patient Reported? Taking?   Cholecalciferol (VITAMIN D3) 1000 units CAPS  Child, Spouse/Significant Other Yes No   Sig: Take 1 capsule by mouth daily   DULoxetine (CYMBALTA) 60 mg delayed release capsule  Child, Spouse/Significant Other No No   Sig: TAKE ONE CAPSULE BY MOUTH ONCE DAILY   Magnesium 250 MG TABS  Child, Spouse/Significant Other Yes No   Sig: Take 250 mg by mouth every evening   Mirabegron ER (Myrbetriq) 50 MG TB24  Child, Spouse/Significant Other No No   Sig: Take 1 tablet (50 mg total) by mouth in the morning   Sodium Fluoride (PreviDent 5000 Booster Plus) 1.1 % PSTE  Child, Spouse/Significant Other No No   Sig: Apply on teeth every evening as directed   acetaminophen (TYLENOL) 325 mg tablet  Child, Spouse/Significant Other No No   Sig: Take 2 tablets (650 mg total) by mouth 3 (three) times a day   amitriptyline (ELAVIL) 10 mg tablet   Yes No   Sig: Take 10 mg by mouth daily at bedtime   aspirin 81 mg chewable tablet  Child, Spouse/Significant Other No No   Sig: Chew 1 tablet (81 mg total) daily   atorvastatin (LIPITOR) 10 mg tablet   No No   Sig: TAKE ONE TABLET BY MOUTH EVERY DAY   ceFAZolin (ANCEF) 2000 mg IVPB   No No   Sig: Inject 2,000 mg into a catheter in a vein over 30 minutes at 100 mL/hr every  "12 (twelve) hours   diphenoxylate-atropine (LOMOTIL) 2.5-0.025 mg per tablet  Child, Spouse/Significant Other Yes No   Sig: Take 1 tablet by mouth if needed   Patient not taking: Reported on 2/22/2025   furosemide (LASIX) 20 mg tablet   No No   Sig: One tablet daily and an extra 20 mg prn for wt gain 3 lb/ 24 hours or 5 lb/ 5 days, above a dry wt of 122 lb   hydroxychloroquine (PLAQUENIL) 200 mg tablet   No No   Sig: Take 1 tablet (200 mg total) by mouth daily with breakfast   omeprazole (PriLOSEC) 20 mg delayed release capsule  Child, Spouse/Significant Other Yes No   Sig: Take 20 mg by mouth daily   pregabalin (LYRICA) 75 mg capsule   No No   Sig: Take 1 capsule (75 mg total) by mouth 2 (two) times a day for 10 days   pyridoxine (B-6) 100 MG tablet  Child, Spouse/Significant Other Yes No   Sig: Take 100 mg by mouth daily      Facility-Administered Medications: None       Portions of the record may have been created with voice recognition software. Occasional wrong word or \"sound a like\" substitutions may have occurred due to the inherent limitations of voice recognition software. Read the chart carefully and recognize, using context, where substitutions have occurred.       ED Course         Critical Care Time  Procedures      "

## 2025-03-31 ENCOUNTER — TELEPHONE (OUTPATIENT)
Dept: INFECTIOUS DISEASES | Facility: CLINIC | Age: OVER 89
End: 2025-03-31

## 2025-03-31 ENCOUNTER — APPOINTMENT (OUTPATIENT)
Dept: PHYSICAL THERAPY | Facility: CLINIC | Age: OVER 89
End: 2025-03-31
Payer: MEDICARE

## 2025-03-31 ENCOUNTER — NURSING HOME VISIT (OUTPATIENT)
Dept: GERIATRICS | Facility: OTHER | Age: OVER 89
End: 2025-03-31
Payer: MEDICARE

## 2025-03-31 VITALS
TEMPERATURE: 98 F | OXYGEN SATURATION: 95 % | WEIGHT: 131 LBS | SYSTOLIC BLOOD PRESSURE: 116 MMHG | DIASTOLIC BLOOD PRESSURE: 72 MMHG | BODY MASS INDEX: 26.46 KG/M2 | HEART RATE: 88 BPM | RESPIRATION RATE: 18 BRPM

## 2025-03-31 DIAGNOSIS — B95.61 MSSA BACTEREMIA: Primary | ICD-10-CM

## 2025-03-31 DIAGNOSIS — E83.42 HYPOMAGNESEMIA: ICD-10-CM

## 2025-03-31 DIAGNOSIS — R78.81 MSSA BACTEREMIA: Primary | ICD-10-CM

## 2025-03-31 DIAGNOSIS — K21.9 GASTROESOPHAGEAL REFLUX DISEASE WITHOUT ESOPHAGITIS: Chronic | ICD-10-CM

## 2025-03-31 DIAGNOSIS — J90 CHRONIC BILATERAL PLEURAL EFFUSIONS: ICD-10-CM

## 2025-03-31 DIAGNOSIS — D63.1 ANEMIA OF CHRONIC RENAL FAILURE, STAGE 4 (SEVERE)  (HCC): ICD-10-CM

## 2025-03-31 DIAGNOSIS — E11.42 DIABETIC POLYNEUROPATHY ASSOCIATED WITH TYPE 2 DIABETES MELLITUS (HCC): ICD-10-CM

## 2025-03-31 DIAGNOSIS — R65.20 SEVERE SEPSIS (HCC): Primary | ICD-10-CM

## 2025-03-31 DIAGNOSIS — B95.61 MSSA BACTEREMIA: ICD-10-CM

## 2025-03-31 DIAGNOSIS — K58.0 IRRITABLE BOWEL SYNDROME WITH DIARRHEA: Chronic | ICD-10-CM

## 2025-03-31 DIAGNOSIS — R53.81 DEBILITY: ICD-10-CM

## 2025-03-31 DIAGNOSIS — R78.81 MSSA BACTEREMIA: ICD-10-CM

## 2025-03-31 DIAGNOSIS — I50.22 CHRONIC SYSTOLIC CONGESTIVE HEART FAILURE (HCC): ICD-10-CM

## 2025-03-31 DIAGNOSIS — M06.09 SERONEGATIVE ARTHROPATHY OF MULTIPLE SITES (HCC): Chronic | ICD-10-CM

## 2025-03-31 DIAGNOSIS — G93.40 ACUTE ENCEPHALOPATHY: ICD-10-CM

## 2025-03-31 DIAGNOSIS — N18.4 ANEMIA OF CHRONIC RENAL FAILURE, STAGE 4 (SEVERE)  (HCC): ICD-10-CM

## 2025-03-31 DIAGNOSIS — N18.4 CHRONIC KIDNEY DISEASE, STAGE IV (SEVERE) (HCC): ICD-10-CM

## 2025-03-31 DIAGNOSIS — G31.84 MILD COGNITIVE IMPAIRMENT: Chronic | ICD-10-CM

## 2025-03-31 DIAGNOSIS — A41.9 SEVERE SEPSIS (HCC): Primary | ICD-10-CM

## 2025-03-31 DIAGNOSIS — R13.19 ESOPHAGEAL DYSPHAGIA: Chronic | ICD-10-CM

## 2025-03-31 PROBLEM — I50.33 ACUTE ON CHRONIC DIASTOLIC (CONGESTIVE) HEART FAILURE (HCC): Status: RESOLVED | Noted: 2022-04-14 | Resolved: 2025-03-31

## 2025-03-31 PROBLEM — R09.02 HYPOXIA: Status: RESOLVED | Noted: 2025-02-22 | Resolved: 2025-03-31

## 2025-03-31 PROBLEM — G93.41 METABOLIC ENCEPHALOPATHY: Status: RESOLVED | Noted: 2025-03-20 | Resolved: 2025-03-31

## 2025-03-31 PROCEDURE — 99306 1ST NF CARE HIGH MDM 50: CPT | Performed by: FAMILY MEDICINE

## 2025-03-31 NOTE — TELEPHONE ENCOUNTER
Reached out to [place] at [facility]. Confirmed receipts of ABX plan, labs, follow up. They do not have questions or concerns, but haven't reviewed the OPAT in detail. They will call us if they need clarification on anything.

## 2025-03-31 NOTE — PROGRESS NOTES
[facility]  [address]  [place] [address]  [facility]   SNF 31  History and Physical  NAME: Tanya Stephen  AGE: 89 y.o. SEX: female [phone]  DATE OF ENCOUNTER: 3/31/2025  Pain: generalized pain in body and extremities   Rehab Potential: poor  Patient Informed of Medical Condition: yes  Patient is Capable of Understanding Their Right: limited due to encephalopathy  Prognosis: poor  Discharge Plan: home with spouse  Surrogate Decision Maker: spouse, Weston Stephen  Advanced Directives: yes  Code status: DNR  PCP: Gregory Santoro, DO  Assessment and Plan   Severe sepsis (HCC)  With recent hospitalization for severe sepsis 2/2 MSSA bacteremia, treated with vasopressor support in ICU, IVF in ED  Blood culture x 2 growth of Staph aureus, MSSA  TTE negative for vegetation  Continue Ancef through 5/4/25 as per ID  Weekly CBC, BMP  Monitor fever curve.    MSSA bacteremia  Source of infection likely from multiple abrasions/wounds in b/l LE  Continue Cefazolin as above.  F/u with ID outpatient as scheduled, 4/9/25.    CHF (congestive heart failure) ([facility])  Wt Readings from Last 3 Encounters:   03/31/25 59.4 kg (131 lb)   03/28/25 58.5 kg (128 lb 15.5 oz)   02/24/25 65 kg (143 lb 4.8 oz)   Not in acute exacerbation  Echo 3/20/25 showed EF 50%, normal diastolic function  Continue home lasix 20 mg daily  BMP today reviewed, normal Na 139, potassium 3.6    Chronic bilateral pleural effusions  Moderate right and small left pleural effusions noted on CT chest 3/19/25  Continue oxygen therapy as needed to maintain SpO2 > 92%  Monitor respiratory status    Esophageal dysphagia  With severe esophageal dysmotility, maintained on amitriptyline 10 mg daily  Continue dysphagia 2 - mechanical soft diet with thin liquid  ST following while in SNF.   Would consider tapering amitriptyline given side effects on older adults, including confusion, cognitive impairment, sedation, and significant  anticholinergic burdens.     Gastroesophageal reflux disease without esophagitis  Continue home dose of omeprazole 20 mg daily.   Calcium carbonate as needed added today.    Diabetic polyneuropathy associated with type 2 diabetes mellitus ([facility])  Lab Results   Component Value Date    HGBA1C 6.7 (H) 03/20/2025   Above target goal of HgbA1C.  Continue regular diet given her poor appetite. Avoid hypoglycemia    Acute encephalopathy  Reported while in the hospital, likely in the setting of severe sepsis/MSSA bacteremia  MRI brain 3/27/25 no acute pathology  Delirium precautions:  -Maintain normal sleep/wake cycle, lights and tv on during day with blinds open, lights and tv off at bedtime with blinds closed, minimize unnecessary interruptions  -if insomnia develops consider Melatonin 3mg   -continue supportive care and frequent reorientation   -monitor for adequate urine output as both urine and stool retention may contribute to and worsen delirium  -pain control with Tylenol 650 mg TID. May increase to 1000 mg TID.    Chronic kidney disease, stage IV (severe) ([facility])  Lab Results   Component Value Date    EGFR 42 03/28/2025    EGFR 39 03/27/2025    EGFR 39 03/26/2025    CREATININE 1.14 03/28/2025    CREATININE 1.22 03/27/2025    CREATININE 1.22 03/26/2025   Recent BMP with Creatinine 1.23, eGFR 42 which is at stage 3b (3/31/25)  Ensure adequate hydration. Avoid hypotension  Monitor BMP    Mild cognitive impairment  Was consulted by Geriatric Medicine in 9/2024 with [facility] 11/26. Suspecting Alzheimer's dementia with polypharmacy contributing to cognitive impairment.  Plan to taper amitriptyline slowly.   Encourage family visits.  Provide frequent reorientation, redirection  Support ADLs/iADLs in SNF    Hypomagnesemia  Continue magnesium 250 mg daily    Debility  Multifactorial, age, comorbid medical conditions, and recent hospitalization for severe illness. Poor prognosis discussed with .   Admitted to SNF for  rehab  PT/OT consult-evaluate and treat  Supportive care, nutritional support, ADL support  Fall precautions    Seronegative arthropathy of multiple sites (HCC)  Follows rheumatology  Maintained on low-dose Plaquenil 200 mg daily.     Anemia of chronic renal failure, stage 4 (severe)  (HCC)  Noted in chart  Hgb 10.9 today  Monitor CBC weekly as per ID    All medications and routine orders were reviewed and updated as needed.  Plan discussed with: Patient and . Coordination of care with nursing, OT/PT, SW, dietician.  Chief Complaint   Seen for admission at Skilled Nursing Facility  History of Present Illness   89 y.o. female who is seen today, following hospitalization at [facility] from 3/19/2025 to 3/28/2025 for severe sepsis and MSSA bacteremia.   Upon exam today, she is lying in bed, c/o pain in arms, body, and legs. Reports poor appetite. Denies HA, chills, CP, SOB, or palpitations. She is hard of hearing. Daughter will bring hearing aids from home in the afternoon.    at bed side, reports her fluctuation in mentation. Still forgetful and confused.  She ambulates with walker at home. Independent in ADLs and some iADLs.   Intermittent confusion, incontinent bowel/bladder per nursing report.     HISTORY:  Past Medical History:   Diagnosis Date    Anemia     Arthritis     Back pain     CHF (congestive heart failure) (HCC)     Chronic kidney disease     Stage 3b    Confusion 02/22/2023    Diabetes (HCC)     Diabetes mellitus (HCC)     Diabetic gastroparesis associated with type 2 diabetes mellitus  (HCC)     Diabetic polyneuropathy (HCC)     Diverticulosis     Herpes zoster     Hypertension     IBS (irritable bowel syndrome)     Neurogenic claudication due to lumbar spinal stenosis     Osteoarthritis     Osteoporosis     Pulmonary edema     Raynaud's phenomenon without gangrene     Seronegative arthropathy of multiple sites (HCC)     Sicca (HCC)      Family History   Problem Relation Age of  Onset    Cancer Brother     Diabetes Mother     Hypertension Father     Heart disease Father      Social History     Socioeconomic History    Marital status: /Civil Union     Spouse name: Weston    Number of children: 2    Years of education: None    Highest education level: High school graduate   Occupational History    None   Tobacco Use    Smoking status: Former     Current packs/day: 0.00     Types: Cigarettes     Quit date:      Years since quittin.2     Passive exposure: Never    Smokeless tobacco: Never   Vaping Use    Vaping status: Never Used   Substance and Sexual Activity    Alcohol use: Not Currently    Drug use: No    Sexual activity: Not Currently     Partners: Male     Birth control/protection: None   Other Topics Concern    None   Social History Narrative    None     Social Drivers of Health     Financial Resource Strain: Not on file   Food Insecurity: No Food Insecurity (3/20/2025)    Nursing - Inadequate Food Risk Classification     Worried About Running Out of Food in the Last Year: Never true     Ran Out of Food in the Last Year: Never true     Ran Out of Food in the Last Year: Never true   Transportation Needs: No Transportation Needs (3/20/2025)    Nursing - Transportation Risk Classification     Lack of Transportation: Not on file     Lack of Transportation: No   Physical Activity: Not on file   Stress: Not on file   Social Connections: Not on file   Intimate Partner Violence: Unknown (3/20/2025)    Nursing IPS     Feels Physically and Emotionally Safe: Not on file     Physically Hurt by Someone: Not on file     Humiliated or Emotionally Abused by Someone: Not on file     Physically Hurt by Someone: No     Hurt or Threatened by Someone: No   Housing Stability: Unknown (3/20/2025)    Nursing: Inadequate Housing Risk Classification     Has Housing: Not on file     Worried About Losing Housing: Not on file     Unable to Get Utilities: Not on file     Unable to Pay for Housing in the  Last Year: No     Has Housin       Allergies:  Allergies   Allergen Reactions    Morphine Other (See Comments)     urinary retention    Ciprofloxacin Nausea Only and Rash       Review of Systems   As per HPI, otherwise negative.  Medications and orders   All medications reviewed and updated in [facility] EMR.  Objective   /72   Pulse 88   Temp 98 °F (36.7 °C)   Resp 18   Wt 59.4 kg (131 lb)   SpO2 95% Comment: oxygen 2L NC  BMI 26.46 kg/m²   Physical Exam  Vitals and nursing note reviewed.   General: no acute distress. cachectic  HENT:      Head: Normocephalic.      Nose: Nose normal.      Mouth: Mucous membranes are moist.   Eyes:       Right eye: No discharge.         Left eye: No discharge.      Conjunctivae normal.   Cardiovascular:      Normal rate and regular rhythm. No murmur heard.  Pulmonary:      Pulmonary effort is normal. No wheezing, rhonchi. Bilateral rales.   Abdominal:      Bowel sounds are normal. There is no distension.      Abdomen is soft. There is no abdominal tenderness.   Musculoskeletal:       Right lower leg: No edema.      Left lower leg: No edema.   Skin: PICC RUE in dressing. No drainage or bleeding noted.     General: Skin is warm. Scattered ecchymoses b/l forearms 2/2 IN insertion and anterior knees, b/l LE. Small abrasions with scabs on left thigh, right LE.  Small wound #3mm dorsal 2nd toe right foot, medial malleolus left foot.  Hammer toes noted.   Neurological: alert.      Oriented to person, and place. Drowsy. Open her eyes to answer questions and then close, falling asleep.       Pertinent Laboratory/Diagnostic Studies:   The following labs/studies were reviewed please see chart or hospital paperwork for details.    Labs & Imaging:  Lab Results   Component Value Date    WBC 9.78 2025    HGB 9.9 (L) 2025    HCT 30.0 (L) 2025    MCV 92 2025     2025     Lab Results   Component Value Date     10/09/2015    SODIUM 137  03/28/2025    K 3.4 (L) 03/28/2025     03/28/2025    CO2 26 03/28/2025    ANIONGAP 8 10/09/2015    AGAP 9 03/28/2025    BUN 28 (H) 03/28/2025    CREATININE 1.14 03/28/2025    GLUC 110 03/28/2025    GLUF 120 (H) 01/17/2025    CALCIUM 7.8 (L) 03/28/2025    AST 39 03/26/2025    ALT <3 (L) 03/26/2025    ALKPHOS 276 (H) 03/26/2025    TP 6.1 (L) 03/26/2025    TBILI 0.94 03/26/2025    EGFR 42 03/28/2025     Lab Results   Component Value Date    HGBA1C 6.7 (H) 03/20/2025     Lab Results   Component Value Date    FNUXKBIJ23 354 02/23/2025     Lab Results   Component Value Date    IAN0FSXNHXQU 7.005 (H) 02/23/2025       Lab Results   Component Value Date    PFRC62ARCOVZ 41.7 05/16/2024      I have spent a total time of 75 minutes in caring for this patient on the day of the visit/encounter including diagnostic results, prognosis, risks and benefits of tx options, instructions for management, patient and family education, counseling / coordination of care, documenting in the medical record, reviewing / ordering tests, medicine, procedures , obtaining and reviewing history, communicating with other healthcare professionals.  Marylu Ma MD

## 2025-03-31 NOTE — PROGRESS NOTES
OPAT NOTE    AP ONLY CAMPUSES ARE: [place] and [place].   In these cases, physician is only cosigning notes.    Supervising/Discharge provider: Venkatesh    Diagnosis: MSSA Bacteremia    Drug: Cefazolin    Dose/Route/Frequency: 2g IV Q12    Labs/Frequency: CBCD BMP weekly    End Date: 5/4    Infusion/VNA/SNF contact: Jewel Desir    Next appointment: 4/9 2:30 PM, Ana (virtual visit)

## 2025-04-01 NOTE — ASSESSMENT & PLAN NOTE
Source of infection likely from multiple abrasions/wounds in b/l LE  Continue Cefazolin as above.  F/u with ID outpatient as scheduled, 4/9/25.

## 2025-04-01 NOTE — ASSESSMENT & PLAN NOTE
Multifactorial, age, comorbid medical conditions, and recent hospitalization for severe illness. Poor prognosis discussed with .   Admitted to SNF for rehab  PT/OT consult-evaluate and treat  Supportive care, nutritional support, ADL support  Fall precautions

## 2025-04-01 NOTE — ASSESSMENT & PLAN NOTE
With severe esophageal dysmotility, maintained on amitriptyline 10 mg daily  Continue dysphagia 2 - mechanical soft diet with thin liquid  ST following while in SNF.   Would consider tapering amitriptyline given side effects on older adults, including confusion, cognitive impairment, sedation, and significant anticholinergic burdens.

## 2025-04-01 NOTE — ASSESSMENT & PLAN NOTE
Reported while in the hospital, likely in the setting of severe sepsis/MSSA bacteremia  MRI brain 3/27/25 no acute pathology  Delirium precautions:  -Maintain normal sleep/wake cycle, lights and tv on during day with blinds open, lights and tv off at bedtime with blinds closed, minimize unnecessary interruptions  -if insomnia develops consider Melatonin 3mg   -continue supportive care and frequent reorientation   -monitor for adequate urine output as both urine and stool retention may contribute to and worsen delirium  -pain control with Tylenol 650 mg TID. May increase to 1000 mg TID.

## 2025-04-01 NOTE — ASSESSMENT & PLAN NOTE
Was consulted by Geriatric Medicine in 9/2024 with [facility] 11/26. Suspecting Alzheimer's dementia with polypharmacy contributing to cognitive impairment.  Plan to taper amitriptyline slowly.   Encourage family visits.  Provide frequent reorientation, redirection  Support ADLs/iADLs in SNF

## 2025-04-01 NOTE — ASSESSMENT & PLAN NOTE
Lab Results   Component Value Date    EGFR 42 03/28/2025    EGFR 39 03/27/2025    EGFR 39 03/26/2025    CREATININE 1.14 03/28/2025    CREATININE 1.22 03/27/2025    CREATININE 1.22 03/26/2025   Recent BMP with Creatinine 1.23, eGFR 42 which is at stage 3b (3/31/25)  Ensure adequate hydration. Avoid hypotension  Monitor BMP

## 2025-04-01 NOTE — ASSESSMENT & PLAN NOTE
Moderate right and small left pleural effusions noted on CT chest 3/19/25  Continue oxygen therapy as needed to maintain SpO2 > 92%  Monitor respiratory status

## 2025-04-01 NOTE — ASSESSMENT & PLAN NOTE
Wt Readings from Last 3 Encounters:   03/31/25 59.4 kg (131 lb)   03/28/25 58.5 kg (128 lb 15.5 oz)   02/24/25 65 kg (143 lb 4.8 oz)   Not in acute exacerbation  Echo 3/20/25 showed EF 50%, normal diastolic function  Continue home lasix 20 mg daily  BMP today reviewed, normal Na 139, potassium 3.6

## 2025-04-01 NOTE — ASSESSMENT & PLAN NOTE
With recent hospitalization for severe sepsis 2/2 MSSA bacteremia, treated with vasopressor support in ICU, IVF in ED  Blood culture x 2 growth of Staph aureus, MSSA  TTE negative for vegetation  Continue Ancef through 5/4/25 as per ID  Weekly CBC, BMP  Monitor fever curve.

## 2025-04-01 NOTE — ASSESSMENT & PLAN NOTE
Lab Results   Component Value Date    HGBA1C 6.7 (H) 03/20/2025   Above target goal of HgbA1C.  Continue regular diet given her poor appetite. Avoid hypoglycemia

## 2025-04-02 ENCOUNTER — NURSING HOME VISIT (OUTPATIENT)
Dept: GERIATRICS | Facility: OTHER | Age: OVER 89
End: 2025-04-02
Payer: MEDICARE

## 2025-04-02 ENCOUNTER — HOSPITAL ENCOUNTER (EMERGENCY)
Facility: HOSPITAL | Age: OVER 89
Discharge: HOME | End: 2025-04-02
Attending: EMERGENCY MEDICINE
Payer: MEDICARE

## 2025-04-02 ENCOUNTER — APPOINTMENT (EMERGENCY)
Dept: RADIOLOGY | Facility: HOSPITAL | Age: OVER 89
End: 2025-04-02
Attending: EMERGENCY MEDICINE
Payer: MEDICARE

## 2025-04-02 VITALS
RESPIRATION RATE: 18 BRPM | DIASTOLIC BLOOD PRESSURE: 52 MMHG | HEART RATE: 92 BPM | SYSTOLIC BLOOD PRESSURE: 103 MMHG | TEMPERATURE: 98.1 F | OXYGEN SATURATION: 100 %

## 2025-04-02 DIAGNOSIS — R65.20 SEVERE SEPSIS (HCC): Primary | ICD-10-CM

## 2025-04-02 DIAGNOSIS — R78.81 MSSA BACTEREMIA: ICD-10-CM

## 2025-04-02 DIAGNOSIS — B95.61 MSSA BACTEREMIA: Primary | ICD-10-CM

## 2025-04-02 DIAGNOSIS — Z95.828 S/P PICC CENTRAL LINE PLACEMENT: ICD-10-CM

## 2025-04-02 DIAGNOSIS — A41.9 SEVERE SEPSIS (HCC): Primary | ICD-10-CM

## 2025-04-02 DIAGNOSIS — J90 CHRONIC BILATERAL PLEURAL EFFUSIONS: ICD-10-CM

## 2025-04-02 DIAGNOSIS — R78.81 MSSA BACTEREMIA: Primary | ICD-10-CM

## 2025-04-02 DIAGNOSIS — B95.61 MSSA BACTEREMIA: ICD-10-CM

## 2025-04-02 DIAGNOSIS — I50.22 CHRONIC SYSTOLIC CONGESTIVE HEART FAILURE (HCC): ICD-10-CM

## 2025-04-02 PROCEDURE — 36569 INSJ PICC 5 YR+ W/O IMAGING: CPT | Performed by: EMERGENCY MEDICINE

## 2025-04-02 PROCEDURE — 99283 EMERGENCY DEPT VISIT LOW MDM: CPT

## 2025-04-02 PROCEDURE — 99284 EMERGENCY DEPT VISIT MOD MDM: CPT | Performed by: EMERGENCY MEDICINE

## 2025-04-02 PROCEDURE — 87070 CULTURE OTHR SPECIMN AEROBIC: CPT | Performed by: EMERGENCY MEDICINE

## 2025-04-02 PROCEDURE — 99309 SBSQ NF CARE MODERATE MDM 30: CPT | Performed by: FAMILY MEDICINE

## 2025-04-02 PROCEDURE — C1751 CATH, INF, PER/CENT/MIDLINE: HCPCS

## 2025-04-02 PROCEDURE — 36573 INSJ PICC RS&I 5 YR+: CPT

## 2025-04-02 PROCEDURE — NC001 PR NO CHARGE: Performed by: STUDENT IN AN ORGANIZED HEALTH CARE EDUCATION/TRAINING PROGRAM

## 2025-04-02 RX ORDER — LIDOCAINE HYDROCHLORIDE 10 MG/ML
INJECTION, SOLUTION EPIDURAL; INFILTRATION; INTRACAUDAL; PERINEURAL AS NEEDED
Status: COMPLETED | OUTPATIENT
Start: 2025-04-02 | End: 2025-04-02

## 2025-04-02 RX ADMIN — LIDOCAINE HYDROCHLORIDE 5 ML: 10 INJECTION, SOLUTION EPIDURAL; INFILTRATION; INTRACAUDAL; PERINEURAL at 11:55

## 2025-04-02 NOTE — DISCHARGE INSTRUCTIONS
Peripherally Inserted Central Catheter     WHAT YOU NEED TO KNOW:   A PICC is an IV placed into a large blood vessel near your heart. It is usually inserted through a blood vessel in your arm. Your PICC may have multiple ports. Ports are tubes where you can inject medicine. A PICC can stay in place for several weeks or months. You may need a PICC to get nutrition, medicine, or fluids. Blood samples can be removed from your PICC and sent to the lab for tests.   DISCHARGE INSTRUCTIONS:    [facility],    Contact Interventional Radiology at [phone]   [place] PATIENTS: Contact Interventional Radiology at [phone]   [place] PATIENTS: Contact Interventional Radiology at [phone] if:  Blood soaks through your bandage.    Your arm or leg feels warm, tender, and painful. It may look swollen and red.   You have trouble moving your arm.    Your catheter falls out.  You have a fever or swelling, redness, pain, or pus where the catheter was inserted.  Persistent nausea or vomiting.    You cannot flush your catheter, or you feel pain when you flush your catheter.    You see a hole or crack in the tubing of your catheter.    You see fluid leaking from the insertion site.    You run out of supplies to care for your catheter.    You have questions or concerns about your condition or care.

## 2025-04-02 NOTE — SEDATION DOCUMENTATION
Pt in IR for PICC exchange procedure performed Dr Adam and Michelle RT,RN. Pt tolerated procedure well. PICC removed from KAIA r/t purulent drainage from site and possible infection. New PICC placed in VANESSA. Catheter tip sent for Cultures. Pt already on ABX treatment outpatient. Report given to ED RN.

## 2025-04-02 NOTE — ED PROVIDER NOTES
Time reflects when diagnosis was documented in both MDM as applicable and the Disposition within this note       Time User Action Codes Description Comment    4/2/2025  9:52 AM MinorMallory Add [R78.81,  B95.61] MSSA bacteremia     4/2/2025 12:31 PM Mallory Chamorro Add [Z95.828] S/P PICC central line placement           ED Disposition       ED Disposition   Discharge    Condition   Stable    Date/Time   Wed Apr 2, 2025 12:31 PM    Comment   Tanya Stephen discharge to home/self care.                   Assessment & Plan       Medical Decision Making  89-year-old female presents emergency department from care facility for PICC line replacement.  PICC was placed by IR, so we will consult to replace.  Will send catheter for culture given mild surrounding erythema at site.    Final assessment: PICC line replaced.  Patient stable for discharge home.    Problems Addressed:  S/P PICC central line placement: acute illness or injury    Amount and/or Complexity of Data Reviewed  Labs: ordered.             Medications       ED Risk Strat Scores                                                History of Present Illness       Chief Complaint   Patient presents with    Medical Problem     Pts PICC line in R arm is falling out, sent from [facility] for new one       Past Medical History:   Diagnosis Date    Anemia     Arthritis     Back pain     CHF (congestive heart failure) (HCC)     Chronic kidney disease     Stage 3b    Confusion 02/22/2023    Diabetes (HCC)     Diabetes mellitus (HCC)     Diabetic gastroparesis associated with type 2 diabetes mellitus  (HCC)     Diabetic polyneuropathy (HCC)     Diverticulosis     Herpes zoster     Hypertension     IBS (irritable bowel syndrome)     Neurogenic claudication due to lumbar spinal stenosis     Osteoarthritis     Osteoporosis     Pulmonary edema     Raynaud's phenomenon without gangrene     Seronegative arthropathy of multiple sites (HCC)     Sicca (HCC)       Past Surgical History:    Procedure Laterality Date    BACK SURGERY      COLONOSCOPY      EGD AND COLONOSCOPY N/A 2016    Procedure: EGD AND COLONOSCOPY;  Surgeon: Elina Anderson MD;  Location: [facility];  Service:     IR PICC PLACEMENT SINGLE LUMEN  3/27/2025    JOINT REPLACEMENT      bilat knees and hips    OTHER SURGICAL HISTORY      pelvis rods    REPLACEMENT TOTAL KNEE BILATERAL      STOMACH SURGERY      UPPER GASTROINTESTINAL ENDOSCOPY        Family History   Problem Relation Age of Onset    Cancer Brother     Diabetes Mother     Hypertension Father     Heart disease Father       Social History     Tobacco Use    Smoking status: Former     Current packs/day: 0.00     Types: Cigarettes     Quit date:      Years since quittin.2     Passive exposure: Never    Smokeless tobacco: Never   Vaping Use    Vaping status: Never Used   Substance Use Topics    Alcohol use: Not Currently    Drug use: No      E-Cigarette/Vaping    E-Cigarette Use Never User       E-Cigarette/Vaping Substances    Nicotine No     THC No     CBD No     Flavoring No     Other No       I have reviewed and agree with the history as documented.     89-year-old female presents emergency department from care facility for PICC line replacement.  Chart reviewed in epic.  Patient recently admitted to this hospital for severe sepsis requiring vasopressors, found to be bacteremic with MSSA.  She was ultimately discharged with a PICC line on IV Ancef.  IV Ancef to be continued until 2025.  Today she was sent in because her PICC line is partially dislodged.  No fevers.  No other complaints.        Review of Systems   Constitutional: Negative.  Negative for chills and fever.   HENT: Negative.  Negative for rhinorrhea.    Eyes: Negative.    Respiratory: Negative.  Negative for cough and shortness of breath.    Cardiovascular: Negative.  Negative for chest pain and leg swelling.   Gastrointestinal: Negative.  Negative for abdominal pain, diarrhea, nausea and  vomiting.   Genitourinary: Negative.  Negative for dysuria, flank pain and frequency.   Musculoskeletal: Negative.  Negative for back pain and neck pain.   Skin: Negative.  Negative for rash.   Neurological: Negative.  Negative for light-headedness and headaches.   All other systems reviewed and are negative.          Objective       ED Triage Vitals [04/02/25 0939]   Temperature Pulse Blood Pressure Respirations SpO2 Patient Position - Orthostatic VS   98.1 °F (36.7 °C) 94 116/53 16 100 % --      Temp Source Heart Rate Source BP Location FiO2 (%) Pain Score    Oral Monitor Left arm -- --      Vitals      Date and Time Temp Pulse SpO2 Resp BP Pain Score FACES Pain Rating User   04/02/25 1328 -- 92 100 % 18 103/52 -- -- JS   04/02/25 0939 98.1 °F (36.7 °C) 94 100 % 16 116/53 -- -- JS            Physical Exam  Vitals and nursing note reviewed.   Constitutional:       General: She is not in acute distress.     Appearance: She is well-developed.   HENT:      Head: Normocephalic and atraumatic.      Mouth/Throat:      Mouth: Mucous membranes are moist.   Eyes:      Extraocular Movements: Extraocular movements intact.      Pupils: Pupils are equal, round, and reactive to light.   Cardiovascular:      Rate and Rhythm: Normal rate and regular rhythm.      Heart sounds: Normal heart sounds. No murmur heard.     No friction rub. No gallop.   Pulmonary:      Effort: Pulmonary effort is normal. No respiratory distress.      Breath sounds: Normal breath sounds. No stridor. No wheezing, rhonchi or rales.   Abdominal:      Palpations: Abdomen is soft.      Tenderness: There is no abdominal tenderness. There is no guarding or rebound.   Musculoskeletal:         General: No swelling or tenderness. Normal range of motion.      Cervical back: Normal range of motion and neck supple.      Right lower leg: No edema.      Left lower leg: No edema.   Skin:     General: Skin is warm and dry.      Capillary Refill: Capillary refill takes  less than 2 seconds.      Coloration: Skin is not pale.      Comments: PICC line in right upper extremity partially dislodged.  Mild surrounding erythema.  No warmth, crepitus, induration.   Neurological:      Mental Status: She is alert and oriented to person, place, and time.      Cranial Nerves: No cranial nerve deficit.      Comments: Clear fluent speech         Results Reviewed       Procedure Component Value Units Date/Time    Culture, Catheter Tip [[id number]] Collected: 04/02/25 1209    Lab Status: In process Specimen: Catheter Tip from PICC Updated: 04/02/25 1218            IR PICC line placement single lumen (preferred for home antibiotics/medications)    (Results Pending)       Procedures    ED Medication and Procedure Management   Prior to Admission Medications   Prescriptions Last Dose Informant Patient Reported? Taking?   Cholecalciferol (VITAMIN D3) 1000 units CAPS  Child, Spouse/Significant Other Yes No   Sig: Take 1 capsule by mouth daily   DULoxetine (CYMBALTA) 60 mg delayed release capsule  Child, Spouse/Significant Other No No   Sig: TAKE ONE CAPSULE BY MOUTH ONCE DAILY   Magnesium 250 MG TABS  Child, Spouse/Significant Other Yes No   Sig: Take 250 mg by mouth every evening   Mirabegron ER (Myrbetriq) 50 MG TB24  Child, Spouse/Significant Other No No   Sig: Take 1 tablet (50 mg total) by mouth in the morning   Sodium Fluoride (PreviDent 5000 Booster Plus) 1.1 % PSTE  Child, Spouse/Significant Other No No   Sig: Apply on teeth every evening as directed   acetaminophen (TYLENOL) 325 mg tablet  Child, Spouse/Significant Other No No   Sig: Take 2 tablets (650 mg total) by mouth 3 (three) times a day   amitriptyline (ELAVIL) 10 mg tablet   Yes No   Sig: Take 10 mg by mouth daily at bedtime   aspirin 81 mg chewable tablet  Child, Spouse/Significant Other No No   Sig: Chew 1 tablet (81 mg total) daily   atorvastatin (LIPITOR) 10 mg tablet   No No   Sig: TAKE ONE TABLET BY MOUTH EVERY DAY   ceFAZolin  (ANCEF) 2000 mg IVPB   No No   Sig: Inject 2,000 mg into a catheter in a vein over 30 minutes at 100 mL/hr every 12 (twelve) hours   furosemide (LASIX) 20 mg tablet   No No   Sig: One tablet daily and an extra 20 mg prn for wt gain 3 lb/ 24 hours or 5 lb/ 5 days, above a dry wt of 122 lb   hydroxychloroquine (PLAQUENIL) 200 mg tablet   No No   Sig: Take 1 tablet (200 mg total) by mouth daily with breakfast   omeprazole (PriLOSEC) 20 mg delayed release capsule  Child, Spouse/Significant Other Yes No   Sig: Take 20 mg by mouth daily   pregabalin (LYRICA) 75 mg capsule   No No   Sig: Take 1 capsule (75 mg total) by mouth 2 (two) times a day for 10 days   pyridoxine (B-6) 100 MG tablet  Child, Spouse/Significant Other Yes No   Sig: Take 100 mg by mouth daily      Facility-Administered Medications: None     Patient's Medications   Discharge Prescriptions    No medications on file     No discharge procedures on file.  ED SEPSIS DOCUMENTATION   Time reflects when diagnosis was documented in both MDM as applicable and the Disposition within this note       Time User Action Codes Description Comment    4/2/2025  9:52 AM Mallory Chamorro Add [R78.81,  B95.61] MSSA bacteremia     4/2/2025 12:31 PM Mallory Chamorro [Z95.828] S/P PICC central line placement                  Mallory Chamorro MD  04/02/25 1400

## 2025-04-02 NOTE — PROCEDURES
Venous Access Line Insertion    Date/Time: 4/2/2025 11:57 AM    Performed by: Michelle Barron  Authorized by: Hakan Adam MD    Patient location:  IR  Other Assisting Provider: No    Consent:     Consent obtained:  Written    Consent given by:  Guardian    Risks discussed:  Arterial puncture, bleeding, incorrect placement, infection, nerve damage and pneumothorax    Alternatives discussed:  Delayed treatment  Universal protocol:     Procedure explained and questions answered to patient or proxy's satisfaction: yes      Immediately prior to procedure, a time out was called: yes      Relevant documents present and verified: yes      Test results available and properly labeled: yes      Radiology Images displayed and confirmed.  If images not available, report reviewed: yes      Required blood products, implants, devices, and special equipment available: yes      Site/side marked: yes      Patient identity confirmed:  Arm band and verbally with patient  Pre-procedure details:     Hand hygiene: Hand hygiene performed prior to insertion      Sterile barrier technique: All elements of maximal sterile technique followed      Skin preparation:  ChloraPrep    Skin preparation agent: Skin preparation agent completely dried prior to procedure    Procedure details:     Complex Venous Access Line Type: PICC      Complex Venous Access Line Indications: long term antibiotics      Catheter tip vessel location: superior vena cava      Orientation:  Left    Location:  Basilic    Procedural supplies:  Single lumen    Catheter size:  4 Fr    Total catheter length (cm):  36    Catheter out on skin (cm):  0    Max flow rate:  999    Arm circumference:  26.5    Patient evaluated for contraindications to access (i.e. fistula, thrombosis, etc): Yes      Approach: percutaneous technique used      Patient position:  Flat    Ultrasound image availability:  Images available in PACS    Sterile ultrasound techniques: Sterile gel and sterile  probe covers were used      Number of attempts:  1    Successful placement: yes      Landmarks identified: yes      Cath access vessel: Fluro.  Anesthesia (see MAR for exact dosages):     Anesthesia method:  Local infiltration    Local anesthetic:  Lidocaine 1% w/o epi  Post-procedure details:     Post-procedure:  Dressing applied    Assessment:  Blood return through all ports and placement verified by x-ray (fluro)    Post-procedure complications: none      Patient tolerance of procedure:  Tolerated well, no immediate complications    Observer: Yes      Observer name:  Zuleima Abernathy RN

## 2025-04-02 NOTE — PROGRESS NOTES
Facility: [facility]  POS: 31  Progress Note    Chief Complaint/Reason for visit: STR follow-up  Code status: DNR  History of Present Illness: The patient is seen in her room today for STR f/u. She was sent to ED this morning for PICC replacement. Left basilic PICC was placed successfully. She is lying in bed upon exam.  and daughter at bed side. She appears tired. Opens her eyes for answers. C/o pain in both arms.   Past Medical History: unchanged from history and physical  Past Medical History:   Diagnosis Date    Anemia     Arthritis     Back pain     CHF (congestive heart failure) (HCC)     Chronic kidney disease     Stage 3b    Confusion 02/22/2023    Diabetes (HCC)     Diabetes mellitus (HCC)     Diabetic gastroparesis associated with type 2 diabetes mellitus  (HCC)     Diabetic polyneuropathy (HCC)     Diverticulosis     Herpes zoster     Hypertension     IBS (irritable bowel syndrome)     Neurogenic claudication due to lumbar spinal stenosis     Osteoarthritis     Osteoporosis     Pulmonary edema     Raynaud's phenomenon without gangrene     Seronegative arthropathy of multiple sites (HCC)     Sicca (HCC)      Family History: Unchanged from history and physical  Social History: Unchanged from history and physical  Review of systems: As per review of medical illness, all other systems reviewed and negative.  Medications: All medication and routine orders were reviewed and updated  Allergies: Reviewed and unchanged  Labs/Diagnostics (reviewed by this provider): Copy in Chart  Imaging Reviewed: no new imaging  Vitals and nursing note reviewed.   General: no acute distress. cachectic  HENT:      Head: Normocephalic.      Nose: Nose normal.      Mouth: Mucous membranes are moist.   Eyes:       Right eye: No discharge.         Left eye: No discharge.      Conjunctivae normal.   Cardiovascular:      Normal rate and regular rhythm.   Pulmonary:      Pulmonary effort is normal. No wheezing, rhonchi or  rales.   Abdominal:      Bowel sounds are normal. There is no distension.      Abdomen is soft. There is no abdominal tenderness.   Musculoskeletal:      Right lower leg: No edema.      Left lower leg: No edema.   Skin:     General: Skin is warm. Cold and purplish discoloration in fingertips both hands. Ice pack on RUE.   Neurological: alert.      Oriented to person.    Behavior: normal.     Assessment/Plan:  Severe sepsis ([facility])  With recent hospitalization for severe sepsis 2/2 MSSA bacteremia, treated with vasopressor support in ICU, IVF in ED  Blood culture x 2 growth of Staph aureus, MSSA  TTE negative for vegetation  Continue Ancef through 5/4/25 as per ID  Weekly CBC, BMP  Monitor fever curve.    MSSA bacteremia  Source of infection likely from multiple abrasions/wounds in b/l LE  Continue Cefazolin as above.  F/u with ID outpatient as scheduled, 4/9/25.    CHF (congestive heart failure) ([facility])  Stable, no acute exacerbation  Echo 3/20/25 showed EF 50%, normal diastolic function  Continue home lasix 20 mg daily  Monitor BMP     Chronic bilateral pleural effusions  Moderate right and small left pleural effusions noted on CT chest 3/19/25  Continue oxygen therapy as needed to maintain SpO2 > 92%  Continue lasix 20 mg daily  Monitor respiratory status     Marylu Ma MD  4/2/2025

## 2025-04-05 LAB — BACTERIA CATH TIP CULT: NO GROWTH

## 2025-04-07 ENCOUNTER — NURSING HOME VISIT (OUTPATIENT)
Dept: GERIATRICS | Facility: OTHER | Age: OVER 89
End: 2025-04-07
Payer: MEDICARE

## 2025-04-07 VITALS — BODY MASS INDEX: 24.88 KG/M2 | WEIGHT: 123.2 LBS

## 2025-04-07 DIAGNOSIS — R53.81 DEBILITY: ICD-10-CM

## 2025-04-07 DIAGNOSIS — R13.19 ESOPHAGEAL DYSPHAGIA: Chronic | ICD-10-CM

## 2025-04-07 DIAGNOSIS — R78.81 MSSA BACTEREMIA: Primary | ICD-10-CM

## 2025-04-07 DIAGNOSIS — R63.0 POOR APPETITE: ICD-10-CM

## 2025-04-07 DIAGNOSIS — J90 CHRONIC BILATERAL PLEURAL EFFUSIONS: ICD-10-CM

## 2025-04-07 DIAGNOSIS — N18.4 CHRONIC KIDNEY DISEASE, STAGE IV (SEVERE) (HCC): ICD-10-CM

## 2025-04-07 DIAGNOSIS — R63.4 WEIGHT LOSS: ICD-10-CM

## 2025-04-07 DIAGNOSIS — I50.9 CONGESTIVE HEART FAILURE, UNSPECIFIED HF CHRONICITY, UNSPECIFIED HEART FAILURE TYPE (HCC): ICD-10-CM

## 2025-04-07 DIAGNOSIS — E44.0 MODERATE PROTEIN-CALORIE MALNUTRITION (HCC): ICD-10-CM

## 2025-04-07 DIAGNOSIS — B95.61 MSSA BACTEREMIA: Primary | ICD-10-CM

## 2025-04-07 DIAGNOSIS — G89.4 CHRONIC PAIN SYNDROME: ICD-10-CM

## 2025-04-07 PROBLEM — R65.20 SEVERE SEPSIS (HCC): Status: RESOLVED | Noted: 2025-03-19 | Resolved: 2025-04-07

## 2025-04-07 PROBLEM — A41.9 SEVERE SEPSIS (HCC): Status: RESOLVED | Noted: 2025-03-19 | Resolved: 2025-04-07

## 2025-04-07 PROCEDURE — 99309 SBSQ NF CARE MODERATE MDM 30: CPT | Performed by: NURSE PRACTITIONER

## 2025-04-07 NOTE — ASSESSMENT & PLAN NOTE
Lab Results   Component Value Date    EGFR 42 03/28/2025    EGFR 39 03/27/2025    EGFR 39 03/26/2025    CREATININE 1.14 03/28/2025    CREATININE 1.22 03/27/2025    CREATININE 1.22 03/26/2025   Baseline creatinine 1.4-1.6 as per nephrology  Recent creatinine trending lower likely falls low due to poor oral intake  Most recent creatinine 1.23/GFR 42  Ensure adequate hydration and avoid hypotension  Renal dose medications  Has a follow-up appointment with nephrology outpatient 4/23/2025

## 2025-04-07 NOTE — ASSESSMENT & PLAN NOTE
With moderate right and small left pleural effusions noted on CT chest 3/19/2025  Currently on oxygen supplementation via nasal cannula; wean off oxygen as able  Currently on Lasix 20 mg daily  Respiratory status has been stable

## 2025-04-07 NOTE — ASSESSMENT & PLAN NOTE
Stable, no acute exacerbation  Echo 3/20/25 showed EF 50%, normal diastolic function  Continue home lasix 20 mg daily  Monitor BMP

## 2025-04-07 NOTE — ASSESSMENT & PLAN NOTE
With weight loss due to poor oral intake likely secondary to acute illness  Current weight 123.2 pounds<<<131 pounds on 3/29/2025  Continue daily weights  Continue nutritional supplements  Continue speech therapy  Dietitian following

## 2025-04-07 NOTE — ASSESSMENT & PLAN NOTE
Wt Readings from Last 3 Encounters:   03/31/25 59.4 kg (131 lb)   03/28/25 58.5 kg (128 lb 15.5 oz)   02/24/25 65 kg (143 lb 4.8 oz)   Echo 3/20/2025 showed EF 50%, normal diastolic function  Stable without exacerbation  Currently on home Lasix 20 mg daily; will monitor closely and discontinue Lasix if oral intake remains poor  Currently has weekly labs due to IV antibiotics  Will order daily weights  Follow-up with cardiology outpatient

## 2025-04-07 NOTE — ASSESSMENT & PLAN NOTE
Multifactorial   Continue supportive care at SNF for ADLs  Continue PT/OT/ST  Continue fall precautions  Ensure adequate hydration and nutrition; continue nutritional supplements  Dietitian following

## 2025-04-07 NOTE — ASSESSMENT & PLAN NOTE
With severe esophageal dysmotility, maintained on amitriptyline 10 mg daily  Continue dysphagia 2 diet with thin liquids  Continue aspiration precautions  Continue speech therapy  Consider tapering amitriptyline given side effects on older adults such as confusion, cognitive impairment, sedation, and significant anticholinergic effects  Dietitian following

## 2025-04-07 NOTE — ASSESSMENT & PLAN NOTE
Moderate right and small left pleural effusions noted on CT chest 3/19/25  Continue oxygen therapy as needed to maintain SpO2 > 92%  Continue lasix 20 mg daily  Monitor respiratory status

## 2025-04-07 NOTE — PROGRESS NOTES
Facility: [facility]  POS: 31  Progress Note    Chief Complaint/Reason for visit: STR follow-up visit  Code status: DNR  History of Present Illness: 89-year-old male seen in follow-up of acute and chronic medical conditions.  Patient was recently hospitalized at [facility] from 3/19/2025 to 3/28/2025 for severe sepsis and MSSA bacteremia.  Continues on IV cefazolin twice daily through 5/4/2025 via PICC line left arm with weekly blood work.  Patient is currently residing at [facility] for rehabilitation.  At time of examination, received patient resting in bed with oxygen via nasal cannula in place.  Patient appears weak and deconditioned.  Patient denies having pain or discomfort.  No complaints of shortness of breath, chest pain, headache, dizziness, abdominal pain, nausea, vomiting, diarrhea, constipation.  Nursing reports that patient's appetite has been very poor even with much encouragement.  She is receiving nutritional supplements Glucerna and Magic cup daily.  See A/P for additional information.  Past Medical History: unchanged from history and physical  Past Medical History:   Diagnosis Date    Anemia     Arthritis     Back pain     CHF (congestive heart failure) (HCC)     Chronic kidney disease     Stage 3b    Confusion 02/22/2023    Diabetes (HCC)     Diabetes mellitus (HCC)     Diabetic gastroparesis associated with type 2 diabetes mellitus  (HCC)     Diabetic polyneuropathy (HCC)     Diverticulosis     Herpes zoster     Hypertension     IBS (irritable bowel syndrome)     Neurogenic claudication due to lumbar spinal stenosis     Osteoarthritis     Osteoporosis     Pulmonary edema     Raynaud's phenomenon without gangrene     Seronegative arthropathy of multiple sites (HCC)     Sicca (HCC)      Family History: Unchanged from history and physical  Social History: Unchanged from history and physical  Review of systems: As per review of medical illness, all other  systems reviewed and negative.  Medications: All medication and routine orders were reviewed and updated  Allergies: Reviewed and unchanged  Consults reviewed:PT and OT  Labs/Diagnostics (reviewed by this provider): Copy in Chart  Imaging Reviewed:  Physical Exam  Weight: 123.2 LB Temp: 97.3           BP: 100/62  Pulse: 66 Resp: 20       O2 Sat: 97% on oxygen via NC  Orientation:Person and Place     Physical Exam  Vitals and nursing note reviewed.   Constitutional:       General: She is not in acute distress.     Appearance: She is ill-appearing. She is not toxic-appearing or diaphoretic.      Comments: Thin and frail elderly female who appears with chronic illness.   HENT:      Head: Normocephalic.      Nose: No congestion.      Mouth/Throat:      Mouth: Mucous membranes are moist.      Pharynx: No oropharyngeal exudate.   Eyes:      Comments: Has prescription glasses.   Cardiovascular:      Rate and Rhythm: Normal rate and regular rhythm.      Comments: Left arm PICC line in place.  Dressing with dried blood noted.  Pulmonary:      Effort: Pulmonary effort is normal. No respiratory distress.      Breath sounds: Rales (Fine rales bibasilar.) present.   Abdominal:      General: Bowel sounds are normal. There is no distension.      Palpations: Abdomen is soft.      Tenderness: There is no abdominal tenderness. There is no guarding.   Musculoskeletal:      Cervical back: Neck supple.      Right lower leg: No edema.      Left lower leg: No edema.      Comments: Heel protectors in place.  Moves all 4 extremities.   Skin:     General: Skin is warm and dry.      Capillary Refill: Capillary refill takes less than 2 seconds.      Findings: Bruising (Resolving ecchymosis on abdomen and bilateral arms.) present.      Comments: With sallow color.  Scabbed areas on right upper extremity.   Neurological:      Mental Status: She is alert. Mental status is at baseline.      Motor: Weakness present.   Psychiatric:         Mood and  Affect: Mood normal.         Behavior: Behavior normal.         Thought Content: Thought content normal.       Assessment/Plan:  89-year-old female with:  Baseline creatinine 1.4-1.6 as per nephrology  MSSA bacteremia, chronic systolic congestive heart failure, chronic bilateral pleural effusions, esophageal dysphagia, irritable Down syndrome, stage IV chronic kidney disease, mild cognitive impairment, debility  MSSA bacteremia  Recently hospitalized with severe sepsis.  Source of infection likely from multiple abrasions/wounds bilateral lower extremities  Afebrile with soft BPs  Continue cefazolin 2 g IV every 12 hours via left arm PICC line.  Nursing to remove PICC line after last dose of IV antibiotic.  No infection noted at PICC line insertion site  Continue weekly blood work as per ID  Follow-up with ID as scheduled outpatient on 4/9/2025    CHF (congestive heart failure) ([facility])  Wt Readings from Last 3 Encounters:   03/31/25 59.4 kg (131 lb)   03/28/25 58.5 kg (128 lb 15.5 oz)   02/24/25 65 kg (143 lb 4.8 oz)   Echo 3/20/2025 showed EF 50%, normal diastolic function  Stable without exacerbation  Currently on home Lasix 20 mg daily; will monitor closely and discontinue Lasix if oral intake remains poor  Currently has weekly labs due to IV antibiotics  Will order daily weights  Follow-up with cardiology outpatient    Chronic kidney disease, stage IV (severe) ([facility])  Lab Results   Component Value Date    EGFR 42 03/28/2025    EGFR 39 03/27/2025    EGFR 39 03/26/2025    CREATININE 1.14 03/28/2025    CREATININE 1.22 03/27/2025    CREATININE 1.22 03/26/2025   Baseline creatinine 1.4-1.6 as per nephrology  Recent creatinine trending lower likely falls low due to poor oral intake  Most recent creatinine 1.23/GFR 42  Ensure adequate hydration and avoid hypotension  Renal dose medications  Has a follow-up appointment with nephrology outpatient 4/23/2025    Chronic bilateral pleural effusions  With moderate right and small  left pleural effusions noted on CT chest 3/19/2025  Currently on oxygen supplementation via nasal cannula; wean off oxygen as able  Currently on Lasix 20 mg daily  Respiratory status has been stable    Esophageal dysphagia  With severe esophageal dysmotility, maintained on amitriptyline 10 mg daily  Continue dysphagia 2 diet with thin liquids  Continue aspiration precautions  Continue speech therapy  Consider tapering amitriptyline given side effects on older adults such as confusion, cognitive impairment, sedation, and significant anticholinergic effects  Dietitian following    Chronic pain syndrome  No complaints of pain at time of exam  Continue scheduled Tylenol, Lyrica, duloxetine    Protein calorie malnutrition/poor appetite/weight loss  With weight loss due to poor oral intake likely secondary to acute illness  Current weight 123.2 pounds<<<131 pounds on 3/29/2025  Continue daily weights  Continue nutritional supplements  Continue speech therapy  Dietitian following    Debility  Multifactorial with severe deconditioning  Continue supportive care at SNF for ADLs  Continue PT/OT/ST  Continue fall precautions  Ensure adequate hydration and nutrition; continue nutritional supplements  Dietitian following    This note was completed in part utilizing [employer] direct voice recognition software.  Grammatical errors, random word insertion, spelling mistakes, and incomplete sentences may be an occasional consequence of the system secondary to software limitations, ambient noise and hardware issues.  At the time of dictation, efforts were made to edit, clarify and/or correct errors.  Please read the chart carefully and recognize, using context, where substitutions have occurred.  If you have any questions or concerns about the context, text or information contained within the body of this dictation, please contact myself, the provider, for further clarification.    JOSE G Arango  4/7/20251:47  PM

## 2025-04-07 NOTE — ASSESSMENT & PLAN NOTE
Recently hospitalized with severe sepsis.  Source of infection likely from multiple abrasions/wounds bilateral lower extremities  Afebrile with soft BPs  Continue cefazolin 2 g IV every 12 hours via left arm PICC line.  Nursing to remove PICC line after last dose of IV antibiotic.  No infection noted at PICC line insertion site  Continue weekly blood work as per ID  Follow-up with ID as scheduled outpatient on 4/9/2025

## 2025-04-08 ENCOUNTER — TELEPHONE (OUTPATIENT)
Age: OVER 89
End: 2025-04-08

## 2025-04-08 NOTE — TELEPHONE ENCOUNTER
Allyson calls from [facility] and request that after pts upcoming visit that any orders be faxed to [phone]

## 2025-04-09 ENCOUNTER — TELEPHONE (OUTPATIENT)
Dept: INFECTIOUS DISEASES | Facility: CLINIC | Age: OVER 89
End: 2025-04-09

## 2025-04-09 ENCOUNTER — TELEMEDICINE (OUTPATIENT)
Dept: INFECTIOUS DISEASES | Facility: CLINIC | Age: OVER 89
End: 2025-04-09
Payer: MEDICARE

## 2025-04-09 VITALS
HEART RATE: 76 BPM | OXYGEN SATURATION: 97 % | DIASTOLIC BLOOD PRESSURE: 58 MMHG | TEMPERATURE: 97.9 F | WEIGHT: 129.8 LBS | BODY MASS INDEX: 26.22 KG/M2 | SYSTOLIC BLOOD PRESSURE: 108 MMHG

## 2025-04-09 DIAGNOSIS — N18.4 CHRONIC KIDNEY DISEASE, STAGE IV (SEVERE) (HCC): ICD-10-CM

## 2025-04-09 DIAGNOSIS — R53.81 DEBILITY: Primary | ICD-10-CM

## 2025-04-09 DIAGNOSIS — R63.0 POOR APPETITE: ICD-10-CM

## 2025-04-09 DIAGNOSIS — E11.42 DIABETIC POLYNEUROPATHY ASSOCIATED WITH TYPE 2 DIABETES MELLITUS (HCC): ICD-10-CM

## 2025-04-09 DIAGNOSIS — R78.81 MSSA BACTEREMIA: ICD-10-CM

## 2025-04-09 DIAGNOSIS — B95.61 MSSA BACTEREMIA: ICD-10-CM

## 2025-04-09 PROCEDURE — 99215 OFFICE O/P EST HI 40 MIN: CPT | Performed by: PHYSICIAN ASSISTANT

## 2025-04-09 NOTE — TELEPHONE ENCOUNTER
Called [facility] and spoke with Allyson today.   Informed Allyson I was calling as patients has a virtual visit today. Informed Allyson to fax recent mediation list, vitals and recent vitals to office. Office fax provided at this time. Allyson states patients daughter will be doing virtual visit with patient today.     Called and spoke with patients daughter Nevaeh today.   Nevaeh states they will be connecting through [employer] for virtual visit.

## 2025-04-10 ENCOUNTER — NURSING HOME VISIT (OUTPATIENT)
Dept: GERIATRICS | Facility: OTHER | Age: OVER 89
End: 2025-04-10
Payer: MEDICARE

## 2025-04-10 VITALS
BODY MASS INDEX: 26.66 KG/M2 | OXYGEN SATURATION: 98 % | TEMPERATURE: 97.6 F | RESPIRATION RATE: 18 BRPM | HEART RATE: 89 BPM | SYSTOLIC BLOOD PRESSURE: 104 MMHG | DIASTOLIC BLOOD PRESSURE: 66 MMHG | WEIGHT: 132 LBS

## 2025-04-10 DIAGNOSIS — K59.01 SLOW TRANSIT CONSTIPATION: ICD-10-CM

## 2025-04-10 DIAGNOSIS — E44.0 MODERATE PROTEIN-CALORIE MALNUTRITION (HCC): ICD-10-CM

## 2025-04-10 DIAGNOSIS — J90 CHRONIC BILATERAL PLEURAL EFFUSIONS: ICD-10-CM

## 2025-04-10 DIAGNOSIS — I50.22 CHRONIC SYSTOLIC CONGESTIVE HEART FAILURE (HCC): Primary | ICD-10-CM

## 2025-04-10 DIAGNOSIS — R26.2 AMBULATORY DYSFUNCTION: ICD-10-CM

## 2025-04-10 DIAGNOSIS — N18.4 CHRONIC KIDNEY DISEASE, STAGE IV (SEVERE) (HCC): ICD-10-CM

## 2025-04-10 DIAGNOSIS — R78.81 MSSA BACTEREMIA: ICD-10-CM

## 2025-04-10 DIAGNOSIS — E11.42 DIABETIC POLYNEUROPATHY ASSOCIATED WITH TYPE 2 DIABETES MELLITUS (HCC): ICD-10-CM

## 2025-04-10 DIAGNOSIS — B95.61 MSSA BACTEREMIA: ICD-10-CM

## 2025-04-10 DIAGNOSIS — R13.19 ESOPHAGEAL DYSPHAGIA: Chronic | ICD-10-CM

## 2025-04-10 PROBLEM — K59.00 CONSTIPATION: Status: ACTIVE | Noted: 2025-04-10

## 2025-04-10 PROCEDURE — 99309 SBSQ NF CARE MODERATE MDM 30: CPT

## 2025-04-10 RX ORDER — POLYETHYLENE GLYCOL 3350 17 G/17G
17 POWDER, FOR SOLUTION ORAL DAILY PRN
Start: 2025-04-10

## 2025-04-10 RX ORDER — SENNOSIDES 8.6 MG
8.6 TABLET ORAL
Start: 2025-04-10

## 2025-04-10 NOTE — ASSESSMENT & PLAN NOTE
Wt Readings from Last 3 Encounters:   04/09/25 58.9 kg (129 lb 12.8 oz)   04/07/25 55.9 kg (123 lb 3.2 oz)   03/31/25 59.4 kg (131 lb)   Echo 3/20/2025 showed EF 50%, normal diastolic function  Stable without exacerbation, no overt signs of overload  Currently on home Lasix 20 mg daily  Currently has weekly labs due to IV antibiotics  Continue daily weights  Follow-up with cardiology outpatient

## 2025-04-10 NOTE — ASSESSMENT & PLAN NOTE
Constipation reported per nursing staff  Increase hydration, fiber, ambulation   Will start senna 8.6 mg HS, Miralax daily PRN

## 2025-04-10 NOTE — ASSESSMENT & PLAN NOTE
Admission blood cultures 2 of 2 sets growing MSSA. Possible skin source as portal of entry with multiple extremity abrasions/wounds. 3/20/25 TTE no vegetation seen  3/21/25 Blood culture 1 of 2 sets + MSSA 3/24/25 blood cultures negative   4/7/25 labs reviewed  -Continue Cefazolin 2 g IV q 12 hours, renal dose adjusted high dose  -Left arm PICC line placed 4/2/25   -Plan to complete 6 week IV Cefazolin course from blood culture clearance through 5/4/25.  -Will check weekly CBCD, BMP while on IV Cefazolin.  -Outpatient ID telemedicine office appt with me 4/29/25 at 11:00 am

## 2025-04-10 NOTE — ASSESSMENT & PLAN NOTE
In setting of advanced age, chronic comorbidities, and deconditioned s/p prolonged hospitalization due to MSSA bacteremia   -rehabilitation efforts   -PT/OT   -nutritional support   -management of infection above

## 2025-04-10 NOTE — PROGRESS NOTES
Name: Tanya Stephen      : 1934      MRN: [id number]  Encounter Provider: Ana Dobbs PA-C  Encounter Date: 2025   Encounter department: [facility]  :  Assessment & Plan  MSSA bacteremia  Admission blood cultures 2 of 2 sets growing MSSA. Possible skin source as portal of entry with multiple extremity abrasions/wounds. 3/20/25 TTE no vegetation seen  3/21/25 Blood culture 1 of 2 sets + MSSA 3/24/25 blood cultures negative   25 labs reviewed  -Continue Cefazolin 2 g IV q 12 hours, renal dose adjusted high dose  -Left arm PICC line placed 25   -Plan to complete 6 week IV Cefazolin course from blood culture clearance through 25.  -Will check weekly CBCD, BMP while on IV Cefazolin.  -Outpatient ID telemedicine office appt with me 25 at 11:00 am        Debility  In setting of advanced age, chronic comorbidities, and deconditioned s/p prolonged hospitalization due to MSSA bacteremia   -rehabilitation efforts   -PT/OT   -nutritional support   -management of infection above       Chronic kidney disease, stage IV (severe) ([facility])  Lab Results   Component Value Date    EGFR 42 2025    EGFR 39 2025    EGFR 39 2025    CREATININE 1.14 2025    CREATININE 1.22 2025    CREATININE 1.22 2025   -renal dose adjust antibiotic as needed  -volume management   -recheck BMP           Poor appetite  Improving a little per family   -nutritional support       Diabetic polyneuropathy associated with type 2 diabetes mellitus ([facility])    Lab Results   Component Value Date    HGBA1C 6.7 (H) 2025 Glu 123  -glycemic management per primary            I have discussed with patient and /daughter at bedside regarding the above plan to continue antibiotic as above and monitor closely. They agree with the plan. All of inquiries discussed and reassurance provided.     Antibiotics:  Cefazolin D21 - D17 from 1st negative blood  cultures    History of Present Illness   Patient here for telemedicine office follow up from rehab facility on IV Cefazolin for MSSA bacteremia.    Patient is sleeping a lot since hospitalization. PICC had to be replaced, it had pulled part way out. Patient appetite slightly improving.   Patient has no reported fever, chills, sweats; no nausea, vomiting, diarrhea; no cough, shortness of breath; no new pain. + chronic pain/back pain.     ROS:  A complete review of systems is negative other than that noted above in the HPI.         Objective   /58   Pulse 76   Temp 97.9 °F (36.6 °C)   Wt 58.9 kg (129 lb 12.8 oz)   LMP  (LMP Unknown)   SpO2 97%   BMI 26.22 kg/m²      General: 89 year old female, chronically debilitated, propped fairly comfortably in chair, sleeping through most of visit/exam.  and daughter providing much of process report. no acute distress.  Throat: Oropharynx moist without lesions.   Lungs: respirations unlabored on RA  Heart: RR  Abdomen: Soft, non-tender, non-distended, positive bowel sounds.    Extremities: No clubbing, cyanosis, + generalized ext edema  Skin: No new rashes or lesions. No new draining wounds. PICC left arm nontender. Multiple ext ecchymoses    Lab Results: I have personally reviewed pertinent labs.  Lab Results   Component Value Date     10/09/2015    K 3.4 (L) 03/28/2025     03/28/2025    CO2 26 03/28/2025    ANIONGAP 8 10/09/2015    BUN 28 (H) 03/28/2025    CREATININE 1.14 03/28/2025    GLUCOSE 205 (H) 10/09/2015    GLUF 120 (H) 01/17/2025    CALCIUM 7.8 (L) 03/28/2025    CORRECTEDCA 8.8 03/26/2025    AST 39 03/26/2025    ALT <3 (L) 03/26/2025    ALKPHOS 276 (H) 03/26/2025    EGFR 42 03/28/2025     Lab Results   Component Value Date    WBC 9.78 03/28/2025    HGB 9.9 (L) 03/28/2025    HCT 30.0 (L) 03/28/2025    MCV 92 03/28/2025     03/28/2025     Lab Results   Component Value Date    ESR 23 11/07/2024       Radiology Results Review: I have  reviewed radiology reports from 4/2/25 including: IR PICC placed left arm.    Administrative Statements   Encounter provider Ana Dobbs PA-C    The Patient is located at Other and in the following state in which I hold an active license [place].    The patient was identified by name and date of birth. Tanya Stephen was informed that this is a telemedicine visit and that the visit is being conducted through the Epic Embedded platform. She agrees to proceed..  My office door was closed. No one else was in the room.  She acknowledged consent and understanding of privacy and security of the video platform. The patient has agreed to participate and understands they can discontinue the visit at any time.    I have spent a total time of 40 minutes in caring for this patient on the day of the visit/encounter including Diagnostic results, Prognosis, Risks and benefits of tx options, Instructions for management, Patient and family education, Importance of tx compliance, Risk factor reductions, Impressions, Counseling / Coordination of care, Documenting in the medical record, Reviewing/placing orders in the medical record (including tests, medications, and/or procedures), and Obtaining or reviewing history  , not including the time spent for establishing the audio/video connection.

## 2025-04-10 NOTE — ASSESSMENT & PLAN NOTE
With moderate right and small left pleural effusions noted on CT chest 3/19/2025. Also noted on previous imaging.  Currently on oxygen supplementation via nasal cannula; wean off oxygen as able  Currently on Lasix 20 mg daily  Respiratory status has been stable

## 2025-04-10 NOTE — ASSESSMENT & PLAN NOTE
With severe esophageal dysmotility, maintained outpatient on amitriptyline 10 mg daily- has since been discontinued   Continue dysphagia 2 diet with thin liquids  Continue aspiration precautions  Continue speech therapy

## 2025-04-10 NOTE — TELEPHONE ENCOUNTER
Called [facility] and spoke with Allyson today.   Allyson confirmed office visit note was received via fax at facility.  Allyson states she has no questions at this time.

## 2025-04-10 NOTE — ASSESSMENT & PLAN NOTE
Weights improving  Continue daily weights  Continue nutritional supplements  Continue speech therapy  Dietitian reports 50% meal completion today

## 2025-04-10 NOTE — ASSESSMENT & PLAN NOTE
Lab Results   Component Value Date    EGFR 42 03/28/2025    EGFR 39 03/27/2025    EGFR 39 03/26/2025    CREATININE 1.14 03/28/2025    CREATININE 1.22 03/27/2025    CREATININE 1.22 03/26/2025     Renal function function stable  Continue weekly labs per ID  Avoid nephrotoxins & NSAIDS

## 2025-04-10 NOTE — ASSESSMENT & PLAN NOTE
Multifactorial  Continue PT/OT  Maintain fall and safety precautions  Encourage appropriate DME use    following for discharge planning

## 2025-04-10 NOTE — ASSESSMENT & PLAN NOTE
Recently hospitalized with severe sepsis. Source of infection likely from multiple abrasions/wounds bilateral lower extremities  Continue cefazolin 2 g IV every 12 hours via left arm PICC line.  Nursing to remove PICC line after last dose of IV antibiotic.  No infection noted at PICC line insertion site  Continue weekly blood work as per ID  Follow-up with ID, next appointment 04/29

## 2025-04-10 NOTE — ASSESSMENT & PLAN NOTE
Lab Results   Component Value Date    EGFR 42 03/28/2025    EGFR 39 03/27/2025    EGFR 39 03/26/2025    CREATININE 1.14 03/28/2025    CREATININE 1.22 03/27/2025    CREATININE 1.22 03/26/2025   -renal dose adjust antibiotic as needed  -volume management   -recheck BMP

## 2025-04-10 NOTE — ASSESSMENT & PLAN NOTE
Lab Results   Component Value Date    HGBA1C 6.7 (H) 03/20/2025   A1C acceptable  Continue regular diet given her poor appetite. Avoid hypoglycemia

## 2025-04-10 NOTE — PROGRESS NOTES
[facility]  [address], [place], [place]  [phone]  Facility: [facility]  POS 31    NAME: Tanya Stephen  AGE: 89 y.o. SEX: female CODE STATUS: No CPR  : 1934     DATE: 4/10/2025     Assessment and Plan:     Problem List Items Addressed This Visit       Diabetic polyneuropathy associated with type 2 diabetes mellitus (HCC)      Lab Results   Component Value Date    HGBA1C 6.7 (H) 2025   A1C acceptable  Continue regular diet given her poor appetite. Avoid hypoglycemia           Esophageal dysphagia (Chronic)    With severe esophageal dysmotility, maintained outpatient on amitriptyline 10 mg daily- has since been discontinued   Continue dysphagia 2 diet with thin liquids  Continue aspiration precautions  Continue speech therapy           Chronic bilateral pleural effusions    With moderate right and small left pleural effusions noted on CT chest 3/19/2025. Also noted on previous imaging.  Currently on oxygen supplementation via nasal cannula; wean off oxygen as able  Currently on Lasix 20 mg daily  Respiratory status has been stable         Chronic kidney disease, stage IV (severe) (Formerly [facility])    Lab Results   Component Value Date    EGFR 42 2025    EGFR 39 2025    EGFR 39 2025    CREATININE 1.14 2025    CREATININE 1.22 2025    CREATININE 1.22 2025     Renal function function stable  Continue weekly labs per ID  Avoid nephrotoxins & NSAIDS           Ambulatory dysfunction    Multifactorial  Continue PT/OT  Maintain fall and safety precautions  Encourage appropriate DME use    following for discharge planning          CHF (congestive heart failure) (Formerly [facility]) - Primary    Wt Readings from Last 3 Encounters:   25 58.9 kg (129 lb 12.8 oz)   25 55.9 kg (123 lb 3.2 oz)   25 59.4 kg (131 lb)   Echo 3/20/2025 showed EF 50%, normal diastolic function  Stable without exacerbation, no overt signs of  overload  Currently on home Lasix 20 mg daily  Currently has weekly labs due to IV antibiotics  Continue daily weights  Follow-up with cardiology outpatient                     MSSA bacteremia    Recently hospitalized with severe sepsis. Source of infection likely from multiple abrasions/wounds bilateral lower extremities  Continue cefazolin 2 g IV every 12 hours via left arm PICC line.  Nursing to remove PICC line after last dose of IV antibiotic.  No infection noted at PICC line insertion site  Continue weekly blood work as per ID  Follow-up with ID, next appointment 04/29           Moderate protein-calorie malnutrition (HCC)    Weights improving  Continue daily weights  Continue nutritional supplements  Continue speech therapy  Dietitian reports 50% meal completion today         Constipation    Constipation reported per nursing staff  Increase hydration, fiber, ambulation   Will start senna 8.6 mg HS, Miralax daily PRN         Relevant Medications    senna (SENOKOT) 8.6 mg    polyethylene glycol (MIRALAX) 17 g packet          Chief Complaint:     Follow up visit     History of Present Illness:     Patient is a 89 y.o. old female being seen at [facility] for follow up of acute and chronic medical conditions. History limited due to cognitive impairment. Per staff, patient noted to have left upper extremity swelling. Staff also reported constipation, last BM 4/8. Seen and examined patient, noted to have trace swelling to B/L upper extremities. Denies pain. Denies headaches, dizziness. Denies CP, reports chronic SOB. Currently on 2L NC during assessment. Dietitian reports improvement in oral intake, 50% of meal consumed this morning. Patient denies any acute concerns at this time.         The following portions of the patient's history were reviewed and updated as appropriate: allergies, current medications, past family history, past medical history, past social history, past surgical history and problem list.     Review of  Systems:     Review of Systems   Unable to perform ROS: Dementia        Problem List:     Patient Active Problem List   Diagnosis    Hypomagnesemia    Post zoster neuralgia    Primary generalized (osteo)arthritis    Seronegative arthropathy of multiple sites (Formerly [facility])    Senile osteoporosis    Diabetic polyneuropathy associated with type 2 diabetes mellitus ([facility])    Irritable bowel syndrome with diarrhea    Abnormality of gait due to impairment of balance    Sicca syndrome ([facility])    OAB (overactive bladder)    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[facility])    Acute encephalopathy    Spinal stenosis of lumbar region with neurogenic claudication    Chronic pain syndrome    Gastroesophageal reflux disease without esophagitis    Esophageal dysphagia    Raynaud's phenomenon without gangrene    Chronic bilateral pleural effusions    Nonrheumatic aortic valve stenosis    Chronic mastoiditis of right side    Mild cognitive impairment    Fall    Parenchymal renal hypertension    Chronic kidney disease, stage IV (severe) ([facility])    Anemia of chronic renal failure, stage 4 (severe)  ([facility]ly [facility])    Ambulatory dysfunction    Small intestinal bacterial overgrowth (SIBO)    CHF (congestive heart failure) ([facility])    MSSA bacteremia    Stroke-like symptoms    Debility    Poor appetite    Weight loss    Moderate protein-calorie malnutrition ([facility])    Constipation        Objective:     /66   Pulse 89   Temp 97.6 °F (36.4 °C)   Resp 18   Wt 59.9 kg (132 lb)   LMP  (LMP Unknown)   SpO2 98%   BMI 26.66 kg/m²     Physical Exam  Vitals and nursing note reviewed.   Constitutional:       General: She is not in acute distress.     Appearance: She is well-developed. She is ill-appearing.      Comments: Chronically ill-appearing    HENT:      Head: Normocephalic and atraumatic.   Eyes:      Conjunctiva/sclera: Conjunctivae normal.   Cardiovascular:      Rate and Rhythm: Normal rate and regular rhythm.      Pulses: Normal pulses.       Heart sounds: No murmur heard.  Pulmonary:      Effort: Pulmonary effort is normal. No respiratory distress.      Breath sounds: Decreased breath sounds present.   Abdominal:      General: Abdomen is flat. Bowel sounds are normal. There is no distension.      Palpations: Abdomen is soft.      Tenderness: There is no abdominal tenderness.   Musculoskeletal:         General: Swelling present.      Cervical back: Neck supple.      Right lower leg: No edema.      Left lower leg: No edema.      Comments: Mild swelling to B/L UE   Skin:     General: Skin is warm and dry.      Capillary Refill: Capillary refill takes less than 2 seconds.      Findings: Bruising present.      Comments: generalized   Neurological:      Mental Status: She is alert. She is confused.      Comments: Tardive dyskinesia   Psychiatric:         Mood and Affect: Mood normal.         Cognition and Memory: Cognition is impaired. Memory is impaired.      Comments: Aphasic speech       Pertinent Laboratory/Diagnostic Studies:    Laboratory Results: I have personally reviewed the pertinent laboratory results/reports     Radiology/Other Diagnostic Testing Results: Results Review Statement: No pertinent imaging studies reviewed.    JOSE G Goel  Geriatric Medicine

## 2025-04-10 NOTE — ASSESSMENT & PLAN NOTE
Lab Results   Component Value Date    HGBA1C 6.7 (H) 03/20/2025 4/7/25 Glu 123  -glycemic management per primary

## 2025-04-14 ENCOUNTER — TELEPHONE (OUTPATIENT)
Age: OVER 89
End: 2025-04-14

## 2025-04-14 LAB
ANION GAP SERPL CALCULATED.3IONS-SCNC: 9 MMOL/L (ref 3–11)
BUN SERPL-MCNC: 36 MG/DL (ref 7–25)
CALCIUM SERPL-MCNC: 8.3 MG/DL (ref 8.5–10.5)
CHLORIDE SERPL-SCNC: 100 MMOL/L (ref 100–109)
CO2 SERPL-SCNC: 31 MMOL/L (ref 21–31)
CREAT SERPL-MCNC: 1.1 MG/DL (ref 0.4–1.1)
CYTOLOGY CMNT CVX/VAG CYTO-IMP: ABNORMAL
ERYTHROCYTE [DISTWIDTH] IN BLOOD BY AUTOMATED COUNT: 20.5 % (ref 12–16)
GFR/BSA.PRED SERPLBLD CYS-BASED-ARV: 47 ML/MIN/{1.73_M2}
GLUCOSE SERPL-MCNC: 108 MG/DL (ref 65–99)
HCT VFR BLD AUTO: 35.1 % (ref 35–43)
HGB BLD-MCNC: 11.6 G/DL (ref 11.5–14.5)
MCH RBC QN AUTO: 31 PG (ref 26–34)
MCHC RBC AUTO-ENTMCNC: 33.1 G/DL (ref 32–37)
MCV RBC AUTO: 94 FL (ref 80–100)
PLATELET # BLD AUTO: 187 THOU/CMM (ref 140–350)
PMV BLD REES-ECKER: 9.6 FL (ref 7.5–11.3)
POTASSIUM SERPL-SCNC: 3.6 MMOL/L (ref 3.5–5.2)
RBC # BLD AUTO: 3.75 MILL/CMM (ref 3.7–4.7)
SODIUM SERPL-SCNC: 140 MMOL/L (ref 135–145)
WBC # BLD AUTO: 7.3 THOU/CMM (ref 4–10)

## 2025-04-14 NOTE — TELEPHONE ENCOUNTER
Patient's  calling worried, wife is still not feeling great and having nausea. Asking for a call back to see if there is anything that can be done to make her feel better, please advise.

## 2025-04-14 NOTE — TELEPHONE ENCOUNTER
Patient's  calling with concerns.  Patient is currently at [facility],  is concerned she is having side effects from infusion drug.   states she is disoriented, loss of appetite, headache and nausea.     requesting a call back on his cell phone  [phone]

## 2025-04-15 ENCOUNTER — NURSING HOME VISIT (OUTPATIENT)
Dept: GERIATRICS | Facility: OTHER | Age: OVER 89
End: 2025-04-15
Payer: MEDICARE

## 2025-04-15 DIAGNOSIS — K21.9 GASTROESOPHAGEAL REFLUX DISEASE WITHOUT ESOPHAGITIS: Chronic | ICD-10-CM

## 2025-04-15 DIAGNOSIS — B95.61 MSSA BACTEREMIA: Primary | ICD-10-CM

## 2025-04-15 DIAGNOSIS — J90 CHRONIC BILATERAL PLEURAL EFFUSIONS: ICD-10-CM

## 2025-04-15 DIAGNOSIS — E44.0 MODERATE PROTEIN-CALORIE MALNUTRITION (HCC): ICD-10-CM

## 2025-04-15 DIAGNOSIS — I50.22 CHRONIC SYSTOLIC CONGESTIVE HEART FAILURE (HCC): ICD-10-CM

## 2025-04-15 DIAGNOSIS — R78.81 MSSA BACTEREMIA: Primary | ICD-10-CM

## 2025-04-15 DIAGNOSIS — R53.81 DEBILITY: ICD-10-CM

## 2025-04-15 DIAGNOSIS — G31.84 MILD COGNITIVE IMPAIRMENT: Chronic | ICD-10-CM

## 2025-04-15 DIAGNOSIS — G93.40 ACUTE ENCEPHALOPATHY: ICD-10-CM

## 2025-04-15 PROCEDURE — 99310 SBSQ NF CARE HIGH MDM 45: CPT | Performed by: FAMILY MEDICINE

## 2025-04-15 NOTE — TELEPHONE ENCOUNTER
Pt's  states he still has not received a phone call. Pt is getting infusion and she seems confused and complaining of nausea

## 2025-04-15 NOTE — PROGRESS NOTES
Facility: [facility]  POS: 31  Progress Note    Chief Complaint/Reason for visit: STR follow-up  Code status: DNR  History of Present Illness: The patient is seen in her room today,  at bed side.  in the room.  concerns about her confusion this morning when she didn't recognize him, asked him where he was. Her appetite is poor. Eats 25% meals per nursing report. She took a few sips of Glucerna.  states she didn't like magic cup. She requires maximal assistance with lying to sitting on side of bed. PT not attempted to walk due to her medical conditions and safety concerns.   She is lying in bed upon exam today. She opens her eyes to voice. She denies pain in her arms or legs.  states she complained of stomach pain that she stopped eating. He inquires whether cefazolin causes nausea, decreased appetite, and confusion.   Past Medical History: unchanged from history and physical  Past Medical History:   Diagnosis Date    Anemia     Arthritis     Back pain     CHF (congestive heart failure) (HCC)     Chronic kidney disease     Stage 3b    Confusion 02/22/2023    Diabetes (HCC)     Diabetes mellitus (HCC)     Diabetic gastroparesis associated with type 2 diabetes mellitus  (HCC)     Diabetic polyneuropathy (HCC)     Diverticulosis     Herpes zoster     Hypertension     IBS (irritable bowel syndrome)     Neurogenic claudication due to lumbar spinal stenosis     Osteoarthritis     Osteoporosis     Pulmonary edema     Raynaud's phenomenon without gangrene     Seronegative arthropathy of multiple sites (HCC)     Sicca (HCC)      Family History: Unchanged from history and physical  Social History: Unchanged from history and physical  Review of systems: As per review of medical illness, all other systems reviewed and negative.  Medications: All medication and routine orders were reviewed and updated  Allergies: Reviewed and unchanged  Consults reviewed:PT, OT, Speech,  Nutrition, and Other  Labs/Diagnostics (reviewed by this provider): Copy in Chart  BMP 4/14/25 showed normal sodium 140, potassium 3.6, glucose 108, creatinine 1.1, EGFR 47  CBC 4/14/25 with normal WBC 7.3, hemoglobin 11.6, platelet 187, MCV 94, MCH 31  Imaging Reviewed: no new imaging  Vitals:    04/15/25 1345   BP: 96/54   Pulse: 72   Resp: 18   Temp: 97.8 °F (36.6 °C)   SpO2: 95%   Weight: 58.4 kg (128 lb 12.8 oz)     Vitals and nursing note reviewed.   General: no acute distress. Cachectic, ill-appearing  HENT:      Head: Normocephalic.      Nose: Nose normal.      Mouth: Mucous membranes are moist.   Eyes:       Right eye: No discharge.         Left eye: No discharge.      Conjunctivae normal.   Cardiovascular:      Normal rate and regular rhythm.   Pulmonary:      Pulmonary effort is normal. Diminished breath sounds bilaterally. No wheezing, or rhonchi.  Abdominal:      Bowel sounds are normal. There is no distension.      Abdomen is soft. There is no abdominal tenderness.   Musculoskeletal:      Right lower leg: no edema.      Left lower leg: no edema.      Prevalon boots in both feet  Skin:     General: Skin is warm. Edema without tenderness in RUE.      PICC intact LUE.     Hyperpigmentation b/l LE. Skin tear 5 cm x 2 cm in LLE in dry dressing.      Scattered ecchymoses b/l forearms and anterior LE  Neurological: alert.      Oriented to person, and place, not time. Able to recognize her . Follow simple commands        Assessment/Plan:    MSSA bacteremia  Recently hospitalized with severe sepsis. Source of infection likely from multiple abrasions/wounds bilateral lower extremities  Continue cefazolin 2 g IV every 12 hours via left arm PICC line through May 4, 2025.  Nursing to remove PICC line after last dose of IV antibiotic.  No infection noted at PICC line insertion site  Continue weekly blood work as per ID  Follow-up with ID, next appointment 04/29      Mild cognitive impairment  Was consulted by  Geriatric Medicine in 9/2024 with [facility] 11/26. Suspecting Alzheimer's dementia with polypharmacy contributing to cognitive impairment.  Encourage family visits.  Provide frequent reorientation, redirection  Support ADLs/iADLs in SNF    Debility  Poor prognosis discussed with .   Consider treatment-focused care versus comfort-focused care given her age, cognitive impairment, physical deconditioning after prolonged hospitalization due to MSSA bacteremia, and comorbidities with arthritis, CKD,CHF  Continue Abx as per ID  OT/PT/nutrition support    Acute encephalopathy  Reported while in the hospital, likely in the setting of severe sepsis/MSSA bacteremia  MRI brain 3/27/25 no acute pathology  Delirium precautions:  -Maintain normal sleep/wake cycle, lights and tv on during day with blinds open, lights and tv off at bedtime with blinds closed, minimize unnecessary interruptions  -continue supportive care and frequent reorientation   -monitor for adequate urine output as both urine and stool retention may contribute to and worsen delirium  -pain control with Tylenol 650 mg TID, duloxetine, lyrica.    Gastroesophageal reflux disease without esophagitis  With nausea and epigastric pain reported  Increase Omeprazole to 40 mg in the morning daily. Zofran added prn for nausea  Consider adding PPI at bed time for better control of symptoms    Moderate protein-calorie malnutrition (HCC)  Nutrition and Speech following  Calorie count conducted 4/8 - 4/10 with averaging 650 Kcal/d and 15g of protein per day. SLP downgraded textures to puree r/t increased dysphagia.    I have spent a total time of 45 minutes in caring for this patient on the day of the visit/encounter including Diagnostic results, Prognosis, Instructions for management, Patient and family education, Impressions, Counseling / Coordination of care, Documenting in the medical record, Reviewing/placing orders in the medical record (including tests, medications,  and/or procedures), Obtaining or reviewing history  , and Communicating with other healthcare professionals .    Marylu Ma MD  4/15/2025

## 2025-04-15 NOTE — TELEPHONE ENCOUNTER
Patient's  calling stating waiting to hear back, advised per Ana,   4/15/25 12:21 PM   Have discussed with staff at facility. Will update  after office hours. Thank you! Ana     No further questions, will wait for call.

## 2025-04-16 ENCOUNTER — NURSING HOME VISIT (OUTPATIENT)
Dept: WOUND CARE | Facility: HOSPITAL | Age: OVER 89
End: 2025-04-16
Payer: MEDICARE

## 2025-04-16 VITALS
RESPIRATION RATE: 18 BRPM | SYSTOLIC BLOOD PRESSURE: 96 MMHG | BODY MASS INDEX: 26.01 KG/M2 | WEIGHT: 128.8 LBS | HEART RATE: 72 BPM | OXYGEN SATURATION: 95 % | DIASTOLIC BLOOD PRESSURE: 54 MMHG | TEMPERATURE: 97.8 F

## 2025-04-16 DIAGNOSIS — S81.812A SKIN TEAR OF LEFT LOWER LEG WITHOUT COMPLICATION, INITIAL ENCOUNTER: Primary | ICD-10-CM

## 2025-04-16 DIAGNOSIS — N18.30 TYPE 2 DIABETES MELLITUS WITH STAGE 3 CHRONIC KIDNEY DISEASE, WITHOUT LONG-TERM CURRENT USE OF INSULIN, UNSPECIFIED WHETHER STAGE 3A OR 3B CKD (HCC): ICD-10-CM

## 2025-04-16 DIAGNOSIS — S51.811A SKIN TEAR OF RIGHT FOREARM WITHOUT COMPLICATION, INITIAL ENCOUNTER: ICD-10-CM

## 2025-04-16 DIAGNOSIS — R26.2 AMBULATORY DYSFUNCTION: ICD-10-CM

## 2025-04-16 DIAGNOSIS — E11.22 TYPE 2 DIABETES MELLITUS WITH STAGE 3 CHRONIC KIDNEY DISEASE, WITHOUT LONG-TERM CURRENT USE OF INSULIN, UNSPECIFIED WHETHER STAGE 3A OR 3B CKD (HCC): ICD-10-CM

## 2025-04-16 PROCEDURE — 99304 1ST NF CARE SF/LOW MDM 25: CPT | Performed by: NURSE PRACTITIONER

## 2025-04-16 NOTE — LETTER
Patient:  Tanya Stephen   7/21/1934           JOSE G Kan saw Tanya Stephen for a wound care visit on 4/16/2025. See below for information relating to this visit.      Chief Complaint   Patient presents with    New Patient Visit        Assessment/Plan:  1. Skin tear of left lower leg without complication, initial encounter  Assessment & Plan:  Left lower leg  Partial-thickness, with moderate amount of drainage, no obvious sign of infection  Local wound care with Xeroform and bordered foam  Continue to offload  Increase protein intake, currently on Glucerna 3 times a day  Follow-up next week  2. Skin tear of right forearm without complication, initial encounter  Assessment & Plan:  Right forearm  Partial-thickness, with no obvious sign of infection  Local wound care with Xeroform and bordered foam  Continue to offload  On Glucerna 3 times a day  Follow-up next week  3. Ambulatory dysfunction  4. Type 2 diabetes mellitus with stage 3 chronic kidney disease, without long-term current use of insulin, unspecified whether stage 3a or 3b CKD ([facility])  Assessment & Plan:   A1C results reviewed with the patient today.  Not performed, patient asleep  Lab Results   Component Value Date    HGBA1C 6.7 (H) 03/20/2025   Under the care of Senior care team         Orders:  Tanya Stephen  7/21/1934  Wound: Sacrum and buttocks  Discontinue previous wound order  Cleanse the skin/wound bed with soap and water, pat dry  Apply hydraguard to wound bed/skin  Frequency : twice a day and prn for soiling    Wound: Right lower arm, left lower leg  Cleanse with normal saline solution wound cleanser  Apply Skin-Prep to periwound area  Apply Xeroform to wound bed and cover with bordered foam  3 times a week and as needed for soiling    Offload all wounds  Turn and reposition frequently,   Instruct / Assist with weight shifting in chair  Increase protein intake.  Monitor for any sign of infection or worsening, inform PCP or  patient's primary physician in your facility.        Follow Up:  Return in about 1 week (around 4/23/2025).       [facility] hours are 8:00 am - 4:30 pm Monday through Friday. The center phone number is [phone]. You can also contact me directly thru my email at [email or web address] or thru tiger [email or web address]. If it is an emergency, please contact the PCP or patient's attending physician in your facility.     Sincerely,    Electronically signed by JOSE G Kan    Patient : Tanya Mendozavacs    7/21/1934

## 2025-04-16 NOTE — TELEPHONE ENCOUNTER
LAVERN Abernathy    Spouse calling back and upset, states he has called for 4 days and no response.    Concerned Cefazolin might be causing nausea, anorexia. Pt is not eating and now losing weight due to it, no energy.    Spouse asking any provider to please call him/respond ASAP.

## 2025-04-17 ENCOUNTER — NURSING HOME VISIT (OUTPATIENT)
Dept: GERIATRICS | Facility: OTHER | Age: OVER 89
End: 2025-04-17
Payer: MEDICARE

## 2025-04-17 VITALS
RESPIRATION RATE: 16 BRPM | SYSTOLIC BLOOD PRESSURE: 98 MMHG | HEART RATE: 82 BPM | TEMPERATURE: 97 F | DIASTOLIC BLOOD PRESSURE: 52 MMHG

## 2025-04-17 DIAGNOSIS — J90 CHRONIC BILATERAL PLEURAL EFFUSIONS: ICD-10-CM

## 2025-04-17 DIAGNOSIS — B95.61 MSSA BACTEREMIA: Primary | ICD-10-CM

## 2025-04-17 DIAGNOSIS — N18.4 CHRONIC KIDNEY DISEASE, STAGE IV (SEVERE) (HCC): ICD-10-CM

## 2025-04-17 DIAGNOSIS — R26.89 ABNORMALITY OF GAIT DUE TO IMPAIRMENT OF BALANCE: Chronic | ICD-10-CM

## 2025-04-17 DIAGNOSIS — D63.1 ANEMIA OF CHRONIC RENAL FAILURE, STAGE 4 (SEVERE)  (HCC): ICD-10-CM

## 2025-04-17 DIAGNOSIS — R63.0 POOR APPETITE: ICD-10-CM

## 2025-04-17 DIAGNOSIS — N18.4 ANEMIA OF CHRONIC RENAL FAILURE, STAGE 4 (SEVERE)  (HCC): ICD-10-CM

## 2025-04-17 DIAGNOSIS — E11.42 DIABETIC POLYNEUROPATHY ASSOCIATED WITH TYPE 2 DIABETES MELLITUS (HCC): ICD-10-CM

## 2025-04-17 DIAGNOSIS — R78.81 MSSA BACTEREMIA: Primary | ICD-10-CM

## 2025-04-17 PROBLEM — E11.22 TYPE 2 DIABETES MELLITUS WITH STAGE 3 CHRONIC KIDNEY DISEASE, WITHOUT LONG-TERM CURRENT USE OF INSULIN (HCC): Status: ACTIVE | Noted: 2025-04-17

## 2025-04-17 PROBLEM — S81.812A SKIN TEAR OF LEFT LOWER LEG WITHOUT COMPLICATION: Status: ACTIVE | Noted: 2025-04-17

## 2025-04-17 PROBLEM — N18.30 TYPE 2 DIABETES MELLITUS WITH STAGE 3 CHRONIC KIDNEY DISEASE, WITHOUT LONG-TERM CURRENT USE OF INSULIN (HCC): Status: ACTIVE | Noted: 2025-04-17

## 2025-04-17 PROBLEM — S51.811A SKIN TEAR OF RIGHT FOREARM WITHOUT COMPLICATION: Status: ACTIVE | Noted: 2025-04-17

## 2025-04-17 PROCEDURE — 99309 SBSQ NF CARE MODERATE MDM 30: CPT | Performed by: INTERNAL MEDICINE

## 2025-04-17 NOTE — ASSESSMENT & PLAN NOTE
A1C results reviewed with the patient today.  Not performed, patient asleep  Lab Results   Component Value Date    HGBA1C 6.7 (H) 03/20/2025   Under the care of Senior care team

## 2025-04-17 NOTE — PROGRESS NOTES
[facility] WOUND CARE MANAGEMENT   AND HYPERBARIC MEDICINE CENTER       Patient ID: Tanya Stephen is a 89 y.o. female Date of Birth 7/21/1934     Location of Service: [facility]    Performed wound round with: Wound team     Chief Complaint : Left lower leg, right upper arm, and back    Wound Instructions:  Wound: Sacrum and buttocks  Discontinue previous wound order  Cleanse the skin/wound bed with soap and water, pat dry  Apply hydraguard to wound bed/skin  Frequency : twice a day and prn for soiling    Wound: Right lower arm, left lower leg  Cleanse with normal saline solution wound cleanser  Apply Skin-Prep to periwound area  Apply Xeroform to wound bed and cover with bordered foam  3 times a week and as needed for soiling    Offload all wounds  Turn and reposition frequently,   Instruct / Assist with weight shifting in chair  Increase protein intake.  Monitor for any sign of infection or worsening, inform PCP or patient's primary physician in your facility.      Allergies  Morphine and Ciprofloxacin      Assessment & Plan:  1. Skin tear of left lower leg without complication, initial encounter  Assessment & Plan:  Left lower leg  Partial-thickness, with moderate amount of drainage, no obvious sign of infection  Local wound care with Xeroform and bordered foam  Continue to offload  Increase protein intake, currently on Glucerna 3 times a day  Follow-up next week  2. Skin tear of right forearm without complication, initial encounter  Assessment & Plan:  Right forearm  Partial-thickness, with no obvious sign of infection  Local wound care with Xeroform and bordered foam  Continue to offload  On Glucerna 3 times a day  Follow-up next week  3. Ambulatory dysfunction  4. Type 2 diabetes mellitus with stage 3 chronic kidney disease, without long-term current use of insulin, unspecified whether stage 3a or 3b CKD ([facility])  Assessment & Plan:   A1C results reviewed with the patient  today.  Not performed, patient asleep  Lab Results   Component Value Date    HGBA1C 6.7 (H) 03/20/2025   Under the care of Senior care team           Subjective:   April 16, 2025.  New consult for wound on the lower back, left lower leg, and right upper extremity.  Patient was referred by Senior care team.  Medical problem includes but not limited to mild cognitive impairment, debility, and moderate protein calorie malnutrition.  I introduced myself to patient's , agreed for wife to be seen for wound consult.  Patient was seen with the facility wound team.    Wound history: As per facility report, patient was admitted with skin tear on the lower back, left lower leg, and right upper extremity.  As per patient's , patient's skin is very fragile.  He is concerned with wound on the lower back which patient sustained from a fall.    Received patient in bed, seems comfortable.  Needs assistance with turning and repositioning.  Have poor appetite as per report.        Review of Systems   Constitutional: Negative.    Respiratory: Negative.     Cardiovascular: Negative.    Skin:  Positive for wound.       Objective:    Physical Exam  Constitutional:       Appearance: Normal appearance.   Cardiovascular:      Rate and Rhythm: Normal rate.   Pulmonary:      Effort: Pulmonary effort is normal.   Musculoskeletal:      Right lower leg: No edema.      Left lower leg: No edema.   Skin:     Findings: Lesion present.      Comments: Lower back: Wound is healed    Left lower leg: Wound size is 3.5 x 2 x 0.1 cm.,  Partial-thickness, moderate amount of serosanguineous drainage, periwound normal, with no obvious sign of infection    Right upper extremity lower arm: Wound size is 1 x 1 x 0.1 cm.,  Partial-thickness, no drainage, periwound normal, with no obvious sign of infection   Neurological:      Mental Status: She is alert.              Procedures           Patient's care was coordinated with nursing facility staff.  Recent vitals, labs and updated medications were reviewed on EMR or chart system of facility. Past Medical, surgical, social, medication and allergy history and patient's previous records were reviewed and updated as appropriate: Most up-to date information is available in the facility EMR where the patient is currently admitted.    Patient Active Problem List   Diagnosis    Hypomagnesemia    Post zoster neuralgia    Primary generalized (osteo)arthritis    Seronegative arthropathy of multiple sites ([facility])    Senile osteoporosis    Diabetic polyneuropathy associated with type 2 diabetes mellitus ([facility])    Irritable bowel syndrome with diarrhea    Abnormality of gait due to impairment of balance    Sicca syndrome ([facility])    OAB (overactive bladder)    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[facility])    Acute encephalopathy    Spinal stenosis of lumbar region with neurogenic claudication    Chronic pain syndrome    Gastroesophageal reflux disease without esophagitis    Esophageal dysphagia    Raynaud's phenomenon without gangrene    Chronic bilateral pleural effusions    Nonrheumatic aortic valve stenosis    Chronic mastoiditis of right side    Mild cognitive impairment    Fall    Parenchymal renal hypertension    Chronic kidney disease, stage IV (severe) ([facility])    Anemia of chronic renal failure, stage 4 (severe)  ([facility])    Ambulatory dysfunction    Small intestinal bacterial overgrowth (SIBO)    CHF (congestive heart failure) ([facility])    MSSA bacteremia    Stroke-like symptoms    Debility    Poor appetite    Weight loss    Moderate protein-calorie malnutrition ([facility])    Constipation    Skin tear of left lower leg without complication    Skin tear of right forearm without complication    Type 2 diabetes mellitus with stage 3 chronic kidney disease, without long-term current use of insulin ([facility])     Past Medical History:   Diagnosis Date    Anemia     Arthritis     Back pain     CHF (congestive heart failure) ([facility])      Chronic kidney disease     Stage 3b    Confusion 2023    Diabetes (HCC)     Diabetes mellitus (HCC)     Diabetic gastroparesis associated with type 2 diabetes mellitus  (HCC)     Diabetic polyneuropathy (HCC)     Diverticulosis     Herpes zoster     Hypertension     IBS (irritable bowel syndrome)     Neurogenic claudication due to lumbar spinal stenosis     Osteoarthritis     Osteoporosis     Pulmonary edema     Raynaud's phenomenon without gangrene     Seronegative arthropathy of multiple sites (HCC)     Sicca (HCC)      Past Surgical History:   Procedure Laterality Date    BACK SURGERY      COLONOSCOPY      EGD AND COLONOSCOPY N/A 2016    Procedure: EGD AND COLONOSCOPY;  Surgeon: Elina Anderson MD;  [facility];  Service:     IR PICC PLACEMENT SINGLE LUMEN  3/27/2025    IR PICC PLACEMENT SINGLE LUMEN  2025    JOINT REPLACEMENT      bilat knees and hips    OTHER SURGICAL HISTORY      pelvis rods    REPLACEMENT TOTAL KNEE BILATERAL      STOMACH SURGERY      UPPER GASTROINTESTINAL ENDOSCOPY       Social History     Socioeconomic History    Marital status: /Civil Union     Spouse name: Weston    Number of children: 2    Years of education: None    Highest education level: High school graduate   Occupational History    None   Tobacco Use    Smoking status: Former     Current packs/day: 0.00     Types: Cigarettes     Quit date:      Years since quittin.3     Passive exposure: Never    Smokeless tobacco: Never   Vaping Use    Vaping status: Never Used   Substance and Sexual Activity    Alcohol use: Not Currently    Drug use: No    Sexual activity: Not Currently     Partners: Male     Birth control/protection: None   Other Topics Concern    None   Social History Narrative    None     Social Drivers of Health     Financial Resource Strain: Not on file   Food Insecurity: No Food Insecurity (3/20/2025)    Nursing - Inadequate Food Risk Classification     Worried About Running Out of Food  in the Last Year: Never true     Ran Out of Food in the Last Year: Never true     Ran Out of Food in the Last Year: Never true   Transportation Needs: No Transportation Needs (3/20/2025)    Nursing - Transportation Risk Classification     Lack of Transportation: Not on file     Lack of Transportation: No   Physical Activity: Not on file   Stress: Not on file   Social Connections: Not on file   Intimate Partner Violence: Unknown (3/20/2025)    Nursing IPS     Feels Physically and Emotionally Safe: Not on file     Physically Hurt by Someone: Not on file     Humiliated or Emotionally Abused by Someone: Not on file     Physically Hurt by Someone: No     Hurt or Threatened by Someone: No   Housing Stability: Unknown (3/20/2025)    Nursing: Inadequate Housing Risk Classification     Has Housing: Not on file     Worried About Losing Housing: Not on file     Unable to Get Utilities: Not on file     Unable to Pay for Housing in the Last Year: No     Has Housin        Current Outpatient Medications:     acetaminophen (TYLENOL) 325 mg tablet, Take 2 tablets (650 mg total) by mouth 3 (three) times a day, Disp: 540 tablet, Rfl: 3    aspirin 81 mg chewable tablet, Chew 1 tablet (81 mg total) daily, Disp: 30 tablet, Rfl: 0    atorvastatin (LIPITOR) 10 mg tablet, TAKE ONE TABLET BY MOUTH EVERY DAY, Disp: 90 tablet, Rfl: 1    ceFAZolin (ANCEF) 2000 mg IVPB, Inject 2,000 mg into a catheter in a vein over 30 minutes at 100 mL/hr every 12 (twelve) hours, Disp: 3600 mL, Rfl: 0    Cholecalciferol (VITAMIN D3) 1000 units CAPS, Take 1 capsule by mouth daily, Disp: , Rfl:     DULoxetine (CYMBALTA) 60 mg delayed release capsule, TAKE ONE CAPSULE BY MOUTH ONCE DAILY, Disp: 30 capsule, Rfl: 5    furosemide (LASIX) 20 mg tablet, One tablet daily and an extra 20 mg prn for wt gain 3 lb/ 24 hours or 5 lb/ 5 days, above a dry wt of 122 lb, Disp: 90 tablet, Rfl: 0    hydroxychloroquine (PLAQUENIL) 200 mg tablet, Take 1 tablet (200 mg total) by  "mouth daily with breakfast, Disp: 90 tablet, Rfl: 1    Magnesium 250 MG TABS, Take 250 mg by mouth every evening, Disp: , Rfl:     Mirabegron ER (Myrbetriq) 50 MG TB24, Take 1 tablet (50 mg total) by mouth in the morning, Disp: 90 tablet, Rfl: 1    omeprazole (PriLOSEC) 20 mg delayed release capsule, Take 20 mg by mouth daily, Disp: , Rfl:     polyethylene glycol (MIRALAX) 17 g packet, Take 17 g by mouth daily as needed (constipation), Disp: , Rfl:     pregabalin (LYRICA) 75 mg capsule, Take 1 capsule (75 mg total) by mouth 2 (two) times a day for 10 days, Disp: 20 capsule, Rfl: 0    pyridoxine (B-6) 100 MG tablet, Take 100 mg by mouth daily, Disp: , Rfl:     senna (SENOKOT) 8.6 mg, Take 1 tablet (8.6 mg total) by mouth daily at bedtime, Disp: , Rfl:     Sodium Fluoride (PreviDent 5000 Booster Plus) 1.1 % PSTE, Apply on teeth every evening as directed, Disp: 100 mL, Rfl: 3  Family History   Problem Relation Age of Onset    Cancer Brother     Diabetes Mother     Hypertension Father     Heart disease Father               Coordination of Care: Wound team aware of the treatment plan. Facility nurse will provide wound treatment and monitor the wound for any changes.     Patient / Staff education : Patient / Staff was given education on sign of infection and pressure ulcer prevention. Patient/ Staff verbalized understanding     Follow up :  Next week    Voice-recognition software may have been used in the preparation of this document. Occasional wrong word or \"sound-alike\" substitutions may have occurred due to the inherent limitations of voice recognition software. Interpretation should be guided by context.      JOSE G Kan  "

## 2025-04-17 NOTE — ASSESSMENT & PLAN NOTE
Right forearm  Partial-thickness, with no obvious sign of infection  Local wound care with Xeroform and bordered foam  Continue to offload  On Glucerna 3 times a day  Follow-up next week

## 2025-04-17 NOTE — ASSESSMENT & PLAN NOTE
Lab Results   Component Value Date    EGFR 47 (L) 04/14/2025    EGFR 42 03/28/2025    EGFR 39 03/27/2025    CREATININE 1.10 04/14/2025    CREATININE 1.14 03/28/2025    CREATININE 1.22 03/27/2025   The patient has chronic renal insufficiency stage IIIa patient's last GFR was 47 with a creatinine of 1.10 slight improvement noted

## 2025-04-17 NOTE — ASSESSMENT & PLAN NOTE
At present mainly at bedrest unable to have strength to get out of bed and ambulate even with assistance.  Concern for skin breakdown.

## 2025-04-17 NOTE — ASSESSMENT & PLAN NOTE
Patient no longer stage IV currently stage IIIa anemia has resolved 3 days ago patient's hemoglobin was 11.6 with a hematocrit of 35.1.

## 2025-04-17 NOTE — ASSESSMENT & PLAN NOTE
Left lower leg  Partial-thickness, with moderate amount of drainage, no obvious sign of infection  Local wound care with Xeroform and bordered foam  Continue to offload  Increase protein intake, currently on Glucerna 3 times a day  Follow-up next week

## 2025-04-17 NOTE — ASSESSMENT & PLAN NOTE
Lab Results   Component Value Date    HGBA1C 6.7 (H) 03/20/2025   Patient with history of diabetic polyneuropathy.

## 2025-04-17 NOTE — PROGRESS NOTES
[facility]  Servando Gonzales MD FACP-[facility]  Progress Note    NAME: Tanya Stephen  AGE: 89 y.o. SEX: female [phone]    DATE OF ENCOUNTER: 4/17/2025    Assessment and Plan     Problem List Items Addressed This Visit          Respiratory    Chronic bilateral pleural effusions    Patient with history of chronic bilateral pleural effusions.            Endocrine    Diabetic polyneuropathy associated with type 2 diabetes mellitus ([facility])      Lab Results   Component Value Date    HGBA1C 6.7 (H) 03/20/2025   Patient with history of diabetic polyneuropathy.            Genitourinary    Chronic kidney disease, stage IV (severe) ([facility])    Lab Results   Component Value Date    EGFR 47 (L) 04/14/2025    EGFR 42 03/28/2025    EGFR 39 03/27/2025    CREATININE 1.10 04/14/2025    CREATININE 1.14 03/28/2025    CREATININE 1.22 03/27/2025   The patient has chronic renal insufficiency stage IIIa patient's last GFR was 47 with a creatinine of 1.10 slight improvement noted            Blood    Anemia of chronic renal failure, stage 4 (severe)  ([facility])    Patient no longer stage IV currently stage IIIa anemia has resolved 3 days ago patient's hemoglobin was 11.6 with a hematocrit of 35.1.            Care Coordination    Abnormality of gait due to impairment of balance (Chronic)    At present mainly at bedrest unable to have strength to get out of bed and ambulate even with assistance.  Concern for skin breakdown.            Other    MSSA bacteremia - Primary    Patient with history of  MSSA bacteremia to continue with cefazolin 2 g every 12 hours         Poor appetite    Patient's appetite is extremely poor.  Today she had a small bowl of ice cream            All medications and routine orders were reviewed and updated as needed.    Plan discussed with: Family member    Chief Complaint     No chief complaint on file.       History of Present Illness     Patient is a 89-year-old  female with history of recent MSSA  bacteremia.  Felt to be the source of infection the abrasion and wounds of her lower extremities.  Patient was placed on cefazolin 2 g every 12 hours she does have a left arm PICC line.  She has continued to decline appetite is very poor  is having a very difficult time feeding her.  Patient is DNR I did speak with the  about the possibility of getting hospice involved with [facility] which would still permit the antibiotics to be given until May 4.  Hospice will let me know as to whether they can continue giving her the antibiotic.  Patient's  will also discuss with his family as to whether they want to have hospice see patient at this time.  Patient's other concerns are that of diabetic polyneuropathy on last hemoglobin A1c was 6.7 back in March 20 of this year.  Patient has esophageal dysphagia, chronic bilateral pleural effusions, chronic renal insufficiency stage IIIb, patient no longer ambulating due to increased weakness.  Patient with previous history of congestive heart failure diastolic in nature.  Patient noted to be malnourished.          HISTORY:  Past Surgical History:   Procedure Laterality Date    BACK SURGERY      COLONOSCOPY      EGD AND COLONOSCOPY N/A 12/23/2016    Procedure: EGD AND COLONOSCOPY;  Surgeon: Elina Anderson MD;  [facility];  Service:     IR PICC PLACEMENT SINGLE LUMEN  3/27/2025    IR PICC PLACEMENT SINGLE LUMEN  4/2/2025    JOINT REPLACEMENT      bilat knees and hips    OTHER SURGICAL HISTORY      pelvis rods    REPLACEMENT TOTAL KNEE BILATERAL      STOMACH SURGERY      UPPER GASTROINTESTINAL ENDOSCOPY        Past Medical History:   Diagnosis Date    Anemia     Arthritis     Back pain     CHF (congestive heart failure) (HCC)     Chronic kidney disease     Stage 3b    Confusion 02/22/2023    Diabetes (HCC)     Diabetes mellitus (HCC)     Diabetic gastroparesis associated with type 2 diabetes mellitus  (HCC)     Diabetic polyneuropathy (HCC)      Diverticulosis     Herpes zoster     Hypertension     IBS (irritable bowel syndrome)     Neurogenic claudication due to lumbar spinal stenosis     Osteoarthritis     Osteoporosis     Pulmonary edema     Raynaud's phenomenon without gangrene     Seronegative arthropathy of multiple sites (HCC)     Sicca (HCC)      Family History   Problem Relation Age of Onset    Cancer Brother     Diabetes Mother     Hypertension Father     Heart disease Father      Social History     Socioeconomic History    Marital status: /Civil Union     Spouse name: Weston    Number of children: 2    Years of education: None    Highest education level: High school graduate   Occupational History    None   Tobacco Use    Smoking status: Former     Current packs/day: 0.00     Types: Cigarettes     Quit date:      Years since quittin.3     Passive exposure: Never    Smokeless tobacco: Never   Vaping Use    Vaping status: Never Used   Substance and Sexual Activity    Alcohol use: Not Currently    Drug use: No    Sexual activity: Not Currently     Partners: Male     Birth control/protection: None   Other Topics Concern    None   Social History Narrative    None     Social Drivers of Health     Financial Resource Strain: Not on file   Food Insecurity: No Food Insecurity (3/20/2025)    Nursing - Inadequate Food Risk Classification     Worried About Running Out of Food in the Last Year: Never true     Ran Out of Food in the Last Year: Never true     Ran Out of Food in the Last Year: Never true   Transportation Needs: No Transportation Needs (3/20/2025)    Nursing - Transportation Risk Classification     Lack of Transportation: Not on file     Lack of Transportation: No   Physical Activity: Not on file   Stress: Not on file   Social Connections: Not on file   Intimate Partner Violence: Unknown (3/20/2025)    Nursing IPS     Feels Physically and Emotionally Safe: Not on file     Physically Hurt by Someone: Not on file     Humiliated or  Emotionally Abused by Someone: Not on file     Physically Hurt by Someone: No     Hurt or Threatened by Someone: No   Housing Stability: Unknown (3/20/2025)    Nursing: Inadequate Housing Risk Classification     Has Housing: Not on file     Worried About Losing Housing: Not on file     Unable to Get Utilities: Not on file     Unable to Pay for Housing in the Last Year: No     Has Housin       Allergies:  Allergies   Allergen Reactions    Morphine Other (See Comments)     urinary retention    Ciprofloxacin Nausea Only and Rash       Review of Systems     Review of Systems   Constitutional:  Positive for appetite change.   Eyes: Negative.    Respiratory: Negative.     Cardiovascular: Negative.    Gastrointestinal:  Positive for abdominal distention.   Endocrine: Negative.    Genitourinary: Negative.    Musculoskeletal:  Positive for arthralgias, back pain and gait problem.   Skin:  Positive for pallor.   Allergic/Immunologic: Negative.    Neurological:  Positive for weakness.   Psychiatric/Behavioral:  Positive for confusion and decreased concentration.        PHQ-2/9 Depression Screening             Medications and orders       Current Outpatient Medications:     acetaminophen (TYLENOL) 325 mg tablet, Take 2 tablets (650 mg total) by mouth 3 (three) times a day, Disp: 540 tablet, Rfl: 3    aspirin 81 mg chewable tablet, Chew 1 tablet (81 mg total) daily, Disp: 30 tablet, Rfl: 0    atorvastatin (LIPITOR) 10 mg tablet, TAKE ONE TABLET BY MOUTH EVERY DAY, Disp: 90 tablet, Rfl: 1    ceFAZolin (ANCEF) 2000 mg IVPB, Inject 2,000 mg into a catheter in a vein over 30 minutes at 100 mL/hr every 12 (twelve) hours, Disp: 3600 mL, Rfl: 0    Cholecalciferol (VITAMIN D3) 1000 units CAPS, Take 1 capsule by mouth daily, Disp: , Rfl:     DULoxetine (CYMBALTA) 60 mg delayed release capsule, TAKE ONE CAPSULE BY MOUTH ONCE DAILY, Disp: 30 capsule, Rfl: 5    furosemide (LASIX) 20 mg tablet, One tablet daily and an extra 20 mg prn for  wt gain 3 lb/ 24 hours or 5 lb/ 5 days, above a dry wt of 122 lb, Disp: 90 tablet, Rfl: 0    hydroxychloroquine (PLAQUENIL) 200 mg tablet, Take 1 tablet (200 mg total) by mouth daily with breakfast, Disp: 90 tablet, Rfl: 1    Magnesium 250 MG TABS, Take 250 mg by mouth every evening, Disp: , Rfl:     Mirabegron ER (Myrbetriq) 50 MG TB24, Take 1 tablet (50 mg total) by mouth in the morning, Disp: 90 tablet, Rfl: 1    omeprazole (PriLOSEC) 20 mg delayed release capsule, Take 20 mg by mouth daily, Disp: , Rfl:     polyethylene glycol (MIRALAX) 17 g packet, Take 17 g by mouth daily as needed (constipation), Disp: , Rfl:     pregabalin (LYRICA) 75 mg capsule, Take 1 capsule (75 mg total) by mouth 2 (two) times a day for 10 days, Disp: 20 capsule, Rfl: 0    pyridoxine (B-6) 100 MG tablet, Take 100 mg by mouth daily, Disp: , Rfl:     senna (SENOKOT) 8.6 mg, Take 1 tablet (8.6 mg total) by mouth daily at bedtime, Disp: , Rfl:     Sodium Fluoride (PreviDent 5000 Booster Plus) 1.1 % PSTE, Apply on teeth every evening as directed, Disp: 100 mL, Rfl: 3       Objective     Vitals:   Vitals:    04/17/25 1353   BP: 98/52   Pulse: 82   Resp: 16   Temp: (!) 97 °F (36.1 °C)       Physical Exam  Constitutional:       Comments: Peers to be in discomfort she did answer some of my questions appropriately.   HENT:      Head: Atraumatic.      Right Ear: External ear normal.      Left Ear: External ear normal.      Nose: Nose normal.      Mouth/Throat:      Mouth: Mucous membranes are dry.   Eyes:      Pupils: Pupils are equal, round, and reactive to light.   Cardiovascular:      Rate and Rhythm: Normal rate and regular rhythm.      Pulses: Normal pulses.   Pulmonary:      Effort: Pulmonary effort is normal.      Breath sounds: Normal breath sounds.   Abdominal:      General: Bowel sounds are normal. There is distension.      Comments: Mild distention of abdomen noted   Musculoskeletal:      Cervical back: Neck supple.      Comments:  Patient noted to have some swelling in her hands   Skin:     General: Skin is dry.   Neurological:      Mental Status: Mental status is at baseline.         Pertinent Laboratory/Diagnostic Studies:   The following labs/studies were reviewed please see facility chart for details.

## 2025-04-18 ENCOUNTER — TELEPHONE (OUTPATIENT)
Age: OVER 89
End: 2025-04-18

## 2025-04-18 NOTE — TELEPHONE ENCOUNTER
Pt Spouse Weston called regarding pt Cefazolin while pt is at [facility]. The provider there has suggested that she stop the IV abx and switch over to oral Cefazolin.       Weston would like a cb

## 2025-04-21 NOTE — TELEPHONE ENCOUNTER
Pt  calling in and states that he plans to take pt home in the next couple of days.They are unhappy with the facility and wondering how to go about services once she is home for abx plan. Please advise

## 2025-04-22 ENCOUNTER — NURSING HOME VISIT (OUTPATIENT)
Dept: GERIATRICS | Facility: OTHER | Age: OVER 89
End: 2025-04-22
Payer: MEDICARE

## 2025-04-22 DIAGNOSIS — I50.9 CONGESTIVE HEART FAILURE, UNSPECIFIED HF CHRONICITY, UNSPECIFIED HEART FAILURE TYPE (HCC): ICD-10-CM

## 2025-04-22 DIAGNOSIS — E44.0 MODERATE PROTEIN-CALORIE MALNUTRITION (HCC): ICD-10-CM

## 2025-04-22 DIAGNOSIS — Z78.9: ICD-10-CM

## 2025-04-22 DIAGNOSIS — B95.61 MSSA BACTEREMIA: Primary | ICD-10-CM

## 2025-04-22 DIAGNOSIS — R53.81 DEBILITY: ICD-10-CM

## 2025-04-22 DIAGNOSIS — R13.19 ESOPHAGEAL DYSPHAGIA: Chronic | ICD-10-CM

## 2025-04-22 DIAGNOSIS — N18.4 CHRONIC KIDNEY DISEASE, STAGE IV (SEVERE) (HCC): ICD-10-CM

## 2025-04-22 DIAGNOSIS — R78.81 MSSA BACTEREMIA: Primary | ICD-10-CM

## 2025-04-22 PROCEDURE — 99310 SBSQ NF CARE HIGH MDM 45: CPT | Performed by: NURSE PRACTITIONER

## 2025-04-22 NOTE — ASSESSMENT & PLAN NOTE
As per 's wishes, will discontinue IV cefazolin and transitioned to oral cefadroxil 500 mg every 12 hours x 2 weeks in collaboration with ID as patient will be discharged home today with [facility]

## 2025-04-22 NOTE — ASSESSMENT & PLAN NOTE
With severe esophageal dysmotility  Currently on dysphagia 1 diet with thin liquids  Continue aspiration precautions  Patient was receiving speech therapy at SNF

## 2025-04-22 NOTE — LETTER
April 22, 2025     Gregory Santoro DO  [address] [place] [id number]    Patient: Tanya Stephen   YOB: 1934   Date of Visit: 4/22/2025       Dear Dr. Gregory Santoro DO:    Thank you for referring Tanya Stephen to me for evaluation. Below are my notes for this consultation.    If you have questions, please do not hesitate to call me. I look forward to following your patient along with you.         Sincerely,        JOSE G Arango        CC: No Recipients    JOSE G Arango  4/22/2025  2:32 PM  [address] [address] [phone]  DISCHARGE SUMMARY  POS: 31  Facility: [facility]    NAME: Tanya Stephen  AGE: 89 y.o. SEX: female  DATE OF ADMISSION: 3/28/2025 DATE OF DISCHARGE: 4/22/2025 DISCHARGE DISPOSITION: Home  Code status: DNR   Reason for admission: Patient was admitted from [facility] for rehabilitation and IV antibiotics after hospitalization at [facility] for severe sepsis and MSSA bacteremia  Additional Problems:   Past Medical History:   Diagnosis Date   • Anemia    • Arthritis    • Back pain    • CHF (congestive heart failure) ([facility])    • Chronic kidney disease     Stage 3b   • Confusion 02/22/2023   • Diabetes ([facility])    • Diabetes mellitus ([facility])    • Diabetic gastroparesis associated with type 2 diabetes mellitus  ([facility])    • Diabetic polyneuropathy ([facility])    • Diverticulosis    • Herpes zoster    • Hypertension    • IBS (irritable bowel syndrome)    • Neurogenic claudication due to lumbar spinal stenosis    • Osteoarthritis    • Osteoporosis    • Pulmonary edema    • Raynaud's phenomenon without gangrene    • Seronegative arthropathy of multiple sites ([facility])    • Sicca ([facility])      Discharge Diagnoses: See problem list follow up recommendations below.    Course of stay: Patient was admitted to [facility] for rehabilitation and IV antibiotics following  hospitalization for above-mentioned.  Received patient resting in bed.  Patient was awake and appears very weak.  Patient struggled to speak but she voiced that she is having a difficult time swallowing and feels very tired.  Bilateral upper extremity tremors along with head tremors when speaking noted.  No respiratory distress.  Oxygen is on via nasal cannula.  Although she has bilateral upper extremity edema, she appears dry with very poor skin turgor.  [facility] wound team and [facility] wound CRNP has been managing skin tear of left lower leg and skin tear of right forearm.  I spoke to [facility] representative this morning with social service and Dr. Ma present to discuss discharge plans.  Patient's  would like patient to be discharged home possibly today under [facility] hospice.  [facility] hospice will take care of of medical equipment needed in the home.  Social service will take care of transportation.  Collaborated with MARK Abernathy PA-C regarding antibiotic therapy; will discontinue IV cefazolin and order cefadroxil 500 mg every 12 hours p.o. x 2 weeks.  Provided antibiotic prescription to [facility].  During the resident's stay at [facility], she received PT/OT, dietitian support, social service support, and medical management.  Patient's prognosis is very poor in setting of hospitalization in March with severe sepsis and MSSA bacteremia, severe physical deconditioning, dysphagia with poor oral intake.     Labs and testing performed during stay: CBC, BMP    New Medications: Oral cefadroxil to start at home in place of IV cefazolin  Discontinued Medications: IV cefazolin  Discharge Medications: See discharge medication list which was reviewed in [facility] and compared to facility orders for accuracy.  Status at time of discharge exam: Stable    Today's Visit: 4/22/202511:18 AM    Subjective: Feels tired    Review of systems: As per review of medical illness, all other  systems reviewed and negative.    Vitals: Weight: 128.8 pounds    BP: 96/58    Temp: 97.8     HR: 62    Resp: 20    Exam: Physical Exam  Vitals and nursing note reviewed.   Constitutional:       General: She is not in acute distress.     Appearance: She is ill-appearing. She is not toxic-appearing or diaphoretic.      Comments: Frail and thin elderly female who appears chronically ill.   HENT:      Head: Normocephalic.      Nose: No congestion.      Mouth/Throat:      Mouth: Mucous membranes are dry.   Cardiovascular:      Rate and Rhythm: Normal rate and regular rhythm.   Pulmonary:      Effort: Pulmonary effort is normal. No respiratory distress.   Abdominal:      General: Bowel sounds are normal. There is no distension.      Palpations: Abdomen is soft.      Tenderness: There is no abdominal tenderness. There is no guarding.   Musculoskeletal:      Cervical back: Neck supple.      Comments: Bilateral upper extremity tremors noted.   Skin:     General: Skin is warm and dry.      Capillary Refill: Capillary refill takes less than 2 seconds.      Findings: Bruising (Right lower extremity.) present.      Comments: With a very sallow color.  Very poor skin turgor.  Dressing noted to left lower extremity.  Bruise right lower extremity.  Scabbed areas noted left upper extremity.   Neurological:      Mental Status: She is alert. Mental status is at baseline.   Psychiatric:         Mood and Affect: Mood normal.         Behavior: Behavior normal.         Thought Content: Thought content normal.       Discussion with patient/family and further instructions:  -Fall precautions  -Aspiration precautions  -Medication list was reviewed  [facility]    Follow-up Recommendations: Please follow-up with your primary care physician within 7-10 days of discharge to review medication changes and current status.     Problem List Follow-up Recommendations:  89-year-old female with:  MSSA bacteremia  As per  's wishes, will discontinue IV cefazolin and transitioned to oral cefadroxil 500 mg every 12 hours x 2 weeks in collaboration with ID as [facility]    Transitioned from acute care to hospice  [facility] hospice consulted as per 's wishes.  [facility] will take of medical equipment needs at home as per conversation via phone    Moderate protein-calorie malnutrition (HCC)  In setting of acute and chronic medical conditions, poor oral intake.  With weight loss, loss of muscle and subcutaneous tissue  Patient will be transitioning to hospice care    Esophageal dysphagia  With severe esophageal dysmotility  Currently on dysphagia 1 diet with thin liquids  Continue aspiration precautions  Patient was receiving speech therapy at [facility]    CHF (congestive heart failure) ([facility])  Wt Readings from Last 3 Encounters:   04/15/25 58.4 kg (128 lb 12.8 oz)   04/10/25 59.9 kg (132 lb)   04/09/25 58.9 kg (129 lb 12.8 oz)   Echo 3/20/2025 showed EF 50%, normal diastolic function  Patient appears dry  Currently on Lasix 20 mg daily, but not sure if patient has been taking the medications as she reports having a difficult time swallowing medications  Patient will be transitioning to hospice care at home    Chronic kidney disease, stage IV (severe) ([facility])  Lab Results   Component Value Date    EGFR 47 (L) 04/14/2025    EGFR 42 03/28/2025    EGFR 39 03/27/2025    CREATININE 1.10 04/14/2025    CREATININE 1.14 03/28/2025    CREATININE 1.22 03/27/2025   Recent lower creat levels liklely false low due to poor oral inatke    Debility  With very poor prognosis  Patient transitioning to hospice care at home today     I have spent 45 minutes with patient today in which greater than 50% of this time was spent in counseling/coordination of care regarding Diagnostic results, Patient and family education, Counseling / Coordination of care, Documenting in the medical record, Reviewing/placing  orders in the medical record (including tests, medications, and/or procedures), Obtaining or reviewing history  , and Communicating with other healthcare professionals .    PCP made aware of discharge summary via epic communications.    This note was completed in part utilizing Dragon Medical one voice recognition software.  Grammatical errors, random word insertion, spelling mistakes, and incomplete sentences may be an occasional consequence of the system secondary to software limitations, ambient noise and hardware issues.  At the time of dictation, efforts were made to edit, clarify and/or correct errors.  Please read the chart carefully and recognize, using context, where substitutions have occurred.  If you have any questions or concerns about the context, text or information contained within the body of this dictation, please contact myself, the provider, for further clarification.    JOSE G Arango  4/22/202511:18 AM     JOSE G Arango  4/22/2025 11:18 AM  Sign when Signing Visit  [address] [address]  [phone]  DISCHARGE SUMMARY  POS: 31  Facility: [facility]    NAME: Tanya Stephen  AGE: 89 y.o. SEX: female  DATE OF ADMISSION: 3/28/2025 DATE OF DISCHARGE: 4/22/2025 DISCHARGE DISPOSITION: Home  Code status: DNR   Reason for admission: Patient was admitted from [facility] for rehabilitation and IV antibiotics after hospitalization at [facility] for severe sepsis and MSSA bacteremia  Additional Problems:   Past Medical History:   Diagnosis Date   • Anemia    • Arthritis    • Back pain    • CHF (congestive heart failure) (HCC)    • Chronic kidney disease     Stage 3b   • Confusion 02/22/2023   • Diabetes (HCC)    • Diabetes mellitus (HCC)    • Diabetic gastroparesis associated with type 2 diabetes mellitus  (HCC)    • Diabetic polyneuropathy (HCC)    • Diverticulosis    • Herpes zoster    • Hypertension     • IBS (irritable bowel syndrome)    • Neurogenic claudication due to lumbar spinal stenosis    • Osteoarthritis    • Osteoporosis    • Pulmonary edema    • Raynaud's phenomenon without gangrene    • Seronegative arthropathy of multiple sites (HCC)    • Sicca (HCC)      Discharge Diagnoses: See problem list follow up recommendations below.    Course of stay: Patient was admitted to [facility] for rehabilitation and IV antibiotics following hospitalization for above-mentioned.  Received patient resting in bed.  Patient was awake and appears very weak.  Patient struggled to speak but she voiced that she is having a difficult time swallowing and feels very tired.  Bilateral upper extremity tremors along with head tremors when speaking noted.  No respiratory distress.  Oxygen is on via nasal cannula.  Although she has bilateral upper extremity edema, she appears dry with very poor skin turgor.  [facility] wound team and [facility] wound CRNP has been managing skin tear of left lower leg and skin tear of right forearm.  I spoke to [facility] representative this morning with social service and Dr. Ma present to discuss discharge plans.  Patient's  would like patient to be discharged home possibly today under [facility] hospice.  [facility] hospice will take care of of medical equipment needed in the home.  Social service will take care of transportation.  Collaborated with MARK Abernathy PA-C regarding antibiotic therapy; will discontinue IV cefazolin and order cefadroxil 500 mg every 12 hours p.o. x 2 weeks.  Provided antibiotic prescription to [facility].  During the resident's stay at [facility], she received PT/OT, dietitian support, social service support, and medical management.  Patient's prognosis is very poor in setting of hospitalization in March with severe sepsis and MSSA bacteremia, severe physical deconditioning, dysphagia with poor oral intake.     Labs and  testing performed during stay: CBC, BMP    New Medications: Oral cefadroxil to start at home in place of IV cefazolin  Discontinued Medications: IV cefazolin  Discharge Medications: See discharge medication list which was reviewed in [facility] and compared to facility orders for accuracy.  Status at time of discharge exam: Stable    Today's Visit: 4/22/202511:18 AM    Subjective: Feels tired    Review of systems: As per review of medical illness, all other systems reviewed and negative.    Vitals: Weight: 128.8 pounds    BP: 96/58    Temp: 97.8     HR: 62    Resp: 20    Exam: Physical Exam  Vitals and nursing note reviewed.   Constitutional:       General: She is not in acute distress.     Appearance: She is ill-appearing. She is not toxic-appearing or diaphoretic.      Comments: Frail and thin elderly female who appears chronically ill.   HENT:      Head: Normocephalic.      Nose: No congestion.      Mouth/Throat:      Mouth: Mucous membranes are dry.   Cardiovascular:      Rate and Rhythm: Normal rate and regular rhythm.   Pulmonary:      Effort: Pulmonary effort is normal. No respiratory distress.   Abdominal:      General: Bowel sounds are normal. There is no distension.      Palpations: Abdomen is soft.      Tenderness: There is no abdominal tenderness. There is no guarding.   Musculoskeletal:      Cervical back: Neck supple.      Comments: Bilateral upper extremity tremors noted.   Skin:     General: Skin is warm and dry.      Capillary Refill: Capillary refill takes less than 2 seconds.      Findings: Bruising (Right lower extremity.) present.      Comments: With a very sallow color.  Very poor skin turgor.  Dressing noted to left lower extremity.  Bruise right lower extremity.  Scabbed areas noted left upper extremity.   Neurological:      Mental Status: She is alert. Mental status is at baseline.   Psychiatric:         Mood and Affect: Mood normal.         Behavior: Behavior normal.         Thought Content:  Thought content normal.       Discussion with patient/family and further instructions:  -Fall precautions  -Aspiration precautions  -Medication list was reviewed  -[facility] hospice discharge planning    Follow-up Recommendations: Please follow-up with your primary care physician within 7-10 days of discharge to review medication changes and current status.     Problem List Follow-up Recommendations:  89-year-old female with:  MSSA bacteremia  As per 's wishes, will discontinue IV cefazolin and transitioned to oral cefadroxil 500 mg every 12 hours x 2 weeks in collaboration with ID as patient will be discharged home today with [facility] hosp[facility]    Transitioned from acute care to hospice  [facility] hospice consulted as per 's wishes.  [facility] will take of medical equipment needs at home as per conversation via phone    Moderate protein-calorie malnutrition (HCC)  In setting of acute and chronic medical conditions, poor oral intake.  With weight loss, loss of muscle and subcutaneous tissue  Patient will be transitioning to hospice care    Esophageal dysphagia  With severe esophageal dysmotility  Currently on dysphagia 1 diet with thin liquids  Continue aspiration precautions  Patient was receiving speech therapy at [facility]    CHF (congestive heart failure) ([facility])  Wt Readings from Last 3 Encounters:   04/15/25 58.4 kg (128 lb 12.8 oz)   04/10/25 59.9 kg (132 lb)   04/09/25 58.9 kg (129 lb 12.8 oz)   Echo 3/20/2025 showed EF 50%, normal diastolic function  Patient appears dry  Currently on Lasix 20 mg daily, but not sure if patient has been taking the medications as she reports having a difficult time swallowing medications  Patient will be transitioning to hospice care at home    Chronic kidney disease, stage IV (severe) ([facility])  Lab Results   Component Value Date    EGFR 47 (L) 04/14/2025    EGFR 42 03/28/2025    EGFR 39 03/27/2025    CREATININE 1.10 04/14/2025    CREATININE 1.14 03/28/2025    CREATININE  1.22 03/27/2025   Recent lower creat levels liklely false low due to poor oral inatke     I have spent >30 minutes with patient today in which greater than 50% of this time was spent in counseling/coordination of care regarding Diagnostic results, Patient and family education, Counseling / Coordination of care, Documenting in the medical record, Reviewing/placing orders in the medical record (including tests, medications, and/or procedures), Obtaining or reviewing history  , and Communicating with other healthcare professionals .    PCP made aware of discharge summary via epic communications.    This note was completed in part utilizing Dragon Medical one voice recognition software.  Grammatical errors, random word insertion, spelling mistakes, and incomplete sentences may be an occasional consequence of the system secondary to software limitations, ambient noise and hardware issues.  At the time of dictation, efforts were made to edit, clarify and/or correct errors.  Please read the chart carefully and recognize, using context, where substitutions have occurred.  If you have any questions or concerns about the context, text or information contained within the body of this dictation, please contact myself, the provider, for further clarification.    JOSE G Arango  4/22/202511:18 AM

## 2025-04-22 NOTE — ASSESSMENT & PLAN NOTE
Lab Results   Component Value Date    EGFR 47 (L) 04/14/2025    EGFR 42 03/28/2025    EGFR 39 03/27/2025    CREATININE 1.10 04/14/2025    CREATININE 1.14 03/28/2025    CREATININE 1.22 03/27/2025   Recent lower creat levels liklely false low due to poor oral inatke

## 2025-04-22 NOTE — PROGRESS NOTES
[facility]  [address] [address]  [phone]  DISCHARGE SUMMARY  POS: 31  Facility: [facility]    NAME: Tanya Stephen  AGE: 89 y.o. SEX: female  DATE OF ADMISSION: 3/28/2025 DATE OF DISCHARGE: 4/22/2025 DISCHARGE DISPOSITION: Home  Code status: DNR   Reason for admission: Patient was admitted from [facility] for rehabilitation and IV antibiotics after hospitalization at [facility] for severe sepsis and MSSA bacteremia  Additional Problems:   Past Medical History:   Diagnosis Date    Anemia     Arthritis     Back pain     CHF (congestive heart failure) ([facility])     Chronic kidney disease     Stage 3b    Confusion 02/22/2023    Diabetes ([facility])     Diabetes mellitus ([facility])     Diabetic gastroparesis associated with type 2 diabetes mellitus  ([facility])     Diabetic polyneuropathy ([facility])     Diverticulosis     Herpes zoster     Hypertension     IBS (irritable bowel syndrome)     Neurogenic claudication due to lumbar spinal stenosis     Osteoarthritis     Osteoporosis     Pulmonary edema     Raynaud's phenomenon without gangrene     Seronegative arthropathy of multiple sites ([facility])     Sicca ([facility])      Discharge Diagnoses: See problem list follow up recommendations below.    Course of stay: Patient was admitted to [facility] for rehabilitation and IV antibiotics following hospitalization for above-mentioned.  Received patient resting in bed.  Patient was awake and appears very weak.  Patient struggled to speak but she voiced that she is having a difficult time swallowing and feels very tired.  Bilateral upper extremity tremors along with head tremors when speaking noted.  No respiratory distress.  Oxygen is on via nasal cannula.  Although she has bilateral upper extremity edema, she appears dry with very poor skin turgor.  [facility] wound team and [facility] wound CRNP has been managing skin tear of left lower leg and skin tear of right forearm.  I spoke to  [facility] representative this morning with social service and Dr. Ma present to discuss discharge plans.  Patient's  would like patient to be discharged home under [facility] hospice.  [facility] hospice will take care of of medical equipment needed in the home.  Social service will take care of transportation.  Collaborated with MARK Abernathy PA-C regarding antibiotic therapy; will discontinue IV cefazolin and order cefadroxil 500 mg every 12 hours p.o. x 2 weeks.  Provided antibiotic prescription to [facility].  PICC line was removed by patient's nurse.  During the resident's stay at [facility], she received PT/OT, dietitian support, social service support, and medical management.  Patient's prognosis is very poor in setting of hospitalization in March with severe sepsis and MSSA bacteremia, severe physical deconditioning, dysphagia with poor oral intake.  Patient is scheduled to be discharged home today around 3 PM and she will transition to hospice care as discussed.    Labs and testing performed during stay: CBC, BMP    New Medications: Oral cefadroxil to start at home in place of IV cefazolin  Discontinued Medications: IV cefazolin  Discharge Medications: See discharge medication list which was reviewed in [email or web address] and compared to facility orders for accuracy.  Status at time of discharge exam: Stable    Today's Visit: 4/22/202511:18 AM    Subjective: Feels tired    Review of systems: As per review of medical illness, all other systems reviewed and negative.    Vitals: Weight: 128.8 pounds    BP: 96/58    Temp: 97.8     HR: 62    Resp: 20    Exam: Physical Exam  Vitals and nursing note reviewed.   Constitutional:       General: She is not in acute distress.     Appearance: She is ill-appearing. She is not toxic-appearing or diaphoretic.      Comments: Frail and thin elderly female who appears chronically ill.   HENT:      Head: Normocephalic.      Nose: No congestion.       Mouth/Throat:      Mouth: Mucous membranes are dry.   Cardiovascular:      Rate and Rhythm: Normal rate and regular rhythm.   Pulmonary:      Effort: Pulmonary effort is normal. No respiratory distress.   Abdominal:      General: Bowel sounds are normal. There is no distension.      Palpations: Abdomen is soft.      Tenderness: There is no abdominal tenderness. There is no guarding.   Musculoskeletal:      Cervical back: Neck supple.      Comments: Bilateral upper extremity tremors noted.   Skin:     General: Skin is warm and dry.      Capillary Refill: Capillary refill takes less than 2 seconds.      Findings: Bruising (Right lower extremity.) present.      Comments: With a very sallow color.  Very poor skin turgor.  Dressing noted to left lower extremity.  Bruise right lower extremity.  Scabbed areas noted left upper extremity.   Neurological:      Mental Status: She is alert. Mental status is at baseline.   Psychiatric:         Mood and Affect: Mood normal.         Behavior: Behavior normal.         Thought Content: Thought content normal.       Discussion with patient/family and further instructions:  -Fall precautions  -Aspiration precautions  -Medication list was reviewed  -[facility] hospice discharge planning    Follow-up Recommendations: Please follow-up with your primary care physician within 7-10 days of discharge to review medication changes and current status.     Problem List Follow-up Recommendations:  89-year-old female with:  MSSA bacteremia  As per 's wishes, will discontinue IV cefazolin and transitioned to oral cefadroxil 500 mg every 12 hours x 2 weeks in collaboration with ID as patient will be discharged home today with [facility] hospice    Transitioned from acute care to hospice  [facility] hospice consulted as per 's wishes.  [facility] will take of medical equipment needs at home as per conversation via phone    Moderate protein-calorie malnutrition (HCC)  In setting of acute  and chronic medical conditions, poor oral intake.  With weight loss, loss of muscle and subcutaneous tissue  Patient will be transitioning to hospice care    Esophageal dysphagia  With severe esophageal dysmotility  Currently on dysphagia 1 diet with thin liquids  Continue aspiration precautions  Patient was receiving speech therapy at [facility]    CHF (congestive heart failure) ([facility])  Wt Readings from Last 3 Encounters:   04/15/25 58.4 kg (128 lb 12.8 oz)   04/10/25 59.9 kg (132 lb)   04/09/25 58.9 kg (129 lb 12.8 oz)   Echo 3/20/2025 showed EF 50%, normal diastolic function  Patient appears dry  Currently on Lasix 20 mg daily, but not sure if patient has been taking the medications as she reports having a difficult time swallowing medications  Patient will be transitioning to hospice care at home    Chronic kidney disease, stage IV (severe) ([facility])  Lab Results   Component Value Date    EGFR 47 (L) 04/14/2025    EGFR 42 03/28/2025    EGFR 39 03/27/2025    CREATININE 1.10 04/14/2025    CREATININE 1.14 03/28/2025    CREATININE 1.22 03/27/2025   Recent lower creat levels liklely false low due to poor oral inatke    Debility  With very poor prognosis  Patient transitioning to hospice care at home today     I have spent 45 minutes with patient today in which greater than 50% of this time was spent in counseling/coordination of care regarding Diagnostic results, Patient and family education, Counseling / Coordination of care, Documenting in the medical record, Reviewing/placing orders in the medical record (including tests, medications, and/or procedures), Obtaining or reviewing history  , and Communicating with other healthcare professionals .    PCP made aware of discharge summary via epic communications.    This note was completed in part utilizing Dragon Medical one voice recognition software.  Grammatical errors, random word insertion, spelling mistakes, and incomplete sentences may be an occasional consequence of the  system secondary to software limitations, ambient noise and hardware issues.  At the time of dictation, efforts were made to edit, clarify and/or correct errors.  Please read the chart carefully and recognize, using context, where substitutions have occurred.  If you have any questions or concerns about the context, text or information contained within the body of this dictation, please contact myself, the provider, for further clarification.    JOSE G Arango  4/22/202511:18 AM

## 2025-04-22 NOTE — ASSESSMENT & PLAN NOTE
[facility] hospice consulted as per 's wishes.  [facility] will take of medical equipment needs at home as per conversation via phone

## 2025-04-22 NOTE — LETTER
April 22, 2025     Gregory Santoro DO  [address] [place] [id number]    Patient: Tanya Stephen   YOB: 1934   Date of Visit: 4/22/2025       Dear Dr. Gregory Santoro DO:    Thank you for referring Tanya Stephen to me for evaluation. Below are my notes for this consultation.    If you have questions, please do not hesitate to call me. I look forward to following your patient along with you.         Sincerely,        JOSE G Arango        CC: No Recipients    JOSE G Arango  4/22/2025  2:32 PM  Sign when Signing Visit  [place] [address]  [phone]  DISCHARGE SUMMARY  POS: 31  Facility: [facility]    NAME: Tanya Stephen  AGE: 89 y.o. SEX: female  DATE OF ADMISSION: 3/28/2025 DATE OF DISCHARGE: 4/22/2025 DISCHARGE DISPOSITION: Home  Code status: DNR   Reason for admission: Patient was admitted from [facility] for rehabilitation and IV antibiotics after hospitalization at [facility] for severe sepsis and MSSA bacteremia  Additional Problems:   Past Medical History:   Diagnosis Date   • Anemia    • Arthritis    • Back pain    • CHF (congestive heart failure) ([facility])    • Chronic kidney disease     Stage 3b   • Confusion 02/22/2023   • Diabetes ([facility])    • Diabetes mellitus ([facility])    • Diabetic gastroparesis associated with type 2 diabetes mellitus  ([facility])    • Diabetic polyneuropathy ([facility])    • Diverticulosis    • Herpes zoster    • Hypertension    • IBS (irritable bowel syndrome)    • Neurogenic claudication due to lumbar spinal stenosis    • Osteoarthritis    • Osteoporosis    • Pulmonary edema    • Raynaud's phenomenon without gangrene    • Seronegative arthropathy of multiple sites ([facility])    • Sicca ([facility])      Discharge Diagnoses: See problem list follow up recommendations below.    Course of stay: Patient was admitted to [facility] for rehabilitation and IV antibiotics  following hospitalization for above-mentioned.  Received patient resting in bed.  Patient was awake and appears very weak.  Patient struggled to speak but she voiced that she is having a difficult time swallowing and feels very tired.  Bilateral upper extremity tremors along with head tremors when speaking noted.  No respiratory distress.  Oxygen is on via nasal cannula.  Although she has bilateral upper extremity edema, she appears dry with very poor skin turgor.  [facility] wound team and [facility]'s wound CRNP has been managing skin tear of left lower leg and skin tear of right forearm.  I spoke to [facility] representative this morning with social service and Dr. Ma present to discuss discharge plans.  Patient's  would like patient to be discharged home possibly today under [facility] hospice.  [facility] hospice will take care of of medical equipment needed in the home.  Social service will take care of transportation.  Collaborated with MARK Abernathy PA-C regarding antibiotic therapy; will discontinue IV cefazolin and order cefadroxil 500 mg every 12 hours p.o. x 2 weeks.  Provided antibiotic prescription to [facility].  PICC line was removed by patient's nurse.  During the resident's stay at [facility], she received PT/OT, dietitian support, social service support, and medical management.  Patient's prognosis is very poor in setting of hospitalization in March with severe sepsis and MSSA bacteremia, severe physical deconditioning, dysphagia with poor oral intake.     Labs and testing performed during stay: CBC, BMP    New Medications: Oral cefadroxil to start at home in place of IV cefazolin  Discontinued Medications: IV cefazolin  Discharge Medications: See discharge medication list which was reviewed in [facility] and compared to facility orders for accuracy.  Status at time of discharge exam: Stable    Today's Visit: 4/22/202511:18 AM    Subjective: Feels tired    Review of  systems: As per review of medical illness, all other systems reviewed and negative.    Vitals: Weight: 128.8 pounds    BP: 96/58    Temp: 97.8     HR: 62    Resp: 20    Exam: Physical Exam  Vitals and nursing note reviewed.   Constitutional:       General: She is not in acute distress.     Appearance: She is ill-appearing. She is not toxic-appearing or diaphoretic.      Comments: Frail and thin elderly female who appears chronically ill.   HENT:      Head: Normocephalic.      Nose: No congestion.      Mouth/Throat:      Mouth: Mucous membranes are dry.   Cardiovascular:      Rate and Rhythm: Normal rate and regular rhythm.   Pulmonary:      Effort: Pulmonary effort is normal. No respiratory distress.   Abdominal:      General: Bowel sounds are normal. There is no distension.      Palpations: Abdomen is soft.      Tenderness: There is no abdominal tenderness. There is no guarding.   Musculoskeletal:      Cervical back: Neck supple.      Comments: Bilateral upper extremity tremors noted.   Skin:     General: Skin is warm and dry.      Capillary Refill: Capillary refill takes less than 2 seconds.      Findings: Bruising (Right lower extremity.) present.      Comments: With a very sallow color.  Very poor skin turgor.  Dressing noted to left lower extremity.  Bruise right lower extremity.  Scabbed areas noted left upper extremity.   Neurological:      Mental Status: She is alert. Mental status is at baseline.   Psychiatric:         Mood and Affect: Mood normal.         Behavior: Behavior normal.         Thought Content: Thought content normal.       Discussion with patient/family and further instructions:  -Fall precautions  -Aspiration precautions  -Medication list was reviewed  -[facility] hospice discharge planning    Follow-up Recommendations: Please follow-up with your primary care physician within 7-10 days of discharge to review medication changes and current status.     Problem List Follow-up  Recommendations:  89-year-old female with:  MSSA bacteremia  As per 's wishes, will discontinue IV cefazolin and transitioned to oral cefadroxil 500 mg every 12 hours x 2 weeks in collaboration with ID as patient will be discharged home today with [facility] hospice    Transitioned from acute care to hospice  [facility] hospice consulted as per 's wishes.  [facility] will take of medical equipment needs at home as per conversation via phone    Moderate protein-calorie malnutrition (HCC)  In setting of acute and chronic medical conditions, poor oral intake.  With weight loss, loss of muscle and subcutaneous tissue  Patient will be transitioning to hospice care    Esophageal dysphagia  With severe esophageal dysmotility  Currently on dysphagia 1 diet with thin liquids  Continue aspiration precautions  Patient was receiving speech therapy at [facility]    CHF (congestive heart failure) ([facility])  Wt Readings from Last 3 Encounters:   04/15/25 58.4 kg (128 lb 12.8 oz)   04/10/25 59.9 kg (132 lb)   04/09/25 58.9 kg (129 lb 12.8 oz)   Echo 3/20/2025 showed EF 50%, normal diastolic function  Patient appears dry  Currently on Lasix 20 mg daily, but not sure if patient has been taking the medications as she reports having a difficult time swallowing medications  Patient will be transitioning to hospice care at home    Chronic kidney disease, stage IV (severe) ([facility])  Lab Results   Component Value Date    EGFR 47 (L) 04/14/2025    EGFR 42 03/28/2025    EGFR 39 03/27/2025    CREATININE 1.10 04/14/2025    CREATININE 1.14 03/28/2025    CREATININE 1.22 03/27/2025   Recent lower creat levels liklely false low due to poor oral inatke    Debility  With very poor prognosis  Patient transitioning to hospice care at home today     I have spent 45 minutes with patient today in which greater than 50% of this time was spent in counseling/coordination of care regarding Diagnostic results, Patient and family education, Counseling /  Coordination of care, Documenting in the medical record, Reviewing/placing orders in the medical record (including tests, medications, and/or procedures), Obtaining or reviewing history  , and Communicating with other healthcare professionals .    PCP made aware of discharge summary via epic communications.    This note was completed in part utilizing Dragon Medical one voice recognition software.  Grammatical errors, random word insertion, spelling mistakes, and incomplete sentences may be an occasional consequence of the system secondary to software limitations, ambient noise and hardware issues.  At the time of dictation, efforts were made to edit, clarify and/or correct errors.  Please read the chart carefully and recognize, using context, where substitutions have occurred.  If you have any questions or concerns about the context, text or information contained within the body of this dictation, please contact myself, the provider, for further clarification.    JOSE G Arango  4/22/202511:18 AM

## 2025-04-22 NOTE — ASSESSMENT & PLAN NOTE
Wt Readings from Last 3 Encounters:   04/15/25 58.4 kg (128 lb 12.8 oz)   04/10/25 59.9 kg (132 lb)   04/09/25 58.9 kg (129 lb 12.8 oz)   Echo 3/20/2025 showed EF 50%, normal diastolic function  Patient appears dry  Currently on Lasix 20 mg daily, but not sure if patient has been taking the medications as she reports having a difficult time swallowing medications  Patient will be transitioning to hospice care at home           chest pain

## 2025-04-22 NOTE — ASSESSMENT & PLAN NOTE
In setting of acute and chronic medical conditions, poor oral intake.  With weight loss, loss of muscle and subcutaneous tissue  Patient will be transitioning to hospice care

## 2025-04-28 ENCOUNTER — TELEPHONE (OUTPATIENT)
Dept: INFECTIOUS DISEASES | Facility: CLINIC | Age: OVER 89
End: 2025-04-28

## 2025-04-28 NOTE — TELEPHONE ENCOUNTER
Called and spoke with patients  Weston today.   Informed Weston I was calling as patient has virtual appointment tomorrow. Weston states he would like appointment cancelled. Weston states patient is admitted to hospice care. Weston states since patient is home all her medications have been stopped and she is just receiving comfort care medications.   Patient appointment cancelled at this time.

## 2025-05-02 ENCOUNTER — TELEPHONE (OUTPATIENT)
Age: OVER 89
End: 2025-05-02

## 2025-05-02 NOTE — TELEPHONE ENCOUNTER
Pts  canceled the pts 5/15/25 appt via [employer] and was calling to make sure it was canceled. Pt is in hospice